# Patient Record
Sex: MALE | Race: WHITE | Employment: OTHER | URBAN - METROPOLITAN AREA
[De-identification: names, ages, dates, MRNs, and addresses within clinical notes are randomized per-mention and may not be internally consistent; named-entity substitution may affect disease eponyms.]

---

## 2018-02-10 ENCOUNTER — APPOINTMENT (OUTPATIENT)
Dept: NUCLEAR MEDICINE | Age: 73
DRG: 246 | End: 2018-02-10
Attending: EMERGENCY MEDICINE
Payer: MEDICARE

## 2018-02-10 ENCOUNTER — HOSPITAL ENCOUNTER (INPATIENT)
Age: 73
LOS: 5 days | Discharge: HOME HEALTH | DRG: 246 | End: 2018-02-15
Attending: EMERGENCY MEDICINE | Admitting: INTERNAL MEDICINE
Payer: MEDICARE

## 2018-02-10 ENCOUNTER — APPOINTMENT (OUTPATIENT)
Dept: GENERAL RADIOLOGY | Age: 73
DRG: 246 | End: 2018-02-10
Attending: EMERGENCY MEDICINE
Payer: MEDICARE

## 2018-02-10 DIAGNOSIS — R77.8 TROPONIN LEVEL ELEVATED: ICD-10-CM

## 2018-02-10 DIAGNOSIS — N17.9 AKI (ACUTE KIDNEY INJURY) (HCC): Primary | ICD-10-CM

## 2018-02-10 DIAGNOSIS — I48.91 ATRIAL FIBRILLATION WITH RVR (HCC): ICD-10-CM

## 2018-02-10 DIAGNOSIS — I50.9 ACUTE CONGESTIVE HEART FAILURE, UNSPECIFIED CONGESTIVE HEART FAILURE TYPE: ICD-10-CM

## 2018-02-10 DIAGNOSIS — R73.9 HYPERGLYCEMIA: ICD-10-CM

## 2018-02-10 PROBLEM — R00.0 TACHYCARDIA: Status: ACTIVE | Noted: 2018-02-10

## 2018-02-10 LAB
ALBUMIN SERPL-MCNC: 2.7 G/DL (ref 3.4–5)
ALBUMIN/GLOB SERPL: 0.7 {RATIO} (ref 0.8–1.7)
ALP SERPL-CCNC: 171 U/L (ref 45–117)
ALT SERPL-CCNC: 33 U/L (ref 16–61)
ANION GAP SERPL CALC-SCNC: 12 MMOL/L (ref 3–18)
APPEARANCE UR: CLEAR
APTT PPP: 27.7 SEC (ref 23–36.4)
APTT PPP: 94 SEC (ref 23–36.4)
AST SERPL-CCNC: 24 U/L (ref 15–37)
BACTERIA URNS QL MICRO: NORMAL /HPF
BASOPHILS # BLD: 0 K/UL (ref 0–0.06)
BASOPHILS NFR BLD: 0 % (ref 0–2)
BILIRUB SERPL-MCNC: 0.7 MG/DL (ref 0.2–1)
BILIRUB UR QL: NEGATIVE
BNP SERPL-MCNC: 4668 PG/ML (ref 0–900)
BUN SERPL-MCNC: 32 MG/DL (ref 7–18)
BUN/CREAT SERPL: 17 (ref 12–20)
CALCIUM SERPL-MCNC: 8.5 MG/DL (ref 8.5–10.1)
CHLORIDE SERPL-SCNC: 97 MMOL/L (ref 100–108)
CK MB CFR SERPL CALC: 4.1 % (ref 0–4)
CK MB CFR SERPL CALC: 4.4 % (ref 0–4)
CK MB SERPL-MCNC: 4.7 NG/ML (ref 5–25)
CK MB SERPL-MCNC: 6.3 NG/ML (ref 5–25)
CK SERPL-CCNC: 115 U/L (ref 39–308)
CK SERPL-CCNC: 142 U/L (ref 39–308)
CO2 SERPL-SCNC: 25 MMOL/L (ref 21–32)
COLOR UR: YELLOW
CREAT SERPL-MCNC: 1.91 MG/DL (ref 0.6–1.3)
D DIMER PPP FEU-MCNC: 0.57 UG/ML(FEU)
DIFFERENTIAL METHOD BLD: ABNORMAL
EOSINOPHIL # BLD: 0 K/UL (ref 0–0.4)
EOSINOPHIL NFR BLD: 0 % (ref 0–5)
EPITH CASTS URNS QL MICRO: NORMAL /LPF (ref 0–5)
ERYTHROCYTE [DISTWIDTH] IN BLOOD BY AUTOMATED COUNT: 14.4 % (ref 11.6–14.5)
EST. AVERAGE GLUCOSE BLD GHB EST-MCNC: 289 MG/DL
GLOBULIN SER CALC-MCNC: 3.9 G/DL (ref 2–4)
GLUCOSE BLD STRIP.AUTO-MCNC: 342 MG/DL (ref 70–110)
GLUCOSE BLD STRIP.AUTO-MCNC: 468 MG/DL (ref 70–110)
GLUCOSE BLD STRIP.AUTO-MCNC: 516 MG/DL (ref 70–110)
GLUCOSE SERPL-MCNC: 519 MG/DL (ref 74–99)
GLUCOSE SERPL-MCNC: 527 MG/DL (ref 74–99)
GLUCOSE UR STRIP.AUTO-MCNC: >1000 MG/DL
HBA1C MFR BLD: 11.7 % (ref 4.5–5.6)
HCT VFR BLD AUTO: 45.7 % (ref 36–48)
HGB BLD-MCNC: 15.4 G/DL (ref 13–16)
HGB UR QL STRIP: ABNORMAL
INR PPP: 1 (ref 0.8–1.2)
KETONES UR QL STRIP.AUTO: NEGATIVE MG/DL
LEUKOCYTE ESTERASE UR QL STRIP.AUTO: NEGATIVE
LYMPHOCYTES # BLD: 0.4 K/UL (ref 0.9–3.6)
LYMPHOCYTES NFR BLD: 3 % (ref 21–52)
MAGNESIUM SERPL-MCNC: 2.1 MG/DL (ref 1.6–2.6)
MCH RBC QN AUTO: 29.9 PG (ref 24–34)
MCHC RBC AUTO-ENTMCNC: 33.7 G/DL (ref 31–37)
MCV RBC AUTO: 88.7 FL (ref 74–97)
MONOCYTES # BLD: 1.3 K/UL (ref 0.05–1.2)
MONOCYTES NFR BLD: 9 % (ref 3–10)
NEUTS SEG # BLD: 12.3 K/UL (ref 1.8–8)
NEUTS SEG NFR BLD: 88 % (ref 40–73)
NITRITE UR QL STRIP.AUTO: NEGATIVE
PH UR STRIP: 5 [PH] (ref 5–8)
PLATELET # BLD AUTO: 444 K/UL (ref 135–420)
PMV BLD AUTO: 10.5 FL (ref 9.2–11.8)
POTASSIUM SERPL-SCNC: 5.4 MMOL/L (ref 3.5–5.5)
PROT SERPL-MCNC: 6.6 G/DL (ref 6.4–8.2)
PROT UR STRIP-MCNC: 300 MG/DL
PROTHROMBIN TIME: 12.5 SEC (ref 11.5–15.2)
RBC # BLD AUTO: 5.15 M/UL (ref 4.7–5.5)
RBC #/AREA URNS HPF: NORMAL /HPF (ref 0–5)
SODIUM SERPL-SCNC: 134 MMOL/L (ref 136–145)
SP GR UR REFRACTOMETRY: 1.01 (ref 1–1.03)
TROPONIN I SERPL-MCNC: 0.62 NG/ML (ref 0–0.06)
TROPONIN I SERPL-MCNC: 1.25 NG/ML (ref 0–0.06)
UROBILINOGEN UR QL STRIP.AUTO: 0.2 EU/DL (ref 0.2–1)
WBC # BLD AUTO: 14 K/UL (ref 4.6–13.2)
WBC URNS QL MICRO: NORMAL /HPF (ref 0–5)

## 2018-02-10 PROCEDURE — 74011250637 HC RX REV CODE- 250/637: Performed by: INTERNAL MEDICINE

## 2018-02-10 PROCEDURE — A9567 TECHNETIUM TC-99M AEROSOL: HCPCS

## 2018-02-10 PROCEDURE — 74011000250 HC RX REV CODE- 250: Performed by: EMERGENCY MEDICINE

## 2018-02-10 PROCEDURE — 71045 X-RAY EXAM CHEST 1 VIEW: CPT

## 2018-02-10 PROCEDURE — 85730 THROMBOPLASTIN TIME PARTIAL: CPT | Performed by: INTERNAL MEDICINE

## 2018-02-10 PROCEDURE — 96376 TX/PRO/DX INJ SAME DRUG ADON: CPT

## 2018-02-10 PROCEDURE — 85730 THROMBOPLASTIN TIME PARTIAL: CPT | Performed by: EMERGENCY MEDICINE

## 2018-02-10 PROCEDURE — 93005 ELECTROCARDIOGRAM TRACING: CPT

## 2018-02-10 PROCEDURE — 83880 ASSAY OF NATRIURETIC PEPTIDE: CPT | Performed by: EMERGENCY MEDICINE

## 2018-02-10 PROCEDURE — 74011636637 HC RX REV CODE- 636/637: Performed by: EMERGENCY MEDICINE

## 2018-02-10 PROCEDURE — 74011000250 HC RX REV CODE- 250: Performed by: INTERNAL MEDICINE

## 2018-02-10 PROCEDURE — 74011636637 HC RX REV CODE- 636/637: Performed by: INTERNAL MEDICINE

## 2018-02-10 PROCEDURE — 74011250636 HC RX REV CODE- 250/636

## 2018-02-10 PROCEDURE — 85025 COMPLETE CBC W/AUTO DIFF WBC: CPT | Performed by: EMERGENCY MEDICINE

## 2018-02-10 PROCEDURE — 74011250636 HC RX REV CODE- 250/636: Performed by: INTERNAL MEDICINE

## 2018-02-10 PROCEDURE — 83036 HEMOGLOBIN GLYCOSYLATED A1C: CPT | Performed by: INTERNAL MEDICINE

## 2018-02-10 PROCEDURE — 74011250636 HC RX REV CODE- 250/636: Performed by: EMERGENCY MEDICINE

## 2018-02-10 PROCEDURE — 82947 ASSAY GLUCOSE BLOOD QUANT: CPT

## 2018-02-10 PROCEDURE — 96374 THER/PROPH/DIAG INJ IV PUSH: CPT

## 2018-02-10 PROCEDURE — 82962 GLUCOSE BLOOD TEST: CPT

## 2018-02-10 PROCEDURE — 82550 ASSAY OF CK (CPK): CPT | Performed by: EMERGENCY MEDICINE

## 2018-02-10 PROCEDURE — 96375 TX/PRO/DX INJ NEW DRUG ADDON: CPT

## 2018-02-10 PROCEDURE — 74011250637 HC RX REV CODE- 250/637: Performed by: EMERGENCY MEDICINE

## 2018-02-10 PROCEDURE — 94762 N-INVAS EAR/PLS OXIMTRY CONT: CPT

## 2018-02-10 PROCEDURE — 85379 FIBRIN DEGRADATION QUANT: CPT | Performed by: EMERGENCY MEDICINE

## 2018-02-10 PROCEDURE — 99285 EMERGENCY DEPT VISIT HI MDM: CPT

## 2018-02-10 PROCEDURE — 80053 COMPREHEN METABOLIC PANEL: CPT | Performed by: EMERGENCY MEDICINE

## 2018-02-10 PROCEDURE — 83735 ASSAY OF MAGNESIUM: CPT | Performed by: EMERGENCY MEDICINE

## 2018-02-10 PROCEDURE — 85610 PROTHROMBIN TIME: CPT | Performed by: EMERGENCY MEDICINE

## 2018-02-10 PROCEDURE — 65660000000 HC RM CCU STEPDOWN

## 2018-02-10 PROCEDURE — 36415 COLL VENOUS BLD VENIPUNCTURE: CPT | Performed by: INTERNAL MEDICINE

## 2018-02-10 PROCEDURE — 81001 URINALYSIS AUTO W/SCOPE: CPT | Performed by: INTERNAL MEDICINE

## 2018-02-10 RX ORDER — FUROSEMIDE 10 MG/ML
60 INJECTION INTRAMUSCULAR; INTRAVENOUS 2 TIMES DAILY
Status: DISCONTINUED | OUTPATIENT
Start: 2018-02-10 | End: 2018-02-14

## 2018-02-10 RX ORDER — ADENOSINE 3 MG/ML
12 INJECTION, SOLUTION INTRAVENOUS
Status: COMPLETED | OUTPATIENT
Start: 2018-02-10 | End: 2018-02-10

## 2018-02-10 RX ORDER — ADENOSINE 3 MG/ML
INJECTION, SOLUTION INTRAVENOUS
Status: COMPLETED
Start: 2018-02-10 | End: 2018-02-10

## 2018-02-10 RX ORDER — ACETAMINOPHEN 325 MG/1
650 TABLET ORAL
Status: DISCONTINUED | OUTPATIENT
Start: 2018-02-10 | End: 2018-02-15 | Stop reason: HOSPADM

## 2018-02-10 RX ORDER — DEXTROSE 50 % IN WATER (D50W) INTRAVENOUS SYRINGE
25-50 AS NEEDED
Status: DISCONTINUED | OUTPATIENT
Start: 2018-02-10 | End: 2018-02-15 | Stop reason: HOSPADM

## 2018-02-10 RX ORDER — ADENOSINE 3 MG/ML
6 INJECTION, SOLUTION INTRAVENOUS
Status: COMPLETED | OUTPATIENT
Start: 2018-02-10 | End: 2018-02-10

## 2018-02-10 RX ORDER — ASPIRIN 81 MG/1
81 TABLET ORAL DAILY
Status: DISCONTINUED | OUTPATIENT
Start: 2018-02-11 | End: 2018-02-15 | Stop reason: HOSPADM

## 2018-02-10 RX ORDER — METOPROLOL TARTRATE 5 MG/5ML
5 INJECTION INTRAVENOUS ONCE
Status: COMPLETED | OUTPATIENT
Start: 2018-02-10 | End: 2018-02-10

## 2018-02-10 RX ORDER — METOPROLOL TARTRATE 25 MG/1
25 TABLET, FILM COATED ORAL 2 TIMES DAILY
Status: DISCONTINUED | OUTPATIENT
Start: 2018-02-10 | End: 2018-02-11

## 2018-02-10 RX ORDER — INSULIN LISPRO 100 [IU]/ML
INJECTION, SOLUTION INTRAVENOUS; SUBCUTANEOUS
Status: DISCONTINUED | OUTPATIENT
Start: 2018-02-10 | End: 2018-02-12

## 2018-02-10 RX ORDER — ATORVASTATIN CALCIUM 20 MG/1
20 TABLET, FILM COATED ORAL
Status: DISCONTINUED | OUTPATIENT
Start: 2018-02-10 | End: 2018-02-13

## 2018-02-10 RX ORDER — HEPARIN SODIUM 10000 [USP'U]/100ML
18-36 INJECTION, SOLUTION INTRAVENOUS
Status: DISCONTINUED | OUTPATIENT
Start: 2018-02-10 | End: 2018-02-14

## 2018-02-10 RX ORDER — MAGNESIUM SULFATE 100 %
4 CRYSTALS MISCELLANEOUS AS NEEDED
Status: DISCONTINUED | OUTPATIENT
Start: 2018-02-10 | End: 2018-02-15 | Stop reason: HOSPADM

## 2018-02-10 RX ORDER — INSULIN GLARGINE 100 [IU]/ML
0.2 INJECTION, SOLUTION SUBCUTANEOUS
Status: DISCONTINUED | OUTPATIENT
Start: 2018-02-10 | End: 2018-02-15 | Stop reason: HOSPADM

## 2018-02-10 RX ORDER — HEPARIN SODIUM 1000 [USP'U]/ML
80 INJECTION, SOLUTION INTRAVENOUS; SUBCUTANEOUS ONCE
Status: COMPLETED | OUTPATIENT
Start: 2018-02-10 | End: 2018-02-10

## 2018-02-10 RX ORDER — FUROSEMIDE 10 MG/ML
20 INJECTION INTRAMUSCULAR; INTRAVENOUS DAILY
Status: DISCONTINUED | OUTPATIENT
Start: 2018-02-11 | End: 2018-02-10

## 2018-02-10 RX ORDER — INSULIN LISPRO 100 [IU]/ML
INJECTION, SOLUTION INTRAVENOUS; SUBCUTANEOUS
Status: DISCONTINUED | OUTPATIENT
Start: 2018-02-10 | End: 2018-02-10

## 2018-02-10 RX ORDER — FUROSEMIDE 10 MG/ML
40 INJECTION INTRAMUSCULAR; INTRAVENOUS
Status: COMPLETED | OUTPATIENT
Start: 2018-02-10 | End: 2018-02-10

## 2018-02-10 RX ORDER — GUAIFENESIN 100 MG/5ML
162 LIQUID (ML) ORAL
Status: COMPLETED | OUTPATIENT
Start: 2018-02-10 | End: 2018-02-10

## 2018-02-10 RX ADMIN — METOPROLOL TARTRATE 5 MG: 5 INJECTION, SOLUTION INTRAVENOUS at 14:56

## 2018-02-10 RX ADMIN — FUROSEMIDE 40 MG: 10 INJECTION, SOLUTION INTRAMUSCULAR; INTRAVENOUS at 13:02

## 2018-02-10 RX ADMIN — ADENOSINE 12 MG: 3 INJECTION, SOLUTION INTRAVENOUS at 12:55

## 2018-02-10 RX ADMIN — METOPROLOL TARTRATE 5 MG: 5 INJECTION, SOLUTION INTRAVENOUS at 20:22

## 2018-02-10 RX ADMIN — HUMAN INSULIN 6 UNITS: 100 INJECTION, SOLUTION SUBCUTANEOUS at 13:02

## 2018-02-10 RX ADMIN — ATORVASTATIN CALCIUM 20 MG: 20 TABLET, FILM COATED ORAL at 21:56

## 2018-02-10 RX ADMIN — INSULIN LISPRO 15 UNITS: 100 INJECTION, SOLUTION INTRAVENOUS; SUBCUTANEOUS at 21:57

## 2018-02-10 RX ADMIN — METOPROLOL TARTRATE 25 MG: 25 TABLET ORAL at 20:14

## 2018-02-10 RX ADMIN — INSULIN GLARGINE 15 UNITS: 100 INJECTION, SOLUTION SUBCUTANEOUS at 21:57

## 2018-02-10 RX ADMIN — ADENOSINE 6 MG: 3 INJECTION, SOLUTION INTRAVENOUS at 12:48

## 2018-02-10 RX ADMIN — HEPARIN SODIUM 18 UNITS/KG/HR: 10000 INJECTION, SOLUTION INTRAVENOUS at 14:56

## 2018-02-10 RX ADMIN — HUMAN INSULIN 8 UNITS: 100 INJECTION, SOLUTION SUBCUTANEOUS at 14:19

## 2018-02-10 RX ADMIN — HEPARIN SODIUM 18 UNITS/KG/HR: 10000 INJECTION, SOLUTION INTRAVENOUS at 16:39

## 2018-02-10 RX ADMIN — ASPIRIN 81 MG 162 MG: 81 TABLET ORAL at 14:57

## 2018-02-10 RX ADMIN — FUROSEMIDE 60 MG: 10 INJECTION, SOLUTION INTRAMUSCULAR; INTRAVENOUS at 20:12

## 2018-02-10 RX ADMIN — HEPARIN SODIUM 6100 UNITS: 1000 INJECTION, SOLUTION INTRAVENOUS; SUBCUTANEOUS at 14:56

## 2018-02-10 NOTE — ED TRIAGE NOTES
Pt c/o sob since Tuesday, pt states worse each day, pt reports some swelling in rudolph ankles and exertional sob,   Denies chest pain or any other sx. Sepsis Screening completed    (  )Patient meets SIRS criteria. (x)Patient does not meet SIRS criteria.       SIRS Criteria is achieved when two or more of the following are present   Temperature < 96.8°F (36°C) or > 100.9°F (38.3°C)   Heart Rate > 90 beats per minute   Respiratory Rate > 20 breaths per minute   WBC count > 12,000 or <4,000 or > 10% bands

## 2018-02-10 NOTE — PROGRESS NOTES
1605:  Informed that SBAR was ready to be reviewed, awaiting patient arrival to unit. 1645:  Patient arrived to unit with Monika Pinon RN from ED. Bedside skin assessment performed, no abnormalities noted. Patient present difficulty breathing with exertion, Dr. Janay Flor ordered for patient to be on 2L O2 via NC. O2 sat 100%. Patient had +2 bilat pitting edema to LE. Patient had no complaints of pain or discomfort at the time, oriented to room, call bell, telephone, bed, television, and bathroom. Heparin drip verified with Elta Dys at 18 units/kg/hr, PTT ordered for start of drip. Whiteboard updated, bed at the lowest position with call bell within reach. Patient wife at bedside.

## 2018-02-10 NOTE — ED PROVIDER NOTES
EMERGENCY DEPARTMENT HISTORY AND PHYSICAL EXAM    Date: 2/10/2018  Patient Name: Keenan Mccarty. History of Presenting Illness     Chief Complaint   Patient presents with    Shortness of Breath         History Provided By: Patient    Chief Complaint: Dyspnea on exertion  Duration: 4 Days  Timing:  Worsening  Severity: Moderate  Associated Symptoms: BLE swelling    Additional History (Context):   12:36 PM  Keenan Martinez is a 81 y.o. male with PMHX NIDDM, HTN, HDL presents to the emergency department C/O gradually worsening dyspnea on exertion, onset 4 days ago. Worse with laying down at night. Associated sxs include BLE swelling. Tried cough syrup and ASA with no relief. Pt states he ran out of all of his medication a few months ago. Pt is not followed by cardiology and has no known cardiac history. Pt also c/o dizziness when he lays down at night, which causes sleep disturbance. Pt denies PMHX tachycardia, A-fib, CP, and any other sxs or complaints. PCP: Savannah Santos MD        Past History     Past Medical History:  Past Medical History:   Diagnosis Date    Asthma     Diabetes ([address]     High cholesterol     Hypertension        Past Surgical History:  Past Surgical History:   Procedure Laterality Date    HX CATARACT REMOVAL         Family History:  History reviewed. No pertinent family history. Social History:  Social History   Substance Use Topics    Smoking status: Former Smoker    Smokeless tobacco: Never Used    Alcohol use 0.6 oz/week     1 Cans of beer per week       Allergies:  No Known Allergies      Review of Systems   Review of Systems   Respiratory: Positive for shortness of breath. Cardiovascular: Positive for leg swelling. Negative for chest pain. Neurological: Positive for dizziness (when laying down at night). Psychiatric/Behavioral: Positive for sleep disturbance. All other systems reviewed and are negative.       Physical Exam     Vitals:    02/10/18 1224   BP: 104/59   Pulse: (!) 141   Resp: 20   Temp: 98.1 °F (36.7 °C)   SpO2: 94%   Weight: 76.2 kg (168 lb)   Height: 5' 7\" (1.702 m)     Physical Exam   Nursing note and vitals reviewed. Constitutional: Alert. Well appearing, no acute distress. Head: Normocephalic, Atraumatic  Eyes: Pupils are equal, round, and reactive to light, EOMI  ENT: Moist mucous membranes, oropharynx clear. Neck: Supple, non-tender  Cardiovascular: Tachycardic. Regular rhythm, no murmurs, rubs, or gallops  Chest: Normal work of breathing and chest excursion bilaterally  Lungs: Bibasilar crackles, R>L. Mildly SOB with speaking, no distress and able to speak in full sentences. Abdomen: Soft, non tender, non distended, normoactive bowel sounds  Back: No evidence of trauma or deformity. No CVA Tenderness. Extremities: No evidence of trauma or deformity. 1+ pitting edema in RLE and 2+ edema in knee and LLE.    Skin: Warm and dry  Neuro: Alert and appropriate, facial movement symmetric, normal speech, strength and sensation full and symmetric bilaterally, normal gait, normal coordination  Psychiatric: Normal mood and affect       Diagnostic Study Results     Labs -     Recent Results (from the past 12 hour(s))   EKG, 12 LEAD, INITIAL    Collection Time: 02/10/18 12:33 PM   Result Value Ref Range    Ventricular Rate 140 BPM    Atrial Rate 140 BPM    P-R Interval 136 ms    QRS Duration 116 ms    Q-T Interval 302 ms    QTC Calculation (Bezet) 461 ms    Calculated R Axis -14 degrees    Calculated T Axis 142 degrees    Diagnosis       Sinus tachycardia  Anteroseptal infarct , age undetermined  T wave abnormality, consider lateral ischemia  Abnormal ECG  No previous ECGs available     CBC WITH AUTOMATED DIFF    Collection Time: 02/10/18 12:38 PM   Result Value Ref Range    WBC 14.0 (H) 4.6 - 13.2 K/uL    RBC 5.15 4.70 - 5.50 M/uL    HGB 15.4 13.0 - 16.0 g/dL    HCT 45.7 36.0 - 48.0 %    MCV 88.7 74.0 - 97.0 FL    MCH 29.9 24.0 - 34.0 PG    MCHC 33.7 31.0 - 37.0 g/dL    RDW 14.4 11.6 - 14.5 %    PLATELET 205 (H) 654 - 420 K/uL    MPV 10.5 9.2 - 11.8 FL    NEUTROPHILS 88 (H) 40 - 73 %    LYMPHOCYTES 3 (L) 21 - 52 %    MONOCYTES 9 3 - 10 %    EOSINOPHILS 0 0 - 5 %    BASOPHILS 0 0 - 2 %    ABS. NEUTROPHILS 12.3 (H) 1.8 - 8.0 K/UL    ABS. LYMPHOCYTES 0.4 (L) 0.9 - 3.6 K/UL    ABS. MONOCYTES 1.3 (H) 0.05 - 1.2 K/UL    ABS. EOSINOPHILS 0.0 0.0 - 0.4 K/UL    ABS. BASOPHILS 0.0 0.0 - 0.06 K/UL    DF AUTOMATED     PROTHROMBIN TIME + INR    Collection Time: 02/10/18 12:38 PM   Result Value Ref Range    Prothrombin time 12.5 11.5 - 15.2 sec    INR 1.0 0.8 - 1.2     MAGNESIUM    Collection Time: 02/10/18 12:38 PM   Result Value Ref Range    Magnesium 2.1 1.6 - 2.6 mg/dL   METABOLIC PANEL, COMPREHENSIVE    Collection Time: 02/10/18 12:38 PM   Result Value Ref Range    Sodium 134 (L) 136 - 145 mmol/L    Potassium 5.4 3.5 - 5.5 mmol/L    Chloride 97 (L) 100 - 108 mmol/L    CO2 25 21 - 32 mmol/L    Anion gap 12 3.0 - 18 mmol/L    Glucose 519 (HH) 74 - 99 mg/dL    BUN 32 (H) 7.0 - 18 MG/DL    Creatinine 1.91 (H) 0.6 - 1.3 MG/DL    BUN/Creatinine ratio 17 12 - 20      GFR est AA 42 (L) >60 ml/min/1.73m2    GFR est non-AA 35 (L) >60 ml/min/1.73m2    Calcium 8.5 8.5 - 10.1 MG/DL    Bilirubin, total 0.7 0.2 - 1.0 MG/DL    ALT (SGPT) 33 16 - 61 U/L    AST (SGOT) 24 15 - 37 U/L    Alk.  phosphatase 171 (H) 45 - 117 U/L    Protein, total 6.6 6.4 - 8.2 g/dL    Albumin 2.7 (L) 3.4 - 5.0 g/dL    Globulin 3.9 2.0 - 4.0 g/dL    A-G Ratio 0.7 (L) 0.8 - 1.7     CARDIAC PANEL,(CK, CKMB & TROPONIN)    Collection Time: 02/10/18 12:38 PM   Result Value Ref Range     39 - 308 U/L    CK - MB 6.3 (H) <3.6 ng/ml    CK-MB Index 4.4 (H) 0.0 - 4.0 %    Troponin-I, Qt. 0.62 (H) 0.00 - 0.06 NG/ML   NT-PRO BNP    Collection Time: 02/10/18 12:38 PM   Result Value Ref Range    NT pro-BNP 4668 (H) 0 - 900 PG/ML   D DIMER    Collection Time: 02/10/18 12:38 PM   Result Value Ref Range    D DIMER 0.57 (H) <0.46 ug/ml(FEU)   PTT    Collection Time: 02/10/18 12:38 PM   Result Value Ref Range    aPTT 27.7 23.0 - 36.4 SEC   GLUCOSE, POC    Collection Time: 02/10/18 12:39 PM   Result Value Ref Range    Glucose (POC) 468 (HH) 70 - 110 mg/dL   GLUCOSE, POC    Collection Time: 02/10/18  2:15 PM   Result Value Ref Range    Glucose (POC) 342 (H) 70 - 110 mg/dL       Radiologic Studies -   1:38 PM  RADIOLOGY FINDINGS  Chest X-ray shows interstitial pulmonary edema with bilateral effusions. No infiltrate. Pending review by Radiologist  Recorded by Olivia Guerra, ED Scribe, as dictated by Willam Vargas MD     XR CHEST PORT    (Results Pending)   NM LUNG PERFUSION W VENT    (Results Pending)     CT Results  (Last 48 hours)    None        CXR Results  (Last 48 hours)    None            Medical Decision Making   I am the first provider for this patient. I reviewed the vital signs, available nursing notes, past medical history, past surgical history, family history and social history. Vital Signs-Reviewed the patient's vital signs. Pulse Oximetry Analysis - 94% on RA     Cardiac Monitor:  Rate: 142 bpm  Rhythm: Sinus tachycardia    EKG interpretation: (Preliminary)  12:33 PM   Sinus tachycardic at 140 bpm. T wave inversions in 1, 2, AVL, AVF, V5, V6. No ST segment change. Normal intervals. No CP. No STEMI. EKG read by Willam Vargas MD at 12:34 PM     Records Reviewed: Nursing Notes    Procedures:  Procedures    ED Course:   12:36 PM Initial assessment performed. The patients presenting problems have been discussed, and they are in agreement with the care plan formulated and outlined with them. I have encouraged them to ask questions as they arise throughout their visit. 12:58 PM Pt received 6 mg of Adenosine with no effect.  Subsequently receive 12 mg which results in slowing of his HR to reveal no significant underlying rhythm, so tachycardia is likely due to sinus tach.     2:22 PM Discussed patient's history, exam, and available diagnostics results with Betty Cronin MD, cardiology, who agrees with plan to empirically start heparin at the thromboembolism dosing until we can get the VQ results (no isotope until 4 PM) back because he will need anticoagulation due to elevated troponin anyway. Also recommends ASA and low dose beta blocker. Will consult on the pt.     2:52 PM Discussed patient's history, exam, and available diagnostics results with Emiliano Thompson MD, internal medicine, who agrees to admit to Telemetry. Will reassess and if pt becomes symptomatic, he will upgrade to ICU. Requests D-dimer and if it comes back negative, he will cancel the VQ.     3:45 PM Patient's heart rate improved after metoprolol and noted to be in a fib on the monitor. Diagnosis and Disposition       Critical Care Time:   2:55 PM  I have spent 35 minutes of critical care time involved in lab review, consultations with specialist, family decision-making, and documentation. During this entire length of time I was immediately available to the patient. Critical Care: The reason for providing this level of medical care for this critically ill patient was due a critical illness that impaired one or more vital organ systems such that there was a high probability of imminent or life threatening deterioration in the patients condition. This care involved high complexity decision making to assess, manipulate, and support vital system functions, to treat this degreee vital organ system failure and to prevent further life threatening deterioration of the patients condition. Core Measures:  For Hospitalized Patients:    1. Hospitalization Decision Time:  The decision to hospitalize the patient was made by Ting Donahue MD at 2:42 PM on 2/10/2018    2.  Aspirin: Aspirin was not given because the patient did not present with a stroke at the time of their Emergency Department evaluation    2:52 PM  Patient is being admitted to the hospital by Emiliano Ortiz MD. The results of their tests and reasons for their admission have been discussed with them and/or available family. They convey agreement and understanding for the need to be admitted and for their admission diagnosis. CONDITIONS ON ADMISSION:  Sepsis is not present at the time of admission. Pneumonia is not present at the time of admission. MRSA is not present at the time of admission. Wound infection is not present at the time of admission. Pressure Ulcer is not present at the time of admission. CLINICAL IMPRESSION:    1. ELINOR (acute kidney injury) ([address]    2. Atrial fibrillation with RVR ([facility] [address]    3. Acute congestive heart failure, unspecified congestive heart failure type ([address]    4. Hyperglycemia    5. Troponin level elevated        Discussion: 81 y.o. male presenting for 82 y/o male presents for evaluation of dyspnea on exertion secondary to new onset heart failure. He is tachycardic and initially received adenosine to evaluate for underlying arrhythmia of which none were noted at that time. His vital signs have otherwise been stable. He received IV metoprolol with improvement of HR and ultimately ended up showing underlying A-fib. At no point did he have CP, however, trop is elevated at 0.61. Labs also reveal elevated creatinine of 1.9, so unable to check CTA chest and awaiting VQ scan for further evaluation of possible PE. Discussed with Dr. Karen Casey, cardiology, who agrees with plan for empiric anticoagulation with Heparin given elevated troponin. Should his VQ scan be negative, this will be changed to ACS dosing rather than VTE dosing. Hes also noted to be hyperglycemic secondary to medication noncompliance with no evidence of DKA. This was treated with IV insulin. Will admit to Telemetry for diuresis and further cardiac evaluation. PLAN:  1.  Admit to Telemetry  ___________________________   Attestations:     CHRIS ATTESTATION:  This note is prepared by Bree Rios, acting as Scribe for Bebe Gonzalez MD.    PROVIDER ATTESTATION:  Bebe Gonzalez MD: The scribe's documentation has been prepared under my direction and personally reviewed by me in its entirety.  I confirm that the note above accurately reflects all work, treatment, procedures, and medical decision making performed by me.   _______________________________

## 2018-02-10 NOTE — IP AVS SNAPSHOT
[address] [address] 
[phone] Patient: Lucia Barbosa. MRN: [id number] EXO:[id number] About your hospitalization You were admitted on:  February 10, 2018 You last received care in the:  [address] [place] [address] You were discharged on:  February 15, 2018 Why you were hospitalized Your primary diagnosis was:  Nstemi (Non-St Elevated Myocardial Infarction) (Hcc) Your diagnoses also included: Tachycardia, Atrial Flutter (Hcc), Uncontrolled Type Ii Diabetes Mellitus (Hcc), Acute Renal Insufficiency, Systolic Chf, Acute (Hcc) Follow-up Information Follow up With Details Comments Contact Info Melinda Hopson MD On 2/19/2018 Follow-up appointment @ 2:45 p.m. 97 [facility] [address] [place] [address] 
[phone] Agusto Mcghee MD On 2/22/2018 Follow-up appointment @ 1:30 p.m. 355 [facility] [address] [place] [address] 
[phone] 3250 [facility],[address] to continue managing your healthcare needs. [phone] Discharge Orders None A check jasmin indicates which time of day the medication should be taken. My Medications START taking these medications Instructions Each Dose to Equal  
 Morning Noon Evening Bedtime  
 apixaban 5 mg tablet Commonly known as:  Ayla Bullock Your last dose was: Your next dose is: Take 1 Tab by mouth two (2) times a day. 5 mg  
    
   
   
   
  
 aspirin delayed-release 81 mg tablet Start taking on:  2/16/2018 Your last dose was: Your next dose is: Take 1 Tab by mouth daily. 81 mg  
    
   
   
   
  
 atorvastatin 40 mg tablet Commonly known as:  LIPITOR Your last dose was: Your next dose is: Take 1 Tab by mouth nightly. 40 mg  
    
   
   
   
  
 clopidogrel 75 mg Tab Commonly known as:  PLAVIX Start taking on:  2/16/2018 Your last dose was: Your next dose is: Take 1 Tab by mouth daily. 75 mg  
    
   
   
   
  
 furosemide 40 mg tablet Commonly known as:  LASIX Your last dose was: Your next dose is: Take 1 Tab by mouth daily. 40 mg  
    
   
   
   
  
 insulin glargine 100 unit/mL injection Commonly known as:  LANTUS Your last dose was: Your next dose is:    
   
   
 15 Units by SubCUTAneous route daily. 15 Units  
    
   
   
   
  
 losartan 25 mg tablet Commonly known as:  COZAAR Start taking on:  2/16/2018 Your last dose was: Your next dose is: Take 1 Tab by mouth daily. 25 mg  
    
   
   
   
  
 metoprolol succinate 50 mg XL tablet Commonly known as:  TOPROL-XL Start taking on:  2/16/2018 Your last dose was: Your next dose is: Take 1 Tab by mouth daily. 50 mg SITagliptin 50 mg tablet Commonly known as:  Marcia Loop Your last dose was: Your next dose is: Take 1 Tab by mouth daily. 50 mg Where to Get Your Medications These medications were sent to [address] [place] [address] [address] Phone:  [phone]  
  apixaban 5 mg tablet  
 aspirin delayed-release 81 mg tablet  
 atorvastatin 40 mg tablet  
 clopidogrel 75 mg Tab  
 furosemide 40 mg tablet  
 insulin glargine 100 unit/mL injection  
 losartan 25 mg tablet  
 metoprolol succinate 50 mg XL tablet SITagliptin 50 mg tablet Discharge Instructions DISCHARGE SUMMARY from Nurse PATIENT INSTRUCTIONS: 
 
Recommended activity: Activity as tolerated *  Please give a list of your current medications to your Primary Care Provider.  
 
*  Please update this list whenever your medications are discontinued, doses are 
 changed, or new medications (including over-the-counter products) are added. *  Please carry medication information at all times in case of emergency situations. These are general instructions for a healthy lifestyle: No smoking/ No tobacco products/ Avoid exposure to second hand smoke Surgeon General's Warning:  Quitting smoking now greatly reduces serious risk to your health. Obesity, smoking, and sedentary lifestyle greatly increases your risk for illness A healthy diet, regular physical exercise & weight monitoring are important for maintaining a healthy lifestyle You may be retaining fluid if you have a history of heart failure or if you experience any of the following symptoms:  Weight gain of 3 pounds or more overnight or 5 pounds in a week, increased swelling in our hands or feet or shortness of breath while lying flat in bed. Please call your doctor as soon as you notice any of these symptoms; do not wait until your next office visit. Recognize signs and symptoms of STROKE: 
 
F-face looks uneven A-arms unable to move or move unevenly S-speech slurred or non-existent T-time-call 911 as soon as signs and symptoms begin-DO NOT go Back to bed or wait to see if you get better-TIME IS BRAIN. Warning Signs of HEART ATTACK Call 911 if you have these symptoms: 
? Chest discomfort. Most heart attacks involve discomfort in the center of the chest that lasts more than a few minutes, or that goes away and comes back. It can feel like uncomfortable pressure, squeezing, fullness, or pain. ? Discomfort in other areas of the upper body. Symptoms can include pain or discomfort in one or both arms, the back, neck, jaw, or stomach. ? Shortness of breath with or without chest discomfort. ? Other signs may include breaking out in a cold sweat, nausea, or lightheadedness. Don't wait more than five minutes to call [address]!  Fast action can save your life. Calling 911 is almost always the fastest way to get lifesaving treatment. Emergency Medical Services staff can begin treatment when they arrive  up to an hour sooner than if someone gets to the hospital by car. The discharge information has been reviewed with the {PATIENT PARENT GUARDIAN:26040}. The {PATIENT PARENT GUARDIAN:31681} verbalized understanding. Discharge medications reviewed with the {Dishcarge meds reviewed [facility]} and appropriate educational materials and side effects teaching were provided. ___________________________________________________________________________________________________________________________________ Atrial Fibrillation: Care Instructions Your Care Instructions Atrial fibrillation is an irregular and often fast heartbeat. Treating this condition is important for several reasons. It can cause blood clots, which can travel from your heart to your brain and cause a stroke. If you have a fast heartbeat, you may feel lightheaded, dizzy, and weak. An irregular heartbeat can also increase your risk for heart failure. Atrial fibrillation is often the result of another heart condition, such as high blood pressure or coronary artery disease. Making changes to improve your heart condition will help you stay healthy and active. Follow-up care is a key part of your treatment and safety. Be sure to make and go to all appointments, and call your doctor if you are having problems. It's also a good idea to know your test results and keep a list of the medicines you take. How can you care for yourself at home? Medicines ? · Take your medicines exactly as prescribed. Call your doctor if you think you are having a problem with your medicine. You will get more details on the specific medicines your doctor prescribes.   
? · If your doctor has given you a blood thinner to prevent a stroke, be sure you get instructions about how to take your medicine safely. Blood thinners can cause serious bleeding problems. ? · Do not take any vitamins, over-the-counter drugs, or herbal products without talking to your doctor first. ? Lifestyle changes ? · Do not smoke. Smoking can increase your chance of a stroke and heart attack. If you need help quitting, talk to your doctor about stop-smoking programs and medicines. These can increase your chances of quitting for good. ? · Eat a heart-healthy diet. ? · Stay at a healthy weight. Lose weight if you need to.  
? · Limit alcohol to 2 drinks a day for men and 1 drink a day for women. Too much alcohol can cause health problems. ? · Avoid colds and flu. Get a pneumococcal vaccine shot. If you have had one before, ask your doctor whether you need another dose. Get a flu shot every year. If you must be around people with colds or flu, wash your hands often. Activity ? · If your doctor recommends it, get more exercise. Walking is a good choice. Bit by bit, increase the amount you walk every day. Try for at least 30 minutes on most days of the week. You also may want to swim, bike, or do other activities. Your doctor may suggest that you join a cardiac rehabilitation program so that you can have help increasing your physical activity safely. ? · Start light exercise if your doctor says it is okay. Even a small amount will help you get stronger, have more energy, and manage stress. Walking is an easy way to get exercise. Start out by walking a little more than you did in the hospital. Gradually increase the amount you walk. ? · When you exercise, watch for signs that your heart is working too hard. You are pushing too hard if you cannot talk while you are exercising. If you become short of breath or dizzy or have chest pain, sit down and rest immediately. ? · Check your pulse regularly.  Place two fingers on the artery at the palm side of your wrist, in line with your thumb. If your heartbeat seems uneven or fast, talk to your doctor. When should you call for help? Call 911 anytime you think you may need emergency care. For example, call if: 
? · You have symptoms of a heart attack. These may include: ¨ Chest pain or pressure, or a strange feeling in the chest. 
¨ Sweating. ¨ Shortness of breath. ¨ Nausea or vomiting. ¨ Pain, pressure, or a strange feeling in the back, neck, jaw, or upper belly or in one or both shoulders or arms. ¨ Lightheadedness or sudden weakness. ¨ A fast or irregular heartbeat. After you call 911, the  may tell you to chew 1 adult-strength or 2 to 4 low-dose aspirin. Wait for an ambulance. Do not try to drive yourself. ? · You have symptoms of a stroke. These may include: 
¨ Sudden numbness, tingling, weakness, or loss of movement in your face, arm, or leg, especially on only one side of your body. ¨ Sudden vision changes. ¨ Sudden trouble speaking. ¨ Sudden confusion or trouble understanding simple statements. ¨ Sudden problems with walking or balance. ¨ A sudden, severe headache that is different from past headaches. ? · You passed out (lost consciousness). ?Call your doctor now or seek immediate medical care if: 
? · You have new or increased shortness of breath. ? · You feel dizzy or lightheaded, or you feel like you may faint. ? · Your heart rate becomes irregular. ? · You can feel your heart flutter in your chest or skip heartbeats. Tell your doctor if these symptoms are new or worse. ? Watch closely for changes in your health, and be sure to contact your doctor if you have any problems. Where can you learn more? Go to http://chelita-tiarra.info/. Enter U020 in the search box to learn more about \"Atrial Fibrillation: Care Instructions. \" Current as of: September 21, 2016 Content Version: 11.4 © 8917-9663 [employer]. Care instructions adapted under license by [employer] (which disclaims liability or warranty for this information). If you have questions about a medical condition or this instruction, always ask your healthcare professional. [address] any warranty or liability for your use of this information. Heart Failure: Care Instructions Your Care Instructions Heart failure occurs when your heart does not pump as much blood as the body needs. Failure does not mean that the heart has stopped pumping but rather that it is not pumping as well as it should. Over time, this causes fluid buildup in your lungs and other parts of your body. Fluid buildup can cause shortness of breath, fatigue, swollen ankles, and other problems. By taking medicines regularly, reducing sodium (salt) in your diet, checking your weight every day, and making lifestyle changes, you can feel better and live longer. Follow-up care is a key part of your treatment and safety. Be sure to make and go to all appointments, and call your doctor if you are having problems. It's also a good idea to know your test results and keep a list of the medicines you take. How can you care for yourself at home? Medicines ? · Be safe with medicines. Take your medicines exactly as prescribed. Call your doctor if you think you are having a problem with your medicine. ? · Do not take any vitamins, over-the-counter medicine, or herbal products without talking to your doctor first. Valentin Speck not take ibuprofen (Advil or Motrin) and naproxen (Aleve) without talking to your doctor first. They could make your heart failure worse. ? · You may be taking some of the following medicine. ¨ Beta-blockers can slow heart rate, decrease blood pressure, and improve your condition. Taking a beta-blocker may lower your chance of needing to be hospitalized. ¨ Angiotensin-converting enzyme inhibitors (ACEIs) reduce the heart's workload, lower blood pressure, and reduce swelling. Taking an ACEI may lower your chance of needing to be hospitalized again. ¨ Angiotensin II receptor blockers (ARBs) work like ACEIs. Your doctor may prescribe them instead of ACEIs. ¨ Diuretics, also called water pills, reduce swelling. ¨ Potassium supplements replace this important mineral, which is sometimes lost with diuretics. ¨ Aspirin and other blood thinners prevent blood clots, which can cause a stroke or heart attack. ? You will get more details on the specific medicines your doctor prescribes. Diet ? · Your doctor may suggest that you limit sodium to 2,000 milligrams (mg) a day or less. That is less than 1 teaspoon of salt a day, including all the salt you eat in cooking or in packaged foods. People get most of their sodium from processed foods. Fast food and restaurant meals also tend to be very high in sodium. ? · Ask your doctor how much liquid you can drink each day. You may have to limit liquids. ?Weight ? · Weigh yourself without clothing at the same time each day. Record your weight. Call your doctor if you have a sudden weight gain, such as more than 2 to 3 pounds in a day or 5 pounds in a week. (Your doctor may suggest a different range of weight gain.) A sudden weight gain may mean that your heart failure is getting worse. ? Activity level ? · Start light exercise (if your doctor says it is okay). Even if you can only do a small amount, exercise will help you get stronger, have more energy, and manage your weight and your stress. Walking is an easy way to get exercise. Start out by walking a little more than you did before. Bit by bit, increase the amount you walk. ? · When you exercise, watch for signs that your heart is working too hard.  You are pushing yourself too hard if you cannot talk while you are exercising. If you become short of breath or dizzy or have chest pain, stop, sit down, and rest.  
? · If you feel \"wiped out\" the day after you exercise, walk slower or for a shorter distance until you can work up to a better pace. ? · Get enough rest at night. Sleeping with 1 or 2 pillows under your upper body and head may help you breathe easier. ? Lifestyle changes ? · Do not smoke. Smoking can make a heart condition worse. If you need help quitting, talk to your doctor about stop-smoking programs and medicines. These can increase your chances of quitting for good. Quitting smoking may be the most important step you can take to protect your heart. ? · Limit alcohol to 2 drinks a day for men and 1 drink a day for women. Too much alcohol can cause health problems. ? · Avoid getting sick from colds and the flu. Get a pneumococcal vaccine shot. If you have had one before, ask your doctor whether you need another dose. Get a flu shot each year. If you must be around people with colds or the flu, wash your hands often. When should you call for help? Call 911 if you have symptoms of sudden heart failure such as: 
? · You have severe trouble breathing. ? · You cough up pink, foamy mucus. ? · You have a new irregular or rapid heartbeat. ?Call your doctor now or seek immediate medical care if: 
? · You have new or increased shortness of breath. ? · You are dizzy or lightheaded, or you feel like you may faint. ? · You have sudden weight gain, such as more than 2 to 3 pounds in a day or 5 pounds in a week. (Your doctor may suggest a different range of weight gain.) ? · You have increased swelling in your legs, ankles, or feet. ? · You are suddenly so tired or weak that you cannot do your usual activities. ? Watch closely for changes in your health, and be sure to contact your doctor if you develop new symptoms. Where can you learn more? Go to http://chelita-tiarra.info/. Enter V498 in the search box to learn more about \"Heart Failure: Care Instructions. \" Current as of: September 21, 2016 Content Version: 11.4 © 9115-0129 [employer]. Care instructions adapted under license by [employer] (which disclaims liability or warranty for this information). If you have questions about a medical condition or this instruction, always ask your healthcare professional. [facility] any warranty or liability for your use of this information. Learning About Meal Planning for Diabetes Why plan your meals? Meal planning can be a key part of managing diabetes. Planning meals and snacks with the right balance of carbohydrate, protein, and fat can help you keep your blood sugar at the target level you set with your doctor. You don't have to eat special foods. You can eat what your family eats, including sweets once in a while. But you do have to pay attention to how often you eat and how much you eat of certain foods. You may want to work with a dietitian or a certified diabetes educator. He or she can give you tips and meal ideas and can answer your questions about meal planning. This health professional can also help you reach a healthy weight if that is one of your goals. What plan is right for you? Your dietitian or diabetes educator may suggest that you start with the plate format or carbohydrate counting. The plate format The plate format is a simple way to help you manage how you eat. You plan meals by learning how much space each food should take on a plate. Using the plate format helps you spread carbohydrate throughout the day. It can make it easier to keep your blood sugar level within your target range. It also helps you see if you're eating healthy portion sizes. To use the plate format, you put non-starchy vegetables on half your plate. Add meat or meat substitutes on one-quarter of the plate.  Put a grain or starchy vegetable (such as brown rice or a potato) on the final quarter of the plate. You can add a small piece of fruit and some low-fat or fat-free milk or yogurt, depending on your carbohydrate goal for each meal. 
Here are some tips for using the plate format: · Make sure that you are not using an oversized plate. A 9-inch plate is best. Many restaurants use larger plates. · Get used to using the plate format at home. Then you can use it when you eat out. · Write down your questions about using the plate format. Talk to your doctor, a dietitian, or a diabetes educator about your concerns. Carbohydrate counting With carbohydrate counting, you plan meals based on the amount of carbohydrate in each food. Carbohydrate raises blood sugar higher and more quickly than any other nutrient. It is found in desserts, breads and cereals, and fruit. It's also found in starchy vegetables such as potatoes and corn, grains such as rice and pasta, and milk and yogurt. Spreading carbohydrate throughout the day helps keep your blood sugar levels within your target range. Your daily amount depends on several things, including your weight, how active you are, which diabetes medicines you take, and what your goals are for your blood sugar levels. A registered dietitian or diabetes educator can help you plan how much carbohydrate to include in each meal and snack. A guideline for your daily amount of carbohydrate is: · 45 to 60 grams at each meal. That's about the same as 3 to 4 carbohydrate servings. · 15 to 20 grams at each snack. That's about the same as 1 carbohydrate serving. The Nutrition Facts label on packaged foods tells you how much carbohydrate is in a serving of the food. First, look at the serving size on the food label. Is that the amount you eat in a serving? All of the nutrition information on a food label is based on that serving size.  So if you eat more or less than that, you'll need to adjust the other numbers. Total carbohydrate is the next thing you need to look for on the label. If you count carbohydrate servings, one serving of carbohydrate is 15 grams. For foods that don't come with labels, such as fresh fruits and vegetables, you'll need a guide that lists carbohydrate in these foods. Ask your doctor, dietitian, or diabetes educator about books or other nutrition guides you can use. If you take insulin, you need to know how many grams of carbohydrate are in a meal. This lets you know how much rapid-acting insulin to take before you eat. If you use an insulin pump, you get a constant rate of insulin during the day. So the pump must be programmed at meals to give you extra insulin to cover the rise in blood sugar after meals. When you know how much carbohydrate you will eat, you can take the right amount of insulin. Or, if you always use the same amount of insulin, you need to make sure that you eat the same amount of carbohydrate at meals. If you need more help to understand carbohydrate counting and food labels, ask your doctor, dietitian, or diabetes educator. How do you get started with meal planning? Here are some tips to get started: 
· Plan your meals a week at a time. Don't forget to include snacks too. · Use cookbooks or online recipes to plan several main meals. Plan some quick meals for busy nights. You also can double some recipes that freeze well. Then you can save half for other busy nights when you don't have time to cook. · Make sure you have the ingredients you need for your recipes. If you're running low on basic items, put these items on your shopping list too. · List foods that you use to make breakfasts, lunches, and snacks. List plenty of fruits and vegetables. · Post this list on the refrigerator. Add to it as you think of more things you need. · Take the list to the store to do your weekly shopping. Follow-up care is a key part of your treatment and safety. Be sure to make and go to all appointments, and call your doctor if you are having problems. It's also a good idea to know your test results and keep a list of the medicines you take. Where can you learn more? Go to http://chelita-tiarra.info/. Rachel Milian in the search box to learn more about \"Learning About Meal Planning for Diabetes. \" Current as of: March 13, 2017 Content Version: 11.4 © 0202-7879 [employer]. Care instructions adapted under license by [employer] (which disclaims liability or warranty for this information). If you have questions about a medical condition or this instruction, always ask your healthcare professional. [address] any warranty or liability for your use of this information. Learning About Diabetes and Coronary Artery Disease How are diabetes and heart disease connected? Many people think diabetes and heart disease go hand in hand. But having diabetes doesn't have to mean that you are going to have a heart attack someday. Healthy living can help prevent many of the problems that come with both diabetes and heart disease. For some people, diabetes can cause problems in your body that may lead to heart disease. Diabetes can make the problems of heart disease worse. Experts do not fully understand how diabetes affects the heart. Many things can lead to heart disease, including high blood sugar, insulin resistance, high cholesterol, and high blood pressure. But lifestyle and genetics may also affect a person's risk. But here's the good news: The good things you're doing to stay healthy with diabetes-eating healthy foods, quitting smoking, getting exercise and more-are also helping your heart. What increases your risk for heart disease? When you have diabetes, your risk for heart disease is even higher if you have: · High blood pressure, which pushes blood through the arteries with too much force. Over time, this damages the walls of the arteries. · High cholesterol, which causes the buildup of a kind of fat inside the blood vessel walls. This buildup can lower blood flow to the heart muscle and raise your risk for having a heart attack. · Kidney damage, which shares many of the risk factors for heart disease (such as high blood sugar, high blood pressure, and high cholesterol). How can you keep your heart healthy when you have diabetes? Managing your diabetes and keeping your heart healthy are two sides of the same coin. Here are some things you can do. · Test your blood sugar levels and get your diabetes tests on schedule. Try to keep your numbers within your target range. · Keep track of your blood pressure. The target for most people with diabetes is below 140/90. Your doctor will give you a goal that's right for you. If your blood pressure is high, your treatment may also include medicine. Changes in your lifestyle, such as staying at a healthy weight, may also help you lower your blood pressure. · Eat heart-healthy foods. These include fruits, vegetables, whole grains, fish, and low-fat or nonfat dairy foods. Limit sodium, alcohol, and sweets. · If your doctor recommends it, get more exercise. Walking is a good choice. Bit by bit, increase the amount you walk every day. Try for at least 30 minutes on most days of the week. · Do not smoke. Smoking can make diabetes and heart disease worse. If you need help quitting, talk to your doctor about stop-smoking programs and medicines. These can increase your chances of quitting for good. · Your doctor may talk with you about taking medicines for your heart. For example, your doctor may suggest taking a statin or daily aspirin. Where can you learn more? Go to http://chelita-tiarra.info/. Enter H878 in the search box to learn more about \"Learning About Diabetes and Coronary Artery Disease. \" Current as of: March 13, 2017 Content Version: 11.4 © 5976-3021 [employer]adoÂ°. Care instructions adapted under license by [employer] (which disclaims liability or warranty for this information). If you have questions about a medical condition or this instruction, always ask your healthcare professional. [address] any warranty or liability for your use of this information. Heart Failure: Care Instructions Your Care Instructions Heart failure occurs when your heart does not pump as much blood as the body needs. Failure does not mean that the heart has stopped pumping but rather that it is not pumping as well as it should. Over time, this causes fluid buildup in your lungs and other parts of your body. Fluid buildup can cause shortness of breath, fatigue, swollen ankles, and other problems. By taking medicines regularly, reducing sodium (salt) in your diet, checking your weight every day, and making lifestyle changes, you can feel better and live longer. Follow-up care is a key part of your treatment and safety. Be sure to make and go to all appointments, and call your doctor if you are having problems. It's also a good idea to know your test results and keep a list of the medicines you take. How can you care for yourself at home? Medicines ? · Be safe with medicines. Take your medicines exactly as prescribed. Call your doctor if you think you are having a problem with your medicine. ? · Do not take any vitamins, over-the-counter medicine, or herbal products without talking to your doctor first. Christian Garcia not take ibuprofen (Advil or Motrin) and naproxen (Aleve) without talking to your doctor first. They could make your heart failure worse. ? · You may be taking some of the following medicine. ¨ Beta-blockers can slow heart rate, decrease blood pressure, and improve your condition. Taking a beta-blocker may lower your chance of needing to be hospitalized. ¨ Angiotensin-converting enzyme inhibitors (ACEIs) reduce the heart's workload, lower blood pressure, and reduce swelling. Taking an ACEI may lower your chance of needing to be hospitalized again. ¨ Angiotensin II receptor blockers (ARBs) work like ACEIs. Your doctor may prescribe them instead of ACEIs. ¨ Diuretics, also called water pills, reduce swelling. ¨ Potassium supplements replace this important mineral, which is sometimes lost with diuretics. ¨ Aspirin and other blood thinners prevent blood clots, which can cause a stroke or heart attack. ? You will get more details on the specific medicines your doctor prescribes. Diet ? · Your doctor may suggest that you limit sodium to 2,000 milligrams (mg) a day or less. That is less than 1 teaspoon of salt a day, including all the salt you eat in cooking or in packaged foods. People get most of their sodium from processed foods. Fast food and restaurant meals also tend to be very high in sodium. ? · Ask your doctor how much liquid you can drink each day. You may have to limit liquids. ?Weight ? · Weigh yourself without clothing at the same time each day. Record your weight. Call your doctor if you have a sudden weight gain, such as more than 2 to 3 pounds in a day or 5 pounds in a week. (Your doctor may suggest a different range of weight gain.) A sudden weight gain may mean that your heart failure is getting worse. ? Activity level ? · Start light exercise (if your doctor says it is okay). Even if you can only do a small amount, exercise will help you get stronger, have more energy, and manage your weight and your stress. Walking is an easy way to get exercise. Start out by walking a little more than you did before. Bit by bit, increase the amount you walk. ? · When you exercise, watch for signs that your heart is working too hard. You are pushing yourself too hard if you cannot talk while you are exercising. If you become short of breath or dizzy or have chest pain, stop, sit down, and rest.  
? · If you feel \"wiped out\" the day after you exercise, walk slower or for a shorter distance until you can work up to a better pace. ? · Get enough rest at night. Sleeping with 1 or 2 pillows under your upper body and head may help you breathe easier. ? Lifestyle changes ? · Do not smoke. Smoking can make a heart condition worse. If you need help quitting, talk to your doctor about stop-smoking programs and medicines. These can increase your chances of quitting for good. Quitting smoking may be the most important step you can take to protect your heart. ? · Limit alcohol to 2 drinks a day for men and 1 drink a day for women. Too much alcohol can cause health problems. ? · Avoid getting sick from colds and the flu. Get a pneumococcal vaccine shot. If you have had one before, ask your doctor whether you need another dose. Get a flu shot each year. If you must be around people with colds or the flu, wash your hands often. When should you call for help? Call 911 if you have symptoms of sudden heart failure such as: 
? · You have severe trouble breathing. ? · You cough up pink, foamy mucus. ? · You have a new irregular or rapid heartbeat. ?Call your doctor now or seek immediate medical care if: 
? · You have new or increased shortness of breath. ? · You are dizzy or lightheaded, or you feel like you may faint. ? · You have sudden weight gain, such as more than 2 to 3 pounds in a day or 5 pounds in a week. (Your doctor may suggest a different range of weight gain.) ? · You have increased swelling in your legs, ankles, or feet. ? · You are suddenly so tired or weak that you cannot do your usual activities. ?Watch closely for changes in your health, and be sure to contact your doctor if you develop new symptoms. Where can you learn more? Go to http://chelita-tiarra.info/. Enter V021 in the search box to learn more about \"Heart Failure: Care Instructions. \" Current as of: September 21, 2016 Content Version: 11.4 © 5579-1460 [employer]. Care instructions adapted under license by [employer] (which disclaims liability or warranty for this information). If you have questions about a medical condition or this instruction, always ask your healthcare professional. [place] [address] any warranty or liability for your use of this information. Managing Other Conditions When You Have Heart Failure: Care Instructions Your Care Instructions All the systems in your body rely on each other to work properly. Heart failure has effects all through your body that can lead to other problems, such as kidney disease. The reverse is also true. A condition like diabetes or lung disease can damage or stress your heart and cause heart failure. Managing any other problems can help reduce your heart's workload and make your heart failure better. Conditions that commonly cause or occur along with heart failure include high blood pressure, diabetes, COPD, high cholesterol, kidney problems, anemia, and arthritis. Follow-up care is a key part of your treatment and safety. Be sure to make and go to all appointments, and call your doctor if you are having problems. It's also a good idea to know your test results and keep a list of the medicines you take. How can you care for yourself at home? Steps to help with heart failure and other problems · Eat less salt (sodium). This helps keep fluid from building up. It may help you feel better. Limiting sodium can also help if you have high blood pressure or kidney disease. · Watch your fluid intake if your doctor tells you to. Reducing fluids can ease your heart's workload and keep your sodium level in balance. · Get regular exercise. Regular, moderate exercise, such as walking, helps your heart. It can also help lower your blood pressure, lower stress, and help you lose weight. · Lose weight if you are overweight. Losing weight can help you manage diabetes, lower your blood pressure and cholesterol level, and reduce the workload on your heart. · Stop smoking. Smoking stresses your lungs, interferes with healing, and can make heart failure worse. · Limit alcohol. Alcohol can raise your blood pressure. Ask your doctor how much, if any, is safe. If your doctor has not set you up with a cardiac rehabilitation (rehab) program, talk to him or her about whether that is right for you. Cardiac rehab includes exercise, help with diet and lifestyle changes, and emotional support. To stay as healthy as possible · Work closely with your doctor. Have all your tests, and go to all your appointments. · Take your medicines exactly as prescribed. You will take medicines to treat the other conditions you have along with heart failure. It can be hard to balance the treatment for all your conditions. You will need to have follow-up tests to make sure that all your medicines are working well together. Talk to your doctor if you have any problems with your medicine. · Keep all your doctors informed about your health problems and all the medicines you take for them. Medicines that can treat one condition may make another condition worse. · Talk to your doctor before you take any vitamins, over-the-counter drugs, or herbal products. Do not take aspirin, ibuprofen (Advil, Motrin), or naproxen (Aleve) unless you talk to your doctor first. They could make your heart failure and other problems worse. When should you call for help? Call 911 if you have symptoms of sudden heart failure, such as: ? · You have severe trouble breathing. ? · You cough up pink, foamy mucus. ? · You have a new irregular or rapid heartbeat. ?Call 911 if you have symptoms of a heart attack. These may include: 
? · Chest pain or pressure, or a strange feeling in the chest.  
? · Sweating. ? · Shortness of breath. ? · Nausea or vomiting. ? · Pain, pressure, or a strange feeling in the back, neck, jaw, or upper belly or in one or both shoulders or arms. ? · Lightheadedness or sudden weakness. ? · A fast or irregular heartbeat. ? After you call 911, the  may tell you to chew 1 adult-strength or 2 to 4 low-dose aspirin. Wait for an ambulance. Do not try to drive yourself. ?Call your doctor now or seek immediate medical care if: 
? · You have new or increased shortness of breath. ? · You are dizzy or lightheaded, or you feel like you may faint. ? · You have sudden weight gain, such as more than 2 to 3 pounds in a day or 5 pounds in a week. (Your doctor may suggest a different range of weight gain.) ? · You have increased swelling in your legs, ankles, or feet. ? · You are suddenly so tired or weak that you cannot do your usual activities. ? Watch closely for changes in your health, and be sure to contact your doctor if you develop new symptoms. Where can you learn more? Go to http://chelita-tiarra.info/. Enter P052 in the search box to learn more about \"Managing Other Conditions When You Have Heart Failure: Care Instructions. \" Current as of: September 21, 2016 Content Version: 11.4 © 2470-4003 [employer]. Care instructions adapted under license by [employer] (which disclaims liability or warranty for this information). If you have questions about a medical condition or this instruction, always ask your healthcare professional. [address] any warranty or liability for your use of this information. Learning About Fluid Overload What is fluid overload? Fluid overload means that your body has too much water. The extra fluid in your body can raise your blood pressure and force your heart to work harder. It can also make it hard for you to breathe. Most of your body is made up of water. The body uses minerals like sodium and potassium to help organs such as your heart, kidneys, and liver balance how much water you need. For example, the heart pumps blood to move water around the body. And the kidneys work to get rid of the water that the body doesn't need. Health conditions like kidney disease, heart failure, and cirrhosis can cause fluid overload. Other things can cause extra fluid to build up. IV fluids, some medicines, too much salt (sodium) from food, and certain medical treatments can sometimes cause this fluid increase. What are the symptoms? Some of the most common symptoms are: 
· Gaining weight over a short period of time. · Swelling in the ankles or legs. · Shortness of breath. How is it treated? The goal of treatment is to remove the extra fluid in your body. Your treatment will depend on the cause. Your doctor may: · Give you medicines, such as diuretics (also called \"water pills\"). They help your body get rid of the extra fluid. · Restrict your fluid or salt intake. Follow-up care is a key part of your treatment and safety. Be sure to make and go to all appointments, and call your doctor if you are having problems. It's also a good idea to know your test results and keep a list of the medicines you take. Where can you learn more? Go to http://chelita-tiarra.info/. Enter O110 in the search box to learn more about \"Learning About Fluid Overload. \" Current as of: September 21, 2016 Content Version: 11.4 © 3243-7494 [employer].  Care instructions adapted under license by [email or web address] (which disclaims liability or warranty for this information). If you have questions about a medical condition or this instruction, always ask your healthcare professional. [address] any warranty or liability for your use of this information. Cardiac Catheterization/Angiography Discharge Instructions *Check the puncture site frequently for swelling or bleeding. If you see any bleeding, lie down and apply pressure over the area with a clean town or washcloth. Notify your doctor for any redness, swelling, drainage or oozing from the puncture site. Notify your doctor for any fever or chills. *If the leg or arm with the puncture becomes cold, numb or painful, call Dr Rowena Umana office. *Activity should be limited for the next 48 hours. Climb stairs as little as possible and avoid any stooping, bending or strenuous activity for 48 hours. No heavy lifting (anything over 10 pounds) for three days. *Do not drive for 48 hours. *You may resume your usual diet. Drink more fluids than usual. 
 
*Have a responsible person drive you home and stay with you for at least 24 hours after your heart catheterization/angiography. *You may remove the bandage from your Left and Arm in 24 hours. You may shower in 24 hours. No tub baths, hot tubs or swimming for one week. Do not place any lotions, creams, powders, ointments over the puncture site for one week. You may place a clean band-aid over the puncture site each day for 5 days. Change this daily. Lab Results Component Value Date/Time Hemoglobin A1c 11.7 (H) 02/10/2018 12:38 PM  
 
 
This lab test reflects that your blood sugar has been slightly elevated over the past 3 months and should be evaluated by your primary care provider. An A1C of 5.7-6.4% meets the criteria for pre-diabetes; an A1C of 6.5% or higher meets the criteria for diabetes.   
 
This lab test reflects that your blood sugar averaged 289 mg/dL  over the past 3 months. It is important to follow up with your provider on a routine basis to continue to evaluate your blood sugar and discuss any necessary changes in treatment. [employer] Announcement We are excited to announce that we are making your provider's discharge notes available to you in [employer]. You will see these notes when they are completed and signed by the physician that discharged you from your recent hospital stay. If you have any questions or concerns about any information you see in [employer], please call the Health Information Department where you were seen or reach out to your Primary Care Provider for more information about your plan of care. Introducing [facility]! Vicenta Lim introduces [employer] patient portal. Now you can access parts of your medical record, email your doctor's office, and request medication refills online. 1. In your internet browser, go to [email or web address] 2. Click on the First Time User? Click Here link in the Sign In box. You will see the New Member Sign Up page. 3. Enter your [employer] Access Code exactly as it appears below. You will not need to use this code after youve completed the sign-up process. If you do not sign up before the expiration date, you must request a new code. · [employer] Access Code: [email or web address] Expires: 5/16/2018 12:33 PM 
 
4. Enter the last four digits of your Social Security Number (xxxx) and Date of Birth (mm/dd/yyyy) as indicated and click Submit. You will be taken to the next sign-up page. 5. Create a [employer] ID. This will be your [employer] login ID and cannot be changed, so think of one that is secure and easy to remember. 6. Create a [employer] password. You can change your password at any time. 7. Enter your Password Reset Question and Answer. This can be used at a later time if you forget your password. 8. Enter your e-mail address.  You will receive e-mail notification when new information is available in [employer]. 9. Click Sign Up. You can now view and download portions of your medical record. 10. Click the Download Summary menu link to download a portable copy of your medical information. If you have questions, please visit the Frequently Asked Questions section of the [employer] website. Remember, [employer] is NOT to be used for urgent needs. For medical emergencies, dial 911. Now available from your iPhone and Android! Providers Seen During Your Hospitalization Provider Specialty Primary office phone Darin Corrigan MD Emergency Medicine [phone] Toro Katz MD Internal Medicine [phone] Your Primary Care Physician (PCP) Primary Care Physician Office Phone Office Fax OTHER, PHYS ** None ** ** None ** You are allergic to the following No active allergies Recent Documentation Height Weight BMI Smoking Status 1.702 m 76.8 kg 26.53 kg/m2 Former Smoker Emergency Contacts Name Discharge Info Relation Home Work Mobile [address] [facility] CAREGIVER [3] Spouse [3]   [phone] Patient Belongings The following personal items are in your possession at time of discharge: 
  Dental Appliances: None  Visual Aid: None      Home Medications: None   Jewelry: None  Clothing: Footwear, Pants, Socks, Shirt, At bedside Discharge Instructions Attachments/References DIET: DASH (ENGLISH) Patient Handouts DASH Diet: Care Instructions Your Care Instructions The DASH diet is an eating plan that can help lower your blood pressure. DASH stands for Dietary Approaches to Stop Hypertension. Hypertension is high blood pressure. The DASH diet focuses on eating foods that are high in calcium, potassium, and magnesium. These nutrients can lower blood pressure.  The foods that are highest in these nutrients are fruits, vegetables, low-fat dairy products, nuts, seeds, and legumes. But taking calcium, potassium, and magnesium supplements instead of eating foods that are high in those nutrients does not have the same effect. The DASH diet also includes whole grains, fish, and poultry. The DASH diet is one of several lifestyle changes your doctor may recommend to lower your high blood pressure. Your doctor may also want you to decrease the amount of sodium in your diet. Lowering sodium while following the DASH diet can lower blood pressure even further than just the DASH diet alone. Follow-up care is a key part of your treatment and safety. Be sure to make and go to all appointments, and call your doctor if you are having problems. It's also a good idea to know your test results and keep a list of the medicines you take. How can you care for yourself at home? Following the DASH diet · Eat 4 to 5 servings of fruit each day. A serving is 1 medium-sized piece of fruit, ½ cup chopped or canned fruit, 1/4 cup dried fruit, or 4 ounces (½ cup) of fruit juice. Choose fruit more often than fruit juice. · Eat 4 to 5 servings of vegetables each day. A serving is 1 cup of lettuce or raw leafy vegetables, ½ cup of chopped or cooked vegetables, or 4 ounces (½ cup) of vegetable juice. Choose vegetables more often than vegetable juice. · Get 2 to 3 servings of low-fat and fat-free dairy each day. A serving is 8 ounces of milk, 1 cup of yogurt, or 1 ½ ounces of cheese. · Eat 6 to 8 servings of grains each day. A serving is 1 slice of bread, 1 ounce of dry cereal, or ½ cup of cooked rice, pasta, or cooked cereal. Try to choose whole-grain products as much as possible. · Limit lean meat, poultry, and fish to 2 servings each day. A serving is 3 ounces, about the size of a deck of cards. · Eat 4 to 5 servings of nuts, seeds, and legumes (cooked dried beans, lentils, and split peas) each week.  A serving is 1/3 cup of nuts, 2 tablespoons of seeds, or ½ cup of cooked beans or peas. · Limit fats and oils to 2 to 3 servings each day. A serving is 1 teaspoon of vegetable oil or 2 tablespoons of salad dressing. · Limit sweets and added sugars to 5 servings or less a week. A serving is 1 tablespoon jelly or jam, ½ cup sorbet, or 1 cup of lemonade. · Eat less than 2,300 milligrams (mg) of sodium a day. If you limit your sodium to 1,500 mg a day, you can lower your blood pressure even more. Tips for success · Start small. Do not try to make dramatic changes to your diet all at once. You might feel that you are missing out on your favorite foods and then be more likely to not follow the plan. Make small changes, and stick with them. Once those changes become habit, add a few more changes. · Try some of the following: ¨ Make it a goal to eat a fruit or vegetable at every meal and at snacks. This will make it easy to get the recommended amount of fruits and vegetables each day. ¨ Try yogurt topped with fruit and nuts for a snack or healthy dessert. ¨ Add lettuce, tomato, cucumber, and onion to sandwiches. ¨ Combine a ready-made pizza crust with low-fat mozzarella cheese and lots of vegetable toppings. Try using tomatoes, squash, spinach, broccoli, carrots, cauliflower, and onions. ¨ Have a variety of cut-up vegetables with a low-fat dip as an appetizer instead of chips and dip. ¨ Sprinkle sunflower seeds or chopped almonds over salads. Or try adding chopped walnuts or almonds to cooked vegetables. ¨ Try some vegetarian meals using beans and peas. Add garbanzo or kidney beans to salads. Make burritos and tacos with mashed hernandez beans or black beans. Where can you learn more? Go to http://chelita-tiarra.info/. Enter A513 in the search box to learn more about \"DASH Diet: Care Instructions. \" Current as of: September 21, 2016 Content Version: 11.4 © 9815-4606 [employer].  Care instructions adapted under license by [address] (which disclaims liability or warranty for this information). If you have questions about a medical condition or this instruction, always ask your healthcare professional. [address] any warranty or liability for your use of this information. Please provide this summary of care documentation to your next provider. Signatures-by signing, you are acknowledging that this After Visit Summary has been reviewed with you and you have received a copy. Patient Signature:  ____________________________________________________________ Date:  ____________________________________________________________  
  
Ser Officer Provider Signature:  ____________________________________________________________ Date:  ____________________________________________________________

## 2018-02-10 NOTE — H&P
HISTORY AND PHYSICAL EXAMINATION      Assessment:     Patient Active Problem List    Diagnosis Date Noted    Tachycardia 02/10/2018     1. Acute resp distress   2. Atrial fibrillation with RVR ([facility] [address]    3. Acute congestive heart failure, unspecified congestive heart failure type ([facility] [address]    4. DM-ii uncotrolled   5. Acute renal insuf          Plan:   Admit to tele  Consult cardiology - d/w Dr. Jatinder Weaver  Start lopressor  Cont IV HEP  obtian echo  Labs s ordered  IV lasix  acuuchces and SSI. Check HA1c    GI/DVT Prophylaxis  - IV HEp  Code Status: full  D/w wife at [facility]    Subjective:     Alf Smiley is a 81 y.o. male being admitted to the hospital with  Acute resp distress. He states 'he was on DM meds but stopped taking it several months ago because \"i did not like the doctor I was gong to\" He has been havign progressive SOB with cough and LE edema.  In ER, he was found to be in CHF exacerbation with resp distress and has been admitted for [facility]         Past Medical History:   Diagnosis Date    Asthma     Diabetes ([facility] [address]     High cholesterol     Hypertension        Past Surgical History:   Procedure Laterality Date    HX CATARACT REMOVAL         No Known Allergies    Current Facility-Administered Medications   Medication Dose Route Frequency Provider Last Rate Last Dose    heparin 25,000 units in D5W 250 ml infusion  18-36 Units/kg/hr IntraVENous TITRATE Bogdan Botello MD 13.7 mL/hr at 02/10/18 1639 18 Units/kg/hr at 02/10/18 1639    acetaminophen (TYLENOL) tablet 650 mg  650 mg Oral Q4H PRN Ila Cortes MD        insulin glargine (LANTUS) injection 15 Units  0.2 Units/kg SubCUTAneous QHS Ila Cortes MD        insulin lispro (HUMALOG) injection   SubCUTAneous AC&HS Ila Cortes MD        glucose chewable tablet 16 g  4 Tab Oral PRN Ila Cortes MD        glucagon (GLUCAGEN) injection 1 mg  1 mg IntraMUSCular PRN Ila Cortes MD        dextrose (D50W) injection syrg 12.5-25 g  25-50 mL IntraVENous PRN Toro Katz MD       [facility] furosemide (LASIX) injection 20 mg  20 mg IntraVENous DAILY Toro Katz MD           None       Social History     Social History    Marital status:      Spouse name: N/A    Number of children: N/A    Years of education: N/A     Occupational History    Not on file. Social History Main Topics    Smoking status: Former Smoker    Smokeless tobacco: Never Used    Alcohol use 0.6 oz/week     1 Cans of beer per week    Drug use: No    Sexual activity: Not on file     Other Topics Concern    Not on file     Social History Narrative    No narrative on file       History reviewed. No pertinent family history. Constitutional: negative for chills, fever and malaise/fatigue   Skin: negative for rash   HENT: negative for congestion, ear pain and headaches   Eyes: Negative for blurred vision   Cardiovascular: posfor chest pain, leg swelling, orthopnea   Respiratory: posfor cough, shortness of breath,    Gastointestinal: Negative for abdominal pain, constipation, diarrhea, nausea and vomiting   Genitourinary: not assessed   Musculoskeletal: negative for back pain, falls and myalgias   Endo: negative for polydipsia and polyuria.    Heme: negative for anemia   Allergies: negative for environmental allergies and hay fever   Neurological: negative for dizziness and focal weakness   Psychiatric:  Negative for depression, insomnia and anxious         Objective:     Patient Vitals for the past 24 hrs:   BP Temp Pulse Resp SpO2 Height Weight   02/10/18 1645 (!) 138/96 - (!) 118 24 100 % - -   02/10/18 1638 - - - 20 - - -   02/10/18 1530 115/69 - (!) 102 23 95 % - -   02/10/18 1224 104/59 98.1 °F (36.7 °C) (!) 141 20 94 % 5' 7\" (1.702 m) 76.2 kg (168 lb)           Extended / Orthostatic Vitals:    Vital Signs  Level of Consciousness: Alert (02/10/18 1645)  Temp: 98.1 °F (36.7 °C) (02/10/18 1224)  Temp Source: Oral (02/10/18 1638)  Pulse (Heart Rate): (!) 118 (02/10/18 1645)  Cardiac Rhythm: (P) Sinus tachycardia (02/10/18 1645)  Resp Rate: 24 (02/10/18 1645)  BP: (!) 138/96 (02/10/18 1645)  MAP (Monitor): 81 (02/10/18 1530)  MAP (Calculated): 110 (02/10/18 1645)  BP 1 Location: Left arm (02/10/18 1645)  BP 1 Method: Automatic (02/10/18 1645)  BP Patient Position: At rest (02/10/18 1645)  MEWS Score: 4 (02/10/18 1224)         Oxygen Therapy  O2 Sat (%): 100 % (02/10/18 1645)    General Appearance:   Appears in  acute distress. ,  Skin:   No rash, No jaundice,   Lymph:   There is no lymphadenopathy,   HEENT:   PERRLA, EOMI, Dry oral mucous membranes, Sclera clear, Neck:   Supple, Without masses, Without thyromegaly, Without bruits,   Lungs:   B/l basilar rales  Heart:  irreg irreg rhythm  Breasts:   normal,   Abdomen:   Soft , Non-distended, Normal bowel sounds and No organomegaly or mass, Extremities:   No edema of legs, Normal pedal and radial pulses,   Neuro:   alert, oriented, affect appropriate and speech fluent,    Laboratory:     Recent Results (from the past 24 hour(s))   EKG, 12 LEAD, INITIAL    Collection Time: 02/10/18 12:33 PM   Result Value Ref Range    Ventricular Rate 140 BPM    Atrial Rate 140 BPM    P-R Interval 136 ms    QRS Duration 116 ms    Q-T Interval 302 ms    QTC Calculation (Bezet) 461 ms    Calculated R Axis -14 degrees    Calculated T Axis 142 degrees    Diagnosis       Sinus tachycardia  Anteroseptal infarct , age undetermined  T wave abnormality, consider lateral ischemia  Abnormal ECG  No previous ECGs available     CBC WITH AUTOMATED DIFF    Collection Time: 02/10/18 12:38 PM   Result Value Ref Range    WBC 14.0 (H) 4.6 - 13.2 K/uL    RBC 5.15 4.70 - 5.50 M/uL    HGB 15.4 13.0 - 16.0 g/dL    HCT 45.7 36.0 - 48.0 %    MCV 88.7 74.0 - 97.0 FL    MCH 29.9 24.0 - 34.0 PG    MCHC 33.7 31.0 - 37.0 g/dL    RDW 14.4 11.6 - 14.5 %    PLATELET 485 (H) 243 - 420 K/uL    MPV 10.5 9.2 - 11.8 FL    NEUTROPHILS 88 (H) 40 - 73 % LYMPHOCYTES 3 (L) 21 - 52 %    MONOCYTES 9 3 - 10 %    EOSINOPHILS 0 0 - 5 %    BASOPHILS 0 0 - 2 %    ABS. NEUTROPHILS 12.3 (H) 1.8 - 8.0 K/UL    ABS. LYMPHOCYTES 0.4 (L) 0.9 - 3.6 K/UL    ABS. MONOCYTES 1.3 (H) 0.05 - 1.2 K/UL    ABS. EOSINOPHILS 0.0 0.0 - 0.4 K/UL    ABS. BASOPHILS 0.0 0.0 - 0.06 K/UL    DF AUTOMATED     PROTHROMBIN TIME + INR    Collection Time: 02/10/18 12:38 PM   Result Value Ref Range    Prothrombin time 12.5 11.5 - 15.2 sec    INR 1.0 0.8 - 1.2     MAGNESIUM    Collection Time: 02/10/18 12:38 PM   Result Value Ref Range    Magnesium 2.1 1.6 - 2.6 mg/dL   METABOLIC PANEL, COMPREHENSIVE    Collection Time: 02/10/18 12:38 PM   Result Value Ref Range    Sodium 134 (L) 136 - 145 mmol/L    Potassium 5.4 3.5 - 5.5 mmol/L    Chloride 97 (L) 100 - 108 mmol/L    CO2 25 21 - 32 mmol/L    Anion gap 12 3.0 - 18 mmol/L    Glucose 519 (HH) 74 - 99 mg/dL    BUN 32 (H) 7.0 - 18 MG/DL    Creatinine 1.91 (H) 0.6 - 1.3 MG/DL    BUN/Creatinine ratio 17 12 - 20      GFR est AA 42 (L) >60 ml/min/1.73m2    GFR est non-AA 35 (L) >60 ml/min/1.73m2    Calcium 8.5 8.5 - 10.1 MG/DL    Bilirubin, total 0.7 0.2 - 1.0 MG/DL    ALT (SGPT) 33 16 - 61 U/L    AST (SGOT) 24 15 - 37 U/L    Alk.  phosphatase 171 (H) 45 - 117 U/L    Protein, total 6.6 6.4 - 8.2 g/dL    Albumin 2.7 (L) 3.4 - 5.0 g/dL    Globulin 3.9 2.0 - 4.0 g/dL    A-G Ratio 0.7 (L) 0.8 - 1.7     CARDIAC PANEL,(CK, CKMB & TROPONIN)    Collection Time: 02/10/18 12:38 PM   Result Value Ref Range     39 - 308 U/L    CK - MB 6.3 (H) <3.6 ng/ml    CK-MB Index 4.4 (H) 0.0 - 4.0 %    Troponin-I, Qt. 0.62 (H) 0.00 - 0.06 NG/ML   NT-PRO BNP    Collection Time: 02/10/18 12:38 PM   Result Value Ref Range    NT pro-BNP 4668 (H) 0 - 900 PG/ML   D DIMER    Collection Time: 02/10/18 12:38 PM   Result Value Ref Range    D DIMER 0.57 (H) <0.46 ug/ml(FEU)   PTT    Collection Time: 02/10/18 12:38 PM   Result Value Ref Range    aPTT 27.7 23.0 - 36.4 SEC   GLUCOSE, POC Collection Time: 02/10/18 12:39 PM   Result Value Ref Range    Glucose (POC) 468 (HH) 70 - 110 mg/dL   GLUCOSE, POC    Collection Time: 02/10/18  2:15 PM   Result Value Ref Range    Glucose (POC) 342 (H) 70 - 110 mg/dL         Imaging/Procedures:     Chest X-ray:  Chest X-ray shows interstitial pulmonary edema with bilateral effusions. No infiltrate.         Esvin Montanez MD    2/10/2018, 5:30 PM

## 2018-02-10 NOTE — IP AVS SNAPSHOT
[address] [address] 
[phone] Patient: Mary Merida. MRN: [id number] A check jasmin indicates which time of day the medication should be taken. My Medications START taking these medications Instructions Each Dose to Equal  
 Morning Noon Evening Bedtime  
 apixaban 5 mg tablet Commonly known as:  Nayana Capps Your last dose was: Your next dose is: Take 1 Tab by mouth two (2) times a day. 5 mg  
    
   
   
   
  
 aspirin delayed-release 81 mg tablet Start taking on:  2/16/2018 Your last dose was: Your next dose is: Take 1 Tab by mouth daily. 81 mg  
    
   
   
   
  
 atorvastatin 40 mg tablet Commonly known as:  LIPITOR Your last dose was: Your next dose is: Take 1 Tab by mouth nightly. 40 mg  
    
   
   
   
  
 clopidogrel 75 mg Tab Commonly known as:  PLAVIX Start taking on:  2/16/2018 Your last dose was: Your next dose is: Take 1 Tab by mouth daily. 75 mg  
    
   
   
   
  
 furosemide 40 mg tablet Commonly known as:  LASIX Your last dose was: Your next dose is: Take 1 Tab by mouth daily. 40 mg  
    
   
   
   
  
 insulin glargine 100 unit/mL injection Commonly known as:  LANTUS Your last dose was: Your next dose is:    
   
   
 15 Units by SubCUTAneous route daily. 15 Units  
    
   
   
   
  
 losartan 25 mg tablet Commonly known as:  COZAAR Start taking on:  2/16/2018 Your last dose was: Your next dose is: Take 1 Tab by mouth daily. 25 mg  
    
   
   
   
  
 metoprolol succinate 50 mg XL tablet Commonly known as:  TOPROL-XL Start taking on:  2/16/2018 Your last dose was: Your next dose is: Take 1 Tab by mouth daily.   
 50 mg  
    
   
 SITagliptin 50 mg tablet Commonly known as:  Del Mcgraw Your last dose was: Your next dose is: Take 1 Tab by mouth daily. 50 mg Where to Get Your Medications These medications were sent to [address] [place] [address] [address] Phone:  [phone]  
  apixaban 5 mg tablet  
 aspirin delayed-release 81 mg tablet  
 atorvastatin 40 mg tablet  
 clopidogrel 75 mg Tab  
 furosemide 40 mg tablet  
 insulin glargine 100 unit/mL injection  
 losartan 25 mg tablet  
 metoprolol succinate 50 mg XL tablet SITagliptin 50 mg tablet

## 2018-02-10 NOTE — ED NOTES
RN called Telemetry and spoke to Riley to inform her that patient will be going to room 340 after nuclear medicine.  Elena to review SBAR report and RN will take patient to room after nuclear medicine exam.

## 2018-02-11 LAB
ALBUMIN SERPL-MCNC: 2.2 G/DL (ref 3.4–5)
ALBUMIN/GLOB SERPL: 0.6 {RATIO} (ref 0.8–1.7)
ALP SERPL-CCNC: 144 U/L (ref 45–117)
ALT SERPL-CCNC: 29 U/L (ref 16–61)
ANION GAP SERPL CALC-SCNC: 9 MMOL/L (ref 3–18)
APTT PPP: 152.6 SEC (ref 23–36.4)
APTT PPP: 48.9 SEC (ref 23–36.4)
APTT PPP: 60.2 SEC (ref 23–36.4)
APTT PPP: 72.8 SEC (ref 23–36.4)
AST SERPL-CCNC: 26 U/L (ref 15–37)
ATRIAL RATE: 140 BPM
ATRIAL RATE: 300 BPM
BASOPHILS # BLD: 0 K/UL (ref 0–0.06)
BASOPHILS NFR BLD: 0 % (ref 0–2)
BILIRUB SERPL-MCNC: 0.4 MG/DL (ref 0.2–1)
BUN SERPL-MCNC: 40 MG/DL (ref 7–18)
BUN/CREAT SERPL: 20 (ref 12–20)
CALCIUM SERPL-MCNC: 8.3 MG/DL (ref 8.5–10.1)
CALCULATED P AXIS, ECG09: -53 DEGREES
CALCULATED R AXIS, ECG10: -14 DEGREES
CALCULATED R AXIS, ECG10: 18 DEGREES
CALCULATED T AXIS, ECG11: 142 DEGREES
CALCULATED T AXIS, ECG11: 174 DEGREES
CHLORIDE SERPL-SCNC: 103 MMOL/L (ref 100–108)
CK MB CFR SERPL CALC: 3.4 % (ref 0–4)
CK MB CFR SERPL CALC: 3.5 % (ref 0–4)
CK MB SERPL-MCNC: 4 NG/ML (ref 5–25)
CK MB SERPL-MCNC: 4.2 NG/ML (ref 5–25)
CK SERPL-CCNC: 114 U/L (ref 39–308)
CK SERPL-CCNC: 124 U/L (ref 39–308)
CO2 SERPL-SCNC: 30 MMOL/L (ref 21–32)
CREAT SERPL-MCNC: 2.03 MG/DL (ref 0.6–1.3)
DIAGNOSIS, 93000: NORMAL
DIAGNOSIS, 93000: NORMAL
DIFFERENTIAL METHOD BLD: ABNORMAL
EOSINOPHIL # BLD: 0.2 K/UL (ref 0–0.4)
EOSINOPHIL NFR BLD: 1 % (ref 0–5)
ERYTHROCYTE [DISTWIDTH] IN BLOOD BY AUTOMATED COUNT: 14.6 % (ref 11.6–14.5)
GLOBULIN SER CALC-MCNC: 3.4 G/DL (ref 2–4)
GLUCOSE BLD STRIP.AUTO-MCNC: 102 MG/DL (ref 70–110)
GLUCOSE BLD STRIP.AUTO-MCNC: 113 MG/DL (ref 70–110)
GLUCOSE BLD STRIP.AUTO-MCNC: 174 MG/DL (ref 70–110)
GLUCOSE BLD STRIP.AUTO-MCNC: 184 MG/DL (ref 70–110)
GLUCOSE BLD STRIP.AUTO-MCNC: 229 MG/DL (ref 70–110)
GLUCOSE BLD STRIP.AUTO-MCNC: 305 MG/DL (ref 70–110)
GLUCOSE SERPL-MCNC: 43 MG/DL (ref 74–99)
HCT VFR BLD AUTO: 44.6 % (ref 36–48)
HGB BLD-MCNC: 14.6 G/DL (ref 13–16)
LYMPHOCYTES # BLD: 1.5 K/UL (ref 0.9–3.6)
LYMPHOCYTES NFR BLD: 11 % (ref 21–52)
MAGNESIUM SERPL-MCNC: 2.2 MG/DL (ref 1.6–2.6)
MCH RBC QN AUTO: 29.1 PG (ref 24–34)
MCHC RBC AUTO-ENTMCNC: 32.7 G/DL (ref 31–37)
MCV RBC AUTO: 89 FL (ref 74–97)
MONOCYTES # BLD: 1.8 K/UL (ref 0.05–1.2)
MONOCYTES NFR BLD: 13 % (ref 3–10)
NEUTS SEG # BLD: 10 K/UL (ref 1.8–8)
NEUTS SEG NFR BLD: 75 % (ref 40–73)
P-R INTERVAL, ECG05: 136 MS
PHOSPHATE SERPL-MCNC: 4.8 MG/DL (ref 2.5–4.9)
PLATELET # BLD AUTO: 459 K/UL (ref 135–420)
PMV BLD AUTO: 10.8 FL (ref 9.2–11.8)
POTASSIUM SERPL-SCNC: 4.6 MMOL/L (ref 3.5–5.5)
PROT SERPL-MCNC: 5.6 G/DL (ref 6.4–8.2)
Q-T INTERVAL, ECG07: 302 MS
Q-T INTERVAL, ECG07: 374 MS
QRS DURATION, ECG06: 100 MS
QRS DURATION, ECG06: 116 MS
QTC CALCULATION (BEZET), ECG08: 461 MS
QTC CALCULATION (BEZET), ECG08: 465 MS
RBC # BLD AUTO: 5.01 M/UL (ref 4.7–5.5)
SODIUM SERPL-SCNC: 142 MMOL/L (ref 136–145)
TROPONIN I SERPL-MCNC: 1.25 NG/ML (ref 0–0.06)
TROPONIN I SERPL-MCNC: 1.33 NG/ML (ref 0–0.06)
TSH SERPL DL<=0.05 MIU/L-ACNC: 1.72 UIU/ML (ref 0.36–3.74)
VENTRICULAR RATE, ECG03: 140 BPM
VENTRICULAR RATE, ECG03: 93 BPM
WBC # BLD AUTO: 13.4 K/UL (ref 4.6–13.2)

## 2018-02-11 PROCEDURE — 74011250637 HC RX REV CODE- 250/637: Performed by: INTERNAL MEDICINE

## 2018-02-11 PROCEDURE — 84443 ASSAY THYROID STIM HORMONE: CPT | Performed by: INTERNAL MEDICINE

## 2018-02-11 PROCEDURE — 74011000250 HC RX REV CODE- 250: Performed by: INTERNAL MEDICINE

## 2018-02-11 PROCEDURE — 82550 ASSAY OF CK (CPK): CPT | Performed by: INTERNAL MEDICINE

## 2018-02-11 PROCEDURE — 85730 THROMBOPLASTIN TIME PARTIAL: CPT | Performed by: INTERNAL MEDICINE

## 2018-02-11 PROCEDURE — 77010033678 HC OXYGEN DAILY

## 2018-02-11 PROCEDURE — 80053 COMPREHEN METABOLIC PANEL: CPT | Performed by: INTERNAL MEDICINE

## 2018-02-11 PROCEDURE — 84100 ASSAY OF PHOSPHORUS: CPT | Performed by: INTERNAL MEDICINE

## 2018-02-11 PROCEDURE — 74011636637 HC RX REV CODE- 636/637: Performed by: INTERNAL MEDICINE

## 2018-02-11 PROCEDURE — 36415 COLL VENOUS BLD VENIPUNCTURE: CPT | Performed by: INTERNAL MEDICINE

## 2018-02-11 PROCEDURE — 85025 COMPLETE CBC W/AUTO DIFF WBC: CPT | Performed by: INTERNAL MEDICINE

## 2018-02-11 PROCEDURE — 74011250636 HC RX REV CODE- 250/636: Performed by: EMERGENCY MEDICINE

## 2018-02-11 PROCEDURE — 82962 GLUCOSE BLOOD TEST: CPT

## 2018-02-11 PROCEDURE — 74011250636 HC RX REV CODE- 250/636: Performed by: INTERNAL MEDICINE

## 2018-02-11 PROCEDURE — C8929 TTE W OR WO FOL WCON,DOPPLER: HCPCS

## 2018-02-11 PROCEDURE — 65660000000 HC RM CCU STEPDOWN

## 2018-02-11 PROCEDURE — 83735 ASSAY OF MAGNESIUM: CPT | Performed by: INTERNAL MEDICINE

## 2018-02-11 RX ORDER — CLOPIDOGREL BISULFATE 75 MG/1
75 TABLET ORAL DAILY
Status: DISCONTINUED | OUTPATIENT
Start: 2018-02-12 | End: 2018-02-15 | Stop reason: HOSPADM

## 2018-02-11 RX ORDER — METOPROLOL TARTRATE 50 MG/1
50 TABLET ORAL 2 TIMES DAILY
Status: DISCONTINUED | OUTPATIENT
Start: 2018-02-11 | End: 2018-02-12

## 2018-02-11 RX ORDER — CLOPIDOGREL 300 MG/1
300 TABLET, FILM COATED ORAL
Status: COMPLETED | OUTPATIENT
Start: 2018-02-11 | End: 2018-02-11

## 2018-02-11 RX ORDER — METOPROLOL TARTRATE 50 MG/1
50 TABLET ORAL ONCE
Status: COMPLETED | OUTPATIENT
Start: 2018-02-11 | End: 2018-02-11

## 2018-02-11 RX ORDER — HEPARIN SODIUM 1000 [USP'U]/ML
40 INJECTION, SOLUTION INTRAVENOUS; SUBCUTANEOUS
Status: COMPLETED | OUTPATIENT
Start: 2018-02-11 | End: 2018-02-11

## 2018-02-11 RX ADMIN — METOPROLOL TARTRATE 50 MG: 50 TABLET, FILM COATED ORAL at 22:44

## 2018-02-11 RX ADMIN — FUROSEMIDE 60 MG: 10 INJECTION, SOLUTION INTRAMUSCULAR; INTRAVENOUS at 22:44

## 2018-02-11 RX ADMIN — INSULIN LISPRO 12 UNITS: 100 INJECTION, SOLUTION INTRAVENOUS; SUBCUTANEOUS at 12:52

## 2018-02-11 RX ADMIN — METOPROLOL TARTRATE 50 MG: 50 TABLET ORAL at 10:50

## 2018-02-11 RX ADMIN — METOPROLOL TARTRATE 25 MG: 25 TABLET ORAL at 08:44

## 2018-02-11 RX ADMIN — INSULIN LISPRO 3 UNITS: 100 INJECTION, SOLUTION INTRAVENOUS; SUBCUTANEOUS at 17:51

## 2018-02-11 RX ADMIN — CLOPIDOGREL BISULFATE 300 MG: 300 TABLET, FILM COATED ORAL at 23:37

## 2018-02-11 RX ADMIN — HEPARIN SODIUM 20 UNITS/KG/HR: 10000 INJECTION, SOLUTION INTRAVENOUS at 10:55

## 2018-02-11 RX ADMIN — HEPARIN SODIUM 3600 UNITS: 1000 INJECTION, SOLUTION INTRAVENOUS; SUBCUTANEOUS at 06:37

## 2018-02-11 RX ADMIN — INSULIN GLARGINE 15 UNITS: 100 INJECTION, SOLUTION SUBCUTANEOUS at 22:45

## 2018-02-11 RX ADMIN — INSULIN LISPRO 3 UNITS: 100 INJECTION, SOLUTION INTRAVENOUS; SUBCUTANEOUS at 22:47

## 2018-02-11 RX ADMIN — HEPARIN SODIUM 17 UNITS/KG/HR: 10000 INJECTION, SOLUTION INTRAVENOUS at 15:49

## 2018-02-11 RX ADMIN — ATORVASTATIN CALCIUM 20 MG: 20 TABLET, FILM COATED ORAL at 22:44

## 2018-02-11 RX ADMIN — FUROSEMIDE 60 MG: 10 INJECTION, SOLUTION INTRAMUSCULAR; INTRAVENOUS at 08:43

## 2018-02-11 RX ADMIN — SODIUM CHLORIDE 1 ML: 9 INJECTION INTRAMUSCULAR; INTRAVENOUS; SUBCUTANEOUS at 09:25

## 2018-02-11 RX ADMIN — ASPIRIN 81 MG: 81 TABLET, COATED ORAL at 08:44

## 2018-02-11 NOTE — PROGRESS NOTES
Cardiology Progress Note        Patient: David Durant. Sex: male          DOA: 2/10/2018  YOB: 1945      Age:  81 y.o.        LOS:  LOS: 1 day   Assessment/Plan     Acute decompensated systolic heart failure LVEF 15-20%  Non-STEMI  Atrial flutter with controlled ventricular response  Renal insufficiency baseline unknown  Uncontrolled diabetes mellitus    Echocardiogram revealed wall motion abnormality with LVEF of 15-20%. Plan:     Continue aspirin, beta blocker , Statin and IV heparin  Add Plavix 300 mg PO followed by 75 mg PO once a day. Continue IV Lasix  Strict input output  Fluid restriction up to 1.2 L per day and salt restriction up to 2 g per day  Daily BMP  Continue management as per hospital medicine  Patient will need LEFT heart catheterization, coronary angiogram plus or minus PCI. All risks, benefits and alternatives explained to the patient he verbally understood. Continue management as per hospital medicine. Subjective:    cc: My breathing is improving, leg swelling is little better today      REVIEW OF SYSTEMS:     General: No fevers or chills. Cardiovascular: No chest pain,No palpitations, positive orthopnea,positive PND, positive leg swelling, No claudication  Pulmonary: positive dyspnea  Gastrointestinal: No nausea, vomiting, bleeding  Neurology: No Dizziness    Objective:      Visit Vitals    /55 (BP 1 Location: Left arm, BP Patient Position: Sitting)    Pulse 75    Temp 98 °F (36.7 °C)    Resp 14    Ht 5' 7\" (1.702 m)    Wt 90 kg (198 lb 6.4 oz)    SpO2 98%    BMI 31.07 kg/m2     Body mass index is 31.07 kg/(m^2). Physical Exam:  General Appearance: Comfortable, not using accessory muscles of respiration. HEENT: SUSHMA. HEAD: Atraumatic  NECK: No JVD, no thyroidomeglay.  CAROTIDS:no bruit  LUNGS: Absent at entry at both bases   HEART: S1+S2 audible, irregularly irregular,no murmur, no pericardial rub.     ABD: Non-tender, BS Audible    EXT: +++ leg edema, and no cyanosis. PSYCHIATRIC EXAM: Mood is appropriate. MUSCULOSKELETAL: Grossly no joint deformity. NEUROLOGICAL: AAO times 3, No motor and sensory deficit.   Medication:  Current Facility-Administered Medications   Medication Dose Route Frequency    metoprolol tartrate (LOPRESSOR) tablet 50 mg  50 mg Oral BID    heparin 25,000 units in D5W 250 ml infusion  18-36 Units/kg/hr IntraVENous TITRATE    acetaminophen (TYLENOL) tablet 650 mg  650 mg Oral Q4H PRN    insulin glargine (LANTUS) injection 15 Units  0.2 Units/kg SubCUTAneous QHS    glucose chewable tablet 16 g  4 Tab Oral PRN    glucagon (GLUCAGEN) injection 1 mg  1 mg IntraMUSCular PRN    dextrose (D50W) injection syrg 12.5-25 g  25-50 mL IntraVENous PRN    aspirin delayed-release tablet 81 mg  81 mg Oral DAILY    atorvastatin (LIPITOR) tablet 20 mg  20 mg Oral QHS    furosemide (LASIX) injection 60 mg  60 mg IntraVENous BID    insulin lispro (HUMALOG) injection   SubCUTAneous AC&HS               Lab/Data Reviewed:       Recent Labs      02/11/18   0219  02/10/18   1238   WBC  13.4*  14.0*   HGB  14.6  15.4   HCT  44.6  45.7   PLT  459*  444*     Recent Labs      02/11/18   0219  02/10/18   2108  02/10/18   1238   NA  142   --   134*   K  4.6   --   5.4   CL  103   --   97*   CO2  30   --   25   GLU  43*  527*  519*   BUN  40*   --   32*   CREA  2.03*   --   1.91*   CA  8.3*   --   8.5       Signed By: Gregorio Hanley MD     February 11, 2018

## 2018-02-11 NOTE — ROUTINE PROCESS
Bedside and Verbal shift change report given to Andrew Pearson RN (oncoming nurse) by ELIEZER Perez RN (offgoing nurse). Report included the following information SBAR, Kardex, Intake/Output, MAR and Recent Results.

## 2018-02-11 NOTE — PROGRESS NOTES
1930:  Received patient with Michael Velez RN.    2000:  MT reports to this RN that patient's HR is sustaining in the 150s. Patient noted to be up in bathroom at this time. Will check on him in a few minutes. 2010:  Assessment completed at 2005. Patient ambulatory to bathroom and flushed urine down the toilet before we were able to measure. Patient has returned to bed at this time and is noted to have some MYRICK. Dr. Dave Augustine on floor. Critical troponin of 1.25 reported to him verbally and he stated he would inform Dr. Mariola Mays of this result. Orders received to give the ordered 60mg IV Lasix and the ordered PO metoprolol now and he ordered an additional 5mg IV Lopressor to be given now. Education provided to patient regarding medications and CHF, patient showed little evidence of learning as he stated, \"I still don't know how I caught this. \"  Will continue to provide education to patient. Dr. Dave Augustine at bedside reiterated to patient that he is on a fluid restriction of 1.2 L/day and that he cannot drink fluids ad jonathan. Dr. Dave Augustine also reminded patient to use urinal so that we can measure his output. Patient verbalized understanding. Will provide reinforcement throughout shift. 2057:  Patient's HR now 108, remains in A-flutter at this time. 2110:  Dr. Dave Augustine on floor, reported elevated POC glucose to him, no new orders other than to change patient to very resistant SSI scale per protocol. Stat random glucose obtained. No urine output since receiving IV meds earlier, monitor for UOP. UA ordered. 2120:  Patient with 300 clear yellow urine output. Specimen sent to lab for previously ordered UA. 2156:  Scheduled medications given as ordered. Patient resting in bed with feet up. Patient c/o being thirsty. Patient educated again on reasons for fluid restriction and he verbalized understanding. Will continue reinforcing education. 2245:  Stat EKG completed as ordered.   Patient with additional 150 clear yellow urine output at this time. 2300:  Reassessment completed. No needs stated. Patient resting in bed quietly. 0004: Instructed by Dr. Carley Story to recheck patient's blood sugar. POC glucose is 229 at this time. No further interventions at this time. 0015:  Patient with another 350 ml clear yellow urine out at this time. No other needs noted. 0128:  Rounds completed. Patient resting quietly in bed with no needs stated. 0410:  Notified by lab of critical low glucose of 43. POC performed at this time and = 102. Patient with no s/sx of hypoglycemia. Alert, oriented, no diaphoresis noted. Will notify Dr. Carley Story. Patient weighed in bed, warning box popped up due to patient's stated weight being 168 as recorded in ED. Actual bed weight this am was 198.4 lbs. Actual weight not recorded upon admission to unit. 3033:  Reassessment completed. Patient remains on [facility] with slight improvement to MYRICK. Patient states he is unable to get much sleep. Reassured patient that all testing and VS are necessary to provide excellent care for his condition and patient verbalized understanding. 9012:  Telephone call with Dr. Carley Story regarding critical lab values. Informed of POC glucose check revealing 102. No further orders regarding troponin or glucose. Did discuss low PTT (48.9) also received but not a critical value. Last PTT was therapeutic (94.0) and protocol dictates large bolus and large increase to drip rate. Concerned that lab results may not be accurate as no changes have been made to drip rate and IV has not been stopped or turned off at any time since last draw. Orders received to redraw the PTT stat and adjust drip per new draw. No further orders received.       0602:  PTT returned with value of 60.2. Drip will be adjusted per protocol and 40 unit/kg bolus ordered per protocol.       3321:  Heparin bolus given as ordered and drip adjusted per protocol with JORDIN Kaitlin Ross RN. Next PTT due at 1245. New PIV #22 placed to left wrist without difficulty. Left AC #18 remains in place.

## 2018-02-11 NOTE — PROGRESS NOTES
Hospitalist Progress Note    Patient: Parth Ramachandran MRN: [id number]  CSN: [id number]    YOB: 1945  Age: 81 y.o. Sex: male    DOA: 2/10/2018 LOS:  LOS: 1 day              IMPRESSION and Plan:    Parth Ramachandran is a 81 y.o. male with   Patient Active Problem List    Diagnosis Date Noted    Tachycardia 02/10/2018     Active Problems:    Tachycardia (2/10/2018)        1. Acute resp distress  -- improved   2. Atrial fibrillation with RVR --HR still elevated. Increase B-blocker. On IV hep   3. Acute congestive heart failure, unspecified congestive heart failure type -- echo pending. Cont Iv lasix   4. DM-ii uncotrolled  -- BS imporvong. Cont accuchecks and ssi   5. Acute renal insuf  -- cr is somewhat worse. Will obtain renal us. Avoid ACE/ARB. Consider nephrology consult     6  Abnormal troponin / NSTEMI  -- cont lipitor. On IV Heparin. Echo pending. Appriciate cardiology input      Patient's condition is fair        Recommend to continue hospitalization. Discussed with patient. Chief Complaints:   Chief Complaint   Patient presents with    Shortness of Breath     SUBJECTIVE:  Pt is seen and examined. \" My breathing has improved somewhat\". No CP    No CP No Fever, chills, Nausea, vomitting. Review of systems:    General: No fevers or chills. Cardiovascular: No chest pain or pressure. No palpitations. Pulmonary: shortness of breath.    Gastrointestinal: No nausea, vomiting    PE:  Patient Vitals for the past 24 hrs:   BP Temp Pulse Resp SpO2 Height Weight   02/11/18 0657 128/85 97.3 °F (36.3 °C) 100 16 100 % - -   02/11/18 0413 - - - - - - 90 kg (198 lb 6.4 oz)   02/11/18 0412 (!) 132/91 97.4 °F (36.3 °C) (!) 109 20 98 % - -   02/10/18 2325 110/71 97.2 °F (36.2 °C) 74 20 98 % - -   02/10/18 1937 118/68 98.4 °F (36.9 °C) (!) 112 20 98 % - -   02/10/18 1645 (!) 138/96 - (!) 118 24 100 % - -   02/10/18 1638 - - - 20 - - -   02/10/18 1530 115/69 - (!) 102 23 95 % - - 02/10/18 1224 104/59 98.1 °F (36.7 °C) (!) 141 20 94 % 5' 7\" (1.702 m) 76.2 kg (168 lb)       Intake/Output Summary (Last 24 hours) at 02/11/18 0934  Last data filed at 02/11/18 0649   Gross per 24 hour   Intake          1207.66 ml   Output             1250 ml   Net           -42.34 ml     Patient Vitals for the past 120 hrs:   Weight   02/10/18 1224 76.2 kg (168 lb)   02/11/18 0413 90 kg (198 lb 6.4 oz)           HEENT: Perrla, EOMI; oral mucosa well prefused; Conjunctiva not injected  Neck: No JVD, Negative carotid bruits, normal pulses; No thyromegaly  Resp: CTA bilaterally; No wheezes or rales  CV: irreg irreg   Abd: Positive Bowel Sounds, Soft, Nontender  Ext: improved LE edema  Neuro: Alert and oriented; Nonfocal  Skin: Warm, Dry, Intact  Pulses: 2+ DP/PT/Rad      Intake and Output:  Current Shift:     Last three shifts:  02/09 1901 - 02/11 0700  In: 1207.7 [P.O.:960; I.V.:247.7]  Out: 1250 [Urine:1250]    Lab/Data Reviewed:  Recent Results (from the past 8 hour(s))   CBC WITH AUTOMATED DIFF    Collection Time: 02/11/18  2:19 AM   Result Value Ref Range    WBC 13.4 (H) 4.6 - 13.2 K/uL    RBC 5.01 4.70 - 5.50 M/uL    HGB 14.6 13.0 - 16.0 g/dL    HCT 44.6 36.0 - 48.0 %    MCV 89.0 74.0 - 97.0 FL    MCH 29.1 24.0 - 34.0 PG    MCHC 32.7 31.0 - 37.0 g/dL    RDW 14.6 (H) 11.6 - 14.5 %    PLATELET 410 (H) 303 - 420 K/uL    MPV 10.8 9.2 - 11.8 FL    NEUTROPHILS 75 (H) 40 - 73 %    LYMPHOCYTES 11 (L) 21 - 52 %    MONOCYTES 13 (H) 3 - 10 %    EOSINOPHILS 1 0 - 5 %    BASOPHILS 0 0 - 2 %    ABS. NEUTROPHILS 10.0 (H) 1.8 - 8.0 K/UL    ABS. LYMPHOCYTES 1.5 0.9 - 3.6 K/UL    ABS. MONOCYTES 1.8 (H) 0.05 - 1.2 K/UL    ABS. EOSINOPHILS 0.2 0.0 - 0.4 K/UL    ABS.  BASOPHILS 0.0 0.0 - 0.06 K/UL    DF AUTOMATED     MAGNESIUM    Collection Time: 02/11/18  2:19 AM   Result Value Ref Range    Magnesium 2.2 1.6 - 2.6 mg/dL   METABOLIC PANEL, COMPREHENSIVE    Collection Time: 02/11/18  2:19 AM   Result Value Ref Range    Sodium 142 136 - 145 mmol/L    Potassium 4.6 3.5 - 5.5 mmol/L    Chloride 103 100 - 108 mmol/L    CO2 30 21 - 32 mmol/L    Anion gap 9 3.0 - 18 mmol/L    Glucose 43 (LL) 74 - 99 mg/dL    BUN 40 (H) 7.0 - 18 MG/DL    Creatinine 2.03 (H) 0.6 - 1.3 MG/DL    BUN/Creatinine ratio 20 12 - 20      GFR est AA 40 (L) >60 ml/min/1.73m2    GFR est non-AA 33 (L) >60 ml/min/1.73m2    Calcium 8.3 (L) 8.5 - 10.1 MG/DL    Bilirubin, total 0.4 0.2 - 1.0 MG/DL    ALT (SGPT) 29 16 - 61 U/L    AST (SGOT) 26 15 - 37 U/L    Alk.  phosphatase 144 (H) 45 - 117 U/L    Protein, total 5.6 (L) 6.4 - 8.2 g/dL    Albumin 2.2 (L) 3.4 - 5.0 g/dL    Globulin 3.4 2.0 - 4.0 g/dL    A-G Ratio 0.6 (L) 0.8 - 1.7     PHOSPHORUS    Collection Time: 02/11/18  2:19 AM   Result Value Ref Range    Phosphorus 4.8 2.5 - 4.9 MG/DL   PTT    Collection Time: 02/11/18  2:19 AM   Result Value Ref Range    aPTT 48.9 (H) 23.0 - 36.4 SEC   CARDIAC PANEL,(CK, CKMB & TROPONIN)    Collection Time: 02/11/18  2:19 AM   Result Value Ref Range     39 - 308 U/L    CK - MB 4.2 (H) <3.6 ng/ml    CK-MB Index 3.4 0.0 - 4.0 %    Troponin-I, Qt. 1.33 (HH) 0.00 - 0.06 NG/ML   GLUCOSE, POC    Collection Time: 02/11/18  4:10 AM   Result Value Ref Range    Glucose (POC) 102 70 - 110 mg/dL   PTT    Collection Time: 02/11/18  4:38 AM   Result Value Ref Range    aPTT 60.2 (H) 23.0 - 36.4 SEC   GLUCOSE, POC    Collection Time: 02/11/18  6:17 AM   Result Value Ref Range    Glucose (POC) 113 (H) 70 - 110 mg/dL   CARDIAC PANEL,(CK, CKMB & TROPONIN)    Collection Time: 02/11/18  7:42 AM   Result Value Ref Range     39 - 308 U/L    CK - MB 4.0 (H) <3.6 ng/ml    CK-MB Index 3.5 0.0 - 4.0 %    Troponin-I, Qt. 1.25 (HH) 0.00 - 0.06 NG/ML     Medications:  Current Facility-Administered Medications   Medication Dose Route Frequency    heparin 25,000 units in D5W 250 ml infusion  18-36 Units/kg/hr IntraVENous TITRATE    acetaminophen (TYLENOL) tablet 650 mg  650 mg Oral Q4H PRN    insulin glargine (LANTUS) injection 15 Units  0.2 Units/kg SubCUTAneous QHS    glucose chewable tablet 16 g  4 Tab Oral PRN    glucagon (GLUCAGEN) injection 1 mg  1 mg IntraMUSCular PRN    dextrose (D50W) injection syrg 12.5-25 g  25-50 mL IntraVENous PRN    metoprolol tartrate (LOPRESSOR) tablet 25 mg  25 mg Oral BID    aspirin delayed-release tablet 81 mg  81 mg Oral DAILY    atorvastatin (LIPITOR) tablet 20 mg  20 mg Oral QHS    furosemide (LASIX) injection 60 mg  60 mg IntraVENous BID    insulin lispro (HUMALOG) injection   SubCUTAneous AC&HS       Recent Results (from the past 24 hour(s))   EKG, 12 LEAD, INITIAL    Collection Time: 02/10/18 12:33 PM   Result Value Ref Range    Ventricular Rate 140 BPM    Atrial Rate 140 BPM    P-R Interval 136 ms    QRS Duration 116 ms    Q-T Interval 302 ms    QTC Calculation (Bezet) 461 ms    Calculated R Axis -14 degrees    Calculated T Axis 142 degrees    Diagnosis       Sinus tachycardia  Anteroseptal infarct , age undetermined  T wave abnormality, consider lateral ischemia  Abnormal ECG  No previous ECGs available     CBC WITH AUTOMATED DIFF    Collection Time: 02/10/18 12:38 PM   Result Value Ref Range    WBC 14.0 (H) 4.6 - 13.2 K/uL    RBC 5.15 4.70 - 5.50 M/uL    HGB 15.4 13.0 - 16.0 g/dL    HCT 45.7 36.0 - 48.0 %    MCV 88.7 74.0 - 97.0 FL    MCH 29.9 24.0 - 34.0 PG    MCHC 33.7 31.0 - 37.0 g/dL    RDW 14.4 11.6 - 14.5 %    PLATELET 080 (H) 509 - 420 K/uL    MPV 10.5 9.2 - 11.8 FL    NEUTROPHILS 88 (H) 40 - 73 %    LYMPHOCYTES 3 (L) 21 - 52 %    MONOCYTES 9 3 - 10 %    EOSINOPHILS 0 0 - 5 %    BASOPHILS 0 0 - 2 %    ABS. NEUTROPHILS 12.3 (H) 1.8 - 8.0 K/UL    ABS. LYMPHOCYTES 0.4 (L) 0.9 - 3.6 K/UL    ABS. MONOCYTES 1.3 (H) 0.05 - 1.2 K/UL    ABS. EOSINOPHILS 0.0 0.0 - 0.4 K/UL    ABS.  BASOPHILS 0.0 0.0 - 0.06 K/UL    DF AUTOMATED     PROTHROMBIN TIME + INR    Collection Time: 02/10/18 12:38 PM   Result Value Ref Range    Prothrombin time 12.5 11.5 - 15.2 sec    INR 1.0 0.8 - 1. 2     MAGNESIUM    Collection Time: 02/10/18 12:38 PM   Result Value Ref Range    Magnesium 2.1 1.6 - 2.6 mg/dL   METABOLIC PANEL, COMPREHENSIVE    Collection Time: 02/10/18 12:38 PM   Result Value Ref Range    Sodium 134 (L) 136 - 145 mmol/L    Potassium 5.4 3.5 - 5.5 mmol/L    Chloride 97 (L) 100 - 108 mmol/L    CO2 25 21 - 32 mmol/L    Anion gap 12 3.0 - 18 mmol/L    Glucose 519 (HH) 74 - 99 mg/dL    BUN 32 (H) 7.0 - 18 MG/DL    Creatinine 1.91 (H) 0.6 - 1.3 MG/DL    BUN/Creatinine ratio 17 12 - 20      GFR est AA 42 (L) >60 ml/min/1.73m2    GFR est non-AA 35 (L) >60 ml/min/1.73m2    Calcium 8.5 8.5 - 10.1 MG/DL    Bilirubin, total 0.7 0.2 - 1.0 MG/DL    ALT (SGPT) 33 16 - 61 U/L    AST (SGOT) 24 15 - 37 U/L    Alk.  phosphatase 171 (H) 45 - 117 U/L    Protein, total 6.6 6.4 - 8.2 g/dL    Albumin 2.7 (L) 3.4 - 5.0 g/dL    Globulin 3.9 2.0 - 4.0 g/dL    A-G Ratio 0.7 (L) 0.8 - 1.7     CARDIAC PANEL,(CK, CKMB & TROPONIN)    Collection Time: 02/10/18 12:38 PM   Result Value Ref Range     39 - 308 U/L    CK - MB 6.3 (H) <3.6 ng/ml    CK-MB Index 4.4 (H) 0.0 - 4.0 %    Troponin-I, Qt. 0.62 (H) 0.00 - 0.06 NG/ML   NT-PRO BNP    Collection Time: 02/10/18 12:38 PM   Result Value Ref Range    NT pro-BNP 4668 (H) 0 - 900 PG/ML   D DIMER    Collection Time: 02/10/18 12:38 PM   Result Value Ref Range    D DIMER 0.57 (H) <0.46 ug/ml(FEU)   PTT    Collection Time: 02/10/18 12:38 PM   Result Value Ref Range    aPTT 27.7 23.0 - 36.4 SEC   HEMOGLOBIN A1C WITH EAG    Collection Time: 02/10/18 12:38 PM   Result Value Ref Range    Hemoglobin A1c 11.7 (H) 4.5 - 5.6 %    Est. average glucose 289 mg/dL   GLUCOSE, POC    Collection Time: 02/10/18 12:39 PM   Result Value Ref Range    Glucose (POC) 468 (HH) 70 - 110 mg/dL   GLUCOSE, POC    Collection Time: 02/10/18  2:15 PM   Result Value Ref Range    Glucose (POC) 342 (H) 70 - 110 mg/dL   CARDIAC PANEL,(CK, CKMB & TROPONIN)    Collection Time: 02/10/18  7:18 PM   Result Value Ref Range     39 - 308 U/L    CK - MB 4.7 (H) <3.6 ng/ml    CK-MB Index 4.1 (H) 0.0 - 4.0 %    Troponin-I, Qt. 1.25 (HH) 0.00 - 0.06 NG/ML   GLUCOSE, POC    Collection Time: 02/10/18  9:04 PM   Result Value Ref Range    Glucose (POC) 516 (HH) 70 - 110 mg/dL   PTT    Collection Time: 02/10/18  9:08 PM   Result Value Ref Range    aPTT 94.0 (H) 23.0 - 36.4 SEC   GLUCOSE, RANDOM    Collection Time: 02/10/18  9:08 PM   Result Value Ref Range    Glucose 527 (HH) 74 - 99 mg/dL   URINALYSIS W/ RFLX MICROSCOPIC    Collection Time: 02/10/18  9:20 PM   Result Value Ref Range    Color YELLOW      Appearance CLEAR      Specific gravity 1.014 1.005 - 1.030      pH (UA) 5.0 5.0 - 8.0      Protein 300 (A) NEG mg/dL    Glucose >1000 (A) NEG mg/dL    Ketone NEGATIVE  NEG mg/dL    Bilirubin NEGATIVE  NEG      Blood TRACE (A) NEG      Urobilinogen 0.2 0.2 - 1.0 EU/dL    Nitrites NEGATIVE  NEG      Leukocyte Esterase NEGATIVE  NEG     URINE MICROSCOPIC ONLY    Collection Time: 02/10/18  9:20 PM   Result Value Ref Range    WBC 0 to 1 0 - 5 /hpf    RBC 0 to 1 0 - 5 /hpf    Epithelial cells RARE 0 - 5 /lpf    Bacteria RARE NEG /hpf   EKG, 12 LEAD, INITIAL    Collection Time: 02/10/18 10:45 PM   Result Value Ref Range    Ventricular Rate 103 BPM    Atrial Rate 103 BPM    QRS Duration 100 ms    Q-T Interval 372 ms    QTC Calculation (Bezet) 487 ms    Calculated R Axis 23 degrees    Calculated T Axis 163 degrees    Diagnosis       Atrial fibrillation with rapid ventricular response  Anteroseptal infarct (cited on or before 10-FEB-2018)  T wave abnormality, consider lateral ischemia or digitalis effect  Abnormal ECG  When compared with ECG of 10-FEB-2018 12:33,  Atrial fibrillation has replaced Sinus rhythm  Serial changes of evolving Anteroseptal infarct present     GLUCOSE, POC    Collection Time: 02/11/18 12:04 AM   Result Value Ref Range    Glucose (POC) 229 (H) 70 - 110 mg/dL   CBC WITH AUTOMATED DIFF    Collection Time: 02/11/18  2:19 AM   Result Value Ref Range    WBC 13.4 (H) 4.6 - 13.2 K/uL    RBC 5.01 4.70 - 5.50 M/uL    HGB 14.6 13.0 - 16.0 g/dL    HCT 44.6 36.0 - 48.0 %    MCV 89.0 74.0 - 97.0 FL    MCH 29.1 24.0 - 34.0 PG    MCHC 32.7 31.0 - 37.0 g/dL    RDW 14.6 (H) 11.6 - 14.5 %    PLATELET 098 (H) 379 - 420 K/uL    MPV 10.8 9.2 - 11.8 FL    NEUTROPHILS 75 (H) 40 - 73 %    LYMPHOCYTES 11 (L) 21 - 52 %    MONOCYTES 13 (H) 3 - 10 %    EOSINOPHILS 1 0 - 5 %    BASOPHILS 0 0 - 2 %    ABS. NEUTROPHILS 10.0 (H) 1.8 - 8.0 K/UL    ABS. LYMPHOCYTES 1.5 0.9 - 3.6 K/UL    ABS. MONOCYTES 1.8 (H) 0.05 - 1.2 K/UL    ABS. EOSINOPHILS 0.2 0.0 - 0.4 K/UL    ABS. BASOPHILS 0.0 0.0 - 0.06 K/UL    DF AUTOMATED     MAGNESIUM    Collection Time: 02/11/18  2:19 AM   Result Value Ref Range    Magnesium 2.2 1.6 - 2.6 mg/dL   METABOLIC PANEL, COMPREHENSIVE    Collection Time: 02/11/18  2:19 AM   Result Value Ref Range    Sodium 142 136 - 145 mmol/L    Potassium 4.6 3.5 - 5.5 mmol/L    Chloride 103 100 - 108 mmol/L    CO2 30 21 - 32 mmol/L    Anion gap 9 3.0 - 18 mmol/L    Glucose 43 (LL) 74 - 99 mg/dL    BUN 40 (H) 7.0 - 18 MG/DL    Creatinine 2.03 (H) 0.6 - 1.3 MG/DL    BUN/Creatinine ratio 20 12 - 20      GFR est AA 40 (L) >60 ml/min/1.73m2    GFR est non-AA 33 (L) >60 ml/min/1.73m2    Calcium 8.3 (L) 8.5 - 10.1 MG/DL    Bilirubin, total 0.4 0.2 - 1.0 MG/DL    ALT (SGPT) 29 16 - 61 U/L    AST (SGOT) 26 15 - 37 U/L    Alk.  phosphatase 144 (H) 45 - 117 U/L    Protein, total 5.6 (L) 6.4 - 8.2 g/dL    Albumin 2.2 (L) 3.4 - 5.0 g/dL    Globulin 3.4 2.0 - 4.0 g/dL    A-G Ratio 0.6 (L) 0.8 - 1.7     PHOSPHORUS    Collection Time: 02/11/18  2:19 AM   Result Value Ref Range    Phosphorus 4.8 2.5 - 4.9 MG/DL   PTT    Collection Time: 02/11/18  2:19 AM   Result Value Ref Range    aPTT 48.9 (H) 23.0 - 36.4 SEC   CARDIAC PANEL,(CK, CKMB & TROPONIN)    Collection Time: 02/11/18  2:19 AM   Result Value Ref Range     39 - 308 U/L    CK - MB 4.2 (H) <3.6 ng/ml    CK-MB Index 3.4 0.0 - 4.0 %    Troponin-I, Qt. 1.33 (HH) 0.00 - 0.06 NG/ML   GLUCOSE, POC    Collection Time: 02/11/18  4:10 AM   Result Value Ref Range    Glucose (POC) 102 70 - 110 mg/dL   PTT    Collection Time: 02/11/18  4:38 AM   Result Value Ref Range    aPTT 60.2 (H) 23.0 - 36.4 SEC   GLUCOSE, POC    Collection Time: 02/11/18  6:17 AM   Result Value Ref Range    Glucose (POC) 113 (H) 70 - 110 mg/dL   CARDIAC PANEL,(CK, CKMB & TROPONIN)    Collection Time: 02/11/18  7:42 AM   Result Value Ref Range     39 - 308 U/L    CK - MB 4.0 (H) <3.6 ng/ml    CK-MB Index 3.5 0.0 - 4.0 %    Troponin-I, Qt. 1.25 (HH) 0.00 - 0.06 NG/ML       Procedures/imaging: see electronic medical records for all procedures/Xrays and details which were not copied into this note but were reviewed prior to creation of Franci Morris MD   2/11/2018, 9:34 AM

## 2018-02-11 NOTE — PROGRESS NOTES
7576:  Assumed care for patient, received bedside report from \[facility]\"". Patient sitting on side of bed eating breakfast with no complaints of pain or discomfort at the time. Heparin drip verified at 20 units/kg/hr. Patient on 2L of O2 via NC with no complaints or signs of respiratory distress. Whiteboard updated, bed at the lowest position with call bell within reach. 0845:  2D echo being performed at bedside. 5921:  Critical lab called in from lab by Dickson Navarrete, troponin 1.25. Dr. Jami Rodríguez present at time of notification and no further orders given, told \"Dr. Aaron Salter already said that patient need to go to cath lab. \"   Dr. Aaron Salter paged to notify of critical lab value. 36:  Spoke with Dr. Jami Rodríguez in regard to patient HR sustaining 150's, told that dose of lopressor was increased. No further orders given. 1014: Informed Dr. Jami Rodríguez that HR continues to be in 150's, given verbal order for lopressor 50mg once. 1347:  Critical lab value called in from lab by Kolton Martinez, . 6. Provider not contacted, followed heparin drip protocol. 1400:  Heparin drip stopped. 1545:  Heparin drip restarted and decreased by 3 units/kg/hr. 1915:  Bedside and Verbal shift change report given to Bronwyn Bucio RN (oncoming nurse) by Fadumo Rockwell RN   (offgoing nurse). Report included the following information SBAR, Kardex, ED Summary, Procedure Summary, Intake/Output, MAR, Med Rec Status, Cardiac Rhythm Aflutter and Alarm Parameters .

## 2018-02-12 ENCOUNTER — APPOINTMENT (OUTPATIENT)
Dept: ULTRASOUND IMAGING | Age: 73
DRG: 246 | End: 2018-02-12
Attending: INTERNAL MEDICINE
Payer: MEDICARE

## 2018-02-12 PROBLEM — I48.92 ATRIAL FLUTTER (HCC): Status: ACTIVE | Noted: 2018-02-12

## 2018-02-12 PROBLEM — I50.21 SYSTOLIC CHF, ACUTE (HCC): Status: ACTIVE | Noted: 2018-02-12

## 2018-02-12 PROBLEM — I21.4 NSTEMI (NON-ST ELEVATED MYOCARDIAL INFARCTION) (HCC): Status: ACTIVE | Noted: 2018-02-12

## 2018-02-12 PROBLEM — N28.9 ACUTE RENAL INSUFFICIENCY: Status: ACTIVE | Noted: 2018-02-12

## 2018-02-12 LAB
ALBUMIN SERPL-MCNC: 2 G/DL (ref 3.4–5)
ALBUMIN/GLOB SERPL: 0.6 {RATIO} (ref 0.8–1.7)
ALP SERPL-CCNC: 126 U/L (ref 45–117)
ALT SERPL-CCNC: 27 U/L (ref 16–61)
ANION GAP SERPL CALC-SCNC: 5 MMOL/L (ref 3–18)
APTT PPP: 86.9 SEC (ref 23–36.4)
AST SERPL-CCNC: 22 U/L (ref 15–37)
BASOPHILS # BLD: 0 K/UL (ref 0–0.06)
BASOPHILS NFR BLD: 0 % (ref 0–2)
BILIRUB SERPL-MCNC: 0.3 MG/DL (ref 0.2–1)
BUN SERPL-MCNC: 41 MG/DL (ref 7–18)
BUN/CREAT SERPL: 23 (ref 12–20)
CALCIUM SERPL-MCNC: 8.1 MG/DL (ref 8.5–10.1)
CHLORIDE SERPL-SCNC: 105 MMOL/L (ref 100–108)
CO2 SERPL-SCNC: 32 MMOL/L (ref 21–32)
CREAT SERPL-MCNC: 1.76 MG/DL (ref 0.6–1.3)
DIFFERENTIAL METHOD BLD: ABNORMAL
EOSINOPHIL # BLD: 0.2 K/UL (ref 0–0.4)
EOSINOPHIL NFR BLD: 2 % (ref 0–5)
ERYTHROCYTE [DISTWIDTH] IN BLOOD BY AUTOMATED COUNT: 14.7 % (ref 11.6–14.5)
GLOBULIN SER CALC-MCNC: 3.3 G/DL (ref 2–4)
GLUCOSE BLD STRIP.AUTO-MCNC: 203 MG/DL (ref 70–110)
GLUCOSE BLD STRIP.AUTO-MCNC: 211 MG/DL (ref 70–110)
GLUCOSE BLD STRIP.AUTO-MCNC: 285 MG/DL (ref 70–110)
GLUCOSE BLD STRIP.AUTO-MCNC: 93 MG/DL (ref 70–110)
GLUCOSE SERPL-MCNC: 187 MG/DL (ref 74–99)
HCT VFR BLD AUTO: 41.2 % (ref 36–48)
HGB BLD-MCNC: 13.4 G/DL (ref 13–16)
LYMPHOCYTES # BLD: 1.2 K/UL (ref 0.9–3.6)
LYMPHOCYTES NFR BLD: 12 % (ref 21–52)
MAGNESIUM SERPL-MCNC: 1.9 MG/DL (ref 1.6–2.6)
MCH RBC QN AUTO: 29.1 PG (ref 24–34)
MCHC RBC AUTO-ENTMCNC: 32.5 G/DL (ref 31–37)
MCV RBC AUTO: 89.6 FL (ref 74–97)
MONOCYTES # BLD: 1.3 K/UL (ref 0.05–1.2)
MONOCYTES NFR BLD: 13 % (ref 3–10)
NEUTS SEG # BLD: 6.9 K/UL (ref 1.8–8)
NEUTS SEG NFR BLD: 73 % (ref 40–73)
PHOSPHATE SERPL-MCNC: 4.1 MG/DL (ref 2.5–4.9)
PLATELET # BLD AUTO: 338 K/UL (ref 135–420)
PMV BLD AUTO: 10.7 FL (ref 9.2–11.8)
POTASSIUM SERPL-SCNC: 3.8 MMOL/L (ref 3.5–5.5)
PROT SERPL-MCNC: 5.3 G/DL (ref 6.4–8.2)
RBC # BLD AUTO: 4.6 M/UL (ref 4.7–5.5)
SODIUM SERPL-SCNC: 142 MMOL/L (ref 136–145)
WBC # BLD AUTO: 9.7 K/UL (ref 4.6–13.2)

## 2018-02-12 PROCEDURE — 74011250637 HC RX REV CODE- 250/637: Performed by: INTERNAL MEDICINE

## 2018-02-12 PROCEDURE — 65660000000 HC RM CCU STEPDOWN

## 2018-02-12 PROCEDURE — 85730 THROMBOPLASTIN TIME PARTIAL: CPT | Performed by: INTERNAL MEDICINE

## 2018-02-12 PROCEDURE — 82962 GLUCOSE BLOOD TEST: CPT

## 2018-02-12 PROCEDURE — 74011636637 HC RX REV CODE- 636/637: Performed by: PHYSICIAN ASSISTANT

## 2018-02-12 PROCEDURE — 74011250636 HC RX REV CODE- 250/636: Performed by: INTERNAL MEDICINE

## 2018-02-12 PROCEDURE — 74011636637 HC RX REV CODE- 636/637: Performed by: INTERNAL MEDICINE

## 2018-02-12 PROCEDURE — 80053 COMPREHEN METABOLIC PANEL: CPT | Performed by: INTERNAL MEDICINE

## 2018-02-12 PROCEDURE — 76770 US EXAM ABDO BACK WALL COMP: CPT

## 2018-02-12 PROCEDURE — 84100 ASSAY OF PHOSPHORUS: CPT | Performed by: INTERNAL MEDICINE

## 2018-02-12 PROCEDURE — 85025 COMPLETE CBC W/AUTO DIFF WBC: CPT | Performed by: INTERNAL MEDICINE

## 2018-02-12 PROCEDURE — 83735 ASSAY OF MAGNESIUM: CPT | Performed by: INTERNAL MEDICINE

## 2018-02-12 PROCEDURE — 36415 COLL VENOUS BLD VENIPUNCTURE: CPT | Performed by: INTERNAL MEDICINE

## 2018-02-12 RX ORDER — METOLAZONE 5 MG/1
5 TABLET ORAL ONCE
Status: COMPLETED | OUTPATIENT
Start: 2018-02-12 | End: 2018-02-12

## 2018-02-12 RX ORDER — INSULIN LISPRO 100 [IU]/ML
INJECTION, SOLUTION INTRAVENOUS; SUBCUTANEOUS
Status: DISCONTINUED | OUTPATIENT
Start: 2018-02-12 | End: 2018-02-15 | Stop reason: HOSPADM

## 2018-02-12 RX ORDER — METOPROLOL SUCCINATE 50 MG/1
50 TABLET, EXTENDED RELEASE ORAL DAILY
Status: DISCONTINUED | OUTPATIENT
Start: 2018-02-13 | End: 2018-02-15 | Stop reason: HOSPADM

## 2018-02-12 RX ORDER — INSULIN LISPRO 100 [IU]/ML
5 INJECTION, SOLUTION INTRAVENOUS; SUBCUTANEOUS
Status: DISCONTINUED | OUTPATIENT
Start: 2018-02-12 | End: 2018-02-12

## 2018-02-12 RX ORDER — INSULIN LISPRO 100 [IU]/ML
5 INJECTION, SOLUTION INTRAVENOUS; SUBCUTANEOUS
Status: DISCONTINUED | OUTPATIENT
Start: 2018-02-12 | End: 2018-02-15 | Stop reason: HOSPADM

## 2018-02-12 RX ADMIN — INSULIN LISPRO 5 UNITS: 100 INJECTION, SOLUTION INTRAVENOUS; SUBCUTANEOUS at 17:29

## 2018-02-12 RX ADMIN — FUROSEMIDE 60 MG: 10 INJECTION, SOLUTION INTRAMUSCULAR; INTRAVENOUS at 22:46

## 2018-02-12 RX ADMIN — INSULIN LISPRO 5 UNITS: 100 INJECTION, SOLUTION INTRAVENOUS; SUBCUTANEOUS at 13:33

## 2018-02-12 RX ADMIN — ASPIRIN 81 MG: 81 TABLET, COATED ORAL at 10:01

## 2018-02-12 RX ADMIN — ATORVASTATIN CALCIUM 20 MG: 20 TABLET, FILM COATED ORAL at 22:46

## 2018-02-12 RX ADMIN — INSULIN LISPRO 6 UNITS: 100 INJECTION, SOLUTION INTRAVENOUS; SUBCUTANEOUS at 10:00

## 2018-02-12 RX ADMIN — INSULIN LISPRO 9 UNITS: 100 INJECTION, SOLUTION INTRAVENOUS; SUBCUTANEOUS at 11:30

## 2018-02-12 RX ADMIN — METOPROLOL TARTRATE 50 MG: 50 TABLET, FILM COATED ORAL at 10:01

## 2018-02-12 RX ADMIN — FUROSEMIDE 60 MG: 10 INJECTION, SOLUTION INTRAMUSCULAR; INTRAVENOUS at 10:00

## 2018-02-12 RX ADMIN — CLOPIDOGREL BISULFATE 75 MG: 75 TABLET ORAL at 10:01

## 2018-02-12 RX ADMIN — INSULIN GLARGINE 15 UNITS: 100 INJECTION, SOLUTION SUBCUTANEOUS at 22:46

## 2018-02-12 RX ADMIN — INSULIN LISPRO 4 UNITS: 100 INJECTION, SOLUTION INTRAVENOUS; SUBCUTANEOUS at 17:29

## 2018-02-12 RX ADMIN — METOLAZONE 5 MG: 5 TABLET ORAL at 17:29

## 2018-02-12 NOTE — DIABETES MGMT
Diabetes Patient/Family Education Record    Factors That  May Influence Patients Ability  to Learn or  Comply with Recommendations   []   Language barrier    []   Cultural needs   []   Motivation    []   Cognitive limitation    []   Physical   []   Education    []   Physiological factors   []   Hearing/vision/speaking impairment   []   Congregational beliefs    []   Financial factors   []  Other:   [x]  No factors identified at this time.      Person Instructed:   [x]   Patient   [x]   Family   []  Other     Preference for Learning:   [x]   Verbal   []   Written   []  Demonstration     Level of Comprehension & Competence:    []  Good                                      [x] Fair                                     []  Poor                             []  Needs Reinforcement   []  Teachback completed    Education Component: pt reports attendance at OP Diabetes Self Management classes when initially diagnosed 3-4 years ago;    [x]  Medication management, including no PTA; pt confirmed non adherence to Metformin BID therapy; pt reports he did not \"like\" new OP PCM and did not follow up for prescription refills; pt does not report and AEs w/ Met therapy   []  Nutritional management    []  Exercise   []  Signs, symptoms, and treatment of hyperglycemia and hypoglycemia   [] Prevention, recognition and treatment of hyperglycemia and hypoglycemia   [x]  Importance of blood glucose monitoring; pt had working glucometer at home but stopped SMBG     []  Instruction on use of the blood glucose meter   [x]  Discuss the importance of HbA1C monitoring    []  Sick day guidelines   []  Proper use and disposal of lancets, needles, syringes or insulin pens (if appropriate)   [x]  Potential long-term complications; increase risk of CVD   [] Information about whom to contact in case of emergency or for more information    [x]  Goal:  Patient/family will demonstrate understanding of Diabetes Self Management Skills by: 3/15/18  Plan for post-discharge education or self-management support:    [x] Outpatient class schedule provided            [] Patient Declined    [] Scheduled for outpatient classes (date) _______         Trena Dukes RN, MS  Glycemic Control Team

## 2018-02-12 NOTE — PROGRESS NOTES
1920: Assumed patient care, he was alert and oriented to person, place, time and situation. Respiratory status was stable on room air. Vital signs were stable. MEWS score was a one. Patient denied any nausea vomiting dizziness or anxiety. White board was updated and explained. Bed was locked and in lowest position. Call bell, water and personal belongings were within reach. Patient had no questions, comments or concerns after bedside shift report. 0700: Patient had an uneventful shift. Respiratory status, vital signs and MEWS score remained stable. Patient was resting quietly with no signs of distress noted. Bed locked and in lowest position. Call bell water and personal belongings were within reach. Patient had no questions, comments or concerns after bedside shift report.  Bedside report given to Morgan Velázquez R.N.

## 2018-02-12 NOTE — PROGRESS NOTES
Hospitalist Progress Note    Patient: Yaneli Bennett. MRN: [id number]  CSN: [id number]    YOB: 1945  Age: 81 y.o. Sex: male    DOA: 2/10/2018 LOS:  LOS: 2 days          Chief Complaint:    SOB    Assessment/Plan     1. SOB  2. Atrial Flutter with Controlled Ventricular Response  3. DM2, Uncontrolled  4. Acute Renal Insufficiency  5. NSTEMI  6. Systolic CHF    1. Resolved, no further complaints of dyspnea. Continue to monitor fluid status, continue fluid restriction to 1.2L/day. 2. HR remains controlled. Continue aspirin, plavix, and beta blocker. Continue to monitor. 3. Glycemic control team on board. Continue cardiac diet, 15 units lantus, and SSI for normal insulin sensitivity. 4. Cr improving, pending renal US results. 5. Cardiology on board. Discussed case with Dr Philipp Lainez who will plan for left heart cath +/- PCI. Continue to follow and await results/recs. 6. Continue fluid restriction and salt restriction. Continue IV lasix and monitor fluid status. DVT Prophylaxis - Heparin gtt  Dispo: Pending results of cardiac cath and cardiology recs.      Patient Active Problem List   Diagnosis Code    Tachycardia R00.0    Atrial flutter ([facility]) I48.92    Uncontrolled type II diabetes mellitus (HCC) E11.65    Acute renal insufficiency N28.9    NSTEMI (non-ST elevated myocardial infarction) ([facility]) G09.9    Systolic CHF, acute ([facility]) I50.21       Subjective:    Denies CP/SOB  No new concerns or complaints    Review of systems:    Constitutional: denies fevers, chills, myalgias  Respiratory: denies SOB, cough  Cardiovascular: denies chest pain, palpitations  Gastrointestinal: denies nausea, vomiting, diarrhea      Vital signs/Intake and Output:  Visit Vitals    /68 (BP 1 Location: Left arm, BP Patient Position: At rest)    Pulse 76    Temp 97.3 °F (36.3 °C)    Resp 18    Ht 5' 7\" (1.702 m)    Wt 90 kg (198 lb 6.4 oz)    SpO2 98%    BMI 31.07 kg/m2     Current Shift:  02/12 0701 - 02/12 1900  In: 240 [P.O.:240]  Out: 600 [Urine:600]  Last three shifts:  02/10 1901 - 02/12 0700  In: 847.7 [P.O.:600; I.V.:247.7]  Out: 2800 [Urine:2800]    Exam:    General: Elderly white male, alert, NAD, OX3  Head/Neck: NCAT, supple, No masses, No lymphadenopathy  CVS:Irregular rhythm, no M/R/G, S1/S2 heard, no thrill  Lungs:Diminished breath sounds bilaterally, no wheezes, rhonchi, or rales  Abdomen: Soft, Nontender, No distention, Normal Bowel sounds, No hepatomegaly  Extremities: BLE edema, pulses palpable 2+  Skin:normal texture and turgor, no rashes, no lesions  Neuro:grossly normal , follows commands  Psych:appropriate                Labs: Results:       Chemistry Recent Labs      02/12/18   0447  02/11/18   0219  02/10/18   2108  02/10/18   1238   GLU  187*  43*  527*  519*   NA  142  142   --   134*   K  3.8  4.6   --   5.4   CL  105  103   --   97*   CO2  32  30   --   25   BUN  41*  40*   --   32*   CREA  1.76*  2.03*   --   1.91*   CA  8.1*  8.3*   --   8.5   AGAP  5  9   --   12   BUCR  23*  20   --   17   AP  126*  144*   --   171*   TP  5.3*  5.6*   --   6.6   ALB  2.0*  2.2*   --   2.7*   GLOB  3.3  3.4   --   3.9   AGRAT  0.6*  0.6*   --   0.7*      CBC w/Diff Recent Labs      02/12/18   0447  02/11/18   0219  02/10/18   1238   WBC  9.7  13.4*  14.0*   RBC  4.60*  5.01  5.15   HGB  13.4  14.6  15.4   HCT  41.2  44.6  45.7   PLT  338  459*  444*   GRANS  73  75*  88*   LYMPH  12*  11*  3*   EOS  2  1  0      Cardiac Enzymes Recent Labs      02/11/18   0742  02/11/18   0219   CPK  114  124   CKND1  3.5  3.4      Coagulation Recent Labs      02/11/18   1920  02/11/18   1314   02/10/18   1238   PTP   --    --    --   12.5   INR   --    --    --   1.0   APTT  72.8*  152.6*   < >  27.7    < > = values in this interval not displayed.        Lipid Panel No results found for: CHOL, CHOLPOCT, CHOLX, CHLST, CHOLV, 791408, HDL, LDL, LDLC, DLDLP, 853249, VLDLC, VLDL, TGLX, TRIGL, TRIGP, TGLPOCT, CHHD, CHHDX   BNP No results for input(s): BNPP in the last 72 hours.    Liver Enzymes Recent Labs      02/12/18   0447   TP  5.3*   ALB  2.0*   AP  126*   SGOT  22      Thyroid Studies Lab Results   Component Value Date/Time    TSH 1.72 02/11/2018 02:19 AM        Procedures/imaging: see electronic medical records for all procedures/Xrays and details which were not copied into this note but were reviewed prior to creation of [address], PA-C

## 2018-02-12 NOTE — DIABETES MGMT
NUTRITION ASSESSMENT / GLYCEMIC CONTROL PLAN OF CARE     Syeda Dougherty.           81 y.o.              Patient Active Problem List   Diagnosis Code    Tachycardia R00.0   - DM2     INTERVENTIONS/PLAN:   - BG out of target, known h/o uncontrolled DM2, A1C not to goal (11.7%), pt did not follow-up for refill of Metformin 7+ months ago  - basal insulin orders in place, recommend the following:   * continue current Lantus 15 units qhs order   * initiate mealtime dose of Humalog 5 units qac (0.05 units/kg)   * decrease corrective coverage scale to Normal Sensitivity    (order entered per protocol, Edmar TIAN)    - noted critical hypoglycemic event 2/11 @ 0219, suspect r/t overcorrection, pt is insulin naive, correction scale has been reduced, no repeats noted since  - [facility] (see additional note)  - encouraged OP DM Self Management Class (schedule given)  - recommend d/c pt with a DM home regimen, will be able to make more specific recommendations once pt is closer to d/c    ASSESSMENT:   Nutritional Status: obesity, h/o excessive energy intake, h/o excessive CHO intake, BG out of target r/t uncontrolled DM, non-adherence to Dm med regimen or nutrition recommendations, very large gap in understanding between self-care (or lack of) and impact on health, self-monitoring deficit         Lab Results   Component Value Date/Time     (H) 02/12/2018 04:47 AM    GLUCPOC 285 (H) 02/12/2018 11:34 AM    GLUCPOC 203 (H) 02/12/2018 07:41 AM    GLUCPOC 184 (H) 02/11/2018 09:11 PM             Within target range (non-ICU: <140; ICU<180): [] Yes   [x]  No    Current Insulin regimen:   - Lantus 15 units qhs  - Humalog Very Insulin Resistant Corrective Coverage    Home medication/insulin regimen:   - none, reports was on Metformin 1000 mg BID 7 months ago    HbA1c: equivalent  to ave BGlucose of 289 mg/dl for 2-3 months prior to admission    Lab Results   Component Value Date/Time Hemoglobin A1c 11.7 (H) 02/10/2018 12:38 PM       Adequate glycemic control PTA:  [] Yes  [x] No       SUBJECTIVE/OBJECTIVE:   Information obtained from: pt, family, chart, IDT; pt was able to report no current DM meds at home, stopped ~ 7 months ago when he ran out and did not follow-up with new OP PCM, old MD moved and was transferred to another MD in practice, did not get along with new provider, has working glucometer + supplies but has not checked \"since I stopped the medicine\", discussed with pt that he will need medicine for his DM at home, also stressed that whatever he is discharged with will only last ~30 days, he will need to follow-up for refills, pt expressed interest in our Care Team helping him find new doctor, discussed with Radha (Care Team) on floor        Medications: [x]                Reviewed        Labs:   Lab Results   Component Value Date/Time    Sodium 142 02/12/2018 04:47 AM    Potassium 3.8 02/12/2018 04:47 AM    Chloride 105 02/12/2018 04:47 AM    CO2 32 02/12/2018 04:47 AM    Anion gap 5 02/12/2018 04:47 AM    Glucose 187 (H) 02/12/2018 04:47 AM    BUN 41 (H) 02/12/2018 04:47 AM    Creatinine 1.76 (H) 02/12/2018 04:47 AM    Calcium 8.1 (L) 02/12/2018 04:47 AM    Magnesium 1.9 02/12/2018 04:47 AM    Phosphorus 4.1 02/12/2018 04:47 AM    Albumin 2.0 (L) 02/12/2018 04:47 AM       Anthropometrics: IBW : 73.5 kg (162 lb), % IBW (Calculated): 122.47 %, BMI (calculated): 31.1  Wt Readings from Last 1 Encounters:   02/12/18 90 kg (198 lb 6.4 oz)    Ht Readings from Last 1 Encounters:   02/12/18 5' 7\" (1.702 m)       Estimated Nutrition Needs: 1825 Kcals/day (25 kcal/kg of IBW), Protein (g): 72 g (1 g/kg) Fluid (ml): 1200 ml (per MD)  Based on:   []          Actual BW    [x]          IBW   []            Adjusted BW        Diet:   Active Orders   Diet    DIET CARDIAC Regular; FR 1200ML       Intake:   Patient Vitals for the past 100 hrs:   % Diet Eaten   02/12/18 0959 100 %   02/11/18 1920 100 % 02/11/18 1829 100 %   02/10/18 1854 100 %       Nutrition Diagnoses:   - altered nutrition-related lab values r/t Dm AEB A1C of 11.7% and known h/o DM2  - obesity r/t h/o excessive energy intake AEB BMI of 31.1%  - self-monitoring deficit r/t Dm AEB pt report of no SMBG at home  - lack of adherence to DM regimen and nutrition recommendations r/t DM AEB A1C of 11.7% and observed gap in understanding between self-care and impact on health       Nutrition Interventions:   - education, diet Cardiac with FR 1200 ml     Goal: .  - BG will be in target range of  mg/dl (non-ICU) by 2/15/18  - pt will check BG at least 1x/day at home by 2/20/18  - pt will maintain current wt/prevent wt gain by 3/12/18  - pt will follow up with OP PCP for DM by 3/12/18       Nutrition Monitoring and Evaluation      [x]     Monitor po intake on meal rounds  [x]     Continue inpatient monitoring and intervention  [x]     Other: BG      Nutrition Risk:  []   High     []  Moderate    [x]  Minimal/Uncompromised    HODA Almonte, MPH, RD, CDE

## 2018-02-12 NOTE — DIABETES MGMT
GLYCEMIC CONTROL PROGRESS NOTE:    -discussed in rounds known h/o T2DM, HbA1C not within recommended range for age + comorbids r/t non adherence to Metformin therapy  -BG out of target range non-ICU: < 140 mg/dL, requiring corrective coverage  -TDD = 33 (15 Lantus + 18 - Humalog Normal Insulin Sensitivity Corrective Coverage)  -recommend initiate weight based mealtime insulin Humalog 5 units qac (orders entered per protocol)  Noted:  -pt with severe hypoglycemic episode possibly, hypo protocol followed    - Intake:   Patient Vitals for the past 100 hrs:   % Diet Eaten   02/12/18 0959 100 %   02/11/18 1920 100 %   02/11/18 1829 100 %   02/10/18 1854 100 %         Elbert Holman RN, MS  Glycemic Control Team

## 2018-02-12 NOTE — PROGRESS NOTES
Physical Therapy Screening:  Services are not indicated at this time. An InBasket screening referral was triggered for physical therapy based on results obtained during the nursing admission assessment. The patients chart was reviewed and the patient is not appropriate for a skilled therapy evaluation at this time. Please consult physical therapy if any therapy needs arise. Thank you.     Janet Oates PT, DPT

## 2018-02-12 NOTE — PROGRESS NOTES
Chart Reviewed. Noted patient admitted to the hospital for further medical management. Care Management to follow up with patient and/or family for transition of care needs prn. Readmission Risk Assessment: Low Risk and MSSP/Good Help ACO patients    RRAT Score: 1 - 12    Initial Assessment: presents to the emergency department C/O gradually worsening dyspnea on exertion, onset 4 days ago. Worse with laying down at night patient admitted for medical management    1130 Met with patient at bedside he informed cm he lives with his wife and is usually independent. Patient states he has a pcp at [address] [facility] but they don't get along he would like cm to find him a pcp. CMS will follow up with patient for pcp. Patient may have cardiac cath today or tomorrow. Cm will continue to follow [place] [id number] versus [facility]    Emergency Contact: see face sheet    Pertinent Medical Hx: DM, HTN, HLD  PCP/Specialists:     Community Services:     DME:     Low Risk Care Transition Plan:  1. Evaluate for [facility] or H2H, community care coordination of resources  2. Involve patient/caregiver in assessment, planning, education and implement of intervention. 3. CM daily patient care huddles/interdisciplinary rounds. 4. PCP/Specialist appointment within 7 - 10 days made prior to discharge. 5. Facilitate transportation and logistics for follow-up appointments.   6. Handoff to [address] Nurse Navigator or PCP practice

## 2018-02-13 PROBLEM — R00.0 TACHYCARDIA: Status: RESOLVED | Noted: 2018-02-10 | Resolved: 2018-02-13

## 2018-02-13 LAB
ACT BLD: 194 SECS (ref 79–138)
ANION GAP SERPL CALC-SCNC: 5 MMOL/L (ref 3–18)
APTT PPP: 74.8 SEC (ref 23–36.4)
APTT PPP: 92.5 SEC (ref 23–36.4)
APTT PPP: >180 SEC (ref 23–36.4)
BUN SERPL-MCNC: 43 MG/DL (ref 7–18)
BUN/CREAT SERPL: 26 (ref 12–20)
CALCIUM SERPL-MCNC: 8.4 MG/DL (ref 8.5–10.1)
CHLORIDE SERPL-SCNC: 101 MMOL/L (ref 100–108)
CO2 SERPL-SCNC: 34 MMOL/L (ref 21–32)
CREAT SERPL-MCNC: 1.68 MG/DL (ref 0.6–1.3)
GLUCOSE BLD STRIP.AUTO-MCNC: 152 MG/DL (ref 70–110)
GLUCOSE BLD STRIP.AUTO-MCNC: 155 MG/DL (ref 70–110)
GLUCOSE BLD STRIP.AUTO-MCNC: 180 MG/DL (ref 70–110)
GLUCOSE BLD STRIP.AUTO-MCNC: 328 MG/DL (ref 70–110)
GLUCOSE SERPL-MCNC: 180 MG/DL (ref 74–99)
HCT VFR BLD AUTO: 38.2 % (ref 36–48)
HGB BLD-MCNC: 12.7 G/DL (ref 13–16)
POTASSIUM SERPL-SCNC: 4.3 MMOL/L (ref 3.5–5.5)
SODIUM SERPL-SCNC: 140 MMOL/L (ref 136–145)

## 2018-02-13 PROCEDURE — 74011250636 HC RX REV CODE- 250/636

## 2018-02-13 PROCEDURE — 74011250636 HC RX REV CODE- 250/636: Performed by: EMERGENCY MEDICINE

## 2018-02-13 PROCEDURE — 74011636637 HC RX REV CODE- 636/637: Performed by: INTERNAL MEDICINE

## 2018-02-13 PROCEDURE — B211YZZ FLUOROSCOPY OF MULTIPLE CORONARY ARTERIES USING OTHER CONTRAST: ICD-10-PCS | Performed by: INTERNAL MEDICINE

## 2018-02-13 PROCEDURE — 74011250637 HC RX REV CODE- 250/637: Performed by: INTERNAL MEDICINE

## 2018-02-13 PROCEDURE — 85347 COAGULATION TIME ACTIVATED: CPT

## 2018-02-13 PROCEDURE — 027034Z DILATION OF CORONARY ARTERY, ONE ARTERY WITH DRUG-ELUTING INTRALUMINAL DEVICE, PERCUTANEOUS APPROACH: ICD-10-PCS | Performed by: INTERNAL MEDICINE

## 2018-02-13 PROCEDURE — 74011636320 HC RX REV CODE- 636/320: Performed by: INTERNAL MEDICINE

## 2018-02-13 PROCEDURE — 74011250636 HC RX REV CODE- 250/636: Performed by: INTERNAL MEDICINE

## 2018-02-13 PROCEDURE — 74011636637 HC RX REV CODE- 636/637: Performed by: PHYSICIAN ASSISTANT

## 2018-02-13 PROCEDURE — 85018 HEMOGLOBIN: CPT | Performed by: INTERNAL MEDICINE

## 2018-02-13 PROCEDURE — 65660000000 HC RM CCU STEPDOWN

## 2018-02-13 PROCEDURE — 85730 THROMBOPLASTIN TIME PARTIAL: CPT | Performed by: INTERNAL MEDICINE

## 2018-02-13 PROCEDURE — C1894 INTRO/SHEATH, NON-LASER: HCPCS

## 2018-02-13 PROCEDURE — 36415 COLL VENOUS BLD VENIPUNCTURE: CPT | Performed by: INTERNAL MEDICINE

## 2018-02-13 PROCEDURE — 74011000250 HC RX REV CODE- 250: Performed by: INTERNAL MEDICINE

## 2018-02-13 PROCEDURE — 4A023N7 MEASUREMENT OF CARDIAC SAMPLING AND PRESSURE, LEFT HEART, PERCUTANEOUS APPROACH: ICD-10-PCS | Performed by: INTERNAL MEDICINE

## 2018-02-13 PROCEDURE — 82962 GLUCOSE BLOOD TEST: CPT

## 2018-02-13 PROCEDURE — 80048 BASIC METABOLIC PNL TOTAL CA: CPT | Performed by: INTERNAL MEDICINE

## 2018-02-13 PROCEDURE — 74011000250 HC RX REV CODE- 250

## 2018-02-13 RX ORDER — FENTANYL CITRATE 50 UG/ML
INJECTION, SOLUTION INTRAMUSCULAR; INTRAVENOUS
Status: COMPLETED
Start: 2018-02-13 | End: 2018-02-13

## 2018-02-13 RX ORDER — EPTIFIBATIDE 0.75 MG/ML
1 INJECTION, SOLUTION INTRAVENOUS CONTINUOUS
Status: DISCONTINUED | OUTPATIENT
Start: 2018-02-13 | End: 2018-02-13

## 2018-02-13 RX ORDER — HEPARIN SODIUM 200 [USP'U]/100ML
500 INJECTION, SOLUTION INTRAVENOUS
Status: DISCONTINUED | OUTPATIENT
Start: 2018-02-13 | End: 2018-02-13 | Stop reason: ALTCHOICE

## 2018-02-13 RX ORDER — SODIUM CHLORIDE 0.9 % (FLUSH) 0.9 %
5-10 SYRINGE (ML) INJECTION EVERY 8 HOURS
Status: DISCONTINUED | OUTPATIENT
Start: 2018-02-13 | End: 2018-02-15 | Stop reason: HOSPADM

## 2018-02-13 RX ORDER — MIDAZOLAM HYDROCHLORIDE 1 MG/ML
INJECTION, SOLUTION INTRAMUSCULAR; INTRAVENOUS
Status: COMPLETED
Start: 2018-02-13 | End: 2018-02-13

## 2018-02-13 RX ORDER — EPTIFIBATIDE 2 MG/ML
INJECTION, SOLUTION INTRAVENOUS
Status: COMPLETED
Start: 2018-02-13 | End: 2018-02-13

## 2018-02-13 RX ORDER — FENTANYL CITRATE 50 UG/ML
25-100 INJECTION, SOLUTION INTRAMUSCULAR; INTRAVENOUS
Status: DISCONTINUED | OUTPATIENT
Start: 2018-02-13 | End: 2018-02-13 | Stop reason: ALTCHOICE

## 2018-02-13 RX ORDER — GUAIFENESIN 100 MG/5ML
81 LIQUID (ML) ORAL
Status: COMPLETED | OUTPATIENT
Start: 2018-02-13 | End: 2018-02-13

## 2018-02-13 RX ORDER — ATORVASTATIN CALCIUM 20 MG/1
40 TABLET, FILM COATED ORAL
Status: DISCONTINUED | OUTPATIENT
Start: 2018-02-13 | End: 2018-02-15 | Stop reason: HOSPADM

## 2018-02-13 RX ORDER — HEPARIN SODIUM 1000 [USP'U]/ML
INJECTION, SOLUTION INTRAVENOUS; SUBCUTANEOUS
Status: COMPLETED
Start: 2018-02-13 | End: 2018-02-13

## 2018-02-13 RX ORDER — HEPARIN SODIUM 1000 [USP'U]/ML
1000-4000 INJECTION, SOLUTION INTRAVENOUS; SUBCUTANEOUS
Status: DISCONTINUED | OUTPATIENT
Start: 2018-02-13 | End: 2018-02-13 | Stop reason: ALTCHOICE

## 2018-02-13 RX ORDER — LIDOCAINE HYDROCHLORIDE 10 MG/ML
INJECTION INFILTRATION; PERINEURAL
Status: COMPLETED
Start: 2018-02-13 | End: 2018-02-13

## 2018-02-13 RX ORDER — CLOPIDOGREL 300 MG/1
300 TABLET, FILM COATED ORAL ONCE
Status: COMPLETED | OUTPATIENT
Start: 2018-02-13 | End: 2018-02-13

## 2018-02-13 RX ORDER — GUAIFENESIN 100 MG/5ML
LIQUID (ML) ORAL
Status: DISPENSED
Start: 2018-02-13 | End: 2018-02-14

## 2018-02-13 RX ORDER — EPTIFIBATIDE 2 MG/ML
180 INJECTION, SOLUTION INTRAVENOUS
Status: DISCONTINUED | OUTPATIENT
Start: 2018-02-13 | End: 2018-02-13 | Stop reason: ALTCHOICE

## 2018-02-13 RX ORDER — VERAPAMIL HYDROCHLORIDE 2.5 MG/ML
INJECTION, SOLUTION INTRAVENOUS
Status: DISPENSED
Start: 2018-02-13 | End: 2018-02-14

## 2018-02-13 RX ORDER — SODIUM CHLORIDE 0.9 % (FLUSH) 0.9 %
5-10 SYRINGE (ML) INJECTION AS NEEDED
Status: DISCONTINUED | OUTPATIENT
Start: 2018-02-13 | End: 2018-02-15 | Stop reason: HOSPADM

## 2018-02-13 RX ORDER — LIDOCAINE HYDROCHLORIDE 10 MG/ML
3-20 INJECTION INFILTRATION; PERINEURAL
Status: DISCONTINUED | OUTPATIENT
Start: 2018-02-13 | End: 2018-02-13 | Stop reason: ALTCHOICE

## 2018-02-13 RX ORDER — MIDAZOLAM HYDROCHLORIDE 1 MG/ML
.5-2 INJECTION, SOLUTION INTRAMUSCULAR; INTRAVENOUS
Status: DISCONTINUED | OUTPATIENT
Start: 2018-02-13 | End: 2018-02-13 | Stop reason: ALTCHOICE

## 2018-02-13 RX ORDER — CLOPIDOGREL 300 MG/1
TABLET, FILM COATED ORAL
Status: DISPENSED
Start: 2018-02-13 | End: 2018-02-14

## 2018-02-13 RX ORDER — EPTIFIBATIDE 0.75 MG/ML
2 INJECTION, SOLUTION INTRAVENOUS CONTINUOUS
Status: DISCONTINUED | OUTPATIENT
Start: 2018-02-13 | End: 2018-02-13 | Stop reason: CLARIF

## 2018-02-13 RX ADMIN — FENTANYL CITRATE 25 MCG: 50 INJECTION, SOLUTION INTRAMUSCULAR; INTRAVENOUS at 15:26

## 2018-02-13 RX ADMIN — EPTIFIBATIDE 16.2 MG: 2 INJECTION, SOLUTION INTRAVENOUS at 16:24

## 2018-02-13 RX ADMIN — HEPARIN SODIUM 17 UNITS/KG/HR: 10000 INJECTION, SOLUTION INTRAVENOUS at 06:03

## 2018-02-13 RX ADMIN — HEPARIN SODIUM 4000 UNITS: 1000 INJECTION, SOLUTION INTRAVENOUS; SUBCUTANEOUS at 15:26

## 2018-02-13 RX ADMIN — LIDOCAINE HYDROCHLORIDE 3 ML: 10 INJECTION, SOLUTION INFILTRATION; PERINEURAL at 15:25

## 2018-02-13 RX ADMIN — FUROSEMIDE 60 MG: 10 INJECTION, SOLUTION INTRAMUSCULAR; INTRAVENOUS at 21:41

## 2018-02-13 RX ADMIN — METOPROLOL SUCCINATE 50 MG: 50 TABLET, EXTENDED RELEASE ORAL at 21:19

## 2018-02-13 RX ADMIN — ATORVASTATIN CALCIUM 40 MG: 20 TABLET, FILM COATED ORAL at 21:41

## 2018-02-13 RX ADMIN — FENTANYL CITRATE 25 MCG: 50 INJECTION, SOLUTION INTRAMUSCULAR; INTRAVENOUS at 16:00

## 2018-02-13 RX ADMIN — EPTIFIBATIDE 16.2 MG: 2 INJECTION, SOLUTION INTRAVENOUS at 16:14

## 2018-02-13 RX ADMIN — NITROGLYCERIN 200 MCG: 5 INJECTION, SOLUTION INTRAVENOUS at 15:53

## 2018-02-13 RX ADMIN — Medication 10 ML: at 21:44

## 2018-02-13 RX ADMIN — IOPAMIDOL 75 ML: 510 INJECTION, SOLUTION INTRAVASCULAR at 16:15

## 2018-02-13 RX ADMIN — ASPIRIN 81 MG 81 MG: 81 TABLET ORAL at 16:12

## 2018-02-13 RX ADMIN — NITROGLYCERIN 200 MCG: 5 INJECTION, SOLUTION INTRAVENOUS at 16:05

## 2018-02-13 RX ADMIN — CLOPIDOGREL BISULFATE 300 MG: 300 TABLET, FILM COATED ORAL at 16:11

## 2018-02-13 RX ADMIN — NITROGLYCERIN 200 MCG: 5 INJECTION, SOLUTION INTRAVENOUS at 15:59

## 2018-02-13 RX ADMIN — INSULIN GLARGINE 15 UNITS: 100 INJECTION, SOLUTION SUBCUTANEOUS at 21:44

## 2018-02-13 RX ADMIN — HEPARIN SODIUM 3000 UNITS: 1000 INJECTION, SOLUTION INTRAVENOUS; SUBCUTANEOUS at 15:49

## 2018-02-13 RX ADMIN — INSULIN LISPRO 8 UNITS: 100 INJECTION, SOLUTION INTRAVENOUS; SUBCUTANEOUS at 21:41

## 2018-02-13 RX ADMIN — FENTANYL CITRATE 50 MCG: 50 INJECTION, SOLUTION INTRAMUSCULAR; INTRAVENOUS at 16:06

## 2018-02-13 RX ADMIN — MIDAZOLAM HYDROCHLORIDE 0.5 MG: 1 INJECTION, SOLUTION INTRAMUSCULAR; INTRAVENOUS at 15:25

## 2018-02-13 RX ADMIN — VERAPAMIL HYDROCHLORIDE 3 ML: 2.5 INJECTION INTRAVENOUS at 15:26

## 2018-02-13 RX ADMIN — LIDOCAINE HYDROCHLORIDE 3 ML: 10 INJECTION INFILTRATION; PERINEURAL at 15:25

## 2018-02-13 RX ADMIN — INSULIN LISPRO 5 UNITS: 100 INJECTION, SOLUTION INTRAVENOUS; SUBCUTANEOUS at 17:57

## 2018-02-13 NOTE — PROGRESS NOTES
Cardiology Progress Note        Patient: Kecia Sanchez. Sex: male          DOA: 2/10/2018  YOB: 1945      Age:  81 y.o.        LOS:  LOS: 2 days    Patient seen and examined, chart reviewed. Assessment/Plan     Patient Active Problem List   Diagnosis Code         Atrial flutter ([facility]) I48.92    Uncontrolled type II diabetes mellitus ([facility] [address] E11.65    Acute renal insufficiency N28.9    [facility]STEMI (non-ST elevated myocardial infarction) [facility] F58.0    Systolic CHF, acute ([facility]) I50.21      Acute decompensated systolic heart failure LVEF 15-20%  Non-STEMI  Atrial flutter with controlled ventricular response  Renal insufficiency baseline unknown  Uncontrolled diabetes mellitus     Echocardiogram revealed wall motion abnormality with LVEF of 15-20%.     Plan:      Continue aspirin, plavix, beta blocker ,Statin and IV heparin  Continue IV Lasix  One dose of Metolazone 5 mg PO today  Will add ACEI/ARB  Strict input output  Fluid restriction up to 1.2 L per day and salt restriction up to 2 g per day  Daily BMP  Continue management as per hospital medicine  Patient will need LEFT heart catheterization, coronary angiogram plus or minus PCI. All risks, benefits and alternatives explained to the patient he verbally understood.     Continue management as per hospital medicine. Subjective:    cc: My breathing is better, leg swelling is improving       REVIEW OF SYSTEMS:     General: No fevers or chills.   Cardiovascular: No chest pain,No palpitations, No orthopnea, No PND, Positive leg swelling, No claudication  Pulmonary: Positive dyspnea   Gastrointestinal: No nausea, vomiting, bleeding  Neurology: No Dizziness    Objective:      Visit Vitals    /64 (BP 1 Location: Left arm, BP Patient Position: Sitting)    Pulse 77    Temp 97.3 °F (36.3 °C)    Resp 18    Ht 5' 7\" (1.702 m)    Wt 90 kg (198 lb 6.4 oz)    SpO2 100%    BMI 31.07 kg/m2 Body mass index is 31.07 kg/(m^2). Physical Exam:  General Appearance: Comfortable, not using accessory muscles of respiration. HEENT: SUSHMA. HEAD: Atraumatic  NECK: No JVD, no thyroidomeglay. CAROTIDS: No bruit  LUNGS: bibasilar absent air entry   HEART: S1+S2 audible, irregularly irregular, no murmur, no pericardial rub. ABD: Non-tender, BS Audible    EXT: +++ bilateral leg edema, and no cyanosis. VASCULAR EXAM: Pulses are intact. PSYCHIATRIC EXAM: Mood is appropriate. MUSCULOSKELETAL: Grossly no joint deformity.   NEUROLOGICAL: AAO times 3, No motor and sensory deficit  Medication:  Current Facility-Administered Medications   Medication Dose Route Frequency    insulin lispro (HUMALOG) injection   SubCUTAneous AC&HS    insulin lispro (HUMALOG) injection 5 Units  5 Units SubCUTAneous TIDAC    [START ON 2/13/2018] metoprolol succinate (TOPROL-XL) XL tablet 50 mg  50 mg Oral DAILY    clopidogrel (PLAVIX) tablet 75 mg  75 mg Oral DAILY    heparin 25,000 units in D5W 250 ml infusion  18-36 Units/kg/hr IntraVENous TITRATE    acetaminophen (TYLENOL) tablet 650 mg  650 mg Oral Q4H PRN    insulin glargine (LANTUS) injection 15 Units  0.2 Units/kg SubCUTAneous QHS    glucose chewable tablet 16 g  4 Tab Oral PRN    glucagon (GLUCAGEN) injection 1 mg  1 mg IntraMUSCular PRN    dextrose (D50W) injection syrg 12.5-25 g  25-50 mL IntraVENous PRN    aspirin delayed-release tablet 81 mg  81 mg Oral DAILY    atorvastatin (LIPITOR) tablet 20 mg  20 mg Oral QHS    furosemide (LASIX) injection 60 mg  60 mg IntraVENous BID               Lab/Data Reviewed:       Recent Labs      02/12/18   0447  02/11/18   0219  02/10/18   1238   WBC  9.7  13.4*  14.0*   HGB  13.4  14.6  15.4   HCT  41.2  44.6  45.7   PLT  338  459*  444*     Recent Labs      02/12/18   0447  02/11/18   0219  02/10/18   2108  02/10/18   1238   NA  142  142   --   134*   K  3.8  4.6   --   5.4   CL  105  103   --   97*   CO2  32  30   -- 25   GLU  187*  43*  527*  519*   BUN  41*  40*   --   32*   CREA  1.76*  2.03*   --   1.91*   CA  8.1*  8.3*   --   8.5       Signed By: Joaquin Montero MD     February 12, 2018

## 2018-02-13 NOTE — PROGRESS NOTES
1730: TR band to left wrist discontinued. Pt tolerated well and site remains wnl.     1745: TRANSFER - OUT REPORT:    Verbal report given to Jignesh Edwards RN on Vallerie Spurling.  being transferred to [address] [place] [address](unit) for routine progression of care       Report consisted of patients Situation, Background, Assessment and   Recommendations(SBAR). Information from the following report(s) Procedure Summary, Intake/Output and MAR was reviewed with the receiving nurse. Lines:   Peripheral IV 02/10/18 Right Antecubital (Active)   Site Assessment Bleeding 2/13/2018  2:29 PM   Phlebitis Assessment 0 2/13/2018  2:29 PM   Infiltration Assessment 0 2/13/2018  2:29 PM   Dressing Status Clean, dry, & intact 2/13/2018  2:29 PM   Dressing Type Transparent 2/13/2018  2:29 PM   Hub Color/Line Status Green 2/13/2018  2:29 PM   Action Taken Open ports on tubing capped 2/12/2018  7:25 PM   Alcohol Cap Used Yes 2/13/2018 12:00 PM       Peripheral IV 02/11/18 Left Wrist (Active)   Site Assessment Clean, dry, & intact 2/13/2018  2:29 PM   Phlebitis Assessment 0 2/13/2018  2:29 PM   Infiltration Assessment 0 2/13/2018  2:29 PM   Dressing Status Clean, dry, & intact 2/13/2018  2:29 PM   Dressing Type Transparent 2/13/2018  2:29 PM   Hub Color/Line Status Blue 2/13/2018  2:29 PM   Action Taken Open ports on tubing capped 2/12/2018  7:25 PM   Alcohol Cap Used Yes 2/13/2018  2:29 PM        Opportunity for questions and clarification was provided. Patient transported with:   Monitor  Registered Nurse    7269: Pt transferred back to [facility]. Left radial site assessed with receiving nurse upon arrival on unit. Denies any pain or concerns.

## 2018-02-13 NOTE — PROCEDURES
[facility] and coronary angiogram done via left radial artery. Left main normal  Proximal LAD has 98% stenosis. Mid LCx has 50% stenosis  Mid RCA has 70% stenosis    Successful PCI of pLAD done with SOCORRO. Patient tolerated procedure. Patient was converted to Sinus rhythm during procedure. Integrilin bolus given  Plavix 300 mg PO given. Continue aspirin, plavix, beta blocker and statin  Continue IV lasix  Continue IV heparin    Continue management as per hospital medicine   Plan discussed with patient.

## 2018-02-13 NOTE — PROGRESS NOTES
2600 [facility] care of patient from Hemal Kearney RN. Heparin gtt verified at bedside    840 Assessment completed, patient is alert and oriented x's 4, no c/o pain. A flutter on the monitor with a HR in the 80's. Lung fields are clear, but diminished throughout on 1.5L NC, 02 sat sustained at 100% with no cough noted. Patient is voiding without any complications. Scheduled meds held due to scheduled cardiac cath today. Patient is currently sitting up on side of bed after conversing with Abdon TIAN, no s/s of any distress, VSS, call bell and personal belongings within reach, will continue to monitor. IDR/SLIDR Summary          Patient: David Durant. MRN: [id number]    Age: 81 y.o. YOB: 1945 Room/Bed: 340/01   Admit Diagnosis: Tachycardia  Principal Diagnosis: NSTEMI (non-ST elevated myocardial infarction) ([facility] [address]   Goals: improve respiratory status  Readmission: NO  Quality Measure: CHF  VTE Prophylaxis: Chemical  Influenza Vaccine screening completed? YES  Pneumococcal Vaccine screening completed? YES  Mobility needs: No   Nutrition plan:Yes  Consults:Case Management    Financial concerns:No  Escalated to CM? YES  RRAT Score:    Interventions:H2H  Testing due for pt today? YES cardiac cath  LOS: 3 days Expected length of stay 2 days  Discharge plan: home   PCP: Savannah Santos MD  Transportation needs: No    Days before discharge:two or more days before discharge   Discharge disposition: Home    Signed:     Jus Hill RN  2/13/2018  11:13 AM    1407 TRANSFER - OUT REPORT:    Verbal report given to Paris Evans RN(name) on David Durant.  being transferred to cardiac cath(unit) for ordered procedure       Report consisted of patients Situation, Background, Assessment and   Recommendations(SBAR). Information from the following report(s) SBAR was reviewed with the receiving nurse.     Lines:   Peripheral IV 02/10/18 Right Antecubital (Active)   Site Assessment Clean, dry, & intact 2/13/2018 12:00 PM   Phlebitis Assessment 0 2/13/2018 12:00 PM   Infiltration Assessment 0 2/13/2018 12:00 PM   Dressing Status Clean, dry, & intact 2/13/2018  8:46 AM   Dressing Type Transparent 2/13/2018 12:00 PM   Hub Color/Line Status Green 2/13/2018 12:00 PM   Action Taken Open ports on tubing capped 2/12/2018  7:25 PM   Alcohol Cap Used Yes 2/13/2018 12:00 PM       Peripheral IV 02/11/18 Left Wrist (Active)   Site Assessment Clean, dry, & intact 2/13/2018 12:00 PM   Phlebitis Assessment 0 2/13/2018 12:00 PM   Infiltration Assessment 0 2/13/2018 12:00 PM   Dressing Status Clean, dry, & intact 2/13/2018 12:00 PM   Dressing Type Transparent 2/13/2018 12:00 PM   Hub Color/Line Status Blue 2/13/2018 12:00 PM   Action Taken Open ports on tubing capped 2/12/2018  7:25 PM   Alcohol Cap Used Yes 2/13/2018 12:00 PM        Opportunity for questions and clarification was provided. Patient transported with:   Monitor  O2 @ 1.5 liters    1836 patient arrived back on unit from heart center with left t armband in place, dressing remain C/D/I. Heparin gtt restarted at 17 units, patient is free from pain, no s/s of any distress, VSS, will continue to monitor. 1929 Bedside and Verbal shift change report given to Rj Rivera RN (oncoming nurse) by Yong Garcia RN (offgoing nurse). Report included the following information SBAR.

## 2018-02-13 NOTE — ROUTINE PROCESS
TRANSFER - OUT REPORT:  Verbal report given to Professor Narendra Vasquez RN(name) on Keenan Mccarty. being transferred to CARE(unit) for routine progression of care   Report consisted of patients Situation, Background, Assessment and   Recommendations(SBAR). Information from the following report(s) Procedure Summary, Recent Results, Med Rec Status and Cardiac Rhythm NSR was reviewed with the receiving nurse.     Cath Lab Report:    Procedure:  Angelina.Lies ] C Angelina.Lies ] PTCA      Access site:   Angelina.Lies ] Radial        Sheath:           Angelina.Lies ] Pulled in Cath Lab         Closure:         X  Radial Band  Angelina.Lies ] Left          Site Assessment:   Angelina.Lies ] Clean, Dry, No bleeding    [ ] Minor oozing          [ ] Hematoma: Description:    Stents(s) Placement:  [ ] Left Main:                 Angelina.Lies ] LAD:                [ ] Circ:                [ ] RCA:                [ ] EF:     [ ] Peripheral:      [ ] N/A        Intra procedure Medications:    Fentanyl:100 MCG  Versed:.5 MG  Heparin:7000 UNITS  ASA 81 MG  PLAVIX 300 MG    Lines:        Peripheral IV 02/10/18 Right Antecubital (Active)   Site Assessment Bleeding 2/13/2018  2:29 PM   Phlebitis Assessment 0 2/13/2018  2:29 PM   Infiltration Assessment 0 2/13/2018  2:29 PM   Dressing Status Clean, dry, & intact 2/13/2018  2:29 PM   Dressing Type Transparent 2/13/2018  2:29 PM   Hub Color/Line Status Green 2/13/2018  2:29 PM   Action Taken Open ports on tubing capped 2/12/2018  7:25 PM   Alcohol Cap Used Yes 2/13/2018 12:00 PM       Peripheral IV 02/11/18 Left Wrist (Active)   Site Assessment Clean, dry, & intact 2/13/2018  2:29 PM   Phlebitis Assessment 0 2/13/2018  2:29 PM   Infiltration Assessment 0 2/13/2018  2:29 PM   Dressing Status Clean, dry, & intact 2/13/2018  2:29 PM   Dressing Type Transparent 2/13/2018  2:29 PM   Hub Color/Line Status Blue 2/13/2018  2:29 PM   Action Taken Open ports on tubing capped 2/12/2018  7:25 PM   Alcohol Cap Used Yes 2/13/2018  2:29 PM          Patient Vitals for the past 4 hrs: Temp Pulse Resp BP SpO2   02/13/18 1618 97.9 °F (36.6 °C) 87 17 149/85 100 %         Extended / Orthostatic Vitals:    Vital Signs  Level of Consciousness: Alert (02/13/18 1618)  Temp: 97.9 °F (36.6 °C) (02/13/18 1618)  Temp Source: Oral (02/13/18 1618)  Pulse (Heart Rate): 87 (02/13/18 1618)  Heart Rate Source: Monitor (02/13/18 1618)  Cardiac Rhythm: Normal sinus rhythm (02/13/18 1618)  Resp Rate: 17 (02/13/18 1618)  BP: 149/85 (02/13/18 1618)  MAP (Monitor): 81 (02/10/18 1530)  MAP (Calculated): 106 (02/13/18 1618)  BP 1 Location: Right arm (02/13/18 1618)  BP 1 Method: Automatic (02/13/18 1618)  BP Patient Position: At rest;Supine (02/13/18 1618)  MEWS Score: 1 (02/13/18 1618)         Oxygen Therapy  O2 Sat (%): 100 % (02/13/18 1618)  O2 Device: Nasal cannula (02/13/18 0836)  O2 Flow Rate (L/min): 1.5 l/min (02/13/18 9159)          Opportunity for questions and clarification was provided.

## 2018-02-13 NOTE — PROGRESS NOTES
Hospitalist Progress Note    Patient: Nely Henriquez. MRN: [id number]  CSN: [id number]    YOB: 1945  Age: 81 y.o. Sex: male    DOA: 2/10/2018 LOS:  LOS: 3 days          Chief Complaint:    SOB    Assessment/Plan     1. SOB  2. Atrial Flutter with Controlled Ventricular Response  3. DM2, Uncontrolled  4. Acute Renal Insufficiency   5. NSTEMI  6. Systolic CHF    1. No further complaints. Continue fluid and salt restriction per cardiology recs. 2. HR controlled, continue aspirin, plavix, beta blocker. Continue to monitor. 3. BGL improved this AM to 180. Patient is NPO for cath, but may resume cardiac diet upon completion with 15 units of Lantus, 5 units Humalog, and SSI coverage. 4. Cr continues to improve daily. Continue to monitor. 5. Cardiology on board. Will plan for cath today. Patient on heparin drip. Dr Gunner Vargas will do procedure today. Await results and recs upon completion. 6. Continue fluid restriction to 1.2L/day and salt restriction to 2g per day. Continue IV Lasix. Patient received one dose of metolazone yesterday, with improvement of his BLE edema. DVT Prophylaxis - Heparin gtt  Dispo: Await cath today. Will need further cardiology recs. Patient Active Problem List   Diagnosis Code    Atrial flutter ([facility] I48.92    Uncontrolled type II diabetes mellitus ([facility] E11.65    Acute renal insufficiency N28.9    NSTEMI (non-ST elevated myocardial infarction) ([facility] J35.6    Systolic CHF, acute ([facility]) I50.21       Subjective:    Patient states he is overwhelmed with all of this. Wants to know if he will require insulin upon discharge.      Review of systems:    Constitutional: denies fevers, chills, myalgias  Respiratory: denies SOB, cough  Cardiovascular: denies chest pain, palpitations  Gastrointestinal: denies nausea, vomiting, diarrhea      Vital signs/Intake and Output:  Visit Vitals    /89 (BP 1 Location: Left arm, BP Patient Position: At rest;Sitting)   [address] [place] [address] Pulse 84    Temp 97.4 °F (36.3 °C)    Resp 18    Ht 5' 7\" (1.702 m)    Wt 90 kg (198 lb 6.6 oz)    SpO2 100%    BMI 31.08 kg/m2     Current Shift:  02/13 0701 - 02/13 1900  In: -   Out: 800 [Urine:800]  Last three shifts:  02/11 1901 - 02/13 0700  In: 720 [P.O.:720]  Out: 3770 [Urine:3770]    Exam:    General: Elderly male, alert, NAD, OX3  Head/Neck: NCAT, supple, No masses, No lymphadenopathy  CVS:Regular rate and rhythm, no M/R/G, S1/S2 heard, no thrill  Lungs:Clear to auscultation bilaterally, no wheezes, rhonchi, or rales  Abdomen: Soft, Nontender, No distention, Normal Bowel sounds, No hepatomegaly  Extremities: BLE 1+ edema, pulses palpable 2+  Skin:normal texture and turgor, no rashes, no lesions  Neuro:grossly normal , follows commands  Psych:appropriate                Labs: Results:       Chemistry Recent Labs      02/13/18   0415  02/12/18   0447  02/11/18   0219   02/10/18   1238   GLU  180*  187*  43*   < >  519*   NA  140  142  142   --   134*   K  4.3  3.8  4.6   --   5.4   CL  101  105  103   --   97*   CO2  34*  32  30   --   25   BUN  43*  41*  40*   --   32*   CREA  1.68*  1.76*  2.03*   --   1.91*   CA  8.4*  8.1*  8.3*   --   8.5   AGAP  5  5  9   --   12   BUCR  26*  23*  20   --   17   AP   --   126*  144*   --   171*   TP   --   5.3*  5.6*   --   6.6   ALB   --   2.0*  2.2*   --   2.7*   GLOB   --   3.3  3.4   --   3.9   AGRAT   --   0.6*  0.6*   --   0.7*    < > = values in this interval not displayed.       CBC w/Diff Recent Labs      02/12/18   0447  02/11/18   0219  02/10/18   1238   WBC  9.7  13.4*  14.0*   RBC  4.60*  5.01  5.15   HGB  13.4  14.6  15.4   HCT  41.2  44.6  45.7   PLT  338  459*  444*   GRANS  73  75*  88*   LYMPH  12*  11*  3*   EOS  2  1  0      Cardiac Enzymes Recent Labs      02/11/18   0742  02/11/18   0219   CPK  114  124   CKND1  3.5  3.4      Coagulation Recent Labs      02/13/18   0415  02/12/18   1600   02/10/18   1238   PTP   --    --    --   12.5   INR --    --    --   1.0   APTT  92.5*  86.9*   < >  27.7    < > = values in this interval not displayed. Lipid Panel No results found for: CHOL, CHOLPOCT, CHOLX, CHLST, CHOLV, 898255, HDL, LDL, LDLC, DLDLP, 926369, VLDLC, VLDL, TGLX, TRIGL, TRIGP, TGLPOCT, CHHD, CHHDX   BNP No results for input(s): BNPP in the last 72 hours.    Liver Enzymes Recent Labs      02/12/18   0447   TP  5.3*   ALB  2.0*   AP  126*   SGOT  22      Thyroid Studies Lab Results   Component Value Date/Time    TSH 1.72 02/11/2018 02:19 AM        Procedures/imaging: see electronic medical records for all procedures/Xrays and details which were not copied into this note but were reviewed prior to creation of [address] Hernandez, PA-C

## 2018-02-13 NOTE — PROGRESS NOTES
1920: Assumed patient care, he was alert and oriented to person, place, time and situation. Respiratory status was stable on 1.5L via nasal cannula. Vital signs were stable. MEWS score was a one. Patient denied any nausea vomiting dizziness or anxiety. White board was updated and explained. Bed was locked and in lowest position. Call bell, water and personal belongings were within reach. Patient had no questions, comments or concerns after bedside shift report. 0000: Patient was NPO for a possible procedure in the morning. The reason for him being Npo was explained, he verbalized understanding. 0700: Patient had an uneventful shift. Respiratory status, vital signs and MEWS score remained stable. Patient was resting quietly with no signs of distress noted. Bed locked and in lowest position. Call bell water and personal belongings were within reach. Patient  had no questions, comments or concerns after bedside shift report. Heparin rate verification done with and bedside report given to OMAR Moreno Officer JULISSA

## 2018-02-13 NOTE — DIABETES MGMT
GLYCEMIC CONTROL PROGRESS NOTE:    -known h/o T2DM, HbA1C not within recommended range for age + comorbids r/t nonadherence on oral home regimen  -BG out of target range non-ICU: < 140 mg/dL, requiring corrective coverage  -TDD = 44 (15 Lantus + 10 mealtime + 19 - Humalog Very Insulin Resistant Corrective Coverage)  -possible d/c today after cardiac cath  -monitor BG trends and make adjustments as needed; develop d/c plan for DM home regimen  Recent Glucose Results:   Lab Results   Component Value Date/Time     (H) 02/13/2018 04:15 AM    GLUCPOC 152 (H) 02/13/2018 11:46 AM    GLUCPOC 180 (H) 02/13/2018 06:09 AM    GLUCPOC 93 02/12/2018 09:17 PM         Elbert Holman RN, MS  Glycemic Control Team

## 2018-02-13 NOTE — H&P
Date of Surgery Update:  Marga Alcala. was seen and examined. History and physical has been reviewed. The patient has been examined.  There have been no significant clinical changes since the completion of the originally dated History and Physical.    Signed By: Olga Lidia Christiansen MD     February 13, 2018 2:48 PM

## 2018-02-13 NOTE — PROGRESS NOTES
D/c plan: home when medically cleared  Met with patient at bedside. Patient is scheduled to have a cardiac cath today. Patient lives with hi wife and want to return home when medically cleared. Cm will continue to follow. [facility] versus [facility]  Care Management Interventions  Transition of Care Consult (CM Consult): Discharge Planning  Current Support Network: Lives with Spouse  Confirm Follow Up Transport: Family  Plan discussed with Pt/Family/Caregiver: Yes  Freedom of Choice Offered:  Yes

## 2018-02-13 NOTE — ROUTINE PROCESS
Cardiac Cath Lab:  Pre Procedure Chart Check     Patients chart was accessed and reviewed for possible and/or scheduled procedure. Creatinine Clearance:  CREATININE: 1.68 MG/DL ABNORMAL (02/13/18 0415)  Estimated creatinine clearance: 43.8 mL/min    Total Contrast  Load:  3 x estimated clearance amount=  131.4ml    75% of Contrast Load:  0.75 x Total Contrast Load=    98.55ml    Recent Labs      02/13/18   1035  02/13/18   0415   02/12/18   0447   02/11/18   0742   WBC   --    --    --   9.7   --    --    RBC   --    --    --   4.60*   --    --    HCT   --    --    --   41.2   --    --    HGB   --    --    --   13.4   --    --    PLT   --    --    --   338   --    --    APTT  74.8*  92.5*   < >   --    < >   --    NA   --   140   --   142   --    --    K   --   4.3   --   3.8   --    --    BUN   --   43*   --   41*   --    --    CREA   --   1.68*   --   1.76*   --    --    GFRAA   --   49*   --   47*   --    --    GFRNA   --   41*   --   38*   --    --    CA   --   8.4*   --   8.1*   --    --    CPK   --    --    --    --    --   114   CKMB   --    --    --    --    --   4.0*   CKND1   --    --    --    --    --   3.5   TROIQ   --    --    --    --    --   1.25*    < > = values in this interval not displayed. BMI: Body mass index is 31.08 kg/(m^2).     ALLERGIES: No Known Allergies    Lines:        Peripheral IV 02/10/18 Right Antecubital (Active)   Site Assessment Bleeding 2/13/2018  2:29 PM   Phlebitis Assessment 0 2/13/2018  2:29 PM   Infiltration Assessment 0 2/13/2018  2:29 PM   Dressing Status Clean, dry, & intact 2/13/2018  2:29 PM   Dressing Type Transparent 2/13/2018  2:29 PM   Hub Color/Line Status Green 2/13/2018  2:29 PM   Action Taken Open ports on tubing capped 2/12/2018  7:25 PM   Alcohol Cap Used Yes 2/13/2018 12:00 PM       Peripheral IV 02/11/18 Left Wrist (Active)   Site Assessment Clean, dry, & intact 2/13/2018  2:29 PM   Phlebitis Assessment 0 2/13/2018  2:29 PM   Infiltration Assessment 0 2/13/2018  2:29 PM   Dressing Status Clean, dry, & intact 2/13/2018  2:29 PM   Dressing Type Transparent 2/13/2018  2:29 PM   Hub Color/Line Status Blue 2/13/2018  2:29 PM   Action Taken Open ports on tubing capped 2/12/2018  7:25 PM   Alcohol Cap Used Yes 2/13/2018  2:29 PM          History:    Past Medical History:   Diagnosis Date    Asthma     Diabetes ([facility]     High cholesterol     Hypertension      Past Surgical History:   Procedure Laterality Date    HX CATARACT REMOVAL       Patient Active Problem List   Diagnosis Code    Atrial flutter ([facility] I48.92    Uncontrolled type II diabetes mellitus ([facility] E11.65    Acute renal insufficiency N28.9    NSTEMI (non-ST elevated myocardial infarction) ([facility] M36.3    Systolic CHF, acute ([facility]) I50.21

## 2018-02-14 ENCOUNTER — HOME HEALTH ADMISSION (OUTPATIENT)
Dept: HOME HEALTH SERVICES | Facility: HOME HEALTH | Age: 73
End: 2018-02-14
Payer: MEDICARE

## 2018-02-14 LAB
ANION GAP SERPL CALC-SCNC: 4 MMOL/L (ref 3–18)
APTT PPP: 50.4 SEC (ref 23–36.4)
BUN SERPL-MCNC: 32 MG/DL (ref 7–18)
BUN/CREAT SERPL: 22 (ref 12–20)
CALCIUM SERPL-MCNC: 8.5 MG/DL (ref 8.5–10.1)
CHLORIDE SERPL-SCNC: 102 MMOL/L (ref 100–108)
CO2 SERPL-SCNC: 38 MMOL/L (ref 21–32)
CREAT SERPL-MCNC: 1.46 MG/DL (ref 0.6–1.3)
ERYTHROCYTE [DISTWIDTH] IN BLOOD BY AUTOMATED COUNT: 14.2 % (ref 11.6–14.5)
GLUCOSE BLD STRIP.AUTO-MCNC: 181 MG/DL (ref 70–110)
GLUCOSE BLD STRIP.AUTO-MCNC: 235 MG/DL (ref 70–110)
GLUCOSE BLD STRIP.AUTO-MCNC: 289 MG/DL (ref 70–110)
GLUCOSE BLD STRIP.AUTO-MCNC: 345 MG/DL (ref 70–110)
GLUCOSE SERPL-MCNC: 106 MG/DL (ref 74–99)
HCT VFR BLD AUTO: 40.3 % (ref 36–48)
HGB BLD-MCNC: 13.3 G/DL (ref 13–16)
MCH RBC QN AUTO: 29.3 PG (ref 24–34)
MCHC RBC AUTO-ENTMCNC: 33 G/DL (ref 31–37)
MCV RBC AUTO: 88.8 FL (ref 74–97)
PLATELET # BLD AUTO: 365 K/UL (ref 135–420)
PMV BLD AUTO: 10.2 FL (ref 9.2–11.8)
POTASSIUM SERPL-SCNC: 3.5 MMOL/L (ref 3.5–5.5)
RBC # BLD AUTO: 4.54 M/UL (ref 4.7–5.5)
SODIUM SERPL-SCNC: 144 MMOL/L (ref 136–145)
WBC # BLD AUTO: 10.5 K/UL (ref 4.6–13.2)

## 2018-02-14 PROCEDURE — 80048 BASIC METABOLIC PNL TOTAL CA: CPT | Performed by: HOSPITALIST

## 2018-02-14 PROCEDURE — 74011250636 HC RX REV CODE- 250/636: Performed by: EMERGENCY MEDICINE

## 2018-02-14 PROCEDURE — 74011250637 HC RX REV CODE- 250/637: Performed by: HOSPITALIST

## 2018-02-14 PROCEDURE — 74011250636 HC RX REV CODE- 250/636: Performed by: INTERNAL MEDICINE

## 2018-02-14 PROCEDURE — 74011250637 HC RX REV CODE- 250/637: Performed by: INTERNAL MEDICINE

## 2018-02-14 PROCEDURE — 36415 COLL VENOUS BLD VENIPUNCTURE: CPT | Performed by: HOSPITALIST

## 2018-02-14 PROCEDURE — 74011636637 HC RX REV CODE- 636/637: Performed by: PHYSICIAN ASSISTANT

## 2018-02-14 PROCEDURE — 74011250637 HC RX REV CODE- 250/637: Performed by: PHYSICIAN ASSISTANT

## 2018-02-14 PROCEDURE — 65660000000 HC RM CCU STEPDOWN

## 2018-02-14 PROCEDURE — 85027 COMPLETE CBC AUTOMATED: CPT | Performed by: HOSPITALIST

## 2018-02-14 PROCEDURE — 74011636637 HC RX REV CODE- 636/637: Performed by: INTERNAL MEDICINE

## 2018-02-14 PROCEDURE — 82962 GLUCOSE BLOOD TEST: CPT

## 2018-02-14 PROCEDURE — 85730 THROMBOPLASTIN TIME PARTIAL: CPT | Performed by: INTERNAL MEDICINE

## 2018-02-14 RX ORDER — FUROSEMIDE 40 MG/1
40 TABLET ORAL DAILY
Status: DISCONTINUED | OUTPATIENT
Start: 2018-02-15 | End: 2018-02-15 | Stop reason: HOSPADM

## 2018-02-14 RX ORDER — POTASSIUM CHLORIDE 20 MEQ/1
40 TABLET, EXTENDED RELEASE ORAL
Status: COMPLETED | OUTPATIENT
Start: 2018-02-14 | End: 2018-02-14

## 2018-02-14 RX ORDER — LOSARTAN POTASSIUM 25 MG/1
25 TABLET ORAL DAILY
Status: DISCONTINUED | OUTPATIENT
Start: 2018-02-14 | End: 2018-02-15 | Stop reason: HOSPADM

## 2018-02-14 RX ADMIN — INSULIN LISPRO 5 UNITS: 100 INJECTION, SOLUTION INTRAVENOUS; SUBCUTANEOUS at 08:00

## 2018-02-14 RX ADMIN — POTASSIUM CHLORIDE 40 MEQ: 20 TABLET, EXTENDED RELEASE ORAL at 10:52

## 2018-02-14 RX ADMIN — INSULIN GLARGINE 15 UNITS: 100 INJECTION, SOLUTION SUBCUTANEOUS at 22:35

## 2018-02-14 RX ADMIN — FUROSEMIDE 60 MG: 10 INJECTION, SOLUTION INTRAMUSCULAR; INTRAVENOUS at 10:51

## 2018-02-14 RX ADMIN — HEPARIN SODIUM 14 UNITS/KG/HR: 10000 INJECTION, SOLUTION INTRAVENOUS at 08:06

## 2018-02-14 RX ADMIN — Medication 10 ML: at 22:35

## 2018-02-14 RX ADMIN — INSULIN LISPRO 10 UNITS: 100 INJECTION, SOLUTION INTRAVENOUS; SUBCUTANEOUS at 22:36

## 2018-02-14 RX ADMIN — FUROSEMIDE 60 MG: 10 INJECTION, SOLUTION INTRAMUSCULAR; INTRAVENOUS at 22:36

## 2018-02-14 RX ADMIN — INSULIN LISPRO 6 UNITS: 100 INJECTION, SOLUTION INTRAVENOUS; SUBCUTANEOUS at 16:30

## 2018-02-14 RX ADMIN — INSULIN LISPRO 5 UNITS: 100 INJECTION, SOLUTION INTRAVENOUS; SUBCUTANEOUS at 12:23

## 2018-02-14 RX ADMIN — INSULIN LISPRO 2 UNITS: 100 INJECTION, SOLUTION INTRAVENOUS; SUBCUTANEOUS at 07:59

## 2018-02-14 RX ADMIN — CLOPIDOGREL BISULFATE 75 MG: 75 TABLET ORAL at 10:52

## 2018-02-14 RX ADMIN — APIXABAN 5 MG: 5 TABLET, FILM COATED ORAL at 12:23

## 2018-02-14 RX ADMIN — LOSARTAN POTASSIUM 25 MG: 25 TABLET ORAL at 16:58

## 2018-02-14 RX ADMIN — INSULIN LISPRO 4 UNITS: 100 INJECTION, SOLUTION INTRAVENOUS; SUBCUTANEOUS at 11:30

## 2018-02-14 RX ADMIN — APIXABAN 5 MG: 5 TABLET, FILM COATED ORAL at 22:35

## 2018-02-14 RX ADMIN — Medication 10 ML: at 14:00

## 2018-02-14 RX ADMIN — INSULIN LISPRO 5 UNITS: 100 INJECTION, SOLUTION INTRAVENOUS; SUBCUTANEOUS at 16:59

## 2018-02-14 RX ADMIN — ASPIRIN 81 MG: 81 TABLET, COATED ORAL at 10:53

## 2018-02-14 RX ADMIN — ATORVASTATIN CALCIUM 40 MG: 20 TABLET, FILM COATED ORAL at 22:35

## 2018-02-14 RX ADMIN — METOPROLOL SUCCINATE 50 MG: 50 TABLET, EXTENDED RELEASE ORAL at 10:52

## 2018-02-14 RX ADMIN — Medication 10 ML: at 07:53

## 2018-02-14 NOTE — PROGRESS NOTES
D/c plan: home today  Met with patient during IDR's patient will need HHC for SN per ASMITA Coppola. Patient provided with [address]. He has chose to use [facility]. CMS will place referral for hhc. No other needs at this time no dme needed  Care Management Interventions  PCP Verified by CM:  Yes  Transition of Care Consult (CM Consult): [address]: Yes  Current Support Network: Lives with Spouse  Confirm Follow Up Transport: Family  Plan discussed with Pt/Family/Caregiver: Yes  Freedom of Choice Offered: Yes  Discharge Location  Discharge Placement: Home with home health

## 2018-02-14 NOTE — PROGRESS NOTES
Hospitalist Progress Note    Patient: Steffany Fernandez MRN: [id number]  CSN: [id number]    YOB: 1945  Age: 81 y.o. Sex: male    DOA: 2/10/2018 LOS:  LOS: 4 days          Chief Complaint:    SOB    Assessment/Plan     1. SOB  2. Atrial Flutter with Controlled Ventricular Response  3. DM2, Uncontrolled  4. Acute Renal Insufficiency  5. NSTEMI  6. Systolic CHF  7. Coronary Artery Disease    1. No further complaints. Patient was not requiring supplemental oxygen at time of exam. Continue fluid restriction and salt restriction per cardiology recs. 2. Patient has returned to sinus rhythm since the catheterization yesterday. 3. Adjusted patient to diabetic diet with 2g sodium restriction, and 1.5L fluid restriction. Continue 15 units basal insulin with 5 units mealtime, and SSI coverage. Glycemic control will meet with the patient and his wife tomorrow AM prior to discharge. 4. Continue monitoring Cr with daily BMP. Continued improvement. 5. Patient is s/p cardiac cath with PCI of LAD with Dr Adán Mancia. There was significant CAD of the LAD, RCA, and LCx. Continue plavix, aspirin, beta blocker, and statin. Heparin gtt was dc this AM with transition to Eliquis 5mg BID. Will check with pharmacy to ensure there is no need to renally adjust Eliquis. 6. Continue IV Lasix. Will transition to PO tomorrow AM in anticipation of discharge. 7. As above, continue plavix, aspirin, beta blocker, statin, and eliquis. DVT Prophylaxis - Eliquis  Dispo: Tomorrow AM, home with home health. Patient Active Problem List   Diagnosis Code    Atrial flutter ([facility] I48.92    Uncontrolled type II diabetes mellitus ([facility] E11.65    Acute renal insufficiency N28.9    NSTEMI (non-ST elevated myocardial infarction) ([facility] Y71.7    Systolic CHF, acute ([facility]) I50.21       Subjective:    Patient has many questions regarding new medications  Also questions insulin regimen.     Review of systems:    Constitutional: denies fevers, chills, myalgias  Respiratory: denies SOB, cough  Cardiovascular: denies chest pain, palpitations  Gastrointestinal: denies nausea, vomiting, diarrhea      Vital signs/Intake and Output:  Visit Vitals    /72 (BP 1 Location: Right arm, BP Patient Position: At rest;Supine; Head of bed elevated (Comment degrees))    Pulse 84    Temp 97.7 °F (36.5 °C)    Resp 16    Ht 5' 7\" (1.702 m)    Wt 90 kg (198 lb 6.6 oz)    SpO2 100%    BMI 31.08 kg/m2     Current Shift:  02/14 0701 - 02/14 1900  In: 240 [P.O.:240]  Out: 650 [Urine:650]  Last three shifts:  02/12 1901 - 02/14 0700  In: 720 [P.O.:720]  Out: 4270 [Urine:4270]    Exam:    General: Elderly male, alert, NAD, OX3  Head/Neck: NCAT, supple, No masses, No lymphadenopathy  CVS:Regular rate and rhythm, no M/R/G, S1/S2 heard, no thrill  Lungs:Diminished lung sounds bilaterally, no wheezes, rhonchi, or rales  Abdomen: Soft, Nontender, No distention, Normal Bowel sounds, No hepatomegaly  Extremities: 3+ edema bilateral lower extremities, pulses palpable 2+  Skin:normal texture and turgor, no rashes, no lesions  Neuro:grossly normal , follows commands  Psych:appropriate                Labs: Results:       Chemistry Recent Labs      02/14/18   0401  02/13/18   0415  02/12/18   0447   GLU  106*  180*  187*   NA  144  140  142   K  3.5  4.3  3.8   CL  102  101  105   CO2  38*  34*  32   BUN  32*  43*  41*   CREA  1.46*  1.68*  1.76*   CA  8.5  8.4*  8.1*   AGAP  4  5  5   BUCR  22*  26*  23*   AP   --    --   126*   TP   --    --   5.3*   ALB   --    --   2.0*   GLOB   --    --   3.3   AGRAT   --    --   0.6*      CBC w/Diff Recent Labs      02/14/18   0401  02/13/18   1544  02/12/18   0447   WBC  10.5   --   9.7   RBC  4.54*   --   4.60*   HGB  13.3  12.7*  13.4   HCT  40.3  38.2  41.2   PLT  365   --   338   GRANS   --    --   73   LYMPH   --    --   12*   EOS   --    --   2      Cardiac Enzymes No results for input(s): CPK, CKND1, LEONEL in the last 72 hours.    No lab exists for component: CKRMB, TROIP   Coagulation Recent Labs      02/14/18   0842  02/13/18   1745   APTT  50.4*  >180.0*       Lipid Panel No results found for: CHOL, CHOLPOCT, CHOLX, CHLST, CHOLV, 108078, HDL, LDL, LDLC, DLDLP, 847054, VLDLC, VLDL, TGLX, TRIGL, TRIGP, TGLPOCT, CHHD, CHHDX   BNP No results for input(s): BNPP in the last 72 hours.    Liver Enzymes Recent Labs      02/12/18   0447   TP  5.3*   ALB  2.0*   AP  126*   SGOT  22      Thyroid Studies Lab Results   Component Value Date/Time    TSH 1.72 02/11/2018 02:19 AM        Procedures/imaging: see electronic medical records for all procedures/Xrays and details which were not copied into this note but were reviewed prior to creation of [address], PA-C

## 2018-02-14 NOTE — DIABETES MGMT
GLYCEMIC CONTROL PROGRESS NOTE:    -discussed in rounds, known h/o T2DM HbA1C not within recommended range for age + morbids r/t nonadherence to Metformin therapy  -BG out of target range non-ICU: < 140 mg/dL, requiring corrective coverage  -TDD = 28 (15 Lantus + 5 mealtime + 8 - Humalog Normal Insulin Sensitivity Corrective Coverage)  -pt responding well to IP regimen of Lantus + corrective coverage  -GC will educate pt/wife on DM home regimen tomorrow--Lantus 15 every day + Januvia 50 mg    Recent Glucose Results:   Lab Results   Component Value Date/Time     (H) 02/14/2018 04:01 AM    GLUCPOC 235 (H) 02/14/2018 11:17 AM    GLUCPOC 181 (H) 02/14/2018 07:58 AM    GLUCPOC 328 (H) 02/13/2018 09:21 PM         Chikis Andrew RN, MS  Glycemic Control Team

## 2018-02-14 NOTE — PROGRESS NOTES
1920: Assumed patient care, he was alert and oriented to person, place, time and situation. Respiratory status was stable on room. Vital signs were stable. MEWS score was a one. Patient denied any nausea vomiting dizziness or anxiety. White board was updated and explained. Bed was locked and in lowest position. Call bell, water and personal belongings were within reach. Patient had no questions, comments or concerns after bedside shift report. 0700: Patient had an uneventful shift. Respiratory status, vital signs and MEWS score remained stable. Patient was resting quietly with no signs of distress noted. Bed locked and in lowest position. Call bell water and personal belongings were within reach. Patient had no questions, comments or concerns after bedside shift report.  Bedside report given to [facility].

## 2018-02-14 NOTE — DIABETES MGMT
GLYCEMIC CONTROL PROGRESS NOTE:    - known h/o T2DM, HbA1C not within recommended range for age + comorbids  - in depth education today, pt new to insulin therapy  - general overview of all DM, disease process, insulin basics including time action profile and purpose of basal insulin, proper storage of insulin, nutrition, SMBG, hyper + hypo   * pt, wife, and son were able to demonstrate successful priming, drawing up of correct dose (15 units), injection & disposal of needle using a sample insulin pen  -pt was able to identify appropriate treatment options for hypoglycemic events (juice,soda, glucose tablets); pt provided with Angle insulin kit with written education on injection sites, how to inject using insulin pen or vial & syringe, goals for pre and post prandial BG, hyper + hypo, physical activity, and meal planning  -pt and family members verbalized understanding of above items with no further questions at this time  -pt and family members encouraged to attend OP Diabetes Self Management Class, provided with schedule    Eitan Echeverria RN, MS  Glycemic Control Team

## 2018-02-14 NOTE — ROUTINE PROCESS
- Assumed pt care. Received verbal SBAR from Renée Ann. No S/S of distress noted. Heparin verified 13 units/kg/hr. Pt concerned about which medications to take at discharge, \"what color they are, will they interact with each other. \" Informed pt all would be explained to him and his wife at discharge. Education was provided reguarding all medications during med administration.   - Sheila Rae RN to be informed when pt wife will arrive today. Diabetic education will be provided. - Pt wife stated she would be here sometime after 1:00 PM. Will inform Shelley Mina. - Pt son requested information/status about pt FMLA paperwork. Called [employer]LY. [facility] has paperwork. Hospital has 14 days after discharge to provide pt work with paperwork. Informed pt son and gave him Norma's business card with contact information. Business to contact [facility] with a fax number. Pt son verbalized understanding.  - [address] RN in room with wife and son providing diabetes education.   - Sheila Rae RN requested that I inform pt about disposal of insulin needles. Informed pt to place needles in hard plastic container (ex: empty detergent or bleach bottle) and write contaminated/ needles on bottle in marker. Also talked with Shelley Mina about providing pt information about DASH diet. Bedside and Verbal shift change report given to Tyrone Mata RN (oncoming nurse) by Patricia Barrett RN (offgoing nurse). Report included the following information SBAR, Kardex, Intake/Output and MAR.

## 2018-02-15 VITALS
RESPIRATION RATE: 14 BRPM | OXYGEN SATURATION: 97 % | SYSTOLIC BLOOD PRESSURE: 124 MMHG | DIASTOLIC BLOOD PRESSURE: 64 MMHG | WEIGHT: 169.4 LBS | HEART RATE: 73 BPM | TEMPERATURE: 97.7 F | BODY MASS INDEX: 26.59 KG/M2 | HEIGHT: 67 IN

## 2018-02-15 LAB
ANION GAP SERPL CALC-SCNC: 3 MMOL/L (ref 3–18)
BUN SERPL-MCNC: 35 MG/DL (ref 7–18)
BUN/CREAT SERPL: 22 (ref 12–20)
CALCIUM SERPL-MCNC: 8.8 MG/DL (ref 8.5–10.1)
CHLORIDE SERPL-SCNC: 99 MMOL/L (ref 100–108)
CO2 SERPL-SCNC: 38 MMOL/L (ref 21–32)
CREAT SERPL-MCNC: 1.6 MG/DL (ref 0.6–1.3)
GLUCOSE BLD STRIP.AUTO-MCNC: 117 MG/DL (ref 70–110)
GLUCOSE BLD STRIP.AUTO-MCNC: 290 MG/DL (ref 70–110)
GLUCOSE SERPL-MCNC: 107 MG/DL (ref 74–99)
MAGNESIUM SERPL-MCNC: 1.8 MG/DL (ref 1.6–2.6)
POTASSIUM SERPL-SCNC: 3.1 MMOL/L (ref 3.5–5.5)
SODIUM SERPL-SCNC: 140 MMOL/L (ref 136–145)

## 2018-02-15 PROCEDURE — 74011250637 HC RX REV CODE- 250/637: Performed by: INTERNAL MEDICINE

## 2018-02-15 PROCEDURE — 74011636637 HC RX REV CODE- 636/637: Performed by: PHYSICIAN ASSISTANT

## 2018-02-15 PROCEDURE — 83735 ASSAY OF MAGNESIUM: CPT | Performed by: INTERNAL MEDICINE

## 2018-02-15 PROCEDURE — 74011250637 HC RX REV CODE- 250/637: Performed by: PHYSICIAN ASSISTANT

## 2018-02-15 PROCEDURE — 36415 COLL VENOUS BLD VENIPUNCTURE: CPT | Performed by: INTERNAL MEDICINE

## 2018-02-15 PROCEDURE — 80048 BASIC METABOLIC PNL TOTAL CA: CPT | Performed by: INTERNAL MEDICINE

## 2018-02-15 PROCEDURE — 74011250636 HC RX REV CODE- 250/636: Performed by: PHYSICIAN ASSISTANT

## 2018-02-15 PROCEDURE — 82962 GLUCOSE BLOOD TEST: CPT

## 2018-02-15 RX ORDER — POTASSIUM CHLORIDE 7.45 MG/ML
10 INJECTION INTRAVENOUS
Status: DISPENSED | OUTPATIENT
Start: 2018-02-15 | End: 2018-02-15

## 2018-02-15 RX ORDER — INSULIN GLARGINE 100 [IU]/ML
15 INJECTION, SOLUTION SUBCUTANEOUS DAILY
Qty: 1 VIAL | Refills: 0 | Status: SHIPPED | OUTPATIENT
Start: 2018-02-15 | End: 2021-08-30

## 2018-02-15 RX ORDER — LOSARTAN POTASSIUM 25 MG/1
25 TABLET ORAL DAILY
Qty: 30 TAB | Refills: 0 | Status: SHIPPED | OUTPATIENT
Start: 2018-02-16 | End: 2021-08-30

## 2018-02-15 RX ORDER — CLOPIDOGREL BISULFATE 75 MG/1
75 TABLET ORAL DAILY
Qty: 30 TAB | Refills: 0 | Status: SHIPPED | OUTPATIENT
Start: 2018-02-16 | End: 2021-08-30

## 2018-02-15 RX ORDER — POTASSIUM CHLORIDE 1.5 G/1.77G
40 POWDER, FOR SOLUTION ORAL
Status: COMPLETED | OUTPATIENT
Start: 2018-02-15 | End: 2018-02-15

## 2018-02-15 RX ORDER — METOPROLOL SUCCINATE 50 MG/1
50 TABLET, EXTENDED RELEASE ORAL DAILY
Qty: 30 TAB | Refills: 0 | Status: SHIPPED | OUTPATIENT
Start: 2018-02-16

## 2018-02-15 RX ORDER — FUROSEMIDE 40 MG/1
40 TABLET ORAL DAILY
Qty: 30 TAB | Refills: 0 | Status: SHIPPED | OUTPATIENT
Start: 2018-02-15 | End: 2021-05-25

## 2018-02-15 RX ORDER — ASPIRIN 81 MG/1
81 TABLET ORAL DAILY
Qty: 30 TAB | Refills: 0 | Status: SHIPPED | OUTPATIENT
Start: 2018-02-16 | End: 2021-08-30

## 2018-02-15 RX ORDER — BLOOD-GLUCOSE CONTROL, NORMAL
EACH MISCELLANEOUS
Qty: 1 PACKAGE | Refills: 0 | Status: SHIPPED | OUTPATIENT
Start: 2018-02-15 | End: 2021-05-25

## 2018-02-15 RX ORDER — ATORVASTATIN CALCIUM 40 MG/1
40 TABLET, FILM COATED ORAL
Qty: 30 TAB | Refills: 0 | Status: SHIPPED | OUTPATIENT
Start: 2018-02-15

## 2018-02-15 RX ADMIN — APIXABAN 5 MG: 5 TABLET, FILM COATED ORAL at 11:25

## 2018-02-15 RX ADMIN — Medication 10 ML: at 14:00

## 2018-02-15 RX ADMIN — CLOPIDOGREL BISULFATE 75 MG: 75 TABLET ORAL at 11:24

## 2018-02-15 RX ADMIN — POTASSIUM CHLORIDE 40 MEQ: 1.5 POWDER, FOR SOLUTION ORAL at 13:51

## 2018-02-15 RX ADMIN — INSULIN LISPRO 6 UNITS: 100 INJECTION, SOLUTION INTRAVENOUS; SUBCUTANEOUS at 11:30

## 2018-02-15 RX ADMIN — FUROSEMIDE 40 MG: 40 TABLET ORAL at 11:24

## 2018-02-15 RX ADMIN — POTASSIUM CHLORIDE 10 MEQ: 10 INJECTION, SOLUTION INTRAVENOUS at 11:43

## 2018-02-15 RX ADMIN — LOSARTAN POTASSIUM 25 MG: 25 TABLET ORAL at 11:25

## 2018-02-15 RX ADMIN — INSULIN LISPRO 5 UNITS: 100 INJECTION, SOLUTION INTRAVENOUS; SUBCUTANEOUS at 11:30

## 2018-02-15 RX ADMIN — ASPIRIN 81 MG: 81 TABLET, COATED ORAL at 11:24

## 2018-02-15 RX ADMIN — METOPROLOL SUCCINATE 50 MG: 50 TABLET, EXTENDED RELEASE ORAL at 11:23

## 2018-02-15 NOTE — ROUTINE PROCESS
Dual AVS reviewed with VALERIA Chavez. All medications reviewed individually with patient. Opportunities for questions and concerns provided. Patient discharged via (mode of transport ie. Car, ambulance or air transport) car  Patient's arm band appropriately discarded.

## 2018-02-15 NOTE — PROGRESS NOTES
Per Abdon TIAN, pt should discharge today. Per previous cm notes, pt has already been arranged with [facility]. No other needs identified. CM will cont to be available for discharge planning needs. 1125  CM met with patient at bedside. Pt stated his wife will drive him home at discharge. Pt has already been arranged with [facility]. No other needs identified. Care Management Interventions  PCP Verified by CM:  Yes  Transition of Care Consult (CM Consult): [address]: Yes  Current Support Network: Lives with Spouse  Confirm Follow Up Transport: Family  Plan discussed with Pt/Family/Caregiver: Yes  Freedom of Choice Offered: Yes  Discharge Location  Discharge Placement: Home with home health

## 2018-02-15 NOTE — PROGRESS NOTES
Shift Summary:  Patient spent uneventful shift. No issues noted. See flow sheet and MAR. .    Diabetic diet discussed with patient who agrees that he needs to make some lifestyle changes to help make himself healthier. Patient eager to go home.

## 2018-02-15 NOTE — PROGRESS NOTES
Problem: Falls - Risk of  Goal: *Absence of Falls  Document Joana Fall Risk and appropriate interventions in the flowsheet.    Outcome: Progressing Towards Goal  Fall Risk Interventions:            Medication Interventions: Evaluate medications/consider consulting pharmacy, Patient to call before getting OOB, Teach patient to arise slowly         History of Falls Interventions: Door open when patient unattended

## 2018-02-15 NOTE — DISCHARGE INSTRUCTIONS
DISCHARGE SUMMARY from Nurse    PATIENT INSTRUCTIONS:    Recommended activity: Activity as tolerated    *  Please give a list of your current medications to your Primary Care Provider. *  Please update this list whenever your medications are discontinued, doses are      changed, or new medications (including over-the-counter products) are added. *  Please carry medication information at all times in case of emergency situations. These are general instructions for a healthy lifestyle:    No smoking/ No tobacco products/ Avoid exposure to second hand smoke  Surgeon General's Warning:  Quitting smoking now greatly reduces serious risk to your health. Obesity, smoking, and sedentary lifestyle greatly increases your risk for illness    A healthy diet, regular physical exercise & weight monitoring are important for maintaining a healthy lifestyle    You may be retaining fluid if you have a history of heart failure or if you experience any of the following symptoms:  Weight gain of 3 pounds or more overnight or 5 pounds in a week, increased swelling in our hands or feet or shortness of breath while lying flat in bed. Please call your doctor as soon as you notice any of these symptoms; do not wait until your next office visit. Recognize signs and symptoms of STROKE:    F-face looks uneven    A-arms unable to move or move unevenly    S-speech slurred or non-existent    T-time-call 911 as soon as signs and symptoms begin-DO NOT go       Back to bed or wait to see if you get better-TIME IS BRAIN. Warning Signs of HEART ATTACK     Call 911 if you have these symptoms:   Chest discomfort. Most heart attacks involve discomfort in the center of the chest that lasts more than a few minutes, or that goes away and comes back. It can feel like uncomfortable pressure, squeezing, fullness, or pain.  Discomfort in other areas of the upper body.  Symptoms can include pain or discomfort in one or both arms, the back, neck, jaw, or stomach.  Shortness of breath with or without chest discomfort.  Other signs may include breaking out in a cold sweat, nausea, or lightheadedness. Don't wait more than five minutes to call 911 - MINUTES MATTER! Fast action can save your life. Calling 911 is almost always the fastest way to get lifesaving treatment. Emergency Medical Services staff can begin treatment when they arrive -- up to an hour sooner than if someone gets to the hospital by car. The discharge information has been reviewed with the patient. The patient verbalized understanding. Discharge medications reviewed with the patient and appropriate educational materials and side effects teaching were provided. ___________________________________________________________________________________________________________________________________       Atrial Fibrillation: Care Instructions  Your Care Instructions    Atrial fibrillation is an irregular and often fast heartbeat. Treating this condition is important for several reasons. It can cause blood clots, which can travel from your heart to your brain and cause a stroke. If you have a fast heartbeat, you may feel lightheaded, dizzy, and weak. An irregular heartbeat can also increase your risk for heart failure. Atrial fibrillation is often the result of another heart condition, such as high blood pressure or coronary artery disease. Making changes to improve your heart condition will help you stay healthy and active. Follow-up care is a key part of your treatment and safety. Be sure to make and go to all appointments, and call your doctor if you are having problems. It's also a good idea to know your test results and keep a list of the medicines you take. How can you care for yourself at home? Medicines  ? · Take your medicines exactly as prescribed. Call your doctor if you think you are having a problem with your medicine.  You will get more details on the specific medicines your doctor prescribes. ? · If your doctor has given you a blood thinner to prevent a stroke, be sure you get instructions about how to take your medicine safely. Blood thinners can cause serious bleeding problems. ? · Do not take any vitamins, over-the-counter drugs, or herbal products without talking to your doctor first.   ? Lifestyle changes  ? · Do not smoke. Smoking can increase your chance of a stroke and heart attack. If you need help quitting, talk to your doctor about stop-smoking programs and medicines. These can increase your chances of quitting for good. ? · Eat a heart-healthy diet. ? · Stay at a healthy weight. Lose weight if you need to.   ? · Limit alcohol to 2 drinks a day for men and 1 drink a day for women. Too much alcohol can cause health problems. ? · Avoid colds and flu. Get a pneumococcal vaccine shot. If you have had one before, ask your doctor whether you need another dose. Get a flu shot every year. If you must be around people with colds or flu, wash your hands often. Activity  ? · If your doctor recommends it, get more exercise. Walking is a good choice. Bit by bit, increase the amount you walk every day. Try for at least 30 minutes on most days of the week. You also may want to swim, bike, or do other activities. Your doctor may suggest that you join a cardiac rehabilitation program so that you can have help increasing your physical activity safely. ? · Start light exercise if your doctor says it is okay. Even a small amount will help you get stronger, have more energy, and manage stress. Walking is an easy way to get exercise. Start out by walking a little more than you did in the hospital. Gradually increase the amount you walk. ? · When you exercise, watch for signs that your heart is working too hard. You are pushing too hard if you cannot talk while you are exercising.  If you become short of breath or dizzy or have chest pain, sit down and rest immediately. ? · Check your pulse regularly. Place two fingers on the artery at the palm side of your wrist, in line with your thumb. If your heartbeat seems uneven or fast, talk to your doctor. When should you call for help? Call 911 anytime you think you may need emergency care. For example, call if:  ? · You have symptoms of a heart attack. These may include:  ¨ Chest pain or pressure, or a strange feeling in the chest.  ¨ Sweating. ¨ Shortness of breath. ¨ Nausea or vomiting. ¨ Pain, pressure, or a strange feeling in the back, neck, jaw, or upper belly or in one or both shoulders or arms. ¨ Lightheadedness or sudden weakness. ¨ A fast or irregular heartbeat. After you call 911, the  may tell you to chew 1 adult-strength or 2 to 4 low-dose aspirin. Wait for an ambulance. Do not try to drive yourself. ? · You have symptoms of a stroke. These may include:  ¨ Sudden numbness, tingling, weakness, or loss of movement in your face, arm, or leg, especially on only one side of your body. ¨ Sudden vision changes. ¨ Sudden trouble speaking. ¨ Sudden confusion or trouble understanding simple statements. ¨ Sudden problems with walking or balance. ¨ A sudden, severe headache that is different from past headaches. ? · You passed out (lost consciousness). ?Call your doctor now or seek immediate medical care if:  ? · You have new or increased shortness of breath. ? · You feel dizzy or lightheaded, or you feel like you may faint. ? · Your heart rate becomes irregular. ? · You can feel your heart flutter in your chest or skip heartbeats. Tell your doctor if these symptoms are new or worse. ? Watch closely for changes in your health, and be sure to contact your doctor if you have any problems. Where can you learn more? Go to http://chelita-tiarra.info/. Enter U020 in the search box to learn more about \"Atrial Fibrillation: Care Instructions. \"  Current as of: September 21, 2016  Content Version: 11.4  © 6833-8558 [employer]. Care instructions adapted under license by [employer] (which disclaims liability or warranty for this information). If you have questions about a medical condition or this instruction, always ask your healthcare professional. [address] any warranty or liability for your use of this information. Heart Failure: Care Instructions  Your Care Instructions    Heart failure occurs when your heart does not pump as much blood as the body needs. Failure does not mean that the heart has stopped pumping but rather that it is not pumping as well as it should. Over time, this causes fluid buildup in your lungs and other parts of your body. Fluid buildup can cause shortness of breath, fatigue, swollen ankles, and other problems. By taking medicines regularly, reducing sodium (salt) in your diet, checking your weight every day, and making lifestyle changes, you can feel better and live longer. Follow-up care is a key part of your treatment and safety. Be sure to make and go to all appointments, and call your doctor if you are having problems. It's also a good idea to know your test results and keep a list of the medicines you take. How can you care for yourself at home? Medicines  ? · Be safe with medicines. Take your medicines exactly as prescribed. Call your doctor if you think you are having a problem with your medicine. ? · Do not take any vitamins, over-the-counter medicine, or herbal products without talking to your doctor first. Darleene Diones not take ibuprofen (Advil or Motrin) and naproxen (Aleve) without talking to your doctor first. They could make your heart failure worse. ? · You may be taking some of the following medicine. ¨ Beta-blockers can slow heart rate, decrease blood pressure, and improve your condition. Taking a beta-blocker may lower your chance of needing to be hospitalized.   ¨ Angiotensin-converting enzyme inhibitors (ACEIs) reduce the heart's workload, lower blood pressure, and reduce swelling. Taking an ACEI may lower your chance of needing to be hospitalized again. ¨ Angiotensin II receptor blockers (ARBs) work like ACEIs. Your doctor may prescribe them instead of ACEIs. ¨ Diuretics, also called water pills, reduce swelling. ¨ Potassium supplements replace this important mineral, which is sometimes lost with diuretics. ¨ Aspirin and other blood thinners prevent blood clots, which can cause a stroke or heart attack. ? You will get more details on the specific medicines your doctor prescribes. Diet  ? · Your doctor may suggest that you limit sodium to 2,000 milligrams (mg) a day or less. That is less than 1 teaspoon of salt a day, including all the salt you eat in cooking or in packaged foods. People get most of their sodium from processed foods. Fast food and restaurant meals also tend to be very high in sodium. ? · Ask your doctor how much liquid you can drink each day. You may have to limit liquids. ?Weight  ? · Weigh yourself without clothing at the same time each day. Record your weight. Call your doctor if you have a sudden weight gain, such as more than 2 to 3 pounds in a day or 5 pounds in a week. (Your doctor may suggest a different range of weight gain.) A sudden weight gain may mean that your heart failure is getting worse. ? Activity level  ? · Start light exercise (if your doctor says it is okay). Even if you can only do a small amount, exercise will help you get stronger, have more energy, and manage your weight and your stress. Walking is an easy way to get exercise. Start out by walking a little more than you did before. Bit by bit, increase the amount you walk. ? · When you exercise, watch for signs that your heart is working too hard. You are pushing yourself too hard if you cannot talk while you are exercising.  If you become short of breath or dizzy or have chest pain, stop, sit down, and rest.   ? · If you feel \"wiped out\" the day after you exercise, walk slower or for a shorter distance until you can work up to a better pace. ? · Get enough rest at night. Sleeping with 1 or 2 pillows under your upper body and head may help you breathe easier. ? Lifestyle changes  ? · Do not smoke. Smoking can make a heart condition worse. If you need help quitting, talk to your doctor about stop-smoking programs and medicines. These can increase your chances of quitting for good. Quitting smoking may be the most important step you can take to protect your heart. ? · Limit alcohol to 2 drinks a day for men and 1 drink a day for women. Too much alcohol can cause health problems. ? · Avoid getting sick from colds and the flu. Get a pneumococcal vaccine shot. If you have had one before, ask your doctor whether you need another dose. Get a flu shot each year. If you must be around people with colds or the flu, wash your hands often. When should you call for help? Call 911 if you have symptoms of sudden heart failure such as:  ? · You have severe trouble breathing. ? · You cough up pink, foamy mucus. ? · You have a new irregular or rapid heartbeat. ?Call your doctor now or seek immediate medical care if:  ? · You have new or increased shortness of breath. ? · You are dizzy or lightheaded, or you feel like you may faint. ? · You have sudden weight gain, such as more than 2 to 3 pounds in a day or 5 pounds in a week. (Your doctor may suggest a different range of weight gain.)   ? · You have increased swelling in your legs, ankles, or feet. ? · You are suddenly so tired or weak that you cannot do your usual activities. ? Watch closely for changes in your health, and be sure to contact your doctor if you develop new symptoms. Where can you learn more? Go to http://chelita-tiarra.info/.   Enter W237 in the search box to learn more about \"Heart Failure: Care Instructions. \"  Current as of: September 21, 2016  Content Version: 11.4  © 0762-4187 [employer]. Care instructions adapted under license by [employer] (which disclaims liability or warranty for this information). If you have questions about a medical condition or this instruction, always ask your healthcare professional. [address] any warranty or liability for your use of this information. Learning About Meal Planning for Diabetes  Why plan your meals? Meal planning can be a key part of managing diabetes. Planning meals and snacks with the right balance of carbohydrate, protein, and fat can help you keep your blood sugar at the target level you set with your doctor. You don't have to eat special foods. You can eat what your family eats, including sweets once in a while. But you do have to pay attention to how often you eat and how much you eat of certain foods. You may want to work with a dietitian or a certified diabetes educator. He or she can give you tips and meal ideas and can answer your questions about meal planning. This health professional can also help you reach a healthy weight if that is one of your goals. What plan is right for you? Your dietitian or diabetes educator may suggest that you start with the plate format or carbohydrate counting. The plate format  The plate format is a simple way to help you manage how you eat. You plan meals by learning how much space each food should take on a plate. Using the plate format helps you spread carbohydrate throughout the day. It can make it easier to keep your blood sugar level within your target range. It also helps you see if you're eating healthy portion sizes. To use the plate format, you put non-starchy vegetables on half your plate. Add meat or meat substitutes on one-quarter of the plate. Put a grain or starchy vegetable (such as brown rice or a potato) on the final quarter of the plate. You can add a small piece of fruit and some low-fat or fat-free milk or yogurt, depending on your carbohydrate goal for each meal.  Here are some tips for using the plate format:  · Make sure that you are not using an oversized plate. A 9-inch plate is best. Many restaurants use larger plates. · Get used to using the plate format at home. Then you can use it when you eat out. · Write down your questions about using the plate format. Talk to your doctor, a dietitian, or a diabetes educator about your concerns. Carbohydrate counting  With carbohydrate counting, you plan meals based on the amount of carbohydrate in each food. Carbohydrate raises blood sugar higher and more quickly than any other nutrient. It is found in desserts, breads and cereals, and fruit. It's also found in starchy vegetables such as potatoes and corn, grains such as rice and pasta, and milk and yogurt. Spreading carbohydrate throughout the day helps keep your blood sugar levels within your target range. Your daily amount depends on several things, including your weight, how active you are, which diabetes medicines you take, and what your goals are for your blood sugar levels. A registered dietitian or diabetes educator can help you plan how much carbohydrate to include in each meal and snack. A guideline for your daily amount of carbohydrate is:  · 45 to 60 grams at each meal. That's about the same as 3 to 4 carbohydrate servings. · 15 to 20 grams at each snack. That's about the same as 1 carbohydrate serving. The Nutrition Facts label on packaged foods tells you how much carbohydrate is in a serving of the food. First, look at the serving size on the food label. Is that the amount you eat in a serving? All of the nutrition information on a food label is based on that serving size. So if you eat more or less than that, you'll need to adjust the other numbers. Total carbohydrate is the next thing you need to look for on the label.  If you count carbohydrate servings, one serving of carbohydrate is 15 grams. For foods that don't come with labels, such as fresh fruits and vegetables, you'll need a guide that lists carbohydrate in these foods. Ask your doctor, dietitian, or diabetes educator about books or other nutrition guides you can use. If you take insulin, you need to know how many grams of carbohydrate are in a meal. This lets you know how much rapid-acting insulin to take before you eat. If you use an insulin pump, you get a constant rate of insulin during the day. So the pump must be programmed at meals to give you extra insulin to cover the rise in blood sugar after meals. When you know how much carbohydrate you will eat, you can take the right amount of insulin. Or, if you always use the same amount of insulin, you need to make sure that you eat the same amount of carbohydrate at meals. If you need more help to understand carbohydrate counting and food labels, ask your doctor, dietitian, or diabetes educator. How do you get started with meal planning? Here are some tips to get started:  · Plan your meals a week at a time. Don't forget to include snacks too. · Use cookbooks or online recipes to plan several main meals. Plan some quick meals for busy nights. You also can double some recipes that freeze well. Then you can save half for other busy nights when you don't have time to cook. · Make sure you have the ingredients you need for your recipes. If you're running low on basic items, put these items on your shopping list too. · List foods that you use to make breakfasts, lunches, and snacks. List plenty of fruits and vegetables. · Post this list on the refrigerator. Add to it as you think of more things you need. · Take the list to the store to do your weekly shopping. Follow-up care is a key part of your treatment and safety. Be sure to make and go to all appointments, and call your doctor if you are having problems.  It's also a good idea to know your test results and keep a list of the medicines you take. Where can you learn more? Go to http://chleita-tiarra.info/. Karen Washburn in the search box to learn more about \"Learning About Meal Planning for Diabetes. \"  Current as of: March 13, 2017  Content Version: 11.4  © 5778-2471 [employer]. Care instructions adapted under license by [employer] (which disclaims liability or warranty for this information). If you have questions about a medical condition or this instruction, always ask your healthcare professional. [address] any warranty or liability for your use of this information. Learning About Diabetes and Coronary Artery Disease  How are diabetes and heart disease connected? Many people think diabetes and heart disease go hand in hand. But having diabetes doesn't have to mean that you are going to have a heart attack someday. Healthy living can help prevent many of the problems that come with both diabetes and heart disease. For some people, diabetes can cause problems in your body that may lead to heart disease. Diabetes can make the problems of heart disease worse. Experts do not fully understand how diabetes affects the heart. Many things can lead to heart disease, including high blood sugar, insulin resistance, high cholesterol, and high blood pressure. But lifestyle and genetics may also affect a person's risk. But here's the good news: The good things you're doing to stay healthy with diabetes-eating healthy foods, quitting smoking, getting exercise and more-are also helping your heart. What increases your risk for heart disease? When you have diabetes, your risk for heart disease is even higher if you have:  · High blood pressure, which pushes blood through the arteries with too much force. Over time, this damages the walls of the arteries.   · High cholesterol, which causes the buildup of a kind of fat inside the blood vessel walls. This buildup can lower blood flow to the heart muscle and raise your risk for having a heart attack. · Kidney damage, which shares many of the risk factors for heart disease (such as high blood sugar, high blood pressure, and high cholesterol). How can you keep your heart healthy when you have diabetes? Managing your diabetes and keeping your heart healthy are two sides of the same coin. Here are some things you can do. · Test your blood sugar levels and get your diabetes tests on schedule. Try to keep your numbers within your target range. · Keep track of your blood pressure. The target for most people with diabetes is below 140/90. Your doctor will give you a goal that's right for you. If your blood pressure is high, your treatment may also include medicine. Changes in your lifestyle, such as staying at a healthy weight, may also help you lower your blood pressure. · Eat heart-healthy foods. These include fruits, vegetables, whole grains, fish, and low-fat or nonfat dairy foods. Limit sodium, alcohol, and sweets. · If your doctor recommends it, get more exercise. Walking is a good choice. Bit by bit, increase the amount you walk every day. Try for at least 30 minutes on most days of the week. · Do not smoke. Smoking can make diabetes and heart disease worse. If you need help quitting, talk to your doctor about stop-smoking programs and medicines. These can increase your chances of quitting for good. · Your doctor may talk with you about taking medicines for your heart. For example, your doctor may suggest taking a statin or daily aspirin. Where can you learn more? Go to http://chelita-tiarra.info/. Enter P996 in the search box to learn more about \"Learning About Diabetes and Coronary Artery Disease. \"  Current as of: March 13, 2017  Content Version: 11.4  © 1931-4212 [employer].  Care instructions adapted under license by [facility] (which disclaims liability or warranty for this information). If you have questions about a medical condition or this instruction, always ask your healthcare professional. [address] any warranty or liability for your use of this information. Heart Failure: Care Instructions  Your Care Instructions    Heart failure occurs when your heart does not pump as much blood as the body needs. Failure does not mean that the heart has stopped pumping but rather that it is not pumping as well as it should. Over time, this causes fluid buildup in your lungs and other parts of your body. Fluid buildup can cause shortness of breath, fatigue, swollen ankles, and other problems. By taking medicines regularly, reducing sodium (salt) in your diet, checking your weight every day, and making lifestyle changes, you can feel better and live longer. Follow-up care is a key part of your treatment and safety. Be sure to make and go to all appointments, and call your doctor if you are having problems. It's also a good idea to know your test results and keep a list of the medicines you take. How can you care for yourself at home? Medicines  ? · Be safe with medicines. Take your medicines exactly as prescribed. Call your doctor if you think you are having a problem with your medicine. ? · Do not take any vitamins, over-the-counter medicine, or herbal products without talking to your doctor first. Valentin Speck not take ibuprofen (Advil or Motrin) and naproxen (Aleve) without talking to your doctor first. They could make your heart failure worse. ? · You may be taking some of the following medicine. ¨ Beta-blockers can slow heart rate, decrease blood pressure, and improve your condition. Taking a beta-blocker may lower your chance of needing to be hospitalized. ¨ Angiotensin-converting enzyme inhibitors (ACEIs) reduce the heart's workload, lower blood pressure, and reduce swelling.  Taking an ACEI may lower your chance of needing to be hospitalized again. ¨ Angiotensin II receptor blockers (ARBs) work like ACEIs. Your doctor may prescribe them instead of ACEIs. ¨ Diuretics, also called water pills, reduce swelling. ¨ Potassium supplements replace this important mineral, which is sometimes lost with diuretics. ¨ Aspirin and other blood thinners prevent blood clots, which can cause a stroke or heart attack. ? You will get more details on the specific medicines your doctor prescribes. Diet  ? · Your doctor may suggest that you limit sodium to 2,000 milligrams (mg) a day or less. That is less than 1 teaspoon of salt a day, including all the salt you eat in cooking or in packaged foods. People get most of their sodium from processed foods. Fast food and restaurant meals also tend to be very high in sodium. ? · Ask your doctor how much liquid you can drink each day. You may have to limit liquids. ?Weight  ? · Weigh yourself without clothing at the same time each day. Record your weight. Call your doctor if you have a sudden weight gain, such as more than 2 to 3 pounds in a day or 5 pounds in a week. (Your doctor may suggest a different range of weight gain.) A sudden weight gain may mean that your heart failure is getting worse. ? Activity level  ? · Start light exercise (if your doctor says it is okay). Even if you can only do a small amount, exercise will help you get stronger, have more energy, and manage your weight and your stress. Walking is an easy way to get exercise. Start out by walking a little more than you did before. Bit by bit, increase the amount you walk. ? · When you exercise, watch for signs that your heart is working too hard. You are pushing yourself too hard if you cannot talk while you are exercising.  If you become short of breath or dizzy or have chest pain, stop, sit down, and rest.   ? · If you feel \"wiped out\" the day after you exercise, walk slower or for a shorter distance until you can work up to a better pace. ? · Get enough rest at night. Sleeping with 1 or 2 pillows under your upper body and head may help you breathe easier. ? Lifestyle changes  ? · Do not smoke. Smoking can make a heart condition worse. If you need help quitting, talk to your doctor about stop-smoking programs and medicines. These can increase your chances of quitting for good. Quitting smoking may be the most important step you can take to protect your heart. ? · Limit alcohol to 2 drinks a day for men and 1 drink a day for women. Too much alcohol can cause health problems. ? · Avoid getting sick from colds and the flu. Get a pneumococcal vaccine shot. If you have had one before, ask your doctor whether you need another dose. Get a flu shot each year. If you must be around people with colds or the flu, wash your hands often. When should you call for help? Call 911 if you have symptoms of sudden heart failure such as:  ? · You have severe trouble breathing. ? · You cough up pink, foamy mucus. ? · You have a new irregular or rapid heartbeat. ?Call your doctor now or seek immediate medical care if:  ? · You have new or increased shortness of breath. ? · You are dizzy or lightheaded, or you feel like you may faint. ? · You have sudden weight gain, such as more than 2 to 3 pounds in a day or 5 pounds in a week. (Your doctor may suggest a different range of weight gain.)   ? · You have increased swelling in your legs, ankles, or feet. ? · You are suddenly so tired or weak that you cannot do your usual activities. ? Watch closely for changes in your health, and be sure to contact your doctor if you develop new symptoms. Where can you learn more? Go to http://chelita-tiarra.info/. Enter Z176 in the search box to learn more about \"Heart Failure: Care Instructions. \"  Current as of: September 21, 2016  Content Version: 11.4  © 8854-9391 [employer].  Care instructions adapted under license by [employer] (which disclaims liability or warranty for this information). If you have questions about a medical condition or this instruction, always ask your healthcare professional. [address] any warranty or liability for your use of this information. Managing Other Conditions When You Have Heart Failure: Care Instructions  Your Care Instructions    All the systems in your body rely on each other to work properly. Heart failure has effects all through your body that can lead to other problems, such as kidney disease. The reverse is also true. A condition like diabetes or lung disease can damage or stress your heart and cause heart failure. Managing any other problems can help reduce your heart's workload and make your heart failure better. Conditions that commonly cause or occur along with heart failure include high blood pressure, diabetes, COPD, high cholesterol, kidney problems, anemia, and arthritis. Follow-up care is a key part of your treatment and safety. Be sure to make and go to all appointments, and call your doctor if you are having problems. It's also a good idea to know your test results and keep a list of the medicines you take. How can you care for yourself at home? Steps to help with heart failure and other problems  · Eat less salt (sodium). This helps keep fluid from building up. It may help you feel better. Limiting sodium can also help if you have high blood pressure or kidney disease. · Watch your fluid intake if your doctor tells you to. Reducing fluids can ease your heart's workload and keep your sodium level in balance. · Get regular exercise. Regular, moderate exercise, such as walking, helps your heart. It can also help lower your blood pressure, lower stress, and help you lose weight. · Lose weight if you are overweight.  Losing weight can help you manage diabetes, lower your blood pressure and cholesterol level, and reduce the workload on your heart. · Stop smoking. Smoking stresses your lungs, interferes with healing, and can make heart failure worse. · Limit alcohol. Alcohol can raise your blood pressure. Ask your doctor how much, if any, is safe. If your doctor has not set you up with a cardiac rehabilitation (rehab) program, talk to him or her about whether that is right for you. Cardiac rehab includes exercise, help with diet and lifestyle changes, and emotional support. To stay as healthy as possible  · Work closely with your doctor. Have all your tests, and go to all your appointments. · Take your medicines exactly as prescribed. You will take medicines to treat the other conditions you have along with heart failure. It can be hard to balance the treatment for all your conditions. You will need to have follow-up tests to make sure that all your medicines are working well together. Talk to your doctor if you have any problems with your medicine. · Keep all your doctors informed about your health problems and all the medicines you take for them. Medicines that can treat one condition may make another condition worse. · Talk to your doctor before you take any vitamins, over-the-counter drugs, or herbal products. Do not take aspirin, ibuprofen (Advil, Motrin), or naproxen (Aleve) unless you talk to your doctor first. They could make your heart failure and other problems worse. When should you call for help? Call 911 if you have symptoms of sudden heart failure, such as:  ? · You have severe trouble breathing. ? · You cough up pink, foamy mucus. ? · You have a new irregular or rapid heartbeat. ?Call 911 if you have symptoms of a heart attack. These may include:  ? · Chest pain or pressure, or a strange feeling in the chest.   ? · Sweating. ? · Shortness of breath. ? · Nausea or vomiting.    ? · Pain, pressure, or a strange feeling in the back, neck, jaw, or upper belly or in one or both shoulders or arms.   ? · Lightheadedness or sudden weakness. ? · A fast or irregular heartbeat. ? After you call 911, the  may tell you to chew 1 adult-strength or 2 to 4 low-dose aspirin. Wait for an ambulance. Do not try to drive yourself. ?Call your doctor now or seek immediate medical care if:  ? · You have new or increased shortness of breath. ? · You are dizzy or lightheaded, or you feel like you may faint. ? · You have sudden weight gain, such as more than 2 to 3 pounds in a day or 5 pounds in a week. (Your doctor may suggest a different range of weight gain.)   ? · You have increased swelling in your legs, ankles, or feet. ? · You are suddenly so tired or weak that you cannot do your usual activities. ? Watch closely for changes in your health, and be sure to contact your doctor if you develop new symptoms. Where can you learn more? Go to http://chelita-tiarra.info/. Enter P052 in the search box to learn more about \"Managing Other Conditions When You Have Heart Failure: Care Instructions. \"  Current as of: September 21, 2016  Content Version: 11.4  © 8307-8109 [employer]. Care instructions adapted under license by [employer] (which disclaims liability or warranty for this information). If you have questions about a medical condition or this instruction, always ask your healthcare professional. [place] [address] any warranty or liability for your use of this information. Learning About Fluid Overload  What is fluid overload? Fluid overload means that your body has too much water. The extra fluid in your body can raise your blood pressure and force your heart to work harder. It can also make it hard for you to breathe. Most of your body is made up of water. The body uses minerals like sodium and potassium to help organs such as your heart, kidneys, and liver balance how much water you need.  For example, the heart pumps blood to move water around the body. And the kidneys work to get rid of the water that the body doesn't need. Health conditions like kidney disease, heart failure, and cirrhosis can cause fluid overload. Other things can cause extra fluid to build up. IV fluids, some medicines, too much salt (sodium) from food, and certain medical treatments can sometimes cause this fluid increase. What are the symptoms? Some of the most common symptoms are:  · Gaining weight over a short period of time. · Swelling in the ankles or legs. · Shortness of breath. How is it treated? The goal of treatment is to remove the extra fluid in your body. Your treatment will depend on the cause. Your doctor may:  · Give you medicines, such as diuretics (also called \"water pills\"). They help your body get rid of the extra fluid. · Restrict your fluid or salt intake. Follow-up care is a key part of your treatment and safety. Be sure to make and go to all appointments, and call your doctor if you are having problems. It's also a good idea to know your test results and keep a list of the medicines you take. Where can you learn more? Go to http://chelita-tiarra.info/. Enter O110 in the search box to learn more about \"Learning About Fluid Overload. \"  Current as of: September 21, 2016  Content Version: 11.4  © 3175-7364 [employer]. Care instructions adapted under license by [employer] (which disclaims liability or warranty for this information). If you have questions about a medical condition or this instruction, always ask your healthcare professional. [place] [address] any warranty or liability for your use of this information. Cardiac Catheterization/Angiography Discharge Instructions    *Check the puncture site frequently for swelling or bleeding. If you see any bleeding, lie down and apply pressure over the area with a clean town or washcloth.  Notify your doctor for any redness, swelling, drainage or oozing from the puncture site. Notify your doctor for any fever or chills. *If the leg or arm with the puncture becomes cold, numb or painful, call Dr Dae Hedrick office. *Activity should be limited for the next 48 hours. Climb stairs as little as possible and avoid any stooping, bending or strenuous activity for 48 hours. No heavy lifting (anything over 10 pounds) for three days. *Do not drive for 48 hours. *You may resume your usual diet. Drink more fluids than usual.    *Have a responsible person drive you home and stay with you for at least 24 hours after your heart catheterization/angiography. *You may remove the bandage from your Left and Arm in 24 hours. You may shower in 24 hours. No tub baths, hot tubs or swimming for one week. Do not place any lotions, creams, powders, ointments over the puncture site for one week. You may place a clean band-aid over the puncture site each day for 5 days. Change this daily. Lab Results   Component Value Date/Time    Hemoglobin A1c 11.7 (H) 02/10/2018 12:38 PM       This lab test reflects that your blood sugar has been slightly elevated over the past 3 months and should be evaluated by your primary care provider. An A1C of 5.7-6.4% meets the criteria for pre-diabetes; an A1C of 6.5% or higher meets the criteria for diabetes. This lab test reflects that your blood sugar averaged 289 mg/dL  over the past 3 months. It is important to follow up with your provider on a routine basis to continue to evaluate your blood sugar and discuss any necessary changes in treatment.

## 2018-02-15 NOTE — PROGRESS NOTES
In patient chart to verify what prescriptions were sent to pharmacy, patient asking about script for lancets and strips.

## 2018-02-15 NOTE — PROGRESS NOTES
Cardiology Progress Note        Patient: Melynda Sandhoff. Sex: male          DOA: 2/10/2018  YOB: 1945      Age:  81 y.o.        LOS:  LOS: 4 days    Patient seen and examined, chart reviewed. Assessment/Plan     Patient Active Problem List   Diagnosis Code    Atrial flutter ([facility]) I48.92    Uncontrolled type II diabetes mellitus ([facility] [address] E11.65    Acute renal insufficiency N28.9    [facility]STEM[facility] (non-ST elevated [facility] infarction) [facility] O12.8    Systolic CHF, acute ([facility]) I50.21      CAD s/p PCI of LAD  Paroxysmal atrial flutter  Acute systolic heart failure   DM  Renal insufficieny - improving     Plan:    Continue Aspirin, plavix, beta blocker and statin  Start Eliquis 5 mg PO BID and Losartan 25 mg PO once a day. Change Lasix to 40 mg PO once a day from tomorrow  I/O  Fluid restriction up to 1.2 l/day and Salt restriction up to 2 gm/day  Strongly advised about medication compliance  Continue management as per hospital medicine  Plan discussed with patient and family members at bed side              Subjective:    cc: My breathing is better, leg swelling is improving       REVIEW OF SYSTEMS:     General: No fevers or chills. Cardiovascular: No chest pain,No palpitations, No orthopnea, No PND, Positive leg swelling, No claudication  Pulmonary: Positive dyspnea  Gastrointestinal: No nausea, vomiting, bleeding  Neurology: No Dizziness    Objective:      Visit Vitals    /64 (BP 1 Location: Left arm, BP Patient Position: Sitting)    Pulse 80    Temp 97.5 °F (36.4 °C)    Resp 14    Ht 5' 7\" (1.702 m)    Wt 90 kg (198 lb 6.6 oz)    SpO2 96%    BMI 31.08 kg/m2     Body mass index is 31.08 kg/(m^2). Physical Exam:  General Appearance: Comfortable, not using accessory muscles of respiration. HEENT: SUSHMA. HEAD: Atraumatic  NECK: No JVD, no thyroidomeglay.  CAROTIDS: No bruit  LUNGS: Absent air entry at bases   HEART: S1+S2 audible, no murmur, no pericardial rub. ABD: Non-tender, BS Audible    EXT: ++ leg edema, and no cyanosis. Left wrist: no swelling, no hematoma, no ecchymosis, Left radial pulse +, normal sensory and motor exam of left hand    PSYCHIATRIC EXAM: Mood is appropriate. MUSCULOSKELETAL: Grossly no joint deformity.   NEUROLOGICAL: AAO times 3, No motor and sensory deficit  Medication:  Current Facility-Administered Medications   Medication Dose Route Frequency    apixaban (ELIQUIS) tablet 5 mg  5 mg Oral BID    losartan (COZAAR) tablet 25 mg  25 mg Oral DAILY    [START ON 2/15/2018] furosemide (LASIX) tablet 40 mg  40 mg Oral DAILY    sodium chloride (NS) flush 5-10 mL  5-10 mL IntraVENous Q8H    sodium chloride (NS) flush 5-10 mL  5-10 mL IntraVENous PRN    atorvastatin (LIPITOR) tablet 40 mg  40 mg Oral QHS    insulin lispro (HUMALOG) injection   SubCUTAneous AC&HS    insulin lispro (HUMALOG) injection 5 Units  5 Units SubCUTAneous TIDAC    metoprolol succinate (TOPROL-XL) XL tablet 50 mg  50 mg Oral DAILY    clopidogrel (PLAVIX) tablet 75 mg  75 mg Oral DAILY    acetaminophen (TYLENOL) tablet 650 mg  650 mg Oral Q4H PRN    insulin glargine (LANTUS) injection 15 Units  0.2 Units/kg SubCUTAneous QHS    glucose chewable tablet 16 g  4 Tab Oral PRN    glucagon (GLUCAGEN) injection 1 mg  1 mg IntraMUSCular PRN    dextrose (D50W) injection syrg 12.5-25 g  25-50 mL IntraVENous PRN    aspirin delayed-release tablet 81 mg  81 mg Oral DAILY               Lab/Data Reviewed:       Recent Labs      02/14/18   0401  02/13/18   1544  02/12/18   0447   WBC  10.5   --   9.7   HGB  13.3  12.7*  13.4   HCT  40.3  38.2  41.2   PLT  365   --   338     Recent Labs      02/14/18   0401  02/13/18   0415  02/12/18   0447   NA  144  140  142   K  3.5  4.3  3.8   CL  102  101  105   CO2  38*  34*  32   GLU  106*  180*  187*   BUN  32*  43*  41*   CREA  1.46*  1.68*  1.76*   CA  8.5  8.4*  8.1*       Signed By: Camilla Martin MD     February 14, 2018

## 2018-02-15 NOTE — PROGRESS NOTES
1945:  Received patient from Anneliese Courtney RN.    2015:  Assessment completed. Patient sitting up to side of bed watching TV. 2+ edema noted to BLE. Reminded patient to elevate legs to help eliminate the swelling. Patient placed his legs in the bed at this time. POC for shift discussed with patient and all questions were answered. No complaints of pain and no needs are stated at this time. 2345:  Notified by MT that patient had 21 beat run of VT. Assessed patient who stated he was sleeping just fine, and had not felt anything abnormal.  Patient denies chest pain or SOB at this time. Telephone call with Dr. Angel Guerra who states to check BMP and Mag. In the am.  No further orders at this time. 0010:  Reassessment completed. No changes from previous. Patient resting in bed with eyes closed. No complaints of pain and no needs stated.

## 2018-02-15 NOTE — DISCHARGE SUMMARY
Discharge Summary    Patient: Perri Jeans. MRN: [id number]  CSN: [id number]    YOB: 1945  Age: 81 y.o. Sex: male    DOA: 2/10/2018 LOS:  LOS: 5 days   Discharge Date:      Primary Care Provider:  Savannah Santos, MD    Admission Diagnoses: Tachycardia    Discharge Diagnoses:    Problem List as of 2/15/2018  Never Reviewed          Codes Class Noted - Resolved    Atrial flutter (RUST 75.) ICD-10-CM: I48.92  ICD-9-CM: 427.32  2/12/2018 - Present        Uncontrolled type II diabetes mellitus (RUST 75.) ICD-10-CM: E11.65  ICD-9-CM: 250.02  2/12/2018 - Present        Acute renal insufficiency ICD-10-CM: N28.9  ICD-9-CM: 593.9  2/12/2018 - Present        * (Principal)NSTEMI (non-ST elevated myocardial infarction) (RUST 75.) ICD-10-CM: I21.4  ICD-9-CM: 410.70  2/12/2018 - Present        Systolic CHF, acute (RUST 75.) ICD-10-CM: I50.21  ICD-9-CM: 428.21, 428.0  2/12/2018 - Present        RESOLVED: Tachycardia ICD-10-CM: R00.0  ICD-9-CM: 785.0  2/10/2018 - 2/13/2018              Discharge Medications:     Current Discharge Medication List      START taking these medications    Details   apixaban (ELIQUIS) 5 mg tablet Take 1 Tab by mouth two (2) times a day. Qty: 60 Tab, Refills: 0      aspirin delayed-release 81 mg tablet Take 1 Tab by mouth daily. Qty: 30 Tab, Refills: 0      atorvastatin (LIPITOR) 40 mg tablet Take 1 Tab by mouth nightly. Qty: 30 Tab, Refills: 0      clopidogrel (PLAVIX) 75 mg tab Take 1 Tab by mouth daily. Qty: 30 Tab, Refills: 0      furosemide (LASIX) 40 mg tablet Take 1 Tab by mouth daily. Qty: 30 Tab, Refills: 0      insulin glargine (LANTUS) 100 unit/mL injection 15 Units by SubCUTAneous route daily. Qty: 1 Vial, Refills: 0      losartan (COZAAR) 25 mg tablet Take 1 Tab by mouth daily. Qty: 30 Tab, Refills: 0      metoprolol succinate (TOPROL-XL) 50 mg XL tablet Take 1 Tab by mouth daily. Qty: 30 Tab, Refills: 0      SITagliptin (JANUVIA) 50 mg tablet Take 1 Tab by mouth daily.   Qty: 30 Tab, Refills: 0             Discharge Condition: Stable    Procedures : Cardiac catheterization with stent placement    Consults: Cardiology      PHYSICAL EXAM    Visit Vitals    /64 (BP 1 Location: Right arm, BP Patient Position: Sitting)    Pulse 73    Temp 97.7 °F (36.5 °C)    Resp 14    Ht 5' 7\" (1.702 m)    Wt 76.8 kg (169 lb 6.4 oz)    SpO2 97%    BMI 26.53 kg/m2     General: Awake, cooperative, no acute distress    HEENT: NC, Atraumatic. PERRLA, EOMI. Anicteric sclerae. Lungs:  Diminished bibasilar breath sounds. No Wheezing/Rhonchi/Rales. Heart:  Regular  rhythm,  No murmur, No Rubs, No Gallops  Abdomen: Soft, Non distended, Non tender. +Bowel sounds,   Extremities: 2+ BLE edema. Psych:   Not anxious or agitated. Neurologic:  No acute neurological deficits. Admission HPI : Ever Villanueva. is a 81 y.o. male being admitted to the hospital with  Acute resp distress. He states 'he was on DM meds but stopped taking it several months ago because \"i did not like the doctor I was gong to\" He has been havign progressive SOB with cough and LE edema. In ER, he was found to be in CHF exacerbation with resp distress and has been admitted for [facility] Course : This patient was admitted to medical services on February 10, 2018 with respiratory distress. He was found to be hyperglycemic as well, as he had stopped taking his diabetic medications. The patient underwent echocardiogram which showed a severely decreased EF. The decision was then made to under cardiac catheterization with stent placement in the LAD, with disease in the RCA and LCx as well. The patient was anticoagulated prior to and after the procedure. The patient had thorough education on his diabetes and which medications he will be discharged on. Both he and his family were present at the time of education.  After the cardiac cath, the patient was up ad jonathan and stated that he felt better. Activity: Activity as tolerated    Diet: Cardiac Diet and Diabetic Diet    Follow-up: This patient was instructed to follow up with Dr Lyn Green on 2/22 and Dr Adán Mancia on 2/19. Disposition: This patient will be discharged home with home health to ensure that the patient has a safe medication plan in place. Minutes spent on discharge: 45       Labs: Results:       Chemistry Recent Labs      02/15/18   0451  02/14/18   0401  02/13/18   0415   GLU  107*  106*  180*   NA  140  144  140   K  3.1*  3.5  4.3   CL  99*  102  101   CO2  38*  38*  34*   BUN  35*  32*  43*   CREA  1.60*  1.46*  1.68*   CA  8.8  8.5  8.4*   AGAP  3  4  5   BUCR  22*  22*  26*      CBC w/Diff Recent Labs      02/14/18   0401  02/13/18   1544   WBC  10.5   --    RBC  4.54*   --    HGB  13.3  12.7*   HCT  40.3  38.2   PLT  365   --       Cardiac Enzymes No results for input(s): CPK, CKND1, LEONEL in the last 72 hours. No lab exists for component: CKRMB, TROIP   Coagulation Recent Labs      02/14/18   0842  02/13/18   1745   APTT  50.4*  >180.0*       Lipid Panel No results found for: CHOL, CHOLPOCT, CHOLX, CHLST, CHOLV, 304975, HDL, LDL, LDLC, DLDLP, 438693, VLDLC, VLDL, TGLX, TRIGL, TRIGP, TGLPOCT, CHHD, CHHDX   BNP No results for input(s): BNPP in the last 72 hours. Liver Enzymes No results for input(s): TP, ALB, TBIL, AP, SGOT, GPT in the last 72 hours. No lab exists for component: DBIL   Thyroid Studies Lab Results   Component Value Date/Time    TSH 1.72 02/11/2018 02:19 AM            Significant Diagnostic Studies: Us Retroperitoneum Comp    Result Date: 2/12/2018  EXAM: Complete Retroperitoneal Ultrasound INDICATION: Renal failure, acute kidney injury. COMPARISON: None. _______________ FINDINGS: RIGHT KIDNEY: 10.9 cm in length with lobulated contour. No hydronephrosis or renal mass. No visible calculi. Mild diffuse increased renal fracture, isoechoic to adjacent liver.  LEFT KIDNEY: 10.5 cm in length with a lobulated contour. No hydronephrosis or renal mass. No visible calculi. Mild diffuse increased renal parenchymal echotexture. BLADDER: No intraluminal calculi or debris. No wall thickening. The left ureteral jet is identified. A right ureteral jet with color Doppler interrogation. Pre-void volume estimated at 185 ml. Post-void volume was not performed as the patient was unable to void. OTHER: None. _______________     IMPRESSION: 1. Sonographic findings of medical renal disease without evidence of hydronephrosis. 2. Unremarkable ultrasound of the bladder. Xr Chest Port    Result Date: 2/10/2018  EXAM: CHEST PORTABLE. INDICATION: Shortness of breath. COMPARISON: None. _______________ FINDINGS: Artifact projected the right hemithorax. Right upper lobe azygous fissure. Cardiac size is enlarged. Aortic arch calcifications are noted. Mediastinal structures and pulmonary vasculature are within normal limits. Bilateral lower lung zone opacities and adjacent pleural effusions. No evidence for pneumothorax. Osseous structures are intact. _______________     IMPRESSION: 1. Bilateral pleural effusions with adjacent atelectasis/infiltrates. Nm Lung Perfusion W Vent    Result Date: 2/10/2018  Reason for exam: Shortness of breath, chest pain. Pulmonary ventilation and perfusion scan was performed using 0.8 mCi of 99m technetium labeled DTPA administered orally and 6.6 mCi of 99m technetium labeled MAA given intravenously. Images were acquired in multiple projections. Comparison: 2/10/2018. Ventilation and pulmonary scan demonstrates matched defects predominantly in the lower lung zones. Left basilar pleural effusion with adjacent atelectasis/infiltrate is seen on plain film performed on the same date. There are no segmental or subsegmental perfusion defects. No mismatch defects are identified. There is clumping of radiotracer in the hilar regions. Impression: 1. Low probability for pulmonary embolus.          No results found for this or any previous visit.         CC: Phys Other, MD

## 2018-02-15 NOTE — ROUTINE PROCESS
Assumed pt care. Received bedside and verbal SBAR from [facility]. No s/s of distress noted. Cath site clean, dry, and intact. No s/s of hematoma noted. Pt to be discharged today following 3 IV runs of K. One run of IV K completed. Asked Elayne TIAN if order for K could be changed to oral. Temo TIAN changed order 40 mEQ of KCL packets. K packets administered  prior to discharge. Naif Street RN discharged and provided education to pt and wife. Telemetry and INT removed from pt. Belongings packed. Dash diet information was included in discharge packet.

## 2018-02-15 NOTE — ROUTINE PROCESS
Bedside and Verbal shift change report given to JOSE Stevens (oncoming nurse) by ELIEZER Perez, VALERIA (offgoing nurse). Report included the following information SBAR, Kardex, Intake/Output, MAR and Recent Results.

## 2018-02-18 ENCOUNTER — HOME CARE VISIT (OUTPATIENT)
Dept: SCHEDULING | Facility: HOME HEALTH | Age: 73
End: 2018-02-18
Payer: MEDICARE

## 2018-02-18 PROCEDURE — 3331090002 HH PPS REVENUE DEBIT

## 2018-02-18 PROCEDURE — G0299 HHS/HOSPICE OF RN EA 15 MIN: HCPCS

## 2018-02-18 PROCEDURE — 3331090001 HH PPS REVENUE CREDIT

## 2018-02-18 PROCEDURE — 400013 HH SOC

## 2018-02-19 ENCOUNTER — HOME CARE VISIT (OUTPATIENT)
Dept: HOME HEALTH SERVICES | Facility: HOME HEALTH | Age: 73
End: 2018-02-19
Payer: MEDICARE

## 2018-02-19 PROCEDURE — 3331090001 HH PPS REVENUE CREDIT

## 2018-02-19 PROCEDURE — 3331090002 HH PPS REVENUE DEBIT

## 2018-02-20 PROCEDURE — 3331090001 HH PPS REVENUE CREDIT

## 2018-02-20 PROCEDURE — 3331090002 HH PPS REVENUE DEBIT

## 2018-02-21 ENCOUNTER — HOME CARE VISIT (OUTPATIENT)
Dept: SCHEDULING | Facility: HOME HEALTH | Age: 73
End: 2018-02-21
Payer: MEDICARE

## 2018-02-21 VITALS — OXYGEN SATURATION: 96 % | RESPIRATION RATE: 16 BRPM | HEART RATE: 60 BPM | TEMPERATURE: 98.5 F

## 2018-02-21 PROCEDURE — 3331090002 HH PPS REVENUE DEBIT

## 2018-02-21 PROCEDURE — 3331090001 HH PPS REVENUE CREDIT

## 2018-02-21 PROCEDURE — G0299 HHS/HOSPICE OF RN EA 15 MIN: HCPCS

## 2018-02-22 VITALS
DIASTOLIC BLOOD PRESSURE: 70 MMHG | TEMPERATURE: 97 F | HEART RATE: 66 BPM | OXYGEN SATURATION: 98 % | SYSTOLIC BLOOD PRESSURE: 118 MMHG | RESPIRATION RATE: 16 BRPM

## 2018-02-22 PROCEDURE — 3331090001 HH PPS REVENUE CREDIT

## 2018-02-22 PROCEDURE — 3331090002 HH PPS REVENUE DEBIT

## 2018-02-23 PROCEDURE — 3331090002 HH PPS REVENUE DEBIT

## 2018-02-23 PROCEDURE — 3331090001 HH PPS REVENUE CREDIT

## 2018-02-24 PROCEDURE — 3331090001 HH PPS REVENUE CREDIT

## 2018-02-24 PROCEDURE — 3331090002 HH PPS REVENUE DEBIT

## 2018-02-25 PROCEDURE — 3331090001 HH PPS REVENUE CREDIT

## 2018-02-25 PROCEDURE — 3331090002 HH PPS REVENUE DEBIT

## 2018-02-26 ENCOUNTER — HOME CARE VISIT (OUTPATIENT)
Dept: HOME HEALTH SERVICES | Facility: HOME HEALTH | Age: 73
End: 2018-02-26
Payer: MEDICARE

## 2018-02-26 PROCEDURE — 3331090001 HH PPS REVENUE CREDIT

## 2018-02-26 PROCEDURE — 3331090002 HH PPS REVENUE DEBIT

## 2018-02-27 PROCEDURE — 3331090001 HH PPS REVENUE CREDIT

## 2018-02-27 PROCEDURE — 3331090002 HH PPS REVENUE DEBIT

## 2018-02-28 PROCEDURE — 3331090002 HH PPS REVENUE DEBIT

## 2018-02-28 PROCEDURE — 3331090001 HH PPS REVENUE CREDIT

## 2018-03-01 ENCOUNTER — HOME CARE VISIT (OUTPATIENT)
Dept: HOME HEALTH SERVICES | Facility: HOME HEALTH | Age: 73
End: 2018-03-01
Payer: MEDICARE

## 2018-03-01 PROCEDURE — 3331090001 HH PPS REVENUE CREDIT

## 2018-03-01 PROCEDURE — 3331090003 HH PPS REVENUE ADJ

## 2018-03-01 PROCEDURE — 3331090002 HH PPS REVENUE DEBIT

## 2018-03-02 PROCEDURE — 3331090002 HH PPS REVENUE DEBIT

## 2018-03-02 PROCEDURE — 3331090001 HH PPS REVENUE CREDIT

## 2018-03-03 PROCEDURE — 3331090001 HH PPS REVENUE CREDIT

## 2018-03-03 PROCEDURE — 3331090002 HH PPS REVENUE DEBIT

## 2018-03-04 PROCEDURE — 3331090002 HH PPS REVENUE DEBIT

## 2018-03-04 PROCEDURE — 3331090001 HH PPS REVENUE CREDIT

## 2018-03-05 PROCEDURE — 3331090002 HH PPS REVENUE DEBIT

## 2018-03-05 PROCEDURE — 3331090001 HH PPS REVENUE CREDIT

## 2018-03-06 PROCEDURE — 3331090001 HH PPS REVENUE CREDIT

## 2018-03-06 PROCEDURE — 3331090002 HH PPS REVENUE DEBIT

## 2018-05-29 ENCOUNTER — APPOINTMENT (OUTPATIENT)
Dept: GENERAL RADIOLOGY | Age: 73
DRG: 378 | End: 2018-05-29
Attending: PHYSICIAN ASSISTANT
Payer: MEDICARE

## 2018-05-29 ENCOUNTER — HOSPITAL ENCOUNTER (INPATIENT)
Age: 73
LOS: 3 days | Discharge: HOME | DRG: 378 | End: 2018-06-01
Attending: EMERGENCY MEDICINE | Admitting: INTERNAL MEDICINE
Payer: MEDICARE

## 2018-05-29 DIAGNOSIS — R06.00 DYSPNEA, UNSPECIFIED TYPE: ICD-10-CM

## 2018-05-29 DIAGNOSIS — D64.9 SEVERE ANEMIA: Primary | ICD-10-CM

## 2018-05-29 DIAGNOSIS — N17.9 AKI (ACUTE KIDNEY INJURY) (HCC): ICD-10-CM

## 2018-05-29 DIAGNOSIS — K92.2 GASTROINTESTINAL HEMORRHAGE, UNSPECIFIED GASTROINTESTINAL HEMORRHAGE TYPE: ICD-10-CM

## 2018-05-29 PROBLEM — R79.89 ELEVATED BRAIN NATRIURETIC PEPTIDE (BNP) LEVEL: Status: ACTIVE | Noted: 2018-05-29

## 2018-05-29 PROBLEM — I25.10 CAD (CORONARY ARTERY DISEASE): Status: ACTIVE | Noted: 2018-05-29

## 2018-05-29 PROBLEM — E11.9 DM2 (DIABETES MELLITUS, TYPE 2) (HCC): Status: ACTIVE | Noted: 2018-02-12

## 2018-05-29 PROBLEM — N18.30 CKD (CHRONIC KIDNEY DISEASE) STAGE 3, GFR 30-59 ML/MIN (HCC): Status: ACTIVE | Noted: 2018-05-29

## 2018-05-29 LAB
ALBUMIN SERPL-MCNC: 3.2 G/DL (ref 3.4–5)
ALBUMIN/GLOB SERPL: 1.1 {RATIO} (ref 0.8–1.7)
ALP SERPL-CCNC: 160 U/L (ref 45–117)
ALT SERPL-CCNC: 32 U/L (ref 16–61)
ANION GAP SERPL CALC-SCNC: 19 MMOL/L (ref 3–18)
APTT PPP: 33.7 SEC (ref 23–36.4)
ARTERIAL PATENCY WRIST A: YES
AST SERPL-CCNC: 17 U/L (ref 15–37)
ATRIAL RATE: 75 BPM
BASE DEFICIT BLD-SCNC: 3 MMOL/L
BASOPHILS # BLD: 0 K/UL (ref 0–0.06)
BASOPHILS NFR BLD: 0 % (ref 0–2)
BDY SITE: ABNORMAL
BILIRUB SERPL-MCNC: 1.2 MG/DL (ref 0.2–1)
BNP SERPL-MCNC: 4485 PG/ML (ref 0–900)
BUN SERPL-MCNC: 45 MG/DL (ref 7–18)
BUN/CREAT SERPL: 21 (ref 12–20)
CALCIUM SERPL-MCNC: 7.9 MG/DL (ref 8.5–10.1)
CALCULATED P AXIS, ECG09: 58 DEGREES
CALCULATED R AXIS, ECG10: 48 DEGREES
CALCULATED T AXIS, ECG11: 110 DEGREES
CHLORIDE SERPL-SCNC: 94 MMOL/L (ref 100–108)
CK MB CFR SERPL CALC: 3 % (ref 0–4)
CK MB SERPL-MCNC: 1.4 NG/ML (ref 5–25)
CK SERPL-CCNC: 46 U/L (ref 39–308)
CO2 SERPL-SCNC: 20 MMOL/L (ref 21–32)
CREAT SERPL-MCNC: 2.19 MG/DL (ref 0.6–1.3)
DIAGNOSIS, 93000: NORMAL
DIFFERENTIAL METHOD BLD: ABNORMAL
EOSINOPHIL # BLD: 0 K/UL (ref 0–0.4)
EOSINOPHIL NFR BLD: 0 % (ref 0–5)
ERYTHROCYTE [DISTWIDTH] IN BLOOD BY AUTOMATED COUNT: 19.9 % (ref 11.6–14.5)
GAS FLOW.O2 O2 DELIVERY SYS: ABNORMAL L/MIN
GAS FLOW.O2 SETTING OXYMISER: 4 L/M
GLOBULIN SER CALC-MCNC: 3 G/DL (ref 2–4)
GLUCOSE BLD STRIP.AUTO-MCNC: 233 MG/DL (ref 70–110)
GLUCOSE SERPL-MCNC: 251 MG/DL (ref 74–99)
HCO3 BLD-SCNC: 21 MMOL/L (ref 22–26)
HCT VFR BLD AUTO: 13.3 % (ref 36–48)
HGB BLD-MCNC: 3.5 G/DL (ref 13–16)
INR PPP: 1.9 (ref 0.8–1.2)
LYMPHOCYTES # BLD: 0.6 K/UL (ref 0.9–3.6)
LYMPHOCYTES NFR BLD: 5 % (ref 21–52)
MCH RBC QN AUTO: 16.1 PG (ref 24–34)
MCHC RBC AUTO-ENTMCNC: 26.3 G/DL (ref 31–37)
MCV RBC AUTO: 61 FL (ref 74–97)
MONOCYTES # BLD: 1.1 K/UL (ref 0.05–1.2)
MONOCYTES NFR BLD: 9 % (ref 3–10)
NEUTS SEG # BLD: 10.1 K/UL (ref 1.8–8)
NEUTS SEG NFR BLD: 86 % (ref 40–73)
O2/TOTAL GAS SETTING VFR VENT: 36 %
P-R INTERVAL, ECG05: 156 MS
PCO2 BLD: 29.4 MMHG (ref 35–45)
PH BLD: 7.46 [PH] (ref 7.35–7.45)
PLATELET # BLD AUTO: 330 K/UL (ref 135–420)
PMV BLD AUTO: 9.2 FL (ref 9.2–11.8)
PO2 BLD: 132 MMHG (ref 80–100)
POTASSIUM SERPL-SCNC: 4.1 MMOL/L (ref 3.5–5.5)
PROT SERPL-MCNC: 6.2 G/DL (ref 6.4–8.2)
PROTHROMBIN TIME: 20.8 SEC (ref 11.5–15.2)
Q-T INTERVAL, ECG07: 422 MS
QRS DURATION, ECG06: 106 MS
QTC CALCULATION (BEZET), ECG08: 471 MS
RBC # BLD AUTO: 2.18 M/UL (ref 4.7–5.5)
RBC MORPH BLD: ABNORMAL
SAO2 % BLD: 99 % (ref 92–97)
SERVICE CMNT-IMP: ABNORMAL
SODIUM SERPL-SCNC: 133 MMOL/L (ref 136–145)
SPECIMEN TYPE: ABNORMAL
TROPONIN I SERPL-MCNC: 0.02 NG/ML (ref 0–0.06)
VENTRICULAR RATE, ECG03: 75 BPM
WBC # BLD AUTO: 11.8 K/UL (ref 4.6–13.2)

## 2018-05-29 PROCEDURE — 93005 ELECTROCARDIOGRAM TRACING: CPT

## 2018-05-29 PROCEDURE — 86920 COMPATIBILITY TEST SPIN: CPT | Performed by: EMERGENCY MEDICINE

## 2018-05-29 PROCEDURE — 77030013169 SET IV BLD ICUM -A

## 2018-05-29 PROCEDURE — 86850 RBC ANTIBODY SCREEN: CPT | Performed by: EMERGENCY MEDICINE

## 2018-05-29 PROCEDURE — 82803 BLOOD GASES ANY COMBINATION: CPT

## 2018-05-29 PROCEDURE — 71045 X-RAY EXAM CHEST 1 VIEW: CPT

## 2018-05-29 PROCEDURE — 85610 PROTHROMBIN TIME: CPT | Performed by: PHYSICIAN ASSISTANT

## 2018-05-29 PROCEDURE — 74011250636 HC RX REV CODE- 250/636: Performed by: PHYSICIAN ASSISTANT

## 2018-05-29 PROCEDURE — C9113 INJ PANTOPRAZOLE SODIUM, VIA: HCPCS | Performed by: PHYSICIAN ASSISTANT

## 2018-05-29 PROCEDURE — 85730 THROMBOPLASTIN TIME PARTIAL: CPT | Performed by: PHYSICIAN ASSISTANT

## 2018-05-29 PROCEDURE — 96374 THER/PROPH/DIAG INJ IV PUSH: CPT

## 2018-05-29 PROCEDURE — 65610000006 HC RM INTENSIVE CARE

## 2018-05-29 PROCEDURE — 99285 EMERGENCY DEPT VISIT HI MDM: CPT

## 2018-05-29 PROCEDURE — 96361 HYDRATE IV INFUSION ADD-ON: CPT

## 2018-05-29 PROCEDURE — 36430 TRANSFUSION BLD/BLD COMPNT: CPT

## 2018-05-29 PROCEDURE — 80053 COMPREHEN METABOLIC PANEL: CPT | Performed by: PHYSICIAN ASSISTANT

## 2018-05-29 PROCEDURE — 36600 WITHDRAWAL OF ARTERIAL BLOOD: CPT

## 2018-05-29 PROCEDURE — 82550 ASSAY OF CK (CPK): CPT | Performed by: PHYSICIAN ASSISTANT

## 2018-05-29 PROCEDURE — 30233N1 TRANSFUSION OF NONAUTOLOGOUS RED BLOOD CELLS INTO PERIPHERAL VEIN, PERCUTANEOUS APPROACH: ICD-10-PCS | Performed by: INTERNAL MEDICINE

## 2018-05-29 PROCEDURE — 85025 COMPLETE CBC W/AUTO DIFF WBC: CPT | Performed by: PHYSICIAN ASSISTANT

## 2018-05-29 PROCEDURE — 82962 GLUCOSE BLOOD TEST: CPT

## 2018-05-29 PROCEDURE — P9016 RBC LEUKOCYTES REDUCED: HCPCS | Performed by: EMERGENCY MEDICINE

## 2018-05-29 PROCEDURE — 83880 ASSAY OF NATRIURETIC PEPTIDE: CPT | Performed by: PHYSICIAN ASSISTANT

## 2018-05-29 RX ORDER — MAGNESIUM SULFATE 100 %
4 CRYSTALS MISCELLANEOUS AS NEEDED
Status: DISCONTINUED | OUTPATIENT
Start: 2018-05-29 | End: 2018-06-02 | Stop reason: HOSPADM

## 2018-05-29 RX ORDER — INSULIN LISPRO 100 [IU]/ML
INJECTION, SOLUTION INTRAVENOUS; SUBCUTANEOUS
Status: DISCONTINUED | OUTPATIENT
Start: 2018-05-29 | End: 2018-06-02 | Stop reason: HOSPADM

## 2018-05-29 RX ORDER — SODIUM CHLORIDE 9 MG/ML
250 INJECTION, SOLUTION INTRAVENOUS AS NEEDED
Status: DISCONTINUED | OUTPATIENT
Start: 2018-05-29 | End: 2018-06-02 | Stop reason: HOSPADM

## 2018-05-29 RX ORDER — PANTOPRAZOLE SODIUM 40 MG/10ML
40 INJECTION, POWDER, LYOPHILIZED, FOR SOLUTION INTRAVENOUS
Status: COMPLETED | OUTPATIENT
Start: 2018-05-29 | End: 2018-05-29

## 2018-05-29 RX ORDER — PANTOPRAZOLE SODIUM 40 MG/10ML
40 INJECTION, POWDER, LYOPHILIZED, FOR SOLUTION INTRAVENOUS EVERY 12 HOURS
Status: DISCONTINUED | OUTPATIENT
Start: 2018-05-29 | End: 2018-05-29

## 2018-05-29 RX ORDER — DEXTROSE 50 % IN WATER (D50W) INTRAVENOUS SYRINGE
25-50 AS NEEDED
Status: DISCONTINUED | OUTPATIENT
Start: 2018-05-29 | End: 2018-06-02 | Stop reason: HOSPADM

## 2018-05-29 RX ORDER — INSULIN GLARGINE 100 [IU]/ML
7 INJECTION, SOLUTION SUBCUTANEOUS DAILY
Status: DISCONTINUED | OUTPATIENT
Start: 2018-05-30 | End: 2018-06-02 | Stop reason: HOSPADM

## 2018-05-29 RX ORDER — SODIUM CHLORIDE 9 MG/ML
75 INJECTION, SOLUTION INTRAVENOUS CONTINUOUS
Status: DISCONTINUED | OUTPATIENT
Start: 2018-05-29 | End: 2018-05-29

## 2018-05-29 RX ADMIN — SODIUM CHLORIDE 1000 ML: 900 INJECTION, SOLUTION INTRAVENOUS at 18:29

## 2018-05-29 RX ADMIN — PANTOPRAZOLE SODIUM 40 MG: 40 INJECTION, POWDER, FOR SOLUTION INTRAVENOUS at 18:26

## 2018-05-29 NOTE — ED TRIAGE NOTES
Patient went to see Dr. Joy Landry today for shortness of breath and patient fell at Dr. Damir Valenzuela off. Patient sent here by Dr. Joy Landry for further evaluation. Sepsis Screening completed    (  )Patient meets SIRS criteria. ( x )Patient does not meet SIRS criteria.       SIRS Criteria is achieved when two or more of the following are present   Temperature < 96.8°F (36°C) or > 100.9°F (38.3°C)   Heart Rate > 90 beats per minute   Respiratory Rate > 20 breaths per minute   WBC count > 12,000 or <4,000 or > 10% bands

## 2018-05-29 NOTE — Clinical Note
Status[de-identified] Inpatient [101] Type of Bed: Intensive Care [6] Inpatient Hospitalization Certified Necessary for the Following Reasons: 3. Patient receiving treatment that can only be provided in an inpatient setting (further clarification in H&P documentation) Admitting Diagnosis: Severe anemia [[id number]] Admitting Physician: Jeremiah Colin [[id number]] Attending Physician: Jeremiah Colin [[id number]] Estimated Length of Stay: 2 Midnights Discharge Plan[de-identified] Home with Office Follow-up

## 2018-05-29 NOTE — IP AVS SNAPSHOT
[address] [place] [address] 
[phone] Patient: Beny Bui. MRN: [id number] FFT:6/68/[id number] About your hospitalization You were admitted on:  May 29, 2018 You last received care in the:  [address] [place] [address] You were discharged on:  June 1, 2018 Why you were hospitalized Your primary diagnosis was:  Gi Bleed Your diagnoses also included:  Symptomatic Anemia, Dm2 (Diabetes Mellitus, Type 2) ([facility]), Acute Renal Insufficiency, Cad (Coronary Artery Disease), Chau (Acute Kidney Injury) ([facility]), Ckd (Chronic Kidney Disease) Stage 3, Gfr 30-59 Ml/Min, Elevated Brain Natriuretic Peptide (Bnp) Level Follow-up Information Follow up With Details Comments Contact Info Chelsie Wise MD   [address] [facility] [address] [place], [place] [id number] 
[phone] Rafael Huff MD On 7/19/2018 Follow up appointment scheduled for June 19, 2018 at 1:00 p.m. [address] [place] [address] [address] [place] [address] 
[phone] Care Mgmt contacted MD's office (Dr. Rober Garcia) on 6/1/18 to assist with scheduling f/u appointment for patient. MD's office closed at [facility] on Fridays. Patient please call MD's office on Monday, June 4, 2018 to arrange f/u appointment date/time. Discharge Orders None A check jasmin indicates which time of day the medication should be taken. My Medications START taking these medications Instructions Each Dose to Equal  
 Morning Noon Evening Bedtime Omeprazole delayed release 20 mg tablet Commonly known as:  PRILOSEC D/R Your last dose was: Your next dose is: Take 1 Tab by mouth daily. 20 mg CONTINUE taking these medications Instructions Each Dose to Equal  
 Morning Noon Evening Bedtime  
 apixaban 5 mg tablet Commonly known as:  Jill Barton Your last dose was: Your next dose is: Take 1 Tab by mouth two (2) times a day. 5 mg  
    
   
   
   
  
 aspirin delayed-release 81 mg tablet Your last dose was: Your next dose is: Take 1 Tab by mouth daily. 81 mg  
    
   
   
   
  
 atorvastatin 40 mg tablet Commonly known as:  LIPITOR Your last dose was: Your next dose is: Take 1 Tab by mouth nightly. 40 mg  
    
   
   
   
  
 clopidogrel 75 mg Tab Commonly known as:  PLAVIX Your last dose was: Your next dose is: Take 1 Tab by mouth daily. 75 mg  
    
   
   
   
  
 furosemide 40 mg tablet Commonly known as:  LASIX Your last dose was: Your next dose is: Take 1 Tab by mouth daily. 40 mg  
    
   
   
   
  
 glucose blood VI test strips strip Commonly known as:  IGLUCOSE TEST STRIP Your last dose was: Your next dose is:    
   
   
 Use to check BGL every morning  
     
   
   
   
  
 insulin glargine 100 unit/mL injection Commonly known as:  LANTUS Your last dose was: Your next dose is:    
   
   
 15 Units by SubCUTAneous route daily. 15 Units  
    
   
   
   
  
 lancets 30 gauge Misc Commonly known as:  ADVOCATE LANCET Your last dose was: Your next dose is:    
   
   
 Use while checking BGL each morning. losartan 25 mg tablet Commonly known as:  COZAAR Your last dose was: Your next dose is: Take 1 Tab by mouth daily. 25 mg  
    
   
   
   
  
 metoprolol succinate 50 mg XL tablet Commonly known as:  TOPROL-XL Your last dose was: Your next dose is: Take 1 Tab by mouth daily. 50 mg SITagliptin 50 mg tablet Commonly known as:  Noreene Sly Your last dose was: Your next dose is: Take 1 Tab by mouth daily.   
 50 mg  
    
   
   
   
  
  
  
 Where to Get Your Medications These medications were sent to [address] [place] [address], [address] [place] [address] [address] Pay[address] Masoud Phone:  [phone] Omeprazole delayed release 20 mg tablet Discharge Instructions DISCHARGE SUMMARY from Nurse PATIENT INSTRUCTIONS: 
 
 
F-face looks uneven A-arms unable to move or move unevenly S-speech slurred or non-existent T-time-call 911 as soon as signs and symptoms begin-DO NOT go Back to bed or wait to see if you get better-TIME IS BRAIN. Warning Signs of HEART ATTACK Call 911 if you have these symptoms: 
? Chest discomfort. Most heart attacks involve discomfort in the center of the chest that lasts more than a few minutes, or that goes away and comes back. It can feel like uncomfortable pressure, squeezing, fullness, or pain. ? Discomfort in other areas of the upper body. Symptoms can include pain or discomfort in one or both arms, the back, neck, jaw, or stomach. ? Shortness of breath with or without chest discomfort. ? Other signs may include breaking out in a cold sweat, nausea, or lightheadedness. Don't wait more than five minutes to call [address]! Fast action can save your life. Calling 911 is almost always the fastest way to get lifesaving treatment. Emergency Medical Services staff can begin treatment when they arrive  up to an hour sooner than if someone gets to the hospital by car. The discharge information has been reviewed with the patient. The patient verbalized understanding. Discharge medications reviewed with the patient and appropriate educational materials and side effects teaching were provided. ___________________________________________________________________________________________________________________________________ Anemia: Care Instructions Your Care Instructions Anemia is a low level of red blood cells, which carry oxygen throughout your body. Many things can cause anemia. Lack of iron is one of the most common causes. Your body needs iron to make hemoglobin, a substance in red blood cells that carries oxygen from the lungs to your body's cells. Without enough iron, the body produces fewer and smaller red blood cells. As a result, your body's cells do not get enough oxygen, and you feel tired and weak. And you may have trouble concentrating. Bleeding is the most common cause of a lack of iron. You may have heavy menstrual bleeding or bleeding caused by conditions such as ulcers, hemorrhoids, or cancer. Regular use of aspirin or other anti-inflammatory medicines (such as ibuprofen) also can cause bleeding in some people. A lack of iron in your diet also can cause anemia, especially at times when the body needs more iron, such as during pregnancy, infancy, and the teen years. Your doctor may have prescribed iron pills. It may take several months of treatment for your iron levels to return to normal. Your doctor also may suggest that you eat foods that are rich in iron, such as meat and beans. There are many other causes of anemia. It is not always due to a lack of iron. Finding the specific cause of your anemia will help your doctor find the right treatment for you. Follow-up care is a key part of your treatment and safety. Be sure to make and go to all appointments, and call your doctor if you are having problems. It's also a good idea to know your test results and keep a list of the medicines you take. How can you care for yourself at home? · Take your medicines exactly as prescribed. Call your doctor if you think you are having a problem with your medicine. · If your doctor recommends iron pills, take them as directed: ¨ Try to take the pills on an empty stomach about 1 hour before or 2 hours after meals. But you may need to take iron with food to avoid an upset stomach. ¨ Do not take antacids or drink milk or caffeine drinks (such as coffee, tea, or cola) at the same time or within 2 hours of the time that you take your iron. They can make it hard for your body to absorb the iron. ¨ Vitamin C (from food or supplements) helps your body absorb iron. Try taking iron pills with a glass of orange juice or some other food that is high in vitamin C, such as citrus fruits. ¨ Iron pills may cause stomach problems, such as heartburn, nausea, diarrhea, constipation, and cramps. Be sure to drink plenty of fluids, and include fruits, vegetables, and fiber in your diet each day. Iron pills often make your bowel movements dark or green. ¨ If you forget to take an iron pill, do not take a double dose of iron the next time you take a pill. ¨ Keep iron pills out of the reach of small children. An overdose of iron can be very dangerous. · Follow your doctor's advice about eating iron-rich foods. These include red meat, shellfish, poultry, eggs, beans, raisins, whole-grain bread, and leafy green vegetables. · Steam vegetables to help them keep their iron content. When should you call for help? Call 911 anytime you think you may need emergency care. For example, call if: 
? · You have symptoms of a heart attack. These may include: ¨ Chest pain or pressure, or a strange feeling in the chest. 
¨ Sweating. ¨ Shortness of breath. ¨ Nausea or vomiting. ¨ Pain, pressure, or a strange feeling in the back, neck, jaw, or upper belly or in one or both shoulders or arms. ¨ Lightheadedness or sudden weakness. ¨ A fast or irregular heartbeat. After you call 911, the  may tell you to chew 1 adult-strength or 2 to 4 low-dose aspirin. Wait for an ambulance. Do not try to drive yourself. ? · You passed out (lost consciousness). ?Call your doctor now or seek immediate medical care if: ? · You have new or increased shortness of breath. ? · You are dizzy or lightheaded, or you feel like you may faint. ? · Your fatigue and weakness continue or get worse. ? · You have any abnormal bleeding, such as: 
¨ Nosebleeds. ¨ Vaginal bleeding that is different (heavier, more frequent, at a different time of the month) than what you are used to. ¨ Bloody or black stools, or rectal bleeding. ¨ Bloody or pink urine. ? Watch closely for changes in your health, and be sure to contact your doctor if: 
? · You do not get better as expected. Where can you learn more? Go to http://chelita-tiarra.info/. Enter R301 in the search box to learn more about \"Anemia: Care Instructions. \" Current as of: October 13, 2016 Content Version: 11.4 © 4929-2292 [employer]. Care instructions adapted under license by "[employer] (which disclaims liability or warranty for this information). If you have questions about a medical condition or this instruction, always ask your healthcare professional. [place] [address] any warranty or liability for your use of this information. Colonoscopy: What to Expect at [facility] Your Recovery After you have a colonoscopy, you will stay at the clinic for 1 to 2 hours until the medicines wear off. Then you can go home. But you will need to arrange for a ride. Your doctor will tell you when you can eat and do your other usual activities. Your doctor will talk to you about when you will need your next colonoscopy. Your doctor can help you decide how often you need to be checked. This will depend on the results of your test and your risk for colorectal cancer. After the test, you may be bloated or have gas pains. You may need to pass gas. If a biopsy was done or a polyp was removed, you may have streaks of blood in your stool (feces) for a few days.  
This care sheet gives you a general idea about how long it will take for you to recover. But each person recovers at a different pace. Follow the steps below to get better as quickly as possible. How can you care for yourself at home? Activity ? · Rest when you feel tired. ? · You can do your normal activities when it feels okay to do so. Diet ? · Follow your doctor's directions for eating. ? · Unless your doctor has told you not to, drink plenty of fluids. This helps to replace the fluids that were lost during the colon prep. ? · Do not drink alcohol. Medicines ? · Your doctor will tell you if and when you can restart your medicines. He or she will also give you instructions about taking any new medicines. ? · If you take blood thinners, such as warfarin (Coumadin), clopidogrel (Plavix), or aspirin, be sure to talk to your doctor. He or she will tell you if and when to start taking those medicines again. Make sure that you understand exactly what your doctor wants you to do. ? · If polyps were removed or a biopsy was done during the test, your doctor may tell you not to take aspirin or other anti-inflammatory medicines for a few days. These include ibuprofen (Advil, Motrin) and naproxen (Aleve). Other instructions ? · For your safety, do not drive or operate machinery until the medicine wears off and you can think clearly. Your doctor may tell you not to drive or operate machinery until the day after your test.  
? · Do not sign legal documents or make major decisions until the medicine wears off and you can think clearly. The anesthesia can make it hard for you to fully understand what you are agreeing to. Follow-up care is a key part of your treatment and safety. Be sure to make and go to all appointments, and call your doctor if you are having problems. It's also a good idea to know your test results and keep a list of the medicines you take. When should you call for help? Call 911 anytime you think you may need emergency care. For example, call if: ? · You passed out (lost consciousness). ? · You pass maroon or bloody stools. ? · You have trouble breathing. ?Call your doctor now or seek immediate medical care if: 
? · You have pain that does not get better after you take pain medicine. ? · You are sick to your stomach or cannot drink fluids. ? · You have new or worse belly pain. ? · You have blood in your stools. ? · You have a fever. ? · You cannot pass stools or gas. ? Watch closely for changes in your health, and be sure to contact your doctor if you have any problems. Where can you learn more? Go to http://chelita-tiarra.info/. Enter E264 in the search box to learn more about \"Colonoscopy: What to Expect at Home. \" Current as of: May 12, 2017 Content Version: 11.4 © 2388-8456 [employer]. Care instructions adapted under license by [employer] (which disclaims liability or warranty for this information). If you have questions about a medical condition or this instruction, always ask your healthcare professional. [place] [address] any warranty or liability for your use of this information. Introducing [facility]! [employer] introduces Zendrive patient portal. Now you can access parts of your medical record, email your doctor's office, and request medication refills online. 1. In your internet browser, go to [email or web address] 2. Click on the First Time User? Click Here link in the Sign In box. You will see the New Member Sign Up page. 3. Enter your Zendrive Access Code exactly as it appears below. You will not need to use this code after youve completed the sign-up process. If you do not sign up before the expiration date, you must request a new code. · Zendrive Access Code: [email or web address] Expires: 8/27/2018  5:12 PM 
 
4.  Enter the last four digits of your Social Security Number (xxxx) and Date of Birth (mm/dd/yyyy) as indicated and click Submit. You will be taken to the next sign-up page. 5. Create a SimuFormt ID. This will be your FlxOne login ID and cannot be changed, so think of one that is secure and easy to remember. 6. Create a SimuFormt password. You can change your password at any time. 7. Enter your Password Reset Question and Answer. This can be used at a later time if you forget your password. 8. Enter your e-mail address. You will receive e-mail notification when new information is available in [address] 9. Click Sign Up. You can now view and download portions of your medical record. 10. Click the Download Summary menu link to download a portable copy of your medical information. If you have questions, please visit the Frequently Asked Questions section of the FlxOne website. Remember, FlxOne is NOT to be used for urgent needs. For medical emergencies, dial 911. Now available from your iPhone and Android! Introducing Gonsalo Segura As a [facility] patient, I wanted to make you aware of our electronic visit tool called Gonsalo Segura. [facility] 24/7 allows you to connect within minutes with a medical provider 24 hours a day, seven days a week via a mobile device or tablet or logging into a secure website from your computer. You can access Gonsalo Segura from anywhere in the [place]. A virtual visit might be right for you when you have a simple condition and feel like you just dont want to get out of bed, or cant get away from work for an appointment, when your regular [facility] provider is not available (evenings, weekends or holidays), or when youre out of town and need minor care. Electronic visits cost only $49 and if the [facility] 24/7 provider determines a prescription is needed to treat your condition, one can be electronically transmitted to a nearby pharmacy*. Please take a moment to enroll today if you have not already done so. The enrollment process is free and takes just a few minutes. To enroll, please download the [employer] 24/7 jessie to your tablet or phone, or visit [email or web address] to enroll on your computer. And, as an [address] [place] [address] patient with a [employer] account, the results of your visits will be scanned into your electronic medical record and your primary care provider will be able to view the scanned results. We urge you to continue to see your regular [employer] provider for your ongoing medical care. And while your primary care provider may not be the one available when you seek a [employer] virtual visit, the peace of mind you get from getting a real diagnosis real time can be priceless. For more information on [employer], view our Frequently Asked Questions (FAQs) at [email or web address]. Sincerely, 
 
Deanna Urrutia MD 
Chief Medical Officer [facility] Billie Miramontes *:  certain medications cannot be prescribed via [employer] Unresulted Labs-Please follow up with your PCP about these lab tests Order Current Status HGB & HCT In process IRON PROFILE In process Providers Seen During Your Hospitalization Provider Specialty Primary office phone Herb Goyal MD Emergency Medicine [phone] Edy Alexander MD Internal Medicine [phone] Your Primary Care Physician (PCP) Primary Care Physician Office Phone Office Fax Elaina Pacheco [phone] You are allergic to the following No active allergies Recent Documentation Height Weight BMI Smoking Status 1.702 m 84 kg 29 kg/m2 Former Smoker Emergency Contacts Name Discharge Info Relation Home Work Mobile [address] [place] [address] CAREGIVER [3] Spouse [3]   [phone] Patient Belongings The following personal items are in your possession at time of discharge: 
  Dental Appliances: None  Visual Aid: None             Clothing: Hat, Sweater, Footwear, Undergarments Please provide this summary of care documentation to your next provider. Signatures-by signing, you are acknowledging that this After Visit Summary has been reviewed with you and you have received a copy. Patient Signature:  ____________________________________________________________ Date:  ____________________________________________________________  
  
Elsie Pane Provider Signature:  ____________________________________________________________ Date:  ____________________________________________________________

## 2018-05-29 NOTE — ED PROVIDER NOTES
EMERGENCY DEPARTMENT HISTORY AND PHYSICAL EXAM    Date: 5/29/2018  Patient Name: David Pressley. History of Presenting Illness     No chief complaint on file. History Provided By: Patient    Chief Complaint: SOB  Duration: 4 Months  Timing:  Intermittent  Location: Chest  Quality: MYRICK  Severity: Moderate, pulse ox of 87% on RA  Modifying Factors: No relieving or worsening factors  Associated Symptoms: Dizziness, SOB, pallor. Also c/o diarrhea. Additional History (Context):   5:19 PM  David Carpenter is a 81 y.o. male with PMHX of DM, asthma, HTN, & HLD, who presents to the emergency department for two complaints: MYRICK onset 4 months ago and fall today. Pt was being seen by PCP, Dr. Vincent Amaral, due to MYRICK, and had fallen secondary to weakness in BLE. Per wife pt had caught himself from fall and did not sustain head injury or LOC. Associated sxs include dizziness, SOB, pallor. Reports he had 2 balloons and coronary stent placed 4 months ago and has had MYRICK since. Pt was placed on 81 mg ASA, 5 mg Eliquis, and 75 mg Plavix after this surgery. Also states he has had diarrhea for last two weeks that he notes is darker in color than normal stools. Pt still has c/o MYRICK, but no CP or SOB at this time. Followed by Dr. Álvaro Victoria (cardiology). Denies N/V, abdominal pain, HA, blurred vision, unilateral weakness, CP, back pain, hx COPD, current tobacco use, and any other sxs or complaints.      Shx: -tobacco use (previously used tobacco but quit about 10 yrs ago)    PCP: Darwin Paige MD    Current Facility-Administered Medications   Medication Dose Route Frequency Provider Last Rate Last Dose    0.9% sodium chloride infusion 250 mL  250 mL IntraVENous PRN Michele Kemp MD        insulin glargine (LANTUS) injection 7 Units  7 Units SubCUTAneous DAILY Nir Gee MD        insulin lispro (HUMALOG) injection   SubCUTAneous AC&HS Willa Harmon MD        glucose chewable tablet 16 g  4 Tab Oral PRN Nir Amin, MD        glucagon (GLUCAGEN) injection 1 mg  1 mg IntraMUSCular PRN Norma Crandall MD        dextrose (D50W) injection syrg 12.5-25 g  25-50 mL IntraVENous PRN Norma Crandall MD        pantoprazole (PROTONIX) 40 mg in sodium chloride 0.9% 10 mL injection  40 mg IntraVENous Q12H Norma Crandall MD           Past History     Past Medical History:  Past Medical History:   Diagnosis Date    Asthma     Diabetes ([address]     High cholesterol     Hypertension        Past Surgical History:  Past Surgical History:   Procedure Laterality Date    HX CATARACT REMOVAL         Family History:  No family history on file. Social History:  Social History   Substance Use Topics    Smoking status: Former Smoker    Smokeless tobacco: Never Used    Alcohol use 0.6 oz/week     1 Cans of beer per week       Allergies:  No Known Allergies      Review of Systems   Review of Systems   Respiratory: Positive for shortness of breath. Cardiovascular: Negative for chest pain. Gastrointestinal: Positive for diarrhea. Negative for nausea and vomiting. Skin: Positive for pallor. Neurological: Positive for dizziness and weakness (BLE). Negative for syncope, speech difficulty, light-headedness and headaches. All other systems reviewed and are negative. Physical Exam     Vitals:    05/29/18 2345 05/30/18 0000 05/30/18 0030 05/30/18 0045   BP: 131/52   128/53   Pulse: 68 99 74 70   Resp: 13 25 15 16   Temp:       SpO2:  94% 100%    Weight:       Height:         Physical Exam   Constitutional: He is oriented to person, place, and time. He appears well-developed and well-nourished. No distress. Sitting up on stretcher, appears pale, no resp distress    HENT:   Head: Normocephalic and atraumatic. Eyes: EOM are normal. Pupils are equal, round, and reactive to light. Neck: Normal range of motion. Neck supple. Cardiovascular: Normal rate, regular rhythm, normal heart sounds and intact distal pulses.     No murmur heard.  Pulmonary/Chest: Effort normal. No respiratory distress. He has wheezes (faint wheeze ). He has no rales. Abdominal: Soft. Bowel sounds are normal. He exhibits no distension. There is no tenderness. There is no rebound and no guarding. abd soft non tender    Genitourinary:   Genitourinary Comments: Brown stool, Heme +    Neurological: He is alert and oriented to person, place, and time. Skin: Skin is warm and dry. There is pallor. Psychiatric: He has a normal mood and affect. Judgment normal.   Nursing note and vitals reviewed. Diagnostic Study Results     Labs -     Recent Results (from the past 12 hour(s))   EKG, 12 LEAD, INITIAL    Collection Time: 05/29/18  5:33 PM   Result Value Ref Range    Ventricular Rate 75 BPM    Atrial Rate 75 BPM    P-R Interval 156 ms    QRS Duration 106 ms    Q-T Interval 422 ms    QTC Calculation (Bezet) 471 ms    Calculated P Axis 58 degrees    Calculated R Axis 48 degrees    Calculated T Axis 110 degrees    Diagnosis       Poor data quality, interpretation may be adversely affected  Normal sinus rhythm  ST T changes in lateral leads   Prolonged QT  Abnormal ECG  Confirmed by Felisa Pfeiffer MD, [facility] ([id number]) on 5/29/2018 7:58:04 PM     CBC WITH AUTOMATED DIFF    Collection Time: 05/29/18  5:45 PM   Result Value Ref Range    WBC 11.8 4.6 - 13.2 K/uL    RBC 2.18 (L) 4.70 - 5.50 M/uL    HGB 3.5 (LL) 13.0 - 16.0 g/dL    HCT 13.3 (LL) 36.0 - 48.0 %    MCV 61.0 (L) 74.0 - 97.0 FL    MCH 16.1 (L) 24.0 - 34.0 PG    MCHC 26.3 (L) 31.0 - 37.0 g/dL    RDW 19.9 (H) 11.6 - 14.5 %    PLATELET 639 205 - 419 K/uL    MPV 9.2 9.2 - 11.8 FL    NEUTROPHILS 86 (H) 40 - 73 %    LYMPHOCYTES 5 (L) 21 - 52 %    MONOCYTES 9 3 - 10 %    EOSINOPHILS 0 0 - 5 %    BASOPHILS 0 0 - 2 %    ABS. NEUTROPHILS 10.1 (H) 1.8 - 8.0 K/UL    ABS. LYMPHOCYTES 0.6 (L) 0.9 - 3.6 K/UL    ABS. MONOCYTES 1.1 0.05 - 1.2 K/UL    ABS. EOSINOPHILS 0.0 0.0 - 0.4 K/UL    ABS.  BASOPHILS 0.0 0.0 - 0.06 K/UL    RBC COMMENTS OVALOCYTES  1+        RBC COMMENTS POIKILOCYTOSIS  1+        RBC COMMENTS ANISOCYTOSIS  2+        RBC COMMENTS MICROCYTOSIS  2+        RBC COMMENTS HYPOCHROMIA  1+        DF AUTOMATED     METABOLIC PANEL, COMPREHENSIVE    Collection Time: 05/29/18  5:45 PM   Result Value Ref Range    Sodium 133 (L) 136 - 145 mmol/L    Potassium 4.1 3.5 - 5.5 mmol/L    Chloride 94 (L) 100 - 108 mmol/L    CO2 20 (L) 21 - 32 mmol/L    Anion gap 19 (H) 3.0 - 18 mmol/L    Glucose 251 (H) 74 - 99 mg/dL    BUN 45 (H) 7.0 - 18 MG/DL    Creatinine 2.19 (H) 0.6 - 1.3 MG/DL    BUN/Creatinine ratio 21 (H) 12 - 20      GFR est AA 36 (L) >60 ml/min/1.73m2    GFR est non-AA 30 (L) >60 ml/min/1.73m2    Calcium 7.9 (L) 8.5 - 10.1 MG/DL    Bilirubin, total 1.2 (H) 0.2 - 1.0 MG/DL    ALT (SGPT) 32 16 - 61 U/L    AST (SGOT) 17 15 - 37 U/L    Alk. phosphatase 160 (H) 45 - 117 U/L    Protein, total 6.2 (L) 6.4 - 8.2 g/dL    Albumin 3.2 (L) 3.4 - 5.0 g/dL    Globulin 3.0 2.0 - 4.0 g/dL    A-G Ratio 1.1 0.8 - 1.7     PROTHROMBIN TIME + INR    Collection Time: 05/29/18  5:45 PM   Result Value Ref Range    Prothrombin time 20.8 (H) 11.5 - 15.2 sec    INR 1.9 (H) 0.8 - 1.2     PTT    Collection Time: 05/29/18  5:45 PM   Result Value Ref Range    aPTT 33.7 23.0 - 36.4 SEC   NT-PRO BNP    Collection Time: 05/29/18  5:45 PM   Result Value Ref Range    NT pro-BNP 4485 (H) 0 - 900 PG/ML   CARDIAC PANEL,(CK, CKMB & TROPONIN)    Collection Time: 05/29/18  5:45 PM   Result Value Ref Range    CK 46 39 - 308 U/L    CK - MB 1.4 <3.6 ng/ml    CK-MB Index 3.0 0.0 - 4.0 %    Troponin-I, Qt. 0.02 0.00 - 0.06 NG/ML   TYPE + CROSSMATCH    Collection Time: 05/29/18  5:45 PM   Result Value Ref Range    Crossmatch Expiration 06/01/2018     ABO/Rh(D) O POSITIVE     Antibody screen NEG     CALLED TO:       2 UNITS READY NOTIFIED [facility] AT 1928 ON 5/29/18 BY JACOB.     Unit number [id number]     Blood component type RC LR     Unit division 00     Status of unit ISSUED Crossmatch result Compatible     Unit number [id number]     Blood component type  LR     Unit division 00     Status of unit ISSUED     Crossmatch result Compatible     Unit number [id number]     Blood component type  LR     Unit division 00     Status of unit ALLOCATED     Crossmatch result Compatible    POC G3    Collection Time: 05/29/18  6:21 PM   Result Value Ref Range    Device: NASAL CANNULA      Flow rate (POC) 4 L/M    FIO2 (POC) 36 %    pH (POC) 7.461 (H) 7.35 - 7.45      pCO2 (POC) 29.4 (L) 35.0 - 45.0 MMHG    pO2 (POC) 132 (H) 80 - 100 MMHG    HCO3 (POC) 21.0 (L) 22 - 26 MMOL/L    sO2 (POC) 99 (H) 92 - 97 %    Base deficit (POC) 3 mmol/L    Allens test (POC) YES      Site RIGHT RADIAL      Specimen type (POC) ARTERIAL      Performed by Ruby Castillo    GLUCOSE, POC    Collection Time: 05/29/18 10:36 PM   Result Value Ref Range    Glucose (POC) 233 (H) 70 - 110 mg/dL       Radiologic Studies -   XR CHEST PORT   Final Result        RADIOLOGY FINDINGS  Chest X-ray shows no acute process  Pending review by Radiologist  Recorded by [facility], ED Scribe, as dictated by Nabeel Lyons PA-C. Medications given in the ED-  Medications   0.9% sodium chloride infusion 250 mL (not administered)   insulin glargine (LANTUS) injection 7 Units (not administered)   insulin lispro (HUMALOG) injection (not administered)   glucose chewable tablet 16 g (not administered)   glucagon (GLUCAGEN) injection 1 mg (not administered)   dextrose (D50W) injection syrg 12.5-25 g (not administered)   pantoprazole (PROTONIX) 40 mg in sodium chloride 0.9% 10 mL injection (not administered)   pantoprazole (PROTONIX) injection 40 mg (40 mg IntraVENous Given 5/29/18 1826)   sodium chloride 0.9 % bolus infusion 1,000 mL (1,000 mL IntraVENous New Bag 5/29/18 1829)         Medical Decision Making   I am the first provider for this patient.     I reviewed the vital signs, available nursing notes, past medical history, past surgical history, family history and social history. Vital Signs-Reviewed the patient's vital signs. Pulse Oximetry Analysis - 100% on 4L O2 via NC     Cardiac Monitor:  Rate: 72 bpm  Rhythm: NSR    EKG interpretation: (Preliminary)  NSR @ 75 bpm. Nonspecific ST and T wave abnormality. Prolonged QT. No CP, no STEMI. EKG read by Nabeel Lyons PA-C at 5:33 PM.     Records Reviewed: Nursing Notes, Old Medical Records, Previous electrocardiograms and Previous Laboratory Studies    Provider Notes (Medical Decision Making):   ACS, MI, Arrhthymia, CHF, pericarditis, pneumonia, bronchitis, musculoskeletal pain, Ptx, GI bleed, anemia, electrolyte imbalance, UTI      Procedures:  Procedures     PROCEDURE NOTE - RECTAL EXAM:   5:33 PM  Performed by: Nabeel Lyons PA-C  Rectal exam performed. Brown stool was collected. Stool was Hemoccult tested, and found to be grossly heme Positive. The procedure took 1-15 minutes, and pt tolerated well. Written by Alf Jarrett ED Scribe, as dictated by Nabeel Lyons PA-C.     ED Course:   5:19 PM Initial assessment performed. The patients presenting problems have been discussed, and they are in agreement with the care plan formulated and outlined with them. I have encouraged them to ask questions as they arise throughout their visit. 6:28 PM Face-to-Face Progress Note:  ED Attending was requested to see pt by the TANYA. Evaluated pt face-to-face. 81year old male sent from PCP office for MYRICK. Extremely pale on exam. Abdomen soft and non tender. Found to have a hgb of 3.5. Will require blood transfusion and reversal of anticoagulation. Written by Migdalia Crooks ED Scribe, as dictated by Leroy Wilson MD .     6:32 PM Discussed patient's history, exam, and available diagnostics results with pharmacy, who recommend Kcentra for reversal of Eliquis, however supply was depleted on another pt earlier today and there is currently none left in pharmacy.  Will call other [facility] facilities about retrieving some more. 6:44 PM Discussed patient's history, exam, and available diagnostics results with Mary Young MD, gastroenterology, who recommends Protonix BID and placing pt on full liquid diet. Will scope pt tomorrow. 7:10 PM Pharmacy will be getting Kcentra from [facility] or [place] stat. Will call back when they have a time frame. 7:15 PM Discussed patient's history, exam, and available diagnostics results with Donna Sanderson MD, internal medicine, who agree to admit pt to ICU.    7:30 PM Pharmacy called back. [facility] will be sending Kcentra stat, but they are unable to give a time frame. Diagnosis and Disposition       Critical Care Time:  I have spent 60 minutes of critical care time involved in lab review, consultations with specialist, family decision-making, and documentation. During this entire length of time I was immediately available to the patient. Critical Care: The reason for providing this level of medical care for this critically ill patient was due a critical illness that impaired one or more vital organ systems such that there was a high probability of imminent or life threatening deterioration in the patients condition. This care involved high complexity decision making to assess, manipulate, and support vital system functions, to treat this degreee vital organ system failure and to prevent further life threatening deterioration of the patients condition. Core Measures:  For Hospitalized Patients:    1. Hospitalization Decision Time:  The decision to hospitalize the patient was made by Cata Plummer PA-C at 5:45 PM on 5/29/2018    2. Aspirin: Aspirin was not given because the patient is a bleeding risk    7:18 PM  Patient is being admitted to the hospital by Donna Sanderson MD. The results of their tests and reasons for their admission have been discussed with them and/or available family.  They convey agreement and understanding for the need to be admitted and for their admission diagnosis. CONDITIONS ON ADMISSION:  Sepsis is not present at the time of admission. Deep Vein Thrombosis is not present at the time of admission. Thrombosis is not present at the time of admission. Urinary Tract Infection is not present at the time of admission. Pneumonia is not present at the time of admission. MRSA is not present at the time of admission. Wound infection is not present at the time of admission. Pressure Ulcer is not present at the time of admission. CLINICAL IMPRESSION:    1. Severe anemia    2. Gastrointestinal hemorrhage, unspecified gastrointestinal hemorrhage type    3. ELINOR (acute kidney injury) ([facility] [address]    4. Dyspnea, unspecified type        PLAN:  1. ADMIT TO ICU    _______________________________    Attestations: This note is prepared by [facility] and Khris Meade, acting as Scribe for Cyrus Diaz PA-C. Cyrus Diaz PA-C:  The scribe's documentation has been prepared under my direction and personally reviewed by me in its entirety. I confirm that the note above accurately reflects all work, treatment, procedures, and medical decision making performed by me. This note is prepared by Margoth Tabares, acting as Scribe for MD Michaela Stone MD:  The scribe's documentation has been prepared under my direction and personally reviewed by me in its entirety.   I confirm that the note above accurately reflects all work, treatment, procedures, and medical decision making performed by me.  _______________________________

## 2018-05-29 NOTE — IP AVS SNAPSHOT
[address] [address] 
[phone] Patient: Carlos Paige. MRN: [id number] YJM:[id number] A check jasmin indicates which time of day the medication should be taken. My Medications START taking these medications Instructions Each Dose to Equal  
 Morning Noon Evening Bedtime Omeprazole delayed release 20 mg tablet Commonly known as:  PRILOSEC D/R Your last dose was: Your next dose is: Take 1 Tab by mouth daily. 20 mg CONTINUE taking these medications Instructions Each Dose to Equal  
 Morning Noon Evening Bedtime  
 apixaban 5 mg tablet Commonly known as:  Malia Josh Your last dose was: Your next dose is: Take 1 Tab by mouth two (2) times a day. 5 mg  
    
   
   
   
  
 aspirin delayed-release 81 mg tablet Your last dose was: Your next dose is: Take 1 Tab by mouth daily. 81 mg  
    
   
   
   
  
 atorvastatin 40 mg tablet Commonly known as:  LIPITOR Your last dose was: Your next dose is: Take 1 Tab by mouth nightly. 40 mg  
    
   
   
   
  
 clopidogrel 75 mg Tab Commonly known as:  PLAVIX Your last dose was: Your next dose is: Take 1 Tab by mouth daily. 75 mg  
    
   
   
   
  
 furosemide 40 mg tablet Commonly known as:  LASIX Your last dose was: Your next dose is: Take 1 Tab by mouth daily. 40 mg  
    
   
   
   
  
 glucose blood VI test strips strip Commonly known as:  IGLUCOSE TEST STRIP Your last dose was: Your next dose is:    
   
   
 Use to check BGL every morning  
     
   
   
   
  
 insulin glargine 100 unit/mL injection Commonly known as:  LANTUS Your last dose was: Your next dose is:    
   
   
 15 Units by SubCUTAneous route daily. 15 Units  
    
   
   
   
  
 lancets 30 gauge Misc Commonly known as:  ADVOCATE LANCET Your last dose was: Your next dose is:    
   
   
 Use while checking BGL each morning. losartan 25 mg tablet Commonly known as:  COZAAR Your last dose was: Your next dose is: Take 1 Tab by mouth daily. 25 mg  
    
   
   
   
  
 metoprolol succinate 50 mg XL tablet Commonly known as:  TOPROL-XL Your last dose was: Your next dose is: Take 1 Tab by mouth daily. 50 mg SITagliptin 50 mg tablet Commonly known as:  Erick Ambrosio Your last dose was: Your next dose is: Take 1 Tab by mouth daily. 50 mg Where to Get Your Medications These medications were sent to [address] [place] [address] [address] Phone:  [phone] Omeprazole delayed release 20 mg tablet

## 2018-05-30 ENCOUNTER — APPOINTMENT (OUTPATIENT)
Dept: CT IMAGING | Age: 73
DRG: 378 | End: 2018-05-30
Attending: INTERNAL MEDICINE
Payer: MEDICARE

## 2018-05-30 LAB
ALBUMIN SERPL-MCNC: 2.9 G/DL (ref 3.4–5)
ALBUMIN/GLOB SERPL: 1.1 {RATIO} (ref 0.8–1.7)
ALP SERPL-CCNC: 131 U/L (ref 45–117)
ALT SERPL-CCNC: 27 U/L (ref 16–61)
ANION GAP SERPL CALC-SCNC: 10 MMOL/L (ref 3–18)
APTT PPP: 35.6 SEC (ref 23–36.4)
AST SERPL-CCNC: 14 U/L (ref 15–37)
BILIRUB SERPL-MCNC: 1.9 MG/DL (ref 0.2–1)
BUN SERPL-MCNC: 40 MG/DL (ref 7–18)
BUN/CREAT SERPL: 22 (ref 12–20)
CALCIUM SERPL-MCNC: 7.6 MG/DL (ref 8.5–10.1)
CHLORIDE SERPL-SCNC: 100 MMOL/L (ref 100–108)
CO2 SERPL-SCNC: 26 MMOL/L (ref 21–32)
CREAT SERPL-MCNC: 1.84 MG/DL (ref 0.6–1.3)
ERYTHROCYTE [DISTWIDTH] IN BLOOD BY AUTOMATED COUNT: 26.5 % (ref 11.6–14.5)
GLOBULIN SER CALC-MCNC: 2.7 G/DL (ref 2–4)
GLUCOSE BLD STRIP.AUTO-MCNC: 104 MG/DL (ref 70–110)
GLUCOSE BLD STRIP.AUTO-MCNC: 134 MG/DL (ref 70–110)
GLUCOSE BLD STRIP.AUTO-MCNC: 202 MG/DL (ref 70–110)
GLUCOSE BLD STRIP.AUTO-MCNC: 93 MG/DL (ref 70–110)
GLUCOSE SERPL-MCNC: 203 MG/DL (ref 74–99)
HCT VFR BLD AUTO: 17.9 % (ref 36–48)
HCT VFR BLD AUTO: 25.5 % (ref 36–48)
HGB BLD-MCNC: 5.2 G/DL (ref 13–16)
HGB BLD-MCNC: 7.8 G/DL (ref 13–16)
INR PPP: 1.6 (ref 0.8–1.2)
MCH RBC QN AUTO: 19.8 PG (ref 24–34)
MCHC RBC AUTO-ENTMCNC: 29.1 G/DL (ref 31–37)
MCV RBC AUTO: 68.1 FL (ref 74–97)
PLATELET # BLD AUTO: 270 K/UL (ref 135–420)
PMV BLD AUTO: 9.3 FL (ref 9.2–11.8)
POTASSIUM SERPL-SCNC: 4 MMOL/L (ref 3.5–5.5)
PROT SERPL-MCNC: 5.6 G/DL (ref 6.4–8.2)
PROTHROMBIN TIME: 18.2 SEC (ref 11.5–15.2)
RBC # BLD AUTO: 2.63 M/UL (ref 4.7–5.5)
SODIUM SERPL-SCNC: 136 MMOL/L (ref 136–145)
WBC # BLD AUTO: 8.1 K/UL (ref 4.6–13.2)

## 2018-05-30 PROCEDURE — 85610 PROTHROMBIN TIME: CPT | Performed by: INTERNAL MEDICINE

## 2018-05-30 PROCEDURE — 36415 COLL VENOUS BLD VENIPUNCTURE: CPT | Performed by: INTERNAL MEDICINE

## 2018-05-30 PROCEDURE — 74011250636 HC RX REV CODE- 250/636: Performed by: INTERNAL MEDICINE

## 2018-05-30 PROCEDURE — 74011636320 HC RX REV CODE- 636/320: Performed by: INTERNAL MEDICINE

## 2018-05-30 PROCEDURE — 77030011640 HC PAD GRND REM COVD -A: Performed by: INTERNAL MEDICINE

## 2018-05-30 PROCEDURE — C9113 INJ PANTOPRAZOLE SODIUM, VIA: HCPCS | Performed by: INTERNAL MEDICINE

## 2018-05-30 PROCEDURE — 86677 HELICOBACTER PYLORI ANTIBODY: CPT | Performed by: INTERNAL MEDICINE

## 2018-05-30 PROCEDURE — 85730 THROMBOPLASTIN TIME PARTIAL: CPT | Performed by: INTERNAL MEDICINE

## 2018-05-30 PROCEDURE — 85018 HEMOGLOBIN: CPT | Performed by: INTERNAL MEDICINE

## 2018-05-30 PROCEDURE — P9016 RBC LEUKOCYTES REDUCED: HCPCS | Performed by: EMERGENCY MEDICINE

## 2018-05-30 PROCEDURE — G0500 MOD SEDAT ENDO SERVICE >5YRS: HCPCS | Performed by: INTERNAL MEDICINE

## 2018-05-30 PROCEDURE — 74011636637 HC RX REV CODE- 636/637: Performed by: INTERNAL MEDICINE

## 2018-05-30 PROCEDURE — 74011000250 HC RX REV CODE- 250: Performed by: INTERNAL MEDICINE

## 2018-05-30 PROCEDURE — 0DJ08ZZ INSPECTION OF UPPER INTESTINAL TRACT, VIA NATURAL OR ARTIFICIAL OPENING ENDOSCOPIC: ICD-10-PCS | Performed by: INTERNAL MEDICINE

## 2018-05-30 PROCEDURE — 76040000007: Performed by: INTERNAL MEDICINE

## 2018-05-30 PROCEDURE — 82962 GLUCOSE BLOOD TEST: CPT

## 2018-05-30 PROCEDURE — 74176 CT ABD & PELVIS W/O CONTRAST: CPT

## 2018-05-30 PROCEDURE — 77010033678 HC OXYGEN DAILY

## 2018-05-30 PROCEDURE — 74011250637 HC RX REV CODE- 250/637: Performed by: INTERNAL MEDICINE

## 2018-05-30 PROCEDURE — 85027 COMPLETE CBC AUTOMATED: CPT | Performed by: INTERNAL MEDICINE

## 2018-05-30 PROCEDURE — 80053 COMPREHEN METABOLIC PANEL: CPT | Performed by: INTERNAL MEDICINE

## 2018-05-30 PROCEDURE — 65610000006 HC RM INTENSIVE CARE

## 2018-05-30 PROCEDURE — 36430 TRANSFUSION BLD/BLD COMPNT: CPT | Performed by: NURSE PRACTITIONER

## 2018-05-30 RX ORDER — NALOXONE HYDROCHLORIDE 0.4 MG/ML
0.4 INJECTION, SOLUTION INTRAMUSCULAR; INTRAVENOUS; SUBCUTANEOUS
Status: ACTIVE | OUTPATIENT
Start: 2018-05-30 | End: 2018-05-30

## 2018-05-30 RX ORDER — DEXTROMETHORPHAN/PSEUDOEPHED 2.5-7.5/.8
1.2 DROPS ORAL
Status: DISCONTINUED | OUTPATIENT
Start: 2018-05-30 | End: 2018-05-30 | Stop reason: SDUPTHER

## 2018-05-30 RX ORDER — MIDAZOLAM HYDROCHLORIDE 1 MG/ML
.5-5 INJECTION, SOLUTION INTRAMUSCULAR; INTRAVENOUS
Status: DISPENSED | OUTPATIENT
Start: 2018-05-30 | End: 2018-05-30

## 2018-05-30 RX ORDER — EPINEPHRINE 0.1 MG/ML
1 INJECTION INTRACARDIAC; INTRAVENOUS
Status: DISCONTINUED | OUTPATIENT
Start: 2018-05-30 | End: 2018-05-30 | Stop reason: SDUPTHER

## 2018-05-30 RX ORDER — EPINEPHRINE 0.1 MG/ML
1 INJECTION INTRACARDIAC; INTRAVENOUS
Status: ACTIVE | OUTPATIENT
Start: 2018-05-30 | End: 2018-05-30

## 2018-05-30 RX ORDER — LUBIPROSTONE 24 UG/1
24 CAPSULE, GELATIN COATED ORAL 2 TIMES DAILY WITH MEALS
Status: DISCONTINUED | OUTPATIENT
Start: 2018-05-30 | End: 2018-06-02 | Stop reason: HOSPADM

## 2018-05-30 RX ORDER — FLUMAZENIL 0.1 MG/ML
0.2 INJECTION INTRAVENOUS
Status: DISCONTINUED | OUTPATIENT
Start: 2018-05-30 | End: 2018-05-30 | Stop reason: SDUPTHER

## 2018-05-30 RX ORDER — FUROSEMIDE 10 MG/ML
40 INJECTION INTRAMUSCULAR; INTRAVENOUS ONCE
Status: COMPLETED | OUTPATIENT
Start: 2018-05-30 | End: 2018-05-30

## 2018-05-30 RX ORDER — FLUMAZENIL 0.1 MG/ML
0.2 INJECTION INTRAVENOUS
Status: ACTIVE | OUTPATIENT
Start: 2018-05-30 | End: 2018-05-30

## 2018-05-30 RX ORDER — NALOXONE HYDROCHLORIDE 0.4 MG/ML
0.4 INJECTION, SOLUTION INTRAMUSCULAR; INTRAVENOUS; SUBCUTANEOUS
Status: DISCONTINUED | OUTPATIENT
Start: 2018-05-30 | End: 2018-05-30 | Stop reason: SDUPTHER

## 2018-05-30 RX ORDER — FENTANYL CITRATE 50 UG/ML
100 INJECTION, SOLUTION INTRAMUSCULAR; INTRAVENOUS
Status: DISPENSED | OUTPATIENT
Start: 2018-05-30 | End: 2018-05-30

## 2018-05-30 RX ORDER — MIDAZOLAM HYDROCHLORIDE 1 MG/ML
.5-5 INJECTION, SOLUTION INTRAMUSCULAR; INTRAVENOUS
Status: DISCONTINUED | OUTPATIENT
Start: 2018-05-30 | End: 2018-05-30 | Stop reason: SDUPTHER

## 2018-05-30 RX ORDER — POLYETHYLENE GLYCOL 3350 17 G/17G
17 POWDER, FOR SOLUTION ORAL 3 TIMES DAILY
Status: DISCONTINUED | OUTPATIENT
Start: 2018-05-30 | End: 2018-06-02 | Stop reason: HOSPADM

## 2018-05-30 RX ORDER — DEXTROMETHORPHAN/PSEUDOEPHED 2.5-7.5/.8
1.2 DROPS ORAL
Status: DISCONTINUED | OUTPATIENT
Start: 2018-05-30 | End: 2018-06-01 | Stop reason: HOSPADM

## 2018-05-30 RX ORDER — SODIUM CHLORIDE 9 MG/ML
1000 INJECTION, SOLUTION INTRAVENOUS CONTINUOUS
Status: DISCONTINUED | OUTPATIENT
Start: 2018-05-30 | End: 2018-05-30

## 2018-05-30 RX ORDER — SODIUM CHLORIDE 9 MG/ML
100 INJECTION, SOLUTION INTRAVENOUS CONTINUOUS
Status: DISPENSED | OUTPATIENT
Start: 2018-05-30 | End: 2018-05-30

## 2018-05-30 RX ORDER — METOPROLOL TARTRATE 5 MG/5ML
5 INJECTION INTRAVENOUS EVERY 6 HOURS
Status: DISCONTINUED | OUTPATIENT
Start: 2018-05-30 | End: 2018-05-31

## 2018-05-30 RX ORDER — DOCUSATE SODIUM 100 MG/1
100 CAPSULE, LIQUID FILLED ORAL 3 TIMES DAILY
Status: DISCONTINUED | OUTPATIENT
Start: 2018-05-30 | End: 2018-06-02 | Stop reason: HOSPADM

## 2018-05-30 RX ORDER — SODIUM CHLORIDE 9 MG/ML
250 INJECTION, SOLUTION INTRAVENOUS AS NEEDED
Status: DISCONTINUED | OUTPATIENT
Start: 2018-05-30 | End: 2018-06-02 | Stop reason: HOSPADM

## 2018-05-30 RX ORDER — ATROPINE SULFATE 0.1 MG/ML
0.5 INJECTION INTRAVENOUS
Status: DISCONTINUED | OUTPATIENT
Start: 2018-05-30 | End: 2018-05-30 | Stop reason: SDUPTHER

## 2018-05-30 RX ORDER — ATROPINE SULFATE 0.1 MG/ML
0.5 INJECTION INTRAVENOUS
Status: ACTIVE | OUTPATIENT
Start: 2018-05-30 | End: 2018-05-30

## 2018-05-30 RX ORDER — FENTANYL CITRATE 50 UG/ML
100 INJECTION, SOLUTION INTRAMUSCULAR; INTRAVENOUS
Status: DISCONTINUED | OUTPATIENT
Start: 2018-05-30 | End: 2018-05-30 | Stop reason: SDUPTHER

## 2018-05-30 RX ADMIN — LUBIPROSTONE 24 MCG: 24 CAPSULE, GELATIN COATED ORAL at 18:10

## 2018-05-30 RX ADMIN — FUROSEMIDE 40 MG: 10 INJECTION, SOLUTION INTRAMUSCULAR; INTRAVENOUS at 11:44

## 2018-05-30 RX ADMIN — POLYETHYLENE GLYCOL 3350 17 G: 17 POWDER, FOR SOLUTION ORAL at 18:10

## 2018-05-30 RX ADMIN — SODIUM CHLORIDE 40 MG: 9 INJECTION INTRAMUSCULAR; INTRAVENOUS; SUBCUTANEOUS at 07:04

## 2018-05-30 RX ADMIN — DOCUSATE SODIUM 100 MG: 100 CAPSULE, LIQUID FILLED ORAL at 21:58

## 2018-05-30 RX ADMIN — DIATRIZOATE MEGLUMINE AND DIATRIZOATE SODIUM 30 ML: 660; 100 LIQUID ORAL; RECTAL at 20:27

## 2018-05-30 RX ADMIN — DOCUSATE SODIUM 100 MG: 100 CAPSULE, LIQUID FILLED ORAL at 18:10

## 2018-05-30 RX ADMIN — SODIUM CHLORIDE 40 MG: 9 INJECTION INTRAMUSCULAR; INTRAVENOUS; SUBCUTANEOUS at 18:11

## 2018-05-30 RX ADMIN — INSULIN LISPRO 4 UNITS: 100 INJECTION, SOLUTION INTRAVENOUS; SUBCUTANEOUS at 08:16

## 2018-05-30 RX ADMIN — INSULIN GLARGINE 7 UNITS: 100 INJECTION, SOLUTION SUBCUTANEOUS at 08:15

## 2018-05-30 RX ADMIN — METOPROLOL TARTRATE 5 MG: 5 INJECTION, SOLUTION INTRAVENOUS at 11:44

## 2018-05-30 RX ADMIN — POLYETHYLENE GLYCOL 3350 17 G: 17 POWDER, FOR SOLUTION ORAL at 21:58

## 2018-05-30 NOTE — ED NOTES
TRANSFER - OUT REPORT:    Verbal report given to Severiano Nevins, RN on Vicenta Archuleta.  being transferred to ICU for routine progression of care       Report consisted of patients Situation, Background, Assessment and   Recommendations(SBAR). Information from the following report(s) SBAR, ED Summary, [facility] and Recent Results was reviewed with the receiving nurse. Lines:   Peripheral IV 05/29/18 Left Antecubital (Active)   Site Assessment Clean, dry, & intact 5/29/2018  6:20 PM   Dressing Status Clean, dry, & intact; Occlusive 5/29/2018  6:20 PM   Hub Color/Line Status Pink 5/29/2018  6:20 PM        Opportunity for questions and clarification was provided.       Patient transported with:   Monitor  O2 @ 2 liters  Registered Nurse

## 2018-05-30 NOTE — DIABETES MGMT
NUTRITION / GLYCEMIC CONTROL PLAN OF CARE        Diabetes Management:    -known h/o T2DM, HbA1C not within recommended range for age + comorbids on basal insulin home regimen  -pt received blood transfusions today, will need to obtain current HbA1C prior to discharge  - recommend: both FBG & PPG out of target range, monitor overnight trends and make adjustments as needed  - education: (see GC RN note )  - goals:    *BG will be in target range ICU: 140-180 mg/dL, by 6/6   *PO intake will be at least 50 % of meals offered by 6/6     - TDD: 11 (7 lantus + 4 - Humalog Normal Insulin Sensitivity Corrective Coverage)  - BG range: 104-251 mg/dL  - Hypo: no  - BG in target range (non-ICU: <140; ICU<180): [] Yes  [x] No  - Steroids: no  - Intake:    Patient Vitals for the past 100 hrs:   % Diet Eaten   05/30/18 0800 100 %     Current Insulin regimen:   Lantus 7 units daily  Humalog Normal Insulin Sensitivity Corrective Coverage    Home medication/insulin regimen: (unable to confirm PTA DM meds)  Lantus 15 units daily  Januvia 50 mg daily    Recent Glucose Results: Lab Results   Component Value Date/Time     (H) 05/30/2018 04:14 AM     (H) 05/29/2018 05:45 PM    GLUCPOC 104 05/30/2018 11:47 AM    GLUCPOC 202 (H) 05/30/2018 06:12 AM    GLUCPOC 233 (H) 05/29/2018 10:36 PM   Adequate glycemic control PTA:  [] Yes  [x] No    HbA1c: equivalent  to ave BGlucose of 289 mg/dl for 2-3 months prior to admission    Lab Results   Component Value Date/Time    Hemoglobin A1c 11.7 (H) 02/10/2018 12:38 PM         Diet:   Active Orders   Diet    DIET NPO With Meds     Gustavo Magaña RN, MS  Glycemic Control Team  Pager [phone] (M-TH 8:30-5P)  *After Hours pager [phone]

## 2018-05-30 NOTE — CDMP QUERY
Please clarify if this patient is being treated/managed for:    =>Acute blood loss anemia in setting of GI bleed  =>Other Explanation of clinical findings  =>Unable to Determine (no explanation of clinical findings)    The medical record reflects the following:    Risk:on asa    Clinical Indicators:H&H 3.5/13.3 , + for hemoccult stool. Treatment: 2 units prbc    Please clarify and document your clinical opinion in the progress notes and discharge summary including the definitive and/or presumptive diagnosis, (suspected or probable), related to the above clinical findings. Please include clinical findings supporting your diagnosis. If you DECLINE this query or would like to communicate with [facility], please utilize the \"[employer] message box\" at the TOP of the Progress Note on the right.       Thank you,  Tenisha Craven RN [facility] [phone]

## 2018-05-30 NOTE — ACP (ADVANCE CARE PLANNING)
I introduced Advance Medical Directive to patient. Wife was at the bedside. Patient stated that he is comfortable with his wife being his legal next of kin. He knows to call the Chaplains if he decides to complete while here. Wife is active at [employer] Patient is hopeful to \"get the heck out of here. \"     Ngoc Whitman M.Div.   Board Certified   [phone] - Office

## 2018-05-30 NOTE — ROUTINE PROCESS
1905 Bedside and Verbal shift change  Received from Deep Hallman RN (outgoing nurse), to L. Darcey Galeazzi (oncoming)  Pt. Is AOX 4. IV SL, Pt. denies  pain at this time. Report included the following information SBAR, Kardex, Procedure Summary, Intake/Output, MAR, Recent Lab Results, and  Cardiac Rhythm @ NSR. Will resume care and monitor Pt. Condition. Pt. Educated on call bell when in need of help and assistance. Pt. verbalized understanding. 1920  Pt. Head to toe Assessment Done and documented. Pt. Sitting at bedside, Pt. Voided cyu.    2000  Pt. OOB to [facility] with loose yellow brown BM. Given  Chlorhexidene bath, back care, jolie care, changed gown, and linene. 2027  Given gastroview contrast for CT abd.    2130  Pt. Brought to CT Scan and back, Pt. Tolerated procedure and transport well.    2300  Pt. In bed, watching tv, no sign of distress. 0000  Pt  Made no complaints. 0200 Denies pain. 0430  Resting comfortably. 0515  BM X1 light bron stool. 0630  Pt. Able to rest and sleep between care. Verbal and bedside Shift changed report given to HALEY Villagomez RN (oncoming RN) on Pt. Condition. Report consisted of patients Situation, History, Activities, intake/output,  Background, Assessment and Recommendations(SBAR). Information from the following report(s) Kardex, order Summary, Lab results and MAR was reviewed with the receiving nurse. Opportunity for questions and clarification was provided.

## 2018-05-30 NOTE — PROCEDURES
[facility]  Esophagogastroduodenoscopy Procedure Report  _______________________________________________________  Patient: Chauncey Steel. Attending Physician: Rivka Rhodes MD    Patient ID: [id number]                                      Referring Physician: Phillip Rivera MD    Exam Date: May 30, 2018 _______________________________________________________      Introduction: A  81 y.o. male patient, presents for Esophagogastroduodenoscopy Procedure. Indication: severe iron deficiency anemia at 3.5 was 13.3 in February 2018. He required 4 blood transfusions. Has been noticing black stools x 3 weeks but today 2 brown liquid stools. He is chronically constipated he was started in February on Eliquis, Plavix and ASA for coronary stent insertion. : Rivka Rhodes MD    Sedation:    Versed 6 mg IV, fentanyl 100 mcg IV, topical Hurricaine spray  Procedure Details:  After infomed consent was obtained for the procedure, with all risks and benefits of procedure explained the patient was taken to the endoscopy suite and placed in the left lateral decubitus position. Following sequential administration of sedation as per above, the endoscope was inserted into the mouth and advanced under direct vision to proximal jejunum. A careful inspection was made as the gastroscope was withdrawn, including a retroflexed view of the proximal stomach; findings and interventions are described below. Findings:   HYPOPHARYNX AND LARYNX: Normal  Esophagus: Normal proximal and middle esophagus. Grade B small linear erosive esophagitis on 2 locations in the distal esophagus. Slightly irregular Z line and 1.5 cm hiatal hernia. Diaphragmatic pinch located at 38 cm. Stomach: No food or liquid retention. Normal, cardia, fundus, body, lesser curvature, incisura, antrum and pylorus. Mucosa is normal.  Duodenum/jejunum:    The bulb, second, third, fourth portions, proximal jejunum and major papilla are normal sharp angulation between D 1 and D 2. Therapies:    none    Specimen:   none           Complications:   None    EBL:  None  IMPRESSION: High tolerance to sedation. Grade B small linear erosive esophagitis on 2 locations in the distal esophagus. Slightly irregular Z line and 1.5 cm hiatal hernia. Gastric biopsy not taken because on Plavix          Recommendations: -Acid suppression with a proton pump inhibitor. , -Await SEROLOGY test result and treat for Helicobacter pylori if positive. , -Follow clinical symptoms and laboratory studies for evidence of rebleeding. , -Clear liquid diet. , -No NSAIDS, -do Friday colonoscopy CT scan of abd and pelvis tomorrow.   Assistant: none    Elenita Bowles MD  5/30/2018  12:59 PM

## 2018-05-30 NOTE — ROUTINE PROCESS
Bedside, Verbal and Written shift change report given to MIRACLE Velasco (oncoming nurse) by Radha Watson,VALERIA (offgoing nurse). Report included the following information SBAR, Kardex, Intake/Output and Recent Results. Assumed care of pt at this time. Pt aox4, denies pain, remains telemetry monitored, 2L NC, 3rd unit PRBCs running now with one more to go.    1000. Scheduled med's given, pt tolerated well. Multiple IV attempts with out success.  1100.  4th unit PRBCs started, pt tolerating well.  1200. Reassessed, status unchanged, Dr. Dave Berrios at bedside with Endo team for EGD.  1400. Endo Team at bedside for scope. Pt aox4 following commands. Dr. Joel Neal at bedside assessing pt.  1600. Post EDG pt sleeping at this time. VSS.    1800. Pt awake, scheduled med's given. Pt appears asymptomatic. VSS. 1915. Bedside, Verbal and Written shift change report given to ELIEZER Dahl RN (oncoming nurse) by Parul Velasco (offgoing nurse). Report included the following information SBAR, Kardex, Intake/Output and Recent Results.

## 2018-05-30 NOTE — DIABETES MGMT
GLYCEMIC CONTROL PROGRESS NOTE:    -known h/o T2DM, Hba1C pending pt receiving blood transfusions, on basal + oral home regimen  -BG out of target range ICU: 140-180 mg/dL  -TDD = 11 (7 lantus + 4 - Humalog Normal Insulin Sensitivity Corrective Coverage)  -24 hour BG trend trending down  -recommend monitor BG trends for the need to add mealtime insulin as diet advances      Recent Glucose Results:   Lab Results   Component Value Date/Time     (H) 05/30/2018 04:14 AM     (H) 05/29/2018 05:45 PM    GLUCPOC 202 (H) 05/30/2018 06:12 AM    GLUCPOC 233 (H) 05/29/2018 10:36 PM     Chucho Payne RN, MS  Glycemic Control Team  Pager [phone] (M-TH 8:30-5P)  *After Hours pager [phone]

## 2018-05-30 NOTE — PROGRESS NOTES
Critical result called from Obdulio in lab. H&H 5.2 and 17.9. Notified Dr. Celestino Mendez. Aware pt has completed previous PRBC infusions. New orders to transfuse 2 units given. Verbal read back verified. Notified HODA Crowe RN (primary RN).

## 2018-05-30 NOTE — CONSULTS
[id number] Merged with [facility]    Ritchie Garcia  MR#: [id number]  : 1945  ACCOUNT #: [de-identified]   DATE OF SERVICE: 2018    HISTORY OF PRESENT ILLNESS:  This is a 79-year-old male who was sent from Dr. Naif Rojo office to the emergency room because of severe anemia. The patient has been feeling weak for the last 3 weeks with severe exertional shortness of breath. He says that during visit to the Dr. Naif Rojo office, he felt very weak and almost collapsed, but did not lose consciousness. The patient was found to have a hemoglobin of 3.5. He received 4 blood transfusions. His hemoglobin was 13.3 on 2018. This is when he had his angioplasty and stents inserted in his heart, was put on Plavix and Eliquis. The patient claims in the last 3 weeks, he has been having black discoloration in his stools, but no rectal bleeding. He claims that he is chronically constipated, usually he has 1 or 2 bowel movements per week. He claims that he had a negative colonoscopy about 20 years ago. He denied having an abdominal pain, dyspepsia, heartburn, nausea, vomiting or dysphagia. He had never had any endoscopy. He has no prior history of peptic ulcer disease. He denies taking any NSAIDs. His weight has been stable. He has been diabetic for at least 3 years with poor control. He has been, since February, on insulin. He had a small myocardial infarction in 2018. Never had any stroke. Never had any surgeries. He used to smoke, but quit 20 years ago. Denied abusing alcohol. He claims that he used to drink a couple of drinks only a week, but quit a few months ago. He is not known to have any liver disease. FAMILY HISTORY:  His mother  in her 35s of cancer, but he is not sure what kind. SOCIAL HISTORY:  He is living with his wife who had, herself, had cancer.   He has been very sedentary recently because of his shortness of breath and also bilateral leg swelling and pain. ALLERGIES:  No known drug allergies. MEDICATIONS:  At home, he has been taking Eliquis 5 mg b.i.d., aspirin 81 mg, Lipitor 40, Plavix 75 mg, Lasix 40, Lantus 15 units at night and 7 in the morning, Cozaar 25 mg, Toprol-XL 50, Januvia 50. FUNCTIONAL INQUIRY:  No loss of consciousness, no stroke, paralysis, numbness. No hematuria, epistaxis or gingevorrhagia. He denied having back pain or headaches. PHYSICAL EXAMINATION:  GENERAL:  We have a 70-year-old  male who appears to be in no distress. He has very slow mentation, but he is alert and oriented. Moves all 4 extremities. His weight is 180 and apparently has been stable. VITAL SIGNS:  Temperature 98.3, pulse 67-98, breathing 17-23, saturating 100% on nasal cannula 2 liters per minute. SKIN:  Normal.  No stigmata of chronic liver disease. HEENT:  Eyes are remarkable for pale conjunctivae, but the sclerae are anicteric. The pupils are equal, round, reactive to light. Oropharyngeal cavity:  He has pale and moist mucous membrane. He has his own teeth. NECK:  Supple, no palpable mass. LUNGS:  Remarkable for diffuse decreased air entry and some rhonchi. HEART:  Rhythm is regular. S1, S2 normal.  No gallop, no murmur. ABDOMEN:  Nondistended, soft, nontender. No mass or organomegaly. Bowel sounds are normal.    LABORATORY DATA:  Blood tests. His hemoglobin is 5.2 after 3 blood transfusions. Platelets 730. WBC is 11.8 with 86% neutrophils. Very low MCV and MCH. INR 1.9 and PTT 20.8. Complete metabolic panel: We have a sodium of 133, glucose 251, BUN 45, creatinine 2.19. In February, they were 35 and 1. 16. Albumin 3.2. Normal LFTs except for alkaline phosphatase 160. CPK, CPK-MB, troponin normal.  NT ProBNP 4485. Chest x-ray:  Lungs are clear. Normal cardiac silhouette, no effusions. Ultrasound of the retroperitoneum on 02/2018, medical renal disease.     In conclusion, this is a 79-year-old male who has been on Plavix, Eliquis, and aspirin since February 2018 for myocardial infarction and insertion of stents, but in the last 3 weeks, he has been having dark black stools. He arrived with severe iron deficiency anemia at 3.5. He is chronically constipated. Usually he has one bowel movement per week or twice. He does not take laxatives. He had a negative colonoscopy at least 20 years ago. For now, we are going to check his stomach, make sure that he does not have any AVM or ulcer, although he is completely asymptomatic. Second, if this is negative, then we have to do a full colonoscopy. The patient will be a challenge to clean, because he is chronically constipated. This patient has diabetes mellitus and appears to be poorly controlled. He has coronary artery disease. He may have also peripheral vascular disease. He has acute on chronic kidney disease. For now, we will do the EGD, I explained to him this and he agreed to proceed. On physical exam, he has a huge scrotal hernia, which needs to be addressed. He is going to be high surgical risk and his colonoscopy is going to be also extremely difficult if we have to do it. I would like to order a CT scan of the abdomen and pelvis to evaluate this properly. ALLISON Reza MD MBE / GUERO  D: 05/30/2018 12:44     T: 05/30/2018 13:37  JOB #: [id number]  CC: Haritha Mcgowan MD  CC: Dequan Restrepo MD

## 2018-05-30 NOTE — PROGRESS NOTES
2125 Arrived to unit via stretcher accompanied by ER staff. Blood verified with Getachew Hill RN. Pt oriented to room and call bell system. Bed alarm set. Importance of asking for help when attempting to get out of bed explained due to prior syncopal episode. Pt reluctant to this. Educated on ICU safety measures. VSS. SOB noted on exertion. 2L O2 NC, o2 98% at this time. 2200 Incontinence episode. Brown soft BM, no david blood. However burgundy coloring noted on toilette wipe. During incontinence change, large mass noted to inguinal area. Hospitalist made aware of possible hernia. No new orders. Will assess and handle this problem outpatient. 0000 2nd unit of PRBCs infusing. Pt tolerating fine. SOB beginning to steadily improve through tranfusion. 0200 Resting. No change. Comfortable. 9242 Critical called to JORDIN Cisse RN on [facility]. This was reported to on call hospitalist. Additional 2 units ordered. Awaiting call from lab that units are ready and available to transfuse. 7917 Still awaiting PRBCs to be ready to transfuse. 6230 No change. Resting. VSS. NAD. Bed alarm remains on. Sunmnary: Pt tolerated 2 units PRBc successfully. On 3rd unit currently. 1 to go. VSS. Denies pain. On 2L O2 NC for SOB MYRICK. BM X3, all soft small-medium. No david blood, but some burgundy streaks. Awaiting GI to see patient. Possible scope. Bedside and Verbal shift change report given to Torri Grissom RN (oncoming nurse) by Anna Neves RN   (offgoing nurse). Report included the following information SBAR, Kardex and MAR.

## 2018-05-30 NOTE — PROGRESS NOTES
Reason for Admission:  C/o SOB               RRAT Score: 32                 Resources/supports as identified by patient/family:  TBD                 Top Challenges facing patient (as identified by patient/family and CM):  TBD                     Finances/Medication cost?                    Transportation? Support system or lack thereof? Living arrangements? Self-care/ADLs/Cognition? Current Advanced Directive/Advance Care Plan:  Not on file cm will alert MD to have discussion with pt and request consult for [facility] services to evaluate. Plan for utilizing home health:TBD                          Likelihood of readmission: TBD                 Transition of Care Plan:     Chart reviewed per ED note pt was at PCP office for SOB and fell in office, pt admitted  To ICU for monitoring cm will cont to review and plan for d/c planning.

## 2018-05-30 NOTE — PROGRESS NOTES
Called Pharmacy to ask for assistance in fixing discrepancy created. Cesar Davis RN assisted and aware of discrepancy but we were unable to fix discrepancy so called pharmacy and spoke with Lloyd Hadley who stated Valentino Bills would be the person to help fix but she was out. Lloyd Hadley stated to make a note in chart in regard to issue and Valentino Bills would audit and if had questions would address.

## 2018-05-30 NOTE — CONSULTS
[facility]  Pulmonary, Critical Care, and Sleep Medicine    Name: Deneice Severance. MRN: [id number]   : 1945 Hospital: [facility] [place]   Date: 2018        Pulmonary Critical Care Initial Patient Consult      IMPRESSION:   · Principal Problem:  ·   GI bleed (2018)  ·   · Active Problems:  ·   DM2 (diabetes mellitus, type 2) ([facility] [address] (2018)  ·   ·   Acute renal insufficiency (2018)  ·   ·   Symptomatic anemia (2018)  ·   ·   CAD (coronary artery disease) (2018)  ·   ·   ELINOR (acute kidney injury) ([facility] (2018)  ·   ·   CKD (chronic kidney disease) stage 3, GFR 30-59 ml/min (2018)  ·   ·   Elevated brain natriuretic peptide (BNP) level (2018)  ·   · Chronic systolic cardiomyopathy, LVEF 15-20%   RECOMMENDATIONS:   Compensated respiratory status; continue supplemental O2 via NC, titrate flow for goal SPO2> 90%  Aspiration prevention bundle, head of the bed at 30' all times, pulmonary hygiene care  Transfuse PRBC and monitor H/H Q6  Fluids: IVF NS at 100 mL/hr  Monitor hemodynamics, UO  Check cardiac panel, ECHO; starting/continuing anti-platelet per any cardiology consult  Await GI input and any interventions  Glycemic control  Stress ulcer prophylaxis - PPI  DVT prophylaxis - SCDs  AM labs. Diet - advance per GI work up    Will defer respective systems problem management to primary and other consultant and follow patient in ICU with primary and other medical team  Further recommendations will be based on the patient's response to recommended treatment and results of the investigation ordered. Subjective/History:   Mr. Deneice Severance. has been seen and evaluated as Dr. Val Nagel requested now for assisting in ICU management of severe anemia. Patient is a 81 y.o. male HTN, HLD, CAD, DM, asthma presented with pre-syncopal episode from PCP's office. In ED, patient noted to have severe anemia with Hgb 3.5. Heme occult was positive.  GI has been consulted. He states in [facility] with NSTEMI, underwent LHC and had stent placed, subsequently he was started on Eliquis and plavix. He has noticed some dark stool over the past several weeks, no abd pain, hematemesis and other bleeding, weight loss, nausea, vomiting. He has associated fatigue and MYRICK. The patient denies cough, chest pain, wheezing or hemoptysis. Denies NSAIDs, tobacco and IVDA. Last drink was over 3 months ago. Review of Systems:  Pertinent items are noted in HPI. Past Medical History:  Past Medical History:   Diagnosis Date    Asthma     Diabetes ([facility] [address]     High cholesterol     Hypertension         Past Surgical History:  Past Surgical History:   Procedure Laterality Date    HX CATARACT REMOVAL          Medications:  Prior to Admission medications    Medication Sig Start Date End Date Taking? Authorizing Provider   apixaban (ELIQUIS) 5 mg tablet Take 1 Tab by mouth two (2) times a day. 2/15/18   Danay Keys PA-C   aspirin delayed-release 81 mg tablet Take 1 Tab by mouth daily. 2/16/18   Danay Keys PA-C   atorvastatin (LIPITOR) 40 mg tablet Take 1 Tab by mouth nightly. 2/15/18   Dnaay Keys PA-C   clopidogrel (PLAVIX) 75 mg tab Take 1 Tab by mouth daily. 2/16/18   Danay Keys PA-C   furosemide (LASIX) 40 mg tablet Take 1 Tab by mouth daily. 2/15/18   Danay Keys PA-C   insulin glargine (LANTUS) 100 unit/mL injection 15 Units by SubCUTAneous route daily. 2/15/18   Danay Keys PA-C   losartan (COZAAR) 25 mg tablet Take 1 Tab by mouth daily. 2/16/18   Danay Keys PA-C   metoprolol succinate (TOPROL-XL) 50 mg XL tablet Take 1 Tab by mouth daily. 2/16/18   Danay Keys PA-C   SITagliptin (JANUVIA) 50 mg tablet Take 1 Tab by mouth daily. 2/15/18   Danay Keys PA-C   lancets (ADVOCATE LANCET) 30 gauge misc Use while checking BGL each morning.  2/15/18   Danay Keys PA-C   glucose blood VI test strips (IGLUCOSE TEST STRIP) strip Use to check BGL every morning 2/15/18   Cleo Landin PA-C       Current Facility-Administered Medications   Medication Dose Route Frequency    furosemide (LASIX) injection 40 mg  40 mg IntraVENous ONCE    metoprolol (LOPRESSOR) injection 5 mg  5 mg IntraVENous Q6H    insulin glargine (LANTUS) injection 7 Units  7 Units SubCUTAneous DAILY    insulin lispro (HUMALOG) injection   SubCUTAneous AC&HS    pantoprazole (PROTONIX) 40 mg in sodium chloride 0.9% 10 mL injection  40 mg IntraVENous Q12H       Allergy:  No Known Allergies     Social History:  Social History   Substance Use Topics    Smoking status: Former Smoker    Smokeless tobacco: Never Used    Alcohol use 0.6 oz/week     1 Cans of beer per week        Family History:  Denies family history of chronic lung disease such as COPD. Objective:   Vital Signs:    Blood pressure 143/76, pulse 80, temperature 98.3 °F (36.8 °C), resp. rate 17, height 5' 7\" (1.702 m), weight 81.6 kg (180 lb), SpO2 100 %. Body mass index is 28.19 kg/(m^2).    O2 Device: Nasal cannula   O2 Flow Rate (L/min): 2 l/min   Temp (24hrs), Av.9 °F (36.6 °C), Min:97.4 °F (36.3 °C), Max:98.3 °F (36.8 °C)       Intake/Output:   Last shift:      701 - 1900  In: 840 [P.O.:840]  Out: 225 [Urine:225]  Last 3 shifts: 1901 -  0700  In: 615.8   Out: 700 [Urine:700]    Intake/Output Summary (Last 24 hours) at 18 0955  Last data filed at 18 0800   Gross per 24 hour   Intake           1455.8 ml   Output              925 ml   Net            530.8 ml       Physical Exam:  General: AAO x 3, pale, in no respiratory distress and acyanotic, cooperative, no distress, appears older than stated age  HEENT: PERRLA, EOMI, fundi benign, throat normal without erythema or exudate  Neck: No abnormally enlarged lymph nodes or thyroid, supple  Chest: normal  Lungs: moderate air entry, breathing normal, clear to auscultation bilaterally, normal percussion bilaterally, no tenderness/ rash  Heart: Regular rate and rhythm, S1S2 present or without murmur or extra heart sounds  Abdomen: protuberant, bowel sounds normoactive, tympanic, abdomen is soft without significant tenderness, masses, organomegaly or guarding, rigidity, rebound  Extremity: negative for edema, cyanosis, clubbing  Capillary refill: normal  Neuro: alert, oriented x 3, no defects noted in general exam.  Skin: Skin color, texture, turgor fair. Skin dry, warm, non-diaphoretic    Data:     Recent Results (from the past 24 hour(s))   EKG, 12 LEAD, INITIAL    Collection Time: 05/29/18  5:33 PM   Result Value Ref Range    Ventricular Rate 75 BPM    Atrial Rate 75 BPM    P-R Interval 156 ms    QRS Duration 106 ms    Q-T Interval 422 ms    QTC Calculation (Bezet) 471 ms    Calculated P Axis 58 degrees    Calculated R Axis 48 degrees    Calculated T Axis 110 degrees    Diagnosis       Poor data quality, interpretation may be adversely affected  Normal sinus rhythm  ST T changes in lateral leads   Prolonged QT  Abnormal ECG  Confirmed by Josue Rivera MD, [facility] ([id number]) on 5/29/2018 7:58:04 PM     CBC WITH AUTOMATED DIFF    Collection Time: 05/29/18  5:45 PM   Result Value Ref Range    WBC 11.8 4.6 - 13.2 K/uL    RBC 2.18 (L) 4.70 - 5.50 M/uL    HGB 3.5 (LL) 13.0 - 16.0 g/dL    HCT 13.3 (LL) 36.0 - 48.0 %    MCV 61.0 (L) 74.0 - 97.0 FL    MCH 16.1 (L) 24.0 - 34.0 PG    MCHC 26.3 (L) 31.0 - 37.0 g/dL    RDW 19.9 (H) 11.6 - 14.5 %    PLATELET 611 447 - 504 K/uL    MPV 9.2 9.2 - 11.8 FL    NEUTROPHILS 86 (H) 40 - 73 %    LYMPHOCYTES 5 (L) 21 - 52 %    MONOCYTES 9 3 - 10 %    EOSINOPHILS 0 0 - 5 %    BASOPHILS 0 0 - 2 %    ABS. NEUTROPHILS 10.1 (H) 1.8 - 8.0 K/UL    ABS. LYMPHOCYTES 0.6 (L) 0.9 - 3.6 K/UL    ABS. MONOCYTES 1.1 0.05 - 1.2 K/UL    ABS. EOSINOPHILS 0.0 0.0 - 0.4 K/UL    ABS.  BASOPHILS 0.0 0.0 - 0.06 K/UL    RBC COMMENTS OVALOCYTES  1+        RBC COMMENTS POIKILOCYTOSIS  1+        RBC COMMENTS ANISOCYTOSIS  2+        RBC COMMENTS MICROCYTOSIS  2+        RBC COMMENTS HYPOCHROMIA  1+        DF AUTOMATED     METABOLIC PANEL, COMPREHENSIVE    Collection Time: 05/29/18  5:45 PM   Result Value Ref Range    Sodium 133 (L) 136 - 145 mmol/L    Potassium 4.1 3.5 - 5.5 mmol/L    Chloride 94 (L) 100 - 108 mmol/L    CO2 20 (L) 21 - 32 mmol/L    Anion gap 19 (H) 3.0 - 18 mmol/L    Glucose 251 (H) 74 - 99 mg/dL    BUN 45 (H) 7.0 - 18 MG/DL    Creatinine 2.19 (H) 0.6 - 1.3 MG/DL    BUN/Creatinine ratio 21 (H) 12 - 20      GFR est AA 36 (L) >60 ml/min/1.73m2    GFR est non-AA 30 (L) >60 ml/min/1.73m2    Calcium 7.9 (L) 8.5 - 10.1 MG/DL    Bilirubin, total 1.2 (H) 0.2 - 1.0 MG/DL    ALT (SGPT) 32 16 - 61 U/L    AST (SGOT) 17 15 - 37 U/L    Alk. phosphatase 160 (H) 45 - 117 U/L    Protein, total 6.2 (L) 6.4 - 8.2 g/dL    Albumin 3.2 (L) 3.4 - 5.0 g/dL    Globulin 3.0 2.0 - 4.0 g/dL    A-G Ratio 1.1 0.8 - 1.7     PROTHROMBIN TIME + INR    Collection Time: 05/29/18  5:45 PM   Result Value Ref Range    Prothrombin time 20.8 (H) 11.5 - 15.2 sec    INR 1.9 (H) 0.8 - 1.2     PTT    Collection Time: 05/29/18  5:45 PM   Result Value Ref Range    aPTT 33.7 23.0 - 36.4 SEC   NT-PRO BNP    Collection Time: 05/29/18  5:45 PM   Result Value Ref Range    NT pro-BNP 4485 (H) 0 - 900 PG/ML   CARDIAC PANEL,(CK, CKMB & TROPONIN)    Collection Time: 05/29/18  5:45 PM   Result Value Ref Range    CK 46 39 - 308 U/L    CK - MB 1.4 <3.6 ng/ml    CK-MB Index 3.0 0.0 - 4.0 %    Troponin-I, Qt. 0.02 0.00 - 0.06 NG/ML   TYPE + CROSSMATCH    Collection Time: 05/29/18  5:45 PM   Result Value Ref Range    Crossmatch Expiration 06/01/2018     ABO/Rh(D) O POSITIVE     Antibody screen NEG     CALLED TO:       2 UNITS READY NOTIFIED ZAID RN ED AT 1928 ON 5/29/18 BY JACOB.     CALLED TO:       Nakul Ley RN, ICU ON 05/30/2018 AT 0618 FOR 1 UNIT OF PRBC READY TO TRANSFUSE BY JDA    Unit number [id number]     Blood component type [facility]     Unit division 00     Status of unit ISSUED     Crossmatch result Compatible     Unit number [id number]     Blood component type RC LR     Unit division 00     Status of unit ISSUED     Crossmatch result Compatible     Unit number [id number]     Blood component type RC LR     Unit division 00     Status of unit ISSUED     Crossmatch result Compatible     Unit number [id number]     Blood component type RC LR     Unit division 00     Status of unit ALLOCATED     Crossmatch result Compatible    POC G3    Collection Time: 05/29/18  6:21 PM   Result Value Ref Range    Device: NASAL CANNULA      Flow rate (POC) 4 L/M    FIO2 (POC) 36 %    pH (POC) 7.461 (H) 7.35 - 7.45      pCO2 (POC) 29.4 (L) 35.0 - 45.0 MMHG    pO2 (POC) 132 (H) 80 - 100 MMHG    HCO3 (POC) 21.0 (L) 22 - 26 MMOL/L    sO2 (POC) 99 (H) 92 - 97 %    Base deficit (POC) 3 mmol/L    Allens test (POC) YES      Site RIGHT RADIAL      Specimen type (POC) ARTERIAL      Performed by Henry Carter    GLUCOSE, POC    Collection Time: 05/29/18 10:36 PM   Result Value Ref Range    Glucose (POC) 233 (H) 70 - 237 mg/dL   METABOLIC PANEL, COMPREHENSIVE    Collection Time: 05/30/18  4:14 AM   Result Value Ref Range    Sodium 136 136 - 145 mmol/L    Potassium 4.0 3.5 - 5.5 mmol/L    Chloride 100 100 - 108 mmol/L    CO2 26 21 - 32 mmol/L    Anion gap 10 3.0 - 18 mmol/L    Glucose 203 (H) 74 - 99 mg/dL    BUN 40 (H) 7.0 - 18 MG/DL    Creatinine 1.84 (H) 0.6 - 1.3 MG/DL    BUN/Creatinine ratio 22 (H) 12 - 20      GFR est AA 44 (L) >60 ml/min/1.73m2    GFR est non-AA 37 (L) >60 ml/min/1.73m2    Calcium 7.6 (L) 8.5 - 10.1 MG/DL    Bilirubin, total 1.9 (H) 0.2 - 1.0 MG/DL    ALT (SGPT) 27 16 - 61 U/L    AST (SGOT) 14 (L) 15 - 37 U/L    Alk.  phosphatase 131 (H) 45 - 117 U/L    Protein, total 5.6 (L) 6.4 - 8.2 g/dL    Albumin 2.9 (L) 3.4 - 5.0 g/dL    Globulin 2.7 2.0 - 4.0 g/dL    A-G Ratio 1.1 0.8 - 1.7     CBC W/O DIFF    Collection Time: 05/30/18  4:14 AM   Result Value Ref Range    WBC 8.1 4.6 - 13.2 K/uL    RBC 2.63 (L) 4.70 - 5.50 M/uL    HGB 5.2 (LL) 13.0 - 16.0 g/dL    HCT 17.9 (LL) 36.0 - 48.0 %    MCV 68.1 (L) 74.0 - 97.0 FL    MCH 19.8 (L) 24.0 - 34.0 PG    MCHC 29.1 (L) 31.0 - 37.0 g/dL    RDW 26.5 (H) 11.6 - 14.5 %    PLATELET 085 327 - 239 K/uL    MPV 9.3 9.2 - 11.8 FL   PROTHROMBIN TIME + INR    Collection Time: 05/30/18  4:14 AM   Result Value Ref Range    Prothrombin time 18.2 (H) 11.5 - 15.2 sec    INR 1.6 (H) 0.8 - 1.2     PTT    Collection Time: 05/30/18  4:14 AM   Result Value Ref Range    aPTT 35.6 23.0 - 36.4 SEC   GLUCOSE, POC    Collection Time: 05/30/18  6:12 AM   Result Value Ref Range    Glucose (POC) 202 (H) 70 - 110 mg/dL           Recent Labs      05/29/18   1821   FIO2I  36   HCO3I  21.0*   PCO2I  29.4*   PHI  7.461*   PO2I  132*       All Micro Results     None          Telemetry: normal sinus rhythm    2/11/18 ECHO  LVEF 15-20%    Imaging:  [x]I have personally reviewed the patients chest radiographs images and report       Results from Hospital Encounter encounter on 05/29/18   XR CHEST PORT   Narrative EXAM: Chest x-ray portable 1 view    INDICATION: Shortness of breath    COMPARISON: February 10, 2018    _____________    FINDINGS:     The lungs are clear. The cardiomediastinal silhouette is within normal limits  for an AP radiograph of the chest. There are no pleural effusions. _____________         Impression IMPRESSION:     No acute cardiopulmonary disease        No results found for this or any previous visit. [x]See my orders for details    My assessment, plan of care, findings, medications, side effects etc were discussed with:  [x]nursing []PT/OT    []respiratory therapy []Dr. Hao Grajeda [x]Patient     [x]Total critical care time exclusive of procedures 46 minutes with complex decision making performed and > 50% time spent in face to face evaluation.     Caterina Pacheco MD

## 2018-05-30 NOTE — H&P
History & Physical    Patient: Danie Alvarado MRN: [id number]  CSN: [id number]    YOB: 1945  Age: 81 y.o. Sex: male      DOA: 5/29/2018  Primary Care Provider:  Trevon Dunbar MD      Assessment/Plan     Patient Active Problem List   Diagnosis Code    Atrial flutter (HCC) I48.92    DM2 (diabetes mellitus, type 2) ([facility] E11.9    Acute renal insufficiency N28.9    NSTEMI (non-ST elevated myocardial infarction) ([facility] B81.2    Systolic CHF, acute (HCC) I50.21    Symptomatic anemia D64.9    CAD (coronary artery disease) I25.10    GI bleed K92.2    ELINOR (acute kidney injury) ([facility] N17.9    CKD (chronic kidney disease) stage 3, GFR 30-59 ml/min N18.3    Elevated brain natriuretic peptide (BNP) level R79.89       1. PreSyncope, Symptomatic Anemia with SOB with exertion   2. GI bleed, unknown source   3. ELINOR on CKD stage III  4. Elevated BNP  5. CAD s/p Stents   6. DM2  FULL CODE     -admit for further care to the icu   -PPI IV BID, type and screen, 2 units pRBC ordered with 2 units ahead   -GI consulted, possible scope tomorrow. Will order clear liquid their recs   -check caogs neal am. Hold all po meds including ELiquis and plavix   -check iron studies and ferritin. Advised to check prior to BLood tx   -monitor Renal function, elevated Cr likely from poor perfusion/oxygenation in setting of low Hb   -no  IVF at this time due to stable hemodynamics and elevated BNP  -accuchecks, ISS    Estimated length of stay : 2-3 days     CC: PResyncope       HPI:     Danie Alvarado is a 81 y.o. male who came to the ed after being Presyncopal at his PCPs office. Patient states he went to his routine appt and while leaving the office, he felt lightheadedness and fell down on his knees therefore sent to the ED. Patient states he was here back in feb and was dx with NSTEMI. He underwent LHC and has stent placed, subsequently he was started on ELiquis and plavix.  Since then he has noticed some dark stool over the past several weeks. No abd pain, hematemesis and other complaints. Denies every having C scope in the past. No weight loss and other complaints. During this time he also reports of progressive exertional sob and more pale appearing. In the ED he was noted to have Hb of 3 which was lower from baseline around 13 back in feb 2018. Heme occult was positive. GI was consulted and 2 units prbc was ordered. His Cr was noted to be slightly elevated too. Denies tobacco and IVDA. Last drink was over 3 months ago. Past Medical History:   Diagnosis Date    Asthma     Diabetes ([address]     High cholesterol     Hypertension        Past Surgical History:   Procedure Laterality Date    HX CATARACT REMOVAL         No family history on file. Social History     Social History    Marital status:      Spouse name: N/A    Number of children: N/A    Years of education: N/A     Social History Main Topics    Smoking status: Former Smoker    Smokeless tobacco: Never Used    Alcohol use 0.6 oz/week     1 Cans of beer per week    Drug use: No    Sexual activity: Not on file     Other Topics Concern    Not on file     Social History Narrative    No narrative on file       Prior to Admission medications    Medication Sig Start Date End Date Taking? Authorizing Provider   apixaban (ELIQUIS) 5 mg tablet Take 1 Tab by mouth two (2) times a day. 2/15/18   Danay Keys PA-C   aspirin delayed-release 81 mg tablet Take 1 Tab by mouth daily. 2/16/18   Danay Keys PA-C   atorvastatin (LIPITOR) 40 mg tablet Take 1 Tab by mouth nightly. 2/15/18   Danay Keys PA-C   clopidogrel (PLAVIX) 75 mg tab Take 1 Tab by mouth daily. 2/16/18   Danay Keys PA-C   furosemide (LASIX) 40 mg tablet Take 1 Tab by mouth daily. 2/15/18   Danay Keys PA-C   insulin glargine (LANTUS) 100 unit/mL injection 15 Units by SubCUTAneous route daily.  2/15/18   Danay Keys PA-C   losartan (COZAAR) 25 mg tablet Take 1 Tab by mouth daily. 18   Manny Garcia PA-C   metoprolol succinate (TOPROL-XL) 50 mg XL tablet Take 1 Tab by mouth daily. 18   Manny Garcia PA-C   SITagliptin (JANUVIA) 50 mg tablet Take 1 Tab by mouth daily. 2/15/18   Manny Garcia PA-C   lancets (ADVOCATE LANCET) 30 gauge misc Use while checking BGL each morning. 2/15/18   Manny Garcia PA-C   glucose blood VI test strips (IGLUCOSE TEST STRIP) strip Use to check BGL every morning 2/15/18   Manny Garcia PA-C       No Known Allergies    Review of Systems  Gen: No fever, chills, weight loss/gain. Heent: No headache, rhinorrhea, epistaxis, ear pain, hearing loss, sinus pain, neck pain/stiffness, sore throat. Heart: No chest pain, palpitations, MYRICK, pnd, or orthopnea. Resp: No cough, hemoptysis, wheezing   GI: No nausea, vomiting, diarrhea, constipation  : No urinary obstruction, dysuria or hematuria. Derm: No rash, new skin lesion or pruritis. Musc/skeletal: no bone or joint complains. Vasc: No edema, cyanosis or claudication. Endo: No heat/cold intolerance, no polyuria,polydipsia or polyphagia. Neuro: No unilateral weakness, numbness, tingling. No seizures. Heme: No easy bruising or bleeding. Physical Exam:     Physical Exam:  Visit Vitals    BP (!) 124/38 (BP 1 Location: Right arm, BP Patient Position: At rest)    Pulse 70    Temp 97.8 °F (36.6 °C)    Resp 19    Ht 5' 7\" (1.702 m)    Wt 81.6 kg (180 lb)    SpO2 92%    BMI 28.19 kg/m2    O2 Flow Rate (L/min): 2 l/min O2 Device: Nasal cannula    Temp (24hrs), Av.6 °F (36.4 °C), Min:97.4 °F (36.3 °C), Max:97.8 °F (36.6 °C)             General:  Awake, cooperative, no distress. Head:  Normocephalic, without obvious abnormality, atraumatic. Eyes:  Conjunctivae/corneas pale, sclera anicteric, PERRL, EOMs intact. Nose: Nares normal. No drainage or sinus tenderness.    Throat: Lips, mucosa, and tongue normal. Neck: Supple, symmetrical, trachea midline, no adenopathy. Lungs:   Clear to auscultation bilaterally. Heart:  Regular rate and rhythm, S1, S2 normal, no murmur, click, rub or gallop. Abdomen: Soft, non-tender. Bowel sounds normal. No masses,  No organomegaly. Extremities: Extremities normal, atraumatic, no cyanosis or edema. Capillary refill normal.   Pulses: 2+ and symmetric all extremities. Skin: Skin color pale, turgor normal. No rashes or lesions   Neurologic: CNII-XII intact. No focal motor or sensory deficit. Labs Reviewed:    Recent Results (from the past 24 hour(s))   EKG, 12 LEAD, INITIAL    Collection Time: 05/29/18  5:33 PM   Result Value Ref Range    Ventricular Rate 75 BPM    Atrial Rate 75 BPM    P-R Interval 156 ms    QRS Duration 106 ms    Q-T Interval 422 ms    QTC Calculation (Bezet) 471 ms    Calculated P Axis 58 degrees    Calculated R Axis 48 degrees    Calculated T Axis 110 degrees    Diagnosis       Poor data quality, interpretation may be adversely affected  Normal sinus rhythm  ST T changes in lateral leads   Prolonged QT  Abnormal ECG  Confirmed by Victoria Aguilar MD, [facility] ([id number]) on 5/29/2018 7:58:04 PM     CBC WITH AUTOMATED DIFF    Collection Time: 05/29/18  5:45 PM   Result Value Ref Range    WBC 11.8 4.6 - 13.2 K/uL    RBC 2.18 (L) 4.70 - 5.50 M/uL    HGB 3.5 (LL) 13.0 - 16.0 g/dL    HCT 13.3 (LL) 36.0 - 48.0 %    MCV 61.0 (L) 74.0 - 97.0 FL    MCH 16.1 (L) 24.0 - 34.0 PG    MCHC 26.3 (L) 31.0 - 37.0 g/dL    RDW 19.9 (H) 11.6 - 14.5 %    PLATELET 246 991 - 294 K/uL    MPV 9.2 9.2 - 11.8 FL    NEUTROPHILS 86 (H) 40 - 73 %    LYMPHOCYTES 5 (L) 21 - 52 %    MONOCYTES 9 3 - 10 %    EOSINOPHILS 0 0 - 5 %    BASOPHILS 0 0 - 2 %    ABS. NEUTROPHILS 10.1 (H) 1.8 - 8.0 K/UL    ABS. LYMPHOCYTES 0.6 (L) 0.9 - 3.6 K/UL    ABS. MONOCYTES 1.1 0.05 - 1.2 K/UL    ABS. EOSINOPHILS 0.0 0.0 - 0.4 K/UL    ABS.  BASOPHILS 0.0 0.0 - 0.06 K/UL    RBC COMMENTS OVALOCYTES  1+        RBC COMMENTS POIKILOCYTOSIS  1+        RBC COMMENTS ANISOCYTOSIS  2+        RBC COMMENTS MICROCYTOSIS  2+        RBC COMMENTS HYPOCHROMIA  1+        DF AUTOMATED     METABOLIC PANEL, COMPREHENSIVE    Collection Time: 05/29/18  5:45 PM   Result Value Ref Range    Sodium 133 (L) 136 - 145 mmol/L    Potassium 4.1 3.5 - 5.5 mmol/L    Chloride 94 (L) 100 - 108 mmol/L    CO2 20 (L) 21 - 32 mmol/L    Anion gap 19 (H) 3.0 - 18 mmol/L    Glucose 251 (H) 74 - 99 mg/dL    BUN 45 (H) 7.0 - 18 MG/DL    Creatinine 2.19 (H) 0.6 - 1.3 MG/DL    BUN/Creatinine ratio 21 (H) 12 - 20      GFR est AA 36 (L) >60 ml/min/1.73m2    GFR est non-AA 30 (L) >60 ml/min/1.73m2    Calcium 7.9 (L) 8.5 - 10.1 MG/DL    Bilirubin, total 1.2 (H) 0.2 - 1.0 MG/DL    ALT (SGPT) 32 16 - 61 U/L    AST (SGOT) 17 15 - 37 U/L    Alk. phosphatase 160 (H) 45 - 117 U/L    Protein, total 6.2 (L) 6.4 - 8.2 g/dL    Albumin 3.2 (L) 3.4 - 5.0 g/dL    Globulin 3.0 2.0 - 4.0 g/dL    A-G Ratio 1.1 0.8 - 1.7     PROTHROMBIN TIME + INR    Collection Time: 05/29/18  5:45 PM   Result Value Ref Range    Prothrombin time 20.8 (H) 11.5 - 15.2 sec    INR 1.9 (H) 0.8 - 1.2     PTT    Collection Time: 05/29/18  5:45 PM   Result Value Ref Range    aPTT 33.7 23.0 - 36.4 SEC   NT-PRO BNP    Collection Time: 05/29/18  5:45 PM   Result Value Ref Range    NT pro-BNP 4485 (H) 0 - 900 PG/ML   CARDIAC PANEL,(CK, CKMB & TROPONIN)    Collection Time: 05/29/18  5:45 PM   Result Value Ref Range    CK 46 39 - 308 U/L    CK - MB 1.4 <3.6 ng/ml    CK-MB Index 3.0 0.0 - 4.0 %    Troponin-I, Qt. 0.02 0.00 - 0.06 NG/ML   TYPE + CROSSMATCH    Collection Time: 05/29/18  5:45 PM   Result Value Ref Range    Crossmatch Expiration 06/01/2018     ABO/Rh(D) O POSITIVE     Antibody screen NEG     CALLED TO:       2 UNITS READY NOTIFIED [facility] AT 1928 ON 5/29/18 BY JACOB.     Unit number [id number]     Blood component type  LR     Unit division 00     Status of unit ALLOCATED     Crossmatch result Compatible     Unit number [id number]     Blood component type  LR     Unit division 00     Status of unit ALLOCATED     Crossmatch result Compatible     Unit number [id number]     Blood component type  LR     Unit division 00     Status of unit ALLOCATED     Crossmatch result Compatible    POC G3    Collection Time: 05/29/18  6:21 PM   Result Value Ref Range    Device: NASAL CANNULA      Flow rate (POC) 4 L/M    FIO2 (POC) 36 %    pH (POC) 7.461 (H) 7.35 - 7.45      pCO2 (POC) 29.4 (L) 35.0 - 45.0 MMHG    pO2 (POC) 132 (H) 80 - 100 MMHG    HCO3 (POC) 21.0 (L) 22 - 26 MMOL/L    sO2 (POC) 99 (H) 92 - 97 %    Base deficit (POC) 3 mmol/L    Allens test (POC) YES      Site RIGHT RADIAL      Specimen type (POC) ARTERIAL      Performed by Darny Mind Palettejoel        Procedures/imaging: see electronic medical records for all procedures/Xrays and details which were not copied into this note but were reviewed prior to creation of Plan    45 minutes of critical care time spent in the direct evaluation and treatment of this high risk patient. The reason for providing this level of medical care for this critically ill patient was due a critical illness that impaired one or more vital organ systems such that there was a high probability of imminent or life threatening deterioration in the patients condition. This care involved high complexity decision making to assess, manipulate, and support vital system functions, to treat this degreee vital organ system failure and to prevent further life threatening deterioration of the patients condition.       CC: Chao Parker MD

## 2018-05-30 NOTE — DIABETES MGMT
Diabetes Patient/Family Education Record    Factors That  May Influence Patients Ability  to Learn or  Comply with Recommendations   []   Language barrier    []   Cultural needs   []   Motivation    []   Cognitive limitation    []   Physical   []   Education    []   Physiological factors   [x]   Hearing/vision/speaking impairment   []   Synagogue beliefs    []   Financial factors   []  Other:   []  No factors identified at this time.      Person Instructed:   [x]   Patient   []   Family   []  Other     Preference for Learning:   [x]   Verbal   []   Written   []  Demonstration     Level of Comprehension & Competence:    []  Good                                      [x] Fair                                     []  Poor                             [x]  Needs Reinforcement   []  Teachback completed    Education Component:   [x]  Medication management, including attempted confirmation of home regimen; pt verbalizes he takes 40 units of insulin does not know the name; pt unable to confirm [place] and [place]    []  Nutritional management    []  Exercise   []  Signs, symptoms, and treatment of hyperglycemia and hypoglycemia   [] Prevention, recognition and treatment of hyperglycemia and hypoglycemia   [x]  Importance of blood glucose monitoring; SMBG daily pt reports ranges in the high 200s-300s    []  Instruction on use of the blood glucose meter   [x]  Discuss the importance of HbA1C monitoring; receiving blood transfusion will need to obtain a new Hba1C this week   []  Sick day guidelines   []  Proper use and disposal of lancets, needles, syringes or insulin pens (if appropriate)   []  Potential long-term complications (retinopathy, kidney disease, neuropathy, foot care)   [] Information about whom to contact in case of emergency or for more information    [x]  Goal:  Patient/family will demonstrate understanding of Diabetes Self Management Skills by: 6/30  Plan for post-discharge education or self-management support:    [x] Outpatient class schedule provided            [] Patient Declined    [] Scheduled for outpatient classes (date) _______       Raghavendra Mcrae RN, MS  Glycemic Control Team  Pager [phone] (M-TH 8:30-5P)  *After Hours pager [phone]

## 2018-05-30 NOTE — PROGRESS NOTES
Hospitalist Progress Note    Patient: Lalo Barriga. MRN: [id number]  CSN: [id number]    YOB: 1945  Age: 81 y.o. Sex: male    DOA: 5/29/2018 LOS:  LOS: 1 day          Chief Complaint: anemia        Assessment/Plan       Disposition :  Patient Active Problem List   Diagnosis Code    Atrial flutter (HCC) I48.92    DM2 (diabetes mellitus, type 2) (HCC) E11.9    Acute renal insufficiency N28.9    NSTEMI (non-ST elevated myocardial infarction) ([facility] J34.3    Systolic CHF, acute (HCC) I50.21    Symptomatic anemia D64.9    CAD (coronary artery disease) I25.10    GI bleed K92.2    ELINOR (acute kidney injury) ([facility] N17.9    CKD (chronic kidney disease) stage 3, GFR 30-59 ml/min N18.3    Elevated brain natriuretic peptide (BNP) level R79.89         82 y/o gentleman who I have seen twice in the office admitted after being seen in the office yesterday for dyspnea and weakness. In ER determined to be remarkably anemic with hemoglobin of 3.5. History notable for stent placement in February, plavix antiplatelet therapy and therapeutic anticoagulation with Eliquis. S/p two units of prbc's. Currently receiving third unit. 1.  Anemia severe/symptomatic. ABL anemia poa. 2.  Probably upper gi bleed. 3.  CAD s/p stents Feb 2018 on antiplatelet med. 4.  Atrial flutter on Eliquis. 5.  CKD. 6.  CHF systolic chronic EF 65%. 7.  DM2.  8.  Hyperlipidemia. 9.  Hx of asthma. Former smoker. 1.  Complete blood transfusion. 2.  GI eval - EGD. 3.  PPI. 4.  Rx lasix iv x one dose to prevent overload / chf from transfusions. 5.  Resume cardiac meds as indicated. 6.  Hold antiplatelet and anticoagulant meds now. 7.  Pharmacological dvt px contraindicated at this juncture. 8.  Dispo TBD. 9.  Full Code. Subjective:    Seen and examined in room 2. No cp or sob. On third unit of prbc's.       Review of systems:    Constitutional: denies fevers, chills, myalgias  Respiratory: denies SOB, cough  Cardiovascular: denies chest pain, palpitations  Gastrointestinal: denies nausea, vomiting, diarrhea      Vital signs/Intake and Output:  Visit Vitals    /57    Pulse 69    Temp 98.3 °F (36.8 °C)    Resp 20    Ht 5' 7\" (1.702 m)    Wt 81.6 kg (180 lb)    SpO2 100%    BMI 28.19 kg/m2     Current Shift:  05/30 0701 - 05/30 1900  In: 840 [P.O.:840]  Out: 225 [Urine:225]  Last three shifts:  05/28 1901 - 05/30 0700  In: 615.8   Out: 700 [Urine:700]    Exam:    nad  Head/Neck: mmm  CVS:s1s2  Lungs:Cdiminished in the bases. Abdomen: Soft, bs+  Extremities: Trace edema. Labs: Results:       Chemistry Recent Labs      05/30/18 0414 05/29/18 1745   GLU  203*  251*   NA  136  133*   K  4.0  4.1   CL  100  94*   CO2  26  20*   BUN  40*  45*   CREA  1.84*  2.19*   CA  7.6*  7.9*   AGAP  10  19*   BUCR  22*  21*   AP  131*  160*   TP  5.6*  6.2*   ALB  2.9*  3.2*   GLOB  2.7  3.0   AGRAT  1.1  1.1      CBC w/Diff Recent Labs      05/30/18 0414 05/29/18 1745   WBC  8.1  11.8   RBC  2.63*  2.18*   HGB  5.2*  3.5*   HCT  17.9*  13.3*   PLT  270  330   GRANS   --   86*   LYMPH   --   5*   EOS   --   0      Cardiac Enzymes Recent Labs      05/29/18 1745   CPK  46   CKND1  3.0      Coagulation Recent Labs      05/30/18 0414 05/29/18   1745   PTP  18.2*  20.8*   INR  1.6*  1.9*   APTT  35.6  33.7       Lipid Panel No results found for: CHOL, CHOLPOCT, CHOLX, CHLST, CHOLV, 436413, HDL, LDL, LDLC, DLDLP, 942648, VLDLC, VLDL, TGLX, TRIGL, TRIGP, TGLPOCT, CHHD, CHHDX   BNP No results for input(s): BNPP in the last 72 hours.    Liver Enzymes Recent Labs      05/30/18   0414   TP  5.6*   ALB  2.9*   AP  131*   SGOT  14*      Thyroid Studies Lab Results   Component Value Date/Time    TSH 1.72 02/11/2018 02:19 AM        Procedures/imaging: see electronic medical records for all procedures/Xrays and details which were not copied into this note but were reviewed prior to creation of Oliver Justin MD

## 2018-05-31 LAB
ABO + RH BLD: NORMAL
ANION GAP SERPL CALC-SCNC: 6 MMOL/L (ref 3–18)
BLD PROD TYP BPU: NORMAL
BLOOD GROUP ANTIBODIES SERPL: NORMAL
BPU ID: NORMAL
BUN SERPL-MCNC: 24 MG/DL (ref 7–18)
BUN/CREAT SERPL: 16 (ref 12–20)
CALCIUM SERPL-MCNC: 8 MG/DL (ref 8.5–10.1)
CALLED TO:,BCALL1: NORMAL
CALLED TO:,BCALL2: NORMAL
CHLORIDE SERPL-SCNC: 104 MMOL/L (ref 100–108)
CO2 SERPL-SCNC: 30 MMOL/L (ref 21–32)
CREAT SERPL-MCNC: 1.48 MG/DL (ref 0.6–1.3)
CROSSMATCH RESULT,%XM: NORMAL
ERYTHROCYTE [DISTWIDTH] IN BLOOD BY AUTOMATED COUNT: 25.2 % (ref 11.6–14.5)
GLUCOSE BLD STRIP.AUTO-MCNC: 103 MG/DL (ref 70–110)
GLUCOSE BLD STRIP.AUTO-MCNC: 112 MG/DL (ref 70–110)
GLUCOSE BLD STRIP.AUTO-MCNC: 130 MG/DL (ref 70–110)
GLUCOSE BLD STRIP.AUTO-MCNC: 164 MG/DL (ref 70–110)
GLUCOSE BLD STRIP.AUTO-MCNC: 244 MG/DL (ref 70–110)
GLUCOSE SERPL-MCNC: 124 MG/DL (ref 74–99)
H PYLORI IGA SER-ACNC: <9 UNITS (ref 0–8.9)
H PYLORI IGG SER IA-ACNC: <0.8 INDEX VALUE (ref 0–0.79)
HCT VFR BLD AUTO: 25 % (ref 36–48)
HCT VFR BLD AUTO: 26 % (ref 36–48)
HCT VFR BLD AUTO: 27.1 % (ref 36–48)
HGB BLD-MCNC: 7.5 G/DL (ref 13–16)
HGB BLD-MCNC: 7.8 G/DL (ref 13–16)
HGB BLD-MCNC: 8.3 G/DL (ref 13–16)
MCH RBC QN AUTO: 21.6 PG (ref 24–34)
MCHC RBC AUTO-ENTMCNC: 30 G/DL (ref 31–37)
MCV RBC AUTO: 72 FL (ref 74–97)
PLATELET # BLD AUTO: 277 K/UL (ref 135–420)
PMV BLD AUTO: 9.4 FL (ref 9.2–11.8)
POTASSIUM SERPL-SCNC: 3.5 MMOL/L (ref 3.5–5.5)
RBC # BLD AUTO: 3.47 M/UL (ref 4.7–5.5)
SODIUM SERPL-SCNC: 140 MMOL/L (ref 136–145)
SPECIMEN EXP DATE BLD: NORMAL
STATUS OF UNIT,%ST: NORMAL
UNIT DIVISION, %UDIV: 0
WBC # BLD AUTO: 9.8 K/UL (ref 4.6–13.2)

## 2018-05-31 PROCEDURE — 74011250637 HC RX REV CODE- 250/637: Performed by: HOSPITALIST

## 2018-05-31 PROCEDURE — 74011000250 HC RX REV CODE- 250: Performed by: INTERNAL MEDICINE

## 2018-05-31 PROCEDURE — 74011250637 HC RX REV CODE- 250/637: Performed by: INTERNAL MEDICINE

## 2018-05-31 PROCEDURE — 77010033678 HC OXYGEN DAILY

## 2018-05-31 PROCEDURE — 80048 BASIC METABOLIC PNL TOTAL CA: CPT | Performed by: INTERNAL MEDICINE

## 2018-05-31 PROCEDURE — 36415 COLL VENOUS BLD VENIPUNCTURE: CPT | Performed by: INTERNAL MEDICINE

## 2018-05-31 PROCEDURE — 82962 GLUCOSE BLOOD TEST: CPT

## 2018-05-31 PROCEDURE — 74011250636 HC RX REV CODE- 250/636: Performed by: INTERNAL MEDICINE

## 2018-05-31 PROCEDURE — 85018 HEMOGLOBIN: CPT | Performed by: INTERNAL MEDICINE

## 2018-05-31 PROCEDURE — C9113 INJ PANTOPRAZOLE SODIUM, VIA: HCPCS | Performed by: INTERNAL MEDICINE

## 2018-05-31 PROCEDURE — 65610000006 HC RM INTENSIVE CARE

## 2018-05-31 PROCEDURE — 74011636637 HC RX REV CODE- 636/637: Performed by: INTERNAL MEDICINE

## 2018-05-31 PROCEDURE — 85027 COMPLETE CBC AUTOMATED: CPT | Performed by: INTERNAL MEDICINE

## 2018-05-31 RX ORDER — METOCLOPRAMIDE HYDROCHLORIDE 5 MG/ML
5 INJECTION INTRAMUSCULAR; INTRAVENOUS
Status: DISCONTINUED | OUTPATIENT
Start: 2018-05-31 | End: 2018-06-02 | Stop reason: HOSPADM

## 2018-05-31 RX ORDER — LOSARTAN POTASSIUM 25 MG/1
25 TABLET ORAL DAILY
Status: DISCONTINUED | OUTPATIENT
Start: 2018-06-01 | End: 2018-06-02 | Stop reason: HOSPADM

## 2018-05-31 RX ORDER — METOPROLOL SUCCINATE 50 MG/1
50 TABLET, EXTENDED RELEASE ORAL DAILY
Status: DISCONTINUED | OUTPATIENT
Start: 2018-06-01 | End: 2018-06-02 | Stop reason: HOSPADM

## 2018-05-31 RX ORDER — SODIUM CHLORIDE 9 MG/ML
100 INJECTION, SOLUTION INTRAVENOUS CONTINUOUS
Status: DISPENSED | OUTPATIENT
Start: 2018-05-31 | End: 2018-06-01

## 2018-05-31 RX ORDER — ATORVASTATIN CALCIUM 20 MG/1
40 TABLET, FILM COATED ORAL
Status: DISCONTINUED | OUTPATIENT
Start: 2018-05-31 | End: 2018-06-02 | Stop reason: HOSPADM

## 2018-05-31 RX ADMIN — INSULIN GLARGINE 7 UNITS: 100 INJECTION, SOLUTION SUBCUTANEOUS at 09:06

## 2018-05-31 RX ADMIN — SODIUM CHLORIDE 40 MG: 9 INJECTION INTRAMUSCULAR; INTRAVENOUS; SUBCUTANEOUS at 06:12

## 2018-05-31 RX ADMIN — POLYETHYLENE GLYCOL 3350 17 G: 17 POWDER, FOR SOLUTION ORAL at 13:46

## 2018-05-31 RX ADMIN — POLYETHYLENE GLYCOL 3350, SODIUM CHLORIDE, POTASSIUM CHLORIDE, SODIUM BICARBONATE, AND SODIUM SULFATE 4000 ML: 240; 5.84; 2.98; 6.72; 22.72 POWDER, FOR SOLUTION ORAL at 21:16

## 2018-05-31 RX ADMIN — METOPROLOL TARTRATE 5 MG: 5 INJECTION, SOLUTION INTRAVENOUS at 00:00

## 2018-05-31 RX ADMIN — POLYETHYLENE GLYCOL 3350 17 G: 17 POWDER, FOR SOLUTION ORAL at 21:17

## 2018-05-31 RX ADMIN — METOPROLOL TARTRATE 5 MG: 5 INJECTION, SOLUTION INTRAVENOUS at 06:14

## 2018-05-31 RX ADMIN — SODIUM CHLORIDE 40 MG: 9 INJECTION INTRAMUSCULAR; INTRAVENOUS; SUBCUTANEOUS at 16:52

## 2018-05-31 RX ADMIN — LUBIPROSTONE 24 MCG: 24 CAPSULE, GELATIN COATED ORAL at 09:06

## 2018-05-31 RX ADMIN — INSULIN LISPRO 2 UNITS: 100 INJECTION, SOLUTION INTRAVENOUS; SUBCUTANEOUS at 11:30

## 2018-05-31 RX ADMIN — POLYETHYLENE GLYCOL 3350 17 G: 17 POWDER, FOR SOLUTION ORAL at 09:06

## 2018-05-31 RX ADMIN — INSULIN LISPRO 4 UNITS: 100 INJECTION, SOLUTION INTRAVENOUS; SUBCUTANEOUS at 22:28

## 2018-05-31 RX ADMIN — DOCUSATE SODIUM 100 MG: 100 CAPSULE, LIQUID FILLED ORAL at 21:17

## 2018-05-31 RX ADMIN — DOCUSATE SODIUM 100 MG: 100 CAPSULE, LIQUID FILLED ORAL at 16:53

## 2018-05-31 RX ADMIN — DOCUSATE SODIUM 100 MG: 100 CAPSULE, LIQUID FILLED ORAL at 09:06

## 2018-05-31 RX ADMIN — ATORVASTATIN CALCIUM 40 MG: 20 TABLET, FILM COATED ORAL at 21:17

## 2018-05-31 RX ADMIN — LUBIPROSTONE 24 MCG: 24 CAPSULE, GELATIN COATED ORAL at 16:53

## 2018-05-31 NOTE — ROUTINE PROCESS
Bedside, Verbal and Written shift change report given to J. Darice Osler (oncoming nurse) by Huma Smyth (offgoing nurse). Report included the following information SBAR, Kardex, Intake/Output and Recent Results. 1500. Assumed care of pt at this time. Pt aox4 following commands, denies pain, up with assist.  Remains tele monitored, 2L NC.  1630. Scheduled med's given, questions answered. 1915  Bedside, Verbal and Written shift change report given to Xochilt Lucas RN (oncoming nurse) by Grant Ritchei. Darice Osler (offgoing nurse). Report included the following information SBAR, Kardex, Intake/Output and Recent Results.

## 2018-05-31 NOTE — PROGRESS NOTES
Chart reviewed cm spoke with pt at bedside regarding d/c planning pt plans to return back home where he lives with his wife denies using any home DME's , pt feels he needs home 02 explained walk test and orders will be needed by physician prior to discharge,otherwise pt feels he does not have any cm needs at this time.

## 2018-05-31 NOTE — PROGRESS NOTES
[facility]ists  Pulmonary, Critical Care, and Sleep Medicine    Name: Danie Meyer. MRN: [id number]   : 1945 Hospital: [facility] [place]   Date: 2018        Pulmonary Critical Care Patient F/Up note      IMPRESSION:   Principal Problem:    GI bleed (2018)    Active Problems:    DM2 (diabetes mellitus, type 2) ([facility] [address] (2018)      Acute renal insufficiency (2018)      Symptomatic anemia (2018)      CAD (coronary artery disease) (2018)      ELINOR (acute kidney injury) ([facility] [address] (2018)      CKD (chronic kidney disease) stage 3, GFR 30-59 ml/min (2018)      Elevated brain natriuretic peptide (BNP) level (2018)    · Chronic systolic cardiomyopathy, LVEF 15-20%   RECOMMENDATIONS:   Compensated respiratory status; continue supplemental O2 via NC as needed for goal SPO2> 90%  Aspiration prevention bundle, head of the bed at 30' all times, pulmonary hygiene care  Transfuse PRBC for Hgb < 7  Monitor hemodynamics, UO  Starting/continuing anti-platelet per any cardiology consult, may consider to get Dr. Karla Ferguson after colonoscopy in AM by GI  Glycemic control  Stress ulcer prophylaxis - PPI  DVT prophylaxis - SCDs  AM labs. Diet - advance per GI work up    Will defer respective systems problem management to primary and other consultant and will sign off. History:   Mr. Danie Meyer. has been seen and evaluated as Dr. Lr Shock requested now for assisting in ICU management of severe anemia. Patient is a 81 y.o. male HTN, HLD, CAD, DM, asthma presented with pre-syncopal episode from PCP's office. In ED, patient noted to have severe anemia with Hgb 3.5. Heme occult was positive. GI has been consulted. He states in [place] dx with NSTEMI, underwent LHC and had stent placed, subsequently he was started on Eliquis and plavix. He has noticed some dark stool over the past several weeks, no abd pain, hematemesis and other bleeding, weight loss, nausea, vomiting.  He has associated fatigue and MYRICK. The patient denies cough, chest pain, wheezing or hemoptysis. Denies NSAIDs, tobacco and IVDA. Last drink was over 3 months ago. Subjective:  Patient had no further GI bleed with brown stool with MB, HGB stable  Hemodynamic stable. The patient denies cough, chest pain, dyspnea, wheezing or hemoptysis. No fever, UO good, tolerating diet well. Possible colonoscopy in AM tomorrow    Review of Systems:  General ROS: negative for  - fever, chills, weight loss, fatigue and malaise  Cardiovascular ROS: negative for - chest pain, edema, murmur, orthopnea, palpitations or paroxysmal nocturnal dyspnea  Gastrointestinal ROS: no abdominal pain, or black or bloody stools  Dermatological ROS: negative for - pruritus, rash or skin lesion changes       Past Medical History:  Past Medical History:   Diagnosis Date    Asthma     Diabetes ([facility] [address]     High cholesterol     Hypertension         Past Surgical History:  Past Surgical History:   Procedure Laterality Date    HX CATARACT REMOVAL          Medications:  Prior to Admission medications    Medication Sig Start Date End Date Taking? Authorizing Provider   apixaban (ELIQUIS) 5 mg tablet Take 1 Tab by mouth two (2) times a day. 2/15/18   Steen Round, PA-C   aspirin delayed-release 81 mg tablet Take 1 Tab by mouth daily. 2/16/18   Jeremy Round, PA-C   atorvastatin (LIPITOR) 40 mg tablet Take 1 Tab by mouth nightly. 2/15/18   Steen Round, PA-C   clopidogrel (PLAVIX) 75 mg tab Take 1 Tab by mouth daily. 2/16/18   Jeremy Round, PA-C   furosemide (LASIX) 40 mg tablet Take 1 Tab by mouth daily. 2/15/18   Jeremy Round, PA-C   insulin glargine (LANTUS) 100 unit/mL injection 15 Units by SubCUTAneous route daily. 2/15/18   Steen Round, PA-C   losartan (COZAAR) 25 mg tablet Take 1 Tab by mouth daily. 2/16/18   Jeremy Round, PA-C   metoprolol succinate (TOPROL-XL) 50 mg XL tablet Take 1 Tab by mouth daily.  2/16/18   [facility] JAYLA Silva PA-C   SITagliptin (JANUVIA) 50 mg tablet Take 1 Tab by mouth daily. 2/15/18   Samy Murphy PA-C   lancets (ADVOCATE LANCET) 30 gauge misc Use while checking BGL each morning. 2/15/18   Samy Murphy PA-C   glucose blood VI test strips (IGLUCOSE TEST STRIP) strip Use to check BGL every morning 2/15/18   Samy Murphy PA-C       Current Facility-Administered Medications   Medication Dose Route Frequency    polyethylene glycol (MIRALAX) packet 17 g  17 g Oral TID    lubiPROStone (AMITIZA) capsule 24 mcg  24 mcg Oral BID WITH MEALS    docusate sodium (COLACE) capsule 100 mg  100 mg Oral TID    insulin glargine (LANTUS) injection 7 Units  7 Units SubCUTAneous DAILY    insulin lispro (HUMALOG) injection   SubCUTAneous AC&HS    pantoprazole (PROTONIX) 40 mg in sodium chloride 0.9% 10 mL injection  40 mg IntraVENous Q12H       Allergy:  No Known Allergies     Social History:  Social History   Substance Use Topics    Smoking status: Former Smoker    Smokeless tobacco: Never Used    Alcohol use 0.6 oz/week     1 Cans of beer per week        Family History:  Denies family history of chronic lung disease such as COPD. Objective:   Vital Signs:    Blood pressure 132/86, pulse 87, temperature 98.3 °F (36.8 °C), resp. rate 27, height 5' 7\" (1.702 m), weight 80.9 kg (178 lb 5.6 oz), SpO2 100 %. Body mass index is 27.93 kg/(m^2).    O2 Device: Nasal cannula   O2 Flow Rate (L/min): 2 l/min   Temp (24hrs), Av °F (36.7 °C), Min:97.6 °F (36.4 °C), Max:98.3 °F (36.8 °C)       Intake/Output:   Last shift:       07 - 1900  In: 360 [P.O.:360]  Out: 700 [Urine:700]  Last 3 shifts: 1901 -  0700  In: 1695.8 [P.O.:1080]  Out: 2453 [Urine:1775]    Intake/Output Summary (Last 24 hours) at 18 1327  Last data filed at 18 1100   Gross per 24 hour   Intake              600 ml   Output             1550 ml   Net             -950 ml       Physical Exam:  General: AAO x 3, pale, no respiratory distress, cooperative, no distress, appears older than stated age  HEENT: PERRLA, EOMI, fundi benign, throat normal without erythema or exudate  Neck: No abnormally enlarged lymph nodes or thyroid, supple  Chest: normal  Lungs: moderate air entry, clear to auscultation bilaterally, normal percussion bilaterally, no tenderness/ rash  Heart: Regular rate and rhythm, S1S2 present or without murmur or extra heart sounds  Abdomen: protuberant, bowel sounds normoactive, tympanic, abdomen is soft without significant tenderness, masses, organomegaly or guarding, rigidity, rebound  Extremity: negative for edema, cyanosis, clubbing  Neuro: alert, oriented x 3, no defects noted in general exam.  Skin: Skin color, texture, turgor fair.  Skin dry, warm, non-diaphoretic    Data:     Recent Results (from the past 24 hour(s))   HGB & HCT    Collection Time: 05/30/18  4:14 PM   Result Value Ref Range    HGB 7.8 (L) 13.0 - 16.0 g/dL    HCT 25.5 (L) 36.0 - 48.0 %   H PYLORI ABS, IGA AND IGG    Collection Time: 05/30/18  4:14 PM   Result Value Ref Range    H. pylori Ab, IgA PENDING units    H. pylori Ab, IgG <0.80 0.00 - 0.79 Index Value   GLUCOSE, POC    Collection Time: 05/30/18  5:40 PM   Result Value Ref Range    Glucose (POC) 93 70 - 110 mg/dL   GLUCOSE, POC    Collection Time: 05/30/18  9:07 PM   Result Value Ref Range    Glucose (POC) 134 (H) 70 - 110 mg/dL   CBC W/O DIFF    Collection Time: 05/31/18  3:45 AM   Result Value Ref Range    WBC 9.8 4.6 - 13.2 K/uL    RBC 3.47 (L) 4.70 - 5.50 M/uL    HGB 7.5 (L) 13.0 - 16.0 g/dL    HCT 25.0 (L) 36.0 - 48.0 %    MCV 72.0 (L) 74.0 - 97.0 FL    MCH 21.6 (L) 24.0 - 34.0 PG    MCHC 30.0 (L) 31.0 - 37.0 g/dL    RDW 25.2 (H) 11.6 - 14.5 %    PLATELET 972 624 - 934 K/uL    MPV 9.4 9.2 - 08.4 FL   METABOLIC PANEL, BASIC    Collection Time: 05/31/18  3:45 AM   Result Value Ref Range    Sodium 140 136 - 145 mmol/L    Potassium 3.5 3.5 - 5.5 mmol/L    Chloride 104 100 - 108 mmol/L    CO2 30 21 - 32 mmol/L    Anion gap 6 3.0 - 18 mmol/L    Glucose 124 (H) 74 - 99 mg/dL    BUN 24 (H) 7.0 - 18 MG/DL    Creatinine 1.48 (H) 0.6 - 1.3 MG/DL    BUN/Creatinine ratio 16 12 - 20      GFR est AA 57 (L) >60 ml/min/1.73m2    GFR est non-AA 47 (L) >60 ml/min/1.73m2    Calcium 8.0 (L) 8.5 - 10.1 MG/DL   GLUCOSE, POC    Collection Time: 05/31/18  6:27 AM   Result Value Ref Range    Glucose (POC) 112 (H) 70 - 110 mg/dL   GLUCOSE, POC    Collection Time: 05/31/18  7:10 AM   Result Value Ref Range    Glucose (POC) 103 70 - 110 mg/dL   HGB & HCT    Collection Time: 05/31/18  9:54 AM   Result Value Ref Range    HGB 7.8 (L) 13.0 - 16.0 g/dL    HCT 26.0 (L) 36.0 - 48.0 %   GLUCOSE, POC    Collection Time: 05/31/18 11:18 AM   Result Value Ref Range    Glucose (POC) 164 (H) 70 - 110 mg/dL           Recent Labs      05/29/18   1821   FIO2I  36   HCO3I  21.0*   PCO2I  29.4*   PHI  7.461*   PO2I  132*       All Micro Results     None          Telemetry: normal sinus rhythm    2/11/18 ECHO  LVEF 15-20%    Imaging:  [x]I have personally reviewed the patients chest radiographs images and report       Results from Hospital Encounter encounter on 05/29/18   XR CHEST PORT   Narrative EXAM: Chest x-ray portable 1 view    INDICATION: Shortness of breath    COMPARISON: February 10, 2018    _____________    FINDINGS:     The lungs are clear. The cardiomediastinal silhouette is within normal limits  for an AP radiograph of the chest. There are no pleural effusions. _____________         Impression IMPRESSION:     No acute cardiopulmonary disease          Results from [place] encounter on 05/29/18   CT ABD PELV WO CONT   Narrative EXAM: CT of the abdomen and pelvis    INDICATION: Abdominal pain    COMPARISON: None. TECHNIQUE: Axial CT imaging of the abdomen and pelvis was performed without  intravenous contrast. Multiplanar reformats were generated.     DOSE REDUCTION:  One or more dose reduction techniques were used on this CT:  automated exposure control, adjustment of the mAs and/or kVp according to  patient's size, and iterative reconstruction techniques. The specific techniques  utilized on this CT exam have been documented in the patient's electronic  medical record.    _______________    FINDINGS:    LOWER CHEST: Small bilateral effusions are seen. There is a small hiatal hernia. LIVER, BILIARY: There is hepatic steatosis. No biliary dilation. Gallbladder is  unremarkable. PANCREAS: Normal.    SPLEEN: Normal.    ADRENALS: Normal.    KIDNEYS/URETERS: There is perinephric stranding around both kidneys. There is  mild hydronephrosis. Prominent extrarenal pelves seen bilaterally. Is  hydroureter bilaterally down to the pelvis however no ureteral calculi seen. VASCULATURE: Moderate calcific atherosclerosis present. LYMPH NODES: No enlarged lymph nodes. GASTRO INTESTINAL TRACT: There is a small hiatal hernia. There is no bowel  obstruction. There is a right inguinal hernia extending into the right scrotal  sac containing cecum and terminal ileum. There is no bowel obstruction. This  hernia sac is fairly large measuring 12.7 x 13.7 x 16.2 cm. There is no  stranding of the fat in this hernia sac. There is no edema of the bowel wall. No  evidence of acute appendicitis. The appendix is normal in the right scrotal sac. Patulous left inguinal canal with fat content seen    PELVIC ORGANS/BLADDER: Prostate is mildly enlarged. The urinary bladder is  unremarkable. The urinary bladder is markedly distended. BONES: No acute or aggressive osseous abnormalities identified. OTHER: None.    _______________         Impression IMPRESSION:    1. Is a very large right inguinal hernia extending into the right scrotal sac  containing proximal colon and terminal ileum. There is no bowel obstruction or  strangulation. Mild to moderate bilateral hydronephrosis and hydroureter down to the pelvis.   This may be secondary to markedly distended bladder. Small bilateral effusions    Small hiatal hernia          [x]See my orders for details    My assessment, plan of care, findings, medications, side effects etc were discussed with:  [x]nursing []PT/OT    []respiratory therapy []Dr. Elke Mercer [x]Patient     [x]Total critical care time exclusive of procedures 31 minutes with complex decision making performed and > 50% time spent in face to face evaluation.     Diana Huff MD

## 2018-05-31 NOTE — PROGRESS NOTES
Hospitalist Progress Note    Patient: Ning Pearson MRN: [id number]  CSN: [id number]    YOB: 1945  Age: 81 y.o. Sex: male    DOA: 5/29/2018 LOS:  LOS: 2 days          Chief Complaint:     Ac blood loss anemia      Assessment/Plan     Ac blood loss anemia, severe  GI bleed  Aflutter/fib  AC with eliquis and plavix/asa  CAD, stent placement , NSTEMI  Chronic systolic CHF  IDDM  CKD 3    Hgb has come up above 7 from 3 range-s/p transfusions  Repeat BMP and CBC in am  S/p EGD  Resume his PO cozaar and metoprolol  CT unrevealing-he has  A hernia however  Off blood thinners for now  Colonoscopy tomorrow  BG levels acceptable    Transfer to tele in interim  OOB with PT  Disposition :  Patient Active Problem List   Diagnosis Code    Atrial flutter ([facility]) I48.92    DM2 (diabetes mellitus, type 2) ([facility]) E11.9    Acute renal insufficiency N28.9    NSTEMI (non-ST elevated myocardial infarction) ([facility]) Z89.4    Systolic CHF, acute ([facility]) I50.21    Symptomatic anemia D64.9    CAD (coronary artery disease) I25.10    GI bleed K92.2    ELINOR (acute kidney injury) ([facility] [address] N17.9    CKD (chronic kidney disease) stage 3, GFR 30-59 ml/min N18.3    Elevated brain natriuretic peptide (BNP) level R79.89       Subjective:    Denies abd pain, bleeding    Review of systems:    Constitutional: denies fevers, chills, myalgias  Respiratory: denies SOB, cough  Cardiovascular: denies chest pain, palpitations  Gastrointestinal: denies nausea, vomiting, diarrhea      Vital signs/Intake and Output:  Visit Vitals    BP (!) 137/108    Pulse (!) 115    Temp 98.3 °F (36.8 °C)    Resp 17    Ht 5' 7\" (1.702 m)    Wt 80.9 kg (178 lb 5.6 oz)    SpO2 100%    BMI 27.93 kg/m2     Current Shift:  05/31 0701 - 05/31 1900  In: 360 [P.O.:360]  Out: 700 [Urine:700]  Last three shifts:  05/29 1901 - 05/31 0700  In: 1695.8 [P.O.:1080]  Out: 1775 [Urine:1775]    Exam:    General: elderly appearing pale WM, alert, NAD, OX3  Head/Neck: NCAT, supple, No masses, No lymphadenopathy  CVS:Regular rate and rhythm, no M/R/G, S1/S2 heard, no thrill  Lungs:Clear to auscultation bilaterally, no wheezes, rhonchi, or rales  Abdomen: Soft, Nontender, No distention, Normal Bowel sounds, No hepatomegaly  Extremities: No C/C/E, pulses palpable 2+  Skin:normal texture and turgor, no rashes, no lesions  Neuro:grossly normal , follows commands  Psych:appropriate                Labs: Results:       Chemistry Recent Labs      05/31/18 0345 05/30/18 0414 05/29/18 1745   GLU  124*  203*  251*   NA  140  136  133*   K  3.5  4.0  4.1   CL  104  100  94*   CO2  30  26  20*   BUN  24*  40*  45*   CREA  1.48*  1.84*  2.19*   CA  8.0*  7.6*  7.9*   AGAP  6  10  19*   BUCR  16  22*  21*   AP   --   131*  160*   TP   --   5.6*  6.2*   ALB   --   2.9*  3.2*   GLOB   --   2.7  3.0   AGRAT   --   1.1  1.1      CBC w/Diff Recent Labs      05/31/18   0954  05/31/18 0345 05/30/18   1614  05/30/18 0414 05/29/18 1745   WBC   --   9.8   --   8.1  11.8   RBC   --   3.47*   --   2.63*  2.18*   HGB  7.8*  7.5*  7.8*  5.2*  3.5*   HCT  26.0*  25.0*  25.5*  17.9*  13.3*   PLT   --   277   --   270  330   GRANS   --    --    --    --   86*   LYMPH   --    --    --    --   5*   EOS   --    --    --    --   0      Cardiac Enzymes Recent Labs      05/29/18 1745   CPK  46   CKND1  3.0      Coagulation Recent Labs      05/30/18 0414 05/29/18   1745   PTP  18.2*  20.8*   INR  1.6*  1.9*   APTT  35.6  33.7       Lipid Panel No results found for: CHOL, CHOLPOCT, CHOLX, CHLST, CHOLV, 859158, HDL, LDL, LDLC, DLDLP, 276437, VLDLC, VLDL, TGLX, TRIGL, TRIGP, TGLPOCT, CHHD, CHHDX   BNP No results for input(s): BNPP in the last 72 hours.    Liver Enzymes Recent Labs      05/30/18   0414   TP  5.6*   ALB  2.9*   AP  131*   SGOT  14*      Thyroid Studies Lab Results   Component Value Date/Time    TSH 1.72 02/11/2018 02:19 AM        Procedures/imaging: see electronic medical records for all procedures/Xrays and details which were not copied into this note but were reviewed prior to creation of Awa Velázquez MD

## 2018-05-31 NOTE — PROGRESS NOTES
Problem: Pressure Injury - Risk of  Goal: *Prevention of pressure injury  Document Anam Scale and appropriate interventions in the flowsheet.    Outcome: Progressing Towards Goal  Pressure Injury Interventions:  Sensory Interventions: Assess changes in LOC         Activity Interventions: Increase time out of bed, Pressure redistribution bed/mattress(bed type)    Mobility Interventions: Pressure redistribution bed/mattress (bed type)    Nutrition Interventions: Document food/fluid/supplement intake

## 2018-05-31 NOTE — DIABETES MGMT
GLYCEMIC CONTROL PROGRESS NOTE:    -discussed in rounds, known h/o T2DM, basal + oral home regimen  -BG out of target range ICU: 140-180 mg/dL  -TDD = 11 (7 lantus + 4 - Humalog Normal Insulin Sensitivity Corrective Coverage)  -24 hour BG trend trending down  -recommend monitor BG trends for the need to add mealtime insulin as diet advances    Recent Glucose Results:   Lab Results   Component Value Date/Time     (H) 05/31/2018 03:45 AM    GLUCPOC 103 05/31/2018 07:10 AM    GLUCPOC 112 (H) 05/31/2018 06:27 AM    GLUCPOC 134 (H) 05/30/2018 09:07 PM        Kingsley Krishnamurthy RN, MS  Glycemic Control Team  Pager [phone] (M-TH 8:30-5P)  *After Hours pager [phone]

## 2018-06-01 ENCOUNTER — HOME HEALTH ADMISSION (OUTPATIENT)
Dept: HOME HEALTH SERVICES | Facility: HOME HEALTH | Age: 73
End: 2018-06-01

## 2018-06-01 VITALS
HEART RATE: 78 BPM | OXYGEN SATURATION: 99 % | RESPIRATION RATE: 18 BRPM | SYSTOLIC BLOOD PRESSURE: 123 MMHG | WEIGHT: 185.19 LBS | BODY MASS INDEX: 29.07 KG/M2 | DIASTOLIC BLOOD PRESSURE: 57 MMHG | TEMPERATURE: 97.5 F | HEIGHT: 67 IN

## 2018-06-01 LAB
ANION GAP SERPL CALC-SCNC: 9 MMOL/L (ref 3–18)
BUN SERPL-MCNC: 12 MG/DL (ref 7–18)
BUN/CREAT SERPL: 10 (ref 12–20)
CALCIUM SERPL-MCNC: 8.1 MG/DL (ref 8.5–10.1)
CHLORIDE SERPL-SCNC: 104 MMOL/L (ref 100–108)
CO2 SERPL-SCNC: 29 MMOL/L (ref 21–32)
CREAT SERPL-MCNC: 1.18 MG/DL (ref 0.6–1.3)
ERYTHROCYTE [DISTWIDTH] IN BLOOD BY AUTOMATED COUNT: 26 % (ref 11.6–14.5)
GLUCOSE BLD STRIP.AUTO-MCNC: 114 MG/DL (ref 70–110)
GLUCOSE BLD STRIP.AUTO-MCNC: 136 MG/DL (ref 70–110)
GLUCOSE BLD STRIP.AUTO-MCNC: 212 MG/DL (ref 70–110)
GLUCOSE BLD STRIP.AUTO-MCNC: 214 MG/DL (ref 70–110)
GLUCOSE SERPL-MCNC: 104 MG/DL (ref 74–99)
HCT VFR BLD AUTO: 24.5 % (ref 36–48)
HCT VFR BLD AUTO: 26.8 % (ref 36–48)
HCT VFR BLD AUTO: 27.9 % (ref 36–48)
HGB BLD-MCNC: 7.3 G/DL (ref 13–16)
HGB BLD-MCNC: 8 G/DL (ref 13–16)
HGB BLD-MCNC: 8.2 G/DL (ref 13–16)
MCH RBC QN AUTO: 21.6 PG (ref 24–34)
MCHC RBC AUTO-ENTMCNC: 29.4 G/DL (ref 31–37)
MCV RBC AUTO: 73.4 FL (ref 74–97)
PLATELET # BLD AUTO: 311 K/UL (ref 135–420)
PMV BLD AUTO: 9.6 FL (ref 9.2–11.8)
POTASSIUM SERPL-SCNC: 3.3 MMOL/L (ref 3.5–5.5)
RBC # BLD AUTO: 3.8 M/UL (ref 4.7–5.5)
SODIUM SERPL-SCNC: 142 MMOL/L (ref 136–145)
WBC # BLD AUTO: 6.4 K/UL (ref 4.6–13.2)

## 2018-06-01 PROCEDURE — 74011250636 HC RX REV CODE- 250/636: Performed by: INTERNAL MEDICINE

## 2018-06-01 PROCEDURE — 82962 GLUCOSE BLOOD TEST: CPT

## 2018-06-01 PROCEDURE — 88305 TISSUE EXAM BY PATHOLOGIST: CPT | Performed by: INTERNAL MEDICINE

## 2018-06-01 PROCEDURE — 74011636637 HC RX REV CODE- 636/637: Performed by: INTERNAL MEDICINE

## 2018-06-01 PROCEDURE — G0500 MOD SEDAT ENDO SERVICE >5YRS: HCPCS | Performed by: INTERNAL MEDICINE

## 2018-06-01 PROCEDURE — 65270000029 HC RM PRIVATE

## 2018-06-01 PROCEDURE — 76040000008: Performed by: INTERNAL MEDICINE

## 2018-06-01 PROCEDURE — 77030013991 HC SNR POLYP ENDOSC BSC -A: Performed by: INTERNAL MEDICINE

## 2018-06-01 PROCEDURE — 85018 HEMOGLOBIN: CPT | Performed by: INTERNAL MEDICINE

## 2018-06-01 PROCEDURE — 74011250637 HC RX REV CODE- 250/637: Performed by: INTERNAL MEDICINE

## 2018-06-01 PROCEDURE — 36415 COLL VENOUS BLD VENIPUNCTURE: CPT | Performed by: HOSPITALIST

## 2018-06-01 PROCEDURE — 83540 ASSAY OF IRON: CPT | Performed by: INTERNAL MEDICINE

## 2018-06-01 PROCEDURE — 80048 BASIC METABOLIC PNL TOTAL CA: CPT | Performed by: HOSPITALIST

## 2018-06-01 PROCEDURE — 85027 COMPLETE CBC AUTOMATED: CPT | Performed by: HOSPITALIST

## 2018-06-01 PROCEDURE — 74011250636 HC RX REV CODE- 250/636

## 2018-06-01 PROCEDURE — 77010033678 HC OXYGEN DAILY

## 2018-06-01 PROCEDURE — 77030020256 HC SOL INJ NACL 0.9%  500ML: Performed by: INTERNAL MEDICINE

## 2018-06-01 PROCEDURE — 0DBN8ZZ EXCISION OF SIGMOID COLON, VIA NATURAL OR ARTIFICIAL OPENING ENDOSCOPIC: ICD-10-PCS | Performed by: INTERNAL MEDICINE

## 2018-06-01 PROCEDURE — 99153 MOD SED SAME PHYS/QHP EA: CPT | Performed by: INTERNAL MEDICINE

## 2018-06-01 PROCEDURE — 74011250637 HC RX REV CODE- 250/637: Performed by: HOSPITALIST

## 2018-06-01 PROCEDURE — C9113 INJ PANTOPRAZOLE SODIUM, VIA: HCPCS | Performed by: INTERNAL MEDICINE

## 2018-06-01 RX ORDER — DEXTROMETHORPHAN/PSEUDOEPHED 2.5-7.5/.8
1.2 DROPS ORAL
Status: CANCELLED | OUTPATIENT
Start: 2018-06-01

## 2018-06-01 RX ORDER — PHENOL/SODIUM PHENOLATE
20 AEROSOL, SPRAY (ML) MUCOUS MEMBRANE DAILY
Qty: 30 TAB | Refills: 5 | Status: SHIPPED | OUTPATIENT
Start: 2018-06-01 | End: 2021-05-25

## 2018-06-01 RX ORDER — NALOXONE HYDROCHLORIDE 0.4 MG/ML
0.4 INJECTION, SOLUTION INTRAMUSCULAR; INTRAVENOUS; SUBCUTANEOUS
Status: DISCONTINUED | OUTPATIENT
Start: 2018-06-01 | End: 2018-06-01 | Stop reason: HOSPADM

## 2018-06-01 RX ORDER — MIDAZOLAM HYDROCHLORIDE 1 MG/ML
.5-5 INJECTION, SOLUTION INTRAMUSCULAR; INTRAVENOUS
Status: DISCONTINUED | OUTPATIENT
Start: 2018-06-01 | End: 2018-06-01 | Stop reason: HOSPADM

## 2018-06-01 RX ORDER — FENTANYL CITRATE 50 UG/ML
100 INJECTION, SOLUTION INTRAMUSCULAR; INTRAVENOUS
Status: DISCONTINUED | OUTPATIENT
Start: 2018-06-01 | End: 2018-06-01 | Stop reason: HOSPADM

## 2018-06-01 RX ORDER — FLUMAZENIL 0.1 MG/ML
0.2 INJECTION INTRAVENOUS
Status: DISCONTINUED | OUTPATIENT
Start: 2018-06-01 | End: 2018-06-01 | Stop reason: HOSPADM

## 2018-06-01 RX ORDER — ATROPINE SULFATE 0.1 MG/ML
0.5 INJECTION INTRAVENOUS
Status: CANCELLED | OUTPATIENT
Start: 2018-06-01 | End: 2018-06-01

## 2018-06-01 RX ORDER — SODIUM CHLORIDE 9 MG/ML
100 INJECTION, SOLUTION INTRAVENOUS CONTINUOUS
Status: CANCELLED | OUTPATIENT
Start: 2018-06-01 | End: 2018-06-01

## 2018-06-01 RX ORDER — EPINEPHRINE 0.1 MG/ML
1 INJECTION INTRACARDIAC; INTRAVENOUS
Status: CANCELLED | OUTPATIENT
Start: 2018-06-01 | End: 2018-06-01

## 2018-06-01 RX ADMIN — SODIUM CHLORIDE 40 MG: 9 INJECTION INTRAMUSCULAR; INTRAVENOUS; SUBCUTANEOUS at 17:32

## 2018-06-01 RX ADMIN — DOCUSATE SODIUM 100 MG: 100 CAPSULE, LIQUID FILLED ORAL at 09:43

## 2018-06-01 RX ADMIN — IRON SUCROSE 200 MG: 20 INJECTION, SOLUTION INTRAVENOUS at 21:39

## 2018-06-01 RX ADMIN — POLYETHYLENE GLYCOL 3350 17 G: 17 POWDER, FOR SOLUTION ORAL at 14:00

## 2018-06-01 RX ADMIN — INSULIN LISPRO 4 UNITS: 100 INJECTION, SOLUTION INTRAVENOUS; SUBCUTANEOUS at 21:30

## 2018-06-01 RX ADMIN — LOSARTAN POTASSIUM 25 MG: 25 TABLET ORAL at 09:43

## 2018-06-01 RX ADMIN — INSULIN GLARGINE 7 UNITS: 100 INJECTION, SOLUTION SUBCUTANEOUS at 09:43

## 2018-06-01 RX ADMIN — DOCUSATE SODIUM 100 MG: 100 CAPSULE, LIQUID FILLED ORAL at 15:25

## 2018-06-01 RX ADMIN — ATORVASTATIN CALCIUM 40 MG: 20 TABLET, FILM COATED ORAL at 21:29

## 2018-06-01 RX ADMIN — SODIUM CHLORIDE 40 MG: 9 INJECTION INTRAMUSCULAR; INTRAVENOUS; SUBCUTANEOUS at 05:25

## 2018-06-01 RX ADMIN — LUBIPROSTONE 24 MCG: 24 CAPSULE, GELATIN COATED ORAL at 17:32

## 2018-06-01 RX ADMIN — METOPROLOL SUCCINATE 50 MG: 50 TABLET, EXTENDED RELEASE ORAL at 09:43

## 2018-06-01 RX ADMIN — INSULIN LISPRO 4 UNITS: 100 INJECTION, SOLUTION INTRAVENOUS; SUBCUTANEOUS at 12:25

## 2018-06-01 NOTE — DISCHARGE SUMMARY
Discharge Summary    Patient: Kristel Red MRN: [id number]  CSN: [id number]    YOB: 1945  Age: 81 y.o. Sex: male    DOA: 5/29/2018 LOS:  LOS: 3 days   Discharge Date:      Primary Care Provider:  Hans Beard MD    Admission Diagnoses: Severe anemia  GI bleed  GI bleed  anemia    Discharge Diagnoses:    Problem List as of 6/1/2018  Date Reviewed: 5/29/2018          Codes Class Noted - Resolved    Symptomatic anemia ICD-10-CM: D64.9  ICD-9-CM: 285.9  5/29/2018 - Present        CAD (coronary artery disease) ICD-10-CM: I25.10  ICD-9-CM: 414.00  5/29/2018 - Present        * (Principal)GI bleed ICD-10-CM: K92.2  ICD-9-CM: 578.9  5/29/2018 - Present        ELINOR (acute kidney injury) ([facility] ICD-10-CM: N17.9  ICD-9-CM: 584.9  5/29/2018 - Present        CKD (chronic kidney disease) stage 3, GFR 30-59 ml/min ICD-10-CM: N18.3  ICD-9-CM: 585.3  5/29/2018 - Present        Elevated brain natriuretic peptide (BNP) level ICD-10-CM: R79.89  ICD-9-CM: 790.99  5/29/2018 - Present        Atrial flutter ([facility] ICD-10-CM: Q88.95  ICD-9-CM: 427.32  2/12/2018 - Present        DM2 (diabetes mellitus, type 2) ([facility] ICD-10-CM: E11.9  ICD-9-CM: 250.00  2/12/2018 - Present        Acute renal insufficiency ICD-10-CM: N28.9  ICD-9-CM: 593.9  2/12/2018 - Present        NSTEMI (non-ST elevated myocardial infarction) ([facility] ICD-10-CM: I21.4  ICD-9-CM: 410.70  2/12/2018 - Present        Systolic CHF, acute ([facility] ICD-10-CM: I50.21  ICD-9-CM: 428.21, 428.0  2/12/2018 - Present        RESOLVED: Tachycardia ICD-10-CM: R00.0  ICD-9-CM: 785.0  2/10/2018 - 2/13/2018              Discharge Medications:     Current Discharge Medication List      START taking these medications    Details   Omeprazole delayed release (PRILOSEC D/R) 20 mg tablet Take 1 Tab by mouth daily.   Qty: 30 Tab, Refills: 5         CONTINUE these medications which have NOT CHANGED    Details   apixaban (ELIQUIS) 5 mg tablet Take 1 Tab by mouth two (2) times a day.  Qty: 60 Tab, Refills: 0      aspirin delayed-release 81 mg tablet Take 1 Tab by mouth daily. Qty: 30 Tab, Refills: 0      atorvastatin (LIPITOR) 40 mg tablet Take 1 Tab by mouth nightly. Qty: 30 Tab, Refills: 0      clopidogrel (PLAVIX) 75 mg tab Take 1 Tab by mouth daily. Qty: 30 Tab, Refills: 0      furosemide (LASIX) 40 mg tablet Take 1 Tab by mouth daily. Qty: 30 Tab, Refills: 0      insulin glargine (LANTUS) 100 unit/mL injection 15 Units by SubCUTAneous route daily. Qty: 1 Vial, Refills: 0      losartan (COZAAR) 25 mg tablet Take 1 Tab by mouth daily. Qty: 30 Tab, Refills: 0      metoprolol succinate (TOPROL-XL) 50 mg XL tablet Take 1 Tab by mouth daily. Qty: 30 Tab, Refills: 0      SITagliptin (JANUVIA) 50 mg tablet Take 1 Tab by mouth daily. Qty: 30 Tab, Refills: 0      lancets (ADVOCATE LANCET) 30 gauge misc Use while checking BGL each morning. Qty: 1 Package, Refills: 0      glucose blood VI test strips (IGLUCOSE TEST STRIP) strip Use to check BGL every morning  Qty: 50 Strip, Refills: 0             Discharge Condition: Good    Procedures : EGD, Colonoscopy    Consults: Gastroenterology      PHYSICAL EXAM   Visit Vitals    /59    Pulse 67    Temp 98.2 °F (36.8 °C)    Resp 17    Ht 5' 7\" (1.702 m)    Wt 84 kg (185 lb 3 oz)    SpO2 94%    BMI 29 kg/m2     General: Awake, cooperative, no acute distress    HEENT: NC, Atraumatic. PERRLA, EOMI. Anicteric sclerae. Lungs:  CTA Bilaterally. No Wheezing/Rhonchi/Rales. Heart:  Regular  rhythm,  No murmur, No Rubs, No Gallops  Abdomen: Soft, Non distended, Non tender. +Bowel sounds,   Extremities: No c/c/e  Psych:   Not anxious or agitated. Neurologic:  No acute neurological deficits. Admission HPI : Kristel Red is a 81 y.o. male who came to the ed after being Presyncopal at his PCPs office.  Patient states he went to his routine appt and while leaving the office, he felt lightheadedness and fell down on his knees therefore sent to the ED. Patient states he was here back in feb and was dx with NSTEMI. He underwent LHC and has stent placed, subsequently he was started on ELiquis and plavix. Since then he has noticed some dark stool over the past several weeks. No abd pain, hematemesis and other complaints. Denies every having C scope in the past. No weight loss and other complaints. During this time he also reports of progressive exertional sob and more pale appearing. In the ED he was noted to have Hb of 3 which was lower from baseline around 13 back in feb 2018. Heme occult was positive. GI was consulted and 2 units prbc was ordered. His Cr was noted to be slightly elevated too. Denies tobacco and IVDA. Last drink was over 3 months ago. Hospital Course :     Severe symptomatic anemia - from GI bleed. It is likely chronic bleed as patient had Hb of 3.5. Until he had symptoms. H/H now improved after multiple blood transfusions. Monitored his H/H and has remained above 8 for the last 2 days. GI bleed - seen and followed by GI. His eliquis, aspirin and plavix all stopped. EGD showed Grade B small linear erosive esophagitis on 2 locations in the distal esophagus. Slightly irregular Z line and 1.5 cm hiatal hernia. Gastric biopsy not taken because on Plavix. Colonoscopy showed Decreased anal sphincter tone. 2 small  4 mm sessile  Polyps in the sigmoid they were hot snared. Mild sigmoid diverticulosis. . Normal Mucosa. GI recommended high fiber diet, PPI, avoid NSAIDS. Repeat colonoscopy 5-10 years pending histology. CT abdomen pelvis showed Is a very large right inguinal hernia extending into the right scrotal sac  containing proximal colon and terminal ileum. There is no bowel obstruction or Strangulation. Mild to moderate bilateral hydronephrosis and hydroureter down to the pelvis. This may be secondary to markedly distended bladder. No other acute findings.    H-Pylori negative  GI is ok to restart eliquis, plavix and aspirin. For his other chronic stable problems, we continued his home medications. Activity: Activity as tolerated    Diet: Cardiac Diet    Follow-up: PCP, GI    Disposition: home    Minutes spent on discharge: 45       Labs: Results:       Chemistry Recent Labs      06/01/18   0540  05/31/18   0345  05/30/18 0414 05/29/18   1745   GLU  104*  124*  203*  251*   NA  142  140  136  133*   K  3.3*  3.5  4.0  4.1   CL  104  104  100  94*   CO2  29  30  26  20*   BUN  12  24*  40*  45*   CREA  1.18  1.48*  1.84*  2.19*   CA  8.1*  8.0*  7.6*  7.9*   AGAP  9  6  10  19*   BUCR  10*  16  22*  21*   AP   --    --   131*  160*   TP   --    --   5.6*  6.2*   ALB   --    --   2.9*  3.2*   GLOB   --    --   2.7  3.0   AGRAT   --    --   1.1  1.1      CBC w/Diff Recent Labs      06/01/18   1155  06/01/18 0540  05/31/18   1557   05/31/18 0345 05/30/18 0414 05/29/18 1745   WBC   --   6.4   --    --   9.8   --   8.1  11.8   RBC   --   3.80*   --    --   3.47*   --   2.63*  2.18*   HGB  8.0*  8.2*  8.3*   < >  7.5*   < >  5.2*  3.5*   HCT  26.8*  27.9*  27.1*   < >  25.0*   < >  17.9*  13.3*   PLT   --   311   --    --   277   --   270  330   GRANS   --    --    --    --    --    --    --   86*   LYMPH   --    --    --    --    --    --    --   5*   EOS   --    --    --    --    --    --    --   0    < > = values in this interval not displayed. Cardiac Enzymes Recent Labs      05/29/18 1745   CPK  46   CKND1  3.0      Coagulation Recent Labs      05/30/18 0414 05/29/18   1745   PTP  18.2*  20.8*   INR  1.6*  1.9*   APTT  35.6  33.7       Lipid Panel No results found for: CHOL, CHOLPOCT, CHOLX, CHLST, CHOLV, 124190, HDL, LDL, LDLC, DLDLP, 886227, VLDLC, VLDL, TGLX, TRIGL, TRIGP, TGLPOCT, CHHD, CHHDX   BNP No results for input(s): BNPP in the last 72 hours.    Liver Enzymes Recent Labs      05/30/18   0414   TP  5.6*   ALB  2.9*   AP  131*   SGOT  14*      Thyroid Studies Lab Results   Component Value Date/Time    TSH 1.72 02/11/2018 02:19 AM            Significant Diagnostic Studies: Ct Abd Pelv Wo Cont    Result Date: 5/30/2018  EXAM: CT of the abdomen and pelvis INDICATION: Abdominal pain COMPARISON: None. TECHNIQUE: Axial CT imaging of the abdomen and pelvis was performed without intravenous contrast. Multiplanar reformats were generated. DOSE REDUCTION:  One or more dose reduction techniques were used on this CT: automated exposure control, adjustment of the mAs and/or kVp according to patient's size, and iterative reconstruction techniques. The specific techniques utilized on this CT exam have been documented in the patient's electronic medical record. _______________ FINDINGS: LOWER CHEST: Small bilateral effusions are seen. There is a small hiatal hernia. LIVER, BILIARY: There is hepatic steatosis. No biliary dilation. Gallbladder is unremarkable. PANCREAS: Normal. SPLEEN: Normal. ADRENALS: Normal. KIDNEYS/URETERS: There is perinephric stranding around both kidneys. There is mild hydronephrosis. Prominent extrarenal pelves seen bilaterally. Is hydroureter bilaterally down to the pelvis however no ureteral calculi seen. VASCULATURE: Moderate calcific atherosclerosis present. LYMPH NODES: No enlarged lymph nodes. GASTRO INTESTINAL TRACT: There is a small hiatal hernia. There is no bowel obstruction. There is a right inguinal hernia extending into the right scrotal sac containing cecum and terminal ileum. There is no bowel obstruction. This hernia sac is fairly large measuring 12.7 x 13.7 x 16.2 cm. There is no stranding of the fat in this hernia sac. There is no edema of the bowel wall. No evidence of acute appendicitis. The appendix is normal in the right scrotal sac. Patulous left inguinal canal with fat content seen PELVIC ORGANS/BLADDER: Prostate is mildly enlarged. The urinary bladder is unremarkable. The urinary bladder is markedly distended.  BONES: No acute or aggressive osseous abnormalities identified. OTHER: None. _______________     IMPRESSION: 1. Is a very large right inguinal hernia extending into the right scrotal sac containing proximal colon and terminal ileum. There is no bowel obstruction or strangulation. Mild to moderate bilateral hydronephrosis and hydroureter down to the pelvis. This may be secondary to markedly distended bladder. Small bilateral effusions Small hiatal hernia     Xr Chest Port    Result Date: 5/29/2018  EXAM: Chest x-ray portable 1 view INDICATION: Shortness of breath COMPARISON: February 10, 2018 _____________ FINDINGS: The lungs are clear. The cardiomediastinal silhouette is within normal limits for an AP radiograph of the chest. There are no pleural effusions. _____________     IMPRESSION: No acute cardiopulmonary disease        No results found for this or any previous visit.         CC: Doris Shaffer MD

## 2018-06-01 NOTE — PROGRESS NOTES
COLONOSCOPY COMPLETED    Patient received 3 mg of versed, 100 mcg of fentanyl, and 150 ml of normal saline    Findings are decreased anal sphincter tone, two polyps in the sigmoid colon, and diverticulosis    VSs at this time    Patient arouses to name and light touch.      Report called to Kian

## 2018-06-01 NOTE — PROGRESS NOTES
Chart reviewed. Attended IDRs with MD Mat eJan. Met with patient at bedside. Pt will have colonoscopy today. Pt may discharge today after procedure. Pt was wearing oxygen during IDRs. MD Mat Jean asked pt to be taken off oxygen. Nursing, if patient will require oxygen at discharge please conduct and chart walk test.  Pt lives with wife. Pts PCP is MD Tommy Haines. Pts cardiologist is MD Alfaro. Pt denies using any  DME. Pt denies being active with any HH. Pt was offered [facility] [place] and pt refused. Will place [facility] order. CM will cont to be available. Care Management Interventions  PCP Verified by CM: Yes  Mode of Transport at Discharge:  Other (see comment) (wife)  Transition of Care Consult (CM Consult): Discharge Planning  Current Support Network: Lives with Spouse  Confirm Follow Up Transport: Family  Plan discussed with Pt/Family/Caregiver: Yes  Freedom of Choice Offered: Yes  Discharge Location  Discharge Placement: Home with family assistance [facility]

## 2018-06-01 NOTE — ROUTINE PROCESS
1930-Verbal report received from Shaun Joy RN. Sbar, mar, labs, kardex, and patient status reviewed. Assumed care of patient. 2000-Patient OOB without informing nurse first. Educated on safety and alerting nurse prior to getting up. Patient is remains slightly unsteady on feet at this time. Assisted to bedside chair and window opened to visualize patient at all times. Large BM in bed-brown, no visible blood at this time. Orders released for colonoscopy in AM.   2100-Initiated bowel prep at this time. No difficulty with drinking prep. 2200-Orders received to transfer to Medical with remote tele. 2300-Telephone report given to Kamlesh ritchie RN. Sbar, mar, labs, kardex and patient status reviewed. 0000-Transferred via Medical bed to Room 326. Oriented to room and call bell. Placed on remote tele box and left in care of VALERIA Madden.

## 2018-06-01 NOTE — PROCEDURES
[facility]  Colonoscopy Procedure Report  _______________________________________________________  Patient: Zeus Carlin. Attending Physician: Sabrina Ken MD    Patient ID: [id number]                                      Referring Physician: Doris Shaffer MD    Exam Date: June 1, 2018 _______________________________________________________      Introduction: A  81 y.o. male patient, presents for inpatient Colonoscopy    Indications: Severe iron deficiency anemia at 3.5. It was 13.3 in February 2018. He required 4 blood transfusions. he was started in February on Eliquis, Plavix and ASA for coronary stent insertion. Has been noticing black stools x 3 weeks but in the hospital they were brown liquid. He is chronically constipated. He has one bm every 3 to 4 days. EGD done on May 30: Grade B small linear erosive esophagitis on 2 locations in the distal esophagus. Slightly irregular Z line and 1.5 cm hiatal hernia. H Pylori came back negative. He never had a previous colonoscopy. CT Scan abd and Pelvis: 1. Is a very large right inguinal hernia extending into the right scrotal sac containing proximal colon and terminal ileum. There is no bowel obstruction or  Strangulation. Mild to moderate bilateral hydronephrosis and hydroureter down to the pelvis. This may be secondary to markedly distended bladder. Small bilateral effusions  Small hiatal hernia. Consent: The benefits, risks, and alternatives to the procedure were discussed and informed consent was obtained from the patient. Preparation: EKG, pulse, pulse oximetry and blood pressure were monitored throughout the procedure. ASA Classification: Class 3 - . The heart is an S1-S2 and regular heart rate and rhythm. Lungs are clear to auscultation and percussion. Abdomen is obese soft, nondistended, and non tender. Huge scrotal hernia.  Mental Status: awake, alert, and oriented to person, place, and time    Medications:  · Fentanyl 100 mcg IV before procedure. · Versed 3 mg IV throughout the procedure. Rectal Exam: decrease anal sphincter tone. No Blood. Prostate not enlarged. Pathology Specimens: One.       Procedure: The colonoscope was passed with ease through the anus under direct visualization and advanced to the cecum and 5 cm inside the terminal ileum. The scope was withdrawn and the mucosa was carefully examined. The quality of the preparation was fair. The views were very good. The patient's toleration of the procedure was excellent. Retroflexion was preformed in the ascending colon and hepatic flexure. The exam was done twice to the cecum. Total time is 23 minutes and withdrawal time is 17 minutes. Findings:    Rectum:   Normal  Sigmoid:   2 small  4 mm sessile  Polyps in the sigmoid they were hot snared. Mild sigmoid diverticulosis. Descending Colon:   Normal  Transverse Colon:   Normal  Ascending Colon:   Normal  Cecum:   Normal  Terminal Ileum:   Normal      Unplanned Events: There were no unplanned events. Estimated Blood Loss: None  Impressions:  Decreased anal sphincter tone. 2 small  4 mm sessile  Polyps in the sigmoid they were hot snared. Mild sigmoid diverticulosis. . Normal Mucosa. Complications: None; patient tolerated the procedure well. Recommendations:  · Resume diabetic and high fiber diet. May have to take Miralax daily to help with constipation. · Colonoscopy recommendation in 5 to 10  Years, pending the result of the histology. · For now discharge on Omeprazole 20 mg or Protonix 40 mg daily check the Hgb/ Hct on regular basis if it remains low or keeps decreasing then we need to do capsule enteroscopy!   · For now give Iron IV infusion to boost his Hgb    Procedure Codes:    · Deyanira Balbuena [[id number]]    Endoscope Information:  Model Number(s)    [id number]     Assistant: None      Signed By: Ludwin Parker MD Date: June 1, 2018

## 2018-06-01 NOTE — PROGRESS NOTES
Gastrointestinal Progress Note    Patient Name: Raven Infante. Today's Date: 6/1/2018    Admit Date: 5/29/2018    Subjective:     Diet: Patient is on Clear Liquid and is tolerating. Nausea is not present. Vomiting is not present. Pain: Patient does not complain of abdominal pain. Bowel Movements: Diarrhea    Bleeding:  None    Current Facility-Administered Medications   Medication Dose Route Frequency    metoclopramide HCl (REGLAN) injection 5 mg  5 mg IntraVENous Q6H PRN    atorvastatin (LIPITOR) tablet 40 mg  40 mg Oral QHS    losartan (COZAAR) tablet 25 mg  25 mg Oral DAILY    metoprolol succinate (TOPROL-XL) XL tablet 50 mg  50 mg Oral DAILY    0.9% sodium chloride infusion 250 mL  250 mL IntraVENous PRN    simethicone (MYLICON) 68IX/7.0JB oral drops 80 mg  1.2 mL Oral Multiple    polyethylene glycol (MIRALAX) packet 17 g  17 g Oral TID    lubiPROStone (AMITIZA) capsule 24 mcg  24 mcg Oral BID WITH MEALS    docusate sodium (COLACE) capsule 100 mg  100 mg Oral TID    0.9% sodium chloride infusion 250 mL  250 mL IntraVENous PRN    insulin glargine (LANTUS) injection 7 Units  7 Units SubCUTAneous DAILY    insulin lispro (HUMALOG) injection   SubCUTAneous AC&HS    glucose chewable tablet 16 g  4 Tab Oral PRN    glucagon (GLUCAGEN) injection 1 mg  1 mg IntraMUSCular PRN    dextrose (D50W) injection syrg 12.5-25 g  25-50 mL IntraVENous PRN    pantoprazole (PROTONIX) 40 mg in sodium chloride 0.9% 10 mL injection  40 mg IntraVENous Q12H          Objective:     Visit Vitals    /56 (BP 1 Location: Right arm, BP Patient Position: At rest)    Pulse 76    Temp 98 °F (36.7 °C)    Resp 16    Ht 5' 7\" (1.702 m)    Wt 84 kg (185 lb 3 oz)    SpO2 100%    BMI 29 kg/m2       05/30 1901 - 06/01 0700  In: 825 [P.O.:825]  Out: 1501 [Urine:1500]    Abdomen: soft, non-tender.  Bowel sounds normal. No masses,  no organomegaly, huge scrotal hernia    Data Review:    Labs: Results: Chemistry Recent Labs      06/01/18   0540  05/31/18   0345 05/30/18 0414 05/29/18   1745   GLU  104*  124*  203*  251*   NA  142  140  136  133*   K  3.3*  3.5  4.0  4.1   CL  104  104  100  94*   CO2  29  30  26  20*   BUN  12  24*  40*  45*   CREA  1.18  1.48*  1.84*  2.19*   CA  8.1*  8.0*  7.6*  7.9*   AGAP  9  6  10  19*   BUCR  10*  16  22*  21*   AP   --    --   131*  160*   TP   --    --   5.6*  6.2*   ALB   --    --   2.9*  3.2*   GLOB   --    --   2.7  3.0   AGRAT   --    --   1.1  1.1      CBC w/Diff Recent Labs      06/01/18   1155  06/01/18 0540 05/31/18   1557   05/31/18 0345 05/30/18 0414 05/29/18 1745   WBC   --   6.4   --    --   9.8   --   8.1  11.8   RBC   --   3.80*   --    --   3.47*   --   2.63*  2.18*   HGB  8.0*  8.2*  8.3*   < >  7.5*   < >  5.2*  3.5*   HCT  26.8*  27.9*  27.1*   < >  25.0*   < >  17.9*  13.3*   PLT   --   311   --    --   277   --   270  330   GRANS   --    --    --    --    --    --    --   86*   LYMPH   --    --    --    --    --    --    --   5*   EOS   --    --    --    --    --    --    --   0    < > = values in this interval not displayed. Coagulation Recent Labs      05/30/18 0414 05/29/18 1745   PTP  18.2*  20.8*   INR  1.6*  1.9*   APTT  35.6  33.7       Liver Enzymes Recent Labs      05/30/18 0414   TP  5.6*   ALB  2.9*   AP  131*   SGOT  14*          Assessment:     Principal Problem:    GI bleed (5/29/2018)    Active Problems:    DM2 (diabetes mellitus, type 2) ([facility] (2/12/2018)      Acute renal insufficiency (2/12/2018)      Symptomatic anemia (5/29/2018)      CAD (coronary artery disease) (5/29/2018)      ELINOR (acute kidney injury) ([facility] (5/29/2018)      CKD (chronic kidney disease) stage 3, GFR 30-59 ml/min (5/29/2018)      Elevated brain natriuretic peptide (BNP) level (5/29/2018)        Plan:     Severe iron deficiency anemia down to 3.5. He was started on Eliquis and Plavix in February. He is chronically constipated.  Plan - Do colonoscopy as he never had one  Constipation  Huge right scrotal hernia  BLAISE Lane MD  June 1, 2018

## 2018-06-01 NOTE — ROUTINE PROCESS
Bedside and Verbal shift change report given to Jevon Zhang RN by Frankie Cabral RN.  Report included the following information SBAR, Kardex, OR Summary, Intake/Output and MAR

## 2018-06-01 NOTE — PROGRESS NOTES
Melodie- Assumed care of patient at this time. Report received from Kyler Portillo RN. Patient sitting on side of bed. Pain 0/10. Call bell left within reach. 7365- Patient sitting on side of bed this time, AM medications tolerated well. A/O x 4. Lungs clear, abdomen soft and non-distended. Bowel sounds active, 20 G IV to left wrist and left AC, capped. No signs of phlebitis or infiltration noted. Skin warm ,dry and intact. Patient denies pain or discomfort. Bed placed in lowest position, call bell within reach. Patient on tele box 42.    1545- Patient off floor for scheduled colonoscopy    1620- Patient returned from colonoscopy     1700- Spoke to Dr. Joseph Lemus, ok to have regular diet, orders entered. Patient will have iron transfusion and then ok to be discharged home. He will put in orders. 1745- Lab called to see if Iron profile can be drawn before iron infusion, stated someone will be up    1830- Lab called again to see if lab had been drawn, states tech is aware and out to get blood draws    Shift Summary: Patient has not complained of pain this shift. Patient can be discharged home after iron infusion, lab has not drawn iron profile at this time. 2 IV sites remain intact.

## 2018-06-01 NOTE — ROUTINE PROCESS
Ivett Torres TRANSFER - in- REPORT:     [employer] report received from Manny Richey RN on [employer] being transfer  In to 3 S room 226  for routine progression of care       Report consisted of patients Situation, Background, Assessment and   Recommendations(SBAR).    Information from the following report(s) SBAR, Kardex, OR Summary and MAR was reviewed with the receiving nurse.     Opportunity for questions and clarification was provided.       Assessment completed upon patients arrival to unit and care assumed. 0045  PT. Tolerated transfer well. Educated to call bell and assistance when in need of help,  Pt. verblied understanding. 0200  Pt. Watching tv.    0400 Pt. Denies pain. 0600  WIll resting well in bed.     Verbal and bedside Shift changed report given to Tammy Nassar RN (oncoming RN) on Pt. Condition. Report consisted of patients Situation, History, Activities, intake/output,  Background, Assessment and Recommendations(SBAR). Information from the following report(s) Kardex, order Summary, Lab results and MAR was reviewed with the receiving nurse. Opportunity for questions and clarification was provided.

## 2018-06-02 LAB
IRON SATN MFR SERPL: 5 %
IRON SERPL-MCNC: 14 UG/DL (ref 50–175)
TIBC SERPL-MCNC: 302 UG/DL (ref 250–450)

## 2018-06-02 NOTE — PROGRESS NOTES
Problem: Falls - Risk of  Goal: *Absence of Falls  Document Joana Fall Risk and appropriate interventions in the flowsheet.    Outcome: Resolved/Met Date Met: 06/01/18  Fall Risk Interventions:  Mobility Interventions: Patient to call before getting OOB         Medication Interventions: Patient to call before getting OOB, Teach patient to arise slowly    Elimination Interventions: Call light in reach, Urinal in reach    History of Falls Interventions: Door open when patient unattended

## 2018-06-02 NOTE — DISCHARGE INSTRUCTIONS
DISCHARGE SUMMARY from Nurse    PATIENT INSTRUCTIONS:    After general anesthesia or intravenous sedation, for 24 hours or while taking prescription Narcotics:  · Limit your activities  · Do not drive and operate hazardous machinery  · Do not make important personal or business decisions  · Do  not drink alcoholic beverages  · If you have not urinated within 8 hours after discharge, please contact your surgeon on call. Report the following to your surgeon:  · Excessive pain, swelling, redness or odor of or around the surgical area  · Temperature over 100.5  · Nausea and vomiting lasting longer than 4 hours or if unable to take medications  · Any signs of decreased circulation or nerve impairment to extremity: change in color, persistent  numbness, tingling, coldness or increase pain  · Any questions    What to do at Home:  Recommended activity: Activity as tolerated    *  Please give a list of your current medications to your Primary Care Provider. *  Please update this list whenever your medications are discontinued, doses are      changed, or new medications (including over-the-counter products) are added. *  Please carry medication information at all times in case of emergency situations. These are general instructions for a healthy lifestyle:    No smoking/ No tobacco products/ Avoid exposure to second hand smoke  Surgeon General's Warning:  Quitting smoking now greatly reduces serious risk to your health. Obesity, smoking, and sedentary lifestyle greatly increases your risk for illness    A healthy diet, regular physical exercise & weight monitoring are important for maintaining a healthy lifestyle    You may be retaining fluid if you have a history of heart failure or if you experience any of the following symptoms:  Weight gain of 3 pounds or more overnight or 5 pounds in a week, increased swelling in our hands or feet or shortness of breath while lying flat in bed.   Please call your doctor as soon as you notice any of these symptoms; do not wait until your next office visit. Recognize signs and symptoms of STROKE:    F-face looks uneven    A-arms unable to move or move unevenly    S-speech slurred or non-existent    T-time-call 911 as soon as signs and symptoms begin-DO NOT go       Back to bed or wait to see if you get better-TIME IS BRAIN. Warning Signs of HEART ATTACK     Call 911 if you have these symptoms:   Chest discomfort. Most heart attacks involve discomfort in the center of the chest that lasts more than a few minutes, or that goes away and comes back. It can feel like uncomfortable pressure, squeezing, fullness, or pain.  Discomfort in other areas of the upper body. Symptoms can include pain or discomfort in one or both arms, the back, neck, jaw, or stomach.  Shortness of breath with or without chest discomfort.  Other signs may include breaking out in a cold sweat, nausea, or lightheadedness. Don't wait more than five minutes to call 911 - MINUTES MATTER! Fast action can save your life. Calling 911 is almost always the fastest way to get lifesaving treatment. Emergency Medical Services staff can begin treatment when they arrive -- up to an hour sooner than if someone gets to the hospital by car. The discharge information has been reviewed with the patient. The patient verbalized understanding. Discharge medications reviewed with the patient and appropriate educational materials and side effects teaching were provided. ___________________________________________________________________________________________________________________________________     Anemia: Care Instructions  Your Care Instructions    Anemia is a low level of red blood cells, which carry oxygen throughout your body. Many things can cause anemia. Lack of iron is one of the most common causes.  Your body needs iron to make hemoglobin, a substance in red blood cells that carries oxygen from the lungs to your body's cells. Without enough iron, the body produces fewer and smaller red blood cells. As a result, your body's cells do not get enough oxygen, and you feel tired and weak. And you may have trouble concentrating. Bleeding is the most common cause of a lack of iron. You may have heavy menstrual bleeding or bleeding caused by conditions such as ulcers, hemorrhoids, or cancer. Regular use of aspirin or other anti-inflammatory medicines (such as ibuprofen) also can cause bleeding in some people. A lack of iron in your diet also can cause anemia, especially at times when the body needs more iron, such as during pregnancy, infancy, and the teen years. Your doctor may have prescribed iron pills. It may take several months of treatment for your iron levels to return to normal. Your doctor also may suggest that you eat foods that are rich in iron, such as meat and beans. There are many other causes of anemia. It is not always due to a lack of iron. Finding the specific cause of your anemia will help your doctor find the right treatment for you. Follow-up care is a key part of your treatment and safety. Be sure to make and go to all appointments, and call your doctor if you are having problems. It's also a good idea to know your test results and keep a list of the medicines you take. How can you care for yourself at home? · Take your medicines exactly as prescribed. Call your doctor if you think you are having a problem with your medicine. · If your doctor recommends iron pills, take them as directed:  ¨ Try to take the pills on an empty stomach about 1 hour before or 2 hours after meals. But you may need to take iron with food to avoid an upset stomach. ¨ Do not take antacids or drink milk or caffeine drinks (such as coffee, tea, or cola) at the same time or within 2 hours of the time that you take your iron. They can make it hard for your body to absorb the iron.   ¨ Vitamin C (from food or supplements) helps your body absorb iron. Try taking iron pills with a glass of orange juice or some other food that is high in vitamin C, such as citrus fruits. ¨ Iron pills may cause stomach problems, such as heartburn, nausea, diarrhea, constipation, and cramps. Be sure to drink plenty of fluids, and include fruits, vegetables, and fiber in your diet each day. Iron pills often make your bowel movements dark or green. ¨ If you forget to take an iron pill, do not take a double dose of iron the next time you take a pill. ¨ Keep iron pills out of the reach of small children. An overdose of iron can be very dangerous. · Follow your doctor's advice about eating iron-rich foods. These include red meat, shellfish, poultry, eggs, beans, raisins, whole-grain bread, and leafy green vegetables. · Steam vegetables to help them keep their iron content. When should you call for help? Call 911 anytime you think you may need emergency care. For example, call if:  ? · You have symptoms of a heart attack. These may include:  ¨ Chest pain or pressure, or a strange feeling in the chest.  ¨ Sweating. ¨ Shortness of breath. ¨ Nausea or vomiting. ¨ Pain, pressure, or a strange feeling in the back, neck, jaw, or upper belly or in one or both shoulders or arms. ¨ Lightheadedness or sudden weakness. ¨ A fast or irregular heartbeat. After you call 911, the  may tell you to chew 1 adult-strength or 2 to 4 low-dose aspirin. Wait for an ambulance. Do not try to drive yourself. ? · You passed out (lost consciousness). ?Call your doctor now or seek immediate medical care if:  ? · You have new or increased shortness of breath. ? · You are dizzy or lightheaded, or you feel like you may faint. ? · Your fatigue and weakness continue or get worse. ? · You have any abnormal bleeding, such as:  ¨ Nosebleeds. ¨ Vaginal bleeding that is different (heavier, more frequent, at a different time of the month) than what you are used to.   ¨ Bloody or black stools, or rectal bleeding. ¨ Bloody or pink urine. ? Watch closely for changes in your health, and be sure to contact your doctor if:  ? · You do not get better as expected. Where can you learn more? Go to http://chelita-tiarra.info/. Enter R301 in the search box to learn more about \"Anemia: Care Instructions. \"  Current as of: October 13, 2016  Content Version: 11.4  © 5011-2205 [employer]. Care instructions adapted under license by [employer] (which disclaims liability or warranty for this information). If you have questions about a medical condition or this instruction, always ask your healthcare professional. [place] [address] any warranty or liability for your use of this information. Colonoscopy: What to Expect at [address] [place] [address]  After you have a colonoscopy, you will stay at the clinic for 1 to 2 hours until the medicines wear off. Then you can go home. But you will need to arrange for a ride. Your doctor will tell you when you can eat and do your other usual activities. Your doctor will talk to you about when you will need your next colonoscopy. Your doctor can help you decide how often you need to be checked. This will depend on the results of your test and your risk for colorectal cancer. After the test, you may be bloated or have gas pains. You may need to pass gas. If a biopsy was done or a polyp was removed, you may have streaks of blood in your stool (feces) for a few days. This care sheet gives you a general idea about how long it will take for you to recover. But each person recovers at a different pace. Follow the steps below to get better as quickly as possible. How can you care for yourself at home? Activity  ? · Rest when you feel tired. ? · You can do your normal activities when it feels okay to do so. Diet  ? · Follow your doctor's directions for eating.    ? · Unless your doctor has told you not to, drink plenty of fluids. This helps to replace the fluids that were lost during the colon prep. ? · Do not drink alcohol. Medicines  ? · Your doctor will tell you if and when you can restart your medicines. He or she will also give you instructions about taking any new medicines. ? · If you take blood thinners, such as warfarin (Coumadin), clopidogrel (Plavix), or aspirin, be sure to talk to your doctor. He or she will tell you if and when to start taking those medicines again. Make sure that you understand exactly what your doctor wants you to do. ? · If polyps were removed or a biopsy was done during the test, your doctor may tell you not to take aspirin or other anti-inflammatory medicines for a few days. These include ibuprofen (Advil, Motrin) and naproxen (Aleve). Other instructions  ? · For your safety, do not drive or operate machinery until the medicine wears off and you can think clearly. Your doctor may tell you not to drive or operate machinery until the day after your test.   ? · Do not sign legal documents or make major decisions until the medicine wears off and you can think clearly. The anesthesia can make it hard for you to fully understand what you are agreeing to. Follow-up care is a key part of your treatment and safety. Be sure to make and go to all appointments, and call your doctor if you are having problems. It's also a good idea to know your test results and keep a list of the medicines you take. When should you call for help? Call 911 anytime you think you may need emergency care. For example, call if:  ? · You passed out (lost consciousness). ? · You pass maroon or bloody stools. ? · You have trouble breathing. ?Call your doctor now or seek immediate medical care if:  ? · You have pain that does not get better after you take pain medicine. ? · You are sick to your stomach or cannot drink fluids. ? · You have new or worse belly pain.    ? · You have blood in your stools. ? · You have a fever. ? · You cannot pass stools or gas. ? Watch closely for changes in your health, and be sure to contact your doctor if you have any problems. Where can you learn more? Go to http://chelita-tiarra.info/. Enter E264 in the search box to learn more about \"Colonoscopy: What to Expect at Home. \"  Current as of: May 12, 2017  Content Version: 11.4  © 3262-4849 [employer], [employer]. Care instructions adapted under license by [employer] (which disclaims liability or warranty for this information). If you have questions about a medical condition or this instruction, always ask your healthcare professional. [address] any warranty or liability for your use of this information.

## 2018-06-02 NOTE — PROGRESS NOTES
Problem: Pressure Injury - Risk of  Goal: *Prevention of pressure injury  Document Anam Scale and appropriate interventions in the flowsheet.    Outcome: Resolved/Met Date Met: 06/01/18  Pressure Injury Interventions:  Sensory Interventions: Assess changes in LOC    Moisture Interventions: Check for incontinence Q2 hours and as needed, Limit adult briefs    Activity Interventions: Increase time out of bed    Mobility Interventions: HOB 30 degrees or less    Nutrition Interventions: Document food/fluid/supplement intake

## 2018-06-02 NOTE — PROGRESS NOTES
Spoke with Dr. Justine Cobian via telephone about iron profile. Instructed to have iron profile drawn, administer IV iron infusion, then discharge patient.      2110- verified with lab that iron profile has been drawn

## 2018-06-02 NOTE — PROGRESS NOTES
I have reviewed discharge instructions with the patient and son. The patient and son verbalized understanding. Pt discharged via [facility] with son.

## 2018-06-04 ENCOUNTER — HOME CARE VISIT (OUTPATIENT)
Dept: HOME HEALTH SERVICES | Facility: HOME HEALTH | Age: 73
End: 2018-06-04

## 2018-09-24 ENCOUNTER — HOSPITAL ENCOUNTER (OUTPATIENT)
Dept: LAB | Age: 73
Discharge: HOME | End: 2018-09-24
Payer: MEDICARE

## 2018-09-24 LAB
ANION GAP SERPL CALC-SCNC: 5 MMOL/L (ref 3–18)
BUN SERPL-MCNC: 31 MG/DL (ref 7–18)
BUN/CREAT SERPL: 15 (ref 12–20)
CALCIUM SERPL-MCNC: 8.6 MG/DL (ref 8.5–10.1)
CHLORIDE SERPL-SCNC: 105 MMOL/L (ref 100–108)
CO2 SERPL-SCNC: 30 MMOL/L (ref 21–32)
CREAT SERPL-MCNC: 2.04 MG/DL (ref 0.6–1.3)
GLUCOSE SERPL-MCNC: 174 MG/DL (ref 74–99)
POTASSIUM SERPL-SCNC: 4.5 MMOL/L (ref 3.5–5.5)
SODIUM SERPL-SCNC: 140 MMOL/L (ref 136–145)

## 2018-09-24 PROCEDURE — 80048 BASIC METABOLIC PNL TOTAL CA: CPT | Performed by: INTERNAL MEDICINE

## 2018-09-24 PROCEDURE — 36415 COLL VENOUS BLD VENIPUNCTURE: CPT | Performed by: INTERNAL MEDICINE

## 2018-09-25 LAB
FAX TO INFO,FAXT: NORMAL
FAX TO NUMBER,FAXN: NORMAL

## 2021-01-01 ENCOUNTER — APPOINTMENT (OUTPATIENT)
Dept: GENERAL RADIOLOGY | Age: 76
DRG: 240 | End: 2021-01-01
Attending: PHYSICIAN ASSISTANT
Payer: MEDICARE

## 2021-01-01 ENCOUNTER — HOSPITAL ENCOUNTER (OUTPATIENT)
Dept: PREADMISSION TESTING | Age: 76
Discharge: HOME | End: 2021-10-11

## 2021-01-01 ENCOUNTER — ANESTHESIA EVENT (OUTPATIENT)
Dept: SURGERY | Age: 76
DRG: 240 | End: 2021-01-01
Payer: MEDICARE

## 2021-01-01 ENCOUNTER — ANESTHESIA (OUTPATIENT)
Dept: SURGERY | Age: 76
DRG: 240 | End: 2021-01-01
Payer: MEDICARE

## 2021-01-01 ENCOUNTER — HOSPITAL ENCOUNTER (INPATIENT)
Age: 76
LOS: 8 days | Discharge: NURSING, REHAB OR LONG-TERM CARE | DRG: 240 | End: 2021-11-03
Attending: EMERGENCY MEDICINE | Admitting: FAMILY MEDICINE
Payer: MEDICARE

## 2021-01-01 ENCOUNTER — HOSPITAL ENCOUNTER (OUTPATIENT)
Dept: PREADMISSION TESTING | Age: 76
Discharge: HOME | End: 2021-11-01

## 2021-01-01 VITALS
DIASTOLIC BLOOD PRESSURE: 52 MMHG | HEART RATE: 61 BPM | TEMPERATURE: 97.6 F | RESPIRATION RATE: 15 BRPM | BODY MASS INDEX: 27.28 KG/M2 | WEIGHT: 180 LBS | SYSTOLIC BLOOD PRESSURE: 147 MMHG | HEIGHT: 68 IN | OXYGEN SATURATION: 95 %

## 2021-01-01 VITALS — WEIGHT: 169 LBS | HEIGHT: 68 IN | BODY MASS INDEX: 25.61 KG/M2

## 2021-01-01 DIAGNOSIS — E11.621 DIABETES MELLITUS WITH FOOT ULCER AND GANGRENE (HCC): Primary | ICD-10-CM

## 2021-01-01 DIAGNOSIS — N39.0 URINARY TRACT INFECTION WITH PYURIA: ICD-10-CM

## 2021-01-01 DIAGNOSIS — Z98.890 S/P FOOT SURGERY, LEFT: ICD-10-CM

## 2021-01-01 DIAGNOSIS — L97.509 DIABETES MELLITUS WITH FOOT ULCER AND GANGRENE (HCC): Primary | ICD-10-CM

## 2021-01-01 DIAGNOSIS — E11.52 DIABETES MELLITUS WITH FOOT ULCER AND GANGRENE (HCC): Primary | ICD-10-CM

## 2021-01-01 LAB
ABO + RH BLD: NORMAL
ABO + RH BLD: NORMAL
ALBUMIN SERPL-MCNC: 1.3 G/DL (ref 3.4–5)
ALBUMIN/GLOB SERPL: 0.3 {RATIO} (ref 0.8–1.7)
ALP SERPL-CCNC: 234 U/L (ref 45–117)
ALT SERPL-CCNC: 18 U/L (ref 16–61)
ANION GAP SERPL CALC-SCNC: 4 MMOL/L (ref 3–18)
ANION GAP SERPL CALC-SCNC: 4 MMOL/L (ref 3–18)
ANION GAP SERPL CALC-SCNC: 5 MMOL/L (ref 3–18)
ANION GAP SERPL CALC-SCNC: 5 MMOL/L (ref 3–18)
ANION GAP SERPL CALC-SCNC: 6 MMOL/L (ref 3–18)
APPEARANCE UR: ABNORMAL
AST SERPL-CCNC: 26 U/L (ref 10–38)
ATRIAL RATE: 79 BPM
BACTERIA SPEC CULT: ABNORMAL
BACTERIA SPEC CULT: ABNORMAL
BACTERIA SPEC CULT: NORMAL
BACTERIA URNS QL MICRO: ABNORMAL /HPF
BASOPHILS # BLD: 0 K/UL (ref 0–0.1)
BASOPHILS NFR BLD: 0 % (ref 0–2)
BILIRUB SERPL-MCNC: 0.5 MG/DL (ref 0.2–1)
BILIRUB UR QL: NEGATIVE
BLD PROD TYP BPU: NORMAL
BLD PROD TYP BPU: NORMAL
BLOOD GROUP ANTIBODIES SERPL: NORMAL
BLOOD GROUP ANTIBODIES SERPL: NORMAL
BPU ID: NORMAL
BPU ID: NORMAL
BUN SERPL-MCNC: 14 MG/DL (ref 7–18)
BUN SERPL-MCNC: 21 MG/DL (ref 7–18)
BUN SERPL-MCNC: 21 MG/DL (ref 7–18)
BUN SERPL-MCNC: 25 MG/DL (ref 7–18)
BUN SERPL-MCNC: 25 MG/DL (ref 7–18)
BUN/CREAT SERPL: 16 (ref 12–20)
BUN/CREAT SERPL: 18 (ref 12–20)
BUN/CREAT SERPL: 19 (ref 12–20)
BUN/CREAT SERPL: 21 (ref 12–20)
BUN/CREAT SERPL: 24 (ref 12–20)
CALCIUM SERPL-MCNC: 7.6 MG/DL (ref 8.5–10.1)
CALCIUM SERPL-MCNC: 7.6 MG/DL (ref 8.5–10.1)
CALCIUM SERPL-MCNC: 7.7 MG/DL (ref 8.5–10.1)
CALCIUM SERPL-MCNC: 7.9 MG/DL (ref 8.5–10.1)
CALCIUM SERPL-MCNC: 8.3 MG/DL (ref 8.5–10.1)
CALCULATED P AXIS, ECG09: 64 DEGREES
CALCULATED R AXIS, ECG10: 69 DEGREES
CALCULATED T AXIS, ECG11: 85 DEGREES
CALLED TO:,BCALL1: NORMAL
CC UR VC: ABNORMAL
CC UR VC: NORMAL
CHLORIDE SERPL-SCNC: 104 MMOL/L (ref 100–111)
CHLORIDE SERPL-SCNC: 108 MMOL/L (ref 100–111)
CHLORIDE SERPL-SCNC: 108 MMOL/L (ref 100–111)
CHLORIDE SERPL-SCNC: 110 MMOL/L (ref 100–111)
CHLORIDE SERPL-SCNC: 110 MMOL/L (ref 100–111)
CO2 SERPL-SCNC: 23 MMOL/L (ref 21–32)
CO2 SERPL-SCNC: 25 MMOL/L (ref 21–32)
CO2 SERPL-SCNC: 26 MMOL/L (ref 21–32)
CO2 SERPL-SCNC: 28 MMOL/L (ref 21–32)
CO2 SERPL-SCNC: 28 MMOL/L (ref 21–32)
COLOR UR: YELLOW
COVID-19 RAPID TEST, COVR: NOT DETECTED
CREAT SERPL-MCNC: 0.88 MG/DL (ref 0.6–1.3)
CREAT SERPL-MCNC: 1.05 MG/DL (ref 0.6–1.3)
CREAT SERPL-MCNC: 1.1 MG/DL (ref 0.6–1.3)
CREAT SERPL-MCNC: 1.14 MG/DL (ref 0.6–1.3)
CREAT SERPL-MCNC: 1.2 MG/DL (ref 0.6–1.3)
CROSSMATCH RESULT,%XM: NORMAL
CROSSMATCH RESULT,%XM: NORMAL
DIAGNOSIS, 93000: NORMAL
DIFFERENTIAL METHOD BLD: ABNORMAL
EOSINOPHIL # BLD: 0.1 K/UL (ref 0–0.4)
EOSINOPHIL NFR BLD: 1 % (ref 0–5)
EPITH CASTS URNS QL MICRO: NEGATIVE /LPF (ref 0–5)
ERYTHROCYTE [DISTWIDTH] IN BLOOD BY AUTOMATED COUNT: 20 % (ref 11.6–14.5)
ERYTHROCYTE [DISTWIDTH] IN BLOOD BY AUTOMATED COUNT: 20.3 % (ref 11.6–14.5)
ERYTHROCYTE [DISTWIDTH] IN BLOOD BY AUTOMATED COUNT: 22.2 % (ref 11.6–14.5)
GLOBULIN SER CALC-MCNC: 4.5 G/DL (ref 2–4)
GLUCOSE BLD STRIP.AUTO-MCNC: 122 MG/DL (ref 70–110)
GLUCOSE BLD STRIP.AUTO-MCNC: 131 MG/DL (ref 70–110)
GLUCOSE BLD STRIP.AUTO-MCNC: 136 MG/DL (ref 70–110)
GLUCOSE BLD STRIP.AUTO-MCNC: 140 MG/DL (ref 70–110)
GLUCOSE BLD STRIP.AUTO-MCNC: 143 MG/DL (ref 70–110)
GLUCOSE BLD STRIP.AUTO-MCNC: 148 MG/DL (ref 70–110)
GLUCOSE BLD STRIP.AUTO-MCNC: 150 MG/DL (ref 70–110)
GLUCOSE BLD STRIP.AUTO-MCNC: 154 MG/DL (ref 70–110)
GLUCOSE BLD STRIP.AUTO-MCNC: 157 MG/DL (ref 70–110)
GLUCOSE BLD STRIP.AUTO-MCNC: 162 MG/DL (ref 70–110)
GLUCOSE BLD STRIP.AUTO-MCNC: 166 MG/DL (ref 70–110)
GLUCOSE BLD STRIP.AUTO-MCNC: 167 MG/DL (ref 70–110)
GLUCOSE BLD STRIP.AUTO-MCNC: 168 MG/DL (ref 70–110)
GLUCOSE BLD STRIP.AUTO-MCNC: 169 MG/DL (ref 70–110)
GLUCOSE BLD STRIP.AUTO-MCNC: 171 MG/DL (ref 70–110)
GLUCOSE BLD STRIP.AUTO-MCNC: 173 MG/DL (ref 70–110)
GLUCOSE BLD STRIP.AUTO-MCNC: 174 MG/DL (ref 70–110)
GLUCOSE BLD STRIP.AUTO-MCNC: 179 MG/DL (ref 70–110)
GLUCOSE BLD STRIP.AUTO-MCNC: 179 MG/DL (ref 70–110)
GLUCOSE BLD STRIP.AUTO-MCNC: 181 MG/DL (ref 70–110)
GLUCOSE BLD STRIP.AUTO-MCNC: 185 MG/DL (ref 70–110)
GLUCOSE BLD STRIP.AUTO-MCNC: 200 MG/DL (ref 70–110)
GLUCOSE BLD STRIP.AUTO-MCNC: 212 MG/DL (ref 70–110)
GLUCOSE BLD STRIP.AUTO-MCNC: 228 MG/DL (ref 70–110)
GLUCOSE BLD STRIP.AUTO-MCNC: 232 MG/DL (ref 70–110)
GLUCOSE BLD STRIP.AUTO-MCNC: 62 MG/DL (ref 70–110)
GLUCOSE BLD STRIP.AUTO-MCNC: 79 MG/DL (ref 70–110)
GLUCOSE BLD STRIP.AUTO-MCNC: 84 MG/DL (ref 70–110)
GLUCOSE BLD STRIP.AUTO-MCNC: 88 MG/DL (ref 70–110)
GLUCOSE BLD STRIP.AUTO-MCNC: 94 MG/DL (ref 70–110)
GLUCOSE BLD STRIP.AUTO-MCNC: 94 MG/DL (ref 70–110)
GLUCOSE BLD STRIP.AUTO-MCNC: 96 MG/DL (ref 70–110)
GLUCOSE SERPL-MCNC: 145 MG/DL (ref 74–99)
GLUCOSE SERPL-MCNC: 152 MG/DL (ref 74–99)
GLUCOSE SERPL-MCNC: 193 MG/DL (ref 74–99)
GLUCOSE SERPL-MCNC: 68 MG/DL (ref 74–99)
GLUCOSE SERPL-MCNC: 71 MG/DL (ref 74–99)
GLUCOSE UR STRIP.AUTO-MCNC: NEGATIVE MG/DL
HCT VFR BLD AUTO: 21.5 % (ref 36–48)
HCT VFR BLD AUTO: 22.9 % (ref 36–48)
HCT VFR BLD AUTO: 27.2 % (ref 36–48)
HCT VFR BLD AUTO: 27.4 % (ref 36–48)
HCT VFR BLD AUTO: 27.5 % (ref 36–48)
HGB BLD-MCNC: 6.1 G/DL (ref 13–16)
HGB BLD-MCNC: 6.7 G/DL (ref 13–16)
HGB BLD-MCNC: 8 G/DL (ref 13–16)
HGB BLD-MCNC: 8.1 G/DL (ref 13–16)
HGB BLD-MCNC: 8.1 G/DL (ref 13–16)
HGB UR QL STRIP: ABNORMAL
HISTORY CHECKED?,CKHIST: NORMAL
HISTORY CHECKED?,CKHIST: NORMAL
KETONES UR QL STRIP.AUTO: NEGATIVE MG/DL
LACTATE BLD-SCNC: 1.36 MMOL/L (ref 0.4–2)
LEUKOCYTE ESTERASE UR QL STRIP.AUTO: ABNORMAL
LYMPHOCYTES # BLD: 1.1 K/UL (ref 0.9–3.6)
LYMPHOCYTES NFR BLD: 10 % (ref 21–52)
MCH RBC QN AUTO: 20.7 PG (ref 24–34)
MCH RBC QN AUTO: 21.3 PG (ref 24–34)
MCH RBC QN AUTO: 22.6 PG (ref 24–34)
MCHC RBC AUTO-ENTMCNC: 28.4 G/DL (ref 31–37)
MCHC RBC AUTO-ENTMCNC: 29.5 G/DL (ref 31–37)
MCHC RBC AUTO-ENTMCNC: 29.8 G/DL (ref 31–37)
MCV RBC AUTO: 72.4 FL (ref 78–100)
MCV RBC AUTO: 72.9 FL (ref 78–100)
MCV RBC AUTO: 75.8 FL (ref 78–100)
MONOCYTES # BLD: 0.6 K/UL (ref 0.05–1.2)
MONOCYTES NFR BLD: 5 % (ref 3–10)
NEUTS SEG # BLD: 9 K/UL (ref 1.8–8)
NEUTS SEG NFR BLD: 82 % (ref 40–73)
NITRITE UR QL STRIP.AUTO: POSITIVE
OTHER,OTHU: ABNORMAL
P-R INTERVAL, ECG05: 152 MS
PH UR STRIP: 6.5 [PH] (ref 5–8)
PLATELET # BLD AUTO: 449 K/UL (ref 135–420)
PLATELET # BLD AUTO: 456 K/UL (ref 135–420)
PLATELET # BLD AUTO: 522 K/UL (ref 135–420)
PMV BLD AUTO: 9.1 FL (ref 9.2–11.8)
PMV BLD AUTO: 9.3 FL (ref 9.2–11.8)
PMV BLD AUTO: 9.6 FL (ref 9.2–11.8)
POTASSIUM SERPL-SCNC: 3.8 MMOL/L (ref 3.5–5.5)
POTASSIUM SERPL-SCNC: 4.2 MMOL/L (ref 3.5–5.5)
POTASSIUM SERPL-SCNC: 4.4 MMOL/L (ref 3.5–5.5)
POTASSIUM SERPL-SCNC: 4.4 MMOL/L (ref 3.5–5.5)
POTASSIUM SERPL-SCNC: 4.5 MMOL/L (ref 3.5–5.5)
PROT SERPL-MCNC: 5.8 G/DL (ref 6.4–8.2)
PROT UR STRIP-MCNC: >300 MG/DL
Q-T INTERVAL, ECG07: 390 MS
QRS DURATION, ECG06: 94 MS
QTC CALCULATION (BEZET), ECG08: 447 MS
RBC # BLD AUTO: 2.95 M/UL (ref 4.35–5.65)
RBC # BLD AUTO: 3.59 M/UL (ref 4.35–5.65)
RBC # BLD AUTO: 3.8 M/UL (ref 4.35–5.65)
RBC #/AREA URNS HPF: ABNORMAL /HPF (ref 0–5)
SERVICE CMNT-IMP: ABNORMAL
SERVICE CMNT-IMP: NORMAL
SODIUM SERPL-SCNC: 137 MMOL/L (ref 136–145)
SODIUM SERPL-SCNC: 137 MMOL/L (ref 136–145)
SODIUM SERPL-SCNC: 140 MMOL/L (ref 136–145)
SOURCE, COVRS: NORMAL
SP GR UR REFRACTOMETRY: 1.02 (ref 1–1.03)
SPECIMEN EXP DATE BLD: NORMAL
SPECIMEN EXP DATE BLD: NORMAL
STATUS OF UNIT,%ST: NORMAL
STATUS OF UNIT,%ST: NORMAL
UNIT DIVISION, %UDIV: 0
UNIT DIVISION, %UDIV: 0
UROBILINOGEN UR QL STRIP.AUTO: 0.2 EU/DL (ref 0.2–1)
VANCOMYCIN SERPL-MCNC: 12.6 UG/ML (ref 5–40)
VANCOMYCIN SERPL-MCNC: 20.9 UG/ML (ref 5–40)
VENTRICULAR RATE, ECG03: 79 BPM
WBC # BLD AUTO: 11 K/UL (ref 4.6–13.2)
WBC # BLD AUTO: 7.1 K/UL (ref 4.6–13.2)
WBC # BLD AUTO: 8.2 K/UL (ref 4.6–13.2)
WBC URNS QL MICRO: ABNORMAL /HPF (ref 0–5)

## 2021-01-01 PROCEDURE — 85027 COMPLETE CBC AUTOMATED: CPT

## 2021-01-01 PROCEDURE — 77030020782 HC GWN BAIR PAWS FLX 3M -B: Performed by: ORTHOPAEDIC SURGERY

## 2021-01-01 PROCEDURE — 80053 COMPREHEN METABOLIC PANEL: CPT

## 2021-01-01 PROCEDURE — 0Y6J0Z1 DETACHMENT AT LEFT LOWER LEG, HIGH, OPEN APPROACH: ICD-10-PCS | Performed by: ORTHOPAEDIC SURGERY

## 2021-01-01 PROCEDURE — 87635 SARS-COV-2 COVID-19 AMP PRB: CPT

## 2021-01-01 PROCEDURE — 74011636637 HC RX REV CODE- 636/637: Performed by: FAMILY MEDICINE

## 2021-01-01 PROCEDURE — 74011250636 HC RX REV CODE- 250/636: Performed by: ORTHOPAEDIC SURGERY

## 2021-01-01 PROCEDURE — 87086 URINE CULTURE/COLONY COUNT: CPT

## 2021-01-01 PROCEDURE — 36415 COLL VENOUS BLD VENIPUNCTURE: CPT

## 2021-01-01 PROCEDURE — 74011250637 HC RX REV CODE- 250/637: Performed by: FAMILY MEDICINE

## 2021-01-01 PROCEDURE — 86923 COMPATIBILITY TEST ELECTRIC: CPT

## 2021-01-01 PROCEDURE — 74011250636 HC RX REV CODE- 250/636: Performed by: FAMILY MEDICINE

## 2021-01-01 PROCEDURE — 85014 HEMATOCRIT: CPT

## 2021-01-01 PROCEDURE — 82962 GLUCOSE BLOOD TEST: CPT

## 2021-01-01 PROCEDURE — 77030027138 HC INCENT SPIROMETER -A

## 2021-01-01 PROCEDURE — 97161 PT EVAL LOW COMPLEX 20 MIN: CPT

## 2021-01-01 PROCEDURE — P9016 RBC LEUKOCYTES REDUCED: HCPCS

## 2021-01-01 PROCEDURE — 96374 THER/PROPH/DIAG INJ IV PUSH: CPT

## 2021-01-01 PROCEDURE — 65270000029 HC RM PRIVATE

## 2021-01-01 PROCEDURE — 76210000006 HC OR PH I REC 0.5 TO 1 HR: Performed by: ORTHOPAEDIC SURGERY

## 2021-01-01 PROCEDURE — 74011250636 HC RX REV CODE- 250/636: Performed by: PHYSICIAN ASSISTANT

## 2021-01-01 PROCEDURE — 74011000250 HC RX REV CODE- 250: Performed by: PHYSICIAN ASSISTANT

## 2021-01-01 PROCEDURE — 77030000032 HC CUF TRNQT ZIMM -B: Performed by: ORTHOPAEDIC SURGERY

## 2021-01-01 PROCEDURE — 36430 TRANSFUSION BLD/BLD COMPNT: CPT

## 2021-01-01 PROCEDURE — 74011000250 HC RX REV CODE- 250: Performed by: REGISTERED NURSE

## 2021-01-01 PROCEDURE — L1830 KO IMMOB CANVAS LONG PRE OTS: HCPCS | Performed by: ORTHOPAEDIC SURGERY

## 2021-01-01 PROCEDURE — 97167 OT EVAL HIGH COMPLEX 60 MIN: CPT

## 2021-01-01 PROCEDURE — 77030006807 HC BLD SAW RECIP KMET -B: Performed by: ORTHOPAEDIC SURGERY

## 2021-01-01 PROCEDURE — 80202 ASSAY OF VANCOMYCIN: CPT

## 2021-01-01 PROCEDURE — 80048 BASIC METABOLIC PNL TOTAL CA: CPT

## 2021-01-01 PROCEDURE — 77030012508 HC MSK AIRWY LMA AMBU -A: Performed by: ANESTHESIOLOGY

## 2021-01-01 PROCEDURE — C9290 INJ, BUPIVACAINE LIPOSOME: HCPCS | Performed by: ORTHOPAEDIC SURGERY

## 2021-01-01 PROCEDURE — 86901 BLOOD TYPING SEROLOGIC RH(D): CPT

## 2021-01-01 PROCEDURE — 77030013079 HC BLNKT BAIR HGGR 3M -A: Performed by: ANESTHESIOLOGY

## 2021-01-01 PROCEDURE — 2709999900 HC NON-CHARGEABLE SUPPLY: Performed by: ORTHOPAEDIC SURGERY

## 2021-01-01 PROCEDURE — 74011250637 HC RX REV CODE- 250/637: Performed by: INTERNAL MEDICINE

## 2021-01-01 PROCEDURE — 93005 ELECTROCARDIOGRAM TRACING: CPT

## 2021-01-01 PROCEDURE — 88307 TISSUE EXAM BY PATHOLOGIST: CPT

## 2021-01-01 PROCEDURE — 97530 THERAPEUTIC ACTIVITIES: CPT

## 2021-01-01 PROCEDURE — 30233N1 TRANSFUSION OF NONAUTOLOGOUS RED BLOOD CELLS INTO PERIPHERAL VEIN, PERCUTANEOUS APPROACH: ICD-10-PCS | Performed by: FAMILY MEDICINE

## 2021-01-01 PROCEDURE — 74011250636 HC RX REV CODE- 250/636: Performed by: REGISTERED NURSE

## 2021-01-01 PROCEDURE — 74011000258 HC RX REV CODE- 258: Performed by: REGISTERED NURSE

## 2021-01-01 PROCEDURE — 71045 X-RAY EXAM CHEST 1 VIEW: CPT

## 2021-01-01 PROCEDURE — 77030040361 HC SLV COMPR DVT MDII -B: Performed by: ORTHOPAEDIC SURGERY

## 2021-01-01 PROCEDURE — 85025 COMPLETE CBC W/AUTO DIFF WBC: CPT

## 2021-01-01 PROCEDURE — 77030031139 HC SUT VCRL2 J&J -A: Performed by: ORTHOPAEDIC SURGERY

## 2021-01-01 PROCEDURE — 81001 URINALYSIS AUTO W/SCOPE: CPT

## 2021-01-01 PROCEDURE — 87040 BLOOD CULTURE FOR BACTERIA: CPT

## 2021-01-01 PROCEDURE — 99285 EMERGENCY DEPT VISIT HI MDM: CPT

## 2021-01-01 PROCEDURE — 76060000034 HC ANESTHESIA 1.5 TO 2 HR: Performed by: ORTHOPAEDIC SURGERY

## 2021-01-01 PROCEDURE — 88311 DECALCIFY TISSUE: CPT

## 2021-01-01 PROCEDURE — 83605 ASSAY OF LACTIC ACID: CPT

## 2021-01-01 PROCEDURE — 76010000153 HC OR TIME 1.5 TO 2 HR: Performed by: ORTHOPAEDIC SURGERY

## 2021-01-01 PROCEDURE — 74011000250 HC RX REV CODE- 250: Performed by: ORTHOPAEDIC SURGERY

## 2021-01-01 PROCEDURE — 74011250637 HC RX REV CODE- 250/637: Performed by: ORTHOPAEDIC SURGERY

## 2021-01-01 RX ORDER — SODIUM CHLORIDE 0.9 % (FLUSH) 0.9 %
5-10 SYRINGE (ML) INJECTION AS NEEDED
Status: DISCONTINUED | OUTPATIENT
Start: 2021-01-01 | End: 2021-01-01 | Stop reason: HOSPADM

## 2021-01-01 RX ORDER — FENTANYL CITRATE 50 UG/ML
25 INJECTION, SOLUTION INTRAMUSCULAR; INTRAVENOUS AS NEEDED
Status: DISCONTINUED | OUTPATIENT
Start: 2021-01-01 | End: 2021-01-01 | Stop reason: HOSPADM

## 2021-01-01 RX ORDER — INSULIN LISPRO 100 [IU]/ML
INJECTION, SOLUTION INTRAVENOUS; SUBCUTANEOUS
Status: DISCONTINUED | OUTPATIENT
Start: 2021-01-01 | End: 2021-01-01 | Stop reason: HOSPADM

## 2021-01-01 RX ORDER — MAGNESIUM SULFATE 100 %
4 CRYSTALS MISCELLANEOUS AS NEEDED
Status: DISCONTINUED | OUTPATIENT
Start: 2021-01-01 | End: 2021-01-01 | Stop reason: HOSPADM

## 2021-01-01 RX ORDER — METOPROLOL SUCCINATE 100 MG/1
100 TABLET, EXTENDED RELEASE ORAL DAILY
Status: DISCONTINUED | OUTPATIENT
Start: 2021-01-01 | End: 2021-01-01 | Stop reason: HOSPADM

## 2021-01-01 RX ORDER — SODIUM CHLORIDE 0.9 % (FLUSH) 0.9 %
5-40 SYRINGE (ML) INJECTION AS NEEDED
Status: DISCONTINUED | OUTPATIENT
Start: 2021-01-01 | End: 2021-01-01 | Stop reason: HOSPADM

## 2021-01-01 RX ORDER — LIDOCAINE HYDROCHLORIDE 20 MG/ML
INJECTION, SOLUTION EPIDURAL; INFILTRATION; INTRACAUDAL; PERINEURAL AS NEEDED
Status: DISCONTINUED | OUTPATIENT
Start: 2021-01-01 | End: 2021-01-01 | Stop reason: HOSPADM

## 2021-01-01 RX ORDER — DEXTROSE 50 % IN WATER (D50W) INTRAVENOUS SYRINGE
25-50 AS NEEDED
Status: DISCONTINUED | OUTPATIENT
Start: 2021-01-01 | End: 2021-01-01 | Stop reason: HOSPADM

## 2021-01-01 RX ORDER — SODIUM CHLORIDE, SODIUM LACTATE, POTASSIUM CHLORIDE, CALCIUM CHLORIDE 600; 310; 30; 20 MG/100ML; MG/100ML; MG/100ML; MG/100ML
125 INJECTION, SOLUTION INTRAVENOUS CONTINUOUS
Status: DISCONTINUED | OUTPATIENT
Start: 2021-01-01 | End: 2021-01-01 | Stop reason: HOSPADM

## 2021-01-01 RX ORDER — CLOPIDOGREL BISULFATE 75 MG/1
TABLET ORAL DAILY
COMMUNITY

## 2021-01-01 RX ORDER — MIDAZOLAM HYDROCHLORIDE 1 MG/ML
INJECTION, SOLUTION INTRAMUSCULAR; INTRAVENOUS AS NEEDED
Status: DISCONTINUED | OUTPATIENT
Start: 2021-01-01 | End: 2021-01-01 | Stop reason: HOSPADM

## 2021-01-01 RX ORDER — SAME BUTANEDISULFONATE/BETAINE 400-600 MG
250 POWDER IN PACKET (EA) ORAL DAILY
Status: DISCONTINUED | OUTPATIENT
Start: 2021-01-01 | End: 2021-01-01 | Stop reason: HOSPADM

## 2021-01-01 RX ORDER — FENTANYL CITRATE 50 UG/ML
INJECTION, SOLUTION INTRAMUSCULAR; INTRAVENOUS AS NEEDED
Status: DISCONTINUED | OUTPATIENT
Start: 2021-01-01 | End: 2021-01-01 | Stop reason: HOSPADM

## 2021-01-01 RX ORDER — LANOLIN ALCOHOL/MO/W.PET/CERES
325 CREAM (GRAM) TOPICAL
Status: DISCONTINUED | OUTPATIENT
Start: 2021-01-01 | End: 2021-01-01 | Stop reason: HOSPADM

## 2021-01-01 RX ORDER — NALOXONE HYDROCHLORIDE 0.4 MG/ML
0.1 INJECTION, SOLUTION INTRAMUSCULAR; INTRAVENOUS; SUBCUTANEOUS AS NEEDED
Status: DISCONTINUED | OUTPATIENT
Start: 2021-01-01 | End: 2021-01-01 | Stop reason: HOSPADM

## 2021-01-01 RX ORDER — FLUMAZENIL 0.1 MG/ML
0.2 INJECTION INTRAVENOUS
Status: DISCONTINUED | OUTPATIENT
Start: 2021-01-01 | End: 2021-01-01 | Stop reason: HOSPADM

## 2021-01-01 RX ORDER — PROPOFOL 10 MG/ML
INJECTION, EMULSION INTRAVENOUS AS NEEDED
Status: DISCONTINUED | OUTPATIENT
Start: 2021-01-01 | End: 2021-01-01 | Stop reason: HOSPADM

## 2021-01-01 RX ORDER — CEFAZOLIN SODIUM/WATER 2 G/20 ML
2 SYRINGE (ML) INTRAVENOUS ONCE
Status: CANCELLED | OUTPATIENT
Start: 2021-01-01 | End: 2021-01-01

## 2021-01-01 RX ORDER — SODIUM CHLORIDE, SODIUM LACTATE, POTASSIUM CHLORIDE, CALCIUM CHLORIDE 600; 310; 30; 20 MG/100ML; MG/100ML; MG/100ML; MG/100ML
1000 INJECTION, SOLUTION INTRAVENOUS CONTINUOUS
Status: DISCONTINUED | OUTPATIENT
Start: 2021-01-01 | End: 2021-01-01 | Stop reason: HOSPADM

## 2021-01-01 RX ORDER — VANCOMYCIN/0.9 % SOD CHLORIDE 1.5G/250ML
1500 PLASTIC BAG, INJECTION (ML) INTRAVENOUS ONCE
Status: COMPLETED | OUTPATIENT
Start: 2021-01-01 | End: 2021-01-01

## 2021-01-01 RX ORDER — METOCLOPRAMIDE HYDROCHLORIDE 5 MG/ML
INJECTION INTRAMUSCULAR; INTRAVENOUS AS NEEDED
Status: DISCONTINUED | OUTPATIENT
Start: 2021-01-01 | End: 2021-01-01 | Stop reason: HOSPADM

## 2021-01-01 RX ORDER — SODIUM CHLORIDE 0.9 % (FLUSH) 0.9 %
5-40 SYRINGE (ML) INJECTION EVERY 8 HOURS
Status: DISCONTINUED | OUTPATIENT
Start: 2021-01-01 | End: 2021-01-01 | Stop reason: HOSPADM

## 2021-01-01 RX ORDER — SODIUM CHLORIDE, SODIUM LACTATE, POTASSIUM CHLORIDE, CALCIUM CHLORIDE 600; 310; 30; 20 MG/100ML; MG/100ML; MG/100ML; MG/100ML
125 INJECTION, SOLUTION INTRAVENOUS CONTINUOUS
Status: CANCELLED | OUTPATIENT
Start: 2021-01-01

## 2021-01-01 RX ORDER — MORPHINE SULFATE 2 MG/ML
2 INJECTION, SOLUTION INTRAMUSCULAR; INTRAVENOUS
Status: DISCONTINUED | OUTPATIENT
Start: 2021-01-01 | End: 2021-01-01 | Stop reason: HOSPADM

## 2021-01-01 RX ORDER — OXYCODONE AND ACETAMINOPHEN 5; 325 MG/1; MG/1
TABLET ORAL
COMMUNITY
End: 2021-01-01

## 2021-01-01 RX ORDER — GABAPENTIN 300 MG/1
300 CAPSULE ORAL 3 TIMES DAILY
Status: DISCONTINUED | OUTPATIENT
Start: 2021-01-01 | End: 2021-01-01 | Stop reason: HOSPADM

## 2021-01-01 RX ORDER — GLYCOPYRROLATE 0.2 MG/ML
INJECTION INTRAMUSCULAR; INTRAVENOUS AS NEEDED
Status: DISCONTINUED | OUTPATIENT
Start: 2021-01-01 | End: 2021-01-01 | Stop reason: HOSPADM

## 2021-01-01 RX ORDER — INSULIN LISPRO 100 [IU]/ML
INJECTION, SOLUTION INTRAVENOUS; SUBCUTANEOUS
Qty: 1 EACH | Refills: 0 | Status: SHIPPED
Start: 2021-01-01

## 2021-01-01 RX ORDER — INSULIN LISPRO 100 [IU]/ML
INJECTION, SOLUTION INTRAVENOUS; SUBCUTANEOUS ONCE
Status: DISCONTINUED | OUTPATIENT
Start: 2021-01-01 | End: 2021-01-01 | Stop reason: HOSPADM

## 2021-01-01 RX ORDER — FACIAL-BODY WIPES
10 EACH TOPICAL
Status: DISCONTINUED | OUTPATIENT
Start: 2021-01-01 | End: 2021-01-01 | Stop reason: HOSPADM

## 2021-01-01 RX ORDER — GABAPENTIN 300 MG/1
300 CAPSULE ORAL 3 TIMES DAILY
Qty: 30 CAPSULE | Refills: 0 | Status: SHIPPED | OUTPATIENT
Start: 2021-01-01

## 2021-01-01 RX ORDER — HYDROMORPHONE HYDROCHLORIDE 1 MG/ML
0.5 INJECTION, SOLUTION INTRAMUSCULAR; INTRAVENOUS; SUBCUTANEOUS
Status: DISCONTINUED | OUTPATIENT
Start: 2021-01-01 | End: 2021-01-01 | Stop reason: HOSPADM

## 2021-01-01 RX ORDER — OXYCODONE HYDROCHLORIDE 10 MG/1
10 TABLET ORAL
Qty: 12 TABLET | Refills: 0 | Status: SHIPPED | OUTPATIENT
Start: 2021-01-01 | End: 2021-01-01

## 2021-01-01 RX ORDER — OXYCODONE HYDROCHLORIDE 5 MG/1
10 TABLET ORAL
Status: DISCONTINUED | OUTPATIENT
Start: 2021-01-01 | End: 2021-01-01 | Stop reason: HOSPADM

## 2021-01-01 RX ORDER — SODIUM CHLORIDE 9 MG/ML
250 INJECTION, SOLUTION INTRAVENOUS AS NEEDED
Status: DISCONTINUED | OUTPATIENT
Start: 2021-01-01 | End: 2021-01-01 | Stop reason: SDUPTHER

## 2021-01-01 RX ORDER — TAMSULOSIN HYDROCHLORIDE 0.4 MG/1
0.8 CAPSULE ORAL DAILY
Status: DISCONTINUED | OUTPATIENT
Start: 2021-01-01 | End: 2021-01-01 | Stop reason: HOSPADM

## 2021-01-01 RX ORDER — MAGNESIUM SULFATE 100 %
16 CRYSTALS MISCELLANEOUS AS NEEDED
Status: DISCONTINUED | OUTPATIENT
Start: 2021-01-01 | End: 2021-01-01 | Stop reason: HOSPADM

## 2021-01-01 RX ORDER — ENOXAPARIN SODIUM 100 MG/ML
40 INJECTION SUBCUTANEOUS EVERY 24 HOURS
Status: DISCONTINUED | OUTPATIENT
Start: 2021-01-01 | End: 2021-01-01 | Stop reason: HOSPADM

## 2021-01-01 RX ORDER — ONDANSETRON 2 MG/ML
INJECTION INTRAMUSCULAR; INTRAVENOUS AS NEEDED
Status: DISCONTINUED | OUTPATIENT
Start: 2021-01-01 | End: 2021-01-01 | Stop reason: HOSPADM

## 2021-01-01 RX ORDER — KETAMINE HCL IN 0.9 % NACL 50 MG/5 ML
SYRINGE (ML) INTRAVENOUS AS NEEDED
Status: DISCONTINUED | OUTPATIENT
Start: 2021-01-01 | End: 2021-01-01 | Stop reason: HOSPADM

## 2021-01-01 RX ORDER — VANCOMYCIN HYDROCHLORIDE
1250 EVERY 24 HOURS
Status: DISCONTINUED | OUTPATIENT
Start: 2021-01-01 | End: 2021-01-01

## 2021-01-01 RX ORDER — EPHEDRINE SULFATE/0.9% NACL/PF 50 MG/5 ML
SYRINGE (ML) INTRAVENOUS AS NEEDED
Status: DISCONTINUED | OUTPATIENT
Start: 2021-01-01 | End: 2021-01-01 | Stop reason: HOSPADM

## 2021-01-01 RX ORDER — SODIUM CHLORIDE 9 MG/ML
250 INJECTION, SOLUTION INTRAVENOUS AS NEEDED
Status: DISCONTINUED | OUTPATIENT
Start: 2021-01-01 | End: 2021-01-01 | Stop reason: HOSPADM

## 2021-01-01 RX ORDER — ATORVASTATIN CALCIUM 20 MG/1
40 TABLET, FILM COATED ORAL
Status: DISCONTINUED | OUTPATIENT
Start: 2021-01-01 | End: 2021-01-01 | Stop reason: HOSPADM

## 2021-01-01 RX ADMIN — ENOXAPARIN SODIUM 40 MG: 40 INJECTION SUBCUTANEOUS at 01:37

## 2021-01-01 RX ADMIN — PROPOFOL 200 MG: 10 INJECTION, EMULSION INTRAVENOUS at 08:34

## 2021-01-01 RX ADMIN — VANCOMYCIN HYDROCHLORIDE 750 MG: 750 INJECTION, POWDER, LYOPHILIZED, FOR SOLUTION INTRAVENOUS at 05:40

## 2021-01-01 RX ADMIN — GLYCOPYRROLATE 0.2 MG: 0.2 INJECTION INTRAMUSCULAR; INTRAVENOUS at 08:27

## 2021-01-01 RX ADMIN — VANCOMYCIN HYDROCHLORIDE 1250 MG: 10 INJECTION, POWDER, LYOPHILIZED, FOR SOLUTION INTRAVENOUS at 22:18

## 2021-01-01 RX ADMIN — ATORVASTATIN CALCIUM 40 MG: 20 TABLET, FILM COATED ORAL at 20:46

## 2021-01-01 RX ADMIN — Medication 250 MG: at 08:13

## 2021-01-01 RX ADMIN — OXYCODONE 10 MG: 5 TABLET ORAL at 22:11

## 2021-01-01 RX ADMIN — INSULIN LISPRO 2 UNITS: 100 INJECTION, SOLUTION INTRAVENOUS; SUBCUTANEOUS at 08:14

## 2021-01-01 RX ADMIN — MORPHINE SULFATE 2 MG: 2 INJECTION, SOLUTION INTRAMUSCULAR; INTRAVENOUS at 03:09

## 2021-01-01 RX ADMIN — WATER 2 G: 1 INJECTION INTRAMUSCULAR; INTRAVENOUS; SUBCUTANEOUS at 08:03

## 2021-01-01 RX ADMIN — ATORVASTATIN CALCIUM 40 MG: 20 TABLET, FILM COATED ORAL at 21:01

## 2021-01-01 RX ADMIN — WATER 2 G: 1 INJECTION INTRAMUSCULAR; INTRAVENOUS; SUBCUTANEOUS at 08:45

## 2021-01-01 RX ADMIN — MORPHINE SULFATE 2 MG: 2 INJECTION, SOLUTION INTRAMUSCULAR; INTRAVENOUS at 09:53

## 2021-01-01 RX ADMIN — OXYCODONE 10 MG: 5 TABLET ORAL at 12:24

## 2021-01-01 RX ADMIN — METOPROLOL SUCCINATE 100 MG: 100 TABLET, EXTENDED RELEASE ORAL at 08:57

## 2021-01-01 RX ADMIN — OXYCODONE 10 MG: 5 TABLET ORAL at 06:43

## 2021-01-01 RX ADMIN — MORPHINE SULFATE 2 MG: 2 INJECTION, SOLUTION INTRAMUSCULAR; INTRAVENOUS at 05:40

## 2021-01-01 RX ADMIN — WATER 2 G: 1 INJECTION INTRAMUSCULAR; INTRAVENOUS; SUBCUTANEOUS at 18:12

## 2021-01-01 RX ADMIN — METOPROLOL SUCCINATE 100 MG: 100 TABLET, EXTENDED RELEASE ORAL at 09:23

## 2021-01-01 RX ADMIN — ENOXAPARIN SODIUM 40 MG: 40 INJECTION SUBCUTANEOUS at 03:18

## 2021-01-01 RX ADMIN — SODIUM CHLORIDE 500 ML: 900 INJECTION, SOLUTION INTRAVENOUS at 14:57

## 2021-01-01 RX ADMIN — METOCLOPRAMIDE 10 MG: 5 INJECTION, SOLUTION INTRAMUSCULAR; INTRAVENOUS at 08:26

## 2021-01-01 RX ADMIN — ONDANSETRON HYDROCHLORIDE 4 MG: 2 INJECTION INTRAMUSCULAR; INTRAVENOUS at 08:55

## 2021-01-01 RX ADMIN — LIDOCAINE HYDROCHLORIDE 100 MG: 20 INJECTION, SOLUTION INTRAVENOUS at 08:34

## 2021-01-01 RX ADMIN — Medication 10 MG: at 09:44

## 2021-01-01 RX ADMIN — Medication 250 MG: at 08:44

## 2021-01-01 RX ADMIN — SODIUM CHLORIDE, SODIUM LACTATE, POTASSIUM CHLORIDE, AND CALCIUM CHLORIDE 125 ML/HR: 600; 310; 30; 20 INJECTION, SOLUTION INTRAVENOUS at 08:11

## 2021-01-01 RX ADMIN — INSULIN LISPRO 2 UNITS: 100 INJECTION, SOLUTION INTRAVENOUS; SUBCUTANEOUS at 12:14

## 2021-01-01 RX ADMIN — OXYCODONE 10 MG: 5 TABLET ORAL at 11:21

## 2021-01-01 RX ADMIN — TAMSULOSIN HYDROCHLORIDE 0.8 MG: 0.4 CAPSULE ORAL at 08:15

## 2021-01-01 RX ADMIN — INSULIN LISPRO 4 UNITS: 100 INJECTION, SOLUTION INTRAVENOUS; SUBCUTANEOUS at 12:38

## 2021-01-01 RX ADMIN — TAMSULOSIN HYDROCHLORIDE 0.8 MG: 0.4 CAPSULE ORAL at 08:13

## 2021-01-01 RX ADMIN — SODIUM CHLORIDE, SODIUM LACTATE, POTASSIUM CHLORIDE, AND CALCIUM CHLORIDE 125 ML/HR: 600; 310; 30; 20 INJECTION, SOLUTION INTRAVENOUS at 20:03

## 2021-01-01 RX ADMIN — ATORVASTATIN CALCIUM 40 MG: 20 TABLET, FILM COATED ORAL at 22:11

## 2021-01-01 RX ADMIN — MORPHINE SULFATE 2 MG: 2 INJECTION, SOLUTION INTRAMUSCULAR; INTRAVENOUS at 11:24

## 2021-01-01 RX ADMIN — INSULIN LISPRO 2 UNITS: 100 INJECTION, SOLUTION INTRAVENOUS; SUBCUTANEOUS at 16:54

## 2021-01-01 RX ADMIN — MULTIPLE VITAMINS W/ MINERALS TAB 1 TABLET: TAB at 08:29

## 2021-01-01 RX ADMIN — INSULIN LISPRO 2 UNITS: 100 INJECTION, SOLUTION INTRAVENOUS; SUBCUTANEOUS at 17:12

## 2021-01-01 RX ADMIN — SODIUM CHLORIDE, SODIUM LACTATE, POTASSIUM CHLORIDE, AND CALCIUM CHLORIDE 125 ML/HR: 600; 310; 30; 20 INJECTION, SOLUTION INTRAVENOUS at 17:04

## 2021-01-01 RX ADMIN — WATER 2 G: 1 INJECTION INTRAMUSCULAR; INTRAVENOUS; SUBCUTANEOUS at 07:00

## 2021-01-01 RX ADMIN — INSULIN LISPRO 2 UNITS: 100 INJECTION, SOLUTION INTRAVENOUS; SUBCUTANEOUS at 13:27

## 2021-01-01 RX ADMIN — VANCOMYCIN HYDROCHLORIDE 1500 MG: 10 INJECTION, POWDER, LYOPHILIZED, FOR SOLUTION INTRAVENOUS at 16:11

## 2021-01-01 RX ADMIN — INSULIN LISPRO 2 UNITS: 100 INJECTION, SOLUTION INTRAVENOUS; SUBCUTANEOUS at 21:34

## 2021-01-01 RX ADMIN — VANCOMYCIN HYDROCHLORIDE 750 MG: 750 INJECTION, POWDER, LYOPHILIZED, FOR SOLUTION INTRAVENOUS at 16:28

## 2021-01-01 RX ADMIN — MORPHINE SULFATE 2 MG: 2 INJECTION, SOLUTION INTRAMUSCULAR; INTRAVENOUS at 16:13

## 2021-01-01 RX ADMIN — SODIUM CHLORIDE, SODIUM LACTATE, POTASSIUM CHLORIDE, AND CALCIUM CHLORIDE 125 ML/HR: 600; 310; 30; 20 INJECTION, SOLUTION INTRAVENOUS at 13:32

## 2021-01-01 RX ADMIN — GABAPENTIN 300 MG: 300 CAPSULE ORAL at 10:24

## 2021-01-01 RX ADMIN — MULTIPLE VITAMINS W/ MINERALS TAB 1 TABLET: TAB at 08:13

## 2021-01-01 RX ADMIN — OXYCODONE 10 MG: 5 TABLET ORAL at 12:32

## 2021-01-01 RX ADMIN — INSULIN LISPRO 2 UNITS: 100 INJECTION, SOLUTION INTRAVENOUS; SUBCUTANEOUS at 18:45

## 2021-01-01 RX ADMIN — OXYCODONE 10 MG: 5 TABLET ORAL at 04:20

## 2021-01-01 RX ADMIN — ATORVASTATIN CALCIUM 40 MG: 20 TABLET, FILM COATED ORAL at 21:42

## 2021-01-01 RX ADMIN — INSULIN LISPRO 2 UNITS: 100 INJECTION, SOLUTION INTRAVENOUS; SUBCUTANEOUS at 22:26

## 2021-01-01 RX ADMIN — Medication 250 MG: at 08:29

## 2021-01-01 RX ADMIN — OXYCODONE 10 MG: 5 TABLET ORAL at 21:32

## 2021-01-01 RX ADMIN — PHENYLEPHRINE HYDROCHLORIDE 100 MCG: 10 INJECTION INTRAVENOUS at 09:36

## 2021-01-01 RX ADMIN — Medication 250 MG: at 08:57

## 2021-01-01 RX ADMIN — OXYCODONE 10 MG: 5 TABLET ORAL at 15:45

## 2021-01-01 RX ADMIN — OXYCODONE 10 MG: 5 TABLET ORAL at 08:03

## 2021-01-01 RX ADMIN — WATER 2 G: 1 INJECTION INTRAMUSCULAR; INTRAVENOUS; SUBCUTANEOUS at 21:30

## 2021-01-01 RX ADMIN — Medication 250 MG: at 08:15

## 2021-01-01 RX ADMIN — MORPHINE SULFATE 2 MG: 2 INJECTION, SOLUTION INTRAMUSCULAR; INTRAVENOUS at 20:57

## 2021-01-01 RX ADMIN — INSULIN LISPRO 4 UNITS: 100 INJECTION, SOLUTION INTRAVENOUS; SUBCUTANEOUS at 21:42

## 2021-01-01 RX ADMIN — OXYCODONE 10 MG: 5 TABLET ORAL at 03:17

## 2021-01-01 RX ADMIN — MORPHINE SULFATE 2 MG: 2 INJECTION, SOLUTION INTRAMUSCULAR; INTRAVENOUS at 09:26

## 2021-01-01 RX ADMIN — OXYCODONE 10 MG: 5 TABLET ORAL at 22:26

## 2021-01-01 RX ADMIN — MORPHINE SULFATE 2 MG: 2 INJECTION, SOLUTION INTRAMUSCULAR; INTRAVENOUS at 08:54

## 2021-01-01 RX ADMIN — INSULIN LISPRO 4 UNITS: 100 INJECTION, SOLUTION INTRAVENOUS; SUBCUTANEOUS at 16:50

## 2021-01-01 RX ADMIN — FERROUS SULFATE TAB 325 MG (65 MG ELEMENTAL FE) 325 MG: 325 (65 FE) TAB at 08:03

## 2021-01-01 RX ADMIN — TAMSULOSIN HYDROCHLORIDE 0.8 MG: 0.4 CAPSULE ORAL at 09:49

## 2021-01-01 RX ADMIN — INSULIN LISPRO 2 UNITS: 100 INJECTION, SOLUTION INTRAVENOUS; SUBCUTANEOUS at 11:59

## 2021-01-01 RX ADMIN — FERROUS SULFATE TAB 325 MG (65 MG ELEMENTAL FE) 325 MG: 325 (65 FE) TAB at 08:57

## 2021-01-01 RX ADMIN — MULTIPLE VITAMINS W/ MINERALS TAB 1 TABLET: TAB at 08:44

## 2021-01-01 RX ADMIN — ATORVASTATIN CALCIUM 40 MG: 20 TABLET, FILM COATED ORAL at 22:26

## 2021-01-01 RX ADMIN — FENTANYL CITRATE 25 MCG: 50 INJECTION, SOLUTION INTRAMUSCULAR; INTRAVENOUS at 09:16

## 2021-01-01 RX ADMIN — MORPHINE SULFATE 2 MG: 2 INJECTION, SOLUTION INTRAMUSCULAR; INTRAVENOUS at 04:26

## 2021-01-01 RX ADMIN — INSULIN LISPRO 2 UNITS: 100 INJECTION, SOLUTION INTRAVENOUS; SUBCUTANEOUS at 21:33

## 2021-01-01 RX ADMIN — INSULIN LISPRO 4 UNITS: 100 INJECTION, SOLUTION INTRAVENOUS; SUBCUTANEOUS at 22:43

## 2021-01-01 RX ADMIN — PHENYLEPHRINE HYDROCHLORIDE 100 MCG: 10 INJECTION INTRAVENOUS at 09:19

## 2021-01-01 RX ADMIN — TAMSULOSIN HYDROCHLORIDE 0.8 MG: 0.4 CAPSULE ORAL at 08:29

## 2021-01-01 RX ADMIN — WATER 2 G: 1 INJECTION INTRAMUSCULAR; INTRAVENOUS; SUBCUTANEOUS at 19:11

## 2021-01-01 RX ADMIN — VANCOMYCIN HYDROCHLORIDE 750 MG: 750 INJECTION, POWDER, LYOPHILIZED, FOR SOLUTION INTRAVENOUS at 04:34

## 2021-01-01 RX ADMIN — Medication 20 MG: at 08:27

## 2021-01-01 RX ADMIN — WATER 2 G: 1 INJECTION INTRAMUSCULAR; INTRAVENOUS; SUBCUTANEOUS at 06:59

## 2021-01-01 RX ADMIN — METOPROLOL SUCCINATE 100 MG: 100 TABLET, EXTENDED RELEASE ORAL at 08:13

## 2021-01-01 RX ADMIN — FERROUS SULFATE TAB 325 MG (65 MG ELEMENTAL FE) 325 MG: 325 (65 FE) TAB at 06:53

## 2021-01-01 RX ADMIN — MULTIPLE VITAMINS W/ MINERALS TAB 1 TABLET: TAB at 08:15

## 2021-01-01 RX ADMIN — VANCOMYCIN HYDROCHLORIDE 750 MG: 750 INJECTION, POWDER, LYOPHILIZED, FOR SOLUTION INTRAVENOUS at 17:24

## 2021-01-01 RX ADMIN — FERROUS SULFATE TAB 325 MG (65 MG ELEMENTAL FE) 325 MG: 325 (65 FE) TAB at 07:30

## 2021-01-01 RX ADMIN — TAMSULOSIN HYDROCHLORIDE 0.8 MG: 0.4 CAPSULE ORAL at 09:23

## 2021-01-01 RX ADMIN — MORPHINE SULFATE 2 MG: 2 INJECTION, SOLUTION INTRAMUSCULAR; INTRAVENOUS at 08:30

## 2021-01-01 RX ADMIN — SODIUM CHLORIDE, SODIUM LACTATE, POTASSIUM CHLORIDE, AND CALCIUM CHLORIDE: 600; 310; 30; 20 INJECTION, SOLUTION INTRAVENOUS at 08:25

## 2021-01-01 RX ADMIN — Medication 250 MG: at 09:49

## 2021-01-01 RX ADMIN — Medication 30 MG: at 08:35

## 2021-01-01 RX ADMIN — OXYCODONE 10 MG: 5 TABLET ORAL at 06:36

## 2021-01-01 RX ADMIN — PHENYLEPHRINE HYDROCHLORIDE 100 MCG: 10 INJECTION INTRAVENOUS at 09:06

## 2021-01-01 RX ADMIN — Medication 250 MG: at 09:23

## 2021-01-01 RX ADMIN — FENTANYL CITRATE 25 MCG: 50 INJECTION, SOLUTION INTRAMUSCULAR; INTRAVENOUS at 09:42

## 2021-01-01 RX ADMIN — MULTIPLE VITAMINS W/ MINERALS TAB 1 TABLET: TAB at 08:57

## 2021-01-01 RX ADMIN — SODIUM CHLORIDE, SODIUM LACTATE, POTASSIUM CHLORIDE, AND CALCIUM CHLORIDE 125 ML/HR: 600; 310; 30; 20 INJECTION, SOLUTION INTRAVENOUS at 12:23

## 2021-01-01 RX ADMIN — METOPROLOL SUCCINATE 100 MG: 100 TABLET, EXTENDED RELEASE ORAL at 09:49

## 2021-01-01 RX ADMIN — ATORVASTATIN CALCIUM 40 MG: 20 TABLET, FILM COATED ORAL at 21:33

## 2021-01-01 RX ADMIN — TAMSULOSIN HYDROCHLORIDE 0.8 MG: 0.4 CAPSULE ORAL at 08:45

## 2021-01-01 RX ADMIN — SODIUM CHLORIDE, SODIUM LACTATE, POTASSIUM CHLORIDE, AND CALCIUM CHLORIDE 125 ML/HR: 600; 310; 30; 20 INJECTION, SOLUTION INTRAVENOUS at 03:29

## 2021-01-01 RX ADMIN — INSULIN LISPRO 2 UNITS: 100 INJECTION, SOLUTION INTRAVENOUS; SUBCUTANEOUS at 08:49

## 2021-01-01 RX ADMIN — SODIUM CHLORIDE, SODIUM LACTATE, POTASSIUM CHLORIDE, AND CALCIUM CHLORIDE 125 ML/HR: 600; 310; 30; 20 INJECTION, SOLUTION INTRAVENOUS at 21:24

## 2021-01-01 RX ADMIN — METOPROLOL SUCCINATE 100 MG: 100 TABLET, EXTENDED RELEASE ORAL at 08:29

## 2021-01-01 RX ADMIN — FERROUS SULFATE TAB 325 MG (65 MG ELEMENTAL FE) 325 MG: 325 (65 FE) TAB at 06:47

## 2021-01-01 RX ADMIN — PHENYLEPHRINE HYDROCHLORIDE 100 MCG: 10 INJECTION INTRAVENOUS at 08:48

## 2021-01-01 RX ADMIN — MULTIPLE VITAMINS W/ MINERALS TAB 1 TABLET: TAB at 09:49

## 2021-01-01 RX ADMIN — MIDAZOLAM 2 MG: 1 INJECTION INTRAMUSCULAR; INTRAVENOUS at 08:26

## 2021-01-01 RX ADMIN — OXYCODONE 10 MG: 5 TABLET ORAL at 03:58

## 2021-01-01 RX ADMIN — FERROUS SULFATE TAB 325 MG (65 MG ELEMENTAL FE) 325 MG: 325 (65 FE) TAB at 06:44

## 2021-01-01 RX ADMIN — SODIUM CHLORIDE, SODIUM LACTATE, POTASSIUM CHLORIDE, AND CALCIUM CHLORIDE 125 ML/HR: 600; 310; 30; 20 INJECTION, SOLUTION INTRAVENOUS at 02:09

## 2021-01-01 RX ADMIN — OXYCODONE 10 MG: 5 TABLET ORAL at 12:14

## 2021-01-01 RX ADMIN — METOPROLOL SUCCINATE 100 MG: 100 TABLET, EXTENDED RELEASE ORAL at 08:15

## 2021-01-01 RX ADMIN — INSULIN LISPRO 2 UNITS: 100 INJECTION, SOLUTION INTRAVENOUS; SUBCUTANEOUS at 12:16

## 2021-01-01 RX ADMIN — MORPHINE SULFATE 2 MG: 2 INJECTION, SOLUTION INTRAMUSCULAR; INTRAVENOUS at 13:31

## 2021-01-01 RX ADMIN — MORPHINE SULFATE 2 MG: 2 INJECTION, SOLUTION INTRAMUSCULAR; INTRAVENOUS at 01:31

## 2021-01-01 RX ADMIN — INSULIN LISPRO 2 UNITS: 100 INJECTION, SOLUTION INTRAVENOUS; SUBCUTANEOUS at 22:11

## 2021-01-01 RX ADMIN — MORPHINE SULFATE 2 MG: 2 INJECTION, SOLUTION INTRAMUSCULAR; INTRAVENOUS at 19:03

## 2021-01-01 RX ADMIN — DIPHENHYDRAMINE HYDROCHLORIDE, ZINC ACETATE: 2; .1 CREAM TOPICAL at 05:35

## 2021-01-01 RX ADMIN — MULTIPLE VITAMINS W/ MINERALS TAB 1 TABLET: TAB at 09:23

## 2021-01-01 RX ADMIN — MORPHINE SULFATE 2 MG: 2 INJECTION, SOLUTION INTRAMUSCULAR; INTRAVENOUS at 09:23

## 2021-01-01 RX ADMIN — TAMSULOSIN HYDROCHLORIDE 0.8 MG: 0.4 CAPSULE ORAL at 08:57

## 2021-01-01 RX ADMIN — OXYCODONE 10 MG: 5 TABLET ORAL at 20:45

## 2021-01-01 RX ADMIN — WATER 2 G: 1 INJECTION INTRAMUSCULAR; INTRAVENOUS; SUBCUTANEOUS at 19:00

## 2021-01-01 RX ADMIN — METOPROLOL SUCCINATE 100 MG: 100 TABLET, EXTENDED RELEASE ORAL at 08:45

## 2021-01-01 RX ADMIN — ATORVASTATIN CALCIUM 40 MG: 20 TABLET, FILM COATED ORAL at 21:32

## 2021-01-01 RX ADMIN — VANCOMYCIN HYDROCHLORIDE 750 MG: 750 INJECTION, POWDER, LYOPHILIZED, FOR SOLUTION INTRAVENOUS at 04:17

## 2021-01-01 RX ADMIN — INSULIN LISPRO 2 UNITS: 100 INJECTION, SOLUTION INTRAVENOUS; SUBCUTANEOUS at 16:13

## 2021-01-01 RX ADMIN — INSULIN LISPRO 2 UNITS: 100 INJECTION, SOLUTION INTRAVENOUS; SUBCUTANEOUS at 08:58

## 2021-01-01 RX ADMIN — ENOXAPARIN SODIUM 40 MG: 40 INJECTION SUBCUTANEOUS at 02:00

## 2021-01-01 RX ADMIN — FERROUS SULFATE TAB 325 MG (65 MG ELEMENTAL FE) 325 MG: 325 (65 FE) TAB at 06:43

## 2021-01-01 RX ADMIN — INSULIN LISPRO 2 UNITS: 100 INJECTION, SOLUTION INTRAVENOUS; SUBCUTANEOUS at 08:13

## 2021-01-01 RX ADMIN — SODIUM CHLORIDE, SODIUM LACTATE, POTASSIUM CHLORIDE, AND CALCIUM CHLORIDE: 600; 310; 30; 20 INJECTION, SOLUTION INTRAVENOUS at 09:36

## 2021-01-01 RX ADMIN — FENTANYL CITRATE 50 MCG: 50 INJECTION, SOLUTION INTRAMUSCULAR; INTRAVENOUS at 09:00

## 2021-01-01 RX ADMIN — WATER 2 G: 1 INJECTION INTRAMUSCULAR; INTRAVENOUS; SUBCUTANEOUS at 14:53

## 2021-04-14 ENCOUNTER — APPOINTMENT (OUTPATIENT)
Dept: GENERAL RADIOLOGY | Age: 76
DRG: 628 | End: 2021-04-14
Attending: EMERGENCY MEDICINE
Payer: MEDICARE

## 2021-04-14 ENCOUNTER — HOSPITAL ENCOUNTER (INPATIENT)
Age: 76
LOS: 8 days | Discharge: HOME HEALTH | DRG: 628 | End: 2021-04-22
Attending: EMERGENCY MEDICINE | Admitting: FAMILY MEDICINE
Payer: MEDICARE

## 2021-04-14 ENCOUNTER — HOSPITAL ENCOUNTER (OUTPATIENT)
Dept: LAB | Age: 76
Discharge: HOME | End: 2021-04-14
Payer: MEDICARE

## 2021-04-14 DIAGNOSIS — J18.9 COMMUNITY ACQUIRED PNEUMONIA OF RIGHT LUNG, UNSPECIFIED PART OF LUNG: ICD-10-CM

## 2021-04-14 DIAGNOSIS — E08.621 DIABETIC ULCER OF LEFT HEEL ASSOCIATED WITH DIABETES MELLITUS DUE TO UNDERLYING CONDITION, LIMITED TO BREAKDOWN OF SKIN (HCC): Primary | ICD-10-CM

## 2021-04-14 DIAGNOSIS — U07.1 COVID-19 VIRUS INFECTION: ICD-10-CM

## 2021-04-14 DIAGNOSIS — N28.9 RENAL INSUFFICIENCY: ICD-10-CM

## 2021-04-14 DIAGNOSIS — A41.9 SEPSIS, DUE TO UNSPECIFIED ORGANISM, UNSPECIFIED WHETHER ACUTE ORGAN DYSFUNCTION PRESENT (HCC): ICD-10-CM

## 2021-04-14 DIAGNOSIS — L97.421 DIABETIC ULCER OF LEFT HEEL ASSOCIATED WITH DIABETES MELLITUS DUE TO UNDERLYING CONDITION, LIMITED TO BREAKDOWN OF SKIN (HCC): Primary | ICD-10-CM

## 2021-04-14 PROBLEM — E11.621 DIABETIC ULCER OF LEFT FOOT (HCC): Status: ACTIVE | Noted: 2021-04-14

## 2021-04-14 PROBLEM — L97.529 DIABETIC ULCER OF LEFT FOOT (HCC): Status: ACTIVE | Noted: 2021-04-14

## 2021-04-14 LAB
ABO + RH BLD: NORMAL
ALBUMIN SERPL-MCNC: 2.2 G/DL (ref 3.4–5)
ALBUMIN/GLOB SERPL: 0.4 {RATIO} (ref 0.8–1.7)
ALP SERPL-CCNC: 136 U/L (ref 45–117)
ALT SERPL-CCNC: 30 U/L (ref 16–61)
ANION GAP SERPL CALC-SCNC: 10 MMOL/L (ref 3–18)
AST SERPL-CCNC: 28 U/L (ref 10–38)
ATRIAL RATE: 97 BPM
BASOPHILS # BLD: 0 K/UL (ref 0–0.1)
BASOPHILS NFR BLD: 0 % (ref 0–2)
BILIRUB SERPL-MCNC: 0.7 MG/DL (ref 0.2–1)
BLOOD GROUP ANTIBODIES SERPL: NORMAL
BUN SERPL-MCNC: 34 MG/DL (ref 7–18)
BUN/CREAT SERPL: 14 (ref 12–20)
CALCIUM SERPL-MCNC: 8.2 MG/DL (ref 8.5–10.1)
CALCULATED P AXIS, ECG09: -2 DEGREES
CALCULATED R AXIS, ECG10: 41 DEGREES
CALCULATED T AXIS, ECG11: 89 DEGREES
CHLORIDE SERPL-SCNC: 99 MMOL/L (ref 100–111)
CO2 SERPL-SCNC: 26 MMOL/L (ref 21–32)
COVID-19 RAPID TEST, COVR: DETECTED
CREAT SERPL-MCNC: 2.51 MG/DL (ref 0.6–1.3)
D DIMER PPP FEU-MCNC: 2.31 UG/ML(FEU)
DIAGNOSIS, 93000: NORMAL
DIFFERENTIAL METHOD BLD: ABNORMAL
EOSINOPHIL # BLD: 0 K/UL (ref 0–0.4)
EOSINOPHIL NFR BLD: 0 % (ref 0–5)
ERYTHROCYTE [DISTWIDTH] IN BLOOD BY AUTOMATED COUNT: 18.8 % (ref 11.6–14.5)
GLOBULIN SER CALC-MCNC: 4.9 G/DL (ref 2–4)
GLUCOSE BLD STRIP.AUTO-MCNC: 168 MG/DL (ref 70–110)
GLUCOSE BLD STRIP.AUTO-MCNC: 48 MG/DL (ref 70–110)
GLUCOSE BLD STRIP.AUTO-MCNC: 76 MG/DL (ref 70–110)
GLUCOSE BLD STRIP.AUTO-MCNC: 84 MG/DL (ref 70–110)
GLUCOSE SERPL-MCNC: 39 MG/DL (ref 74–99)
HCT VFR BLD AUTO: 35.9 % (ref 36–48)
HGB BLD-MCNC: 11.5 G/DL (ref 13–16)
LACTATE BLD-SCNC: 1.12 MMOL/L (ref 0.4–2)
LACTATE BLD-SCNC: 2.59 MMOL/L (ref 0.4–2)
LYMPHOCYTES # BLD: 1.3 K/UL (ref 0.9–3.6)
LYMPHOCYTES NFR BLD: 15 % (ref 21–52)
MCH RBC QN AUTO: 26.7 PG (ref 24–34)
MCHC RBC AUTO-ENTMCNC: 32 G/DL (ref 31–37)
MCV RBC AUTO: 83.3 FL (ref 74–97)
MONOCYTES # BLD: 1.4 K/UL (ref 0.05–1.2)
MONOCYTES NFR BLD: 16 % (ref 3–10)
NEUTS SEG # BLD: 5.9 K/UL (ref 1.8–8)
NEUTS SEG NFR BLD: 69 % (ref 40–73)
P-R INTERVAL, ECG05: 126 MS
PLATELET # BLD AUTO: 491 K/UL (ref 135–420)
PLATELET COMMENTS,PCOM: ABNORMAL
PMV BLD AUTO: 10 FL (ref 9.2–11.8)
POTASSIUM SERPL-SCNC: 4.4 MMOL/L (ref 3.5–5.5)
PROT SERPL-MCNC: 7.1 G/DL (ref 6.4–8.2)
Q-T INTERVAL, ECG07: 330 MS
QRS DURATION, ECG06: 80 MS
QTC CALCULATION (BEZET), ECG08: 419 MS
RBC # BLD AUTO: 4.31 M/UL (ref 4.35–5.65)
RBC MORPH BLD: ABNORMAL
RBC MORPH BLD: ABNORMAL
SODIUM SERPL-SCNC: 135 MMOL/L (ref 136–145)
SOURCE, COVRS: ABNORMAL
SPECIMEN EXP DATE BLD: NORMAL
VENTRICULAR RATE, ECG03: 97 BPM
WBC # BLD AUTO: 8.6 K/UL (ref 4.6–13.2)

## 2021-04-14 PROCEDURE — 82962 GLUCOSE BLOOD TEST: CPT

## 2021-04-14 PROCEDURE — 83605 ASSAY OF LACTIC ACID: CPT

## 2021-04-14 PROCEDURE — 86901 BLOOD TYPING SEROLOGIC RH(D): CPT

## 2021-04-14 PROCEDURE — 80053 COMPREHEN METABOLIC PANEL: CPT

## 2021-04-14 PROCEDURE — 74011250636 HC RX REV CODE- 250/636: Performed by: FAMILY MEDICINE

## 2021-04-14 PROCEDURE — 74011636637 HC RX REV CODE- 636/637: Performed by: FAMILY MEDICINE

## 2021-04-14 PROCEDURE — 74011000258 HC RX REV CODE- 258: Performed by: FAMILY MEDICINE

## 2021-04-14 PROCEDURE — 84145 PROCALCITONIN (PCT): CPT

## 2021-04-14 PROCEDURE — 85025 COMPLETE CBC W/AUTO DIFF WBC: CPT

## 2021-04-14 PROCEDURE — 87040 BLOOD CULTURE FOR BACTERIA: CPT

## 2021-04-14 PROCEDURE — 73630 X-RAY EXAM OF FOOT: CPT

## 2021-04-14 PROCEDURE — 74011000258 HC RX REV CODE- 258: Performed by: EMERGENCY MEDICINE

## 2021-04-14 PROCEDURE — 86140 C-REACTIVE PROTEIN: CPT

## 2021-04-14 PROCEDURE — 93005 ELECTROCARDIOGRAM TRACING: CPT

## 2021-04-14 PROCEDURE — 96375 TX/PRO/DX INJ NEW DRUG ADDON: CPT

## 2021-04-14 PROCEDURE — 36415 COLL VENOUS BLD VENIPUNCTURE: CPT

## 2021-04-14 PROCEDURE — 87205 SMEAR GRAM STAIN: CPT

## 2021-04-14 PROCEDURE — 71045 X-RAY EXAM CHEST 1 VIEW: CPT

## 2021-04-14 PROCEDURE — 74011250637 HC RX REV CODE- 250/637: Performed by: FAMILY MEDICINE

## 2021-04-14 PROCEDURE — 87077 CULTURE AEROBIC IDENTIFY: CPT

## 2021-04-14 PROCEDURE — 74011250636 HC RX REV CODE- 250/636: Performed by: EMERGENCY MEDICINE

## 2021-04-14 PROCEDURE — U0003 INFECTIOUS AGENT DETECTION BY NUCLEIC ACID (DNA OR RNA); SEVERE ACUTE RESPIRATORY SYNDROME CORONAVIRUS 2 (SARS-COV-2) (CORONAVIRUS DISEASE [COVID-19]), AMPLIFIED PROBE TECHNIQUE, MAKING USE OF HIGH THROUGHPUT TECHNOLOGIES AS DESCRIBED BY CMS-2020-01-R: HCPCS

## 2021-04-14 PROCEDURE — 99285 EMERGENCY DEPT VISIT HI MDM: CPT

## 2021-04-14 PROCEDURE — 87186 SC STD MICRODIL/AGAR DIL: CPT

## 2021-04-14 PROCEDURE — 87635 SARS-COV-2 COVID-19 AMP PRB: CPT

## 2021-04-14 PROCEDURE — 85379 FIBRIN DEGRADATION QUANT: CPT

## 2021-04-14 PROCEDURE — 96374 THER/PROPH/DIAG INJ IV PUSH: CPT

## 2021-04-14 PROCEDURE — 65660000000 HC RM CCU STEPDOWN

## 2021-04-14 RX ORDER — ATORVASTATIN CALCIUM 20 MG/1
40 TABLET, FILM COATED ORAL
Status: DISCONTINUED | OUTPATIENT
Start: 2021-04-14 | End: 2021-04-22 | Stop reason: HOSPADM

## 2021-04-14 RX ORDER — VANCOMYCIN 2 GRAM/500 ML IN 0.9 % SODIUM CHLORIDE INTRAVENOUS
2000 ONCE
Status: COMPLETED | OUTPATIENT
Start: 2021-04-14 | End: 2021-04-14

## 2021-04-14 RX ORDER — METOPROLOL SUCCINATE 50 MG/1
50 TABLET, EXTENDED RELEASE ORAL DAILY
Status: DISCONTINUED | OUTPATIENT
Start: 2021-04-15 | End: 2021-04-22 | Stop reason: HOSPADM

## 2021-04-14 RX ORDER — ACETAMINOPHEN 650 MG/1
650 SUPPOSITORY RECTAL
Status: DISCONTINUED | OUTPATIENT
Start: 2021-04-14 | End: 2021-04-22 | Stop reason: HOSPADM

## 2021-04-14 RX ORDER — INSULIN LISPRO 100 [IU]/ML
INJECTION, SOLUTION INTRAVENOUS; SUBCUTANEOUS
Status: DISCONTINUED | OUTPATIENT
Start: 2021-04-14 | End: 2021-04-15 | Stop reason: SDUPTHER

## 2021-04-14 RX ORDER — DEXAMETHASONE SODIUM PHOSPHATE 10 MG/ML
10 INJECTION INTRAMUSCULAR; INTRAVENOUS
Status: COMPLETED | OUTPATIENT
Start: 2021-04-14 | End: 2021-04-14

## 2021-04-14 RX ORDER — SODIUM CHLORIDE 0.9 % (FLUSH) 0.9 %
5-10 SYRINGE (ML) INJECTION AS NEEDED
Status: DISCONTINUED | OUTPATIENT
Start: 2021-04-14 | End: 2021-04-15 | Stop reason: SDUPTHER

## 2021-04-14 RX ORDER — GUAIFENESIN/DEXTROMETHORPHAN 100-10MG/5
5 SYRUP ORAL
Status: DISCONTINUED | OUTPATIENT
Start: 2021-04-14 | End: 2021-04-22 | Stop reason: HOSPADM

## 2021-04-14 RX ORDER — LOSARTAN POTASSIUM 25 MG/1
25 TABLET ORAL DAILY
Status: DISCONTINUED | OUTPATIENT
Start: 2021-04-15 | End: 2021-04-22 | Stop reason: HOSPADM

## 2021-04-14 RX ORDER — CALCIUM CARB/MAGNESIUM CARB 311-232MG
10 TABLET ORAL
Status: DISCONTINUED | OUTPATIENT
Start: 2021-04-14 | End: 2021-04-22 | Stop reason: HOSPADM

## 2021-04-14 RX ORDER — INSULIN GLARGINE 100 [IU]/ML
15 INJECTION, SOLUTION SUBCUTANEOUS DAILY
Status: DISCONTINUED | OUTPATIENT
Start: 2021-04-15 | End: 2021-04-22 | Stop reason: HOSPADM

## 2021-04-14 RX ORDER — FUROSEMIDE 40 MG/1
40 TABLET ORAL DAILY
Status: DISCONTINUED | OUTPATIENT
Start: 2021-04-15 | End: 2021-04-22 | Stop reason: HOSPADM

## 2021-04-14 RX ORDER — MELATONIN
2000 DAILY
Status: DISCONTINUED | OUTPATIENT
Start: 2021-04-21 | End: 2021-04-22 | Stop reason: HOSPADM

## 2021-04-14 RX ORDER — ACETAMINOPHEN 325 MG/1
650 TABLET ORAL
Status: DISCONTINUED | OUTPATIENT
Start: 2021-04-14 | End: 2021-04-22 | Stop reason: HOSPADM

## 2021-04-14 RX ORDER — ASCORBIC ACID 250 MG
500 TABLET ORAL 2 TIMES DAILY
Status: DISCONTINUED | OUTPATIENT
Start: 2021-04-14 | End: 2021-04-22 | Stop reason: HOSPADM

## 2021-04-14 RX ORDER — ENOXAPARIN SODIUM 100 MG/ML
30 INJECTION SUBCUTANEOUS EVERY 12 HOURS
Status: DISCONTINUED | OUTPATIENT
Start: 2021-04-14 | End: 2021-04-18

## 2021-04-14 RX ORDER — ZINC SULFATE 50(220)MG
1 CAPSULE ORAL DAILY
Status: DISCONTINUED | OUTPATIENT
Start: 2021-04-14 | End: 2021-04-22 | Stop reason: HOSPADM

## 2021-04-14 RX ADMIN — PIPERACILLIN AND TAZOBACTAM 4.5 G: 4; .5 INJECTION, POWDER, LYOPHILIZED, FOR SOLUTION INTRAVENOUS; PARENTERAL at 14:49

## 2021-04-14 RX ADMIN — DEXAMETHASONE SODIUM PHOSPHATE 10 MG: 10 INJECTION, SOLUTION INTRAMUSCULAR; INTRAVENOUS at 18:52

## 2021-04-14 RX ADMIN — SODIUM CHLORIDE 1000 ML: 900 INJECTION, SOLUTION INTRAVENOUS at 16:25

## 2021-04-14 RX ADMIN — Medication 6000 UNITS: at 22:18

## 2021-04-14 RX ADMIN — Medication 500 MG: at 22:19

## 2021-04-14 RX ADMIN — VANCOMYCIN HYDROCHLORIDE 2000 MG: 10 INJECTION, POWDER, LYOPHILIZED, FOR SOLUTION INTRAVENOUS at 16:29

## 2021-04-14 RX ADMIN — ENOXAPARIN SODIUM 30 MG: 30 INJECTION SUBCUTANEOUS at 22:18

## 2021-04-14 RX ADMIN — PIPERACILLIN AND TAZOBACTAM 2.25 G: 2; .25 INJECTION, POWDER, LYOPHILIZED, FOR SOLUTION INTRAVENOUS at 22:17

## 2021-04-14 RX ADMIN — INSULIN LISPRO 2 UNITS: 100 INJECTION, SOLUTION INTRAVENOUS; SUBCUTANEOUS at 22:17

## 2021-04-14 RX ADMIN — AZITHROMYCIN MONOHYDRATE 500 MG: 500 INJECTION, POWDER, LYOPHILIZED, FOR SOLUTION INTRAVENOUS at 18:52

## 2021-04-14 RX ADMIN — ATORVASTATIN CALCIUM 40 MG: 20 TABLET, FILM COATED ORAL at 22:19

## 2021-04-14 RX ADMIN — Medication 10 MG: at 22:18

## 2021-04-14 RX ADMIN — ZINC SULFATE 220 MG (50 MG) CAPSULE 1 CAPSULE: CAPSULE at 22:19

## 2021-04-14 NOTE — Clinical Note
Status[de-identified] INPATIENT [101]  Type of Bed: Medical [8]  Inpatient Hospitalization Certified Necessary for the Following Reasons: 3.  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

## 2021-04-14 NOTE — H&P
History & Physical    Patient: Phyllis Almonte. MRN: [id number]  [facility]: [id number]    YOB: 1945  Age: 70 y.o. Sex: male      DOA: 4/14/2021  Primary Care Provider:  Gabby Calderón MD      Assessment/Plan   68-year-old male with past medical history of diabetes, atrial fibrillation on Eliquis, coronary artery disease history of CHF and NSTEMI in the past as well as chronic kidney disease stage III admitted for infected diabetic ulcer on left heel of foot. In emergency room found to be Covid positive. Sepsis -initially diagnosed with sepsis but found not to be present  Lactic acid within normal limits  Blood sugar did fall to 48, but recovered well  No leukocytosis and no left shift  No hypotension and no fever    Diabetes ulcer, infected -  IV vancomycin and Zosyn  Dr. Jackelyn Newton consulting, considering surgical debridement, but that likely will be on hold for right now as patient is tested positive for Covid-19  Will obtain MRI of foot with and without contrast  Will obtain infectious disease consult in a.m. Will obtain wound care consult  Deep plantar left hindfoot soft tissue ulcer. No radiographic findings of active osteomyelitis. We will also order duplex study of bilateral lower extremities to rule out DVT    Community-acquired pneumonia versus Covid pneumonia -  Acute right upper lobe pulmonary infiltrate seen on chest x-ray  Patient receiving IV Zosyn and vancomycin, azithromycin IV added  Patient not hypoxic, not requiring oxygen    COVID-19 infection -  We will place patient on Covid protocol  Ordered immune boosting vitamins, vitamin C vitamin D and zinc  Order lab work per [place] protocol  Onset of symptoms likely 3 weeks ago, too late for remdesivir  If patient becomes hypoxic requires oxygen will start steroids did not start now    Chronic kidney disease, stage III -  Current creatinine 2.51, unknown where patient's baseline is  We will check BMP in a.m.   Avoid nephrotoxic agents    DM with hypoglycemia -  Patient had hypoglycemic episode in the ED with blood sugar dropped down to 48, recovered well with juice  Hypoglycemic protocol  SSI, ADA and fingerstick blood glucose before every meal and nightly    Chronic history of atrial flutter/atrial fibrillation -  On Eliquis, will hold until surgical determination is made    DVT prophylaxis Lovenox via Covid protocol    GI prophylaxis, Pepcid ordered      Patient Active Problem List   Diagnosis Code    Atrial flutter ([facility]) I48.92    DM2 (diabetes mellitus, type 2) ([facility]) E11.9    Acute renal insufficiency N28.9    NSTEMI (non-ST elevated myocardial infarction) ([facility] X18.3    Systolic CHF, acute ([facility]) I50.21    Symptomatic anemia D64.9    CAD (coronary artery disease) I25.10    GI bleed K92.2    ELINOR (acute kidney injury) ([facility] N17.9    CKD (chronic kidney disease) stage 3, GFR 30-59 ml/min ([facility]) N18.30    Elevated brain natriuretic peptide (BNP) level R79.89    CAP (community acquired pneumonia) J18.9    Diabetic ulcer of left foot ([facility] E11.621, L97.529    COVID-19 virus infection U07.1       Estimated length of stay : 3 to 4 days    CC: Infected left heel/foot       HPI:     Bianka Sotomayor is a 70 y.o. male with past medical history of diabetes, atrial fibrillation on Eliquis presents to the emergency department via private vehicle complaining of ulcer to his left foot that has been managed by his podiatrist Dr Rhona Ayala. The ulcer has been present for the past 2 to 3 months. He was in his podiatrist office earlier today and Dr. Rhona Ayala tried to do a debridement but patient did not tolerate it well. As a result, Dr. Rhona Ayala sent the patient to the hospital for IV antibiotics and possible surgical debridement was seen in the office earlier today and was advised to come to the emergency department for admission. Is currently not on antibiotics. Pt denies fever, nausea, vomiting, and any other sxs or complaints.  No relieving or exacerbating factors identified. Patient's wife is by his bedside and she states that the foot has been smelling very foul and so she keeps the windows open at night in order to air out the room. Patient's wife also states that her  had cold/flu symptoms approximately 3 weeks ago and was and had fever, chills, night sweats, diaphoresis, shortness of breath and cough. Patient and his wife deny any Covid exposures and stated that all he does is stay at home. Past Medical History:   Diagnosis Date    Asthma     Diabetes ([address]     High cholesterol     Hypertension        Past Surgical History:   Procedure Laterality Date    COLONOSCOPY N/A 6/1/2018    COLONOSCOPY, POLYPECTOMY performed by Winston Burnham MD at [facility] ENDOSCOPY    HX CATARACT REMOVAL         No family history on file. Social History     Socioeconomic History    Marital status:      Spouse name: Not on file    Number of children: Not on file    Years of education: Not on file    Highest education level: Not on file   Tobacco Use    Smoking status: Former Smoker    Smokeless tobacco: Never Used   Substance and Sexual Activity    Alcohol use: Yes     Alcohol/week: 1.0 standard drinks     Types: 1 Cans of beer per week    Drug use: No       Prior to Admission medications    Medication Sig Start Date End Date Taking? Authorizing Provider   Omeprazole delayed release (PRILOSEC D/R) 20 mg tablet Take 1 Tab by mouth daily. 6/1/18   Ciara Guzmán MD   apixaban (ELIQUIS) 5 mg tablet Take 1 Tab by mouth two (2) times a day. 2/15/18   Sahil Dejesus PA-C   aspirin delayed-release 81 mg tablet Take 1 Tab by mouth daily. 2/16/18   Sahil Dejesus PA-C   atorvastatin (LIPITOR) 40 mg tablet Take 1 Tab by mouth nightly. 2/15/18   Sahil Dejesus PA-C   clopidogrel (PLAVIX) 75 mg tab Take 1 Tab by mouth daily. 2/16/18   Sahil Dejesus PA-C   furosemide (LASIX) 40 mg tablet Take 1 Tab by mouth daily.  2/15/18 Shara Burton PA-C   insulin glargine (LANTUS) 100 unit/mL injection 15 Units by SubCUTAneous route daily. 2/15/18   Shara Burton PA-C   losartan (COZAAR) 25 mg tablet Take 1 Tab by mouth daily. 18   Shara Burton PA-C   metoprolol succinate (TOPROL-XL) 50 mg XL tablet Take 1 Tab by mouth daily. 18   Shara Burton PA-C   SITagliptin (JANUVIA) 50 mg tablet Take 1 Tab by mouth daily. 2/15/18   Shara Burton PA-C   lancets (ADVOCATE LANCET) 30 gauge misc Use while checking BGL each morning. 2/15/18   Shara Burton PA-C   glucose blood VI test strips (IGLUCOSE TEST STRIP) strip Use to check BGL every morning 2/15/18   Shara Burton PA-C       No Known Allergies    Review of Systems  Gen: No fever, chills, malaise, weight loss/gain. Heent: No headache, rhinorrhea, epistaxis, ear pain, hearing loss, sinus pain, neck pain/stiffness, sore throat. Heart: No chest pain, palpitations, MYRICK, pnd, or orthopnea. Resp: No cough, hemoptysis, wheezing and shortness of breath. GI: No nausea, vomiting, diarrhea, constipation, melena or hematochezia. : No urinary obstruction, dysuria or hematuria. Derm: No rash, new skin lesion or pruritis. Musc/skeletal: no bone or joint complains. Vasc: No edema, cyanosis or claudication. Endo: No heat/cold intolerance, no polyuria,polydipsia or polyphagia. Neuro: No unilateral weakness, numbness, tingling. No seizures. Heme: No easy bruising or bleeding. Physical Exam:     Physical Exam:  Visit Vitals  BP (!) 118/58   Pulse 70   Temp 98.9 °F (37.2 °C)   Resp 16   Ht 5' 7\" (1.702 m)   Wt 83.9 kg (185 lb)   SpO2 96%   BMI 28.98 kg/m²      O2 Device: None (Room air)    Temp (24hrs), Av °F (37.2 °C), Min:98.9 °F (37.2 °C), Max:99 °F (37.2 °C)    No intake/output data recorded. No intake/output data recorded. General:   Elderly white male, awake, cooperative, no distress.    Head:  Normocephalic, without obvious abnormality, atraumatic. Eyes:  Conjunctivae/corneas clear, sclera anicteric, PERRL, EOMs intact. Nose: Nares normal. No drainage or sinus tenderness. Throat: Lips, mucosa, and tongue normal.    Neck: Supple, symmetrical, trachea midline, no adenopathy. Lungs:   Clear to auscultation bilaterally. Heart:  Regular rate and rhythm, S1, S2 normal, no murmur, click, rub or gallop. Abdomen: Soft, non-tender. Bowel sounds normal. No masses,  No organomegaly. Extremities: Extremities normal, atraumatic, no cyanosis or edema. Capillary refill normal.   Pulses: 2+ and symmetric all extremities. Skin:  Bilateral lower extremities with skin changes consistent with peripheral vascular disease, heel of left foot shows a quarter sized ulcer with necrotic base oozing serosanguineous fluid   Neurologic: CNII-XII intact. No focal motor or sensory deficit. Labs Reviewed:      Recent Results (from the past 24 hour(s))   METABOLIC PANEL, COMPREHENSIVE    Collection Time: 04/14/21  2:45 PM   Result Value Ref Range    Sodium 135 (L) 136 - 145 mmol/L    Potassium 4.4 3.5 - 5.5 mmol/L    Chloride 99 (L) 100 - 111 mmol/L    CO2 26 21 - 32 mmol/L    Anion gap 10 3.0 - 18 mmol/L    Glucose 39 (LL) 74 - 99 mg/dL    BUN 34 (H) 7.0 - 18 MG/DL    Creatinine 2.51 (H) 0.6 - 1.3 MG/DL    BUN/Creatinine ratio 14 12 - 20      GFR est AA 31 (L) >60 ml/min/1.73m2    GFR est non-AA 25 (L) >60 ml/min/1.73m2    Calcium 8.2 (L) 8.5 - 10.1 MG/DL    Bilirubin, total 0.7 0.2 - 1.0 MG/DL    ALT (SGPT) 30 16 - 61 U/L    AST (SGOT) 28 10 - 38 U/L    Alk.  phosphatase 136 (H) 45 - 117 U/L    Protein, total 7.1 6.4 - 8.2 g/dL    Albumin 2.2 (L) 3.4 - 5.0 g/dL    Globulin 4.9 (H) 2.0 - 4.0 g/dL    A-G Ratio 0.4 (L) 0.8 - 1.7     CBC WITH AUTOMATED DIFF    Collection Time: 04/14/21  2:45 PM   Result Value Ref Range    WBC 8.6 4.6 - 13.2 K/uL    RBC 4.31 (L) 4.35 - 5.65 M/uL    HGB 11.5 (L) 13.0 - 16.0 g/dL    HCT 35.9 (L) 36.0 - 48.0 % MCV 83.3 74.0 - 97.0 FL    MCH 26.7 24.0 - 34.0 PG    MCHC 32.0 31.0 - 37.0 g/dL    RDW 18.8 (H) 11.6 - 14.5 %    PLATELET 069 (H) 572 - 420 K/uL    MPV 10.0 9.2 - 11.8 FL    NEUTROPHILS 69 40 - 73 %    LYMPHOCYTES 15 (L) 21 - 52 %    MONOCYTES 16 (H) 3 - 10 %    EOSINOPHILS 0 0 - 5 %    BASOPHILS 0 0 - 2 %    ABS. NEUTROPHILS 5.9 1.8 - 8.0 K/UL    ABS. LYMPHOCYTES 1.3 0.9 - 3.6 K/UL    ABS. MONOCYTES 1.4 (H) 0.05 - 1.2 K/UL    ABS. EOSINOPHILS 0.0 0.0 - 0.4 K/UL    ABS.  BASOPHILS 0.0 0.0 - 0.1 K/UL    DF MANUAL      PLATELET COMMENTS Increased Platelets      RBC COMMENTS ANISOCYTOSIS  1+        RBC COMMENTS MICROCYTOSIS  1+       POC LACTIC ACID    Collection Time: 04/14/21  2:53 PM   Result Value Ref Range    Lactic Acid (POC) 2.59 (HH) 0.40 - 2.00 mmol/L   EKG, 12 LEAD, INITIAL    Collection Time: 04/14/21  3:00 PM   Result Value Ref Range    Ventricular Rate 97 BPM    Atrial Rate 97 BPM    P-R Interval 126 ms    QRS Duration 80 ms    Q-T Interval 330 ms    QTC Calculation (Bezet) 419 ms    Calculated P Axis -2 degrees    Calculated R Axis 41 degrees    Calculated T Axis 89 degrees    Diagnosis       Normal sinus rhythm  Septal infarct , age undetermined  Abnormal ECG  When compared with ECG of 29-MAY-2018 17:33,  QRS duration has decreased  Nonspecific T wave abnormality no longer evident in Inferior leads  T wave inversion no longer evident in Lateral leads  QT has shortened     GLUCOSE, POC    Collection Time: 04/14/21  3:30 PM   Result Value Ref Range    Glucose (POC) 48 (LL) 70 - 110 mg/dL   GLUCOSE, POC    Collection Time: 04/14/21  4:18 PM   Result Value Ref Range    Glucose (POC) 84 70 - 110 mg/dL   POC LACTIC ACID    Collection Time: 04/14/21  4:31 PM   Result Value Ref Range    Lactic Acid (POC) 1.12 0.40 - 2.00 mmol/L   COVID-19 RAPID TEST    Collection Time: 04/14/21  4:41 PM   Result Value Ref Range    Specimen source Nasopharyngeal      COVID-19 rapid test Detected (AA) NOTD Procedures/imaging: see electronic medical records for all procedures/Xrays and details which were not copied into this note but were reviewed prior to creation of Plan        CC:  Janay Orozco MD

## 2021-04-14 NOTE — PROGRESS NOTES
Pharmacy Renal Dosing Services:    Zosyn was automatically dose-adjusted per [facility] Protocol, with respect to renal function. Consult provided for this   70 y.o. , male , for the indication of diabetic foot infection. Dose adjusted to:  Zosyn 2.25 grams IV q6h (each dose to run over 30 minutes)    Pt Weight:   Wt Readings from Last 1 Encounters:   04/14/21 83.9 kg (185 lb)     Previous Regimen   Zosyn 4.5 grams IV q6h    Serum Creatinine Lab Results   Component Value Date/Time    Creatinine 2.51 (H) 04/14/2021 02:45 PM       Creatinine Clearance Estimated Creatinine Clearance: 27.1 mL/min (A) (based on SCr of 2.51 mg/dL (H)). BUN Lab Results   Component Value Date/Time    BUN 34 (H) 04/14/2021 02:45 PM         Pharmacy to continue to monitor patient daily. Will make dosage adjustments based upon changing renal function.   Signed Fariba Nathan information:  [phone]

## 2021-04-14 NOTE — ED PROVIDER NOTES
EMERGENCY DEPARTMENT HISTORY AND PHYSICAL EXAM    Date: 4/14/2021  Patient Name: Savannah Adan. History of Presenting Illness     Chief Complaint   Patient presents with    Foot Pain         History Provided By: Patient    Additional History (Context):   Savannah Cheung is a 70 y.o. male with PMHX diabetes, atrial fibrillation on Eliquis presents to the emergency department via private vehicle C/O ulcer to his left foot that has been managed by his podiatrist Dr Lisa Sawyer. Was seen in the office earlier today and was advised to come to the emergency department for admission. Patient reports that he has had the wound to his left foot ongoing for 2 to 3 months. Is currently not on antibiotics. Dr. Ashutosh Adan did attempt debridement in the office today however patient was unable to tolerate the debridement. Pt denies fever, nausea, vomiting, and any other sxs or complaints. No relieving or exacerbating factors identified.     PCP: Dean Carroll MD    Current Facility-Administered Medications   Medication Dose Route Frequency Provider Last Rate Last Admin    sodium chloride (NS) flush 5-10 mL  5-10 mL IntraVENous PRN Agustiady-Tobar, Adams Inks, DO        piperacillin-tazobactam (ZOSYN) 4.5 g in 0.9% sodium chloride (MBP/ADV) 100 mL MBP  4.5 g IntraVENous Q6H Agustiady-Tobar, Adams Inks, DO 25 mL/hr at 04/14/21 1449 4.5 g at 04/14/21 1449    vancomycin (VANCOCIN) 2000 mg in  ml infusion  2,000 mg IntraVENous ONCE Agustiady-Tobar, Adams Inks,  mL/hr at 04/14/21 1629 2,000 mg at 04/14/21 1629    VANCOMCYIN-PHARMACY TO DOSE  1 Each Other Rx Dosing/Monitoring Agustiady-Tobar, Adams Inks, DO        sodium chloride 0.9 % bolus infusion 1,000 mL  1,000 mL IntraVENous ONCE Agustiady-Tobar, Adams Inks, DO 1,000 mL/hr at 04/14/21 1625 1,000 mL at 04/14/21 1625    azithromycin (ZITHROMAX) 500 mg in 0.9% sodium chloride 250 mL (VIAL-MATE)  500 mg IntraVENous Q24H John Torre S, DO         Current Outpatient Medications   Medication Sig Dispense Refill    Omeprazole delayed release (PRILOSEC D/R) 20 mg tablet Take 1 Tab by mouth daily. 30 Tab 5    apixaban (ELIQUIS) 5 mg tablet Take 1 Tab by mouth two (2) times a day. 60 Tab 0    aspirin delayed-release 81 mg tablet Take 1 Tab by mouth daily. 30 Tab 0    atorvastatin (LIPITOR) 40 mg tablet Take 1 Tab by mouth nightly. 30 Tab 0    clopidogrel (PLAVIX) 75 mg tab Take 1 Tab by mouth daily. 30 Tab 0    furosemide (LASIX) 40 mg tablet Take 1 Tab by mouth daily. 30 Tab 0    insulin glargine (LANTUS) 100 unit/mL injection 15 Units by SubCUTAneous route daily. 1 Vial 0    losartan (COZAAR) 25 mg tablet Take 1 Tab by mouth daily. 30 Tab 0    metoprolol succinate (TOPROL-XL) 50 mg XL tablet Take 1 Tab by mouth daily. 30 Tab 0    SITagliptin (JANUVIA) 50 mg tablet Take 1 Tab by mouth daily. 30 Tab 0    lancets (ADVOCATE LANCET) 30 gauge misc Use while checking BGL each morning. 1 Package 0    glucose blood VI test strips (IGLUCOSE TEST STRIP) strip Use to check BGL every morning 50 Strip 0       Past History     Past Medical History:  Past Medical History:   Diagnosis Date    Asthma     Diabetes ([address]     High cholesterol     Hypertension        Past Surgical History:  Past Surgical History:   Procedure Laterality Date    COLONOSCOPY N/A 6/1/2018    COLONOSCOPY, POLYPECTOMY performed by Warden Jovani MD at [facility] ENDOSCOPY    HX CATARACT REMOVAL         Family History:  No family history on file. Social History:  Social History     Tobacco Use    Smoking status: Former Smoker    Smokeless tobacco: Never Used   Substance Use Topics    Alcohol use: Yes     Alcohol/week: 1.0 standard drinks     Types: 1 Cans of beer per week    Drug use: No       Allergies:  No Known Allergies      Review of Systems   Review of Systems   Constitutional: Negative for chills and fever.    HENT: Negative for congestion, ear pain, sinus pain and sore throat. Eyes: Negative for pain and visual disturbance. Respiratory: Negative for cough and shortness of breath. Cardiovascular: Negative for chest pain and leg swelling. Gastrointestinal: Negative for abdominal pain, constipation, diarrhea, nausea and vomiting. Genitourinary: Negative for dysuria and hematuria. Musculoskeletal: Negative for back pain and neck pain. Skin: Positive for wound. Negative for pallor and rash. Neurological: Negative for dizziness, tremors, weakness, light-headedness and headaches. All other systems reviewed and are negative. Physical Exam     Vitals:    04/14/21 1530 04/14/21 1600 04/14/21 1630 04/14/21 1704   BP: (!) 107/52 (!) 104/49 (!) 114/56 (!) 118/58   Pulse:    70   Resp:  16 16 16   Temp:    98.9 °F (37.2 °C)   SpO2: 93% 98% 97% 96%   Weight:       Height:         Physical Exam  Musculoskeletal:        Feet:          Nursing note and vitals reviewed    Constitutional: Elderly  male, no acute distress  Head: Normocephalic, Atraumatic  Eyes: Pupils are equal, round, and reactive to light, EOMI  Neck: Supple, non-tender  Cardiovascular: Regular rate and rhythm, no murmurs, rubs, or gallops, + 2 radial pulses bilaterally  Chest: Normal work of breathing and chest excursion bilaterally  Lungs: Clear to ausculation bilaterally, no wheezes, no rhonchi  Abdomen: Soft, non tender, non distended, normoactive bowel sounds  Back: No evidence of trauma or deformity  Extremities: Quarter sized ulcerating wound along the plantar aspect of his mid heel of his left foot. Mild overlying erythema. No evidence of trauma or deformity, no LE edema.  No streaking erythema, vesicular lesions, ulcerations or bulla  Skin: Warm and dry, normal cap refill  Neuro: Alert and appropriate, CN intact, normal speech, moving all 4 extremities freely and symmetrically  Psychiatric: Normal mood and affect       Diagnostic Study Results     Labs -     Recent Results (from the past 12 hour(s))   METABOLIC PANEL, COMPREHENSIVE    Collection Time: 04/14/21  2:45 PM   Result Value Ref Range    Sodium 135 (L) 136 - 145 mmol/L    Potassium 4.4 3.5 - 5.5 mmol/L    Chloride 99 (L) 100 - 111 mmol/L    CO2 26 21 - 32 mmol/L    Anion gap 10 3.0 - 18 mmol/L    Glucose 39 (LL) 74 - 99 mg/dL    BUN 34 (H) 7.0 - 18 MG/DL    Creatinine 2.51 (H) 0.6 - 1.3 MG/DL    BUN/Creatinine ratio 14 12 - 20      GFR est AA 31 (L) >60 ml/min/1.73m2    GFR est non-AA 25 (L) >60 ml/min/1.73m2    Calcium 8.2 (L) 8.5 - 10.1 MG/DL    Bilirubin, total 0.7 0.2 - 1.0 MG/DL    ALT (SGPT) 30 16 - 61 U/L    AST (SGOT) 28 10 - 38 U/L    Alk. phosphatase 136 (H) 45 - 117 U/L    Protein, total 7.1 6.4 - 8.2 g/dL    Albumin 2.2 (L) 3.4 - 5.0 g/dL    Globulin 4.9 (H) 2.0 - 4.0 g/dL    A-G Ratio 0.4 (L) 0.8 - 1.7     CBC WITH AUTOMATED DIFF    Collection Time: 04/14/21  2:45 PM   Result Value Ref Range    WBC 8.6 4.6 - 13.2 K/uL    RBC 4.31 (L) 4.35 - 5.65 M/uL    HGB 11.5 (L) 13.0 - 16.0 g/dL    HCT 35.9 (L) 36.0 - 48.0 %    MCV 83.3 74.0 - 97.0 FL    MCH 26.7 24.0 - 34.0 PG    MCHC 32.0 31.0 - 37.0 g/dL    RDW 18.8 (H) 11.6 - 14.5 %    PLATELET 906 (H) 025 - 420 K/uL    MPV 10.0 9.2 - 11.8 FL    NEUTROPHILS 69 40 - 73 %    LYMPHOCYTES 15 (L) 21 - 52 %    MONOCYTES 16 (H) 3 - 10 %    EOSINOPHILS 0 0 - 5 %    BASOPHILS 0 0 - 2 %    ABS. NEUTROPHILS 5.9 1.8 - 8.0 K/UL    ABS. LYMPHOCYTES 1.3 0.9 - 3.6 K/UL    ABS. MONOCYTES 1.4 (H) 0.05 - 1.2 K/UL    ABS. EOSINOPHILS 0.0 0.0 - 0.4 K/UL    ABS.  BASOPHILS 0.0 0.0 - 0.1 K/UL    DF MANUAL      PLATELET COMMENTS Increased Platelets      RBC COMMENTS ANISOCYTOSIS  1+        RBC COMMENTS MICROCYTOSIS  1+       POC LACTIC ACID    Collection Time: 04/14/21  2:53 PM   Result Value Ref Range    Lactic Acid (POC) 2.59 (HH) 0.40 - 2.00 mmol/L   EKG, 12 LEAD, INITIAL    Collection Time: 04/14/21  3:00 PM   Result Value Ref Range    Ventricular Rate 97 BPM    Atrial Rate 97 BPM    P-R Interval 126 ms    QRS Duration 80 ms    Q-T Interval 330 ms    QTC Calculation (Bezet) 419 ms    Calculated P Axis -2 degrees    Calculated R Axis 41 degrees    Calculated T Axis 89 degrees    Diagnosis       Normal sinus rhythm  Septal infarct , age undetermined  Abnormal ECG  When compared with ECG of 29-MAY-2018 17:33,  QRS duration has decreased  Nonspecific T wave abnormality no longer evident in Inferior leads  T wave inversion no longer evident in Lateral leads  QT has shortened     GLUCOSE, POC    Collection Time: 04/14/21  3:30 PM   Result Value Ref Range    Glucose (POC) 48 (LL) 70 - 110 mg/dL   GLUCOSE, POC    Collection Time: 04/14/21  4:18 PM   Result Value Ref Range    Glucose (POC) 84 70 - 110 mg/dL   POC LACTIC ACID    Collection Time: 04/14/21  4:31 PM   Result Value Ref Range    Lactic Acid (POC) 1.12 0.40 - 2.00 mmol/L   COVID-19 RAPID TEST    Collection Time: 04/14/21  4:41 PM   Result Value Ref Range    Specimen source Nasopharyngeal      COVID-19 rapid test Detected (AA) NOTD         Radiologic Studies -   XR CHEST PORT   Final Result      Acute right upper lobe pulmonary infiltrate. XR FOOT LT MIN 3 V   Final Result      Deep plantar left hindfoot soft tissue ulcer. No radiographic findings of active   osteomyelitis. CT Results  (Last 48 hours)    None        CXR Results  (Last 48 hours)               04/14/21 1530  XR CHEST PORT Final result    Impression:      Acute right upper lobe pulmonary infiltrate. Narrative:  EXAM: CHEST RADIOGRAPH, SINGLE VIEW       CLINICAL INDICATION/HISTORY: Fever, sepsis       COMPARISON: 5/29/2018       TECHNIQUE: Portable frontal view of the chest was obtained.        _______________       FINDINGS:       SUPPORT DEVICES: None. HEART AND MEDIASTINUM: Cardiomediastinal silhouette appears within normal   limits. Normal caliber thoracic aorta. No central vascular congestion. LUNGS AND PLEURAL SPACES: Normal variant azygous fissure.  Patchy opacity is seen   throughout lateral and mid aspects of the right upper lobe. Remaining lung   parenchyma appears otherwise adequately aerated. No pleural effusion or   pneumothorax. BONY THORAX AND SOFT TISSUES: No acute osseous abnormality. _______________                   Medical Decision Making   I am the first provider for this patient. I reviewed the vital signs, available nursing notes, past medical history, past surgical history, family history and social history. Vital Signs-Reviewed the patient's vital signs. Pulse Oximetry Analysis -100% on room air    Cardiac Monitor:  Rate: 99 bpm  Rhythm: Regular    EKG interpretation: (Preliminary)  2:47 PM   Normal sinus rhythm at 97 bpm.  MS interval 126 ms. QRS 80 ms. QTc 419 ms. No acute ST elevation    Records Reviewed: Nursing Notes and Old Medical Records    Provider Notes:   70 y.o. [facility] diabetic nonhealing foot ulcer. On exam patient is afebrile. Normotensive and not tachycardic. He does have a quarter sized ulcerating wound to his left plantar aspect of his foot. Will obtain x-rays of his foot to evaluate for possible osteomyelitis. Will start IV antibiotics. Will discuss admission as recommended by his podiatrist Dr. Damon Pablo    Procedures:  Procedures    ED Course:   2:47 PM   Initial assessment performed. The patients presenting problems have been discussed, and they are in agreement with the care plan formulated and outlined with them. I have encouraged them to ask questions as they arise throughout their visit. 3:49 PM  Patient's lactic acid is elevated 2.59. Code sepsis initiated. ABx initiated, will judiciously use IVF due to advanced age, concern for respiratory compromise. 3:50 PM  Patient checks x-ray concerning for acute infiltrate. Patient already started on antibiotics for diabetic foot ulcer. 5:15 PM  Patient's rapid Covid is positive. Will start steroids. Diagnosis and Disposition     3:48 PM  I have spent 75 minutes of critical care time involved in lab review, consultations with specialist, family decision-making, and documentation. During this entire length of time I was immediately available to the patient. Critical Care: The reason for providing this level of medical care for this critically ill patient was due a critical illness that impaired one or more vital organ systems such that there was a high probability of imminent or life threatening deterioration in the patients condition. This care involved high complexity decision making to assess, manipulate, and support vital system functions, to treat this degreee vital organ system failure and to prevent further life threatening deterioration of the patients condition. Core Measures:  For Hospitalized Patients:    1. Hospitalization Decision Time:  The decision to hospitalize the patient was made by [place] M[place]ormick DO at 3:48 PM on 4/14/2021    2. Aspirin: Aspirin was not given because the patient did not present with a stroke at the time of their Emergency Department evaluation    3:48 PM  Patient is being admitted to the hospital by Dr. Krista Lazo  . The results of their tests and reasons for their admission have been discussed with them and/or available family. They convey agreement and understanding for the need to be admitted and for their admission diagnosis. CONDITIONS ON ADMISSION:  Sepsis is present at the time of admission. Pneumonia is present at the time of admission. MRSA is not present at the time of admission. Wound infection is present at the time of admission. Pressure Ulcer is not present at the time of admission. CLINICAL IMPRESSION:    1. Diabetic ulcer of left heel associated with diabetes mellitus due to underlying condition, limited to breakdown of skin ([address]    2. Community acquired pneumonia of right lung, unspecified part of lung    3.  Sepsis, due to unspecified organism, unspecified whether acute organ dysfunction present ([facility]    4. Renal insufficiency    5. COVID-19 virus infection      ____________________________________     Please note that this dictation was completed with [employer], the computer voice recognition software. Quite often unanticipated grammatical, syntax, homophones, and other interpretive errors are inadvertently transcribed by the computer software. Please disregard these errors. Please excuse any errors that have escaped final proofreading.

## 2021-04-14 NOTE — PROGRESS NOTES
Pharmacy Dosing Services: Vancomycin    Consult for Vancomycin Dosing by Pharmacy from Dr. Yelitza Pastor. Consult provided for this 70y.o. year old male , for indication of Diabetic Foot Infection. Day of Therapy 1    Ht Readings from Last 1 Encounters:   04/14/21 170.2 cm (67\")        Wt Readings from Last 1 Encounters:   04/14/21 83.9 kg (185 lb)        Other Current Antibiotics Zosyn 4.5g IV Q6H   Serum Creatinine Lab Results   Component Value Date/Time    Creatinine 2.04 (H) 09/24/2018 02:23 PM      Creatinine Clearance CrCl cannot be calculated (Patient's most recent lab result is older than the maximum 180 days allowed. ). BUN Lab Results   Component Value Date/Time    BUN 31 (H) 09/24/2018 02:23 PM      WBC Lab Results   Component Value Date/Time    WBC 6.4 06/01/2018 05:40 AM      H/H Lab Results   Component Value Date/Time    HGB 7.3 (L) 06/01/2018 05:15 PM      Platelets Lab Results   Component Value Date/Time    PLATELET 442 73/11/0301 05:40 AM      Temp 99 °F (37.2 °C)     Vancomycin 2000mg IV loading dose scheduled 04/14/21 1600 x1  Subsequent maintenance doses will be ordered after lab results are available. Pharmacy to follow daily and will make changes to dose and/or frequency based on clinical status.       Bernie Olszewski, PharmD  [phone]

## 2021-04-14 NOTE — ED NOTES
Lab called for critical glucose. Repeat test with glucometer. Pt drinking cranberry juice per Dr Haresh Guzmán verbal order. Pt denies any symptoms. Pt drinking juice.

## 2021-04-14 NOTE — ED TRIAGE NOTES
patint was seen by Dr Nicholas Alcala and he sent him to ed he needs to be admitted for diabetic foot ulcer to left heel nerotic and to the bone.  Dr. Tamiko Mccrary to be on consult and plans to take him to surgery

## 2021-04-15 ENCOUNTER — APPOINTMENT (OUTPATIENT)
Dept: MRI IMAGING | Age: 76
DRG: 628 | End: 2021-04-15
Attending: FAMILY MEDICINE
Payer: MEDICARE

## 2021-04-15 PROBLEM — M86.10 ACUTE OSTEOMYELITIS (HCC): Status: ACTIVE | Noted: 2021-04-15

## 2021-04-15 LAB
25(OH)D3 SERPL-MCNC: 17.6 NG/ML (ref 30–100)
ANION GAP SERPL CALC-SCNC: 5 MMOL/L (ref 3–18)
APTT PPP: 41.6 SEC (ref 23–36.4)
BUN SERPL-MCNC: 41 MG/DL (ref 7–18)
BUN/CREAT SERPL: 17 (ref 12–20)
CALCIUM SERPL-MCNC: 7.5 MG/DL (ref 8.5–10.1)
CHLORIDE SERPL-SCNC: 99 MMOL/L (ref 100–111)
CO2 SERPL-SCNC: 28 MMOL/L (ref 21–32)
CREAT SERPL-MCNC: 2.43 MG/DL (ref 0.6–1.3)
CRP SERPL-MCNC: 8.9 MG/DL (ref 0–0.3)
CRP SERPL-MCNC: 9.1 MG/DL (ref 0–0.3)
D DIMER PPP FEU-MCNC: 1.97 UG/ML(FEU)
EST. AVERAGE GLUCOSE BLD GHB EST-MCNC: 134 MG/DL
FERRITIN SERPL-MCNC: 136 NG/ML (ref 8–388)
FIBRINOGEN PPP-MCNC: 796 MG/DL (ref 210–451)
GLUCOSE BLD STRIP.AUTO-MCNC: 196 MG/DL (ref 70–110)
GLUCOSE BLD STRIP.AUTO-MCNC: 229 MG/DL (ref 70–110)
GLUCOSE BLD STRIP.AUTO-MCNC: 288 MG/DL (ref 70–110)
GLUCOSE BLD STRIP.AUTO-MCNC: 351 MG/DL (ref 70–110)
GLUCOSE BLD STRIP.AUTO-MCNC: 364 MG/DL (ref 70–110)
GLUCOSE SERPL-MCNC: 354 MG/DL (ref 74–99)
HBA1C MFR BLD: 6.3 % (ref 4.2–5.6)
INR PPP: 1.2 (ref 0.8–1.2)
LDH SERPL L TO P-CCNC: 180 U/L (ref 81–234)
POTASSIUM SERPL-SCNC: 5.5 MMOL/L (ref 3.5–5.5)
PROCALCITONIN SERPL-MCNC: 0.18 NG/ML
PROTHROMBIN TIME: 15.1 SEC (ref 11.5–15.2)
SARS-COV-2, COV2NT: DETECTED
SODIUM SERPL-SCNC: 132 MMOL/L (ref 136–145)
TROPONIN I SERPL-MCNC: <0.02 NG/ML (ref 0–0.04)
VANCOMYCIN SERPL-MCNC: 15.4 UG/ML (ref 5–40)

## 2021-04-15 PROCEDURE — 74011636637 HC RX REV CODE- 636/637: Performed by: FAMILY MEDICINE

## 2021-04-15 PROCEDURE — 74011000250 HC RX REV CODE- 250: Performed by: INTERNAL MEDICINE

## 2021-04-15 PROCEDURE — 80048 BASIC METABOLIC PNL TOTAL CA: CPT

## 2021-04-15 PROCEDURE — 74011636320 HC RX REV CODE- 636/320: Performed by: FAMILY MEDICINE

## 2021-04-15 PROCEDURE — 73720 MRI LWR EXTREMITY W/O&W/DYE: CPT

## 2021-04-15 PROCEDURE — 86140 C-REACTIVE PROTEIN: CPT

## 2021-04-15 PROCEDURE — A9575 INJ GADOTERATE MEGLUMI 0.1ML: HCPCS | Performed by: FAMILY MEDICINE

## 2021-04-15 PROCEDURE — 74011250636 HC RX REV CODE- 250/636: Performed by: INTERNAL MEDICINE

## 2021-04-15 PROCEDURE — 85384 FIBRINOGEN ACTIVITY: CPT

## 2021-04-15 PROCEDURE — 74011250636 HC RX REV CODE- 250/636: Performed by: FAMILY MEDICINE

## 2021-04-15 PROCEDURE — 65660000000 HC RM CCU STEPDOWN

## 2021-04-15 PROCEDURE — 74011000258 HC RX REV CODE- 258: Performed by: FAMILY MEDICINE

## 2021-04-15 PROCEDURE — 36415 COLL VENOUS BLD VENIPUNCTURE: CPT

## 2021-04-15 PROCEDURE — 83615 LACTATE (LD) (LDH) ENZYME: CPT

## 2021-04-15 PROCEDURE — 82306 VITAMIN D 25 HYDROXY: CPT

## 2021-04-15 PROCEDURE — 82728 ASSAY OF FERRITIN: CPT

## 2021-04-15 PROCEDURE — 85730 THROMBOPLASTIN TIME PARTIAL: CPT

## 2021-04-15 PROCEDURE — 80202 ASSAY OF VANCOMYCIN: CPT

## 2021-04-15 PROCEDURE — 74011250637 HC RX REV CODE- 250/637: Performed by: FAMILY MEDICINE

## 2021-04-15 PROCEDURE — 83036 HEMOGLOBIN GLYCOSYLATED A1C: CPT

## 2021-04-15 PROCEDURE — 85610 PROTHROMBIN TIME: CPT

## 2021-04-15 PROCEDURE — 84145 PROCALCITONIN (PCT): CPT

## 2021-04-15 PROCEDURE — 84484 ASSAY OF TROPONIN QUANT: CPT

## 2021-04-15 PROCEDURE — 82962 GLUCOSE BLOOD TEST: CPT

## 2021-04-15 PROCEDURE — 85379 FIBRIN DEGRADATION QUANT: CPT

## 2021-04-15 RX ORDER — INSULIN LISPRO 100 [IU]/ML
INJECTION, SOLUTION INTRAVENOUS; SUBCUTANEOUS
Status: DISCONTINUED | OUTPATIENT
Start: 2021-04-15 | End: 2021-04-21

## 2021-04-15 RX ORDER — POLYETHYLENE GLYCOL 3350 17 G/17G
17 POWDER, FOR SOLUTION ORAL DAILY PRN
Status: DISCONTINUED | OUTPATIENT
Start: 2021-04-15 | End: 2021-04-22 | Stop reason: HOSPADM

## 2021-04-15 RX ORDER — DEXTROSE 50 % IN WATER (D50W) INTRAVENOUS SYRINGE
25-50 AS NEEDED
Status: DISCONTINUED | OUTPATIENT
Start: 2021-04-15 | End: 2021-04-22 | Stop reason: HOSPADM

## 2021-04-15 RX ORDER — FAMOTIDINE 20 MG/1
20 TABLET, FILM COATED ORAL DAILY
Status: DISCONTINUED | OUTPATIENT
Start: 2021-04-15 | End: 2021-04-22 | Stop reason: HOSPADM

## 2021-04-15 RX ORDER — SODIUM CHLORIDE 0.9 % (FLUSH) 0.9 %
5-40 SYRINGE (ML) INJECTION EVERY 8 HOURS
Status: DISCONTINUED | OUTPATIENT
Start: 2021-04-15 | End: 2021-04-22 | Stop reason: HOSPADM

## 2021-04-15 RX ORDER — ONDANSETRON 2 MG/ML
4 INJECTION INTRAMUSCULAR; INTRAVENOUS
Status: DISCONTINUED | OUTPATIENT
Start: 2021-04-15 | End: 2021-04-22 | Stop reason: HOSPADM

## 2021-04-15 RX ORDER — PROMETHAZINE HYDROCHLORIDE 25 MG/1
12.5 TABLET ORAL
Status: DISCONTINUED | OUTPATIENT
Start: 2021-04-15 | End: 2021-04-22 | Stop reason: HOSPADM

## 2021-04-15 RX ORDER — SPIRONOLACTONE 25 MG/1
12.5 TABLET ORAL DAILY
COMMUNITY
End: 2021-01-01

## 2021-04-15 RX ORDER — AMMONIUM LACTATE 12 G/100G
LOTION TOPICAL AS NEEDED
COMMUNITY
End: 2021-04-22

## 2021-04-15 RX ORDER — MAGNESIUM SULFATE 100 %
4 CRYSTALS MISCELLANEOUS AS NEEDED
Status: DISCONTINUED | OUTPATIENT
Start: 2021-04-15 | End: 2021-04-22 | Stop reason: HOSPADM

## 2021-04-15 RX ORDER — SODIUM CHLORIDE 0.9 % (FLUSH) 0.9 %
5-40 SYRINGE (ML) INJECTION AS NEEDED
Status: DISCONTINUED | OUTPATIENT
Start: 2021-04-15 | End: 2021-04-22 | Stop reason: HOSPADM

## 2021-04-15 RX ORDER — PANTOPRAZOLE SODIUM 40 MG/1
40 TABLET, DELAYED RELEASE ORAL DAILY
COMMUNITY
End: 2021-04-22

## 2021-04-15 RX ORDER — VANCOMYCIN HYDROCHLORIDE
1250 ONCE
Status: COMPLETED | OUTPATIENT
Start: 2021-04-15 | End: 2021-04-15

## 2021-04-15 RX ORDER — VANCOMYCIN HYDROCHLORIDE
1250 ONCE
Status: DISCONTINUED | OUTPATIENT
Start: 2021-04-15 | End: 2021-04-15 | Stop reason: SDUPTHER

## 2021-04-15 RX ADMIN — Medication 6000 UNITS: at 09:35

## 2021-04-15 RX ADMIN — PIPERACILLIN AND TAZOBACTAM 2.25 G: 2; .25 INJECTION, POWDER, LYOPHILIZED, FOR SOLUTION INTRAVENOUS at 14:57

## 2021-04-15 RX ADMIN — GADOTERATE MEGLUMINE 15 ML: 376.9 INJECTION INTRAVENOUS at 09:17

## 2021-04-15 RX ADMIN — Medication 10 ML: at 11:44

## 2021-04-15 RX ADMIN — ENOXAPARIN SODIUM 30 MG: 30 INJECTION SUBCUTANEOUS at 21:52

## 2021-04-15 RX ADMIN — LOSARTAN POTASSIUM 25 MG: 25 TABLET, FILM COATED ORAL at 09:35

## 2021-04-15 RX ADMIN — INSULIN LISPRO 10 UNITS: 100 INJECTION, SOLUTION INTRAVENOUS; SUBCUTANEOUS at 06:44

## 2021-04-15 RX ADMIN — Medication 500 MG: at 09:35

## 2021-04-15 RX ADMIN — VANCOMYCIN HYDROCHLORIDE 1250 MG: 10 INJECTION, POWDER, LYOPHILIZED, FOR SOLUTION INTRAVENOUS at 21:51

## 2021-04-15 RX ADMIN — Medication 10 MG: at 21:52

## 2021-04-15 RX ADMIN — Medication 10 ML: at 21:53

## 2021-04-15 RX ADMIN — ENOXAPARIN SODIUM 30 MG: 30 INJECTION SUBCUTANEOUS at 09:34

## 2021-04-15 RX ADMIN — Medication 500 MG: at 21:52

## 2021-04-15 RX ADMIN — FUROSEMIDE 40 MG: 40 TABLET ORAL at 09:35

## 2021-04-15 RX ADMIN — PIPERACILLIN AND TAZOBACTAM 2.25 G: 2; .25 INJECTION, POWDER, LYOPHILIZED, FOR SOLUTION INTRAVENOUS at 09:36

## 2021-04-15 RX ADMIN — PIPERACILLIN AND TAZOBACTAM 2.25 G: 2; .25 INJECTION, POWDER, LYOPHILIZED, FOR SOLUTION INTRAVENOUS at 06:40

## 2021-04-15 RX ADMIN — MEROPENEM 1 G: 1 INJECTION, POWDER, FOR SOLUTION INTRAVENOUS at 18:10

## 2021-04-15 RX ADMIN — METOPROLOL SUCCINATE 50 MG: 50 TABLET, EXTENDED RELEASE ORAL at 09:35

## 2021-04-15 RX ADMIN — AZITHROMYCIN MONOHYDRATE 500 MG: 500 INJECTION, POWDER, LYOPHILIZED, FOR SOLUTION INTRAVENOUS at 16:06

## 2021-04-15 RX ADMIN — FAMOTIDINE 20 MG: 20 TABLET, FILM COATED ORAL at 11:44

## 2021-04-15 RX ADMIN — INSULIN LISPRO 6 UNITS: 100 INJECTION, SOLUTION INTRAVENOUS; SUBCUTANEOUS at 11:45

## 2021-04-15 RX ADMIN — ZINC SULFATE 220 MG (50 MG) CAPSULE 1 CAPSULE: CAPSULE at 09:35

## 2021-04-15 RX ADMIN — ATORVASTATIN CALCIUM 40 MG: 20 TABLET, FILM COATED ORAL at 21:52

## 2021-04-15 RX ADMIN — INSULIN LISPRO 4 UNITS: 100 INJECTION, SOLUTION INTRAVENOUS; SUBCUTANEOUS at 16:19

## 2021-04-15 RX ADMIN — INSULIN GLARGINE 15 UNITS: 100 INJECTION, SOLUTION SUBCUTANEOUS at 12:42

## 2021-04-15 RX ADMIN — INSULIN LISPRO 3 UNITS: 100 INJECTION, SOLUTION INTRAVENOUS; SUBCUTANEOUS at 21:51

## 2021-04-15 NOTE — PROGRESS NOTES
Hospitalist Progress Note    Patient: Sasha Saba. MRN: [id number]  [facility]: [id number]    YOB: 1945  Age: 70 y.o. Sex: male    DOA: 4/14/2021 LOS:  LOS: 1 day          Chief Complaint:    Infected left foot      Assessment/Plan     71-year-old male with past medical history of diabetes, atrial fibrillation on Eliquis, coronary artery disease history of CHF and NSTEMI in the past as well as chronic kidney disease stage III admitted for infected diabetic ulcer on left heel of foot.   In emergency room found to be Covid positive.      Diabetes ulcer, infected -  Continue IV vancomycin and Zosyn  Dr. Kristy Chao consulting, considering surgical debridement, but that likely will be on hold for right now as patient is tested positive for Covid-19  MRI of left foot does demonstrate acute osteomyelitis as well as ischemic and necrotic tissue    Consulted Infectious Disease -Dr. Roel Chinchilla     Community-acquired pneumonia versus Covid pneumonia -  Acute right upper lobe pulmonary infiltrate seen on chest x-ray  Patient receiving IV Zosyn and vancomycin, azithromycin IV added  Patient not hypoxic, not requiring oxygen     COVID-19 infection -  We will place patient on Covid protocol  Ordered immune boosting vitamins, vitamin C vitamin D and zinc  Order lab work per [place] protocol  Onset of symptoms likely 3 weeks ago, too late for remdesivir  Patient has not required steroids at this point    Chronic kidney disease, stage III -  Mild improvement this morning  Sodium level slightly low, will start low-dose normal saline and continue to follow BMP  Avoid nephrotoxic agents     DM with hypoglycemia -hypoglycemia is definitely resolved and has given way to hyperglycemia  Gave patient dose of 15 units of Lantus  Diabetic educator has been following this patient and appreciate her expertise  SSI, ADA and fingerstick blood glucose before every meal and nightly     Chronic history of atrial flutter/atrial fibrillation -  On Eliquis, will hold until surgical determination is made     DVT prophylaxis Lovenox via Covid protocol     GI prophylaxis, Pepcid ordered     Disposition :  Patient Active Problem List   Diagnosis Code    Atrial flutter ([facility]) I48.92    DM2 (diabetes mellitus, type 2) ([facility]) E11.9    Acute renal insufficiency N28.9    NSTEMI (non-ST elevated myocardial infarction) ([facility]) V01.3    Systolic CHF, acute ([facility]) I50.21    Symptomatic anemia D64.9    CAD (coronary artery disease) I25.10    GI bleed K92.2    ELINOR (acute kidney injury) ([facility] [address] [address] N17.9    CKD (chronic kidney disease) stage 3, GFR 30-59 ml/min ([facility]) N18.30    Elevated brain natriuretic peptide (BNP) level R79.89    CAP (community acquired pneumonia) J18.9    Diabetic ulcer of left foot ([facility]) E11.621, L97.529    COVID-19 virus infection U07.1       Subjective:    Patient very talkative and in good spirits. States that he does not need me to update his wife's condition that he will call her underscore himself. He states that he hopes that she does not get Covid like he did. He has no complaints this morning and he only would just wants to know when his surgery will be now. Review of systems:    Constitutional: denies fevers, chills, myalgias  Respiratory: denies SOB, cough  Cardiovascular: denies chest pain, palpitations  Gastrointestinal: denies nausea, vomiting, diarrhea      Vital signs/Intake and Output:  Visit Vitals  BP (!) 109/49   Pulse 74   Temp 98.4 °F (36.9 °C)   Resp 16   Ht 5' 7\" (1.702 m)   Wt 83.9 kg (185 lb)   SpO2 94%   BMI 28.98 kg/m²     Current Shift:  No intake/output data recorded. Last three shifts:  04/13 1901 - 04/15 0700  In: 170 [P.O.:120;  I.V.:50]  Out: -     Exam:    General: Well developed, alert, NAD, OX3  Head/Neck: NCAT, supple, No masses, No lymphadenopathy  CVS:Regular rate and rhythm, no M/R/G, S1/S2 heard, no thrill  Lungs:Clear to auscultation bilaterally, no wheezes, rhonchi, or rales  Abdomen: Soft, Nontender, No distention, Normal Bowel sounds, No hepatomegaly  Extremities: No C/C/E, pulses palpable 2+  Skin: Left foot wrapped, bloody gauze seen also with necrotic base present normal texture and turgor, no rashes, no lesions  Neuro:grossly normal , follows commands  Psych:appropriate                Labs: Results:       Chemistry Recent Labs     04/15/21  0549 04/14/21  1445   * 39*   * 135*   K 5.5 4.4   CL 99* 99*   CO2 28 26   BUN 41* 34*   CREA 2.43* 2.51*   CA 7.5* 8.2*   AGAP 5 10   BUCR 17 14   AP  --  136*   TP  --  7.1   ALB  --  2.2*   GLOB  --  4.9*   AGRAT  --  0.4*      CBC w/Diff Recent Labs     04/14/21  1445   WBC 8.6   RBC 4.31*   HGB 11.5*   HCT 35.9*   *   GRANS 69   LYMPH 15*   EOS 0      Cardiac Enzymes No results for input(s): CPK, CKND1, LEONEL in the last 72 hours. No lab exists for component: CKRMB, TROIP   Coagulation Recent Labs     04/15/21  0549   PTP 15.1   INR 1.2   APTT 41.6*       Lipid Panel No results found for: CHOL, CHOLPOCT, CHOLX, CHLST, CHOLV, 962906, HDL, HDLP, LDL, LDLC, DLDLP, 204496, VLDLC, VLDL, TGLX, TRIGL, TRIGP, TGLPOCT, CHHD, CHHDX   BNP No results for input(s): BNPP in the last 72 hours.    Liver Enzymes Recent Labs     04/14/21  1445   TP 7.1   ALB 2.2*   *      Thyroid Studies Lab Results   Component Value Date/Time    TSH 1.72 02/11/2018 02:19 AM        Procedures/imaging: see electronic medical records for all procedures/Xrays and details which were not copied into this note but were reviewed prior to creation of Hilario Mohamud MD

## 2021-04-15 NOTE — PROGRESS NOTES
Pharmacy Dosing Services: Vancomycin    Consult for Vancomycin Dosing by Pharmacy by Dr. Heraclio Cagle provided for this 70y.o. year old male , for indication of diabetic foot infection / osteomyelitis. Day of Therapy 2    Scr = 2.43   CrCl = 28 ml/min     Vancomycin 2000 mg IV once, administered 4/14/21 at 16:29. Vancomycin Random level: 15.4  (4/15/21 at 16:04)    within therapeutic range:  15 - 20    Continue therapy with dose: Vancomycin 1250 mg IV once, scheduled for 4/15/21 at ~ 18:00  Subsequent dosing to be determined based on assessment of renal function on 4/16/21    Pharmacy to follow daily and will make changes to dose and/or frequency based on clinical status. Ht Readings from Last 1 Encounters:   04/14/21 170.2 cm (67\")        Wt Readings from Last 1 Encounters:   04/14/21 83.9 kg (185 lb)        Other Current Antibiotics Meropenem / Azithromycin   Significant Cultures Wound (4/14/21) gram neg rods / rare gram + rods   Serum Creatinine Lab Results   Component Value Date/Time    Creatinine 2.43 (H) 04/15/2021 05:49 AM      Creatinine Clearance Estimated Creatinine Clearance: 28 mL/min (A) (based on SCr of 2.43 mg/dL (H)).    BUN Lab Results   Component Value Date/Time    BUN 41 (H) 04/15/2021 05:49 AM      WBC Lab Results   Component Value Date/Time    WBC 8.6 04/14/2021 02:45 PM      H/H Lab Results   Component Value Date/Time    HGB 11.5 (L) 04/14/2021 02:45 PM      Platelets Lab Results   Component Value Date/Time    PLATELET 698 (H) 95/53/0690 02:45 PM      Temp 97.4 °F (36.3 °C)     Pharmacist Get Rodriguez, [address] [facility] [address]

## 2021-04-15 NOTE — DIABETES MGMT
Diabetes Patient/Family Education Record  Factors That  May Influence Patients Ability  to Learn or  Comply with Recommendations   []   Language barrier    []   Cultural needs   []   Motivation    []   Cognitive limitation    []   Physical   []   Education    []   Physiological factors   []   Hearing/vision/speaking impairment   []   Samaritan beliefs    []   Financial factors   []  Other:   [x]  No factors identified at this time.      Person Instructed:   [x]   Patient   []   Family   []  Other     Preference for Learning:   [x]   Verbal   []   Written   []  Demonstration     Level of Comprehension & Competence:    [x]  Good                                      [] Fair                                     []  Poor                             []  Needs Reinforcement   [x]  Teachback completed    Education Component:   [x]  Medication management, including how to administer insulin (if appropriate) and potential medication interactions ; confirmation of home regimen   []  Nutritional management -obtain usual meal pattern   []  Exercise   []  Signs, symptoms, and treatment of hyperglycemia and hypoglycemia   [] Prevention, recognition and treatment of hyperglycemia and hypoglycemia   [x]  Importance of blood glucose monitoring    []  Instruction on use of the blood glucose meter   [x]  Discuss the importance of HbA1C monitoring    []  Sick day guidelines   []  Proper use and disposal of lancets, needles, syringes or insulin pens (if appropriate)   []  Potential long-term complications (retinopathy, kidney disease, neuropathy, foot care)   [] Information about whom to contact in case of emergency or for more information    [x]  Goal:  Patient/family will demonstrate understanding of Diabetes Self Management Skills by: (date) _5/31______  Plan for post-discharge education or self-management support:    [] Outpatient class schedule provided            [] Patient Declined    [] Scheduled for outpatient classes (date) _______  Verify:  Does patient understand how diabetes medications work? ____yes________________________  Does patient know what their most recent A1c is? ___________pending________________________  Does patient monitor glucose at home? _____________________yes______________________  Does patient have difficulty obtaining diabetes medications or testing supplies? ______no___________         Bar Carter MS, RN, CDE  Glycemic Control Team  [phone]  Pager [phone] (- 8:00-4:30P)  *After Hours pager [phone]

## 2021-04-15 NOTE — PROGRESS NOTES
Problem: Pain  Goal: *Control of Pain  Outcome: Progressing Towards Goal  Goal: *PALLIATIVE CARE:  Alleviation of Pain  Outcome: Progressing Towards Goal     Problem: Patient Education: Go to Patient Education Activity  Goal: Patient/Family Education  Outcome: Progressing Towards Goal     Problem: Chronic Renal Failure  Goal: *Fluid and electrolytes stabilized  Outcome: Progressing Towards Goal     Problem: Patient Education: Go to Patient Education Activity  Goal: Patient/Family Education  Outcome: Progressing Towards Goal     Problem: Diabetes Maintenance:Admission  Goal: Activity/Safety  Outcome: Progressing Towards Goal  Goal: Diagnostic Tests/Procedures  Outcome: Progressing Towards Goal  Goal: Nutrition  Outcome: Progressing Towards Goal  Goal: Medications  Outcome: Progressing Towards Goal  Goal: Treatments/Interventions/Procedures  Outcome: Progressing Towards Goal     Problem: Diabetes Maintenance:Ongoing  Goal: Activity/Safety  Outcome: Progressing Towards Goal  Goal: Nutrition  Outcome: Progressing Towards Goal  Goal: Medications  Outcome: Progressing Towards Goal  Goal: Treatments/Interventsions/Procedures  Outcome: Progressing Towards Goal  Goal: *Blood Glucose 80 to 180 md/dl  Outcome: Progressing Towards Goal     Problem: Diabetes Maintenance:Discharge Outcomes  Goal: *Describes follow-up/return visits to physicians  Outcome: Progressing Towards Goal  Goal: *Blood glucose at patient's target range or approaching  Outcome: Progressing Towards Goal  Goal: *Aware of nutrition guidelines  Outcome: Progressing Towards Goal  Goal: *Verbalizes information about medication  Description: Verbalizes name, dosage, time, side effects, and number of days to  continue medications.   Outcome: Progressing Towards Goal  Goal: *Describes goals, rules, symptoms, and treatments  Description: Describes blood glucose goals, monitoring, sick day rules,  hypo/hyperglycemia prevention, symptoms, and treatment  Outcome: Progressing Towards Goal  Goal: *Describes available outpatient diabetes resources and support systems  Outcome: Progressing Towards Goal     Problem: Diabetic Ulcer-Treatment  Goal: *Improvement of existing diabetic ulcer  Outcome: Progressing Towards Goal     Problem: Patient Education: Go to Patient Education Activity  Goal: Patient/Family Education  Outcome: Progressing Towards Goal     Problem: Falls - Risk of  Goal: *Absence of Falls  Description: Document Joana Fall Risk and appropriate interventions in the flowsheet. Outcome: Progressing Towards Goal  Note: Fall Risk Interventions:  Mobility Interventions: Bed/chair exit alarm, Communicate number of staff needed for ambulation/transfer, Patient to call before getting OOB, Utilize walker, cane, or other assistive device                             Problem: Patient Education: Go to Patient Education Activity  Goal: Patient/Family Education  Outcome: Progressing Towards Goal     Problem: Pressure Injury - Risk of  Goal: *Prevention of pressure injury  Description: Document Anam Scale and appropriate interventions in the flowsheet. Outcome: Progressing Towards Goal     Problem: Patient Education: Go to Patient Education Activity  Goal: Patient/Family Education  Outcome: Progressing Towards Goal     Problem: Airway Clearance - Ineffective  Goal: Achieve or maintain patent airway  Outcome: Progressing Towards Goal     Problem: Gas Exchange - Impaired  Goal: Absence of hypoxia  Outcome: Progressing Towards Goal  Goal: Promote optimal lung function  Outcome: Progressing Towards Goal     Problem: Breathing Pattern - Ineffective  Goal: Ability to achieve and maintain a regular respiratory rate  Outcome: Progressing Towards Goal     Problem:  Body Temperature -  Risk of, Imbalanced  Goal: Ability to maintain a body temperature within defined limits  Outcome: Progressing Towards Goal  Goal: Will regain or maintain usual level of consciousness  Outcome: Progressing Towards Goal  Goal: Complications related to the disease process, condition or treatment will be avoided or minimized  Outcome: Progressing Towards Goal     Problem: Isolation Precautions - Risk of Spread of Infection  Goal: Prevent transmission of infectious organism to others  Outcome: Progressing Towards Goal     Problem: Nutrition Deficits  Goal: Optimize nutrtional status  Outcome: Progressing Towards Goal     Problem: Risk for Fluid Volume Deficit  Goal: Maintain normal heart rhythm  Outcome: Progressing Towards Goal  Goal: Maintain absence of muscle cramping  Outcome: Progressing Towards Goal  Goal: Maintain normal serum potassium, sodium, calcium, phosphorus, and pH  Outcome: Progressing Towards Goal     Problem: Loneliness or Risk for Loneliness  Goal: Demonstrate positive use of time alone when socialization is not possible  Outcome: Progressing Towards Goal     Problem: Fatigue  Goal: Verbalize increase energy and improved vitality  Outcome: Progressing Towards Goal     Problem: Patient Education: Go to Patient Education Activity  Goal: Patient/Family Education  Outcome: Progressing Towards Goal

## 2021-04-15 NOTE — PROGRESS NOTES
Podiatry:  Patient seen for treatment of infected, necrotic, left heel ulcer with acute osteomyelitis. Continue IV abx as per ID. Should order arterial doppler for DENNIS's.   He will require surgical I&D with calcaneal debridement when possible due to positive Covid-19 test.  See Consult for details

## 2021-04-15 NOTE — PROGRESS NOTES
Problem: Pain  Goal: *Control of Pain  4/15/2021 1016 by Debbi Mata RN  Outcome: Progressing Towards Goal  4/15/2021 1015 by Debbi Mata RN  Outcome: Progressing Towards Goal     Problem: Patient Education: Go to Patient Education Activity  Goal: Patient/Family Education  4/15/2021 1016 by Debbi Mata RN  Outcome: Progressing Towards Goal  4/15/2021 1015 by Debbi Mata RN  Outcome: Progressing Towards Goal     Problem: Chronic Renal Failure  Goal: *Fluid and electrolytes stabilized  Outcome: Progressing Towards Goal     Problem: Patient Education: Go to Patient Education Activity  Goal: Patient/Family Education  Outcome: Progressing Towards Goal     Problem: Diabetes Maintenance:Admission  Goal: Activity/Safety  Outcome: Progressing Towards Goal  Goal: Nutrition  Outcome: Progressing Towards Goal     Problem: Falls - Risk of  Goal: *Absence of Falls  Description: Document Monica Butler Fall Risk and appropriate interventions in the flowsheet.   4/15/2021 1016 by Debbi Mata RN  Outcome: Progressing Towards Goal  Note: Fall Risk Interventions:  Mobility Interventions: Patient to call before getting OOB         Medication Interventions: Patient to call before getting OOB, Teach patient to arise slowly                4/15/2021 1015 by Debbi Mata RN  Outcome: Progressing Towards Goal  Note: Fall Risk Interventions:  Mobility Interventions: Patient to call before getting OOB         Medication Interventions: Patient to call before getting OOB, Teach patient to arise slowly                   Problem: Patient Education: Go to Patient Education Activity  Goal: Patient/Family Education  Outcome: Progressing Towards Goal     Problem: Airway Clearance - Ineffective  Goal: Achieve or maintain patent airway  Outcome: Progressing Towards Goal     Problem: Gas Exchange - Impaired  Goal: Absence of hypoxia  Outcome: Progressing Towards Goal  Goal: Promote optimal lung function  Outcome: Progressing Towards Goal

## 2021-04-15 NOTE — CONSULTS
[facility]  (A Division of [address] [place] [address])                                                                                                     Evelyne Roa MD                                                                                              (Prefer text when possible)                                                                                                                                      Work Cell #: [phone]. If no call or text back within 30 minutes, please call office number. Office Number: [phone]         Consultation Note      Date of Admission: 4/14/2021    Date of Note: 4/15/2021      Reason for Referral: I was asked to see this immuno-comproised and medically complex patient by Dr Romana Monge  for evaluation and management of diabetic foot infection, also + COVID 23. Thank you for involving me in the care of this patient. Please do not hesitate to contact me on the above number if question or concern. Current Antimicrobials:    Prior Antimicrobials:  · Zithromax 500mg IV- 4/14 TD  · Zosyn 2.25gm IV Q6 4/14 TD  · Vancomycin IV N/A       Assessment:  Recommendation/Plan:    Left Infected DM Ulcer and acute OM and necrotic tissue, punched out heel ulcer.  COVID 19 Infection/ PNA- Right UL infiltrate, without hypoxia. -procal 0.18, D-dimer 1.97, ferretin 136, , fibrinogen 796  Difficult to tell if Flu like symptom due to cellulitis/ soft tissue infection or onset of COVID 2 weeks ago. Other Medical Issues followed and managed by primary and other services   DM- Poorly controlled   Low Vitamin D   CAD with CHF/ NSTMI. Atrial flutter   CKD   Hyperlipidemia   Past ID issues: N/A  Soft tissue infection with necrosis/ acute OM by MRI in diabetic pt, high risk patient. Modify broad abx coverage to Meropenem + vanco for DM infection( minimize renal toxicity with zosyn + vanco ) and agree with Zithromax.  Duplex studies for his ciruclatio   OR to debride as soon as feasible- Podiatry following   Follow serologies to monitor for COVD 19    Monitor vanco level and change in renal status closely  Monitor resp status and supportive care accordingly for COVID status. D/w Dr Dr Darryn Syed, and nursing                 Microbiology:  Blood culture: 4/14- 2 sets: NGSF    Urine culture:N/A    Body Fluid/ CSF/drainage culture:  4/14- gram stain: GNR and GPR        --wound culture Pending    Cath tip/ PICC culture: N/A      Lines / Catheters:      HPI:  70 y.o. WHITE male with PMH of DM, CAD with CHF and CKD. He has a blister and redness on lower LE about two weeks ago, which eschared and removed the scab. Around that time, he also had flu like symptom. After that, he had pain and swelling which opened up on his heel. Per ED reports, had attempt to debridement by Podiatry on his heel, prior to arrival to ED. Denies cough, sob. Unaware of COVID 19 contact but his son has tested negative since his diagnosis. Unaware of his wife status. Retired from [employer] in early [employer], and he relocated from [place] to here to be close to his children. Active Hospital Problems    Diagnosis Date Noted    CAP (community acquired pneumonia) 04/14/2021    Diabetic ulcer of left foot ([facility] [address] 04/14/2021    COVID-19 virus infection 04/14/2021     Past Medical History:   Diagnosis Date    Asthma     Diabetes ([address]     High cholesterol     Hypertension      Past Surgical History:   Procedure Laterality Date    COLONOSCOPY N/A 6/1/2018    COLONOSCOPY, POLYPECTOMY performed by Montez Beth MD at [facility] ENDOSCOPY    HX CATARACT REMOVAL       No family history on file.   Social History     Socioeconomic History    Marital status:      Spouse name: Not on file    Number of children: Not on file    Years of education: Not on file    Highest education level: Not on file   Occupational History    Not on file   Social Needs    Financial resource strain: Not on file    Food insecurity     Worry: Not on file     Inability: Not on file    Transportation needs     Medical: Not on file     Non-medical: Not on file   Tobacco Use    Smoking status: Former Smoker    Smokeless tobacco: Never Used   Substance and Sexual Activity    Alcohol use: Yes     Alcohol/week: 1.0 standard drinks     Types: 1 Cans of beer per week    Drug use: No    Sexual activity: Not on file   Lifestyle    Physical activity     Days per week: Not on file     Minutes per session: Not on file    Stress: Not on file   Relationships    Social connections     Talks on phone: Not on file     Gets together: Not on file     Attends [employer] service: Not on file     Active member of club or organization: Not on file     Attends meetings of clubs or organizations: Not on file     Relationship status: Not on file    Intimate partner violence     Fear of current or ex partner: Not on file     Emotionally abused: Not on file     Physically abused: Not on file     Forced sexual activity: Not on file   Other Topics Concern    Not on file   Social History Narrative    Not on file       Allergies:  Patient has no known allergies.      Medications:  Current Facility-Administered Medications   Medication Dose Route Frequency    sodium chloride (NS) flush 5-40 mL  5-40 mL IntraVENous Q8H    sodium chloride (NS) flush 5-40 mL  5-40 mL IntraVENous PRN    polyethylene glycol (MIRALAX) packet 17 g  17 g Oral DAILY PRN    promethazine (PHENERGAN) tablet 12.5 mg  12.5 mg Oral Q6H PRN    Or    ondansetron (ZOFRAN) injection 4 mg  4 mg IntraVENous Q6H PRN    famotidine (PEPCID) tablet 20 mg  20 mg Oral DAILY    insulin lispro (HUMALOG) injection   SubCUTAneous AC&HS    glucose chewable tablet 16 g  4 Tab Oral PRN    glucagon (GLUCAGEN) injection 1 mg  1 mg IntraMUSCular PRN    dextrose (D50W) injection syrg 12.5-25 g  25-50 mL IntraVENous PRN    Vancomycin random level due today 4/15/21 @ 1600  1 Each Other ONCE    VANCOMCYIN-PHARMACY TO DOSE  1 Each Other Rx Dosing/Monitoring    azithromycin (ZITHROMAX) 500 mg in 0.9% sodium chloride 250 mL (VIAL-MATE)  500 mg IntraVENous Q24H    piperacillin-tazobactam (ZOSYN) 2.25 g in 0.9% sodium chloride (MBP/ADV) 50 mL MBP  2.25 g IntraVENous Q6H    enoxaparin (LOVENOX) injection 30 mg  30 mg SubCUTAneous Q12H    acetaminophen (TYLENOL) tablet 650 mg  650 mg Oral Q6H PRN    Or    acetaminophen (TYLENOL) suppository 650 mg  650 mg Rectal Q6H PRN    guaiFENesin-dextromethorphan (ROBITUSSIN DM) 100-10 mg/5 mL syrup 5 mL  5 mL Oral Q4H PRN    cholecalciferol (VITAMIN D3) tablet 6,000 Units  6,000 Units Oral DAILY    Followed by   Celsa Justice ON 2021] cholecalciferol (VITAMIN D3) (1000 Units /25 mcg) tablet 2,000 Units  2,000 Units Oral DAILY    ascorbic acid (vitamin C) (VITAMIN C) tablet 500 mg  500 mg Oral BID    zinc sulfate (ZINCATE) 50 mg zinc (220 mg) capsule 1 Cap  1 Cap Oral DAILY    melatonin (rapid dissolve) tablet 10 mg  10 mg Oral QHS    atorvastatin (LIPITOR) tablet 40 mg  40 mg Oral QHS    furosemide (LASIX) tablet 40 mg  40 mg Oral DAILY    insulin glargine (LANTUS) injection 15 Units  15 Units SubCUTAneous DAILY    losartan (COZAAR) tablet 25 mg  25 mg Oral DAILY    metoprolol succinate (TOPROL-XL) XL tablet 50 mg  50 mg Oral DAILY        ROS:  A comprehensive review of systems was negative except for that written in the History of Present Illness.      Physical Exam:    HPI:  Temp (24hrs), Av.3 °F (36.8 °C), Min:97.4 °F (36.3 °C), Max:98.9 °F (37.2 °C)    Visit Vitals  BP (!) 154/72 (BP 1 Location: Left arm)   Pulse 87   Temp 97.4 °F (36.3 °C)   Resp 18   Ht 5' 7\" (1.702 m)   Wt 83.9 kg (185 lb)   SpO2 98%   BMI 28.98 kg/m²       General: Well developed, well nourished 70 y.o. WHITE male in no acute distress. ENT: ENT exam normal, no neck nodes or sinus tenderness  Head: normocephalic, without obvious abnormality, atraumatic  Cardio:  regular rate and regular rhythm  Chest: inspection normal - no chest wall deformities or tenderness, respiratory effort normal  Lungs: clear to auscultation, no wheezes or rales and unlabored breathing  Abdomen: soft, non-tender. Bowel sounds normal. No masses, no organomegaly. Extremities:  no redness or tenderness in the calves  Feet with DM deformity  Scab on LLE- above medial malleoli  Open wound with necrotic tissue on heel-- mushy feel to the soft tissue  Neuro: A and O X3, pleasant affect.  Moves all extremities CN grossly intact       Lab results:    Chemistry  Recent Labs     04/15/21  0549 04/14/21  1445   * 39*   * 135*   K 5.5 4.4   CL 99* 99*   CO2 28 26   BUN 41* 34*   CREA 2.43* 2.51*   CA 7.5* 8.2*   AGAP 5 10   BUCR 17 14   AP  --  136*   TP  --  7.1   ALB  --  2.2*   GLOB  --  4.9*   AGRAT  --  0.4*       CBC w/ Diff  Recent Labs     04/14/21  1445   WBC 8.6   RBC 4.31*   HGB 11.5*   HCT 35.9*   *   GRANS 69   LYMPH 15*   EOS 0       Microbiology  All Micro Results     Procedure Component Value Units Date/Time    CULTURE, BLOOD [[id number]] Collected: 04/14/21 1500    Order Status: Completed Specimen: Blood Updated: 04/15/21 0706     Special Requests: NO SPECIAL REQUESTS        Culture result: NO GROWTH AFTER 15 HOURS       CULTURE, BLOOD [[id number]] Collected: 04/14/21 1445    Order Status: Completed Specimen: Blood Updated: 04/15/21 0706     Special Requests: NO SPECIAL REQUESTS        Culture result: NO GROWTH AFTER 16 HOURS       LEGIONELLA PNEUMOPHILA AG, URINE [[id number]]     Order Status: Sent Specimen: Urine     STREP [place], URINE [[id number]]     Order Status: Sent Specimen: Urine     CULTURE, RESPIRATORY/SPUTUM/BRONCH [place] STAIN [[id number]]     Order Status: Sent Specimen: Sputum COVID-19 RAPID TEST [[id number]]  (Abnormal) Collected: 04/14/21 1641    Order Status: Completed Specimen: Nasopharyngeal Updated: 04/14/21 1715     Specimen source Nasopharyngeal        COVID-19 rapid test Detected        Comment:      The specimen is POSITIVE for SARS-CoV-2, the novel coronavirus associated with COVID-19. This test has been authorized by the FDA under an Emergency Use Authorization (EUA) for use by authorized laboratories. Fact sheet for Healthcare Providers: [employer]  Fact sheet for Patients: [employer]       Methodology: Isothermal Nucleic Acid Amplification  CALLED TO AND CORRECTLY REPEATED BY:  Patrick Alves RN ER TO [facility] AT 5991 ON [id number]              Imaging: CXR- right apical infiltreate 4/14  Left foot x-ray: no abn finding for OM    MRI: left foot 4/15- without contrast  1. Plantar soft tissue ulceration involving the calcaneal fat pad with soft  tissue abnormality extending to the bony cortex with associated cortical erosion  and enthesitis in keeping with acute osteomyelitis, corresponding to the  attachment of the plantar fascial aponeurosis which is disrupted. 2.  In addition, there is a large area of abnormal nonenhancing soft tissue with  multiple scattered locules of subcutaneous gas tracking distally along the  course of the plantar fascial aponeurosis from its calcaneal attachment, roughly  9 cm in length compatible with ischemic and necrotic tissue. 3.  Additional chronic and degenerative changes as above. Total time: 70 minutes in this medically complex patient.

## 2021-04-15 NOTE — ROUTINE PROCESS
Assume care of patient from [facility], RN(ED). Patient received in bed awake. Patient A&Ox4, denies pain and discomfort. No distress noted. Bed locked in low position. Call bell within reach and patient verbalized understanding of use for assistance and needs. 0730- Bedside and Verbal shift change report given to Ede Evans RN (oncoming nurse) by Richardson Ferraro RN (offgoing nurse). Report included the following information SBAR, Kardex, Intake/Output, MAR and Recent Results. [facility] [place] [facility] Night Shift Chart Audit    Chart Audit completed?  YES

## 2021-04-15 NOTE — PROGRESS NOTES
Reason for Admission: Diabetic ulcer of left foot    Chart reviewed. Per H&P: \"Asael Carl is a 70 y.o. male with past medical history of diabetes, atrial fibrillation on Eliquis presents to the emergency department via private vehicle complaining of ulcer to his left foot that has been managed by his podiatrist Dr Vinita Bui. The ulcer has been present for the past 2 to 3 months. He was in his podiatrist office earlier today and Dr. Vinita Bui tried to do a debridement but patient did not tolerate it well. As a result, Dr. Vinita Bui sent the patient to the hospital for IV antibiotics and possible surgical debridement was seen in the office earlier today and was advised to come to the emergency department for admission. Is currently not on antibiotics. Pt denies fever, nausea, vomiting, and any other sxs or complaints. No relieving or exacerbating factors identified. Patient's wife is by his bedside and she states that the foot has been smelling very foul and so she keeps the windows open at night in order to air out the room. Patient's wife also states that her  had cold/flu symptoms approximately 3 weeks ago and was and had fever, chills, night sweats, diaphoresis, shortness of breath and cough. Patient and his wife deny any Covid exposures and stated that all he does is stay at home. \"       Care Management/Patient Conversation: 1500: Chart reviewed. CM spoke with the patient and informed him of the CM's role. The patient confirmed demographics and PCP. CM and the patient discussed discharge plans to include transportation upon discharge, DME, family support, and transportation to and from appointments. The patient states that he lives with his wife and that he was independent prior to this admission, not using any DME for ambulation. The patient states that he does have a cane and walker at home should he need them. The patient denies any additional questions or concerns at this time.  CM to follow the patient's progress and be available to assist with discharge planning as needed. CMS referral placed. Prior to admission patient was living: Home with wife    Prior to admission patient was using the following DME: None, but has cane and walker if needed                  RUR Score: 19%                  PCP: First and Last name: Dean Carroll MD    Name of Practice: [facility]   Are you a current patient: Yes/No: Yes   Approximate date of last visit: 4/14/21   Can you participate in a virtual visit if needed:     Do you (patient/family) have any concerns for transition/discharge? Not at this time                 Plan for utilizing home health: TBD      Current Advanced Directive/Advance Care Plan: Full Code no ACP docs    Healthcare Decision Maker: Wife - Mili Weiss    Transition of Care Plan: In progress, likely home with family assist and MD follow-up     Care Management Interventions  PCP Verified by CM:  Yes  Transition of Care Consult (CM Consult): Discharge Planning  Current Support Network: Lives with Spouse  Confirm Follow Up Transport: Family  The Plan for Transition of Care is Related to the Following Treatment Goals : Diabetic ulcer of left foot  Discharge Location  Discharge Placement: Home with family assistance

## 2021-04-15 NOTE — CONSULTS
Podiatry Consult    Subjective:         Date of Consultation: April 15, 2021     Referring Physician: Dr. Alex Whitten    Patient is a 70 y.o.  male who is being seen for treatment of an open infected, deep ulcer, on the bottom of his left heel. He was seen yesterday by his PCP Dr. Cleopatra Houser who referred him to me for treatment. He was then  seen in my office and the ulcer was debrided, but found to be deep to bone with excessive black necrotic gangrenous tissue deep in the wound that I was unable to remove. He was subsequently sent to [facility] for more aggressive treatment and testing. Patient Active Problem List    Diagnosis Date Noted    CAP (community acquired pneumonia) 04/14/2021    Diabetic ulcer of left foot ([facility] [address] 04/14/2021    COVID-19 virus infection 04/14/2021    Symptomatic anemia 05/29/2018    CAD (coronary artery disease) 05/29/2018    GI bleed 05/29/2018    ELINOR (acute kidney injury) ([facility] [address] 05/29/2018    CKD (chronic kidney disease) stage 3, GFR 30-59 ml/min ([facility]) 05/29/2018    Elevated brain natriuretic peptide (BNP) level 05/29/2018    Atrial flutter ([facility] [address] 02/12/2018    DM2 (diabetes mellitus, type 2) ([facility] [address] 02/12/2018    Acute renal insufficiency 02/12/2018    NSTEMI (non-ST elevated myocardial infarction) ([address] 24/72/5312    Systolic CHF, acute ([facility] [address] 02/12/2018     Past Medical History:   Diagnosis Date    Asthma     Diabetes ([address]     High cholesterol     Hypertension       Past Surgical History:   Procedure Laterality Date    COLONOSCOPY N/A 6/1/2018    COLONOSCOPY, POLYPECTOMY performed by Jesenia Bravo MD at [facility] ENDOSCOPY    HX CATARACT REMOVAL        No family history on file. Social History     Tobacco Use    Smoking status: Former Smoker    Smokeless tobacco: Never Used   Substance Use Topics    Alcohol use:  Yes     Alcohol/week: 1.0 standard drinks     Types: 1 Cans of beer per week     Current Facility-Administered Medications   Medication Dose Route Frequency Provider Last Rate Last Admin    sodium chloride (NS) flush 5-40 mL  5-40 mL IntraVENous Q8H Rahul Kidd MD   10 mL at 04/15/21 1144    sodium chloride (NS) flush 5-40 mL  5-40 mL IntraVENous PRN Rahul Kidd MD        polyethylene glycol (MIRALAX) packet 17 g  17 g Oral DAILY PRN Rahul Kidd MD        promethazine (PHENERGAN) tablet 12.5 mg  12.5 mg Oral Q6H PRN Tara Kidd MD        Or    ondansetron (ZOFRAN) injection 4 mg  4 mg IntraVENous Q6H PRN Rahul Kidd MD        famotidine (PEPCID) tablet 20 mg  20 mg Oral DAILY Tara Kidd MD   20 mg at 04/15/21 1144    insulin lispro (HUMALOG) injection   SubCUTAneous AC&HS Rahul Kidd MD   6 Units at 04/15/21 1145    glucose chewable tablet 16 g  4 Tab Oral PRN Rahul Kidd MD        glucagon (GLUCAGEN) injection 1 mg  1 mg IntraMUSCular PRN Rahul Kidd MD        dextrose (D50W) injection syrg 12.5-25 g  25-50 mL IntraVENous PRN Rahul Kidd MD        Vancomycin random level due today 4/15/21 @ 1600  1 Each Other ONCE Lucy Hodges DO        VANCOMCYIN-PHARMACY TO DOSE  1 Each Other Rx Dosing/Monitoring Rahul Kidd MD        azithromycin (ZITHROMAX) 500 mg in 0.9% sodium chloride 250 mL (VIAL-MATE)  500 mg IntraVENous Q24H Tara Kidd MD   500 mg at 04/14/21 1852    piperacillin-tazobactam (ZOSYN) 2.25 g in 0.9% sodium chloride (MBP/ADV) 50 mL MBP  2.25 g IntraVENous Q6H Tara Kidd  mL/hr at 04/15/21 0936 2.25 g at 04/15/21 0936    enoxaparin (LOVENOX) injection 30 mg  30 mg SubCUTAneous Q12H Tara Kidd MD   30 mg at 04/15/21 0934    acetaminophen (TYLENOL) tablet 650 mg  650 mg Oral Q6H PRN Rahul Kidd MD        Or    acetaminophen (TYLENOL) suppository 650 mg  650 mg Rectal Q6H PRN Rahul Kidd MD        guaiFENesin-dextromethorphan (ROBITUSSIN DM) 100-10 mg/5 mL syrup 5 mL  5 mL Oral Q4H PRN Tamy Kidd MD        cholecalciferol (VITAMIN D3) tablet 6,000 Units  6,000 Units Oral DAILY Tamy Kidd MD   6,000 Units at 04/15/21 0935    Followed by   Zoey Locus ON 2021] cholecalciferol (VITAMIN D3) (1000 Units /25 mcg) tablet 2,000 Units  2,000 Units Oral DAILY Tamy Kidd MD        ascorbic acid (vitamin C) (VITAMIN C) tablet 500 mg  500 mg Oral BID Tara Kidd MD   500 mg at 04/15/21 0935    zinc sulfate (ZINCATE) 50 mg zinc (220 mg) capsule 1 Cap  1 Cap Oral DAILY Tamy Kidd MD   1 Cap at 04/15/21 0935    melatonin (rapid dissolve) tablet 10 mg  10 mg Oral QHS Tara Kidd MD   10 mg at 21    atorvastatin (LIPITOR) tablet 40 mg  40 mg Oral QHS Tara Kidd MD   40 mg at 21    furosemide (LASIX) tablet 40 mg  40 mg Oral DAILY Tara Kidd MD   40 mg at 04/15/21 0935    insulin glargine (LANTUS) injection 15 Units  15 Units SubCUTAneous DAILY Tara Kidd MD        losartan (COZAAR) tablet 25 mg  25 mg Oral DAILY Tamy Kidd MD   25 mg at 04/15/21 0935    metoprolol succinate (TOPROL-XL) XL tablet 50 mg  50 mg Oral DAILY Tamy Kidd MD   50 mg at 04/15/21 09      No Known Allergies     Review of Systems:  A comprehensive review of systems was negative except for that written in the History of Present Illness. Objective:     Patient Vitals for the past 8 hrs:   BP Temp Pulse Resp SpO2   04/15/21 0953 (!) 154/72 97.4 °F (36.3 °C) 87 18 98 %     Temp (24hrs), Av.4 °F (36.9 °C), Min:97.4 °F (36.3 °C), Max:99 °F (37.2 °C)      Physical Exam:      Vascular:  Dorsalis pedis pulses are 0/4 bilateral.  Posterior tibial pulses are 0/4 bilateral.  Capillary fill time is greater than 3 seconds, bilateral.  Edema is observed bilateral lower extremities. Varicosities are observed bilateral lower extremities. Derm:  Skin temperature of lower extremities warm to cool proximal to distal bilateral.  Inspection of skin shows open necrotic ulcer plantar left heel, deep to bone with excessive black gangrenous tissue inside the wound. Ulcer measures 3.1 x 1.6 x 1.5 cm with purulent drainage and surrounding erythema. Ortho:  Muscle strength 5/5 for all groups tested. Muscle tone normal. Inspection/palpation of bones - joints- muscle shows - within normal limits bilateral lower extremities. Neuro:  Light touch, sharp/dull, vibratory, and proprioception sensations all decreased bilateral lower extremities. Deep tendon reflexes decreased bilaterally. Lab Review:   Recent Results (from the past 24 hour(s))   CULTURE, BLOOD    Collection Time: 04/14/21  2:45 PM    Specimen: Blood   Result Value Ref Range    Special Requests: NO SPECIAL REQUESTS      Culture result: NO GROWTH AFTER 16 HOURS     METABOLIC PANEL, COMPREHENSIVE    Collection Time: 04/14/21  2:45 PM   Result Value Ref Range    Sodium 135 (L) 136 - 145 mmol/L    Potassium 4.4 3.5 - 5.5 mmol/L    Chloride 99 (L) 100 - 111 mmol/L    CO2 26 21 - 32 mmol/L    Anion gap 10 3.0 - 18 mmol/L    Glucose 39 (LL) 74 - 99 mg/dL    BUN 34 (H) 7.0 - 18 MG/DL    Creatinine 2.51 (H) 0.6 - 1.3 MG/DL    BUN/Creatinine ratio 14 12 - 20      GFR est AA 31 (L) >60 ml/min/1.73m2    GFR est non-AA 25 (L) >60 ml/min/1.73m2    Calcium 8.2 (L) 8.5 - 10.1 MG/DL    Bilirubin, total 0.7 0.2 - 1.0 MG/DL    ALT (SGPT) 30 16 - 61 U/L    AST (SGOT) 28 10 - 38 U/L    Alk.  phosphatase 136 (H) 45 - 117 U/L    Protein, total 7.1 6.4 - 8.2 g/dL    Albumin 2.2 (L) 3.4 - 5.0 g/dL    Globulin 4.9 (H) 2.0 - 4.0 g/dL    A-G Ratio 0.4 (L) 0.8 - 1.7     CBC WITH AUTOMATED DIFF    Collection Time: 04/14/21  2:45 PM   Result Value Ref Range    WBC 8.6 4.6 - 13.2 K/uL    RBC 4.31 (L) 4.35 - 5.65 M/uL    HGB 11.5 (L) 13.0 - 16.0 g/dL    HCT 35.9 (L) 36.0 - 48.0 %    MCV 83.3 74.0 - 97.0 FL    MCH 26.7 24.0 - 34.0 PG    MCHC 32.0 31.0 - 37.0 g/dL    RDW 18.8 (H) 11.6 - 14.5 %    PLATELET 597 (H) 481 - 420 K/uL    MPV 10.0 9.2 - 11.8 FL    NEUTROPHILS 69 40 - 73 %    LYMPHOCYTES 15 (L) 21 - 52 %    MONOCYTES 16 (H) 3 - 10 %    EOSINOPHILS 0 0 - 5 %    BASOPHILS 0 0 - 2 %    ABS. NEUTROPHILS 5.9 1.8 - 8.0 K/UL    ABS. LYMPHOCYTES 1.3 0.9 - 3.6 K/UL    ABS. MONOCYTES 1.4 (H) 0.05 - 1.2 K/UL    ABS. EOSINOPHILS 0.0 0.0 - 0.4 K/UL    ABS.  BASOPHILS 0.0 0.0 - 0.1 K/UL    DF MANUAL      PLATELET COMMENTS Increased Platelets      RBC COMMENTS ANISOCYTOSIS  1+        RBC COMMENTS MICROCYTOSIS  1+       POC LACTIC ACID    Collection Time: 04/14/21  2:53 PM   Result Value Ref Range    Lactic Acid (POC) 2.59 (HH) 0.40 - 2.00 mmol/L   CULTURE, BLOOD    Collection Time: 04/14/21  3:00 PM    Specimen: Blood   Result Value Ref Range    Special Requests: NO SPECIAL REQUESTS      Culture result: NO GROWTH AFTER 15 HOURS     EKG, 12 LEAD, INITIAL    Collection Time: 04/14/21  3:00 PM   Result Value Ref Range    Ventricular Rate 97 BPM    Atrial Rate 97 BPM    P-R Interval 126 ms    QRS Duration 80 ms    Q-T Interval 330 ms    QTC Calculation (Bezet) 419 ms    Calculated P Axis -2 degrees    Calculated R Axis 41 degrees    Calculated T Axis 89 degrees    Diagnosis       Normal sinus rhythm  Septal infarct , age undetermined  Abnormal ECG  When compared with ECG of 29-MAY-2018 17:33,  Nonspecific T wave abnormality no longer evident in Inferior leads  T wave inversion no longer evident in Lateral leads  QT has shortened  Confirmed by Xuan Jon MD. ([id number]) on 4/14/2021 11:45:09 PM     GLUCOSE, POC    Collection Time: 04/14/21  3:30 PM   Result Value Ref Range    Glucose (POC) 48 (LL) 70 - 110 mg/dL   GLUCOSE, POC    Collection Time: 04/14/21  4:18 PM   Result Value Ref Range    Glucose (POC) 84 70 - 110 mg/dL   POC LACTIC ACID    Collection Time: 04/14/21  4:31 PM   Result Value Ref Range    Lactic Acid (POC) 1.12 0.40 - 2.00 mmol/L   COVID-19 RAPID TEST    Collection Time: 04/14/21  4:41 PM   Result Value Ref Range    Specimen source Nasopharyngeal      COVID-19 rapid test Detected (AA) NOTD     CULTURE, WOUND W GRAM STAIN    Collection Time: 04/14/21  6:29 PM    Specimen: Heel   Result Value Ref Range    Special Requests: HEEL   RIGHT HEEL ULCER        GRAM STAIN RARE WBCS SEEN      GRAM STAIN 2+ GRAM NEGATIVE RODS      GRAM STAIN RARE GRAM POSITIVE RODS      Culture result: PENDING    GLUCOSE, POC    Collection Time: 04/14/21  7:25 PM   Result Value Ref Range    Glucose (POC) 76 70 - 110 mg/dL   GLUCOSE, POC    Collection Time: 04/14/21  9:02 PM   Result Value Ref Range    Glucose (POC) 168 (H) 70 - 110 mg/dL   C REACTIVE PROTEIN, QT    Collection Time: 04/14/21  9:40 PM   Result Value Ref Range    C-Reactive protein 9.1 (H) 0 - 0.3 mg/dL   D DIMER    Collection Time: 04/14/21  9:40 PM   Result Value Ref Range    D DIMER 2.31 (H) <0.46 ug/ml(FEU)   TYPE & SCREEN    Collection Time: 04/14/21  9:40 PM   Result Value Ref Range    Crossmatch Expiration 04/17/2021,2359     ABO/Rh(D) O POSITIVE     Antibody screen NEG    METABOLIC PANEL, BASIC    Collection Time: 04/15/21  5:49 AM   Result Value Ref Range    Sodium 132 (L) 136 - 145 mmol/L    Potassium 5.5 3.5 - 5.5 mmol/L    Chloride 99 (L) 100 - 111 mmol/L    CO2 28 21 - 32 mmol/L    Anion gap 5 3.0 - 18 mmol/L    Glucose 354 (H) 74 - 99 mg/dL    BUN 41 (H) 7.0 - 18 MG/DL    Creatinine 2.43 (H) 0.6 - 1.3 MG/DL    BUN/Creatinine ratio 17 12 - 20      GFR est AA 32 (L) >60 ml/min/1.73m2    GFR est non-AA 26 (L) >60 ml/min/1.73m2    Calcium 7.5 (L) 8.5 - 10.1 MG/DL   PROTHROMBIN TIME + INR    Collection Time: 04/15/21  5:49 AM   Result Value Ref Range    Prothrombin time 15.1 11.5 - 15.2 sec    INR 1.2 0.8 - 1.2     PTT    Collection Time: 04/15/21  5:49 AM   Result Value Ref Range    aPTT 41.6 (H) 23.0 - 36.4 SEC   FIBRINOGEN    Collection Time: 04/15/21  5:49 AM   Result Value Ref Range    Fibrinogen 796 (H) 210 - 451 mg/dL   C REACTIVE PROTEIN, QT    Collection Time: 04/15/21  5:49 AM   Result Value Ref Range    C-Reactive protein 8.9 (H) 0 - 0.3 mg/dL   LD    Collection Time: 04/15/21  5:49 AM   Result Value Ref Range     81 - 234 U/L   D DIMER    Collection Time: 04/15/21  5:49 AM   Result Value Ref Range    D DIMER 1.97 (H) <0.46 ug/ml(FEU)   TROPONIN I    Collection Time: 04/15/21  5:49 AM   Result Value Ref Range    Troponin-I, QT <0.02 0.0 - 0.045 NG/ML   FERRITIN    Collection Time: 04/15/21  5:50 AM   Result Value Ref Range    Ferritin 136 8 - 388 NG/ML   VITAMIN D, 25 HYDROXY    Collection Time: 04/15/21  5:50 AM   Result Value Ref Range    Vitamin D 25-Hydroxy 17.6 (L) 30 - 100 ng/mL   GLUCOSE, POC    Collection Time: 04/15/21  6:18 AM   Result Value Ref Range    Glucose (POC) 351 (H) 70 - 110 mg/dL   GLUCOSE, POC    Collection Time: 04/15/21  6:42 AM   Result Value Ref Range    Glucose (POC) 364 (H) 70 - 110 mg/dL   GLUCOSE, POC    Collection Time: 04/15/21 11:42 AM   Result Value Ref Range    Glucose (POC) 288 (H) 70 - 110 mg/dL       Impression:     Grade 4 Kemp decubitus ulcer plantar left heel with deep necrotic tissue and gas extending approx 9 cm along the plantar fascia  Acute osteomyelitis left plantar calcaneous as shown by MRI  Cellulitis left heel-wound cultures pending  Diabetic with ASO/PVD/PN bilateral lower extremities  Covid-19 positive    Recommendation:     Continue IV antibiotics as per ID  Rec vascular arterial doppler DENNIS's bilateral lower extremities  Patient requires surgical I&D of left heel wound when possible, (due to Covid-19 positive)  Consult Wound Care for left heel treatment  Patient non-weight bearing left foot

## 2021-04-15 NOTE — DIABETES MGMT
GLYCEMIC CONTROL PLAN OF CARE        Diabetes Management:      Assessment: known h/o T2DM, HbA1C pending, basal/bolus home regimen  - admitted for infected toe  - Covid positive  - pt states hypoglycemia yesterday r/t lack of PO intake  - per EMR pt is on the following home regimen    *Lantus 40 units @ hs   *Humalog 10 units @ breakfast, 15  Units @ lunch & dinner   *januvia 50 mg daily    BG in target range (non-ICU\" < 180 ; -180):  [] Yes  [x] No      Steroids: Decadron 10 mg x1, steroid associated hyperglycemia    TDD previous day = 2 units - Humalog Normal Insulin Sensitivity Corrective Coverage    Recommend: give Lantus now, monitor BG trends, pt may benefit from mealtime insulin if steroids to continue   *Lantus 20 units     Most recent blood glucose results:   Lab Results   Component Value Date/Time     (H) 04/15/2021 05:49 AM    GLU 39 (LL) 04/14/2021 02:45 PM    GLUCPOC 288 (H) 04/15/2021 11:42 AM    GLUCPOC 364 (H) 04/15/2021 06:42 AM    GLUCPOC 351 (H) 04/15/2021 06:18 AM         Hypo: 39 mg/dL    HbA1C: equivalent  to ave BGlucose of  mg/dl for 2-3 months prior to admission (pending)  Lab Results   Component Value Date/Time    Hemoglobin A1c 11.7 (H) 02/10/2018 12:38 PM       Adequate glycemic control PTA:  [] Yes  [] No      Home diabetes medications:  Key Antihyperglycemic Medications             insulin glargine (LANTUS) 100 unit/mL injection 15 Units by SubCUTAneous route daily. SITagliptin (JANUVIA) 50 mg tablet Take 1 Tab by mouth daily.           Goals:  Blood glucose will be within target range of 70 - 180 mg/dL by: 5/15    Diet:   Active Orders   Diet    DIET DIABETIC CONSISTENT CARB Regular         Education:  __X___ Refer to Diabetes Education Record                       _____ Education not indicated at this time        Rajwinder Bradshaw 89, RN, CDE  Glycemic Control Team  [phone]  Pager [phone] (M-TH 8:00-4:30P)  *After Hours pager [phone]

## 2021-04-16 ENCOUNTER — APPOINTMENT (OUTPATIENT)
Dept: VASCULAR SURGERY | Age: 76
DRG: 628 | End: 2021-04-16
Attending: FAMILY MEDICINE
Payer: MEDICARE

## 2021-04-16 ENCOUNTER — APPOINTMENT (OUTPATIENT)
Dept: WOUND CARE | Age: 76
End: 2021-04-16
Attending: PODIATRIST

## 2021-04-16 LAB
ANION GAP SERPL CALC-SCNC: 4 MMOL/L (ref 3–18)
BUN SERPL-MCNC: 44 MG/DL (ref 7–18)
BUN/CREAT SERPL: 21 (ref 12–20)
CALCIUM SERPL-MCNC: 7.6 MG/DL (ref 8.5–10.1)
CHLORIDE SERPL-SCNC: 104 MMOL/L (ref 100–111)
CO2 SERPL-SCNC: 30 MMOL/L (ref 21–32)
CREAT SERPL-MCNC: 2.12 MG/DL (ref 0.6–1.3)
CRP SERPL-MCNC: 5.8 MG/DL (ref 0–0.3)
D DIMER PPP FEU-MCNC: 1.6 UG/ML(FEU)
GLUCOSE BLD STRIP.AUTO-MCNC: 129 MG/DL (ref 70–110)
GLUCOSE BLD STRIP.AUTO-MCNC: 154 MG/DL (ref 70–110)
GLUCOSE BLD STRIP.AUTO-MCNC: 218 MG/DL (ref 70–110)
GLUCOSE BLD STRIP.AUTO-MCNC: 310 MG/DL (ref 70–110)
GLUCOSE SERPL-MCNC: 212 MG/DL (ref 74–99)
POTASSIUM SERPL-SCNC: 4.9 MMOL/L (ref 3.5–5.5)
SODIUM SERPL-SCNC: 138 MMOL/L (ref 136–145)

## 2021-04-16 PROCEDURE — 74011000250 HC RX REV CODE- 250: Performed by: INTERNAL MEDICINE

## 2021-04-16 PROCEDURE — 36415 COLL VENOUS BLD VENIPUNCTURE: CPT

## 2021-04-16 PROCEDURE — 85379 FIBRIN DEGRADATION QUANT: CPT

## 2021-04-16 PROCEDURE — 65660000000 HC RM CCU STEPDOWN

## 2021-04-16 PROCEDURE — 82962 GLUCOSE BLOOD TEST: CPT

## 2021-04-16 PROCEDURE — 74011250636 HC RX REV CODE- 250/636: Performed by: FAMILY MEDICINE

## 2021-04-16 PROCEDURE — 74011250637 HC RX REV CODE- 250/637: Performed by: FAMILY MEDICINE

## 2021-04-16 PROCEDURE — 93925 LOWER EXTREMITY STUDY: CPT

## 2021-04-16 PROCEDURE — 80048 BASIC METABOLIC PNL TOTAL CA: CPT

## 2021-04-16 PROCEDURE — 93970 EXTREMITY STUDY: CPT

## 2021-04-16 PROCEDURE — 74011636637 HC RX REV CODE- 636/637: Performed by: FAMILY MEDICINE

## 2021-04-16 PROCEDURE — 74011000250 HC RX REV CODE- 250: Performed by: PODIATRIST

## 2021-04-16 PROCEDURE — 74011250636 HC RX REV CODE- 250/636: Performed by: INTERNAL MEDICINE

## 2021-04-16 PROCEDURE — 2709999900 HC NON-CHARGEABLE SUPPLY

## 2021-04-16 PROCEDURE — 86140 C-REACTIVE PROTEIN: CPT

## 2021-04-16 RX ORDER — VANCOMYCIN HYDROCHLORIDE
1250 EVERY 24 HOURS
Status: DISCONTINUED | OUTPATIENT
Start: 2021-04-16 | End: 2021-04-19

## 2021-04-16 RX ORDER — INSULIN LISPRO 100 [IU]/ML
4 INJECTION, SOLUTION INTRAVENOUS; SUBCUTANEOUS
Status: DISCONTINUED | OUTPATIENT
Start: 2021-04-16 | End: 2021-04-22 | Stop reason: HOSPADM

## 2021-04-16 RX ADMIN — Medication 500 MG: at 09:44

## 2021-04-16 RX ADMIN — Medication 500 MG: at 21:18

## 2021-04-16 RX ADMIN — METOPROLOL SUCCINATE 50 MG: 50 TABLET, EXTENDED RELEASE ORAL at 09:44

## 2021-04-16 RX ADMIN — INSULIN LISPRO 3 UNITS: 100 INJECTION, SOLUTION INTRAVENOUS; SUBCUTANEOUS at 06:44

## 2021-04-16 RX ADMIN — AZITHROMYCIN MONOHYDRATE 500 MG: 500 INJECTION, POWDER, LYOPHILIZED, FOR SOLUTION INTRAVENOUS at 17:24

## 2021-04-16 RX ADMIN — INSULIN LISPRO 6 UNITS: 100 INJECTION, SOLUTION INTRAVENOUS; SUBCUTANEOUS at 10:53

## 2021-04-16 RX ADMIN — INSULIN LISPRO 12 UNITS: 100 INJECTION, SOLUTION INTRAVENOUS; SUBCUTANEOUS at 22:15

## 2021-04-16 RX ADMIN — INSULIN GLARGINE 15 UNITS: 100 INJECTION, SOLUTION SUBCUTANEOUS at 09:43

## 2021-04-16 RX ADMIN — MEROPENEM 1 G: 1 INJECTION, POWDER, FOR SOLUTION INTRAVENOUS at 09:40

## 2021-04-16 RX ADMIN — LOSARTAN POTASSIUM 25 MG: 25 TABLET, FILM COATED ORAL at 09:43

## 2021-04-16 RX ADMIN — Medication 10 MG: at 22:16

## 2021-04-16 RX ADMIN — VANCOMYCIN HYDROCHLORIDE 1250 MG: 10 INJECTION, POWDER, LYOPHILIZED, FOR SOLUTION INTRAVENOUS at 17:31

## 2021-04-16 RX ADMIN — ZINC SULFATE 220 MG (50 MG) CAPSULE 1 CAPSULE: CAPSULE at 09:44

## 2021-04-16 RX ADMIN — Medication 10 ML: at 13:18

## 2021-04-16 RX ADMIN — FAMOTIDINE 20 MG: 20 TABLET, FILM COATED ORAL at 09:44

## 2021-04-16 RX ADMIN — MEROPENEM 1 G: 1 INJECTION, POWDER, FOR SOLUTION INTRAVENOUS at 00:57

## 2021-04-16 RX ADMIN — MEROPENEM 1 G: 1 INJECTION, POWDER, FOR SOLUTION INTRAVENOUS at 16:13

## 2021-04-16 RX ADMIN — ATORVASTATIN CALCIUM 40 MG: 20 TABLET, FILM COATED ORAL at 22:16

## 2021-04-16 RX ADMIN — ENOXAPARIN SODIUM 30 MG: 30 INJECTION SUBCUTANEOUS at 09:41

## 2021-04-16 RX ADMIN — Medication 6000 UNITS: at 09:43

## 2021-04-16 RX ADMIN — FUROSEMIDE 40 MG: 40 TABLET ORAL at 09:44

## 2021-04-16 RX ADMIN — COLLAGENASE SANTYL: 250 OINTMENT TOPICAL at 10:45

## 2021-04-16 RX ADMIN — INSULIN LISPRO 4 UNITS: 100 INJECTION, SOLUTION INTRAVENOUS; SUBCUTANEOUS at 16:13

## 2021-04-16 RX ADMIN — Medication 10 ML: at 22:16

## 2021-04-16 RX ADMIN — Medication 10 ML: at 06:45

## 2021-04-16 RX ADMIN — ENOXAPARIN SODIUM 30 MG: 30 INJECTION SUBCUTANEOUS at 21:17

## 2021-04-16 NOTE — PROGRESS NOTES
[facility]                         (A Division of [address] [place] [address])                                                                                                                       Evelyne Domínguez MD  Work Cell #: [phone]. If no call or text back within 30 minutes, please call office number. (Prefer text when possible)  Office #: [phone]  [id number] [phone]   Office Fax: [phone]    Date of Admission: 4/14/2021       Date of Note:  4/16/2021  Consult FU for : Left diabetic foot infection    Summary:    70 y.o. WHITE male with PMH of DM, CAD with CHF and CKD. He has a blister and redness on lower LE about two weeks ago, which eschared and removed the scab. Around that time, he also had flu like symptom. After that, he had pain and swelling which opened up on his heel. Per ED reports, had attempt to debridement by Podiatry on his heel, prior to arrival to ED.     Denies cough, sob. Unaware of COVID 19 contact but his son has tested negative since his diagnosis. Unaware of his wife status.     Retired from [employer] in early [employer], and he relocated from [place] to here to be close to his children. COVID 19 + 4/14       Subjective: \"when is the surgery? \"  No high fevers or chills. No sob, cough. Appetite ok. Anxious about plans    Condition: acutely sick. Hemodynamics/resp status stable    Interval History:  Vascular duplex studies done- result pending  CRP declining           Current Antimicrobials:    Prior Antimicrobials:  · Zithromax 500mg IV- 4/14 TD  · Vancomycin IV  · Meropenem IV 4/15 · Zosyn 2.25gm IV Q6 4/14 to 4/15       Assessment: Rec / Plan:   · Left Infected DM Ulcer and acute OM and necrotic tissue, punched out heel ulcer. Likely mixed infection- GPR/GNR on gram stain  · COVID 19 Infection/ PNA- Right UL infiltrate, without hypoxia.   -procal 0.18, D-dimer 1.97, ferretin 136, , fibrinogen 796  Difficult to tell if Flu like symptom due to cellulitis/ soft tissue infection or onset of COVID 2 weeks ago.      Other Medical Issues followed and managed by primary and other services  · DM- Poorly controlled  · Low Vitamin D  · CAD with CHF/ NSTMI. Atrial flutter  · CKD  · Hyperlipidemia  · Past ID issues: N/A · FU SS on S.aurues, WCX from 4/14/20  · If MSSA, can DC vancomycin  · Cont meropenem/zithromax  · OR debridement as soon as feasible. Unsure of COVID acquisition date, surgery under COVID + protocol  · panculture and biopsy from OR  · FU urine ag- leg/strep    Follow serologies to monitor for COVID 19    I will be back to round on Monday, April 19,2021. For new consults, urgent questions or concerns, please call on call MD via . Thank you.            Microbiology:  Blood culture: 4/14- 2 sets: NGSF     Urine culture:N/A     Body Fluid/ CSF/drainage culture:  4/14- gram stain: GNR and GPR        --wound culture+ s.aurues     Cath tip/ PICC culture: Left PIV         Patient Active Problem List   Diagnosis Code    Atrial flutter (HCC) I48.92    DM2 (diabetes mellitus, type 2) ([facility] E11.9    Acute renal insufficiency N28.9    NSTEMI (non-ST elevated myocardial infarction) ([facility] G59.0    Systolic CHF, acute ([facility]) I50.21    Symptomatic anemia D64.9    CAD (coronary artery disease) I25.10    GI bleed K92.2    ELINOR (acute kidney injury) ([facility] N17.9    CKD (chronic kidney disease) stage 3, GFR 30-59 ml/min ([facility]) N18.30    Elevated brain natriuretic peptide (BNP) level R79.89    CAP (community acquired pneumonia) J18.9    Diabetic ulcer of left foot ([facility] E11.621, L97.529    COVID-19 virus infection U07.1    Acute osteomyelitis ([facility]) M86.10       Current Facility-Administered Medications   Medication Dose Route Frequency    collagenase (SANTYL) 250 unit/gram ointment   Topical DAILY    vancomycin (VANCOCIN) 1250 mg in  ml infusion  1,250 mg IntraVENous Q24H    sodium chloride (NS) flush 5-40 mL  5-40 mL IntraVENous Q8H    sodium chloride (NS) flush 5-40 mL  5-40 mL IntraVENous PRN    polyethylene glycol (MIRALAX) packet 17 g  17 g Oral DAILY PRN    promethazine (PHENERGAN) tablet 12.5 mg  12.5 mg Oral Q6H PRN    Or    ondansetron (ZOFRAN) injection 4 mg  4 mg IntraVENous Q6H PRN    famotidine (PEPCID) tablet 20 mg  20 mg Oral DAILY    insulin lispro (HUMALOG) injection   SubCUTAneous AC&HS    glucose chewable tablet 16 g  4 Tab Oral PRN    glucagon (GLUCAGEN) injection 1 mg  1 mg IntraMUSCular PRN    dextrose (D50W) injection syrg 12.5-25 g  25-50 mL IntraVENous PRN    meropenem (MERREM) 1 g in sterile water (preservative free) 20 mL IV syringe  1 g IntraVENous Q8H    VANCOMCYIN-PHARMACY TO DOSE  1 Each Other Rx Dosing/Monitoring    azithromycin (ZITHROMAX) 500 mg in 0.9% sodium chloride 250 mL (VIAL-MATE)  500 mg IntraVENous Q24H    enoxaparin (LOVENOX) injection 30 mg  30 mg SubCUTAneous Q12H    acetaminophen (TYLENOL) tablet 650 mg  650 mg Oral Q6H PRN    Or    acetaminophen (TYLENOL) suppository 650 mg  650 mg Rectal Q6H PRN    guaiFENesin-dextromethorphan (ROBITUSSIN DM) 100-10 mg/5 mL syrup 5 mL  5 mL Oral Q4H PRN    cholecalciferol (VITAMIN D3) tablet 6,000 Units  6,000 Units Oral DAILY    Followed by   Anneliese Styles ON 4/21/2021] cholecalciferol (VITAMIN D3) (1000 Units /25 mcg) tablet 2,000 Units  2,000 Units Oral DAILY    ascorbic acid (vitamin C) (VITAMIN C) tablet 500 mg  500 mg Oral BID    zinc sulfate (ZINCATE) 50 mg zinc (220 mg) capsule 1 Cap  1 Cap Oral DAILY    melatonin (rapid dissolve) tablet 10 mg  10 mg Oral QHS    atorvastatin (LIPITOR) tablet 40 mg  40 mg Oral QHS    furosemide (LASIX) tablet 40 mg  40 mg Oral DAILY    insulin glargine (LANTUS) injection 15 Units  15 Units SubCUTAneous DAILY    losartan (COZAAR) tablet 25 mg  25 mg Oral DAILY    metoprolol succinate (TOPROL-XL) XL tablet 50 mg  50 mg Oral DAILY         Review of Systems - Negative except left heel pain. About the same       Objective:    Visit Vitals  BP (!) 126/54   Pulse 81   Temp 97.2 °F (36.2 °C)   Resp 18   Ht 5' 7\" (1.702 m)   Wt 88.9 kg (196 lb)   SpO2 97%   BMI 30.70 kg/m²       Temp (24hrs), Av.6 °F (36.4 °C), Min:97.2 °F (36.2 °C), Max:98.4 °F (36.9 °C)      GEN: WDWN, not in resp distress. HEENT: Unicteric. Wearing mask  CHEST: Non laboured breathing  CVS:RRR, no mur/gallop  ABD: Obese/soft. Non tender  EXT: left heel dressed. No cellulitic change. On exposed skin. No creps  CNS: A, OX3. Moves all extremity. CN grossly ok.          Lab results:    Chemistry  Recent Labs     21  0338 04/15/21  0549 21  1445   * 354* 39*    132* 135*   K 4.9 5.5 4.4    99* 99*   CO2 30 28 26   BUN 44* 41* 34*   CREA 2.12* 2.43* 2.51*   CA 7.6* 7.5* 8.2*   AGAP 4 5 10   BUCR 21* 17 14   AP  --   --  136*   TP  --   --  7.1   ALB  --   --  2.2*   GLOB  --   --  4.9*   AGRAT  --   --  0.4*       CBC w/ Diff  Recent Labs     21  1445   WBC 8.6   RBC 4.31*   HGB 11.5*   HCT 35.9*   *   GRANS 69   LYMPH 15*   EOS 0       Microbiology  All Micro Results     Procedure Component Value Units Date/Time    CULTURE, BLOOD [[id number]] Collected: 21 1500    Order Status: Completed Specimen: Blood Updated: 21     Special Requests: NO SPECIAL REQUESTS        Culture result: NO GROWTH 2 DAYS       CULTURE, BLOOD [[id number]] Collected: 21 1445    Order Status: Completed Specimen: Blood Updated: 21     Special Requests: NO SPECIAL REQUESTS        Culture result: NO GROWTH 2 DAYS       LEGIONELLA PNEUMOPHILA AG, URINE [[id number]]     Order Status: Sent Specimen: Urine     STREP Jose A Norton, URINE [[id number]]     Order Status: Sent Specimen: Urine     CULTURE, RESPIRATORY/SPUTUM/BRONCH Deloris Quant STAIN [[id number]]     Order Status: Sent Specimen: Sputum     COVID-19 RAPID TEST [[id number]]  (Abnormal) Collected: 21 1641    Order Status: Completed Specimen: Nasopharyngeal Updated: 04/14/21 1715     Specimen source Nasopharyngeal        COVID-19 rapid test Detected        Comment:      The specimen is POSITIVE for SARS-CoV-2, the novel coronavirus associated with COVID-19. This test has been authorized by the FDA under an Emergency Use Authorization (EUA) for use by authorized laboratories.         Fact sheet for Healthcare Providers: ConventionUpdate.co.nz  Fact sheet for Patients: ConventionUpdate.co.nz       Methodology: Isothermal Nucleic Acid Amplification  CALLED TO AND CORRECTLY REPEATED BY:  Bethel Abebe RN ER TO [facility] AT 9522 ON [id number]                Total duration: 25 minutes

## 2021-04-16 NOTE — DIABETES MGMT
GLYCEMIC CONTROL PLAN OF CARE        Diabetes Management:      Assessment: known h/o T2DM, HbA1C of 6.3%, basal/bolus home regimen  - admitted for infected toe  - Covid positive  - pt states hypoglycemia 4/14/21 r/t lack of PO intake  - per EMR pt is on the following home regimen    *Lantus 40 units @ hs   *Humalog 10 units @ breakfast, 15  Units @ lunch & dinner   *januvia 50 mg daily  Fasting blood glucose this morning in target range however pre-lunch, pre-dinner and HS blood glucose readings have been elevated (195 to 362 mg/dL). Recommend: adding 4 units scheduled meal time lispro TID    Steroids:  No (received single dose Decadron 10 mg on 4/14/21). TDD previous day = 38 units  (15 units Lantus, 23 units corrective lispro)    Most recent blood glucose results:   Lab Results   Component Value Date/Time     (H) 04/16/2021 03:38 AM    GLUCPOC 218 (H) 04/16/2021 10:51 AM    GLUCPOC 154 (H) 04/16/2021 06:32 AM    GLUCPOC 196 (H) 04/15/2021 09:30 PM         Hypo: 39 mg/dL on 4/14/21 (in ED)    BG in target range (non-ICU\" < 180 ; -180):  [] Yes  [x] No      HbA1C: equivalent  to ave BGlucose of  mg/dl for 2-3 months prior to admission (pending)  Lab Results   Component Value Date/Time    Hemoglobin A1c 6.3 (H) 04/15/2021 05:49 AM       Adequate glycemic control PTA:  [x] Yes  [] No      Home diabetes medications:  Key Antihyperglycemic Medications             insulin lispro (HUMALOG KWIKPEN INSULIN SC) (Taking) 10-15 Units by SubCUTAneous route three (3) times daily (with meals). insulin glargine (LANTUS) 100 unit/mL injection 15 Units by SubCUTAneous route daily. SITagliptin (JANUVIA) 50 mg tablet Take 1 Tab by mouth daily.           Goals:  Blood glucose will be within target range of 70 - 180 mg/dL by: 5/15    Diet:   Active Orders   Diet    DIET DIABETIC CONSISTENT CARB Regular     Meal Intake:   Patient Vitals for the past 168 hrs:   % Diet Eaten   04/15/21 1010 76 - 100%   04/14/21 2001 76 - 100%       Education:  __X___ Refer to Diabetes Education Record 4-15-21                       _____ Education not indicated at this time        Stephanie Mitchell, [address] [place] [address], [address] [place] [address]   Office:  [phone]  Pager:  [phone]

## 2021-04-16 NOTE — PROGRESS NOTES
Physician Progress Note      Magda Pena  CSN #:                  [id number]  :                       1945  ADMIT DATE:       2021 2:36 PM  [address] [place] DATE:  RESPONDING  PROVIDER #:        Yohan Gibbs MD          QUERY TEXT:    Patient admitted for infected diabetic foot ulcer. . Noted documentation of history of CHF, and of acute systolic CHF in the  H&P. In order to support the diagnosis of acute CHF, please include additional clinical indicators in your documentation. Or please document if the systolic CHF is chronic. The medical record reflects the following:  Risk Factors: 70 yr old with CHF  Clinical Indicators: - Per 21 H&P: A/P: history of CHF. ... Pt Active Problem List: Systolic CHF, acute  Treatment: Lasix 40 mg po daily; imaging    Thank you for your time,  Tiffanie Rachelle, [facility] Criteria for Acute CHF: Heart Failure exacerbation met with two major criteria or one major and two minor met ([email or web address])  Major: acute pulmonary edema on CXR, cardiomegaly, hepatojugular reflux, JVD, paroxysmal nocturnal dyspnea or orthopnea, rales or S3 gallop;  Minor: ankle edema, MYRICK, hepatomegaly, nocturnal cough, pleural effusion on CXR, tachycardia with HR > 120 bpm  Options provided:  -- Chronic systolic CHF with acute exacerbation as evidenced by, Please document evidence of acute. -- Chronic systolic CHF without acute exacerbation  -- Other - I will add my own diagnosis  -- Disagree - Not applicable / Not valid  -- Disagree - Clinically unable to determine / Unknown  -- Refer to Clinical Documentation Reviewer    PROVIDER RESPONSE TEXT:    Acute systolic CHF was not documented in the H&P as an active problem during the current admission. Acute systolic CHF was listed on the patient's 'non-hospital/problems not addressed during this admission' list.    Query created by:  Sofia Cervantes on 4/15/2021 4:07 PM      Electronically signed Agustin Quach MD 4/16/2021 8:36 AM

## 2021-04-16 NOTE — ROUTINE PROCESS
Assume care of patient from [facility]. Patient received in bed awake. Patient A&Ox4, denies pain and discomfort. No distress noted. Bed locked in low position. Call bell within reach and patient verbalized understanding of use for assistance and needs. 0715- Bedside and Verbal shift change report given to Shyann Silver RN (oncoming nurse) by Tahira Ling RN (offgoing nurse). Report included the following information SBAR, Kardex, Intake/Output, MAR and Recent Results. [facility] Night Shift Chart Audit    Chart Audit completed?  YES

## 2021-04-16 NOTE — PROGRESS NOTES
Problem: Pain  Goal: *Control of Pain  Outcome: Progressing Towards Goal  Goal: *PALLIATIVE CARE:  Alleviation of Pain  Outcome: Progressing Towards Goal     Problem: Patient Education: Go to Patient Education Activity  Goal: Patient/Family Education  Outcome: Progressing Towards Goal     Problem: Chronic Renal Failure  Goal: *Fluid and electrolytes stabilized  Outcome: Progressing Towards Goal     Problem: Patient Education: Go to Patient Education Activity  Goal: Patient/Family Education  Outcome: Progressing Towards Goal     Problem: Diabetes Maintenance:Admission  Goal: Activity/Safety  Outcome: Progressing Towards Goal  Goal: Diagnostic Tests/Procedures  Outcome: Progressing Towards Goal  Goal: Nutrition  Outcome: Progressing Towards Goal  Goal: Medications  Outcome: Progressing Towards Goal  Goal: Treatments/Interventions/Procedures  Outcome: Progressing Towards Goal     Problem: Diabetes Maintenance:Ongoing  Goal: Activity/Safety  Outcome: Progressing Towards Goal  Goal: Nutrition  Outcome: Progressing Towards Goal  Goal: Medications  Outcome: Progressing Towards Goal  Goal: Treatments/Interventsions/Procedures  Outcome: Progressing Towards Goal  Goal: *Blood Glucose 80 to 180 md/dl  Outcome: Progressing Towards Goal     Problem: Diabetes Maintenance:Discharge Outcomes  Goal: *Describes follow-up/return visits to physicians  Outcome: Progressing Towards Goal  Goal: *Blood glucose at patient's target range or approaching  Outcome: Progressing Towards Goal  Goal: *Aware of nutrition guidelines  Outcome: Progressing Towards Goal  Goal: *Verbalizes information about medication  Description: Verbalizes name, dosage, time, side effects, and number of days to  continue medications.   Outcome: Progressing Towards Goal  Goal: *Describes goals, rules, symptoms, and treatments  Description: Describes blood glucose goals, monitoring, sick day rules,  hypo/hyperglycemia prevention, symptoms, and treatment  Outcome: Progressing Towards Goal  Goal: *Describes available outpatient diabetes resources and support systems  Outcome: Progressing Towards Goal     Problem: Diabetic Ulcer-Treatment  Goal: *Improvement of existing diabetic ulcer  Outcome: Progressing Towards Goal     Problem: Patient Education: Go to Patient Education Activity  Goal: Patient/Family Education  Outcome: Progressing Towards Goal     Problem: Falls - Risk of  Goal: *Absence of Falls  Description: Document Joana Fall Risk and appropriate interventions in the flowsheet. Outcome: Progressing Towards Goal  Note: Fall Risk Interventions:  Mobility Interventions: Bed/chair exit alarm, Communicate number of staff needed for ambulation/transfer, Patient to call before getting OOB, Utilize walker, cane, or other assistive device         Medication Interventions: Bed/chair exit alarm, Patient to call before getting OOB, Teach patient to arise slowly                   Problem: Patient Education: Go to Patient Education Activity  Goal: Patient/Family Education  Outcome: Progressing Towards Goal     Problem: Pressure Injury - Risk of  Goal: *Prevention of pressure injury  Description: Document Anam Scale and appropriate interventions in the flowsheet. Outcome: Progressing Towards Goal     Problem: Patient Education: Go to Patient Education Activity  Goal: Patient/Family Education  Outcome: Progressing Towards Goal     Problem: Airway Clearance - Ineffective  Goal: Achieve or maintain patent airway  Outcome: Progressing Towards Goal     Problem: Gas Exchange - Impaired  Goal: Absence of hypoxia  Outcome: Progressing Towards Goal  Goal: Promote optimal lung function  Outcome: Progressing Towards Goal     Problem:  Body Temperature -  Risk of, Imbalanced  Goal: Ability to maintain a body temperature within defined limits  Outcome: Progressing Towards Goal  Goal: Will regain or maintain usual level of consciousness  Outcome: Progressing Towards Goal  Goal: Complications related to the disease process, condition or treatment will be avoided or minimized  Outcome: Progressing Towards Goal     Problem: Breathing Pattern - Ineffective  Goal: Ability to achieve and maintain a regular respiratory rate  Outcome: Progressing Towards Goal     Problem: Isolation Precautions - Risk of Spread of Infection  Goal: Prevent transmission of infectious organism to others  Outcome: Progressing Towards Goal     Problem: Nutrition Deficits  Goal: Optimize nutrtional status  Outcome: Progressing Towards Goal     Problem: Risk for Fluid Volume Deficit  Goal: Maintain normal heart rhythm  Outcome: Progressing Towards Goal  Goal: Maintain absence of muscle cramping  Outcome: Progressing Towards Goal  Goal: Maintain normal serum potassium, sodium, calcium, phosphorus, and pH  Outcome: Progressing Towards Goal     Problem: Loneliness or Risk for Loneliness  Goal: Demonstrate positive use of time alone when socialization is not possible  Outcome: Progressing Towards Goal     Problem: Fatigue  Goal: Verbalize increase energy and improved vitality  Outcome: Progressing Towards Goal     Problem: Patient Education: Go to Patient Education Activity  Goal: Patient/Family Education  Outcome: Progressing Towards Goal

## 2021-04-16 NOTE — PROGRESS NOTES
Discharge Planning: Home with home health vs home with family assist and MD follow-up when medically stable    CM notes that the patient is pending surgery and will likely remain hospitalized through the weekend. CM anticipates that the patient will discharge home with family assist and MD follow-up, possible home health depending on wound care needs after surgery. CM to follow the patient's progress and be available to assist with discharge planning as needed. CMS referral placed. [phone]: CM spoke with the patient an inquired as to whether the patient would be agreeable to home health upon discharge if it is indicated. The patient states that he is agreeable to home health upon discharge if necessary, [address] offered and the patient states that he does not have a preference regarding home health agency. CM to arrange home health on the patient's behalf if indicated upon discharge. Care Management Interventions  PCP Verified by CM:  Yes  Transition of Care Consult (CM Consult): Discharge Planning  Current Support Network: Lives with Spouse  Confirm Follow Up Transport: Family  The Plan for Transition of Care is Related to the Following Treatment Goals : Diabetic ulcer of left foot  Discharge Location  Discharge Placement: Home with family assistance(vs home with home health)

## 2021-04-16 NOTE — ROUTINE PROCESS
Bedside shift change report given to Adelaida RN (oncoming nurse) by Santi Starks RN (offgoing nurse). Report included the following information SBAR, Kardex, Intake/Output, MAR and Recent Results.

## 2021-04-16 NOTE — PROGRESS NOTES
Problem: Pain  Goal: *Control of Pain  Outcome: Progressing Towards Goal     Problem: Patient Education: Go to Patient Education Activity  Goal: Patient/Family Education  Outcome: Progressing Towards Goal     Problem: Diabetes Maintenance:Admission  Goal: Activity/Safety  Outcome: Progressing Towards Goal

## 2021-04-16 NOTE — PROGRESS NOTES
Hospitalist Progress Note    Patient: Brendan Valdivia. MRN: [id number]  [facility]: [id number]    YOB: 1945  Age: 70 y.o. Sex: male    DOA: 4/14/2021 LOS:  LOS: 2 days          Chief Complaint:    Infected left foot      Assessment/Plan     77-year-old male with past medical history of diabetes, atrial fibrillation on Eliquis, coronary artery disease history of CHF and NSTEMI in the past as well as chronic kidney disease stage III admitted for infected diabetic ulcer on left heel of foot.   In emergency room found to be Covid positive.      Diabetes ulcer, infected -associated with acute osteomyelitis as well as ischemic and necrotic tissue  Continue IV vancomycin and Zosyn  Extensive conversation with Dr. Jason Villalta and Dr. Keny Reynolds regarding taking a Covid positive patient to the OR  Patient will be made n.p.o. after midnight in preparation for early morning surgery tomorrow    Infectious Disease -Dr. Jon Glover, following     Community-acquired pneumonia versus Covid pneumonia -  Continue IV antibiotics    COVID-19 infection -  Continue Covid protocol with immune boosting vitamins  Patient continues to be asymptomatic    Chronic kidney disease, stage III -  Continue incremental improvement  Sodium level within normal limits now  Avoid nephrotoxic agents     DM with hypoglycemia  -hypoglycemia is definitely resolved and has given way to hyperglycemia  Adding 4 units of scheduled mealtime lispro 3 times daily     DVT prophylaxis Lovenox via Covid protocol     GI prophylaxis, Pepcid ordered     Disposition :  Patient Active Problem List   Diagnosis Code    Atrial flutter (HCC) I48.92    DM2 (diabetes mellitus, type 2) ([facility] E11.9    Acute renal insufficiency N28.9    NSTEMI (non-ST elevated myocardial infarction) ([facility] Y02.7    Systolic CHF, acute (HCC) I50.21    Symptomatic anemia D64.9    CAD (coronary artery disease) I25.10    GI bleed K92.2    ELINOR (acute kidney injury) ([facility] N17.9    CKD (chronic kidney disease) stage 3, GFR 30-59 ml/min ([facility]) N18.30    Elevated brain natriuretic peptide (BNP) level R79.89    CAP (community acquired pneumonia) J18.9    Diabetic ulcer of left foot ([facility]) E11.621, L97.529    COVID-19 virus infection U07.1    Acute osteomyelitis ([facility] M86.10       Subjective:    Patient hopeful that he will be able to have his surgery soon. Reports no complaints. Had a good night. Review of systems:    Constitutional: denies fevers, chills, myalgias  Respiratory: denies SOB, cough  Cardiovascular: denies chest pain, palpitations  Gastrointestinal: denies nausea, vomiting, diarrhea      Vital signs/Intake and Output:  Visit Vitals  BP (!) 126/54   Pulse 81   Temp 97.2 °F (36.2 °C)   Resp 18   Ht 5' 7\" (1.702 m)   Wt 88.9 kg (196 lb)   SpO2 97%   BMI 30.70 kg/m²     Current Shift:  No intake/output data recorded.   Last three shifts:  04/14 1901 - 04/16 0700  In: 680 [P.O.:360; I.V.:320]  Out: 525 [Urine:525]    Exam:    General: Well developed, alert, NAD, OX3  Head/Neck: NCAT, supple, No masses, No lymphadenopathy  CVS:Regular rate and rhythm, no M/R/G, S1/S2 heard, no thrill  Lungs:Clear to auscultation bilaterally, no wheezes, rhonchi, or rales  Abdomen: Soft, Nontender, No distention, Normal Bowel sounds, No hepatomegaly  Extremities: No C/C/E, pulses palpable 2+  Skin: Left foot wrapped, bloody gauze seen also with necrotic base present normal texture and turgor, no rashes, no lesions  Neuro:grossly normal , follows commands  Psych:appropriate                Labs: Results:       Chemistry Recent Labs     04/16/21  0338 04/15/21  0549 04/14/21  1445   * 354* 39*    132* 135*   K 4.9 5.5 4.4    99* 99*   CO2 30 28 26   BUN 44* 41* 34*   CREA 2.12* 2.43* 2.51*   CA 7.6* 7.5* 8.2*   AGAP 4 5 10   BUCR 21* 17 14   AP  --   --  136*   TP  --   --  7.1   ALB  --   --  2.2*   GLOB  --   --  4.9*   AGRAT  --   --  0.4*      CBC w/Diff Recent Labs     04/14/21  7923 WBC 8.6   RBC 4.31*   HGB 11.5*   HCT 35.9*   *   GRANS 69   LYMPH 15*   EOS 0      Cardiac Enzymes No results for input(s): CPK, CKND1, LEONEL in the last 72 hours. No lab exists for component: CKRMB, TROIP   Coagulation Recent Labs     04/15/21  0549   PTP 15.1   INR 1.2   APTT 41.6*       Lipid Panel No results found for: CHOL, CHOLPOCT, CHOLX, CHLST, CHOLV, 617092, HDL, HDLP, LDL, LDLC, DLDLP, 725913, VLDLC, VLDL, TGLX, TRIGL, TRIGP, TGLPOCT, CHHD, CHHDX   BNP No results for input(s): BNPP in the last 72 hours.    Liver Enzymes Recent Labs     04/14/21  1445   TP 7.1   ALB 2.2*   *      Thyroid Studies Lab Results   Component Value Date/Time    TSH 1.72 02/11/2018 02:19 AM        Procedures/imaging: see electronic medical records for all procedures/Xrays and details which were not copied into this note but were reviewed prior to creation of Elsy Moore MD

## 2021-04-16 NOTE — WOUND CARE
Patient discussed with Dr. Elvis Hall, orders placed for daily santyl, this treatment to continue until patient can be taken to OR for surgical debridement.

## 2021-04-17 ENCOUNTER — APPOINTMENT (OUTPATIENT)
Dept: GENERAL RADIOLOGY | Age: 76
DRG: 628 | End: 2021-04-17
Attending: PODIATRIST
Payer: MEDICARE

## 2021-04-17 ENCOUNTER — ANESTHESIA (OUTPATIENT)
Dept: SURGERY | Age: 76
DRG: 628 | End: 2021-04-17
Payer: MEDICARE

## 2021-04-17 ENCOUNTER — ANESTHESIA EVENT (OUTPATIENT)
Dept: SURGERY | Age: 76
DRG: 628 | End: 2021-04-17
Payer: MEDICARE

## 2021-04-17 PROBLEM — M86.9 DIABETIC FOOT ULCER WITH OSTEOMYELITIS (HCC): Status: ACTIVE | Noted: 2021-04-17

## 2021-04-17 PROBLEM — E11.52 DIABETIC GANGRENE (HCC): Status: ACTIVE | Noted: 2021-04-17

## 2021-04-17 PROBLEM — L97.509 DIABETIC FOOT ULCER WITH OSTEOMYELITIS (HCC): Status: ACTIVE | Noted: 2021-04-17

## 2021-04-17 PROBLEM — E11.52 DIABETIC GANGRENE (HCC): Status: RESOLVED | Noted: 2021-04-17 | Resolved: 2021-04-17

## 2021-04-17 PROBLEM — E11.621 DIABETIC FOOT ULCER WITH OSTEOMYELITIS (HCC): Status: ACTIVE | Noted: 2021-04-17

## 2021-04-17 PROBLEM — M86.072 ACUTE HEMATOGENOUS OSTEOMYELITIS OF LEFT FOOT (HCC): Status: ACTIVE | Noted: 2021-04-17

## 2021-04-17 PROBLEM — E11.69 DIABETIC FOOT ULCER WITH OSTEOMYELITIS (HCC): Status: ACTIVE | Noted: 2021-04-17

## 2021-04-17 PROBLEM — L03.116 CELLULITIS OF LEFT FOOT: Status: ACTIVE | Noted: 2021-04-17

## 2021-04-17 LAB
ANION GAP SERPL CALC-SCNC: 4 MMOL/L (ref 3–18)
BUN SERPL-MCNC: 31 MG/DL (ref 7–18)
BUN/CREAT SERPL: 20 (ref 12–20)
CALCIUM SERPL-MCNC: 7.6 MG/DL (ref 8.5–10.1)
CHLORIDE SERPL-SCNC: 103 MMOL/L (ref 100–111)
CO2 SERPL-SCNC: 29 MMOL/L (ref 21–32)
CREAT SERPL-MCNC: 1.56 MG/DL (ref 0.6–1.3)
DATE LAST DOSE: NORMAL
GLUCOSE BLD STRIP.AUTO-MCNC: 105 MG/DL (ref 70–110)
GLUCOSE BLD STRIP.AUTO-MCNC: 148 MG/DL (ref 70–110)
GLUCOSE BLD STRIP.AUTO-MCNC: 158 MG/DL (ref 70–110)
GLUCOSE BLD STRIP.AUTO-MCNC: 172 MG/DL (ref 70–110)
GLUCOSE SERPL-MCNC: 193 MG/DL (ref 74–99)
POTASSIUM SERPL-SCNC: 4.6 MMOL/L (ref 3.5–5.5)
PROCALCITONIN SERPL-MCNC: 0.09 NG/ML
REPORTED DOSE,DOSE: NORMAL UNITS
REPORTED DOSE/TIME,TMG: 1731
SODIUM SERPL-SCNC: 136 MMOL/L (ref 136–145)
VANCOMYCIN TROUGH SERPL-MCNC: 18.3 UG/ML (ref 10–20)

## 2021-04-17 PROCEDURE — 74011000250 HC RX REV CODE- 250: Performed by: NURSE ANESTHETIST, CERTIFIED REGISTERED

## 2021-04-17 PROCEDURE — 77030002916 HC SUT ETHLN J&J -A: Performed by: PODIATRIST

## 2021-04-17 PROCEDURE — 74011250637 HC RX REV CODE- 250/637: Performed by: FAMILY MEDICINE

## 2021-04-17 PROCEDURE — 88311 DECALCIFY TISSUE: CPT

## 2021-04-17 PROCEDURE — 74011000250 HC RX REV CODE- 250: Performed by: INTERNAL MEDICINE

## 2021-04-17 PROCEDURE — 87185 SC STD ENZYME DETCJ PER NZM: CPT

## 2021-04-17 PROCEDURE — C1713 ANCHOR/SCREW BN/BN,TIS/BN: HCPCS | Performed by: PODIATRIST

## 2021-04-17 PROCEDURE — 87075 CULTR BACTERIA EXCEPT BLOOD: CPT

## 2021-04-17 PROCEDURE — 2709999900 HC NON-CHARGEABLE SUPPLY: Performed by: PODIATRIST

## 2021-04-17 PROCEDURE — 77030019895 HC PCKNG STRP IODO -A: Performed by: PODIATRIST

## 2021-04-17 PROCEDURE — 87205 SMEAR GRAM STAIN: CPT

## 2021-04-17 PROCEDURE — 76010000153 HC OR TIME 1.5 TO 2 HR: Performed by: PODIATRIST

## 2021-04-17 PROCEDURE — 88307 TISSUE EXAM BY PATHOLOGIST: CPT

## 2021-04-17 PROCEDURE — 82962 GLUCOSE BLOOD TEST: CPT

## 2021-04-17 PROCEDURE — 36415 COLL VENOUS BLD VENIPUNCTURE: CPT

## 2021-04-17 PROCEDURE — 77030031741 HC HNDPC IRR VRSAJET PLS S&N -F: Performed by: PODIATRIST

## 2021-04-17 PROCEDURE — 74011250636 HC RX REV CODE- 250/636: Performed by: NURSE ANESTHETIST, CERTIFIED REGISTERED

## 2021-04-17 PROCEDURE — 80202 ASSAY OF VANCOMYCIN: CPT

## 2021-04-17 PROCEDURE — 74011000272 HC RX REV CODE- 272: Performed by: PODIATRIST

## 2021-04-17 PROCEDURE — 74011250636 HC RX REV CODE- 250/636: Performed by: FAMILY MEDICINE

## 2021-04-17 PROCEDURE — 80048 BASIC METABOLIC PNL TOTAL CA: CPT

## 2021-04-17 PROCEDURE — 65660000000 HC RM CCU STEPDOWN

## 2021-04-17 PROCEDURE — 74011250636 HC RX REV CODE- 250/636: Performed by: INTERNAL MEDICINE

## 2021-04-17 PROCEDURE — 74011000250 HC RX REV CODE- 250: Performed by: PODIATRIST

## 2021-04-17 PROCEDURE — 84145 PROCALCITONIN (PCT): CPT

## 2021-04-17 PROCEDURE — 74011636637 HC RX REV CODE- 636/637: Performed by: FAMILY MEDICINE

## 2021-04-17 PROCEDURE — 0QBM0ZZ EXCISION OF LEFT TARSAL, OPEN APPROACH: ICD-10-PCS | Performed by: PODIATRIST

## 2021-04-17 PROCEDURE — 77030013708 HC HNDPC SUC IRR PULS STRY –B: Performed by: PODIATRIST

## 2021-04-17 PROCEDURE — 73630 X-RAY EXAM OF FOOT: CPT

## 2021-04-17 PROCEDURE — 77030003029 HC SUT VCRL J&J -B: Performed by: PODIATRIST

## 2021-04-17 PROCEDURE — 3E0V329 INTRODUCTION OF OTHER ANTI-INFECTIVE INTO BONES, PERCUTANEOUS APPROACH: ICD-10-PCS | Performed by: PODIATRIST

## 2021-04-17 PROCEDURE — 74011250636 HC RX REV CODE- 250/636: Performed by: PODIATRIST

## 2021-04-17 PROCEDURE — 76060000034 HC ANESTHESIA 1.5 TO 2 HR: Performed by: PODIATRIST

## 2021-04-17 PROCEDURE — 0Y9N0ZX DRAINAGE OF LEFT FOOT, OPEN APPROACH, DIAGNOSTIC: ICD-10-PCS | Performed by: PODIATRIST

## 2021-04-17 PROCEDURE — 77030040361 HC SLV COMPR DVT MDII -B: Performed by: PODIATRIST

## 2021-04-17 PROCEDURE — 77030000032 HC CUF TRNQT ZIMM -B: Performed by: PODIATRIST

## 2021-04-17 DEVICE — STIMULAN® RAPID CURE PROVIDED STERILE FOR SINGLE PATIENT USE. STIMULAN® RAPID CURE CONTAINS CALCIUM SULFATE POWDER AND MIXING SOLUTION IN PRE-MEASURED QUANTITIES SO THAT WHEN MIXED TOGETHER IN A STERILE MIXING BOWL, THE RESULTANT PASTE IS TO BE DIGITALLY PACKED INTO OPEN BONE VOID/GAP TO SET INSITU OR PLACED INTO THE MOULD PROVIDED, THE MIXTURE SETS TO FORM BEADS. THE BIODEGRADABLE, RADIOPAQUE BEADS ARE RESORBED IN APPROXIMATELY 30 – 60 DAYS WHEN USED IN ACCORDANCE WITH THE DEVICE LABELLING. STIMULAN® RAPID CURE IS MANUFACTURED FROM SYNTHETIC IMPLANT GRADE CALCIUM SULFATE DIHYDRATE(CASO4.2H2O) THAT RESORBS AND IS REPLACED WITH BONE DURING THE HEALING PROCESS. ALSO, AS THE BONE VOID FILLER BEADS ARE BIODEGRADABLE AND BIOCOMPATIBLE, THEY MAY BE USED AT AN INFECTED SITE.
Type: IMPLANTABLE DEVICE | Site: FOOT | Status: FUNCTIONAL
Brand: STIMULAN® RAPID CURE

## 2021-04-17 RX ORDER — GLYCOPYRROLATE 0.2 MG/ML
INJECTION INTRAMUSCULAR; INTRAVENOUS AS NEEDED
Status: DISCONTINUED | OUTPATIENT
Start: 2021-04-17 | End: 2021-04-17 | Stop reason: HOSPADM

## 2021-04-17 RX ORDER — LIDOCAINE HYDROCHLORIDE 20 MG/ML
INJECTION, SOLUTION EPIDURAL; INFILTRATION; INTRACAUDAL; PERINEURAL AS NEEDED
Status: DISCONTINUED | OUTPATIENT
Start: 2021-04-17 | End: 2021-04-17 | Stop reason: HOSPADM

## 2021-04-17 RX ORDER — NALOXONE HYDROCHLORIDE 0.4 MG/ML
0.1 INJECTION, SOLUTION INTRAMUSCULAR; INTRAVENOUS; SUBCUTANEOUS
Status: CANCELLED | OUTPATIENT
Start: 2021-04-17

## 2021-04-17 RX ORDER — PROPOFOL 10 MG/ML
INJECTION, EMULSION INTRAVENOUS AS NEEDED
Status: DISCONTINUED | OUTPATIENT
Start: 2021-04-17 | End: 2021-04-17 | Stop reason: HOSPADM

## 2021-04-17 RX ORDER — MIDAZOLAM HYDROCHLORIDE 1 MG/ML
INJECTION, SOLUTION INTRAMUSCULAR; INTRAVENOUS AS NEEDED
Status: DISCONTINUED | OUTPATIENT
Start: 2021-04-17 | End: 2021-04-17 | Stop reason: HOSPADM

## 2021-04-17 RX ORDER — ONDANSETRON 2 MG/ML
4 INJECTION INTRAMUSCULAR; INTRAVENOUS ONCE
Status: CANCELLED | OUTPATIENT
Start: 2021-04-17 | End: 2021-04-17

## 2021-04-17 RX ORDER — SODIUM CHLORIDE, SODIUM LACTATE, POTASSIUM CHLORIDE, CALCIUM CHLORIDE 600; 310; 30; 20 MG/100ML; MG/100ML; MG/100ML; MG/100ML
INJECTION, SOLUTION INTRAVENOUS
Status: DISCONTINUED | OUTPATIENT
Start: 2021-04-17 | End: 2021-04-17 | Stop reason: HOSPADM

## 2021-04-17 RX ORDER — INSULIN LISPRO 100 [IU]/ML
INJECTION, SOLUTION INTRAVENOUS; SUBCUTANEOUS ONCE
Status: CANCELLED | OUTPATIENT
Start: 2021-04-17 | End: 2021-04-17

## 2021-04-17 RX ORDER — FENTANYL CITRATE 50 UG/ML
25 INJECTION, SOLUTION INTRAMUSCULAR; INTRAVENOUS
Status: CANCELLED | OUTPATIENT
Start: 2021-04-17

## 2021-04-17 RX ORDER — ONDANSETRON 2 MG/ML
INJECTION INTRAMUSCULAR; INTRAVENOUS AS NEEDED
Status: DISCONTINUED | OUTPATIENT
Start: 2021-04-17 | End: 2021-04-17 | Stop reason: HOSPADM

## 2021-04-17 RX ORDER — DEXTROSE 50 % IN WATER (D50W) INTRAVENOUS SYRINGE
25-50 AS NEEDED
Status: CANCELLED | OUTPATIENT
Start: 2021-04-17

## 2021-04-17 RX ORDER — PROPOFOL 10 MG/ML
INJECTION, EMULSION INTRAVENOUS
Status: DISCONTINUED | OUTPATIENT
Start: 2021-04-17 | End: 2021-04-17 | Stop reason: HOSPADM

## 2021-04-17 RX ORDER — OXYCODONE AND ACETAMINOPHEN 5; 325 MG/1; MG/1
1 TABLET ORAL AS NEEDED
Status: DISCONTINUED | OUTPATIENT
Start: 2021-04-17 | End: 2021-04-22 | Stop reason: HOSPADM

## 2021-04-17 RX ORDER — SODIUM CHLORIDE, SODIUM LACTATE, POTASSIUM CHLORIDE, CALCIUM CHLORIDE 600; 310; 30; 20 MG/100ML; MG/100ML; MG/100ML; MG/100ML
50 INJECTION, SOLUTION INTRAVENOUS CONTINUOUS
Status: CANCELLED | OUTPATIENT
Start: 2021-04-17

## 2021-04-17 RX ORDER — HYDROMORPHONE HYDROCHLORIDE 1 MG/ML
0.5 INJECTION, SOLUTION INTRAMUSCULAR; INTRAVENOUS; SUBCUTANEOUS
Status: CANCELLED | OUTPATIENT
Start: 2021-04-17

## 2021-04-17 RX ORDER — MAGNESIUM SULFATE 100 %
4 CRYSTALS MISCELLANEOUS AS NEEDED
Status: CANCELLED | OUTPATIENT
Start: 2021-04-17

## 2021-04-17 RX ADMIN — PROPOFOL 10 MG: 10 INJECTION, EMULSION INTRAVENOUS at 09:20

## 2021-04-17 RX ADMIN — Medication 500 MG: at 22:21

## 2021-04-17 RX ADMIN — INSULIN LISPRO 3 UNITS: 100 INJECTION, SOLUTION INTRAVENOUS; SUBCUTANEOUS at 22:21

## 2021-04-17 RX ADMIN — MEROPENEM 1 G: 1 INJECTION, POWDER, FOR SOLUTION INTRAVENOUS at 01:17

## 2021-04-17 RX ADMIN — METOPROLOL SUCCINATE 50 MG: 50 TABLET, EXTENDED RELEASE ORAL at 12:03

## 2021-04-17 RX ADMIN — AZITHROMYCIN MONOHYDRATE 500 MG: 500 INJECTION, POWDER, LYOPHILIZED, FOR SOLUTION INTRAVENOUS at 18:00

## 2021-04-17 RX ADMIN — PROPOFOL 10 MG: 10 INJECTION, EMULSION INTRAVENOUS at 09:00

## 2021-04-17 RX ADMIN — LOSARTAN POTASSIUM 25 MG: 25 TABLET, FILM COATED ORAL at 12:00

## 2021-04-17 RX ADMIN — FAMOTIDINE 20 MG: 20 TABLET, FILM COATED ORAL at 12:01

## 2021-04-17 RX ADMIN — SODIUM CHLORIDE, SODIUM LACTATE, POTASSIUM CHLORIDE, AND CALCIUM CHLORIDE: 600; 310; 30; 20 INJECTION, SOLUTION INTRAVENOUS at 08:46

## 2021-04-17 RX ADMIN — ATORVASTATIN CALCIUM 40 MG: 20 TABLET, FILM COATED ORAL at 22:21

## 2021-04-17 RX ADMIN — ENOXAPARIN SODIUM 30 MG: 30 INJECTION SUBCUTANEOUS at 22:21

## 2021-04-17 RX ADMIN — MEROPENEM 1 G: 1 INJECTION, POWDER, FOR SOLUTION INTRAVENOUS at 09:10

## 2021-04-17 RX ADMIN — INSULIN GLARGINE 15 UNITS: 100 INJECTION, SOLUTION SUBCUTANEOUS at 12:02

## 2021-04-17 RX ADMIN — ZINC SULFATE 220 MG (50 MG) CAPSULE 1 CAPSULE: CAPSULE at 12:00

## 2021-04-17 RX ADMIN — PROPOFOL 10 MG: 10 INJECTION, EMULSION INTRAVENOUS at 09:10

## 2021-04-17 RX ADMIN — MEROPENEM 1 G: 1 INJECTION, POWDER, FOR SOLUTION INTRAVENOUS at 17:59

## 2021-04-17 RX ADMIN — VANCOMYCIN HYDROCHLORIDE 1250 MG: 10 INJECTION, POWDER, LYOPHILIZED, FOR SOLUTION INTRAVENOUS at 18:48

## 2021-04-17 RX ADMIN — FUROSEMIDE 40 MG: 40 TABLET ORAL at 12:00

## 2021-04-17 RX ADMIN — INSULIN LISPRO 4 UNITS: 100 INJECTION, SOLUTION INTRAVENOUS; SUBCUTANEOUS at 18:00

## 2021-04-17 RX ADMIN — MIDAZOLAM 1 MG: 1 INJECTION INTRAMUSCULAR; INTRAVENOUS at 08:57

## 2021-04-17 RX ADMIN — LIDOCAINE HYDROCHLORIDE 40 MG: 20 INJECTION, SOLUTION EPIDURAL; INFILTRATION; INTRACAUDAL; PERINEURAL at 09:00

## 2021-04-17 RX ADMIN — INSULIN LISPRO 3 UNITS: 100 INJECTION, SOLUTION INTRAVENOUS; SUBCUTANEOUS at 18:01

## 2021-04-17 RX ADMIN — Medication 10 ML: at 22:21

## 2021-04-17 RX ADMIN — PROPOFOL 10 MG: 10 INJECTION, EMULSION INTRAVENOUS at 09:04

## 2021-04-17 RX ADMIN — Medication 10 ML: at 06:51

## 2021-04-17 RX ADMIN — PROPOFOL 10 MG: 10 INJECTION, EMULSION INTRAVENOUS at 09:02

## 2021-04-17 RX ADMIN — Medication 10 ML: at 15:45

## 2021-04-17 RX ADMIN — Medication 500 MG: at 12:00

## 2021-04-17 RX ADMIN — INSULIN LISPRO 4 UNITS: 100 INJECTION, SOLUTION INTRAVENOUS; SUBCUTANEOUS at 12:02

## 2021-04-17 RX ADMIN — PROPOFOL 10 MG: 10 INJECTION, EMULSION INTRAVENOUS at 09:25

## 2021-04-17 RX ADMIN — PROPOFOL 25 MCG/KG/MIN: 10 INJECTION, EMULSION INTRAVENOUS at 09:25

## 2021-04-17 RX ADMIN — Medication 10 MG: at 22:21

## 2021-04-17 RX ADMIN — Medication 6000 UNITS: at 12:01

## 2021-04-17 RX ADMIN — MIDAZOLAM 1 MG: 1 INJECTION INTRAMUSCULAR; INTRAVENOUS at 08:55

## 2021-04-17 RX ADMIN — ONDANSETRON HYDROCHLORIDE 4 MG: 2 INJECTION INTRAMUSCULAR; INTRAVENOUS at 09:15

## 2021-04-17 RX ADMIN — GLYCOPYRROLATE 0.2 MG: 0.2 INJECTION INTRAMUSCULAR; INTRAVENOUS at 08:53

## 2021-04-17 RX ADMIN — PROPOFOL 10 MG: 10 INJECTION, EMULSION INTRAVENOUS at 09:15

## 2021-04-17 NOTE — ANESTHESIA PREPROCEDURE EVALUATION
Relevant Problems   No relevant active problems       Anesthetic History   No history of anesthetic complications            Review of Systems / Medical History  Patient summary reviewed, nursing notes reviewed and pertinent labs reviewed    Pulmonary          Pneumonia (current admission)    Pertinent negatives: No asthma     Neuro/Psych   Within defined limits           Cardiovascular    Hypertension      CHF (per chart)  Dysrhythmias : atrial fibrillation and atrial flutter  CAD and cardiac stents    Exercise tolerance: <4 METS     GI/Hepatic/Renal         Renal disease: CRI       Endo/Other    Diabetes: type 2, using insulin         Other Findings   Comments: Osteomyelitis/diabetic foot ulcer    Covid 19 + although no symptoms           Physical Exam    Airway  Mallampati: II  TM Distance: 4 - 6 cm  Neck ROM: normal range of motion   Mouth opening: Normal     Cardiovascular               Dental    Dentition: Poor dentition     Pulmonary                 Abdominal         Other Findings            Anesthetic Plan    ASA: 3, emergent  Anesthesia type: MAC            Anesthetic plan and risks discussed with: Patient      Multiple comorbidities although appears non-toxic. No signs/symptoms CHF and no recent angina. Respirations non-labored although was SOB getting OOB to rest room. OK for MAC - suspect minimal sensation in operative area.

## 2021-04-17 NOTE — PROGRESS NOTES
Problem: Pain  Goal: *Control of Pain  Outcome: Progressing Towards Goal     Problem: Patient Education: Go to Patient Education Activity  Goal: Patient/Family Education  Outcome: Progressing Towards Goal     Problem: Chronic Renal Failure  Goal: *Fluid and electrolytes stabilized  Outcome: Progressing Towards Goal     Problem: Patient Education: Go to Patient Education Activity  Goal: Patient/Family Education  Outcome: Progressing Towards Goal     Problem: Diabetes Maintenance:Admission  Goal: Activity/Safety  Outcome: Progressing Towards Goal  Goal: Diagnostic Tests/Procedures  Outcome: Progressing Towards Goal  Goal: Nutrition  Outcome: Progressing Towards Goal  Goal: Medications  Outcome: Progressing Towards Goal  Goal: Treatments/Interventions/Procedures  Outcome: Progressing Towards Goal     Problem: Diabetes Maintenance:Ongoing  Goal: Activity/Safety  Outcome: Progressing Towards Goal  Goal: Nutrition  Outcome: Progressing Towards Goal  Goal: Medications  Outcome: Progressing Towards Goal  Goal: Treatments/Interventsions/Procedures  Outcome: Progressing Towards Goal  Goal: *Blood Glucose 80 to 180 md/dl  Outcome: Progressing Towards Goal     Problem: Diabetes Maintenance:Discharge Outcomes  Goal: *Describes follow-up/return visits to physicians  Outcome: Progressing Towards Goal  Goal: *Blood glucose at patient's target range or approaching  Outcome: Progressing Towards Goal  Goal: *Aware of nutrition guidelines  Outcome: Progressing Towards Goal  Goal: *Verbalizes information about medication  Description: Verbalizes name, dosage, time, side effects, and number of days to  continue medications.   Outcome: Progressing Towards Goal  Goal: *Describes goals, rules, symptoms, and treatments  Description: Describes blood glucose goals, monitoring, sick day rules,  hypo/hyperglycemia prevention, symptoms, and treatment  Outcome: Progressing Towards Goal  Goal: *Describes available outpatient diabetes resources and support systems  Outcome: Progressing Towards Goal     Problem: Falls - Risk of  Goal: *Absence of Falls  Description: Document Loki Gabriel Fall Risk and appropriate interventions in the flowsheet. Outcome: Progressing Towards Goal  Note: Fall Risk Interventions:  Mobility Interventions: Patient to call before getting OOB         Medication Interventions: Patient to call before getting OOB                   Problem:  Body Temperature -  Risk of, Imbalanced  Goal: Ability to maintain a body temperature within defined limits  Outcome: Progressing Towards Goal  Goal: Will regain or maintain usual level of consciousness  Outcome: Progressing Towards Goal  Goal: Complications related to the disease process, condition or treatment will be avoided or minimized  Outcome: Progressing Towards Goal     Problem: Breathing Pattern - Ineffective  Goal: Ability to achieve and maintain a regular respiratory rate  Outcome: Progressing Towards Goal     Problem: Isolation Precautions - Risk of Spread of Infection  Goal: Prevent transmission of infectious organism to others  Outcome: Progressing Towards Goal     Problem: Risk for Fluid Volume Deficit  Goal: Maintain normal heart rhythm  Outcome: Progressing Towards Goal  Goal: Maintain absence of muscle cramping  Outcome: Progressing Towards Goal  Goal: Maintain normal serum potassium, sodium, calcium, phosphorus, and pH  Outcome: Progressing Towards Goal

## 2021-04-17 NOTE — ANESTHESIA POSTPROCEDURE EVALUATION
Post-Anesthesia Evaluation & Assessment    Visit Vitals  BP (!) 153/65   Pulse 87   Temp 36.4 °C (97.5 °F)   Resp 18   Ht 5' 7\" (1.702 m)   Wt 88.7 kg (195 lb 9.6 oz)   SpO2 92%   BMI 30.64 kg/m²       No untreated/active PONV    Post-operative hydration adequate. Adequate post-operative analgesia per PACU discharge criteria    Mental status & level of consciousness: alert and oriented x 3    Respiratory status: patent unassisted airway     No apparent anesthetic complications requiring additional post-anesthetic care    Patient has met all discharge requirements.             Elma Bradford CRNA

## 2021-04-17 NOTE — PERIOP NOTES
TRANSFER - OUT REPORT:    Verbal report given to [facility] RN on Mable Councilman.  being transferred to [facility] for routine post - op       Report consisted of patients Situation, Background, Assessment and   Recommendations(SBAR). Information from the following report(s) OR Summary was reviewed with the receiving nurse. Opportunity for questions and clarification was provided.       Patient transported with:   O2 @ 2 liters  Registered Nurse  [facility]

## 2021-04-17 NOTE — BRIEF OP NOTE
Brief Postoperative Note    Patient: Kyaw Cowan YOB: 1945  MRN: [id number]    Date of Procedure: 4/17/2021     Pre-Op Diagnosis: ostemylitis left heel    Post-Op Diagnosis: Osteomyelitis left heel with cellulitis, gangrenous ulcer with deep abscess tracking along the plantar fascia      Procedure(s):  INCISION AND DRAINAGE LOWER EXTREMITY left heel Debridement antibotic beads possible    Surgeon(s):  Bhanu Marsh DPM    Surgical Assistant: Surg Asst-1: Rachel TANG    Anesthesia: MAC     Estimated Blood Loss (mL): Minimal    Complications: None    Specimens:   ID Type Source Tests Collected by Time Destination   1 : BONE LEFT FOOT Preservative   [facility] [place], [place] 4/17/2021 0955 Pathology   1 : swab left foot Wound Foot, left CULTURE, ANAEROBIC, CULTURE, WOUND W Zaire Garg, [place] 4/17/2021 9177 Microbiology   2 : BONE LEFT FOOT Tissue Foot, left CULTURE, TISSUE W Richard Fung [place], [place] 4/17/2021 3710 Microbiology        Implants:   Implant Name Type Inv.  Item Serial No.  Lot No. LRB No. Used Action   GRAFT BNE SUB 10CC BEAD 25CC CA SULPHATE RAP SET W/ INDIV - [id number]  GRAFT BNE SUB 10CC BEAD 25CC CA SULPHATE RAP SET W/ INDIV  [id number] Left 1 Implanted       Drains: * No LDAs found *    Findings: Diabetic gangrenous ulcer plantar left heel with deep necrotic abscess tracking along the plantar fascia and osteomyelitis of the calcaneous    Electronically Signed by Siddharth Moore DPM on 4/17/2021 at 10:41 AM

## 2021-04-17 NOTE — PROGRESS NOTES
Podiatry:  Patient is status post left foot I&D with deep abscess and calcaneal debridement secondary to osteomyelitis. Deep intra op cultures were taken and bone cultures. Antibiotic beads were packed around the calcaneous with Vancomycin and Rocephin. Will continue IV antibiotics as per ID and will need PICC for 6 weeks of IV antibiotics. Will plan to take him back to the OR next week to remove the antibiotic beads and graft the open wound with Stravix to help wound closure.

## 2021-04-17 NOTE — PROGRESS NOTES
Hospitalist Progress Note    Patient: Daniella Alejandre MRN: [id number]  CSN: [id number]    YOB: 1945  Age: 70 y.o. Sex: male    DOA: 4/14/2021 LOS:  LOS: 3 days          Chief Complaint:    Infected left foot      Assessment/Plan     77-year-old male with past medical history of diabetes, atrial fibrillation on Eliquis, coronary artery disease history of CHF and NSTEMI in the past as well as chronic kidney disease stage III admitted for infected diabetic ulcer on left heel of foot. In emergency room found to be Covid positive.      Osteomyelitis left heel with cellulitis, gangrenous ulcer with deep abscess tracking along the plantar fascia  Community-acquired pneumonia versus Covid pneumonia  Covid positive state  Stage III chronic kidney disease  Diabetes mellitus now with hyperglycemia    Status post incision and drainage lower extremity left heel debridement and antibiotic bead placement with vancomycin and Rocephin  Continue IV antibiotics, appreciate infectious disease expertise  Will need 6 weeks of IV antibiotics via PICC line  Dr. Jesus Gill plans to take the patient back to the OR next week to remove the antibiotic beads and graft open wound  Continue mealtime insulin for improved diabetic control      Disposition :  Patient Active Problem List   Diagnosis Code    Atrial flutter [facility] I48.92    DM2 (diabetes mellitus, type 2) ([facility] E11.9    Acute renal insufficiency N28.9    NSTEMI (non-ST elevated myocardial infarction) ([facility] X21.2    Systolic CHF, acute ([facility]) I50.21    Symptomatic anemia D64.9    CAD (coronary artery disease) I25.10    GI bleed K92.2    ELINOR (acute kidney injury) ([facility] N17.9    CKD (chronic kidney disease) stage 3, GFR 30-59 ml/min ([facility]) N18.30    Elevated brain natriuretic peptide (BNP) level R79.89    CAP (community acquired pneumonia) J18.9    Diabetic ulcer of left foot ([facility]) E11.621, L97.529    COVID-19 virus infection U07.1    Acute osteomyelitis ([facility] M86.10    Acute hematogenous osteomyelitis of left foot ([facility] M86.072    Cellulitis of left foot L03. 80    Diabetic foot ulcer with osteomyelitis (HCC) E11.621, E11.69, L97.509, M86.9       Subjective:    Doing well. No pain in surgical area. In good spirits. Review of systems:    Constitutional: denies fevers, chills, myalgias  Respiratory: denies SOB, cough  Cardiovascular: denies chest pain, palpitations  Gastrointestinal: denies nausea, vomiting, diarrhea      Vital signs/Intake and Output:  Visit Vitals  BP (!) 153/65   Pulse 87   Temp 97.5 °F (36.4 °C)   Resp 18   Ht 5' 7\" (1.702 m)   Wt 88.7 kg (195 lb 9.6 oz)   SpO2 92%   BMI 30.64 kg/m²     Current Shift:  04/17 0701 - 04/17 1900  In: 400 [I.V.:400]  Out: 15   Last three shifts:  04/15 1901 - 04/17 0700  In: 510 [P.O.:240; I.V.:270]  Out: 700 [Urine:700]    Exam:    General: Well developed, alert, NAD, OX3  Head/Neck: NCAT, supple, No masses, No lymphadenopathy  CVS:Regular rate and rhythm, no M/R/G, S1/S2 heard, no thrill  Lungs:Clear to auscultation bilaterally, no wheezes, rhonchi, or rales  Abdomen: Soft, Nontender, No distention, Normal Bowel sounds, No hepatomegaly  Extremities: No C/C/E, pulses palpable 2+  Skin: Left foot wrapped in post-surgical dressing  Neuro:grossly normal , follows commands  Psych:appropriate                Labs: Results:       Chemistry Recent Labs     04/16/21  0338 04/15/21  0549 04/14/21  1445   * 354* 39*    132* 135*   K 4.9 5.5 4.4    99* 99*   CO2 30 28 26   BUN 44* 41* 34*   CREA 2.12* 2.43* 2.51*   CA 7.6* 7.5* 8.2*   AGAP 4 5 10   BUCR 21* 17 14   AP  --   --  136*   TP  --   --  7.1   ALB  --   --  2.2*   GLOB  --   --  4.9*   AGRAT  --   --  0.4*      CBC w/Diff Recent Labs     04/14/21  1445   WBC 8.6   RBC 4.31*   HGB 11.5*   HCT 35.9*   *   GRANS 69   LYMPH 15*   EOS 0      Cardiac Enzymes No results for input(s): CPK, CKND1, LEONEL in the last 72 hours.     No lab exists for component: CKRMB, TROIP   Coagulation Recent Labs     04/15/21  0549   PTP 15.1   INR 1.2   APTT 41.6*       Lipid Panel No results found for: CHOL, CHOLPOCT, CHOLX, CHLST, CHOLV, 850905, HDL, HDLP, LDL, LDLC, DLDLP, 204645, VLDLC, VLDL, TGLX, TRIGL, TRIGP, TGLPOCT, CHHD, CHHDX   BNP No results for input(s): BNPP in the last 72 hours.    Liver Enzymes Recent Labs     04/14/21  1445   TP 7.1   ALB 2.2*   *      Thyroid Studies Lab Results   Component Value Date/Time    TSH 1.72 02/11/2018 02:19 AM        Procedures/imaging: see electronic medical records for all procedures/Xrays and details which were not copied into this note but were reviewed prior to creation of Amada Coleman MD

## 2021-04-17 NOTE — H&P
Date of Surgery Update:  Kerry Hodge was seen and examined. History and physical has been reviewed. The patient has been examined.  There have been no significant clinical changes since the completion of the originally dated History and Physical.    Signed By: Ilya Iglesias DPM     April 17, 2021 8:05 AM

## 2021-04-17 NOTE — PROGRESS NOTES
0725 Assumed care of the patient from ELIEZER Gutiérrez RN (offgoing Nurse). 0748 Pt off the floor for surgery    1022 TRANSFER - IN REPORT:    Verbal report received from Harish Cowan (name) on Annalisa Lin.  being received from OR (unit) for routine post - op      Report consisted of patients Situation, Background, Assessment and   Recommendations(SBAR). Information from the following report(s) SBAR, Kardex, OR Summary and MAR was reviewed with the receiving nurse. Opportunity for questions and clarification was provided. Assessment completed upon patients arrival to unit and care assumed. 1928 Bedside and Verbal shift change report given to Pratima Alonso RN (oncoming nurse) by Irish Dandy RN (offgoing nurse). Report included the following information SBAR, Kardex, MAR, Recent Results and Cardiac Rhythm .

## 2021-04-18 LAB
ANION GAP SERPL CALC-SCNC: 6 MMOL/L (ref 3–18)
BACTERIA SPEC CULT: ABNORMAL
BUN SERPL-MCNC: 31 MG/DL (ref 7–18)
BUN/CREAT SERPL: 20 (ref 12–20)
CALCIUM SERPL-MCNC: 7.9 MG/DL (ref 8.5–10.1)
CHLORIDE SERPL-SCNC: 103 MMOL/L (ref 100–111)
CO2 SERPL-SCNC: 27 MMOL/L (ref 21–32)
CREAT SERPL-MCNC: 1.52 MG/DL (ref 0.6–1.3)
GLUCOSE BLD STRIP.AUTO-MCNC: 154 MG/DL (ref 70–110)
GLUCOSE BLD STRIP.AUTO-MCNC: 269 MG/DL (ref 70–110)
GLUCOSE BLD STRIP.AUTO-MCNC: 92 MG/DL (ref 70–110)
GLUCOSE BLD STRIP.AUTO-MCNC: 99 MG/DL (ref 70–110)
GLUCOSE SERPL-MCNC: 125 MG/DL (ref 74–99)
GRAM STN SPEC: ABNORMAL
POTASSIUM SERPL-SCNC: 5 MMOL/L (ref 3.5–5.5)
SARS-COV-2, COV2: NORMAL
SERVICE CMNT-IMP: ABNORMAL
SODIUM SERPL-SCNC: 136 MMOL/L (ref 136–145)

## 2021-04-18 PROCEDURE — 74011000250 HC RX REV CODE- 250: Performed by: INTERNAL MEDICINE

## 2021-04-18 PROCEDURE — 82962 GLUCOSE BLOOD TEST: CPT

## 2021-04-18 PROCEDURE — 74011250636 HC RX REV CODE- 250/636: Performed by: INTERNAL MEDICINE

## 2021-04-18 PROCEDURE — U0003 INFECTIOUS AGENT DETECTION BY NUCLEIC ACID (DNA OR RNA); SEVERE ACUTE RESPIRATORY SYNDROME CORONAVIRUS 2 (SARS-COV-2) (CORONAVIRUS DISEASE [COVID-19]), AMPLIFIED PROBE TECHNIQUE, MAKING USE OF HIGH THROUGHPUT TECHNOLOGIES AS DESCRIBED BY CMS-2020-01-R: HCPCS

## 2021-04-18 PROCEDURE — 36415 COLL VENOUS BLD VENIPUNCTURE: CPT

## 2021-04-18 PROCEDURE — 74011250636 HC RX REV CODE- 250/636: Performed by: FAMILY MEDICINE

## 2021-04-18 PROCEDURE — 74011250637 HC RX REV CODE- 250/637: Performed by: FAMILY MEDICINE

## 2021-04-18 PROCEDURE — 80048 BASIC METABOLIC PNL TOTAL CA: CPT

## 2021-04-18 PROCEDURE — 74011636637 HC RX REV CODE- 636/637: Performed by: FAMILY MEDICINE

## 2021-04-18 PROCEDURE — 65660000000 HC RM CCU STEPDOWN

## 2021-04-18 RX ORDER — ENOXAPARIN SODIUM 100 MG/ML
1 INJECTION SUBCUTANEOUS EVERY 12 HOURS
Status: DISCONTINUED | OUTPATIENT
Start: 2021-04-18 | End: 2021-04-22 | Stop reason: HOSPADM

## 2021-04-18 RX ADMIN — INSULIN LISPRO 4 UNITS: 100 INJECTION, SOLUTION INTRAVENOUS; SUBCUTANEOUS at 12:22

## 2021-04-18 RX ADMIN — FAMOTIDINE 20 MG: 20 TABLET, FILM COATED ORAL at 08:59

## 2021-04-18 RX ADMIN — ENOXAPARIN SODIUM 30 MG: 30 INJECTION SUBCUTANEOUS at 09:00

## 2021-04-18 RX ADMIN — INSULIN LISPRO 3 UNITS: 100 INJECTION, SOLUTION INTRAVENOUS; SUBCUTANEOUS at 12:22

## 2021-04-18 RX ADMIN — MEROPENEM 1 G: 1 INJECTION, POWDER, FOR SOLUTION INTRAVENOUS at 01:45

## 2021-04-18 RX ADMIN — Medication 10 MG: at 22:21

## 2021-04-18 RX ADMIN — MEROPENEM 1 G: 1 INJECTION, POWDER, FOR SOLUTION INTRAVENOUS at 08:57

## 2021-04-18 RX ADMIN — Medication 10 ML: at 22:22

## 2021-04-18 RX ADMIN — METOPROLOL SUCCINATE 50 MG: 50 TABLET, EXTENDED RELEASE ORAL at 08:57

## 2021-04-18 RX ADMIN — FUROSEMIDE 40 MG: 40 TABLET ORAL at 08:57

## 2021-04-18 RX ADMIN — ZINC SULFATE 220 MG (50 MG) CAPSULE 1 CAPSULE: CAPSULE at 08:57

## 2021-04-18 RX ADMIN — VANCOMYCIN HYDROCHLORIDE 1250 MG: 10 INJECTION, POWDER, LYOPHILIZED, FOR SOLUTION INTRAVENOUS at 17:13

## 2021-04-18 RX ADMIN — Medication 6000 UNITS: at 08:57

## 2021-04-18 RX ADMIN — INSULIN LISPRO 9 UNITS: 100 INJECTION, SOLUTION INTRAVENOUS; SUBCUTANEOUS at 22:22

## 2021-04-18 RX ADMIN — Medication 500 MG: at 22:21

## 2021-04-18 RX ADMIN — LOSARTAN POTASSIUM 25 MG: 25 TABLET, FILM COATED ORAL at 08:58

## 2021-04-18 RX ADMIN — AZITHROMYCIN MONOHYDRATE 500 MG: 500 INJECTION, POWDER, LYOPHILIZED, FOR SOLUTION INTRAVENOUS at 17:14

## 2021-04-18 RX ADMIN — INSULIN LISPRO 4 UNITS: 100 INJECTION, SOLUTION INTRAVENOUS; SUBCUTANEOUS at 08:58

## 2021-04-18 RX ADMIN — ENOXAPARIN SODIUM 90 MG: 100 INJECTION SUBCUTANEOUS at 22:22

## 2021-04-18 RX ADMIN — Medication 10 ML: at 09:00

## 2021-04-18 RX ADMIN — Medication 500 MG: at 08:59

## 2021-04-18 RX ADMIN — ATORVASTATIN CALCIUM 40 MG: 20 TABLET, FILM COATED ORAL at 22:21

## 2021-04-18 RX ADMIN — Medication 10 ML: at 17:15

## 2021-04-18 RX ADMIN — INSULIN GLARGINE 15 UNITS: 100 INJECTION, SOLUTION SUBCUTANEOUS at 08:58

## 2021-04-18 RX ADMIN — MEROPENEM 1 G: 1 INJECTION, POWDER, FOR SOLUTION INTRAVENOUS at 17:13

## 2021-04-18 NOTE — PROGRESS NOTES
Bedside and Verbal shift change report given to Yoselin Justin RN (oncoming nurse) by Sharona Gore RN (offgoing nurse). Report included the following information SBAR, Kardex, ED Summary, Intake/Output, MAR, Recent Results, Med Rec Status and Cardiac Rhythm NSR.     2013  Shift assessment complete  Pt resting quietly with eyes closed and chest rising and falling evenly   No c/o pain or signs of distress   Bandage on LLE clean dry and intact    0145  Reassessment complete  Pt resting quietly with eyes closed and chest rising and falling evenly   No c/o pain or signs of distress  Bandage on LLE clean dry and intact   0437  Reassessment complete   Pt resting quietly with eyes closed and chest rising and falling evenly   3 [facility] Night Shift Chart Audit    Chart Audit completed? YES          Bedside and Verbal shift change report given to Sharona Gore RN (oncoming nurse) by Yoselin Justin RN (offgoing nurse). Report included the following information SBAR, Kardex, ED Summary, Intake/Output, MAR, Recent Results, Med Rec Status and Cardiac Rhythm NSR.

## 2021-04-18 NOTE — PROGRESS NOTES
Problem: Pain  Goal: *Control of Pain  Outcome: Progressing Towards Goal     Problem: Patient Education: Go to Patient Education Activity  Goal: Patient/Family Education  Outcome: Progressing Towards Goal     Problem: Chronic Renal Failure  Goal: *Fluid and electrolytes stabilized  Outcome: Progressing Towards Goal     Problem: Patient Education: Go to Patient Education Activity  Goal: Patient/Family Education  Outcome: Progressing Towards Goal     Problem: Diabetes Maintenance:Admission  Goal: Activity/Safety  Outcome: Progressing Towards Goal  Goal: Diagnostic Tests/Procedures  Outcome: Progressing Towards Goal  Goal: Nutrition  Outcome: Progressing Towards Goal  Goal: Medications  Outcome: Progressing Towards Goal  Goal: Treatments/Interventions/Procedures  Outcome: Progressing Towards Goal     Problem: Diabetes Maintenance:Ongoing  Goal: Activity/Safety  Outcome: Progressing Towards Goal  Goal: Nutrition  Outcome: Progressing Towards Goal  Goal: Medications  Outcome: Progressing Towards Goal  Goal: Treatments/Interventsions/Procedures  Outcome: Progressing Towards Goal  Goal: *Blood Glucose 80 to 180 md/dl  Outcome: Progressing Towards Goal     Problem: Diabetic Ulcer-Treatment  Goal: *Improvement of existing diabetic ulcer  Outcome: Progressing Towards Goal

## 2021-04-18 NOTE — PROGRESS NOTES
Pharmacy Dosing Services: Vancomycin    Consult for Vancomycin Dosing by Pharmacy by Dr. Ezra Gaines provided for this 70y.o. year old male , for indication of Diabetic Foot Infection / Osteomyelitis. Day of Therapy: 5    Scr = 1.52   CrCl = 47.1 ml/min       Vancomycin Trough:  18.3 (4/17/21 at 17:45)    Within therapeutic range:  15 - 20     Continue current dosing:  Vancomycin 1250 mg IV q24h     Pharmacy to follow daily and will make changes to dose and/or frequency based on clinical status. Ht Readings from Last 1 Encounters:   04/14/21 170.2 cm (67\")        Wt Readings from Last 1 Encounters:   04/18/21 93.3 kg (205 lb 11.2 oz)        Other Current Antibiotics Meropenem / Azithromycin   Significant Cultures Wound: Staphlococcus aureus / E. Coli  (pending)   Serum Creatinine Lab Results   Component Value Date/Time    Creatinine 1.52 (H) 04/18/2021 09:25 AM      Creatinine Clearance Estimated Creatinine Clearance: 47.1 mL/min (A) (based on SCr of 1.52 mg/dL (H)).    BUN Lab Results   Component Value Date/Time    BUN 31 (H) 04/18/2021 09:25 AM      WBC Lab Results   Component Value Date/Time    WBC 8.6 04/14/2021 02:45 PM      H/H Lab Results   Component Value Date/Time    HGB 11.5 (L) 04/14/2021 02:45 PM      Platelets Lab Results   Component Value Date/Time    PLATELET 957 (H) 72/82/1620 02:45 PM      Temp 97.4 °F (36.3 °C)     Pharmacist Janice Ortega, [address] [facility]

## 2021-04-18 NOTE — PROGRESS NOTES
Hour troponin, hemoglobin stable at 9.6 continue    Hospitalist Progress Note    Patient: Terri Perry. MRN: [id number]  [facility]: [id number]    YOB: 1945  Age: 70 y.o. Sex: male    DOA: 4/14/2021 LOS:  LOS: 4 days          Chief Complaint:    Infected left foot      Assessment/Plan     82-year-old male with past medical history of diabetes, atrial fibrillation on Eliquis, coronary artery disease history of CHF and NSTEMI in the past as well as chronic kidney disease stage III admitted for infected diabetic ulcer on left heel of foot. In emergency room found to be Covid positive.      Osteomyelitis left heel with cellulitis, gangrenous ulcer with deep abscess tracking along the plantar fascia  Community-acquired pneumonia versus Covid pneumonia  Covid positive state  Stage III chronic kidney disease  Diabetes mellitus now with hyperglycemia    Will obtain COVID-19 PCR test to see if still positive     Will DC IV antibiotics after today for pneumonia because patient will have completed 5-day course    Status post incision and drainage lower extremity left heel debridement and antibiotic bead placement with vancomycin and Rocephin  Continue IV antibiotics, appreciate infectious disease expertise  Will need 6 weeks of IV antibiotics via PICC line  Dr. Craig Solorzano plans to take the patient back to the OR next week to remove the antibiotic beads and graft open wound  Continue mealtime insulin for improved diabetic control      Disposition :  Patient Active Problem List   Diagnosis Code    Atrial flutter ([facility] I48.92    DM2 (diabetes mellitus, type 2) ([facility] E11.9    Acute renal insufficiency N28.9    NSTEMI (non-ST elevated myocardial infarction) ([facility] I57.2    Systolic CHF, acute ([facility]) I50.21    Symptomatic anemia D64.9    CAD (coronary artery disease) I25.10    GI bleed K92.2    ELINOR (acute kidney injury) ([facility] N17.9    CKD (chronic kidney disease) stage 3, GFR 30-59 ml/min ([facility]) N18.30    Elevated brain natriuretic peptide (BNP) level R79.89    CAP (community acquired pneumonia) J18.9    Diabetic ulcer of left foot ([facility] E11.621, L97.529    COVID-19 virus infection U07.1    Acute osteomyelitis ([facility] M86.10    Acute hematogenous osteomyelitis of left foot (HCC) M86.072    Cellulitis of left foot L03. 80    Diabetic foot ulcer with osteomyelitis (HCC) E11.621, E11.69, L97.509, M86.9       Subjective:    Doing well. No pain in surgical area. In good spirits. Wanted to know the plan for his foot. Explained entire that Dr. Jackelyn Newton outlined including the 2nd surgery, the PICC line and 6 weeks of IV antibiotics     Review of systems:    Constitutional: denies fevers, chills, myalgias  Respiratory: denies SOB, cough  Cardiovascular: denies chest pain, palpitations  Gastrointestinal: denies nausea, vomiting, diarrhea      Vital signs/Intake and Output:  Visit Vitals  BP (!) 152/82 (BP 1 Location: Left arm, BP Patient Position: At rest)   Pulse 93   Temp 97.4 °F (36.3 °C)   Resp 18   Ht 5' 7\" (1.702 m)   Wt 93.3 kg (205 lb 11.2 oz)   SpO2 94%   BMI 32.22 kg/m²     Current Shift:  No intake/output data recorded. Last three shifts:  04/16 1901 - 04/18 0700  In: 1120 [P.O.:720;  I.V.:400]  Out: 15     Exam:    General: Well developed, alert, NAD, OX3  Head/Neck: NCAT, supple, No masses, No lymphadenopathy  CVS:Regular rate and rhythm, no M/R/G, S1/S2 heard, no thrill  Lungs:Clear to auscultation bilaterally, no wheezes, rhonchi, or rales  Abdomen: Soft, Nontender, No distention, Normal Bowel sounds, No hepatomegaly  Extremities: No C/C/E, pulses palpable 2+  Skin: Left foot wrapped in post-surgical dressing  Neuro:grossly normal , follows commands  Psych:appropriate                Labs: Results:       Chemistry Recent Labs     04/18/21  0925 04/17/21  1333 04/16/21  0338   * 193* 212*    136 138   K 5.0 4.6 4.9    103 104   CO2 27 29 30   BUN 31* 31* 44*   CREA 1.52* 1.56* 2.12*   CA 7. 9* 7.6* 7.6*   AGAP 6 4 4   BUCR 20 20 21*      CBC w/Diff No results for input(s): WBC, RBC, HGB, HCT, PLT, GRANS, LYMPH, EOS, HGBEXT, HCTEXT, PLTEXT, HGBEXT, HCTEXT, PLTEXT in the last 72 hours. Cardiac Enzymes No results for input(s): CPK, CKND1, LEONEL in the last 72 hours. No lab exists for component: CKRMB, TROIP   Coagulation No results for input(s): PTP, INR, APTT, INREXT, INREXT in the last 72 hours. Lipid Panel No results found for: CHOL, CHOLPOCT, CHOLX, CHLST, CHOLV, 274039, HDL, HDLP, LDL, LDLC, DLDLP, 040311, VLDLC, VLDL, TGLX, TRIGL, TRIGP, TGLPOCT, CHHD, CHHDX   BNP No results for input(s): BNPP in the last 72 hours. Liver Enzymes No results for input(s): TP, ALB, TBIL, AP in the last 72 hours.     No lab exists for component: SGOT, GPT, DBIL   Thyroid Studies Lab Results   Component Value Date/Time    TSH 1.72 02/11/2018 02:19 AM        Procedures/imaging: see electronic medical records for all procedures/Xrays and details which were not copied into this note but were reviewed prior to creation of Ansam Corona MD

## 2021-04-18 NOTE — PROGRESS NOTES
Problem: Pain  Goal: *Control of Pain  Outcome: Progressing Towards Goal     Problem: Patient Education: Go to Patient Education Activity  Goal: Patient/Family Education  Outcome: Progressing Towards Goal     Problem: Patient Education: Go to Patient Education Activity  Goal: Patient/Family Education  Outcome: Progressing Towards Goal     Problem: Diabetes Maintenance:Admission  Goal: Activity/Safety  Outcome: Progressing Towards Goal  Goal: Diagnostic Tests/Procedures  Outcome: Progressing Towards Goal  Goal: Nutrition  Outcome: Progressing Towards Goal  Goal: Medications  Outcome: Progressing Towards Goal  Goal: Treatments/Interventions/Procedures  Outcome: Progressing Towards Goal     Problem: Diabetes Maintenance:Ongoing  Goal: Activity/Safety  Outcome: Progressing Towards Goal  Goal: Nutrition  Outcome: Progressing Towards Goal  Goal: Medications  Outcome: Progressing Towards Goal  Goal: Treatments/Interventsions/Procedures  Outcome: Progressing Towards Goal  Goal: *Blood Glucose 80 to 180 md/dl  Outcome: Progressing Towards Goal     Problem: Diabetes Maintenance:Discharge Outcomes  Goal: *Describes follow-up/return visits to physicians  Outcome: Progressing Towards Goal  Goal: *Blood glucose at patient's target range or approaching  Outcome: Progressing Towards Goal  Goal: *Aware of nutrition guidelines  Outcome: Progressing Towards Goal  Goal: *Verbalizes information about medication  Description: Verbalizes name, dosage, time, side effects, and number of days to  continue medications.   Outcome: Progressing Towards Goal  Goal: *Describes goals, rules, symptoms, and treatments  Description: Describes blood glucose goals, monitoring, sick day rules,  hypo/hyperglycemia prevention, symptoms, and treatment  Outcome: Progressing Towards Goal  Goal: *Describes available outpatient diabetes resources and support systems  Outcome: Progressing Towards Goal     Problem: Diabetic Ulcer-Treatment  Goal: *Improvement of existing diabetic ulcer  Outcome: Progressing Towards Goal     Problem: Patient Education: Go to Patient Education Activity  Goal: Patient/Family Education  Outcome: Progressing Towards Goal     Problem: Falls - Risk of  Goal: *Absence of Falls  Description: Document Hanna Dean Fall Risk and appropriate interventions in the flowsheet. Outcome: Progressing Towards Goal  Note: Fall Risk Interventions:  Mobility Interventions: Assess mobility with egress test, Communicate number of staff needed for ambulation/transfer, Utilize walker, cane, or other assistive device         Medication Interventions: Teach patient to arise slowly, Patient to call before getting OOB    Elimination Interventions: Call light in reach, Patient to call for help with toileting needs, Urinal in reach              Problem: Patient Education: Go to Patient Education Activity  Goal: Patient/Family Education  Outcome: Progressing Towards Goal     Problem: Pressure Injury - Risk of  Goal: *Prevention of pressure injury  Description: Document Anam Scale and appropriate interventions in the flowsheet. Outcome: Progressing Towards Goal     Problem: Patient Education: Go to Patient Education Activity  Goal: Patient/Family Education  Outcome: Progressing Towards Goal     Problem: Airway Clearance - Ineffective  Goal: Achieve or maintain patent airway  Outcome: Progressing Towards Goal     Problem: Gas Exchange - Impaired  Goal: Absence of hypoxia  Outcome: Progressing Towards Goal  Goal: Promote optimal lung function  Outcome: Progressing Towards Goal     Problem: Breathing Pattern - Ineffective  Goal: Ability to achieve and maintain a regular respiratory rate  Outcome: Progressing Towards Goal     Problem:  Body Temperature -  Risk of, Imbalanced  Goal: Ability to maintain a body temperature within defined limits  Outcome: Progressing Towards Goal  Goal: Will regain or maintain usual level of consciousness  Outcome: Progressing Towards Goal  Goal: Complications related to the disease process, condition or treatment will be avoided or minimized  Outcome: Progressing Towards Goal     Problem: Isolation Precautions - Risk of Spread of Infection  Goal: Prevent transmission of infectious organism to others  Outcome: Progressing Towards Goal     Problem: Nutrition Deficits  Goal: Optimize nutrtional status  Outcome: Progressing Towards Goal     Problem: Risk for Fluid Volume Deficit  Goal: Maintain normal heart rhythm  Outcome: Progressing Towards Goal  Goal: Maintain absence of muscle cramping  Outcome: Progressing Towards Goal  Goal: Maintain normal serum potassium, sodium, calcium, phosphorus, and pH  Outcome: Progressing Towards Goal     Problem: Loneliness or Risk for Loneliness  Goal: Demonstrate positive use of time alone when socialization is not possible  Outcome: Progressing Towards Goal     Problem: Fatigue  Goal: Verbalize increase energy and improved vitality  Outcome: Progressing Towards Goal     Problem: Patient Education: Go to Patient Education Activity  Goal: Patient/Family Education  Outcome: Progressing Towards Goal

## 2021-04-18 NOTE — PROGRESS NOTES
0710 Assumed care of the patient from [address] [facility], [address] (offgoing Nurse). Patient is alert and oriented. Pt denies any pain or discomfort at this moment. bed in low position, call bell within reach. 1900 Patient had an uneventful shift and remained stable. Purposeful hourly rounding completed throughout the shift, NAD noted at this time, and patient is resting quietly in bed. No concerns or requests voiced    1945 Bedside and Verbal shift change report given to Joen Duverney RN (oncoming nurse) by Lou Cagle RN (offgoing nurse). Report included the following information SBAR, Kardex, MAR, Recent Results and Cardiac Rhythm .

## 2021-04-19 LAB
ANION GAP SERPL CALC-SCNC: 9 MMOL/L (ref 3–18)
BUN SERPL-MCNC: 27 MG/DL (ref 7–18)
BUN/CREAT SERPL: 20 (ref 12–20)
CALCIUM SERPL-MCNC: 8.5 MG/DL (ref 8.5–10.1)
CHLORIDE SERPL-SCNC: 103 MMOL/L (ref 100–111)
CO2 SERPL-SCNC: 26 MMOL/L (ref 21–32)
CREAT SERPL-MCNC: 1.34 MG/DL (ref 0.6–1.3)
GLUCOSE BLD STRIP.AUTO-MCNC: 106 MG/DL (ref 70–110)
GLUCOSE SERPL-MCNC: 142 MG/DL (ref 74–99)
LEFT CFA PROX SYS PSV: 168.9 CM/S
LEFT PERONEAL DIST VELOCITY: 19 CM/S
LEFT POPLITEAL MID SYS PSV: 48.9 CM/S
LEFT PTA MID SYS PSV: 30.5 CM/S
LEFT SFA DIST VEL RATIO: 1.52
LEFT SFA MID VEL RATIO: 0.56
LEFT SFA PROX VEL RATIO: 0.45
LEFT SUPER FEMORAL DIST SYS PSV: 64.1 CM/S
LEFT SUPER FEMORAL MID SYS PSV: 42.1 CM/S
LEFT SUPER FEMORAL PROX SYS PSV: 75.8 CM/S
POTASSIUM SERPL-SCNC: 3.7 MMOL/L (ref 3.5–5.5)
RIGHT CFA PROX SYS PSV: 132.6 CM/S
RIGHT PERONEAL MID SYS PSV: 20.6 CM/S
RIGHT POPLITEAL MID SYS PSV: 46.2 CM/S
RIGHT PROX PFA A PSV: 107.6 CM/S
RIGHT PTA MID SYS PSV: 30.4 CM/S
RIGHT SFA DIST VEL RATIO: 0.34
RIGHT SUPER FEMORAL DIST SYS PSV: 30.1 CM/S
RIGHT SUPER FEMORAL MID SYS PSV: 89.2 CM/S
SODIUM SERPL-SCNC: 138 MMOL/L (ref 136–145)

## 2021-04-19 PROCEDURE — 74011250636 HC RX REV CODE- 250/636: Performed by: FAMILY MEDICINE

## 2021-04-19 PROCEDURE — 74011000250 HC RX REV CODE- 250: Performed by: INTERNAL MEDICINE

## 2021-04-19 PROCEDURE — 74011250637 HC RX REV CODE- 250/637: Performed by: FAMILY MEDICINE

## 2021-04-19 PROCEDURE — 74011250636 HC RX REV CODE- 250/636: Performed by: INTERNAL MEDICINE

## 2021-04-19 PROCEDURE — 74011250637 HC RX REV CODE- 250/637: Performed by: SPECIALIST

## 2021-04-19 PROCEDURE — 82962 GLUCOSE BLOOD TEST: CPT

## 2021-04-19 PROCEDURE — 65660000000 HC RM CCU STEPDOWN

## 2021-04-19 PROCEDURE — 80048 BASIC METABOLIC PNL TOTAL CA: CPT

## 2021-04-19 PROCEDURE — 36415 COLL VENOUS BLD VENIPUNCTURE: CPT

## 2021-04-19 PROCEDURE — 74011636637 HC RX REV CODE- 636/637: Performed by: FAMILY MEDICINE

## 2021-04-19 RX ADMIN — MEROPENEM 1 G: 1 INJECTION, POWDER, FOR SOLUTION INTRAVENOUS at 17:08

## 2021-04-19 RX ADMIN — ZINC SULFATE 220 MG (50 MG) CAPSULE 1 CAPSULE: CAPSULE at 08:34

## 2021-04-19 RX ADMIN — INSULIN LISPRO 4 UNITS: 100 INJECTION, SOLUTION INTRAVENOUS; SUBCUTANEOUS at 17:24

## 2021-04-19 RX ADMIN — MEROPENEM 1 G: 1 INJECTION, POWDER, FOR SOLUTION INTRAVENOUS at 08:36

## 2021-04-19 RX ADMIN — AZITHROMYCIN MONOHYDRATE 500 MG: 500 INJECTION, POWDER, LYOPHILIZED, FOR SOLUTION INTRAVENOUS at 17:08

## 2021-04-19 RX ADMIN — ENOXAPARIN SODIUM 90 MG: 100 INJECTION SUBCUTANEOUS at 08:41

## 2021-04-19 RX ADMIN — Medication 500 MG: at 08:34

## 2021-04-19 RX ADMIN — Medication 10 ML: at 13:17

## 2021-04-19 RX ADMIN — OXYCODONE HYDROCHLORIDE AND ACETAMINOPHEN 1 TABLET: 5; 325 TABLET ORAL at 09:10

## 2021-04-19 RX ADMIN — MEROPENEM 1 G: 1 INJECTION, POWDER, FOR SOLUTION INTRAVENOUS at 01:20

## 2021-04-19 RX ADMIN — INSULIN LISPRO 4 UNITS: 100 INJECTION, SOLUTION INTRAVENOUS; SUBCUTANEOUS at 13:17

## 2021-04-19 RX ADMIN — ENOXAPARIN SODIUM 90 MG: 100 INJECTION SUBCUTANEOUS at 20:44

## 2021-04-19 RX ADMIN — LOSARTAN POTASSIUM 25 MG: 25 TABLET, FILM COATED ORAL at 08:35

## 2021-04-19 RX ADMIN — INSULIN GLARGINE 15 UNITS: 100 INJECTION, SOLUTION SUBCUTANEOUS at 08:40

## 2021-04-19 RX ADMIN — INSULIN LISPRO 6 UNITS: 100 INJECTION, SOLUTION INTRAVENOUS; SUBCUTANEOUS at 13:17

## 2021-04-19 RX ADMIN — FAMOTIDINE 20 MG: 20 TABLET, FILM COATED ORAL at 08:35

## 2021-04-19 RX ADMIN — FUROSEMIDE 40 MG: 40 TABLET ORAL at 08:34

## 2021-04-19 RX ADMIN — INSULIN LISPRO 4 UNITS: 100 INJECTION, SOLUTION INTRAVENOUS; SUBCUTANEOUS at 08:45

## 2021-04-19 RX ADMIN — INSULIN LISPRO 6 UNITS: 100 INJECTION, SOLUTION INTRAVENOUS; SUBCUTANEOUS at 17:23

## 2021-04-19 RX ADMIN — Medication 10 MG: at 20:43

## 2021-04-19 RX ADMIN — Medication 500 MG: at 20:44

## 2021-04-19 RX ADMIN — ATORVASTATIN CALCIUM 40 MG: 20 TABLET, FILM COATED ORAL at 20:44

## 2021-04-19 RX ADMIN — Medication 6000 UNITS: at 08:34

## 2021-04-19 RX ADMIN — METOPROLOL SUCCINATE 50 MG: 50 TABLET, EXTENDED RELEASE ORAL at 08:35

## 2021-04-19 NOTE — PROGRESS NOTES
0730:Received verbal bedside report from off going nurse JOSE Jorge R.N. Patient care received. Patient alert and oriented x 4. Patient resting in bed denies pain. Patient stable. Call light with in reach bed in lowest position.

## 2021-04-19 NOTE — DIABETES MGMT
GLYCEMIC CONTROL PLAN OF CARE        Diabetes Management:      Assessment: known h/o T2DM, HbA1C of 6.3%, basal/bolus home regimen. Pt admitted for infected toe; Covid positive  - pt states hypoglycemia 4/14/21 r/t lack of PO intake  - per EMR pt is on the following home regimen    *Lantus 40 units @ hs   *Humalog 10 units @ breakfast, 15  Units @ lunch & dinner   *januvia 50 mg daily  Good glycemic control with current dosing of basal insulin plus scheduled meal time and corrective lispro. Current Diabetes Medications:    15 units Lantus/day  4 units lispro TID at meals  Corrective lispro, very insulin resistant dosing ACHS    TDD previous day = 35 units  (15 units Lantus,  8 units scheduled meal time lispro and 12 units corrective lispro)    Most recent blood glucose results:   Lab Results   Component Value Date/Time     (H) 04/19/2021 08:30 AM    GLUCPOC 106 04/19/2021 06:08 AM    GLUCPOC 269 (H) 04/18/2021 09:31 PM    GLUCPOC 92 04/18/2021 04:32 PM         Hypo: 39 mg/dL on 4/14/21 (in ED)    BG in target range (non-ICU < 180 ; -180):  [x] Yes  [] No      HbA1C: equivalent  to ave BGlucose of  mg/dl for 2-3 months prior to admission (pending)  Lab Results   Component Value Date/Time    Hemoglobin A1c 6.3 (H) 04/15/2021 05:49 AM       Adequate glycemic control PTA:  [x] Yes  [] No      Home diabetes medications:  Key Antihyperglycemic Medications             insulin lispro (HUMALOG KWIKPEN INSULIN SC) (Taking) 10-15 Units by SubCUTAneous route three (3) times daily (with meals). insulin glargine (LANTUS) 100 unit/mL injection 15 Units by SubCUTAneous route daily. SITagliptin (JANUVIA) 50 mg tablet Take 1 Tab by mouth daily.           Goals:  Blood glucose will be within target range of 70 - 180 mg/dL by: 5/15    Diet:   Active Orders   Diet    DIET DIABETIC CONSISTENT CARB Regular     Meal Intake:   Patient Vitals for the past 168 hrs:   % Diet Eaten   04/17/21 1800 76 - 100%   04/17/21 1404 76 - 100%   04/16/21 1626 76 - 100%   04/15/21 1010 76 - 100%   04/14/21 2001 76 - 100%       Education:  __X___ Refer to Diabetes Education Record 4-15-21                       _____ Education not indicated at this time      Sherif Talamantes, [address], [address] [place] [address]   Office:  [phone]  Pager:  [phone]

## 2021-04-19 NOTE — PROGRESS NOTES
Bedside and Verbal shift change report given to Aneudy Ellison RN (oncoming nurse) by Terri Delgado RN (offgoing nurse). Report included the following information SBAR, Kardex, ED Summary, Intake/Output, MAR, Recent Results, Med Rec Status and Cardiac Rhythm NSR.     1945  Shift assessment complete  Pt resting quietly with eyes open and chest rising and falling evenly  No c/o pain or signs of distress   0120  Reassessment complete  Pt resting quietly with eyes closed and chest rising and falling evenly  No c/o pain or signs of distress  0413  Reassessment complete  Pt resting quietly with eyes closed and chest rising and falling evenly   No c/o pain or signs of distress    Shift unevenful     [facility] Night Shift Chart Audit    Chart Audit completed? YES             Bedside and Verbal shift change report given to Scottie Fairchild RN (oncoming nurse) by Aneudy Ellison RN (offgoing nurse). Report included the following information SBAR, Kardex, ED Summary, Intake/Output, MAR, Recent Results, Med Rec Status and Cardiac Rhythm nSR.

## 2021-04-19 NOTE — PROGRESS NOTES
Podiatry:  Planning to take patient back to the OR on Wednesday afternoon 6PM to remove antibiotic beads and apply a Stravix graft to the left heel wound and then application of a wound VAC on Thursday morning by nursing in the wound care clinic.

## 2021-04-19 NOTE — PROGRESS NOTES
Discharge Planning: Home with home health and MD follow-up    CM notes that the patient underwent surgery on 4/17/21 and is scheduled for another procedure on 4/21/21. CM also notes that the patient will likely need a PICC and IV antibiotics upon discharge. CM to follow the patient's progress and assist with discharge needs as the patient approaches discharge. Care Management Interventions  PCP Verified by CM:  Yes  Transition of Care Consult (CM Consult): Discharge Planning  Current Support Network: Lives with Spouse  Confirm Follow Up Transport: Family  The Plan for Transition of Care is Related to the Following Treatment Goals : Diabetic ulcer of left foot  Discharge Location  Discharge Placement: Home with family assistance(vs home with home health)

## 2021-04-19 NOTE — WOUND CARE
[facility]. MEDICAL RECORD NUMBER:  [id number]  AGE: 70 y.o. GENDER: male  : 1945  TODAY'S DATE:  2021    GENERAL     [x] Follow-up   [] New Consult    Terri Diggs is a 70 y.o. male referred by:   [x] Physician  [] Nursing  [] Other:         PAST MEDICAL HISTORY    Past Medical History:   Diagnosis Date    Asthma     Diabetes ([address]     High cholesterol     Hypertension         PAST SURGICAL HISTORY    Past Surgical History:   Procedure Laterality Date    COLONOSCOPY N/A 2018    COLONOSCOPY, POLYPECTOMY performed by Denver Barney MD at [address] [place] [address]    History reviewed. No pertinent family history. SOCIAL HISTORY    Social History     Tobacco Use    Smoking status: Former Smoker    Smokeless tobacco: Never Used   Substance Use Topics    Alcohol use: Yes     Alcohol/week: 1.0 standard drinks     Types: 1 Cans of beer per week    Drug use: No       ALLERGIES    No Known Allergies    MEDICATIONS    No current facility-administered medications on file prior to encounter. Current Outpatient Medications on File Prior to Encounter   Medication Sig Dispense Refill    pantoprazole (Protonix) 40 mg tablet Take 40 mg by mouth daily.  ammonium lactate (LAC-HYDRIN) 12 % lotion Apply  to affected area as needed. rub in to affected area well      spironolactone (ALDACTONE) 25 mg tablet Take 12.5 mg by mouth daily. Take 0.5 tablet (12.5 mg) by oral      insulin lispro (HUMALOG KWIKPEN INSULIN SC) 10-15 Units by SubCUTAneous route three (3) times daily (with meals).  Omeprazole delayed release (PRILOSEC D/R) 20 mg tablet Take 1 Tab by mouth daily. 30 Tab 5    apixaban (ELIQUIS) 5 mg tablet Take 1 Tab by mouth two (2) times a day. 60 Tab 0    aspirin delayed-release 81 mg tablet Take 1 Tab by mouth daily. 30 Tab 0    atorvastatin (LIPITOR) 40 mg tablet Take 1 Tab by mouth nightly.  30 Tab 0    clopidogrel (PLAVIX) 75 mg tab Take 1 Tab by mouth daily. 30 Tab 0    furosemide (LASIX) 40 mg tablet Take 1 Tab by mouth daily. 30 Tab 0    insulin glargine (LANTUS) 100 unit/mL injection 15 Units by SubCUTAneous route daily. (Patient taking differently: 40 Units by SubCUTAneous route nightly. Indications: type 2 diabetes mellitus) 1 Vial 0    losartan (COZAAR) 25 mg tablet Take 1 Tab by mouth daily. 30 Tab 0    metoprolol succinate (TOPROL-XL) 50 mg XL tablet Take 1 Tab by mouth daily. 30 Tab 0    SITagliptin (JANUVIA) 50 mg tablet Take 1 Tab by mouth daily. 30 Tab 0    lancets (ADVOCATE LANCET) 30 gauge misc Use while checking BGL each morning. 1 Package 0    glucose blood VI test strips (IGLUCOSE TEST STRIP) strip Use to check BGL every morning 50 Strip 0       Wt Readings from Last 3 Encounters:   04/18/21 93.3 kg (205 lb 11.2 oz)   04/15/21 83.9 kg (185 lb)   06/01/18 84 kg (185 lb 3 oz)       [unfilled]  Visit Vitals  BP (!) 164/62 (BP 1 Location: Right upper arm, BP Patient Position: At rest)   Pulse 79   Temp 97.5 °F (36.4 °C)   Resp 18   Ht 5' 7\" (1.702 m)   Wt 93.3 kg (205 lb 11.2 oz)   SpO2 93%   BMI 32.22 kg/m²       ASSESSMENT     Skin impairment Identification:  Type: surgical    Contributing Factors: diabetes    Incision 04/17/21 Foot Left (Active)   Wound Image   04/19/21 1507   Dressing Status Breakthrough drainage noted; Old drainage noted 04/19/21 1507   Dressing Change Due 04/21/21 04/19/21 1507   Cleansed Not cleansed 04/19/21 1507   Dressing/Treatment ABD pad 04/19/21 1507   Wound Length (cm) 8.5 cm 04/19/21 1507   Wound Width (cm) 0.8 cm 04/19/21 1507   Closure Sutures 04/19/21 1507   Margins Approximated 04/19/21 1507   Incision Assessment Bleeding 04/19/21 1507   Drainage Amount Small 04/19/21 1507   Drainage Description Sanguineous 04/19/21 1507   Wound Odor None 04/19/21 1507   Neha-Wound/Incision Assessment Intact; Other (Comment) 04/19/21 1507   Number of days: 2 PLAN     Skin Care & Pressure Relief Recommendations  Minimize layers of linen  Pads under patient to optimize support surface    Physician/Provider notified: Yes  Recommendations: absorbent dressing changed, packing not removed due to proximity to beads, change abd pads as needed until pt returns to OR Wednesday, Wound care to apply wound vac Thursday morning     Teaching completed with:   [x] Patient           [] Family member       [] Caregiver          [] Nursing  [] Other    Patient/Caregiver Teaching:  Level of patient/caregiver understanding able to:   [x] Indicates understanding       [] Needs reinforcement  [] Unsuccessful      [] Verbal Understanding  [] Demonstrated understanding       [] No evidence of learning  [] Refused teaching         [] N/A       Electronically signed by Sandi Avendaño RN on 4/19/2021 at 3:12 PM

## 2021-04-19 NOTE — PROGRESS NOTES
Problem: Pain  Goal: *Control of Pain  Outcome: Progressing Towards Goal     Problem: Patient Education: Go to Patient Education Activity  Goal: Patient/Family Education  Outcome: Progressing Towards Goal     Problem: Patient Education: Go to Patient Education Activity  Goal: Patient/Family Education  Outcome: Progressing Towards Goal     Problem: Diabetes Maintenance:Admission  Goal: Activity/Safety  Outcome: Progressing Towards Goal  Goal: Diagnostic Tests/Procedures  Outcome: Progressing Towards Goal  Goal: Nutrition  Outcome: Progressing Towards Goal  Goal: Medications  Outcome: Progressing Towards Goal  Goal: Treatments/Interventions/Procedures  Outcome: Progressing Towards Goal     Problem: Diabetes Maintenance:Ongoing  Goal: Activity/Safety  Outcome: Progressing Towards Goal  Goal: Nutrition  Outcome: Progressing Towards Goal  Goal: Medications  Outcome: Progressing Towards Goal  Goal: Treatments/Interventsions/Procedures  Outcome: Progressing Towards Goal  Goal: *Blood Glucose 80 to 180 md/dl  Outcome: Progressing Towards Goal     Problem: Diabetes Maintenance:Discharge Outcomes  Goal: *Describes follow-up/return visits to physicians  Outcome: Progressing Towards Goal  Goal: *Blood glucose at patient's target range or approaching  Outcome: Progressing Towards Goal  Goal: *Aware of nutrition guidelines  Outcome: Progressing Towards Goal  Goal: *Verbalizes information about medication  Description: Verbalizes name, dosage, time, side effects, and number of days to  continue medications.   Outcome: Progressing Towards Goal  Goal: *Describes goals, rules, symptoms, and treatments  Description: Describes blood glucose goals, monitoring, sick day rules,  hypo/hyperglycemia prevention, symptoms, and treatment  Outcome: Progressing Towards Goal  Goal: *Describes available outpatient diabetes resources and support systems  Outcome: Progressing Towards Goal     Problem: Diabetic Ulcer-Treatment  Goal: *Improvement of existing diabetic ulcer  Outcome: Progressing Towards Goal     Problem: Falls - Risk of  Goal: *Absence of Falls  Description: Document Dejan Umanzor Fall Risk and appropriate interventions in the flowsheet. Outcome: Progressing Towards Goal  Note: Fall Risk Interventions:  Mobility Interventions: Assess mobility with egress test, Communicate number of staff needed for ambulation/transfer, Patient to call before getting OOB         Medication Interventions: Patient to call before getting OOB, Teach patient to arise slowly    Elimination Interventions: Patient to call for help with toileting needs, Urinal in reach, Call light in reach              Problem: Patient Education: Go to Patient Education Activity  Goal: Patient/Family Education  Outcome: Progressing Towards Goal     Problem: Pressure Injury - Risk of  Goal: *Prevention of pressure injury  Description: Document Anam Scale and appropriate interventions in the flowsheet. Outcome: Progressing Towards Goal     Problem: Patient Education: Go to Patient Education Activity  Goal: Patient/Family Education  Outcome: Progressing Towards Goal     Problem: Airway Clearance - Ineffective  Goal: Achieve or maintain patent airway  Outcome: Progressing Towards Goal     Problem: Gas Exchange - Impaired  Goal: Absence of hypoxia  Outcome: Progressing Towards Goal  Goal: Promote optimal lung function  Outcome: Progressing Towards Goal     Problem: Breathing Pattern - Ineffective  Goal: Ability to achieve and maintain a regular respiratory rate  Outcome: Progressing Towards Goal     Problem:  Body Temperature -  Risk of, Imbalanced  Goal: Ability to maintain a body temperature within defined limits  Outcome: Progressing Towards Goal  Goal: Will regain or maintain usual level of consciousness  Outcome: Progressing Towards Goal  Goal: Complications related to the disease process, condition or treatment will be avoided or minimized  Outcome: Progressing Towards Goal     Problem: Isolation Precautions - Risk of Spread of Infection  Goal: Prevent transmission of infectious organism to others  Outcome: Progressing Towards Goal     Problem: Nutrition Deficits  Goal: Optimize nutrtional status  Outcome: Progressing Towards Goal     Problem: Risk for Fluid Volume Deficit  Goal: Maintain normal heart rhythm  Outcome: Progressing Towards Goal  Goal: Maintain absence of muscle cramping  Outcome: Progressing Towards Goal  Goal: Maintain normal serum potassium, sodium, calcium, phosphorus, and pH  Outcome: Progressing Towards Goal     Problem: Loneliness or Risk for Loneliness  Goal: Demonstrate positive use of time alone when socialization is not possible  Outcome: Progressing Towards Goal     Problem: Fatigue  Goal: Verbalize increase energy and improved vitality  Outcome: Progressing Towards Goal     Problem: Patient Education: Go to Patient Education Activity  Goal: Patient/Family Education  Outcome: Progressing Towards Goal

## 2021-04-19 NOTE — ROUTINE PROCESS
Bedside and Verbal shift change report given to JOSE Genao R.N. (oncoming nurse) by ALICE Dutta R.N. (offgoing nurse). Report included the following information SBAR, Kardex, Intake/Output, MAR, Accordion, Recent Results, and Med Rec Status.

## 2021-04-19 NOTE — PROGRESS NOTES
Hospitalist Progress Note    Patient: Franky Morocho. MRN: [id number]  CSN: [id number]    YOB: 1945  Age: 70 y.o. Sex: male    DOA: 4/14/2021 LOS:  LOS: 5 days                Assessment/Plan     Patient Active Problem List   Diagnosis Code    Atrial flutter (HCC) I48.92    DM2 (diabetes mellitus, type 2) ([facility] E11.9    Acute renal insufficiency N28.9    NSTEMI (non-ST elevated myocardial infarction) ([facility] N93.3    Systolic CHF, acute ([facility]) I50.21    Symptomatic anemia D64.9    CAD (coronary artery disease) I25.10    GI bleed K92.2    ELINOR (acute kidney injury) ([facility] N17.9    CKD (chronic kidney disease) stage 3, GFR 30-59 ml/min ([facility]) N18.30    Elevated brain natriuretic peptide (BNP) level R79.89    CAP (community acquired pneumonia) J18.9    Diabetic ulcer of left foot ([facility] E11.621, L97.529    COVID-19 virus infection U07.1    Acute osteomyelitis ([facility] M86.10    Acute hematogenous osteomyelitis of left foot ([facility] M86.072    Cellulitis of left foot L03. 80    Diabetic foot ulcer with osteomyelitis ([facility] E11.621, E11.69, L97.509, M86.9            44-year-old male with past medical history of diabetes, atrial fibrillation on Eliquis, coronary artery disease history of CHF and NSTEMI in the past as well as chronic kidney disease stage III admitted for infected diabetic ulcer on left heel of foot.  In emergency room found to be Covid positive.      Osteomyelitis left heel with cellulitis, gangrenous ulcer with deep abscess tracking along the plantar fascia -  Status post incision and drainage lower extremity left heel debridement and antibiotic bead placement   Continue IV antibiotics,  infectious disease following  Dr. Fabiano Olmos plans to take the patient back to the OR next week to remove the antibiotic beads and graft open wound.   Will need 6 weeks of IV antibiotics via PICC line    Covid pneumonia  On vitamin/antioxidant cocktail  Not hypoxic    Stage III chronic kidney disease -  Monitor renal function    Diabetes mellitus now with hyperglycemia -  No better controlled,   Continue with SSI    CAD -  No anginal symptoms    A-flutter -  Rate controlled   on therapeutic lovenox            Disposition : 2-3 days    Review of systems  General: No fevers or chills. Cardiovascular: No chest pain or pressure. No palpitations. Pulmonary: No shortness of breath. Gastrointestinal: No nausea, vomiting. Physical Exam:  General: Awake, cooperative, no acute distress    HEENT: NC, Atraumatic. PERRLA, anicteric sclerae. Lungs: CTA Bilaterally. No Wheezing/Rhonchi/Rales. Heart:  S1 S2,  No murmur, No Rubs, No Gallops  Abdomen: Soft, Non distended, Non tender.  +Bowel sounds,   Extremities: Left foot with ace wrap  Psych:   Not anxious or agitated. Neurologic:  No acute neurological deficit. Vital signs/Intake and Output:  Visit Vitals  BP (!) 133/56 (BP 1 Location: Right upper arm, BP Patient Position: At rest)   Pulse 84   Temp 97.4 °F (36.3 °C)   Resp 18   Ht 5' 7\" (1.702 m)   Wt 93.3 kg (205 lb 11.2 oz)   SpO2 93%   BMI 32.22 kg/m²     Current Shift:  No intake/output data recorded. Last three shifts:  No intake/output data recorded. Labs: Results:       Chemistry Recent Labs     04/19/21  0830 04/18/21  0925 04/17/21  1333   * 125* 193*    136 136   K 3.7 5.0 4.6    103 103   CO2 26 27 29   BUN 27* 31* 31*   CREA 1.34* 1.52* 1.56*   CA 8.5 7.9* 7.6*   AGAP 9 6 4   BUCR 20 20 20      CBC w/Diff No results for input(s): WBC, RBC, HGB, HCT, PLT, GRANS, LYMPH, EOS, HGBEXT, HCTEXT, PLTEXT in the last 72 hours. Cardiac Enzymes No results for input(s): CPK, CKND1, LEONEL in the last 72 hours. No lab exists for component: CKRMB, TROIP   Coagulation No results for input(s): PTP, INR, APTT, INREXT in the last 72 hours.     Lipid Panel No results found for: CHOL, CHOLPOCT, CHOLX, CHLST, CHOLV, 209596, HDL, HDLP, LDL, LDLC, DLDLP, 903829, VLDLC, VLDL, TGLX, TRIGL, TRIGP, TGLPOCT, CHHD, CHHDX   BNP No results for input(s): BNPP in the last 72 hours. Liver Enzymes No results for input(s): TP, ALB, TBIL, AP in the last 72 hours.     No lab exists for component: SGOT, GPT, DBIL   Thyroid Studies Lab Results   Component Value Date/Time    TSH 1.72 02/11/2018 02:19 AM        Procedures/imaging: see electronic medical records for all procedures/Xrays and details which were not copied into this note but were reviewed prior to creation of Plan

## 2021-04-19 NOTE — PROGRESS NOTES
[facility]                         (A Division of [address] [place] [address])                                                                                                                       Evelyne Waters MD  Work Cell #: [phone]. If no call or text back within 30 minutes, please call office number. (Prefer text when possible)  Office #: [phone]  [phone]   Office Fax: [phone]    Date of Admission: 4/14/2021       Date of Note:  4/19/2021   Consult FU for : Left diabetic foot infection    Summary:    70 y.o. WHITE male with PMH of DM, CAD with CHF and CKD. He has a blister and redness on lower LE about two weeks ago, which eschared and removed the scab. Around that time, he also had flu like symptom. After that, he had pain and swelling which opened up on his heel. Per ED reports, had attempt to debridement by Podiatry on his heel, prior to arrival to ED.     Denies cough, sob. Unaware of COVID 19 contact but his son has tested negative since his diagnosis. Unaware of his wife status.     Retired from [employer] in early [address] [employer], and he relocated from [place] to here to be close to his children. COVID 19 + 4/14       Subjective: no new complaint. Doing ok  No high fevers or chills. No sob, cough. Appetite ok. Tolerating abx ok    Condition: Stable    Interval History:  I and D of left heel including heel bone- done 4/17             Current Antimicrobials:    Prior Antimicrobials:  · Zithromax 500mg IV- 4/14 TD  · Vancomycin IV  · Meropenem IV 4/15 · Zosyn 2.25gm IV Q6 4/14 to 4/15       Assessment: Rec / Plan:   · Left Infected DM Ulcer and acute OM and necrotic tissue, punched out heel ulcer. Mixed infection- MSSA. E.coli  · COVID 19 Infection/ PNA- Right UL infiltrate, without hypoxia.   · Post I and D of left heel bone/abscess with ABX bead placement:  4/17/21  -procal 0.18, D-dimer 1.97, ferretin 136, , fibrinogen 796  Difficult to tell if Flu like symptom due to cellulitis/ soft tissue infection or onset of COVID 2 weeks ago.      Other Medical Issues followed and managed by primary and other services  · DM- Poorly controlled  · Low Vitamin D  · CAD with CHF/ NSTMI. Atrial flutter  · CKD  · Hyperlipidemia  · ch  Past ID issues: N/A · FU SS on S.aurues, WCX from 4/14/20  · If MSSA, can DC vancomycin  · Cont meropenem/zithromax  · OR debridement as soon as feasible. Unsure of COVID acquisition date, surgery under COVID + protocol  · panculture and biopsy from OR  · FU urine ag- leg/strep    4/19: wound culture + E.coli/MSSA  DC vanco  Cont meropenem  DC Zithromax for pna per primary    FU OR culture 4/17 until final  Repeat OR Plan noted. PICC needed for long term abxX6 weeks- can place tomorrow. CM to review for place of ABX                 Microbiology:  Blood culture: 4/14- 2 sets: NGSF     Urine culture:N/A     Body Fluid/ CSF/drainage culture:  4/14- gram stain: GNR and GPR        --wound culture+ s.aurues and E.coli     Cath tip/ PICC culture: Left PIV         Patient Active Problem List   Diagnosis Code    Atrial flutter ([facility]) I48.92    DM2 (diabetes mellitus, type 2) ([facility]) E11.9    Acute renal insufficiency N28.9    NSTEMI (non-ST elevated myocardial infarction) ([facility]) L34.4    Systolic CHF, acute ([facility]) I50.21    Symptomatic anemia D64.9    CAD (coronary artery disease) I25.10    GI bleed K92.2    ELINOR (acute kidney injury) ([facility] N17.9    CKD (chronic kidney disease) stage 3, GFR 30-59 ml/min ([facility]) N18.30    Elevated brain natriuretic peptide (BNP) level R79.89    CAP (community acquired pneumonia) J18.9    Diabetic ulcer of left foot ([facility]) E11.621, L97.529    COVID-19 virus infection U07.1    Acute osteomyelitis ([facility] M86.10    Acute hematogenous osteomyelitis of left foot ([facility]) M86.072    Cellulitis of left foot L03. 116    Diabetic foot ulcer with osteomyelitis ([facility]) E11.621, E11.69, L97.509, M86.9 Current Facility-Administered Medications   Medication Dose Route Frequency    enoxaparin (LOVENOX) injection 90 mg  1 mg/kg SubCUTAneous Q12H    oxyCODONE-acetaminophen (PERCOCET) 5-325 mg per tablet 1 Tab  1 Tab Oral PRN    insulin lispro (HUMALOG) injection 4 Units  4 Units SubCUTAneous TIDAC    sodium chloride (NS) flush 5-40 mL  5-40 mL IntraVENous Q8H    sodium chloride (NS) flush 5-40 mL  5-40 mL IntraVENous PRN    polyethylene glycol (MIRALAX) packet 17 g  17 g Oral DAILY PRN    promethazine (PHENERGAN) tablet 12.5 mg  12.5 mg Oral Q6H PRN    Or    ondansetron (ZOFRAN) injection 4 mg  4 mg IntraVENous Q6H PRN    famotidine (PEPCID) tablet 20 mg  20 mg Oral DAILY    insulin lispro (HUMALOG) injection   SubCUTAneous AC&HS    glucose chewable tablet 16 g  4 Tab Oral PRN    glucagon (GLUCAGEN) injection 1 mg  1 mg IntraMUSCular PRN    dextrose (D50W) injection syrg 12.5-25 g  25-50 mL IntraVENous PRN    meropenem (MERREM) 1 g in sterile water (preservative free) 20 mL IV syringe  1 g IntraVENous Q8H    azithromycin (ZITHROMAX) 500 mg in 0.9% sodium chloride 250 mL (VIAL-MATE)  500 mg IntraVENous Q24H    acetaminophen (TYLENOL) tablet 650 mg  650 mg Oral Q6H PRN    Or    acetaminophen (TYLENOL) suppository 650 mg  650 mg Rectal Q6H PRN    guaiFENesin-dextromethorphan (ROBITUSSIN DM) 100-10 mg/5 mL syrup 5 mL  5 mL Oral Q4H PRN    cholecalciferol (VITAMIN D3) tablet 6,000 Units  6,000 Units Oral DAILY    Followed by   Celsa Justice ON 4/21/2021] cholecalciferol (VITAMIN D3) (1000 Units /25 mcg) tablet 2,000 Units  2,000 Units Oral DAILY    ascorbic acid (vitamin C) (VITAMIN C) tablet 500 mg  500 mg Oral BID    zinc sulfate (ZINCATE) 50 mg zinc (220 mg) capsule 1 Cap  1 Cap Oral DAILY    melatonin (rapid dissolve) tablet 10 mg  10 mg Oral QHS    atorvastatin (LIPITOR) tablet 40 mg  40 mg Oral QHS    furosemide (LASIX) tablet 40 mg  40 mg Oral DAILY    insulin glargine (LANTUS) injection 15 Units  15 Units SubCUTAneous DAILY    losartan (COZAAR) tablet 25 mg  25 mg Oral DAILY    metoprolol succinate (TOPROL-XL) XL tablet 50 mg  50 mg Oral DAILY         Review of Systems - Negative except left heel pain. About the same       Objective:    Visit Vitals  BP (!) 164/62 (BP 1 Location: Right upper arm, BP Patient Position: At rest)   Pulse 79   Temp 97.5 °F (36.4 °C)   Resp 18   Ht 5' 7\" (1.702 m)   Wt 93.3 kg (205 lb 11.2 oz)   SpO2 93%   BMI 32.22 kg/m²       Temp (24hrs), Av.7 °F (36.5 °C), Min:97.2 °F (36.2 °C), Max:98.3 °F (36.8 °C)      GEN: WDWN, not in resp distress. HEENT: Unicteric. Wearing mask  CHEST: Non laboured breathing  CVS:RRR, no mur/gallop  ABD: Obese/soft. Non tender  LISET: Chronic scrotal swelling  EXT: left heel dressed. No cellulitic change. On exposed skin. No creps  CNS: A, OX3. Moves all extremity. CN grossly ok. Lab results:    Chemistry  Recent Labs     21  0830 21  0925 21  1333   * 125* 193*    136 136   K 3.7 5.0 4.6    103 103   CO2 26 27 29   BUN 27* 31* 31*   CREA 1.34* 1.52* 1.56*   CA 8.5 7.9* 7.6*   AGAP 9 6 4   BUCR 20 20 20       CBC w/ Diff  No results for input(s): WBC, RBC, HGB, HCT, PLT, GRANS, LYMPH, EOS, HGBEXT, HCTEXT, PLTEXT, HGBEXT, HCTEXT, PLTEXT in the last 72 hours.     Microbiology  All Micro Results     Procedure Component Value Units Date/Time    CULTURE, TISSUE Jeffery Gu STAIN [[id number]] Collected: 21 0907    Order Status: Completed Specimen: Bone Updated: 21 1227     Special Requests: LEFT FOOT        GRAM STAIN 1+ WBCS SEEN         NO ORGANISMS SEEN        Culture result: NO GROWTH 2 DAYS       CULTURE, ANAEROBIC [[id number]] Collected: 21 0929    Order Status: Completed Specimen: Foot, left Updated: 21 1226     Special Requests: NO SPECIAL REQUESTS        Culture result: NO GROWTH 2 DAYS       CULTURE, Patrick Casanova STAIN [[id number]] Collected: 21 0213    Order Status: Completed Specimen: Wound from Foot Updated: 04/19/21 1225     Special Requests: NO SPECIAL REQUESTS        GRAM STAIN OCCASIONAL WBCS SEEN         NO ORGANISMS SEEN        Culture result: NO GROWTH 2 DAYS       CULTURE, BLOOD [[id number]] Collected: 04/14/21 1500    Order Status: Completed Specimen: Blood Updated: 04/19/21 0826     Special Requests: NO SPECIAL REQUESTS        Culture result: NO GROWTH 5 DAYS       CULTURE, BLOOD [[id number]] Collected: 04/14/21 1445    Order Status: Completed Specimen: Blood Updated: 04/19/21 0826     Special Requests: NO SPECIAL REQUESTS        Culture result: NO GROWTH 5 DAYS       LEGIONELLA PNEUMOPHILA AG, URINE [[id number]] Collected: 04/14/21 2000    Order Status: Canceled Specimen: Urine     STREP [place], URINE [[id number]] Collected: 04/14/21 2000    Order Status: Canceled Specimen: Urine     CULTURE, RESPIRATORY/SPUTUM/BRONCH [place] STAIN [[id number]] Collected: 04/14/21 2000    Order Status: Canceled Specimen: Sputum     COVID-19 RAPID TEST [[id number]]  (Abnormal) Collected: 04/14/21 1641    Order Status: Completed Specimen: Nasopharyngeal Updated: 04/14/21 1715     Specimen source Nasopharyngeal        COVID-19 rapid test Detected        Comment:      The specimen is POSITIVE for SARS-CoV-2, the novel coronavirus associated with COVID-19. This test has been authorized by the FDA under an Emergency Use Authorization (EUA) for use by authorized laboratories.         Fact sheet for Healthcare Providers: ConventionUpdate.co.nz  Fact sheet for Patients: ConventionUpdate.co.nz       Methodology: Isothermal Nucleic Acid Amplification  CALLED TO AND CORRECTLY REPEATED BY:  Daniella Woods RN ER TO [facility] AT [id number] ON [id number]                Total duration: 25 minutes

## 2021-04-19 NOTE — PROGRESS NOTES
Problem: Pain  Goal: *Control of Pain  Outcome: Progressing Towards Goal     Problem: Patient Education: Go to Patient Education Activity  Goal: Patient/Family Education  Outcome: Progressing Towards Goal     Problem: Chronic Renal Failure  Goal: *Fluid and electrolytes stabilized  Outcome: Progressing Towards Goal     Problem: Patient Education: Go to Patient Education Activity  Goal: Patient/Family Education  Outcome: Progressing Towards Goal     Problem: Diabetes Maintenance:Admission  Goal: Activity/Safety  Outcome: Progressing Towards Goal  Goal: Diagnostic Tests/Procedures  Outcome: Progressing Towards Goal  Goal: Nutrition  Outcome: Progressing Towards Goal  Goal: Medications  Outcome: Progressing Towards Goal  Goal: Treatments/Interventions/Procedures  Outcome: Progressing Towards Goal     Problem: Falls - Risk of  Goal: *Absence of Falls  Description: Document Joaan Fall Risk and appropriate interventions in the flowsheet. Outcome: Progressing Towards Goal  Note: Fall Risk Interventions:  Mobility Interventions: Communicate number of staff needed for ambulation/transfer, OT consult for ADLs, Patient to call before getting OOB, PT Consult for mobility concerns, PT Consult for assist device competence         Medication Interventions: Patient to call before getting OOB, Teach patient to arise slowly    Elimination Interventions: Call light in reach, Patient to call for help with toileting needs              Problem: Patient Education: Go to Patient Education Activity  Goal: Patient/Family Education  Outcome: Progressing Towards Goal     Problem: Pressure Injury - Risk of  Goal: *Prevention of pressure injury  Description: Document Anam Scale and appropriate interventions in the flowsheet.   Outcome: Progressing Towards Goal  Note: Pressure Injury Interventions:  Sensory Interventions: Assess changes in LOC, Maintain/enhance activity level, Minimize linen layers, Pressure redistribution bed/mattress (bed type)         Activity Interventions: Pressure redistribution bed/mattress(bed type), PT/OT evaluation    Mobility Interventions: Pressure redistribution bed/mattress (bed type), PT/OT evaluation    Nutrition Interventions: Document food/fluid/supplement intake    Friction and Shear Interventions: Apply protective barrier, creams and emollients, Minimize layers                Problem: Patient Education: Go to Patient Education Activity  Goal: Patient/Family Education  Outcome: Progressing Towards Goal

## 2021-04-20 ENCOUNTER — ANESTHESIA EVENT (OUTPATIENT)
Dept: SURGERY | Age: 76
DRG: 628 | End: 2021-04-20
Payer: MEDICARE

## 2021-04-20 ENCOUNTER — APPOINTMENT (OUTPATIENT)
Dept: INTERVENTIONAL RADIOLOGY/VASCULAR | Age: 76
DRG: 628 | End: 2021-04-20
Attending: INTERNAL MEDICINE
Payer: MEDICARE

## 2021-04-20 LAB
ANION GAP SERPL CALC-SCNC: 3 MMOL/L (ref 3–18)
BACTERIA SPEC CULT: NORMAL
BUN SERPL-MCNC: 24 MG/DL (ref 7–18)
BUN/CREAT SERPL: 19 (ref 12–20)
CALCIUM SERPL-MCNC: 8 MG/DL (ref 8.5–10.1)
CHLORIDE SERPL-SCNC: 104 MMOL/L (ref 100–111)
CO2 SERPL-SCNC: 34 MMOL/L (ref 21–32)
CREAT SERPL-MCNC: 1.26 MG/DL (ref 0.6–1.3)
GLUCOSE BLD STRIP.AUTO-MCNC: 131 MG/DL (ref 70–110)
GLUCOSE BLD STRIP.AUTO-MCNC: 145 MG/DL (ref 70–110)
GLUCOSE BLD STRIP.AUTO-MCNC: 162 MG/DL (ref 70–110)
GLUCOSE BLD STRIP.AUTO-MCNC: 169 MG/DL (ref 70–110)
GLUCOSE BLD STRIP.AUTO-MCNC: 189 MG/DL (ref 70–110)
GLUCOSE BLD STRIP.AUTO-MCNC: 200 MG/DL (ref 70–110)
GLUCOSE BLD STRIP.AUTO-MCNC: 210 MG/DL (ref 70–110)
GLUCOSE BLD STRIP.AUTO-MCNC: 216 MG/DL (ref 70–110)
GLUCOSE BLD STRIP.AUTO-MCNC: 267 MG/DL (ref 70–110)
GLUCOSE SERPL-MCNC: 134 MG/DL (ref 74–99)
GRAM STN SPEC: NORMAL
GRAM STN SPEC: NORMAL
POTASSIUM SERPL-SCNC: 3.5 MMOL/L (ref 3.5–5.5)
SARS-COV-2, COV2NT: NOT DETECTED
SERVICE CMNT-IMP: NORMAL
SODIUM SERPL-SCNC: 141 MMOL/L (ref 136–145)

## 2021-04-20 PROCEDURE — 74011250637 HC RX REV CODE- 250/637: Performed by: FAMILY MEDICINE

## 2021-04-20 PROCEDURE — 74011250637 HC RX REV CODE- 250/637: Performed by: SPECIALIST

## 2021-04-20 PROCEDURE — 76937 US GUIDE VASCULAR ACCESS: CPT

## 2021-04-20 PROCEDURE — 74011250636 HC RX REV CODE- 250/636: Performed by: INTERNAL MEDICINE

## 2021-04-20 PROCEDURE — 74011000250 HC RX REV CODE- 250: Performed by: RADIOLOGY

## 2021-04-20 PROCEDURE — 74011250636 HC RX REV CODE- 250/636

## 2021-04-20 PROCEDURE — 74011636637 HC RX REV CODE- 636/637: Performed by: FAMILY MEDICINE

## 2021-04-20 PROCEDURE — 02HV33Z INSERTION OF INFUSION DEVICE INTO SUPERIOR VENA CAVA, PERCUTANEOUS APPROACH: ICD-10-PCS | Performed by: RADIOLOGY

## 2021-04-20 PROCEDURE — 80048 BASIC METABOLIC PNL TOTAL CA: CPT

## 2021-04-20 PROCEDURE — 74011000250 HC RX REV CODE- 250: Performed by: INTERNAL MEDICINE

## 2021-04-20 PROCEDURE — 36415 COLL VENOUS BLD VENIPUNCTURE: CPT

## 2021-04-20 PROCEDURE — 74011250636 HC RX REV CODE- 250/636: Performed by: FAMILY MEDICINE

## 2021-04-20 PROCEDURE — 65660000000 HC RM CCU STEPDOWN

## 2021-04-20 PROCEDURE — 74011000258 HC RX REV CODE- 258: Performed by: INTERNAL MEDICINE

## 2021-04-20 RX ORDER — HEPARIN SODIUM 200 [USP'U]/100ML
500 INJECTION, SOLUTION INTRAVENOUS
Status: COMPLETED | OUTPATIENT
Start: 2021-04-20 | End: 2021-04-20

## 2021-04-20 RX ORDER — HEPARIN SODIUM (PORCINE) LOCK FLUSH IV SOLN 100 UNIT/ML 100 UNIT/ML
500 SOLUTION INTRAVENOUS
Status: DISCONTINUED | OUTPATIENT
Start: 2021-04-20 | End: 2021-04-22 | Stop reason: HOSPADM

## 2021-04-20 RX ORDER — HEPARIN SODIUM 200 [USP'U]/100ML
INJECTION, SOLUTION INTRAVENOUS
Status: COMPLETED
Start: 2021-04-20 | End: 2021-04-20

## 2021-04-20 RX ORDER — LIDOCAINE HYDROCHLORIDE 10 MG/ML
1-20 INJECTION INFILTRATION; PERINEURAL
Status: COMPLETED | OUTPATIENT
Start: 2021-04-20 | End: 2021-04-20

## 2021-04-20 RX ORDER — HEPARIN SODIUM (PORCINE) LOCK FLUSH IV SOLN 100 UNIT/ML 100 UNIT/ML
SOLUTION INTRAVENOUS
Status: COMPLETED
Start: 2021-04-20 | End: 2021-04-20

## 2021-04-20 RX ADMIN — MEROPENEM 1 G: 1 INJECTION, POWDER, FOR SOLUTION INTRAVENOUS at 10:40

## 2021-04-20 RX ADMIN — FAMOTIDINE 20 MG: 20 TABLET, FILM COATED ORAL at 09:02

## 2021-04-20 RX ADMIN — HEPARIN SODIUM IN SODIUM CHLORIDE 1000 UNITS: 200 INJECTION INTRAVENOUS at 15:24

## 2021-04-20 RX ADMIN — LIDOCAINE HYDROCHLORIDE 2 ML: 10 INJECTION, SOLUTION INFILTRATION; PERINEURAL at 15:24

## 2021-04-20 RX ADMIN — AMPICILLIN AND SULBACTAM 3 G: 1; 2 INJECTION, POWDER, FOR SOLUTION INTRAMUSCULAR; INTRAVENOUS at 23:36

## 2021-04-20 RX ADMIN — HEPARIN SODIUM (PORCINE) LOCK FLUSH IV SOLN 100 UNIT/ML 500 UNITS: 100 SOLUTION at 15:25

## 2021-04-20 RX ADMIN — Medication 10 MG: at 22:42

## 2021-04-20 RX ADMIN — MEROPENEM 1 G: 1 INJECTION, POWDER, FOR SOLUTION INTRAVENOUS at 00:07

## 2021-04-20 RX ADMIN — Medication 6000 UNITS: at 10:35

## 2021-04-20 RX ADMIN — HEPARIN SODIUM 1000 UNITS: 200 INJECTION, SOLUTION INTRAVENOUS at 15:24

## 2021-04-20 RX ADMIN — ENOXAPARIN SODIUM 90 MG: 100 INJECTION SUBCUTANEOUS at 22:57

## 2021-04-20 RX ADMIN — AMPICILLIN AND SULBACTAM 3 G: 1; 2 INJECTION, POWDER, FOR SOLUTION INTRAMUSCULAR; INTRAVENOUS at 18:09

## 2021-04-20 RX ADMIN — ZINC SULFATE 220 MG (50 MG) CAPSULE 1 CAPSULE: CAPSULE at 09:03

## 2021-04-20 RX ADMIN — LOSARTAN POTASSIUM 25 MG: 25 TABLET, FILM COATED ORAL at 09:03

## 2021-04-20 RX ADMIN — METOPROLOL SUCCINATE 50 MG: 50 TABLET, EXTENDED RELEASE ORAL at 09:03

## 2021-04-20 RX ADMIN — Medication 500 MG: at 22:42

## 2021-04-20 RX ADMIN — INSULIN LISPRO 3 UNITS: 100 INJECTION, SOLUTION INTRAVENOUS; SUBCUTANEOUS at 17:42

## 2021-04-20 RX ADMIN — Medication 10 ML: at 22:42

## 2021-04-20 RX ADMIN — OXYCODONE HYDROCHLORIDE AND ACETAMINOPHEN 1 TABLET: 5; 325 TABLET ORAL at 09:02

## 2021-04-20 RX ADMIN — INSULIN LISPRO 4 UNITS: 100 INJECTION, SOLUTION INTRAVENOUS; SUBCUTANEOUS at 17:42

## 2021-04-20 RX ADMIN — ATORVASTATIN CALCIUM 40 MG: 20 TABLET, FILM COATED ORAL at 22:43

## 2021-04-20 RX ADMIN — Medication 500 MG: at 09:02

## 2021-04-20 RX ADMIN — INSULIN GLARGINE 15 UNITS: 100 INJECTION, SOLUTION SUBCUTANEOUS at 09:03

## 2021-04-20 RX ADMIN — INSULIN LISPRO 3 UNITS: 100 INJECTION, SOLUTION INTRAVENOUS; SUBCUTANEOUS at 22:57

## 2021-04-20 RX ADMIN — ENOXAPARIN SODIUM 90 MG: 100 INJECTION SUBCUTANEOUS at 09:06

## 2021-04-20 RX ADMIN — FUROSEMIDE 40 MG: 40 TABLET ORAL at 09:03

## 2021-04-20 RX ADMIN — INSULIN LISPRO 3 UNITS: 100 INJECTION, SOLUTION INTRAVENOUS; SUBCUTANEOUS at 06:44

## 2021-04-20 NOTE — PROGRESS NOTES
[facility]                         (A Division of [address] [place] [address] Road)                                                                                                                       Evelyne Rosales MD  Work Cell #: [phone]. If no call or text back within 30 minutes, please call office number. (Prefer text when possible)  Office #: [phone]  [id number] [phone]   Office Fax: [phone]    Date of Admission: 4/14/2021       Date of Note:  4/20/2021   Consult FU for : Left diabetic foot infection    Summary:    70 y.o. WHITE male with PMH of DM, CAD with CHF and CKD. He has a blister and redness on lower LE about two weeks ago, which eschared and removed the scab. Around that time, he also had flu like symptom. After that, he had pain and swelling which opened up on his heel. Per ED reports, had attempt to debridement by Podiatry on his heel, prior to arrival to ED.     Denies cough, sob. Unaware of COVID 19 contact but his son has tested negative since his diagnosis. Unaware of his wife status.     Retired from [employer] in early [address] [employer], and he relocated from [place] to here to be close to his children. COVID 19 + 4/14       Subjective: no new complaint. Doing ok  OR again today    Condition: Stable    Interval History:  I and D of left heel including heel bone- done 4/17             Current Antimicrobials:    Prior Antimicrobials:  · Unasyn 3 gm Q6 hour 4/20 to date  · Right arm PICC- 4/20 · Zosyn 2.25gm IV Q6 4/14 to 4/15  · Vancomycin IV 4/14  until 4/19  · Zithromax 500mg IV- 4/14 to 4/20  · Meropenem IV 4/15-4/20       Assessment: Rec / Plan:   · Left Infected DM Ulcer and acute OM and necrotic tissue, punched out heel ulcer. Mixed infection- MSSA +E.coli + anaerobes  · COVID 19 Infection/ PNA- Right UL infiltrate, without hypoxia.   · Post I and D of left heel bone/abscess with ABX bead placement:  4/17/21  -procal 0.18, D-dimer 1.97, gladystin 136, , fibrinogen 796  Difficult to tell if Flu like symptom due to cellulitis/ soft tissue infection or onset of COVID 2 weeks ago.      Other Medical Issues followed and managed by primary and other services  · DM- Poorly controlled  · Low Vitamin D  · CAD with CHF/ NSTMI. Atrial flutter  · CKD  · Hyperlipidemia  · ch  Past ID issues: N/A Cont Unasyn 3gm IV Q6 for mixed infection  Repeat OR -4/21    PICC needed for long term abxX6 weeks- can place tomorrow- until 6/1/21  CM to review for place of ABX-  -- can go on Unasyn Q6 via pump. Alternate rx--ertapenem 1 gm IV q24 hour if unable to do Unasyn as OP. Will follow               Microbiology:  Blood culture: 4/14- 2 sets: NGSF     Urine culture:N/A     Body Fluid/ CSF/drainage culture:  4/14- gram stain: GNR and GPR        --wound culture+ s.aurues and E.coli + anaerobe   4/17-- OR culture in progress    Cath tip/ PICC culture: right PICC 4/20       Patient Active Problem List   Diagnosis Code    Atrial flutter ([facility]) I48.92    DM2 (diabetes mellitus, type 2) ([facility]) E11.9    Acute renal insufficiency N28.9    NSTEMI (non-ST elevated myocardial infarction) ([facility]) M50.2    Systolic CHF, acute ([facility]) I50.21    Symptomatic anemia D64.9    CAD (coronary artery disease) I25.10    GI bleed K92.2    ELINOR (acute kidney injury) ([facility] N17.9    CKD (chronic kidney disease) stage 3, GFR 30-59 ml/min ([facility]) N18.30    Elevated brain natriuretic peptide (BNP) level R79.89    CAP (community acquired pneumonia) J18.9    Diabetic ulcer of left foot ([facility] E11.621, L97.529    COVID-19 virus infection U07.1    Acute osteomyelitis ([facility] M86.10    Acute hematogenous osteomyelitis of left foot ([facility] M86.072    Cellulitis of left foot L03. 116    Diabetic foot ulcer with osteomyelitis ([facility]) E11.621, E11.69, L97.509, M86.9       Current Facility-Administered Medications   Medication Dose Route Frequency    ampicillin-sulbactam (UNASYN) 3 g in 0.9% sodium chloride (MBP/ADV) 100 mL MBP  3 g IntraVENous Q6H    enoxaparin (LOVENOX) injection 90 mg  1 mg/kg SubCUTAneous Q12H    oxyCODONE-acetaminophen (PERCOCET) 5-325 mg per tablet 1 Tab  1 Tab Oral PRN    insulin lispro (HUMALOG) injection 4 Units  4 Units SubCUTAneous TIDAC    sodium chloride (NS) flush 5-40 mL  5-40 mL IntraVENous Q8H    sodium chloride (NS) flush 5-40 mL  5-40 mL IntraVENous PRN    polyethylene glycol (MIRALAX) packet 17 g  17 g Oral DAILY PRN    promethazine (PHENERGAN) tablet 12.5 mg  12.5 mg Oral Q6H PRN    Or    ondansetron (ZOFRAN) injection 4 mg  4 mg IntraVENous Q6H PRN    famotidine (PEPCID) tablet 20 mg  20 mg Oral DAILY    insulin lispro (HUMALOG) injection   SubCUTAneous AC&HS    glucose chewable tablet 16 g  4 Tab Oral PRN    glucagon (GLUCAGEN) injection 1 mg  1 mg IntraMUSCular PRN    dextrose (D50W) injection syrg 12.5-25 g  25-50 mL IntraVENous PRN    acetaminophen (TYLENOL) tablet 650 mg  650 mg Oral Q6H PRN    Or    acetaminophen (TYLENOL) suppository 650 mg  650 mg Rectal Q6H PRN    guaiFENesin-dextromethorphan (ROBITUSSIN DM) 100-10 mg/5 mL syrup 5 mL  5 mL Oral Q4H PRN    [START ON 4/21/2021] cholecalciferol (VITAMIN D3) (1000 Units /25 mcg) tablet 2,000 Units  2,000 Units Oral DAILY    ascorbic acid (vitamin C) (VITAMIN C) tablet 500 mg  500 mg Oral BID    zinc sulfate (ZINCATE) 50 mg zinc (220 mg) capsule 1 Cap  1 Cap Oral DAILY    melatonin (rapid dissolve) tablet 10 mg  10 mg Oral QHS    atorvastatin (LIPITOR) tablet 40 mg  40 mg Oral QHS    furosemide (LASIX) tablet 40 mg  40 mg Oral DAILY    insulin glargine (LANTUS) injection 15 Units  15 Units SubCUTAneous DAILY    losartan (COZAAR) tablet 25 mg  25 mg Oral DAILY    metoprolol succinate (TOPROL-XL) XL tablet 50 mg  50 mg Oral DAILY         Review of Systems - Negative except left heel pain.  About the same       Objective:    Visit Vitals  BP (!) 129/90   Pulse 91   Temp 97.7 °F (36.5 °C)   Resp 20 Ht 5' 7\" (1.702 m)   Wt 91.3 kg (201 lb 3.2 oz)   SpO2 90%   BMI 31.51 kg/m²       Temp (24hrs), Av.7 °F (36.5 °C), Min:97.4 °F (36.3 °C), Max:98.1 °F (36.7 °C)      GEN: WDWN, not in resp distress. HEENT: Unicteric. Wearing mask  CHEST: Non laboured breathing  EXT: left heel dressed. No cellulitic change. On exposed skin. No creps  CNS: A, OX3. Moves all extremity. CN grossly ok. Lab results:    Chemistry  Recent Labs     21  0610 21  0830 21  0925   * 142* 125*    138 136   K 3.5 3.7 5.0    103 103   CO2 34* 26 27   BUN 24* 27* 31*   CREA 1.26 1.34* 1.52*   CA 8.0* 8.5 7.9*   AGAP 3 9 6   BUCR  20       CBC w/ Diff  No results for input(s): WBC, RBC, HGB, HCT, PLT, GRANS, LYMPH, EOS, HGBEXT, HCTEXT, PLTEXT, HGBEXT, HCTEXT, PLTEXT in the last 72 hours.     Microbiology  All Micro Results     Procedure Component Value Units Date/Time    CULTURE, BLOOD [[id number]] Collected: 21 1445    Order Status: Completed Specimen: Blood Updated: 2156     Special Requests: NO SPECIAL REQUESTS        Culture result: NO GROWTH 6 DAYS       CULTURE, BLOOD [[id number]] Collected: 21 1500    Order Status: Completed Specimen: Blood Updated: 2156     Special Requests: NO SPECIAL REQUESTS        Culture result: NO GROWTH 6 DAYS       CULTURE, TISSUE Eren Yu [[id number]] Collected: 21 0955    Order Status: Completed Specimen: Bone Updated: 21 1227     Special Requests: LEFT FOOT        GRAM STAIN 1+ WBCS SEEN         NO ORGANISMS SEEN        Culture result: NO GROWTH 2 DAYS       CULTURE, ANAEROBIC [[id number]] Collected: 21 0929    Order Status: Completed Specimen: Foot, left Updated: 21 1226     Special Requests: NO SPECIAL REQUESTS        Culture result: NO GROWTH 2 DAYS       CULTURE, Patrick Casanova STAIN [[id number]] Collected: 21 1996    Order Status: Completed Specimen: Wound from Foot Updated: 21 1229 Special Requests: NO SPECIAL REQUESTS        GRAM STAIN OCCASIONAL WBCS SEEN         NO ORGANISMS SEEN        Culture result: NO GROWTH 2 DAYS       LEGIONELLA PNEUMOPHILA AG, URINE [[id number]] Collected: 04/14/21 2000    Order Status: Canceled Specimen: Urine     STREP Eyal Powell, URINE [[id number]] Collected: 04/14/21 2000    Order Status: Canceled Specimen: Urine     CULTURE, RESPIRATORY/SPUTUM/BRONCH Mollie Amen STAIN [[id number]] Collected: 04/14/21 2000    Order Status: Canceled Specimen: Sputum     COVID-19 RAPID TEST [[id number]]  (Abnormal) Collected: 04/14/21 1641    Order Status: Completed Specimen: Nasopharyngeal Updated: 04/14/21 1715     Specimen source Nasopharyngeal        COVID-19 rapid test Detected        Comment:      The specimen is POSITIVE for SARS-CoV-2, the novel coronavirus associated with COVID-19. This test has been authorized by the FDA under an Emergency Use Authorization (EUA) for use by authorized laboratories.         Fact sheet for Healthcare Providers: ConventionUpdate.co.nz  Fact sheet for Patients: ConventionUpdate.co.nz       Methodology: Isothermal Nucleic Acid Amplification  CALLED TO AND CORRECTLY REPEATED BY:  Ingrid Sadler RN ER TO [facility] AT 2808 ON [id number]                Total duration: 15 minutes

## 2021-04-20 NOTE — PROGRESS NOTES
Problem: Pain  Goal: *Control of Pain  Outcome: Progressing Towards Goal     Problem: Patient Education: Go to Patient Education Activity  Goal: Patient/Family Education  Outcome: Progressing Towards Goal     Problem: Chronic Renal Failure  Goal: *Fluid and electrolytes stabilized  Outcome: Progressing Towards Goal     Problem: Patient Education: Go to Patient Education Activity  Goal: Patient/Family Education  Outcome: Progressing Towards Goal     Problem: Diabetes Maintenance:Admission  Goal: Activity/Safety  Outcome: Progressing Towards Goal  Goal: Diagnostic Tests/Procedures  Outcome: Progressing Towards Goal  Goal: Nutrition  Outcome: Progressing Towards Goal  Goal: Medications  Outcome: Progressing Towards Goal  Goal: Treatments/Interventions/Procedures  Outcome: Progressing Towards Goal     Problem: Diabetes Maintenance:Ongoing  Goal: Activity/Safety  Outcome: Progressing Towards Goal  Goal: Nutrition  Outcome: Progressing Towards Goal  Goal: Medications  Outcome: Progressing Towards Goal  Goal: Treatments/Interventsions/Procedures  Outcome: Progressing Towards Goal  Goal: *Blood Glucose 80 to 180 md/dl  Outcome: Progressing Towards Goal     Problem: Diabetes Maintenance:Discharge Outcomes  Goal: *Describes follow-up/return visits to physicians  Outcome: Progressing Towards Goal  Goal: *Blood glucose at patient's target range or approaching  Outcome: Progressing Towards Goal  Goal: *Aware of nutrition guidelines  Outcome: Progressing Towards Goal  Goal: *Verbalizes information about medication  Description: Verbalizes name, dosage, time, side effects, and number of days to  continue medications.   Outcome: Progressing Towards Goal  Goal: *Describes goals, rules, symptoms, and treatments  Description: Describes blood glucose goals, monitoring, sick day rules,  hypo/hyperglycemia prevention, symptoms, and treatment  Outcome: Progressing Towards Goal  Goal: *Describes available outpatient diabetes resources and support systems  Outcome: Progressing Towards Goal     Problem: Diabetic Ulcer-Treatment  Goal: *Improvement of existing diabetic ulcer  Outcome: Progressing Towards Goal     Problem: Patient Education: Go to Patient Education Activity  Goal: Patient/Family Education  Outcome: Progressing Towards Goal     Problem: Falls - Risk of  Goal: *Absence of Falls  Description: Document Joana Fall Risk and appropriate interventions in the flowsheet. Outcome: Progressing Towards Goal  Note: Fall Risk Interventions:  Mobility Interventions: Communicate number of staff needed for ambulation/transfer, OT consult for ADLs, Patient to call before getting OOB, PT Consult for mobility concerns, PT Consult for assist device competence         Medication Interventions: Patient to call before getting OOB, Teach patient to arise slowly    Elimination Interventions: Call light in reach, Patient to call for help with toileting needs              Problem: Patient Education: Go to Patient Education Activity  Goal: Patient/Family Education  Outcome: Progressing Towards Goal     Problem: Pressure Injury - Risk of  Goal: *Prevention of pressure injury  Description: Document Anam Scale and appropriate interventions in the flowsheet. Outcome: Progressing Towards Goal     Problem: Patient Education: Go to Patient Education Activity  Goal: Patient/Family Education  Outcome: Progressing Towards Goal     Problem: Airway Clearance - Ineffective  Goal: Achieve or maintain patent airway  Outcome: Progressing Towards Goal     Problem: Gas Exchange - Impaired  Goal: Absence of hypoxia  Outcome: Progressing Towards Goal  Goal: Promote optimal lung function  Outcome: Progressing Towards Goal     Problem: Breathing Pattern - Ineffective  Goal: Ability to achieve and maintain a regular respiratory rate  Outcome: Progressing Towards Goal     Problem:  Body Temperature -  Risk of, Imbalanced  Goal: Ability to maintain a body temperature within defined limits  Outcome: Progressing Towards Goal  Goal: Will regain or maintain usual level of consciousness  Outcome: Progressing Towards Goal  Goal: Complications related to the disease process, condition or treatment will be avoided or minimized  Outcome: Progressing Towards Goal     Problem: Isolation Precautions - Risk of Spread of Infection  Goal: Prevent transmission of infectious organism to others  Outcome: Progressing Towards Goal     Problem: Nutrition Deficits  Goal: Optimize nutrtional status  Outcome: Progressing Towards Goal     Problem: Risk for Fluid Volume Deficit  Goal: Maintain normal heart rhythm  Outcome: Progressing Towards Goal  Goal: Maintain absence of muscle cramping  Outcome: Progressing Towards Goal  Goal: Maintain normal serum potassium, sodium, calcium, phosphorus, and pH  Outcome: Progressing Towards Goal     Problem: Loneliness or Risk for Loneliness  Goal: Demonstrate positive use of time alone when socialization is not possible  Outcome: Progressing Towards Goal     Problem: Fatigue  Goal: Verbalize increase energy and improved vitality  Outcome: Progressing Towards Goal     Problem: Patient Education: Go to Patient Education Activity  Goal: Patient/Family Education  Outcome: Progressing Towards Goal

## 2021-04-20 NOTE — ROUTINE PROCESS
Bedside and Verbal shift change report given to Jose Daniel Radford  (oncoming nurse) by Heavenly Simms RN  (offgoing nurse). Report given with SBAR, Kardex, Intake/Output and Recent Results.

## 2021-04-20 NOTE — PROGRESS NOTES
Hospitalist Progress Note    Patient: Ivonne Lyon MRN: [id number]  [facility]: [id number]    YOB: 1945  Age: 70 y.o. Sex: male    DOA: 4/14/2021 LOS:  LOS: 6 days                Assessment/Plan     Patient Active Problem List   Diagnosis Code    Atrial flutter ([facility]) I48.92    DM2 (diabetes mellitus, type 2) ([facility] E11.9    Acute renal insufficiency N28.9    NSTEMI (non-ST elevated myocardial infarction) ([facility] L35.1    Systolic CHF, acute ([facility]) I50.21    Symptomatic anemia D64.9    CAD (coronary artery disease) I25.10    GI bleed K92.2    ELINOR (acute kidney injury) ([facility] N17.9    CKD (chronic kidney disease) stage 3, GFR 30-59 ml/min ([facility]) N18.30    Elevated brain natriuretic peptide (BNP) level R79.89    CAP (community acquired pneumonia) J18.9    Diabetic ulcer of left foot ([facility] E11.621, L97.529    COVID-19 virus infection U07.1    Acute osteomyelitis ([facility] M86.10    Acute hematogenous osteomyelitis of left foot ([facility] M86.072    Cellulitis of left foot L03. 80    Diabetic foot ulcer with osteomyelitis ([facility] E11.621, E11.69, L97.509, M86.9            65-year-old male with past medical history of diabetes, atrial fibrillation on Eliquis, coronary artery disease history of CHF and NSTEMI in the past as well as chronic kidney disease stage III admitted for infected diabetic ulcer on left heel of foot.  In emergency room found to be Covid positive.      Osteomyelitis left heel with cellulitis, gangrenous ulcer with deep abscess tracking along the plantar fascia -  Status post incision and drainage lower extremity left heel debridement and antibiotic bead placement   Continue IV antibiotics,  infectious disease following  Dr. Karsten Downing plans to take the patient back to the OR next week to remove the antibiotic beads and graft open wound.   Will need 6 weeks of IV antibiotics via PICC line    Covid pneumonia  On vitamin/antioxidant cocktail  Not hypoxic    Stage III chronic kidney disease -  Monitor renal function    HTN -  On cozaar, toprol xl, lasix. Monitor BP    Diabetes mellitus now with hyperglycemia -  No better controlled,   Continue with SSI    CAD -  No anginal symptoms    A-flutter -  Rate controlled on toprol xl   on therapeutic lovenox      Disposition : 2-3 days    Review of systems  General: No fevers or chills. Cardiovascular: No chest pain or pressure. No palpitations. Pulmonary: No shortness of breath. Gastrointestinal: No nausea, vomiting. Physical Exam:  General: Awake, cooperative, no acute distress    HEENT: NC, Atraumatic. PERRLA, anicteric sclerae. Lungs: CTA Bilaterally. No Wheezing/Rhonchi/Rales. Heart:  S1 S2,  No murmur, No Rubs, No Gallops  Abdomen: Soft, Non distended, Non tender.  +Bowel sounds,   Extremities: Left foot with ace wrap  Psych:   Not anxious or agitated. Neurologic:  No acute neurological deficit. Vital signs/Intake and Output:  Visit Vitals  BP (!) 208/93   Pulse 88   Temp 97.8 °F (36.6 °C)   Resp 20   Ht 5' 7\" (1.702 m)   Wt 91.3 kg (201 lb 3.2 oz)   SpO2 95%   BMI 31.51 kg/m²     Current Shift:  No intake/output data recorded. Last three shifts:  No intake/output data recorded. Labs: Results:       Chemistry Recent Labs     04/20/21  0610 04/19/21  0830 04/18/21  0925   * 142* 125*    138 136   K 3.5 3.7 5.0    103 103   CO2 34* 26 27   BUN 24* 27* 31*   CREA 1.26 1.34* 1.52*   CA 8.0* 8.5 7.9*   AGAP 3 9 6   BUCR 19 20 20      CBC w/Diff No results for input(s): WBC, RBC, HGB, HCT, PLT, GRANS, LYMPH, EOS, HGBEXT, HCTEXT, PLTEXT, HGBEXT, HCTEXT, PLTEXT in the last 72 hours. Cardiac Enzymes No results for input(s): CPK, CKND1, LEONEL in the last 72 hours. No lab exists for component: CKRMB, TROIP   Coagulation No results for input(s): PTP, INR, APTT, INREXT, INREXT in the last 72 hours.     Lipid Panel No results found for: CHOL, CHOLPOCT, CHOLX, CHLST, CHOLV, 004985, HDL, HDLP, LDL, LDLC, DLDLP, 539531, VLDLC, VLDL, TGLX, TRIGL, TRIGP, TGLPOCT, CHHD, CHHDX   BNP No results for input(s): BNPP in the last 72 hours. Liver Enzymes No results for input(s): TP, ALB, TBIL, AP in the last 72 hours.     No lab exists for component: SGOT, GPT, DBIL   Thyroid Studies Lab Results   Component Value Date/Time    TSH 1.72 02/11/2018 02:19 AM        Procedures/imaging: see electronic medical records for all procedures/Xrays and details which were not copied into this note but were reviewed prior to creation of Plan

## 2021-04-20 NOTE — PROGRESS NOTES
Bedside and Verbal shift change report given to HODA Daily R.N. (oncoming nurse) by ALICE Dutta R.N. (offgoing nurse). Report included the following information SBAR, Kardex, Intake/Output, MAR, Accordion, Recent Results, and Med Rec Status.

## 2021-04-20 NOTE — PROGRESS NOTES
Problem: Pain  Goal: *Control of Pain  Outcome: Progressing Towards Goal     Problem: Patient Education: Go to Patient Education Activity  Goal: Patient/Family Education  Outcome: Progressing Towards Goal     Problem: Chronic Renal Failure  Goal: *Fluid and electrolytes stabilized  Outcome: Progressing Towards Goal     Problem: Patient Education: Go to Patient Education Activity  Goal: Patient/Family Education  Outcome: Progressing Towards Goal     Problem: Diabetes Maintenance:Admission  Goal: Activity/Safety  Outcome: Progressing Towards Goal  Goal: Diagnostic Tests/Procedures  Outcome: Progressing Towards Goal  Goal: Nutrition  Outcome: Progressing Towards Goal

## 2021-04-20 NOTE — PROGRESS NOTES
Received verbal bedside report from off going nurse JOSE Genao R.N. Patient care received. Patient alert and oriented x 4. Patient resting in bed denies pain. Patient stable. Call light with in reach bed in lowest position.

## 2021-04-20 NOTE — OP NOTES
[facility] [place]  OPERATIVE REPORT    Name:  Ambreen Holliday  MR#:   [id number]  :  1945  ACCOUNT #:  [de-identified]  DATE OF SERVICE:  2021    LOCATION:  OPS. PREOPERATIVE DIAGNOSES:  1. 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 2.  Cellulitis with abscess, left heel. 3.  Grade B3 [employer] necrotic ulcer of the left heel with gangrene. POSTOPERATIVE DIAGNOSES:  1. 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 2.  Cellulitis with abscess, left heel. 3.  Grade B3 [employer] necrotic ulcer of the left heel with gangrene. PROCEDURES PERFORMED:  1. Partial excision, left calcaneus, with implantation of antibiotic beads secondary to osteomyelitis with saucerization and curettement of infected bone. 2.  Incision and drainage, deep/complicated left heel abscess. 3.  Bone biopsy of the left calcaneus for osteomyelitis. 4.  Surgical wound prep for Stravix graft placement in four days as a separate planned procedure to facilitate wound closure. SURGEON:  Claudette Fox DPM    ASSISTANT:  Goapl Arteaga    ANESTHESIA:  MAC with local consisting of 10 mL of 0.5% Marcaine with epinephrine given to the left heel. COMPLICATIONS:  None. SPECIMENS REMOVED:  A portion of the left calcaneus and bone biopsy of the left calcaneus. HEMOSTASIS:  There was no tourniquet used in this procedure. MATERIALS:  3-0 Vicryl, 2-0 nylon. Also Stimulan Rapid Cure antibiotic beads with vancomycin and Rocephin. Also, aerobic and anaerobic cultures were taken. INJECTABLES:  20 mL of 0.5% Marcaine with epinephrine was also given. IMPLANTS:  Stimulan Rapid Cure antibiotic beads were implanted and also Iodoform gauze was used. ESTIMATED BLOOD LOSS:  Minimal, less than 10 mL.     INDICATIONS FOR PROCEDURE:  The patient is a 59-year-old diabetic male admitted to [facility] on 2021 for treatment of open infected gangrenous ulcer with abscess, left heel, with osteomyelitis of the left calcaneus. The patient is on IV antibiotics, and his MRI shows osteomyelitis of the plantar body of the calcaneus that requires surgical correction. All conservative treatments have failed to offer the patient adequate relief, and the patient desires surgical correction. The planned procedures were explained to the patient in detail, including all risks, benefits, and possible complications, and the patient still desires surgical correction. Medical clearance was obtained prior to surgery. Consent was signed and on chart. All of the patient's questions were answered, and no guarantees were given. Lastly, the patient was asked if he had any bleeding disorders, blood clot history, and he stated that he had none. PROCEDURE:  The patient was brought to the operating room and placed on the operating room table in the supine position. The patient's left heel area was then anesthetized using 10 mL of 0.5% Marcaine with epinephrine as a local block. Next, the patient's left foot was then prepped and draped in the usual sterile manner. Again, no tourniquet was used in this procedure. Attention was then drawn to the plantar left heel area where the necrotic open ulcer was noted. A #10 blade was then used to debride away all gangrenous necrotic tissue, including about 5 cm of the plantar fascia. Next, the incision and drainage of the left heel was done using a #10 blade to make an approximately 10 cm linear skin incision up into the plantar arch area. A second #10 blade was then used to deepen this incision down to muscle and fascial layer, deep to bone. Next, both aerobic and anaerobic cultures were taken and sent to [facility]. Blunt dissection was then continued deeper into the hindfoot to drain all pus and infected areas both medial and laterally.   Next, the surgical site was then flushed with copious amounts of normal sterile saline mixed with Polysporin using a pulse lavage to complete the complicated deep incision and drainage. Next, the body of the calcaneus was then inspected and found to be soft at the plantar posterior area, which was likely to be bone infection, and therefore, a Delphix saw was then used to make a through-and-through osteotomy to excise a 3 cm section of the plantar calcaneus. Next, a bone curette was then used for saucerization and curettement of the calcaneus to remove all soft, gray infected bone down to healthy bleeding bone. Next, a bone biopsy was then taken and sent to pathology to rule out further osteomyelitis. Next, the remaining portion of the calcaneus was then inspected and found to be hard and healthy-looking bone. This was the remaining portion of the body of the calculus. Next, the Stimulan antibiotic beads were then fabricated using vancomycin and Rocephin, and the antibiotic beads were then passed into and on top of the remaining surface of the calcaneus to eradicate the osteomyelitis. Next, the skin incision measuring approximately 10 cm up in the arch area was then closed using 3-0 Vicryl for the deeper stitches and also 2-0 nylon in a simple interrupted technique. Next, the Versajet, a high water pressure debridement system, was then used to complete the surgical wound preparation of the large open plantar ulcer. This was done to accept a Stravix stem cell graft that will be applied in four days to facilitate wound closure due to the deep nature of the wound with exposed bone. Next, the wound was then dressed using Adaptic, 4x4s, Kerlix, ABDs, and an Ace wrap. The patient was then transported to the recovery room with vital signs stable and neurovascular status returned to baseline for the patient's left foot. The patient tolerated the procedures and anesthesia well with no complications.     POSTOPERATIVE CHECK:  The patient was later seen in the recovery room with his dressings clean, dry, and intact with no bleeding noted to his left foot. He stated that he had no pain. He was later returned to his room on the third floor, the medical floor, for continued care. The patient will continue with IV antibiotics and medical care as needed. Podiatry will follow as needed. Lastly, he will be brought back to the OR for a planned future procedure done in four days for a planned Stravix graft to the bottom of his heel.       FREDRICK Rudolph/[email or web address]/[email or web address]  D:  04/19/2021 20:39  T:  04/20/2021 6:56  JOB #:  [id number]

## 2021-04-20 NOTE — PROGRESS NOTES
[id number] Shift Summary: Pt rested well overnight with no complaints. No new clinical concerns noted.      Nightshift Chart Audit Completed

## 2021-04-21 ENCOUNTER — ANESTHESIA (OUTPATIENT)
Dept: SURGERY | Age: 76
DRG: 628 | End: 2021-04-21
Payer: MEDICARE

## 2021-04-21 ENCOUNTER — HOME HEALTH ADMISSION (OUTPATIENT)
Dept: HOME HEALTH SERVICES | Facility: HOME HEALTH | Age: 76
End: 2021-04-21

## 2021-04-21 PROBLEM — L97.424 ULCER OF LEFT HEEL, WITH NECROSIS OF BONE (HCC): Status: ACTIVE | Noted: 2021-04-21

## 2021-04-21 LAB
ANION GAP SERPL CALC-SCNC: 6 MMOL/L (ref 3–18)
BUN SERPL-MCNC: 24 MG/DL (ref 7–18)
BUN/CREAT SERPL: 17 (ref 12–20)
CALCIUM SERPL-MCNC: 8 MG/DL (ref 8.5–10.1)
CHLORIDE SERPL-SCNC: 106 MMOL/L (ref 100–111)
CO2 SERPL-SCNC: 32 MMOL/L (ref 21–32)
CREAT SERPL-MCNC: 1.41 MG/DL (ref 0.6–1.3)
GLUCOSE BLD STRIP.AUTO-MCNC: 107 MG/DL (ref 70–110)
GLUCOSE BLD STRIP.AUTO-MCNC: 109 MG/DL (ref 70–110)
GLUCOSE BLD STRIP.AUTO-MCNC: 191 MG/DL (ref 70–110)
GLUCOSE SERPL-MCNC: 93 MG/DL (ref 74–99)
POTASSIUM SERPL-SCNC: 3.4 MMOL/L (ref 3.5–5.5)
SODIUM SERPL-SCNC: 144 MMOL/L (ref 136–145)

## 2021-04-21 PROCEDURE — 74011250636 HC RX REV CODE- 250/636: Performed by: PODIATRIST

## 2021-04-21 PROCEDURE — 74011250636 HC RX REV CODE- 250/636: Performed by: INTERNAL MEDICINE

## 2021-04-21 PROCEDURE — 2709999900 HC NON-CHARGEABLE SUPPLY: Performed by: PODIATRIST

## 2021-04-21 PROCEDURE — 65660000000 HC RM CCU STEPDOWN

## 2021-04-21 PROCEDURE — 74011636637 HC RX REV CODE- 636/637: Performed by: HOSPITALIST

## 2021-04-21 PROCEDURE — 36415 COLL VENOUS BLD VENIPUNCTURE: CPT

## 2021-04-21 PROCEDURE — 82962 GLUCOSE BLOOD TEST: CPT

## 2021-04-21 PROCEDURE — 77030031741 HC HNDPC IRR VRSAJET PLS S&N -F: Performed by: PODIATRIST

## 2021-04-21 PROCEDURE — 0HRNXK3 REPLACEMENT OF LEFT FOOT SKIN WITH NONAUTOLOGOUS TISSUE SUBSTITUTE, FULL THICKNESS, EXTERNAL APPROACH: ICD-10-PCS | Performed by: PODIATRIST

## 2021-04-21 PROCEDURE — 74011250636 HC RX REV CODE- 250/636: Performed by: FAMILY MEDICINE

## 2021-04-21 PROCEDURE — 74011000250 HC RX REV CODE- 250: Performed by: PODIATRIST

## 2021-04-21 PROCEDURE — 74011250636 HC RX REV CODE- 250/636: Performed by: NURSE ANESTHETIST, CERTIFIED REGISTERED

## 2021-04-21 PROCEDURE — 74011000250 HC RX REV CODE- 250: Performed by: NURSE ANESTHETIST, CERTIFIED REGISTERED

## 2021-04-21 PROCEDURE — 77030040361 HC SLV COMPR DVT MDII -B: Performed by: PODIATRIST

## 2021-04-21 PROCEDURE — 76010000138 HC OR TIME 0.5 TO 1 HR: Performed by: PODIATRIST

## 2021-04-21 PROCEDURE — 74011000272 HC RX REV CODE- 272: Performed by: PODIATRIST

## 2021-04-21 PROCEDURE — 0QBM0ZZ EXCISION OF LEFT TARSAL, OPEN APPROACH: ICD-10-PCS | Performed by: PODIATRIST

## 2021-04-21 PROCEDURE — 80048 BASIC METABOLIC PNL TOTAL CA: CPT

## 2021-04-21 PROCEDURE — 77030031139 HC SUT VCRL2 J&J -A: Performed by: PODIATRIST

## 2021-04-21 PROCEDURE — 74011636637 HC RX REV CODE- 636/637: Performed by: FAMILY MEDICINE

## 2021-04-21 PROCEDURE — 76060000032 HC ANESTHESIA 0.5 TO 1 HR: Performed by: PODIATRIST

## 2021-04-21 PROCEDURE — 74011000258 HC RX REV CODE- 258: Performed by: INTERNAL MEDICINE

## 2021-04-21 PROCEDURE — 74011250637 HC RX REV CODE- 250/637: Performed by: FAMILY MEDICINE

## 2021-04-21 PROCEDURE — 74011250637 HC RX REV CODE- 250/637: Performed by: HOSPITALIST

## 2021-04-21 DEVICE — GRAFT HUM TISS W3XL6CM CRYOPRESERVED PLCNTA TISS UMB AMNION: Type: IMPLANTABLE DEVICE | Site: FOOT | Status: FUNCTIONAL

## 2021-04-21 RX ORDER — INSULIN LISPRO 100 [IU]/ML
INJECTION, SOLUTION INTRAVENOUS; SUBCUTANEOUS
Status: DISCONTINUED | OUTPATIENT
Start: 2021-04-21 | End: 2021-04-22 | Stop reason: HOSPADM

## 2021-04-21 RX ORDER — PROCHLORPERAZINE EDISYLATE 5 MG/ML
5 INJECTION INTRAMUSCULAR; INTRAVENOUS ONCE
Status: CANCELLED | OUTPATIENT
Start: 2021-04-21 | End: 2021-04-21

## 2021-04-21 RX ORDER — POTASSIUM CHLORIDE 20 MEQ/1
40 TABLET, EXTENDED RELEASE ORAL
Status: COMPLETED | OUTPATIENT
Start: 2021-04-21 | End: 2021-04-21

## 2021-04-21 RX ORDER — HYDROMORPHONE HYDROCHLORIDE 1 MG/ML
0.5 INJECTION, SOLUTION INTRAMUSCULAR; INTRAVENOUS; SUBCUTANEOUS
Status: CANCELLED | OUTPATIENT
Start: 2021-04-21

## 2021-04-21 RX ORDER — PROPOFOL 10 MG/ML
INJECTION, EMULSION INTRAVENOUS AS NEEDED
Status: DISCONTINUED | OUTPATIENT
Start: 2021-04-21 | End: 2021-04-21 | Stop reason: HOSPADM

## 2021-04-21 RX ORDER — FENTANYL CITRATE 50 UG/ML
50 INJECTION, SOLUTION INTRAMUSCULAR; INTRAVENOUS AS NEEDED
Status: CANCELLED | OUTPATIENT
Start: 2021-04-21 | End: 2021-04-25

## 2021-04-21 RX ORDER — MIDAZOLAM HYDROCHLORIDE 1 MG/ML
INJECTION, SOLUTION INTRAMUSCULAR; INTRAVENOUS AS NEEDED
Status: DISCONTINUED | OUTPATIENT
Start: 2021-04-21 | End: 2021-04-21 | Stop reason: HOSPADM

## 2021-04-21 RX ORDER — FLUMAZENIL 0.1 MG/ML
0.2 INJECTION INTRAVENOUS
Status: CANCELLED | OUTPATIENT
Start: 2021-04-21

## 2021-04-21 RX ORDER — SODIUM CHLORIDE, SODIUM LACTATE, POTASSIUM CHLORIDE, CALCIUM CHLORIDE 600; 310; 30; 20 MG/100ML; MG/100ML; MG/100ML; MG/100ML
125 INJECTION, SOLUTION INTRAVENOUS CONTINUOUS
Status: DISCONTINUED | OUTPATIENT
Start: 2021-04-21 | End: 2021-04-22

## 2021-04-21 RX ORDER — BUPIVACAINE HYDROCHLORIDE AND EPINEPHRINE 5; 5 MG/ML; UG/ML
INJECTION, SOLUTION PERINEURAL AS NEEDED
Status: DISCONTINUED | OUTPATIENT
Start: 2021-04-21 | End: 2021-04-22 | Stop reason: HOSPADM

## 2021-04-21 RX ORDER — LIDOCAINE HYDROCHLORIDE 20 MG/ML
INJECTION, SOLUTION EPIDURAL; INFILTRATION; INTRACAUDAL; PERINEURAL AS NEEDED
Status: DISCONTINUED | OUTPATIENT
Start: 2021-04-21 | End: 2021-04-21 | Stop reason: HOSPADM

## 2021-04-21 RX ORDER — MAGNESIUM SULFATE 100 %
4 CRYSTALS MISCELLANEOUS AS NEEDED
Status: CANCELLED | OUTPATIENT
Start: 2021-04-21

## 2021-04-21 RX ORDER — SODIUM CHLORIDE 0.9 % (FLUSH) 0.9 %
5-40 SYRINGE (ML) INJECTION EVERY 8 HOURS
Status: CANCELLED | OUTPATIENT
Start: 2021-04-21

## 2021-04-21 RX ORDER — SODIUM CHLORIDE 0.9 % (FLUSH) 0.9 %
5-40 SYRINGE (ML) INJECTION AS NEEDED
Status: CANCELLED | OUTPATIENT
Start: 2021-04-21

## 2021-04-21 RX ORDER — NALOXONE HYDROCHLORIDE 0.4 MG/ML
0.1 INJECTION, SOLUTION INTRAMUSCULAR; INTRAVENOUS; SUBCUTANEOUS AS NEEDED
Status: CANCELLED | OUTPATIENT
Start: 2021-04-21

## 2021-04-21 RX ORDER — DEXTROSE 50 % IN WATER (D50W) INTRAVENOUS SYRINGE
25-50 AS NEEDED
Status: CANCELLED | OUTPATIENT
Start: 2021-04-21

## 2021-04-21 RX ADMIN — Medication 10 MG: at 21:19

## 2021-04-21 RX ADMIN — SODIUM CHLORIDE, SODIUM LACTATE, POTASSIUM CHLORIDE, AND CALCIUM CHLORIDE 125 ML/HR: 600; 310; 30; 20 INJECTION, SOLUTION INTRAVENOUS at 23:32

## 2021-04-21 RX ADMIN — ZINC SULFATE 220 MG (50 MG) CAPSULE 1 CAPSULE: CAPSULE at 10:05

## 2021-04-21 RX ADMIN — PROPOFOL 60 MG: 10 INJECTION, EMULSION INTRAVENOUS at 19:00

## 2021-04-21 RX ADMIN — ENOXAPARIN SODIUM 90 MG: 100 INJECTION SUBCUTANEOUS at 21:19

## 2021-04-21 RX ADMIN — Medication 500 MG: at 10:04

## 2021-04-21 RX ADMIN — Medication 500 MG: at 21:18

## 2021-04-21 RX ADMIN — PROPOFOL 20 MG: 10 INJECTION, EMULSION INTRAVENOUS at 19:20

## 2021-04-21 RX ADMIN — Medication 2000 UNITS: at 10:05

## 2021-04-21 RX ADMIN — MIDAZOLAM 2 MG: 1 INJECTION INTRAMUSCULAR; INTRAVENOUS at 18:39

## 2021-04-21 RX ADMIN — LIDOCAINE HYDROCHLORIDE 60 MG: 20 INJECTION, SOLUTION EPIDURAL; INFILTRATION; INTRACAUDAL; PERINEURAL at 19:00

## 2021-04-21 RX ADMIN — Medication 10 ML: at 13:21

## 2021-04-21 RX ADMIN — PROPOFOL 30 MG: 10 INJECTION, EMULSION INTRAVENOUS at 19:11

## 2021-04-21 RX ADMIN — AMPICILLIN AND SULBACTAM 3 G: 1; 2 INJECTION, POWDER, FOR SOLUTION INTRAMUSCULAR; INTRAVENOUS at 05:13

## 2021-04-21 RX ADMIN — AMPICILLIN AND SULBACTAM 3 G: 1; 2 INJECTION, POWDER, FOR SOLUTION INTRAMUSCULAR; INTRAVENOUS at 23:32

## 2021-04-21 RX ADMIN — INSULIN LISPRO 3 UNITS: 100 INJECTION, SOLUTION INTRAVENOUS; SUBCUTANEOUS at 13:09

## 2021-04-21 RX ADMIN — ENOXAPARIN SODIUM 90 MG: 100 INJECTION SUBCUTANEOUS at 10:05

## 2021-04-21 RX ADMIN — Medication 10 ML: at 05:14

## 2021-04-21 RX ADMIN — SODIUM CHLORIDE, SODIUM LACTATE, POTASSIUM CHLORIDE, AND CALCIUM CHLORIDE 125 ML/HR: 600; 310; 30; 20 INJECTION, SOLUTION INTRAVENOUS at 18:00

## 2021-04-21 RX ADMIN — PROPOFOL 20 MG: 10 INJECTION, EMULSION INTRAVENOUS at 19:15

## 2021-04-21 RX ADMIN — AMPICILLIN AND SULBACTAM 3 G: 1; 2 INJECTION, POWDER, FOR SOLUTION INTRAMUSCULAR; INTRAVENOUS at 13:08

## 2021-04-21 RX ADMIN — LOSARTAN POTASSIUM 25 MG: 25 TABLET, FILM COATED ORAL at 10:04

## 2021-04-21 RX ADMIN — FAMOTIDINE 20 MG: 20 TABLET, FILM COATED ORAL at 10:05

## 2021-04-21 RX ADMIN — INSULIN GLARGINE 15 UNITS: 100 INJECTION, SOLUTION SUBCUTANEOUS at 10:05

## 2021-04-21 RX ADMIN — POTASSIUM CHLORIDE 40 MEQ: 1500 TABLET, EXTENDED RELEASE ORAL at 13:07

## 2021-04-21 RX ADMIN — METOPROLOL SUCCINATE 50 MG: 50 TABLET, EXTENDED RELEASE ORAL at 10:05

## 2021-04-21 RX ADMIN — INSULIN LISPRO 4 UNITS: 100 INJECTION, SOLUTION INTRAVENOUS; SUBCUTANEOUS at 13:08

## 2021-04-21 RX ADMIN — ATORVASTATIN CALCIUM 40 MG: 20 TABLET, FILM COATED ORAL at 21:18

## 2021-04-21 RX ADMIN — FUROSEMIDE 40 MG: 40 TABLET ORAL at 10:04

## 2021-04-21 RX ADMIN — Medication 10 ML: at 21:19

## 2021-04-21 NOTE — H&P
Date of Surgery Update:  Melody Garcia was seen and examined. History and physical has been reviewed. The patient has been examined.  There have been no significant clinical changes since the completion of the originally dated History and Physical.    Signed By: Lesly Davila DPM     April 21, 2021 6:04 PM

## 2021-04-21 NOTE — PROGRESS NOTES
0730 Received report from off going nurse Tomasa Cristobal, [facility]. Report included the following information SBAR, Kardex, Intake/Output, MAR and Recent Results. 1013 Pt phone replaced. 1054 Pt NPO for procedure today. 1345 OR called to ask about Covid status. They saw a recent result that was neg on the 18th. Paged Dr. Mikaela Weller for advisement. Saige Malik 75 Dr. Nicole Alvarez that patient had a dose of Lovenox at 10am today. Dr. Nicole Alvarez said he could safely proceed with procedure. 1630 Complete patient care provided to include CHG wipes/nasal . New linens in prep for surgery. This patient is incontinent of urine and has difficulty wiping his stool away effectively. Needs extra help with hygiene. Patient scrotum is edematous and a \"quick wrap\" can be used as an extra pad to help with incontinence. Pt off floor to surgery. 1905 Gave shift report to oncoming nurse Chele Blackman RN. Report included the following information: SBAR, Kardex, Intake/Output, MAR and Recent Results. Pt still in surgery.

## 2021-04-21 NOTE — ANESTHESIA POSTPROCEDURE EVALUATION
Post-Anesthesia Evaluation and Assessment    Cardiovascular Function/Vital Signs  Visit Vitals  /60   Pulse 81   Temp 36.6 °C (97.8 °F)   Resp 16   Ht 5' 7\" (1.702 m)   Wt 91.2 kg (201 lb)   SpO2 100%   BMI 31.48 kg/m²       Patient is status post Procedure(s):  DEBRIDEMENT OF LEFT HEEL, CURETTEMENT/SAUCERIZATION CALCANEOUS, WOUND,APPLICATION OF STRAVIX GRAFT, PATIENT IS IN ROOM 350  **PATIENT POSITIVE FOR COVID**. Nausea/Vomiting: Controlled. Postoperative hydration reviewed and adequate. Pain:  Pain Scale 1: Numeric (0 - 10) (04/21/21 1315)  Pain Intensity 1: 0 (04/21/21 1315)   Managed. Neurological Status:   Neuro (WDL): Within Defined Limits (04/21/21 1801)   At baseline. Mental Status and Level of Consciousness: Arousable. Pulmonary Status:   O2 Device: None (04/21/21 1935)   Adequate oxygenation and airway patent. Complications related to anesthesia: None    Post-anesthesia assessment completed. No concerns. Patient has met all discharge requirements. Signed By: Bonnie Lim CRNA    April 21, 2021             Procedure(s):  DEBRIDEMENT OF LEFT HEEL, CURETTEMENT/SAUCERIZATION CALCANEOUS, WOUND,APPLICATION OF STRAVIX GRAFT, PATIENT IS IN ROOM 350  **PATIENT POSITIVE FOR COVID**.     MAC    <BSHSIANPOST>    INITIAL Post-op Vital signs:   Vitals Value Taken Time   /60 04/21/21 1935   Temp     Pulse 81 04/21/21 1935   Resp 16 04/21/21 1935   SpO2 100 % 04/21/21 1935

## 2021-04-21 NOTE — PROGRESS NOTES
Podiatry:  Patient is status post left foot surgery for further debridement and placement of Stravix stem cell graft to help granulate over the exposed bone and help close his heel wound. He should continue IV antibiotics then PICC and 6 weeks of IV antibiotics as per ID. I don't feel that the Intraoperative cultures were accurate due to his IV antibiotic treatment prior to surgery/culture, and ID may need to refer to the first wound cultures take on 4-14-21 as shown below for his 6 week treatment. HEEL    RIGHT HEEL ULCER      Final   GRAM STAIN RARE WBCS SEEN      Final   GRAM STAIN      Final   2+ GRAM NEGATIVE RODS    GRAM STAIN      Final   RARE GRAM POSITIVE RODS    Culture result: Abnormal       Final   LIGHT ESCHERICHIA COLI    Culture result: Abnormal       Final   LIGHT STAPHYLOCOCCUS AUREUS    Culture result: Abnormal       Final   100 [facility] will apply a Wound VAC on Thursday morning left heel, and continue wound care for him. He may be discharged when Medicine/ID feel he is stable. I will follow him in the wound care clinic on Fridays.

## 2021-04-21 NOTE — ROUTINE PROCESS
Bedside and Verbal shift change report given to [address] [place] [address] (oncoming nurse) by Agnieszka wagner. Report included the following information SBAR, Kardex and MAR.

## 2021-04-21 NOTE — ANESTHESIA PREPROCEDURE EVALUATION
Relevant Problems   No relevant active problems       Anesthetic History   No history of anesthetic complications            Review of Systems / Medical History  Patient summary reviewed, nursing notes reviewed and pertinent labs reviewed    Pulmonary  Within defined limits          Asthma        Neuro/Psych   Within defined limits           Cardiovascular    Hypertension        Dysrhythmias : atrial fibrillation  CAD    Exercise tolerance: <4 METS     GI/Hepatic/Renal         Renal disease: CRI       Endo/Other    Diabetes    Obesity     Other Findings            Physical Exam    Airway  Mallampati: III  TM Distance: 4 - 6 cm  Neck ROM: normal range of motion   Mouth opening: Normal     Cardiovascular               Dental    Dentition: Poor dentition  Comments: Few remaining teeth, 1 loose right upper incisor.    Pulmonary                 Abdominal  GI exam deferred       Other Findings            Anesthetic Plan    ASA: 3  Anesthesia type: MAC          Induction: Intravenous  Anesthetic plan and risks discussed with: Patient

## 2021-04-21 NOTE — DIABETES MGMT
GLYCEMIC CONTROL PLAN OF CARE        Diabetes Management:      Assessment: known h/o T2DM, HbA1C pending, basal/bolus home regimen  - admitted for infected toe  - Covid positive  - pt states hypoglycemia yesterday r/t lack of PO intake  - per EMR pt is on the following home regimen    *Lantus 40 units @ hs   *Humalog 10 units @ breakfast, 15  Units @ lunch & dinner   *januvia 50 mg daily    BG in target range (non-ICU\" < 180 ; -180):  [] Yes  [x] No      Steroids: No    TDD previous day = 28 (15 Lantus + 4 (MTI) + 9 Humalog Very Insulin Resistant Corrective Coverage)    Recommend: continue current regimen    Most recent blood glucose results:   Lab Results   Component Value Date/Time    GLU 93 04/21/2021 03:10 AM    GLUCPOC 191 (H) 04/21/2021 11:18 AM    GLUCPOC 109 04/21/2021 06:11 AM    GLUCPOC 189 (H) 04/20/2021 10:40 PM         Hypo: no    HbA1C: equivalent  to ave BGlucose of 129 mg/dl for 2-3 months prior to admission  Lab Results   Component Value Date/Time    Hemoglobin A1c 6.3 (H) 04/15/2021 05:49 AM       Adequate glycemic control PTA:  [x] Yes  [] No      Home diabetes medications:  Key Antihyperglycemic Medications             insulin lispro (HUMALOG KWIKPEN INSULIN SC) (Taking) 10-15 Units by SubCUTAneous route three (3) times daily (with meals). insulin glargine (LANTUS) 100 unit/mL injection 15 Units by SubCUTAneous route daily. SITagliptin (JANUVIA) 50 mg tablet Take 1 Tab by mouth daily.           Goals:  Blood glucose will be within target range of 70 - 180 mg/dL by: 5/15    Diet:   Active Orders   Diet    DIET DIABETIC CONSISTENT CARB Regular    DIET NPO         Education:  __X___ Refer to Diabetes Education Record                       _____ Education not indicated at this time        Yousuf Bradshaw 87, RN, CDE  Glycemic Control Team  [phone]  Pager [phone] (M-TH 8:00-4:30P)  *After Hours pager [phone]

## 2021-04-21 NOTE — BRIEF OP NOTE
Brief Postoperative Note    Patient: Annalisa Lin. YOB: 1945  MRN: [id number]    Date of Procedure: 4/21/2021     Pre-Op Diagnosis: OSTEOMYELITIS LEFT FOOT,ULCERATION OF WOUND LEFT HEEL    Post-Op Diagnosis: Same as preoperative diagnosis. Procedure(s):  DEBRIDEMENT OF LEFT HEEL, CURETTEMENT/SAUCERIZATION CALCANEOUS, WOUND,APPLICATION OF STRAVIX GRAFT, PATIENT IS IN ROOM 350  **PATIENT POSITIVE FOR COVID**    Surgeon(s):  Staci Kumar DPM    Surgical Assistant: None    Anesthesia: MAC     Estimated Blood Loss (mL): Minimal    Complications: None    Specimens: * No specimens in log *     Implants:   Implant Name Type Inv.  Item Serial No.  Lot No. LRB No. Used Action   GRAFT HUM TISS [id number] CRYOPRESERVED PLCNTA TISS [employer]ION - [id number]  GRAFT HUM TISS [id number] CRYOPRESERVED PLCNTA TISS [employer]NION [id number] [id number] Left 1 Implanted       Drains: * No LDAs found *    Findings: Osteomyelitis with large necrotic deep ulcer left heel    Electronically Signed by Lavella Seip, DPM on 4/21/2021 at 7:40 PM

## 2021-04-21 NOTE — ROUTINE PROCESS
TRANSFER - OUT REPORT:    Verbal report given to Yeimy Sanchez RN on Dioincio Crum.  being transferred to OR for ordered procedure       Report consisted of patients Situation, Background, Assessment and   Recommendations(SBAR). Information from the following report(s) SBAR, Kardex and MAR was reviewed with the receiving nurse. Lines:   PICC Double Lumen 08/01/30 Right;Basilic (Active)   Central Line Being Utilized Yes 04/20/21 1737   Criteria for Appropriate Use Long term IV/antibiotic administration 04/20/21 1737   Site Assessment Clean, dry, & intact 04/20/21 1737   Phlebitis Assessment 0 04/20/21 1737   Infiltration Assessment 0 04/20/21 1737   Dressing Status Clean, dry, & intact 04/20/21 1737   Dressing Type Disk with Chlorhexadine gluconate (CHG); Tape;Transparent 04/20/21 1737   Hub Color/Line Status Red;Capped;Flushed;Patent 04/20/21 1737   Positive Blood Return (Site #1) Yes 04/20/21 1737   Hub Color/Line Status Purple;Capped;Flushed;Patent 04/20/21 1737   Positive Blood Return (Site #2) Yes 04/20/21 1737        Opportunity for questions and clarification was provided.       Patient transported with:   [employer]

## 2021-04-21 NOTE — ROUTINE PROCESS
2320  Bedside and Verbal shift change  Received from Brina Mohan RN (outgoing nurse), to ELIEZER Maradiaga (oncoming)  Pt. Is AOX 4. IV SL, Pt. denies  pain at this time. Report included the following information SBAR, Kardex, Procedure Summary, Intake/Output, MAR, Recent Lab Results, and  Cardiac Rhythm @ NSR. Will resume care and monitor Pt. Condition. Pt. Educated on call bell when in need of help and assistance. Pt. verbalized understanding. 0000 Pt. Head to toe Assessment Done and documented. 0200  Pt. Made no complaints. 0400  Pt. Eyes closed, easily awaken. 0600  Pt able to rest and sleep well throughout the shift. Verbal and bedside Shift changed report given to Ximena Benavides RN (oncoming RN) on Pt. Condition. Report consisted of patients Situation, History, Activities, intake/output,  Background, Assessment and Recommendations(SBAR). Information from the following report(s) Kardex, order Summary, Lab results and MAR was reviewed with the receiving nurse. Opportunity for questions and clarification was provided.

## 2021-04-21 NOTE — PROGRESS NOTES
Hospitalist Progress Note    Patient: Mable Councilman. MRN: [id number]  CSN: [id number]    YOB: 1945  Age: 70 y.o. Sex: male    DOA: 4/14/2021 LOS:  LOS: 7 days                Assessment/Plan     Patient Active Problem List   Diagnosis Code    Atrial flutter ([facility]) I48.92    DM2 (diabetes mellitus, type 2) ([facility] E11.9    Acute renal insufficiency N28.9    NSTEMI (non-ST elevated myocardial infarction) ([facility] L91.3    Systolic CHF, acute ([facility]) I50.21    Symptomatic anemia D64.9    CAD (coronary artery disease) I25.10    GI bleed K92.2    ELINOR (acute kidney injury) ([facility] N17.9    CKD (chronic kidney disease) stage 3, GFR 30-59 ml/min ([facility]) N18.30    Elevated brain natriuretic peptide (BNP) level R79.89    CAP (community acquired pneumonia) J18.9    Diabetic ulcer of left foot ([facility] E11.621, L97.529    COVID-19 virus infection U07.1    Acute osteomyelitis ([facility] M86.10    Acute hematogenous osteomyelitis of left foot ([facility] M86.072    Cellulitis of left foot L03. 80    Diabetic foot ulcer with osteomyelitis ([facility] E11.621, E11.69, L97.509, M86.9    Ulcer of left heel, with necrosis of bone [facility]) L97.424            72-year-old male with past medical history of diabetes, atrial fibrillation on Eliquis, coronary artery disease history of CHF and NSTEMI in the past as well as chronic kidney disease stage III admitted for infected diabetic ulcer on left heel of foot.  In emergency room found to be Covid positive.      Osteomyelitis left heel with cellulitis, gangrenous ulcer with deep abscess tracking along the plantar fascia -  Status post incision and drainage lower extremity left heel debridement and antibiotic bead placement   Continue IV antibiotics,  infectious disease following  Dr. Vinita Bui plans to take the patient back to the OR next week to remove the antibiotic beads and graft open wound.   Will need 6 weeks of IV antibiotics via PICC line    Covid pneumonia  On vitamin/antioxidant cocktail  Not hypoxic    Stage III chronic kidney disease -  Monitor renal function    HTN -  On cozaar, toprol xl, lasix. Monitor BP    Diabetes mellitus now with hyperglycemia -  No better controlled,   Continue with SSI    CAD -  No anginal symptoms    A-flutter -  Rate controlled on toprol xl   on therapeutic lovenox      Disposition : 2-3 days    Review of systems  General: No fevers or chills. Cardiovascular: No chest pain or pressure. No palpitations. Pulmonary: No shortness of breath. Gastrointestinal: No nausea, vomiting. Physical Exam:  General: Awake, cooperative, no acute distress    HEENT: NC, Atraumatic. PERRLA, anicteric sclerae. Lungs: CTA Bilaterally. No Wheezing/Rhonchi/Rales. Heart:  S1 S2,  No murmur, No Rubs, No Gallops  Abdomen: Soft, Non distended, Non tender.  +Bowel sounds,   Extremities: Left foot with ace wrap  Psych:   Not anxious or agitated. Neurologic:  No acute neurological deficit. Vital signs/Intake and Output:  Visit Vitals  BP (!) 140/71   Pulse 87   Temp 97.8 °F (36.6 °C)   Resp 18   Ht 5' 7\" (1.702 m)   Wt 91.2 kg (201 lb)   SpO2 94%   BMI 31.48 kg/m²     Current Shift:  No intake/output data recorded. Last three shifts:  No intake/output data recorded. Labs: Results:       Chemistry Recent Labs     04/21/21  0310 04/20/21  0610 04/19/21  0830   GLU 93 134* 142*    141 138   K 3.4* 3.5 3.7    104 103   CO2 32 34* 26   BUN 24* 24* 27*   CREA 1.41* 1.26 1.34*   CA 8.0* 8.0* 8.5   AGAP 6 3 9   BUCR 17 19 20      CBC w/Diff No results for input(s): WBC, RBC, HGB, HCT, PLT, GRANS, LYMPH, EOS, HGBEXT, HCTEXT, PLTEXT, HGBEXT, HCTEXT, PLTEXT in the last 72 hours. Cardiac Enzymes No results for input(s): CPK, CKND1, LEONEL in the last 72 hours. No lab exists for component: CKRMB, TROIP   Coagulation No results for input(s): PTP, INR, APTT, INREXT, INREXT in the last 72 hours.     Lipid Panel No results found for: CHOL, CHOLPOCT, CHOLX, CHLST, CHOLV, 219966, HDL, HDLP, LDL, LDLC, DLDLP, 817338, VLDLC, VLDL, TGLX, TRIGL, TRIGP, TGLPOCT, CHHD, CHHDX   BNP No results for input(s): BNPP in the last 72 hours. Liver Enzymes No results for input(s): TP, ALB, TBIL, AP in the last 72 hours.     No lab exists for component: SGOT, GPT, DBIL   Thyroid Studies Lab Results   Component Value Date/Time    TSH 1.72 02/11/2018 02:19 AM        Procedures/imaging: see electronic medical records for all procedures/Xrays and details which were not copied into this note but were reviewed prior to creation of Plan

## 2021-04-22 VITALS
DIASTOLIC BLOOD PRESSURE: 60 MMHG | BODY MASS INDEX: 30.87 KG/M2 | HEIGHT: 67 IN | SYSTOLIC BLOOD PRESSURE: 134 MMHG | HEART RATE: 86 BPM | OXYGEN SATURATION: 99 % | WEIGHT: 196.7 LBS | RESPIRATION RATE: 16 BRPM | TEMPERATURE: 98.4 F

## 2021-04-22 PROBLEM — N28.9 ACUTE RENAL INSUFFICIENCY: Status: RESOLVED | Noted: 2018-02-12 | Resolved: 2021-04-22

## 2021-04-22 LAB
BACTERIA SPEC CULT: NORMAL
CRP SERPL-MCNC: 3.8 MG/DL (ref 0–0.3)
GLUCOSE BLD STRIP.AUTO-MCNC: 110 MG/DL (ref 70–110)
GLUCOSE BLD STRIP.AUTO-MCNC: 84 MG/DL (ref 70–110)
GRAM STN SPEC: NORMAL
GRAM STN SPEC: NORMAL
SERVICE CMNT-IMP: NORMAL

## 2021-04-22 PROCEDURE — 82962 GLUCOSE BLOOD TEST: CPT

## 2021-04-22 PROCEDURE — 74011250637 HC RX REV CODE- 250/637: Performed by: FAMILY MEDICINE

## 2021-04-22 PROCEDURE — 74011636637 HC RX REV CODE- 636/637: Performed by: FAMILY MEDICINE

## 2021-04-22 PROCEDURE — 74011250636 HC RX REV CODE- 250/636: Performed by: INTERNAL MEDICINE

## 2021-04-22 PROCEDURE — 74011000258 HC RX REV CODE- 258: Performed by: INTERNAL MEDICINE

## 2021-04-22 PROCEDURE — 74011250636 HC RX REV CODE- 250/636: Performed by: PODIATRIST

## 2021-04-22 PROCEDURE — 74011250636 HC RX REV CODE- 250/636: Performed by: FAMILY MEDICINE

## 2021-04-22 PROCEDURE — 86140 C-REACTIVE PROTEIN: CPT

## 2021-04-22 PROCEDURE — 2709999900 HC NON-CHARGEABLE SUPPLY

## 2021-04-22 PROCEDURE — 77030019934 HC DRSG VAC ASST KCON -B

## 2021-04-22 RX ADMIN — ENOXAPARIN SODIUM 90 MG: 100 INJECTION SUBCUTANEOUS at 09:22

## 2021-04-22 RX ADMIN — Medication 2000 UNITS: at 09:23

## 2021-04-22 RX ADMIN — SODIUM CHLORIDE, SODIUM LACTATE, POTASSIUM CHLORIDE, AND CALCIUM CHLORIDE 125 ML/HR: 600; 310; 30; 20 INJECTION, SOLUTION INTRAVENOUS at 09:30

## 2021-04-22 RX ADMIN — AMPICILLIN AND SULBACTAM 3 G: 1; 2 INJECTION, POWDER, FOR SOLUTION INTRAMUSCULAR; INTRAVENOUS at 05:34

## 2021-04-22 RX ADMIN — LOSARTAN POTASSIUM 25 MG: 25 TABLET, FILM COATED ORAL at 09:23

## 2021-04-22 RX ADMIN — Medication 10 ML: at 05:34

## 2021-04-22 RX ADMIN — ERTAPENEM SODIUM 1 G: 1 INJECTION, POWDER, LYOPHILIZED, FOR SOLUTION INTRAMUSCULAR; INTRAVENOUS at 15:00

## 2021-04-22 RX ADMIN — Medication 10 ML: at 14:00

## 2021-04-22 RX ADMIN — FUROSEMIDE 40 MG: 40 TABLET ORAL at 09:23

## 2021-04-22 RX ADMIN — METOPROLOL SUCCINATE 50 MG: 50 TABLET, EXTENDED RELEASE ORAL at 09:23

## 2021-04-22 RX ADMIN — Medication 500 MG: at 09:22

## 2021-04-22 RX ADMIN — AMPICILLIN AND SULBACTAM 3 G: 1; 2 INJECTION, POWDER, FOR SOLUTION INTRAMUSCULAR; INTRAVENOUS at 12:21

## 2021-04-22 RX ADMIN — INSULIN GLARGINE 15 UNITS: 100 INJECTION, SOLUTION SUBCUTANEOUS at 09:23

## 2021-04-22 RX ADMIN — FAMOTIDINE 20 MG: 20 TABLET, FILM COATED ORAL at 09:23

## 2021-04-22 RX ADMIN — ZINC SULFATE 220 MG (50 MG) CAPSULE 1 CAPSULE: CAPSULE at 09:23

## 2021-04-22 NOTE — DIABETES MGMT
GLYCEMIC CONTROL PLAN OF CARE        Diabetes Management:      Assessment: known h/o T2DM, HbA1C within recommended range for age + comorbids basal/bolus home regimen  - admitted for infected toe  - Covid positive  - pt states hypoglycemia yesterday r/t lack of PO intake  - per EMR pt is on the following home regimen    *Lantus 40 units @ hs   *Humalog 10 units @ breakfast, 15  Units @ lunch & dinner   *januvia 50 mg daily    BG in target range (non-ICU\" < 180 ; -180):  [x] Yes  [] No      Steroids: No    TDD previous day = 22 (15 Lantus + 4 (MTI) + 3 Humalog Very Insulin Resistant Corrective Coverage)    Recommend:  FBG trending down x 48 hours, recommend adjustment to basal insulin dose   *Lantus 8 units daily    Most recent blood glucose results:   Lab Results   Component Value Date/Time    GLUCPOC 84 04/22/2021 08:31 AM    GLUCPOC 107 04/21/2021 05:57 PM    GLUCPOC 191 (H) 04/21/2021 11:18 AM         Hypo: no    HbA1C: equivalent  to ave BGlucose of 129 mg/dl for 2-3 months prior to admission  Lab Results   Component Value Date/Time    Hemoglobin A1c 6.3 (H) 04/15/2021 05:49 AM       Adequate glycemic control PTA:  [x] Yes  [] No      Home diabetes medications:  Key Antihyperglycemic Medications             insulin lispro (HUMALOG KWIKPEN INSULIN SC) (Taking) 10-15 Units by SubCUTAneous route three (3) times daily (with meals). insulin glargine (LANTUS) 100 unit/mL injection 15 Units by SubCUTAneous route daily. SITagliptin (JANUVIA) 50 mg tablet Take 1 Tab by mouth daily.           Goals:  Blood glucose will be within target range of 70 - 180 mg/dL by: 5/15    Diet:   Active Orders   Diet    DIET DIABETIC CONSISTENT CARB Regular         Education:  __X___ Refer to Diabetes Education Record                       _____ Education not indicated at this time        Sheila Thompson MS, RN, CDE  Glycemic Control Team  [phone]  Pager [phone] (M-TH 8:00-4:30P)  *After Hours pager [phone]

## 2021-04-22 NOTE — WOUND CARE
Wound Care Note:    Wound care attempted to apply wound vac this am, pt very irate with this RN, stating he \"knows nothing about the wound vac, he does not want it, he does not want a tube to carry around, the machine is too large, how will he shower, he needs to speak with his \". Attempted to console and educate pt who was not amenable to discussion, continued to speak over this RN when trying to console him. Discussed this with bedside RN and will reach out to Dr. Fabiano Olmos. Will attempt placement again this afternoon if patient is agreeable at that time. Patient also stating that he is walking around the room, this RN informed pt that is not what is ordered, as he is to be non-weight bearing to left foot, states he will continue to walk, because \"he has to walk\".       Skin Care & Pressure Relief Recommendations  Minimize layers of linen  Pads under patient to optimize support surface  Turn/reposition approximately every 2 hours  Pillow wedges  Manage incontinence   Promote continence; Skin Protective lotion/cream to buttocks and sacrum daily and as needed with incontinence care  Offload heels pillows

## 2021-04-22 NOTE — PROGRESS NOTES
DC Plan: home with follow up with wound care, ID and primary care    Noted wound care documentation of patient refusal of application of wound vac; care manager contacted Dr. Olga Lidia Calhoun who stated that this would be OK, that he did not want home health to disturb his graft and that only supporting outside dressing would be needed; he will see patient on Friday 4/30 in the wound care clinic. Patient should be nonweightbearing as much as possible and keep foot elevated. Regarding daily IV abx, discussed cost breakdown with insurance being $83.39 per day - this is the least expensive option using ertapenem as q 6 unasyn 3 gms was $114.41 plus cost of IV pump of $59.72. Patient very concerned about IV drug cost so CM is arranging for outpatient infusion center at [facility] to provide OP infusion. Patient anxious to go, leaving before confirmation of infusion time. Will follow up in morning and contact patient for therapy. Care Management Interventions  PCP Verified by CM:  Yes  Transition of Care Consult (CM Consult): Discharge Planning  Current Support Network: Lives with Spouse  Confirm Follow Up Transport: Family  The Plan for Transition of Care is Related to the Following Treatment Goals : Diabetic ulcer of left foot  Discharge Location  Discharge Placement: Home with family assistance(vs home with home health)

## 2021-04-22 NOTE — PROGRESS NOTES
[facility]                         (A Division of [address] [place] [address] Road)                                                                                                                       Evelyne Santos MD  Work Cell #: [phone]. If no call or text back within 30 minutes, please call office number. (Prefer text when possible)  Office #: [phone]  [id number] [phone]   Office Fax: [phone]    Date of Admission: 4/14/2021       Date of Note:  4/22/2021   Consult FU for : Left diabetic foot infection    Summary:    70 y.o. WHITE male with PMH of DM, CAD with CHF and CKD. He has a blister and redness on lower LE about two weeks ago, which eschared and removed the scab. Around that time, he also had flu like symptom. After that, he had pain and swelling which opened up on his heel. Per ED reports, had attempt to debridement by Podiatry on his heel, prior to arrival to ED.     Denies cough, sob. Unaware of COVID 19 contact but his son has tested negative since his diagnosis. Unaware of his wife status.     Retired from [employer] in early [address] [employer] [employer], and he relocated from [place] to here to be close to his children. COVID 19 + 4/14       Subjective: no new complaint. Doing ok. Plan of discharge for home infusion in progress. Denies any issue with abx.   No fever  No sob  No itching/rash      Condition: Stable    Interval History:  I and D of left heel including heel bone- done 4/17  Post OR with debridement and placement of Stravix stem cell graft to help granulate over the exposed bone and help close his heel wound- 4/21/21           Current Antimicrobials:    Prior Antimicrobials:  · Unasyn 3 gm Q6 hour 4/20 to date  · Right arm PICC- 4/20 · Zosyn 2.25gm IV Q6 4/14 to 4/15  · Vancomycin IV 4/14  until 4/19  · Zithromax 500mg IV- 4/14 to 4/20  · Meropenem IV 4/15-4/20       Assessment: Rec / Plan:   · Left Infected DM Ulcer and acute OM and necrotic tissue, punched out heel ulcer with Mixed infection- MSSA +E.coli + anaerobes  · Post I and D of left heel bone/abscess 4/17  · Post debridement/graft of heel- 4/21  · CRP 8.9-> 5.8->3.8  · COVID 19 Infection/ PNA- Right UL infiltrate, without hypoxia. FU test 4/18-- negative    -procal 0.18, D-dimer 1.97, ferretin 136, , fibrinogen 796  Difficult to tell if Flu like symptom due to cellulitis/ soft tissue infection or onset of COVID 2 weeks ago.      Other Medical Issues followed and managed by primary and other services  · DM- Poorly controlled  · Low Vitamin D  · CAD with CHF/ NSTMI. Atrial flutter  · CKD  · Hyperlipidemia    Past ID issues: N/A Cont replace Unasyn 3gm IV Q6 for mixed infection- with ertapenem 1 gm Q24. Stressed dm management    Side effect/ HHC monitoring reviewed with patient. D/w care management, Dr Malgorzata Salazar vac/care per Podiatry    OPAT( Outpatient antimicrobial infusion treatment): to be arranged by care management. Type of Antibiotic: ertapenem 1 gm IVPB  End date: June 1, 2021    Routine PICC care per [facility] protocol- with flushing and TPA use if clogging as needed. Removal of PICC at end of treatment. No exception. Labs to be done Q Monday: CBC with Diff, BUN/CR, LFT, CRP, ESR    Please fax results to: [phone], attention Dr Nandini Orozco with ID on [address] morning at wound clinic office, in 1-2 weeks. Please call [phone] to arrange. Will sign off.  Available as needed       Microbiology:  Blood culture: 4/14- 2 sets: NGSF     Urine culture:N/A     Body Fluid/ CSF/drainage culture:  4/14- gram stain: GNR and GPR        --wound culture+ s.aurues and E.coli + anaerobe   4/17-- OR culture: NG    Cath tip/ PICC culture: right PICC 4/20       Patient Active Problem List   Diagnosis Code    Atrial flutter (HCC) I48.92    DM2 (diabetes mellitus, type 2) (HCC) E11.9    Acute renal insufficiency N28.9    NSTEMI (non-ST elevated myocardial infarction) [facility] I21.4    Systolic CHF, acute ([facility]) I50.21    Symptomatic anemia D64.9    CAD (coronary artery disease) I25.10    GI bleed K92.2    ELINOR (acute kidney injury) ([facility]) N17.9    CKD (chronic kidney disease) stage 3, GFR 30-59 ml/min ([facility]) N18.30    Elevated brain natriuretic peptide (BNP) level R79.89    CAP (community acquired pneumonia) J18.9    Diabetic ulcer of left foot ([facility]) E11.621, L97.529    COVID-19 virus infection U07.1    Acute osteomyelitis ([facility]) M86.10    Acute hematogenous osteomyelitis of left foot ([facility]) M86.072    Cellulitis of left foot L03. 116    Diabetic foot ulcer with osteomyelitis ([address] E11.621, E11.69, L97.509, M86.9    Ulcer of left heel, with necrosis of bone ([facility]) L97.424       Current Facility-Administered Medications   Medication Dose Route Frequency    insulin lispro (HUMALOG) injection   SubCUTAneous AC&HS    bupivacaine-EPINEPHrine (SENSORCAINE) 0.5 %-1:200,000 injection    PRN    sodium chloride irrigation 0.9 % 500 mL Irrigation    PRN    sodium chloride irrigation 0.9 % 3,000 mL Irrigation    PRN    ampicillin-sulbactam (UNASYN) 3 g in 0.9% sodium chloride (MBP/ADV) 100 mL MBP  3 g IntraVENous Q6H    heparin (porcine) 100 unit/mL injection 500 Units  500 Units InterCATHeter Q8H PRN    enoxaparin (LOVENOX) injection 90 mg  1 mg/kg SubCUTAneous Q12H    oxyCODONE-acetaminophen (PERCOCET) 5-325 mg per tablet 1 Tab  1 Tab Oral PRN    insulin lispro (HUMALOG) injection 4 Units  4 Units SubCUTAneous TIDAC    sodium chloride (NS) flush 5-40 mL  5-40 mL IntraVENous Q8H    sodium chloride (NS) flush 5-40 mL  5-40 mL IntraVENous PRN    polyethylene glycol (MIRALAX) packet 17 g  17 g Oral DAILY PRN    promethazine (PHENERGAN) tablet 12.5 mg  12.5 mg Oral Q6H PRN    Or    ondansetron (ZOFRAN) injection 4 mg  4 mg IntraVENous Q6H PRN    famotidine (PEPCID) tablet 20 mg  20 mg Oral DAILY    glucose chewable tablet 16 g  4 Tab Oral PRN    glucagon (GLUCAGEN) injection 1 mg  1 mg IntraMUSCular PRN    dextrose (D50W) injection syrg 12.5-25 g  25-50 mL IntraVENous PRN    acetaminophen (TYLENOL) tablet 650 mg  650 mg Oral Q6H PRN    Or    acetaminophen (TYLENOL) suppository 650 mg  650 mg Rectal Q6H PRN    guaiFENesin-dextromethorphan (ROBITUSSIN DM) 100-10 mg/5 mL syrup 5 mL  5 mL Oral Q4H PRN    cholecalciferol (VITAMIN D3) (1000 Units /25 mcg) tablet 2,000 Units  2,000 Units Oral DAILY    ascorbic acid (vitamin C) (VITAMIN C) tablet 500 mg  500 mg Oral BID    zinc sulfate (ZINCATE) 50 mg zinc (220 mg) capsule 1 Cap  1 Cap Oral DAILY    melatonin (rapid dissolve) tablet 10 mg  10 mg Oral QHS    atorvastatin (LIPITOR) tablet 40 mg  40 mg Oral QHS    furosemide (LASIX) tablet 40 mg  40 mg Oral DAILY    insulin glargine (LANTUS) injection 15 Units  15 Units SubCUTAneous DAILY    losartan (COZAAR) tablet 25 mg  25 mg Oral DAILY    metoprolol succinate (TOPROL-XL) XL tablet 50 mg  50 mg Oral DAILY         Review of Systems - Negative except left heel pain. About the same       Objective:    Visit Vitals  /60   Pulse 86   Temp 98.4 °F (36.9 °C)   Resp 16   Ht 5' 7\" (1.702 m)   Wt 89.2 kg (196 lb 11.2 oz)   SpO2 99%   BMI 30.81 kg/m²       Temp (24hrs), Av.1 °F (36.7 °C), Min:97.8 °F (36.6 °C), Max:98.4 °F (36.9 °C)      GEN: WDWN, not in resp distress. HEENT: Unicteric. Wearing mask  CHEST: Non laboured breathing  EXT: left heel dressed. No cellulitic change. On exposed skin. No creps  CNS: A, OX3. Moves all extremity. CN grossly ok. Lab results:    Chemistry  Recent Labs     21  0310 21  0610   GLU 93 134*    141   K 3.4* 3.5    104   CO2 32 34*   BUN 24* 24*   CREA 1.41* 1.26   CA 8.0* 8.0*   AGAP 6 3   BUCR 17 19       CBC w/ Diff  No results for input(s): WBC, RBC, HGB, HCT, PLT, GRANS, LYMPH, EOS, HGBEXT, HCTEXT, PLTEXT, HGBEXT, HCTEXT, PLTEXT in the last 72 hours.     Microbiology  All Micro Results     Procedure Component Value Units Date/Time    CULTURE, TISSUE Mikayla Grain STAIN [[id number]] Collected: 04/17/21 0955    Order Status: Completed Specimen: Bone Updated: 04/22/21 1309     Special Requests: LEFT FOOT        GRAM STAIN 1+ WBCS SEEN         NO ORGANISMS SEEN        Culture result: NO AEROBIC GROWTH AT 4 DAYS    CULTURE, ANAEROBIC [[id number]]  (Abnormal) Collected: 04/17/21 0929    Order Status: Completed Specimen: Foot, left Updated: 04/21/21 1006     Special Requests: NO SPECIAL REQUESTS        Culture result:       LIGHT  ANAEROBIC GRAM NEGATIVE RODS  BETA LACTAMASE NEGATIVE  AND  ANAEROBIC GRAM POSITIVE COCCI      CULTURE, Divina Ajay STAIN [[id number]] Collected: 04/17/21 0929    Order Status: Completed Specimen: Wound from Foot Updated: 04/20/21 1453     Special Requests: NO SPECIAL REQUESTS        GRAM STAIN OCCASIONAL WBCS SEEN         NO ORGANISMS SEEN        Culture result: NO AEROBIC GROWTH      REFER TO [facility] [id number] FOR ANAEROBIC GROWTH    CULTURE, BLOOD [[id number]] Collected: 04/14/21 1445    Order Status: Completed Specimen: Blood Updated: 04/20/21 0656     Special Requests: NO SPECIAL REQUESTS        Culture result: NO GROWTH 6 DAYS       CULTURE, BLOOD [[id number]] Collected: 04/14/21 1500    Order Status: Completed Specimen: Blood Updated: 04/20/21 0656     Special Requests: NO SPECIAL REQUESTS        Culture result: NO GROWTH 6 DAYS       LEGIONELLA PNEUMOPHILA AG, URINE [[id number]] Collected: 04/14/21 2000    Order Status: Canceled Specimen: Urine     STREP Casandra Carbine, URINE [[id number]] Collected: 04/14/21 2000    Order Status: Canceled Specimen: Urine     CULTURE, RESPIRATORY/SPUTUM/BRONCH Mikayla Grain STAIN [[id number]] Collected: 04/14/21 2000    Order Status: Canceled Specimen: Sputum     COVID-19 RAPID TEST [[id number]]  (Abnormal) Collected: 04/14/21 1641    Order Status: Completed Specimen: Nasopharyngeal Updated: 04/14/21 1715     Specimen source Nasopharyngeal        COVID-19 rapid test Detected        Comment:       The specimen is POSITIVE for SARS-CoV-2, the novel coronavirus associated with COVID-19. This test has been authorized by the FDA under an Emergency Use Authorization (EUA) for use by authorized laboratories.         Fact sheet for Healthcare Providers: ConventionUpdate.co.nz  Fact sheet for Patients: ConventionUpdate.co.nz       Methodology: Isothermal Nucleic Acid Amplification  CALLED TO AND CORRECTLY REPEATED BY:  Lior Garcia RN ER TO [facility] AT [id number] ON [id number]             Total duration of time spent with patient interview and exam and discussion of plan of care and arranging antibiotics for OP infusion, review of chart in care everywhere, discussion with medical staff- nursing/attending and additional specialities as indicated: 30 minutes

## 2021-04-22 NOTE — DISCHARGE INSTRUCTIONS
Patient Education        Osteomyelitis: Care Instructions  Your Care Instructions  Osteomyelitis (say \"kh-ybey-ti-kh-fk-NQ-tus\") is a bone infection. It is caused by bacteria. The bacteria can infect the bone where it has been injured, or they can be carried through the blood from another area in the body. Osteomyelitis can be a short- or long-term problem. It is treated with antibiotics. You may get the antibiotics as pills or through a needle in a vein (IV). You will probably get treatment in the hospital at first. The type of treatment depends on the type of bacteria causing the infection, the bones affected, and how bad the infection is. Sometimes people need surgery to drain pus from bone or to fix damaged bone. Short-term osteomyelitis that is treated right away usually can be cured. But the long-term form sometimes comes back after treatment. You can help your chances of stopping the infection by taking your medicines as directed. Follow-up care is a key part of your treatment and safety. Be sure to make and go to all appointments, and call your doctor if you are having problems. It's also a good idea to know your test results and keep a list of the medicines you take. How can you care for yourself at home? · Take your antibiotics as directed. Do not stop taking them just because you feel better. You need to take the full course of antibiotics. · Take pain medicines exactly as directed. ? If the doctor gave you a prescription medicine for pain, take it as prescribed. ? If you are not taking a prescription pain medicine, ask your doctor if you can take an over-the-counter medicine. · Do mild exercise and stretching if your doctor says it is okay. This can help keep your bones and muscles healthy. Avoid strenuous work or exercise until your doctor says you can do it. · Consider physical therapy if your doctor suggests it. Physical therapy may help you have a normal range of movement. · Do not smoke. Smoking can slow healing of the infection. If you need help quitting, talk to your doctor about stop-smoking programs and medicines. These can increase your chances of quitting for good. When should you call for help? Call 911 anytime you think you may need emergency care. For example, call if:    · You have severe bone pain. Call your doctor now or seek immediate medical care if:    · You continue to have bone pain.     · You have signs of infection, such as:  ? Increased pain, swelling, warmth, or redness. ? Red streaks leading from a wound. ? Pus draining from a wound. ? A fever. Watch closely for changes in your health, and be sure to contact your doctor if:    · You do not get better as expected. Where can you learn more? Go to http://www.gray.com/  Enter B364 in the search box to learn more about \"Osteomyelitis: Care Instructions. \"  Current as of: March 4, 2020               Content Version: 12.8  © 2006-2021 [employer] Care instructions adapted under license by [employer] (which disclaims liability or warranty for this information). If you have questions about a medical condition or this instruction, always ask your healthcare professional. [address] any warranty or liability for your use of this information.

## 2021-04-22 NOTE — PROGRESS NOTES
2100-pharmacy returned call from nurse and nurse was informed that Yesenia Morris said it was OK to give scheduled Lovenox tonight.

## 2021-04-22 NOTE — PROGRESS NOTES
1942-Alert and oriented x 4. Lungs with wheezes on RA. BS active x 4 quads. PICC line double lumen to right arm, infusing LR at 125 ml/hr without difficulty. On tele box 32 showing NSR. Left foot is wrapped with Ace wrap on top with no drainage note. 2100-Pharmacy returned all from nurse and informed nurse that Dr. Olga Lidia Calhoun said it was OK to given scheduled Lovenox tonight. 0535-blood drawn from red port of PICC line without difficulty for labs. Patient repositioned in bed and IV ATB started. Shift summary-patient is Covid +. He did ambulates with assist to bathroom x one and brief was placed due to incontinent episodes. Ampicillin IV given with no adverse effects. Ace wrap to left foot continues to be CDI.

## 2021-04-22 NOTE — ROUTINE PROCESS
Bedside and Verbal shift change report given to ELIEZER Silverio RN (oncoming nurse) by [employer] RN (offgoing nurse). Report included the following information SBAR, Kardex, Intake/Output and MAR.

## 2021-04-22 NOTE — ROUTINE PROCESS
Bedside and Verbal shift change report given to LINDA Thornton RN (oncoming nurse) by OR nurse (offgoing nurse). Report included the following information SBAR, Kardex, Intake/Output and MAR.

## 2021-04-22 NOTE — DISCHARGE SUMMARY
Discharge Summary    Patient: Sasha Zarate MRN: [id number]  CSN: [id number]    YOB: 1945  Age: 70 y.o.   Sex: male    DOA: 4/14/2021 LOS:  LOS: 8 days   Discharge Date:      Primary Care Provider:  Judit Camejo MD    Admission Diagnoses: Diabetic ulcer of left foot ([facility] [P54.127, L97.529]  CAP (community acquired pneumonia) [J18.9]  COVID-19 virus infection [U07.1]    Discharge Diagnoses:    Problem List as of 4/22/2021 Date Reviewed: 4/17/2021          Codes Class Noted - Resolved    * (Principal) Ulcer of left heel, with necrosis of bone ([facility] ICD-10-CM: L97.424  ICD-9-CM: 707.14, 730.17  4/21/2021 - Present        Acute hematogenous osteomyelitis of left foot ([facility] ICD-10-CM: X65.079  ICD-9-CM: 730.07  4/17/2021 - Present        Cellulitis of left foot ICD-10-CM: L03.116  ICD-9-CM: 682.7  4/17/2021 - Present        Diabetic foot ulcer with osteomyelitis ([facility] ICD-10-CM: E11.621, E11.69, L97.509, M86.9  ICD-9-CM: 250.80, 707.15, 730.27, 731.8  4/17/2021 - Present        Acute osteomyelitis ([facility] ICD-10-CM: M86.10  ICD-9-CM: 730.00  4/15/2021 - Present        Diabetic ulcer of left foot ([facility] ICD-10-CM: E11.621, L97.529  ICD-9-CM: 250.80, 707.15  4/14/2021 - Present        COVID-19 virus infection ICD-10-CM: U07.1  ICD-9-CM: 079.89  4/14/2021 - Present        CAD (coronary artery disease) ICD-10-CM: I25.10  ICD-9-CM: 414.00  5/29/2018 - Present        ELINOR (acute kidney injury) ([facility] ICD-10-CM: N17.9  ICD-9-CM: 584.9  5/29/2018 - Present        CKD (chronic kidney disease) stage 3, GFR 30-59 ml/min ([facility]) ICD-10-CM: N18.30  ICD-9-CM: 585.3  5/29/2018 - Present        Elevated brain natriuretic peptide (BNP) level ICD-10-CM: R79.89  ICD-9-CM: 790.99  5/29/2018 - Present        Atrial flutter ([facility] ICD-10-CM: L93.67  ICD-9-CM: 427.32  2/12/2018 - Present        DM2 (diabetes mellitus, type 2) ([facility] ICD-10-CM: E11.9  ICD-9-CM: 250.00  2/12/2018 - Present        RESOLVED: Diabetic gangrene ([facility] ICD-10-CM: E11.52  ICD-9-CM: 250.70, 785.4  4/17/2021 - 4/17/2021        RESOLVED: Acute renal insufficiency ICD-10-CM: N28.9  ICD-9-CM: 593.9  2/12/2018 - 4/22/2021        RESOLVED: Tachycardia ICD-10-CM: R00.0  ICD-9-CM: 785.0  2/10/2018 - 2/13/2018              Discharge Medications:     Current Discharge Medication List      START taking these medications    Details   ertapenem 1 gram 1 g IVPB 1 g by IntraVENous route every twenty-four (24) hours for 40 days. Qty: 40 Dose, Refills: 0    Comments: Routine PICC care per [facility] protocol- with flushing and TPA use if clogging as needed. Removal of PICC at end of treatment. No exception.     Labs to be done Q Monday: CBC with Diff, BUN/CR, LFT, CRP, ESR     Please fax results to: [phone], attention Dr Darin Garcia with ID on Wedensday morning       ertapenem 1 gram 1 g IVPB 1 g by IntraVENous route every twenty-four (24) hours for 39 days. Qty: 1 Dose, Refills: 0         CONTINUE these medications which have NOT CHANGED    Details   spironolactone (ALDACTONE) 25 mg tablet Take 12.5 mg by mouth daily. Take 0.5 tablet (12.5 mg) by oral      insulin lispro (HUMALOG KWIKPEN INSULIN SC) 10-15 Units by SubCUTAneous route three (3) times daily (with meals). Omeprazole delayed release (PRILOSEC D/R) 20 mg tablet Take 1 Tab by mouth daily. Qty: 30 Tab, Refills: 5      apixaban (ELIQUIS) 5 mg tablet Take 1 Tab by mouth two (2) times a day. Qty: 60 Tab, Refills: 0      aspirin delayed-release 81 mg tablet Take 1 Tab by mouth daily. Qty: 30 Tab, Refills: 0      atorvastatin (LIPITOR) 40 mg tablet Take 1 Tab by mouth nightly. Qty: 30 Tab, Refills: 0      clopidogrel (PLAVIX) 75 mg tab Take 1 Tab by mouth daily. Qty: 30 Tab, Refills: 0      furosemide (LASIX) 40 mg tablet Take 1 Tab by mouth daily. Qty: 30 Tab, Refills: 0      insulin glargine (LANTUS) 100 unit/mL injection 15 Units by SubCUTAneous route daily.   Qty: 1 Vial, Refills: 0 SITagliptin (JANUVIA) 50 mg tablet Take 1 Tab by mouth daily. Qty: 30 Tab, Refills: 0      lancets (ADVOCATE LANCET) 30 gauge misc Use while checking BGL each morning. Qty: 1 Package, Refills: 0      glucose blood VI test strips (IGLUCOSE TEST STRIP) strip Use to check BGL every morning  Qty: 50 Strip, Refills: 0         STOP taking these medications       pantoprazole (Protonix) 40 mg tablet Comments:   Reason for Stopping:         ammonium lactate (LAC-HYDRIN) 12 % lotion Comments:   Reason for Stopping:               Discharge Condition: Good    Procedures : INCISION AND DRAINAGE LOWER EXTREMITY left heel Debridement antibotic beads. DEBRIDEMENT OF LEFT HEEL, CURETTEMENT/SAUCERIZATION CALCANEOUS, WOUND,APPLICATION OF STRAVIX GRAFT. Consults: Infectious Disease and podiatry      PHYSICAL EXAM   Visit Vitals  /60   Pulse 86   Temp 98.4 °F (36.9 °C)   Resp 16   Ht 5' 7\" (1.702 m)   Wt 89.2 kg (196 lb 11.2 oz)   SpO2 99%   BMI 30.81 kg/m²     General: Awake, cooperative, no acute distress    HEENT: NC, Atraumatic. PERRLA, EOMI. Anicteric sclerae. Lungs:  CTA Bilaterally. No Wheezing/Rhonchi/Rales. Heart:  Regular  rhythm,  No murmur, No Rubs, No Gallops  Abdomen: Soft, Non distended, Non tender. +Bowel sounds,   Extremities: No c/c/e  Psych:   Not anxious or agitated. Neurologic:  No acute neurological deficits. Admission HPI :   Dillon Toscano is a 70 y.o. male with past medical history of diabetes, atrial fibrillation on Eliquis presents to the emergency department via private vehicle complaining of ulcer to his left foot that has been managed by his podiatrist Dr Dianne Chan. The ulcer has been present for the past 2 to 3 months. He was in his podiatrist office earlier today and Dr. Dianne Chan tried to do a debridement but patient did not tolerate it well.   As a result, Dr. Dianne Chan sent the patient to the hospital for IV antibiotics and possible surgical debridement was seen in the office earlier today and was advised to come to the emergency department for admission. Is currently not on antibiotics. Pt denies fever, nausea, vomiting, and any other sxs or complaints. No relieving or exacerbating factors identified. Patient's wife is by his bedside and she states that the foot has been smelling very foul and so she keeps the windows open at night in order to air out the room. Patient's wife also states that her  had cold/flu symptoms approximately 3 weeks ago and was and had fever, chills, night sweats, diaphoresis, shortness of breath and cough. Patient and his wife deny any Covid exposures and stated that all he does is stay at home. Hospital Course :   Mr. Geoff Phillips was admitted to isolation room under COVID 19 protocol. He was seen and followed by podiatry and ID. Osteomyelitis left heel with cellulitis, gangrenous ulcer with deep abscess tracking along the plantar fascia -  S/P incision and drainage lower extremity left heel debridement and antibiotic bead placement on 04/17/2021  S/P debridement of left heel, currettement/saucerization calcaneous wound, application of stravix graft on 04/21/2021  He was seen by ID, his cultures grew mixed nicko with MSSA, E-Coli, and anaerobes. He was started on unasyn, at discharge he was switched to ertapenem. Will need 6 weeks of IV antibiotics via PICC line     Covid 19  Started On vitamin/antioxidant cocktail  Not hypoxic hence did not use steroids     Stage III chronic kidney disease -  Monitored renal function     HTN -  Continued On cozaar, toprol xl, lasix.   Monitor BP     Diabetes mellitus now with hyperglycemia -  Now better controlled,   Was started on SSI     CAD -  No anginal symptoms     A-flutter -  Rate controlled on toprol xl   on therapeutic lovenox dose for procedures, switch to eliquis at discharge.       Activity: Activity as tolerated    Diet: Diabetic Diet    Follow-up: PCP, ID, podiatry    Disposition: home with home health    Minutes spent on discharge: 45       Labs: Results:       Chemistry Recent Labs     04/21/21  0310 04/20/21  0610   GLU 93 134*    141   K 3.4* 3.5    104   CO2 32 34*   BUN 24* 24*   CREA 1.41* 1.26   CA 8.0* 8.0*   AGAP 6 3   BUCR 17 19      CBC w/Diff No results for input(s): WBC, RBC, HGB, HCT, PLT, GRANS, LYMPH, EOS, HGBEXT, HCTEXT, PLTEXT, HGBEXT, HCTEXT, PLTEXT in the last 72 hours. Cardiac Enzymes No results for input(s): CPK, CKND1, LEONEL in the last 72 hours. No lab exists for component: CKRMB, TROIP   Coagulation No results for input(s): PTP, INR, APTT, INREXT, INREXT in the last 72 hours. Lipid Panel No results found for: CHOL, CHOLPOCT, CHOLX, CHLST, CHOLV, 472627, HDL, HDLP, LDL, LDLC, DLDLP, 238683, VLDLC, VLDL, TGLX, TRIGL, TRIGP, TGLPOCT, CHHD, CHHDX   BNP No results for input(s): BNPP in the last 72 hours. Liver Enzymes No results for input(s): TP, ALB, TBIL, AP in the last 72 hours. No lab exists for component: SGOT, GPT, DBIL   Thyroid Studies Lab Results   Component Value Date/Time    TSH 1.72 02/11/2018 02:19 AM            Significant Diagnostic Studies: Xr Foot Lt Min 3 V    Result Date: 4/17/2021  EXAM: XR FOOT LT MIN 3 V CLINICAL INDICATION/HISTORY: Left foot pain, postoperative follow-up  COMPARISON: Left foot MRI 4/15/2021 TECHNIQUE: AP, lateral, and oblique views of the left foot were obtained. _______________ FINDINGS: Radiodense antibiotic pellets are seen throughout the lateral and plantar left hindfoot subjacent to the calcaneus. Expected adjacent postoperative soft tissue swelling and emphysema. Pes planus morphology. Debridement of plantar soft tissue ulcer seen previously. No acute osseous abnormality. _______________     Surgical changes of interval debridement.      Xr Foot Lt Min 3 V    Result Date: 4/14/2021  EXAM: XR FOOT LT MIN 3 V CLINICAL INDICATION/HISTORY: Diabetic with left forefoot soft tissue ulcer, pain and swelling  COMPARISON: None. TECHNIQUE: AP, lateral, and oblique views of the left foot were obtained. _______________ FINDINGS: Deep soft tissue ulcer is seen within the plantar, lateral aspect of the left hindfoot, just peripheral to the calcaneus. No additional region of soft tissue emphysema elsewhere. No radiodense foreign body. Pes planus morphology with moderate degenerative changes of talonavicular articulation. More mild degenerative changes elsewhere in the left foot. No cortical defect or definite periosteal reaction within the plantar calcaneus near the soft tissue ulcer to suggest osteomyelitis. There is no acute fracture or dislocation. _______________     Deep plantar left hindfoot soft tissue ulcer. No radiographic findings of active osteomyelitis. Mri Foot Lt W Wo Cont    Result Date: 4/15/2021  EXAM: MRI FOOT LT W WO CONT CLINICAL INDICATION/HISTORY: Infected diabetic ulcer left heel, evaluate for osteomyelitis  >Additional: None COMPARISON: Left foot radiographs dated April 14, 2021  >Reference exam: None. TECHNIQUE: Axial long axis TI and T2 with fat saturation, sagittal and coronal short axis TI and STIR sequences through the foot were obtained without contrast. Initial postcontrast axial and sagittal T1-weighted fat-saturated imaging was also acquired. _______________ FINDINGS: OSSEOUS: There is a small plantar calcaneal enthesophyte formation with associated diffuse marrow edema with corresponding low T1 signal, postcontrast enhancement, as well as evidence of cortical/bony erosive change along the plantar surface of the calcaneus and enthesophyte corresponding to the attachment of the plantar fascial aponeurosis which is also abnormally thickened with partial disruption of the medial band fibers. Overlying soft tissue ulceration with a defect and underlying soft tissue edema extending to the aponeurosis and bone.  The ulceration measures roughly 13 mm proximal distal by 2.7 cm mediolateral. Degenerative changes noted at the subtalar joint with patchy subcortical edema at the calcaneal sulcus in the opposing surface of the cuboid with a small amount joint fluid noted in the subtalar joint. Talar dome is intact without focal chondral or osteochondral defect. There is hindfoot valgus alignment with medial deviation of the mid talar axis. Padmini morphology of the posterior tibialis tendon slips as it courses under the head of the talus but otherwise intact. Mild tenosynovial fluid noted along the course of the flexor houses longus tendon. SOFT TISSUES: Postcontrast imaging demonstrates low-grade enhancement and skin thickening involving the superficial tissues surrounding the ulceration. There is abnormal low T1 signal and patchy edema with nonenhancing soft tissue dissecting along the plantar aspect of the forefoot superficially along the course of the plantar fascial aponeurosis with numerous locules of soft tissue gas, compatible with ischemic and necrotic tissue. Area of abnormal nonenhancing soft tissue spans roughly 9 cm proximal distal from the level of the cuboid to the calcaneus, to approximately 2 cm mediolateral, and 1.2 cm deep to the ulceration on sagittal series 10 and axial/long axis series image 11. There is extensive edema throughout the intrinsic foot musculature, likely related to diabetic neuromyopathic changes. INCIDENTAL FINDINGS: None. _______________     1. Plantar soft tissue ulceration involving the calcaneal fat pad with soft tissue abnormality extending to the bony cortex with associated cortical erosion and enthesitis in keeping with acute osteomyelitis, corresponding to the attachment of the plantar fascial aponeurosis which is disrupted.  2.  In addition, there is a large area of abnormal nonenhancing soft tissue with multiple scattered locules of subcutaneous gas tracking distally along the course of the plantar fascial aponeurosis from its calcaneal attachment, roughly 9 cm in length compatible with ischemic and necrotic tissue. 3.  Additional chronic and degenerative changes as above. Xr Chest Port    Result Date: 4/14/2021  EXAM: CHEST RADIOGRAPH, SINGLE VIEW CLINICAL INDICATION/HISTORY: Fever, sepsis COMPARISON: 5/29/2018 TECHNIQUE: Portable frontal view of the chest was obtained. _______________ FINDINGS: SUPPORT DEVICES: None. HEART AND MEDIASTINUM: Cardiomediastinal silhouette appears within normal limits. Normal caliber thoracic aorta. No central vascular congestion. LUNGS AND PLEURAL SPACES: Normal variant azygous fissure. Patchy opacity is seen throughout lateral and mid aspects of the right upper lobe. Remaining lung parenchyma appears otherwise adequately aerated. No pleural effusion or pneumothorax. BONY THORAX AND SOFT TISSUES: No acute osseous abnormality. _______________     Acute right upper lobe pulmonary infiltrate. Duplex Lower Ext Venous Bilat    Result Date: 4/19/2021  · No evidence of deep vein thrombosis in the right lower extremity. · No evidence of deep vein thrombosis in the left lower extremity. Duplex Lower Ext Artery Bilat    Result Date: 4/19/2021  Monophasic Doppler waveforms in the right distal common femoral, proximal superficial femoral, middle superficial femoral, distal superficial femoral, proximal popliteal, posterior tibial and dorsalis pedis artery. Biphasic Doppler waveforms in the right proximal common femoral and profunda femoris artery. Monophasic Doppler waveforms in the left distal superficial femoral, proximal popliteal, distal popliteal, posterior tibial, peroneal and dorsalis pedis artery. Biphasic Doppler waveforms in the left proximal superficial femoral and middle superficial femoral artery. Triphasic Doppler waveforms in the left distal common femoral and profunda femoris artery.  The following arteries are patent: distal common femoral.    Ir Guide Insert Picc Over 5 Yrs    Result Date: 4/20/2021  PROCEDURE: PICC placement ATTENDING: Ava Hancock M.D. INDICATION: Long-term central venous access. COMPLICATIONS: None. PROCEDURE: Informed consent was obtained. Physician lead timeout was performed. The procedure was performed following standard maximal barrier technique which includes, cap, mask, sterile gown, sterile gloves, sterile full body drape, hand hygiene with alcohol foam, and 2% chlorhexidine for cutaneous antisepsis. Sterile ultrasound gel and a sterile cover for the US probe was used. . The arm was prepped and draped in sterile fashion. The right basilic vein was compressed to exclude thrombus. A permanent US recording of the vein was created for the patients file. The vein was then accessed with a 21-gauge needle and ultrasound guidance. Guidewire was passed centrally using fluoroscopic guidance. A peel-away sheath was inserted over the guidewire. The intravascular distance was measured. The PICC was trimmed to the appropriate length, 36 cm. The double lumen PICC was placed into the vein with its tip in the SVC/RA junction and both ports aspirated and flushed easily. The PICC was secured to the skin, a sterile dressing including Biopatch was applied. Fluoroscopic dose (reference air kerma): 4 mGy     Dual lumen right PICC placement, ready for immediate use. No results found for this or any previous visit. Please note that this dictation was completed with [employer], the [employer] voice recognition software. Quite often unanticipated grammatical, syntax, homophones, and other interpretive errors are inadvertently transcribed by the computer software. Please disregard these errors. Please excuse any errors that have escaped final proofreading.      CC: Javon Goetz MD

## 2021-04-23 ENCOUNTER — PATIENT OUTREACH (OUTPATIENT)
Dept: CASE MANAGEMENT | Age: 76
End: 2021-04-23

## 2021-04-23 ENCOUNTER — HOSPITAL ENCOUNTER (OUTPATIENT)
Dept: INFUSION THERAPY | Age: 76
Discharge: HOME | End: 2021-04-23
Payer: MEDICARE

## 2021-04-23 VITALS
RESPIRATION RATE: 20 BRPM | OXYGEN SATURATION: 94 % | SYSTOLIC BLOOD PRESSURE: 97 MMHG | TEMPERATURE: 98.8 F | DIASTOLIC BLOOD PRESSURE: 61 MMHG | HEART RATE: 103 BPM

## 2021-04-23 PROCEDURE — 74011250636 HC RX REV CODE- 250/636: Performed by: INTERNAL MEDICINE

## 2021-04-23 PROCEDURE — 96365 THER/PROPH/DIAG IV INF INIT: CPT

## 2021-04-23 PROCEDURE — 74011000258 HC RX REV CODE- 258: Performed by: INTERNAL MEDICINE

## 2021-04-23 RX ADMIN — SODIUM CHLORIDE 1 G: 900 INJECTION INTRAVENOUS at 13:07

## 2021-04-23 NOTE — PROGRESS NOTES
[facility]    Date: 2021    Name: Henrik Quispe. MRN: [id number]         : 1945     Invanz x 1 dose. Mr. Amos Zamora was assessed and education was provided. Patient recently discharged from the hospital. He is here for one more dose of Invanz. Patient states he received the same antibiotics while inpatient. He denies any issues or concerns at this time. Mr. Hildy Holstein vitals were reviewed. Visit Vitals  BP 97/61 (BP 1 Location: Left upper arm, BP Patient Position: Sitting)   Pulse (!) 103   Temp 98.8 °F (37.1 °C)   Resp 20   SpO2 94%     His right upper arm double lumen picc appears to be clean, dry, and intact and  was flushed and noted with brisk blood return.        []  Vancomycin     [x]  Invanz 1 G     []  Cubicin     []  Rocephin    was infused over 30 minutes. Picc-line flushed and heparinized. New curos caps applied to lumens and wrapped with gauze and coban. Stockinette placed. Mr. Amos Zamora tolerated infusion, and had no complaints at this time. Patient armband removed and shredded. Mr. Amos Zamora was discharged from [place] [id number] in stable condition at [phone]. He has no future appointments with [facility] at this time.     Vicki Mojica  2021  1:14 PM

## 2021-04-23 NOTE — PROGRESS NOTES
Transitions of Care     Care Transitions Nurse ( CTN) attempted to contact patient via telephone call regarding recent hospital discharge follow up. A message was heard that call could not be completed as dialed. There was no option to leave a voicemail message.

## 2021-04-26 LAB
BACTERIA SPEC CULT: ABNORMAL
BACTERIA SPEC CULT: ABNORMAL
SERVICE CMNT-IMP: ABNORMAL

## 2021-04-28 ENCOUNTER — PATIENT OUTREACH (OUTPATIENT)
Dept: CASE MANAGEMENT | Age: 76
End: 2021-04-28

## 2021-04-28 NOTE — PROGRESS NOTES
Care Transitions Nurse ( CTN) attempted to contact patient via telephone call regarding follow up of hospital discharge 4/22/21. There was no response. A voicemail message was left requesting a non-emergency return telephone call. CTN  contact information provided.

## 2021-04-28 NOTE — OP NOTES
[facility] [place]  OPERATIVE REPORT    Name:  Sherri Radford  MR#:   [id number]  :  1945  ACCOUNT #:  [de-identified]  DATE OF SERVICE:  2021      LOCATION:  OPS. PREOPERATIVE DIAGNOSES:  1. Diabetic with open ulcer, plantar left heel, with bone necrosis. 2.  Osteomyelitis left calcaneus. POSTOPERATIVE DIAGNOSES:  1. Diabetic with open ulcer, plantar left heel, with bone necrosis. 2.  Osteomyelitis left calcaneus. PROCEDURES PERFORMED:  1. Partial excision with craterization and saucerization of left calcaneus secondary to osteomyelitis. 2.  Application of Stravix allograft to the left heel consisting of a 4 x 6 cm piece. SURGEON:  Luke Cordon DPM    ASSISTANT:  Rosalinda Gracia    ANESTHESIA:  MAC with local consisting of 10 mL of 0.5% Marcaine with epinephrine given to the left heel. HEMOSTASIS:  There was no tourniquet used in this procedure. COMPLICATIONS:  None. SPECIMENS REMOVED:  Bone specimens from the left calcaneus. Also, antibiotic beads were removed and also iodoform gauze was removed. IMPLANTS:  Stravix graft    MATERIALS:  3-0 Vicryl was used. Also, an Mely Stravix mesenchymal allograft was used, a 3 x 6 piece. INJECTABLES:  None. ESTIMATED BLOOD LOSS:  Minimal, less than 5 mL. INDICATIONS FOR PROCEDURE:  The patient is a 51-year-old insulin-dependent diabetic male who was admitted to [facility] on 2021 for inpatient treatment of acute left heel osteomyelitis with cellulitis and open wound. He declined surgical amputation and removal of his left foot infected bones as a treatment for the osteomyelitis and therefore he chooses to try to use six weeks of IV antibiotics to eradicate his osteomyelitis.   It was explained to the patient that this was a second part of a planned and staged surgical procedure today for his previous surgery that was done on 2021 to return him to the OR for continued treatment of his left heel bone infection and subsequent grafting of his large open wound on the bottom of his left heel to try to get this wound to close faster. All conservative treatments have failed to offer the patient adequate relief, and the patient desires surgical correction today. The planned procedures were explained to the patient in detail, including all risks, benefits, and possible complications, and the patient still desires surgical correction. Medical clearance was obtained prior to surgery. Consent was signed and on chart. All of the patient's questions were answered, and no guarantees were given. Also, the patient was asked if he had any history of bleeding disorders or blood clots or DVTs, and he stated that he had none. No tourniquet was used in this procedure. It was also explained to the patient that this surgery would be considered a salvage procedure due to the severity of the previous surgery and infection in his foot and that it was to be performed on his left foot and that no guarantees were given. The patient stated that he understood this and agreed. PROCEDURE:  The patient was brought to the operating room and placed on the operating room table in the supine position. The patient's left foot was then anesthetized using 10 mL of 0.5% Marcaine with epinephrine as a local block to his left heel. The patient's left foot and ankle were then prepped and draped in the usual sterile manner. Attention was then drawn to the plantar left heel where a 3.5 x 3.0 x 2.0 cm open wound was noted deep to bone from his previous surgery with exposed calcaneus from the surgery done on 04/17/2021. Next, the iodoform dressing and also antibiotic beads were then removed.   Blunt dissection was then continued down to better expose the plantar surface of the calcaneus, which appeared to have some necrotic, soft, gray cancellous bone which was then excised with craterization and saucerization technique and removed using a bone curette and an osteotome to remove nonviable bone. The procedure was done down to hard bleeding bone. At this point, a bone biopsy was not considered and not done due to his previous treatment with antibiotic beads and also IV antibiotics since his admission. It was concluded that the bone biopsy would not be accurate. Next, a #10 blade was then used to begin the surgical wound debridement of his left heel, also by excising all necrotic tissue, muscle, and tendon down to healthy bleeding tissue. Next, the Versajet instrument by [facility], which is a high-pressure water tissue debriding system was then used to complete his soft tissue debridement by removing any remaining necrotic tissue deep to tendon, muscle, and bone. The wound at this point measured approximately 3.6 x 3.1 x 2 cm deep to bone and deep fascia. Next, the [employer] placental allograft, which was a 3 x 6 cm mesenchymal graft was then fenestrated and placed into and over the wound and sutured into place using 3-0 Vicryl. Next, the wound was then dressed using Adaptic, 4x4 gauze, ABDs, Kerlix, and an Ace wrap. The patient was then transported to the recovery room with vital signs stable and neurovascular status returned to baseline for the patient's left foot. The patient tolerated the procedures and anesthesia well with no complications. Again, no tourniquet was used in this procedure. It was also planned that the following morning, a wound VAC would be applied to the patient's left heel for better healing. POSTOPERATIVE CHECK:  The patient was later seen in the recovery room with his dressings clean, dry, and intact with no bleeding noted to his left heel. His capillary refill time was then checked for all five toes in his left foot and found to be less than 5 seconds to each toe.   The patient was later transported back to his room on the third floor for continued medical treatment and IV antibiotic treatment for his left foot. Dr. Vinita Bui will follow the patient as needed.       FREDRICK [id number]  D:  04/27/2021 18:53  T:  04/28/2021 4:29  JOB #:  [id number]

## 2021-04-30 ENCOUNTER — HOSPITAL ENCOUNTER (OUTPATIENT)
Dept: WOUND CARE | Age: 76
Discharge: HOME | End: 2021-04-30
Attending: PODIATRIST
Payer: MEDICARE

## 2021-04-30 PROCEDURE — 11042 DBRDMT SUBQ TIS 1ST 20SQCM/<: CPT

## 2021-05-03 VITALS
OXYGEN SATURATION: 98 % | HEART RATE: 72 BPM | RESPIRATION RATE: 18 BRPM | TEMPERATURE: 98.2 F | DIASTOLIC BLOOD PRESSURE: 80 MMHG | SYSTOLIC BLOOD PRESSURE: 150 MMHG

## 2021-05-03 NOTE — DISCHARGE INSTRUCTIONS
Discharge Instructions for  [address]  [address] [place], [place], [facility]  [phone] Fax [phone]    NAME:  Nia Abdul. YOB: 1945  MEDICAL RECORD NUMBER:  [id number]  DATE:  4/30/2021     Compression:  Apply: [x] Multilayer Compression Wrap Applied in Clinic []RightLeg [x]Left Leg   [x] Multi-layer compression. Do not get leg(s) with wrap wet. If wraps become too tight call the center or completely remove the wrap. [x] Elevate leg(s) above the level of the heart when sitting. Dietary:  [] Diet as tolerated: [x] Calorie Diabetic Diet: [x] No Added Salt:  [x] Increase Protein: [] Other:   Activity:  [x] Activity as tolerated:  [] Patient has no activity restrictions     [] Strict Bedrest: [] Remain off Work:     [] May return to full duty work:                                   [] Return to work with restrictions:             Return Appointment:  [] Wound and dressing supply provider:   [] ECF or Home Healthcare:  [] Wound Assessment: [] Physician or NP scheduled for Wound Assessment:   [x] Return Appointment: With [facility]  in  3 Week(s)  [] Ordered tests:      Electronically signed Deon Calderon, [address] [place] [address] Information: Should you experience any significant changes in your wound(s) or have questions about your wound care, please contact the [facility] at [address] [place] [address] 8:00 am - 4:30. If you need help with your wound outside these hours and cannot wait until we are again available, contact your PCP or go to the hospital emergency room. PLEASE NOTE: IF YOU ARE UNABLE TO OBTAIN WOUND SUPPLIES, CONTINUE TO USE THE SUPPLIES YOU HAVE AVAILABLE UNTIL YOU ARE ABLE TO REACH US. IT IS MOST IMPORTANT TO KEEP THE WOUND COVERED AT ALL TIMES.      Physician Signature:_______________________    Date: ___________ Time:  ____________

## 2021-05-03 NOTE — WOUND CARE
05/03/21 1230   Wound Heel Left   Date First Assessed/Time First Assessed: 04/30/21 0900   Primary Wound Type: Open incision/surgical site  Location: Heel  Wound Location Orientation: Left   Wound Image      Wound Etiology Diabetic Kemp 3   Dressing Status Breakthrough drainage noted   Cleansed Wound cleanser   Dressing/Treatment Alginate; Cast padding;Coban/self-adherent bandages; Collagen; Foam   Offloading for Diabetic Foot Ulcers Felt or foam   Dressing Change Due 05/07/21   Wound Length (cm) 2.5 cm   Wound Width (cm) 3.3 cm   Wound Depth (cm) 1.6 cm   Wound Surface Area (cm^2) 8.25 cm^2   Wound Volume (cm^3) 13.2 cm^3   Post-Procedure Length (cm) 2.6 cm   Post-Procedure Width (cm) 3.4 cm   Post-Procedure Depth (cm) 1.7 cm   Post-Procedure Surface Area (cm^2) 8.84 cm^2   Post-Procedure Volume (cm^3) 15.03 cm^3   Wound Assessment Devitalized tissue   Drainage Amount Moderate   Drainage Description Serosanguinous   Wound Odor None   Neha-Wound/Incision Assessment Fragile   Edges Defined edges   Wound Thickness Description Full thickness     Unna Boot Application   Below Knee    NAME:  Ivonne Carroll. YOB: 1945  MEDICAL RECORD NUMBER:  [id number]  DATE:  4/30/2021    Remove old [employer] or Boots. Applied moisturizing agent to dry skin as needed. Appied primary and secondary dressing as ordered. Applied Unna roll from toes to knee overlapping each time. Applied ace wrap or coban from toes to below the knee. Instructed patient/caregiver to keep dressing dry and intact. DO NOT REMOVE DRESSING. Instructed pt/family/caregiver to report excessive draining, loose bandage, wet dressing, severe pain or tingling in toes. Applied [employer] dressing below the knee to left lower leg. Unna Boot(s) were applied per  Guidelines.     Response to treatment: Well tolerated by patient      Electronically signed by Loyda Bonilla RN

## 2021-05-04 ENCOUNTER — APPOINTMENT (OUTPATIENT)
Dept: GENERAL RADIOLOGY | Age: 76
DRG: 464 | End: 2021-05-04
Attending: STUDENT IN AN ORGANIZED HEALTH CARE EDUCATION/TRAINING PROGRAM
Payer: MEDICARE

## 2021-05-04 ENCOUNTER — HOSPITAL ENCOUNTER (INPATIENT)
Age: 76
LOS: 21 days | Discharge: NURSING, REHAB OR LONG-TERM CARE | DRG: 464 | End: 2021-05-25
Attending: STUDENT IN AN ORGANIZED HEALTH CARE EDUCATION/TRAINING PROGRAM | Admitting: HOSPITALIST
Payer: MEDICARE

## 2021-05-04 ENCOUNTER — PATIENT OUTREACH (OUTPATIENT)
Dept: CASE MANAGEMENT | Age: 76
End: 2021-05-04

## 2021-05-04 DIAGNOSIS — T82.524A DISPLACEMENT OF PERIPHERALLY INSERTED CENTRAL CATHETER (PICC) (HCC): ICD-10-CM

## 2021-05-04 DIAGNOSIS — I25.10 ATHEROSCLEROSIS OF NATIVE CORONARY ARTERY OF NATIVE HEART WITHOUT ANGINA PECTORIS: ICD-10-CM

## 2021-05-04 DIAGNOSIS — R06.02 SOB (SHORTNESS OF BREATH): ICD-10-CM

## 2021-05-04 DIAGNOSIS — S82.852A TRIMALLEOLAR FRACTURE OF ANKLE, CLOSED, LEFT, INITIAL ENCOUNTER: Primary | ICD-10-CM

## 2021-05-04 DIAGNOSIS — M86.9 OSTEOMYELITIS OF LEFT FOOT, UNSPECIFIED TYPE (HCC): ICD-10-CM

## 2021-05-04 LAB — GLUCOSE BLD STRIP.AUTO-MCNC: 73 MG/DL (ref 70–110)

## 2021-05-04 PROCEDURE — 74011250636 HC RX REV CODE- 250/636: Performed by: HOSPITALIST

## 2021-05-04 PROCEDURE — 73060 X-RAY EXAM OF HUMERUS: CPT

## 2021-05-04 PROCEDURE — 73610 X-RAY EXAM OF ANKLE: CPT

## 2021-05-04 PROCEDURE — 73630 X-RAY EXAM OF FOOT: CPT

## 2021-05-04 PROCEDURE — 99283 EMERGENCY DEPT VISIT LOW MDM: CPT

## 2021-05-04 PROCEDURE — 65270000029 HC RM PRIVATE

## 2021-05-04 PROCEDURE — 74011250636 HC RX REV CODE- 250/636: Performed by: FAMILY MEDICINE

## 2021-05-04 PROCEDURE — 74011000258 HC RX REV CODE- 258: Performed by: HOSPITALIST

## 2021-05-04 PROCEDURE — 02HV33Z INSERTION OF INFUSION DEVICE INTO SUPERIOR VENA CAVA, PERCUTANEOUS APPROACH: ICD-10-PCS | Performed by: HOSPITALIST

## 2021-05-04 PROCEDURE — 74011250637 HC RX REV CODE- 250/637: Performed by: HOSPITALIST

## 2021-05-04 PROCEDURE — 75810000053 HC SPLINT APPLICATION

## 2021-05-04 PROCEDURE — 82962 GLUCOSE BLOOD TEST: CPT

## 2021-05-04 RX ORDER — INSULIN GLARGINE 100 [IU]/ML
15 INJECTION, SOLUTION SUBCUTANEOUS DAILY
Status: DISCONTINUED | OUTPATIENT
Start: 2021-05-05 | End: 2021-05-21

## 2021-05-04 RX ORDER — ASPIRIN 81 MG/1
81 TABLET ORAL DAILY
Status: DISCONTINUED | OUTPATIENT
Start: 2021-05-05 | End: 2021-05-16

## 2021-05-04 RX ORDER — METOPROLOL SUCCINATE 50 MG/1
50 TABLET, EXTENDED RELEASE ORAL DAILY
Status: DISCONTINUED | OUTPATIENT
Start: 2021-05-05 | End: 2021-05-08

## 2021-05-04 RX ORDER — ATORVASTATIN CALCIUM 20 MG/1
40 TABLET, FILM COATED ORAL
Status: DISCONTINUED | OUTPATIENT
Start: 2021-05-04 | End: 2021-05-25 | Stop reason: HOSPADM

## 2021-05-04 RX ORDER — FUROSEMIDE 40 MG/1
40 TABLET ORAL DAILY
Status: DISCONTINUED | OUTPATIENT
Start: 2021-05-05 | End: 2021-05-07

## 2021-05-04 RX ORDER — INSULIN LISPRO 100 [IU]/ML
INJECTION, SOLUTION INTRAVENOUS; SUBCUTANEOUS
Status: DISCONTINUED | OUTPATIENT
Start: 2021-05-04 | End: 2021-05-25 | Stop reason: HOSPADM

## 2021-05-04 RX ORDER — MORPHINE SULFATE 2 MG/ML
2 INJECTION, SOLUTION INTRAMUSCULAR; INTRAVENOUS
Status: DISCONTINUED | OUTPATIENT
Start: 2021-05-04 | End: 2021-05-18 | Stop reason: SDUPTHER

## 2021-05-04 RX ORDER — LOSARTAN POTASSIUM 25 MG/1
25 TABLET ORAL DAILY
Status: DISCONTINUED | OUTPATIENT
Start: 2021-05-05 | End: 2021-05-05

## 2021-05-04 RX ORDER — MORPHINE SULFATE 4 MG/ML
4 INJECTION INTRAVENOUS
Status: ACTIVE | OUTPATIENT
Start: 2021-05-04 | End: 2021-05-05

## 2021-05-04 RX ORDER — DEXTROSE 50 % IN WATER (D50W) INTRAVENOUS SYRINGE
50 AS NEEDED
Status: DISCONTINUED | OUTPATIENT
Start: 2021-05-04 | End: 2021-05-25 | Stop reason: HOSPADM

## 2021-05-04 RX ORDER — SPIRONOLACTONE 25 MG/1
12.5 TABLET ORAL DAILY
Status: DISCONTINUED | OUTPATIENT
Start: 2021-05-05 | End: 2021-05-05

## 2021-05-04 RX ORDER — MAGNESIUM SULFATE 100 %
16 CRYSTALS MISCELLANEOUS AS NEEDED
Status: DISCONTINUED | OUTPATIENT
Start: 2021-05-04 | End: 2021-05-25 | Stop reason: HOSPADM

## 2021-05-04 RX ORDER — CLOPIDOGREL BISULFATE 75 MG/1
75 TABLET ORAL DAILY
Status: DISCONTINUED | OUTPATIENT
Start: 2021-05-05 | End: 2021-05-16

## 2021-05-04 RX ADMIN — ATORVASTATIN CALCIUM 40 MG: 20 TABLET, FILM COATED ORAL at 23:49

## 2021-05-04 RX ADMIN — ERTAPENEM SODIUM 1 G: 1 INJECTION, POWDER, LYOPHILIZED, FOR SOLUTION INTRAMUSCULAR; INTRAVENOUS at 23:55

## 2021-05-04 RX ADMIN — MORPHINE SULFATE 2 MG: 2 INJECTION, SOLUTION INTRAMUSCULAR; INTRAVENOUS at 23:56

## 2021-05-04 NOTE — PROGRESS NOTES
Care Transitions Nurse ( CTN) unable to reach patient for  Follow up x2. Per chart review patient admitted into THE [facility] ED at this time.

## 2021-05-04 NOTE — H&P
History & Physical    Patient: Sivan Chavez MRN: [id number]  [facility]: [id number]    YOB: 1945  Age: 70 y.o. Sex: male      DOA: 5/4/2021  Primary Care Provider:  Jana Reid MD      Assessment/Plan     Patient Active Problem List   Diagnosis Code    Atrial flutter ([facility] I48.92    DM2 (diabetes mellitus, type 2) ([address] E11.9    CAD (coronary artery disease) I25.10    ELINOR (acute kidney injury) ([address] N17.9    CKD (chronic kidney disease) stage 3, GFR 30-59 ml/min ([facility]) N18.30    Elevated brain natriuretic peptide (BNP) level R79.89    Diabetic ulcer of left foot ([facility]) E11.621, L97.529    COVID-19 virus infection U07.1    Acute osteomyelitis ([address] M86.10    Acute hematogenous osteomyelitis of left foot ([address] M86.072    Cellulitis of left foot L03. 80    Diabetic foot ulcer with osteomyelitis ([address] E11.621, E11.69, L97.509, M86.9    Ulcer of left heel, with necrosis of bone ([address] L97.424    Osteomyelitis ([address] M86.9    Trimalleolar fracture of ankle, closed, left, initial encounter J37.379Y    Displacement of peripherally inserted central catheter (PICC) ([address] T82.524A       Admit to monitored floor. Trimalleolar fracture -  Not a surgical candidate given osteomyelitis. Podiatry recommended splint. Splint placed in ER    Osteomyelitis left heel with cellulitis, gangrenous ulcer with deep abscess tracking along the plantar fascia -  Status post incision and drainage lower extremity left heel debridement and antibiotic bead placement   S/p removal of antibiotic beads and graft open wound. On last admission  On ertapenem for 6 weeks        Stage III chronic kidney disease -  Monitor renal function     HTN -  On cozaar, toprol xl, lasix.   Monitor BP     Diabetes mellitus -  Continue with lantus, start on SSI       CAD -  No anginal symptoms, continue with aspirin and plavix     A-flutter -  Rate controlled on toprol xl  On eliquis    Recent covid 19 infection    Estimated length of stay : 3 days    CC: fall, trimalleolar fracture       HPI:     Savannah Cheung is a 70 y.o. male with diabetes, hypertension, coronary artery disease, atrial fibrillation, recent [phone] presents to ER after he fell from his stairs today. Patient reports that he was stepping from his kitchen to his garage and he fell down 3 steps. He he injured his left foot. He was recently admitted to this hospital for left heel cellulitis and osteomyelitis with gangrenous ulcer and deep abscess. He underwent incision and drainage and placement of antibiotic beads. He was then again taken to the OR for removal of beads and placement of graft. He was seen by ID and a 6 weeks of IV antibiotics recommended. In ED x-ray showed trimalleolar fracture. Past Medical History:   Diagnosis Date    [facility]-fib [facility]     Asthma     CAD (coronary artery disease)     COVID-19     Diabetes ([facility]     High cholesterol     Hypertension        Past Surgical History:   Procedure Laterality Date    COLONOSCOPY N/A 6/1/2018    COLONOSCOPY, POLYPECTOMY performed by Tami Quintana MD at [facility]    HX CATARACT REMOVAL         No family history on file. Social History     Socioeconomic History    Marital status:      Spouse name: Not on file    Number of children: Not on file    Years of education: Not on file    Highest education level: Not on file   Tobacco Use    Smoking status: Former Smoker    Smokeless tobacco: Never Used   Substance and Sexual Activity    Alcohol use: Yes     Alcohol/week: 1.0 standard drinks     Types: 1 Cans of beer per week    Drug use: No       Prior to Admission medications    Medication Sig Start Date End Date Taking? Authorizing Provider   ertapenem 1 gram 1 g IVPB 1 g by IntraVENous route every twenty-four (24) hours for 40 days. 4/22/21 6/1/21  Karthikeyan Rodriguez MD   ertapenem 1 gram 1 g IVPB 1 g by IntraVENous route every twenty-four (24) hours for 39 days.  4/23/21 6/1/21  Isabela Cardona MD   spironolactone (ALDACTONE) 25 mg tablet Take 12.5 mg by mouth daily. Take 0.5 tablet (12.5 mg) by oral    Provider, Historical   insulin lispro (HUMALOG KWIKPEN INSULIN SC) 10-15 Units by SubCUTAneous route three (3) times daily (with meals). Provider, Historical   Omeprazole delayed release (PRILOSEC D/R) 20 mg tablet Take 1 Tab by mouth daily. 6/1/18   Isabela Cardona MD   apixaban (ELIQUIS) 5 mg tablet Take 1 Tab by mouth two (2) times a day. 2/15/18   Iveth Brown PA-C   aspirin delayed-release 81 mg tablet Take 1 Tab by mouth daily. 2/16/18   Iveth Brown PA-C   atorvastatin (LIPITOR) 40 mg tablet Take 1 Tab by mouth nightly. 2/15/18   Iveth Brown PA-C   clopidogrel (PLAVIX) 75 mg tab Take 1 Tab by mouth daily. 2/16/18   Iveth Brown PA-C   furosemide (LASIX) 40 mg tablet Take 1 Tab by mouth daily. 2/15/18   Iveth Brown PA-C   insulin glargine (LANTUS) 100 unit/mL injection 15 Units by SubCUTAneous route daily. Patient taking differently: 40 Units by SubCUTAneous route nightly. Indications: type 2 diabetes mellitus 2/15/18   Iveth Brown PA-C   losartan (COZAAR) 25 mg tablet Take 1 Tab by mouth daily. 2/16/18   Iveth Brown PA-C   metoprolol succinate (TOPROL-XL) 50 mg XL tablet Take 1 Tab by mouth daily. 2/16/18   Iveth Brown PA-C   SITagliptin (JANUVIA) 50 mg tablet Take 1 Tab by mouth daily. 2/15/18   Iveth Brown PA-C   lancets (ADVOCATE LANCET) 30 gauge misc Use while checking BGL each morning. 2/15/18   Iveth Brown PA-C   glucose blood VI test strips (IGLUCOSE TEST STRIP) strip Use to check BGL every morning 2/15/18   Iveth Brown PA-C       No Known Allergies    Review of Systems  Gen: No fever, chills, malaise, weight loss/gain. Heent: No headache, rhinorrhea, epistaxis, ear pain, hearing loss, sinus pain, neck pain/stiffness, sore throat.    Heart: No chest pain, palpitations, MYRICK, pnd, or orthopnea. Resp: No cough, hemoptysis, wheezing and shortness of breath. GI: No nausea, vomiting, diarrhea, constipation, melena or hematochezia. : No urinary obstruction, dysuria or hematuria. Derm: No rash, new skin lesion or pruritis. Musc/skeletal: see above. Vasc: No edema, cyanosis or claudication. Endo: No heat/cold intolerance, no polyuria,polydipsia or polyphagia. Neuro: No unilateral weakness, numbness, tingling. No seizures. Heme: No easy bruising or bleeding. Physical Exam:     Physical Exam:  Visit Vitals  BP (!) 100/53 (BP 1 Location: Left upper arm, BP Patient Position: At rest;Sitting)   Pulse 79   Temp 97.5 °F (36.4 °C)   Resp 16   SpO2 97%      O2 Device: None (Room air)    Temp (24hrs), Av.5 °F (36.4 °C), Min:97.5 °F (36.4 °C), Max:97.5 °F (36.4 °C)    No intake/output data recorded. No intake/output data recorded. General:  Awake, cooperative, no distress. Head:  Normocephalic, without obvious abnormality, atraumatic. Eyes:  Conjunctivae/corneas clear, sclera anicteric, PERRL, EOMs intact. Nose: Nares normal. No drainage or sinus tenderness. Throat: Lips, mucosa, and tongue normal.    Neck: Supple, symmetrical, trachea midline, no adenopathy. Lungs:   Clear to auscultation bilaterally. Heart:   S1, S2, no murmur, click, rub or gallop. Abdomen: Soft, non-tender. Bowel sounds normal. No masses,  No organomegaly. Extremities: Left foot in splint. Pulses: 2+ and symmetric all extremities. Skin: Skin color pink, turgor normal. No rashes or lesions   Neurologic: CNII-XII intact. No focal motor or sensory deficit.        Labs Reviewed:    CMP: No results found for: NA, K, CL, CO2, AGAP, GLU, BUN, CREA, GFRAA, GFRNA, CA, MG, PHOS, ALB, TBIL, TP, ALB, GLOB, AGRAT, ALT  CBC: No results found for: WBC, HGB, HGBEXT, HCT, HCTEXT, PLT, PLTEXT, HGBEXT, HCTEXT, PLTEXT      Procedures/imaging: see electronic medical records for all procedures/Xrays and details which were not copied into this note but were reviewed prior to creation of Plan    Please note that this dictation was completed with [employer], the computer voice recognition software. Quite often unanticipated grammatical, syntax, homophones, and other interpretive errors are inadvertently transcribed by the computer software. Please disregard these errors. Please excuse any errors that have escaped final proofreading.         CC: Norris Weaver MD

## 2021-05-04 NOTE — PROGRESS NOTES
5/4/21 1145: have received call from [facility] OP tx that at today's visit, wife states she is overwhelmed and cannot transport patient daily for OP IV therapy; patient's PICC has also partially dislodged; after patient receives today's abx they will be taking him to [facility] ED for evaluation. Care manager accessed patient record; per discharge note, OP IV tx had been arranged because of drug cost that would patient would incur with home therapy.

## 2021-05-04 NOTE — ED NOTES
TRANSFER - OUT REPORT:    Verbal report given to RN on Bianka Harmon.  being transferred to  for routine progression of care       Report consisted of patients Situation, Background, Assessment and   Recommendations(SBAR). Information from the following report(s) SBAR and Kardex was reviewed with the receiving nurse. Lines:   PICC Double Lumen 44/54/79 Right;Basilic (Active)        Opportunity for questions and clarification was provided.       Patient transported with:  Benji

## 2021-05-04 NOTE — ED PROVIDER NOTES
EMERGENCY DEPARTMENT HISTORY AND PHYSICAL EXAM    Date: 5/4/2021  Patient Name: Brendan Valdivia. History of Presenting Illness     Chief Complaint   Patient presents with    Foot Injury         History Provided By: Patient        Additional History (Context): Brendan Flannery is a 70 y.o. male with diabetes, hypertension, hyperlipidemia, obesity and asthma who presents with having fallen today. He missed stepped getting out of the home to get to his vehicle to get to the infusion center for his morning appointment. Patient is being treated with IV antibiotics for his left foot heel osteomyelitis infection. He had a nonhealing osteomyelitic lesion with a skin graft repair performed by Dr. Jason Villalta. PCP: Cathy Cai MD    Current Facility-Administered Medications   Medication Dose Route Frequency Provider Last Rate Last Admin    morphine injection 4 mg  4 mg IntraVENous NOW Art Rita Portillo PA         Current Outpatient Medications   Medication Sig Dispense Refill    ertapenem 1 gram 1 g IVPB 1 g by IntraVENous route every twenty-four (24) hours for 40 days. 40 Dose 0    ertapenem 1 gram 1 g IVPB 1 g by IntraVENous route every twenty-four (24) hours for 39 days. 1 Dose 0    spironolactone (ALDACTONE) 25 mg tablet Take 12.5 mg by mouth daily. Take 0.5 tablet (12.5 mg) by oral      insulin lispro (HUMALOG KWIKPEN INSULIN SC) 10-15 Units by SubCUTAneous route three (3) times daily (with meals).  Omeprazole delayed release (PRILOSEC D/R) 20 mg tablet Take 1 Tab by mouth daily. 30 Tab 5    apixaban (ELIQUIS) 5 mg tablet Take 1 Tab by mouth two (2) times a day. 60 Tab 0    aspirin delayed-release 81 mg tablet Take 1 Tab by mouth daily. 30 Tab 0    atorvastatin (LIPITOR) 40 mg tablet Take 1 Tab by mouth nightly. 30 Tab 0    clopidogrel (PLAVIX) 75 mg tab Take 1 Tab by mouth daily. 30 Tab 0    furosemide (LASIX) 40 mg tablet Take 1 Tab by mouth daily.  30 Tab 0    insulin glargine (LANTUS) 100 unit/mL injection 15 Units by SubCUTAneous route daily. (Patient taking differently: 40 Units by SubCUTAneous route nightly. Indications: type 2 diabetes mellitus) 1 Vial 0    losartan (COZAAR) 25 mg tablet Take 1 Tab by mouth daily. 30 Tab 0    metoprolol succinate (TOPROL-XL) 50 mg XL tablet Take 1 Tab by mouth daily. 30 Tab 0    SITagliptin (JANUVIA) 50 mg tablet Take 1 Tab by mouth daily. 30 Tab 0    lancets (ADVOCATE LANCET) 30 gauge misc Use while checking BGL each morning. 1 Package 0    glucose blood VI test strips (IGLUCOSE TEST STRIP) strip Use to check BGL every morning 50 Strip 0       Past History     Past Medical History:  Past Medical History:   Diagnosis Date    Asthma     Diabetes ([address]     High cholesterol     Hypertension        Past Surgical History:  Past Surgical History:   Procedure Laterality Date    COLONOSCOPY N/A 6/1/2018    COLONOSCOPY, POLYPECTOMY performed by Amos Wilson MD at [facility] ENDOSCOPY    HX CATARACT REMOVAL         Family History:  No family history on file. Social History:  Social History     Tobacco Use    Smoking status: Former Smoker    Smokeless tobacco: Never Used   Substance Use Topics    Alcohol use: Yes     Alcohol/week: 1.0 standard drinks     Types: 1 Cans of beer per week    Drug use: No       Allergies:  No Known Allergies      Review of Systems   Review of Systems   Musculoskeletal: Positive for arthralgias and gait problem. Negative for joint swelling. Neurological: Negative for weakness and numbness. All Other Systems Negative  Physical Exam     Vitals:    05/04/21 1300   BP: (!) 100/53   Pulse: 79   Resp: 16   Temp: 97.5 °F (36.4 °C)   SpO2: 97%     Physical Exam  Vitals signs and nursing note reviewed. Constitutional:       General: He is not in acute distress. Appearance: He is well-developed. He is not ill-appearing, toxic-appearing or diaphoretic. HENT:      Head: Normocephalic and atraumatic.    Neck: Musculoskeletal: Normal range of motion and neck supple. Thyroid: No thyromegaly. Vascular: No carotid bruit. Trachea: No tracheal deviation. Cardiovascular:      Rate and Rhythm: Normal rate and regular rhythm. Heart sounds: Normal heart sounds. No murmur. No friction rub. No gallop. Pulmonary:      Effort: Pulmonary effort is normal. No respiratory distress. Breath sounds: Normal breath sounds. No stridor. No wheezing or rales. Chest:      Chest wall: No tenderness. Abdominal:      General: There is no distension. Palpations: Abdomen is soft. There is no mass. Tenderness: There is no abdominal tenderness. There is no guarding or rebound. Musculoskeletal: Normal range of motion. Skin:     General: Skin is warm and dry. Coloration: Skin is not pale. Neurological:      Mental Status: He is alert. Psychiatric:         Speech: Speech normal.         Behavior: Behavior normal.         Thought Content: Thought content normal.         Judgment: Judgment normal.            Diagnostic Study Results     Labs -   No results found for this or any previous visit (from the past 12 hour(s)). Radiologic Studies -   XR ANKLE LT MIN 3 V   Final Result      Distal fibular and malleolar fractures as above. XR FOOT LT MIN 3 V   Final Result      Distal fibular and medial/posterior malleoli fractures, incompletely evaluated   on nondedicated studies. XR HUMERUS RT    (Results Pending)     CT Results  (Last 48 hours)    None        CXR Results  (Last 48 hours)    None            Medical Decision Making   I am the first provider for this patient. I reviewed the vital signs, available nursing notes, past medical history, past surgical history, family history and social history. Vital Signs-Reviewed the patient's vital signs.     Records Reviewed: Nursing Notes    Procedures:  Procedures    Provider Notes (Medical Decision Making): spoke with podiatry; will get dedicated ankle series and will speak with ortho next but do not think patient will do well w/crutches. Consider walking boot. Podiatry approves of keeping wound care in place. Sent via [facility] of x-rays to orthopedic surgeon Dr. Shanda Llanes who is on-call today. He says doing surgery on this patient would not be appropriate because of the fact that the foot heel is still infected and does not want to add additional hardware. Does approve of putting the patient in splint keeping him nonweightbearing and admitting to hospitalist for placement. Splint applied by [employer] to left ankle; excellent position. N/v intact before and after application. With Brielle Tracy, nurse from the infusion center. Patient did receive his 1 g of Invanz through a peripheral line today, however, she is concerned that the PICC line was dislodged in the patient's fall this morning as there was too much hanging compared to her previous experiences w/pt. Dr. Marina Salazar for hospitalist service. Patient will need a PICC line replacement and will be in need to be admitted to have the PICC line placed as well as begin receiving his IV antibiotics there. Patient was updated. I have also consulted pharmacy to begin the Invanz 1 g daily beginning tomorrow. MED RECONCILIATION:  Current Facility-Administered Medications   Medication Dose Route Frequency    morphine injection 4 mg  4 mg IntraVENous NOW     Current Outpatient Medications   Medication Sig    ertapenem 1 gram 1 g IVPB 1 g by IntraVENous route every twenty-four (24) hours for 40 days.  ertapenem 1 gram 1 g IVPB 1 g by IntraVENous route every twenty-four (24) hours for 39 days.  spironolactone (ALDACTONE) 25 mg tablet Take 12.5 mg by mouth daily. Take 0.5 tablet (12.5 mg) by oral    insulin lispro (HUMALOG KWIKPEN INSULIN SC) 10-15 Units by SubCUTAneous route three (3) times daily (with meals).     Omeprazole delayed release (PRILOSEC D/R) 20 mg tablet Take 1 Tab by mouth daily.  apixaban (ELIQUIS) 5 mg tablet Take 1 Tab by mouth two (2) times a day.  aspirin delayed-release 81 mg tablet Take 1 Tab by mouth daily.  atorvastatin (LIPITOR) 40 mg tablet Take 1 Tab by mouth nightly.  clopidogrel (PLAVIX) 75 mg tab Take 1 Tab by mouth daily.  furosemide (LASIX) 40 mg tablet Take 1 Tab by mouth daily.  insulin glargine (LANTUS) 100 unit/mL injection 15 Units by SubCUTAneous route daily. (Patient taking differently: 40 Units by SubCUTAneous route nightly. Indications: type 2 diabetes mellitus)    losartan (COZAAR) 25 mg tablet Take 1 Tab by mouth daily.  metoprolol succinate (TOPROL-XL) 50 mg XL tablet Take 1 Tab by mouth daily.  SITagliptin (JANUVIA) 50 mg tablet Take 1 Tab by mouth daily.  lancets (ADVOCATE LANCET) 30 gauge misc Use while checking BGL each morning.  glucose blood VI test strips (IGLUCOSE TEST STRIP) strip Use to check BGL every morning       Disposition:  Admit    6:10 PM  I have spent 35 minutes of critical care time involved in lab review, consultations with specialist, family decision-making, and documentation. During this entire length of time I was immediately available to the patient. Critical Care: The reason for providing this level of medical care for this critically ill patient was due a critical illness that impaired one or more vital organ systems such that there was a high probability of imminent or life threatening deterioration in the patients condition. This care involved high complexity decision making to assess, manipulate, and support vital system functions, to treat this degreee vital organ system failure and to prevent further life threatening deterioration of the patients condition.       Follow-up Information     Follow up With Specialties Details Why Contact Info    Dolly Guadarrama [facility]   [address]  A to [facility]  [facility] [address] [facility]  [phone] Current Discharge Medication List            Diagnosis     Clinical Impression:   1. Trimalleolar fracture of ankle, closed, left, initial encounter    2. Osteomyelitis of left foot, unspecified type ([facility] [address]    3.  Displacement of peripherally inserted central catheter (PICC) ([facility] [address]

## 2021-05-04 NOTE — ED TRIAGE NOTES
Patient getting out of house with 2 steps and LEFT foot gave out.  Patient reports notable deformity    Denies hitting head

## 2021-05-04 NOTE — Clinical Note
Status[de-identified] INPATIENT [101]   Type of Bed: Medical [8]   Inpatient Hospitalization Certified Necessary for the Following Reasons: 3.  Patient receiving treatment that can only be provided in an inpatient setting (further clarification in H&P documentation)   Admitting Diagnosis: Osteomyelitis ([address] [109669]   Admitting Diagnosis: Trimalleolar fracture of ankle, closed, left, initial encounter [[id number]]   Admitting Diagnosis: Displacement of peripherally inserted central catheter (PICC) [facility]) [[id number]]   Admitting Physician: Sofia Díaz [[id number]]   Attending Physician: Sofia Díaz [[id number]]   Estimated Length of Stay: 2 Midnights   Discharge Plan[de-identified] [address] (e.g. Adult Home, Nursing Home, etc.)

## 2021-05-04 NOTE — ED NOTES
No IV access at this time. Pt in and out of sleep. Pt not complaining of pain at this time. Pain to be readdressed as per protocol.

## 2021-05-05 ENCOUNTER — APPOINTMENT (OUTPATIENT)
Dept: ULTRASOUND IMAGING | Age: 76
DRG: 464 | End: 2021-05-05
Attending: INTERNAL MEDICINE
Payer: MEDICARE

## 2021-05-05 ENCOUNTER — APPOINTMENT (OUTPATIENT)
Dept: GENERAL RADIOLOGY | Age: 76
DRG: 464 | End: 2021-05-05
Attending: INTERNAL MEDICINE
Payer: MEDICARE

## 2021-05-05 ENCOUNTER — APPOINTMENT (OUTPATIENT)
Dept: INTERVENTIONAL RADIOLOGY/VASCULAR | Age: 76
DRG: 464 | End: 2021-05-05
Attending: INTERNAL MEDICINE
Payer: MEDICARE

## 2021-05-05 ENCOUNTER — PATIENT OUTREACH (OUTPATIENT)
Dept: CASE MANAGEMENT | Age: 76
End: 2021-05-05

## 2021-05-05 ENCOUNTER — APPOINTMENT (OUTPATIENT)
Dept: WOUND CARE | Age: 76
End: 2021-05-05
Attending: PODIATRIST

## 2021-05-05 ENCOUNTER — APPOINTMENT (OUTPATIENT)
Dept: WOUND CARE | Age: 76
End: 2021-05-05
Attending: INTERNAL MEDICINE

## 2021-05-05 LAB
ALBUMIN SERPL-MCNC: 2.2 G/DL (ref 3.4–5)
ALBUMIN/GLOB SERPL: 0.6 {RATIO} (ref 0.8–1.7)
ALP SERPL-CCNC: 114 U/L (ref 45–117)
ALT SERPL-CCNC: 17 U/L (ref 16–61)
ANION GAP SERPL CALC-SCNC: 9 MMOL/L (ref 3–18)
APPEARANCE UR: ABNORMAL
AST SERPL-CCNC: 27 U/L (ref 10–38)
BACTERIA URNS QL MICRO: ABNORMAL /HPF
BILIRUB SERPL-MCNC: 0.7 MG/DL (ref 0.2–1)
BILIRUB UR QL: NEGATIVE
BUN SERPL-MCNC: 56 MG/DL (ref 7–18)
BUN/CREAT SERPL: 15 (ref 12–20)
CALCIUM SERPL-MCNC: 7.7 MG/DL (ref 8.5–10.1)
CHLORIDE SERPL-SCNC: 106 MMOL/L (ref 100–111)
CO2 SERPL-SCNC: 25 MMOL/L (ref 21–32)
COLOR UR: ABNORMAL
CREAT SERPL-MCNC: 3.78 MG/DL (ref 0.6–1.3)
CREAT UR-MCNC: 28 MG/DL (ref 30–125)
EOSINOPHIL #/AREA URNS HPF: NORMAL /[HPF]
EPITH CASTS URNS QL MICRO: ABNORMAL /LPF (ref 0–5)
ERYTHROCYTE [DISTWIDTH] IN BLOOD BY AUTOMATED COUNT: 20.3 % (ref 11.6–14.5)
ERYTHROCYTE [DISTWIDTH] IN BLOOD BY AUTOMATED COUNT: 20.8 % (ref 11.6–14.5)
GLOBULIN SER CALC-MCNC: 3.4 G/DL (ref 2–4)
GLUCOSE BLD STRIP.AUTO-MCNC: 116 MG/DL (ref 70–110)
GLUCOSE BLD STRIP.AUTO-MCNC: 137 MG/DL (ref 70–110)
GLUCOSE BLD STRIP.AUTO-MCNC: 178 MG/DL (ref 70–110)
GLUCOSE BLD STRIP.AUTO-MCNC: 230 MG/DL (ref 70–110)
GLUCOSE SERPL-MCNC: 107 MG/DL (ref 74–99)
GLUCOSE UR STRIP.AUTO-MCNC: NEGATIVE MG/DL
HCT VFR BLD AUTO: 23 % (ref 36–48)
HCT VFR BLD AUTO: 25.7 % (ref 36–48)
HGB BLD-MCNC: 7 G/DL (ref 13–16)
HGB BLD-MCNC: 7.6 G/DL (ref 13–16)
HGB UR QL STRIP: ABNORMAL
INR PPP: 1.5 (ref 0.8–1.2)
KETONES UR QL STRIP.AUTO: NEGATIVE MG/DL
LACTATE SERPL-SCNC: 1 MMOL/L (ref 0.4–2)
LEUKOCYTE ESTERASE UR QL STRIP.AUTO: ABNORMAL
MCH RBC QN AUTO: 24.8 PG (ref 24–34)
MCH RBC QN AUTO: 25 PG (ref 24–34)
MCHC RBC AUTO-ENTMCNC: 29.6 G/DL (ref 31–37)
MCHC RBC AUTO-ENTMCNC: 30.4 G/DL (ref 31–37)
MCV RBC AUTO: 82.1 FL (ref 74–97)
MCV RBC AUTO: 84 FL (ref 74–97)
NITRITE UR QL STRIP.AUTO: NEGATIVE
OTHER,OTHU: ABNORMAL
PH UR STRIP: 7.5 [PH] (ref 5–8)
PLATELET # BLD AUTO: 275 K/UL (ref 135–420)
PLATELET # BLD AUTO: 317 K/UL (ref 135–420)
PMV BLD AUTO: 10.5 FL (ref 9.2–11.8)
PMV BLD AUTO: 9.7 FL (ref 9.2–11.8)
POTASSIUM SERPL-SCNC: 4.7 MMOL/L (ref 3.5–5.5)
PROT SERPL-MCNC: 5.6 G/DL (ref 6.4–8.2)
PROT UR STRIP-MCNC: >300 MG/DL
PROT UR-MCNC: 678 MG/DL
PROT/CREAT UR-RTO: 24.2
PROTHROMBIN TIME: 17.7 SEC (ref 11.5–15.2)
RBC # BLD AUTO: 2.8 M/UL (ref 4.35–5.65)
RBC # BLD AUTO: 3.06 M/UL (ref 4.35–5.65)
RBC #/AREA URNS HPF: ABNORMAL /HPF (ref 0–5)
SODIUM SERPL-SCNC: 140 MMOL/L (ref 136–145)
SP GR UR REFRACTOMETRY: 1.02 (ref 1–1.03)
UROBILINOGEN UR QL STRIP.AUTO: 0.2 EU/DL (ref 0.2–1)
WBC # BLD AUTO: 5.4 K/UL (ref 4.6–13.2)
WBC # BLD AUTO: 7 K/UL (ref 4.6–13.2)
WBC URNS QL MICRO: ABNORMAL /HPF (ref 0–5)

## 2021-05-05 PROCEDURE — 77001 FLUOROGUIDE FOR VEIN DEVICE: CPT

## 2021-05-05 PROCEDURE — 74011250636 HC RX REV CODE- 250/636: Performed by: RADIOLOGY

## 2021-05-05 PROCEDURE — 85027 COMPLETE CBC AUTOMATED: CPT

## 2021-05-05 PROCEDURE — 87086 URINE CULTURE/COLONY COUNT: CPT

## 2021-05-05 PROCEDURE — 71045 X-RAY EXAM CHEST 1 VIEW: CPT

## 2021-05-05 PROCEDURE — 83605 ASSAY OF LACTIC ACID: CPT

## 2021-05-05 PROCEDURE — 74011250636 HC RX REV CODE- 250/636: Performed by: HOSPITALIST

## 2021-05-05 PROCEDURE — 51798 US URINE CAPACITY MEASURE: CPT

## 2021-05-05 PROCEDURE — 74011250637 HC RX REV CODE- 250/637: Performed by: FAMILY MEDICINE

## 2021-05-05 PROCEDURE — 76770 US EXAM ABDO BACK WALL COMP: CPT

## 2021-05-05 PROCEDURE — 82962 GLUCOSE BLOOD TEST: CPT

## 2021-05-05 PROCEDURE — 74011636637 HC RX REV CODE- 636/637: Performed by: HOSPITALIST

## 2021-05-05 PROCEDURE — 74011000258 HC RX REV CODE- 258: Performed by: HOSPITALIST

## 2021-05-05 PROCEDURE — 85610 PROTHROMBIN TIME: CPT

## 2021-05-05 PROCEDURE — 77010033678 HC OXYGEN DAILY

## 2021-05-05 PROCEDURE — 81001 URINALYSIS AUTO W/SCOPE: CPT

## 2021-05-05 PROCEDURE — 84156 ASSAY OF PROTEIN URINE: CPT

## 2021-05-05 PROCEDURE — 77030027138 HC INCENT SPIROMETER -A

## 2021-05-05 PROCEDURE — 65270000029 HC RM PRIVATE

## 2021-05-05 PROCEDURE — 87205 SMEAR GRAM STAIN: CPT

## 2021-05-05 PROCEDURE — 36415 COLL VENOUS BLD VENIPUNCTURE: CPT

## 2021-05-05 PROCEDURE — 80053 COMPREHEN METABOLIC PANEL: CPT

## 2021-05-05 PROCEDURE — 74011250637 HC RX REV CODE- 250/637: Performed by: HOSPITALIST

## 2021-05-05 PROCEDURE — 84300 ASSAY OF URINE SODIUM: CPT

## 2021-05-05 PROCEDURE — 87040 BLOOD CULTURE FOR BACTERIA: CPT

## 2021-05-05 RX ORDER — ACETAMINOPHEN 500 MG
1000 TABLET ORAL
Status: DISCONTINUED | OUTPATIENT
Start: 2021-05-05 | End: 2021-05-25 | Stop reason: HOSPADM

## 2021-05-05 RX ORDER — IPRATROPIUM BROMIDE AND ALBUTEROL SULFATE 2.5; .5 MG/3ML; MG/3ML
3 SOLUTION RESPIRATORY (INHALATION)
Status: DISCONTINUED | OUTPATIENT
Start: 2021-05-05 | End: 2021-05-11 | Stop reason: SDUPTHER

## 2021-05-05 RX ORDER — LIDOCAINE HYDROCHLORIDE 10 MG/ML
1-20 INJECTION INFILTRATION; PERINEURAL
Status: DISCONTINUED | OUTPATIENT
Start: 2021-05-05 | End: 2021-05-05

## 2021-05-05 RX ORDER — ACETAMINOPHEN 325 MG/1
650 TABLET ORAL
Status: DISCONTINUED | OUTPATIENT
Start: 2021-05-05 | End: 2021-05-05

## 2021-05-05 RX ORDER — ACETAMINOPHEN 325 MG/1
650 TABLET ORAL
Status: DISCONTINUED | OUTPATIENT
Start: 2021-05-05 | End: 2021-05-05 | Stop reason: SDUPTHER

## 2021-05-05 RX ORDER — HEPARIN SODIUM 200 [USP'U]/100ML
500 INJECTION, SOLUTION INTRAVENOUS
Status: COMPLETED | OUTPATIENT
Start: 2021-05-05 | End: 2021-05-05

## 2021-05-05 RX ORDER — HEPARIN SODIUM (PORCINE) LOCK FLUSH IV SOLN 100 UNIT/ML 100 UNIT/ML
500 SOLUTION INTRAVENOUS
Status: DISCONTINUED | OUTPATIENT
Start: 2021-05-05 | End: 2021-05-25 | Stop reason: HOSPADM

## 2021-05-05 RX ADMIN — SPIRONOLACTONE 12.5 MG: 25 TABLET ORAL at 09:15

## 2021-05-05 RX ADMIN — ATORVASTATIN CALCIUM 40 MG: 20 TABLET, FILM COATED ORAL at 21:53

## 2021-05-05 RX ADMIN — APIXABAN 5 MG: 5 TABLET, FILM COATED ORAL at 00:00

## 2021-05-05 RX ADMIN — ERTAPENEM SODIUM 0.5 G: 1 INJECTION, POWDER, LYOPHILIZED, FOR SOLUTION INTRAMUSCULAR; INTRAVENOUS at 23:32

## 2021-05-05 RX ADMIN — INSULIN GLARGINE 15 UNITS: 100 INJECTION, SOLUTION SUBCUTANEOUS at 09:15

## 2021-05-05 RX ADMIN — FUROSEMIDE 40 MG: 40 TABLET ORAL at 09:16

## 2021-05-05 RX ADMIN — CLOPIDOGREL BISULFATE 75 MG: 75 TABLET ORAL at 09:16

## 2021-05-05 RX ADMIN — ACETAMINOPHEN 650 MG: 325 TABLET ORAL at 21:53

## 2021-05-05 RX ADMIN — HEPARIN SODIUM IN SODIUM CHLORIDE 1000 UNITS: 200 INJECTION INTRAVENOUS at 12:48

## 2021-05-05 RX ADMIN — LOSARTAN POTASSIUM 25 MG: 25 TABLET, FILM COATED ORAL at 09:16

## 2021-05-05 RX ADMIN — INSULIN LISPRO 4 UNITS: 100 INJECTION, SOLUTION INTRAVENOUS; SUBCUTANEOUS at 17:05

## 2021-05-05 RX ADMIN — Medication 81 MG: at 09:16

## 2021-05-05 RX ADMIN — HEPARIN SODIUM (PORCINE) LOCK FLUSH IV SOLN 100 UNIT/ML 500 UNITS: 100 SOLUTION at 12:48

## 2021-05-05 RX ADMIN — APIXABAN 5 MG: 5 TABLET, FILM COATED ORAL at 09:16

## 2021-05-05 RX ADMIN — INSULIN LISPRO 2 UNITS: 100 INJECTION, SOLUTION INTRAVENOUS; SUBCUTANEOUS at 13:23

## 2021-05-05 RX ADMIN — METOPROLOL SUCCINATE 50 MG: 50 TABLET, EXTENDED RELEASE ORAL at 09:15

## 2021-05-05 RX ADMIN — SODIUM CHLORIDE 250 ML: 900 INJECTION, SOLUTION INTRAVENOUS at 13:23

## 2021-05-05 NOTE — ROUTINE PROCESS
TRANSFER - IN REPORT:    Verbal report received from Jignesh Winslow RN(name) on [place] Gee.  being received from ED(unit) for routine progression of care      Report consisted of patients Situation, Background, Assessment and   Recommendations(SBAR). Information from the following report(s) SBAR, Kardex, ED Summary, Intake/Output, MAR and Recent Results was reviewed with the receiving nurse. Opportunity for questions and clarification was provided. Assessment completed upon patients arrival to unit and care assumed. 2110 pt received in room via stretcher, A/O x4, no distress noted. 2356 Morphine given for pain rated at 6/10    0630 Incontinent of urine, jolie care diaper change done by CNA. No pain reported while resting in bed, dressing and splint CDI. No surgery planned. Conservative management with antibiotic and pain control. BS ACHS. For SNF placement    0710 Bedside and Verbal shift change report given to Virginia Aceves RN (oncoming nurse) by Jase Reynaga RN   (offgoing nurse). Report included the following information SBAR, Kardex, Intake/Output, MAR and Recent Results.

## 2021-05-05 NOTE — CONSULTS
RENAL CONSULT  2021    Patient:  Bianka Sotomayor :  1945  Gender:  male  MRN #:  [id number]    Consulting Physician:  Magdy Bennett DO,  Assessment:    )Principal Problem:    Trimalleolar fracture of ankle, closed, left, initial encounter (2021)    Active Problems:    Osteomyelitis ([facility] (2021)      Displacement of peripherally inserted central catheter (PICC) ([facility] (2021)      ARF  HTN  Urinary incontinence    Plan:    Need US and bladder PVR. IF urinary retention will need caal placement  Need urine studies  His GFR is below 15, recommend decreasing NOAC dose  PT has excess volume so  NS Only if septic    History of Present Illness:  Bianka Sotomayor is a 70y.o. year old male with ongoing CKD, DM, HTN who has been suffering from infected foot. Pt had basline cr of 1.4 rang  Now is 3.8. NO NSAIDs  On IV abx for Osteo  Urinary incontinence  Adequate PO Intake        Past Medical History:   Diagnosis Date    A-fib ([facility]     Asthma     CAD (coronary artery disease)     COVID-19     Diabetes ([facility]     High cholesterol     Hypertension      Past Surgical History:   Procedure Laterality Date    COLONOSCOPY N/A 2018    COLONOSCOPY, POLYPECTOMY performed by Umair Augustine MD at [facility] ENDOSCOPY    HX CATARACT REMOVAL       No family history on file.   No Known Allergies  Current Facility-Administered Medications   Medication Dose Route Frequency Provider Last Rate Last Admin    sodium chloride 0.9 % bolus infusion 250 mL  250 mL IntraVENous ONCE Sherry Sepulveda MD        ertapenem (INVANZ) 1 g in 0.9% sodium chloride (MBP/ADV) 50 mL MBP  1 g IntraVENous DAILY Rita Villanueva PA        apixaban (ELIQUIS) tablet 5 mg  5 mg Oral BID Nicholas BLOOM MD   5 mg at 21 1090    aspirin delayed-release tablet 81 mg  81 mg Oral DAILY Nehemiah Sewell MD   81 mg at 21 0916    atorvastatin (LIPITOR) tablet 40 mg  40 mg Oral QHS Nehemiah Sewell MD   40 mg at 05/04/21 2349    clopidogreL (PLAVIX) tablet 75 mg  75 mg Oral DAILY Nessa BLOOM MD   75 mg at 05/05/21 0916    ertapenem (INVANZ) 1 g in 0.9% sodium chloride 50 mL IVPB  1,000 mg IntraVENous Q24H Nessa BLOOM  mL/hr at 05/04/21 2355 1 g at 05/04/21 2355    furosemide (LASIX) tablet 40 mg  40 mg Oral DAILY Tatiana Kennedy MD   40 mg at 05/05/21 0916    insulin glargine (LANTUS) injection 15 Units  15 Units SubCUTAneous DAILY Tatiana Kennedy MD   15 Units at 05/05/21 0915    metoprolol succinate (TOPROL-XL) XL tablet 50 mg  50 mg Oral DAILY Nessa BLOOM MD   50 mg at 05/05/21 0915    glucose chewable tablet 16 g  16 g Oral PRN Tatiana Kennedy MD        glucagon (GLUCAGEN) injection 1 mg  1 mg IntraMUSCular PRN Tatiana Kennedy MD        dextrose (D50W) injection syrg 25 g  50 mL IntraVENous PRN Tatiana Kennedy MD        insulin lispro (HUMALOG) injection   SubCUTAneous AC&HS Tatiana Kennedy MD   Stopped at 05/04/21 2200    morphine injection 2 mg  2 mg IntraVENous Q4H PRN Zahira Victor MD   2 mg at 05/04/21 2356       Review of Symptoms:  Below symptoms negative if not in [place].   Consitutional Symptoms: Fever, weight loss, weight gain, fatigue  Eyes:  pain, sudden vision loss, blurry vision, double vision             bleeding nose, sore throat, hoarseness  GI: nausea, vomiting, diarrhea, pain, blood in stool  Pulmonary; Cough, Shortness of breath, Wheezing's  Cardiac: chest pain, palpitation  Musculoskeletal: difficulty walking, falls, pain over muscle, joint pain, weakness  : incontinance  Neurologia: dizziness, syncope, focal weakness, difficulty speaking  Psychiatric:  Depression suicidal ideation    Objective:  Visit Vitals  BP (!) 117/52   Pulse 88   Temp 98.9 °F (37.2 °C)   Resp 18   Ht 5' 8.9\" (1.75 m)   Wt 97.6 kg (215 lb 3.2 oz)   SpO2 94%   BMI 31.87 kg/m²         Well developed and groomed, obese   Eyes: anicteric  Ears, nose, mouth and throat without visible mass, scars, lesion  Neck: supple  Respiratory: decreased breath sounds at the bases  GI: soft, nontender,   Musculoskeletal: No clubbing cynosis, no petechia  Skin:left foot is wrapped   Edema plus 2  Neruoligcal: no focal dificit grossly  Psychicatric: fair judgment and insights, good memory. No patricia affect. Non anxiouse. Laboratory Data:  Lab Results   Component Value Date    BUN 56 (H) 05/05/2021    BUN 24 (H) 04/21/2021    BUN 24 (H) 04/20/2021     05/05/2021     04/21/2021     04/20/2021    CO2 25 05/05/2021    CO2 32 04/21/2021    CO2 34 (H) 04/20/2021     Lab Results   Component Value Date    WBC 5.4 05/05/2021    HGB 7.6 (L) 05/05/2021    HCT 25.7 (L) 05/05/2021       Imaging have been reviewed:  )Xr Humerus Rt    Result Date: 5/4/2021  EXAM: XR HUMERUS RT CLINICAL INDICATION/HISTORY: Pain   > Additional: Right arm pain COMPARISON: None. TECHNIQUE: 2 separate views right humerus _______________ FINDINGS: Osseous alignment is as expected on the provided projections. No evidence of fracture. Mild degenerative changes of both the right shoulder and right elbow. Partial inclusion right approach PICC line. _______________     No acute osseous abnormality or malalignment involving the right humerus. Xr Ankle Lt Min 3 V    Result Date: 5/4/2021  EXAM: LEFT FOOT RADIOGRAPHS CLINICAL INDICATION/HISTORY: fracture eval -Additional: None COMPARISON: Earlier the same day TECHNIQUE: 3 views of the left foot _______________ FINDINGS: BONES: Fracture distal fibula above the syndesmosis with one half shaft width lateral displacement of distal fracture fragment. Fractures of the medial and posterior malleoli. Alignment is anatomic. SOFT TISSUES: Associated soft tissue swelling _______________     Distal fibular and malleolar fractures as above.     Xr Foot Lt Min 3 V    Result Date: 5/4/2021  EXAM: LEFT FOOT RADIOGRAPHS CLINICAL INDICATION/HISTORY: foot injury -Additional: None COMPARISON: None TECHNIQUE: 3 views of the left foot _______________ FINDINGS: BONES: Oblique fracture of the distal fibula with one cortex width lateral displacement of the distal fracture fragment. Fracture of the medial and posterior malleoli . Overlying bandaging slightly limits evaluation. Pes planus morphology with talonavicular and subtalar joint degenerative changes. First MTP joint arthrosis. SOFT TISSUES: Unremarkable. _______________     Distal fibular and medial/posterior malleoli fractures, incompletely evaluated on nondedicated studies. Xr Foot Lt Min 3 V    Result Date: 4/17/2021  EXAM: XR FOOT LT MIN 3 V CLINICAL INDICATION/HISTORY: Left foot pain, postoperative follow-up  COMPARISON: Left foot MRI 4/15/2021 TECHNIQUE: AP, lateral, and oblique views of the left foot were obtained. _______________ FINDINGS: Radiodense antibiotic pellets are seen throughout the lateral and plantar left hindfoot subjacent to the calcaneus. Expected adjacent postoperative soft tissue swelling and emphysema. Pes planus morphology. Debridement of plantar soft tissue ulcer seen previously. No acute osseous abnormality. _______________     Surgical changes of interval debridement. Xr Foot Lt Min 3 V    Result Date: 4/14/2021  EXAM: XR FOOT LT MIN 3 V CLINICAL INDICATION/HISTORY: Diabetic with left forefoot soft tissue ulcer, pain and swelling  COMPARISON: None. TECHNIQUE: AP, lateral, and oblique views of the left foot were obtained. _______________ FINDINGS: Deep soft tissue ulcer is seen within the plantar, lateral aspect of the left hindfoot, just peripheral to the calcaneus. No additional region of soft tissue emphysema elsewhere. No radiodense foreign body. Pes planus morphology with moderate degenerative changes of talonavicular articulation. More mild degenerative changes elsewhere in the left foot.  No cortical defect or definite periosteal reaction within the plantar calcaneus near the soft tissue ulcer to suggest osteomyelitis. There is no acute fracture or dislocation. _______________     Deep plantar left hindfoot soft tissue ulcer. No radiographic findings of active osteomyelitis. Mri Foot Lt W Wo Cont    Result Date: 4/15/2021  EXAM: MRI FOOT LT W WO CONT CLINICAL INDICATION/HISTORY: Infected diabetic ulcer left heel, evaluate for osteomyelitis  >Additional: None COMPARISON: Left foot radiographs dated April 14, 2021  >Reference exam: None. TECHNIQUE: Axial long axis TI and T2 with fat saturation, sagittal and coronal short axis TI and STIR sequences through the foot were obtained without contrast. Initial postcontrast axial and sagittal T1-weighted fat-saturated imaging was also acquired. _______________ FINDINGS: OSSEOUS: There is a small plantar calcaneal enthesophyte formation with associated diffuse marrow edema with corresponding low T1 signal, postcontrast enhancement, as well as evidence of cortical/bony erosive change along the plantar surface of the calcaneus and enthesophyte corresponding to the attachment of the plantar fascial aponeurosis which is also abnormally thickened with partial disruption of the medial band fibers. Overlying soft tissue ulceration with a defect and underlying soft tissue edema extending to the aponeurosis and bone. The ulceration measures roughly 13 mm proximal distal by 2.7 cm mediolateral. Degenerative changes noted at the subtalar joint with patchy subcortical edema at the calcaneal sulcus in the opposing surface of the cuboid with a small amount joint fluid noted in the subtalar joint. Talar dome is intact without focal chondral or osteochondral defect. There is hindfoot valgus alignment with medial deviation of the mid talar axis. Padmini morphology of the posterior tibialis tendon slips as it courses under the head of the talus but otherwise intact. Mild tenosynovial fluid noted along the course of the flexor houses longus tendon.  SOFT TISSUES: Postcontrast imaging demonstrates low-grade enhancement and skin thickening involving the superficial tissues surrounding the ulceration. There is abnormal low T1 signal and patchy edema with nonenhancing soft tissue dissecting along the plantar aspect of the forefoot superficially along the course of the plantar fascial aponeurosis with numerous locules of soft tissue gas, compatible with ischemic and necrotic tissue. Area of abnormal nonenhancing soft tissue spans roughly 9 cm proximal distal from the level of the cuboid to the calcaneus, to approximately 2 cm mediolateral, and 1.2 cm deep to the ulceration on sagittal series 10 and axial/long axis series image 11. There is extensive edema throughout the intrinsic foot musculature, likely related to diabetic neuromyopathic changes. INCIDENTAL FINDINGS: None. _______________     1. Plantar soft tissue ulceration involving the calcaneal fat pad with soft tissue abnormality extending to the bony cortex with associated cortical erosion and enthesitis in keeping with acute osteomyelitis, corresponding to the attachment of the plantar fascial aponeurosis which is disrupted. 2.  In addition, there is a large area of abnormal nonenhancing soft tissue with multiple scattered locules of subcutaneous gas tracking distally along the course of the plantar fascial aponeurosis from its calcaneal attachment, roughly 9 cm in length compatible with ischemic and necrotic tissue. 3.  Additional chronic and degenerative changes as above. Xr Chest Port    Result Date: 4/14/2021  EXAM: CHEST RADIOGRAPH, SINGLE VIEW CLINICAL INDICATION/HISTORY: Fever, sepsis COMPARISON: 5/29/2018 TECHNIQUE: Portable frontal view of the chest was obtained. _______________ FINDINGS: SUPPORT DEVICES: None. HEART AND MEDIASTINUM: Cardiomediastinal silhouette appears within normal limits. Normal caliber thoracic aorta. No central vascular congestion. LUNGS AND PLEURAL SPACES: Normal variant azygous fissure.  Patchy opacity is seen throughout lateral and mid aspects of the right upper lobe. Remaining lung parenchyma appears otherwise adequately aerated. No pleural effusion or pneumothorax. BONY THORAX AND SOFT TISSUES: No acute osseous abnormality. _______________     Acute right upper lobe pulmonary infiltrate. Duplex Lower Ext Venous Bilat    Result Date: 4/19/2021  · No evidence of deep vein thrombosis in the right lower extremity. · No evidence of deep vein thrombosis in the left lower extremity. Duplex Lower Ext Artery Bilat    Result Date: 4/19/2021  Monophasic Doppler waveforms in the right distal common femoral, proximal superficial femoral, middle superficial femoral, distal superficial femoral, proximal popliteal, posterior tibial and dorsalis pedis artery. Biphasic Doppler waveforms in the right proximal common femoral and profunda femoris artery. Monophasic Doppler waveforms in the left distal superficial femoral, proximal popliteal, distal popliteal, posterior tibial, peroneal and dorsalis pedis artery. Biphasic Doppler waveforms in the left proximal superficial femoral and middle superficial femoral artery. Triphasic Doppler waveforms in the left distal common femoral and profunda femoris artery. The following arteries are patent: distal common femoral.    Ir Guide Insert Picc Over 5 Yrs    Result Date: 4/20/2021  PROCEDURE: PICC placement ATTENDING: Hiro Diaz M.D. INDICATION: Long-term central venous access. COMPLICATIONS: None. PROCEDURE: Informed consent was obtained. Physician lead timeout was performed. The procedure was performed following standard maximal barrier technique which includes, cap, mask, sterile gown, sterile gloves, sterile full body drape, hand hygiene with alcohol foam, and 2% chlorhexidine for cutaneous antisepsis. Sterile ultrasound gel and a sterile cover for the US probe was used. . The arm was prepped and draped in sterile fashion.  The right basilic vein was compressed to exclude thrombus. A permanent US recording of the vein was created for the patients file. The vein was then accessed with a 21-gauge needle and ultrasound guidance. Guidewire was passed centrally using fluoroscopic guidance. A peel-away sheath was inserted over the guidewire. The intravascular distance was measured. The PICC was trimmed to the appropriate length, 36 cm. The double lumen PICC was placed into the vein with its tip in the SVC/RA junction and both ports aspirated and flushed easily. The PICC was secured to the skin, a sterile dressing including Biopatch was applied. Fluoroscopic dose (reference air kerma): 4 mGy     Dual lumen right PICC placement, ready for immediate use.        Total time spent 61 minutes    )Oswald Trujillo DO,

## 2021-05-05 NOTE — CONSULTS
[facility]  (A Division of [address] [place] [address])                                                                                                                       Consultation Note    Evelyne Arnett MD  Work Cell #: [phone]( Mon-Fri, 7am-5pm)  If no call or text back within 30 minutes from me, please call office number. (Prefer text when possible)  Office #:     [phone]  [id number] [phone]   Office Fax: [phone]     Date of Admission: 5/4/2021    Date of Note: 5/5/2021      Reason for Referral: I was asked to see patient by Dr Marck Fuentes, [place] *for evaluation and antibiotic management of left heel OM. Thank you for involving me in the care of this patient. Please do not hesitate to contact me on the above number if question or concern. Current Antimicrobials:    Prior Antimicrobials:  Ertapenem 1gm IV q24 4/23 to date · Unasyn 3 gm Q6 hour 4/20 to 4/22/21  · Meropenem IV 4/15-4/20  · Zosyn 2.25gm IV Q6 4/14 to 4/15  · Vancomycin IV 4/14  until 4/19  · Zithromax 500mg IV- 4/14 to 4/20     Central lines / PICC: Right arm picc placed 4/20/21    Assessment: Recommendation/Plan:   · Left Infected DM Ulcer and acute OM and necrotic tissue, punched out heel ulcer with Mixed infection- MSSA +E.coli + anaerobes  · Post I and D of left heel bone/abscess 4/17  · Post debridement/graft of heel- 4/21  · CRP 8.9-> 5.8->3.8 as inpatient  · CRP 15-> 34 as OP( different parameters)  · ESR 78-> 63     · Acute renal failure-- New. Etiology appears to be acute U retention. Drug related IN in DDX. · Left heel ankle fracture 2/2 fall: on this admission       Other Medical Issues followed and managed by primary and other services  · DM- Poorly controlled  · Low Vitamin D  · CAD with CHF/ NSTMI. Atrial flutter  · CKD  · Hyperlipidemia     Past ID issues: N/A  -History of COVID 19 infection 4/2021    Reason for acute renal failure? Renal service following.  UA for eosinophils/US renal orders noted   Reduce ertapenem to 500 mg daily- for CrCl      Acute renal mx- per renal/ primary team    D/w IR to replace the PICC    Daily bmp. Will follow serial crp while here         HPI:  Terri Diggs is 69 y/o WM with PMH as listed below. Last seen by me on 4/22/21 prior to discharge. He was set up at [facility] infusion center for daily ertapenem. Yesterday, I was notified by RN that he fell, hurt his left foot, and his PICC was not working and they infused him via PIV and sent him to ED. On arrival to ED, was noted to be in acute renal failure, and with new left ankle fracture. Wound care note with the picture reviewed. Pt denies F/C/R. Denies SOB. Feels weak, appetite and po intake has decreased past many days per wife. Active Hospital Problems    Diagnosis Date Noted    Osteomyelitis ([facility] 05/04/2021    Trimalleolar fracture of ankle, closed, left, initial encounter 05/04/2021    Displacement of peripherally inserted central catheter (PICC) ([facility] 05/04/2021     Past Medical History:   Diagnosis Date    A-fib ([facility]     Asthma     CAD (coronary artery disease)     COVID-19     Diabetes ([facility]     High cholesterol     Hypertension      Past Surgical History:   Procedure Laterality Date    COLONOSCOPY N/A 6/1/2018    COLONOSCOPY, POLYPECTOMY performed by Denver Barney MD at THE [facility] ENDOSCOPY    HX CATARACT REMOVAL       No family history on file.   Social History     Socioeconomic History    Marital status:      Spouse name: Not on file    Number of children: Not on file    Years of education: Not on file    Highest education level: Not on file   Occupational History    Not on file   Social Needs    Financial resource strain: Not on file    Food insecurity     Worry: Not on file     Inability: Not on file    Transportation needs     Medical: Not on file     Non-medical: Not on file   Tobacco Use    Smoking status: Former Smoker    Smokeless tobacco: Never Used   Substance and Sexual Activity    Alcohol use: Yes     Alcohol/week: 1.0 standard drinks     Types: 1 Cans of beer per week    Drug use: No    Sexual activity: Not on file   Lifestyle    Physical activity     Days per week: Not on file     Minutes per session: Not on file    Stress: Not on file   Relationships    Social connections     Talks on phone: Not on file     Gets together: Not on file     Attends Buddhism service: Not on file     Active member of club or organization: Not on file     Attends meetings of clubs or organizations: Not on file     Relationship status: Not on file    Intimate partner violence     Fear of current or ex partner: Not on file     Emotionally abused: Not on file     Physically abused: Not on file     Forced sexual activity: Not on file   Other Topics Concern    Not on file   Social History Narrative    Not on file       Allergies:  Patient has no known allergies.      Medications:  Current Facility-Administered Medications   Medication Dose Route Frequency    sodium chloride 0.9 % bolus infusion 250 mL  250 mL IntraVENous ONCE    ertapenem (INVANZ) 0.5 g in 0.9% sodium chloride 50 mL IVPB  500 mg IntraVENous Q24H    heparin (porcine) 100 unit/mL injection 500 Units  500 Units InterCATHeter Q8H PRN    heparinized saline 2 units/mL infusion 1,000 Units  500 mL Irrigation RAD ONCE    lidocaine (XYLOCAINE) 10 mg/mL (1 %) injection 1-20 mL  1-20 mL SubCUTAneous RAD ONCE    apixaban (ELIQUIS) tablet 5 mg  5 mg Oral BID    aspirin delayed-release tablet 81 mg  81 mg Oral DAILY    atorvastatin (LIPITOR) tablet 40 mg  40 mg Oral QHS    clopidogreL (PLAVIX) tablet 75 mg  75 mg Oral DAILY    furosemide (LASIX) tablet 40 mg  40 mg Oral DAILY    insulin glargine (LANTUS) injection 15 Units  15 Units SubCUTAneous DAILY    metoprolol succinate (TOPROL-XL) XL tablet 50 mg  50 mg Oral DAILY    glucose chewable tablet 16 g  16 g Oral PRN    glucagon (GLUCAGEN) injection 1 mg  1 mg IntraMUSCular PRN    dextrose (D50W) injection syrg 25 g  50 mL IntraVENous PRN    insulin lispro (HUMALOG) injection   SubCUTAneous AC&HS    morphine injection 2 mg  2 mg IntraVENous Q4H PRN        ROS:  A comprehensive review of systems was negative except for that written in the History of Present Illness. Physical Exam:    Temp (24hrs), Av.3 °F (36.8 °C), Min:97.5 °F (36.4 °C), Max:98.9 °F (37.2 °C)    Visit Vitals  BP (!) 117/52   Pulse 88   Temp 98.9 °F (37.2 °C)   Resp 18   Ht 5' 8.9\" (1.75 m)   Wt 97.6 kg (215 lb 3.2 oz)   SpO2 94%   BMI 31.87 kg/m²       General: Well developed, well nourished 70 y.o. WHITE male in no acute distress. ENT: ENT exam normal, no neck nodes or sinus tenderness  Head: normocephalic, without obvious abnormality, atraumatic  Mouth:  mucous membranes moist, pharynx normal without lesions  Neck: supple, symmetrical, trachea midline   Cardio:  regular rate and rhythm, S1, S2 normal, no murmur, click, rub or gallop and regular rate  Chest: inspection normal - no chest wall deformities or tenderness, respiratory effort normal  Lungs: clear to auscultation, no wheezes or rales and unlabored breathing  Abdomen: soft, non-tender. Bowel sounds normal. No masses, no organomegaly. Extremities:  no redness or tenderness in the calves or thighs, no edema  Neuro: Grossly normal CN. Moves all extremity.  Left LE in support     Microbiology:    Blood culture: - 2 sets: NGSF     Urine culture:N/A     Body Fluid/ CSF/drainage culture:  - gram stain: GNR and GPR        --wound culture+ s.aurues and E.coli + anaerobe   -- OR culture: NG     Cath tip/ PICC culture: right PICC     Lab results:    Chemistry  Recent Labs     21  0330   *      K 4.7      CO2 25   BUN 56*   CREA 3.78*   CA 7.7*   AGAP 9   BUCR 15      TP 5.6*   ALB 2.2*   GLOB 3.4   AGRAT 0.6*       CBC w/ Diff  Recent Labs     21  0330   WBC 5.4   RBC 3.06*   HGB 7.6*   HCT 25.7*          Imagin/5- CXR     1. Right PICC line tip retracted from optimal/appropriate position, projecting  over the right subclavian vein. 2. Mild bibasilar atelectasis. 21  US renal  1. Considerable urinary bladder fluid distention (approximately 2.1 L) with  likely reactive pelviectasis. > Results called to [address] [place] [address] as patient 2 likely benefit from  urinary bladder catheterization. : right humerus     No acute osseous abnormality or malalignment involving the right humerus.      Total duration of time spent with patient interview and exam and discussion of plan of care, review of chart in care everywhere, discussion with medical staff- nursing/attending and additional specialities as indicated: 55 minutes

## 2021-05-05 NOTE — PROGRESS NOTES
Care Transitions Nurse ( CTN) unable to reach patient for follow up of hospital discharge 4/22/21  x2. Per chart review patient us currently admitted into [facility].     Episode of Care Closed.

## 2021-05-05 NOTE — CONSULTS
Consult Note    Patient: Nadir Kirby Sex: male          DOA: 5/4/2021         YOB: 1945      Age:  70 y.o.        LOS:  LOS: 1 day              Assessment/Plan     Principal Problem:    Trimalleolar fracture of ankle, closed, left, initial encounter (5/4/2021)    Active Problems:    Osteomyelitis ([address] (5/4/2021)      Displacement of peripherally inserted central catheter (PICC) ([address] (5/4/2021)    Trimalleolar fracture -  Splint placed in ER     Osteomyelitis left heel with cellulitis, gangrenous ulcer with deep abscess tracking along the plantar fascia -  Status post incision and drainage lower extremity left heel debridement and antibiotic bead placement   S/p removal of antibiotic beads and graft open wound. On last admission  On ertapenem for 6 weeks        Stage III chronic kidney disease -  HTN -  Diabetes mellitus -  CAD -  A-flutter - Rate controlled on toprol xl  On eliquis  Recent covid 19 infection    The patient has minimally displaced trimalleolar ankle fractrue in setting of peripheral neuropathy. We discussed I will contact Dr. Yo Mcneal to discuss his wound and infection. He may benefit from stabilization to help with prevention of Charcot but high risk with osteomyelitis. I reviewed his splint. His motion in toes intact, in supportive wrap for wound. Poss IM nail for fx but stable for now. NWB  Left leg for now. PT.       HPI:     Nadir Kirby is a 70 y.o. male who has been seen for left ankle trimalleolar ankle fracture. PT has ongoing abx for neuropathic ulcer and calcaneus osteomyelitis. Allen Canavan when ankle gave out. No head trauma.      Past Medical History:   Diagnosis Date    A-fib [facility])     Asthma     CAD (coronary artery disease)     COVID-19     Diabetes ([facility]     High cholesterol     Hypertension        Past Surgical History:   Procedure Laterality Date    COLONOSCOPY N/A 6/1/2018    COLONOSCOPY, POLYPECTOMY performed by Rasta Crawford MD at [facility] ENDOSCOPY    HX CATARACT REMOVAL         No family history on file. Social History     Socioeconomic History    Marital status:      Spouse name: Not on file    Number of children: Not on file    Years of education: Not on file    Highest education level: Not on file   Tobacco Use    Smoking status: Former Smoker    Smokeless tobacco: Never Used   Substance and Sexual Activity    Alcohol use: Yes     Alcohol/week: 1.0 standard drinks     Types: 1 Cans of beer per week    Drug use: No       Prior to Admission medications    Medication Sig Start Date End Date Taking? Authorizing Provider   ertapenem 1 gram 1 g IVPB 1 g by IntraVENous route every twenty-four (24) hours for 40 days. 4/22/21 6/1/21  Meg Rodriguez MD   ertapenem 1 gram 1 g IVPB 1 g by IntraVENous route every twenty-four (24) hours for 39 days. 4/23/21 6/1/21  Den Garcia MD   spironolactone (ALDACTONE) 25 mg tablet Take 12.5 mg by mouth daily. Take 0.5 tablet (12.5 mg) by oral    Provider, Historical   insulin lispro (HUMALOG KWIKPEN INSULIN SC) 10-15 Units by SubCUTAneous route three (3) times daily (with meals). Provider, Historical   Omeprazole delayed release (PRILOSEC D/R) 20 mg tablet Take 1 Tab by mouth daily. 6/1/18   Den Garcia MD   apixaban (ELIQUIS) 5 mg tablet Take 1 Tab by mouth two (2) times a day. 2/15/18   Johnson Guzman PA-C   aspirin delayed-release 81 mg tablet Take 1 Tab by mouth daily. 2/16/18   Johnson Guzman PA-C   atorvastatin (LIPITOR) 40 mg tablet Take 1 Tab by mouth nightly. 2/15/18   Johnson Guzman PA-C   clopidogrel (PLAVIX) 75 mg tab Take 1 Tab by mouth daily. 2/16/18   Johnson Guzman PA-C   furosemide (LASIX) 40 mg tablet Take 1 Tab by mouth daily. 2/15/18   Johnson Guzman PA-C   insulin glargine (LANTUS) 100 unit/mL injection 15 Units by SubCUTAneous route daily. Patient taking differently: 40 Units by SubCUTAneous route nightly.  Indications: type 2 diabetes mellitus 2/15/18   Alcides Adan PA-C   losartan (COZAAR) 25 mg tablet Take 1 Tab by mouth daily. 2/16/18   Alcides Adan PA-C   metoprolol succinate (TOPROL-XL) 50 mg XL tablet Take 1 Tab by mouth daily. 2/16/18   Alcides Adan PA-C   SITagliptin (JANUVIA) 50 mg tablet Take 1 Tab by mouth daily. 2/15/18   Alcides Adan PA-C   lancets (ADVOCATE LANCET) 30 gauge misc Use while checking BGL each morning. 2/15/18   Alcides Adan PA-C   glucose blood VI test strips (IGLUCOSE TEST STRIP) strip Use to check BGL every morning 2/15/18   Alcides Adan PA-C       No Known Allergies    Review of Systems  Pertinent items are noted in the History of Present Illness. Physical Exam:      Visit Vitals  BP (!) 131/56   Pulse 78   Temp 98.4 °F (36.9 °C)   Resp 18   SpO2 91%       Physical Exam:  Wound dressed, in 3 sided splint  Moves toes  No acute distress    Labs Reviewed: All lab results for the last 24 hours reviewed. Assessment/Plan     Principal Problem:    Trimalleolar fracture of ankle, closed, left, initial encounter (5/4/2021)    Active Problems:    Osteomyelitis ([address] (5/4/2021)      Displacement of peripherally inserted central catheter (PICC) ([address] (5/4/2021)    Trimalleolar fracture -  Splint placed in ER     Osteomyelitis left heel with cellulitis, gangrenous ulcer with deep abscess tracking along the plantar fascia -  Status post incision and drainage lower extremity left heel debridement and antibiotic bead placement   S/p removal of antibiotic beads and graft open wound. On last admission  On ertapenem for 6 weeks        Stage III chronic kidney disease -  HTN -  Diabetes mellitus -  CAD -  A-flutter - Rate controlled on toprol xl  On eliquis  Recent covid 19 infection    The patient has minimally displaced trimalleolar ankle fractrue in setting of peripheral neuropathy. We discussed I will contact Dr. Landon Ortiz to discuss his wound and infection.   He may benefit from stabilization to help with prevention of Charcot but high risk with osteomyelitis. I reviewed his splint. His motion in toes intact, in supportive wrap for wound. Poss IM nail for fx but stable for now. NWB  Left leg for now.  PT.

## 2021-05-05 NOTE — PROGRESS NOTES
Patient presents compliant to current plan of care as directed.    Ronaldo Mcneil  5/5/2021  9:09 AM

## 2021-05-05 NOTE — PROGRESS NOTES
Hospitalist Progress Note    Patient: Terri Diggs MRN: [id number]  CSN: [id number]    YOB: 1945  Age: 70 y.o. Sex: male    DOA: 5/4/2021 LOS:  LOS: 1 day                Assessment/Plan     Patient Active Problem List   Diagnosis Code    Atrial flutter ([facility]) I48.92    DM2 (diabetes mellitus, type 2) ([facility] E11.9    CAD (coronary artery disease) I25.10    ELINOR (acute kidney injury) ([address] N17.9    CKD (chronic kidney disease) stage 3, GFR 30-59 ml/min ([facility]) N18.30    Elevated brain natriuretic peptide (BNP) level R79.89    Diabetic ulcer of left foot ([facility]) E11.621, L97.529    COVID-19 virus infection U07.1    Acute osteomyelitis ([address] M86.10    Acute hematogenous osteomyelitis of left foot ([address] M86.072    Cellulitis of left foot L03. 80    Diabetic foot ulcer with osteomyelitis ([address] E11.621, E11.69, L97.509, M86.9    Ulcer of left heel, with necrosis of bone ([address] L97.424    Osteomyelitis ([address] M86.9    Trimalleolar fracture of ankle, closed, left, initial encounter S82.852A    Displacement of peripherally inserted central catheter (PICC) [facility]) T82.524A            70 y.o. male with diabetes, hypertension, coronary artery disease, atrial fibrillation, recent COVID-19 presents to ER after he fell from his stairs today. Trimalleolar fracture -  Not a surgical candidate given osteomyelitis. Podiatry recommended splint. Splint placed in ER. Seen by ortho  Further management per ortho/podiatry.     Osteomyelitis left heel with cellulitis, gangrenous ulcer with deep abscess tracking along the plantar fascia -  Status post incision and drainage lower extremity left heel debridement and antibiotic bead placement   S/p removal of antibiotic beads and graft open wound. On last admission  On ertapenem for 6 weeks.      ELINOR on CKD stage 3-  Nephrology following. Renal US with urinary bladder distension.   Mccray placed  Follow urine studies.     HTN -  On cozaar, toprol xl, lasix. Monitor BP     Diabetes mellitus -  Continue with lantus, start on SSI.      CAD -  No anginal symptoms, continue with aspirin and plavix     A-flutter -  Rate controlled on toprol xl  Renally dosed eliquis     Recent covid 19 infection    Disposition : need rehab    Review of systems  General: No fevers or chills. Cardiovascular: No chest pain or pressure. No palpitations. Pulmonary: No shortness of breath. Gastrointestinal: No nausea, vomiting. Physical Exam:  General: Awake, cooperative, no acute distress    HEENT: NC, Atraumatic. PERRLA, anicteric sclerae. Lungs: CTA Bilaterally. No Wheezing/Rhonchi/Rales. Heart:  S1 S2,  No murmur, No Rubs, No Gallops  Abdomen: Soft, Non distended, Non tender.  +Bowel sounds,   Extremities: Left foot in splint  Psych:   Not anxious or agitated. Neurologic:  No acute neurological deficit. Vital signs/Intake and Output:  Visit Vitals  /62   Pulse 100   Temp 99.6 °F (37.6 °C)   Resp 18   Ht 5' 8.9\" (1.75 m)   Wt 97.6 kg (215 lb 3.2 oz)   SpO2 92%   BMI 31.87 kg/m²     Current Shift:  05/05 0701 - 05/05 1900  In: 960 [P.O.:960]  Out: 2750 [Urine:2750]  Last three shifts:  No intake/output data recorded. Labs: Results:       Chemistry Recent Labs     05/05/21  0330   *      K 4.7      CO2 25   BUN 56*   CREA 3.78*   CA 7.7*   AGAP 9   BUCR 15      TP 5.6*   ALB 2.2*   GLOB 3.4   AGRAT 0.6*      CBC w/Diff Recent Labs     05/05/21  0330   WBC 5.4   RBC 3.06*   HGB 7.6*   HCT 25.7*         Cardiac Enzymes No results for input(s): CPK, CKND1, LEONEL in the last 72 hours.     No lab exists for component: CKRMB, TROIP   Coagulation Recent Labs     05/05/21  0330   PTP 17.7*   INR 1.5*       Lipid Panel No results found for: CHOL, CHOLPOCT, CHOLX, CHLST, CHOLV, 517952, HDL, HDLP, LDL, LDLC, DLDLP, 995655, VLDLC, VLDL, TGLX, TRIGL, TRIGP, TGLPOCT, CHHD, CHHDX   BNP No results for input(s): BNPP in the last 72 hours.   Liver Enzymes Recent Labs     05/05/21  0330   TP 5.6*   ALB 2.2*         Thyroid Studies Lab Results   Component Value Date/Time    TSH 1.72 02/11/2018 02:19 AM        Procedures/imaging: see electronic medical records for all procedures/Xrays and details which were not copied into this note but were reviewed prior to creation of Plan

## 2021-05-05 NOTE — PROGRESS NOTES
Reason for Admission:   fall, trimalleolar fracture                    RUR Score:     Mod; 20%             PCP: First and Last name:   Willie Vergara MD     Name of Practice:    Are you a current patient: Yes/No:    Approximate date of last visit:    Can you participate in a virtual visit if needed:     Do you (patient/family) have any concerns for transition/discharge? Plan for utilizing home health:       Current Advanced Directive/Advance Care Plan:  Prior      Healthcare Decision Maker:   Click here to complete [address] including selection of the [address] Relationship (ie \"Primary\")            Primary Decision MakerGuy Home - Spouse - [phone]    Transition of Care Plan:    SNF      Chart reviewed. Per H&P \"Asael Barney. is a 70 y.o. male with diabetes, hypertension, coronary artery disease, atrial fibrillation, recent COVID-19 presents to ER after he fell from his stairs today. Patient reports that he was stepping from his kitchen to his garage and he fell down 3 steps. He he injured his left foot. He was recently admitted to this hospital for left heel cellulitis and osteomyelitis with gangrenous ulcer and deep abscess. He underwent incision and drainage and placement of antibiotic beads. He was then again taken to the OR for removal of beads and placement of graft. He was seen by ID and a 6 weeks of IV antibiotics recommended. In ED x-ray showed trimalleolar fracture. \"    CM met with pt and his wife at bedside to discuss transition of care. Pt/family are in agreement with SNF placement. Pt/family would like to have information sent to [address] [place] [address]. Noted ortho is considering IM nail for fx. Please consider ordering therapy services when appropriate to assist with transition of care.   CMS has been notified to assist.    Care Management Interventions  Mode of Transport at Discharge: BLS  Transition of Care Consult (CM Consult): Discharge Planning, SNF  Health Maintenance Reviewed: Yes  Current Support Network: Lives with Spouse  Confirm Follow Up Transport: Family  The Plan for Transition of Care is Related to the Following Treatment Goals : SNF  The Patient and/or Patient Representative was Provided with a Choice of Provider and Agrees with the Discharge Plan?: Yes  Name of the Patient Representative Who was Provided with a Choice of Provider and Agrees with the Discharge Plan: pt/spouse  Freedom of Choice List was Provided with Basic Dialogue that Supports the Patient's Individualized Plan of Care/Goals, Treatment Preferences and Shares the Quality Data Associated with the Providers?: Yes  Discharge Location  Discharge Placement: Skilled nursing facility      Readmission Assessment  Number of days since last admission?: 8-30 days  Previous disposition: Other (comment)([facility])  What was the patient's/caregiver's perception as to why they think they needed to return back to the hospital?: Other (Comment)(fall)  Did you visit your Primary Care Physician after you left the hospital, before you returned this time?: Yes  Who advised the patient to return to the hospital?: Caregiver  Does the patient report anything that got in the way of taking their medications?: No

## 2021-05-06 ENCOUNTER — APPOINTMENT (OUTPATIENT)
Dept: NON INVASIVE DIAGNOSTICS | Age: 76
DRG: 464 | End: 2021-05-06
Attending: HOSPITALIST
Payer: MEDICARE

## 2021-05-06 ENCOUNTER — APPOINTMENT (OUTPATIENT)
Dept: GENERAL RADIOLOGY | Age: 76
DRG: 464 | End: 2021-05-06
Attending: HOSPITALIST
Payer: MEDICARE

## 2021-05-06 LAB
ANION GAP SERPL CALC-SCNC: 8 MMOL/L (ref 3–18)
BUN SERPL-MCNC: 56 MG/DL (ref 7–18)
BUN/CREAT SERPL: 17 (ref 12–20)
CALCIUM SERPL-MCNC: 7.7 MG/DL (ref 8.5–10.1)
CHLORIDE SERPL-SCNC: 107 MMOL/L (ref 100–111)
CO2 SERPL-SCNC: 27 MMOL/L (ref 21–32)
CREAT SERPL-MCNC: 3.39 MG/DL (ref 0.6–1.3)
ECHO AO ROOT DIAM: 2.97 CM
ECHO AV AREA PEAK VELOCITY: 3.31 CM2
ECHO AV AREA VTI: 2.94 CM2
ECHO AV AREA/BSA PEAK VELOCITY: 1.6 CM2/M2
ECHO AV AREA/BSA VTI: 1.4 CM2/M2
ECHO AV MEAN GRADIENT: 8.12 MMHG
ECHO AV PEAK GRADIENT: 12.44 MMHG
ECHO AV PEAK VELOCITY: 176.33 CM/S
ECHO AV VTI: 31.02 CM
ECHO IVC PROX: 1.62 CM
ECHO LA MAJOR AXIS: 3.54 CM
ECHO LA MINOR AXIS: 1.69 CM
ECHO LA VOL 2C: 43.15 ML (ref 18–58)
ECHO LA VOL 4C: 33.99 ML (ref 18–58)
ECHO LA VOL BP: 42.52 ML (ref 18–58)
ECHO LA VOL/BSA BIPLANE: 20.34 ML/M2 (ref 16–28)
ECHO LA VOLUME INDEX A2C: 20.64 ML/M2 (ref 16–28)
ECHO LA VOLUME INDEX A4C: 16.26 ML/M2 (ref 16–28)
ECHO LV E' LATERAL VELOCITY: 11 CM/S
ECHO LV E' SEPTAL VELOCITY: 7 CM/S
ECHO LV EDV A2C: 77.72 ML
ECHO LV EDV A4C: 118.97 ML
ECHO LV EDV BP: 98.67 ML (ref 67–155)
ECHO LV EDV INDEX A4C: 56.9 ML/M2
ECHO LV EDV INDEX BP: 47.2 ML/M2
ECHO LV EDV NDEX A2C: 37.2 ML/M2
ECHO LV EJECTION FRACTION A2C: 59 PERCENT
ECHO LV EJECTION FRACTION A4C: 58 PERCENT
ECHO LV EJECTION FRACTION BIPLANE: 58.9 PERCENT (ref 55–100)
ECHO LV ESV A2C: 32.11 ML
ECHO LV ESV A4C: 50.34 ML
ECHO LV ESV BP: 40.56 ML (ref 22–58)
ECHO LV ESV INDEX A2C: 15.4 ML/M2
ECHO LV ESV INDEX A4C: 24.1 ML/M2
ECHO LV ESV INDEX BP: 19.4 ML/M2
ECHO LV INTERNAL DIMENSION DIASTOLIC: 4.87 CM (ref 4.2–5.9)
ECHO LV INTERNAL DIMENSION SYSTOLIC: 3.62 CM
ECHO LV IVSD: 1.19 CM (ref 0.6–1)
ECHO LV MASS 2D: 213.8 G (ref 88–224)
ECHO LV MASS INDEX 2D: 102.2 G/M2 (ref 49–115)
ECHO LV POSTERIOR WALL DIASTOLIC: 1.13 CM (ref 0.6–1)
ECHO LVOT DIAM: 2.38 CM
ECHO LVOT PEAK GRADIENT: 6.85 MMHG
ECHO LVOT PEAK VELOCITY: 130.86 CM/S
ECHO LVOT SV: 91.3 ML
ECHO LVOT VTI: 20.47 CM
ECHO MV A VELOCITY: 93.81 CM/S
ECHO MV AREA PHT: 9.13 CM2
ECHO MV E DECELERATION TIME (DT): 83.13 MS
ECHO MV E VELOCITY: 89.03 CM/S
ECHO MV E/A RATIO: 0.95
ECHO MV E/E' LATERAL: 8.09
ECHO MV E/E' RATIO (AVERAGED): 10.41
ECHO MV E/E' SEPTAL: 12.72
ECHO MV PRESSURE HALF TIME (PHT): 24.11 MS
ECHO RA AREA 4C: 18.05 CM2
ECHO RV INTERNAL DIMENSION: 3.44 CM
ECHO RV TAPSE: 3.3 CM (ref 1.5–2)
ERYTHROCYTE [DISTWIDTH] IN BLOOD BY AUTOMATED COUNT: 20.4 % (ref 11.6–14.5)
FERRITIN SERPL-MCNC: 125 NG/ML (ref 8–388)
FOLATE SERPL-MCNC: 5.4 NG/ML (ref 3.1–17.5)
GLUCOSE BLD STRIP.AUTO-MCNC: 167 MG/DL (ref 70–110)
GLUCOSE BLD STRIP.AUTO-MCNC: 171 MG/DL (ref 70–110)
GLUCOSE BLD STRIP.AUTO-MCNC: 211 MG/DL (ref 70–110)
GLUCOSE BLD STRIP.AUTO-MCNC: 295 MG/DL (ref 70–110)
GLUCOSE SERPL-MCNC: 131 MG/DL (ref 74–99)
HCT VFR BLD AUTO: 20.5 % (ref 36–48)
HCT VFR BLD AUTO: 21.1 % (ref 36–48)
HGB BLD-MCNC: 6.2 G/DL (ref 13–16)
HGB BLD-MCNC: 6.7 G/DL (ref 13–16)
HISTORY CHECKED?,CKHIST: NORMAL
IRON SATN MFR SERPL: 5 % (ref 20–50)
IRON SERPL-MCNC: 10 UG/DL (ref 50–175)
LVOT MG: 4.11 MMHG
MCH RBC QN AUTO: 25.4 PG (ref 24–34)
MCHC RBC AUTO-ENTMCNC: 30.2 G/DL (ref 31–37)
MCV RBC AUTO: 84 FL (ref 74–97)
PLATELET # BLD AUTO: 245 K/UL (ref 135–420)
PMV BLD AUTO: 10.7 FL (ref 9.2–11.8)
POTASSIUM SERPL-SCNC: 4.1 MMOL/L (ref 3.5–5.5)
RBC # BLD AUTO: 2.44 M/UL (ref 4.35–5.65)
RETICS/RBC NFR AUTO: 2 % (ref 0.5–2.5)
SODIUM SERPL-SCNC: 142 MMOL/L (ref 136–145)
SODIUM UR-SCNC: 77 MMOL/L (ref 20–110)
TIBC SERPL-MCNC: 186 UG/DL (ref 250–450)
VIT B12 SERPL-MCNC: 578 PG/ML (ref 211–911)
WBC # BLD AUTO: 7.7 K/UL (ref 4.6–13.2)

## 2021-05-06 PROCEDURE — 94640 AIRWAY INHALATION TREATMENT: CPT

## 2021-05-06 PROCEDURE — 86901 BLOOD TYPING SEROLOGIC RH(D): CPT

## 2021-05-06 PROCEDURE — 82607 VITAMIN B-12: CPT

## 2021-05-06 PROCEDURE — 74011000250 HC RX REV CODE- 250: Performed by: FAMILY MEDICINE

## 2021-05-06 PROCEDURE — 74011250636 HC RX REV CODE- 250/636: Performed by: HOSPITALIST

## 2021-05-06 PROCEDURE — 85027 COMPLETE CBC AUTOMATED: CPT

## 2021-05-06 PROCEDURE — 71045 X-RAY EXAM CHEST 1 VIEW: CPT

## 2021-05-06 PROCEDURE — 74011000250 HC RX REV CODE- 250: Performed by: HOSPITALIST

## 2021-05-06 PROCEDURE — 97162 PT EVAL MOD COMPLEX 30 MIN: CPT

## 2021-05-06 PROCEDURE — 36415 COLL VENOUS BLD VENIPUNCTURE: CPT

## 2021-05-06 PROCEDURE — 83540 ASSAY OF IRON: CPT

## 2021-05-06 PROCEDURE — 82728 ASSAY OF FERRITIN: CPT

## 2021-05-06 PROCEDURE — 97535 SELF CARE MNGMENT TRAINING: CPT

## 2021-05-06 PROCEDURE — 74011250637 HC RX REV CODE- 250/637: Performed by: FAMILY MEDICINE

## 2021-05-06 PROCEDURE — 51798 US URINE CAPACITY MEASURE: CPT

## 2021-05-06 PROCEDURE — 97530 THERAPEUTIC ACTIVITIES: CPT

## 2021-05-06 PROCEDURE — C8929 TTE W OR WO FOL WCON,DOPPLER: HCPCS

## 2021-05-06 PROCEDURE — 86923 COMPATIBILITY TEST ELECTRIC: CPT

## 2021-05-06 PROCEDURE — 74011250637 HC RX REV CODE- 250/637: Performed by: HOSPITALIST

## 2021-05-06 PROCEDURE — 77010033678 HC OXYGEN DAILY

## 2021-05-06 PROCEDURE — 74011636637 HC RX REV CODE- 636/637: Performed by: HOSPITALIST

## 2021-05-06 PROCEDURE — 85045 AUTOMATED RETICULOCYTE COUNT: CPT

## 2021-05-06 PROCEDURE — 82962 GLUCOSE BLOOD TEST: CPT

## 2021-05-06 PROCEDURE — 80048 BASIC METABOLIC PNL TOTAL CA: CPT

## 2021-05-06 PROCEDURE — 97166 OT EVAL MOD COMPLEX 45 MIN: CPT

## 2021-05-06 PROCEDURE — 36430 TRANSFUSION BLD/BLD COMPNT: CPT

## 2021-05-06 PROCEDURE — P9016 RBC LEUKOCYTES REDUCED: HCPCS

## 2021-05-06 PROCEDURE — 85018 HEMOGLOBIN: CPT

## 2021-05-06 PROCEDURE — 65270000029 HC RM PRIVATE

## 2021-05-06 PROCEDURE — 74011250636 HC RX REV CODE- 250/636: Performed by: FAMILY MEDICINE

## 2021-05-06 RX ORDER — SODIUM CHLORIDE 9 MG/ML
250 INJECTION, SOLUTION INTRAVENOUS AS NEEDED
Status: DISCONTINUED | OUTPATIENT
Start: 2021-05-06 | End: 2021-05-07 | Stop reason: SDUPTHER

## 2021-05-06 RX ORDER — VANCOMYCIN 2 GRAM/500 ML IN 0.9 % SODIUM CHLORIDE INTRAVENOUS
2000 ONCE
Status: COMPLETED | OUTPATIENT
Start: 2021-05-06 | End: 2021-05-07

## 2021-05-06 RX ADMIN — SODIUM CHLORIDE 500 ML: 900 INJECTION, SOLUTION INTRAVENOUS at 12:49

## 2021-05-06 RX ADMIN — CLOPIDOGREL BISULFATE 75 MG: 75 TABLET ORAL at 10:06

## 2021-05-06 RX ADMIN — VANCOMYCIN HYDROCHLORIDE 2000 MG: 10 INJECTION, POWDER, LYOPHILIZED, FOR SOLUTION INTRAVENOUS at 15:57

## 2021-05-06 RX ADMIN — INSULIN LISPRO 2 UNITS: 100 INJECTION, SOLUTION INTRAVENOUS; SUBCUTANEOUS at 16:55

## 2021-05-06 RX ADMIN — MORPHINE SULFATE 2 MG: 2 INJECTION, SOLUTION INTRAMUSCULAR; INTRAVENOUS at 16:58

## 2021-05-06 RX ADMIN — MEROPENEM 1 G: 1 INJECTION, POWDER, FOR SOLUTION INTRAVENOUS at 12:48

## 2021-05-06 RX ADMIN — INSULIN LISPRO 6 UNITS: 100 INJECTION, SOLUTION INTRAVENOUS; SUBCUTANEOUS at 21:31

## 2021-05-06 RX ADMIN — Medication 81 MG: at 10:06

## 2021-05-06 RX ADMIN — ATORVASTATIN CALCIUM 40 MG: 20 TABLET, FILM COATED ORAL at 21:31

## 2021-05-06 RX ADMIN — INSULIN LISPRO 4 UNITS: 100 INJECTION, SOLUTION INTRAVENOUS; SUBCUTANEOUS at 12:48

## 2021-05-06 RX ADMIN — ACETAMINOPHEN 1000 MG: 500 TABLET ORAL at 10:06

## 2021-05-06 RX ADMIN — METOPROLOL SUCCINATE 50 MG: 50 TABLET, EXTENDED RELEASE ORAL at 10:06

## 2021-05-06 RX ADMIN — FUROSEMIDE 40 MG: 40 TABLET ORAL at 10:06

## 2021-05-06 RX ADMIN — INSULIN LISPRO 2 UNITS: 100 INJECTION, SOLUTION INTRAVENOUS; SUBCUTANEOUS at 08:45

## 2021-05-06 RX ADMIN — IPRATROPIUM BROMIDE AND ALBUTEROL SULFATE 3 ML: .5; 3 SOLUTION RESPIRATORY (INHALATION) at 02:05

## 2021-05-06 RX ADMIN — SODIUM CHLORIDE 1 ML: 9 INJECTION INTRAMUSCULAR; INTRAVENOUS; SUBCUTANEOUS at 13:23

## 2021-05-06 RX ADMIN — INSULIN GLARGINE 15 UNITS: 100 INJECTION, SOLUTION SUBCUTANEOUS at 08:45

## 2021-05-06 NOTE — WOUND CARE
[address] [facility]. MEDICAL RECORD NUMBER:  [id number]  AGE: 70 y.o. GENDER: male  : 1945  TODAY'S DATE:  2021    GENERAL     [] Follow-up   [x] New Consult    Rick Sánchez. is a 70 y.o. male referred by:   [x] Physician  [] Nursing  [] Other:         PAST MEDICAL HISTORY    Past Medical History:   Diagnosis Date    [facility])     Asthma     CAD (coronary artery disease)     COVID-19     Diabetes ([facility])     High cholesterol     Hypertension         PAST SURGICAL HISTORY    Past Surgical History:   Procedure Laterality Date    COLONOSCOPY N/A 2018    COLONOSCOPY, POLYPECTOMY performed by Bridgett Apple MD at [facility] ENDOSCOPY    HX CATARACT REMOVAL         FAMILY HISTORY    No family history on file. SOCIAL HISTORY    Social History     Tobacco Use    Smoking status: Former Smoker    Smokeless tobacco: Never Used   Substance Use Topics    Alcohol use: Yes     Alcohol/week: 1.0 standard drinks     Types: 1 Cans of beer per week    Drug use: No       ALLERGIES    No Known Allergies    MEDICATIONS    No current facility-administered medications on file prior to encounter. Current Outpatient Medications on File Prior to Encounter   Medication Sig Dispense Refill    ertapenem 1 gram 1 g IVPB 1 g by IntraVENous route every twenty-four (24) hours for 40 days. 40 Dose 0    ertapenem 1 gram 1 g IVPB 1 g by IntraVENous route every twenty-four (24) hours for 39 days. 1 Dose 0    spironolactone (ALDACTONE) 25 mg tablet Take 12.5 mg by mouth daily. Take 0.5 tablet (12.5 mg) by oral      insulin lispro (HUMALOG KWIKPEN INSULIN SC) 10-15 Units by SubCUTAneous route three (3) times daily (with meals).  Omeprazole delayed release (PRILOSEC D/R) 20 mg tablet Take 1 Tab by mouth daily. 30 Tab 5    apixaban (ELIQUIS) 5 mg tablet Take 1 Tab by mouth two (2) times a day. 60 Tab 0    aspirin delayed-release 81 mg tablet Take 1 Tab by mouth daily.  30 Tab 0    atorvastatin (LIPITOR) 40 mg tablet Take 1 Tab by mouth nightly. 30 Tab 0    clopidogrel (PLAVIX) 75 mg tab Take 1 Tab by mouth daily. 30 Tab 0    furosemide (LASIX) 40 mg tablet Take 1 Tab by mouth daily. 30 Tab 0    insulin glargine (LANTUS) 100 unit/mL injection 15 Units by SubCUTAneous route daily. (Patient taking differently: 40 Units by SubCUTAneous route nightly. Indications: type 2 diabetes mellitus) 1 Vial 0    losartan (COZAAR) 25 mg tablet Take 1 Tab by mouth daily. 30 Tab 0    metoprolol succinate (TOPROL-XL) 50 mg XL tablet Take 1 Tab by mouth daily. 30 Tab 0    SITagliptin (JANUVIA) 50 mg tablet Take 1 Tab by mouth daily. 30 Tab 0    lancets (ADVOCATE LANCET) 30 gauge misc Use while checking BGL each morning.  1 Package 0    glucose blood VI test strips (IGLUCOSE TEST STRIP) strip Use to check BGL every morning 50 Strip 0       Wt Readings from Last 3 Encounters:   05/06/21 95.7 kg (211 lb)   04/22/21 89.2 kg (196 lb 11.2 oz)   04/15/21 83.9 kg (185 lb)       [unfilled]  Visit Vitals  BP (!) 93/45   Pulse (!) 104   Temp (!) 101.8 °F (38.8 °C)   Resp 18   Ht 5' 8.9\" (1.75 m)   Wt 95.7 kg (211 lb)   SpO2 94%   BMI 31.25 kg/m²       ASSESSMENT     Skin impairment Identification:  Type: diabetic and non-healing surgical    Contributing Factors: diabetes and non-compliance    Wound Heel Left (Active)   Wound Image   05/06/21 1206   Wound Etiology Diabetic 05/06/21 1206   Dressing Status Intact 05/06/21 1206   Cleansed Cleansed with saline 05/06/21 1206   Dressing/Treatment Collagen;Alginate with Ag 05/06/21 1206   Offloading for Diabetic Foot Ulcers Other (comment) 05/06/21 1206   Dressing Change Due 05/06/21 05/06/21 1206   Wound Length (cm) 2.5 cm 05/06/21 1206   Wound Width (cm) 3 cm 05/06/21 1206   Wound Surface Area (cm^2) 7.5 cm^2 05/06/21 1206   Change in Wound Size % 9.09 05/06/21 1206   Wound Assessment Graft;Slough 05/06/21 1206   Drainage Amount Moderate 05/06/21 1206   Drainage Description Thick;Brown 05/06/21 1206   Wound Odor None 05/06/21 1206   Neha-Wound/Incision Assessment Maceration 05/06/21 1206   Edges Unattached edges 05/06/21 1206   Wound Thickness Description Full thickness 05/06/21 1206   Number of days: 6       Incision 04/21/21 Foot Left (Active)   Dressing Status Clean;Dry; Intact 05/05/21 2051   Dressing/Treatment Ace wrap;Splint 05/05/21 2051   Drainage Amount None 05/05/21 2051   Wound Odor None 05/05/21 2051   Neha-Wound/Incision Assessment Intact 05/05/21 0909   Number of days: 15       [REMOVED] Incision 04/17/21 Foot Left (Removed)   Number of days: 4          PLAN     Skin Care & Pressure Relief Recommendations  Minimize layers of linen  Pads under patient to optimize support surface  Turn/reposition approximately every 2 hours  Pillow wedges  Promote continence; Skin Protective lotion/cream to buttocks and sacrum daily and as needed with incontinence care    Physician/Provider notified: Yes  Recommendations: per wound clinic orders, promogran to ulcerated area, alginate to incision, soft roll to leg, splint reapplied and secured with ace wrap    Teaching completed with:   [x] Patient           [] Family member       [] Caregiver          [] Nursing  [] Other    Patient/Caregiver Teaching:  Level of patient/caregiver understanding able to:   [x] Indicates understanding       [] Needs reinforcement  [] Unsuccessful      [] Verbal Understanding  [] Demonstrated understanding       [] No evidence of learning  [] Refused teaching         [] N/A       Electronically signed by Sandi Dumont RN on 5/6/2021 at 12:23 PM

## 2021-05-06 NOTE — PROGRESS NOTES
Physician Progress Note      Sonya Hines  CSN #:                  [id number]  :                       1945  ADMIT DATE:       2021 3:59 PM  [address] [place] DATE:  RESPONDING  PROVIDER #:        Latonya Aparicio MD          QUERY TEXT:    Pt admitted with fracture ankle  . Pt noted to have abnormal H&H . If possible, please document in the progress notes and discharge summary if you are evaluating and/or treating any of the following: The medical record reflects the following:  Risk Factors: Fracture ankle and CKD  Clinical Indicators: 5/ H&H 7.6, 7.0/23.0, 5/ H&H 6.2/20.5. Facture ankle, CKD 3  Treatment: 1 unit PRBC    Thank-You  Adry Gamble RN [facility]  [phone]  Options provided:  -- Acute blood loss anemia  -- Anemia of chronic disease due to CKD. -- Other - I will add my own diagnosis  -- Disagree - Not applicable / Not valid  -- Disagree - Clinically unable to determine / Unknown  -- Refer to Clinical Documentation Reviewer    PROVIDER RESPONSE TEXT:    This patient has acute blood loss anemia.     Query created by: Tyler Singh on 2021 9:44 AM      Electronically signed by:  Latonya Aparicio MD 2021 3:16 PM

## 2021-05-06 NOTE — PROGRESS NOTES
5/6/2021 PT note: consult received and chart reviewed. Pt has unstable L ankle fracture; non weight bearing with splint in place. Per chart, if pt medically stable, pt may have surgery Tuesday for internal fixation. Evaluation attempted. Wound Care nurses currently with pt performing wound care to L LE. Will f/u at later time as pt schedule allows for PT evaluation.  Thank you for this referral.   Nellie Hidalgo, PT

## 2021-05-06 NOTE — PROGRESS NOTES
Overnight coverage note:     Bedside nurse culture informed me that patient has spiked a temperature. His temperature is 102. 2. He is not tachycardic and his vitals are otherwise stable. She also informed me that he has blood in his urine. Possible traumatic Mccray insertion may be causing hematuria. Obtain blood cultures x2, lactic acid, urinalysis and urine culture. Also obtain CBC. Order Tylenol for fever. Currently only on Invanz IV. Will consider adjusting antibiotic profile after lab work results available.

## 2021-05-06 NOTE — PROGRESS NOTES
Pharmacy Dosing Services: Vancomycin    Consult for Vancomycin Dosing by Pharmacy from Dr. Marcin Moffett  Indication: Bone and Joint Infection  Day of Therapy 1    Other Current Antibiotics Meropenem 1g IV Q12H   Significant Cultures Pending   Serum Creatinine Lab Results   Component Value Date/Time    Creatinine 3.39 (H) 05/06/2021 03:00 AM      Creatinine Clearance Estimated Creatinine Clearance: 21.8 mL/min (A) (based on SCr of 3.39 mg/dL (H)). BUN Lab Results   Component Value Date/Time    BUN 56 (H) 05/06/2021 03:00 AM       WBC Lab Results   Component Value Date/Time    WBC 7.7 05/06/2021 03:00 AM      H/H    Lab Results   Component Value Date/Time    HGB 6.2 (L) 05/06/2021 03:00 AM      Platelets Lab Results   Component Value Date/Time    PLATELET 973 79/31/4759 03:00 AM      Temp 99.4 °F (37.4 °C)     Vancomycin 2g IV x1 loading dose received today at 1600. Maintenance doses will be ordered based on random levels until renal function improves. First random level is scheduled to be drawn 05/07/21 1600. Pharmacy will continue to monitor daily and make adjustments based on clinical status.     Katina Madison, PharmD  [phone]

## 2021-05-06 NOTE — PROGRESS NOTES
Entered chart to complete admission data base.  Seems sure of which meds but unsure of when he took them last.

## 2021-05-06 NOTE — PROGRESS NOTES
Assessment:     Trimalleolar fracture of ankle, closed, left, initial encounter (5/4/2021)      Osteomyelitis (HCC) (5/4/2021)   Displacement of peripherally inserted central catheter (PICC) ([facility] (5/4/2021)   ARF  HTN  Urinary incontinence     Plan:    Urinary retention  Mccray in place and draining  BUN and cr slightly better  D/w and ID attending  Will need Urology in put once stable       CC: ARF, urinary retention  Interval History: Mccray was put, good urine out put, some hematuria        Subjective:   PT does not have any somatic complaints  Left leg is heavy      Review of Systems  Negative for  SOB,  Nausea, vomiting        Blood pressure (!) 113/52, pulse 89, temperature 100 °F (37.8 °C), resp. rate 20, height 5' 8.9\" (1.75 m), weight 95.7 kg (211 lb), SpO2 96 %.       NAD, Conversant  Mccray in place  Edema right leg  Left leg is wrapped        Intake/Output Summary (Last 24 hours) at 5/6/2021 0939  Last data filed at 5/6/2021 0205  Gross per 24 hour   Intake 480 ml   Output 3575 ml   Net -3095 ml      Recent Labs     05/06/21  0300   WBC 7.7     Lab Results   Component Value Date/Time    Sodium 142 05/06/2021 03:00 AM    Potassium 4.1 05/06/2021 03:00 AM    Chloride 107 05/06/2021 03:00 AM    CO2 27 05/06/2021 03:00 AM    Anion gap 8 05/06/2021 03:00 AM    Glucose 131 (H) 05/06/2021 03:00 AM    BUN 56 (H) 05/06/2021 03:00 AM    Creatinine 3.39 (H) 05/06/2021 03:00 AM    BUN/Creatinine ratio 17 05/06/2021 03:00 AM    GFR est AA 22 (L) 05/06/2021 03:00 AM    GFR est non-AA 18 (L) 05/06/2021 03:00 AM    Calcium 7.7 (L) 05/06/2021 03:00 AM        Current Facility-Administered Medications   Medication Dose Route Frequency Provider Last Rate Last Admin    0.9% sodium chloride infusion 250 mL  250 mL IntraVENous PRN Bruno Lopez MD        ertapenem [facility]) 0.5 g in 0.9% sodium chloride 50 mL IVPB  500 mg IntraVENous Q24H Harley BLOOM  mL/hr at 05/05/21 2332 0.5 g at 05/05/21 2332    heparin (porcine) 100 unit/mL injection 500 Units  500 Units InterCATHeter Q8H PRN Mili Patel MD   500 Units at 05/05/21 1248    [Held by provider] apixaban (ELIQUIS) tablet 2.5 mg  2.5 mg Oral BID Preeti Cloud MD   Stopped at 05/05/21 2100    acetaminophen (TYLENOL) tablet 1,000 mg  1,000 mg Oral Q8H PRN Armen Chnace MD        albuterol-ipratropium (DUO-NEB) 2.5 MG-0.5 MG/3 ML  3 mL Nebulization Q4H PRN Armen Chance MD   3 mL at 05/06/21 0205    aspirin delayed-release tablet 81 mg  81 mg Oral DAILY Preeti Cloud MD   81 mg at 05/05/21 0916    atorvastatin (LIPITOR) tablet 40 mg  40 mg Oral QHS Luan BLOOM MD   40 mg at 05/05/21 2153    clopidogreL (PLAVIX) tablet 75 mg  75 mg Oral DAILY Preeti Cloud MD   75 mg at 05/05/21 0916    furosemide (LASIX) tablet 40 mg  40 mg Oral DAILY Luan BLOOM MD   40 mg at 05/05/21 0916    insulin glargine (LANTUS) injection 15 Units  15 Units SubCUTAneous DAILY Preeti Cloud MD   15 Units at 05/05/21 0915    metoprolol succinate (TOPROL-XL) XL tablet 50 mg  50 mg Oral DAILY Luan BLOOM MD   50 mg at 05/05/21 0915    glucose chewable tablet 16 g  16 g Oral PRN Preeti Cloud MD        glucagon (GLUCAGEN) injection 1 mg  1 mg IntraMUSCular PRN Preeti Cloud MD        dextrose (D50W) injection syrg 25 g  50 mL IntraVENous PRN Preeti Cloud MD        insulin lispro (HUMALOG) injection   SubCUTAneous AC&HS Preeti Cloud MD   Stopped at 05/05/21 2200    morphine injection 2 mg  2 mg IntraVENous Q4H PRN Armen Chance MD   2 mg at 05/04/21 9002

## 2021-05-06 NOTE — PROGRESS NOTES
[facility]                         (A Division of [address] [place] [address])                                                                                                                       Evelyne Barrett MD  Work Cell #: [phone]( Mon-Fri, 7am-5pm)  If no call or text back within 30 minutes from me, please call office number. (Prefer text when possible)  Office #:     [phone]   Office Fax: [phone]     Date of Admission: 5/4/2021   Date of Service:  5/6/2021  Reason for FU : left ankle OM, fever    Current Antimicrobials:    Prior Antimicrobials:  Ertapenem 1gm IV q24 4/23 to date  -- reduced dose to 500 mg 5/5 · Unasyn 3 gm Q6 hour 4/20 to 4/22/21  · Meropenem IV 4/15-4/20  · Zosyn 2.25gm IV Q6 4/14 to 4/15  · Vancomycin IV 4/14  until 4/19  · Zithromax 500mg IV- 4/14 to 4/20             Assessment: Rec / Plan:   · Fever to 102.2 overnight-- DDX: urine source Vs worsening of left ankle OM Vs line. No respiratory complaint and no rash noted. · Left Infected DM Ulcer and acute OM and necrotic tissue, punched out heel ulcer with Mixed infection- MSSA +E.coli + anaerobes - 4/14/21  · Post I and D of left heel bone/abscess 4/17, OR cultures remain sterile  · Post I and D with graft to heel- 4/21  CRP 8.9-> 5.8->3.8 as inpatient  CRP 15-> 34 as OP  ESR 78-> 63   · Acute renal failure 2/2 urinary retention, caal in place. Drug related IN in DDX but less likely       (urine eos negative)   · Left heel ankle fracture 2/2 fall: on this admission    · Anemia- HB 6.2    Other Medical Issues followed and managed by primary and other services  · DM- Poorly controlled  · Low Vitamin D  · CAD with CHF/ NSTMI. Atrial flutter  · CKD  · Hyperlipidemia     Past ID issues: N/A  -History of COVID 19 infection 4/2021 Agree with panculture- FU cultures from 5/5/21    Cont with Ertapenem at 500 mg- hemodynamics ok    If hypotension or recurrent fever:  1.  Add vancomycin to regimen and change ertapenem to meropenem  2. Re-image left ankle with CT scan + consider CT CAP    Ortho note about internal fixation noted. Will d/w MD    AUGUST renal MD-- expected gradual improvement with Obstructive uropathy. Will follow     Condition: Hemodynamically stable. New fever    Subjective: \" Can't move much\"  High fever to 102. 2. didn't feel it much. Denies cough/sob, HA, runny nose, chest discomfort. Denies N/V/abd pain. Denies flank pain. No diarrhea. No itching or rash    Mccray placed, relieved of >3L of urine with hematuria       Review of System:  See above, other wise negative     Summary:    Oz Villavicencio. is 69 y/o WM with PMH as listed below- with initial consult on 4/15/21. Last seen by me on 4/22/21 prior to discharge. He was set up at [facility] infusion center for daily ertapenem.     Yesterday, I was notified by RN that he fell, hurt his left foot, and his PICC was not working and they infused him via PIV and sent him to ED.     On arrival to ED, was noted to be in acute renal failure, and with new left ankle fracture.     Wound care note with the picture reviewed.     Pt denies F/C/R. Denies SOB. Feels weak, appetite and po intake has decreased past many days per wife. Patient Active Problem List   Diagnosis Code    Atrial flutter ([facility]) I48.92    DM2 (diabetes mellitus, type 2) ([facility]) E11.9    CAD (coronary artery disease) I25.10    ELINOR (acute kidney injury) [facility] N17.9    CKD (chronic kidney disease) stage 3, GFR 30-59 ml/min ([facility]) N18.30    Elevated brain natriuretic peptide (BNP) level R79.89    Diabetic ulcer of left foot ([facility]) E11.621, L97.529    COVID-19 virus infection U07.1    Acute osteomyelitis ([facility] M86.10    Acute hematogenous osteomyelitis of left foot ([facility]) M86.072    Cellulitis of left foot L03. 80    Diabetic foot ulcer with osteomyelitis ([facility]) E11.621, E11.69, L97.509, M86.9    Ulcer of left heel, with necrosis of bone ([address] L97.424    Osteomyelitis ([facility] M86.9    Trimalleolar fracture of ankle, closed, left, initial encounter S82.852A    Displacement of peripherally inserted central catheter (PICC) ([facility] T82.524A       Current Facility-Administered Medications   Medication Dose Route Frequency    0.9% sodium chloride infusion 250 mL  250 mL IntraVENous PRN    ertapenem (INVANZ) 0.5 g in 0.9% sodium chloride 50 mL IVPB  500 mg IntraVENous Q24H    heparin (porcine) 100 unit/mL injection 500 Units  500 Units InterCATHeter Q8H PRN    [Held by provider] apixaban (ELIQUIS) tablet 2.5 mg  2.5 mg Oral BID    acetaminophen (TYLENOL) tablet 1,000 mg  1,000 mg Oral Q8H PRN    albuterol-ipratropium (DUO-NEB) 2.5 MG-0.5 MG/3 ML  3 mL Nebulization Q4H PRN    aspirin delayed-release tablet 81 mg  81 mg Oral DAILY    atorvastatin (LIPITOR) tablet 40 mg  40 mg Oral QHS    clopidogreL (PLAVIX) tablet 75 mg  75 mg Oral DAILY    furosemide (LASIX) tablet 40 mg  40 mg Oral DAILY    insulin glargine (LANTUS) injection 15 Units  15 Units SubCUTAneous DAILY    metoprolol succinate (TOPROL-XL) XL tablet 50 mg  50 mg Oral DAILY    glucose chewable tablet 16 g  16 g Oral PRN    glucagon (GLUCAGEN) injection 1 mg  1 mg IntraMUSCular PRN    dextrose (D50W) injection syrg 25 g  50 mL IntraVENous PRN    insulin lispro (HUMALOG) injection   SubCUTAneous AC&HS    morphine injection 2 mg  2 mg IntraVENous Q4H PRN       Objective:    Visit Vitals  BP (!) 113/52   Pulse 89   Temp 100 °F (37.8 °C)   Resp 20   Ht 5' 8.9\" (1.75 m)   Wt 95.7 kg (211 lb)   SpO2 96%   BMI 31.25 kg/m²       Temp (24hrs), Av.2 °F (37.9 °C), Min:98.4 °F (36.9 °C), Max:102.2 °F (39 °C)    Right PICC - replaced over wire     GEN: WD sick looking , not in resp distress. HEENT: Unicteric. EOMI intact  CHEST: Non laboured breathing. CTA  CVS:RRR, no mur/gallop  ABD: Obese/soft. Non tender. NABS  LISET: caal in place- with hematuria.  NO CVA tenderness  EXT: No apparent swelling or redness on UE/LE joints that was examined. Left LE is splinted. Didn't see wound site. Skin: Dry and intact. No rash, no redness. CNS: A, OX3. Moves all extremity. CN grossly ok. Microbiology:    Blood culture: - 2 sets: NGSF  - 2 sets: NGSF     Urine culture:  - urine culture: Pending     Body Fluid/ CSF/drainage culture:  - gram stain: GNR and GPR        --wound culture+ s.aurues and E.coli + anaerobe   -- OR culture: NG    Lab results:    Chemistry  Recent Labs     21  0300 21  0330   * 107*    140   K 4.1 4.7    106   CO2 27 25   BUN 56* 56*   CREA 3.39* 3.78*   CA 7.7* 7.7*   AGAP 8 9   BUCR 17 15   AP  --  114   TP  --  5.6*   ALB  --  2.2*   GLOB  --  3.4   AGRAT  --  0.6*       CBC w/ Diff  Recent Labs     21  0300 21  2155 21  0330   WBC 7.7 7.0 5.4   RBC 2.44* 2.80* 3.06*   HGB 6.2* 7.0* 7.6*   HCT 20.5* 23.0* 25.7*    275 317     Imagin/5- CXR     1. Right PICC line tip retracted from optimal/appropriate position, projecting  over the right subclavian vein. 2. Mild bibasilar atelectasis.      21  US renal  1. Considerable urinary bladder fluid distention (approximately 2.1 L) with  likely reactive pelviectasis.      > Results called to [address] [place] [address] unit as patient 2 likely benefit from  urinary bladder catheterization.     : right humerus     No acute osseous abnormality or malalignment involving the right humerus.      Total duration of time spent with patient interview and exam and discussion of plan of care, review of chart in care everywhere, discussion with medical staff- nursing/attending and additional specialities as indicated:  35 minutes

## 2021-05-06 NOTE — PROGRESS NOTES
Care manager met with patient; introduced self and care manager role in assisting with discharge planning; when care manager discussed planned surgery, patient appeared initially surprised; reviewed that Dr. Yonis Laura had been in to see him this morning to which he then appeared to remember the conversation. So far, patient has been accepted to [facility] but did not broach this yet with patient as he appears to be unhappy with hospitalization in general, saying things like \"I feel like I have been here for a year\" and \"I wish I were back in [place]\" - patient shared that he was in the [employer], stationed in [facility] for 4 days and in [facility] for 7 years; it was in [facility] that he met his wife. Anticipate patient will need SNF for both IV abx and rehab postop since he was unsuccessful with OP IV abx previously; care manager will continue to follow.     Care Management Interventions  Mode of Transport at Discharge: BLS  Transition of Care Consult (CM Consult): Discharge Planning, SNF  Health Maintenance Reviewed: Yes  Current Support Network: Lives with Spouse  Confirm Follow Up Transport: Family  The Plan for Transition of Care is Related to the Following Treatment Goals : SNF  The Patient and/or Patient Representative was Provided with a Choice of Provider and Agrees with the Discharge Plan?: Yes  Name of the Patient Representative Who was Provided with a Choice of Provider and Agrees with the Discharge Plan: pt/spouse  Freedom of Choice List was Provided with Basic Dialogue that Supports the Patient's Individualized Plan of Care/Goals, Treatment Preferences and Shares the Quality Data Associated with the Providers?: Yes  Discharge Location  Discharge Placement: Skilled nursing facility

## 2021-05-06 NOTE — PROGRESS NOTES
Problem: Self Care Deficits Care Plan (Adult)  Goal: *Acute Goals and Plan of Care (Insert Text)  Description: Occupational Therapy Goals  Initiated 5/6/2021 within 7 day(s). 1.  Patient will perform grooming with supervision/set-up seated at EOB. 2.  Patient will perform upper body bathing with minimal assistance/contact guard assist seated at EOB. 3.  Patient will participate in upper extremity therapeutic exercise/activities with minimal assistance/contact guard assist for 10 minutes. 4.  Patient will utilize energy conservation techniques during functional activities with verbal, visual, and tactile cues. OCCUPATIONAL THERAPY EVALUATION    Patient: Franky Ricci (80 y.o. male)  Date: 5/6/2021  Primary Diagnosis: Osteomyelitis ([facility] [M86.9]  Trimalleolar fracture of ankle, closed, left, initial encounter [S82.992A]  Displacement of peripherally inserted central catheter (PICC) ([facility] [T82.524A]        Precautions:   Fall, NWB(lle)  PLOF: mod A for ADLs and transfers PTA     ASSESSMENT :  Based on the objective data described below, the patient presents with decreased strength, endurance and balance for safety with ADLs and transfers. Pt presented supine in bed. Pt co-treat with PT requiring skilled assistance for safety. Pt with occasional confusion and requires verbal  cues to be redirected for participation with transfers and ADLs. Pt performed bed mobility with mod A, supine<>sit with min A, demo poor dynamic sitting balance and requires cues to maintain NWB at LLE when sitting EOB. Pt participate with functional reach activities while seated EOB in all planes. Pt returned to bed at the end of session in [facility] N[facility] Barillas Patient will benefit from skilled intervention to address the above impairments.   Patient's rehabilitation potential is considered to be Fair  Factors which may influence rehabilitation potential include:   []             None noted  []             Mental ability/status  [x] Medical condition  []             Home/family situation and support systems  [x]             Safety awareness  []             Pain tolerance/management  []             Other:      PLAN :  Recommendations and Planned Interventions:   [x]               Self Care Training                  [x]      Therapeutic Activities  [x]               Functional Mobility Training   []      Cognitive Retraining  [x]               Therapeutic Exercises           []      Endurance Activities  [x]               Balance Training                    []      Neuromuscular Re-Education  []               Visual/Perceptual Training     [x]      Home Safety Training  [x]               Patient Education                   [x]      Family Training/Education  []               Other (comment):    Frequency/Duration: Patient will be followed by occupational therapy 1-2 times per day/4-7 days per week to address goals. Discharge Recommendations: Inpatient Rehab and [facility]  Further Equipment Recommendations for Discharge: TBD      SUBJECTIVE:   Patient stated  I will try to get up.     OBJECTIVE DATA SUMMARY:     Past Medical History:   Diagnosis Date    A-fib ([facility]     Asthma     CAD (coronary artery disease)     COVID-19     Diabetes ([facility]     High cholesterol     Hypertension      Past Surgical History:   Procedure Laterality Date    COLONOSCOPY N/A 6/1/2018    COLONOSCOPY, POLYPECTOMY performed by Umair Augustine MD at [facility] ENDOSCOPY    HX CATARACT REMOVAL       Barriers to Learning/Limitations: yes;  altered mental status (i.e.Sedation, Confusion)  Compensate with: visual, verbal, tactile, kinesthetic cues/model    Home Situation:   Home Situation  Home Environment: Private residence  # Steps to Enter: 2  Rails to Enter: Yes  Hand Rails : Bilateral  Wheelchair Ramp: Yes  One/Two Story Residence: Two story, live on 1st floor  Interior Rails: Right  Lift Chair Available: No  Living Alone: No  Support Systems: Spouse/Significant Other/Partner  Patient Expects to be Discharged to[de-identified] Private residence  Current DME Used/Available at Home: Dulcie Pila, rolling(transport wheelchair)  Tub or Shower Type: Tub/Shower combination  []  Right hand dominant   []  Left hand dominant    Cognitive/Behavioral Status:  Neurologic State: Alert  Orientation Level: Oriented to person;Oriented to place;Oriented to time  Cognition: Decreased attention/concentration;Decreased command following  Safety/Judgement: Fall prevention;Decreased insight into deficits    Skin: iintact  Edema: none    Vision/Perceptual:    Tracking: Able to track stimulus in all quadrants w/o difficulty      Coordination: BUE  Coordination: Generally decreased, functional  Fine Motor Skills-Upper: Left Intact; Right Intact    Gross Motor Skills-Upper: Left Intact; Right Intact    Balance:  Sitting: Impaired  Sitting - Static: Fair (occasional)  Sitting - Dynamic: Poor (constant support)  Standing: (not tested)    Strength: BUE  Strength: Generally decreased, functional    Tone & Sensation: BUE  Tone: Normal  Sensation: Intact    Range of Motion: BUE  AROM: Generally decreased, functional  PROM: Generally decreased, functional    Functional Mobility and Transfers for ADLs:  Bed Mobility:  Rolling: Moderate assistance  Supine to Sit: Moderate assistance  Sit to Supine: Minimum assistance  Scooting: Contact guard assistance;Minimum assistance(seated EOB;  Ralph bed used to scoot to [facility] supine)  Transfers:    ADL Assessment:   Feeding: Setup    Oral Facial Hygiene/Grooming: Minimum assistance    Upper Body Dressing: Minimum assistance    Lower Body Dressing: Maximum assistance    Toileting: Maximum assistance    ADL Intervention:  Upper Body Dressing Assistance  Dressing Assistance: Minimum assistance  Hospital Gown: Minimum  assistance    Cognitive Retraining  Safety/Judgement: Fall prevention;Decreased insight into deficits    Therapeutic Exercise:      Pain:  Pain level pre-treatment: 0/10   Pain level post-treatment: 0/10   Pain Intervention(s): Medication (see MAR); Rest, Ice, Repositioning   Response to intervention: Nurse notified, See doc flow    Activity Tolerance:   Fair   Please refer to the flowsheet for vital signs taken during this treatment. After treatment:   [] Patient left in no apparent distress sitting up in chair  [x] Patient left in no apparent distress in bed  [x] Call bell left within reach  [x] Nursing notified  [] Caregiver present  [] Bed alarm activated    COMMUNICATION/EDUCATION:   [x] Role of Occupational Therapy in the acute care setting  [x] Home safety education was provided and the patient/caregiver indicated understanding. [x] Patient/family have participated as able in goal setting and plan of care. [] Patient/family agree to work toward stated goals and plan of care. [] Patient understands intent and goals of therapy, but is neutral about his/her participation. [] Patient is unable to participate in goal setting and plan of care. Thank you for this referral.  Ethan Myrick OTR/L  Time Calculation: 30 mins    Eval Complexity: History: MEDIUM Complexity : Expanded review of history including physical, cognitive and psychosocial  history ; Examination: MEDIUM Complexity : 3-5 performance deficits relating to physical, cognitive , or psychosocial skils that result in activity limitations and / or participation restrictions; Decision Making:MEDIUM Complexity : Patient may present with comorbidities that affect occupational performnce.  Miniml to moderate modification of tasks or assistance (eg, physical or verbal ) with assesment(s) is necessary to enable patient to complete evaluation

## 2021-05-06 NOTE — PROGRESS NOTES
Hospitalist notified of VS and pt's condition. Instructed pt to take deep breaths. IS provided,  No Tylenol ordered for now    2250 Blood culture x2 done, urine specimen brought to lab    Joyce 1019 therapist paged for [facility] treatment. MD aware of lab results    0540 Blood drawn for type and screen, H/H of 6.2/20.5    0805 Bedside and Verbal shift change report given to Miko Ashraf RN (oncoming nurse) by Mihai Mayo RN   (offgoing nurse). Report included the following information SBAR, Kardex, Intake/Output, MAR and Recent Results.

## 2021-05-06 NOTE — PROGRESS NOTES
Occupational Therapy Treatment Attempt     Chart reviewed. Attempted Occupational Therapy Treatment, however, patient unable to be seen due to:  []  Nausea/vomiting  []  Eating  []  Pain  []  Patient too lethargic  []  Off Unit for testing/procedure  []  Dialysis treatment in progress  []  Telemetry Results  [x]  Other: getting Echo at bedside. Will f/u later as patient's schedule allows.   Lalitha Sharpe, OTR/L

## 2021-05-06 NOTE — PROGRESS NOTES
Pharmacy Dosing Services: Meropenem    Meropenem 1g IV Q8H was automatically dose-adjusted per [facility] P&T Committee Protocol to Meropenem 1g IV Q12H, with respect to renal function. Consult provided for this   70 y.o. , male , for the indication of Bone and Joint Infection. Pt Weight:   Wt Readings from Last 1 Encounters:   05/06/21 95.7 kg (211 lb)         Previous Regimen New start for Meropenem 1g IV Q8H   Serum Creatinine Lab Results   Component Value Date/Time    Creatinine 3.39 (H) 05/06/2021 03:00 AM       Creatinine Clearance Estimated Creatinine Clearance: 22.1 mL/min (A) (based on SCr of 3.39 mg/dL (H)). BUN Lab Results   Component Value Date/Time    BUN 56 (H) 05/06/2021 03:00 AM         Pharmacy to continue to monitor patient daily and make dosage adjustments based upon changing renal function and clinical status.     [place], PharmD  [phone]

## 2021-05-06 NOTE — PROGRESS NOTES
Hospitalist Progress Note    Patient: Melody Garcia MRN: [id number]  CSN: [id number]    YOB: 1945  Age: 70 y.o. Sex: male    DOA: 5/4/2021 LOS:  LOS: 2 days                Assessment/Plan     Patient Active Problem List   Diagnosis Code    Atrial flutter ([facility]) I48.92    DM2 (diabetes mellitus, type 2) ([facility] E11.9    CAD (coronary artery disease) I25.10    ELINOR (acute kidney injury) ([address] N17.9    CKD (chronic kidney disease) stage 3, GFR 30-59 ml/min ([facility]) N18.30    Elevated brain natriuretic peptide (BNP) level R79.89    Diabetic ulcer of left foot ([facility]) E11.621, L97.529    COVID-19 virus infection U07.1    Acute osteomyelitis ([address] M86.10    Acute hematogenous osteomyelitis of left foot ([address] M86.072    Cellulitis of left foot L03. 80    Diabetic foot ulcer with osteomyelitis ([address] E11.621, E11.69, L97.509, M86.9    Ulcer of left heel, with necrosis of bone ([address] L97.424    Osteomyelitis ([address] M86.9    Trimalleolar fracture of ankle, closed, left, initial encounter S82.852A    Displacement of peripherally inserted central catheter (PICC) [facility]) T82.524A            70 y.o. male with diabetes, hypertension, coronary artery disease, atrial fibrillation, recent COVID-19 presents to ER after he fell from his stairs today. Persistent fever, hypotension. Fluid bolus given  Monitor BP  If persistently low, then need to transfer to ICU for pressors. Lactic acid in normal range. CXR ordered. Hematuria -  Likely from traumatic caal. Anemia -  Hb, low since admission. Unlikely anemia from hematuria. Blood transfusion ordered. Will work up anemia by getting the following labs-   1. Iron levels   2. TIBC   3. Iron saturation   4. Serum ferritin   5. Vitamin B12   6. Folate level   7. Hemoccult testing of stools. Trimalleolar fracture -  Not a surgical candidate given osteomyelitis. Podiatry recommended splint. Splint placed in ER.   Seen by ortho  Further management/surgical intervention per ortho/podiatry.     Osteomyelitis left heel with cellulitis, gangrenous ulcer with deep abscess tracking along the plantar fascia -  Status post incision and drainage lower extremity left heel debridement and antibiotic bead placement   S/p removal of antibiotic beads and graft open wound. On last admission  Was discharged on ertapenem. Antibiotics switched to meropenem, vancomycin.     ELINOR on CKD stage 3-  Nephrology following. Renal US with urinary bladder distension. Mccray placed  Follow urine studies.     HTN -  On cozaar, toprol xl, lasix. Monitor BP     Diabetes mellitus -  Continue with lantus, start on SSI.      CAD -  No anginal symptoms, continue with aspirin and plavix     A-flutter -  Rate controlled on toprol xl  eliquis stopped due to hematuria     Recent covid 19 infection    Disposition : need rehab    Review of systems  General: No fevers or chills. Cardiovascular: No chest pain or pressure. No palpitations. Pulmonary: No shortness of breath. Gastrointestinal: No nausea, vomiting. Physical Exam:  General: Awake, cooperative, no acute distress    HEENT: NC, Atraumatic. PERRLA, anicteric sclerae. Lungs: CTA Bilaterally. No Wheezing/Rhonchi/Rales. Heart:  S1 S2,  No murmur, No Rubs, No Gallops  Abdomen: Soft, Non distended, Non tender.  +Bowel sounds,   Extremities: Left foot in splint  Psych:   Not anxious or agitated. Neurologic:  No acute neurological deficit. Vital signs/Intake and Output:  Visit Vitals  BP (!) 93/45   Pulse (!) 104   Temp (!) 101.8 °F (38.8 °C)   Resp 18   Ht 5' 8\" (1.727 m)   Wt 95.7 kg (211 lb)   SpO2 94%   BMI 32.08 kg/m²     Current Shift:  No intake/output data recorded.   Last three shifts:  05/04 1901 - 05/06 0700  In: 960 [P.O.:960]  Out: 3575 [Urine:3575]            Labs: Results:       Chemistry Recent Labs     05/06/21  0300 05/05/21  0330   * 107*    140   K 4.1 4.7    106   CO2 27 25 BUN 56* 56*   CREA 3.39* 3.78*   CA 7.7* 7.7*   AGAP 8 9   BUCR 17 15   AP  --  114   TP  --  5.6*   ALB  --  2.2*   GLOB  --  3.4   AGRAT  --  0.6*      CBC w/Diff Recent Labs     05/06/21  0300 05/05/21  2155 05/05/21  0330   WBC 7.7 7.0 5.4   RBC 2.44* 2.80* 3.06*   HGB 6.2* 7.0* 7.6*   HCT 20.5* 23.0* 25.7*    275 317      Cardiac Enzymes No results for input(s): CPK, CKND1, LEONEL in the last 72 hours. No lab exists for component: CKRMB, TROIP   Coagulation Recent Labs     05/05/21  0330   PTP 17.7*   INR 1.5*       Lipid Panel No results found for: CHOL, CHOLPOCT, CHOLX, CHLST, CHOLV, 632602, HDL, HDLP, LDL, LDLC, DLDLP, 595395, VLDLC, VLDL, TGLX, TRIGL, TRIGP, TGLPOCT, CHHD, CHHDX   BNP No results for input(s): BNPP in the last 72 hours.    Liver Enzymes Recent Labs     05/05/21  0330   TP 5.6*   ALB 2.2*         Thyroid Studies Lab Results   Component Value Date/Time    TSH 1.72 02/11/2018 02:19 AM        Procedures/imaging: see electronic medical records for all procedures/Xrays and details which were not copied into this note but were reviewed prior to creation of Plan

## 2021-05-06 NOTE — PROGRESS NOTES
Progress Note      Patient: Jacobo Wise. Sex: male          DOA: 5/4/2021       YOB: 1945      Age:  70 y.o.        LOS:  LOS: 2 days               Assessment/Plan      Principal Problem:    Trimalleolar fracture of ankle, closed, left, initial encounter (5/4/2021)     Active Problems:    Osteomyelitis ([address] (5/4/2021)       Displacement of peripherally inserted central catheter (PICC) ([address] (5/4/2021)     Trimalleolar fracture -  Splint placed in ER     Osteomyelitis left heel with cellulitis, gangrenous ulcer with deep abscess tracking along the plantar fascia -  Status post incision and drainage lower extremity left heel debridement and antibiotic bead placement   S/p removal of antibiotic beads and graft open wound. On last admission  On ertapenem for 6 weeks      I spoke with Dr. Alexsander Muir. Pt often putting weight on foot. Concerning if will be compliant with non weight bearing. Has unstable ankle fracture. Discussed possible internal fixation to prevent open fx. Pt anemic and spiked temp. If medically stable may benefit from IM nail laterally and screws to try to prevent osteomyeleitis. [facility] if stable can place internal fixation. Discussed with pt. Pt concerned about surgery and I agree. Will discuss again but with ongoing fever may make surgery too high risk. Subjective:   Patient seen today for follow up. Pt looking for menu. Minimal pain.      Medications:     Current Facility-Administered Medications:     0.9% sodium chloride infusion 250 mL, 250 mL, IntraVENous, PRN, Arias Luque MD    ertapenem [facility]) 0.5 g in 0.9% sodium chloride 50 mL IVPB, 500 mg, IntraVENous, Q24H, Sherry Sepulveda MD, Last Rate: 100 mL/hr at 05/05/21 2332, 0.5 g at 05/05/21 2332    heparin (porcine) 100 unit/mL injection 500 Units, 500 Units, InterCATHeter, Q8H PRN, Broadus Bumpers, MD, 500 Units at 05/05/21 1248    [Held by provider] apixaban (ELIQUIS) tablet 2.5 mg, 2.5 mg, Oral, BID, Kodi Leyva MD, Stopped at 05/05/21 2100    acetaminophen (TYLENOL) tablet 1,000 mg, 1,000 mg, Oral, Q8H PRN, Gunner Gaines MD    albuterol-ipratropium (DUO-NEB) 2.5 MG-0.5 MG/3 ML, 3 mL, Nebulization, Q4H PRN, Gunner Gaines MD, 3 mL at 05/06/21 0205    aspirin delayed-release tablet 81 mg, 81 mg, Oral, DAILY, Sherry Sepulveda MD, 81 mg at 05/05/21 0916    atorvastatin (LIPITOR) tablet 40 mg, 40 mg, Oral, QHS, Sherry Sepulveda MD, 40 mg at 05/05/21 2153    clopidogreL (PLAVIX) tablet 75 mg, 75 mg, Oral, DAILY, Sherry Kendrick MD, 75 mg at 05/05/21 0916    furosemide (LASIX) tablet 40 mg, 40 mg, Oral, DAILY, Sherry Sepulveda MD, 40 mg at 05/05/21 0916    insulin glargine (LANTUS) injection 15 Units, 15 Units, SubCUTAneous, DAILY, Sherry Kendrick MD, 15 Units at 05/05/21 0915    metoprolol succinate (TOPROL-XL) XL tablet 50 mg, 50 mg, Oral, DAILY, Sherry Sepulveda MD, 50 mg at 05/05/21 0915    glucose chewable tablet 16 g, 16 g, Oral, PRN, Kodi Leyva MD    glucagon (GLUCAGEN) injection 1 mg, 1 mg, IntraMUSCular, PRN, Kodi Leyva MD    dextrose (D50W) injection syrg 25 g, 50 mL, IntraVENous, PRN, Michael Kendrick MD    insulin lispro (HUMALOG) injection, , SubCUTAneous, AC&HS, Kodi Leyva MD, Stopped at 05/05/21 2200    morphine injection 2 mg, 2 mg, IntraVENous, Q4H PRN, Gunner Gaines MD, 2 mg at 05/04/21 4776    Review of Systems  Negative for chest pain and shortness of breath        Objective:      Visit Vitals  BP (!) 100/52 (BP 1 Location: Left upper arm, BP Patient Position: At rest)   Pulse (!) 52   Temp 99.6 °F (37.6 °C)   Resp 17   Ht 5' 8.9\" (1.75 m)   Wt 95.7 kg (211 lb)   SpO2 98%   BMI 31.25 kg/m²       Physical Exam:    Pt in splint, moves toes, unable to see wound, will await pics from wound care      Labs:  Recent Results (from the past 24 hour(s))   GLUCOSE, POC    Collection Time: 05/05/21 12:48 PM   Result Value Ref Range    Glucose (POC) 178 (H) 70 - 110 mg/dL   GLUCOSE, POC    Collection Time: 05/05/21  4:16 PM   Result Value Ref Range    Glucose (POC) 230 (H) 70 - 110 mg/dL   GLUCOSE, POC    Collection Time: 05/05/21  9:52 PM   Result Value Ref Range    Glucose (POC) 137 (H) 70 - 110 mg/dL   LACTIC ACID    Collection Time: 05/05/21  9:55 PM   Result Value Ref Range    Lactic acid 1.0 0.4 - 2.0 MMOL/L   CBC W/O DIFF    Collection Time: 05/05/21  9:55 PM   Result Value Ref Range    WBC 7.0 4.6 - 13.2 K/uL    RBC 2.80 (L) 4.35 - 5.65 M/uL    HGB 7.0 (L) 13.0 - 16.0 g/dL    HCT 23.0 (L) 36.0 - 48.0 %    MCV 82.1 74.0 - 97.0 FL    MCH 25.0 24.0 - 34.0 PG    MCHC 30.4 (L) 31.0 - 37.0 g/dL    RDW 20.3 (H) 11.6 - 14.5 %    PLATELET 069 628 - 045 K/uL    MPV 9.7 9.2 - 11.8 FL   URINALYSIS W/MICROSCOPIC    Collection Time: 05/05/21 10:40 PM   Result Value Ref Range    Color RED      Appearance BLOODY      Specific gravity 1.025 1.005 - 1.030      pH (UA) 7.5 5.0 - 8.0      Protein >300 (A) NEG mg/dL    Glucose Negative NEG mg/dL    Ketone Negative NEG mg/dL    Bilirubin Negative NEG      Blood LARGE (A) NEG      Urobilinogen 0.2 0.2 - 1.0 EU/dL    Nitrites Negative NEG      Leukocyte Esterase SMALL (A) NEG      WBC 11 to 20 0 - 5 /hpf    RBC TOO NUMEROUS TO COUNT 0 - 5 /hpf    Epithelial cells FEW 0 - 5 /lpf    Bacteria 1+ (A) NEG /hpf    Other:        Macroscopic performed on spun urine due to gross blood  or mucus   EOSINOPHILS, URINE    Collection Time: 05/05/21 10:40 PM   Result Value Ref Range    Eosinophils,urine NONE SEEN     PROTEIN/CREATININE RATIO, URINE    Collection Time: 05/05/21 10:40 PM   Result Value Ref Range    Protein, urine random 678 (H) <11.9 mg/dL    Creatinine, urine 28.00 (L) 30 - 125 mg/dL    Protein/Creat.  urine Ratio 74.3     METABOLIC PANEL, BASIC    Collection Time: 05/06/21  3:00 AM   Result Value Ref Range    Sodium 142 136 - 145 mmol/L    Potassium 4.1 3.5 - 5.5 mmol/L    Chloride 107 100 - 111 mmol/L    CO2 27 21 - 32 mmol/L    Anion gap 8 3.0 - 18 mmol/L    Glucose 131 (H) 74 - 99 mg/dL    BUN 56 (H) 7.0 - 18 MG/DL    Creatinine 3.39 (H) 0.6 - 1.3 MG/DL    BUN/Creatinine ratio 17 12 - 20      GFR est AA 22 (L) >60 ml/min/1.73m2    GFR est non-AA 18 (L) >60 ml/min/1.73m2    Calcium 7.7 (L) 8.5 - 10.1 MG/DL   CBC W/O DIFF    Collection Time: 05/06/21  3:00 AM   Result Value Ref Range    WBC 7.7 4.6 - 13.2 K/uL    RBC 2.44 (L) 4.35 - 5.65 M/uL    HGB 6.2 (L) 13.0 - 16.0 g/dL    HCT 20.5 (L) 36.0 - 48.0 %    MCV 84.0 74.0 - 97.0 FL    MCH 25.4 24.0 - 34.0 PG    MCHC 30.2 (L) 31.0 - 37.0 g/dL    RDW 20.4 (H) 11.6 - 14.5 %    PLATELET 273 275 - 597 K/uL    MPV 10.7 9.2 - 11.8 FL   RBC, ALLOCATE    Collection Time: 05/06/21  6:00 AM   Result Value Ref Range    HISTORY CHECKED?  Historical check performed    TYPE & SCREEN    Collection Time: 05/06/21  6:40 AM   Result Value Ref Range    Crossmatch Expiration 05/09/2021,7629     ABO/Rh(D) PENDING     Antibody screen PENDING

## 2021-05-06 NOTE — PROGRESS NOTES
Problem: Mobility Impaired (Adult and Pediatric)  Goal: *Acute Goals and Plan of Care (Insert Text)  Description: Physical Therapy Goals   Initiated 5/6/2021 and to be accomplished within 7 day(s)  1. Patient will move from supine <> sit with S in prep for out of bed activity and change of position. 2.  Patient will demonstrate sitting balance intact EOB without UE support /S for performance for ADL/ therapeutic exercise activity. 3.  Patient will perform transfers bed to chair via transfer board with min assist.  Outcome: Progressing Towards Goal  []  Patient has met MD ray collins for d/c home   []  Recommend HH with 24 hour adult care   [x]  Benefit from additional acute PT session to address:  functional mobility deficits    PHYSICAL THERAPY EVALUATION    Patient: Jaclyn Umanzor. (80 y.o. male)  Date: 5/6/2021  Primary Diagnosis: Osteomyelitis (HCC) [M86.9]  Trimalleolar fracture of ankle, closed, left, initial encounter [S82.132A]  Displacement of peripherally inserted central catheter (PICC) ([facility] [T82.524A]  Precautions:  Fall, NWB(L LE)  NWB L LE  PLOF: reports amb with RW PTA; lives in 2 story home with spouse and family, but stays downstairs; has 2-3 steps to enter with BHR's    ASSESSMENT :  Based on the objective data described below, the patient presents with decreased ROM/motor performance L LE due to recent fracture and prior h/o heel surgery including skin graft. Pt also with decrease overall functional mobility. Reports no pain t/o session. OT in progress on PT arrival.  Pt requires mod assist to L LE and trunk to transition supine >sit with additional time and vc for efficient use of UE's assist.  Demonstrates impaired sitting balance, requiring UE assist for self support and CGA/vc for safety. Unsafe to attempt transfers to stand at this time due to decr'd strength and decr'd overall activity tolerance. Pt returned to supine with min assist/vc.  Pt rolls with min/mod A for clean pad application and [place] bed utilized to move pt up to [facility]. Pt left sitting up in bed with LE's elevated, all needs in reach and nurse Raisa notified. Pt for possible surgery ORIF L ankle next week per chart. Pt will most likely benefit from SNF for rehab upon discharge at that time. Patient education: Mobility safety, WB, elevation, environmental safety  Patient will benefit from skilled intervention to address the above impairments. Patient's rehabilitation potential is considered to be Fair  Factors which may influence rehabilitation potential include:   []         None noted  [x]         Mental ability/status  [x]         Medical condition  [x]         Home/family situation and support systems  [x]         Safety awareness  []         Pain tolerance/management  []         Other:      PLAN :  Recommendations and Planned Interventions:   [x]           Bed Mobility Training             [x]    Neuromuscular Re-Education  [x]           Transfer Training                   []    Orthotic/Prosthetic Training  [x]           Gait Training                          []    Modalities  [x]           Therapeutic Exercises           []    Edema Management/Control  [x]           Therapeutic Activities            []    Family Training/Education  [x]           Patient Education  []           Other (comment):    Frequency/Duration: Patient will be followed by physical therapy daily to address goals. Discharge Recommendations: Skilled Nursing Facility  Further Equipment Recommendations for Discharge: N/A     SUBJECTIVE:   Patient stated I wish I did have some pain.     OBJECTIVE DATA SUMMARY:     Past Medical History:   Diagnosis Date    A-fib ([facility]     Asthma     CAD (coronary artery disease)     COVID-19     Diabetes ([facility]     High cholesterol     Hypertension      Past Surgical History:   Procedure Laterality Date    COLONOSCOPY N/A 6/1/2018    COLONOSCOPY, POLYPECTOMY performed by Denver Barney MD at [facility] ENDOSCOPY    HX CATARACT REMOVAL       Barriers to Learning/Limitations: yes;  physical and altered mental status (i.e.Sedation, Confusion)  Compensate with: Visual Cues, Verbal Cues, and Tactile Cues  Home Situation:  Home Situation  Home Environment: Private residence  # Steps to Enter: 2  Rails to Enter: Yes  Hand Rails : Bilateral  Wheelchair Ramp: Yes  One/Two Story Residence: Two story, live on 1st floor  Interior Rails: Right  Lift Chair Available: No  Living Alone: No  Support Systems: Spouse/Significant Other/Partner, Family member(s)  Patient Expects to be Discharged to[de-identified] Private residence  Current DME Used/Available at Home: Walker, rolling  Critical Behavior:  Neurologic State: Alert  Orientation Level: Oriented to person;Oriented to place;Oriented to time;Disoriented to situation  Cognition: Decreased attention/concentration;Decreased command following  Safety/Judgement: Decreased insight into deficits  Psychosocial  Patient Behaviors: Calm; Cooperative  Strength:    Strength: Generally decreased, functional  Tone & Sensation:   Tone: Normal  Sensation: Impaired(LE's distally)  Range Of Motion:  AROM: Generally decreased, functional  PROM: Generally decreased, functional  Functional Mobility:  Bed Mobility:  Rolling: Minimum assistance; Moderate assistance(vc)  Supine to Sit: Moderate assistance; Additional time(vc)  Sit to Supine: Minimum assistance(vc)  Scooting: Contact guard assistance;Minimum assistance(seated EOB; [place] bed used to scoot to [facility] supine)  Balance:   Sitting: Impaired  Sitting - Static: Fair (occasional)  Sitting - Dynamic: Poor (constant support); Fair (occasional)  Ambulation/Gait Training:  Left Side Weight Bearing: Non-weight bearing  Therapeutic Exercises:   LAQ x 5  Pain:  Pain level pre-treatment: 0/10   Pain level post-treatment: 0/10   Pain Intervention(s) : Medication (see MAR);  Rest, Ice, Repositioning  Response to intervention: Nurse notified, See doc flow  Activity Tolerance:   Fair   Please refer to the flowsheet for vital signs taken during this treatment. After treatment:   []         Patient left in no apparent distress sitting up in chair  [x]         Patient left in no apparent distress in bed  [x]         Call bell left within reach  [x]         Nursing notified  []         Caregiver present  [x]         Bed alarm activated  []         SCDs applied    COMMUNICATION/EDUCATION:   [x]         Role of Physical Therapy in the acute care setting. [x]         Fall prevention education was provided and the patient/caregiver indicated understanding. [x]         Patient/family have participated as able in goal setting and plan of care. [x]         Patient/family agree to work toward stated goals and plan of care. []         Patient understands intent and goals of therapy, but is neutral about his/her participation. []         Patient is unable to participate in goal setting/plan of care: ongoing with therapy staff.  []         Other:     Thank you for this referral.  Katelin Lugo, PT   Time Calculation: 17 mins      Eval Complexity: History: HIGH Complexity :3+ comorbidities / personal factors will impact the outcome/ POC Exam:HIGH Complexity : 4+ Standardized tests and measures addressing body structure, function, activity limitation and / or participation in recreation  Presentation: MEDIUM Complexity : Evolving with changing characteristics  Clinical Decision Making:Medium Complexity    Overall Complexity:MEDIUM

## 2021-05-07 ENCOUNTER — APPOINTMENT (OUTPATIENT)
Dept: MRI IMAGING | Age: 76
DRG: 464 | End: 2021-05-07
Attending: INTERNAL MEDICINE
Payer: MEDICARE

## 2021-05-07 LAB
ANION GAP SERPL CALC-SCNC: 5 MMOL/L (ref 3–18)
BACTERIA SPEC CULT: NORMAL
BUN SERPL-MCNC: 52 MG/DL (ref 7–18)
BUN/CREAT SERPL: 20 (ref 12–20)
CALCIUM SERPL-MCNC: 7.5 MG/DL (ref 8.5–10.1)
CHLORIDE SERPL-SCNC: 109 MMOL/L (ref 100–111)
CO2 SERPL-SCNC: 31 MMOL/L (ref 21–32)
CREAT SERPL-MCNC: 2.63 MG/DL (ref 0.6–1.3)
ERYTHROCYTE [DISTWIDTH] IN BLOOD BY AUTOMATED COUNT: 18.5 % (ref 11.6–14.5)
GLUCOSE BLD STRIP.AUTO-MCNC: 150 MG/DL (ref 70–110)
GLUCOSE BLD STRIP.AUTO-MCNC: 157 MG/DL (ref 70–110)
GLUCOSE BLD STRIP.AUTO-MCNC: 172 MG/DL (ref 70–110)
GLUCOSE BLD STRIP.AUTO-MCNC: 235 MG/DL (ref 70–110)
GLUCOSE SERPL-MCNC: 145 MG/DL (ref 74–99)
HCT VFR BLD AUTO: 24.5 % (ref 36–48)
HGB BLD-MCNC: 7.8 G/DL (ref 13–16)
HISTORY CHECKED?,CKHIST: NORMAL
MAGNESIUM SERPL-MCNC: 1.6 MG/DL (ref 1.6–2.6)
MCH RBC QN AUTO: 26.4 PG (ref 24–34)
MCHC RBC AUTO-ENTMCNC: 31.8 G/DL (ref 31–37)
MCV RBC AUTO: 82.8 FL (ref 74–97)
PLATELET # BLD AUTO: 214 K/UL (ref 135–420)
PMV BLD AUTO: 10.2 FL (ref 9.2–11.8)
POTASSIUM SERPL-SCNC: 3.6 MMOL/L (ref 3.5–5.5)
RBC # BLD AUTO: 2.96 M/UL (ref 4.35–5.65)
SERVICE CMNT-IMP: NORMAL
SODIUM SERPL-SCNC: 145 MMOL/L (ref 136–145)
VANCOMYCIN SERPL-MCNC: 10.9 UG/ML (ref 5–40)
WBC # BLD AUTO: 9 K/UL (ref 4.6–13.2)

## 2021-05-07 PROCEDURE — 73718 MRI LOWER EXTREMITY W/O DYE: CPT

## 2021-05-07 PROCEDURE — 74011250637 HC RX REV CODE- 250/637: Performed by: HOSPITALIST

## 2021-05-07 PROCEDURE — 65270000029 HC RM PRIVATE

## 2021-05-07 PROCEDURE — 82962 GLUCOSE BLOOD TEST: CPT

## 2021-05-07 PROCEDURE — 74011250636 HC RX REV CODE- 250/636: Performed by: INTERNAL MEDICINE

## 2021-05-07 PROCEDURE — 74011250637 HC RX REV CODE- 250/637: Performed by: FAMILY MEDICINE

## 2021-05-07 PROCEDURE — P9016 RBC LEUKOCYTES REDUCED: HCPCS

## 2021-05-07 PROCEDURE — 77010033678 HC OXYGEN DAILY

## 2021-05-07 PROCEDURE — 74011250637 HC RX REV CODE- 250/637: Performed by: UROLOGY

## 2021-05-07 PROCEDURE — 80048 BASIC METABOLIC PNL TOTAL CA: CPT

## 2021-05-07 PROCEDURE — 74011000250 HC RX REV CODE- 250: Performed by: HOSPITALIST

## 2021-05-07 PROCEDURE — 85027 COMPLETE CBC AUTOMATED: CPT

## 2021-05-07 PROCEDURE — 83735 ASSAY OF MAGNESIUM: CPT

## 2021-05-07 PROCEDURE — 80202 ASSAY OF VANCOMYCIN: CPT

## 2021-05-07 PROCEDURE — 36415 COLL VENOUS BLD VENIPUNCTURE: CPT

## 2021-05-07 PROCEDURE — 36430 TRANSFUSION BLD/BLD COMPNT: CPT

## 2021-05-07 PROCEDURE — 74011250636 HC RX REV CODE- 250/636: Performed by: HOSPITALIST

## 2021-05-07 PROCEDURE — 74011250636 HC RX REV CODE- 250/636: Performed by: FAMILY MEDICINE

## 2021-05-07 PROCEDURE — 74011636637 HC RX REV CODE- 636/637: Performed by: HOSPITALIST

## 2021-05-07 PROCEDURE — 87040 BLOOD CULTURE FOR BACTERIA: CPT

## 2021-05-07 RX ORDER — SODIUM CHLORIDE 9 MG/ML
250 INJECTION, SOLUTION INTRAVENOUS AS NEEDED
Status: DISCONTINUED | OUTPATIENT
Start: 2021-05-07 | End: 2021-05-11 | Stop reason: SDUPTHER

## 2021-05-07 RX ORDER — TAMSULOSIN HYDROCHLORIDE 0.4 MG/1
0.4 CAPSULE ORAL DAILY
Status: DISCONTINUED | OUTPATIENT
Start: 2021-05-07 | End: 2021-05-19

## 2021-05-07 RX ORDER — VANCOMYCIN/0.9 % SOD CHLORIDE 1.5G/250ML
1500 PLASTIC BAG, INJECTION (ML) INTRAVENOUS ONCE
Status: COMPLETED | OUTPATIENT
Start: 2021-05-07 | End: 2021-05-07

## 2021-05-07 RX ORDER — SODIUM CHLORIDE, SODIUM LACTATE, POTASSIUM CHLORIDE, CALCIUM CHLORIDE 600; 310; 30; 20 MG/100ML; MG/100ML; MG/100ML; MG/100ML
50 INJECTION, SOLUTION INTRAVENOUS CONTINUOUS
Status: DISCONTINUED | OUTPATIENT
Start: 2021-05-07 | End: 2021-05-11

## 2021-05-07 RX ORDER — MAGNESIUM SULFATE HEPTAHYDRATE 40 MG/ML
2 INJECTION, SOLUTION INTRAVENOUS
Status: COMPLETED | OUTPATIENT
Start: 2021-05-07 | End: 2021-05-07

## 2021-05-07 RX ADMIN — INSULIN GLARGINE 15 UNITS: 100 INJECTION, SOLUTION SUBCUTANEOUS at 08:42

## 2021-05-07 RX ADMIN — INSULIN LISPRO 4 UNITS: 100 INJECTION, SOLUTION INTRAVENOUS; SUBCUTANEOUS at 12:04

## 2021-05-07 RX ADMIN — SODIUM CHLORIDE 500 ML: 900 INJECTION, SOLUTION INTRAVENOUS at 18:26

## 2021-05-07 RX ADMIN — INSULIN LISPRO 2 UNITS: 100 INJECTION, SOLUTION INTRAVENOUS; SUBCUTANEOUS at 08:42

## 2021-05-07 RX ADMIN — SODIUM CHLORIDE, SODIUM LACTATE, POTASSIUM CHLORIDE, AND CALCIUM CHLORIDE 50 ML/HR: 600; 310; 30; 20 INJECTION, SOLUTION INTRAVENOUS at 12:04

## 2021-05-07 RX ADMIN — MAGNESIUM SULFATE HEPTAHYDRATE 2 G: 40 INJECTION, SOLUTION INTRAVENOUS at 20:54

## 2021-05-07 RX ADMIN — MAGNESIUM SULFATE HEPTAHYDRATE 2 G: 40 INJECTION, SOLUTION INTRAVENOUS at 19:34

## 2021-05-07 RX ADMIN — INSULIN LISPRO 2 UNITS: 100 INJECTION, SOLUTION INTRAVENOUS; SUBCUTANEOUS at 21:03

## 2021-05-07 RX ADMIN — ATORVASTATIN CALCIUM 40 MG: 20 TABLET, FILM COATED ORAL at 21:03

## 2021-05-07 RX ADMIN — VANCOMYCIN HYDROCHLORIDE 1500 MG: 10 INJECTION, POWDER, LYOPHILIZED, FOR SOLUTION INTRAVENOUS at 20:54

## 2021-05-07 RX ADMIN — TAMSULOSIN HYDROCHLORIDE 0.4 MG: 0.4 CAPSULE ORAL at 18:26

## 2021-05-07 RX ADMIN — ACETAMINOPHEN 1000 MG: 500 TABLET ORAL at 00:07

## 2021-05-07 RX ADMIN — MEROPENEM 1 G: 1 INJECTION, POWDER, FOR SOLUTION INTRAVENOUS at 12:04

## 2021-05-07 RX ADMIN — MEROPENEM 1 G: 1 INJECTION, POWDER, FOR SOLUTION INTRAVENOUS at 00:08

## 2021-05-07 RX ADMIN — INSULIN LISPRO 2 UNITS: 100 INJECTION, SOLUTION INTRAVENOUS; SUBCUTANEOUS at 16:57

## 2021-05-07 RX ADMIN — ACETAMINOPHEN 1000 MG: 500 TABLET ORAL at 17:17

## 2021-05-07 RX ADMIN — IRON SUCROSE 300 MG: 20 INJECTION, SOLUTION INTRAVENOUS at 10:18

## 2021-05-07 RX ADMIN — MORPHINE SULFATE 2 MG: 2 INJECTION, SOLUTION INTRAMUSCULAR; INTRAVENOUS at 12:19

## 2021-05-07 NOTE — PROGRESS NOTES
Be paged three times while being paged. Talked with rn and concerns about fever and tachy. Chart reviewed. Treating for OM Pt is already on vanc and merrem and f/u with  ID. Will obtain bcx , tylenol as needed. Small ns bolus.

## 2021-05-07 NOTE — PROGRESS NOTES
Bedside and Verbal shift change report given to [address] (oncoming nurse) by JOSE Mcneil (offgoing nurse). Report included the following information SBAR, Kardex, Intake/Output, MAR and Recent Results. 0255- Pt O2 sat 88 on 2L via NC. Increased O2 flow rate to 3L via NC.    0257- Pt O2 sat 93. Pt temp 100.9, PRN tylenol given. Shift Summary-   Patient Vitals for the past 12 hrs:   Temp Pulse Resp BP SpO2   05/08/21 0257 (!) 100.9 °F (38.3 °C) (!) 106 16 (!) 132/55 93 %   05/08/21 0255 -- -- -- -- (!) 88 %   05/07/21 2300 99 °F (37.2 °C) 86 16 130/62 96 %   05/07/21 1936 98.7 °F (37.1 °C) 71 16 (!) 128/57 94 %   Pt had uneventful shift. Pt denied pain/discomfort    Bedside and Verbal shift change report given to ROSETTA Zhang (oncoming nurse) by [address] (offgoing nurse). Report included the following information SBAR, Kardex, Intake/Output, MAR and Recent Results.

## 2021-05-07 NOTE — PROGRESS NOTES
2044-alert and oriented x 3, disoriented to situation. Lungs CTA and diminished. BS active x 4 quads. Swelling at pubis area. Caal catheter in place draining bloody urine. Ace wrap dressing in place to left foot, CDI. Denies pain at this time. PICC line to right cephlic, patent. IV acces to left forearm patent. 2+ edema to RLE. 2110-Dr. Mara Delarosa on the floor and asked her to look at patient's caal catheter ouput due to bloody color. New orders to irrigate and assess bladder with scan for retaining of urine. 2124-caal irrigated with 500 ml or NS. No clots noted. 2131-bladder scan shows 37.    0007-Tylenol given for fever of 100.9.  0024-message sent to Dr. Clif Hobbs, to let her know that patient was not retaining urine and that his hemoglobbin was 6.7 after his blood infusion and was 6.2 before the infusion. 0313-Unit of blood started with vital signs charted in flow sheet. No s/s of adverse effects from infusion. 0623-Blood infusion completed with no adverse effects from blood infusion. Patient's nose with small amount of bleeding, humidifier attached to oxygen to give moisture. Gown changed and patient changed of small BM. Shift summary-on bedrest, caal draining dark red bloody urine. Dr. Vitor Dumas. Blood x one unit given on noc shift. Patient changed. Small amount of bleeding to nose with humidifier place.

## 2021-05-07 NOTE — PROGRESS NOTES
Hospitalist Progress Note    Patient: Dillon Toscano MRN: [id number]  [facility]: [id number]    YOB: 1945  Age: 70 y.o. Sex: male    DOA: 5/4/2021 LOS:  LOS: 3 days                Assessment/Plan     Patient Active Problem List   Diagnosis Code    Atrial flutter ([facility]) I48.92    DM2 (diabetes mellitus, type 2) ([facility] E11.9    CAD (coronary artery disease) I25.10    ELINOR (acute kidney injury) ([address] N17.9    CKD (chronic kidney disease) stage 3, GFR 30-59 ml/min ([facility]) N18.30    Elevated brain natriuretic peptide (BNP) level R79.89    Diabetic ulcer of left foot ([facility]) E11.621, L97.529    COVID-19 virus infection U07.1    Acute osteomyelitis ([address] M86.10    Acute hematogenous osteomyelitis of left foot ([address] M86.072    Cellulitis of left foot L03. 80    Diabetic foot ulcer with osteomyelitis ([address] E11.621, E11.69, L97.509, M86.9    Ulcer of left heel, with necrosis of bone ([address] L97.424    Osteomyelitis ([address] M86.9    Trimalleolar fracture of ankle, closed, left, initial encounter S82.852A    Displacement of peripherally inserted central catheter (PICC) [facility]) T82.524A            70 y.o. male with diabetes, hypertension, coronary artery disease, atrial fibrillation, recent COVID-19 presents to ER after he fell from his stairs today. BP better. CXR with no acute findings. Anemia -  Hb, low since admission. Unlikely anemia from hematuria. S/p Blood transfusion   H/H improved after blood transfusion  Iron panel with low iron  venofer ordered. Trimalleolar fracture -  Not a surgical candidate given osteomyelitis. Podiatry recommended splint. Splint placed in ER.   Seen by ortho  Further management/surgical intervention per ortho/podiatry.     Osteomyelitis left heel with cellulitis, gangrenous ulcer with deep abscess tracking along the plantar fascia -  Status post incision and drainage lower extremity left heel debridement and antibiotic bead placement   S/p removal of antibiotic beads and graft open wound. On last admission  Was discharged on ertapenem. Antibiotics switched to meropenem, vancomycin.     ELINOR on CKD stage 3-  Nephrology following. Renal US with urinary bladder distension. Caal placed      Urinary retention -  S/p caal  Hematuria likely traumatic  Urology consulted.     HTN -  On cozaar, toprol xl, lasix. Monitor BP     Diabetes mellitus -  Continue with lantus, start on SSI.      CAD -  No anginal symptoms,  aspirin and plavix on hold due to hematuria.      A-flutter -  Rate controlled on toprol xl  eliquis stopped due to hematuria     Recent covid 19 infection    Disposition : need rehab    Review of systems  General: No fevers or chills. Cardiovascular: No chest pain or pressure. No palpitations. Pulmonary: No shortness of breath. Gastrointestinal: No nausea, vomiting. Physical Exam:  General: Awake, cooperative, no acute distress    HEENT: NC, Atraumatic. PERRLA, anicteric sclerae. Lungs: CTA Bilaterally. No Wheezing/Rhonchi/Rales. Heart:  S1 S2,  No murmur, No Rubs, No Gallops  Abdomen: Soft, Non distended, Non tender.  +Bowel sounds,   Extremities: Left foot in splint  Psych:   Not anxious or agitated. Neurologic:  No acute neurological deficit.            Vital signs/Intake and Output:  Visit Vitals  BP (!) 146/62 (BP 1 Location: Left arm, BP Patient Position: At rest)   Pulse (!) 110   Temp (!) 102.9 °F (39.4 °C)   Resp 18   Ht 5' 7.99\" (1.727 m)   Wt 94.3 kg (207 lb 12.8 oz)   SpO2 96%   BMI 31.60 kg/m²     Current Shift:  05/07 0701 - 05/07 1900  In: 6958 [P.O.:288]  Out: 325 [Urine:325]  Last three shifts:  05/05 1901 - 05/07 0700  In: 1372.1   Out: 2583 [Urine:3675]            Labs: Results:       Chemistry Recent Labs     05/07/21  0710 05/06/21  0300 05/05/21  0330   * 131* 107*    142 140   K 3.6 4.1 4.7    107 106   CO2 31 27 25   BUN 52* 56* 56*   CREA 2.63* 3.39* 3.78*   CA 7.5* 7.7* 7.7*   AGAP 5 8 9   BUCR 20 17 15   AP  --   --  114   TP  --   --  5.6*   ALB  --   --  2.2*   GLOB  --   --  3.4   AGRAT  --   --  0.6*      CBC w/Diff Recent Labs     05/07/21  0710 05/06/21 2000 05/06/21  0300 05/05/21  2155   WBC 9.0  --  7.7 7.0   RBC 2.96*  --  2.44* 2.80*   HGB 7.8* 6.7* 6.2* 7.0*   HCT 24.5* 21.1* 20.5* 23.0*     --  245 275      Cardiac Enzymes No results for input(s): CPK, CKND1, LEONEL in the last 72 hours. No lab exists for component: CKRMB, TROIP   Coagulation Recent Labs     05/05/21  0330   PTP 17.7*   INR 1.5*       Lipid Panel No results found for: CHOL, CHOLPOCT, CHOLX, CHLST, CHOLV, 347347, HDL, HDLP, LDL, LDLC, DLDLP, 370797, VLDLC, VLDL, TGLX, TRIGL, TRIGP, TGLPOCT, CHHD, CHHDX   BNP No results for input(s): BNPP in the last 72 hours.    Liver Enzymes Recent Labs     05/05/21  0330   TP 5.6*   ALB 2.2*         Thyroid Studies Lab Results   Component Value Date/Time    TSH 1.72 02/11/2018 02:19 AM        Procedures/imaging: see electronic medical records for all procedures/Xrays and details which were not copied into this note but were reviewed prior to creation of Plan

## 2021-05-07 NOTE — CONSULTS
Urology Consult    Subjective:     Date of Consultation:  May 7, 2021    Referring Physician: Dr Megan Rios     Reason for Consultation:  Urinary Retention    History of Present Illness:   Patient is a 70 y.o.  male who is being seen for urinary retention. He was admitted to the hospital for Osteomyelitis [facility]) [M86.9]  Trimalleolar fracture of ankle, closed, left, initial encounter [S82.011V]  Displacement of peripherally inserted central catheter (PICC) ([facility] Pepe Pelaez. He is a poor historian and easily confused. He states that he has some baseline difficulties voiding: slow stream, intermittent stream at baseline. Since he has been hospitalized, he would feel an urge to void, yet be unable to generate a stream.  He would only dribble. No baseline hematuria, but there was some after caal insertion. Past Medical History:   Diagnosis Date    A-fib ([facility]     Asthma     CAD (coronary artery disease)     COVID-19     Diabetes ([facility]     High cholesterol     Hypertension       Past Surgical History:   Procedure Laterality Date    COLONOSCOPY N/A 6/1/2018    COLONOSCOPY, POLYPECTOMY performed by Inocencia Velasco MD at [facility] ENDOSCOPY    HX CATARACT REMOVAL        No family history on file. Social History     Tobacco Use    Smoking status: Former Smoker    Smokeless tobacco: Never Used   Substance Use Topics    Alcohol use: Yes     Alcohol/week: 1.0 standard drinks     Types: 1 Cans of beer per week     No Known Allergies   Prior to Admission medications    Medication Sig Start Date End Date Taking? Authorizing Provider   ertapenem 1 gram 1 g IVPB 1 g by IntraVENous route every twenty-four (24) hours for 40 days. 4/22/21 6/1/21  Norma Rodriguez MD   ertapenem 1 gram 1 g IVPB 1 g by IntraVENous route every twenty-four (24) hours for 39 days. 4/23/21 6/1/21  Kodi Leyva MD   spironolactone (ALDACTONE) 25 mg tablet Take 12.5 mg by mouth daily.  Take 0.5 tablet (12.5 mg) by oral Provider, Historical   insulin lispro (HUMALOG KWIKPEN INSULIN SC) 10-15 Units by SubCUTAneous route three (3) times daily (with meals). Provider, Historical   Omeprazole delayed release (PRILOSEC D/R) 20 mg tablet Take 1 Tab by mouth daily. Patient taking differently: Take 40 mg by mouth daily. 6/1/18   Preeti Cloud MD   apixaban (ELIQUIS) 5 mg tablet Take 1 Tab by mouth two (2) times a day. 2/15/18   Alonza Leventhal, PA-C   aspirin delayed-release 81 mg tablet Take 1 Tab by mouth daily. 2/16/18   Alonza Leventhal, PA-C   atorvastatin (LIPITOR) 40 mg tablet Take 1 Tab by mouth nightly. 2/15/18   Alonza Leventhal, PA-C   clopidogrel (PLAVIX) 75 mg tab Take 1 Tab by mouth daily. 2/16/18   Alonza Leventhal, PA-C   furosemide (LASIX) 40 mg tablet Take 1 Tab by mouth daily. 2/15/18   Alonza Leventhal, PA-C   insulin glargine (LANTUS) 100 unit/mL injection 15 Units by SubCUTAneous route daily. Patient taking differently: 40 Units by SubCUTAneous route nightly. Indications: type 2 diabetes mellitus 2/15/18   Alonza Leventhal, PA-C   losartan (COZAAR) 25 mg tablet Take 1 Tab by mouth daily. 2/16/18   Alonza Leventhal, PA-C   metoprolol succinate (TOPROL-XL) 50 mg XL tablet Take 1 Tab by mouth daily. 2/16/18   Alonza Leventhal, PA-C   SITagliptin (JANUVIA) 50 mg tablet Take 1 Tab by mouth daily. 2/15/18   Alonza Leventhal, PA-C   lancets (ADVOCATE LANCET) 30 gauge misc Use while checking BGL each morning. 2/15/18   Alonza Leventhal, PA-C   glucose blood VI test strips (IGLUCOSE TEST STRIP) strip Use to check BGL every morning 2/15/18   Alonza Leventhal, PA-C         Review of Systems:  A comprehensive review of systems was negative except for: leg foot pain and just feeling weak.       Objective:     Patient Vitals for the past 8 hrs:   BP Temp Pulse Resp SpO2   05/07/21 1703 -- (!) 102.9 °F (39.4 °C) -- -- --   05/07/21 1700 (!) 146/62 100.1 °F (37.8 °C) (!) 110 18 96 %   05/07/21 1053 (!) 118/50 (!) 100.9 °F (38.3 °C) (!) 105 16 92 %     Temp (24hrs), Av.7 °F (37.6 °C), Min:98 °F (36.7 °C), Max:102.9 °F (39.4 °C)      Intake and Output:    1901 -  0700  In: 1372.1   Out: 5535 [Urine:3675]    Physical Exam:  Gen - NAD, conversational  Psych - A and O x 4, but easily side-tracked and often rambles  CV - Irregular, tach  Resp - Breathing with some difficulty  Abd - soft, nd, nt   - normal phallus with caal in place        CBC W/O DIFF    Collection Time: 21  7:10 AM   Result Value Ref Range    WBC 9.0 4.6 - 13.2 K/uL    RBC 2.96 (L) 4.35 - 5.65 M/uL    HGB 7.8 (L) 13.0 - 16.0 g/dL    HCT 24.5 (L) 36.0 - 48.0 %    MCV 82.8 74.0 - 97.0 FL    MCH 26.4 24.0 - 34.0 PG    MCHC 31.8 31.0 - 37.0 g/dL    RDW 18.5 (H) 11.6 - 14.5 %    PLATELET 772 607 - 055 K/uL    MPV 10.2 9.2 - 11.8 FL       BMP:   Lab Results   Component Value Date/Time     2021 07:10 AM    K 3.6 2021 07:10 AM     2021 07:10 AM    CO2 31 2021 07:10 AM    AGAP 5 2021 07:10 AM     (H) 2021 07:10 AM    BUN 52 (H) 2021 07:10 AM    CREA 2.63 (H) 2021 07:10 AM    GFRAA 29 (L) 2021 07:10 AM    GFRNA 24 (L) 2021 07:10 AM          Assessment:     Principal Problem:    Trimalleolar fracture of ankle, closed, left, initial encounter (2021)    Active Problems:    Osteomyelitis (HCC) (2021)      Displacement of peripherally inserted central catheter (PICC) ([facility] (2021)        Urinary retention. Hematuria is resolved and was likely just due to some catheter trauma and anticoagulation    Plan:     Continue caal until he is improved medically -- certainly through the weekend.    Start flomax

## 2021-05-07 NOTE — ROUTINE PROCESS
Bedside and Verbal shift change report given to [place] (oncoming nurse) by Paty Menjivar RN (offgoing nurse). Report included the following information SBAR, Kardex, Intake/Output and MAR.

## 2021-05-07 NOTE — PROGRESS NOTES
DC Plan: SNF for rehab and abx post surgical repair of ankle fx    Care manager contacted patient's son, Marcia Mcnamara [phone] to introduce self and inquire about SNF choices for post surgical discharge plan anticipation. Patient has been accepted by [facility] AT Replaced by [facility] but will update list after surgery. SNF list sent to son at [email or web address]    . Care Management Interventions  Mode of Transport at Discharge: BLS  Transition of Care Consult (CM Consult): Discharge Planning, SNF  Health Maintenance Reviewed: Yes  Current Support Network: Lives with Spouse  Confirm Follow Up Transport: Family  The Plan for Transition of Care is Related to the Following Treatment Goals : SNF  The Patient and/or Patient Representative was Provided with a Choice of Provider and Agrees with the Discharge Plan?: Yes  Name of the Patient Representative Who was Provided with a Choice of Provider and Agrees with the Discharge Plan: pt/spouse  Freedom of Choice List was Provided with Basic Dialogue that Supports the Patient's Individualized Plan of Care/Goals, Treatment Preferences and Shares the Quality Data Associated with the Providers?: Yes  Discharge Location  Discharge Placement: Skilled nursing facility

## 2021-05-07 NOTE — PROGRESS NOTES
Assessment:   Trimalleolar fracture of ankle, closed, left, initial encounter (5/4/2021)      Osteomyelitis ([facility] [address] (5/4/2021)   [facility]) [facility] (5/4/2021)   ARF  HTN  Urinary incontinence     Plan:    Urinary retention  Mccray in place and draining, bloody  BUN and cr  better  D/w nursing staff  Will need Urology in put for hematuria  Pt has left thigh skin redness and pain? Already on abx,   D/w nursing staff to keep monitoring left thigh redness  Need IVF given his anemia, infection and now overdiuresing due to post obstructive uropathy  Tele          CC: ARF, urinary retention  Interval History: Mccray cath is draining blood             Subjective:   PT does not have any somatic complaints      Decreased skin turger,   No edema of right leg  Abdomen is soft        Blood pressure 138/70, pulse 90, temperature 98 °F (36.7 °C), resp. rate 16, height 5' 7.99\" (1.727 m), weight 94.3 kg (207 lb 12.8 oz), SpO2 97 %.       Left leg is heavy  Decreased skin tuger,   No edema of right leg  Abdomen is soft      Intake/Output Summary (Last 24 hours) at 5/7/2021 0931  Last data filed at 5/7/2021 0853  Gross per 24 hour   Intake 1872.1 ml   Output 3275 ml   Net -1402.9 ml      Recent Labs     05/07/21  0710   WBC 9.0     Lab Results   Component Value Date/Time    Sodium 145 05/07/2021 07:10 AM    Potassium 3.6 05/07/2021 07:10 AM    Chloride 109 05/07/2021 07:10 AM    CO2 31 05/07/2021 07:10 AM    Anion gap 5 05/07/2021 07:10 AM    Glucose 145 (H) 05/07/2021 07:10 AM    BUN 52 (H) 05/07/2021 07:10 AM    Creatinine 2.63 (H) 05/07/2021 07:10 AM    BUN/Creatinine ratio 20 05/07/2021 07:10 AM    GFR est AA 29 (L) 05/07/2021 07:10 AM    GFR est non-AA 24 (L) 05/07/2021 07:10 AM    Calcium 7.5 (L) 05/07/2021 07:10 AM        Current Facility-Administered Medications   Medication Dose Route Frequency Provider Last Rate Last Admin    0.9% sodium chloride infusion 250 mL  250 mL IntraVENous PRN Jayden Magaña MD        iron sucrose (VENOFER) 300 mg in 0.9% sodium chloride 250 mL IVPB  300 mg IntraVENous ONCE Oswald Trujillo R DO        lactated Ringers infusion  50 mL/hr IntraVENous CONTINUOUS Oswald Trujillo, DO        0.9% sodium chloride infusion 250 mL  250 mL IntraVENous PRN Jayden Magaña MD        meropenem (MERREM) 1 g in sterile water (preservative free) 20 mL IV syringe  1 g IntraVENous Q12H Nicholas BLOOM MD   1 g at 05/07/21 0008    Vancomycin- Pharmacy to dose  1 Each Other Rx Dosing/Monitoring Nehemiah Sewell MD        Vancomycin Random Level 05/07/21 1600  1 Each Other ONCE Sherry Sepulveda MD        heparin (porcine) 100 unit/mL injection 500 Units  500 Units InterCATHeter Q8H PRN Yamile Gonzalez MD   500 Units at 05/05/21 1248    [Held by provider] apixaban (ELIQUIS) tablet 2.5 mg  2.5 mg Oral BID Nehemiah Sewell MD   Stopped at 05/05/21 2100    acetaminophen (TYLENOL) tablet 1,000 mg  1,000 mg Oral Q8H PRN Jayden Magaña MD   1,000 mg at 05/07/21 0007    albuterol-ipratropium (DUO-NEB) 2.5 MG-0.5 MG/3 ML  3 mL Nebulization Q4H PRN Suzanne TAYLOR MD   3 mL at 05/06/21 0205    aspirin delayed-release tablet 81 mg  81 mg Oral DAILY Nehemiah Sewell MD   Stopped at 05/07/21 1100    atorvastatin (LIPITOR) tablet 40 mg  40 mg Oral QHS Nicholas BLOOM MD   40 mg at 05/06/21 2131    clopidogreL (PLAVIX) tablet 75 mg  75 mg Oral DAILY Nehemiah Sewell MD   Stopped at 05/07/21 1100    insulin glargine (LANTUS) injection 15 Units  15 Units SubCUTAneous DAILY Nehemiah Sewell MD   15 Units at 05/07/21 8801    [Held by provider] metoprolol succinate (TOPROL-XL) XL tablet 50 mg  50 mg Oral DAILY Nicholas BLOOM MD   50 mg at 05/06/21 1006    glucose chewable tablet 16 g  16 g Oral PRN Nehemiah Sewell MD        glucagon (GLUCAGEN) injection 1 mg  1 mg IntraMUSCular PRN Nehemiah Sewell MD        dextrose (D50W) injection syrg 25 g  50 mL IntraVENous PRN Ignacio Lawrence MD        insulin lispro (HUMALOG) injection   SubCUTAneous AC&HS Ignacio Lawrence MD   2 Units at 05/07/21 8499    morphine injection 2 mg  2 mg IntraVENous Q4H PRN Bruno Lopez MD   2 mg at 05/06/21 1658     Total spent 45 minutes

## 2021-05-07 NOTE — PROGRESS NOTES
Patient presents compliant to current plan of care as directed.    Ronaldo Mcneil  5/7/2021  8:54 AM

## 2021-05-07 NOTE — PROGRESS NOTES
1139: Pt reports he just did therapy, will follow up again. 1432: Pt refusing PT due to just getting back from MRI. Will follow up tomorrow.

## 2021-05-07 NOTE — PROGRESS NOTES
Progress Note      Patient: Savannah Adan. Sex: male          DOA: 5/4/2021       YOB: 1945      Age:  70 y.o.        LOS:  LOS: 3 days             Assessment/Plan      Principal Problem:    Trimalleolar fracture of ankle, closed, left, initial encounter (5/4/2021)     Active Problems:    Osteomyelitis ([address] (5/4/2021)       Displacement of peripherally inserted central catheter (PICC) ([address] (5/4/2021)     Trimalleolar fracture -  Splint placed in ER     Osteomyelitis left heel with cellulitis, gangrenous ulcer with deep abscess tracking along the plantar fascia -  Status post incision and drainage lower extremity left heel debridement and antibiotic bead placement   MRI shows possible worsening fluid, poss osteo    I reviewed his Wound care images    ON IV ABX  Planned for OR Tuesday, can change ORIF to washout, will get new cultures    S/p removal of antibiotic beads and graft open wound. On last admission  On ertapenem for 6 weeks     .     Subjective:   Patient seen today for follow up.   Wound care changed dressing today  Medications:     Current Facility-Administered Medications:     0.9% sodium chloride infusion 250 mL, 250 mL, IntraVENous, PRN, Destin Llamas MD    lactated Ringers infusion, 50 mL/hr, IntraVENous, CONTINUOUS, Oswald Trujillo R, DO, Last Rate: 50 mL/hr at 05/07/21 1204, 50 mL/hr at 05/07/21 1204    meropenem (MERREM) 1 g in sterile water (preservative free) 20 mL IV syringe, 1 g, IntraVENous, Q12H, Sherry Sepulveda MD, 1 g at 05/07/21 1204    Vancomycin- Pharmacy to dose, 1 Each, Other, Rx Dosing/Monitoring, Luis F Barahona MD    Vancomycin Random Level 05/07/21 1600, 1 Each, Other, ONCE, Sherry Sepulveda MD    heparin (porcine) 100 unit/mL injection 500 Units, 500 Units, InterCATHeter, Q8H PRN, Ej Wright MD, 500 Units at 05/05/21 1248    [Held by provider] apixaban (ELIQUIS) tablet 2.5 mg, 2.5 mg, Oral, BID, Luis F Barahona MD, Stopped at 05/05/21 2100    acetaminophen (TYLENOL) tablet 1,000 mg, 1,000 mg, Oral, Q8H PRN, Esa TAYLOR MD, 1,000 mg at 05/07/21 0007    albuterol-ipratropium (DUO-NEB) 2.5 MG-0.5 MG/3 ML, 3 mL, Nebulization, Q4H PRN, Nadia Chin MD, 3 mL at 05/06/21 0205    [Held by provider] aspirin delayed-release tablet 81 mg, 81 mg, Oral, DAILY, Sherry Sepulveda MD, Stopped at 05/07/21 1100    atorvastatin (LIPITOR) tablet 40 mg, 40 mg, Oral, QHS, Sherry Sepulveda MD, 40 mg at 05/06/21 2131    [Held by provider] clopidogreL (PLAVIX) tablet 75 mg, 75 mg, Oral, DAILY, Sherry Sepulveda MD, Stopped at 05/07/21 1100    insulin glargine (LANTUS) injection 15 Units, 15 Units, SubCUTAneous, DAILY, Eileen Mcallister MD, 15 Units at 05/07/21 0842    [Held by provider] metoprolol succinate (TOPROL-XL) XL tablet 50 mg, 50 mg, Oral, DAILY, Sherry Sepulveda MD, 50 mg at 05/06/21 1006    glucose chewable tablet 16 g, 16 g, Oral, PRN, Eileen Mcallister MD    glucagon (GLUCAGEN) injection 1 mg, 1 mg, IntraMUSCular, PRN, Eileen Mcallister MD    dextrose (D50W) injection syrg 25 g, 50 mL, IntraVENous, PRN, Annie Ignacio MD    insulin lispro (HUMALOG) injection, , SubCUTAneous, AC&HS, Eileen Mcallister MD, 4 Units at 05/07/21 1204    morphine injection 2 mg, 2 mg, IntraVENous, Q4H PRN, Nadia Chin MD, 2 mg at 05/07/21 1219    Review of Systems  Negative for chest pain and shortness of breath        Objective:      Visit Vitals  BP (!) 118/50   Pulse (!) 105   Temp (!) 100.9 °F (38.3 °C)   Resp 16   Ht 5' 7.99\" (1.727 m)   Wt 94.3 kg (207 lb 12.8 oz)   SpO2 92%   BMI 31.60 kg/m²       Physical Exam:  In dressing,  Dry  Good alignement    Labs:  Recent Results (from the past 24 hour(s))   GLUCOSE, POC    Collection Time: 05/06/21  4:35 PM   Result Value Ref Range    Glucose (POC) 171 (H) 70 - 110 mg/dL   HGB & HCT    Collection Time: 05/06/21  8:00 PM   Result Value Ref Range    HGB 6.7 (L) 13.0 - 16.0 g/dL    HCT 21.1 (L) 36.0 - 48.0 %   GLUCOSE, POC    Collection Time: 05/06/21  9:09 PM   Result Value Ref Range    Glucose (POC) 295 (H) 70 - 110 mg/dL   RBC, ALLOCATE    Collection Time: 05/07/21  2:15 AM   Result Value Ref Range    HISTORY CHECKED?  Historical check performed    METABOLIC PANEL, BASIC    Collection Time: 05/07/21  7:10 AM   Result Value Ref Range    Sodium 145 136 - 145 mmol/L    Potassium 3.6 3.5 - 5.5 mmol/L    Chloride 109 100 - 111 mmol/L    CO2 31 21 - 32 mmol/L    Anion gap 5 3.0 - 18 mmol/L    Glucose 145 (H) 74 - 99 mg/dL    BUN 52 (H) 7.0 - 18 MG/DL    Creatinine 2.63 (H) 0.6 - 1.3 MG/DL    BUN/Creatinine ratio 20 12 - 20      GFR est AA 29 (L) >60 ml/min/1.73m2    GFR est non-AA 24 (L) >60 ml/min/1.73m2    Calcium 7.5 (L) 8.5 - 10.1 MG/DL   CBC W/O DIFF    Collection Time: 05/07/21  7:10 AM   Result Value Ref Range    WBC 9.0 4.6 - 13.2 K/uL    RBC 2.96 (L) 4.35 - 5.65 M/uL    HGB 7.8 (L) 13.0 - 16.0 g/dL    HCT 24.5 (L) 36.0 - 48.0 %    MCV 82.8 74.0 - 97.0 FL    MCH 26.4 24.0 - 34.0 PG    MCHC 31.8 31.0 - 37.0 g/dL    RDW 18.5 (H) 11.6 - 14.5 %    PLATELET 467 804 - 560 K/uL    MPV 10.2 9.2 - 11.8 FL   GLUCOSE, POC    Collection Time: 05/07/21  7:17 AM   Result Value Ref Range    Glucose (POC) 150 (H) 70 - 110 mg/dL   GLUCOSE, POC    Collection Time: 05/07/21 11:13 AM   Result Value Ref Range    Glucose (POC) 235 (H) 70 - 110 mg/dL

## 2021-05-07 NOTE — ROUTINE PROCESS
Bedside and Verbal shift change report given to LINDA Plunkett RN (oncoming nurse) by Codie Morel RN (offgoing nurse). Report included the following information SBAR, Kardex, Intake/Output and MAR.

## 2021-05-07 NOTE — PROGRESS NOTES
[facility]                         (A Division of [address] [place] [address])                                                                                                                       Evelyne aCrson MD  Work Cell #: [phone]( Mon-Fri, 7am-5pm)  If no call or text back within 30 minutes from me, please call office number. (Prefer text when possible)  Office #:     [phone]  [id number]   Office Fax: [phone]     Date of Admission: 5/4/2021   Date of Service:  5/7/2021  Reason for FU : left ankle OM, fever    Current Antimicrobials:    Prior Antimicrobials:  Meropenem 1 gm Q12 5/6 to date  Vancomycin 5/6 to dtae  ·    Zithromax 500mg IV- 4/14 to 4/20  · Vancomycin IV 4/14  until 4/19  · Zosyn 2.25gm IV Q6 4/14 to 4/15   · Meropenem IV 4/15-4/20  Unasyn 3 gm Q6 hour 4/20 to 4/22/21  Ertapenem 1gm IV q24 4/23 to date  -- reduced dose to 500 mg 5/5       Assessment: Rec / Plan:   · Persistent Fever: Tmax to 101.8- last 24 hours  · DDX: urine source Vs worsening of left ankle OM or abscess Vs line. No respiratory complaint and no rash noted. · Left Infected DM Ulcer and acute OM and necrotic tissue, punched out heel ulcer with Mixed infection- MSSA +E.coli + anaerobes - 4/14/21  · Post I and D of left heel bone/abscess 4/17, OR cultures remain sterile  · Post I and D with graft to heel- 4/21  CRP 8.9-> 5.8->3.8 as inpatient  CRP 15-> 34 as OP  ESR 78-> 63   · Acute renal failure 2/2 urinary retention, caal in place. Drug related IN in DDX but less likely       (urine eos negative): Improving  · Left heel ankle fracture 2/2 fall: on this admission    · Anemia- HB 6.2  · Hematuria    Other Medical Issues followed and managed by primary and other services  · DM- Poorly controlled  · Low Vitamin D  · CAD with CHF/ NSTMI.  Atrial flutter  · CKD  · Hyperlipidemia     Past ID issues: N/A  -History of COVID 19 infection 4/2021  FU cultures from 5/5/21- NGSF    ABX broadened to meropenem and Vanco yesterday per plan    AUGUST RN- wound care: marked drainage from left heel 5/6    Will proceed with MRI of left ankle/lwer leg- unable to do CT since w/IV contrast- due to renal failure. Assess abscess collection/ progressing soft tissue/ bone infection. AUGUST ortho MD and Dr Suleiman Villanueva( who is away at this time)     Plan for Urology eval noted    Will follow                Condition: Hemodynamically stable. Persistent fever    Subjective: \" 50 people came in so far this morning! \"  He is awake, alert and oriented to self, place and time. Reports no severe pain/ groin pain. Denies fever or chills- but had fevers   Denies N/V/abd pain. Denies flank pain. No diarrhea. No itching or rash    Continues to drain bloody urine       Review of System:  See above, other wise negative     Summary:    Melody Garcia is 71 y/o WM with PMH as listed below- with initial consult on 4/15/21. Last seen by me on 4/22/21 prior to discharge. He was set up at [facility] infusion center for daily ertapenem.     Yesterday, I was notified by RN that he fell, hurt his left foot, and his PICC was not working and they infused him via PIV and sent him to ED.     On arrival to ED, was noted to be in acute renal failure, and with new left ankle fracture.     Wound care note with the picture reviewed.     Pt denies F/C/R. Denies SOB. Feels weak, appetite and po intake has decreased past many days per wife.      Patient Active Problem List   Diagnosis Code    Atrial flutter ([facility]) I48.92    DM2 (diabetes mellitus, type 2) ([facility]) E11.9    CAD (coronary artery disease) I25.10    ELINOR (acute kidney injury) ([facility] N17.9    CKD (chronic kidney disease) stage 3, GFR 30-59 ml/min ([facility]) N18.30    Elevated brain natriuretic peptide (BNP) level R79.89    Diabetic ulcer of left foot ([facility]) E11.621, L97.529    COVID-19 virus infection U07.1    Acute osteomyelitis ([facility] M86.10    Acute hematogenous osteomyelitis of left foot ([facility] M86.072    Cellulitis of left foot L03. 116    Diabetic foot ulcer with osteomyelitis ([facility] E11.621, E11.69, L97.509, M86.9    Ulcer of left heel, with necrosis of bone ([facility]) L97.424    Osteomyelitis ([facility] M86.9    Trimalleolar fracture of ankle, closed, left, initial encounter S82.852A    Displacement of peripherally inserted central catheter (PICC) ([facility] T82.524A       Current Facility-Administered Medications   Medication Dose Route Frequency    0.9% sodium chloride infusion 250 mL  250 mL IntraVENous PRN    iron sucrose (VENOFER) 300 mg in 0.9% sodium chloride 250 mL IVPB  300 mg IntraVENous ONCE    lactated Ringers infusion  50 mL/hr IntraVENous CONTINUOUS    0.9% sodium chloride infusion 250 mL  250 mL IntraVENous PRN    meropenem (MERREM) 1 g in sterile water (preservative free) 20 mL IV syringe  1 g IntraVENous Q12H    Vancomycin- Pharmacy to dose  1 Each Other Rx Dosing/Monitoring    Vancomycin Random Level 05/07/21 1600  1 Each Other ONCE    heparin (porcine) 100 unit/mL injection 500 Units  500 Units InterCATHeter Q8H PRN    [Held by provider] apixaban (ELIQUIS) tablet 2.5 mg  2.5 mg Oral BID    acetaminophen (TYLENOL) tablet 1,000 mg  1,000 mg Oral Q8H PRN    albuterol-ipratropium (DUO-NEB) 2.5 MG-0.5 MG/3 ML  3 mL Nebulization Q4H PRN    [Held by provider] aspirin delayed-release tablet 81 mg  81 mg Oral DAILY    atorvastatin (LIPITOR) tablet 40 mg  40 mg Oral QHS    [Held by provider] clopidogreL (PLAVIX) tablet 75 mg  75 mg Oral DAILY    insulin glargine (LANTUS) injection 15 Units  15 Units SubCUTAneous DAILY    [Held by provider] metoprolol succinate (TOPROL-XL) XL tablet 50 mg  50 mg Oral DAILY    glucose chewable tablet 16 g  16 g Oral PRN    glucagon (GLUCAGEN) injection 1 mg  1 mg IntraMUSCular PRN    dextrose (D50W) injection syrg 25 g  50 mL IntraVENous PRN    insulin lispro (HUMALOG) injection   SubCUTAneous AC&HS    morphine injection 2 mg  2 mg IntraVENous Q4H PRN       Objective:    Visit Vitals  /70   Pulse 90   Temp 98 °F (36.7 °C)   Resp 16   Ht 5' 7.99\" (1.727 m)   Wt 94.3 kg (207 lb 12.8 oz)   SpO2 97%   BMI 31.60 kg/m²       Temp (24hrs), Av.3 °F (37.4 °C), Min:98 °F (36.7 °C), Max:101.8 °F (38.8 °C)    Right PICC - replaced over wire     GEN: WD sick looking , not in resp distress. HEENT: Unicteric. EOMI intact  CHEST: Non laboured breathing. CTA  CVS:RRR, no mur/gallop  ABD: Obese/soft. Non tender. NABS  LISET: caal in place- with hematuria. EXT: Left LE is splinted. No redness/ creps to palpable soft tissue. D/w Wound care about her exam yesterday  Skin: Dry and intact. No rash, no redness. CNS: A, OX3. Moves all extremity. CN grossly ok. Microbiology:    Blood culture: - 2 sets: NGSF  - 2 sets: NGSF     Urine culture:  - urine culture: Pending     Body Fluid/ CSF/drainage culture:  - gram stain: GNR and GPR        --wound culture+ s.aurues and E.coli + anaerobe   -- OR culture: NG    Lab results:    Chemistry  Recent Labs     21  0710 05/06/21  0300 21  0330   * 131* 107*    142 140   K 3.6 4.1 4.7    107 106   CO2 31 27 25   BUN 52* 56* 56*   CREA 2.63* 3.39* 3.78*   CA 7.5* 7.7* 7.7*   AGAP 5 8 9   BUCR 20 17 15   AP  --   --  114   TP  --   --  5.6*   ALB  --   --  2.2*   GLOB  --   --  3.4   AGRAT  --   --  0.6*       CBC w/ Diff  Recent Labs     21  0710 21  2000 21  0300 21  2155   WBC 9.0  --  7.7 7.0   RBC 2.96*  --  2.44* 2.80*   HGB 7.8* 6.7* 6.2* 7.0*   HCT 24.5* 21.1* 20.5* 23.0*     --  245 275     Imagin/5- CXR     1. Right PICC line tip retracted from optimal/appropriate position, projecting  over the right subclavian vein. 2. Mild bibasilar atelectasis.      21  US renal  1.  Considerable urinary bladder fluid distention (approximately 2.1 L) with  likely reactive pelviectasis.      > Results called to [address] [place] [address] Street unit as patient 2 likely benefit from  urinary bladder catheterization.     5/4: right humerus     No acute osseous abnormality or malalignment involving the right humerus.      Total duration of time spent with patient interview and exam and discussion of plan of care, review of chart in care everywhere, discussion with medical staff- nursing/attending and additional specialities as indicated:  35 minutes

## 2021-05-08 LAB
ANION GAP SERPL CALC-SCNC: 6 MMOL/L (ref 3–18)
BUN SERPL-MCNC: 38 MG/DL (ref 7–18)
BUN/CREAT SERPL: 19 (ref 12–20)
CALCIUM SERPL-MCNC: 7.5 MG/DL (ref 8.5–10.1)
CHLORIDE SERPL-SCNC: 108 MMOL/L (ref 100–111)
CO2 SERPL-SCNC: 29 MMOL/L (ref 21–32)
CREAT SERPL-MCNC: 1.96 MG/DL (ref 0.6–1.3)
GLUCOSE BLD STRIP.AUTO-MCNC: 138 MG/DL (ref 70–110)
GLUCOSE BLD STRIP.AUTO-MCNC: 150 MG/DL (ref 70–110)
GLUCOSE BLD STRIP.AUTO-MCNC: 190 MG/DL (ref 70–110)
GLUCOSE BLD STRIP.AUTO-MCNC: 193 MG/DL (ref 70–110)
GLUCOSE SERPL-MCNC: 119 MG/DL (ref 74–99)
POTASSIUM SERPL-SCNC: 3.3 MMOL/L (ref 3.5–5.5)
SODIUM SERPL-SCNC: 143 MMOL/L (ref 136–145)

## 2021-05-08 PROCEDURE — 74011250636 HC RX REV CODE- 250/636: Performed by: INTERNAL MEDICINE

## 2021-05-08 PROCEDURE — 74011250637 HC RX REV CODE- 250/637: Performed by: INTERNAL MEDICINE

## 2021-05-08 PROCEDURE — 74011250636 HC RX REV CODE- 250/636: Performed by: HOSPITALIST

## 2021-05-08 PROCEDURE — 74011250637 HC RX REV CODE- 250/637: Performed by: UROLOGY

## 2021-05-08 PROCEDURE — 65270000029 HC RM PRIVATE

## 2021-05-08 PROCEDURE — 74011000250 HC RX REV CODE- 250: Performed by: HOSPITALIST

## 2021-05-08 PROCEDURE — 77010033678 HC OXYGEN DAILY

## 2021-05-08 PROCEDURE — 74011250637 HC RX REV CODE- 250/637: Performed by: FAMILY MEDICINE

## 2021-05-08 PROCEDURE — 74011250637 HC RX REV CODE- 250/637: Performed by: HOSPITALIST

## 2021-05-08 PROCEDURE — 74011636637 HC RX REV CODE- 636/637: Performed by: HOSPITALIST

## 2021-05-08 PROCEDURE — 80048 BASIC METABOLIC PNL TOTAL CA: CPT

## 2021-05-08 PROCEDURE — 82962 GLUCOSE BLOOD TEST: CPT

## 2021-05-08 RX ORDER — METOPROLOL SUCCINATE 50 MG/1
50 TABLET, EXTENDED RELEASE ORAL DAILY
Status: DISCONTINUED | OUTPATIENT
Start: 2021-05-08 | End: 2021-05-25 | Stop reason: HOSPADM

## 2021-05-08 RX ORDER — VANCOMYCIN HYDROCHLORIDE
1250 EVERY 24 HOURS
Status: DISCONTINUED | OUTPATIENT
Start: 2021-05-08 | End: 2021-05-12

## 2021-05-08 RX ORDER — POTASSIUM CHLORIDE 20 MEQ/1
20 TABLET, EXTENDED RELEASE ORAL ONCE
Status: COMPLETED | OUTPATIENT
Start: 2021-05-08 | End: 2021-05-08

## 2021-05-08 RX ADMIN — INSULIN GLARGINE 15 UNITS: 100 INJECTION, SOLUTION SUBCUTANEOUS at 08:22

## 2021-05-08 RX ADMIN — MEROPENEM 1 G: 1 INJECTION, POWDER, FOR SOLUTION INTRAVENOUS at 01:17

## 2021-05-08 RX ADMIN — TAMSULOSIN HYDROCHLORIDE 0.4 MG: 0.4 CAPSULE ORAL at 08:22

## 2021-05-08 RX ADMIN — METOPROLOL SUCCINATE 50 MG: 50 TABLET, EXTENDED RELEASE ORAL at 14:00

## 2021-05-08 RX ADMIN — INSULIN LISPRO 2 UNITS: 100 INJECTION, SOLUTION INTRAVENOUS; SUBCUTANEOUS at 21:15

## 2021-05-08 RX ADMIN — VANCOMYCIN HYDROCHLORIDE 1250 MG: 10 INJECTION, POWDER, LYOPHILIZED, FOR SOLUTION INTRAVENOUS at 21:15

## 2021-05-08 RX ADMIN — MEROPENEM 1 G: 1 INJECTION, POWDER, FOR SOLUTION INTRAVENOUS at 12:46

## 2021-05-08 RX ADMIN — SODIUM CHLORIDE, SODIUM LACTATE, POTASSIUM CHLORIDE, AND CALCIUM CHLORIDE 50 ML/HR: 600; 310; 30; 20 INJECTION, SOLUTION INTRAVENOUS at 14:01

## 2021-05-08 RX ADMIN — POTASSIUM CHLORIDE 20 MEQ: 1500 TABLET, EXTENDED RELEASE ORAL at 10:26

## 2021-05-08 RX ADMIN — ACETAMINOPHEN 1000 MG: 500 TABLET ORAL at 03:13

## 2021-05-08 RX ADMIN — INSULIN LISPRO 2 UNITS: 100 INJECTION, SOLUTION INTRAVENOUS; SUBCUTANEOUS at 16:53

## 2021-05-08 RX ADMIN — INSULIN LISPRO 2 UNITS: 100 INJECTION, SOLUTION INTRAVENOUS; SUBCUTANEOUS at 12:45

## 2021-05-08 RX ADMIN — ATORVASTATIN CALCIUM 40 MG: 20 TABLET, FILM COATED ORAL at 21:15

## 2021-05-08 RX ADMIN — ACETAMINOPHEN 1000 MG: 500 TABLET ORAL at 15:39

## 2021-05-08 NOTE — PROGRESS NOTES
Pharmacy Dosing Services:    Consult for Vancomycin Dosing by Pharmacy by Dr. Rolf Campoverde provided for this 70y.o. year old male , for indication of Bone and Joint Infection. Day of Therapy 3    Ht Readings from Last 1 Encounters:   05/07/21 172.7 cm (67.99\")        Wt Readings from Last 1 Encounters:   05/07/21 94.3 kg (207 lb 12.8 oz)        Previous Regimen Have been dosing vancomycin daily   Last Level 10.9 on 5/7/21   Other Current Antibiotics Merrem 1 g IV q 12 hr   Serum Creatinine Lab Results   Component Value Date/Time    Creatinine 1.96 (H) 05/08/2021 03:14 AM      Creatinine Clearance Estimated Creatinine Clearance: 37.4 mL/min (A) (based on SCr of 1.96 mg/dL (H)). BUN Lab Results   Component Value Date/Time    BUN 38 (H) 05/08/2021 03:14 AM      WBC Lab Results   Component Value Date/Time    WBC 9.0 05/07/2021 07:10 AM      H/H Lab Results   Component Value Date/Time    HGB 7.8 (L) 05/07/2021 07:10 AM      Platelets Lab Results   Component Value Date/Time    PLATELET 843 94/80/5013 07:10 AM      Temp 98.9 °F (37.2 °C)     Last dose of Vancomycin = 1500 mg IV once on 5/7/21   Follow with Vancomycin 1250 mg IV every 24 hours. Patients creatinine clearance is steadily improving. Dose calculated to approximate a therapeutic trough of 15-20 mcg/mL. Pharmacy to follow daily and will make changes to dose and/or frequency based on clinical status.     Pharmacist David Velasquez, [address] [facility]

## 2021-05-08 NOTE — PROGRESS NOTES
Inflammatory, infectious or ischemic colitis  CT scan showed "   wall thickening throughout the colon at the splenic flexure and proximal descending colon  Diverticulosis in the sigmoid colon without diverticulitis "  No evidence of bowel perforation, obstruction or abscess noted on CT  Abdominal pain  resolved  WBC's remain normalized  Afebrile    Tolerating house diet but reports she is eating lite things      -continue IV Zosyn until discharge  -follow CBC trend  -will need a colonoscopy but this can be accomplished outpatient in 6-8 weeks Progress Note POD #      Patient: Nia Abdul. Sex: male          DOA: 5/4/2021         YOB: 1945      Surgery:            LOS: 4 days               Subjective:     No new complaints    Objective:      Visit Vitals  /62   Pulse (!) 106   Temp 98.9 °F (37.2 °C)   Resp 18   Ht 5' 7.99\" (1.727 m)   Wt 94.3 kg (207 lb 12.8 oz)   SpO2 93%   BMI 31.60 kg/m²       Physical Exam:  Neurological: motor strength: 5/5 in lower extremities bilaterally                          sensation: intact to light touch  Patient mobility  Bed Mobility Training  Rolling: Moderate assistance  Supine to Sit: Moderate assistance  Sit to Supine: Minimum assistance  Scooting: Contact guard assistance, Minimum assistance(seated EOB; [place] bed used to scoot to [facility] supine)             Weight Bearing Status  Left Side Weight Bearing: Non-weight bearing        Intake and Output:  Current Shift:  No intake/output data recorded. Last three shifts:  05/06 1901 - 05/08 0700  In: 4788 [P.O.:288]  Out: 6754 [Urine:4975]    Lab/Data Reviewed:    Lab/Data Reviewed:  Lab Results   Component Value Date/Time    WBC 9.0 05/07/2021 07:10 AM    HGB 7.8 (L) 05/07/2021 07:10 AM    HCT 24.5 (L) 05/07/2021 07:10 AM    PLATELET 176 21/25/0809 07:10 AM    MCV 82.8 05/07/2021 07:10 AM     Lab Results   Component Value Date/Time    aPTT 41.6 (H) 04/15/2021 05:49 AM     Lab Results   Component Value Date/Time    INR 1.5 (H) 05/05/2021 03:30 AM    INR 1.2 04/15/2021 05:49 AM    INR 1.6 (H) 05/30/2018 04:14 AM    Prothrombin time 17.7 (H) 05/05/2021 03:30 AM    Prothrombin time 15.1 04/15/2021 05:49 AM    Prothrombin time 18.2 (H) 05/30/2018 04:14 AM            Assessment/Plan     Principal Problem:    Trimalleolar fracture of ankle, closed, left, initial encounter (5/4/2021)    Active Problems:    Osteomyelitis ([address] (5/4/2021)      Displacement of peripherally inserted central catheter (PICC) ([facility] (5/4/2021)        1. Stable  2. Dr. Vaughn Murrieta surgery for Tuesday  3.  Continue Abx

## 2021-05-08 NOTE — PROGRESS NOTES
Problem: Falls - Risk of  Goal: *Absence of Falls  Description: Document Faviola Scott Fall Risk and appropriate interventions in the flowsheet.   Outcome: Progressing Towards Goal  Note: Fall Risk Interventions:  Mobility Interventions: Communicate number of staff needed for ambulation/transfer    Mentation Interventions: Adequate sleep, hydration, pain control    Medication Interventions: Patient to call before getting OOB    Elimination Interventions: Call light in reach    History of Falls Interventions: Bed/chair exit alarm

## 2021-05-08 NOTE — PROGRESS NOTES
1321: Pt refusing PT stating \"I don't want to. \"  Will follow up as schedule permits. 1520: 2nd attempt, refusing, will attempt 1 more day, if refusing pt will be removed from PT caseload.

## 2021-05-08 NOTE — PROGRESS NOTES
Bedside and Verbal shift change report given to [address] (oncoming nurse) by Bere Ely (offgoing nurse). Report included the following information SBAR, Kardex, Intake/Output, MAR and Recent Results. Shift Summary-   Patient Vitals for the past 12 hrs:   Temp Pulse Resp BP SpO2   05/09/21 0615 100.1 °F (37.8 °C) (!) 111 20 127/71 --   05/09/21 0452 100.2 °F (37.9 °C) (!) 110 19 (!) 146/56 94 %   05/09/21 0400 -- (!) 110 -- -- --   05/09/21 0000 -- 93 -- -- --   05/08/21 2348 99.4 °F (37.4 °C) (!) 108 16 (!) 136/56 90 %   05/08/21 2000 -- 98 -- -- --   05/08/21 1953 97.7 °F (36.5 °C) 97 16 (!) 135/57 90 %   Pt had uneventful shift    Bedside and Verbal shift change report given to ROSETTA Parry (oncoming nurse) by [address] (offgoing nurse). Report included the following information SBAR, Kardex, Intake/Output, MAR and Recent Results.

## 2021-05-08 NOTE — PROGRESS NOTES
Consult for Vancomycin Dosing by Dr Leroy Lemon    Vancomycin Trough = 10.9 on 5/7/21  (drawn 1725)  Vancomycin 1500 mg IV once at 2054 on 5/7/21  Scr = 2.63  CrCl = 27.9 ml/min  WBC = 9  Tmax = 100.9  DOT: 2  Indication: Bone and Joint Infecton

## 2021-05-08 NOTE — PROGRESS NOTES
0696  Bedside and verbal shift change report given to Alcira Thakkar RN (on coming nurse) by KRISTI Freed RN (off going nurse). Report included the following information SBAR, Kardex, OR Summary, Intake/Output and MAR.    1342  Pt's  and notified Dr. Gretchen Merrill. 1443  Pt's  and found out that pt was trying to  a paper on the floor. 1534  Fever 102.7 and administered Tylenol 1000 mg.    1909  Bedside and verbal shift change report given by VALERIA Treadwell (off going nurse) to Ingrid Case RN(on coming nurse). Report included the following information SBAR, Kardex, OR Summary, Intake/Output and MAR.

## 2021-05-08 NOTE — PROGRESS NOTES
Hospitalist Progress Note    Patient: Sasha Saba. MRN: [id number]  [facility]: [id number]    YOB: 1945  Age: 70 y.o. Sex: male    DOA: 5/4/2021 LOS:  LOS: 4 days                Assessment and Plan:    Principal Problem:    Trimalleolar fracture of ankle, closed, left, initial encounter (5/4/2021)    Active Problems:    Osteomyelitis ([facility] (5/4/2021)      Displacement of peripherally inserted central catheter (PICC) ([facility] (5/4/2021)        Anemia -    H/H improved after blood transfusion. Better today      Iron panel with low iron  venofer ordered.         Trimalleolar fracture -  Not a surgical candidate given osteomyelitis. Podiatry recommended splint. Splint placed in ER. Seen by ortho, he may get wash out on Tuesday    Further management/surgical intervention per ortho/podiatry.     Osteomyelitis left heel with cellulitis, gangrenous ulcer with deep abscess tracking along the plantar fascia -  Status post incision and drainage lower extremity left heel debridement and antibiotic bead placement   S/p removal of antibiotic beads and graft open wound. On last admission  Was discharged on ertapenem. Antibiotics switched to meropenem, vancomycin.     ELINOR on CKD stage 3-  Nephrology following. His creatinine is improving today    Renal US with urinary bladder distension. Caal placed        Urinary retention -  S/p caal  Hematuria likely traumatic  Urology consulted.     HTN -  On cozaar, toprol xl, lasix. Monitor BP     Diabetes mellitus -  Continue with lantus, start on SSI.      CAD -  No anginal symptoms,  aspirin and plavix on hold due to hematuria.      A-flutter -  Was Rate controlled as outpatient  on toprol xl. This was held because of hypotension a few days ago. Will restart  eliquis stopped due to hematuria      Chief complaint:  70 y. o. male with diabetes, hypertension, coronary artery disease, atrial fibrillation, recent COVID-19 presents to ER after he fell from his stairs today.      Subjective:    He feels good        Review of systems:    General: No fevers or chills. Cardiovascular: No chest pain or pressure. No palpitations. Pulmonary: No shortness of breath. Gastrointestinal: No nausea, vomiting. Objective:    Vital signs/Intake and Output:  Visit Vitals  BP (!) 145/71   Pulse (!) 104   Temp 98.4 °F (36.9 °C)   Resp 18   Ht 5' 7.99\" (1.727 m)   Wt 94.3 kg (207 lb 12.8 oz)   SpO2 91%   BMI 31.60 kg/m²     Current Shift:  05/08 0701 - 05/08 1900  In: 300   Out: 2350 [ZMCZU:4975]  Last three shifts:  05/06 1901 - 05/08 0700  In: 4788 [P.O.:288]  Out: 2755 [Urine:4975]    Physical Exam:  General: NAD, AAOx3. Non-toxic. HEENT: NC/AT. PERRLA, EOMI.  MMM. Lungs: Nml inspection. CTA B/L. No wheezing, rales or rhonchi. Heart:  S1S2 RRR,  PMI mid 5th IC space. No M/RG. Abdomen: Soft, NT/ND.  BS+. No peritoneal signs. Extremities: No C/C/E. Psych:   Nml affect. Neurologic:  2-12 intact. Strength 5/5 throughout. Sensation symmetrical.          Labs: Results:       Chemistry Recent Labs     05/08/21 0314 05/07/21  0710 05/06/21  0300   * 145* 131*    145 142   K 3.3* 3.6 4.1    109 107   CO2 29 31 27   BUN 38* 52* 56*   CREA 1.96* 2.63* 3.39*   CA 7.5* 7.5* 7.7*   AGAP 6 5 8   BUCR 19 20 17      CBC w/Diff Recent Labs     05/07/21  0710 05/06/21  2000 05/06/21  0300 05/05/21  2155   WBC 9.0  --  7.7 7.0   RBC 2.96*  --  2.44* 2.80*   HGB 7.8* 6.7* 6.2* 7.0*   HCT 24.5* 21.1* 20.5* 23.0*     --  245 275      Cardiac Enzymes No results for input(s): CPK, CKND1, LEONEL in the last 72 hours. No lab exists for component: CKRMB, TROIP   Coagulation No results for input(s): PTP, INR, APTT, INREXT in the last 72 hours. Lipid Panel No results found for: CHOL, CHOLPOCT, CHOLX, CHLST, CHOLV, 072753, HDL, HDLP, LDL, LDLC, DLDLP, 003932, VLDLC, VLDL, TGLX, TRIGL, TRIGP, TGLPOCT, CHHD, CHHDX   BNP No results for input(s): BNPP in the last 72 hours.    Liver Enzymes No results for input(s): TP, ALB, TBIL, AP in the last 72 hours.     No lab exists for component: SGOT, GPT, DBIL   Thyroid Studies Lab Results   Component Value Date/Time    TSH 1.72 02/11/2018 02:19 AM        Procedures/imaging: see electronic medical records for all procedures/Xrays and details which were not copied into this note but were reviewed prior to creation of Plan

## 2021-05-09 LAB
ANION GAP SERPL CALC-SCNC: 7 MMOL/L (ref 3–18)
BUN SERPL-MCNC: 39 MG/DL (ref 7–18)
BUN/CREAT SERPL: 21 (ref 12–20)
CALCIUM SERPL-MCNC: 7.3 MG/DL (ref 8.5–10.1)
CHLORIDE SERPL-SCNC: 106 MMOL/L (ref 100–111)
CO2 SERPL-SCNC: 28 MMOL/L (ref 21–32)
CREAT SERPL-MCNC: 1.88 MG/DL (ref 0.6–1.3)
GLUCOSE BLD STRIP.AUTO-MCNC: 121 MG/DL (ref 70–110)
GLUCOSE BLD STRIP.AUTO-MCNC: 122 MG/DL (ref 70–110)
GLUCOSE BLD STRIP.AUTO-MCNC: 127 MG/DL (ref 70–110)
GLUCOSE BLD STRIP.AUTO-MCNC: 156 MG/DL (ref 70–110)
GLUCOSE BLD STRIP.AUTO-MCNC: 183 MG/DL (ref 70–110)
GLUCOSE SERPL-MCNC: 122 MG/DL (ref 74–99)
POTASSIUM SERPL-SCNC: 3.1 MMOL/L (ref 3.5–5.5)
SODIUM SERPL-SCNC: 141 MMOL/L (ref 136–145)

## 2021-05-09 PROCEDURE — 74011250637 HC RX REV CODE- 250/637: Performed by: INTERNAL MEDICINE

## 2021-05-09 PROCEDURE — 80048 BASIC METABOLIC PNL TOTAL CA: CPT

## 2021-05-09 PROCEDURE — 74011000250 HC RX REV CODE- 250: Performed by: HOSPITALIST

## 2021-05-09 PROCEDURE — 36415 COLL VENOUS BLD VENIPUNCTURE: CPT

## 2021-05-09 PROCEDURE — 74011250636 HC RX REV CODE- 250/636: Performed by: HOSPITALIST

## 2021-05-09 PROCEDURE — 74011250637 HC RX REV CODE- 250/637: Performed by: FAMILY MEDICINE

## 2021-05-09 PROCEDURE — 74011636637 HC RX REV CODE- 636/637: Performed by: HOSPITALIST

## 2021-05-09 PROCEDURE — 97530 THERAPEUTIC ACTIVITIES: CPT

## 2021-05-09 PROCEDURE — 82962 GLUCOSE BLOOD TEST: CPT

## 2021-05-09 PROCEDURE — 65270000029 HC RM PRIVATE

## 2021-05-09 PROCEDURE — 74011250637 HC RX REV CODE- 250/637: Performed by: UROLOGY

## 2021-05-09 PROCEDURE — 51798 US URINE CAPACITY MEASURE: CPT

## 2021-05-09 PROCEDURE — 74011250637 HC RX REV CODE- 250/637: Performed by: HOSPITALIST

## 2021-05-09 RX ADMIN — MEROPENEM 1 G: 1 INJECTION, POWDER, FOR SOLUTION INTRAVENOUS at 12:48

## 2021-05-09 RX ADMIN — METOPROLOL SUCCINATE 50 MG: 50 TABLET, EXTENDED RELEASE ORAL at 08:29

## 2021-05-09 RX ADMIN — INSULIN GLARGINE 15 UNITS: 100 INJECTION, SOLUTION SUBCUTANEOUS at 08:29

## 2021-05-09 RX ADMIN — ACETAMINOPHEN 1000 MG: 500 TABLET ORAL at 17:06

## 2021-05-09 RX ADMIN — MEROPENEM 1 G: 1 INJECTION, POWDER, FOR SOLUTION INTRAVENOUS at 00:23

## 2021-05-09 RX ADMIN — INSULIN LISPRO 2 UNITS: 100 INJECTION, SOLUTION INTRAVENOUS; SUBCUTANEOUS at 08:25

## 2021-05-09 RX ADMIN — INSULIN LISPRO 2 UNITS: 100 INJECTION, SOLUTION INTRAVENOUS; SUBCUTANEOUS at 16:45

## 2021-05-09 RX ADMIN — VANCOMYCIN HYDROCHLORIDE 1250 MG: 10 INJECTION, POWDER, LYOPHILIZED, FOR SOLUTION INTRAVENOUS at 21:43

## 2021-05-09 RX ADMIN — TAMSULOSIN HYDROCHLORIDE 0.4 MG: 0.4 CAPSULE ORAL at 08:30

## 2021-05-09 RX ADMIN — ATORVASTATIN CALCIUM 40 MG: 20 TABLET, FILM COATED ORAL at 21:43

## 2021-05-09 NOTE — PROGRESS NOTES
Problem: Mobility Impaired (Adult and Pediatric)  Goal: *Acute Goals and Plan of Care (Insert Text)  Description: Physical Therapy Goals   Initiated 5/6/2021 and to be accomplished within 7 day(s)  1. Patient will move from supine <> sit with S in prep for out of bed activity and change of position. 2.  Patient will demonstrate sitting balance intact EOB without UE support /S for performance for ADL/ therapeutic exercise activity. 3.  Patient will perform transfers bed to chair via transfer board with min assist.          Outcome: Progressing Towards Goal   PHYSICAL THERAPY TREATMENT    Patient: Annalisa Banuelos (80 y.o. male)  Date: 5/9/2021  Diagnosis: Osteomyelitis ([facility] [M86.9]  Trimalleolar fracture of ankle, closed, left, initial encounter [S82.852A]  Displacement of peripherally inserted central catheter (PICC) ([facility] [T82.524A] Trimalleolar fracture of ankle, closed, left, initial encounter    Precautions: Fall, NWB(lle)  PLOF: scheduled for ankle surgery on Tues 5/11, unable to obtain info on mobility PTA due to pt is very difficult to understand    ASSESSMENT:  Pt required maxA to sit up EOB. Posterior and L lateral lean requiring increased VC for shifting COG and use of UE for support. Pt performed (B)LE LAQs. C/o passing out, assist back to supine and pt felt better. Elevated LLE with a pillow and placed bed in trendelenburg to reduce edema. Progression toward goals:   []      Improving appropriately and progressing toward goals  [x]      Improving slowly and progressing toward goals  [x]      Not making progress toward goals and plan of care will be adjusted     PLAN:  Patient continues to benefit from skilled intervention to address the above impairments. Continue treatment per established plan of care.   Discharge Recommendations:  Claudio Bonilla  Further Equipment Recommendations for Discharge:  wheelchair      SUBJECTIVE:   Patient stated .    OBJECTIVE DATA SUMMARY: Critical Behavior:  Neurologic State: Alert  Orientation Level: Disoriented to time, Oriented to person, Oriented to place, Oriented to situation  Cognition: Follows commands  Safety/Judgement: Fall prevention, Decreased insight into deficits  Functional Mobility Training:  Bed Mobility:    Supine to Sit: Maximum assistance  Sit to Supine: Moderate assistance  Scooting: Total assistance  Balance:  Sitting: Impaired  Sitting - Static: Fair (occasional)(-)  Sitting - Dynamic: Poor (constant support)  Therapeutic Exercises:   (B)LEs      EXERCISE   Sets   Reps   Active Active Assist   Passive Self ROM   Comments   Ankle Pumps    [] [] [] []    Quad Sets/Glut Sets    [] [] [] [] Hold for 5 secs   Hamstring Sets   [] [] [] []    Short Arc Quads   [] [] [] []    Heel Slides   [] [] [] []    Straight Leg Raises   [] [] [] []    Hip Add   [] [] [] [] Hold for 5 secs, w/ pillow squeeze   Long Arc Quads 2 5 [x] [] [] []    Seated Marching   [] [] [] []    Standing Marching   [] [] [] []       [] [] [] []        Pain:  Pain level pre-treatment: 0/10  Pain level post-treatment: 5/10   Pain Intervention(s): Medication (see MAR); Rest, Ice, Repositioning   Response to intervention: Nurse notified, See doc flow    Activity Tolerance:   poor  Please refer to the flowsheet for vital signs taken during this treatment. After treatment:   [] Patient left in no apparent distress sitting up in chair  [x] Patient left in no apparent distress in bed  [x] Call bell left within reach  [] Nursing notified  [] Caregiver present  [] Bed alarm activated  [] SCDs applied      COMMUNICATION/EDUCATION:   [x]         Role of Physical Therapy in the acute care setting. []         Fall prevention education was provided and the patient/caregiver indicated understanding. []         Patient/family have participated as able in working toward goals and plan of care. []         Patient/family agree to work toward stated goals and plan of care.   [x] Patient understands intent and goals of therapy, but is neutral about his/her participation.   []         Patient is unable to participate in stated goals/plan of care: ongoing with therapy staff.  []         Other:        Kristy Felder, FESTUS   Time Calculation: 14 mins

## 2021-05-09 NOTE — PROGRESS NOTES
0715  Bedside and verbal shift change report given to Julianne Hammond, RN (on coming nurse) by KRISTI Cooper RN (off going nurse). Report included the following information SBAR, Kardex, OR Summary, Intake/Output and MAR.    2019  Bedside and verbal shift change report given by VALERIA Treadwell (off going nurse) to JUAN Castro RN(on coming nurse). Report included the following information SBAR, Kardex, OR Summary, Intake/Output and MAR.

## 2021-05-09 NOTE — PROGRESS NOTES
Problem: Falls - Risk of  Goal: *Absence of Falls  Description: Document Samantha Steele Fall Risk and appropriate interventions in the flowsheet. Outcome: Progressing Towards Goal  Note: Fall Risk Interventions:  Mobility Interventions: Communicate number of staff needed for ambulation/transfer    Mentation Interventions: Adequate sleep, hydration, pain control    Medication Interventions: Teach patient to arise slowly    Elimination Interventions: Call light in reach    History of Falls Interventions:  Investigate reason for fall

## 2021-05-09 NOTE — PROGRESS NOTES
Hospitalist Progress Note    Patient: Mario Coker. MRN: [id number]  CSN: [id number]    YOB: 1945  Age: 70 y.o. Sex: male    DOA: 5/4/2021 LOS:  LOS: 5 days                Assessment and Plan:    Principal Problem:    Trimalleolar fracture of ankle, closed, left, initial encounter (5/4/2021)    Active Problems:    Osteomyelitis ([address] (5/4/2021)      Displacement of peripherally inserted central catheter (PICC) ([facility] (5/4/2021)        Anemia -     H/H improved after blood transfusion. Stable today. WIll recheck tomorrow       Iron panel with low iron  venofer ordered.         Trimalleolar fracture -  Not a surgical candidate given osteomyelitis. Podiatry recommended splint. Splint placed in ER. Seen by ortho, he is to get wash out on Tuesday  with [place] petty    Osteomyelitis left heel with cellulitis, gangrenous ulcer with deep abscess tracking along the plantar fascia -  Status post incision and drainage lower extremity left heel debridement and antibiotic bead placement   S/p removal of antibiotic beads and graft open wound. On last admission  Was discharged on ertapenem. Antibiotics switched to meropenem, vancomycin.     ELINOR on CKD stage 3-  Nephrology following. His creatinine is improving today  again  Was 1.88 today    Renal US with urinary bladder distension. Caal placed        Urinary retention -  S/p caal  Hematuria likely traumatic  Urology consulted.     HTN -  On cozaar, toprol xl, lasix. Monitor BP     Diabetes mellitus -  Continue with lantus, start on SSI.      CAD -  No anginal symptoms,  aspirin and plavix on hold due to hematuria.      A-flutter -  Was Rate controlled as outpatient  on toprol xl. This was held because of hypotension a few days ago. restarted this yesterday and heart rate improved. eliquis stopped due to hematuria        Chief complaint:  70 y. o. male with diabetes, hypertension, coronary artery disease, atrial fibrillation, recent COVID-19 presents to ER after he fell from his stairs today. Subjective:    He feels ok todfday        Review of systems:    General: No fevers or chills. Cardiovascular: No chest pain or pressure. No palpitations. Pulmonary: No shortness of breath. Gastrointestinal: No nausea, vomiting. Objective:    Vital signs/Intake and Output:  Visit Vitals  /71   Pulse (!) 111   Temp 100.1 °F (37.8 °C)   Resp 20   Ht 5' 7.99\" (1.727 m)   Wt 96.4 kg (212 lb 9.6 oz)   SpO2 94%   BMI 32.33 kg/m²     Current Shift:  05/09 0701 - 05/09 1900  In: 6004 [P.O.:120; I.V.:816]  Out: 350 [Urine:350]  Last three shifts:  05/07 1901 - 05/09 0700  In: 6662 [P.O.:120; I.V.:2992]  Out: 4600 [Urine:4600]    Physical Exam:  General: NAD, AAOx3. Non-toxic. HEENT: NC/AT. PERRLA, EOMI.  MMM. Lungs: Nml inspection. CTA B/L. No wheezing, rales or rhonchi. Heart:  S1S2 RRR,  PMI mid 5th IC space. No M/RG. Abdomen: Soft, NT/ND.  BS+. No peritoneal signs. Extremities: No C/C/E. Psych:   Nml affect. Neurologic:  2-12 intact. Strength 5/5 throughout. Sensation symmetrical.          Labs: Results:       Chemistry Recent Labs     05/09/21  0325 05/08/21  0314 05/07/21  0710   * 119* 145*    143 145   K 3.1* 3.3* 3.6    108 109   CO2 28 29 31   BUN 39* 38* 52*   CREA 1.88* 1.96* 2.63*   CA 7.3* 7.5* 7.5*   AGAP 7 6 5   BUCR 21* 19 20      CBC w/Diff Recent Labs     05/07/21  0710 05/06/21 2000   WBC 9.0  --    RBC 2.96*  --    HGB 7.8* 6.7*   HCT 24.5* 21.1*     --       Cardiac Enzymes No results for input(s): CPK, CKND1, LEONEL in the last 72 hours. No lab exists for component: CKRMB, TROIP   Coagulation No results for input(s): PTP, INR, APTT, INREXT in the last 72 hours. Lipid Panel No results found for: CHOL, CHOLPOCT, CHOLX, CHLST, CHOLV, 990275, HDL, HDLP, LDL, LDLC, DLDLP, 418753, VLDLC, VLDL, TGLX, TRIGL, TRIGP, TGLPOCT, CHHD, CHHDX   BNP No results for input(s): BNPP in the last 72 hours. Liver Enzymes No results for input(s): TP, ALB, TBIL, AP in the last 72 hours.     No lab exists for component: SGOT, GPT, DBIL   Thyroid Studies Lab Results   Component Value Date/Time    TSH 1.72 02/11/2018 02:19 AM        Procedures/imaging: see electronic medical records for all procedures/Xrays and details which were not copied into this note but were reviewed prior to creation of Plan

## 2021-05-10 LAB
ANION GAP SERPL CALC-SCNC: 9 MMOL/L (ref 3–18)
BASOPHILS # BLD: 0 K/UL (ref 0–0.1)
BASOPHILS NFR BLD: 0 % (ref 0–2)
BUN SERPL-MCNC: 38 MG/DL (ref 7–18)
BUN/CREAT SERPL: 27 (ref 12–20)
CALCIUM SERPL-MCNC: 7.4 MG/DL (ref 8.5–10.1)
CHLORIDE SERPL-SCNC: 107 MMOL/L (ref 100–111)
CO2 SERPL-SCNC: 28 MMOL/L (ref 21–32)
CREAT SERPL-MCNC: 1.4 MG/DL (ref 0.6–1.3)
DIFFERENTIAL METHOD BLD: ABNORMAL
EOSINOPHIL # BLD: 0.2 K/UL (ref 0–0.4)
EOSINOPHIL NFR BLD: 2 % (ref 0–5)
ERYTHROCYTE [DISTWIDTH] IN BLOOD BY AUTOMATED COUNT: 19.2 % (ref 11.6–14.5)
GLUCOSE BLD STRIP.AUTO-MCNC: 122 MG/DL (ref 70–110)
GLUCOSE BLD STRIP.AUTO-MCNC: 142 MG/DL (ref 70–110)
GLUCOSE BLD STRIP.AUTO-MCNC: 153 MG/DL (ref 70–110)
GLUCOSE BLD STRIP.AUTO-MCNC: 206 MG/DL (ref 70–110)
GLUCOSE SERPL-MCNC: 115 MG/DL (ref 74–99)
HCT VFR BLD AUTO: 22.8 % (ref 36–48)
HCT VFR BLD AUTO: 23.4 % (ref 36–48)
HEMOCCULT STL QL: POSITIVE
HGB BLD-MCNC: 7.3 G/DL (ref 13–16)
HGB BLD-MCNC: 7.5 G/DL (ref 13–16)
LYMPHOCYTES # BLD: 0.6 K/UL (ref 0.9–3.6)
LYMPHOCYTES NFR BLD: 8 % (ref 21–52)
MCH RBC QN AUTO: 26.3 PG (ref 24–34)
MCHC RBC AUTO-ENTMCNC: 31.2 G/DL (ref 31–37)
MCV RBC AUTO: 84.2 FL (ref 74–97)
MONOCYTES # BLD: 0.9 K/UL (ref 0.05–1.2)
MONOCYTES NFR BLD: 11 % (ref 3–10)
NEUTS SEG # BLD: 6.1 K/UL (ref 1.8–8)
NEUTS SEG NFR BLD: 77 % (ref 40–73)
PLATELET # BLD AUTO: 249 K/UL (ref 135–420)
PMV BLD AUTO: 10.5 FL (ref 9.2–11.8)
POTASSIUM SERPL-SCNC: 3.1 MMOL/L (ref 3.5–5.5)
RBC # BLD AUTO: 2.78 M/UL (ref 4.35–5.65)
SODIUM SERPL-SCNC: 144 MMOL/L (ref 136–145)
VANCOMYCIN TROUGH SERPL-MCNC: 17.8 UG/ML (ref 10–20)
WBC # BLD AUTO: 7.9 K/UL (ref 4.6–13.2)

## 2021-05-10 PROCEDURE — 82272 OCCULT BLD FECES 1-3 TESTS: CPT

## 2021-05-10 PROCEDURE — C9113 INJ PANTOPRAZOLE SODIUM, VIA: HCPCS | Performed by: HOSPITALIST

## 2021-05-10 PROCEDURE — 86677 HELICOBACTER PYLORI ANTIBODY: CPT

## 2021-05-10 PROCEDURE — 77010033678 HC OXYGEN DAILY

## 2021-05-10 PROCEDURE — 74011250636 HC RX REV CODE- 250/636: Performed by: INTERNAL MEDICINE

## 2021-05-10 PROCEDURE — 74011250637 HC RX REV CODE- 250/637: Performed by: HOSPITALIST

## 2021-05-10 PROCEDURE — 80048 BASIC METABOLIC PNL TOTAL CA: CPT

## 2021-05-10 PROCEDURE — 74011250637 HC RX REV CODE- 250/637: Performed by: UROLOGY

## 2021-05-10 PROCEDURE — 97110 THERAPEUTIC EXERCISES: CPT

## 2021-05-10 PROCEDURE — 80202 ASSAY OF VANCOMYCIN: CPT

## 2021-05-10 PROCEDURE — 74011000250 HC RX REV CODE- 250: Performed by: HOSPITALIST

## 2021-05-10 PROCEDURE — 85018 HEMOGLOBIN: CPT

## 2021-05-10 PROCEDURE — 85025 COMPLETE CBC W/AUTO DIFF WBC: CPT

## 2021-05-10 PROCEDURE — 82962 GLUCOSE BLOOD TEST: CPT

## 2021-05-10 PROCEDURE — 74011250636 HC RX REV CODE- 250/636: Performed by: HOSPITALIST

## 2021-05-10 PROCEDURE — 97530 THERAPEUTIC ACTIVITIES: CPT

## 2021-05-10 PROCEDURE — 74011000250 HC RX REV CODE- 250: Performed by: INTERNAL MEDICINE

## 2021-05-10 PROCEDURE — 65270000029 HC RM PRIVATE

## 2021-05-10 PROCEDURE — 74011636637 HC RX REV CODE- 636/637: Performed by: HOSPITALIST

## 2021-05-10 PROCEDURE — 74011250637 HC RX REV CODE- 250/637: Performed by: INTERNAL MEDICINE

## 2021-05-10 PROCEDURE — 36592 COLLECT BLOOD FROM PICC: CPT

## 2021-05-10 PROCEDURE — 51798 US URINE CAPACITY MEASURE: CPT

## 2021-05-10 RX ORDER — ENOXAPARIN SODIUM 100 MG/ML
40 INJECTION SUBCUTANEOUS EVERY 24 HOURS
Status: DISCONTINUED | OUTPATIENT
Start: 2021-05-10 | End: 2021-05-11

## 2021-05-10 RX ORDER — LANOLIN ALCOHOL/MO/W.PET/CERES
1 CREAM (GRAM) TOPICAL 2 TIMES DAILY WITH MEALS
Status: DISCONTINUED | OUTPATIENT
Start: 2021-05-10 | End: 2021-05-25 | Stop reason: HOSPADM

## 2021-05-10 RX ORDER — POTASSIUM CHLORIDE 20 MEQ/1
40 TABLET, EXTENDED RELEASE ORAL 2 TIMES DAILY
Status: COMPLETED | OUTPATIENT
Start: 2021-05-10 | End: 2021-05-11

## 2021-05-10 RX ADMIN — TAMSULOSIN HYDROCHLORIDE 0.4 MG: 0.4 CAPSULE ORAL at 11:36

## 2021-05-10 RX ADMIN — ATORVASTATIN CALCIUM 40 MG: 20 TABLET, FILM COATED ORAL at 21:36

## 2021-05-10 RX ADMIN — MEROPENEM 1 G: 1 INJECTION, POWDER, FOR SOLUTION INTRAVENOUS at 11:34

## 2021-05-10 RX ADMIN — MEROPENEM 1 G: 1 INJECTION, POWDER, FOR SOLUTION INTRAVENOUS at 01:01

## 2021-05-10 RX ADMIN — POTASSIUM CHLORIDE 40 MEQ: 1500 TABLET, EXTENDED RELEASE ORAL at 11:36

## 2021-05-10 RX ADMIN — SODIUM CHLORIDE 40 MG: 9 INJECTION INTRAMUSCULAR; INTRAVENOUS; SUBCUTANEOUS at 21:36

## 2021-05-10 RX ADMIN — METOPROLOL SUCCINATE 50 MG: 50 TABLET, EXTENDED RELEASE ORAL at 11:37

## 2021-05-10 RX ADMIN — INSULIN LISPRO 4 UNITS: 100 INJECTION, SOLUTION INTRAVENOUS; SUBCUTANEOUS at 21:35

## 2021-05-10 RX ADMIN — INSULIN GLARGINE 15 UNITS: 100 INJECTION, SOLUTION SUBCUTANEOUS at 11:34

## 2021-05-10 RX ADMIN — VANCOMYCIN HYDROCHLORIDE 1250 MG: 10 INJECTION, POWDER, LYOPHILIZED, FOR SOLUTION INTRAVENOUS at 21:40

## 2021-05-10 RX ADMIN — FERROUS SULFATE TAB 325 MG (65 MG ELEMENTAL FE) 325 MG: 325 (65 FE) TAB at 16:19

## 2021-05-10 RX ADMIN — POTASSIUM CHLORIDE 40 MEQ: 1500 TABLET, EXTENDED RELEASE ORAL at 21:36

## 2021-05-10 RX ADMIN — INSULIN LISPRO 2 UNITS: 100 INJECTION, SOLUTION INTRAVENOUS; SUBCUTANEOUS at 11:17

## 2021-05-10 RX ADMIN — SODIUM CHLORIDE 40 MG: 9 INJECTION INTRAMUSCULAR; INTRAVENOUS; SUBCUTANEOUS at 11:36

## 2021-05-10 RX ADMIN — ENOXAPARIN SODIUM 40 MG: 40 INJECTION SUBCUTANEOUS at 11:35

## 2021-05-10 RX ADMIN — FERROUS SULFATE TAB 325 MG (65 MG ELEMENTAL FE) 325 MG: 325 (65 FE) TAB at 11:36

## 2021-05-10 RX ADMIN — MEROPENEM 1 G: 1 INJECTION, POWDER, FOR SOLUTION INTRAVENOUS at 21:36

## 2021-05-10 NOTE — PROGRESS NOTES
DC Plan: SNF for rehab and continuation of abx    Care manager met with patient at bedside; noted urine in caal tubing is clear alanna. Per patient, \"a thousand people\" have been in patient room today. Patient stated that he is aware of surgery tomorrow and that he will be going to rehab when ready. Has been accepted to [facility] so far. Care manager will continue to follow.     Care Management Interventions  Mode of Transport at Discharge: BLS  Transition of Care Consult (CM Consult): Discharge Planning, SNF  Health Maintenance Reviewed: Yes  Current Support Network: Lives with Spouse  Confirm Follow Up Transport: Family  The Plan for Transition of Care is Related to the Following Treatment Goals : SNF  The Patient and/or Patient Representative was Provided with a Choice of Provider and Agrees with the Discharge Plan?: Yes  Name of the Patient Representative Who was Provided with a Choice of Provider and Agrees with the Discharge Plan: pt/spouse  Freedom of Choice List was Provided with Basic Dialogue that Supports the Patient's Individualized Plan of Care/Goals, Treatment Preferences and Shares the Quality Data Associated with the Providers?: Yes  Discharge Location  Discharge Placement: Skilled nursing facility

## 2021-05-10 NOTE — PROGRESS NOTES
Patient assisted with compliance to current plan of care as directed.    Ronaldo Mcneil  5/10/2021  11:47 AM

## 2021-05-10 NOTE — PROGRESS NOTES
Patient:  Nadir Ruiz. :  1945  Gender:  male  MRN #:  [id number]    Assessment:   He is 70 yrs male with h/o CKD stage 3Ga , Uncontrolled DM, HTN Anemia , CAD s/p stent admitted for left foot/heel cellulitis and osteomyelitis . He was found to have acute renal failure on CKD with peaked creatinine of 3.78 mg/dl on admission with urinary retention . Urine sodium was not consistent with pre renal . Most likely obstructive uropathy , Toxin mediated ATN is other possibility due to cellulitis/osteomyelitis vs antibiotics  Related interstitia nephritis is other possibility , urine eosinophil is negative . He is schedule to have left foot debridement on     Plan:     # ELINOR on CKD - with peaked creatinine 3.78 mg/dl , now renal function is slowly improving , creatinine down to 1.41 mg/dl this morning after having Foleys catheter  - Appreciate urologist Dr Nabeel Matthews recommendation for urinary retention   - continue Flomax 0.4 mg daily   - Strict intake and output recording   - Try to keep volume status even   - encourage oral water intake   - Avoid NSAIDS, contrast and nephrotoxin   - Dose all meds and antibiotics for current eGFR  - Ensure that he is able to urinate without retention after removing Mccray's catheter    # Hypokalemia- Agree to replace 40 meq KCL -two doses  Keep level around 4-4.5 mmol/lt     # Mild Hypernatremia - Needs free water intake for rising sodium         As renal function is improving , I will sign off . Please call us if you have any question and concern . He needs follow up in Nephrology clinic for CKD after discharge.         Subjective/Interval Events    - he feels better , except pain in left foot  - BP stable  Breathing comfortable on nasal canula  Laying flat in bed  No other complaints      Intake/Output Summary (Last 24 hours) at 5/10/2021 1241  Last data filed at 5/10/2021 1145  Gross per 24 hour   Intake 3673 ml   Output 3625 ml   Net 48 ml           Blood pressure (!) 152/57, pulse (!) 109, temperature 98.4 °F (36.9 °C), resp. rate 18, height 5' 7.99\" (1.727 m), weight 95 kg (209 lb 8 oz), SpO2 95 %. Exam:    Pt awake and alert  HEENT: No JVD, anicteric sclera, no neck swelling  Lung: clear to auscultation  Heart: s1s2 heard   Abdomen: soft, non tender, no guarding, normal bowel sounds. Ext: no edema in RLE, LLE wrapped in crepe bandage    CNS- Oriented to time , place and person     [unfilled]   Recent Labs     05/10/21  0525   WBC 7.9   HCT 23.4*   MCV 84.2     Recent Labs     05/10/21  0525      CO2 28   BUN 38*     No results for input(s): ALBUMIN, AGRAT in the last 72 hours. No lab exists for component: TOTPR, SGOTAST, ALKPHOS, BILIT, BILID, SGPTALT, GLOBULIN    MRI left foot 5/7/21: 1. Progressive extension of a plantar left heel ulcer with fluid extending to  the calcaneal undersurface.     > Small foci of T1 marrow hypointensity adjacent to the ulcer site compatible  with small areas of osteomyelitis.     > Concomitant rupture of the central bundle of the planar fascial aponeurosis. 2. Multifocal chondrosis and advanced hindfoot valgus. 3. Trimalleolar left ankle fracture with distal syndesmotic involvement  4.  Diffusely abnormal intrinsic muscle bulk and signal, typical for changes  related to subacute denervation      Thank you very much for consult     Cintia Asif MD  Nephrology -[facility], [facility].

## 2021-05-10 NOTE — PROGRESS NOTES
[facility]                         (A Division of [address] [place] [address])                                                                                                                       Evelyne Conklin MD  Work Cell #: [phone]( Mon-Fri, 7am-5pm)  If no call or text back within 30 minutes from me, please call office number. (Prefer text when possible)  Office #:     21  #[phone]   Office Fax: [phone]     Date of Admission: 5/4/2021   Date of Service:  5/10/2021  Reason for FU : left ankle OM, fever    Current Antimicrobials:    Prior Antimicrobials:  Meropenem 1 gm Q12 5/6 to date  Vancomycin 5/6 to dtae  ·    Zithromax 500mg IV- 4/14 to 4/20  · Vancomycin IV 4/14  until 4/19  · Zosyn 2.25gm IV Q6 4/14 to 4/15   · Meropenem IV 4/15-4/20  Unasyn 3 gm Q6 hour 4/20 to 4/22/21  Ertapenem 1gm IV q24 4/23 to date  -- reduced dose to 500 mg 5/5       Assessment: Rec / Plan:   · Fever: Improved, last record on 5/8/21       DDX: urine source Vs worsening of left ankle OM      or abscess Vs line. No respiratory complaint and no      rash noted. · Left Infected DM Ulcer and acute OM and necrotic tissue, punched out heel ulcer with Mixed infection- MSSA +E.coli + anaerobes - 4/14/21  · Post I and D of left heel bone/abscess 4/17, OR cultures remain sterile  · Post I and D with graft to heel- 4/21  CRP 8.9-> 5.8->3.8 as inpatient  CRP 15-> 34 as OP  ESR 78-> 63   · Acute renal failure 2/2 urinary retention, caal in place. Drug related IN in DDX but less likely       (urine eos negative): Improving  · Left heel ankle fracture 2/2 fall: on this admission    · Anemia- HB 6.2  · Hematuria    Other Medical Issues followed and managed by primary and other services  · DM- Poorly controlled  · Low Vitamin D  · CAD with CHF/ NSTMI.  Atrial flutter  · CKD  · Hyperlipidemia  · hypokalemia     Past ID issues: N/A  -History of COVID 19 infection 4/2021 MRI of left heel-- abn with fluid collection. Going to OR tomorrow. I and D plans for tomorrow. Please send of panculture- aerobic/anaerobic. Thanks. Cont meropenem and Vanco. Follow Cr closely while on vanco-- continues to improve    Will follow                Condition: Hemodynamically stable. Persistent fever    Subjective: \" no complaint\"    Denies fever or chills- but had fevers   Denies N/V/abd pain. Denies flank pain. No diarrhea. No itching or rash    Continues with acal- urine in bag less bloody this am  Urology seen pt over weekend- note reviewed       Review of System:  See above, other wise negative     Summary:    Dillon Toscano is 71 y/o WM with PMH as listed below- with initial consult on 4/15/21. Last seen by me on 4/22/21 prior to discharge. He was set up at [facility] infusion center for daily ertapenem.     Yesterday, I was notified by RN that he fell, hurt his left foot, and his PICC was not working and they infused him via PIV and sent him to ED.     On arrival to ED, was noted to be in acute renal failure, and with new left ankle fracture.     Wound care note with the picture reviewed.     Pt denies F/C/R. Denies SOB. Feels weak, appetite and po intake has decreased past many days per wife. Patient Active Problem List   Diagnosis Code    Atrial flutter ([facility]) I48.92    DM2 (diabetes mellitus, type 2) ([facility]) E11.9    CAD (coronary artery disease) I25.10    ELINOR (acute kidney injury) ([address] N17.9    CKD (chronic kidney disease) stage 3, GFR 30-59 ml/min ([facility]) N18.30    Elevated brain natriuretic peptide (BNP) level R79.89    Diabetic ulcer of left foot ([facility]) E11.621, L97.529    COVID-19 virus infection U07.1    Acute osteomyelitis ([address] M86.10    Acute hematogenous osteomyelitis of left foot ([facility]) M86.072    Cellulitis of left foot L03. 116    Diabetic foot ulcer with osteomyelitis ([facility]) E11.621, E11.69, L97.509, M86.9    Ulcer of left heel, with necrosis of bone ([address] L97.375    Osteomyelitis ([facility] M86.9    Trimalleolar fracture of ankle, closed, left, initial encounter S82.852A    Displacement of peripherally inserted central catheter (PICC) ([facility] T82.524A       Current Facility-Administered Medications   Medication Dose Route Frequency    potassium chloride (K-DUR, KLOR-CON) SR tablet 40 mEq  40 mEq Oral BID    enoxaparin (LOVENOX) injection 40 mg  40 mg SubCUTAneous Q24H    ferrous sulfate tablet 325 mg  1 Tab Oral BID WITH MEALS    pantoprazole (PROTONIX) 40 mg in 0.9% sodium chloride 10 mL injection  40 mg IntraVENous Q12H    meropenem (MERREM) 1 g in sterile water (preservative free) 20 mL IV syringe  1 g IntraVENous Q8H    vancomycin (VANCOCIN) 1250 mg in  ml infusion  1,250 mg IntraVENous Q24H    metoprolol succinate (TOPROL-XL) XL tablet 50 mg  50 mg Oral DAILY    0.9% sodium chloride infusion 250 mL  250 mL IntraVENous PRN    lactated Ringers infusion  50 mL/hr IntraVENous CONTINUOUS    tamsulosin (FLOMAX) capsule 0.4 mg  0.4 mg Oral DAILY    Vancomycin- Pharmacy to dose  1 Each Other Rx Dosing/Monitoring    heparin (porcine) 100 unit/mL injection 500 Units  500 Units InterCATHeter Q8H PRN    [Held by provider] apixaban (ELIQUIS) tablet 2.5 mg  2.5 mg Oral BID    acetaminophen (TYLENOL) tablet 1,000 mg  1,000 mg Oral Q8H PRN    albuterol-ipratropium (DUO-NEB) 2.5 MG-0.5 MG/3 ML  3 mL Nebulization Q4H PRN    [Held by provider] aspirin delayed-release tablet 81 mg  81 mg Oral DAILY    atorvastatin (LIPITOR) tablet 40 mg  40 mg Oral QHS    [Held by provider] clopidogreL (PLAVIX) tablet 75 mg  75 mg Oral DAILY    insulin glargine (LANTUS) injection 15 Units  15 Units SubCUTAneous DAILY    glucose chewable tablet 16 g  16 g Oral PRN    glucagon (GLUCAGEN) injection 1 mg  1 mg IntraMUSCular PRN    dextrose (D50W) injection syrg 25 g  50 mL IntraVENous PRN    insulin lispro (HUMALOG) injection   SubCUTAneous AC&HS    morphine injection 2 mg  2 mg IntraVENous Q4H PRN       Objective:    Visit Vitals  BP (!) 152/57 (BP 1 Location: Left upper arm, BP Patient Position: At rest)   Pulse (!) 109   Temp 98.4 °F (36.9 °C)   Resp 18   Ht 5' 7.99\" (1.727 m)   Wt 95 kg (209 lb 8 oz)   SpO2 95%   BMI 31.86 kg/m²       Temp (24hrs), Av.6 °F (37 °C), Min:97.8 °F (36.6 °C), Max:99.5 °F (37.5 °C)    Right PICC - replaced over wire     GEN: WD sick looking , not in resp distress. HEENT: Unicteric. EOMI intact  CHEST: Non laboured breathing. CTA  CVS:RRR, no mur/gallop  ABD: Obese/soft. Non tender. NABS  LISET: caal in place- with hematuria. EXT: Left LE is splinted. No redness/ creps to palpable soft tissue. D/w Wound care about her exam yesterday  Skin: Dry and intact. No rash, no redness. CNS: A, OX3. Moves all extremity. CN grossly ok. Microbiology:    Blood culture: - 2 sets: NGSF    - 2 sets: NG  : 1 set: NGSF     Urine culture:  - urine culture: NG     Body Fluid/ CSF/drainage culture:  - gram stain: GNR and GPR        --wound culture+ s.aurues and E.coli + anaerobe   -- OR culture: NG    Lab results:    Chemistry  Recent Labs     05/10/21  0525 21  0325 21  0314   * 122* 119*    141 143   K 3.1* 3.1* 3.3*    106 108   CO2 28 28 29   BUN 38* 39* 38*   CREA 1.40* 1.88* 1.96*   CA 7.4* 7.3* 7.5*   AGAP 9 7 6   BUCR 27* 21* 19       CBC w/ Diff  Recent Labs     05/10/21  0525   WBC 7.9   RBC 2.78*   HGB 7.3*   HCT 23.4*      GRANS 77*   LYMPH 8*   EOS 2     Imagin/5- CXR     1. Right PICC line tip retracted from optimal/appropriate position, projecting  over the right subclavian vein. 2. Mild bibasilar atelectasis.      21  US renal  1.  Considerable urinary bladder fluid distention (approximately 2.1 L) with  likely reactive pelviectasis.      > Results called to [address] [place] [address] unit as patient 2 likely benefit from  urinary bladder catheterization.     : right humerus  No acute osseous abnormality or malalignment involving the right humerus. 5/7: MRI of left foot:    No acute osseous abnormality or malalignment involving the right humerus.      Total duration of time spent with patient interview and exam and discussion of plan of care, review of chart in care everywhere, discussion with medical staff- nursing/attending and additional specialities as indicated:  25 minutes

## 2021-05-10 NOTE — PROGRESS NOTES
2019 - Bedside and Verbal shift change report given to Georgia Higgins RN (oncoming nurse) by Alyssa Chong RN (offgoing nurse). Report included the following information SBAR, Kardex, ED Summary, Intake/Output, MAR, Recent Results and Cardiac Rhythm NSR.     0 ml in caal bag at this time. 2208 - Bladder scanner showed more than 999 ml. Patient reported the urge to void and defecate. Manually irrigated catheter w/ 250 ml NS.     2244 - Slight wheezing present to bilateral anterior upper lungs. Patient positioned to sit upright. [place] urine began to flow through tubing. Bed repositioned into trendelenburg. Total urine output at this time 1675 ml. 160 ml on bladder scan post urine drainage. 0010 - Bedside and Verbal shift change report given to Shankar Good RN (oncoming nurse) by Georgia Higgins RN (offgoing nurse). Report included the following information SBAR, Kardex, Intake/Output, MAR, Recent Results and Cardiac Rhythm NSR.

## 2021-05-10 NOTE — PROGRESS NOTES
Bedside and Verbal shift change report given to [address] (oncoming nurse) by JOSE Mcneil (offgoing nurse). Report included the following information SBAR, Kardex, Intake/Output, MAR and Recent Results. 0420- Pt PICC flushes but does not give blood return.  notified, orders received for cathflo. 0553- Pt Hgb 6.7. Dr. Evelio Garcia paged, orders received for 1 unit blood transfusion    Shift Summary-   Patient Vitals for the past 12 hrs:   Temp Pulse Resp BP SpO2   05/11/21 0424 99 °F (37.2 °C) 98 18 138/62 98 %   05/10/21 2131 99.2 °F (37.3 °C) 99 18 135/65 98 %   Pt denied pain/discomfort overnight    Bedside and Verbal shift change report given to ELIEZER Silverio (oncoming nurse) by [address] (offgoing nurse). Report included the following information SBAR, Kardex, Intake/Output, MAR and Recent Results.

## 2021-05-10 NOTE — PROGRESS NOTES
Problem: Mobility Impaired (Adult and Pediatric)  Goal: *Acute Goals and Plan of Care (Insert Text)  Description: Physical Therapy Goals   Initiated 5/6/2021 and to be accomplished within 7 day(s)  1. Patient will move from supine <> sit with S in prep for out of bed activity and change of position. 2.  Patient will demonstrate sitting balance intact EOB without UE support /S for performance for ADL/ therapeutic exercise activity. 3.  Patient will perform transfers bed to chair via transfer board with min assist.    Outcome: Progressing Towards Goal  []  Patient has met MD ray collins for d/c home   []  Recommend HH with 24 hour adult care   [x]  Benefit from additional acute PT session to address:  address functional mobility deficits    PHYSICAL THERAPY TREATMENT    Patient: Nia Allen (80 y.o. male)  Date: 5/10/2021  Diagnosis: Osteomyelitis ([facility] [M86.9]  Trimalleolar fracture of ankle, closed, left, initial encounter [S82.852A]  Displacement of peripherally inserted central catheter (PICC) ([facility] [T82.524A] Trimalleolar fracture of ankle, closed, left, initial encounter  Precautions: Fall, NWB(lle)  PLOF: as per evaluation  ASSESSMENT:  Remains slightly confused and with decr'd insight into deficits. Pt son present. Pt  able to pull self up to [facility] with bed inverted into trendelenburg position. Fair tolerance for therapeutic ex as noted below. Declines sitting EOB. Note congested cough. Reminded pt to use IS and re-educated in same with pt demonstration performed. May benefit from continued instruction. Pt for surgery L foot tomorrow. Progression toward goals:   []      Improving appropriately and progressing toward goals  [x]      Improving slowly and progressing toward goals  []      Not making progress toward goals and plan of care will be adjusted     PLAN:  Patient continues to benefit from skilled intervention to address the above impairments.   Continue treatment per established plan of care. Discharge Recommendations:  Claudio Bonilla  Further Equipment Recommendations for Discharge:  N/A     SUBJECTIVE:   Patient stated Indira Tejada is looking at my foot.     OBJECTIVE DATA SUMMARY:   Critical Behavior:  Neurologic State: Confused  Orientation Level: Disoriented to situation, Disoriented to time, Oriented to person, Oriented to place(Redirectable)  Cognition: Follows commands  Safety/Judgement: Fall prevention, Decreased insight into deficits  Functional Mobility Training:  Bed Mobility:  Scooting: Stand-by assistance;Bed Modified(be in trendelenburg )   Therapeutic Exercises: r le      EXERCISE   Sets   Reps   Active Active Assist   Passive Self ROM   Comments   Ankle Pumps 1 10  [] [] [] [] R   Unilateral bridging 2 5 [x] [] [] []    Heel Slides 1 10 [x] [] [] []    Straight Leg Raises 2 5 [x] [] [] []    Hip ABD 1 10 [x] [] [] [] 1   Long Arc Quads   [] [] [] []    Seated Marching   [] [] [] []    Standing Marching   [] [] [] []       [] [] [] []        Pain:  Pain level pre-treatment: 0/10  Pain level post-treatment: 0/10   Pain Intervention(s): Medication (see MAR); Rest, Ice, Repositioning   Response to intervention: Nurse notified, See doc flow    Activity Tolerance:   fair  Please refer to the flowsheet for vital signs taken during this treatment. After treatment:   [] Patient left in no apparent distress sitting up in chair  [x] Patient left in no apparent distress in bed  [x] Call bell left within reach  [] Nursing notified  [x] Caregiver present-son  [] Bed alarm activated  [] SCDs applied      COMMUNICATION/EDUCATION:   [x]         Role of Physical Therapy in the acute care setting. [x]         Fall prevention education was provided and the patient/caregiver indicated understanding. [x]         Patient/family have participated as able in working toward goals and plan of care.   [x]         Patient/family agree to work toward stated goals and plan of care.  []         Patient understands intent and goals of therapy, but is neutral about his/her participation.   []         Patient is unable to participate in stated goals/plan of care: ongoing with therapy staff.  []         Other:        Alexys Grissom, PT   Time Calculation: 14 mins

## 2021-05-10 NOTE — PROGRESS NOTES
Progress Note      Patient: Daniella Campbell. Sex: male          DOA: 5/4/2021         YOB: 1945      Age:  70 y.o.        LOS:  LOS: 6 days             Assessment and Plan:     Principal Problem:    Trimalleolar fracture of ankle, closed, left, initial encounter (5/4/2021)     Active Problems:    Osteomyelitis ([address] (5/4/2021)     Displacement of peripherally inserted central catheter (PICC) ([address] (5/4/2021)     Anemia -   H/H improved after blood transfusion.        Iron panel with low iron  venofer ordered.       Trimalleolar fracture -  Seen by ortho,  wash out on Tuesday       Osteomyelitis left heel with cellulitis, gangrenous ulcer with deep abscess tracking along the plantar fascia -  Status post incision and drainage lower extremity left heel debridement and antibiotic bead placement   S/p removal of antibiotic beads and graft open wound. On last admission  Was discharged on ertapenem. Antibiotics switched to meropenem, vancomycin.     ELINOR on CKD stage 3-  Nephrology following. His creatinine is improving today    Renal US with urinary bladder distension. Caal placed        Urinary retention -  S/p caal  Hematuria likely traumatic  Urology consulted.     HTN -  On cozaar, toprol xl, lasix. Monitor BP     Diabetes mellitus -  Continue with lantus, start on SSI.      CAD -  No anginal symptoms,  aspirin and plavix on hold due to hematuria.      A-flutter -  Was Rate controlled as outpatient    Subjective:     [place] weekend. Mild pain. Objective:      Visit Vitals  BP (!) 146/58 (BP 1 Location: Left upper arm, BP Patient Position: At rest)   Pulse 98   Temp 98.6 °F (37 °C)   Resp 17   Ht 5' 7.99\" (1.727 m)   Wt 93 kg (205 lb)   SpO2 96%   BMI 31.18 kg/m²       Physical Exam:  Pertinent items are noted in HPI. Intake and Output:  Current Shift:  No intake/output data recorded. Last three shifts:  05/08 1901 - 05/10 0700  In: 4793 [P.O.:120;  I.V.:1823]  Out: 6225 [KEYANA:[id number]]    Lab/Data Reviewed: All lab results for the last 24 hours reviewed. Medications Reviewed    Continued hospitalization is indicated due to surgery, infection.

## 2021-05-10 NOTE — PROGRESS NOTES
Hospitalist Progress Note    Patient: Lizette Velasco. MRN: [id number]  CSN: [id number]    YOB: 1945  Age: 70 y.o. Sex: male    DOA: 5/4/2021 LOS:  LOS: 6 days          Chief Complaint:    Ankle infection      Assessment/Plan     Anemia -severe  Repeat Hgb this afternoon, he is bordering on needing another transfusion  Hemoccult came back positive  Start PPI IV  Ask GI to see  He was on blood thinners at home, uncertain baseline KZC-18? Daily CBC     Severe iron deficiency, s/p venofer, start PO iron     Trimalleolar fracture -  Not a surgical candidate given osteomyelitis. Podiatry recommended splint. Splint placed in ER. Seen by ortho, he is to get wash out on [address]     Osteomyelitis left heel with cellulitis, gangrenous ulcer with deep abscess tracking along the plantar fascia -  Status post incision and drainage lower extremity left heel debridement and antibiotic bead placement   S/p removal of antibiotic beads and graft open wound. On last admission  Was discharged on ertapenem. Antibiotics switched to meropenem, vancomycin here now     ELINOR on CKD stage 3-improved  Renal US with urinary bladder distension. Caal placed     Urinary retention -  S/p caal  Hematuria likely traumatic, RESUME AC after surgery tomorrow, only if GI issues are ok, otherwise continue to hold     HTN -  On cozaar, toprol xl, lasix.   Monitor BP    Hypokalemia-repeltion PO, daily BMP     Diabetes mellitus -  Continue with lantus,  SSI.      CAD -  No anginal symptoms,  aspirin and plavix on hold due to hematuria. and now planned surgery     A-flutter -  Resume eliquis when stabale    Repeat H&H  Supportive care  GI consult     Disposition :  Patient Active Problem List   Diagnosis Code    Atrial flutter (HCC) I48.92    DM2 (diabetes mellitus, type 2) ([facility] E11.9    CAD (coronary artery disease) I25.10    ELINOR (acute kidney injury) ([facility] N17.9    CKD (chronic kidney disease) stage 3, GFR 30-59 ml/min ([facility]) N18.30    Elevated brain natriuretic peptide (BNP) level R79.89    Diabetic ulcer of left foot ([facility]) E11.621, L97.529    COVID-19 virus infection U07.1    Acute osteomyelitis ([facility]) M86.10    Acute hematogenous osteomyelitis of left foot ([facility]) M86.072    Cellulitis of left foot L03. 80    Diabetic foot ulcer with osteomyelitis ([facility] E11.621, E11.69, L97.509, M86.9    Ulcer of left heel, with necrosis of bone ([facility]) L97.424    Osteomyelitis ([facility] M86.9    Trimalleolar fracture of ankle, closed, left, initial encounter S82.852A    Displacement of peripherally inserted central catheter (PICC) [facility] T82.524A       Subjective:  He denies new issue  Denies rectal bleeding or blood in stool  No abd pain  No CP, SOB, palp      Review of systems:    Constitutional: denies fevers, chills  Respiratory: denies SOB  Cardiovascular: denies chest pain, palpitations  Gastrointestinal: denies nausea, vomiting, diarrhea      Vital signs/Intake and Output:  Visit Vitals  BP (!) 140/67 (BP 1 Location: Left upper arm)   Pulse 100   Temp 98.2 °F (36.8 °C)   Resp 20   Ht 5' 7.99\" (1.727 m)   Wt 93 kg (205 lb)   SpO2 99%   BMI 31.18 kg/m²     Current Shift:  No intake/output data recorded. Last three shifts:  05/08 1901 - 05/10 0700  In: 4793 [P.O.:120;  I.V.:1823]  Out: 4125 [Urine:4125]    Exam:    General: elderly Wm alert, NAD, OX3  Head/Neck: NCAT, supple, No masses, No lymphadenopathy  CVS:Regular rate and rhythm, no M/R/G, S1/S2 heard, no thrill  Lungs:Clear to auscultation bilaterally, no wheezes, rhonchi, or rales  Abdomen: Soft, Nontender, No distention, Normal Bowel sounds, No hepatomegaly  Extremities: foot and leg dressed  Mccray, yellow urine  Neuro:grossly normal , follows commands  Psych:appropriate                Labs: Results:       Chemistry Recent Labs     05/10/21  0525 05/09/21  0325 05/08/21  0314   * 122* 119*    141 143   K 3.1* 3.1* 3.3*    106 108   CO2 28 28 29   BUN 38* 39* 38*   CREA 1.40* 1.88* 1.96*   CA 7.4* 7.3* 7.5*   AGAP 9 7 6   BUCR 27* 21* 19      CBC w/Diff Recent Labs     05/10/21  0525   WBC 7.9   RBC 2.78*   HGB 7.3*   HCT 23.4*      GRANS 77*   LYMPH 8*   EOS 2      Cardiac Enzymes No results for input(s): CPK, CKND1, LEONEL in the last 72 hours. No lab exists for component: CKRMB, TROIP   Coagulation No results for input(s): PTP, INR, APTT, INREXT in the last 72 hours. Lipid Panel No results found for: CHOL, CHOLPOCT, CHOLX, CHLST, CHOLV, 462697, HDL, HDLP, LDL, LDLC, DLDLP, 472799, VLDLC, VLDL, TGLX, TRIGL, TRIGP, TGLPOCT, CHHD, CHHDX   BNP No results for input(s): BNPP in the last 72 hours. Liver Enzymes No results for input(s): TP, ALB, TBIL, AP in the last 72 hours.     No lab exists for component: SGOT, GPT, DBIL   Thyroid Studies Lab Results   Component Value Date/Time    TSH 1.72 02/11/2018 02:19 AM        Procedures/imaging: see electronic medical records for all procedures/Xrays and details which were not copied into this note but were reviewed prior to creation of Winston Pugh MD

## 2021-05-10 NOTE — PROGRESS NOTES
Problem: Mobility Impaired (Adult and Pediatric)  Goal: *Acute Goals and Plan of Care (Insert Text)  Description: Physical Therapy Goals   Initiated 5/6/2021 and to be accomplished within 7 day(s)  1. Patient will move from supine <> sit with S in prep for out of bed activity and change of position. 2.  Patient will demonstrate sitting balance intact EOB without UE support /S for performance for ADL/ therapeutic exercise activity. 3.  Patient will perform transfers bed to chair via transfer board with min assist.      5/10/2021  PT treatment not completed due to:  [] Off Unit for testing/procedure  [] Pain  [] Eating  [] Patient too lethargic  [] Nausea/vomiting  [] Dialysis treatment in progress   [x] Other: Dr Karen Cabezas in with pt at this time. Will f/u at later time as pt schedule allows. Thank you.    Santi Nguyễn, PT

## 2021-05-10 NOTE — DIABETES MGMT
GLYCEMIC CONTROL PLAN OF CARE     Assessment/Recommendations:  Pt with h/o CKD stage 3, DM, HTN Anemia , CAD s/p stent admitted for left foot/heel cellulitis and osteomyelitis, scheduled to have foot debridement 5/11. Pt with good glycemic control PTA. He states he obtains his diabetes medications and supplies from [facility]. Most blood glucose readings are in target range with current Lantus and corrective lispro dosing. Most recent blood glucose values:  5/10:   142, 153  5/09:  183, 127, 156, 122, 121    Current A1C of 6.3 % is equivalent to average blood glucose of 134 mg/dl over the past 2-3 months. Current hospital diabetes medications:   15 units Lantus/day  Corrective lispro, normal insulin sensitivity ACHS    Previous day's insulin requirements:  19 units (15 units Lantus plus 4 units corrective lispro)    Home diabetes medications: (pt verified his insulin doses as below but does not know if he is taking Januvia)  Lantus 40 units/day  Humalog 10 units at breakfast and 15 units at lunch and dinner  Per chart - Januvia 50 mg/d    Diet:  DIET DIABETIC WITH OPTIONS - pt took 100% breakfast meal today per observation.   DIET NPO     Meal Intake:   Patient Vitals for the past 168 hrs:   % Diet Eaten   05/10/21 0630 0%   05/09/21 0824 76 - 100%   05/08/21 0821 76 - 100%   05/07/21 1215 51 - 75%   05/07/21 0958 76 - 100%   05/05/21 1425 76 - 100%   05/05/21 0924 76 - 100%     Education:  ____Refer to Diabetes Education Record             _x___Education not indicated at this time    Ivet Dumont, [address] [place] [address], [address] [place] [address]  Diabetes and Glycemic Control   Office:  [phone]  Pager:  [phone]

## 2021-05-10 NOTE — PROGRESS NOTES
Occult positive stool. H&H 7.3 & 23.4. Dr. Katha Denver made aware of occult stool and will consult GI. Patient slept through the night, denied pain and discomfort. Dressing to LLE CDI with splint intact, popliteal pulse present,  and +1 edema. Mccray draining brown, clear urine, but positional.  Vital signs stable, NSR, and afebrile. 0745 - Bedside and Verbal shift change report given to JOSE Mcneil RN (oncoming nurse) by Aniya Galdamez (offgoing nurse). Report included the following information SBAR, Kardex and Intake/Output.

## 2021-05-11 ENCOUNTER — APPOINTMENT (OUTPATIENT)
Dept: GENERAL RADIOLOGY | Age: 76
DRG: 464 | End: 2021-05-11
Attending: HOSPITALIST
Payer: MEDICARE

## 2021-05-11 ENCOUNTER — ANESTHESIA EVENT (OUTPATIENT)
Dept: SURGERY | Age: 76
DRG: 464 | End: 2021-05-11
Payer: MEDICARE

## 2021-05-11 ENCOUNTER — ANESTHESIA (OUTPATIENT)
Dept: SURGERY | Age: 76
DRG: 464 | End: 2021-05-11
Payer: MEDICARE

## 2021-05-11 ENCOUNTER — APPOINTMENT (OUTPATIENT)
Dept: GENERAL RADIOLOGY | Age: 76
DRG: 464 | End: 2021-05-11
Attending: ORTHOPAEDIC SURGERY
Payer: MEDICARE

## 2021-05-11 ENCOUNTER — APPOINTMENT (OUTPATIENT)
Dept: CT IMAGING | Age: 76
DRG: 464 | End: 2021-05-11
Attending: INTERNAL MEDICINE
Payer: MEDICARE

## 2021-05-11 LAB
ABO + RH BLD: NORMAL
ANION GAP SERPL CALC-SCNC: 7 MMOL/L (ref 3–18)
BACTERIA SPEC CULT: NORMAL
BACTERIA SPEC CULT: NORMAL
BASOPHILS # BLD: 0 K/UL (ref 0–0.1)
BASOPHILS # BLD: 0 K/UL (ref 0–0.1)
BASOPHILS NFR BLD: 0 % (ref 0–2)
BASOPHILS NFR BLD: 0 % (ref 0–2)
BLD PROD TYP BPU: NORMAL
BLD PROD TYP BPU: NORMAL
BLOOD GROUP ANTIBODIES SERPL: NORMAL
BPU ID: NORMAL
BPU ID: NORMAL
BUN SERPL-MCNC: 36 MG/DL (ref 7–18)
BUN/CREAT SERPL: 26 (ref 12–20)
CALCIUM SERPL-MCNC: 7.2 MG/DL (ref 8.5–10.1)
CALLED TO:,BCALL1: NORMAL
CALLED TO:,BCALL2: NORMAL
CHLORIDE SERPL-SCNC: 110 MMOL/L (ref 100–111)
CO2 SERPL-SCNC: 29 MMOL/L (ref 21–32)
CREAT SERPL-MCNC: 1.4 MG/DL (ref 0.6–1.3)
CROSSMATCH RESULT,%XM: NORMAL
CROSSMATCH RESULT,%XM: NORMAL
DIFFERENTIAL METHOD BLD: ABNORMAL
DIFFERENTIAL METHOD BLD: ABNORMAL
EOSINOPHIL # BLD: 0.2 K/UL (ref 0–0.4)
EOSINOPHIL # BLD: 0.2 K/UL (ref 0–0.4)
EOSINOPHIL NFR BLD: 3 % (ref 0–5)
EOSINOPHIL NFR BLD: 3 % (ref 0–5)
ERYTHROCYTE [DISTWIDTH] IN BLOOD BY AUTOMATED COUNT: 19.6 % (ref 11.6–14.5)
ERYTHROCYTE [DISTWIDTH] IN BLOOD BY AUTOMATED COUNT: 19.6 % (ref 11.6–14.5)
GLUCOSE BLD STRIP.AUTO-MCNC: 104 MG/DL (ref 70–110)
GLUCOSE BLD STRIP.AUTO-MCNC: 120 MG/DL (ref 70–110)
GLUCOSE BLD STRIP.AUTO-MCNC: 123 MG/DL (ref 70–110)
GLUCOSE BLD STRIP.AUTO-MCNC: 200 MG/DL (ref 70–110)
GLUCOSE SERPL-MCNC: 107 MG/DL (ref 74–99)
H PYLORI IGA SER-ACNC: <9 UNITS (ref 0–8.9)
H PYLORI IGG SER IA-ACNC: 0.62 INDEX VALUE (ref 0–0.79)
HCT VFR BLD AUTO: 21.6 % (ref 36–48)
HCT VFR BLD AUTO: 22.2 % (ref 36–48)
HGB BLD-MCNC: 6.7 G/DL (ref 13–16)
HGB BLD-MCNC: 6.9 G/DL (ref 13–16)
HISTORY CHECKED?,CKHIST: NORMAL
LYMPHOCYTES # BLD: 0.5 K/UL (ref 0.9–3.6)
LYMPHOCYTES # BLD: 0.5 K/UL (ref 0.9–3.6)
LYMPHOCYTES NFR BLD: 8 % (ref 21–52)
LYMPHOCYTES NFR BLD: 9 % (ref 21–52)
MCH RBC QN AUTO: 26.3 PG (ref 24–34)
MCH RBC QN AUTO: 26.5 PG (ref 24–34)
MCHC RBC AUTO-ENTMCNC: 31 G/DL (ref 31–37)
MCHC RBC AUTO-ENTMCNC: 31.1 G/DL (ref 31–37)
MCV RBC AUTO: 84.7 FL (ref 74–97)
MCV RBC AUTO: 85.4 FL (ref 74–97)
MONOCYTES # BLD: 0.6 K/UL (ref 0.05–1.2)
MONOCYTES # BLD: 0.6 K/UL (ref 0.05–1.2)
MONOCYTES NFR BLD: 11 % (ref 3–10)
MONOCYTES NFR BLD: 12 % (ref 3–10)
NEUTS SEG # BLD: 3.9 K/UL (ref 1.8–8)
NEUTS SEG # BLD: 4.2 K/UL (ref 1.8–8)
NEUTS SEG NFR BLD: 75 % (ref 40–73)
NEUTS SEG NFR BLD: 75 % (ref 40–73)
PLATELET # BLD AUTO: 255 K/UL (ref 135–420)
PLATELET # BLD AUTO: 269 K/UL (ref 135–420)
PMV BLD AUTO: 10.4 FL (ref 9.2–11.8)
PMV BLD AUTO: 10.4 FL (ref 9.2–11.8)
POTASSIUM SERPL-SCNC: 3.6 MMOL/L (ref 3.5–5.5)
RBC # BLD AUTO: 2.55 M/UL (ref 4.35–5.65)
RBC # BLD AUTO: 2.6 M/UL (ref 4.35–5.65)
SERVICE CMNT-IMP: NORMAL
SERVICE CMNT-IMP: NORMAL
SODIUM SERPL-SCNC: 146 MMOL/L (ref 136–145)
SPECIMEN EXP DATE BLD: NORMAL
STATUS OF UNIT,%ST: NORMAL
STATUS OF UNIT,%ST: NORMAL
UNIT DIVISION, %UDIV: 0
UNIT DIVISION, %UDIV: 0
WBC # BLD AUTO: 5.2 K/UL (ref 4.6–13.2)
WBC # BLD AUTO: 5.6 K/UL (ref 4.6–13.2)

## 2021-05-11 PROCEDURE — 74011000250 HC RX REV CODE- 250: Performed by: HOSPITALIST

## 2021-05-11 PROCEDURE — 86901 BLOOD TYPING SEROLOGIC RH(D): CPT

## 2021-05-11 PROCEDURE — C9113 INJ PANTOPRAZOLE SODIUM, VIA: HCPCS | Performed by: HOSPITALIST

## 2021-05-11 PROCEDURE — 36415 COLL VENOUS BLD VENIPUNCTURE: CPT

## 2021-05-11 PROCEDURE — 80048 BASIC METABOLIC PNL TOTAL CA: CPT

## 2021-05-11 PROCEDURE — 74011000250 HC RX REV CODE- 250: Performed by: ANESTHESIOLOGY

## 2021-05-11 PROCEDURE — 74011000250 HC RX REV CODE- 250: Performed by: INTERNAL MEDICINE

## 2021-05-11 PROCEDURE — 74174 CTA ABD&PLVS W/CONTRAST: CPT

## 2021-05-11 PROCEDURE — 77010033678 HC OXYGEN DAILY

## 2021-05-11 PROCEDURE — 36430 TRANSFUSION BLD/BLD COMPNT: CPT

## 2021-05-11 PROCEDURE — 74011250637 HC RX REV CODE- 250/637: Performed by: FAMILY MEDICINE

## 2021-05-11 PROCEDURE — 74011000636 HC RX REV CODE- 636: Performed by: HOSPITALIST

## 2021-05-11 PROCEDURE — 74011250636 HC RX REV CODE- 250/636: Performed by: INTERNAL MEDICINE

## 2021-05-11 PROCEDURE — P9016 RBC LEUKOCYTES REDUCED: HCPCS

## 2021-05-11 PROCEDURE — 74011250637 HC RX REV CODE- 250/637: Performed by: UROLOGY

## 2021-05-11 PROCEDURE — 82962 GLUCOSE BLOOD TEST: CPT

## 2021-05-11 PROCEDURE — 74011250637 HC RX REV CODE- 250/637: Performed by: HOSPITALIST

## 2021-05-11 PROCEDURE — 74011636637 HC RX REV CODE- 636/637: Performed by: HOSPITALIST

## 2021-05-11 PROCEDURE — 74011250636 HC RX REV CODE- 250/636: Performed by: HOSPITALIST

## 2021-05-11 PROCEDURE — 74011250637 HC RX REV CODE- 250/637: Performed by: INTERNAL MEDICINE

## 2021-05-11 PROCEDURE — 86923 COMPATIBILITY TEST ELECTRIC: CPT

## 2021-05-11 PROCEDURE — 85025 COMPLETE CBC W/AUTO DIFF WBC: CPT

## 2021-05-11 PROCEDURE — 65270000029 HC RM PRIVATE

## 2021-05-11 PROCEDURE — 71045 X-RAY EXAM CHEST 1 VIEW: CPT

## 2021-05-11 PROCEDURE — 74011250636 HC RX REV CODE- 250/636: Performed by: ORTHOPAEDIC SURGERY

## 2021-05-11 RX ORDER — FUROSEMIDE 10 MG/ML
20 INJECTION INTRAMUSCULAR; INTRAVENOUS ONCE
Status: COMPLETED | OUTPATIENT
Start: 2021-05-11 | End: 2021-05-11

## 2021-05-11 RX ORDER — SODIUM CHLORIDE 9 MG/ML
250 INJECTION, SOLUTION INTRAVENOUS AS NEEDED
Status: DISCONTINUED | OUTPATIENT
Start: 2021-05-11 | End: 2021-05-22 | Stop reason: SDUPTHER

## 2021-05-11 RX ORDER — IPRATROPIUM BROMIDE AND ALBUTEROL SULFATE 2.5; .5 MG/3ML; MG/3ML
3 SOLUTION RESPIRATORY (INHALATION)
Status: DISCONTINUED | OUTPATIENT
Start: 2021-05-11 | End: 2021-05-25 | Stop reason: HOSPADM

## 2021-05-11 RX ORDER — SODIUM CHLORIDE 9 MG/ML
150 INJECTION, SOLUTION INTRAVENOUS ONCE
Status: COMPLETED | OUTPATIENT
Start: 2021-05-11 | End: 2021-05-11

## 2021-05-11 RX ORDER — SODIUM CHLORIDE, SODIUM LACTATE, POTASSIUM CHLORIDE, CALCIUM CHLORIDE 600; 310; 30; 20 MG/100ML; MG/100ML; MG/100ML; MG/100ML
125 INJECTION, SOLUTION INTRAVENOUS CONTINUOUS
Status: DISCONTINUED | OUTPATIENT
Start: 2021-05-11 | End: 2021-05-12

## 2021-05-11 RX ORDER — SODIUM CHLORIDE 9 MG/ML
100 INJECTION, SOLUTION INTRAVENOUS CONTINUOUS
Status: DISCONTINUED | OUTPATIENT
Start: 2021-05-11 | End: 2021-05-12

## 2021-05-11 RX ORDER — IPRATROPIUM BROMIDE AND ALBUTEROL SULFATE 2.5; .5 MG/3ML; MG/3ML
3 SOLUTION RESPIRATORY (INHALATION)
Status: COMPLETED | OUTPATIENT
Start: 2021-05-11 | End: 2021-05-11

## 2021-05-11 RX ORDER — ALBUTEROL SULFATE 0.83 MG/ML
2.5 SOLUTION RESPIRATORY (INHALATION)
Status: CANCELLED | OUTPATIENT
Start: 2021-05-11 | End: 2021-05-12

## 2021-05-11 RX ADMIN — FERROUS SULFATE TAB 325 MG (65 MG ELEMENTAL FE) 325 MG: 325 (65 FE) TAB at 09:31

## 2021-05-11 RX ADMIN — SODIUM CHLORIDE 40 MG: 9 INJECTION INTRAMUSCULAR; INTRAVENOUS; SUBCUTANEOUS at 21:51

## 2021-05-11 RX ADMIN — ACETAMINOPHEN 1000 MG: 500 TABLET ORAL at 23:27

## 2021-05-11 RX ADMIN — INSULIN GLARGINE 15 UNITS: 100 INJECTION, SOLUTION SUBCUTANEOUS at 09:26

## 2021-05-11 RX ADMIN — FERROUS SULFATE TAB 325 MG (65 MG ELEMENTAL FE) 325 MG: 325 (65 FE) TAB at 18:25

## 2021-05-11 RX ADMIN — MEROPENEM 1 G: 1 INJECTION, POWDER, FOR SOLUTION INTRAVENOUS at 21:52

## 2021-05-11 RX ADMIN — SODIUM CHLORIDE 100 ML/HR: 900 INJECTION, SOLUTION INTRAVENOUS at 18:26

## 2021-05-11 RX ADMIN — SODIUM CHLORIDE 40 MG: 9 INJECTION INTRAMUSCULAR; INTRAVENOUS; SUBCUTANEOUS at 09:25

## 2021-05-11 RX ADMIN — TAMSULOSIN HYDROCHLORIDE 0.4 MG: 0.4 CAPSULE ORAL at 09:26

## 2021-05-11 RX ADMIN — METOPROLOL SUCCINATE 50 MG: 50 TABLET, EXTENDED RELEASE ORAL at 16:25

## 2021-05-11 RX ADMIN — INSULIN LISPRO 4 UNITS: 100 INJECTION, SOLUTION INTRAVENOUS; SUBCUTANEOUS at 21:51

## 2021-05-11 RX ADMIN — POTASSIUM CHLORIDE 40 MEQ: 1500 TABLET, EXTENDED RELEASE ORAL at 21:51

## 2021-05-11 RX ADMIN — SODIUM CHLORIDE 150 ML/HR: 900 INJECTION, SOLUTION INTRAVENOUS at 12:06

## 2021-05-11 RX ADMIN — WATER 1 MG: 1 INJECTION INTRAMUSCULAR; INTRAVENOUS; SUBCUTANEOUS at 04:24

## 2021-05-11 RX ADMIN — VANCOMYCIN HYDROCHLORIDE 1250 MG: 10 INJECTION, POWDER, LYOPHILIZED, FOR SOLUTION INTRAVENOUS at 21:51

## 2021-05-11 RX ADMIN — MEROPENEM 1 G: 1 INJECTION, POWDER, FOR SOLUTION INTRAVENOUS at 04:24

## 2021-05-11 RX ADMIN — SODIUM CHLORIDE, SODIUM LACTATE, POTASSIUM CHLORIDE, AND CALCIUM CHLORIDE 125 ML/HR: 600; 310; 30; 20 INJECTION, SOLUTION INTRAVENOUS at 16:09

## 2021-05-11 RX ADMIN — IPRATROPIUM BROMIDE AND ALBUTEROL SULFATE 3 ML: .5; 3 SOLUTION RESPIRATORY (INHALATION) at 16:38

## 2021-05-11 RX ADMIN — IOPAMIDOL 80 ML: 755 INJECTION, SOLUTION INTRAVENOUS at 05:26

## 2021-05-11 RX ADMIN — ATORVASTATIN CALCIUM 40 MG: 20 TABLET, FILM COATED ORAL at 21:51

## 2021-05-11 RX ADMIN — FUROSEMIDE 20 MG: 10 INJECTION, SOLUTION INTRAMUSCULAR; INTRAVENOUS at 18:25

## 2021-05-11 RX ADMIN — POTASSIUM CHLORIDE 40 MEQ: 1500 TABLET, EXTENDED RELEASE ORAL at 09:26

## 2021-05-11 NOTE — PROGRESS NOTES
Attempted OT tx session at 1427. Pt currently receiving blood transfusion. Will follow up as schedule permits.     Kristen Louis MS OTR/L

## 2021-05-11 NOTE — PROGRESS NOTES
Vancomycin - Pharmacy to Dose  Patient currently on Vancomycin 1250 mg IV q24hrs. Trough level 17.8 at 20:08 today ==> Therapeutic   No Change.   CrCl 52.5 ml/min  WBC 7.9  Pharmacy will continue to monitor and adjust.

## 2021-05-11 NOTE — PROGRESS NOTES
[facility]                         (A Division of [address] [place] [address])                                                                                                                       Evelyne Daniel MD  Work Cell #: [phone]( Mon-Fri, 7am-5pm)  If no call or text back within 30 minutes from me, please call office number. (Prefer text when possible)  Office #:     21  #[phone]   Office Fax: [phone]     Date of Admission: 5/4/2021   Date of Service:  5/11/2021  Reason for FU : left ankle OM, fever    Current Antimicrobials:    Prior Antimicrobials:  Meropenem 1 gm Q12 5/6 to date  Vancomycin 5/6 to dtae  ·    Zithromax 500mg IV- 4/14 to 4/20  · Vancomycin IV 4/14  until 4/19  · Zosyn 2.25gm IV Q6 4/14 to 4/15   · Meropenem IV 4/15-4/20  Unasyn 3 gm Q6 hour 4/20 to 4/22/21  Ertapenem 1gm IV q24 4/23 to date  -- reduced dose to 500 mg 5/5       Assessment: Rec / Plan:   · Fever: Improved, last record >102 on 5/8/21       DDX: urine source Vs worsening of left ankle OM      or abscess Vs line. No respiratory complaint and no      rash noted. · Left Infected DM Ulcer and acute OM and necrotic tissue, punched out heel ulcer with Mixed infection- MSSA +E.coli + anaerobes - 4/14/21  · Post I and D of left heel bone/abscess 4/17, OR cultures remain sterile  · Post I and D with graft to heel- 4/21  CRP 8.9-> 5.8->3.8( 4/22/21) as inpatient   CRP 15-> 34 as OP  ESR 78-> 63   · Acute renal failure 2/2 urinary retention, caal in place. Drug related IN in DDX but less likely       (urine eos negative): Improving  · Left heel ankle fracture 2/2 fall: on this admission    · Anemia- HB 6.2  · Hematuria- off anticoagulant    Other Medical Issues followed and managed by primary and other services  · DM- Poorly controlled  · Low Vitamin D  · CAD with CHF/ NSTMI.  Atrial flutter  · CKD  · Hyperlipidemia       Past ID issues: N/A  -History of COVID 19 infection 4/2021 MRI of left heel-- abn with fluid collection. I and D today. Please send of panculture- aerobic/anaerobic. Thanks. Cont meropenem and Vanco. Follow Cr closely while on vanco. Vanco level on target    Further ABX adjustment based on OR finding/cultures    Will follow                Condition: Hemodynamically stable. Persistent fever    Subjective: \" Will I have food after surgery? \"    Appears tired, no new complaint. NPO for procedure today  PRBC transfusing    Denies fever or chills  Denies N/V/abd pain. Denies flank pain. No diarrhea. No itching or rash    Continues with caal- urine in bag with tea colored urine  Urology seen pt over weekend- note reviewed  GI on board for Anemia work up       Review of System:  See above, other wise negative     Summary:    Mario Coker. is 69 y/o WM with PMH as listed below- with initial consult on 4/15/21. Last seen by me on 4/22/21 prior to discharge. He was set up at [facility] infusion center for daily ertapenem.     Yesterday, I was notified by RN that he fell, hurt his left foot, and his PICC was not working and they infused him via PIV and sent him to ED.     On arrival to ED, was noted to be in acute renal failure, and with new left ankle fracture.     Wound care note with the picture reviewed.     Pt denies F/C/R. Denies SOB. Feels weak, appetite and po intake has decreased past many days per wife.      Patient Active Problem List   Diagnosis Code    Atrial flutter ([facility]) I48.92    DM2 (diabetes mellitus, type 2) ([facility]) E11.9    CAD (coronary artery disease) I25.10    ELINOR (acute kidney injury) ([facility] N17.9    CKD (chronic kidney disease) stage 3, GFR 30-59 ml/min ([facility]) N18.30    Elevated brain natriuretic peptide (BNP) level R79.89    Diabetic ulcer of left foot ([facility]) E11.621, L97.529    COVID-19 virus infection U07.1    Acute osteomyelitis ([facility] M86.10    Acute hematogenous osteomyelitis of left foot ([facility]) M86.072    Cellulitis of left foot L03. 116    Diabetic foot ulcer with osteomyelitis ([facility] E11.621, E11.69, L97.509, M86.9    Ulcer of left heel, with necrosis of bone ([facility]) L97.424    [facility]el[facility] M86.9    Trimalleolar fracture of ankle, closed, left, initial encounter S82.852A    Displacement of peripherally inserted central catheter (PICC) ([facility] T82.524A       Current Facility-Administered Medications   Medication Dose Route Frequency    0.9% sodium chloride infusion 250 mL  250 mL IntraVENous PRN    0.9% sodium chloride infusion  100 mL/hr IntraVENous CONTINUOUS    potassium chloride (K-DUR, KLOR-CON) SR tablet 40 mEq  40 mEq Oral BID    ferrous sulfate tablet 325 mg  1 Tab Oral BID WITH MEALS    pantoprazole (PROTONIX) 40 mg in 0.9% sodium chloride 10 mL injection  40 mg IntraVENous Q12H    meropenem (MERREM) 1 g in sterile water (preservative free) 20 mL IV syringe  1 g IntraVENous Q8H    vancomycin (VANCOCIN) 1250 mg in  ml infusion  1,250 mg IntraVENous Q24H    metoprolol succinate (TOPROL-XL) XL tablet 50 mg  50 mg Oral DAILY    tamsulosin (FLOMAX) capsule 0.4 mg  0.4 mg Oral DAILY    Vancomycin- Pharmacy to dose  1 Each Other Rx Dosing/Monitoring    heparin (porcine) 100 unit/mL injection 500 Units  500 Units InterCATHeter Q8H PRN    [Held by provider] apixaban (ELIQUIS) tablet 2.5 mg  2.5 mg Oral BID    acetaminophen (TYLENOL) tablet 1,000 mg  1,000 mg Oral Q8H PRN    albuterol-ipratropium (DUO-NEB) 2.5 MG-0.5 MG/3 ML  3 mL Nebulization Q4H PRN    [Held by provider] aspirin delayed-release tablet 81 mg  81 mg Oral DAILY    atorvastatin (LIPITOR) tablet 40 mg  40 mg Oral QHS    [Held by provider] clopidogreL (PLAVIX) tablet 75 mg  75 mg Oral DAILY    insulin glargine (LANTUS) injection 15 Units  15 Units SubCUTAneous DAILY    glucose chewable tablet 16 g  16 g Oral PRN    glucagon (GLUCAGEN) injection 1 mg  1 mg IntraMUSCular PRN    dextrose (D50W) injection syrg 25 g  50 mL IntraVENous PRN    insulin lispro (HUMALOG) injection   SubCUTAneous AC&HS    morphine injection 2 mg  2 mg IntraVENous Q4H PRN       Objective:    Visit Vitals  BP (!) 150/58 (BP 1 Location: Left upper arm, BP Patient Position: Supine)   Pulse 96   Temp 98.7 °F (37.1 °C)   Resp 16   Ht 5' 7.99\" (1.727 m)   Wt 95 kg (209 lb 8 oz)   SpO2 99%   BMI 31.86 kg/m²       Temp (24hrs), Av.7 °F (37.1 °C), Min:98.3 °F (36.8 °C), Max:99.2 °F (37.3 °C)    Right PICC - replaced over wire     GEN: WD chronically sick looking , not in resp distress. HEENT: Unicteric. EOMI intact  CHEST: Non laboured breathing. CTA  CVS:RRR, no mur/gallop  ABD: Obese/soft. Non tender. NABS  LISET: caal in place- with tea colored urine  EXT: Left LE is splinted. No redness/ creps to palpable soft tissue. D/w Wound care about her exam yesterday  Skin: Dry and intact. No rash, no redness. CNS: A, OX3. Moves all extremity. CN grossly ok. Microbiology:    Blood culture: - 2 sets: NGSF    - 2 sets: NG  : 1 set: NGSF     Urine culture:  - urine culture: NG     Body Fluid/ CSF/drainage culture:  - gram stain: GNR and GPR        --wound culture+ s.aurues and E.coli + anaerobe   -- OR culture: NG    Lab results:    Chemistry  Recent Labs     21  0454 05/10/21  0525 21  0325   * 115* 122*   * 144 141   K 3.6 3.1* 3.1*    107 106   CO2 29 28 28   BUN 36* 38* 39*   CREA 1.40* 1.40* 1.88*   CA 7.2* 7.4* 7.3*   AGAP 7 9 7   BUCR 26* 27* 21*       CBC w/ Diff  Recent Labs     21  0822 21  0454 05/10/21  1525 05/10/21  0525   WBC 5.6 5.2  --  7.9   RBC 2.60* 2.55*  --  2.78*   HGB 6.9* 6.7* 7.5* 7.3*   HCT 22.2* 21.6* 22.8* 23.4*    255  --  249   GRANS 75* 75*  --  77*   LYMPH 8* 9*  --  8*   EOS 3 3  --  2     Imagin/5- CXR     1. Right PICC line tip retracted from optimal/appropriate position, projecting  over the right subclavian vein. 2. Mild bibasilar atelectasis.    5/5/21  US renal  1. Considerable urinary bladder fluid distention (approximately 2.1 L) with  likely reactive pelviectasis.      > Results called to [address] [place] [address] unit as patient 2 likely benefit from  urinary bladder catheterization.     5/4: right humerus  No acute osseous abnormality or malalignment involving the right humerus. 5/7: MRI of left foot:    No acute osseous abnormality or malalignment involving the right humerus.      Total duration of time spent with patient interview and exam and discussion of plan of care, review of chart in care everywhere, discussion with medical staff- nursing/attending and additional specialities as indicated:  25 minutes

## 2021-05-11 NOTE — ANESTHESIA PREPROCEDURE EVALUATION
Relevant Problems   CARDIOVASCULAR   (+) Atrial flutter (HCC)   (+) CAD (coronary artery disease)      RENAL FAILURE   (+) ELINOR (acute kidney injury) (HCC)   (+) CKD (chronic kidney disease) stage 3, GFR 30-59 ml/min (HCC)      ENDOCRINE   (+) DM2 (diabetes mellitus, type 2) (HCC)   (+) Diabetic foot ulcer with osteomyelitis (HCC)      HEMATOLOGY   (+) Acute hematogenous osteomyelitis of left foot (HCC)   (+) Acute osteomyelitis (HCC)   (+) Osteomyelitis (HCC)       Anesthetic History   No history of anesthetic complications            Review of Systems / Medical History  Patient summary reviewed, nursing notes reviewed and pertinent labs reviewed    Pulmonary            Asthma        Neuro/Psych   Within defined limits           Cardiovascular    Hypertension        Dysrhythmias   CAD and cardiac stents         GI/Hepatic/Renal  Within defined limits              Endo/Other    Diabetes         Other Findings              Physical Exam    Airway  Mallampati: III  TM Distance: 4 - 6 cm  Neck ROM: normal range of motion        Cardiovascular               Dental      Comments: Poor dentition   Pulmonary        Wheezes:bilateral         Abdominal        Comments: Will give nebulizer tx pre-op Other Findings            Anesthetic Plan    ASA: 3  Anesthesia type: general          Induction: Intravenous      In pre-op pt had diffuse bilateral wheezing. Pt in NAD and SpO2 96% on 2l NC. Lungs not clear post tx; + bilateral rhonchi. Discussed w/ Dr. Julio Springer and decision made to delay surgery for further optimization.     Spoke with Dr. Francis Hobbs, as lungs were documented as CTA this AM.

## 2021-05-11 NOTE — PROGRESS NOTES
Bedside and Verbal shift change report given to [address] (oncoming nurse) by ELIEZER Silverio (offgoing nurse). Report included the following information SBAR, Kardex, Intake/Output, MAR and Recent Results. Shift Summary-   Patient Vitals for the past 12 hrs:   Temp Pulse Resp BP SpO2   05/12/21 0352 98.8 °F (37.1 °C) 91 16 (!) 128/55 99 %   05/11/21 2320 (!) 101.1 °F (38.4 °C) 95 18 137/62 92 %   05/11/21 1916 98.2 °F (36.8 °C) 87 18 (!) 149/55 97 %   05/11/21 1743 98.8 °F (37.1 °C) 90 20 (!) 153/62 98 %   Pt given PRN tylenol x1 overnight for temp of 101.1    Bedside and Verbal shift change report given to ELIEZER Silverio (oncoming nurse) by [address] (offgoing nurse). Report included the following information SBAR, Kardex, Intake/Output, MAR and Recent Results.

## 2021-05-11 NOTE — PROGRESS NOTES
Pt seems oriented but still easily confused. Discussed surgery for washout, possible ORIF vs ex fix. He agrees to proceed. Will also get new cultures from heel. JENAE Ruiz    ADDENDUM:  In Pre op pt found to be wheezing, possible fluid overload. Afebrile. May need to delay surgery until Thursday or Friday to medically optimize.      Joshua Etienne MD  [address] [place] [address]

## 2021-05-11 NOTE — PROGRESS NOTES
Problem: Falls - Risk of  Goal: *Absence of Falls  Description: Document Jesu Wiseman Fall Risk and appropriate interventions in the flowsheet.   Outcome: Progressing Towards Goal  Note: Fall Risk Interventions:  Mobility Interventions: Patient to call before getting OOB, Strengthening exercises (ROM-active/passive)    Mentation Interventions: Adequate sleep, hydration, pain control, Reorient patient    Medication Interventions: Teach patient to arise slowly, Patient to call before getting OOB    Elimination Interventions: Bed/chair exit alarm, Patient to call for help with toileting needs, Call light in reach, Toileting schedule/hourly rounds    History of Falls Interventions: Room close to nurse's station

## 2021-05-11 NOTE — PROGRESS NOTES
Bedside and Verbal shift change report given to VALERIA Willingham (oncoming nurse) by  Swati Cho RN (offgoing nurse).  Report included the following information SBAR, Kardex, Intake/Output, MAR, Recent Results

## 2021-05-11 NOTE — CONSULTS
[address] [facility]    Name:  Adilia Quinn  MR#:   [id number]  :  1945  ACCOUNT #:  [de-identified]  DATE OF SERVICE:  05/10/2021      HISTORY OF PRESENT ILLNESS:  This is a 70-year-old male who admitted on 2021, because of trimalleolar fracture on the right side after falling. The patient was already admitted recently for left heel cellulitis and osteomyelitis with gangrenous ulcer requiring incision, debridement, and drainage. He also was admitted recently for COVID-19 infection. He is known to be obese and having diabetes, hypertension, coronary artery disease, atrial fibrillation, and anticoagulated. He has asthma. The patient is also known to have recurring severe iron deficiency anemia. In fact in 2018, he underwent colonoscopy because hemoglobin was 3.5. There were two small 4-mm sessile polyps in the sigmoid that were adenomas, mild sigmoid diverticulosis, but nothing else to explain the anemia. His EGD was done on 2021, showed a small linear erosive esophagitis in two locations in the distal esophagus, slightly irregular Z-line, and a 1.5-cm hiatal hernia. The H. pylori came back negative. CT scan of the abdomen and pelvis at that time showed that he has very large right inguinal hernia extending into the right scrotal sac containing proximal colon and terminal ileum, mild-to-moderate bilateral hydronephrosis and hydroureter down to the pelvis secondary to markedly distended bladder, small bilateral effusions, and a small hiatal hernia. The patient had a capsule enteroscopy at the office on 2018, that was completely normal.    The patient denies taking NSAIDs. MEDICATIONS AT HOME:  1.  Omeprazole 20 mg daily. 2.  Januvia 50 mg.  3.  Ertapenem 1 g IV every 24 hours. 4.  Aldactone 25 mg half a tablet daily. 5.  Humalog 10-15 units subcutaneously three times a day with meals.   6.  Eliquis 5 mg b.i.d.  7.  Aspirin 81 mg.  8.  Lipitor 40.  9. Plavix 75 mg. 10.  Lasix 40. 11.  Lantus 40 units. 12.  Cozaar 25 mg. 13.  Toprol-XL 50 mg daily. SOCIAL HISTORY:  He used to drink heavily, but quit many years ago, at least 3, and he used to smoke, but quit about 20 years ago. Lately, he has been very sedentary, he was limited in his activity because of his foot ulcer. He claims that he has a stent in his heart. However, he denies having CVA. He has been diabetic for many years. He is also with mild chronic kidney disease with creatinine of around 1.40, but on arrival, his creatinine was 3.78 and BUN was 56. Presently, they are 38 and 1.40 respectively. The patient denied having any dyspepsia, heartburns, nausea, vomiting, or dysphagia, but I feel that he is a poor historian. He claims that he has some tenderness due to being constipated. He denies having any rectal bleeding or melena. He has poor memory and he is disoriented to person. PHYSICAL EXAMINATION:  GENERAL:  The patient appears to be shortness of breath on the slightest exertion. VITAL SIGNS:  Weight is 212 pounds, but looks like much heavier than this, temperature 98.3, pulse , blood pressure 142/56, breathing 18, and saturation 98% on nasal cannula at 2 L per minute. SKIN:  Normal.  HEENT:  The eyes are remarkable for pale conjunctivae. The sclerae are anicteric. The pupils are equal and reactive to light. The sclerae are anicteric. The oropharyngeal cavity has dry and pale mucous membranes. Normal teeth. NECK:  Supple. No palpable mass. No enlarged thyroids. LUNGS:  Clear to auscultation. HEART:  The cardiac rhythm is irregular. He has atrial fibrillation. ABDOMEN:  The abdomen is huge, bulgy with a large right inguinal hernia extending into the scrotum. EXTREMITIES:  He has bilateral leg edema, but he has a dressing on his right ankle, and it is bandaged. GENITOURINARY:  He has a Mccray catheter that is draining very dark urine.   NEUROLOGIC:  The patient is alert and oriented x2, but disoriented to person. He could not tell me who was the president, he told it was Trump or Obama. He moves his four extremities, but he is slow and he has some poor memories. LABORATORY DATA:  His hemoglobin is 7.5, and it was 6.2 on arrival.  Normal MCV and MCH. Normal WBCs, platelets, and differential.  Occult blood in his stools was positive. BUN and creatinine were 5.36 and 3.78, but now, 38 and 1.40. Ultrasound of the abdomen showed urinary bladder, fluid distention about 2.1 L. In conclusion, this is a 72-year-old male who is on multiple anticoagulants including Eliquis, Plavix, and aspirin, had a recent fracture of the right ankle, presented with a drop in his hemoglobin to 6.7. We know in the past that he had the same problem and his hemoglobin at that time was 3.5. We are not sure where he is bleeding from. I do not think there is a need to repeat his colonoscopy, but later, we may benefit from an EGD. For now, the patient has so many other problems including the fracture, acute-on-chronic kidney disease with significant distention of his bladder that needs to be addressed as possibly. Once he is more stable, we can consider doing an upper endoscopy just to be on the safe side, although the last time, there was not much there. I recommended in this case to repeat his CT scan of the abdomen and pelvis without the contrast because of the chronic kidney disease.     Sincerely,      MD KENJI Vaca/[email or web address]  D:  05/10/2021 19:48  T:  05/10/2021 22:54  JOB #:  [id number]  CC:  Claudio Watkins MD

## 2021-05-11 NOTE — PROGRESS NOTES
Hospitalist Progress Note    Patient: Trenton Gee. MRN: [id number]  CSN: [id number]    YOB: 1945  Age: 70 y.o. Sex: male    DOA: 5/4/2021 LOS:  LOS: 7 days          Chief Complaint:          Assessment/Plan     Anemia -severe  Hemoccult came back positive  PPI IV  Appreciate GI consult-possible EGD after orthopedic surgery completed, watch Hgb  Transfuse PRBC today  Hx of GI bleeding and anemia  Daily CBC     Severe iron deficiency, s/p venofer, started PO iron     Trimalleolar fracture -  Not a surgical candidate given osteomyelitis. Podiatry recommended splint. Splint placed in ER. Seen by ortho, he is to get wash out today with Dr Agueda Stokes     Osteomyelitis left heel with cellulitis, gangrenous ulcer with deep abscess tracking along the plantar fascia -  Status post incision and drainage lower extremity left heel debridement and antibiotic bead placement   S/p removal of antibiotic beads and graft open wound. On last admission  Was discharged on ertapenem. Antibiotics switched to meropenem, vancomycin here now     ELINOR on CKD stage 3-improved  Renal US with urinary bladder distension.   Caal placed  IVF hydration     Urinary retention -  S/p caal  Hematuria likely traumatic, RESUME AC after surgery only if GI issues are ok, otherwise continue to hold     HTN -  On toprol     Hypokalemia-repeltion PO, daily BMP     Diabetes mellitus -  Continue with lantus,  SSI.      CAD -  No anginal symptoms,  aspirin and plavix on hold due to hematuria. and now planned surgery     A-flutter -  Resume eliquis when stable    Supportive care  Will need rehab after medically stable    Daily labs  Watch for GI bleeding  Hold blood thinners for now     Disposition :  Patient Active Problem List   Diagnosis Code    Atrial flutter ([facility] [address] I48.92    DM2 (diabetes mellitus, type 2) ([address] E11.9    CAD (coronary artery disease) I25.10    ELINOR (acute kidney injury) ([address] N17.9    CKD (chronic kidney disease) stage 3, GFR 30-59 ml/min ([facility]) N18.30    Elevated brain natriuretic peptide (BNP) level R79.89    Diabetic ulcer of left foot ([facility]) E11.621, L97.529    COVID-19 virus infection U07.1    Acute osteomyelitis (HCC) M86.10    Acute hematogenous osteomyelitis of left foot ([facility]) M86.072    Cellulitis of left foot L03. 116    Diabetic foot ulcer with osteomyelitis ([address] E11.621, E11.69, L97.509, M86.9    Ulcer of left heel, with necrosis of bone ([facility]) L97.424    Osteomyelitis ([facility] [address] M86.9    Trimalleolar fracture of ankle, closed, left, initial encounter S82.852A    Displacement of peripherally inserted central catheter (PICC) ([facility] [address] T82.524A       Subjective:    I am ok  Denies pain, SOB, Chest pain  Watching the price is right on tv  No new problems reported    Review of systems:    Constitutional: denies fevers, chills  Respiratory: denies SOB, cough  Cardiovascular: denies chest pain  Gastrointestinal: denies nausea, vomiting, diarrhea      Vital signs/Intake and Output:  Visit Vitals  /62   Pulse (!) 106   Temp 98.6 °F (37 °C)   Resp 18   Ht 5' 7.99\" (1.727 m)   Wt 95 kg (209 lb 8 oz)   SpO2 92%   BMI 31.86 kg/m²     Current Shift:  No intake/output data recorded.   Last three shifts:  05/09 1901 - 05/11 0700  In: 4467 [I.V.:717]  Out: 4775 [Urine:4775]    Exam:    General:debilitated elderly Wm, alert, NAD, OX3  CVS:Regular rate and rhythm, no M/R/G, S1/S2 heard, no thrill  Lungs:Clear to auscultation bilaterally, no wheezes, rhonchi, or rales  Abdomen: Soft, Nontender, No distention, Normal Bowel sounds, No hepatomegaly  Extremities: LLe dressed  Neuro:grossly normal , follows commands  Psych:appropriate                Labs: Results:       Chemistry Recent Labs     05/11/21  0454 05/10/21  0525 05/09/21  0325   * 115* 122*   * 144 141   K 3.6 3.1* 3.1*    107 106   CO2 29 28 28   BUN 36* 38* 39*   CREA 1.40* 1.40* 1.88*   CA 7.2* 7.4* 7.3*   AGAP 7 9 7   BUCR 26* 27* 21* CBC w/Diff Recent Labs     05/11/21  0454 05/10/21  1525 05/10/21  0525   WBC 5.2  --  7.9   RBC 2.55*  --  2.78*   HGB 6.7* 7.5* 7.3*   HCT 21.6* 22.8* 23.4*     --  249   GRANS 75*  --  77*   LYMPH 9*  --  8*   EOS 3  --  2      Cardiac Enzymes No results for input(s): CPK, CKND1, LEONEL in the last 72 hours. No lab exists for component: CKRMB, TROIP   Coagulation No results for input(s): PTP, INR, APTT, INREXT in the last 72 hours. Lipid Panel No results found for: CHOL, CHOLPOCT, CHOLX, CHLST, CHOLV, 805567, HDL, HDLP, LDL, LDLC, DLDLP, 833725, VLDLC, VLDL, TGLX, TRIGL, TRIGP, TGLPOCT, CHHD, CHHDX   BNP No results for input(s): BNPP in the last 72 hours. Liver Enzymes No results for input(s): TP, ALB, TBIL, AP in the last 72 hours.     No lab exists for component: SGOT, GPT, DBIL   Thyroid Studies Lab Results   Component Value Date/Time    TSH 1.72 02/11/2018 02:19 AM        Procedures/imaging: see electronic medical records for all procedures/Xrays and details which were not copied into this note but were reviewed prior to creation of Denzel Moya MD

## 2021-05-11 NOTE — PROGRESS NOTES
Urology Progress Note    Subjective:     Daily Progress Note: 2021 4:18 PM    Rick Sánchez. is doing OKAY. NO CATHETER RELATED PAIN. tHERE WAS SOME OBSTRUCTION YESTERDAY BUT IT CLEARED EASILY WITH A FLUSH.  DRAINING FINE ALL DAY TODAY. hE FEELS WEAK AND DOESN'T THINK HE IS VERY AMBULATORY.   Objective:     Visit Vitals  BP (!) 145/67 (BP 1 Location: Left upper arm, BP Patient Position: At rest)   Pulse 91   Temp 99 °F (37.2 °C)   Resp 20   Ht 5' 7.99\" (1.727 m)   Wt 95 kg (209 lb 8 oz)   SpO2 97%   BMI 31.86 kg/m²        Temp (24hrs), Av.7 °F (37.1 °C), Min:98.3 °F (36.8 °C), Max:99.2 °F (37.3 °C)      Intake and Output:  1901 -  0700  In: 9266 [I.V.:717]  Out: 4775 [Urine:4775]   0701 -  190  In: 921.3   Out: 1150 [Urine:1150]    Physical Exam:   NAD, LYING COMFORTABLY IN BED  ABD - SOFT, ND, NT   - CUELLAR IN PLACE AND NORMAL PHALLUS OTHERWISE    UOP - BROWNISH TINGED CONSISTENT WITH OLD BLOOD STAINED URINE, NO ACTIVE HEMATURIA    Lab/Data Review:  BMP:   Lab Results   Component Value Date/Time     (H) 2021 04:54 AM    K 3.6 2021 04:54 AM     2021 04:54 AM    CO2 29 2021 04:54 AM    AGAP 7 2021 04:54 AM     (H) 2021 04:54 AM    BUN 36 (H) 2021 04:54 AM    CREA 1.40 (H) 2021 04:54 AM    GFRAA >60 2021 04:54 AM    GFRNA 50 (L) 2021 04:54 AM     CBC:   Lab Results   Component Value Date/Time    WBC 5.6 2021 08:22 AM    HGB 6.9 (L) 2021 08:22 AM    HCT 22.2 (L) 2021 08:22 AM     2021 08:22 AM       Assessment/Plan:     Principal Problem:    Trimalleolar fracture of ankle, closed, left, initial encounter (2021)    Active Problems:    Osteomyelitis (HCC) (2021)      Displacement of peripherally inserted central catheter (PICC) ([facility] [address] (2021)    ACUTE URINARY RETENTION    Plan:      IF OKAY PER PRIMARY TEM, CUELLAR MAY BE REMOVED ON Wednesday EVENING AT 11 PM

## 2021-05-11 NOTE — PROGRESS NOTES
Problem: Mobility Impaired (Adult and Pediatric)  Goal: *Acute Goals and Plan of Care (Insert Text)  Description: Physical Therapy Goals   Initiated 5/6/2021 and to be accomplished within 7 day(s)  1. Patient will move from supine <> sit with S in prep for out of bed activity and change of position. 2.  Patient will demonstrate sitting balance intact EOB without UE support /S for performance for ADL/ therapeutic exercise activity. 3.  Patient will perform transfers bed to chair via transfer board with min assist.      Outcome: Not Progressing Towards Goal   PT session held due to:  []  Nausea/vomiting  []  RN Communication/ suggestion  []  Extreme Pain  [x]  Blood transfusion in progress. Will f/u later as pt's schedule allows. Thank you.   Sanjeev Grimaldo, PTA

## 2021-05-12 LAB
ABO + RH BLD: NORMAL
ANION GAP SERPL CALC-SCNC: 4 MMOL/L (ref 3–18)
BASOPHILS # BLD: 0 K/UL (ref 0–0.1)
BASOPHILS NFR BLD: 0 % (ref 0–2)
BLD PROD TYP BPU: NORMAL
BLOOD GROUP ANTIBODIES SERPL: NORMAL
BPU ID: NORMAL
BUN SERPL-MCNC: 27 MG/DL (ref 7–18)
BUN/CREAT SERPL: 20 (ref 12–20)
CALCIUM SERPL-MCNC: 7.1 MG/DL (ref 8.5–10.1)
CALLED TO:,BCALL1: NORMAL
CHLORIDE SERPL-SCNC: 113 MMOL/L (ref 100–111)
CO2 SERPL-SCNC: 30 MMOL/L (ref 21–32)
CREAT SERPL-MCNC: 1.33 MG/DL (ref 0.6–1.3)
CROSSMATCH RESULT,%XM: NORMAL
DIFFERENTIAL METHOD BLD: ABNORMAL
EOSINOPHIL # BLD: 0.2 K/UL (ref 0–0.4)
EOSINOPHIL NFR BLD: 5 % (ref 0–5)
ERYTHROCYTE [DISTWIDTH] IN BLOOD BY AUTOMATED COUNT: 18.6 % (ref 11.6–14.5)
GLUCOSE BLD STRIP.AUTO-MCNC: 148 MG/DL (ref 70–110)
GLUCOSE BLD STRIP.AUTO-MCNC: 173 MG/DL (ref 70–110)
GLUCOSE BLD STRIP.AUTO-MCNC: 187 MG/DL (ref 70–110)
GLUCOSE SERPL-MCNC: 142 MG/DL (ref 74–99)
HCT VFR BLD AUTO: 25.7 % (ref 36–48)
HGB BLD-MCNC: 7.9 G/DL (ref 13–16)
LYMPHOCYTES # BLD: 0.5 K/UL (ref 0.9–3.6)
LYMPHOCYTES NFR BLD: 12 % (ref 21–52)
MCH RBC QN AUTO: 26.5 PG (ref 24–34)
MCHC RBC AUTO-ENTMCNC: 30.7 G/DL (ref 31–37)
MCV RBC AUTO: 86.2 FL (ref 74–97)
MONOCYTES # BLD: 0.6 K/UL (ref 0.05–1.2)
MONOCYTES NFR BLD: 15 % (ref 3–10)
NEUTS SEG # BLD: 2.8 K/UL (ref 1.8–8)
NEUTS SEG NFR BLD: 67 % (ref 40–73)
PLATELET # BLD AUTO: 273 K/UL (ref 135–420)
PMV BLD AUTO: 10.3 FL (ref 9.2–11.8)
POTASSIUM SERPL-SCNC: 3.8 MMOL/L (ref 3.5–5.5)
RBC # BLD AUTO: 2.98 M/UL (ref 4.35–5.65)
SODIUM SERPL-SCNC: 147 MMOL/L (ref 136–145)
SPECIMEN EXP DATE BLD: NORMAL
STATUS OF UNIT,%ST: NORMAL
UNIT DIVISION, %UDIV: 0
WBC # BLD AUTO: 4.1 K/UL (ref 4.6–13.2)

## 2021-05-12 PROCEDURE — 2709999900 HC NON-CHARGEABLE SUPPLY

## 2021-05-12 PROCEDURE — 74011636637 HC RX REV CODE- 636/637: Performed by: HOSPITALIST

## 2021-05-12 PROCEDURE — 74011250636 HC RX REV CODE- 250/636: Performed by: HOSPITALIST

## 2021-05-12 PROCEDURE — 74011250637 HC RX REV CODE- 250/637: Performed by: UROLOGY

## 2021-05-12 PROCEDURE — 74011250636 HC RX REV CODE- 250/636: Performed by: RADIOLOGY

## 2021-05-12 PROCEDURE — 74011250636 HC RX REV CODE- 250/636: Performed by: INTERNAL MEDICINE

## 2021-05-12 PROCEDURE — 85025 COMPLETE CBC W/AUTO DIFF WBC: CPT

## 2021-05-12 PROCEDURE — 74011250637 HC RX REV CODE- 250/637: Performed by: INTERNAL MEDICINE

## 2021-05-12 PROCEDURE — 97110 THERAPEUTIC EXERCISES: CPT

## 2021-05-12 PROCEDURE — 97535 SELF CARE MNGMENT TRAINING: CPT

## 2021-05-12 PROCEDURE — C9113 INJ PANTOPRAZOLE SODIUM, VIA: HCPCS | Performed by: HOSPITALIST

## 2021-05-12 PROCEDURE — 74011000250 HC RX REV CODE- 250: Performed by: INTERNAL MEDICINE

## 2021-05-12 PROCEDURE — 74011250637 HC RX REV CODE- 250/637: Performed by: HOSPITALIST

## 2021-05-12 PROCEDURE — 80048 BASIC METABOLIC PNL TOTAL CA: CPT

## 2021-05-12 PROCEDURE — 74011000250 HC RX REV CODE- 250: Performed by: HOSPITALIST

## 2021-05-12 PROCEDURE — 97530 THERAPEUTIC ACTIVITIES: CPT

## 2021-05-12 PROCEDURE — 82962 GLUCOSE BLOOD TEST: CPT

## 2021-05-12 PROCEDURE — 94640 AIRWAY INHALATION TREATMENT: CPT

## 2021-05-12 PROCEDURE — 65270000029 HC RM PRIVATE

## 2021-05-12 RX ADMIN — INSULIN LISPRO 2 UNITS: 100 INJECTION, SOLUTION INTRAVENOUS; SUBCUTANEOUS at 12:33

## 2021-05-12 RX ADMIN — MEROPENEM 1 G: 1 INJECTION, POWDER, FOR SOLUTION INTRAVENOUS at 12:33

## 2021-05-12 RX ADMIN — MEROPENEM 1 G: 1 INJECTION, POWDER, FOR SOLUTION INTRAVENOUS at 03:44

## 2021-05-12 RX ADMIN — TAMSULOSIN HYDROCHLORIDE 0.4 MG: 0.4 CAPSULE ORAL at 09:04

## 2021-05-12 RX ADMIN — METOPROLOL SUCCINATE 50 MG: 50 TABLET, EXTENDED RELEASE ORAL at 09:04

## 2021-05-12 RX ADMIN — FERROUS SULFATE TAB 325 MG (65 MG ELEMENTAL FE) 325 MG: 325 (65 FE) TAB at 09:04

## 2021-05-12 RX ADMIN — SODIUM CHLORIDE 40 MG: 9 INJECTION INTRAMUSCULAR; INTRAVENOUS; SUBCUTANEOUS at 09:04

## 2021-05-12 RX ADMIN — SODIUM CHLORIDE 40 MG: 9 INJECTION INTRAMUSCULAR; INTRAVENOUS; SUBCUTANEOUS at 20:34

## 2021-05-12 RX ADMIN — MEROPENEM 1 G: 1 INJECTION, POWDER, FOR SOLUTION INTRAVENOUS at 20:34

## 2021-05-12 RX ADMIN — INSULIN GLARGINE 15 UNITS: 100 INJECTION, SOLUTION SUBCUTANEOUS at 09:04

## 2021-05-12 RX ADMIN — FERROUS SULFATE TAB 325 MG (65 MG ELEMENTAL FE) 325 MG: 325 (65 FE) TAB at 17:57

## 2021-05-12 RX ADMIN — ATORVASTATIN CALCIUM 40 MG: 20 TABLET, FILM COATED ORAL at 21:25

## 2021-05-12 RX ADMIN — INSULIN LISPRO 2 UNITS: 100 INJECTION, SOLUTION INTRAVENOUS; SUBCUTANEOUS at 17:57

## 2021-05-12 RX ADMIN — INSULIN LISPRO 2 UNITS: 100 INJECTION, SOLUTION INTRAVENOUS; SUBCUTANEOUS at 21:26

## 2021-05-12 RX ADMIN — HEPARIN SODIUM (PORCINE) LOCK FLUSH IV SOLN 100 UNIT/ML 500 UNITS: 100 SOLUTION at 21:27

## 2021-05-12 RX ADMIN — IPRATROPIUM BROMIDE AND ALBUTEROL SULFATE 3 ML: .5; 3 SOLUTION RESPIRATORY (INHALATION) at 22:04

## 2021-05-12 NOTE — PROGRESS NOTES
Hospitalist Progress Note    Patient: Inessa Corral. MRN: [id number]  CSN: [id number]    YOB: 1945  Age: 70 y.o. Sex: male    DOA: 5/4/2021 LOS:  LOS: 8 days          Chief Complaint:    Infected ankle      Assessment/Plan   Surgery cancelled 5/11 due to wheezing and prob volume overload  Received lasix   CXR with effusions  Received blood 5/11. hgb better today    Admitted for ankle infection and prior fracture with multiple comorbidities    Anemia -severe  Hemoccult came back positive  PPI IV  Appreciate GI consult-possible EGD after orthopedic surgery completed, watch Hgb  Hx of GI bleeding and anemia  Daily CBC     Severe iron deficiency, s/p venofer, started PO iron     Trimalleolar fracture -  Not a surgical candidate given osteomyelitis. Podiatry recommended splint. Splint placed in ER. Seen by ortho, he is to get wash out now on Friday 5/14     Osteomyelitis left heel with cellulitis, gangrenous ulcer with deep abscess tracking along the plantar fascia -  Status post incision and drainage lower extremity left heel debridement and antibiotic bead placement   S/p removal of antibiotic beads and graft open wound. On last admission  Was discharged on ertapenem. Antibiotics switched to meropenem, vancomycin here now     ELINOR on CKD stage 3-improved  Renal US with urinary bladder distension.   Caal in place  Urinary retention   S/p caal    HTN -  On toprol     Hypokalemia-repeltion PO, daily BMP     Diabetes mellitus -  Continue with lantus,  SSI.      CAD -  No anginal symptoms,  aspirin and plavix on hold due to hematuria. and now planned surgery     A-flutter -  Resume eliquis when stable     Supportive care  Will need rehab after medically stable     Disposition :SNF  Patient Active Problem List   Diagnosis Code    Atrial flutter (HCC) I48.92    DM2 (diabetes mellitus, type 2) ([facility] [address] E11.9    CAD (coronary artery disease) I25.10    ELINOR (acute kidney injury) ([facility] [address] N17.9    CKD (chronic kidney disease) stage 3, GFR 30-59 ml/min ([facility]) N18.30    Elevated brain natriuretic peptide (BNP) level R79.89    Diabetic ulcer of left foot ([facility]) E11.621, L97.529    COVID-19 virus infection U07.1    Acute osteomyelitis ([facility]) M86.10    Acute hematogenous osteomyelitis of left foot ([facility]) M86.072    Cellulitis of left foot L03. 116    Diabetic foot ulcer with osteomyelitis ([facility] [address] E11.621, E11.69, L97.509, M86.9    Ulcer of left heel, with necrosis of bone ([facility]) L97.424    Osteomyelitis ([facility] [address] M86.9    Trimalleolar fracture of ankle, closed, left, initial encounter S82.852A    Displacement of peripherally inserted central catheter (PICC) ([facility] [address] T82.524A       Subjective:    He denies any new issue  Resting easy this am  No CP, cough, wheezing, SOB    Review of systems:    Constitutional: denies fevers, chills    Gastrointestinal: denies nausea, vomiting, diarrhea      Vital signs/Intake and Output:  Visit Vitals  BP (!) 128/55 (BP 1 Location: Left upper arm, BP Patient Position: At rest)   Pulse 91   Temp 98.8 °F (37.1 °C)   Resp 16   Ht 5' 7.99\" (1.727 m)   Wt 95 kg (209 lb 8 oz)   SpO2 99%   BMI 31.86 kg/m²     Current Shift:  No intake/output data recorded.   Last three shifts:  05/10 1901 - 05/12 0700  In: 3421.3   Out: 3650 [Urine:3650]    Exam:    General: debilitated elderly Wm, alert, NAD, OX3  CVS:Regular rate and rhythm, no M/R/G, S1/S2 heard, no thrill  Lungs:Clearbilaterally, no wheezes, rhonchi, or rales auscultated  Abdomen: Soft, Nontender, No distention, Normal Bowel sounds, No hepatomegaly  Extremities: LLE dressed  Neuro:grossly normal , follows commands  Psych:appropriate                Labs: Results:       Chemistry Recent Labs     05/12/21  0335 05/11/21  0454 05/10/21  0525   * 107* 115*   * 146* 144   K 3.8 3.6 3.1*   * 110 107   CO2 30 29 28   BUN 27* 36* 38*   CREA 1.33* 1.40* 1.40*   CA 7.1* 7.2* 7.4*   AGAP 4 7 9   BUCR 20 26* 27*      CBC w/Diff Recent Labs     05/12/21  0335 05/11/21  0822 05/11/21  0454   WBC 4.1* 5.6 5.2   RBC 2.98* 2.60* 2.55*   HGB 7.9* 6.9* 6.7*   HCT 25.7* 22.2* 21.6*    269 255   GRANS 67 75* 75*   LYMPH 12* 8* 9*   EOS 5 3 3      Cardiac Enzymes No results for input(s): CPK, CKND1, LEONEL in the last 72 hours. No lab exists for component: CKRMB, TROIP   Coagulation No results for input(s): PTP, INR, APTT, INREXT in the last 72 hours. Lipid Panel No results found for: CHOL, CHOLPOCT, CHOLX, CHLST, CHOLV, 806512, HDL, HDLP, LDL, LDLC, DLDLP, 574791, VLDLC, VLDL, TGLX, TRIGL, TRIGP, TGLPOCT, CHHD, CHHDX   BNP No results for input(s): BNPP in the last 72 hours. Liver Enzymes No results for input(s): TP, ALB, TBIL, AP in the last 72 hours.     No lab exists for component: SGOT, GPT, DBIL   Thyroid Studies Lab Results   Component Value Date/Time    TSH 1.72 02/11/2018 02:19 AM        Procedures/imaging: see electronic medical records for all procedures/Xrays and details which were not copied into this note but were reviewed prior to creation of Edmundo Cross MD

## 2021-05-12 NOTE — PROGRESS NOTES
DC Plan: SNF     Noted planned surgery today has been postponed until Friday. Please order therapy services when appropriate following surgical intervention to assist with transition to SNF.   CM to continue to follow and assist.    Care Management Interventions  Mode of Transport at Discharge: BLS  Transition of Care Consult (CM Consult): Discharge Planning, SNF  Health Maintenance Reviewed: Yes  Current Support Network: Lives with Spouse  Confirm Follow Up Transport: Family  The Plan for Transition of Care is Related to the Following Treatment Goals : SNF  The Patient and/or Patient Representative was Provided with a Choice of Provider and Agrees with the Discharge Plan?: Yes  Name of the Patient Representative Who was Provided with a Choice of Provider and Agrees with the Discharge Plan: pt/spouse  Freedom of Choice List was Provided with Basic Dialogue that Supports the Patient's Individualized Plan of Care/Goals, Treatment Preferences and Shares the Quality Data Associated with the Providers?: Yes  Discharge Location  Discharge Placement: Skilled nursing facility

## 2021-05-12 NOTE — PROGRESS NOTES
Progress Note      Patient: Phyllis Almonte. Sex: male          DOA: 5/4/2021         YOB: 1945      Age:  70 y.o.        LOS:  LOS: 8 days     Assessment/Plan     Principal Problem:    Trimalleolar fracture of ankle, closed, left, initial encounter (5/4/2021)    Active Problems:    Osteomyelitis ([address] (5/4/2021)      Displacement of peripherally inserted central catheter (PICC) ([facility] [address] (5/4/2021)      Pt received blood yesterday. Planned ORIF but was wheezing. Will add on to Friday. WBC down  Still spiking fevers  Plan washout and possible orif vs ex fix              Subjective:     Pt more alert today. Still feeling weak. Objective:      Visit Vitals  BP (!) 128/55 (BP 1 Location: Left upper arm, BP Patient Position: At rest)   Pulse 91   Temp 98.8 °F (37.1 °C)   Resp 16   Ht 5' 7.99\" (1.727 m)   Wt 95 kg (209 lb 8 oz)   SpO2 99%   BMI 31.86 kg/m²       Physical Exam:  splint in place, foot externally rotated,     Intake and Output:  Current Shift:  No intake/output data recorded. Last three shifts:  05/10 1901 - 05/12 0700  In: 3421.3   Out: [address] [Urine:3650]    Lab/Data Reviewed: All lab results for the last 24 hours reviewed.     Medications Reviewed    Continued hospitalization is indicated due to CV, pulm issues, fracture

## 2021-05-12 NOTE — PROGRESS NOTES
Problem: Falls - Risk of  Goal: *Absence of Falls  Description: Document [place] Fall Risk and appropriate interventions in the flowsheet. Outcome: Progressing Towards Goal  Note: Fall Risk Interventions:  Mobility Interventions: Communicate number of staff needed for ambulation/transfer, Patient to call before getting OOB    Mentation Interventions: Adequate sleep, hydration, pain control, Increase mobility, More frequent rounding    Medication Interventions: Patient to call before getting OOB, Teach patient to arise slowly    Elimination Interventions: Call light in reach, Patient to call for help with toileting needs, Toilet paper/wipes in reach    History of Falls Interventions: Door open when patient unattended, Evaluate medications/consider consulting pharmacy         Problem: Patient Education: Go to Patient Education Activity  Goal: Patient/Family Education  Outcome: Progressing Towards Goal     Problem: Pressure Injury - Risk of  Goal: *Prevention of pressure injury  Description: Document Anam Scale and appropriate interventions in the flowsheet.   Outcome: Progressing Towards Goal  Note: Pressure Injury Interventions:  Sensory Interventions: Minimize linen layers    Moisture Interventions: Minimize layers    Activity Interventions: Pressure redistribution bed/mattress(bed type)    Mobility Interventions: Pressure redistribution bed/mattress (bed type)    Nutrition Interventions: Document food/fluid/supplement intake    Friction and Shear Interventions: Minimize layers                Problem: Patient Education: Go to Patient Education Activity  Goal: Patient/Family Education  Outcome: Progressing Towards Goal     Problem: Pain  Goal: *Control of Pain  Outcome: Progressing Towards Goal     Problem: Patient Education: Go to Patient Education Activity  Goal: Patient/Family Education  Outcome: Progressing Towards Goal     Problem: Patient Education: Go to Patient Education Activity  Goal: Patient/Family Education  Outcome: Progressing Towards Goal     Problem: Patient Education: Go to Patient Education Activity  Goal: Patient/Family Education  Outcome: Progressing Towards Goal

## 2021-05-12 NOTE — PROGRESS NOTES
Problem: Self Care Deficits Care Plan (Adult)  Goal: *Acute Goals and Plan of Care (Insert Text)  Description: Occupational Therapy Goals  Initiated 5/6/2021 within 7 day(s). 1.  Patient will perform grooming with supervision/set-up seated at EOB. 2.  Patient will perform upper body bathing with minimal assistance/contact guard assist seated at EOB. 3.  Patient will participate in upper extremity therapeutic exercise/activities with minimal assistance/contact guard assist for 10 minutes. 4.  Patient will utilize energy conservation techniques during functional activities with verbal, visual, and tactile cues. Outcome: Progressing Towards Goal   OCCUPATIONAL THERAPY TREATMENT    Patient: Kelly Almeida (80 y.o. male)  Date: 5/12/2021  Diagnosis: Osteomyelitis ([facility] [address] [M86.9]  Trimalleolar fracture of ankle, closed, left, initial encounter [S82.852A]  Displacement of peripherally inserted central catheter (PICC) ([facility] [address] [T82.524A] Trimalleolar fracture of ankle, closed, left, initial encounter  Procedure(s) (LRB):  OPEN TREATMENT OF TRIMALLEOLAR LEFT ANKLE FRACTURE W/C-ARM (Left) * Surgery Date in Future *  Precautions: Fall, NWB(lle)  PLOF: Mod I with ADLs    Chart, occupational therapy assessment, plan of care, and goals were reviewed. ASSESSMENT:  Nursing/RN cleared for pt to participate in OT tx session. Patient presents lying semi-reclined in bed, agreeable to participate in grooming tasks, SBA combing hair and washing face. Partial co-tx with PT to maximize safety and pt's participation in bed mobility during toileting d/t incontinence of bowel, dep clothing mgmt and dep-max A, rolling R<-L w/ Min A and vc's for maintaining LLE NWB and correct hand placement on bed rails to assist, scooting up towards head of bed with modification in trendelenburg to grade easier, vc's to maintain LLE NWB and use of RLE and placement for hand on bed rails to assist with tactile and verbal cues.   Patient resting comfortably lying semi reclined in bed, call bell within reach & pt verbalized understanding and provided return demonstration to utilize for assist e.g. functional transfers in order to prevent falls. Progression toward goals:  []          Improving appropriately and progressing toward goals  [x]          Improving slowly and progressing toward goals  []          Not making progress toward goals and plan of care will be adjusted     PLAN:  Patient continues to benefit from skilled intervention to address the above impairments. Continue treatment per established plan of care. Discharge Recommendations:  Inpatient Rehab  Further Equipment Recommendations for Discharge: to be determined as progress is made with therapy       SUBJECTIVE:   Patient stated I was in the [employer] for 23 years.     OBJECTIVE DATA SUMMARY:   Cognitive/Behavioral Status:  Neurologic State: Alert, Confused  Orientation Level: Oriented to person, Oriented to place, Disoriented to situation, Disoriented to time  Cognition: Follows commands  Safety/Judgement: Fall prevention, Decreased insight into deficits    Functional Mobility and Transfers for ADLs:   Bed Mobility:  Rolling: Minimum assistance; Moderate assistance(vc's)        Scooting: Minimum assistance;Bed Modified(vc's)     ADL Intervention:   Patient presents lying semi-reclined in bed, agreeable to participate in grooming tasks, SBA combing hair and washing face. Partial co-tx with PT to maximize safety and pt's participation in bed mobility during toileting d/t incontinence of bowel, dep clothing mgmt and dep-max A, rolling R<-L w/ Min A and vc's for maintaining LLE NWB and correct hand placement on bed rails to assist, scooting up towards head of bed with modification in trendelenburg to grade easier, vc's to maintain LLE NWB and use of RLE and placement for hand on bed rails to assist with tactile and verbal cues.        Toileting  Toileting Assistance: Maximum assistance  Clothing Management: Total assistance (dependent)    Pain:  Pain level pre-treatment: 2/10 left ankle  Pain level post-treatment: 0/10  Pain Intervention(s): Medication (see MAR); Rest, Repositioning   Response to intervention: Nurse notified, See doc flow    Activity Tolerance:    poor  Please refer to the flowsheet for vital signs taken during this treatment. After treatment:   []  Patient left in no apparent distress sitting up in chair  [x]  Patient left in no apparent distress in bed  [x]  Call bell left within reach  [x]  Nursing notified  []  Caregiver present  []  Bed alarm activated    COMMUNICATION/EDUCATION:   [x] Role of Occupational Therapy in the acute care setting  [x] Home safety education was provided and the patient/caregiver indicated understanding. [x] Patient/family have participated as able in working towards goals and plan of care. [x] Patient/family agree to work toward stated goals and plan of care. [] Patient understands intent and goals of therapy, but is neutral about his/her participation. [] Patient is unable to participate in goal setting and plan of care.       Thank you for this referral.  Leeroy Morales  Time Calculation: 27 mins

## 2021-05-12 NOTE — PROGRESS NOTES
Bedside and Verbal shift change report given to VALERIA Trevizo (oncoming nurse) by  Parvin Brady RN (offgoing nurse).  Report included the following information SBAR, Kardex, Intake/Output, MAR, Recent Results

## 2021-05-12 NOTE — PROGRESS NOTES
Problem: Mobility Impaired (Adult and Pediatric)  Goal: *Acute Goals and Plan of Care (Insert Text)  Description: Physical Therapy Goals   Initiated 5/6/2021 and to be accomplished within 7 day(s)  1. Patient will move from supine <> sit with S in prep for out of bed activity and change of position. 2.  Patient will demonstrate sitting balance intact EOB without UE support /S for performance for ADL/ therapeutic exercise activity. 3.  Patient will perform transfers bed to chair via transfer board with min assist.    Outcome: Progressing Towards Goal  physical Therapy TREATMENT    Patient: Jacobo Way (80 y.o. male)  Date: 5/12/2021  Diagnosis: Osteomyelitis ([facility] [address] [M86.9]  Trimalleolar fracture of ankle, closed, left, initial encounter [S82.342A]  Displacement of peripherally inserted central catheter (PICC) ([facility] [address] [T82.524A] Trimalleolar fracture of ankle, closed, left, initial encounter  Procedure(s) (LRB):  OPEN TREATMENT OF TRIMALLEOLAR LEFT ANKLE FRACTURE W/C-ARM (Left) * Surgery Date in Future *  Precautions: Fall, NWB(lle)   Chart, physical therapy assessment, plan of care and goals were reviewed. ASSESSMENT:  Pt found participating with OT on PT arrival.  Pt reports having had BM and needing cleanup. Required min assist to roll to L and mod assist for rolling toward R due to pt NWB L LE for push off and needs assist and verbal reminders of same. Hygiene care performed total assist and gown change completed. Pt able to pull self up toward [facility] with bed in trendelenburg position and PT assist to maintain L LE NWB. Fair tolerance for therapeutic ex as noted below. R LE weakness; needs continued strengthening ex for same.   Progression toward goals:  []      Improving appropriately and progressing toward goals  [x]      Improving slowly and progressing toward goals  []      Not making progress toward goals and plan of care will be adjusted     PLAN:  Patient continues to benefit from skilled intervention to address the above impairments. Continue treatment per established plan of care. Discharge Recommendations:  Skilled Nursing Facility  Further Equipment Recommendations for Discharge:  N/A     SUBJECTIVE:   Patient stated I just wish it was Friday.     OBJECTIVE DATA SUMMARY:   Critical Behavior:  Neurologic State: Alert, Confused  Orientation Level: Oriented to person, Oriented to place, Oriented to situation  Cognition: Follows commands  Safety/Judgement: Fall prevention, Decreased insight into deficits  Functional Mobility Training:  Bed Mobility:  Rolling: Minimum assistance; Moderate assistance; Other (comment)(vc)  Scooting: Minimum assistance;Bed Modified(vc)  Therapeutic Exercises:   R LE: QS with 5 sec hold; unilateral bridging x 5  Pain:  Pain Scale 1: Numeric (0 - 10)  Activity Tolerance:   Fair   Please refer to the flowsheet for vital signs taken during this treatment.   After treatment:   [] Patient left in no apparent distress sitting up in chair  [x] Patient left in no apparent distress in bed  [x] Call bell left within reach  [] Nursing notified  [] Caregiver present  [] Bed alarm activated      Cori Retort, PT   Time Calculation: 23 mins

## 2021-05-12 NOTE — PROGRESS NOTES
Problem: Mobility Impaired (Adult and Pediatric)  Goal: *Acute Goals and Plan of Care (Insert Text)  Description: Physical Therapy Goals   Initiated 5/6/2021 and to be accomplished within 7 day(s)  1. Patient will move from supine <> sit with S in prep for out of bed activity and change of position. 2.  Patient will demonstrate sitting balance intact EOB without UE support /S for performance for ADL/ therapeutic exercise activity. 3.  Patient will perform transfers bed to chair via transfer board with min assist.      Outcome: Not Progressing Towards Goal  PT session held due to:  [x]  Off unit for surgical procedure   []  RN Communication/ suggestion  []  Extreme Pain  []  Dialysis treatment in progress. Please place new order with WB status . Thank you.   Angy Hayes, PTA

## 2021-05-12 NOTE — PROGRESS NOTES
[facility]                         (A Division of [address] [place] [address])                                                                                                                       Evelyne Glover MD  Work Cell #: [phone]( Mon-Fri, 7am-5pm)  If no call or text back within 30 minutes from me, please call office number. (Prefer text when possible)  Office #:     21  #[phone]   Office Fax: [phone]     Date of Admission: 5/4/2021   Date of Service:  5/12/2021  Reason for FU : left ankle OM, fever    Current Antimicrobials:    Prior Antimicrobials:  Meropenem 1 gm Q12 5/6 to date  Vancomycin 5/6 to dtae  ·    Zithromax 500mg IV- 4/14 to 4/20  · Vancomycin IV 4/14  until 4/19  · Zosyn 2.25gm IV Q6 4/14 to 4/15   · Meropenem IV 4/15-4/20  Unasyn 3 gm Q6 hour 4/20 to 4/22/21  Ertapenem 1gm IV q24 4/23 to date  -- reduced dose to 500 mg 5/5       Assessment: Rec / Plan:   · Fever: Improved, last record >102 on 5/8/21       DDX: urine source Vs worsening of left ankle OM      or abscess Vs line. No respiratory complaint and no      rash noted. · Left Infected DM Ulcer and acute OM and necrotic tissue, punched out heel ulcer with Mixed infection- MSSA +E.coli + anaerobes - 4/14/21  · Post I and D of left heel bone/abscess 4/17, OR cultures remain sterile  · Post I and D with graft to heel- 4/21  CRP 8.9-> 5.8->3.8( 4/22/21) as inpatient   CRP 15-> 34 as OP  ESR 78-> 63   · Acute renal failure 2/2 urinary retention, caal in place. Drug related IN in DDX but less likely       (urine eos negative): Improving  · Left heel ankle fracture 2/2 fall: on this admission    · Anemia- HB 6.2  · Hematuria- off anticoagulant    Other Medical Issues followed and managed by primary and other services  · DM- Poorly controlled  · Low Vitamin D  · CAD with CHF/ NSTMI.  Atrial flutter  · CKD  · Hyperlipidemia       Past ID issues: N/A  -History of COVID 19 infection 4/2021   OR cancelled 5/11-- due to lung congestion  BCX -5/5 and 5/7: NG, will dc vancomycin- as no MRSA growth    Cont  meropenem   Continues to spike 101 and above- hopefull I and D soon( Friday per Dr Hailee Chacon). Condition: Hemodynamically stable. Persistent fever    Subjective: \" no fever\"  T max of 101.2 overnight noted. Denies chills/rigors. Denies fever    SOB is better today per pt  Denies N/V/abd pain. Denies flank pain. No diarrhea. No itching or rash    Continues with caal- urine in bag with tea colored urine    CTA AP report reviewed  OR cancellation due to congestion yesterday. Review of System:  See above, other wise negative     Summary:    Bianka Sotomayor is 71 y/o WM with PMH as listed below- with initial consult on 4/15/21. Last seen by me on 4/22/21 prior to discharge. He was set up at [facility] infusion center for daily ertapenem.     Yesterday, I was notified by RN that he fell, hurt his left foot, and his PICC was not working and they infused him via PIV and sent him to ED.     On arrival to ED, was noted to be in acute renal failure, and with new left ankle fracture.     Wound care note with the picture reviewed.     Pt denies F/C/R. Denies SOB. Feels weak, appetite and po intake has decreased past many days per wife. Patient Active Problem List   Diagnosis Code    Atrial flutter ([facility]) I48.92    DM2 (diabetes mellitus, type 2) ([facility]) E11.9    CAD (coronary artery disease) I25.10    ELINOR (acute kidney injury) ([facility] [address] N17.9    CKD (chronic kidney disease) stage 3, GFR 30-59 ml/min ([facility]) N18.30    Elevated brain natriuretic peptide (BNP) level R79.89    Diabetic ulcer of left foot ([facility]) E11.621, L97.529    COVID-19 virus infection U07.1    Acute osteomyelitis ([facility] [address] M86.10    Acute hematogenous osteomyelitis of left foot ([facility]) M86.072    Cellulitis of left foot L03. 80    Diabetic foot ulcer with osteomyelitis ([facility]) E11.621, E11.69, L97.509, M86.9    Ulcer of left heel, with necrosis of bone (H[facility]) L97.424    Osteomyelitis ([facility] [address] M86.9    Trimalleolar fracture of ankle, closed, left, initial encounter S82.852A    Dis[facility] of peripherally inserted central catheter ([facility]ICC) [facility] T82.524A       Current Facility-Administered Medications   Medication Dose Route Frequency    0.9% sodium chloride infusion 250 mL  250 mL IntraVENous PRN    albuterol-ipratropium (DUO-NEB) 2.5 MG-0.5 MG/3 ML  3 mL Nebulization Q4H PRN    ferrous sulfate tablet 325 mg  1 Tab Oral BID WITH MEALS    pantoprazole (PROTONIX) 40 mg in 0.9% sodium chloride 10 mL injection  40 mg IntraVENous Q12H    meropenem (MERREM) 1 g in sterile water (preservative free) 20 mL IV syringe  1 g IntraVENous Q8H    metoprolol succinate (TOPROL-XL) XL tablet 50 mg  50 mg Oral DAILY    tamsulosin (FLOMAX) capsule 0.4 mg  0.4 mg Oral DAILY    heparin (porcine) 100 unit/mL injection 500 Units  500 Units InterCATHeter Q8H PRN    [Held by provider] apixaban (ELIQUIS) tablet 2.5 mg  2.5 mg Oral BID    acetaminophen (TYLENOL) tablet 1,000 mg  1,000 mg Oral Q8H PRN    [Held by provider] aspirin delayed-release tablet 81 mg  81 mg Oral DAILY    atorvastatin (LIPITOR) tablet 40 mg  40 mg Oral QHS    [Held by provider] clopidogreL (PLAVIX) tablet 75 mg  75 mg Oral DAILY    insulin glargine (LANTUS) injection 15 Units  15 Units SubCUTAneous DAILY    glucose chewable tablet 16 g  16 g Oral PRN    glucagon (GLUCAGEN) injection 1 mg  1 mg IntraMUSCular PRN    dextrose (D50W) injection syrg 25 g  50 mL IntraVENous PRN    insulin lispro (HUMALOG) injection   SubCUTAneous AC&HS    morphine injection 2 mg  2 mg IntraVENous Q4H PRN       Objective:    Visit Vitals  BP (!) 140/72   Pulse 77   Temp 97.4 °F (36.3 °C)   Resp 20   Ht 5' 8\" (1.727 m)   Wt 95 kg (209 lb 8 oz)   SpO2 98%   BMI 31.85 kg/m²       Temp (24hrs), Av.8 °F (37.1 °C), Min:97.4 °F (36.3 °C), Max:101.1 °F (38.4 °C)    Right PICC - replaced over wire     GEN: WD chronically sick looking , not in resp distress. HEENT: Unicteric. EOMI intact  CHEST: Non laboured breathing. B/L rhonchi  CVS:RRR, no mur/gallop  ABD: Obese/soft. Non tender. NABS  LISET: caal in place- with tea colored urine  EXT: Left LE is splinted. No redness/ creps to palpable soft tissue. D/w Wound care about her exam yesterday  Skin: Dry and intact. No rash, no redness. CNS: A, OX3. Moves all extremity. CN grossly ok. Microbiology:    Blood culture: - 2 sets: NGSF    - 2 sets: NG  : 1 set: NGSF     Urine culture:  - urine culture: NG     Body Fluid/ CSF/drainage culture:  - gram stain: GNR and GPR        --wound culture+ s.aurues and E.coli + anaerobe   -- OR culture: NG    Lab results:    Chemistry  Recent Labs     21  0335 21  0454 05/10/21  0525   * 107* 115*   * 146* 144   K 3.8 3.6 3.1*   * 110 107   CO2 30 29 28   BUN 27* 36* 38*   CREA 1.33* 1.40* 1.40*   CA 7.1* 7.2* 7.4*   AGAP 4 7 9   BUCR 20 26* 27*       CBC w/ Diff  Recent Labs     21  0335 21  0822 21  0454   WBC 4.1* 5.6 5.2   RBC 2.98* 2.60* 2.55*   HGB 7.9* 6.9* 6.7*   HCT 25.7* 22.2* 21.6*    269 255   GRANS 67 75* 75*   LYMPH 12* 8* 9*   EOS 5 3 3     Imagin/5- CXR     1. Right PICC line tip retracted from optimal/appropriate position, projecting  over the right subclavian vein. 2. Mild bibasilar atelectasis.      21  US renal  1. Considerable urinary bladder fluid distention (approximately 2.1 L) with  likely reactive pelviectasis.      > Results called to [address] [place] [address] as patient 2 likely benefit from  urinary bladder catheterization.     : right humerus  No acute osseous abnormality or malalignment involving the right humerus. : MRI of left foot:    No acute osseous abnormality or malalignment involving the right humerus.      : CTA AP w/contrast  No evidence of contrast extravasation to suggest active gastrointestinal  hemorrhage.     Bilateral hydroureteronephrosis secondary to markedly thick-walled urinary  bladder containing hyperdense fluid, consistent with hemorrhage. Indwelling  Mccray catheter with incomplete bladder decompression. Findings concerning for  cystitis and hemorrhage possibly secondary to catheter placement.   Cannot  exclude underlying malignancy, cystoscopy is recommend upon resolution of acute  pathology.     Large right inguinal hernia containing nondilated distal ileum and right colon.     Trace retroperitoneal ascites.        Total duration of time spent with patient interview and exam and discussion of plan of care, review of chart in care everywhere, discussion with medical staff- nursing/attending and additional specialities as indicated:  25 minutes

## 2021-05-12 NOTE — PROGRESS NOTES
Comprehensive Nutrition Assessment    Type and Reason for Visit: Initial, LOS    Nutrition Recommendations/Plan: Other: continue w/ POC    Nutrition Assessment:  PMHx: CAD, HTN, high cholesterol, CKD stage 3, ELINOR, . Admitted w/ Trimalleolar fracture of ankle, Osteomyelitis. Estimated Daily Nutrient Needs:  Energy (kcal): 4989-3281([id number]); Weight Used for Energy Requirements: Current  Protein (g): (1-1.2g); Weight Used for Protein Requirements: Current  Fluid (ml/day): 3260-7651; Method Used for Fluid Requirements: 1 ml/kcal      Nutrition Related Findings:  Labs Na 147, BUN 27, creatinine 1.33. Med: ferrous sulfate, toprol xl, humalog, lantus. +BM 5/10. Pt eats good at home, and eats what family makes. Since admission, appetite seemed to have declined. Pt reports to be tired of the hospital food. Was able to see breakfast tray and seems to have only had a few bites of the omelette. Offered Glucerna but not interested at this time. Asked about any recent weight changes and was not able to recall. Wounds:    None       Current Nutrition Therapies:  DIET DIABETIC CONSISTENT CARB Regular    Anthropometric Measures:  · Height:  5' 8\" (172.7 cm)  · Current Body Wt:  95 kg (209 lb 7 oz)   · Admission Body Wt:       · Usual Body Wt:  89.2 kg (196 lb 10.4 oz)(4/14/21)     · Ideal Body Wt:  154 lbs:  136 %   · Adjusted Body Weight:   ; Weight Adjustment for: No adjustment   · Adjusted BMI:       · BMI Category:  Obese class 1 (BMI 30.0-34. 9)       Nutrition Diagnosis:   · Inadequate oral intake related to acute injury/trauma as evidenced by intake 0-25%, other (specify)(Pt reports to be tired of the food provided.)      Nutrition Interventions:   Food and/or Nutrient Delivery: Continue current diet  Nutrition Education and Counseling: No recommendations at this time  Coordination of Nutrition Care: Continue to monitor while inpatient, Interdisciplinary rounds    Goals:  Encourage PO intake >50% at most meals throughout the next 5-7 days       Nutrition Monitoring and Evaluation:   Behavioral-Environmental Outcomes: None identified  Food/Nutrient Intake Outcomes: Food and nutrient intake  Physical Signs/Symptoms Outcomes: Biochemical data, Skin, Weight, GI status, Nausea/vomiting    Discharge Planning:     Too soon to determine     Electronically signed by Jeanie Barney on 5/12/2021 at 11:43 AM

## 2021-05-13 LAB
ANION GAP SERPL CALC-SCNC: 3 MMOL/L (ref 3–18)
BACTERIA SPEC CULT: NORMAL
BASOPHILS # BLD: 0 K/UL (ref 0–0.1)
BASOPHILS NFR BLD: 0 % (ref 0–2)
BUN SERPL-MCNC: 22 MG/DL (ref 7–18)
BUN/CREAT SERPL: 18 (ref 12–20)
CALCIUM SERPL-MCNC: 7 MG/DL (ref 8.5–10.1)
CHLORIDE SERPL-SCNC: 113 MMOL/L (ref 100–111)
CO2 SERPL-SCNC: 31 MMOL/L (ref 21–32)
CREAT SERPL-MCNC: 1.19 MG/DL (ref 0.6–1.3)
DIFFERENTIAL METHOD BLD: ABNORMAL
EOSINOPHIL # BLD: 0.1 K/UL (ref 0–0.4)
EOSINOPHIL NFR BLD: 3 % (ref 0–5)
ERYTHROCYTE [DISTWIDTH] IN BLOOD BY AUTOMATED COUNT: 18.6 % (ref 11.6–14.5)
GLUCOSE BLD STRIP.AUTO-MCNC: 140 MG/DL (ref 70–110)
GLUCOSE BLD STRIP.AUTO-MCNC: 188 MG/DL (ref 70–110)
GLUCOSE BLD STRIP.AUTO-MCNC: 233 MG/DL (ref 70–110)
GLUCOSE BLD STRIP.AUTO-MCNC: 251 MG/DL (ref 70–110)
GLUCOSE SERPL-MCNC: 126 MG/DL (ref 74–99)
HCT VFR BLD AUTO: 25.5 % (ref 36–48)
HGB BLD-MCNC: 7.8 G/DL (ref 13–16)
LYMPHOCYTES # BLD: 0.8 K/UL (ref 0.9–3.6)
LYMPHOCYTES NFR BLD: 20 % (ref 21–52)
MCH RBC QN AUTO: 26.6 PG (ref 24–34)
MCHC RBC AUTO-ENTMCNC: 30.6 G/DL (ref 31–37)
MCV RBC AUTO: 87 FL (ref 74–97)
MONOCYTES # BLD: 0.2 K/UL (ref 0.05–1.2)
MONOCYTES NFR BLD: 6 % (ref 3–10)
NEUTS SEG # BLD: 2.8 K/UL (ref 1.8–8)
NEUTS SEG NFR BLD: 71 % (ref 40–73)
PLATELET # BLD AUTO: 257 K/UL (ref 135–420)
PMV BLD AUTO: 10 FL (ref 9.2–11.8)
POTASSIUM SERPL-SCNC: 3.5 MMOL/L (ref 3.5–5.5)
RBC # BLD AUTO: 2.93 M/UL (ref 4.35–5.65)
RBC MORPH BLD: ABNORMAL
SERVICE CMNT-IMP: NORMAL
SODIUM SERPL-SCNC: 147 MMOL/L (ref 136–145)
WBC # BLD AUTO: 3.9 K/UL (ref 4.6–13.2)

## 2021-05-13 PROCEDURE — 74011250637 HC RX REV CODE- 250/637: Performed by: UROLOGY

## 2021-05-13 PROCEDURE — 97110 THERAPEUTIC EXERCISES: CPT

## 2021-05-13 PROCEDURE — 74011250637 HC RX REV CODE- 250/637: Performed by: HOSPITALIST

## 2021-05-13 PROCEDURE — 74011250637 HC RX REV CODE- 250/637: Performed by: FAMILY MEDICINE

## 2021-05-13 PROCEDURE — 74011000250 HC RX REV CODE- 250: Performed by: HOSPITALIST

## 2021-05-13 PROCEDURE — 85025 COMPLETE CBC W/AUTO DIFF WBC: CPT

## 2021-05-13 PROCEDURE — 65270000029 HC RM PRIVATE

## 2021-05-13 PROCEDURE — 74011250637 HC RX REV CODE- 250/637: Performed by: INTERNAL MEDICINE

## 2021-05-13 PROCEDURE — 74011250636 HC RX REV CODE- 250/636: Performed by: INTERNAL MEDICINE

## 2021-05-13 PROCEDURE — 74011250636 HC RX REV CODE- 250/636: Performed by: HOSPITALIST

## 2021-05-13 PROCEDURE — 77010033678 HC OXYGEN DAILY

## 2021-05-13 PROCEDURE — 74011636637 HC RX REV CODE- 636/637: Performed by: HOSPITALIST

## 2021-05-13 PROCEDURE — 82962 GLUCOSE BLOOD TEST: CPT

## 2021-05-13 PROCEDURE — C9113 INJ PANTOPRAZOLE SODIUM, VIA: HCPCS | Performed by: HOSPITALIST

## 2021-05-13 PROCEDURE — 80048 BASIC METABOLIC PNL TOTAL CA: CPT

## 2021-05-13 PROCEDURE — 74011000250 HC RX REV CODE- 250: Performed by: INTERNAL MEDICINE

## 2021-05-13 RX ADMIN — INSULIN GLARGINE 15 UNITS: 100 INJECTION, SOLUTION SUBCUTANEOUS at 11:33

## 2021-05-13 RX ADMIN — MEROPENEM 1 G: 1 INJECTION, POWDER, FOR SOLUTION INTRAVENOUS at 04:11

## 2021-05-13 RX ADMIN — ACETAMINOPHEN 1000 MG: 500 TABLET ORAL at 00:59

## 2021-05-13 RX ADMIN — MEROPENEM 1 G: 1 INJECTION, POWDER, FOR SOLUTION INTRAVENOUS at 21:52

## 2021-05-13 RX ADMIN — METOPROLOL SUCCINATE 50 MG: 50 TABLET, EXTENDED RELEASE ORAL at 11:33

## 2021-05-13 RX ADMIN — ACETAMINOPHEN 1000 MG: 500 TABLET ORAL at 23:00

## 2021-05-13 RX ADMIN — INSULIN LISPRO 4 UNITS: 100 INJECTION, SOLUTION INTRAVENOUS; SUBCUTANEOUS at 16:38

## 2021-05-13 RX ADMIN — TAMSULOSIN HYDROCHLORIDE 0.4 MG: 0.4 CAPSULE ORAL at 11:32

## 2021-05-13 RX ADMIN — INSULIN LISPRO 2 UNITS: 100 INJECTION, SOLUTION INTRAVENOUS; SUBCUTANEOUS at 21:53

## 2021-05-13 RX ADMIN — SODIUM CHLORIDE 40 MG: 9 INJECTION INTRAMUSCULAR; INTRAVENOUS; SUBCUTANEOUS at 21:52

## 2021-05-13 RX ADMIN — SODIUM CHLORIDE 40 MG: 9 INJECTION INTRAMUSCULAR; INTRAVENOUS; SUBCUTANEOUS at 11:33

## 2021-05-13 RX ADMIN — ATORVASTATIN CALCIUM 40 MG: 20 TABLET, FILM COATED ORAL at 21:52

## 2021-05-13 RX ADMIN — MEROPENEM 1 G: 1 INJECTION, POWDER, FOR SOLUTION INTRAVENOUS at 11:33

## 2021-05-13 RX ADMIN — INSULIN LISPRO 6 UNITS: 100 INJECTION, SOLUTION INTRAVENOUS; SUBCUTANEOUS at 11:33

## 2021-05-13 RX ADMIN — FERROUS SULFATE TAB 325 MG (65 MG ELEMENTAL FE) 325 MG: 325 (65 FE) TAB at 11:32

## 2021-05-13 RX ADMIN — FERROUS SULFATE TAB 325 MG (65 MG ELEMENTAL FE) 325 MG: 325 (65 FE) TAB at 16:39

## 2021-05-13 NOTE — PROGRESS NOTES
Hospitalist Progress Note    Patient: Maria Teresa Lunsford. MRN: [id number]  CSN: [id number]    YOB: 1945  Age: 70 y.o. Sex: male    DOA: 5/4/2021 LOS:  LOS: 9 days          Chief Complaint:      Infected ankle    Assessment/Plan     Surgery cancelled 5/11 due to wheezing and prob volume overload  Received lasix   CXR with effusions  Received blood 5/11. Improved, stable     Admitted for ankle infection and prior fracture with multiple comorbidities     Anemia -severe  Hemoccult came back positive  PPI IV  Appreciate GI consult-possible EGD after orthopedic surgery completed, watch Hgb  Hx of GI bleeding and anemia  Daily CBC     Severe iron deficiency, s/p venofer, started PO iron     Trimalleolar fracture -  Not a surgical candidate given osteomyelitis. Seen by ortho, he is to get wash out now on Friday 5/14     Osteomyelitis left heel with cellulitis, gangrenous ulcer with deep abscess tracking along the plantar fascia -  Status post incision and drainage lower extremity left heel debridement and antibiotic bead placement   S/p removal of antibiotic beads and graft open wound. Antibiotics switched to meropenem, vancomycin here now     ELINOR on CKD stage 3-improved  Renal US with urinary bladder distension.   Mccray in place  Urinary retention   flomax    HTN -  On toprol     Hypokalemia-repeltion PO PRN, daily BMP     Diabetes mellitus -  Continue with lantus,  SSI.      CAD -  No anginal symptoms,  aspirin and plavix on hold due to hematuria. and now planned surgery     A-flutter -  Resume eliquis when stable     Supportive care    Procedure tomorrow  Disposition :  Patient Active Problem List   Diagnosis Code    Atrial flutter ([facility]) I48.92    DM2 (diabetes mellitus, type 2) ([facility]) E11.9    CAD (coronary artery disease) I25.10    ELINOR (acute kidney injury) ([facility] N17.9    CKD (chronic kidney disease) stage 3, GFR 30-59 ml/min ([facility]) N18.30    Elevated brain natriuretic peptide (BNP) level R79.89    Diabetic ulcer of left foot ([address] E11.621, L97.529    COVID-19 virus infection U07.1    Acute osteomyelitis ([address] M86.10    Acute hematogenous osteomyelitis of left foot ([address] M86.072    Cellulitis of left foot L03. 116    Diabetic foot ulcer with osteomyelitis ([address] E11.621, E11.69, L97.509, M86.9    Ulcer of left heel, with necrosis of bone ([address] L97.424    Osteomyelitis ([address] M86.9    Trimalleolar fracture of ankle, closed, left, initial encounter S82.852A    Displacement of peripherally inserted central catheter (PICC) ([address] T82.524A       Subjective:    I am fine, how are you? Denies pain, nausea, fevers/chills    Review of systems:    Constitutional: denies fevers, chills  Respiratory: denies SOB  Cardiovascular: denies chest pain  Gastrointestinal: denies nausea, vomiting, diarrhea      Vital signs/Intake and Output:  Visit Vitals  /71 (BP 1 Location: Left upper arm, BP Patient Position: At rest)   Pulse 92   Temp 98.9 °F (37.2 °C)   Resp 16   Ht 5' 8\" (1.727 m)   Wt 95.2 kg (209 lb 12.8 oz)   SpO2 99%   BMI 31.90 kg/m²     Current Shift:  No intake/output data recorded.   Last three shifts:  05/11 1901 - 05/13 0700  In: 1800   Out: 3025 [Urine:3025]    Exam:    General: elderly weakened Wm, alert, NAD, OX3  CVS:Regular rate and rhythm, no M/R/G, S1/S2 heard, no thrill  Lungs:Clear to auscultation bilaterally, no wheezes, rhonchi, or rales noted on exam  Abdomen: Soft, Nontender, No distention, Normal Bowel sounds, No hepatomegaly  LLE dressed/bandaged  Neuro:grossly normal , follows commands  Psych:appropriate                Labs: Results:       Chemistry Recent Labs     05/13/21  0535 05/12/21  0335 05/11/21  0454   * 142* 107*   * 147* 146*   K 3.5 3.8 3.6   * 113* 110   CO2 31 30 29   BUN 22* 27* 36*   CREA 1.19 1.33* 1.40*   CA 7.0* 7.1* 7.2*   AGAP 3 4 7   BUCR 18 20 26*      CBC w/Diff Recent Labs     05/13/21  0535 05/12/21  0335 05/11/21  0822   WBC 3.9* 4. 1* 5.6   RBC 2.93* 2.98* 2.60*   HGB 7.8* 7.9* 6.9*   HCT 25.5* 25.7* 22.2*    273 269   GRANS 71 67 75*   LYMPH 20* 12* 8*   EOS 3 5 3      Cardiac Enzymes No results for input(s): CPK, CKND1, LEONEL in the last 72 hours. No lab exists for component: CKRMB, TROIP   Coagulation No results for input(s): PTP, INR, APTT, INREXT in the last 72 hours. Lipid Panel No results found for: CHOL, CHOLPOCT, CHOLX, CHLST, CHOLV, 122303, HDL, HDLP, LDL, LDLC, DLDLP, 098428, VLDLC, VLDL, TGLX, TRIGL, TRIGP, TGLPOCT, CHHD, CHHDX   BNP No results for input(s): BNPP in the last 72 hours. Liver Enzymes No results for input(s): TP, ALB, TBIL, AP in the last 72 hours.     No lab exists for component: SGOT, GPT, DBIL   Thyroid Studies Lab Results   Component Value Date/Time    TSH 1.72 02/11/2018 02:19 AM        Procedures/imaging: see electronic medical records for all procedures/Xrays and details which were not copied into this note but were reviewed prior to creation of Edin Olivares MD

## 2021-05-13 NOTE — PROGRESS NOTES
0745 Beside and verbal shift change report given to [facility] [employer] (oncoming nurse) by Varun Muñoz (off going nurse). Report included the following information SBAR, Kardex,OR summary, Intake/output and MAR.       1940 Bedside and Verbal shift change report given to [address] [place] [address] Drive (oncoming nurse) by [facility] (offgoing nurse). Report included the following information SBAR, Kardex, Intake/Output, and MAR.

## 2021-05-13 NOTE — PROGRESS NOTES
Problem: Mobility Impaired (Adult and Pediatric)  Goal: *Acute Goals and Plan of Care (Insert Text)  Description: Physical Therapy Goals   Initiated 5/6/2021 and to be accomplished within 7 day(s)  1. Patient will move from supine <> sit with S in prep for out of bed activity and change of position. 2.  Patient will demonstrate sitting balance intact EOB without UE support /S for performance for ADL/ therapeutic exercise activity. 3.  Patient will perform transfers bed to chair via transfer board with min assist.    Outcome: Progressing Towards Goal  []  Patient has met MD ray collins for d/c home   []  Recommend  with 24 hour adult care   [x]  Benefit from additional acute PT session to address:  functional mobility and strength/ROM deficits    PHYSICAL THERAPY TREATMENT    Patient: Daniella Alejandre (80 y.o. male)  Date: 5/13/2021  Diagnosis: Osteomyelitis ([facility] [M86.9]  Trimalleolar fracture of ankle, closed, left, initial encounter [S82.852A]  Displacement of peripherally inserted central catheter (PICC) ([facility] [T82.524A] Trimalleolar fracture of ankle, closed, left, initial encounter  Procedure(s) (LRB):  OPEN TREATMENT OF TRIMALLEOLAR LEFT ANKLE FRACTURE W/C-ARM (Left) * Surgery Date in Future *  Precautions: Fall, NWB(lle)    ASSESSMENT:  Intermittent confusion. Perseverating in going home after surgery. Does not appear to comprehend NWB post surgery. For surgery tomorrow per chart. Progression toward goals:   []      Improving appropriately and progressing toward goals  [x]      Improving slowly and progressing toward goals  []      Not making progress toward goals and plan of care will be adjusted     PLAN:  Patient continues to benefit from skilled intervention to address the above impairments. Continue treatment per established plan of care.   Discharge Recommendations:  Rehab  Further Equipment Recommendations for Discharge:  N/A     SUBJECTIVE:   Patient stated I'm going home after surgery.     OBJECTIVE DATA SUMMARY:   Critical Behavior:  Neurologic State: Alert  Orientation Level: Oriented X4(periodically confusion)  Cognition: Appropriate safety awareness, Follows commands  Safety/Judgement: Fall prevention, Decreased insight into deficits  Therapeutic Exercises:     EXERCISE   Sets   Reps   Active Active Assist   Passive Self ROM   Comments   Ankle Pumps 1 20  [x] [] [] []    Quad Sets/Glut Sets 1 10  [x] [] [] [] Hold for 5 secs   Heel Slides 1 15 [x] [] [] [] R LE   Straight Leg Raises 1 10 [x] [] [] [] 2x5 L   Hip Adb/Add 1 15 [x] [] [] []    Long Arc Quads   [] [] [] []    Seated Marching   [] [] [] []    Standing Marching   [] [] [] []       [] [] [] []        Pain:  Pain level pre-treatment: 0/10  Pain level post-treatment: 0/10   Pain Intervention(s): Medication (see MAR); Rest, Ice, Repositioning   Response to intervention: Nurse notified, See doc flow  Activity Tolerance:   Fair   Please refer to the flowsheet for vital signs taken during this treatment. After treatment:   [] Patient left in no apparent distress sitting up in chair  [x] Patient left in no apparent distress in bed  [x] Call bell left within reach  [] Nursing notified  [] Caregiver present  [] Bed alarm activated  [] SCDs applied      COMMUNICATION/EDUCATION:   [x]         Role of Physical Therapy in the acute care setting. [x]         Fall prevention education was provided and the patient/caregiver indicated understanding. [x]         Patient/family have participated as able in working toward goals and plan of care. [x]         Patient/family agree to work toward stated goals and plan of care. []         Patient understands intent and goals of therapy, but is neutral about his/her participation.   []         Patient is unable to participate in stated goals/plan of care: ongoing with therapy staff.  []         Other:        Katelin Lugo, PT   Time Calculation: 23 mins

## 2021-05-13 NOTE — PROGRESS NOTES
Progress Note      Patient: Trini Vázquez Sex: male          DOA: 5/4/2021         YOB: 1945      Age:  70 y.o.        LOS:  LOS: 9 days               Assessment/Plan     Principal Problem:    Trimalleolar fracture of ankle, closed, left, initial encounter (5/4/2021)    Active Problems:    Osteomyelitis ([address] (5/4/2021)      Displacement of peripherally inserted central catheter (PICC) ([address] (5/4/2021)      Plan washout and possible orif vs ex fix tomorrow. Discussed differences if wound in poor condition vs good. Showed pt ex fix yesterday but does no remember. Discussed risks and benefits again for tomorrow. Plan washout and possible orif vs ex fix    disposition per home team  Subjective: Thirsty. Anxious to get ankle fixed. Minimal pain    Objective:      Visit Vitals  BP (!) 155/71 (BP 1 Location: Left upper arm, BP Patient Position: At rest)   Pulse 84   Temp 98.3 °F (36.8 °C)   Resp 17   Ht 5' 8\" (1.727 m)   Wt 95.2 kg (209 lb 12.8 oz)   SpO2 98%   BMI 31.90 kg/m²       Physical Exam:  splint intact, no drainage. Intake and Output:  Current Shift:  No intake/output data recorded. Last three shifts:  05/11 1901 - 05/13 0700  In: 1800   Out: 3025 [Urine:3025]    Lab/Data Reviewed: All lab results for the last 24 hours reviewed. Medications Reviewed    Continued hospitalization is indicated due to abx, orif.

## 2021-05-13 NOTE — PROGRESS NOTES
Problem: Falls - Risk of  Goal: *Absence of Falls  Description: Document Braulio Rain Fall Risk and appropriate interventions in the flowsheet. Outcome: Progressing Towards Goal  Note: Fall Risk Interventions:  Mobility Interventions: Communicate number of staff needed for ambulation/transfer, Patient to call before getting OOB, Utilize walker, cane, or other assistive device    Mentation Interventions: Adequate sleep, hydration, pain control    Medication Interventions: Evaluate medications/consider consulting pharmacy, Patient to call before getting OOB    Elimination Interventions: Call light in reach, Toileting schedule/hourly rounds    History of Falls Interventions: Investigate reason for fall, Evaluate medications/consider consulting pharmacy         Problem: Patient Education: Go to Patient Education Activity  Goal: Patient/Family Education  Outcome: Progressing Towards Goal     Problem: Pressure Injury - Risk of  Goal: *Prevention of pressure injury  Description: Document Anam Scale and appropriate interventions in the flowsheet.   Outcome: Progressing Towards Goal  Note: Pressure Injury Interventions:  Sensory Interventions: Assess need for specialty bed, Assess changes in LOC, Minimize linen layers, Pressure redistribution bed/mattress (bed type)    Moisture Interventions: Absorbent underpads, Minimize layers    Activity Interventions: Pressure redistribution bed/mattress(bed type)    Mobility Interventions: HOB 30 degrees or less, Pressure redistribution bed/mattress (bed type), PT/OT evaluation    Nutrition Interventions: Document food/fluid/supplement intake    Friction and Shear Interventions: Lift team/patient mobility team                Problem: Patient Education: Go to Patient Education Activity  Goal: Patient/Family Education  Outcome: Progressing Towards Goal     Problem: Pain  Goal: *Control of Pain  Outcome: Progressing Towards Goal     Problem: Patient Education: Go to Patient Education Activity  Goal: Patient/Family Education  Outcome: Progressing Towards Goal     Problem: Patient Education: Go to Patient Education Activity  Goal: Patient/Family Education  Outcome: Progressing Towards Goal     Problem: Patient Education: Go to Patient Education Activity  Goal: Patient/Family Education  Outcome: Progressing Towards Goal

## 2021-05-13 NOTE — PROGRESS NOTES
Caal cath flushed as received in report, draining to alanna then clear yellow urine - no resistance nor clots noted. Resp therapist paged for Neb treatment for noted wheezing. Pt using IS, tolerating well    0300 Irrigation of caal done with minimal amount of old clots removed. Dressing to PICC line changed    0600 Caal irrigated with minimal old clots noted, no difficulty with irrigation. Incontinent of dark brown/black loose stool - jolie care done and CHG wipes given, linens changed    0800 Bedside and Verbal shift change report given to Lety Jimenes RN (oncoming nurse) by Lois Chester RN   (offgoing nurse). Report included the following information SBAR, Kardex, Intake/Output, MAR and Recent Results.

## 2021-05-13 NOTE — PROGRESS NOTES
5/13/2021  PT treatment not completed due to:  [] Off Unit for testing/procedure  [] Pain  [x] Eating  [] Patient too lethargic  [] Nausea/vomiting  [] Dialysis treatment in progress   [] Other:  Will f/u at later time as pt schedule allows. Thank you.    Deejay Salomon, PT

## 2021-05-13 NOTE — PROGRESS NOTES
[facility]                         (A Division of [address] [place] [address])                                                                                                                       Evelyne Jorgensen MD  Work Cell #: [phone]( Mon-Fri, 7am-5pm)  If no call or text back within 30 minutes from me, please call office number. (Prefer text when possible)  Office #:     [phone]  [phone]   Office Fax: [phone]     Date of Admission: 5/4/2021   Date of Service:  5/13/2021  Reason for FU : left ankle OM, fever    Current Antimicrobials:    Prior Antimicrobials:  Meropenem 1 gm Q12 5/6 to date    ·    Zithromax 500mg IV- 4/14 to 4/20  · Vancomycin IV 4/14  until 4/19  · Zosyn 2.25gm IV Q6 4/14 to 4/15   · Meropenem IV 4/15-4/20  Unasyn 3 gm Q6 hour 4/20 to 4/22/21  Ertapenem 1gm IV q24 4/23 to date  -- reduced dose to 500 mg 5/5  --Vancomycin 5/6 to 5/12       Assessment: Rec/ Plan on ID issues I am following:   · Fever: source suspected progressed left ankle infection/abscess       DDX: urine source Vs Vs line. No respiratory                complaint and no rash noted. bcx neg- 5/5 and 5/7  ucx neg 5/5  · Left Infected DM Ulcer and acute OM and necrotic tissue, punched out heel ulcer with Mixed infection- MSSA +E.coli + anaerobes - 4/14/21  · Post I and D of left heel bone/abscess 4/17, OR cultures remain sterile  · Post I and D with graft to heel- 4/21  CRP 8.9-> 5.8->3.8( 4/22/21) as inpatient   CRP 15-> 34 as OP  ESR 78-> 63   · Acute renal failure 2/2 urinary retention, caal in place. Drug related IN in DDX but less likely       (urine eos negative): Resolved  · Left heel ankle fracture 2/2 fall: on this admission    · Anemia- HB 6.2  · Hematuria- off anticoagulant    Other Medical Issues followed and managed by primary and other services  · DM- Poorly controlled  · Low Vitamin D  · CAD with CHF/ NSTMI.  Atrial flutter  · CKD  · Hyperlipidemia       Past ID issues: N/A  -History of COVID 19 infection 4/2021   Await OR on 5/14- please send of tissue culture + histology    Cont meropenem     Will follow                          Condition: Hemodynamically stable. Persistent fever    Subjective: \" no complaint\"  Was sleeping, but wakes easily  Looks weak  Denies F/C-- Tmax of 100.6 on chart review lat 24 hours  Denies cough/chest pain    Denies flank pain. No diarrhea. No itching or rash    Continues with caal- urine in bag with tea colored urine    CTA AP report reviewed         Review of System:  See above, other wise negative     Summary:    Terri Perry. is 71 y/o WM with PMH as listed below- with initial consult on 4/15/21. Last seen by me on 4/22/21 prior to discharge. He was set up at [facility] infusion center for daily ertapenem.     Yesterday, I was notified by RN that he fell, hurt his left foot, and his PICC was not working and they infused him via PIV and sent him to ED.     On arrival to ED, was noted to be in acute renal failure, and with new left ankle fracture.     Wound care note with the picture reviewed.     Pt denies F/C/R. Denies SOB. Feels weak, appetite and po intake has decreased past many days per wife. Patient Active Problem List   Diagnosis Code    Atrial flutter ([facility]) I48.92    DM2 (diabetes mellitus, type 2) ([facility]) E11.9    CAD (coronary artery disease) I25.10    ELINOR (acute kidney injury) ([facility] N17.9    CKD (chronic kidney disease) stage 3, GFR 30-59 ml/min ([facility]) N18.30    Elevated brain natriuretic peptide (BNP) level R79.89    Diabetic ulcer of left foot ([facility]) E11.621, L97.529    COVID-19 virus infection U07.1    Acute osteomyelitis ([facility] M86.10    Acute hematogenous osteomyelitis of left foot ([facility]) M86.072    Cellulitis of left foot L03. 80    Diabetic foot ulcer with osteomyelitis ([facility]) E11.621, E11.69, L97.509, M86.9    Ulcer of left heel, with necrosis of bone ([facility] L97.424    [facility]el[facility] M86.9    Trimalleolar fracture of ankle, closed, left, initial encounter S82.852A    Displacement of peripherally inserted central catheter (PICC) ([facility] T82.524A       Current Facility-Administered Medications   Medication Dose Route Frequency    0.9% sodium chloride infusion 250 mL  250 mL IntraVENous PRN    albuterol-ipratropium (DUO-NEB) 2.5 MG-0.5 MG/3 ML  3 mL Nebulization Q4H PRN    ferrous sulfate tablet 325 mg  1 Tab Oral BID WITH MEALS    pantoprazole (PROTONIX) 40 mg in 0.9% sodium chloride 10 mL injection  40 mg IntraVENous Q12H    meropenem (MERREM) 1 g in sterile water (preservative free) 20 mL IV syringe  1 g IntraVENous Q8H    metoprolol succinate (TOPROL-XL) XL tablet 50 mg  50 mg Oral DAILY    tamsulosin (FLOMAX) capsule 0.4 mg  0.4 mg Oral DAILY    heparin (porcine) 100 unit/mL injection 500 Units  500 Units InterCATHeter Q8H PRN    [Held by provider] apixaban (ELIQUIS) tablet 2.5 mg  2.5 mg Oral BID    acetaminophen (TYLENOL) tablet 1,000 mg  1,000 mg Oral Q8H PRN    [Held by provider] aspirin delayed-release tablet 81 mg  81 mg Oral DAILY    atorvastatin (LIPITOR) tablet 40 mg  40 mg Oral QHS    [Held by provider] clopidogreL (PLAVIX) tablet 75 mg  75 mg Oral DAILY    insulin glargine (LANTUS) injection 15 Units  15 Units SubCUTAneous DAILY    glucose chewable tablet 16 g  16 g Oral PRN    glucagon (GLUCAGEN) injection 1 mg  1 mg IntraMUSCular PRN    dextrose (D50W) injection syrg 25 g  50 mL IntraVENous PRN    insulin lispro (HUMALOG) injection   SubCUTAneous AC&HS    morphine injection 2 mg  2 mg IntraVENous Q4H PRN       Objective:    Visit Vitals  BP (!) 151/67 (BP 1 Location: Left upper arm, BP Patient Position: At rest)   Pulse 93   Temp 98.9 °F (37.2 °C)   Resp 16   Ht 5' 8\" (1.727 m)   Wt 95.2 kg (209 lb 12.8 oz)   SpO2 100%   BMI 31.90 kg/m²       Temp (24hrs), Av °F (37.2 °C), Min:98.3 °F (36.8 °C), Max:100.6 °F (38.1 °C)    Right PICC - replaced over wire     GEN: WD chronically sick looking , not in resp distress. HEENT: Unicteric. EOMI intact  CHEST: Non laboured breathing. B/L rhonchi  CVS:RRR, no mur/gallop  ABD: Obese/soft. Non tender. NABS  LISET: caal in place- with tea colored urine  EXT: Left LE is splinted. No redness/ creps to palpable soft tissue. D/w Wound care about her exam yesterday  Skin: Dry and intact. No rash, no redness. CNS: A, OX3. Moves all extremity. CN grossly ok. Microbiology:    Blood culture: - 2 sets: NGSF    - 2 sets: NG  : 1 set: NGSF     Urine culture:  - urine culture: NG     Wounddrainage and OR tissueculture:  - gram stain: GNR and GPR        --wound culture+ s.aurues and E.coli + anaerobe   -- OR culture: NG    Lab results:    Chemistry  Recent Labs     21  0535 21  0335 21  0454   * 142* 107*   * 147* 146*   K 3.5 3.8 3.6   * 113* 110   CO2 31 30 29   BUN 22* 27* 36*   CREA 1.19 1.33* 1.40*   CA 7.0* 7.1* 7.2*   AGAP 3 4 7   BUCR 18 20 26*       CBC w/ Diff  Recent Labs     21  0535 21  0335 21  0822   WBC 3.9* 4.1* 5.6   RBC 2.93* 2.98* 2.60*   HGB 7.8* 7.9* 6.9*   HCT 25.5* 25.7* 22.2*    273 269   GRANS 71 67 75*   LYMPH 20* 12* 8*   EOS 3 5 3     Imagin/5- CXR     1. Right PICC line tip retracted from optimal/appropriate position, projecting  over the right subclavian vein. 2. Mild bibasilar atelectasis.      21  US renal  1. Considerable urinary bladder fluid distention (approximately 2.1 L) with  likely reactive pelviectasis.      > Results called to [address] [place] [address] as patient 2 likely benefit from  urinary bladder catheterization.     : right humerus  No acute osseous abnormality or malalignment involving the right humerus. : MRI of left foot:    No acute osseous abnormality or malalignment involving the right humerus.      : CTA AP w/contrast  No evidence of contrast extravasation to suggest active gastrointestinal  hemorrhage.     Bilateral hydroureteronephrosis secondary to markedly thick-walled urinary  bladder containing hyperdense fluid, consistent with hemorrhage. Indwelling  Mccray catheter with incomplete bladder decompression. Findings concerning for  cystitis and hemorrhage possibly secondary to catheter placement.   Cannot  exclude underlying malignancy, cystoscopy is recommend upon resolution of acute  pathology.     Large right inguinal hernia containing nondilated distal ileum and right colon.     Trace retroperitoneal ascites.        Total duration of time spent with patient interview and exam and discussion of plan of care, review of chart in care everywhere, discussion with medical staff- nursing/attending and additional specialities as indicated:  20 minutes

## 2021-05-13 NOTE — PROGRESS NOTES
Bedside and Verbal shift change report given to [address] (oncoming nurse) by Valarie Leyva (offgoing nurse). Report included the following information SBAR, Kardex, Intake/Output, MAR and Recent Results. 2300- Pt has temp of 100.8, PRN tylenol given    Shift Summary-   Patient Vitals for the past 12 hrs:   Temp Pulse Resp BP SpO2   05/14/21 0347 97.6 °F (36.4 °C) 88 18 124/60 100 %   05/13/21 2255 (!) 100.8 °F (38.2 °C) 91 18 (!) 142/56 100 %   05/13/21 1946 99.4 °F (37.4 °C) 95 18 (!) 171/61 100 %   Pt had uneventful shift    Bedside and Verbal shift change report given to MIRACLE Sterling (oncoming nurse) by [address] (offgoing nurse). Report included the following information SBAR, Kardex, Intake/Output, MAR and Recent Results.

## 2021-05-13 NOTE — PROGRESS NOTES
Care manager rounded on patient, wife at bedside; plan is for surgery this afternoon and pending orders will pursue SNF placement for discharge planning.

## 2021-05-14 ENCOUNTER — ANESTHESIA EVENT (OUTPATIENT)
Dept: SURGERY | Age: 76
DRG: 464 | End: 2021-05-14
Payer: MEDICARE

## 2021-05-14 ENCOUNTER — ANESTHESIA (OUTPATIENT)
Dept: SURGERY | Age: 76
DRG: 464 | End: 2021-05-14
Payer: MEDICARE

## 2021-05-14 ENCOUNTER — APPOINTMENT (OUTPATIENT)
Dept: GENERAL RADIOLOGY | Age: 76
DRG: 464 | End: 2021-05-14
Attending: ORTHOPAEDIC SURGERY
Payer: MEDICARE

## 2021-05-14 LAB
ANION GAP SERPL CALC-SCNC: 2 MMOL/L (ref 3–18)
BASOPHILS # BLD: 0 K/UL (ref 0–0.1)
BASOPHILS NFR BLD: 0 % (ref 0–2)
BUN SERPL-MCNC: 20 MG/DL (ref 7–18)
BUN/CREAT SERPL: 19 (ref 12–20)
CALCIUM SERPL-MCNC: 7 MG/DL (ref 8.5–10.1)
CHLORIDE SERPL-SCNC: 112 MMOL/L (ref 100–111)
CO2 SERPL-SCNC: 32 MMOL/L (ref 21–32)
CREAT SERPL-MCNC: 1.08 MG/DL (ref 0.6–1.3)
CRP SERPL-MCNC: 15.3 MG/DL (ref 0–0.3)
DIFFERENTIAL METHOD BLD: ABNORMAL
EOSINOPHIL # BLD: 0.2 K/UL (ref 0–0.4)
EOSINOPHIL NFR BLD: 6 % (ref 0–5)
ERYTHROCYTE [DISTWIDTH] IN BLOOD BY AUTOMATED COUNT: 18.4 % (ref 11.6–14.5)
ERYTHROCYTE [SEDIMENTATION RATE] IN BLOOD: >140 MM/HR (ref 0–20)
GLUCOSE BLD STRIP.AUTO-MCNC: 145 MG/DL (ref 70–110)
GLUCOSE BLD STRIP.AUTO-MCNC: 146 MG/DL (ref 70–110)
GLUCOSE BLD STRIP.AUTO-MCNC: 154 MG/DL (ref 70–110)
GLUCOSE BLD STRIP.AUTO-MCNC: 156 MG/DL (ref 70–110)
GLUCOSE BLD STRIP.AUTO-MCNC: 214 MG/DL (ref 70–110)
GLUCOSE SERPL-MCNC: 131 MG/DL (ref 74–99)
HCT VFR BLD AUTO: 26.2 % (ref 36–48)
HGB BLD-MCNC: 7.9 G/DL (ref 13–16)
LYMPHOCYTES # BLD: 0.7 K/UL (ref 0.9–3.6)
LYMPHOCYTES NFR BLD: 18 % (ref 21–52)
MCH RBC QN AUTO: 26.1 PG (ref 24–34)
MCHC RBC AUTO-ENTMCNC: 30.2 G/DL (ref 31–37)
MCV RBC AUTO: 86.5 FL (ref 74–97)
MONOCYTES # BLD: 0.5 K/UL (ref 0.05–1.2)
MONOCYTES NFR BLD: 13 % (ref 3–10)
NEUTS SEG # BLD: 2.5 K/UL (ref 1.8–8)
NEUTS SEG NFR BLD: 62 % (ref 40–73)
PLATELET # BLD AUTO: 267 K/UL (ref 135–420)
PMV BLD AUTO: 10 FL (ref 9.2–11.8)
POTASSIUM SERPL-SCNC: 3.5 MMOL/L (ref 3.5–5.5)
RBC # BLD AUTO: 3.03 M/UL (ref 4.35–5.65)
SODIUM SERPL-SCNC: 146 MMOL/L (ref 136–145)
WBC # BLD AUTO: 4 K/UL (ref 4.6–13.2)

## 2021-05-14 PROCEDURE — 65270000029 HC RM PRIVATE

## 2021-05-14 PROCEDURE — 74011250637 HC RX REV CODE- 250/637: Performed by: INTERNAL MEDICINE

## 2021-05-14 PROCEDURE — 77030026244: Performed by: ORTHOPAEDIC SURGERY

## 2021-05-14 PROCEDURE — 77030030144 HC BLT EXT FIX WRE ORTH -B: Performed by: ORTHOPAEDIC SURGERY

## 2021-05-14 PROCEDURE — 74011250637 HC RX REV CODE- 250/637: Performed by: HOSPITALIST

## 2021-05-14 PROCEDURE — 76010000131 HC OR TIME 2 TO 2.5 HR: Performed by: ORTHOPAEDIC SURGERY

## 2021-05-14 PROCEDURE — 97605 NEG PRS WND THER DME<=50SQCM: CPT

## 2021-05-14 PROCEDURE — 77030022865 HC NUT BN TRUELOK ORTH -B: Performed by: ORTHOPAEDIC SURGERY

## 2021-05-14 PROCEDURE — 77030000032 HC CUF TRNQT ZIMM -B: Performed by: ORTHOPAEDIC SURGERY

## 2021-05-14 PROCEDURE — 2709999900 HC NON-CHARGEABLE SUPPLY: Performed by: ORTHOPAEDIC SURGERY

## 2021-05-14 PROCEDURE — C9113 INJ PANTOPRAZOLE SODIUM, VIA: HCPCS | Performed by: HOSPITALIST

## 2021-05-14 PROCEDURE — 77030021517 HC ROD EXT FIX TRULK ORTH -B: Performed by: ORTHOPAEDIC SURGERY

## 2021-05-14 PROCEDURE — 0JBR0ZZ EXCISION OF LEFT FOOT SUBCUTANEOUS TISSUE AND FASCIA, OPEN APPROACH: ICD-10-PCS | Performed by: ORTHOPAEDIC SURGERY

## 2021-05-14 PROCEDURE — 74011636637 HC RX REV CODE- 636/637: Performed by: ANESTHESIOLOGY

## 2021-05-14 PROCEDURE — 74011000250 HC RX REV CODE- 250: Performed by: REGISTERED NURSE

## 2021-05-14 PROCEDURE — 77030025678 HC STRT EXT FIX TRULK ORTH -G: Performed by: ORTHOPAEDIC SURGERY

## 2021-05-14 PROCEDURE — 80048 BASIC METABOLIC PNL TOTAL CA: CPT

## 2021-05-14 PROCEDURE — 77030023040: Performed by: ORTHOPAEDIC SURGERY

## 2021-05-14 PROCEDURE — 76060000035 HC ANESTHESIA 2 TO 2.5 HR: Performed by: ORTHOPAEDIC SURGERY

## 2021-05-14 PROCEDURE — 77030031139 HC SUT VCRL2 J&J -A: Performed by: ORTHOPAEDIC SURGERY

## 2021-05-14 PROCEDURE — 85652 RBC SED RATE AUTOMATED: CPT

## 2021-05-14 PROCEDURE — 77030019952 HC CANSTR VAC ASST KCON -B: Performed by: ORTHOPAEDIC SURGERY

## 2021-05-14 PROCEDURE — 0QSH35Z REPOSITION LEFT TIBIA WITH EXTERNAL FIXATION DEVICE, PERCUTANEOUS APPROACH: ICD-10-PCS | Performed by: ORTHOPAEDIC SURGERY

## 2021-05-14 PROCEDURE — 85025 COMPLETE CBC W/AUTO DIFF WBC: CPT

## 2021-05-14 PROCEDURE — 74011250636 HC RX REV CODE- 250/636: Performed by: ANESTHESIOLOGY

## 2021-05-14 PROCEDURE — 74011250637 HC RX REV CODE- 250/637: Performed by: UROLOGY

## 2021-05-14 PROCEDURE — 77030029593: Performed by: ORTHOPAEDIC SURGERY

## 2021-05-14 PROCEDURE — 74011000250 HC RX REV CODE- 250: Performed by: INTERNAL MEDICINE

## 2021-05-14 PROCEDURE — 86140 C-REACTIVE PROTEIN: CPT

## 2021-05-14 PROCEDURE — 77030031193 HC WSHR EXT FIX ORTH -A: Performed by: ORTHOPAEDIC SURGERY

## 2021-05-14 PROCEDURE — 74011000258 HC RX REV CODE- 258: Performed by: REGISTERED NURSE

## 2021-05-14 PROCEDURE — 77030031191 HC WRE TRULOK OLV ORTH -C: Performed by: ORTHOPAEDIC SURGERY

## 2021-05-14 PROCEDURE — 74011250636 HC RX REV CODE- 250/636: Performed by: INTERNAL MEDICINE

## 2021-05-14 PROCEDURE — 87205 SMEAR GRAM STAIN: CPT

## 2021-05-14 PROCEDURE — 74011636637 HC RX REV CODE- 636/637: Performed by: HOSPITALIST

## 2021-05-14 PROCEDURE — 77030019934 HC DRSG VAC ASST KCON -B: Performed by: ORTHOPAEDIC SURGERY

## 2021-05-14 PROCEDURE — 36415 COLL VENOUS BLD VENIPUNCTURE: CPT

## 2021-05-14 PROCEDURE — 74011000250 HC RX REV CODE- 250: Performed by: HOSPITALIST

## 2021-05-14 PROCEDURE — 74011250636 HC RX REV CODE- 250/636: Performed by: HOSPITALIST

## 2021-05-14 PROCEDURE — 77030004085: Performed by: ORTHOPAEDIC SURGERY

## 2021-05-14 PROCEDURE — 77030021520 HC POST EXT FIX TRULK ORTH -C: Performed by: ORTHOPAEDIC SURGERY

## 2021-05-14 PROCEDURE — 77030040361 HC SLV COMPR DVT MDII -B: Performed by: ORTHOPAEDIC SURGERY

## 2021-05-14 PROCEDURE — 77010033678 HC OXYGEN DAILY

## 2021-05-14 PROCEDURE — 76210000006 HC OR PH I REC 0.5 TO 1 HR: Performed by: ORTHOPAEDIC SURGERY

## 2021-05-14 PROCEDURE — 87075 CULTR BACTERIA EXCEPT BLOOD: CPT

## 2021-05-14 PROCEDURE — 77030025817 HC BLT EXT FIX ORTH -A: Performed by: ORTHOPAEDIC SURGERY

## 2021-05-14 PROCEDURE — 2709999900 HC NON-CHARGEABLE SUPPLY

## 2021-05-14 PROCEDURE — 87176 TISSUE HOMOGENIZATION CULTR: CPT

## 2021-05-14 PROCEDURE — 77030012508 HC MSK AIRWY LMA AMBU -A: Performed by: ANESTHESIOLOGY

## 2021-05-14 PROCEDURE — 74011250636 HC RX REV CODE- 250/636: Performed by: REGISTERED NURSE

## 2021-05-14 PROCEDURE — 82962 GLUCOSE BLOOD TEST: CPT

## 2021-05-14 PROCEDURE — 94640 AIRWAY INHALATION TREATMENT: CPT

## 2021-05-14 RX ORDER — IPRATROPIUM BROMIDE AND ALBUTEROL SULFATE 2.5; .5 MG/3ML; MG/3ML
3 SOLUTION RESPIRATORY (INHALATION)
Status: COMPLETED | OUTPATIENT
Start: 2021-05-14 | End: 2021-05-14

## 2021-05-14 RX ORDER — PROPOFOL 10 MG/ML
INJECTION, EMULSION INTRAVENOUS AS NEEDED
Status: DISCONTINUED | OUTPATIENT
Start: 2021-05-14 | End: 2021-05-14 | Stop reason: HOSPADM

## 2021-05-14 RX ORDER — SODIUM CHLORIDE 0.9 % (FLUSH) 0.9 %
5-40 SYRINGE (ML) INJECTION AS NEEDED
Status: DISCONTINUED | OUTPATIENT
Start: 2021-05-14 | End: 2021-05-14 | Stop reason: HOSPADM

## 2021-05-14 RX ORDER — MAGNESIUM SULFATE 100 %
4 CRYSTALS MISCELLANEOUS AS NEEDED
Status: DISCONTINUED | OUTPATIENT
Start: 2021-05-14 | End: 2021-05-14 | Stop reason: HOSPADM

## 2021-05-14 RX ORDER — ONDANSETRON 2 MG/ML
INJECTION INTRAMUSCULAR; INTRAVENOUS AS NEEDED
Status: DISCONTINUED | OUTPATIENT
Start: 2021-05-14 | End: 2021-05-14 | Stop reason: HOSPADM

## 2021-05-14 RX ORDER — DEXTROSE 50 % IN WATER (D50W) INTRAVENOUS SYRINGE
25-50 AS NEEDED
Status: DISCONTINUED | OUTPATIENT
Start: 2021-05-14 | End: 2021-05-14 | Stop reason: HOSPADM

## 2021-05-14 RX ORDER — SODIUM CHLORIDE, SODIUM LACTATE, POTASSIUM CHLORIDE, CALCIUM CHLORIDE 600; 310; 30; 20 MG/100ML; MG/100ML; MG/100ML; MG/100ML
125 INJECTION, SOLUTION INTRAVENOUS CONTINUOUS
Status: DISCONTINUED | OUTPATIENT
Start: 2021-05-14 | End: 2021-05-15

## 2021-05-14 RX ORDER — HYDROMORPHONE HYDROCHLORIDE 1 MG/ML
0.5 INJECTION, SOLUTION INTRAMUSCULAR; INTRAVENOUS; SUBCUTANEOUS
Status: DISCONTINUED | OUTPATIENT
Start: 2021-05-14 | End: 2021-05-14 | Stop reason: HOSPADM

## 2021-05-14 RX ORDER — MIDAZOLAM HYDROCHLORIDE 1 MG/ML
INJECTION, SOLUTION INTRAMUSCULAR; INTRAVENOUS AS NEEDED
Status: DISCONTINUED | OUTPATIENT
Start: 2021-05-14 | End: 2021-05-14 | Stop reason: HOSPADM

## 2021-05-14 RX ORDER — FENTANYL CITRATE 50 UG/ML
25 INJECTION, SOLUTION INTRAMUSCULAR; INTRAVENOUS AS NEEDED
Status: DISCONTINUED | OUTPATIENT
Start: 2021-05-14 | End: 2021-05-14 | Stop reason: HOSPADM

## 2021-05-14 RX ORDER — SODIUM CHLORIDE, SODIUM LACTATE, POTASSIUM CHLORIDE, CALCIUM CHLORIDE 600; 310; 30; 20 MG/100ML; MG/100ML; MG/100ML; MG/100ML
125 INJECTION, SOLUTION INTRAVENOUS CONTINUOUS
Status: DISCONTINUED | OUTPATIENT
Start: 2021-05-14 | End: 2021-05-14 | Stop reason: HOSPADM

## 2021-05-14 RX ORDER — METOCLOPRAMIDE HYDROCHLORIDE 5 MG/ML
INJECTION INTRAMUSCULAR; INTRAVENOUS AS NEEDED
Status: DISCONTINUED | OUTPATIENT
Start: 2021-05-14 | End: 2021-05-14 | Stop reason: HOSPADM

## 2021-05-14 RX ORDER — SODIUM CHLORIDE 0.9 % (FLUSH) 0.9 %
5-40 SYRINGE (ML) INJECTION EVERY 8 HOURS
Status: DISCONTINUED | OUTPATIENT
Start: 2021-05-14 | End: 2021-05-14 | Stop reason: HOSPADM

## 2021-05-14 RX ORDER — LIDOCAINE HYDROCHLORIDE 20 MG/ML
INJECTION, SOLUTION EPIDURAL; INFILTRATION; INTRACAUDAL; PERINEURAL AS NEEDED
Status: DISCONTINUED | OUTPATIENT
Start: 2021-05-14 | End: 2021-05-14 | Stop reason: HOSPADM

## 2021-05-14 RX ORDER — INSULIN LISPRO 100 [IU]/ML
INJECTION, SOLUTION INTRAVENOUS; SUBCUTANEOUS ONCE
Status: COMPLETED | OUTPATIENT
Start: 2021-05-14 | End: 2021-05-14

## 2021-05-14 RX ADMIN — PHENYLEPHRINE HYDROCHLORIDE 100 MCG: 10 INJECTION INTRAVENOUS at 15:46

## 2021-05-14 RX ADMIN — SODIUM CHLORIDE, SODIUM LACTATE, POTASSIUM CHLORIDE, AND CALCIUM CHLORIDE 125 ML/HR: 600; 310; 30; 20 INJECTION, SOLUTION INTRAVENOUS at 20:13

## 2021-05-14 RX ADMIN — PROPOFOL 50 MG: 10 INJECTION, EMULSION INTRAVENOUS at 15:26

## 2021-05-14 RX ADMIN — PHENYLEPHRINE HYDROCHLORIDE 100 MCG: 10 INJECTION INTRAVENOUS at 15:58

## 2021-05-14 RX ADMIN — PHENYLEPHRINE HYDROCHLORIDE 100 MCG: 10 INJECTION INTRAVENOUS at 16:21

## 2021-05-14 RX ADMIN — INSULIN LISPRO 2 UNITS: 100 INJECTION, SOLUTION INTRAVENOUS; SUBCUTANEOUS at 17:50

## 2021-05-14 RX ADMIN — MIDAZOLAM 2 MG: 1 INJECTION INTRAMUSCULAR; INTRAVENOUS at 15:04

## 2021-05-14 RX ADMIN — FERROUS SULFATE TAB 325 MG (65 MG ELEMENTAL FE) 325 MG: 325 (65 FE) TAB at 09:25

## 2021-05-14 RX ADMIN — SODIUM CHLORIDE, SODIUM LACTATE, POTASSIUM CHLORIDE, AND CALCIUM CHLORIDE: 600; 310; 30; 20 INJECTION, SOLUTION INTRAVENOUS at 15:04

## 2021-05-14 RX ADMIN — METOCLOPRAMIDE 10 MG: 5 INJECTION, SOLUTION INTRAMUSCULAR; INTRAVENOUS at 15:20

## 2021-05-14 RX ADMIN — TAMSULOSIN HYDROCHLORIDE 0.4 MG: 0.4 CAPSULE ORAL at 09:25

## 2021-05-14 RX ADMIN — MEROPENEM: 1 INJECTION, POWDER, FOR SOLUTION INTRAVENOUS at 13:03

## 2021-05-14 RX ADMIN — SODIUM CHLORIDE 40 MG: 9 INJECTION INTRAMUSCULAR; INTRAVENOUS; SUBCUTANEOUS at 20:07

## 2021-05-14 RX ADMIN — LIDOCAINE HYDROCHLORIDE 100 MG: 20 INJECTION, SOLUTION EPIDURAL; INFILTRATION; INTRACAUDAL; PERINEURAL at 15:11

## 2021-05-14 RX ADMIN — ATORVASTATIN CALCIUM 40 MG: 20 TABLET, FILM COATED ORAL at 21:35

## 2021-05-14 RX ADMIN — INSULIN GLARGINE 15 UNITS: 100 INJECTION, SOLUTION SUBCUTANEOUS at 09:23

## 2021-05-14 RX ADMIN — IPRATROPIUM BROMIDE AND ALBUTEROL SULFATE 3 ML: .5; 3 SOLUTION RESPIRATORY (INHALATION) at 11:58

## 2021-05-14 RX ADMIN — PROPOFOL 150 MG: 10 INJECTION, EMULSION INTRAVENOUS at 15:11

## 2021-05-14 RX ADMIN — SODIUM CHLORIDE 40 MG: 9 INJECTION INTRAMUSCULAR; INTRAVENOUS; SUBCUTANEOUS at 09:24

## 2021-05-14 RX ADMIN — MEROPENEM 1 G: 1 INJECTION, POWDER, FOR SOLUTION INTRAVENOUS at 04:04

## 2021-05-14 RX ADMIN — INSULIN LISPRO 4 UNITS: 100 INJECTION, SOLUTION INTRAVENOUS; SUBCUTANEOUS at 21:35

## 2021-05-14 RX ADMIN — ONDANSETRON HYDROCHLORIDE 4 MG: 2 INJECTION INTRAMUSCULAR; INTRAVENOUS at 15:18

## 2021-05-14 RX ADMIN — PHENYLEPHRINE HYDROCHLORIDE 100 MCG: 10 INJECTION INTRAVENOUS at 16:31

## 2021-05-14 RX ADMIN — INSULIN LISPRO 2 UNITS: 100 INJECTION, SOLUTION INTRAVENOUS; SUBCUTANEOUS at 09:24

## 2021-05-14 RX ADMIN — METOPROLOL SUCCINATE 50 MG: 50 TABLET, EXTENDED RELEASE ORAL at 09:25

## 2021-05-14 RX ADMIN — MEROPENEM 1 G: 1 INJECTION, POWDER, FOR SOLUTION INTRAVENOUS at 20:07

## 2021-05-14 NOTE — BRIEF OP NOTE
Brief Postoperative Note    Patient: Franky Morocho. YOB: 1945  MRN: [id number]    Date of Procedure: 5/14/2021     Pre-Op Diagnosis: fracture left ankle    Post-Op Diagnosis: Same as preoperative diagnosis. Procedure(s):  LEFT ANKLE BIMALLEOLAR ANKLE FRACTURE, EXTERNAL FIXATION ,WASHOUT LEFT FOOT WITH  PLACEMENT OF WOUND VAC     Surgeon(s):  Ines Ramsey MD    Surgical Assistant: None    Anesthesia: General     Estimated Blood Loss (mL): less than 096     Complications: None    Specimens:   ID Type Source Tests Collected by Time Destination   1 : swab from left foot  Wound Foot, left CULTURE, ANAEROBIC, CULTURE, WOUND W Leilani Marin MD 5/14/2021 1544 Microbiology   2 : tissue from left foot  Wound Foot, left CULTURE, ANAEROBIC, CULTURE, WOUND W Leilani Marin MD 5/14/2021 1545 Microbiology        Implants:   Implant Name Type Inv.  Item Serial No.  Lot No. LRB No. Used Action   1.8 olive wire    [employer] 0 Left 5 Implanted   1.8 smooth wire     [employer] 0 Left 3 Implanted       Drains: * No LDAs found *    Findings: dessicated dry lateral foot, ulcer to calcaneus      Electronically Signed by Joey Umanzor MD on 5/14/2021 at 5:01 PM

## 2021-05-14 NOTE — PROGRESS NOTES
Met with patient in room post CHG bath and application of Nozin for preop prep; care manager will continue to follow for discharge needs postop; has been accepted to [address] [place] [address] so far for continuation of care.

## 2021-05-14 NOTE — PROGRESS NOTES
Pt here for left ankle washout, cultures, possible internal versus external fixation of the left ankle. He understands and wishes to proceed. Kiana Mendez MD  [facility]

## 2021-05-14 NOTE — ANESTHESIA PREPROCEDURE EVALUATION
Relevant Problems   CARDIOVASCULAR   (+) Atrial flutter (HCC)   (+) CAD (coronary artery disease)      RENAL FAILURE   (+) ELINOR (acute kidney injury) (HCC)   (+) CKD (chronic kidney disease) stage 3, GFR 30-59 ml/min (HCC)      ENDOCRINE   (+) DM2 (diabetes mellitus, type 2) (HCC)   (+) Diabetic foot ulcer with osteomyelitis (HCC)      HEMATOLOGY   (+) Acute hematogenous osteomyelitis of left foot (HCC)   (+) Acute osteomyelitis (HCC)   (+) Osteomyelitis (HCC)       Anesthetic History   No history of anesthetic complications            Review of Systems / Medical History  Patient summary reviewed, nursing notes reviewed and pertinent labs reviewed    Pulmonary            Asthma        Neuro/Psych   Within defined limits           Cardiovascular    Hypertension        Dysrhythmias   CAD    Exercise tolerance: >4 METS     GI/Hepatic/Renal  Within defined limits              Endo/Other    Diabetes         Other Findings                   Anesthetic Plan    ASA: 4, emergent  Anesthesia type: general          Induction: Intravenous  Anesthetic plan and risks discussed with: Patient      Pt understands he is at a higher periop risk

## 2021-05-14 NOTE — PERIOP NOTES
TRANSFER - IN REPORT:    Verbal report received from Ofe Butler RN(name) on [employer].  being received from 3S(unit) for ordered procedure      Report consisted of patients Situation, Background, Assessment and   Recommendations(SBAR). Information from the following report(s) SBAR was reviewed with the receiving nurse. Opportunity for questions and clarification was provided. Assessment completed upon patients arrival to unit and care assumed.

## 2021-05-14 NOTE — PERIOP NOTES
TRANSFER - OUT REPORT:    Verbal report given to Mitchel Pichardo  being transferred to [phone] for routine post - op       Report consisted of patients Situation, Background, Assessment and   Recommendations(SBAR). Information from the following report(s) SBAR, Kardex, Procedure Summary, Intake/Output and MAR was reviewed with the receiving nurse. Lines:   PICC Double Lumen 30/56/18 Right;Basilic (Active)   Central Line Being Utilized Yes 05/14/21 1750   Criteria for Appropriate Use Hemodynamically unstable, requiring monitoring lines, vasopressors, or volume resuscitation 05/14/21 1429   Site Assessment Clean, dry, & intact 05/14/21 1750   Phlebitis Assessment 0 05/14/21 1750   Infiltration Assessment 0 05/14/21 1750   Arm Circumference (cm) 0 cm 05/07/21 0853   Date of Last Dressing Change 05/13/21 05/13/21 2152   Dressing Status Clean, dry, & intact 05/14/21 1750   Action Taken Open ports on tubing capped 05/12/21 2033   Dressing Type Disk with Chlorhexadine gluconate (CHG) 05/14/21 1750   Hub Color/Line Status Purple 05/14/21 1750   Positive Blood Return (Site #1) No 05/13/21 1144   Hub Color/Line Status Red 05/14/21 1429   Positive Blood Return (Site #2) No 05/13/21 1144   Alcohol Cap Used Yes 05/14/21 1429       Peripheral IV 05/04/21 Left Forearm (Active)   Site Assessment Clean, dry, & intact 05/13/21 1144   Phlebitis Assessment 0 05/13/21 1144   Infiltration Assessment 0 05/13/21 1144   Dressing Status Clean, dry, & intact 05/13/21 1144   Dressing Type Transparent;Tape 05/13/21 1144   Hub Color/Line Status Flushed;Patent 05/13/21 1144   Action Taken Open ports on tubing capped 05/13/21 1144   Alcohol Cap Used Yes 05/13/21 1144        Opportunity for questions and clarification was provided.       Patient transported with:   O2 @ 3 liters  Registered Nurse  Tech     Visit Vitals  /61   Pulse 88   Temp 98 °F (36.7 °C)   Resp 20   Ht 5' 8\" (1.727 m)   Wt 95.2 kg (209 lb 12.8 oz) SpO2 95%   BMI 31.90 kg/m²       Intake/Output Summary (Last 24 hours) at 5/14/2021 1813  Last data filed at 5/14/2021 1750  Gross per 24 hour   Intake 1750 ml   Output 1050 ml   Net 700 ml

## 2021-05-14 NOTE — PROGRESS NOTES
Problem: Mobility Impaired (Adult and Pediatric)  Goal: *Acute Goals and Plan of Care (Insert Text)  Description: Physical Therapy Goals   Initiated 5/6/2021 and to be accomplished within 7 day(s)  1. Patient will move from supine <> sit with S in prep for out of bed activity and change of position. 2.  Patient will demonstrate sitting balance intact EOB without UE support /S for performance for ADL/ therapeutic exercise activity. 3.  Patient will perform transfers bed to chair via transfer board with min assist.      5/14/2021  PT treatment not completed due to:  [] Off Unit for testing/procedure  [] Pain  [] Eating  [] Patient too lethargic  [] Nausea/vomiting  [] Dialysis treatment in progress   [x] Other: Pt off unit for surgery L ankle. Will f/up accordingly after new orders received post operatively.    Serge Robles, PT

## 2021-05-14 NOTE — PROGRESS NOTES
1922 Assumed patient care at this time. Report received from Ludwin Moore RN. Patient in bed in lowest position with wheels locked. Call light within reach. 2005 Shift assessment completed at this time. Patient is alert and oriented x4, but does experience periodic confusion, easy to redirect. Lungs coarse on 3L nasal cannula. Last bowel movement on 5/13. Mccray draining alanna urine. Swelling to scrotum. PICC line to right basilic, red and purple ends flushed, patent, and with positive blood return. LR infusing at 125 mL/hr. Mepilex to the left arm for a skin tear. Scattered bruising on skin. Patient has a wound vac to the left foot with transparent film to the bottom. Patient does not have any feeling to the left surgical foot at this time. Currently experiencing no pain. Call light within reach. 0405 Blood drawn from PICC line. Positive blood return in purple and red ends. Flushed. Caps changed. Alcohol cap applied. [id number] Respiratory in to do duo-neb. Patient wheezing. 0630 Paged on call for Dr. Sharrie Cowden at this time regarding change in skin around external fixator. 0645 Attempted to repage on call for Dr. Sharrie Cowden. Answering service stated it was too early to repage. 0079 Paged hospitalist at this time. [address] with Dr. Roxana Rojas at this time regarding patient's skin change around external fixator. Ordered to elevate leg and they will be in shortly to assess. 0700 Spoke to Dr. Cheli Lao, relayed situation. Informed that Dr. Roxana Rojas is aware and will be rounding to see the patient.     0706 Elevated patient's left leg on two pillows per Dr. Kaitlynn Jasmine order. 0730 Spoke to ASMITA Villasenor, regarding status of patient's foot. 0725 Bedside and verbal shift change report given to Jill Disla RN (oncoming nurse) by Nayely Guzman RN (offgoing nurse). Report included the following information: SBAR, Kardex, MAR, and recent results.     0800 ASMITA Villasenor, in to assess patient's foot.

## 2021-05-14 NOTE — ROUTINE PROCESS
TRANSFER - OUT REPORT:    Verbal report given to Bronwyn Escalante RN on Ronnell Chung.  being transferred to OR for ordered procedure       Report consisted of patients Situation, Background, Assessment and   Recommendations(SBAR). Information from the following report(s) SBAR, Intake/Output and MAR was reviewed with the receiving nurse. Lines:   PICC Double Lumen 58/65/03 Right;Basilic (Active)   Central Line Being Utilized Yes 05/13/21 2152   Criteria for Appropriate Use Hemodynamically unstable, requiring monitoring lines, vasopressors, or volume resuscitation 05/13/21 2152   Site Assessment Clean, dry, & intact 05/13/21 2152   Phlebitis Assessment 0 05/13/21 2152   Infiltration Assessment 0 05/13/21 2152   Arm Circumference (cm) 0 cm 05/07/21 0853   Date of Last Dressing Change 05/13/21 05/13/21 2152   Dressing Status Clean, dry, & intact 05/13/21 2152   Action Taken Open ports on tubing capped 05/12/21 2033   Dressing Type Disk with Chlorhexadine gluconate (CHG); Transparent 05/13/21 2152   Hub Color/Line Status Red 05/13/21 1144   Positive Blood Return (Site #1) No 05/13/21 1144   Hub Color/Line Status Purple 05/13/21 1144   Positive Blood Return (Site #2) No 05/13/21 1144   Alcohol Cap Used Yes 05/13/21 2152       Peripheral IV 05/04/21 Left Forearm (Active)   Site Assessment Clean, dry, & intact 05/13/21 1144   Phlebitis Assessment 0 05/13/21 1144   Infiltration Assessment 0 05/13/21 1144   Dressing Status Clean, dry, & intact 05/13/21 1144   Dressing Type Transparent;Tape 05/13/21 1144   Hub Color/Line Status Flushed;Patent 05/13/21 1144   Action Taken Open ports on tubing capped 05/13/21 1144   Alcohol Cap Used Yes 05/13/21 1144        Opportunity for questions and clarification was provided.       Patient transported with:   [employer]

## 2021-05-14 NOTE — PROGRESS NOTES
0730 Received bedside shift report from off going nurse Valentino Chacko RN. Report included the following information SBAR, Kardex, Intake/Output, MAR and Recent Results. 1300 Prep for surgery completed. 26 Pt off floor to surgery. 1830 Pt return for OR. Dual skin check with Radha Pelayo RN. Wounds noted. 1930 Gave bedside shift report to on coming nurse Musa Bains [facility]. Report included the following information SBAR, Kardex, Intake/Output, MAR and Recent Results.

## 2021-05-14 NOTE — PROGRESS NOTES
[facility]                         (A Division of [address] [place] [address])                                                                                                                       Evelyne Manjarrez MD  Work Cell #: [phone]( Mon-Fri, 7am-5pm)  If no call or text back within 30 minutes from me, please call office number. (Prefer text when possible)  Office #:     [phone]  [facility]   Office Fax: [phone]     Date of Admission: 5/4/2021   Date of Service:  5/14/2021  Reason for FU : left ankle OM, fever    Current Antimicrobials:    Prior Antimicrobials:  Meropenem 1 gm Q12 5/6 to date    ·    Zithromax 500mg IV- 4/14 to 4/20  · Vancomycin IV 4/14  until 4/19  · Zosyn 2.25gm IV Q6 4/14 to 4/15   · Meropenem IV 4/15-4/20  Unasyn 3 gm Q6 hour 4/20 to 4/22/21  Ertapenem 1gm IV q24 4/23 to date  -- reduced dose to 500 mg 5/5  --Vancomycin 5/6 to 5/12       Assessment: Rec/ Plan on ID issues I am following:   · Fever: source suspected progressed left ankle infection/abscess       DDX: urine source Vs Vs line. No respiratory                complaint and no rash noted. bcx neg- 5/5 and 5/7  ucx neg 5/5  · Left Infected DM Ulcer and acute OM and necrotic tissue, punched out heel ulcer with Mixed infection- MSSA +E.coli + anaerobes - 4/14/21  · Post I and D of left heel bone/abscess 4/17, OR cultures remain sterile  · Post I and D with graft to heel- 4/21  CRP 8.9-> 5.8->3.8( 4/22/21) as inpatient   CRP 15-> 34 as OP  ESR 78-> 63   · Acute renal failure 2/2 urinary retention, caal in place. Drug related IN in DDX but less likely       (urine eos negative): Resolved  · Left heel ankle fracture 2/2 fall: on this admission    · Anemia- HB 6.2  · Hematuria- off anticoagulant    Other Medical Issues followed and managed by primary and other services  · DM- Poorly controlled  · Low Vitamin D  · CAD with CHF/ NSTMI.  Atrial flutter  · CKD  · Hyperlipidemia       Past ID issues: N/A  -History of COVID 19 infection 4/2021   Await OR on 5/14- please send of tissue culture for aerobicanaerobic culture + histology    Cont meropenem IV. Modification of ABX further if new pathogens in OR sample    Will follow    I will be back for routine round on Monday morning. For new consults, urgent questions or concerns, please call covering MD via ( Dr Jet Gleason for this weekend). Thank you. Condition: Hemodynamically stable. Persistent fever    Subjective: \" no complaint\"    Denies F/C-- Tmax of 100.8 on chart review lat 24 hours  Denies cough/chest pain or SOB    Denies flank pain. No diarrhea. No itching or rash    Continues with caal- urine in bag with tea colored urine    CTA AP report reviewed         Review of System:  See above, other wise negative     Summary:    Inessa Corral. is 71 y/o WM with PMH as listed below- with initial consult on 4/15/21. Last seen by me on 4/22/21 prior to discharge. He was set up at [facility] infusion center for daily ertapenem.     Yesterday, I was notified by RN that he fell, hurt his left foot, and his PICC was not working and they infused him via PIV and sent him to ED.     On arrival to ED, was noted to be in acute renal failure, and with new left ankle fracture.     Wound care note with the picture reviewed.     Pt denies F/C/R. Denies SOB. Feels weak, appetite and po intake has decreased past many days per wife.      Patient Active Problem List   Diagnosis Code    Atrial flutter ([facility]) I48.92    DM2 (diabetes mellitus, type 2) ([facility]) E11.9    CAD (coronary artery disease) I25.10    ELINOR (acute kidney injury) ([facility] N17.9    CKD (chronic kidney disease) stage 3, GFR 30-59 ml/min ([facility]) N18.30    Elevated brain natriuretic peptide (BNP) level R79.89    Diabetic ulcer of left foot ([facility]) E11.621, L97.529    COVID-19 virus infection U07.1    Acute osteomyelitis ([facility] M86.10    Acute hematogenous osteomyelitis of left foot ([facility] M86.072    Cellulitis of left foot L03. 116    Diabetic foot ulcer with osteomyelitis ([facility] E11.621, E11.69, L97.509, M86.9    Ulcer of left heel, with necrosis of bone ([facility]) L97.424    Osteomyelitis ([facility] M86.9    Trimalleolar fracture of ankle, closed, left, initial encounter S82.852A    Displacement of peripherally inserted central catheter (PICC) ([facility] T82.524A       Current Facility-Administered Medications   Medication Dose Route Frequency    albuterol-ipratropium (DUO-NEB) 2.5 MG-0.5 MG/3 ML  3 mL Nebulization NOW    0.9% sodium chloride infusion 250 mL  250 mL IntraVENous PRN    albuterol-ipratropium (DUO-NEB) 2.5 MG-0.5 MG/3 ML  3 mL Nebulization Q4H PRN    ferrous sulfate tablet 325 mg  1 Tab Oral BID WITH MEALS    pantoprazole (PROTONIX) 40 mg in 0.9% sodium chloride 10 mL injection  40 mg IntraVENous Q12H    meropenem (MERREM) 1 g in sterile water (preservative free) 20 mL IV syringe  1 g IntraVENous Q8H    metoprolol succinate (TOPROL-XL) XL tablet 50 mg  50 mg Oral DAILY    tamsulosin (FLOMAX) capsule 0.4 mg  0.4 mg Oral DAILY    heparin (porcine) 100 unit/mL injection 500 Units  500 Units InterCATHeter Q8H PRN    [Held by provider] apixaban (ELIQUIS) tablet 2.5 mg  2.5 mg Oral BID    acetaminophen (TYLENOL) tablet 1,000 mg  1,000 mg Oral Q8H PRN    [Held by provider] aspirin delayed-release tablet 81 mg  81 mg Oral DAILY    atorvastatin (LIPITOR) tablet 40 mg  40 mg Oral QHS    [Held by provider] clopidogreL (PLAVIX) tablet 75 mg  75 mg Oral DAILY    insulin glargine (LANTUS) injection 15 Units  15 Units SubCUTAneous DAILY    glucose chewable tablet 16 g  16 g Oral PRN    glucagon (GLUCAGEN) injection 1 mg  1 mg IntraMUSCular PRN    dextrose (D50W) injection syrg 25 g  50 mL IntraVENous PRN    insulin lispro (HUMALOG) injection   SubCUTAneous AC&HS    morphine injection 2 mg  2 mg IntraVENous Q4H PRN Objective:    Visit Vitals  BP (!) 143/66 (BP 1 Location: Left upper arm, BP Patient Position: At rest)   Pulse 100   Temp 97.8 °F (36.6 °C)   Resp 17   Ht 5' 8\" (1.727 m)   Wt 95.2 kg (209 lb 12.8 oz)   SpO2 100%   BMI 31.90 kg/m²       Temp (24hrs), Av.9 °F (37.2 °C), Min:97.6 °F (36.4 °C), Max:100.8 °F (38.2 °C)    Right PICC - replaced over wire     GEN: WD chronically sick looking , not in resp distress. HEENT: Unicteric. EOMI intact  CHEST: Non laboured breathing. B/L rhonchi  CVS:RRR, no mur/gallop  ABD: Obese/soft. Non tender. NABS  LISET: caal in place- with tea colored urine  EXT: Left LE is splinted. No redness/ creps to palpable soft tissue. D/w Wound care about her exam yesterday  Skin: Dry and intact. No rash, no redness. CNS: A, OX3. Moves all extremity. CN grossly ok. Microbiology:    Blood culture: - 2 sets: NGSF    - 2 sets: NG  : 1 set: NGSF     Urine culture:  - urine culture: NG     Wounddrainage and OR tissueculture:  - gram stain: GNR and GPR        --wound culture+ s.aurues and E.coli + anaerobe   -- OR culture: NG    Lab results:    Chemistry  Recent Labs     2135 21  0335   * 126* 142*   * 147* 147*   K 3.5 3.5 3.8   * 113* 113*   CO2 32 31 30   BUN 20* 22* 27*   CREA 1.08 1.19 1.33*   CA 7.0* 7.0* 7.1*   AGAP 2* 3 4   BUCR 19 18 20       CBC w/ Diff  Recent Labs     21  0535 21  0335   WBC 4.0* 3.9* 4.1*   RBC 3.03* 2.93* 2.98*   HGB 7.9* 7.8* 7.9*   HCT 26.2* 25.5* 25.7*    257 273   GRANS 62 71 67   LYMPH 18* 20* 12*   EOS 6* 3 5     Imagin/5- CXR     1. Right PICC line tip retracted from optimal/appropriate position, projecting  over the right subclavian vein. 2. Mild bibasilar atelectasis.      21  US renal  1.  Considerable urinary bladder fluid distention (approximately 2.1 L) with  likely reactive pelviectasis.      > Results called to [facility] nursing unit as patient 2 likely benefit from  urinary bladder catheterization.     5/4: right humerus  No acute osseous abnormality or malalignment involving the right humerus. 5/7: MRI of left foot:    No acute osseous abnormality or malalignment involving the right humerus. 5/11: CTA AP w/contrast  No evidence of contrast extravasation to suggest active gastrointestinal  hemorrhage.     Bilateral hydroureteronephrosis secondary to markedly thick-walled urinary  bladder containing hyperdense fluid, consistent with hemorrhage. Indwelling  Mccray catheter with incomplete bladder decompression. Findings concerning for  cystitis and hemorrhage possibly secondary to catheter placement.   Cannot  exclude underlying malignancy, cystoscopy is recommend upon resolution of acute  pathology.     Large right inguinal hernia containing nondilated distal ileum and right colon.     Trace retroperitoneal ascites.        Total duration of time spent with patient interview and exam and discussion of plan of care, review of chart in care everywhere, discussion with medical staff- nursing/attending and additional specialities as indicated:  20 minutes

## 2021-05-14 NOTE — PROGRESS NOTES
Hospitalist Progress Note    Patient: Kyaw Lyles. MRN: [id number]  CSN: [id number]    YOB: 1945  Age: 70 y.o. Sex: male    DOA: 5/4/2021 LOS:  LOS: 10 days          Chief Complaint:    Ankle infection      Assessment/Plan     Surgery cancelled 5/11 due to wheezing and prob volume overload  Received lasix   CXR with effusions, better clinically after lasix  Received blood 5/11. Hgb stable above 7    No clear wheezes but give neb today to make sure he is clear for surgery     Admitted for ankle infection and prior fracture with multiple comorbidities     Anemia -severe  Hemoccult came back positive  PPI IV  Appreciate GI consult-possible EGD after orthopedic surgery completed, watch Hgb  Hx of GI bleeding and anemia  Daily CBC     Severe iron deficiency, s/p venofer, on PO iron     Trimalleolar fracture -  Not a surgical candidate given osteomyelitis.    Seen by ortho, he is to get wash out today     Osteomyelitis left heel with cellulitis, gangrenous ulcer with deep abscess tracking along the plantar fascia -  Status post incision and drainage lower extremity left heel debridement and antibiotic bead placement   S/p removal of antibiotic beads and graft open wound.     Antibiotics switched to meropenem, vancomycin here now     LEINOR on CKD stage 3-resolved  Mccray in place  Urinary retention -flomax     HTN -  On toprol     Hypokalemia-improved     Diabetes mellitus -  Continue with lantus,  SSI.      CAD -  Blood thinners are on hold for procedure     A-flutter -  Resume eliquis when stable    Follow and continue Iv antibiotics    SNF when able to be discharged  Disposition :  Patient Active Problem List   Diagnosis Code    Atrial flutter ([facility]) I48.92    DM2 (diabetes mellitus, type 2) ([facility] E11.9    CAD (coronary artery disease) I25.10    ELINOR (acute kidney injury) ([facility] N17.9    CKD (chronic kidney disease) stage 3, GFR 30-59 ml/min ([facility]) N18.30    Elevated brain natriuretic peptide (BNP) level R79.89    Diabetic ulcer of left foot ([facility]) E11.621, L97.529    COVID-19 virus infection U07.1    Acute osteomyelitis ([facility]) M86.10    Acute hematogenous osteomyelitis of left foot ([facility]) M86.072    Cellulitis of left foot L03. 116    Diabetic foot ulcer with osteomyelitis ([address] E11.621, E11.69, L97.509, M86.9    Ulcer of left heel, with necrosis of bone ([address] L97.424    Osteomyelitis ([address] M86.9    Trimalleolar fracture of ankle, closed, left, initial encounter S82.852A    Displacement of peripherally inserted central catheter (PICC) ([address] T82.524A       Subjective:  I feel fine  Ready to get procedure done with      Review of systems:    Constitutional: denies fevers, chills  Respiratory: denies SOB, cough  Cardiovascular: denies chest pain  Gastrointestinal: denies nausea, vomiting, diarrhea      Vital signs/Intake and Output:  Visit Vitals  BP (!) 143/66 (BP 1 Location: Left upper arm, BP Patient Position: At rest)   Pulse 100   Temp 97.8 °F (36.6 °C)   Resp 17   Ht 5' 8\" (1.727 m)   Wt 95.2 kg (209 lb 12.8 oz)   SpO2 100%   BMI 31.90 kg/m²     Current Shift:  No intake/output data recorded.   Last three shifts:  05/12 1901 - 05/14 0700  In: 1300   Out: 1350 [Urine:1350]    Exam:    General: debilitated elderly WM alert, NAD, OX3  Head/Neck: NCAT, supple, No masses, No lymphadenopathy  CVS:Regular rate and rhythm, no M/R/G, S1/S2 heard, no thrill  Lungs:few fine wheezes upper lobes, no cough, no rales or rhonchi  Abdomen: Soft, Nontender, No distention, Normal Bowel sounds, No hepatomegaly  Extremities: LLE dressed  Neuro:grossly normal , follows commands  Psych:appropriate                Labs: Results:       Chemistry Recent Labs     05/14/21  0425 05/13/21  0535 05/12/21  0335   * 126* 142*   * 147* 147*   K 3.5 3.5 3.8   * 113* 113*   CO2 32 31 30   BUN 20* 22* 27*   CREA 1.08 1.19 1.33*   CA 7.0* 7.0* 7.1*   AGAP 2* 3 4   BUCR 19 18 20      CBC w/Diff Recent Labs 05/14/21  0425 05/13/21  0535 05/12/21  0335   WBC 4.0* 3.9* 4.1*   RBC 3.03* 2.93* 2.98*   HGB 7.9* 7.8* 7.9*   HCT 26.2* 25.5* 25.7*    257 273   GRANS 62 71 67   LYMPH 18* 20* 12*   EOS 6* 3 5      Cardiac Enzymes No results for input(s): CPK, CKND1, LEONEL in the last 72 hours. No lab exists for component: CKRMB, TROIP   Coagulation No results for input(s): PTP, INR, APTT, INREXT in the last 72 hours. Lipid Panel No results found for: CHOL, CHOLPOCT, CHOLX, CHLST, CHOLV, 457965, HDL, HDLP, LDL, LDLC, DLDLP, 742500, VLDLC, VLDL, TGLX, TRIGL, TRIGP, TGLPOCT, CHHD, CHHDX   BNP No results for input(s): BNPP in the last 72 hours. Liver Enzymes No results for input(s): TP, ALB, TBIL, AP in the last 72 hours.     No lab exists for component: SGOT, GPT, DBIL   Thyroid Studies Lab Results   Component Value Date/Time    TSH 1.72 02/11/2018 02:19 AM        Procedures/imaging: see electronic medical records for all procedures/Xrays and details which were not copied into this note but were reviewed prior to creation of Murpy[facility] MD Ernesto

## 2021-05-14 NOTE — ANESTHESIA POSTPROCEDURE EVALUATION
Post-Anesthesia Evaluation and Assessment    Cardiovascular Function/Vital Signs  Visit Vitals  BP (!) 110/58 (BP 1 Location: Left upper arm, BP Patient Position: At rest)   Pulse 87   Temp 36.8 °C (98.2 °F)   Resp 20   Ht 5' 8\" (1.727 m)   Wt 95.2 kg (209 lb 12.8 oz)   SpO2 100%   BMI 31.90 kg/m²       Patient is status post Procedure(s):  LEFT ANKLE BIMALLEOLAR ANKLE FRACTURE, EXTERNAL FIXATION ,WASHOUT LEFT FOOT WITH  PLACEMENT OF WOUND VAC . Nausea/Vomiting: Controlled. Postoperative hydration reviewed and adequate. Pain:  Pain Scale 1: Numeric (0 - 10) (05/14/21 1750)  Pain Intensity 1: 0 (05/14/21 1750)   Managed. Neurological Status:   Neuro (WDL): Within Defined Limits (05/14/21 1750)   At baseline. Mental Status and Level of Consciousness: Baseline and stable. Pulmonary Status:   O2 Device: Nasal cannula (05/14/21 4959)   Adequate oxygenation and airway patent. Complications related to anesthesia: None    Post-anesthesia assessment completed. No concerns. Patient has met all discharge requirements.     Signed By: Lucio Adhikari MD

## 2021-05-14 NOTE — ROUTINE PROCESS
TRANSFER - IN REPORT:    Verbal report received from [facility], [address] on Trini Pel.  being received from PACU for routine post - op      Report consisted of patients Situation, Background, Assessment and   Recommendations(SBAR). Information from the following report(s) SBAR, Kardex, OR Summary, Procedure Summary and MAR was reviewed with the receiving nurse. Opportunity for questions and clarification was provided. Assessment completed upon patients arrival to unit and care assumed.

## 2021-05-14 NOTE — PERIOP NOTES
[address] [place] [address] made aware that SBAR is ready for review.  Patient assigned room #319. Tanya Eddy will be the nurse

## 2021-05-15 LAB
APTT PPP: 94.1 SEC (ref 23–36.4)
BASOPHILS # BLD: 0 K/UL (ref 0–0.1)
BASOPHILS NFR BLD: 0 % (ref 0–2)
DIFFERENTIAL METHOD BLD: ABNORMAL
EOSINOPHIL # BLD: 0.1 K/UL (ref 0–0.4)
EOSINOPHIL NFR BLD: 3 % (ref 0–5)
ERYTHROCYTE [DISTWIDTH] IN BLOOD BY AUTOMATED COUNT: 18.3 % (ref 11.6–14.5)
GLUCOSE BLD STRIP.AUTO-MCNC: 224 MG/DL (ref 70–110)
GLUCOSE BLD STRIP.AUTO-MCNC: 274 MG/DL (ref 70–110)
GLUCOSE BLD STRIP.AUTO-MCNC: 299 MG/DL (ref 70–110)
GLUCOSE BLD STRIP.AUTO-MCNC: 300 MG/DL (ref 70–110)
HCT VFR BLD AUTO: 23.3 % (ref 36–48)
HGB BLD-MCNC: 7 G/DL (ref 13–16)
LYMPHOCYTES # BLD: 0.5 K/UL (ref 0.9–3.6)
LYMPHOCYTES NFR BLD: 15 % (ref 21–52)
MCH RBC QN AUTO: 26.2 PG (ref 24–34)
MCHC RBC AUTO-ENTMCNC: 30 G/DL (ref 31–37)
MCV RBC AUTO: 87.3 FL (ref 74–97)
MONOCYTES # BLD: 0.4 K/UL (ref 0.05–1.2)
MONOCYTES NFR BLD: 11 % (ref 3–10)
NEUTS SEG # BLD: 2.4 K/UL (ref 1.8–8)
NEUTS SEG NFR BLD: 69 % (ref 40–73)
PLATELET # BLD AUTO: 236 K/UL (ref 135–420)
PMV BLD AUTO: 10.2 FL (ref 9.2–11.8)
RBC # BLD AUTO: 2.67 M/UL (ref 4.35–5.65)
WBC # BLD AUTO: 3.5 K/UL (ref 4.6–13.2)

## 2021-05-15 PROCEDURE — 74011250636 HC RX REV CODE- 250/636: Performed by: INTERNAL MEDICINE

## 2021-05-15 PROCEDURE — 74011000250 HC RX REV CODE- 250: Performed by: INTERNAL MEDICINE

## 2021-05-15 PROCEDURE — 85025 COMPLETE CBC W/AUTO DIFF WBC: CPT

## 2021-05-15 PROCEDURE — 77010033678 HC OXYGEN DAILY

## 2021-05-15 PROCEDURE — 74011250637 HC RX REV CODE- 250/637: Performed by: HOSPITALIST

## 2021-05-15 PROCEDURE — 65660000000 HC RM CCU STEPDOWN

## 2021-05-15 PROCEDURE — 74011250637 HC RX REV CODE- 250/637: Performed by: INTERNAL MEDICINE

## 2021-05-15 PROCEDURE — 74011636637 HC RX REV CODE- 636/637: Performed by: HOSPITALIST

## 2021-05-15 PROCEDURE — C9113 INJ PANTOPRAZOLE SODIUM, VIA: HCPCS | Performed by: HOSPITALIST

## 2021-05-15 PROCEDURE — 74011000250 HC RX REV CODE- 250: Performed by: HOSPITALIST

## 2021-05-15 PROCEDURE — 82962 GLUCOSE BLOOD TEST: CPT

## 2021-05-15 PROCEDURE — 94640 AIRWAY INHALATION TREATMENT: CPT

## 2021-05-15 PROCEDURE — 74011250636 HC RX REV CODE- 250/636: Performed by: HOSPITALIST

## 2021-05-15 PROCEDURE — 74011250637 HC RX REV CODE- 250/637: Performed by: UROLOGY

## 2021-05-15 PROCEDURE — 36592 COLLECT BLOOD FROM PICC: CPT

## 2021-05-15 PROCEDURE — 74011250637 HC RX REV CODE- 250/637: Performed by: FAMILY MEDICINE

## 2021-05-15 PROCEDURE — 85730 THROMBOPLASTIN TIME PARTIAL: CPT

## 2021-05-15 PROCEDURE — 36415 COLL VENOUS BLD VENIPUNCTURE: CPT

## 2021-05-15 RX ORDER — HEPARIN SODIUM 10000 [USP'U]/100ML
18-36 INJECTION, SOLUTION INTRAVENOUS
Status: DISCONTINUED | OUTPATIENT
Start: 2021-05-15 | End: 2021-05-17

## 2021-05-15 RX ORDER — PANTOPRAZOLE SODIUM 40 MG/1
40 TABLET, DELAYED RELEASE ORAL
Status: DISCONTINUED | OUTPATIENT
Start: 2021-05-15 | End: 2021-05-16

## 2021-05-15 RX ORDER — POLYETHYLENE GLYCOL 3350 17 G/17G
17 POWDER, FOR SOLUTION ORAL DAILY
Status: DISCONTINUED | OUTPATIENT
Start: 2021-05-15 | End: 2021-05-20

## 2021-05-15 RX ADMIN — INSULIN LISPRO 6 UNITS: 100 INJECTION, SOLUTION INTRAVENOUS; SUBCUTANEOUS at 16:52

## 2021-05-15 RX ADMIN — TAMSULOSIN HYDROCHLORIDE 0.4 MG: 0.4 CAPSULE ORAL at 08:59

## 2021-05-15 RX ADMIN — POLYETHYLENE GLYCOL 3350 17 G: 17 POWDER, FOR SOLUTION ORAL at 12:36

## 2021-05-15 RX ADMIN — METOPROLOL SUCCINATE 50 MG: 50 TABLET, EXTENDED RELEASE ORAL at 08:59

## 2021-05-15 RX ADMIN — IPRATROPIUM BROMIDE AND ALBUTEROL SULFATE 3 ML: .5; 3 SOLUTION RESPIRATORY (INHALATION) at 06:10

## 2021-05-15 RX ADMIN — FERROUS SULFATE TAB 325 MG (65 MG ELEMENTAL FE) 325 MG: 325 (65 FE) TAB at 08:59

## 2021-05-15 RX ADMIN — FERROUS SULFATE TAB 325 MG (65 MG ELEMENTAL FE) 325 MG: 325 (65 FE) TAB at 16:52

## 2021-05-15 RX ADMIN — ATORVASTATIN CALCIUM 40 MG: 20 TABLET, FILM COATED ORAL at 22:05

## 2021-05-15 RX ADMIN — INSULIN LISPRO 8 UNITS: 100 INJECTION, SOLUTION INTRAVENOUS; SUBCUTANEOUS at 12:36

## 2021-05-15 RX ADMIN — INSULIN GLARGINE 15 UNITS: 100 INJECTION, SOLUTION SUBCUTANEOUS at 09:00

## 2021-05-15 RX ADMIN — SODIUM CHLORIDE 40 MG: 9 INJECTION INTRAMUSCULAR; INTRAVENOUS; SUBCUTANEOUS at 08:58

## 2021-05-15 RX ADMIN — MEROPENEM 1 G: 1 INJECTION, POWDER, FOR SOLUTION INTRAVENOUS at 12:36

## 2021-05-15 RX ADMIN — PANTOPRAZOLE SODIUM 40 MG: 40 TABLET, DELAYED RELEASE ORAL at 16:52

## 2021-05-15 RX ADMIN — INSULIN LISPRO 6 UNITS: 100 INJECTION, SOLUTION INTRAVENOUS; SUBCUTANEOUS at 22:04

## 2021-05-15 RX ADMIN — MEROPENEM 1 G: 1 INJECTION, POWDER, FOR SOLUTION INTRAVENOUS at 22:05

## 2021-05-15 RX ADMIN — MEROPENEM 1 G: 1 INJECTION, POWDER, FOR SOLUTION INTRAVENOUS at 04:00

## 2021-05-15 RX ADMIN — HEPARIN SODIUM 18 UNITS/KG/HR: 10000 INJECTION, SOLUTION INTRAVENOUS at 10:43

## 2021-05-15 RX ADMIN — INSULIN LISPRO 6 UNITS: 100 INJECTION, SOLUTION INTRAVENOUS; SUBCUTANEOUS at 09:00

## 2021-05-15 RX ADMIN — ACETAMINOPHEN 1000 MG: 500 TABLET ORAL at 16:51

## 2021-05-15 NOTE — ROUTINE PROCESS
0700 received bedside report from night shift RN. Patient resting quietly in bed. Alert and oriented x 4. Left foot with halo brace, elevated on pillow per night shift RN's request from physician on call. Pedal pulse present. Patient states feeling is present LLE. ASMITA Aguilera in to see patient    Patient's caal flushed per order. Dr Elgin Craven in to see patient. Orders put in for Telemetry transfer. Dr Carmencita Brandon in to see patient. 1130 SBAR given to Daren Akhtar RN on Telemetry. 1645 PTT to be drawn. Daren Akhtar RN aware. 1255 patient brought to new room.

## 2021-05-15 NOTE — CONSULTS
VASCULAR CONSULTATION NOTE    A vascular consultation has been requested on Ivonne Lyon, who is a 70 y.o. male admitted to the hospital on 5/4/2021 with an admitting diagnosis of Osteomyelitis ([facility] [M86.9]  Trimalleolar fracture of ankle, closed, left, initial encounter [W22.408W]  Displacement of peripherally inserted central catheter (PICC) ([facility] Rahat Styles. He is being seen for evaluation and possible treatment of necrotic ankle ulcer, PAD    Attending Physician: Dr. Annabelle Gilford    HPI:  71 yo male who was being treated for osteomyelitis of the left heel and subsequently fell down his steps on 5/4 suffering trimalleolar fracture. Has multiple medical problems including CAD, AF, type 2 DM. He was taken to the OR yesterday at which time he underwent debridement of his infected calcaneus. There is documentation of discoloration of the ankle and blistering at that time. On exam currently there is full thickness necrosis of the skin at the ankle. The patient has undergone arterial duplex of both lower extremities which on the left revealed significant SFA stenosis with monophasic waveforms distal to the SFA disease. There is also right CFA and SFA stenosis of hemodynamic significance. HISTORY:    Patient Active Problem List   Diagnosis Code    Atrial flutter ([facility]) I48.92    DM2 (diabetes mellitus, type 2) ([facility]) E11.9    CAD (coronary artery disease) I25.10    ELINOR (acute kidney injury) ([facility] N17.9    CKD (chronic kidney disease) stage 3, GFR 30-59 ml/min ([facility]) N18.30    Elevated brain natriuretic peptide (BNP) level R79.89    Diabetic ulcer of left foot ([facility]) E11.621, L97.529    COVID-19 virus infection U07.1    Acute osteomyelitis ([facility] M86.10    Acute hematogenous osteomyelitis of left foot ([facility]) M86.072    Cellulitis of left foot L03. 116    Diabetic foot ulcer with osteomyelitis ([facility]) E11.621, E11.69, L97.509, M86.9    Ulcer of left heel, with necrosis of bone ([facility] L97.424    [facility]omyelitis ([facility] M86.9    Trimalleolar fracture of ankle, closed, left, initial encounter S82.852A    Displacement of peripherally inserted central catheter (PICC) ([facility] T82.524A       Past Medical History:   Diagnosis Date    [facility]fib ([facility]     Asthma     CAD (coronary artery disease)     COVID-19     Diabetes ([facility]     High cholesterol     Hypertension      Past Surgical History:   Procedure Laterality Date    COLONOSCOPY N/A 6/1/2018    COLONOSCOPY, POLYPECTOMY performed by Umair Augustine MD at [address] [place] [address] Dr         History reviewed. No pertinent family history. No Known Allergies      In Patient Medications:   ASA, Plavix and apixaban on hold  Atorvastatin 40mg  Ferrous Sulfate 325mg  Insulin Glargine  Insulin Lispro  Meropenem  Metoprolol  Pantoprazole  Polyethylene Glycol  Tamsulosin  Heparin gtt        Review of Systems:   Respiratory: positive for wheezing  Cardiovascular: positive for lower extremity edema  Gastrointestinal: positive for heme positive, negative for nausea and vomiting  Genitourinary:negative for dysuria and decreased stream  Integument/breast: positive for skin color change  Hematologic/lymphatic: negative for coughing up blood  Musculoskeletal:positive for myalgias and arthralgias  Neurological: positive for paresthesia and gait problems  Endocrine: positive for diabetic symptoms including poor wound healing    Physical Assessment:     WD WN 71 yo male in NAD  Tm 101 HR  BP  119//56  HEENT:  NCAT  Neck:  Supple  Cor:  RR  Abd:  Benign  Ext:  Ex fix intact left foot, full thickness skin necrosis about ankle,  Forefoot and midfoot are viable and warm. Has dp/pt signals in left foot. Sensation is decreased compared to RLE, motor intact left foot.   See pics below                    Lab      CBC:   Lab Results   Component Value Date/Time    WBC 3.5 (L) 05/15/2021 04:10 AM    RBC 2.67 (L) 05/15/2021 04:10 AM     BMP:   Lab Results Component Value Date/Time    CO2 32 05/14/2021 04:25 AM    BUN 20 (H) 05/14/2021 04:25 AM     Coagulation:   Lab Results   Component Value Date/Time    INR 1.5 (H) 05/05/2021 03:30 AM    aPTT 94.1 (H) 05/15/2021 04:35 PM       Xray   5/7 MTI left foot:  IMPRESSION  1. Progressive extension of a plantar left heel ulcer with fluid extending to  the calcaneal undersurface.     > Small foci of T1 marrow hypointensity adjacent to the ulcer site compatible  with small areas of osteomyelitis.     > Concomitant rupture of the central bundle of the planar fascial aponeurosis. 2. Multifocal chondrosis and advanced hindfoot valgus. 3. Trimalleolar left ankle fracture with distal syndesmotic involvement  4. Diffusely abnormal intrinsic muscle bulk and signal, typical for changes  related to subacute denervation    PVL   As above. Impression:     1. I am certainly concerned about the necrotic skin changes which envelope the ankle--there is no crepitance and some tenderness. Ex fix intact and foot is viable with dp/pt signals.   Whether or not this is a salvageable limb remains in question and I have discussed this candidly with the patient and his wife  Plan:     I will plan LLE arteriogram with intervention Monday afternoon  Continue abx/heparin for now  Will follow with you    [facility]  MORAIMA Lam Prof Surgery  PG [phone]  Cell [phone]

## 2021-05-15 NOTE — PROGRESS NOTES
Physical Therapy Reevaluation/Treatment Attempt     Chart reviewed. Attempted Physical Therapy Evaluation, however, patient unable to be seen due to:  []  Nausea/vomiting  []  Eating  []  Pain  []  Patient too lethargic  []  Off Unit for testing/procedure  []  Dialysis treatment in progress   []  Telemetry Results  [x]  Other: On PT entry, pt states, \"I'm not doing therapy today. My leg is turning black. I think they're going to have to cut it off. \" Provided pt with reassurance, and encouraged pt to participate, but pt continues to refuse saying, \"maybe tomorrow. \"      Will follow up tomorrow as patient's schedule allows.    Thank you for this referral.    Any Prom, PT, DPT

## 2021-05-15 NOTE — ROUTINE PROCESS
1140 TRANSFER - IN REPORT:    TRANSFER - IN REPORT:    Verbal report received from Tiffanie (name) on [facility].  being received from Medical (unit) for routine progression of care      Report consisted of patients Situation, Background, Assessment and   Recommendations(SBAR). Information from the following report(s) SBAR, Kardex and MAR was reviewed with the receiving nurse. Opportunity for questions and clarification was provided. Assessment completed upon patients arrival to unit and care assumed. 1255 Pt arrived on the unit. Care assumed. 1720 Pt caal leaking from insertion site. Spoke with Dr. Sheila Marin if caal can be removed. Telephone order to remove caal and bladder scan after 5 hours of removal of caal and if it is more than 250 ml, place new caal with size up. 1735 Caal removed at this time. 1930 Bedside and Verbal shift change report given to Therese Issa RN (oncoming nurse) by Sebastián Griffin RN (offgoing nurse). Report included the following information SBAR, Kardex, MAR, Recent Results and Cardiac Rhythm .

## 2021-05-15 NOTE — PROGRESS NOTES
Hospitalist Progress Note    Patient: Maria Teresa Lunsford. MRN: [id number]  CSN: [id number]    YOB: 1945  Age: 70 y.o.   Sex: male    DOA: 5/4/2021 LOS:  LOS: 11 days          Chief Complaint:    Infected foot and ankle      Assessment/Plan       Admitted 5/4 after falling and fracturing left ankle, he has osteo left calcaneus with ulcer and gangrenous skin lesion, he went to OR yesterday for external fixator and washout, and the skin continues to be necrotic  Orthopedics concerned he may not heal lower leg and is at increased risk for amputation    S/p washout and external fixator 5/14     PVD-no occlusion on sterial duplex but clinically looks like very poor prognosis he will heal this lower leg with now very complicated infection, fracture, and ulcerations/necrosis    Osteo-and calcaneal ulcer-on merrem    Iron def anemia with hemoccult pos stool-seen by GI  Recommended conservative therapy with PPI and monitoring as has had prior eval for GI bleeding    His eliquis, asa, plavix on hold for procedure yesterday  I am hesitant to resume all 3 with his condition but know he needs blood thinner thuss will start heparin gtt in case he may also need vascular procedure and this caan be stopped quickly    Severe iron deficiency, s/p venofer, on PO iron     ELINOR on CKD stage 3-resolved  Mccray in place    Urinary retention -flomax     HTN -On toprol     Hypokalemia-improved     Diabetes mellituswith vasc disease, neuropathy, Continue with lantus,  SSI.      CAD -no chest pain    A-flutter -hold eliquis, heparin for now       Called and updated wife  Local wound care  Start PT  Remote tele monitoring      Disposition :  Patient Active Problem List   Diagnosis Code    Atrial flutter ([facility] I48.92    DM2 (diabetes mellitus, type 2) ([facility] E11.9    CAD (coronary artery disease) I25.10    ELINOR (acute kidney injury) ([facility] N17.9    CKD (chronic kidney disease) stage 3, GFR 30-59 ml/min ([address] N18.30    Elevated brain natriuretic peptide (BNP) level R79.89    Diabetic ulcer of left foot (HCC) E11.621, L97.529    COVID-19 virus infection U07.1    Acute osteomyelitis (HCC) M86.10    Acute hematogenous osteomyelitis of left foot (HCC) M86.072    [facility]    Diabetic foot ulcer with osteomyelitis ([address] E11.621, E11.69, L97.509, M86.9    Ulcer of left heel, with necrosis of bone ([address] L97.424    Osteomyelitis ([address] M86.9    Trimalleolar fracture of ankle, closed, left, initial encounter S82.852A    Displacement of peripherally inserted central catheter (PICC) ([address] T82.524A       Subjective:  I am ok  States no constipation  Denies CP, SOB  Is worried he could lose his leg      Review of systems:    Constitutional: denies fevers, chills  Respiratory: denies ough  Gastrointestinal: denies nausea, vomiting, diarrhea      Vital signs/Intake and Output:  Visit Vitals  BP (!) 119/56   Pulse 100   Temp 100.1 °F (37.8 °C)   Resp 19   Ht 5' 8\" (1.727 m)   Wt 106.2 kg (234 lb 3.2 oz)   SpO2 95%   BMI 35.61 kg/m²     Current Shift:  No intake/output data recorded. Last three shifts:  05/13 1901 - 05/15 0700  In: 2750 [P.O.:500;  I.V.:750]  Out: 1800 [Urine:1750]    Exam:    General: Well developed, elderly WM, alert, NAD, OX3  CVS:Regular rate and rhythm, no M/R/G, S1/S2 heard, no thrill  Lungs:Clear to auscultation bilaterally, no wheezes, rhonchi, or rales  Abdomen: Soft, Nontender, No distention, Normal Bowel sounds, No hepatomegaly  Extremities: left ankle with necrotic skin lateral lower leg, ext fixator in place, heel ulcer  Neuro:grossly normal , follows commands  Psych:appropriate                Labs: Results:       Chemistry Recent Labs     05/14/21  0425 05/13/21  0535   * 126*   * 147*   K 3.5 3.5   * 113*   CO2 32 31   BUN 20* 22*   CREA 1.08 1.19   CA 7.0* 7.0*   AGAP 2* 3   BUCR 19 18      CBC w/Diff Recent Labs     05/15/21  0410 05/14/21  0425 05/13/21  0537 WBC 3.5* 4.0* 3.9*   RBC 2.67* 3.03* 2.93*   HGB 7.0* 7.9* 7.8*   HCT 23.3* 26.2* 25.5*    267 257   GRANS 69 62 71   LYMPH 15* 18* 20*   EOS 3 6* 3      Cardiac Enzymes No results for input(s): CPK, CKND1, LEONEL in the last 72 hours. No lab exists for component: CKRMB, TROIP   Coagulation No results for input(s): PTP, INR, APTT, INREXT in the last 72 hours. Lipid Panel No results found for: CHOL, CHOLPOCT, CHOLX, CHLST, CHOLV, 972131, HDL, HDLP, LDL, LDLC, DLDLP, 579480, VLDLC, VLDL, TGLX, TRIGL, TRIGP, TGLPOCT, CHHD, CHHDX   BNP No results for input(s): BNPP in the last 72 hours. Liver Enzymes No results for input(s): TP, ALB, TBIL, AP in the last 72 hours.     No lab exists for component: SGOT, GPT, DBIL   Thyroid Studies Lab Results   Component Value Date/Time    TSH 1.72 02/11/2018 02:19 AM        Procedures/imaging: see electronic medical records for all procedures/Xrays and details which were not copied into this note but were reviewed prior to creation of Jovon Betancourt MD

## 2021-05-15 NOTE — PROGRESS NOTES
Occupational Therapy Treatment Attempt     Chart reviewed. Attempted Occupational Therapy Treatment, however, patient unable to be seen due to:  []  Nausea/vomiting  []  Eating  []  Pain  []  Patient too lethargic  []  Off Unit for testing/procedure  []  Dialysis treatment in progress  []  Telemetry Results  [x]  Other:  Pt refusing participation with therapy. Pt upset about possible amputation at L foot. Will f/u later as patient's schedule allows.   José Miguel Sommer, OTR/L

## 2021-05-15 NOTE — PROGRESS NOTES
Progress Note      Patient: Oz Villavicencio. Sex: male          DOA: 5/4/2021     YOB: 1945      Age:  70 y.o.        LOS:  LOS: 11 days     Status Post: Procedure(s):  LEFT ANKLE BIMALLEOLAR ANKLE FRACTURE, EXTERNAL FIXATION ,WASHOUT LEFT FOOT WITH  PLACEMENT OF WOUND VAC   Surgery Date: 5/14/2021            Subjective:     Oz Villavicencio. is a 70 y.o. male who c/o left ankle and foot pain. Patient reports discoloration to foot noted overnight. Objective:      Visit Vitals  /62   Pulse (!) 101   Temp 99.6 °F (37.6 °C)   Resp 20   Ht 5' 8\" (1.727 m)   Wt 106.2 kg (234 lb 3.2 oz)   SpO2 98%   BMI 35.61 kg/m²       Physical Exam:   Ex fix in place, wound vac in appropriate position  Discoloration of skin noted with erythematous changes pretibial becoming purple and darkened to black distally to the level of the ankle. Wiggles Toes  Foot sensation intact to light touch      Intake and Output:  Current Shift:  No intake/output data recorded. Last three shifts:  05/13 1901 - 05/15 0700  In: 2750 [P.O.:500; I.V.:750]  Out: 1800 [Urine:1750]  Voiding Status:  + void without need for Mccray catheter    Lab/Data Reviewed:  Recent Labs     05/15/21  0410 05/14/21  0425   HGB 7.0* 7.9*   HCT 23.3* 26.2*   NA  --  146*   K  --  3.5   BUN  --  20*   CREA  --  1.08   GLU  --  131*         Medications Reviewed    Assessment/Plan     Principal Problem:    Trimalleolar fracture of ankle, closed, left, initial encounter (5/4/2021)    Active Problems:    Osteomyelitis ([address] (5/4/2021)      Displacement of peripherally inserted central catheter (PICC) ([address] (5/4/2021)        · Pending vascular consult for left lower extremity  · Dr Brandyn Edwards aware  · Appears to be focal skin necrosis in the anterior portion of distal tibia          The patient was seen and examined by Dr. Hyacinth Nunez today as well and he is in agreement with above.

## 2021-05-15 NOTE — OP NOTES
[facility] [place]  OPERATIVE REPORT    Name:  Ramone Thompson  MR#:   [id number]  :  1945  ACCOUNT #:  [de-identified]  DATE OF SERVICE:  2021    PREOPERATIVE DIAGNOSES:  Diabetes, vascular insufficiency, heel ulceration, bimalleolar ankle fracture. POSTOPERATIVE DIAGNOSES:  Severe ulcer; necrotic changes, lateral foot; bimalleolar ankle fracture. PROCEDURES PERFORMED:  Left foot debridement of heel ulcer; excisional debridement including skin, subcutaneous tissue, and curetting down bone; external fixation for bimalleolar ankle fracture and peripheral neuropathy. SURGEON:  Crys Celestin MD.    ASSISTANT: Terra Mariee    ANESTHESIA:  General.    COMPLICATIONS:  None. SPECIMENS REMOVED:  Aerobic and anaerobic cultures were taken. IMPLANTS/DRAINS:  Wound VAC. ESTIMATED BLOOD LOSS:  Less than 100. INDICATION:  The patient has been treated by another provider for a heel ulcer including washout, placement of antibiotic beads, followed by graft placement. He has been admitted after a bimalleolar ankle fracture. He has been urinary bleeding. He has had multiple medical issues. He has had wheezing, possible infection, workup for multiple different sources of his infection and hematuria. He was planned to be taken to the operating room for open reduction internal fixation of his fracture, but he was having significant wheezing. He failed nebs. He was tuned up after getting a blood transfusion and then was planned to be taken to the OR today. PROCEDURE:  He was taken care by Wound Care in the hospital and after taking him to the operating room, he underwent general anesthesia supine. The splint was removed. We found him to have the heel ulcer still had no evidence of healing down to the calcaneus. There was maceration at the heel. The lateral side of the foot had desiccated dry gangrene.   There were significant venous insufficiency changes and bluish discoloration and blistering. With the skin quality I did not feel we could attempt internal fixation, so we went with my plan for percutaneous / external fixation. We cleaned the skin with Betadine-Betadine, and then after doing this, removed the sutures on the plantar aspect of the foot, curetted this out, excised the poor quality tissue around the edges, and then curetted down the exposed calcaneus bone, removing the tissue over top. After this was curetted out, we took cultures. We then irrigated with normal saline with Betadine. After this, we placed a three-ring external fixator for multiplanar control of his ankle fracture. This was a trimalleolar ankle fracture, with medial, posterior and lateral malleolar fractures. We placed heel pins with olives laterally. Mid foot pins were placed. Rings were placed proximally, 140 rings. After getting stable fixation of the rings, we adjusted the fracture by pulling this and placing in slight varus to correct the alignment. We cleaned the pin sites, placed a wound VAC over the plantar aspect, foot placed into suction, had adequate seal.  We placed Xeroform over the wounds and then extubated and transferred him to the recovery room. He had minimal bleeding. Urine output appeared to be clear. After getting hydrated, he will be admitted to the floor. I sent a note to the primary care team to consider Vascular consult, as I reviewed his studies that showed he did have blood supply to the foot, but clinically he does not seem to have adequate blood supply to heal this wound, and I am anticipating if we do not improve the blood supply, he may end up with a below-knee amputation. We will continue his IV antibiotics. Continue wound care, and we will treat him with Ex-Fix and antibiotics.       Richelle Lin MD      [id number]  D:  05/14/2021 17:16  T:  05/15/2021 3:25  JOB #:  [id number]

## 2021-05-16 ENCOUNTER — APPOINTMENT (OUTPATIENT)
Dept: GENERAL RADIOLOGY | Age: 76
DRG: 464 | End: 2021-05-16
Attending: HOSPITALIST
Payer: MEDICARE

## 2021-05-16 LAB
ANION GAP SERPL CALC-SCNC: 4 MMOL/L (ref 3–18)
APTT PPP: 145.8 SEC (ref 23–36.4)
APTT PPP: 177.3 SEC (ref 23–36.4)
BASOPHILS # BLD: 0 K/UL (ref 0–0.1)
BASOPHILS NFR BLD: 0 % (ref 0–2)
BUN SERPL-MCNC: 16 MG/DL (ref 7–18)
BUN/CREAT SERPL: 14 (ref 12–20)
CALCIUM SERPL-MCNC: 6.9 MG/DL (ref 8.5–10.1)
CHLORIDE SERPL-SCNC: 108 MMOL/L (ref 100–111)
CO2 SERPL-SCNC: 32 MMOL/L (ref 21–32)
CREAT SERPL-MCNC: 1.11 MG/DL (ref 0.6–1.3)
DIFFERENTIAL METHOD BLD: ABNORMAL
EOSINOPHIL # BLD: 0.2 K/UL (ref 0–0.4)
EOSINOPHIL NFR BLD: 4 % (ref 0–5)
ERYTHROCYTE [DISTWIDTH] IN BLOOD BY AUTOMATED COUNT: 17.9 % (ref 11.6–14.5)
GLUCOSE BLD STRIP.AUTO-MCNC: 119 MG/DL (ref 70–110)
GLUCOSE BLD STRIP.AUTO-MCNC: 140 MG/DL (ref 70–110)
GLUCOSE BLD STRIP.AUTO-MCNC: 165 MG/DL (ref 70–110)
GLUCOSE BLD STRIP.AUTO-MCNC: 177 MG/DL (ref 70–110)
GLUCOSE SERPL-MCNC: 109 MG/DL (ref 74–99)
HCT VFR BLD AUTO: 25.7 % (ref 36–48)
HGB BLD-MCNC: 7.7 G/DL (ref 13–16)
LYMPHOCYTES # BLD: 0.7 K/UL (ref 0.9–3.6)
LYMPHOCYTES NFR BLD: 16 % (ref 21–52)
MCH RBC QN AUTO: 26.1 PG (ref 24–34)
MCHC RBC AUTO-ENTMCNC: 30 G/DL (ref 31–37)
MCV RBC AUTO: 87.1 FL (ref 74–97)
MONOCYTES # BLD: 0.4 K/UL (ref 0.05–1.2)
MONOCYTES NFR BLD: 10 % (ref 3–10)
NEUTS SEG # BLD: 3.1 K/UL (ref 1.8–8)
NEUTS SEG NFR BLD: 70 % (ref 40–73)
PLATELET # BLD AUTO: 274 K/UL (ref 135–420)
PMV BLD AUTO: 10.2 FL (ref 9.2–11.8)
POTASSIUM SERPL-SCNC: 3.5 MMOL/L (ref 3.5–5.5)
RBC # BLD AUTO: 2.95 M/UL (ref 4.35–5.65)
SODIUM SERPL-SCNC: 144 MMOL/L (ref 136–145)
WBC # BLD AUTO: 4.4 K/UL (ref 4.6–13.2)

## 2021-05-16 PROCEDURE — 77030019905 HC CATH URETH INTMIT MDII -A

## 2021-05-16 PROCEDURE — 85730 THROMBOPLASTIN TIME PARTIAL: CPT

## 2021-05-16 PROCEDURE — 77010033678 HC OXYGEN DAILY

## 2021-05-16 PROCEDURE — 51798 US URINE CAPACITY MEASURE: CPT

## 2021-05-16 PROCEDURE — 74011250637 HC RX REV CODE- 250/637: Performed by: INTERNAL MEDICINE

## 2021-05-16 PROCEDURE — 85025 COMPLETE CBC W/AUTO DIFF WBC: CPT

## 2021-05-16 PROCEDURE — 80048 BASIC METABOLIC PNL TOTAL CA: CPT

## 2021-05-16 PROCEDURE — 74011250637 HC RX REV CODE- 250/637: Performed by: UROLOGY

## 2021-05-16 PROCEDURE — 74011250637 HC RX REV CODE- 250/637: Performed by: HOSPITALIST

## 2021-05-16 PROCEDURE — 74011250636 HC RX REV CODE- 250/636: Performed by: INTERNAL MEDICINE

## 2021-05-16 PROCEDURE — 82962 GLUCOSE BLOOD TEST: CPT

## 2021-05-16 PROCEDURE — 74011636637 HC RX REV CODE- 636/637: Performed by: HOSPITALIST

## 2021-05-16 PROCEDURE — 65660000000 HC RM CCU STEPDOWN

## 2021-05-16 PROCEDURE — 71045 X-RAY EXAM CHEST 1 VIEW: CPT

## 2021-05-16 PROCEDURE — 74011000250 HC RX REV CODE- 250: Performed by: INTERNAL MEDICINE

## 2021-05-16 PROCEDURE — 77030040830 HC CATH URETH FOL MDII -A

## 2021-05-16 RX ORDER — FUROSEMIDE 20 MG/1
20 TABLET ORAL DAILY
Status: DISCONTINUED | OUTPATIENT
Start: 2021-05-16 | End: 2021-05-25 | Stop reason: HOSPADM

## 2021-05-16 RX ORDER — PANTOPRAZOLE SODIUM 40 MG/1
40 TABLET, DELAYED RELEASE ORAL
Status: DISCONTINUED | OUTPATIENT
Start: 2021-05-17 | End: 2021-05-25 | Stop reason: HOSPADM

## 2021-05-16 RX ADMIN — TAMSULOSIN HYDROCHLORIDE 0.4 MG: 0.4 CAPSULE ORAL at 08:06

## 2021-05-16 RX ADMIN — MEROPENEM 1 G: 1 INJECTION, POWDER, FOR SOLUTION INTRAVENOUS at 11:28

## 2021-05-16 RX ADMIN — FUROSEMIDE 20 MG: 20 TABLET ORAL at 08:05

## 2021-05-16 RX ADMIN — MEROPENEM 1 G: 1 INJECTION, POWDER, FOR SOLUTION INTRAVENOUS at 06:16

## 2021-05-16 RX ADMIN — MEROPENEM 1 G: 1 INJECTION, POWDER, FOR SOLUTION INTRAVENOUS at 22:37

## 2021-05-16 RX ADMIN — FERROUS SULFATE TAB 325 MG (65 MG ELEMENTAL FE) 325 MG: 325 (65 FE) TAB at 16:03

## 2021-05-16 RX ADMIN — INSULIN GLARGINE 15 UNITS: 100 INJECTION, SOLUTION SUBCUTANEOUS at 08:06

## 2021-05-16 RX ADMIN — PANTOPRAZOLE SODIUM 40 MG: 40 TABLET, DELAYED RELEASE ORAL at 06:16

## 2021-05-16 RX ADMIN — INSULIN LISPRO 3 UNITS: 100 INJECTION, SOLUTION INTRAVENOUS; SUBCUTANEOUS at 16:04

## 2021-05-16 RX ADMIN — ATORVASTATIN CALCIUM 40 MG: 20 TABLET, FILM COATED ORAL at 22:37

## 2021-05-16 RX ADMIN — FERROUS SULFATE TAB 325 MG (65 MG ELEMENTAL FE) 325 MG: 325 (65 FE) TAB at 08:06

## 2021-05-16 RX ADMIN — METOPROLOL SUCCINATE 50 MG: 50 TABLET, EXTENDED RELEASE ORAL at 08:06

## 2021-05-16 RX ADMIN — INSULIN LISPRO 3 UNITS: 100 INJECTION, SOLUTION INTRAVENOUS; SUBCUTANEOUS at 11:54

## 2021-05-16 NOTE — PROGRESS NOTES
Physical Therapy Reevaluation/Treatment Attempt     Chart reviewed. Attempted Physical Therapy Evaluation, however, patient unable to be seen due to:  []  Nausea/vomiting  []  Eating  []  Pain  []  Patient too lethargic  []  Off Unit for testing/procedure  []  Dialysis treatment in progress   []  Telemetry Results  [x]  Other: Pt continues to be frustrated/upset over possibility of L LE amputation. Pt refusing participation in therapy at this time. Noted that L LE arteriogram is planned for tomorrow afternoon. Will need new PT orders post-operatively. Will follow up tomorrow as medically appropriate.    Thank you for this referral.    Quynh Pedersen, PT, DPT

## 2021-05-16 NOTE — PROGRESS NOTES
OT treatment attempted, pt has visitor at this time and is scheduled for vascular surgery to L LE tomorrow. Pt had initially refused PT earlier today. We will attempt later as schedule allows.    Roby ARREOLA, OTR/L

## 2021-05-16 NOTE — PROGRESS NOTES
1200- MD made aware of Pt having bloody urine. Pt was straight cath and Caal placed on previous shift. Unsure if bloody urine is due to trauma from caal placement. Dr Suma Lr gave verbal order to hold Heparin Drip until 1600.  Will continue to monitor

## 2021-05-16 NOTE — PROGRESS NOTES
Problem: Falls - Risk of  Goal: *Absence of Falls  Description: Document Faviola Scott Fall Risk and appropriate interventions in the flowsheet. Outcome: Progressing Towards Goal  Note: Fall Risk Interventions:  Mobility Interventions: Patient to call before getting OOB, PT Consult for mobility concerns    Mentation Interventions: Door open when patient unattended    Medication Interventions: Teach patient to arise slowly, Bed/chair exit alarm    Elimination Interventions: Bed/chair exit alarm    History of Falls Interventions: Bed/chair exit alarm         Problem: Pressure Injury - Risk of  Goal: *Prevention of pressure injury  Description: Document Anam Scale and appropriate interventions in the flowsheet.   Outcome: Progressing Towards Goal  Note: Pressure Injury Interventions:  Sensory Interventions: Assess changes in LOC, Maintain/enhance activity level    Moisture Interventions: Absorbent underpads    Activity Interventions: Pressure redistribution bed/mattress(bed type), PT/OT evaluation    Mobility Interventions: PT/OT evaluation, Pressure redistribution bed/mattress (bed type)    Nutrition Interventions: Document food/fluid/supplement intake    Friction and Shear Interventions: HOB 30 degrees or less                Problem: Pain  Goal: *Control of Pain  Outcome: Progressing Towards Goal     Problem: Patient Education: Go to Patient Education Activity  Goal: Patient/Family Education  Outcome: Progressing Towards Goal

## 2021-05-16 NOTE — PROGRESS NOTES
Hospitalist Progress Note    Patient: Bianka Sotomayor MRN: [id number]  CSN: [id number]    YOB: 1945  Age: 70 y.o. Sex: male    DOA: 5/4/2021 LOS:  LOS: 12 days          Chief Complaint:    necrosis      Assessment/Plan     Admitted 5/4 after falling and fracturing left ankle, he has osteo left calcaneus with ulcer and gangrenous skin lesion, he went to OR 5/14 for external fixator and washout, and the skin continues to be worsening with necrosis  Orthopedics concerned he may not heal lower leg and is at increased risk for amputation     S/p washout and external fixator 5/14     PVD-no occlusion on arterial duplex but clinically looks like very poor prognosis he will heal this lower leg with now very complicated infection, fracture, and ulcerations/necrosis    For LLE angiogram tomorrow  On heparin gtt     Osteo-and calcaneal ulcer-on merrem     Iron def anemia with hemoccult pos stool-seen by GI  Recommended conservative therapy with PPI and monitoring as has had prior eval for GI bleeding  Heparin with caution, we had to stop his blood thinners for severe anemia and procedure, resume with caution, heme pos stool during this stay     His asa, plavix on hold     Severe iron deficiency, s/p venofer, on PO iron     ELINOR on CKD stage 3-resolved  Mccray in place     Urinary retention -flomax     HTN -On toprol     Hypokalemia-improved     Diabetes mellitus with vasc disease, severe  neuropathy, calcaneal ulcer and ostoe.  Continue with lantus,  SSI.      CAD -no chest pain     A-flutter -hold eliquis, continue heparin for now    Guarded prognosis  High probablility he will need amputation of LLE, BKA  With fracture, PVD, osteo, and ulceration, and diabetes complications, he is unlikely to heal        Disposition :  Patient Active Problem List   Diagnosis Code    Atrial flutter (HCC) I48.92    DM2 (diabetes mellitus, type 2) ([address] E11.9    CAD (coronary artery disease) I25.10    ELINOR (acute kidney injury) ([facility] N17.9    CKD (chronic kidney disease) stage 3, GFR 30-59 ml/min ([facility]) N18.30    Elevated brain natriuretic peptide (BNP) level R79.89    Diabetic ulcer of left foot ([facility]) E11.621, L97.529    COVID-19 virus infection U07.1    Acute osteomyelitis ([facility]) M86.10    Acute hematogenous osteomyelitis of left foot ([facility]) M86.072    Cellulitis of left foot L03. [address]    Diabetic foot ulcer with osteomyelitis ([address] E11.621, E11.69, L97.509, M86.9    Ulcer of left heel, with necrosis of bone ([facility]) L97.424    Osteomyelitis ([address] M86.9    Trimalleolar fracture of ankle, closed, left, initial encounter S82.852A    Displacement of peripherally inserted central catheter (PICC) ([facility] T82.524A       Subjective:    I sure do not want to lose my foot    Denies new issue    No pain, or nausea  No CP    I always wheeze, that is nothing new    Review of systems:    Constitutional: denies fevers, chills  Respiratory: denies SOB, cough  Cardiovascular: denies chest pain, palpitations  Gastrointestinal: denies nausea, vomiting      Vital signs/Intake and Output:  Visit Vitals  BP (!) 143/57 (BP 1 Location: Left upper arm, BP Patient Position: At rest;Supine)   Pulse 99   Temp 99.7 °F (37.6 °C)   Resp 22   Ht 5' 8\" (1.727 m)   Wt 106.2 kg (234 lb 3.2 oz)   SpO2 98%   BMI 35.61 kg/m²     Current Shift:  05/16 0701 - 05/16 1900  In: 480 [P.O.:480]  Out: -   Last three shifts:  05/14 1901 - 05/16 0700  In: 1841.2 [P.O.:500;  I.V.:841.2]  Out: 2070 [Urine:2070]    Exam:    General: elderly debilitated WM, alert, NAD, OX3  CVS:Regular rate and rhythm, no M/R/G, S1/S2 heard, no thrill  Lungs:Clear to auscultation bilaterally, no wheezes, rhonchi, or rales  Abdomen: Soft, Nontender, No distention, Normal Bowel sounds, No hepatomegaly  Extremities: LLE with ext fixator, blackened circumferential tissue around lower leg, ankle, heel ulcer with wound vac  Neuro:grossly normal , follows commands  Psych:appropriate Labs: Results:       Chemistry Recent Labs     05/16/21  0420 05/14/21  0425   * 131*    146*   K 3.5 3.5    112*   CO2 32 32   BUN 16 20*   CREA 1.11 1.08   CA 6.9* 7.0*   AGAP 4 2*   BUCR 14 19      CBC w/Diff Recent Labs     05/16/21  0420 05/15/21  0410 05/14/21  0425   WBC 4.4* 3.5* 4.0*   RBC 2.95* 2.67* 3.03*   HGB 7.7* 7.0* 7.9*   HCT 25.7* 23.3* 26.2*    236 267   GRANS 70 69 62   LYMPH 16* 15* 18*   EOS 4 3 6*      Cardiac Enzymes No results for input(s): CPK, CKND1, LEONEL in the last 72 hours. No lab exists for component: CKRMB, TROIP   Coagulation Recent Labs     05/16/21  0420 05/15/21  1635   APTT 145.8* 94.1*       Lipid Panel No results found for: CHOL, CHOLPOCT, CHOLX, CHLST, CHOLV, 203472, HDL, HDLP, LDL, LDLC, DLDLP, 737618, VLDLC, VLDL, TGLX, TRIGL, TRIGP, TGLPOCT, CHHD, CHHDX   BNP No results for input(s): BNPP in the last 72 hours. Liver Enzymes No results for input(s): TP, ALB, TBIL, AP in the last 72 hours.     No lab exists for component: SGOT, GPT, DBIL   Thyroid Studies Lab Results   Component Value Date/Time    TSH 1.72 02/11/2018 02:19 AM        Procedures/imaging: see electronic medical records for all procedures/Xrays and details which were not copied into this note but were reviewed prior to creation of Stanley Masters MD

## 2021-05-16 NOTE — ROUTINE PROCESS
Assume care of patient from [facility]. Patient received in bed awake. Patient A&Ox4, denies pain and discomfort. No distress noted. Bed locked in low position. Call bell within reach and patient verbalized understanding of use for assistance and needs. Was informed by off going nurse that 14 fr caal was removed at [phone] due to leaking and per MD to reassess in 5 hours to replace caal with larger lumen. 2025- Called and informed Dr. Katha Denver that patient has not voided since caal removal at [phone], no orders present for bladder checks and straight cath. Orders placed for voiding trail *2 if bladder check > 300.     2105- Bladder check reveal <220 cc or urine. 2350- Bladder check revealed 325 cc. Straight cath revealed 350 cc of alanna urine. Will continue to monitor. 4423- Bladder check revealed 375 cc of retained urine. 9494- Per order caal placed, 16 Fr. 450 cc of bloody urine obtained. 2421- Bedside and Verbal shift change report given to Compa Hirsch RN (oncoming nurse) by Renard Schrader RN (offgoing nurse). Report included the following information SBAR, Kardex, Intake/Output, MAR and Recent Results. 3 [place] [facility] Night Shift Chart Audit    Chart Audit completed?  YES

## 2021-05-16 NOTE — PROGRESS NOTES
Problem: Falls - Risk of  Goal: *Absence of Falls  Description: Document Mazama Fall Risk and appropriate interventions in the flowsheet. Outcome: Progressing Towards Goal  Note: Fall Risk Interventions:  Mobility Interventions: Communicate number of staff needed for ambulation/transfer    Mentation Interventions: Bed/chair exit alarm, Adequate sleep, hydration, pain control, More frequent rounding, Reorient patient, Increase mobility, Update white board    Medication Interventions: Bed/chair exit alarm, Patient to call before getting OOB, Teach patient to arise slowly    Elimination Interventions: Bed/chair exit alarm, Call light in reach, Patient to call for help with toileting needs, Toileting schedule/hourly rounds, Toilet paper/wipes in reach    History of Falls Interventions: Bed/chair exit alarm, Door open when patient unattended, Investigate reason for fall         Problem: Patient Education: Go to Patient Education Activity  Goal: Patient/Family Education  Outcome: Progressing Towards Goal     Problem: Pressure Injury - Risk of  Goal: *Prevention of pressure injury  Description: Document Anam Scale and appropriate interventions in the flowsheet. Outcome: Progressing Towards Goal  Note: Pressure Injury Interventions:  Sensory Interventions: Check visual cues for pain, Discuss PT/OT consult with provider, Float heels, Maintain/enhance activity level, Pressure redistribution bed/mattress (bed type), Turn and reposition approx.  every two hours (pillows and wedges if needed)    Moisture Interventions: Absorbent underpads, Internal/External urinary devices, Minimize layers, Moisture barrier    Activity Interventions: PT/OT evaluation, Pressure redistribution bed/mattress(bed type), Increase time out of bed    Mobility Interventions: HOB 30 degrees or less, Pressure redistribution bed/mattress (bed type), PT/OT evaluation    Nutrition Interventions: Document food/fluid/supplement intake, Offer support with meals,snacks and hydration    Friction and Shear Interventions: Apply protective barrier, creams and emollients, Foam dressings/transparent film/skin sealants, HOB 30 degrees or less                Problem: Patient Education: Go to Patient Education Activity  Goal: Patient/Family Education  Outcome: Progressing Towards Goal     Problem: Pain  Goal: *Control of Pain  Outcome: Progressing Towards Goal     Problem: Patient Education: Go to Patient Education Activity  Goal: Patient/Family Education  Outcome: Progressing Towards Goal     Problem: Patient Education: Go to Patient Education Activity  Goal: Patient/Family Education  Outcome: Progressing Towards Goal     Problem: Patient Education: Go to Patient Education Activity  Goal: Patient/Family Education  Outcome: Progressing Towards Goal     Problem: General Wound Care  Goal: *Non-infected wound: Improvement of existing wound, absence of infection, and maintenance of skin integrity  Outcome: Progressing Towards Goal  Goal: *Infected Wound: Prevention of further infection and promotion of healing  Description: Infection control procedures (eg: clean dressings, clean gloves, hand washing, precautions to isolate wound from contamination, sterile instruments used for wound debridement) should be implemented.   Outcome: Progressing Towards Goal  Goal: Interventions  Outcome: Progressing Towards Goal

## 2021-05-16 NOTE — PROGRESS NOTES
Progress Note      Patient: Delia Mclean Sex: male          DOA: 5/4/2021     YOB: 1945      Age:  70 y.o.        LOS:  LOS: 12 days     Status Post: Procedure(s):  LEFT ANKLE BIMALLEOLAR ANKLE FRACTURE, EXTERNAL FIXATION ,WASHOUT LEFT FOOT WITH  PLACEMENT OF WOUND VAC   Surgery Date: 5/14/2021            Subjective:     Delia Mclean is a 70 y.o. male who c/o left ankle and foot pain. Noted sstble area of necrosis  Vascular consult noted. Objective:      Visit Vitals  BP (!) 143/57 (BP 1 Location: Left upper arm, BP Patient Position: At rest;Supine)   Pulse 99   Temp 99.7 °F (37.6 °C)   Resp 22   Ht 5' 8\" (1.727 m)   Wt 106.2 kg (234 lb 3.2 oz)   SpO2 98%   BMI 35.61 kg/m²       Physical Exam:   Ex fix in place, wound vac in appropriate position  Discoloration of skin noted with erythematous changes pretibial becoming purple and darkened to black distally to the level of the ankle. Wiggles Toes  Foot sensation intact to light touch      Intake and Output:  Current Shift:  05/16 0701 - 05/16 1900  In: 480 [P.O.:480]  Out: -   Last three shifts:  05/14 1901 - 05/16 0700  In: 1841.2 [P.O.:500;  I.V.:841.2]  Out: 2070 [Urine:2070]  Voiding Status:  + void without need for Mccray catheter    Lab/Data Reviewed:  Recent Labs     05/16/21  0420   HGB 7.7*   HCT 25.7*      K 3.5   BUN 16   CREA 1.11   *         Medications Reviewed    Assessment/Plan     Principal Problem:    Trimalleolar fracture of ankle, closed, left, initial encounter (5/4/2021)    Active Problems:    Osteomyelitis ([address] (5/4/2021)      Displacement of peripherally inserted central catheter (PICC) ([address] (5/4/2021)        · Pending vascular consult for left lower extremity  · Dr Mccloud Tello aware  · Appears to be focal skin necrosis in the anterior portion of distal tibia un changed   · Additional vascular studies pending  · Further intervention may be dependant on Vascular evaluation likely amputation

## 2021-05-17 ENCOUNTER — APPOINTMENT (OUTPATIENT)
Dept: INTERVENTIONAL RADIOLOGY/VASCULAR | Age: 76
DRG: 464 | End: 2021-05-17
Attending: SURGERY
Payer: MEDICARE

## 2021-05-17 LAB
ANION GAP SERPL CALC-SCNC: 3 MMOL/L (ref 3–18)
APTT PPP: 118.5 SEC (ref 23–36.4)
BASOPHILS # BLD: 0 K/UL (ref 0–0.1)
BASOPHILS NFR BLD: 1 % (ref 0–2)
BUN SERPL-MCNC: 14 MG/DL (ref 7–18)
BUN/CREAT SERPL: 14 (ref 12–20)
CALCIUM SERPL-MCNC: 7.1 MG/DL (ref 8.5–10.1)
CHLORIDE SERPL-SCNC: 108 MMOL/L (ref 100–111)
CO2 SERPL-SCNC: 34 MMOL/L (ref 21–32)
CREAT SERPL-MCNC: 1 MG/DL (ref 0.6–1.3)
DIFFERENTIAL METHOD BLD: ABNORMAL
EOSINOPHIL # BLD: 0.2 K/UL (ref 0–0.4)
EOSINOPHIL NFR BLD: 3 % (ref 0–5)
ERYTHROCYTE [DISTWIDTH] IN BLOOD BY AUTOMATED COUNT: 17.8 % (ref 11.6–14.5)
GLUCOSE BLD STRIP.AUTO-MCNC: 112 MG/DL (ref 70–110)
GLUCOSE BLD STRIP.AUTO-MCNC: 130 MG/DL (ref 70–110)
GLUCOSE BLD STRIP.AUTO-MCNC: 133 MG/DL (ref 70–110)
GLUCOSE BLD STRIP.AUTO-MCNC: 133 MG/DL (ref 70–110)
GLUCOSE BLD STRIP.AUTO-MCNC: 152 MG/DL (ref 70–110)
GLUCOSE SERPL-MCNC: 136 MG/DL (ref 74–99)
HCT VFR BLD AUTO: 25.2 % (ref 36–48)
HGB BLD-MCNC: 7.7 G/DL (ref 13–16)
LYMPHOCYTES # BLD: 0.8 K/UL (ref 0.9–3.6)
LYMPHOCYTES NFR BLD: 18 % (ref 21–52)
MCH RBC QN AUTO: 26.5 PG (ref 24–34)
MCHC RBC AUTO-ENTMCNC: 30.6 G/DL (ref 31–37)
MCV RBC AUTO: 86.6 FL (ref 74–97)
MONOCYTES # BLD: 0.4 K/UL (ref 0.05–1.2)
MONOCYTES NFR BLD: 10 % (ref 3–10)
NEUTS SEG # BLD: 3 K/UL (ref 1.8–8)
NEUTS SEG NFR BLD: 68 % (ref 40–73)
PLATELET # BLD AUTO: 269 K/UL (ref 135–420)
PMV BLD AUTO: 10 FL (ref 9.2–11.8)
POTASSIUM SERPL-SCNC: 3.4 MMOL/L (ref 3.5–5.5)
RBC # BLD AUTO: 2.91 M/UL (ref 4.35–5.65)
SODIUM SERPL-SCNC: 145 MMOL/L (ref 136–145)
WBC # BLD AUTO: 4.4 K/UL (ref 4.6–13.2)

## 2021-05-17 PROCEDURE — C1725 CATH, TRANSLUMIN NON-LASER: HCPCS

## 2021-05-17 PROCEDURE — 82962 GLUCOSE BLOOD TEST: CPT

## 2021-05-17 PROCEDURE — 99153 MOD SED SAME PHYS/QHP EA: CPT

## 2021-05-17 PROCEDURE — 99152 MOD SED SAME PHYS/QHP 5/>YRS: CPT

## 2021-05-17 PROCEDURE — 74011000636 HC RX REV CODE- 636: Performed by: SURGERY

## 2021-05-17 PROCEDURE — 97605 NEG PRS WND THER DME<=50SQCM: CPT

## 2021-05-17 PROCEDURE — 76937 US GUIDE VASCULAR ACCESS: CPT

## 2021-05-17 PROCEDURE — 74011636637 HC RX REV CODE- 636/637: Performed by: HOSPITALIST

## 2021-05-17 PROCEDURE — 77010033678 HC OXYGEN DAILY

## 2021-05-17 PROCEDURE — 74011000258 HC RX REV CODE- 258: Performed by: INTERNAL MEDICINE

## 2021-05-17 PROCEDURE — 85730 THROMBOPLASTIN TIME PARTIAL: CPT

## 2021-05-17 PROCEDURE — 74011250637 HC RX REV CODE- 250/637: Performed by: HOSPITALIST

## 2021-05-17 PROCEDURE — 74011250636 HC RX REV CODE- 250/636: Performed by: SURGERY

## 2021-05-17 PROCEDURE — 65660000000 HC RM CCU STEPDOWN

## 2021-05-17 PROCEDURE — 85025 COMPLETE CBC W/AUTO DIFF WBC: CPT

## 2021-05-17 PROCEDURE — 74011000250 HC RX REV CODE- 250: Performed by: INTERNAL MEDICINE

## 2021-05-17 PROCEDURE — 74011250637 HC RX REV CODE- 250/637: Performed by: SURGERY

## 2021-05-17 PROCEDURE — 74011250637 HC RX REV CODE- 250/637: Performed by: UROLOGY

## 2021-05-17 PROCEDURE — 74011250636 HC RX REV CODE- 250/636

## 2021-05-17 PROCEDURE — 74011250636 HC RX REV CODE- 250/636: Performed by: INTERNAL MEDICINE

## 2021-05-17 PROCEDURE — 80048 BASIC METABOLIC PNL TOTAL CA: CPT

## 2021-05-17 PROCEDURE — 36415 COLL VENOUS BLD VENIPUNCTURE: CPT

## 2021-05-17 PROCEDURE — 74011250637 HC RX REV CODE- 250/637: Performed by: INTERNAL MEDICINE

## 2021-05-17 RX ORDER — MIDAZOLAM HYDROCHLORIDE 1 MG/ML
.5-2 INJECTION, SOLUTION INTRAMUSCULAR; INTRAVENOUS
Status: DISCONTINUED | OUTPATIENT
Start: 2021-05-17 | End: 2021-05-25 | Stop reason: HOSPADM

## 2021-05-17 RX ORDER — HEPARIN SODIUM 200 [USP'U]/100ML
1000 INJECTION, SOLUTION INTRAVENOUS
Status: COMPLETED | OUTPATIENT
Start: 2021-05-17 | End: 2021-05-17

## 2021-05-17 RX ORDER — CLOPIDOGREL 300 MG/1
600 TABLET, FILM COATED ORAL ONCE
Status: COMPLETED | OUTPATIENT
Start: 2021-05-17 | End: 2021-05-17

## 2021-05-17 RX ORDER — LIDOCAINE HYDROCHLORIDE 10 MG/ML
1-10 INJECTION, SOLUTION EPIDURAL; INFILTRATION; INTRACAUDAL; PERINEURAL
Status: COMPLETED | OUTPATIENT
Start: 2021-05-17 | End: 2021-05-17

## 2021-05-17 RX ORDER — PROTAMINE SULFATE 10 MG/ML
INJECTION, SOLUTION INTRAVENOUS
Status: COMPLETED
Start: 2021-05-17 | End: 2021-05-17

## 2021-05-17 RX ORDER — FENTANYL CITRATE 50 UG/ML
25-100 INJECTION, SOLUTION INTRAMUSCULAR; INTRAVENOUS
Status: DISCONTINUED | OUTPATIENT
Start: 2021-05-17 | End: 2021-05-25 | Stop reason: HOSPADM

## 2021-05-17 RX ORDER — SODIUM CHLORIDE 9 MG/ML
50 INJECTION, SOLUTION INTRAVENOUS CONTINUOUS
Status: DISCONTINUED | OUTPATIENT
Start: 2021-05-17 | End: 2021-05-20

## 2021-05-17 RX ORDER — NITROGLYCERIN 10 MG/100ML
INJECTION INTRAVENOUS
Status: DISCONTINUED
Start: 2021-05-17 | End: 2021-05-17 | Stop reason: WASHOUT

## 2021-05-17 RX ORDER — HEPARIN SODIUM 1000 [USP'U]/ML
1000-10000 INJECTION, SOLUTION INTRAVENOUS; SUBCUTANEOUS
Status: DISCONTINUED | OUTPATIENT
Start: 2021-05-17 | End: 2021-05-25 | Stop reason: HOSPADM

## 2021-05-17 RX ORDER — CLOPIDOGREL BISULFATE 75 MG/1
75 TABLET ORAL DAILY
Status: DISCONTINUED | OUTPATIENT
Start: 2021-05-18 | End: 2021-05-25 | Stop reason: HOSPADM

## 2021-05-17 RX ADMIN — FENTANYL CITRATE 25 MCG: 50 INJECTION, SOLUTION INTRAMUSCULAR; INTRAVENOUS at 16:58

## 2021-05-17 RX ADMIN — FENTANYL CITRATE 25 MCG: 50 INJECTION, SOLUTION INTRAMUSCULAR; INTRAVENOUS at 16:53

## 2021-05-17 RX ADMIN — ATORVASTATIN CALCIUM 40 MG: 20 TABLET, FILM COATED ORAL at 21:10

## 2021-05-17 RX ADMIN — IOPAMIDOL 80 ML: 510 INJECTION, SOLUTION INTRAVASCULAR at 17:22

## 2021-05-17 RX ADMIN — CLOPIDOGREL BISULFATE 600 MG: 300 TABLET, FILM COATED ORAL at 21:10

## 2021-05-17 RX ADMIN — PROTAMINE SULFATE 25 MG: 10 INJECTION, SOLUTION INTRAVENOUS at 17:18

## 2021-05-17 RX ADMIN — FERROUS SULFATE TAB 325 MG (65 MG ELEMENTAL FE) 325 MG: 325 (65 FE) TAB at 11:30

## 2021-05-17 RX ADMIN — MEROPENEM 1 G: 1 INJECTION, POWDER, FOR SOLUTION INTRAVENOUS at 04:37

## 2021-05-17 RX ADMIN — FENTANYL CITRATE 50 MCG: 50 INJECTION, SOLUTION INTRAMUSCULAR; INTRAVENOUS at 16:32

## 2021-05-17 RX ADMIN — HEPARIN SODIUM 10000 UNITS: 1000 INJECTION INTRAVENOUS; SUBCUTANEOUS at 16:42

## 2021-05-17 RX ADMIN — PIPERACILLIN AND TAZOBACTAM 3.38 G: 3; .375 INJECTION, POWDER, LYOPHILIZED, FOR SOLUTION INTRAVENOUS at 14:43

## 2021-05-17 RX ADMIN — SODIUM CHLORIDE 50 ML/HR: 900 INJECTION, SOLUTION INTRAVENOUS at 18:05

## 2021-05-17 RX ADMIN — TAMSULOSIN HYDROCHLORIDE 0.4 MG: 0.4 CAPSULE ORAL at 08:27

## 2021-05-17 RX ADMIN — MIDAZOLAM HYDROCHLORIDE 1 MG: 1 INJECTION, SOLUTION INTRAMUSCULAR; INTRAVENOUS at 16:32

## 2021-05-17 RX ADMIN — PIPERACILLIN AND TAZOBACTAM 3.38 G: 3; .375 INJECTION, POWDER, LYOPHILIZED, FOR SOLUTION INTRAVENOUS at 21:10

## 2021-05-17 RX ADMIN — HEPARIN SODIUM IN SODIUM CHLORIDE 1000 UNITS: 200 INJECTION INTRAVENOUS at 16:36

## 2021-05-17 RX ADMIN — FUROSEMIDE 20 MG: 20 TABLET ORAL at 08:27

## 2021-05-17 RX ADMIN — LIDOCAINE HYDROCHLORIDE 4 ML: 10 INJECTION, SOLUTION EPIDURAL; INFILTRATION; INTRACAUDAL; PERINEURAL at 17:22

## 2021-05-17 RX ADMIN — PANTOPRAZOLE SODIUM 40 MG: 40 TABLET, DELAYED RELEASE ORAL at 06:38

## 2021-05-17 RX ADMIN — INSULIN GLARGINE 15 UNITS: 100 INJECTION, SOLUTION SUBCUTANEOUS at 08:28

## 2021-05-17 RX ADMIN — MEROPENEM 1 G: 1 INJECTION, POWDER, FOR SOLUTION INTRAVENOUS at 11:30

## 2021-05-17 RX ADMIN — MIDAZOLAM HYDROCHLORIDE 0.5 MG: 1 INJECTION, SOLUTION INTRAMUSCULAR; INTRAVENOUS at 16:53

## 2021-05-17 RX ADMIN — METOPROLOL SUCCINATE 50 MG: 50 TABLET, EXTENDED RELEASE ORAL at 08:27

## 2021-05-17 NOTE — PROCEDURES
Brief Post-Op Note    Surgeon(s): Tyrone    Assistant(s): Ruben    Pre-operative Diagnosis: gangrene left ankle    Post-operative Diagnosis: same as preop diagnosis    Procedure(s) Performed:  Abdominal aortogram, LLE arteriogram with pta left SFA    Anesthesia:  Local with 1% lidocaine and Moderate Sedation    Blood Adminstered:  none    Findings: occluded left SFA    Complications: none    Estimated Blood Loss:  minimal <100    Tubes and Drains: none    Implants/Grafts:  starclose right CFA    Specimens: none

## 2021-05-17 NOTE — PROGRESS NOTES
Hospitalist Progress Note    Patient: Annalisa Banuelos MRN: [id number]  CSN: [id number]    YOB: 1945  Age: 70 y.o. Sex: male    DOA: 5/4/2021 LOS:  LOS: 13 days                Assessment/Plan     Patient Active Problem List   Diagnosis Code    Atrial flutter ([facility]) I48.92    DM2 (diabetes mellitus, type 2) ([facility] E11.9    CAD (coronary artery disease) I25.10    ELINOR (acute kidney injury) ([address] N17.9    CKD (chronic kidney disease) stage 3, GFR 30-59 ml/min ([facility]) N18.30    Elevated brain natriuretic peptide (BNP) level R79.89    Diabetic ulcer of left foot ([facility]) E11.621, L97.529    COVID-19 virus infection U07.1    Acute osteomyelitis ([address] M86.10    Acute hematogenous osteomyelitis of left foot ([address] M86.072    Cellulitis of left foot L03. 80    Diabetic foot ulcer with osteomyelitis ([address] E11.621, E11.69, L97.509, M86.9    Ulcer of left heel, with necrosis of bone ([address] L97.424    Osteomyelitis ([address] M86.9    Trimalleolar fracture of ankle, closed, left, initial encounter S82.852A    Displacement of peripherally inserted central catheter (PICC) [facility]) T82.524A            70 y.o. male with diabetes, hypertension, coronary artery disease, atrial fibrillation, recent COVID-19 presents to ER after he fell from his stairs today. Anemia -  Heme positive stools  Seen by GI  Recommend PPI  Monitor H/H while on heparin drip. Received venofer. Trimalleolar fracture -  S/p washout and external fixator     Osteomyelitis left heel with cellulitis, gangrenous ulcer with deep abscess tracking along the plantar fascia -  Continue with antibiotics    PVD -  No occlusion on arterial duplex  Seen by vascular  For angiogram today.     ELINOR on CKD stage 3-  Renal function improving. Renal US with urinary bladder distension. Caal placed  Seen by nephrology    Urinary retention -  S/p caal  Hematuria likely traumatic  Seen by urology     HTN -  On toprol xl, lasix.   Monitor BP     Diabetes mellitus -  Continue with lantus, start on SSI.      CAD -  No anginal symptoms,  aspirin and plavix on hold due to hematuria.      A-flutter -  Rate controlled on toprol xl    On heparin drip. Watch H/H, hemoccult stool positive. Also concern for non healing of wound due to PVD. At increased risk of amputation.     Recent covid 19 infection    Disposition : need rehab    Review of systems  General: No fevers or chills. Cardiovascular: No chest pain or pressure. No palpitations. Pulmonary: No shortness of breath. Gastrointestinal: No nausea, vomiting. Physical Exam:  General: Awake, cooperative, no acute distress    HEENT: NC, Atraumatic. PERRLA, anicteric sclerae. Lungs: CTA Bilaterally. No Wheezing/Rhonchi/Rales. Heart:  S1 S2,  No murmur, No Rubs, No Gallops  Abdomen: Soft, Non distended, Non tender.  +Bowel sounds,   Extremities: Left foot in splint  Psych:   Not anxious or agitated. Neurologic:  No acute neurological deficit. Vital signs/Intake and Output:  Visit Vitals  BP (!) 160/64 (BP 1 Location: Left upper arm, BP Patient Position: At rest)   Pulse (!) 106   Temp 98.6 °F (37 °C)   Resp 20   Ht 5' 8\" (1.727 m)   Wt 106.2 kg (234 lb 3.2 oz)   SpO2 98%   BMI 35.61 kg/m²     Current Shift:  No intake/output data recorded. Last three shifts:  05/15 1901 - 05/17 0700  In: 1737.2 [P.O.:720; I.V.:1017.2]  Out: 2295 [Urine:2295]            Labs: Results:       Chemistry Recent Labs     05/17/21  0820 05/16/21  0420   * 109*    144   K 3.4* 3.5    108   CO2 34* 32   BUN 14 16   CREA 1.00 1.11   CA 7.1* 6.9*   AGAP 3 4   BUCR 14 14      CBC w/Diff Recent Labs     05/17/21  0820 05/16/21  0420 05/15/21  0410   WBC 4.4* 4.4* 3.5*   RBC 2.91* 2.95* 2.67*   HGB 7.7* 7.7* 7.0*   HCT 25.2* 25.7* 23.3*    274 236   GRANS 68 70 69   LYMPH 18* 16* 15*   EOS 3 4 3      Cardiac Enzymes No results for input(s): CPK, CKND1, LEONEL in the last 72 hours.     No lab exists for component: CKRMB, TROIP   Coagulation Recent Labs     05/17/21  0820 05/16/21  2210   APTT 118.5* 177.3*       Lipid Panel No results found for: CHOL, CHOLPOCT, CHOLX, CHLST, CHOLV, 938139, HDL, HDLP, LDL, LDLC, DLDLP, 955786, VLDLC, VLDL, TGLX, TRIGL, TRIGP, TGLPOCT, CHHD, CHHDX   BNP No results for input(s): BNPP in the last 72 hours. Liver Enzymes No results for input(s): TP, ALB, TBIL, AP in the last 72 hours.     No lab exists for component: SGOT, GPT, DBIL   Thyroid Studies Lab Results   Component Value Date/Time    TSH 1.72 02/11/2018 02:19 AM        Procedures/imaging: see electronic medical records for all procedures/Xrays and details which were not copied into this note but were reviewed prior to creation of Plan

## 2021-05-17 NOTE — PROGRESS NOTES
[facility]                         (A Division of [address] [place] [address])                                                                                                                       Evelyne Davis MD  Work Cell #: [phone]( Mon-Fri, 7am-5pm)  If no call or text back within 30 minutes from me, please call office number. (Prefer text when possible)  Office #:     [phone]  [id number] [phone]   Office Fax: [phone]     Date of Admission: 5/4/2021   Date of Service:  5/17/2021  Reason for FU : left ankle OM, fever    Current Antimicrobials:    Prior Antimicrobials:  Meropenem 1 gm Q12 5/6 to date    ·    Zithromax 500mg IV- 4/14 to 4/20  · Vancomycin IV 4/14  until 4/19  · Zosyn 2.25gm IV Q6 4/14 to 4/15   · Meropenem IV 4/15-4/20  Unasyn 3 gm Q6 hour 4/20 to 4/22/21  Ertapenem 1gm IV q24 4/23 to date  -- reduced dose to 500 mg 5/5  --Vancomycin 5/6 to 5/12       Assessment: Rec/ Plan on ID issues I am following:   · Fever: source suspected progressed left ankle infection/abscess       DDX: urine source Vs Vs line. No respiratory                complaint and no rash noted. --Post left foot excisional debridement of skin, SQ tissue down to bone, x-fix placement- 5/14/21    bcx neg- 5/5 and 5/7  ucx neg 5/5  · Left Infected DM Ulcer and acute OM and necrotic tissue, punched out heel ulcer with Mixed infection- MSSA +E.coli + anaerobes - 4/14/21  · Post I and D of left heel bone/abscess 4/17, OR cultures remain sterile  · Post I and D with graft to heel- 4/21    · Acute renal failure 2/2 urinary retention, caal in place. Drug related IN in DDX but less likely       (urine eos negative): Resolved  · Left heel ankle fracture 2/2 fall: on this admission    · Anemia- HB 6.2  · Hematuria- off anticoagulant    Other Medical Issues followed and managed by primary and other services  · DM- Poorly controlled  · Low Vitamin D  · CAD with CHF/ NSTMI.  Atrial flutter  · CKD  · Hyperlipidemia       Past ID issues: N/A  -History of COVID 19 infection 4/2021   OR  cx 5/14- NGSF    Will switch meropenem to Zosyn( to avoid resistance development)     Poor tissue perfusion and necrosis found in OR, which likely drove the fever earlier. No new pathogens from cultures    Noted vascular study in plan. BKA under consideration( maybe a good option for him)    Will follow                          Condition: Hemodynamically stable. Fever better    Subjective: \" no complaint\" Hi son with him in room    Afebrile. No chills/sob. OR note from 5/14 reviewed  OR cultures: NGSF  CRP is going up  Renal status- normal and remains stable. Continues to be on same ABX-- no spikes of high fever last 48-72 hurs         Review of System:  See above, other wise negative     Summary:    Dillon Toscano is 71 y/o WM with PMH as listed below- with initial consult on 4/15/21. Last seen by me on 4/22/21 prior to discharge. He was set up at [facility] infusion center for daily ertapenem.     Yesterday, I was notified by RN that he fell, hurt his left foot, and his PICC was not working and they infused him via PIV and sent him to ED.     On arrival to ED, was noted to be in acute renal failure, and with new left ankle fracture.     Wound care note with the picture reviewed.     Pt denies F/C/R. Denies SOB. Feels weak, appetite and po intake has decreased past many days per wife.      Patient Active Problem List   Diagnosis Code    Atrial flutter ([facility]) I48.92    DM2 (diabetes mellitus, type 2) ([facility]) E11.9    CAD (coronary artery disease) I25.10    ELINOR (acute kidney injury) ([facility] N17.9    CKD (chronic kidney disease) stage 3, GFR 30-59 ml/min ([facility]) N18.30    Elevated brain natriuretic peptide (BNP) level R79.89    Diabetic ulcer of left foot ([facility]) E11.621, L97.529    COVID-19 virus infection U07.1    Acute osteomyelitis ([facility] M86.10    Acute hematogenous osteomyelitis of left foot ([address] M86.072    Cellulitis of left foot L03. 116    Diabetic foot ulcer with osteomyelitis ([facility] E11.621, E11.69, L97.509, M86.9    Ulcer of left heel, with necrosis of bone ([facility]) L97.424    Osteomyelitis ([facility] M86.9    Trimalleolar fracture of ankle, closed, left, initial encounter S82.852A    Displacement of peripherally inserted central catheter (PICC) ([facility] T82.524A       Current Facility-Administered Medications   Medication Dose Route Frequency    furosemide (LASIX) tablet 20 mg  20 mg Oral DAILY    pantoprazole (PROTONIX) tablet 40 mg  40 mg Oral ACB    heparin 25,000 units in D5W 250 ml infusion  18-36 Units/kg/hr IntraVENous TITRATE    polyethylene glycol (MIRALAX) packet 17 g  17 g Oral DAILY    0.9% sodium chloride infusion 250 mL  250 mL IntraVENous PRN    albuterol-ipratropium (DUO-NEB) 2.5 MG-0.5 MG/3 ML  3 mL Nebulization Q4H PRN    ferrous sulfate tablet 325 mg  1 Tab Oral BID WITH MEALS    meropenem (MERREM) 1 g in sterile water (preservative free) 20 mL IV syringe  1 g IntraVENous Q8H    metoprolol succinate (TOPROL-XL) XL tablet 50 mg  50 mg Oral DAILY    tamsulosin (FLOMAX) capsule 0.4 mg  0.4 mg Oral DAILY    heparin (porcine) 100 unit/mL injection 500 Units  500 Units InterCATHeter Q8H PRN    [Held by provider] apixaban (ELIQUIS) tablet 2.5 mg  2.5 mg Oral BID    acetaminophen (TYLENOL) tablet 1,000 mg  1,000 mg Oral Q8H PRN    atorvastatin (LIPITOR) tablet 40 mg  40 mg Oral QHS    insulin glargine (LANTUS) injection 15 Units  15 Units SubCUTAneous DAILY    glucose chewable tablet 16 g  16 g Oral PRN    glucagon (GLUCAGEN) injection 1 mg  1 mg IntraMUSCular PRN    dextrose (D50W) injection syrg 25 g  50 mL IntraVENous PRN    insulin lispro (HUMALOG) injection   SubCUTAneous AC&HS    morphine injection 2 mg  2 mg IntraVENous Q4H PRN       Objective:    Visit Vitals  BP (!) 148/71 (BP 1 Location: Left upper arm, BP Patient Position: At rest)   Pulse 92   Temp 98.5 °F (36.9 °C) Resp 18   Ht 5' 8\" (1.727 m)   Wt 106.2 kg (234 lb 3.2 oz)   SpO2 97%   BMI 35.61 kg/m²       Temp (24hrs), Av.4 °F (37.4 °C), Min:98.5 °F (36.9 °C), Max:100.1 °F (37.8 °C)    Right PICC - replaced over wire     GEN: WD chronically sick looking , not in resp distress. HEENT: Unicteric. EOMI intact  CHEST: Non laboured breathing. B/L rhonchi  CVS:RRR, no mur/gallop  ABD: Obese/soft. Non tender. NABS  LISET: caal in place- with tea colored urine  EXT: Left LE is has X-fix in place  --lower leg necrotic skin- circumferential noted  Skin: Dry and intact. No rash, no redness. CNS: A, OX3. Moves all extremity. CN grossly ok. Microbiology:    Blood culture: - 2 sets: NGSF    - 2 sets: NG  : 1 set: NGSF     Urine culture:  - urine culture:      Wounddrainage and OR tissueculture:  - gram stain: GNR and GPR        --wound culture+ s.aurues and E.coli + anaerobe   -- OR culture:   - OR aerobic/anaerobic culture: NGSF    Lab results:    Chemistry  Recent Labs     21  0820 21  0420   * 109*    144   K 3.4* 3.5    108   CO2 34* 32   BUN 14 16   CREA 1.00 1.11   CA 7.1* 6.9*   AGAP 3 4   BUCR 14 14       CBC w/ Diff  Recent Labs     21  0820 21  0420 05/15/21  0410   WBC 4.4* 4.4* 3.5*   RBC 2.91* 2.95* 2.67*   HGB 7.7* 7.7* 7.0*   HCT 25.2* 25.7* 23.3*    274 236   GRANS 68 70 69   LYMPH 18* 16* 15*   EOS 3 4 3     Imagin/5- CXR     1. Right PICC line tip retracted from optimal/appropriate position, projecting  over the right subclavian vein. 2. Mild bibasilar atelectasis.      21  US renal  1. Considerable urinary bladder fluid distention (approximately 2.1 L) with  likely reactive pelviectasis.      > Results called to [address] [place] [address] unit as patient 2 likely benefit from  urinary bladder catheterization.     : right humerus  No acute osseous abnormality or malalignment involving the right humerus.      : MRI of left foot:    No acute osseous abnormality or malalignment involving the right humerus. 5/11: CTA AP w/contrast  No evidence of contrast extravasation to suggest active gastrointestinal  hemorrhage.     Bilateral hydroureteronephrosis secondary to markedly thick-walled urinary  bladder containing hyperdense fluid, consistent with hemorrhage. Indwelling  Mccray catheter with incomplete bladder decompression. Findings concerning for  cystitis and hemorrhage possibly secondary to catheter placement.   Cannot  exclude underlying malignancy, cystoscopy is recommend upon resolution of acute  pathology.     Large right inguinal hernia containing nondilated distal ileum and right colon.     Trace retroperitoneal ascites.      5/16:  CXR: Borderline heart size with mild bronchovascular prominence likely vascular  congestion but improved since the last study of 5/11/2021.

## 2021-05-17 NOTE — ROUTINE PROCESS
Assume care of patient from [facility]. Patient received in bed awake. Patient A&Ox4, denies pain and discomfort. No distress noted. Bed locked in low position. Call bell within reach and patient verbalized understanding of use for assistance and needs. Mccray in place draining alanna urine w/ red sediments. Patient has been NPO after midnight. 4129- Bedside and Verbal shift change report given to VALERIA Mcfadden (oncoming nurse) by Rush Najjar, RN (offgoing nurse). Report included the following information SBAR, Kardex, Intake/Output, MAR and Recent Results. [facility] Night Shift Chart Audit    Chart Audit completed?  YES

## 2021-05-17 NOTE — PROGRESS NOTES
DC Plan: [facility]    CM met with patient to f/u on d/c plan. Patient agreeable to [facility]. Pt states, \"they might chop my leg off. \"  CM informed patient that we would continue to follow him throughout his hospitalization as his d/c plans could change. [facility]        5/7/21    DC Plan: SNF for rehab and abx post surgical repair of ankle fx     Care manager contacted patient's son, Xuan Cole [phone] to introduce self and inquire about SNF choices for post surgical discharge plan anticipation. Patient has been accepted by [employer] [facility] [facility] but will update list after surgery. SNF list sent to son at [email or web address]

## 2021-05-17 NOTE — PROGRESS NOTES
Progress Note      Patient: Kerry Hall. Sex: male          DOA: 5/4/2021         YOB: 1945      Age:  70 y.o.        LOS:  LOS: 13 days               Assessment/Plan     Principal Problem:    Trimalleolar fracture of ankle, closed, left, initial encounter (5/4/2021)    Active Problems:    Osteomyelitis ([address] (5/4/2021)    Displacement of peripherally inserted central catheter (PICC) ([address] (5/4/2021)    Ex fix placed for ulcer protection, and stabilize trimalleolar ankle fracture with possible osteomyelitis and dry gangrene. Plan for vascular study today to hopefully improve blood supply. Discussed with pt that the foot may not be salvageable but will see if improved flow helps. TBD dispostion  Subjective:     Pt alert, says ex fix feels like an anchor. Objective:      Visit Vitals  BP (!) 160/64 (BP 1 Location: Left upper arm, BP Patient Position: At rest)   Pulse (!) 106   Temp 98.6 °F (37 °C)   Resp 20   Ht 5' 8\" (1.727 m)   Wt 106.2 kg (234 lb 3.2 oz)   SpO2 98%   BMI 35.61 kg/m²       Physical Exam:  Dry gangrenous skin lateral foot and anterior ankle  Vac to good seal  Toes move well  Good alignment of foot    Intake and Output:  Current Shift:  No intake/output data recorded. Last three shifts:  05/15 1901 - 05/17 0700  In: 1737.2 [P.O.:720; I.V.:1017.2]  Out: 2295 [Urine:2295]    Lab/Data Reviewed: All lab results for the last 24 hours reviewed. Medications Reviewed    Continued hospitalization is indicated due to vascular studies, abx.

## 2021-05-17 NOTE — ROUTINE PROCESS
Bedside and Verbal shift change report given to Moriah Lr  (oncoming nurse) by Shannan Melgoza  (offgoing nurse). Report included the following information SBAR, Kardex, Procedure Summary, Intake/Output, MAR and Recent Results.

## 2021-05-17 NOTE — PROGRESS NOTES
TRANSFER - OUT REPORT:    Verbal report given to Susan Cross RN(name) on Savannah Adan.  being transferred to [facility] for routine progression of care       Report consisted of patients Situation, Background, Assessment and   Recommendations(SBAR). Information from the following report(s) SBAR, Procedure Summary, MAR and Cardiac Rhythm NSR was reviewed with the receiving nurse. Lines:   PICC Double Lumen 68/79/51 Right;Basilic (Active)   Central Line Being Utilized Yes 05/17/21 0313   Criteria for Appropriate Use Hemodynamically unstable, requiring monitoring lines, vasopressors, or volume resuscitation 05/17/21 0313   Site Assessment Clean, dry, & intact 05/17/21 0313   Phlebitis Assessment 0 05/17/21 0313   Infiltration Assessment 0 05/17/21 0313   Arm Circumference (cm) 0 cm 05/07/21 0853   Date of Last Dressing Change 05/13/21 05/16/21 1540   Dressing Status Clean, dry, & intact 05/17/21 0313   Action Taken Open ports on tubing capped 05/17/21 0313   Dressing Type Disk with Chlorhexadine gluconate (CHG) 05/17/21 0313   Hub Color/Line Status Infusing 05/17/21 0313   Positive Blood Return (Site #1) Yes 05/17/21 0313   Hub Color/Line Status Flushed;Cap end changed 05/17/21 0313   Positive Blood Return (Site #2) Yes 05/17/21 0313   Alcohol Cap Used Yes 05/17/21 0313        Opportunity for questions and clarification was provided. Patient transported with:   O2 @ 2 liters  Registered Nurse    Surgicel, gauze and tegaderm to right groin.    Lay flat for 4 hours

## 2021-05-17 NOTE — PROGRESS NOTES
Problem: Mobility Impaired (Adult and Pediatric)  Goal: *Acute Goals and Plan of Care (Insert Text)  Description: Physical Therapy Goals   Initiated 5/6/2021 and to be accomplished within 7 day(s)  1. Patient will move from supine <> sit with S in prep for out of bed activity and change of position. 2.  Patient will demonstrate sitting balance intact EOB without UE support /S for performance for ADL/ therapeutic exercise activity. 3.  Patient will perform transfers bed to chair via transfer board with min assist.          Note: Physical Therapy Attempt    Chart reviewed. Pt refused to participate in physical therapy session due to:    []  Eating meal  []  Nausea  []  Dizziness  []  Lethargy  []  Lab Results  []  Blood Transfusion  []  Dialysis  []  Pain  [x]  Other: Judit Glover are going to take me to surgery to chop off my leg. I don't want to do anything. \"  Explained PT role and benefits but pt cont to refuse. Will attempt later today/tomorrow as pt schedule allows.     Nusrat Jason PT

## 2021-05-17 NOTE — PROGRESS NOTES
OT note: Pt refusing to participate with Occupational therapy at this time. Pt education on role of therapy and risks of staying in bed. Pt continued to refuse to sit EOB or participate at this time. Will continue to follow.  Thank you Romulo Walker OTR/CLINT

## 2021-05-17 NOTE — PERIOP NOTES
TRANSFER - OUT REPORT:    Verbal report given to [facility] RN(name) on Rick Sánchez.  being transferred to [facility](unit) for routine post - op       Report consisted of patients Situation, Background, Assessment and   Recommendations(SBAR). Information from the following report(s) SBAR, Kardex and Cardiac Rhythm SR was reviewed with the receiving nurse. Lines:   PICC Double Lumen 05/74/80 Right;Basilic (Active)   Central Line Being Utilized Yes 05/17/21 1805   Criteria for Appropriate Use Hemodynamically unstable, requiring monitoring lines, vasopressors, or volume resuscitation 05/17/21 1805   Site Assessment Clean, dry, & intact 05/17/21 1805   Phlebitis Assessment 0 05/17/21 1805   Infiltration Assessment 0 05/17/21 1805   Arm Circumference (cm) 0 cm 05/07/21 0853   Date of Last Dressing Change 05/13/21 05/16/21 1540   Dressing Status Clean, dry, & intact 05/17/21 1805   Action Taken Open ports on tubing capped 05/17/21 1805   Dressing Type Disk with Chlorhexadine gluconate (CHG) 05/17/21 1805   Hub Color/Line Status Infusing 05/17/21 1805   Positive Blood Return (Site #1) Yes 05/17/21 0313   Hub Color/Line Status Capped 05/17/21 1805   Positive Blood Return (Site #2) Yes 05/17/21 0313   Alcohol Cap Used Yes 05/17/21 1805        Opportunity for questions and clarification was provided.       Patient transported with:   Monitor  O2 @ 3 liters  Registered Nurse  [facility]

## 2021-05-17 NOTE — PROGRESS NOTES
Problem: Falls - Risk of  Goal: *Absence of Falls  Description: Document Devere Chatham Fall Risk and appropriate interventions in the flowsheet. Outcome: Progressing Towards Goal  Note: Fall Risk Interventions:  Mobility Interventions: Patient to call before getting OOB, PT Consult for mobility concerns    Mentation Interventions: Bed/chair exit alarm, Increase mobility, More frequent rounding, Reorient patient, Update white board    Medication Interventions: Bed/chair exit alarm, Patient to call before getting OOB, Teach patient to arise slowly    Elimination Interventions: Bed/chair exit alarm, Call light in reach, Patient to call for help with toileting needs, Toileting schedule/hourly rounds, Toilet paper/wipes in reach    History of Falls Interventions: Bed/chair exit alarm, Door open when patient unattended, Investigate reason for fall         Problem: Patient Education: Go to Patient Education Activity  Goal: Patient/Family Education  Outcome: Progressing Towards Goal     Problem: Pressure Injury - Risk of  Goal: *Prevention of pressure injury  Description: Document Anam Scale and appropriate interventions in the flowsheet.   Outcome: Progressing Towards Goal  Note: Pressure Injury Interventions:  Sensory Interventions: Assess changes in LOC, Discuss PT/OT consult with provider, Float heels, Keep linens dry and wrinkle-free, Pressure redistribution bed/mattress (bed type)    Moisture Interventions: Absorbent underpads, Check for incontinence Q2 hours and as needed, Internal/External urinary devices, Moisture barrier, Minimize layers    Activity Interventions: Increase time out of bed, Pressure redistribution bed/mattress(bed type), PT/OT evaluation    Mobility Interventions: HOB 30 degrees or less, Pressure redistribution bed/mattress (bed type), PT/OT evaluation    Nutrition Interventions: Document food/fluid/supplement intake    Friction and Shear Interventions: Apply protective barrier, creams and emollients, Foam dressings/transparent film/skin sealants, HOB 30 degrees or less                Problem: Patient Education: Go to Patient Education Activity  Goal: Patient/Family Education  Outcome: Progressing Towards Goal     Problem: Pain  Goal: *Control of Pain  Outcome: Progressing Towards Goal     Problem: Patient Education: Go to Patient Education Activity  Goal: Patient/Family Education  Outcome: Progressing Towards Goal     Problem: Patient Education: Go to Patient Education Activity  Goal: Patient/Family Education  Outcome: Progressing Towards Goal     Problem: Patient Education: Go to Patient Education Activity  Goal: Patient/Family Education  Outcome: Progressing Towards Goal     Problem: General Wound Care  Goal: *Non-infected wound: Improvement of existing wound, absence of infection, and maintenance of skin integrity  Outcome: Progressing Towards Goal  Goal: *Infected Wound: Prevention of further infection and promotion of healing  Description: Infection control procedures (eg: clean dressings, clean gloves, hand washing, precautions to isolate wound from contamination, sterile instruments used for wound debridement) should be implemented.   Outcome: Progressing Towards Goal  Goal: Interventions  Outcome: Progressing Towards Goal

## 2021-05-18 LAB
ANION GAP SERPL CALC-SCNC: 2 MMOL/L (ref 3–18)
BACTERIA SPEC CULT: NORMAL
BASOPHILS # BLD: 0 K/UL (ref 0–0.1)
BASOPHILS NFR BLD: 0 % (ref 0–2)
BUN SERPL-MCNC: 15 MG/DL (ref 7–18)
BUN/CREAT SERPL: 14 (ref 12–20)
CALCIUM SERPL-MCNC: 6.8 MG/DL (ref 8.5–10.1)
CHLORIDE SERPL-SCNC: 107 MMOL/L (ref 100–111)
CO2 SERPL-SCNC: 34 MMOL/L (ref 21–32)
CREAT SERPL-MCNC: 1.05 MG/DL (ref 0.6–1.3)
DIFFERENTIAL METHOD BLD: ABNORMAL
EOSINOPHIL # BLD: 0.1 K/UL (ref 0–0.4)
EOSINOPHIL NFR BLD: 2 % (ref 0–5)
ERYTHROCYTE [DISTWIDTH] IN BLOOD BY AUTOMATED COUNT: 17.4 % (ref 11.6–14.5)
GLUCOSE BLD STRIP.AUTO-MCNC: 169 MG/DL (ref 70–110)
GLUCOSE BLD STRIP.AUTO-MCNC: 171 MG/DL (ref 70–110)
GLUCOSE BLD STRIP.AUTO-MCNC: 201 MG/DL (ref 70–110)
GLUCOSE BLD STRIP.AUTO-MCNC: 213 MG/DL (ref 70–110)
GLUCOSE SERPL-MCNC: 175 MG/DL (ref 74–99)
GRAM STN SPEC: NORMAL
HCT VFR BLD AUTO: 22.2 % (ref 36–48)
HGB BLD-MCNC: 6.7 G/DL (ref 13–16)
HISTORY CHECKED?,CKHIST: NORMAL
LYMPHOCYTES # BLD: 0.6 K/UL (ref 0.9–3.6)
LYMPHOCYTES NFR BLD: 14 % (ref 21–52)
MCH RBC QN AUTO: 26.2 PG (ref 24–34)
MCHC RBC AUTO-ENTMCNC: 30.2 G/DL (ref 31–37)
MCV RBC AUTO: 86.7 FL (ref 74–97)
MONOCYTES # BLD: 0.4 K/UL (ref 0.05–1.2)
MONOCYTES NFR BLD: 10 % (ref 3–10)
NEUTS SEG # BLD: 3 K/UL (ref 1.8–8)
NEUTS SEG NFR BLD: 74 % (ref 40–73)
PLATELET # BLD AUTO: 238 K/UL (ref 135–420)
PMV BLD AUTO: 10.4 FL (ref 9.2–11.8)
POTASSIUM SERPL-SCNC: 3.9 MMOL/L (ref 3.5–5.5)
RBC # BLD AUTO: 2.56 M/UL (ref 4.35–5.65)
SERVICE CMNT-IMP: NORMAL
SODIUM SERPL-SCNC: 143 MMOL/L (ref 136–145)
WBC # BLD AUTO: 4.1 K/UL (ref 4.6–13.2)

## 2021-05-18 PROCEDURE — 74011250637 HC RX REV CODE- 250/637: Performed by: HOSPITALIST

## 2021-05-18 PROCEDURE — 36430 TRANSFUSION BLD/BLD COMPNT: CPT

## 2021-05-18 PROCEDURE — 80048 BASIC METABOLIC PNL TOTAL CA: CPT

## 2021-05-18 PROCEDURE — P9016 RBC LEUKOCYTES REDUCED: HCPCS

## 2021-05-18 PROCEDURE — 86923 COMPATIBILITY TEST ELECTRIC: CPT

## 2021-05-18 PROCEDURE — 77010033678 HC OXYGEN DAILY

## 2021-05-18 PROCEDURE — 36415 COLL VENOUS BLD VENIPUNCTURE: CPT

## 2021-05-18 PROCEDURE — 65660000000 HC RM CCU STEPDOWN

## 2021-05-18 PROCEDURE — 74011250637 HC RX REV CODE- 250/637: Performed by: SURGERY

## 2021-05-18 PROCEDURE — 74011250636 HC RX REV CODE- 250/636: Performed by: INTERNAL MEDICINE

## 2021-05-18 PROCEDURE — 86901 BLOOD TYPING SEROLOGIC RH(D): CPT

## 2021-05-18 PROCEDURE — 82962 GLUCOSE BLOOD TEST: CPT

## 2021-05-18 PROCEDURE — 74011000258 HC RX REV CODE- 258: Performed by: INTERNAL MEDICINE

## 2021-05-18 PROCEDURE — 85025 COMPLETE CBC W/AUTO DIFF WBC: CPT

## 2021-05-18 PROCEDURE — 74011250637 HC RX REV CODE- 250/637: Performed by: UROLOGY

## 2021-05-18 PROCEDURE — 74011250637 HC RX REV CODE- 250/637: Performed by: INTERNAL MEDICINE

## 2021-05-18 PROCEDURE — 74011636637 HC RX REV CODE- 636/637: Performed by: HOSPITALIST

## 2021-05-18 RX ORDER — SODIUM CHLORIDE 9 MG/ML
250 INJECTION, SOLUTION INTRAVENOUS AS NEEDED
Status: DISCONTINUED | OUTPATIENT
Start: 2021-05-18 | End: 2021-05-25 | Stop reason: HOSPADM

## 2021-05-18 RX ADMIN — INSULIN LISPRO 6 UNITS: 100 INJECTION, SOLUTION INTRAVENOUS; SUBCUTANEOUS at 06:31

## 2021-05-18 RX ADMIN — FERROUS SULFATE TAB 325 MG (65 MG ELEMENTAL FE) 325 MG: 325 (65 FE) TAB at 08:43

## 2021-05-18 RX ADMIN — METOPROLOL SUCCINATE 50 MG: 50 TABLET, EXTENDED RELEASE ORAL at 08:43

## 2021-05-18 RX ADMIN — POLYETHYLENE GLYCOL 3350 17 G: 17 POWDER, FOR SOLUTION ORAL at 08:43

## 2021-05-18 RX ADMIN — TAMSULOSIN HYDROCHLORIDE 0.4 MG: 0.4 CAPSULE ORAL at 08:43

## 2021-05-18 RX ADMIN — INSULIN LISPRO 6 UNITS: 100 INJECTION, SOLUTION INTRAVENOUS; SUBCUTANEOUS at 17:24

## 2021-05-18 RX ADMIN — INSULIN LISPRO 3 UNITS: 100 INJECTION, SOLUTION INTRAVENOUS; SUBCUTANEOUS at 21:30

## 2021-05-18 RX ADMIN — PIPERACILLIN AND TAZOBACTAM 3.38 G: 3; .375 INJECTION, POWDER, LYOPHILIZED, FOR SOLUTION INTRAVENOUS at 08:39

## 2021-05-18 RX ADMIN — INSULIN GLARGINE 15 UNITS: 100 INJECTION, SOLUTION SUBCUTANEOUS at 08:43

## 2021-05-18 RX ADMIN — PIPERACILLIN AND TAZOBACTAM 3.38 G: 3; .375 INJECTION, POWDER, LYOPHILIZED, FOR SOLUTION INTRAVENOUS at 20:09

## 2021-05-18 RX ADMIN — FERROUS SULFATE TAB 325 MG (65 MG ELEMENTAL FE) 325 MG: 325 (65 FE) TAB at 17:24

## 2021-05-18 RX ADMIN — ATORVASTATIN CALCIUM 40 MG: 20 TABLET, FILM COATED ORAL at 21:29

## 2021-05-18 RX ADMIN — FUROSEMIDE 20 MG: 20 TABLET ORAL at 08:43

## 2021-05-18 RX ADMIN — CLOPIDOGREL BISULFATE 75 MG: 75 TABLET ORAL at 08:43

## 2021-05-18 RX ADMIN — PIPERACILLIN AND TAZOBACTAM 3.38 G: 3; .375 INJECTION, POWDER, LYOPHILIZED, FOR SOLUTION INTRAVENOUS at 16:51

## 2021-05-18 RX ADMIN — INSULIN LISPRO 2 UNITS: 100 INJECTION, SOLUTION INTRAVENOUS; SUBCUTANEOUS at 12:31

## 2021-05-18 RX ADMIN — PIPERACILLIN AND TAZOBACTAM 3.38 G: 3; .375 INJECTION, POWDER, LYOPHILIZED, FOR SOLUTION INTRAVENOUS at 02:46

## 2021-05-18 RX ADMIN — PANTOPRAZOLE SODIUM 40 MG: 40 TABLET, DELAYED RELEASE ORAL at 06:31

## 2021-05-18 NOTE — OP NOTES
[facility]  OPERATIVE REPORT    Name:  Italia Coker  MR#:   [id number]  :  1945  ACCOUNT #:  [de-identified]  DATE OF SERVICE:  2021    PREOPERATIVE DIAGNOSIS:  Gangrene of the left ankle. POSTOPERATIVE DIAGNOSIS:  Gangrene of the left ankle. PROCEDURE PERFORMED:  Abdominal aortogram; left lower extremity arteriogram; third-order cannulation, left popliteal artery; percutaneous transluminal angioplasty of left superficial femoral artery, ultrasound guidance for cannulation of right common femoral artery. SURGEON:  Stephenie Iglesias MD.    ASSISTANT:  Ruben    ANESTHESIA:  Moderate sedation. This was administered by [facility]. The patient received a total of 1.5 mg of Versed and 100 mcg of fentanyl. START TIME:  16:32. COMPLETION:  17:24. The patient underwent continuous cardiovascular monitoring. I supervised the entire procedure. COMPLICATIONS:  None. SPECIMENS REMOVED:  none    IMPLANTS:  Star close right CFA. ESTIMATED BLOOD LOSS:  Minimal.    INDICATIONS FOR PROCEDURE:  The patient is a 63-year-old gentleman with osteomyelitis of the heel, status post debridement who subsequently had a fall and had just suffered a trimalleolar fracture of the ankle. He presented with skin changes at the ankle subsequently and then underwent external fixation. I was consulted for the gangrenous skin changes, and the patient has moderate arterial insufficiency of the left lower extremity and presents now for angiography. OPERATIVE FINDINGS:  The abdominal aortogram was essentially normal.  There is right common iliac artery stenosis, which is hemodynamically significant; however, the patient has no symptomatology on the right side. This was not further addressed. The left common iliac artery, hypogastric artery, and external iliac artery were widely patent, as is the common femoral artery and profunda femoris artery. The SFA is occluded at its origin.   There is reconstitution of the distal SFA which is diseased and essentially the popliteal artery becomes normal in caliber and then the three-vessel runoff to the foot. We successfully crossed the occluded SFA, performed balloon angioplasty, and there was brisk flow through the SFA without any evidence of distal embolization. The profunda femoris also remains widely patent. PROCEDURE:  Having obtained prior consent, the patient was brought to the angiogram suite and was placed in a supine position. He was prepped and draped in a sterile fashion. Appropriate time-out with [place] protocol was performed. Lidocaine 1% without epinephrine was used to anesthetize the skin and subcutaneous tissue overlying the right common femoral artery. It was documented to be patent with ultrasound and under real-time ultrasound, was cannulated with an 18-gauge needle, visualizing the needle entering the artery. A permanent image for the patient's record was obtained. The Pure Focus wire was passed into the pararenal abdominal aorta, and the Universal flush catheter was advanced to the pararenal aorta. Flush abdominal aortogram was obtained, followed by a pelvic arteriogram.  I then used the catheter to direct the stiff-angled Glidewire into the contralateral, that is the left common iliac artery, external iliac artery, common femoral artery, advanced the catheter into the common femoral artery on the left side and performed left lower extremity arteriogram, see details above. We elected to intervene following the arteriogram.  I advanced stiff-angled Glidewire into the profunda femoris artery, removed the 4-Zambian from the groin, and placed a Hurricane 6/45 sheath over the bifurcation and into the common femoral artery on the left side. We cannulated the SFA with a combination of the stiff-angled Glidewire and 0.035 CXI, and traversed the SFA for the most part in the subintimal plane.   We reentered the above-the-knee popliteal artery and confirmed this angiographically, and also subsequently then performed angioplasty of the SFA with a 5x300 balloon. This did give a very nice result. There was still a proximal dissection which I was unsure whether or not this was flow-limiting or not, and for this reason, I angioplastied the proximal SFA with a 6-mm balloon. We did use a wallace wire technique and preserved the profunda femoris artery while the SFA work was being performed. Completion angiogram revealed brisk flow through the SFA, and there was no evidence of distal embolization prior to the last balloon angioplasty. Subsequently, then, the long 6-Israeli sheath was removed. I deployed the StarClose device in the right groin. I am unsure if it is excess. As there was still rather significant bleeding from the groin, I held pressure for 10 minutes on the groin. I gave the patient 25 mg of protamine with absolute hemostasis in the groin and a palpable femoral pulse distal to the cannulation site. This was accepted. The patient did tolerate the procedure well.       Dene Bence, MD      [id number]  D:  05/17/2021 17:41  T:  05/18/2021 4:13  JOB #:  [id number]

## 2021-05-18 NOTE — PROGRESS NOTES
Hospitalist Progress Note    Patient: Henrik Quispe. MRN: [id number]  [facility]: [id number]    YOB: 1945  Age: 70 y.o. Sex: male    DOA: 5/4/2021 LOS:  LOS: 14 days                Assessment/Plan     Patient Active Problem List   Diagnosis Code    Atrial flutter ([facility]) I48.92    DM2 (diabetes mellitus, type 2) ([facility] [address] E11.9    CAD (coronary artery disease) I25.10    ELINOR (acute kidney injury) ([address] N17.9    CKD (chronic kidney disease) stage 3, GFR 30-59 ml/min ([facility]) N18.30    Elevated brain natriuretic peptide (BNP) level R79.89    Diabetic ulcer of left foot ([facility]) E11.621, L97.529    COVID-19 virus infection U07.1    Acute osteomyelitis ([address] M86.10    Acute hematogenous osteomyelitis of left foot ([address] M86.072    Cellulitis of left foot L03. 80    Diabetic foot ulcer with osteomyelitis ([address] E11.621, E11.69, L97.509, M86.9    Ulcer of left heel, with necrosis of bone ([address] L97.424    Osteomyelitis ([address] M86.9    Trimalleolar fracture of ankle, closed, left, initial encounter S82.852A    Displacement of peripherally inserted central catheter (PICC) [facility]) T82.524A            70 y.o. male with diabetes, hypertension, coronary artery disease, atrial fibrillation, recent COVID-19 presents to ER after he fell from his stairs today. Anemia -  Heme positive stools  Persistent anemia,  Discussed with GI  Will do EGD tomorrow  Clear liquid diet. Trimalleolar fracture -  S/p washout and external fixator     Osteomyelitis left heel with cellulitis, gangrenous ischemic ulcer with deep abscess tracking along the plantar fascia and left ankle -  Continue with antibiotics    PVD -  No occlusion on arterial duplex  Seen by vascular  S/p LLE arteriogram with PTA of left SFA 05/17/2021.     ELINOR on CKD stage 3-  Renal function improving. Renal US with urinary bladder distension.   Caal placed  Seen by nephrology    Urinary retention -  Continue flomax  D/c caal, voiding trial     HTN -  On toprol xl, lasix. Monitor BP     Diabetes mellitus -  Continue with lantus, start on SSI.      CAD -  No anginal symptoms,  aspirin and plavix on hold due to hematuria.      A-flutter -  Rate controlled on toprol xl    Stopped all anticoagulation and antiplatelets.     Recent covid 19 infection    Disposition : need rehab    Review of systems  General: No fevers or chills. Cardiovascular: No chest pain or pressure. No palpitations. Pulmonary: No shortness of breath. Gastrointestinal: No nausea, vomiting. Physical Exam:  General: Awake, cooperative, no acute distress    HEENT: NC, Atraumatic. PERRLA, anicteric sclerae. Lungs: CTA Bilaterally. No Wheezing/Rhonchi/Rales. Heart:  S1 S2,  No murmur, No Rubs, No Gallops  Abdomen: Soft, Non distended, Non tender.  +Bowel sounds,   Extremities: Left foot in splint  Psych:   Not anxious or agitated. Neurologic:  No acute neurological deficit. Vital signs/Intake and Output:  Visit Vitals  BP (!) 147/47   Pulse 84   Temp 98 °F (36.7 °C)   Resp 18   Ht 5' 8\" (1.727 m)   Wt 106.2 kg (234 lb 3.2 oz)   SpO2 100%   BMI 35.61 kg/m²     Current Shift:  05/18 0701 - 05/18 1900  In: 318.8   Out: -   Last three shifts:  05/16 1901 - 05/18 0700  In: 276 [I.V.:276]  Out: 2600 [Urine:2600]            Labs: Results:       Chemistry Recent Labs     05/18/21  0250 05/17/21  0820 05/16/21  0420   * 136* 109*    145 144   K 3.9 3.4* 3.5    108 108   CO2 34* 34* 32   BUN 15 14 16   CREA 1.05 1.00 1.11   CA 6.8* 7.1* 6.9*   AGAP 2* 3 4   BUCR 14 14 14      CBC w/Diff Recent Labs     05/18/21  0625 05/17/21  0820 05/16/21  0420   WBC 4.1* 4.4* 4.4*   RBC 2.56* 2.91* 2.95*   HGB 6.7* 7.7* 7.7*   HCT 22.2* 25.2* 25.7*    269 274   GRANS 74* 68 70   LYMPH 14* 18* 16*   EOS 2 3 4      Cardiac Enzymes No results for input(s): CPK, CKND1, LEONEL in the last 72 hours.     No lab exists for component: CKRMB, TROIP   Coagulation Recent Labs     05/17/21  0810 05/16/21  2210   APTT 118.5* 177.3*       Lipid Panel No results found for: CHOL, CHOLPOCT, CHOLX, CHLST, CHOLV, 560603, HDL, HDLP, LDL, LDLC, DLDLP, 063293, VLDLC, VLDL, TGLX, TRIGL, TRIGP, TGLPOCT, CHHD, CHHDX   BNP No results for input(s): BNPP in the last 72 hours. Liver Enzymes No results for input(s): TP, ALB, TBIL, AP in the last 72 hours.     No lab exists for component: SGOT, GPT, DBIL   Thyroid Studies Lab Results   Component Value Date/Time    TSH 1.72 02/11/2018 02:19 AM        Procedures/imaging: see electronic medical records for all procedures/Xrays and details which were not copied into this note but were reviewed prior to creation of Plan

## 2021-05-18 NOTE — PROGRESS NOTES
[facility]                         (A Division of [address] [place] [address])                                                                                                                       Evelyne Manjarrez MD  Work Cell #: [phone]( Mon-Fri, 7am-5pm)  If no call or text back within 30 minutes from me, please call office number. (Prefer text when possible)  Office #:     [phone]  [phone]   Office Fax: [phone]     Date of Admission: 5/4/2021   Date of Service:  5/18/2021  Reason for FU : left ankle OM, fever    Current Antimicrobials:    Prior Antimicrobials:    Zosyn 5/17 to date  ·    Zithromax 500mg IV- 4/14 to 4/20  · Vancomycin IV 4/14  until 4/19  · Zosyn 2.25gm IV Q6 4/14 to 4/15   · Meropenem IV 4/15-4/20  Unasyn 3 gm Q6 hour 4/20 to 4/22/21  Ertapenem 1gm IV q24 4/23 to date  -- reduced dose to 500 mg 5/5  --Vancomycin 5/6 to 5/12  --Meropenem 1 gm Q12 5/6 to date       Assessment: Rec/ Plan on ID issues I am following:   · Fever: Improved post I and D  · Source suspected progressed left ankle infection/abscess       DDX: urine source Vs Vs line. No respiratory                complaint and no rash noted. --Post left foot excisional debridement of skin, SQ tissue down to bone, x-fix placement- 5/14/21  --Post angioplasty LLE-5/17    bcx neg- 5/5 and 5/7  ucx neg 5/5  · Left Infected DM Ulcer and acute OM and necrotic tissue, punched out heel ulcer with Mixed infection- MSSA +E.coli + anaerobes - 4/14/21  · Post I and D of left heel bone/abscess 4/17, OR cultures remain sterile  · Post I and D with graft to heel- 4/21    · Acute renal failure 2/2 urinary retention, caal in place.  Drug related IN in DDX but less likely       (urine eos negative): Resolved  · Left heel ankle fracture 2/2 fall: on this admission    · Anemia- HB 6.2  · Hematuria- off anticoagulant    Other Medical Issues followed and managed by primary and other services  · DM- Poorly controlled  · Low Vitamin D  · CAD with CHF/ NSTMI. Atrial flutter  · CKD  · Hyperlipidemia       Past ID issues: N/A  -History of COVID 19 infection 4/2021   OR  cx 5/14- NGSF    Poor tissue perfusion and necrosis found in OR, which likely drove the fever earlier. No new pathogens from cultures      Cont Zosyn    Wound care for the skin care-LLE    Will follow                          Condition: Hemodynamically stable. Fever better    Subjective: \" I want to sleep\" His wife with him in room    Afebrile. No chills/sob. He didn't get good sleep  His left leg feels better now, was hurting soon after the vascular procedure yeserday    OR cultures: NGSF  CRP is going up  Renal status- normal and remains stable. Review of System:  See above, other wise negative     Summary:    Sasha Zarate is 69 y/o WM with PMH as listed below- with initial consult on 4/15/21. Last seen by me on 4/22/21 prior to discharge. He was set up at [facility] infusion center for daily ertapenem.     Yesterday, I was notified by RN that he fell, hurt his left foot, and his PICC was not working and they infused him via PIV and sent him to ED.     On arrival to ED, was noted to be in acute renal failure, and with new left ankle fracture.     Wound care note with the picture reviewed.     Pt denies F/C/R. Denies SOB. Feels weak, appetite and po intake has decreased past many days per wife.      Patient Active Problem List   Diagnosis Code    Atrial flutter ([facility]) I48.92    DM2 (diabetes mellitus, type 2) ([facility]) E11.9    CAD (coronary artery disease) I25.10    ELINOR (acute kidney injury) ([facility] N17.9    CKD (chronic kidney disease) stage 3, GFR 30-59 ml/min ([facility]) N18.30    Elevated brain natriuretic peptide (BNP) level R79.89    Diabetic ulcer of left foot ([facility]) E11.621, L97.529    COVID-19 virus infection U07.1    Acute osteomyelitis ([facility] M86.10    Acute hematogenous osteomyelitis of left foot ([facility]) M86.072    Cellulitis of left foot L03. 116    Diabetic foot ulcer with osteomyelitis ([facility] E11.621, E11.69, L97.509, M86.9    Ulcer of left heel, with necrosis of bone ([facility]) L97.424    Osteomyelitis ([facility] M86.9    Trimalleolar fracture of ankle, closed, left, initial encounter S82.852A    Displacement of peripherally inserted central catheter (PICC) ([facility] T82.524A       Current Facility-Administered Medications   Medication Dose Route Frequency    0.9% sodium chloride infusion 250 mL  250 mL IntraVENous PRN    piperacillin-tazobactam (ZOSYN) 3.375 g in 0.9% sodium chloride (MBP/ADV) 100 mL MBP  3.375 g IntraVENous Q6H    iopamidoL (ISOVUE 250) 51 % contrast injection 1-100 mL  1-100 mL IntraarTERial RAD CONTINUOUS    fentaNYL citrate (PF) injection  mcg   mcg IntraVENous Rad Multiple    midazolam (VERSED) injection 0.5-2 mg  0.5-2 mg IntraVENous Rad Multiple    heparin (porcine) 1,000 unit/mL injection 1,000-10,000 Units  1,000-10,000 Units IntraVENous Rad Multiple    0.9% sodium chloride infusion  50 mL/hr IntraVENous CONTINUOUS    [Held by provider] clopidogreL (PLAVIX) tablet 75 mg  75 mg Oral DAILY    furosemide (LASIX) tablet 20 mg  20 mg Oral DAILY    pantoprazole (PROTONIX) tablet 40 mg  40 mg Oral ACB    polyethylene glycol (MIRALAX) packet 17 g  17 g Oral DAILY    0.9% sodium chloride infusion 250 mL  250 mL IntraVENous PRN    albuterol-ipratropium (DUO-NEB) 2.5 MG-0.5 MG/3 ML  3 mL Nebulization Q4H PRN    ferrous sulfate tablet 325 mg  1 Tab Oral BID WITH MEALS    metoprolol succinate (TOPROL-XL) XL tablet 50 mg  50 mg Oral DAILY    tamsulosin (FLOMAX) capsule 0.4 mg  0.4 mg Oral DAILY    heparin (porcine) 100 unit/mL injection 500 Units  500 Units InterCATHeter Q8H PRN    [Held by provider] apixaban (ELIQUIS) tablet 2.5 mg  2.5 mg Oral BID    acetaminophen (TYLENOL) tablet 1,000 mg  1,000 mg Oral Q8H PRN    atorvastatin (LIPITOR) tablet 40 mg  40 mg Oral QHS    insulin glargine (LANTUS) injection 15 Units  15 Units SubCUTAneous DAILY    glucose chewable tablet 16 g  16 g Oral PRN    glucagon (GLUCAGEN) injection 1 mg  1 mg IntraMUSCular PRN    dextrose (D50W) injection syrg 25 g  50 mL IntraVENous PRN    insulin lispro (HUMALOG) injection   SubCUTAneous AC&HS       Objective:    Visit Vitals  BP (!) 134/59   Pulse 96   Temp 98 °F (36.7 °C)   Resp 20   Ht 5' 8\" (1.727 m)   Wt 106.2 kg (234 lb 3.2 oz)   SpO2 99%   BMI 35.61 kg/m²       Temp (24hrs), Av.4 °F (36.9 °C), Min:97.2 °F (36.2 °C), Max:99.3 °F (37.4 °C)    Right PICC - replaced over wire     GEN: WD chronically sick looking , not in resp distress. HEENT: Unicteric. EOMI intact  CHEST: Non laboured breathing. B/L rhonchi  CVS:RRR, no mur/gallop  ABD: Obese/soft. Non tender. NABS  LISET: caal in place- with tea colored urine  EXT: Left LE is has X-fix in place  --lower leg necrotic skin- circumferential noted- same  Skin: Dry and intact. No rash, no redness. CNS: A, OX3. Moves all extremity. CN grossly ok. Microbiology:    Blood culture: - 2 sets: NGSF    - 2 sets: NG  : 1 set: NGSF     Urine culture:  - urine culture: NG     Wounddrainage and OR tissueculture:  - gram stain: GNR and GPR        --wound culture+ s.aurues and E.coli + anaerobe   -- OR culture: NG  - OR aerobic/anaerobic culture: NGSF    Lab results:    Chemistry  Recent Labs     21  0250 21  0820 21  0420   * 136* 109*    145 144   K 3.9 3.4* 3.5    108 108   CO2 34* 34* 32   BUN 15 14 16   CREA 1.05 1.00 1.11   CA 6.8* 7.1* 6.9*   AGAP 2* 3 4   BUCR 14 14 14       CBC w/ Diff  Recent Labs     21  0625 21  0820 21  0420   WBC 4.1* 4.4* 4.4*   RBC 2.56* 2.91* 2.95*   HGB 6.7* 7.7* 7.7*   HCT 22.2* 25.2* 25.7*    269 274   GRANS 74* 68 70   LYMPH 14* 18* 16*   EOS 2 3 4     Imagin/5- CXR     1.  Right PICC line tip retracted from optimal/appropriate position, projecting  over the right subclavian vein. 2. Mild bibasilar atelectasis.      5/5/21  US renal  1. Considerable urinary bladder fluid distention (approximately 2.1 L) with  likely reactive pelviectasis.      > Results called to [address] [place] [address] unit as patient 2 likely benefit from  urinary bladder catheterization.     5/4: right humerus  No acute osseous abnormality or malalignment involving the right humerus. 5/7: MRI of left foot:    No acute osseous abnormality or malalignment involving the right humerus. 5/11: CTA AP w/contrast  No evidence of contrast extravasation to suggest active gastrointestinal  hemorrhage.     Bilateral hydroureteronephrosis secondary to markedly thick-walled urinary  bladder containing hyperdense fluid, consistent with hemorrhage. Indwelling  Mccray catheter with incomplete bladder decompression. Findings concerning for  cystitis and hemorrhage possibly secondary to catheter placement.   Cannot  exclude underlying malignancy, cystoscopy is recommend upon resolution of acute  pathology.     Large right inguinal hernia containing nondilated distal ileum and right colon.     Trace retroperitoneal ascites.      5/16:  CXR: Borderline heart size with mild bronchovascular prominence likely vascular  congestion but improved since the last study of 5/11/2021.

## 2021-05-18 NOTE — PROGRESS NOTES
Problem: Mobility Impaired (Adult and Pediatric)  Goal: *Acute Goals and Plan of Care (Insert Text)  Description: Physical Therapy Goals   Initiated 5/6/2021 and to be accomplished within 7 day(s)  1. Patient will move from supine <> sit with S in prep for out of bed activity and change of position. 2.  Patient will demonstrate sitting balance intact EOB without UE support /S for performance for ADL/ therapeutic exercise activity. 3.  Patient will perform transfers bed to chair via transfer board with min assist.      5/18/2021  Chart reviewed and noted vascular procedure yesterday. PT treatment not completed at this time as pt declines stating \"I can't move anything. I won't do that (PT) till I get out the hospital after they chop my leg off\". OTONIEL Cabello arrived for VS check. CNA and PT assisted pt to move up in bed. With bed position modified, pt moved toward [facility] with mod/max assist  of 2. Pt with external fixator and wound vac L foot/ankle. Will f/u at later time as pt schedule allows. Cory Smith, PT    17:12  PT attempted. Pt eating meal at this time. Continues to state \"I can't do nothing\". Attempted to encourage pt regarding benefits of physical therapy for strengthening UE's and R LE. Pt replied \"If I had this metal think off, I could go home\". Overall poor insight into deficits. Will f/up at later time as pt schedule allows.   Cory Smith, PT

## 2021-05-18 NOTE — DIABETES MGMT
GLYCEMIC CONTROL PLAN OF CARE     Assessment:  Pt with h/o CKD stage 3, DM, HTN Anemia , CAD s/p stent admitted for trimalleolar fx of ankle, washout and  external fixator,  left foot/heel cellulitis and osteomyelitis. Variable fasting blood glucose readings past 7 days (113 to 299 mg/dL) on 15 units Lantus/day. Noted diet orders written for clear liquids. Recommendations:  1. If fasting blood glucose in the morning remains elevated, could consider small increase of Lantus to 17 units/day. Most recent blood glucose values:  5/18:  213, 171  5/17:  133, 133, 152, 112, 130  5/16:  FBG - 119  5/15:  FBG - 299  5/14:  FBG -  154  5/13:  FBG -  140  5/12:  FBG - 148          Current A1C of 6.3 % is equivalent to average blood glucose of 134 mg/dl over the past 2-3 months. Current hospital diabetes medications:   15 units Lantus/day  Corrective lispro, very insulin resistant dosing ACHS    Previous day's insulin requirements:  15 units ( Lantus)    Home diabetes medications: (pt verified his insulin doses as below but does not know if he is taking Januvia)  Lantus 40 units/day  Humalog 10 units at breakfast and 15 units at lunch and dinner  Per chart - Januvia 50 mg/d  Pt with good glycemic control PTA. He states he obtains his diabetes medications and supplies from [facility].  [place]  Diet: DIET CLEAR LIQUID   Meal Intake:   Patient Vitals for the past 168 hrs:   % Diet Eaten   05/16/21 0834 76 - 100%   05/14/21 2005 76 - 100%   05/13/21 0650 0%     Education:  ____Refer to Diabetes Education Record             _x___Education not indicated at this time    Vicki Gleason, [address] [place] [address], [address] [place] [address]  Diabetes and Glycemic Control   Office:  [phone]  Pager:  [phone]

## 2021-05-18 NOTE — PROGRESS NOTES
1935 - Bedside report received from NICHOLE CARBAJAL RN. Patient in bed. Pain 0/10.     2015 - Patient in bed at this time. D L PICC to RUE  intact and patent. External fixator to LLE and wnd vac in place. Gangrene and edema noted to LLE,+ CMS. Mccray patent with alanna urine, will come out in AM per day RN. Scrotal swelling noted. R groin drsg with blood, leave drsg on per RN. Pt A & O x 4. LS clear, on 3L NC. Abdomen soft, NT and ND. + BS to all 4 quadrants. Denies nausea. Pain 0/10. Call light within reach. Pt had an uneventful shift. No issues/concerns at this time.  Call bell within reach

## 2021-05-18 NOTE — PROGRESS NOTES
Comprehensive Nutrition Assessment    Type and Reason for Visit: Reassess, RD nutrition re-screen/LOS    Nutrition Recommendations/Plan: Other: continue w/ POC    Nutrition Assessment:  PMHx: CAD, HTN, high cholesterol, CKD stage 3, ELINOR, . Admitted w/ Trimalleolar fracture of ankle-S/p washout and external fixator, Osteomyelitis left heel with cellulitis, gangrenous ulcer with deep abscess tracking along the plantar fascia    Estimated Daily Nutrient Needs:  Energy (kcal): 0785-0578; Weight Used for Energy Requirements: Current  Protein (g): (1-1.2g); Weight Used for Protein Requirements: Current  Fluid (ml/day): 0624-0958; Method Used for Fluid Requirements: 1 ml/kcal      Nutrition Related Findings:  (P) Labs and meds reviewed. +BM 5/16/21. Noted 2+ pitting LLE and RLE in flowsheet. Wounds:    Surgical incision  Diabetic wound noted in wound care note 5/6     Current Nutrition Therapies:  DIET DIABETIC CONSISTENT CARB Regular    Anthropometric Measures:  · Height:  5' 8\" (172.7 cm)  · Current Body Wt:  106.2 kg (234 lb 2.1 oz)   · Admission Body Wt:       · Usual Body Wt:  89.2 kg (196 lb 10.4 oz)(4/2021)     · Ideal Body Wt:  154 lbs:  152 %   · Adjusted Body Weight:   ; Weight Adjustment for: No adjustment   · Adjusted BMI:       · BMI Category:  Obese class 2 (BMI 35.0-39. 9)       Nutrition Diagnosis:   · Inadequate protein intake related to acute injury/trauma as evidenced by wounds(Diabetic wound), PO intake about 50% per patient.     Nutrition Interventions:   Food and/or Nutrient Delivery: Continue current diet  Nutrition Education and Counseling: No recommendations at this time  Coordination of Nutrition Care: Continue to monitor while inpatient, Interdisciplinary rounds    Goals:  Continue to encourage PO intake >50% at most meals throughout the next 5-7 days       Nutrition Monitoring and Evaluation:   Behavioral-Environmental Outcomes: None identified  Food/Nutrient Intake Outcomes: Food and nutrient intake  Physical Signs/Symptoms Outcomes: Biochemical data, Skin, Weight, GI status, Nausea/vomiting    Discharge Planning:    Continue current diet     Electronically signed by Luh Corrigan on 5/18/2021 at 1:25 PM

## 2021-05-18 NOTE — PROGRESS NOTES
Assumed care of pt from [address]  6395: Pt was educated on blood transfusion. Transfusion started. Vital WDL  1500: Pt tolerated blood transfusion. 1800: pt had uneventful shift. Bedside shift change report given to Deny Aguilar RN (oncoming nurse) by Donna Olivarez RN (offgoing nurse). Report included the following information SBAR, Kardex, Intake/Output and MAR.

## 2021-05-18 NOTE — PROGRESS NOTES
1920 Assumed care from JOSE Haque RN.    5161 Bedside and Verbal shift change report given to KRISTI Mullen RN (oncoming nurse) by Esvin Thomson RN   (offgoing nurse). Report included the following information SBAR, Kardex, ED Summary, Procedure Summary, Intake/Output, MAR, Recent Results and Med Rec Status.

## 2021-05-19 LAB
ABO + RH BLD: NORMAL
BLD PROD TYP BPU: NORMAL
BLOOD GROUP ANTIBODIES SERPL: NORMAL
BPU ID: NORMAL
CALLED TO:,BCALL1: NORMAL
CROSSMATCH RESULT,%XM: NORMAL
GLUCOSE BLD STRIP.AUTO-MCNC: 129 MG/DL (ref 70–110)
GLUCOSE BLD STRIP.AUTO-MCNC: 131 MG/DL (ref 70–110)
GLUCOSE BLD STRIP.AUTO-MCNC: 206 MG/DL (ref 70–110)
HCT VFR BLD AUTO: 24.5 % (ref 36–48)
HGB BLD-MCNC: 7.5 G/DL (ref 13–16)
SPECIMEN EXP DATE BLD: NORMAL
STATUS OF UNIT,%ST: NORMAL
UNIT DIVISION, %UDIV: 0

## 2021-05-19 PROCEDURE — 74011250636 HC RX REV CODE- 250/636: Performed by: SURGERY

## 2021-05-19 PROCEDURE — 77030019934 HC DRSG VAC ASST KCON -B

## 2021-05-19 PROCEDURE — 77010033678 HC OXYGEN DAILY

## 2021-05-19 PROCEDURE — 2709999900 HC NON-CHARGEABLE SUPPLY: Performed by: INTERNAL MEDICINE

## 2021-05-19 PROCEDURE — 85018 HEMOGLOBIN: CPT

## 2021-05-19 PROCEDURE — 76040000008: Performed by: INTERNAL MEDICINE

## 2021-05-19 PROCEDURE — 65660000000 HC RM CCU STEPDOWN

## 2021-05-19 PROCEDURE — 36415 COLL VENOUS BLD VENIPUNCTURE: CPT

## 2021-05-19 PROCEDURE — 74011250636 HC RX REV CODE- 250/636: Performed by: INTERNAL MEDICINE

## 2021-05-19 PROCEDURE — 74011250637 HC RX REV CODE- 250/637: Performed by: HOSPITALIST

## 2021-05-19 PROCEDURE — 77030021593 HC FCPS BIOP ENDOSC BSC -A: Performed by: INTERNAL MEDICINE

## 2021-05-19 PROCEDURE — 77030040830 HC CATH URETH FOL MDII -A

## 2021-05-19 PROCEDURE — 2709999900 HC NON-CHARGEABLE SUPPLY

## 2021-05-19 PROCEDURE — 74011250637 HC RX REV CODE- 250/637: Performed by: INTERNAL MEDICINE

## 2021-05-19 PROCEDURE — 77030040361 HC SLV COMPR DVT MDII -B: Performed by: INTERNAL MEDICINE

## 2021-05-19 PROCEDURE — 74011000258 HC RX REV CODE- 258: Performed by: INTERNAL MEDICINE

## 2021-05-19 PROCEDURE — 0DB68ZX EXCISION OF STOMACH, VIA NATURAL OR ARTIFICIAL OPENING ENDOSCOPIC, DIAGNOSTIC: ICD-10-PCS | Performed by: INTERNAL MEDICINE

## 2021-05-19 PROCEDURE — 82962 GLUCOSE BLOOD TEST: CPT

## 2021-05-19 PROCEDURE — 88305 TISSUE EXAM BY PATHOLOGIST: CPT

## 2021-05-19 PROCEDURE — 97605 NEG PRS WND THER DME<=50SQCM: CPT

## 2021-05-19 PROCEDURE — 74011636637 HC RX REV CODE- 636/637: Performed by: HOSPITALIST

## 2021-05-19 PROCEDURE — 74011250637 HC RX REV CODE- 250/637: Performed by: UROLOGY

## 2021-05-19 PROCEDURE — G0500 MOD SEDAT ENDO SERVICE >5YRS: HCPCS | Performed by: INTERNAL MEDICINE

## 2021-05-19 PROCEDURE — 77030039961 HC KT CUST COLON BSC -D: Performed by: INTERNAL MEDICINE

## 2021-05-19 RX ORDER — EPINEPHRINE 0.1 MG/ML
1 INJECTION INTRACARDIAC; INTRAVENOUS
Status: CANCELLED | OUTPATIENT
Start: 2021-05-19 | End: 2021-05-20

## 2021-05-19 RX ORDER — SODIUM CHLORIDE 0.9 % (FLUSH) 0.9 %
5-40 SYRINGE (ML) INJECTION EVERY 8 HOURS
Status: CANCELLED | OUTPATIENT
Start: 2021-05-19

## 2021-05-19 RX ORDER — TAMSULOSIN HYDROCHLORIDE 0.4 MG/1
0.8 CAPSULE ORAL DAILY
Status: DISCONTINUED | OUTPATIENT
Start: 2021-05-20 | End: 2021-05-25 | Stop reason: HOSPADM

## 2021-05-19 RX ORDER — NALOXONE HYDROCHLORIDE 0.4 MG/ML
0.4 INJECTION, SOLUTION INTRAMUSCULAR; INTRAVENOUS; SUBCUTANEOUS
Status: CANCELLED | OUTPATIENT
Start: 2021-05-19 | End: 2021-05-19

## 2021-05-19 RX ORDER — FENTANYL CITRATE 50 UG/ML
100 INJECTION, SOLUTION INTRAMUSCULAR; INTRAVENOUS
Status: DISCONTINUED | OUTPATIENT
Start: 2021-05-19 | End: 2021-05-19 | Stop reason: HOSPADM

## 2021-05-19 RX ORDER — DIPHENHYDRAMINE HCL 25 MG
25 CAPSULE ORAL
Status: DISCONTINUED | OUTPATIENT
Start: 2021-05-19 | End: 2021-05-25 | Stop reason: HOSPADM

## 2021-05-19 RX ORDER — SODIUM CHLORIDE 9 MG/ML
1000 INJECTION, SOLUTION INTRAVENOUS CONTINUOUS
Status: DISPENSED | OUTPATIENT
Start: 2021-05-19 | End: 2021-05-19

## 2021-05-19 RX ORDER — SODIUM CHLORIDE 0.9 % (FLUSH) 0.9 %
5-40 SYRINGE (ML) INJECTION AS NEEDED
Status: CANCELLED | OUTPATIENT
Start: 2021-05-19

## 2021-05-19 RX ORDER — MIDAZOLAM HYDROCHLORIDE 1 MG/ML
.25-5 INJECTION, SOLUTION INTRAMUSCULAR; INTRAVENOUS
Status: DISCONTINUED | OUTPATIENT
Start: 2021-05-19 | End: 2021-05-19 | Stop reason: HOSPADM

## 2021-05-19 RX ORDER — DIPHENHYDRAMINE HYDROCHLORIDE 50 MG/ML
50 INJECTION, SOLUTION INTRAMUSCULAR; INTRAVENOUS ONCE
Status: CANCELLED | OUTPATIENT
Start: 2021-05-19 | End: 2021-05-19

## 2021-05-19 RX ORDER — DEXTROMETHORPHAN/PSEUDOEPHED 2.5-7.5/.8
1.2 DROPS ORAL
Status: CANCELLED | OUTPATIENT
Start: 2021-05-19

## 2021-05-19 RX ORDER — FLUMAZENIL 0.1 MG/ML
0.2 INJECTION INTRAVENOUS
Status: CANCELLED | OUTPATIENT
Start: 2021-05-19 | End: 2021-05-19

## 2021-05-19 RX ORDER — ATROPINE SULFATE 0.1 MG/ML
0.5 INJECTION INTRAVENOUS
Status: CANCELLED | OUTPATIENT
Start: 2021-05-19 | End: 2021-05-20

## 2021-05-19 RX ADMIN — SODIUM CHLORIDE 50 ML/HR: 900 INJECTION, SOLUTION INTRAVENOUS at 22:29

## 2021-05-19 RX ADMIN — METOPROLOL SUCCINATE 50 MG: 50 TABLET, EXTENDED RELEASE ORAL at 08:21

## 2021-05-19 RX ADMIN — PIPERACILLIN AND TAZOBACTAM 3.38 G: 3; .375 INJECTION, POWDER, LYOPHILIZED, FOR SOLUTION INTRAVENOUS at 20:42

## 2021-05-19 RX ADMIN — PIPERACILLIN AND TAZOBACTAM 3.38 G: 3; .375 INJECTION, POWDER, LYOPHILIZED, FOR SOLUTION INTRAVENOUS at 17:31

## 2021-05-19 RX ADMIN — INSULIN LISPRO 6 UNITS: 100 INJECTION, SOLUTION INTRAVENOUS; SUBCUTANEOUS at 22:29

## 2021-05-19 RX ADMIN — PIPERACILLIN AND TAZOBACTAM 3.38 G: 3; .375 INJECTION, POWDER, LYOPHILIZED, FOR SOLUTION INTRAVENOUS at 02:08

## 2021-05-19 RX ADMIN — FERROUS SULFATE TAB 325 MG (65 MG ELEMENTAL FE) 325 MG: 325 (65 FE) TAB at 17:32

## 2021-05-19 RX ADMIN — INSULIN GLARGINE 15 UNITS: 100 INJECTION, SOLUTION SUBCUTANEOUS at 09:00

## 2021-05-19 RX ADMIN — ATORVASTATIN CALCIUM 40 MG: 20 TABLET, FILM COATED ORAL at 22:14

## 2021-05-19 RX ADMIN — PANTOPRAZOLE SODIUM 40 MG: 40 TABLET, DELAYED RELEASE ORAL at 06:58

## 2021-05-19 RX ADMIN — FERROUS SULFATE TAB 325 MG (65 MG ELEMENTAL FE) 325 MG: 325 (65 FE) TAB at 08:21

## 2021-05-19 RX ADMIN — PIPERACILLIN AND TAZOBACTAM 3.38 G: 3; .375 INJECTION, POWDER, LYOPHILIZED, FOR SOLUTION INTRAVENOUS at 08:21

## 2021-05-19 RX ADMIN — TAMSULOSIN HYDROCHLORIDE 0.4 MG: 0.4 CAPSULE ORAL at 08:21

## 2021-05-19 RX ADMIN — FUROSEMIDE 20 MG: 20 TABLET ORAL at 08:21

## 2021-05-19 NOTE — PROGRESS NOTES
Hospitalist Progress Note    Patient: Sasha Saba. MRN: [id number]  CSN: [id number]    YOB: 1945  Age: 70 y.o. Sex: male    DOA: 5/4/2021 LOS:  LOS: 15 days                Assessment/Plan     Patient Active Problem List   Diagnosis Code    Atrial flutter ([facility]) I48.92    DM2 (diabetes mellitus, type 2) ([address] E11.9    CAD (coronary artery disease) I25.10    ELINOR (acute kidney injury) ([address] N17.9    CKD (chronic kidney disease) stage 3, GFR 30-59 ml/min ([facility]) N18.30    Elevated brain natriuretic peptide (BNP) level R79.89    Diabetic ulcer of left foot ([facility]) E11.621, L97.529    COVID-19 virus infection U07.1    Acute osteomyelitis ([address] M86.10    Acute hematogenous osteomyelitis of left foot ([address] M86.072    Cellulitis of left foot L03. 80    Diabetic foot ulcer with osteomyelitis ([address] E11.621, E11.69, L97.509, M86.9    Ulcer of left heel, with necrosis of bone ([address] L97.424    Osteomyelitis ([address] M86.9    Trimalleolar fracture of ankle, closed, left, initial encounter S82.852A    Displacement of peripherally inserted central catheter (PICC) [facility]) T82.524A            70 y.o. male with diabetes, hypertension, coronary artery disease, atrial fibrillation, recent COVID-19 presents to ER after he fell from his stairs. Anemia -  Heme positive stools  Persistent anemia,  Discussed with GI  Will do EGD today  Clear liquid diet. Trimalleolar fracture -  S/p washout and external fixator     Osteomyelitis left heel with cellulitis, gangrenous ischemic ulcer with deep abscess tracking along the plantar fascia and left ankle -  Continue with antibiotics    PVD -  No occlusion on arterial duplex  Seen by vascular  S/p LLE arteriogram with PTA of left SFA 05/17/2021.     ELINOR on CKD stage 3-  Renal function improving. Renal US with urinary bladder distension.   Caal placed  Seen by nephrology    Urinary retention -  Continue flomax  D/c caal, voiding trial    Inguinal hernia -  Right. With scrotal swelling  chronic     HTN -  On toprol xl, lasix. Monitor BP     Diabetes mellitus -  Continue with lantus, start on SSI.      CAD -  No anginal symptoms,  aspirin and plavix on hold due to hematuria.      A-flutter -  Rate controlled on toprol xl    Stopped all anticoagulation and antiplatelets.     Recent covid 19 infection    Disposition : need rehab    Review of systems  General: No fevers or chills. Cardiovascular: No chest pain or pressure. No palpitations. Pulmonary: No shortness of breath. Gastrointestinal: No nausea, vomiting. Physical Exam:  General: Awake, cooperative, no acute distress    HEENT: NC, Atraumatic. PERRLA, anicteric sclerae. Lungs: CTA Bilaterally. No Wheezing/Rhonchi/Rales. Heart:  S1 S2,  No murmur, No Rubs, No Gallops  Abdomen: Soft, Non distended, Non tender.  +Bowel sounds, enlarged scrotum secondary to large inguinal hernia. Extremities: Left foot in splint  Psych:   Not anxious or agitated. Neurologic:  No acute neurological deficit. Vital signs/Intake and Output:  Visit Vitals  BP (!) 127/56   Pulse 83   Temp 97.4 °F (36.3 °C)   Resp 15   Ht 5' 8\" (1.727 m)   Wt 106.2 kg (234 lb 3.2 oz)   SpO2 100%   BMI 35.61 kg/m²     Current Shift:  No intake/output data recorded. Last three shifts:  05/17 1901 - 05/19 0700  In: 318.8   Out: 1500 [Urine:1500]            Labs: Results:       Chemistry Recent Labs     05/18/21  0250 05/17/21  0820   * 136*    145   K 3.9 3.4*    108   CO2 34* 34*   BUN 15 14   CREA 1.05 1.00   CA 6.8* 7.1*   AGAP 2* 3   BUCR 14 14      CBC w/Diff Recent Labs     05/19/21  1130 05/18/21  0625 05/17/21  0820   WBC  --  4.1* 4.4*   RBC  --  2.56* 2.91*   HGB 7.5* 6.7* 7.7*   HCT 24.5* 22.2* 25.2*   PLT  --  238 269   GRANS  --  74* 68   LYMPH  --  14* 18*   EOS  --  2 3      Cardiac Enzymes No results for input(s): CPK, CKND1, LEONEL in the last 72 hours.     No lab exists for component: [place] Coagulation Recent Labs     05/17/21  0820 05/16/21  2210   APTT 118.5* 177.3*       Lipid Panel No results found for: CHOL, CHOLPOCT, CHOLX, CHLST, CHOLV, 941190, HDL, HDLP, LDL, LDLC, DLDLP, 391532, VLDLC, VLDL, TGLX, TRIGL, TRIGP, TGLPOCT, CHHD, CHHDX   BNP No results for input(s): BNPP in the last 72 hours. Liver Enzymes No results for input(s): TP, ALB, TBIL, AP in the last 72 hours.     No lab exists for component: SGOT, GPT, DBIL   Thyroid Studies Lab Results   Component Value Date/Time    TSH 1.72 02/11/2018 02:19 AM        Procedures/imaging: see electronic medical records for all procedures/Xrays and details which were not copied into this note but were reviewed prior to creation of Plan

## 2021-05-19 NOTE — PROGRESS NOTES
DC Plan:  [facility]    CM continuing to follow pt for d/c needs. 5/17/21    DC Plan: [facility]     CM met with patient to f/u on d/c plan. Patient agreeable to [facility]. Pt states, \"they might chop my leg off. \"  CM informed patient that we would continue to follow him throughout his hospitalization as his d/c plans could change. [facility]           5/7/21     DC Plan: SNF for rehab and abx post surgical repair of ankle fx     Care manager contacted patient's son, Sharif Gaitan [phone] to introduce self and inquire about SNF choices for post surgical discharge plan anticipation. [facility] has been accepted by [address] but will update list after surgery.  SNF list sent to son at [email or web address]

## 2021-05-19 NOTE — PROGRESS NOTES
[facility]                         (A Division of [address] [place] [address])                                                                                                                       Evelyne Roa MD  Work Cell #: [phone]( Mon-Fri, 7am-5pm)  If no call or text back within 30 minutes from me, please call office number. (Prefer text when possible)  Office #:     [phone]  [phone]   Office Fax: [phone]     Date of Admission: 5/4/2021   Date of Service:  5/19/2021  Reason for FU : left ankle OM, fever    Current Antimicrobials:    Prior Antimicrobials:    Zosyn 5/17 to date  ·    Zithromax 500mg IV- 4/14 to 4/20  · Vancomycin IV 4/14  until 4/19  · Zosyn 2.25gm IV Q6 4/14 to 4/15   · Meropenem IV 4/15-4/20  Unasyn 3 gm Q6 hour 4/20 to 4/22/21  Ertapenem 1gm IV q24 4/23 to date  -- reduced dose to 500 mg 5/5  --Vancomycin 5/6 to 5/12  --Meropenem 1 gm Q12 5/6 to date       Assessment: Rec/ Plan on ID issues I am following:   · Fever: Improved post I and D  · Source suspected progressed left ankle infection/abscess       DDX: urine source Vs Vs line. No respiratory                complaint and no rash noted. --Post left foot excisional debridement of skin, SQ tissue down to bone, x-fix placement- 5/14/21  --Post angioplasty/stent  LLE-5/17    bcx neg- 5/5 and 5/7  ucx neg 5/5  · Left Infected DM Ulcer and acute OM and necrotic tissue, punched out heel ulcer with Mixed infection- MSSA +E.coli + anaerobes - 4/14/21  · Post I and D of left heel bone/abscess 4/17, OR cultures remain sterile  · Post I and D with graft to heel- 4/21    · Acute renal failure 2/2 urinary retention, caal in place.  Drug related IN in DDX but less likely       (urine eos negative): Resolved  · Left heel ankle fracture 2/2 fall: on this admission    · Anemia- HB 6.2  · Hematuria- off anticoagulant    Other Medical Issues followed and managed by primary and other services  · DM- Poorly controlled  · Low Vitamin D  · CAD with CHF/ NSTMI. Atrial flutter  · CKD  · Hyperlipidemia       Past ID issues: N/A  -History of COVID 19 infection 4/2021   OR  cx 5/14- NGSF    Poor tissue perfusion and necrosis found in OR, which likely drove the fever earlier. No new pathogens from cultures      Cont Zosyn- plan to complete his OM treatment with Zosyn  ( until June 1)  Wound care for the skin care-LLE    Will follow                          Condition: Hemodynamically stable. Fever better    Subjective:   Wakes easily, states \" I am ok\"    Afebrile. No chills/sob. He didn't get good sleep    OR cultures: NGSF    Renal status- normal and remains stable. Review of System:  See above, other wise negative     Summary:    Trini Vázquez is 69 y/o WM with PMH as listed below- with initial consult on 4/15/21. Last seen by me on 4/22/21 prior to discharge. He was set up at [facility] infusion center for daily ertapenem.     Yesterday, I was notified by RN that he fell, hurt his left foot, and his PICC was not working and they infused him via PIV and sent him to ED.     On arrival to ED, was noted to be in acute renal failure, and with new left ankle fracture.     Wound care note with the picture reviewed.     Pt denies F/C/R. Denies SOB. Feels weak, appetite and po intake has decreased past many days per wife. Patient Active Problem List   Diagnosis Code    Atrial flutter ([facility]) I48.92    DM2 (diabetes mellitus, type 2) ([facility]) E11.9    CAD (coronary artery disease) I25.10    ELINOR (acute kidney injury) ([facility] N17.9    CKD (chronic kidney disease) stage 3, GFR 30-59 ml/min ([facility]) N18.30    Elevated brain natriuretic peptide (BNP) level R79.89    Diabetic ulcer of left foot ([facility]) E11.621, L97.529    COVID-19 virus infection U07.1    Acute osteomyelitis ([facility] M86.10    Acute hematogenous osteomyelitis of left foot ([facility]) M86.072    Cellulitis of left foot L03. 116    Diabetic foot ulcer with osteomyelitis ([facility] E11.621, E11.69, L97.509, M86.9    Ulcer of left heel, with necrosis of bone ([facility]) L97.424    Osteomyelitis ([facility] M86.9    Trimalleolar fracture of ankle, closed, left, initial encounter S82.852A    Displacement of peripherally inserted central catheter (PICC) ([facility] T82.524A       Current Facility-Administered Medications   Medication Dose Route Frequency    0.9% sodium chloride infusion 250 mL  250 mL IntraVENous PRN    piperacillin-tazobactam (ZOSYN) 3.375 g in 0.9% sodium chloride (MBP/ADV) 100 mL MBP  3.375 g IntraVENous Q6H    iopamidoL (ISOVUE 250) 51 % contrast injection 1-100 mL  1-100 mL IntraarTERial RAD CONTINUOUS    fentaNYL citrate (PF) injection  mcg   mcg IntraVENous Rad Multiple    midazolam (VERSED) injection 0.5-2 mg  0.5-2 mg IntraVENous Rad Multiple    heparin (porcine) 1,000 unit/mL injection 1,000-10,000 Units  1,000-10,000 Units IntraVENous Rad Multiple    0.9% sodium chloride infusion  50 mL/hr IntraVENous CONTINUOUS    [Held by provider] clopidogreL (PLAVIX) tablet 75 mg  75 mg Oral DAILY    furosemide (LASIX) tablet 20 mg  20 mg Oral DAILY    pantoprazole (PROTONIX) tablet 40 mg  40 mg Oral ACB    polyethylene glycol (MIRALAX) packet 17 g  17 g Oral DAILY    0.9% sodium chloride infusion 250 mL  250 mL IntraVENous PRN    albuterol-ipratropium (DUO-NEB) 2.5 MG-0.5 MG/3 ML  3 mL Nebulization Q4H PRN    ferrous sulfate tablet 325 mg  1 Tablet Oral BID WITH MEALS    metoprolol succinate (TOPROL-XL) XL tablet 50 mg  50 mg Oral DAILY    tamsulosin (FLOMAX) capsule 0.4 mg  0.4 mg Oral DAILY    heparin (porcine) 100 unit/mL injection 500 Units  500 Units InterCATHeter Q8H PRN    [Held by provider] apixaban (ELIQUIS) tablet 2.5 mg  2.5 mg Oral BID    acetaminophen (TYLENOL) tablet 1,000 mg  1,000 mg Oral Q8H PRN    atorvastatin (LIPITOR) tablet 40 mg  40 mg Oral QHS    insulin glargine (LANTUS) injection 15 Units  15 Units SubCUTAneous DAILY    glucose chewable tablet 16 g  16 g Oral PRN    glucagon (GLUCAGEN) injection 1 mg  1 mg IntraMUSCular PRN    dextrose (D50W) injection syrg 25 g  50 mL IntraVENous PRN    insulin lispro (HUMALOG) injection   SubCUTAneous AC&HS       Objective:    Visit Vitals  BP (!) 164/61   Pulse 92   Temp 97.9 °F (36.6 °C)   Resp 20   Ht 5' 8\" (1.727 m)   Wt 106.2 kg (234 lb 3.2 oz)   SpO2 100%   BMI 35.61 kg/m²       Temp (24hrs), Av.5 °F (36.9 °C), Min:97.9 °F (36.6 °C), Max:99.9 °F (37.7 °C)    Right PICC - replaced over wire     GEN: WD chronically sick looking , not in resp distress. HEENT: Unicteric. EOMI intact  CHEST: Non laboured breathing. CVS:RRR, no mur/gallop  ABD: Obese/soft. Non tender. EXT: Left LE is has X-fix in place  --lower leg necrotic skin- circumferential noted- same  Skin: Dry and intact. No rash, no redness. CNS: A, OX3. Moves all extremity. CN grossly ok. Microbiology:    Blood culture: - 2 sets: NGSF    - 2 sets: NG  : 1 set: NGSF     Urine culture:  - urine culture: NG     Wounddrainage and OR tissueculture:  - gram stain: GNR and GPR        --wound culture+ s.aurues and E.coli + anaerobe   -- OR culture: NG  - OR aerobic/anaerobic culture: NGSF    Lab results:    Chemistry  Recent Labs     21  0250 21  0820   * 136*    145   K 3.9 3.4*    108   CO2 34* 34*   BUN 15 14   CREA 1.05 1.00   CA 6.8* 7.1*   AGAP 2* 3   BUCR 14 14       CBC w/ Diff  Recent Labs     21  0625 21  0820   WBC 4.1* 4.4*   RBC 2.56* 2.91*   HGB 6.7* 7.7*   HCT 22.2* 25.2*    269   GRANS 74* 68   LYMPH 14* 18*   EOS 2 3     Imagin/5- CXR     1. Right PICC line tip retracted from optimal/appropriate position, projecting  over the right subclavian vein. 2. Mild bibasilar atelectasis.      21  US renal  1.  Considerable urinary bladder fluid distention (approximately 2.1 L) with  likely reactive pelviectasis.      > Results called to [address] [place] [address] unit as patient 2 likely benefit from  urinary bladder catheterization.     5/4: right humerus  No acute osseous abnormality or malalignment involving the right humerus. 5/7: MRI of left foot:    No acute osseous abnormality or malalignment involving the right humerus. 5/11: CTA AP w/contrast  No evidence of contrast extravasation to suggest active gastrointestinal  hemorrhage.     Bilateral hydroureteronephrosis secondary to markedly thick-walled urinary  bladder containing hyperdense fluid, consistent with hemorrhage. Indwelling  Mccray catheter with incomplete bladder decompression. Findings concerning for  cystitis and hemorrhage possibly secondary to catheter placement.   Cannot  exclude underlying malignancy, cystoscopy is recommend upon resolution of acute  pathology.     Large right inguinal hernia containing nondilated distal ileum and right colon.     Trace retroperitoneal ascites.      5/16:  CXR: Borderline heart size with mild bronchovascular prominence likely vascular  congestion but improved since the last study of 5/11/2021.

## 2021-05-19 NOTE — PROCEDURES
(EGD) Esophagogastroduodenoscopy (UPPER ENDOSCOPY) Procedure Note  [facility] [place]  __________________________________________________________________________________________________________________________      5/19/2021     Patient: Melody Garcia YOB: 1945 Gender: male Age: 70 y.o. INDICATION:  79-year-old male who admitted on 05/04/2021, because of trimalleolar fracture on the right side after falling. The patient was already admitted recently for left heel cellulitis and osteomyelitis with gangrenous ulcer requiring incision, debridement, and drainage. He also was admitted recently for COVID-19 infection. He is known to be obese and having diabetes, hypertension, coronary artery disease, atrial fibrillation, and anticoagulated Eliquis, Plavix, and aspirin, had a recent fracture of the right ankle, presented with a drop in his hemoglobin to 6.7. He has asthma. The patient is also known to have recurring severe iron deficiency anemia. In fact in 06/2018, he underwent colonoscopy because hemoglobin was 3.5. There were two small 4-mm sessile polyps in the sigmoid that were adenomas, mild sigmoid diverticulosis, but nothing else to explain the anemia. His EGD was done on 04/30/2021, showed a small linear erosive esophagitis in two locations in the distal esophagus, slightly irregular Z-line, and a 1.5-cm hiatal hernia. The H. pylori came back negative. CT scan of the abdomen and pelvis at that time showed that he has very large right inguinal hernia extending into the right scrotal sac containing proximal colon and terminal ileum, mild-to-moderate bilateral hydronephrosis and hydroureter down to the pelvis secondary to markedly distended bladder, small bilateral effusions, and a small hiatal hernia. The patient had a capsule enteroscopy at the office on 08/01/2018, that was completely normal. Already on Omeprazole 20 mg daily but he is not sure he takes it? ? H Pylori serology was negative this time. : Patricia Nguyễn MD    Referring Provider:  Liz Plummer MD    Sedation:  Versed 5 mg IV, Fentanyl  100 mcg IV,  Glucagon 1 mg IV    Procedure Details:  After infomed consent was obtained for the procedure, with all risks and benefits of procedure explained to the family the patient was taken to the endoscopy suite and placed in the left lateral decubitus position. Following sequential administration of sedation as per above, the endoscope was inserted into the mouth and advanced under direct vision to the proximal jejunum. A careful inspection was made as the gastroscope was withdrawn, including a retroflexed view of the proximal stomach; findings and interventions are described below. OROPHARYNX: The vocal Cords and the larynx are normal.   ESOPHAGUS: The proximal, mid, and distal oesophagus are normal. The Z-Line is intact located at:43.5 cm. < 2 cm Hiatal hernia. Diaphragmatic opening or notch is located at 45 cm. STOMACH: The stomach is large. But does not contain any blood, fluid or solid food retention. The fundus on antegrade and retroflex views is normal. The cardia, body, lesser curvature, greater curvature, the antrum, and pylorus are normal. There is moderate diffuse gastric mucosal edema few small biopsies taken. DUODENUM:  In the distal bulb there is a sharp turn with fluffy erythematous mucosa extending into the proximal D2 but there duodenitis becomes erosive. There is evidence of a healed and epethelialized longitudinal ulcer inside that sharp turn of the upper duodenal genu. No stigmata or friability. So probably it was a recent ulcer presently healedThe proximal, mid bulb, third, fourth portions and major papilla are normal. But no AVM. PROXIMAL JEJUNUM:  Unremarkable. Therapies:  Gastric biopsy    Specimen: One           Complications:   None    EBL:  Negligible    IMPLANTS:   Implant Name Type Inv.  Item Serial No.  Lot No. LRB No. Used Action   1.8 olive wire    [employer] 0 Left 5 Implanted   1.8 smooth wire     [employer] 0 Left 3 Implanted     IMPRESSION:  2 cm Hiatal hernia. Diaphragmatic opening or notch is located at 45 cm. The stomach is large. But does not contain any blood, fluid or solid food retention. Moderate diffuse gastric mucosal edema few small biopsies taken. DUODENUM:  In the distal bulb there is a sharp turn with fluffy erythematous mucosa extending into the proximal D2 but there duodenitis becomes erosive. There is evidence of a healed and epethelialized longitudinal ulcer inside that sharp turn of the upper duodenal genu. No stigmata or friability. So probably it was a recent ulcer that could have bled and presently healed no active ulcer, bleeding or  AVM. RECOMMENDATION:  May resume antireflux diabetic diet. Avoid NSAID's. Make a FU appointment at the office. Start taking Famotidine 20 mg daily at bed time continue always. Monitor Hgb / Hct and occult blood in the stool on regular basis. It is possible that he had a recent duodenal ulcer? If he has not been taking the Omeprazole at home.      Assistant: None    --Montez Beth MD on 5/19/2021 at 3:43 PM

## 2021-05-19 NOTE — PROGRESS NOTES
Problem: Mobility Impaired (Adult and Pediatric)  Goal: *Acute Goals and Plan of Care (Insert Text)  Description: Physical Therapy Goals   Initiated 5/6/2021 and to be accomplished within 7 day(s)  1. Patient will move from supine <> sit with S in prep for out of bed activity and change of position. 2.  Patient will demonstrate sitting balance intact EOB without UE support /S for performance for ADL/ therapeutic exercise activity. 3.  Patient will perform transfers bed to chair via transfer board with min assist.    5/19/2021  PT treatment not completed due to:  [] Off Unit for testing/procedure  [] Pain  [] Eating  [] Patient too lethargic  [] Nausea/vomiting  [] Dialysis treatment in progress   [x] Other: pt continues to decline participation with therapy and states he will do it when he gets out of the hospital.  Expresses fatigue from GI procedure also (recent EGD today). Will f/u tomorrow as appropriate.   Amy Sanchez, PT

## 2021-05-19 NOTE — WOUND CARE
[address]. MEDICAL RECORD NUMBER:  [id number]  AGE: 70 y.o. GENDER: male  : 1945  TODAY'S DATE:  2021    GENERAL     [] Follow-up   [x] New Consult    Rick Sánchez. is a 70 y.o. male referred by:   [x] Physician  [] Nursing  [] Other:         PAST MEDICAL HISTORY    Past Medical History:   Diagnosis Date    A-fib ([facility]     Asthma     CAD (coronary artery disease)     COVID-19     Diabetes ([facility]     High cholesterol     Hypertension         PAST SURGICAL HISTORY    Past Surgical History:   Procedure Laterality Date    COLONOSCOPY N/A 2018    COLONOSCOPY, POLYPECTOMY performed by Bridgett Apple MD at [address] [place] [address]    History reviewed. No pertinent family history. SOCIAL HISTORY    Social History     Tobacco Use    Smoking status: Former Smoker    Smokeless tobacco: Never Used   Substance Use Topics    Alcohol use: Yes     Alcohol/week: 1.0 standard drinks     Types: 1 Cans of beer per week    Drug use: No       ALLERGIES    No Known Allergies    MEDICATIONS    No current facility-administered medications on file prior to encounter. Current Outpatient Medications on File Prior to Encounter   Medication Sig Dispense Refill    ertapenem 1 gram 1 g IVPB 1 g by IntraVENous route every twenty-four (24) hours for 40 days. 40 Dose 0    ertapenem 1 gram 1 g IVPB 1 g by IntraVENous route every twenty-four (24) hours for 39 days. 1 Dose 0    spironolactone (ALDACTONE) 25 mg tablet Take 12.5 mg by mouth daily. Take 0.5 tablet (12.5 mg) by oral      insulin lispro (HUMALOG KWIKPEN INSULIN SC) 10-15 Units by SubCUTAneous route three (3) times daily (with meals).  Omeprazole delayed release (PRILOSEC D/R) 20 mg tablet Take 1 Tab by mouth daily. (Patient taking differently: Take 40 mg by mouth daily.) 30 Tab 5    apixaban (ELIQUIS) 5 mg tablet Take 1 Tab by mouth two (2) times a day.  60 Tab 0    aspirin delayed-release 81 mg tablet Take 1 Tab by mouth daily. 30 Tab 0    atorvastatin (LIPITOR) 40 mg tablet Take 1 Tab by mouth nightly. 30 Tab 0    clopidogrel (PLAVIX) 75 mg tab Take 1 Tab by mouth daily. 30 Tab 0    furosemide (LASIX) 40 mg tablet Take 1 Tab by mouth daily. 30 Tab 0    insulin glargine (LANTUS) 100 unit/mL injection 15 Units by SubCUTAneous route daily. (Patient taking differently: 40 Units by SubCUTAneous route nightly. Indications: type 2 diabetes mellitus) 1 Vial 0    losartan (COZAAR) 25 mg tablet Take 1 Tab by mouth daily. 30 Tab 0    metoprolol succinate (TOPROL-XL) 50 mg XL tablet Take 1 Tab by mouth daily. 30 Tab 0    SITagliptin (JANUVIA) 50 mg tablet Take 1 Tab by mouth daily. 30 Tab 0    lancets (ADVOCATE LANCET) 30 gauge misc Use while checking BGL each morning. 1 Package 0    glucose blood VI test strips (IGLUCOSE TEST STRIP) strip Use to check BGL every morning 50 Strip 0       Wt Readings from Last 3 Encounters:   05/14/21 106.2 kg (234 lb 3.2 oz)   05/07/21 94.3 kg (207 lb 14.3 oz)   04/22/21 89.2 kg (196 lb 11.2 oz)       [unfilled]  Visit Vitals  BP (!) 150/57   Pulse 88   Temp 97.4 °F (36.3 °C)   Resp 20   Ht 5' 8\" (1.727 m)   Wt 106.2 kg (234 lb 3.2 oz)   SpO2 99%   BMI 35.61 kg/m²       ASSESSMENT     Skin impairment Identification:  Type: diabetic and surgical    Contributing Factors: diabetes, poor glucose control and surgery    Wound Heel Left (Active)   Wound Image   05/06/21 1206   Wound Etiology Diabetic 05/18/21 2015   Dressing Status Clean;Dry; Intact 05/18/21 2015   Cleansed Cleansed with saline 05/06/21 1206   Dressing/Treatment Xeroform; Other (Comment); Negative Pressure Wound Therapy 05/14/21 1750   Offloading for Diabetic Foot Ulcers Other (comment) 05/06/21 1206   Dressing Change Due 05/06/21 05/06/21 1206   Wound Length (cm) 2.5 cm 05/06/21 1206   Wound Width (cm) 3 cm 05/06/21 1206   Wound Surface Area (cm^2) 7.5 cm^2 05/06/21 1206   Change in Wound Size % 9.09 05/06/21 1206   Wound Assessment Graft;Slough 05/06/21 1206   Drainage Amount None 05/18/21 2015   Drainage Description Brown 05/15/21 1958   Wound Odor None 05/15/21 1958   Neha-Wound/Incision Assessment Maceration 05/06/21 1206   Edges Unattached edges 05/06/21 1206   Wound Thickness Description Full thickness 05/06/21 1206   Number of days: 19       Incision 04/21/21 Foot Left (Active)   Dressing Status Dry; Intact 05/10/21 2132   Dressing/Treatment Ace wrap 05/10/21 1146   Drainage Amount None 05/10/21 1146   Wound Odor None 05/10/21 1146   Neha-Wound/Incision Assessment Intact 05/10/21 1146   Number of days: 28       Incision 05/14/21 Foot Left (Active)   Wound Image   05/19/21 1156   Dressing Status New dressing applied 05/19/21 1156   Cleansed Wound cleanser 05/19/21 1156   Dressing/Treatment Negative Pressure Wound Therapy 05/19/21 1156   Wound Length (cm) 6.8 cm 05/19/21 1156   Wound Width (cm) 7 cm 05/19/21 1156   Wound Depth (cm) 1.3 cm 05/19/21 1156   Wound Volume (cm^3) 61.88 cm^3 05/19/21 1156   Drainage Amount Small 05/19/21 1156   Drainage Description Other (Comment) 05/19/21 1156   Wound Odor None 05/19/21 1156   Neha-Wound/Incision Assessment Fragile 05/19/21 1156   Number of days: 5       [REMOVED] Incision 04/17/21 Foot Left (Removed)   Number of days: 4          PLAN     Skin Care & Pressure Relief Recommendations  Minimize layers of linen  Pads under patient to optimize support surface  Turn/reposition approximately every 2 hours  Pillow wedges  Manage incontinence   Please use in bed position system  Promote continence; Skin Protective lotion/cream to buttocks and sacrum daily and as needed with incontinence care  Offload heels pillows      Recommendations:  Wound vac dressing change done to left plantar foot using 1 piece of foam, vac drape and disc. Suction at 125 mm hg.  Dressing secure, no leaks detected.   Dressing changes to be done by wound care two times weekly.     Teaching completed with:   [x] Patient           [] Family member       [] Caregiver          [] Nursing  [] Other    Patient/Caregiver Teaching:  Level of patient/caregiver understanding able to:   [] Indicates understanding       [] Needs reinforcement  [] Unsuccessful      [x] Verbal Understanding  [] Demonstrated understanding       [] No evidence of learning  [] Refused teaching         [] N/A       Electronically signed by Jhonatan Avalos RN on 5/19/2021 at 12:03 PM

## 2021-05-19 NOTE — PROGRESS NOTES
Assumed care of pt from [facility].   1130: Notified  that pt caal had been removed but for concern of pt not voiding after removal. Dr. Alejandra Adams stated if pt does not void to replace caal. 1600: pt went to ENDO.   1800: Pt stated he was not sure if he voided. This RN checked pt pt was dry except stool. Bladder scanned < 500 in bladder. This RN and another RN tried to place caal 3 times and unsuccessful. 1950: Paged [place] Colonel.  2000: Nicole Whipple returned this RN;s call and will come to place caal. Instructed to have caal by bedside. Informed night RN to have caal by bedside when doctor comes.

## 2021-05-19 NOTE — PERIOP NOTES
REPORT GIVEN TO VALERIA Mabry in [address] [place] [address]- report includes procedure performed, medication given/   Findings/ diet post EGD . Left foot dressing remaion intact. R folded blanket inserted to stabilized left foot. Transportation aware and seen the leg/   PT Tolerated procedure well/   Pt on supine position DURING PROCEDURE- HEAD TURN TO LEFT SIDE-.  TO PROTECT LEFT FOOT

## 2021-05-19 NOTE — ROUTINE PROCESS
Bedside and Verbal shift change report given to VALERIA Rodarte by Briana Martínez. Report included the following information SBAR, Kardex, OR Summary, Intake/Output and MAR.

## 2021-05-19 NOTE — PROGRESS NOTES
VASCULAR AND TRANSPLANT       PROGRESS NOTE    Date: 2021    Post-Op Day: 2  S/p Abdominal aortogram, LLE arteriogram with pta left SFA  Plan:   Continue current treatment per medical and orthopedic services. eliquis and plavix being held secondary to GI Bleed. 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 S/p angiogram .  Left foot warm and perfused. Subjective:   Pt without complaints this am.        Objective:   Admit weight: Weight: 97.6 kg (215 lb 3.2 oz)  Last recorded weight: Weight: 106.2 kg (234 lb 3.2 oz)    Temp (24hrs), Av.5 °F (36.9 °C), Min:97.9 °F (36.6 °C), Max:99.9 °F (37.7 °C)          [unfilled]    Intake/Output Summary (Last 24 hours) at 2021 1053  Last data filed at 2021 0402  Gross per 24 hour   Intake 318.8 ml   Output 1100 ml   Net -781.2 ml       Right groin soft, no hematoma or pulsatile mass. Large hernia present. Left foot with external fixation devise in place. Foot is warm and perfused. Motor and sensory grossly intact. Labs:     BMP:   Recent Labs     21  0250 21  0820   BUN 15 14    145   CO2 34* 34*     CBC:    Recent Labs     21  0625 21  0820   WBC 4.1* 4.4*   HCT 22.2* 25.2*   RDW 17.4* 17.8*     PT/INR: No results for input(s): INR, INREXT in the last 72 hours.     No lab exists for component: PT    Arterial Blood Gases   No results found for: LPMO2, FIO2, PEEP, PH1, PCO2, PO2, HCO3, MODE     MICRO:  Results     Procedure Component Value Units Date/Time    ELBA Sultana Nithin [[id number]] Collected: 21 1600    Order Status: Canceled Specimen: Wound from [address] [place] [address], ANAEROBIC [[id number]] Collected: 21 8355    Order Status: Canceled Specimen: Foot, left     CULTURE, TISSUE [address] [[id number]] Collected: 21 5678    Order Status: Completed Specimen: Foot, left Updated: 21 8342     Special Requests: NO SPECIAL REQUESTS        GRAM STAIN NO WBC'S SEEN         NO ORGANISMS SEEN        Culture result: NO GROWTH 4 DAYS       CULTURE, ANAEROBIC [[id number]] Collected: 05/14/21 1544    Order Status: Completed Specimen: Foot, left Updated: 05/18/21 0945     Special Requests: NO SPECIAL REQUESTS        Culture result: NO GROWTH 4 DAYS       CULTURE, Anali Griffith STAIN [[id number]] Collected: 05/14/21 1544    Order Status: Completed Specimen: Wound from Foot Updated: 05/18/21 0945     Special Requests: NO SPECIAL REQUESTS        GRAM STAIN NO WBC'S SEEN         NO ORGANISMS SEEN        Culture result: NO GROWTH 4 DAYS       CULTURE, BLOOD [[id number]] Collected: 05/07/21 1745    Order Status: Completed Specimen: Blood Updated: 05/13/21 0617     Special Requests: NO SPECIAL REQUESTS        Culture result: NO GROWTH 6 DAYS       CULTURE, BLOOD [[id number]] Collected: 05/05/21 2240    Order Status: Completed Specimen: Blood Updated: 05/11/21 0859     Special Requests: NO SPECIAL REQUESTS        Culture result: NO GROWTH 6 DAYS       CULTURE, URINE [[id number]] Collected: 05/05/21 2240    Order Status: Completed Specimen: Cath Urine Updated: 05/07/21 1457     Special Requests: NO SPECIAL REQUESTS        Culture result: No growth (<1,000 CFU/ML)       CULTURE, BLOOD [[id number]] Collected: 05/05/21 2155    Order Status: Completed Specimen: Blood Updated: 05/11/21 0859     Special Requests: NO SPECIAL REQUESTS        Culture result: NO GROWTH 6 DAYS                   Demetrius Kaur PA-C   [phone].

## 2021-05-19 NOTE — DIABETES MGMT
GLYCEMIC CONTROL PLAN OF CARE        Diabetes Management:      Assessment: known h/o T2DM, HbA1C within recommended range for age + comorbids on basal/bolus home regimen  - admitted for left foot heal osteomyelitis    BG in target range (non-ICU\" < 180 ; -180):  [] Yes  [x] No      Steroids: No    TDD previous day = 32 (15 Lantus + 17 Humalog Very Insulin Resistant Corrective Coverage)    Recommend: FBG in target range, consistent corrective coverage on clear liquid diet  - pt would benefit from scheduled dose of mealtime insulin once diet progresses    Most recent blood glucose results:   Lab Results   Component Value Date/Time    GLUCPOC 131 (H) 05/19/2021 11:27 AM    GLUCPOC 129 (H) 05/19/2021 06:16 AM    GLUCPOC 169 (H) 05/18/2021 09:08 PM         Hypo: No    HbA1C: equivalent  to ave BGlucose of 134 mg/dl for 2-3 months prior to admission  Lab Results   Component Value Date/Time    Hemoglobin A1c 6.3 (H) 04/15/2021 05:49 AM       Adequate glycemic control PTA:  [x] Yes  [] No      Home diabetes medications:  Key Antihyperglycemic Medications             insulin lispro (HUMALOG KWIKPEN INSULIN SC) 10-15 Units by SubCUTAneous route three (3) times daily (with meals). insulin glargine (LANTUS) 100 unit/mL injection 15 Units by SubCUTAneous route daily. SITagliptin (JANUVIA) 50 mg tablet Take 1 Tab by mouth daily.           Goals:  Blood glucose will be within target range of 70 - 180 mg/dL by: 6/15    Diet:   Active Orders   Diet    DIET CLEAR LIQUID       Education:  ___X__ Refer to Diabetes Education Record                       _____ Education not indicated at this time      Dinah Bradshaw 87, RN, CDE  Glycemic Control Team  [phone]  Pager [phone] (M-TH 8:00-4:30P)  *After Hours pager [phone]

## 2021-05-19 NOTE — PROGRESS NOTES
Problem: Falls - Risk of  Goal: *Absence of Falls  Description: Document Dejan Schimcollin Fall Risk and appropriate interventions in the flowsheet.   Outcome: Progressing Towards Goal  Note: Fall Risk Interventions:  Mobility Interventions: Communicate number of staff needed for ambulation/transfer    Mentation Interventions: Adequate sleep, hydration, pain control    Medication Interventions: Evaluate medications/consider consulting pharmacy    Elimination Interventions: Call light in reach, Patient to call for help with toileting needs    History of Falls Interventions: Consult care management for discharge planning

## 2021-05-20 LAB
ANION GAP SERPL CALC-SCNC: 2 MMOL/L (ref 3–18)
BUN SERPL-MCNC: 13 MG/DL (ref 7–18)
BUN/CREAT SERPL: 11 (ref 12–20)
CALCIUM SERPL-MCNC: 7 MG/DL (ref 8.5–10.1)
CHLORIDE SERPL-SCNC: 108 MMOL/L (ref 100–111)
CO2 SERPL-SCNC: 36 MMOL/L (ref 21–32)
CREAT SERPL-MCNC: 1.17 MG/DL (ref 0.6–1.3)
ERYTHROCYTE [DISTWIDTH] IN BLOOD BY AUTOMATED COUNT: 17.1 % (ref 11.6–14.5)
GLUCOSE BLD STRIP.AUTO-MCNC: 191 MG/DL (ref 70–110)
GLUCOSE BLD STRIP.AUTO-MCNC: 196 MG/DL (ref 70–110)
GLUCOSE BLD STRIP.AUTO-MCNC: 217 MG/DL (ref 70–110)
GLUCOSE BLD STRIP.AUTO-MCNC: 254 MG/DL (ref 70–110)
GLUCOSE SERPL-MCNC: 206 MG/DL (ref 74–99)
HCT VFR BLD AUTO: 23.4 % (ref 36–48)
HGB BLD-MCNC: 7 G/DL (ref 13–16)
MCH RBC QN AUTO: 26.1 PG (ref 24–34)
MCHC RBC AUTO-ENTMCNC: 29.9 G/DL (ref 31–37)
MCV RBC AUTO: 87.3 FL (ref 74–97)
PLATELET # BLD AUTO: 266 K/UL (ref 135–420)
PMV BLD AUTO: 10.2 FL (ref 9.2–11.8)
POTASSIUM SERPL-SCNC: 3.1 MMOL/L (ref 3.5–5.5)
RBC # BLD AUTO: 2.68 M/UL (ref 4.35–5.65)
SODIUM SERPL-SCNC: 146 MMOL/L (ref 136–145)
WBC # BLD AUTO: 3.7 K/UL (ref 4.6–13.2)

## 2021-05-20 PROCEDURE — 77030040393 HC DRSG OPTIFOAM GENT MDII -B

## 2021-05-20 PROCEDURE — 77010033678 HC OXYGEN DAILY

## 2021-05-20 PROCEDURE — 85027 COMPLETE CBC AUTOMATED: CPT

## 2021-05-20 PROCEDURE — 97110 THERAPEUTIC EXERCISES: CPT

## 2021-05-20 PROCEDURE — 97164 PT RE-EVAL EST PLAN CARE: CPT

## 2021-05-20 PROCEDURE — 97530 THERAPEUTIC ACTIVITIES: CPT

## 2021-05-20 PROCEDURE — 82962 GLUCOSE BLOOD TEST: CPT

## 2021-05-20 PROCEDURE — 74011250636 HC RX REV CODE- 250/636: Performed by: INTERNAL MEDICINE

## 2021-05-20 PROCEDURE — 74011000258 HC RX REV CODE- 258: Performed by: INTERNAL MEDICINE

## 2021-05-20 PROCEDURE — 74011636637 HC RX REV CODE- 636/637: Performed by: HOSPITALIST

## 2021-05-20 PROCEDURE — 74011250637 HC RX REV CODE- 250/637: Performed by: INTERNAL MEDICINE

## 2021-05-20 PROCEDURE — 74011250636 HC RX REV CODE- 250/636: Performed by: HOSPITALIST

## 2021-05-20 PROCEDURE — 97168 OT RE-EVAL EST PLAN CARE: CPT

## 2021-05-20 PROCEDURE — 65660000000 HC RM CCU STEPDOWN

## 2021-05-20 PROCEDURE — 74011250637 HC RX REV CODE- 250/637: Performed by: HOSPITALIST

## 2021-05-20 PROCEDURE — 80048 BASIC METABOLIC PNL TOTAL CA: CPT

## 2021-05-20 PROCEDURE — 74011250637 HC RX REV CODE- 250/637: Performed by: UROLOGY

## 2021-05-20 RX ORDER — POTASSIUM CHLORIDE 7.45 MG/ML
10 INJECTION INTRAVENOUS
Status: COMPLETED | OUTPATIENT
Start: 2021-05-20 | End: 2021-05-20

## 2021-05-20 RX ADMIN — INSULIN LISPRO 6 UNITS: 100 INJECTION, SOLUTION INTRAVENOUS; SUBCUTANEOUS at 06:39

## 2021-05-20 RX ADMIN — PANTOPRAZOLE SODIUM 40 MG: 40 TABLET, DELAYED RELEASE ORAL at 06:39

## 2021-05-20 RX ADMIN — INSULIN GLARGINE 15 UNITS: 100 INJECTION, SOLUTION SUBCUTANEOUS at 08:55

## 2021-05-20 RX ADMIN — METOPROLOL SUCCINATE 50 MG: 50 TABLET, EXTENDED RELEASE ORAL at 08:55

## 2021-05-20 RX ADMIN — PIPERACILLIN AND TAZOBACTAM 3.38 G: 3; .375 INJECTION, POWDER, LYOPHILIZED, FOR SOLUTION INTRAVENOUS at 13:14

## 2021-05-20 RX ADMIN — APIXABAN 2.5 MG: 2.5 TABLET, FILM COATED ORAL at 20:28

## 2021-05-20 RX ADMIN — POTASSIUM CHLORIDE 10 MEQ: 10 INJECTION, SOLUTION INTRAVENOUS at 09:53

## 2021-05-20 RX ADMIN — FERROUS SULFATE TAB 325 MG (65 MG ELEMENTAL FE) 325 MG: 325 (65 FE) TAB at 08:55

## 2021-05-20 RX ADMIN — PIPERACILLIN AND TAZOBACTAM 3.38 G: 3; .375 INJECTION, POWDER, LYOPHILIZED, FOR SOLUTION INTRAVENOUS at 02:37

## 2021-05-20 RX ADMIN — PIPERACILLIN AND TAZOBACTAM 3.38 G: 3; .375 INJECTION, POWDER, LYOPHILIZED, FOR SOLUTION INTRAVENOUS at 20:28

## 2021-05-20 RX ADMIN — TAMSULOSIN HYDROCHLORIDE 0.8 MG: 0.4 CAPSULE ORAL at 08:55

## 2021-05-20 RX ADMIN — INSULIN LISPRO 9 UNITS: 100 INJECTION, SOLUTION INTRAVENOUS; SUBCUTANEOUS at 10:55

## 2021-05-20 RX ADMIN — INSULIN LISPRO 3 UNITS: 100 INJECTION, SOLUTION INTRAVENOUS; SUBCUTANEOUS at 16:03

## 2021-05-20 RX ADMIN — FERROUS SULFATE TAB 325 MG (65 MG ELEMENTAL FE) 325 MG: 325 (65 FE) TAB at 16:03

## 2021-05-20 RX ADMIN — POTASSIUM CHLORIDE 10 MEQ: 10 INJECTION, SOLUTION INTRAVENOUS at 12:22

## 2021-05-20 RX ADMIN — ATORVASTATIN CALCIUM 40 MG: 20 TABLET, FILM COATED ORAL at 22:20

## 2021-05-20 RX ADMIN — POTASSIUM CHLORIDE 10 MEQ: 10 INJECTION, SOLUTION INTRAVENOUS at 08:56

## 2021-05-20 RX ADMIN — INSULIN LISPRO 3 UNITS: 100 INJECTION, SOLUTION INTRAVENOUS; SUBCUTANEOUS at 22:21

## 2021-05-20 RX ADMIN — POTASSIUM CHLORIDE 10 MEQ: 10 INJECTION, SOLUTION INTRAVENOUS at 10:56

## 2021-05-20 RX ADMIN — FUROSEMIDE 20 MG: 20 TABLET ORAL at 08:55

## 2021-05-20 RX ADMIN — PIPERACILLIN AND TAZOBACTAM 3.38 G: 3; .375 INJECTION, POWDER, LYOPHILIZED, FOR SOLUTION INTRAVENOUS at 08:56

## 2021-05-20 NOTE — PROGRESS NOTES
1915 - Assumed care at this time. 2027 - Complete assessment documented in flowsheet. Patient A&Ox4, 2L NC. Expiratory wheezing noted. Denies chest pain and SOB. Pain 0/10. Pt educated on deep breathing and pain management. Pt verbalized understanding, no concerns voiced. Call bell within reach, bed in lowest position. Pt encouraged to call for assistance. Luca Grigsby paged regarding pt's current hemoglobin. Christa Edwards 96 with Dr. Kayden Hancock regarding. Hospitalist to place orders for 1 unit PRBCs.

## 2021-05-20 NOTE — ROUTINE PROCESS
Bedside and Verbal shift change report given to Lorna Osborne (oncoming nurse) by Luz Elena García RN (offgoing nurse). Report included the following information SBAR, Kardex, MAR and Recent Results.

## 2021-05-20 NOTE — PROGRESS NOTES
Spoke with Dr. Ksenia Chung, notified MD of H/H and potassium level. Md notified pt with two large loose black stools during the night.

## 2021-05-20 NOTE — PROGRESS NOTES
Dr. Anibal Renee notified pt had two large loose black stools during the night, notified of low H/H and potassium level this am

## 2021-05-20 NOTE — PROGRESS NOTES
Assumed patient care at this time. Received report from April RN.  0806-Assessment complete and documented in flowsheets. 1330-Nurse and prashant did pin care on external fixator. Nurse changed mepilex on L arm due to skin tear. Nurse applied a new square mepilex. 1343-Pt was changed due to stool incontinence. Nurse changed gown, linen, and square mepilex to sacrum due to small pressure injury.

## 2021-05-20 NOTE — PROGRESS NOTES
Occupational Therapy Treatment Attempt     Chart reviewed. Attempted Occupational Therapy Treatment, however, patient unable to be seen due to:  []  Nausea/vomiting  []  Eating  []  Pain  []  Patient too lethargic  []  Off Unit for testing/procedure  []  Dialysis treatment in progress  []  Telemetry Results  [x]  Other: with nursing, getting dressing changes at L foot. Will f/u later as patient's schedule allows.   Richelle Paulson OTR/L

## 2021-05-20 NOTE — DIABETES MGMT
GLYCEMIC CONTROL PLAN OF CARE        Diabetes Management:      Assessment: known h/o T2DM, HbA1C within recommended range for age + comorbids on basal/bolus home regimen  - admitted for left foot heal osteomyelitis    BG in target range (non-ICU\" < 180 ; -180):  [] Yes  [x] No      Steroids: No    TDD previous day = 21 (15 Lantus + 6 Humalog Very Insulin Resistant Corrective Coverage)    Recommend: BG trending up with advancement of diet recommend increase basal & initiate mealtime insulin; BG in 200s today   *Lantus 20 units daily   *Humalog 6 units qac    Most recent blood glucose results:   Lab Results   Component Value Date/Time     (H) 05/20/2021 04:15 AM    GLUCPOC 254 (H) 05/20/2021 10:52 AM    GLUCPOC 217 (H) 05/20/2021 06:08 AM    GLUCPOC 206 (H) 05/19/2021 10:25 PM         Hypo: No    HbA1C: equivalent  to ave BGlucose of 134 mg/dl for 2-3 months prior to admission  Lab Results   Component Value Date/Time    Hemoglobin A1c 6.3 (H) 04/15/2021 05:49 AM       Adequate glycemic control PTA:  [x] Yes  [] No      Home diabetes medications:  Key Antihyperglycemic Medications             insulin lispro (HUMALOG KWIKPEN INSULIN SC) 10-15 Units by SubCUTAneous route three (3) times daily (with meals). insulin glargine (LANTUS) 100 unit/mL injection 15 Units by SubCUTAneous route daily. SITagliptin (JANUVIA) 50 mg tablet Take 1 Tab by mouth daily.           Goals:  Blood glucose will be within target range of 70 - 180 mg/dL by: 6/15    Diet:   Active Orders   Diet    DIET DIABETIC WITH OPTIONS Consistent Carb 1800kcal; Regular       Education:  ___X__ Refer to Diabetes Education Record                       _____ Education not indicated at this time      Kitty Bradshaw 87, RN, CDE  Glycemic Control Team  [phone]  Pager [phone] (M-TH 8:00-4:30P)  *After Hours pager [phone]

## 2021-05-20 NOTE — PROGRESS NOTES
Problem: Self Care Deficits Care Plan (Adult)  Goal: *Acute Goals and Plan of Care (Insert Text)  Description: Occupational Therapy Goals  Initiated 5/6/2021 within 7 day(s). 1.  Patient will perform grooming with supervision/set-up seated at EOB. 2.  Patient will perform upper body bathing with minimal assistance/contact guard assist seated at EOB. 3.  Patient will participate in upper extremity therapeutic exercise/activities with minimal assistance/contact guard assist for 10 minutes. 4.  Patient will utilize energy conservation techniques during functional activities with verbal, visual, and tactile cues. Occupational Therapy Goals  Revised 5/20/2021 within 3 day trial.    1.  Patient will perform grooming with minimum assistance seated at EOB. 2.  Patient will participate in upper extremity therapeutic exercise/activities with minimal assistance/contact guard assist for 10 minutes. 3.  Patient will utilize energy conservation techniques during functional activities with verbal, visual, and tactile cues. Outcome: Not Progressing Towards Goal; POC revised     OCCUPATIONAL THERAPY RE-EVALUATION    Patient: Kerry Hodge (80 y.o. male)  Date: 5/20/2021  Primary Diagnosis: Osteomyelitis ([facility] [address] [M86.9]  Trimalleolar fracture of ankle, closed, left, initial encounter [S82.532A]  Displacement of peripherally inserted central catheter (PICC) ([facility] [address] [T82.524A]  Procedure(s) (LRB):  ESOPHAGOGASTRODUODENOSCOPY (EGD)/BIOPSY (N/A) 1 Day Post-Op   Precautions:   Fall, NWB (L LE)  PLOF:     ASSESSMENT :  Based on the objective data described below, the patient presents with poor activity tolerance and endurance, poor dynamic sitting balance and decreased participation with therapy. Pt defers participation with OT initially but after max verbal encouragement, agreed to participate with bed level UE exercises. Pt appears angry/frustrated and states he wants to drink beer.  Pt HEP provided and he verbalized understanding for the same. Encouraged to perform exercises at least 1 more time before bed time today. OT will follow for 3 day trial as pt has not been participating with therapy as needed. Pt was left long sitting in bed at the end of session. Patient will benefit from skilled intervention to address the above impairments. Patient's rehabilitation potential is considered to be Fair  Factors which may influence rehabilitation potential include:   []             None noted  [x]             Mental ability/status  [x]             Medical condition  []             Home/family situation and support systems  []             Safety awareness  []             Pain tolerance/management  []             Other:      PLAN :  Recommendations and Planned Interventions:   [x]               Self Care Training                  []      Therapeutic Activities  [x]               Functional Mobility Training   []      Cognitive Retraining  [x]               Therapeutic Exercises           []      Endurance Activities  [x]               Balance Training                    []      Neuromuscular Re-Education  []               Visual/Perceptual Training     [x]      Home Safety Training  [x]               Patient Education                   [x]      Family Training/Education  []               Other (comment):    Frequency/Duration: Patient will be followed by occupational therapy 3 day trials to address goals. Discharge Recommendations: LTAC  Further Equipment Recommendations for Discharge: tbd     SUBJECTIVE:   Patient stated  I want to be 32 again and want all of this to be gone.  pointing at hardware on L foot    OBJECTIVE DATA SUMMARY:   Hospital course since last seen and reason for reevaluation:  limited to no participation with therapy  Past Medical History:   Diagnosis Date    A-fib [facility])     Asthma     CAD (coronary artery disease)     COVID-19     Diabetes ([facility] [address]     High cholesterol     Hypertension      Past Surgical History:   Procedure Laterality Date    COLONOSCOPY N/A 6/1/2018    COLONOSCOPY, POLYPECTOMY performed by Jhony Nova MD at [facility] ENDOSCOPY    HX CATARACT REMOVAL       Barriers to Learning/Limitations: None  Compensate with: visual, verbal, tactile, kinesthetic cues/model    Home Situation:   Home Situation  Home Environment: Private residence  # Steps to Enter: 2  Rails to Enter: Yes  Hand Rails : Bilateral  Wheelchair Ramp: Yes  One/Two Story Residence: Two story, live on 1st floor  Interior Rails: Right  Lift Chair Available: No  Living Alone: No  Support Systems: Spouse/Significant Other/Partner  Patient Expects to be Discharged to[de-identified] Other (comment) (PACU)  Current DME Used/Available at Home: Walker, rolling (transport wheelchair)  Tub or Shower Type: Tub/Shower combination  []  Right hand dominant   []  Left hand dominant    Cognitive/Behavioral Status:  Neurologic State: Alert  Orientation Level: Oriented X4  Cognition: Follows commands  Safety/Judgement: Fall prevention    Skin: intact  Edema: none    Vision/Perceptual:    Tracking: Able to track stimulus in all quadrants w/o difficulty     Corrective Lenses: Reading glasses  Coordination: BUE  Coordination: Generally decreased, functional  Fine Motor Skills-Upper: Left Intact; Right Intact    Gross Motor Skills-Upper: Left Intact; Right Intact  Balance:  Sitting:  (not tested)  Strength: BUE  Strength: Generally decreased, functional  Tone & Sensation: BUE  Tone: Normal  Sensation: Intact  Range of Motion: BUE  AROM: Generally decreased, functional  PROM: Generally decreased, functional (L LE non functional)  Functional Mobility and Transfers for ADLs:  Bed Mobility:  Rolling:  (not tested)  Transfers:  Sit to Stand:  (not tested)  ADL Assessment:   Feeding: Independent    Oral Facial Hygiene/Grooming: Setup    Upper Body Dressing: Minimum assistance    Lower Body Dressing: Maximum assistance    Toileting: Maximum assistance; Total assistance  ADL Intervention:  Cognitive Retraining  Safety/Judgement: Fall prevention    Therapeutic Exercise:  HEP provided for BUEs to maintain UE strength and overall endurance for carryover of ADLs and transfers with safety. Shoulder flexion/extension, ab/adduction; elbow flexion/extension; scapular elevation/deperession, protraction/retration: 2 sets 10 reps; 3 times a day  Pain:  Pain level pre-treatment: 6/10   Pain level post-treatment: 6/10  Pain Intervention(s): Medication (see MAR); Rest, Ice, Repositioning   Response to intervention: Nurse notified, See doc flow    Activity Tolerance:   Poor   Please refer to the flowsheet for vital signs taken during this treatment. After treatment:   [] Patient left in no apparent distress sitting up in chair  [x] Patient left in no apparent distress in bed  [x] Call bell left within reach  [x] Nursing notified  [] Caregiver present  [] Bed alarm activated    COMMUNICATION/EDUCATION:   [x] Role of Occupational Therapy in the acute care setting  [x] Home safety education was provided and the patient/caregiver indicated understanding. [] Patient/family have participated as able in goal setting and plan of care. [] Patient/family agree to work toward stated goals and plan of care. [x] Patient understands intent and goals of therapy, but is neutral about his/her participation. [] Patient is unable to participate in goal setting and plan of care.     Thank you for this referral.  Reid Wolff OTR/CLINT  Time Calculation: 10 mins

## 2021-05-20 NOTE — ROUTINE PROCESS
Bedside shift change report given to Sandi Parra RN (oncoming nurse) by Vernell Skiff RN (offgoing nurse). Report included the following information SBAR, Kardex, Intake/Output and MAR.

## 2021-05-20 NOTE — PROGRESS NOTES
Bedside shift change report given to Raine Han  RN (oncoming nurse) by Sandi Martin RN (offgoing nurse). Report included the following information SBAR, Kardex and Recent Results.

## 2021-05-20 NOTE — PROGRESS NOTES
Tatum Hidalgo paged regarding H/H and potassium level  Awaiting return call  0515- Md notified of labs

## 2021-05-20 NOTE — PROGRESS NOTES
Urology Progress Note    Subjective:     Daily Progress Note: 2021 9:33 PM    Rick Gonzalo. failed voiding trial today. Nursing could not place caal catheter because of buried penis and large right inguinal hernia to the scrotum. Bladder scan >500cc    Objective:     Visit Vitals  BP (!) 118/39 (BP 1 Location: Left upper arm, BP Patient Position: At rest)   Pulse 84   Temp 98.2 °F (36.8 °C)   Resp 18   Ht 5' 8\" (1.727 m)   Wt 106.2 kg (234 lb 3.2 oz)   SpO2 100%   BMI 35.61 kg/m²        Temp (24hrs), Av.2 °F (36.8 °C), Min:97.4 °F (36.3 °C), Max:98.8 °F (37.1 °C)      Intake and Output:   0701 -  1900  In: 418.8 [I.V.:100]  Out: 1100 [Urine:1100]  No intake/output data recorded. Physical Exam:   Large right scrotum, buried penis. Palpable left testis. Lab/Data Review:  BMP: No results found for: NA, K, CL, CO2, AGAP, GLU, BUN, CREA, GFRAA, GFRNA  CMP: No results found for: NA, K, CL, CO2, AGAP, GLU, BUN, CREA, GFRAA, GFRNA, CA, MG, PHOS, ALB, TBIL, TP, ALB, GLOB, AGRAT, ALT    Assessment/Plan:     Principal Problem:    Trimalleolar fracture of ankle, closed, left, initial encounter (2021)    Active Problems:    Osteomyelitis ([address] (2021)      Displacement of peripherally inserted central catheter (PICC) ([address] (2021)        Plan:    Beside caal catheter placement with 16fr, with clear urine. Drained over 500cc. Cont caal catheter and consider VT in 2 weeks. Increase flomax to 0.8g daily (done).

## 2021-05-20 NOTE — PROGRESS NOTES
Hospitalist Progress Note    Patient: Daniella Alejandre MRN: [id number]  [facility]: [id number]    YOB: 1945  Age: 70 y.o. Sex: male    DOA: 5/4/2021 LOS:  LOS: 16 days          Chief Complaint:    anemia      Assessment/Plan        70 y. o. male with diabetes, hypertension, coronary artery disease, atrial fibrillation, recent COVID-19 presents to ER after he fell from his stairs. He fractured right ankle that already was being treated for ulcer and sossteomyelitis  He has had repair with fixator and washout for infection  He has necrotic skin prompting vasc eval, no occlusion found    He is anemia but has been eval-ed by GI, we need to resume his AC as he is at high risk for further vasc issue or CVA and we wll monitor his HGb closely     Anemia -  Heme positive stools  EGD done, may have had bleed from prior ulcer    Hypokalemia-4 runs IV Kcl     Trimalleolar fracture -  S/p washout and external fixator     Osteomyelitis left heel with cellulitis, gangrenous ischemic ulcer with deep abscess tracking along the plantar fascia and left ankle -  Continue with antibiotics     PVD -  No occlusion on arterial duplex    S/p LLE arteriogram with PTA of left SFA 05/17/2021.     ELINOR on CKD stage 3-  Renal function improving. Renal US with urinary bladder distension. Caal placed  Failed void trial, continue caal and flomax     Urinary retention-as above  Inguinal hernia -  Right. With scrotal swelling  Chronic    Diarrhea-hold miralax     HTN -  On toprol xl, lasix.   Monitor BP     Diabetes mellitus -  Continue with lantus, start on SSI.      CAD -  No anginal symptoms,  aspirin and plavix on hold due to hematuria.      A-flutter -  Rate controlled on toprol xl  Eliquis, as no active bleeding found on EGD    Follow daily CBC     Disposition :  Patient Active Problem List   Diagnosis Code    Atrial flutter (HCC) I48.92    DM2 (diabetes mellitus, type 2) (HCC) E11.9    CAD (coronary artery disease) I25.10    ELINOR (acute kidney injury) ([facility] N17.9    CKD (chronic kidney disease) stage 3, GFR 30-59 ml/min ([facility]) N18.30    Elevated brain natriuretic peptide (BNP) level R79.89    Diabetic ulcer of left foot ([facility]) E11.621, L97.529    COVID-19 virus infection U07.1    Acute osteomyelitis ([facility]) M86.10    Acute hematogenous osteomyelitis of left foot ([facility]) M86.072    Cellulitis of left foot L03. 116    Diabetic foot ulcer with osteomyelitis ([address] E11.621, E11.69, L97.509, M86.9    Ulcer of left heel, with necrosis of bone ([facility]) L97.424    Osteomyelitis ([facility] [facility] M86.9    Trimalleolar fracture of ankle, closed, left, initial encounter S82.852A    Displacement of peripherally inserted central catheter (PICC) ([facility] T82.524A       Subjective:  I am having loose stools      Review of systems:    Constitutional: denies fevers, chills  Respiratory: denies SOB, cough  Cardiovascular: denies chest pain, palpitations  Gastrointestinal: denies nausea, vomiting      Vital signs/Intake and Output:  Visit Vitals  BP (!) 154/52   Pulse 97   Temp 98.4 °F (36.9 °C)   Resp 18   Ht 5' 8\" (1.727 m)   Wt 106.2 kg (234 lb 3.2 oz)   SpO2 100%   BMI 35.61 kg/m²     Current Shift:  05/20 0701 - 05/20 1900  In: -   Out: 550 [Urine:550]  Last three shifts:  05/18 1901 - 05/20 0700  In: 3524.7 [P.O.:340; I.V.:3184.7]  Out: 2700 [Urine:2700]    Exam:    General: elderly debilitated WM alert, NAD, OX3  CVS:Regular rhythm, no M/R/G, S1/S2 heard, no thrill  Lungs:Clear to auscultation bilaterally, no wheezes, rhonchi, or rales  Abdomen: Soft, Nontender, No distention, Normal Bowel sounds, No hepatomegaly  Extremities:left ankle with necrotic skin changes, no changes, fixator and wound vac in place, edema improved  Neuro:grossly normal , follows commands  Psych:appropriate                Labs: Results:       Chemistry Recent Labs     05/20/21  0415 05/18/21  0250 05/17/21  0820   * 175* 136*   * 143 145   K 3.1* 3.9 3.4*    107 108   CO2 36* 34* 34*   BUN 13 15 14   CREA 1.17 1.05 1.00   CA 7.0* 6.8* 7.1*   AGAP 2* 2* 3   BUCR 11* 14 14      CBC w/Diff Recent Labs     05/20/21  0415 05/19/21  1130 05/18/21  0625 05/17/21  0820   WBC 3.7*  --  4.1* 4.4*   RBC 2.68*  --  2.56* 2.91*   HGB 7.0* 7.5* 6.7* 7.7*   HCT 23.4* 24.5* 22.2* 25.2*     --  238 269   GRANS  --   --  74* 68   LYMPH  --   --  14* 18*   EOS  --   --  2 3      Cardiac Enzymes No results for input(s): CPK, CKND1, LEONEL in the last 72 hours. No lab exists for component: CKRMB, TROIP   Coagulation Recent Labs     05/17/21  0820   APTT 118.5*       Lipid Panel No results found for: CHOL, CHOLPOCT, CHOLX, CHLST, CHOLV, 989027, HDL, HDLP, LDL, LDLC, DLDLP, 823817, VLDLC, VLDL, TGLX, TRIGL, TRIGP, TGLPOCT, CHHD, CHHDX   BNP No results for input(s): BNPP in the last 72 hours. Liver Enzymes No results for input(s): TP, ALB, TBIL, AP in the last 72 hours.     No lab exists for component: SGOT, GPT, DBIL   Thyroid Studies Lab Results   Component Value Date/Time    TSH 1.72 02/11/2018 02:19 AM        Procedures/imaging: see electronic medical records for all procedures/Xrays and details which were not copied into this note but were reviewed prior to creation of Ang Nice MD

## 2021-05-21 LAB
ANION GAP SERPL CALC-SCNC: 0 MMOL/L (ref 3–18)
BASOPHILS # BLD: 0 K/UL (ref 0–0.1)
BASOPHILS NFR BLD: 0 % (ref 0–2)
BUN SERPL-MCNC: 8 MG/DL (ref 7–18)
BUN/CREAT SERPL: 8 (ref 12–20)
CALCIUM SERPL-MCNC: 7.1 MG/DL (ref 8.5–10.1)
CHLORIDE SERPL-SCNC: 108 MMOL/L (ref 100–111)
CO2 SERPL-SCNC: 37 MMOL/L (ref 21–32)
CREAT SERPL-MCNC: 1.02 MG/DL (ref 0.6–1.3)
CRP SERPL-MCNC: 10.5 MG/DL (ref 0–0.3)
DIFFERENTIAL METHOD BLD: ABNORMAL
EOSINOPHIL # BLD: 0.1 K/UL (ref 0–0.4)
EOSINOPHIL NFR BLD: 2 % (ref 0–5)
ERYTHROCYTE [DISTWIDTH] IN BLOOD BY AUTOMATED COUNT: 17.2 % (ref 11.6–14.5)
ERYTHROCYTE [SEDIMENTATION RATE] IN BLOOD: >140 MM/HR (ref 0–20)
FOLATE SERPL-MCNC: 3.5 NG/ML (ref 3.1–17.5)
GLUCOSE BLD STRIP.AUTO-MCNC: 132 MG/DL (ref 70–110)
GLUCOSE BLD STRIP.AUTO-MCNC: 165 MG/DL (ref 70–110)
GLUCOSE BLD STRIP.AUTO-MCNC: 176 MG/DL (ref 70–110)
GLUCOSE BLD STRIP.AUTO-MCNC: 222 MG/DL (ref 70–110)
GLUCOSE SERPL-MCNC: 139 MG/DL (ref 74–99)
HCT VFR BLD AUTO: 22.8 % (ref 36–48)
HCT VFR BLD AUTO: 25.3 % (ref 36–48)
HGB BLD-MCNC: 6.9 G/DL (ref 13–16)
HGB BLD-MCNC: 7.7 G/DL (ref 13–16)
LYMPHOCYTES # BLD: 1 K/UL (ref 0.9–3.6)
LYMPHOCYTES NFR BLD: 24 % (ref 21–52)
MCH RBC QN AUTO: 26.5 PG (ref 24–34)
MCHC RBC AUTO-ENTMCNC: 30.3 G/DL (ref 31–37)
MCV RBC AUTO: 87.7 FL (ref 74–97)
MONOCYTES # BLD: 0.3 K/UL (ref 0.05–1.2)
MONOCYTES NFR BLD: 8 % (ref 3–10)
NEUTS SEG # BLD: 2.6 K/UL (ref 1.8–8)
NEUTS SEG NFR BLD: 65 % (ref 40–73)
PLATELET # BLD AUTO: 286 K/UL (ref 135–420)
PMV BLD AUTO: 10 FL (ref 9.2–11.8)
POTASSIUM SERPL-SCNC: 3.1 MMOL/L (ref 3.5–5.5)
RBC # BLD AUTO: 2.6 M/UL (ref 4.35–5.65)
SODIUM SERPL-SCNC: 145 MMOL/L (ref 136–145)
VIT B12 SERPL-MCNC: 853 PG/ML (ref 211–911)
WBC # BLD AUTO: 4 K/UL (ref 4.6–13.2)

## 2021-05-21 PROCEDURE — 36592 COLLECT BLOOD FROM PICC: CPT

## 2021-05-21 PROCEDURE — 047N3ZZ DILATION OF LEFT POPLITEAL ARTERY, PERCUTANEOUS APPROACH: ICD-10-PCS | Performed by: SURGERY

## 2021-05-21 PROCEDURE — 74011636637 HC RX REV CODE- 636/637: Performed by: HOSPITALIST

## 2021-05-21 PROCEDURE — 86140 C-REACTIVE PROTEIN: CPT

## 2021-05-21 PROCEDURE — 82962 GLUCOSE BLOOD TEST: CPT

## 2021-05-21 PROCEDURE — 65660000000 HC RM CCU STEPDOWN

## 2021-05-21 PROCEDURE — 85018 HEMOGLOBIN: CPT

## 2021-05-21 PROCEDURE — 77030019934 HC DRSG VAC ASST KCON -B

## 2021-05-21 PROCEDURE — 74011250637 HC RX REV CODE- 250/637: Performed by: HOSPITALIST

## 2021-05-21 PROCEDURE — B41G1ZZ FLUOROSCOPY OF LEFT LOWER EXTREMITY ARTERIES USING LOW OSMOLAR CONTRAST: ICD-10-PCS | Performed by: SURGERY

## 2021-05-21 PROCEDURE — 97605 NEG PRS WND THER DME<=50SQCM: CPT

## 2021-05-21 PROCEDURE — 77010033678 HC OXYGEN DAILY

## 2021-05-21 PROCEDURE — 74011250637 HC RX REV CODE- 250/637: Performed by: INTERNAL MEDICINE

## 2021-05-21 PROCEDURE — 74011000258 HC RX REV CODE- 258: Performed by: INTERNAL MEDICINE

## 2021-05-21 PROCEDURE — 82607 VITAMIN B-12: CPT

## 2021-05-21 PROCEDURE — 047L3ZZ DILATION OF LEFT FEMORAL ARTERY, PERCUTANEOUS APPROACH: ICD-10-PCS | Performed by: SURGERY

## 2021-05-21 PROCEDURE — 85652 RBC SED RATE AUTOMATED: CPT

## 2021-05-21 PROCEDURE — B4101ZZ FLUOROSCOPY OF ABDOMINAL AORTA USING LOW OSMOLAR CONTRAST: ICD-10-PCS | Performed by: SURGERY

## 2021-05-21 PROCEDURE — 85025 COMPLETE CBC W/AUTO DIFF WBC: CPT

## 2021-05-21 PROCEDURE — 36415 COLL VENOUS BLD VENIPUNCTURE: CPT

## 2021-05-21 PROCEDURE — 74011250636 HC RX REV CODE- 250/636: Performed by: INTERNAL MEDICINE

## 2021-05-21 PROCEDURE — 80048 BASIC METABOLIC PNL TOTAL CA: CPT

## 2021-05-21 PROCEDURE — 74011250637 HC RX REV CODE- 250/637: Performed by: UROLOGY

## 2021-05-21 RX ORDER — INSULIN GLARGINE 100 [IU]/ML
20 INJECTION, SOLUTION SUBCUTANEOUS DAILY
Status: DISCONTINUED | OUTPATIENT
Start: 2021-05-22 | End: 2021-05-25 | Stop reason: HOSPADM

## 2021-05-21 RX ORDER — POTASSIUM CHLORIDE 20 MEQ/1
40 TABLET, EXTENDED RELEASE ORAL
Status: COMPLETED | OUTPATIENT
Start: 2021-05-21 | End: 2021-05-21

## 2021-05-21 RX ORDER — POTASSIUM CHLORIDE 20 MEQ/1
20 TABLET, EXTENDED RELEASE ORAL DAILY
Status: DISCONTINUED | OUTPATIENT
Start: 2021-05-22 | End: 2021-05-25 | Stop reason: HOSPADM

## 2021-05-21 RX ADMIN — METOPROLOL SUCCINATE 50 MG: 50 TABLET, EXTENDED RELEASE ORAL at 09:28

## 2021-05-21 RX ADMIN — PANTOPRAZOLE SODIUM 40 MG: 40 TABLET, DELAYED RELEASE ORAL at 06:12

## 2021-05-21 RX ADMIN — PIPERACILLIN AND TAZOBACTAM 3.38 G: 3; .375 INJECTION, POWDER, LYOPHILIZED, FOR SOLUTION INTRAVENOUS at 15:24

## 2021-05-21 RX ADMIN — INSULIN GLARGINE 15 UNITS: 100 INJECTION, SOLUTION SUBCUTANEOUS at 09:28

## 2021-05-21 RX ADMIN — PIPERACILLIN AND TAZOBACTAM 3.38 G: 3; .375 INJECTION, POWDER, LYOPHILIZED, FOR SOLUTION INTRAVENOUS at 21:16

## 2021-05-21 RX ADMIN — INSULIN LISPRO 3 UNITS: 100 INJECTION, SOLUTION INTRAVENOUS; SUBCUTANEOUS at 13:03

## 2021-05-21 RX ADMIN — FERROUS SULFATE TAB 325 MG (65 MG ELEMENTAL FE) 325 MG: 325 (65 FE) TAB at 17:55

## 2021-05-21 RX ADMIN — POTASSIUM CHLORIDE 40 MEQ: 1500 TABLET, EXTENDED RELEASE ORAL at 13:03

## 2021-05-21 RX ADMIN — ATORVASTATIN CALCIUM 40 MG: 20 TABLET, FILM COATED ORAL at 21:16

## 2021-05-21 RX ADMIN — INSULIN LISPRO 6 UNITS: 100 INJECTION, SOLUTION INTRAVENOUS; SUBCUTANEOUS at 21:15

## 2021-05-21 RX ADMIN — PIPERACILLIN AND TAZOBACTAM 3.38 G: 3; .375 INJECTION, POWDER, LYOPHILIZED, FOR SOLUTION INTRAVENOUS at 01:51

## 2021-05-21 RX ADMIN — INSULIN LISPRO 3 UNITS: 100 INJECTION, SOLUTION INTRAVENOUS; SUBCUTANEOUS at 17:55

## 2021-05-21 RX ADMIN — FUROSEMIDE 20 MG: 20 TABLET ORAL at 09:28

## 2021-05-21 RX ADMIN — FERROUS SULFATE TAB 325 MG (65 MG ELEMENTAL FE) 325 MG: 325 (65 FE) TAB at 09:28

## 2021-05-21 RX ADMIN — TAMSULOSIN HYDROCHLORIDE 0.8 MG: 0.4 CAPSULE ORAL at 09:28

## 2021-05-21 RX ADMIN — PIPERACILLIN AND TAZOBACTAM 3.38 G: 3; .375 INJECTION, POWDER, LYOPHILIZED, FOR SOLUTION INTRAVENOUS at 09:29

## 2021-05-21 NOTE — ROUTINE PROCESS
Bedside and Verbal shift change report [facility] by Aisha Rayo RN. Report included the following information SBAR, Kardex, Intake/Output and MAR.

## 2021-05-21 NOTE — PROGRESS NOTES
4161: Pt has been refusing to participate in therapy services for 8 straight day. Discharging pt from PT caseload for adamantly refusing participation even with max encouragement and education and why therapy in necessary. Correction: Spoke with PT from yesterday and pt did participate in re-eval, note not yet in chart when I had attempted pt at 09:46. Will continue to attempt PT for now. Pt is very inconsistent in participation.

## 2021-05-21 NOTE — PERIOP NOTES
VASCULAR AND TRANSPLANT       PROGRESS NOTE    Date: 2021    Post-Op Day: 4  S/p Brief Post-Op Note     Surgeon(s): Tyrone     Assistant(s): Ruben     Pre-operative Diagnosis: gangrene left ankle     Post-operative Diagnosis: same as preop diagnosis     Procedure(s) Performed:  Abdominal aortogram, LLE arteriogram with pta left SFA     Anesthesia:  Local with 1% lidocaine and Moderate Sedation     Blood Adminstered:  none     Findings: occluded left SFA     Plan:   plavix and Eliquis still being held secondary to gi bleed. Continue current treatment. Will follow up with Dr. Omar Mendiola 4 weeks as an outpatient. Assessment:   69 y/o male with left ankle fracture, gangrene left ankle/foot. Atherosclerosis of native arteries with gangrene. S/p angioplast left sfa. Left foot perfused. Subjective:   Pt without complaints today. Objective:   Admit weight: Weight: 97.6 kg (215 lb 3.2 oz)  Last recorded weight: Weight: 106.2 kg (234 lb 3.2 oz)    Temp (24hrs), Av.3 °F (36.8 °C), Min:98 °F (36.7 °C), Max:98.5 °F (36.9 °C)          [unfilled]    Intake/Output Summary (Last 24 hours) at 2021 1713  Last data filed at 2021 6217  Gross per 24 hour   Intake 50 ml   Output 1400 ml   Net -1350 ml       Right groin soft, no hematoma. Some tenderness. Left foot with external fixation devise in place. Gangrene stable. Motor and sensory are grossly intact. There are strong biphasic dorsalis pedis and posterior tibial artery signals. Labs:     BMP:   Recent Labs     21  0925 21  0415   BUN 8 13    146*   CO2 37* 36*     CBC:    Recent Labs     21  0925 21  0159 21  0415 21  1130   WBC  --  4.0* 3.7*  --    HCT 25.3* 22.8* 23.4* 24.5*   RDW  --  17.2* 17.1*  --      PT/INR: No results for input(s): INR, INREXT in the last 72 hours.     No lab exists for component: PT    Arterial Blood Gases No results found for: LPMO2, FIO2, PEEP, PH1, PCO2, PO2, HCO3, MODE     MICRO:  Results     Procedure Component Value Units Date/Time    CULTURE, Dalia Santos STAIN [[id number]] Collected: 05/14/21 1600    Order Status: Canceled Specimen: Wound from [facility], ANAEROBIC [[id number]] Collected: 05/14/21 1545    Order Status: Canceled Specimen: Foot, left     CULTURE, TISSUE Orysia Chiquito [[id number]] Collected: 05/14/21 1545    Order Status: Completed Specimen: Foot, left Updated: 05/18/21 0946     Special Requests: NO SPECIAL REQUESTS        GRAM STAIN NO WBC'S SEEN         NO ORGANISMS SEEN        Culture result: NO GROWTH 4 DAYS       CULTURE, ANAEROBIC [[id number]] Collected: 05/14/21 1544    Order Status: Completed Specimen: Foot, left Updated: 05/18/21 0945     Special Requests: NO SPECIAL REQUESTS        Culture result: NO GROWTH 4 DAYS       CULTURE, Dalia Santos STAIN [[id number]] Collected: 05/14/21 1544    Order Status: Completed Specimen: Wound from Foot Updated: 05/18/21 0945     Special Requests: NO SPECIAL REQUESTS        GRAM STAIN NO WBC'S SEEN         NO ORGANISMS SEEN        Culture result: NO GROWTH 4 DAYS       CULTURE, BLOOD [[id number]] Collected: 05/07/21 1743    Order Status: Completed Specimen: Blood Updated: 05/13/21 0617     Special Requests: NO SPECIAL REQUESTS        Culture result: NO GROWTH 6 DAYS                   Lola Foote PA-C   [phone].

## 2021-05-21 NOTE — DIABETES MGMT
GLYCEMIC CONTROL PLAN OF CARE        Diabetes Management:      Assessment: known h/o T2DM, HbA1C within recommended range for age + comorbids on basal/bolus home regimen  - admitted for left foot heal osteomyelitis  Pt with elevated fasting and pre-prandial blood glucose yesterday requiring high amount of corrective lispro however improved fasting glucose this morning (132 mg/dL) and pre lunch blood glucose also in target range. TDD previous day = 36 (15 Lantus + 21 Humalog Very Insulin Resistant Corrective Coverage)    Recommend:   1. Continue 15 units Lantus/day for now. 2. Should pt experience elevated pre-lunch, pre-dinner and HS blood glucose, could consider initiating mealtime insulin. Would start with 5 units lispro qac (weight based). Most recent blood glucose results:   5/21: 132, 176  5/20:  217, 254, 191, 196           Hypo: No  Steroids: No    Current diabetes medications:  Lantus 15 units/d  Corrective lispro, very insulin resistant dosing ACHS    BG in target range (non-ICU\" < 180 ; -180):  [x] Yes, today (5/21)  [] No      HbA1C: equivalent  to ave BGlucose of 134 mg/dl for 2-3 months prior to admission  Lab Results   Component Value Date/Time    Hemoglobin A1c 6.3 (H) 04/15/2021 05:49 AM       Adequate glycemic control PTA:  [x] Yes  [] No      Home diabetes medications:  Key Antihyperglycemic Medications             insulin lispro (HUMALOG KWIKPEN INSULIN SC) 10-15 Units by SubCUTAneous route three (3) times daily (with meals). insulin glargine (LANTUS) 100 unit/mL injection 15 Units by SubCUTAneous route daily. SITagliptin (JANUVIA) 50 mg tablet Take 1 Tab by mouth daily.           Goals:  Blood glucose will be within target range of 70 - 180 mg/dL by: 6/15    Diet:   Active Orders   Diet    DIET DIABETIC WITH OPTIONS Consistent Carb 1800kcal; Regular       Education:  ___X__ Refer to Diabetes Education Record                       _____ Education not indicated at this time      Sherif Talamantes RD, [facility]  Diabetes and Glycemic Control   Office:  [phone]  Pager:  [phone]

## 2021-05-21 NOTE — PROGRESS NOTES
Problem: Falls - Risk of  Goal: *Absence of Falls  Description: Document Penamichael Coe Fall Risk and appropriate interventions in the flowsheet. Outcome: Progressing Towards Goal  Note: Fall Risk Interventions:  Mobility Interventions: Communicate number of staff needed for ambulation/transfer, Patient to call before getting OOB, Utilize walker, cane, or other assistive device    Mentation Interventions: Adequate sleep, hydration, pain control, Update white board    Medication Interventions: Bed/chair exit alarm    Elimination Interventions: Call light in reach    History of Falls Interventions: Investigate reason for fall         Problem: Pressure Injury - Risk of  Goal: *Prevention of pressure injury  Description: Document Anam Scale and appropriate interventions in the flowsheet.   Outcome: Progressing Towards Goal  Note: Pressure Injury Interventions:  Sensory Interventions: Assess changes in LOC    Moisture Interventions: Absorbent underpads    Activity Interventions: Assess need for specialty bed, PT/OT evaluation    Mobility Interventions: Assess need for specialty bed, PT/OT evaluation    Nutrition Interventions: Document food/fluid/supplement intake    Friction and Shear Interventions: Apply protective barrier, creams and emollients, Minimize layers

## 2021-05-21 NOTE — PROGRESS NOTES
Hospitalist Progress Note    Patient: Mable Councilman. MRN: [id number]  CSN: [id number]    YOB: 1945  Age: 70 y.o. Sex: male    DOA: 5/4/2021 LOS:  LOS: 17 days          Chief Complaint:    Ankle fracture      Assessment/Plan     70 y. o. male with diabetes, hypertension, CAD, atrial fibrillation, recent COVID-19 presents to ER after he fell from his stairs. He fractured right ankle that already was being treated for ulcer and osteomyelitis  He has had repair with fixator and washout for infection  He has necrotic skin prompting vasc eval, no occlusion found        Anemia -severe  Heme positive stools  EGD done, may have had bleed from prior ulcer, none active seen  Hgb repeat is stable over 7 this am    ricardo again hold eliquiss althoug he has risks with thromboembolic disease as immobilized and with afutter/fib, as HGb is lingering at 7 range, and slightly lower this am  If stable still tomorrow I think we can resume eliquis     Hypokalemia-repeat BMP done, add PO Kdur     Trimalleolar fracture -  S/p washout and external fixator     Osteomyelitis left heel with cellulitis, gangrenous ischemic ulcer with deep abscess tracking along the plantar fascia and left ankle -  Continue with antibiotics     PVD -  No occlusion on arterial duplex   S/p LLE arteriogram with PTA of left SFA 05/17/2021.     Urinary retention, failed VT, continue caal and flomax     Inguinal hernia and scrotal edema, supportive care      HTN -  On toprol xl, lasix.     Type 2 insulin dep Diabetes mellitus -  Continue with lantus, adjust up dose     CAD  A-flutter     Updated daughter in room  Continue care  If stable, LTAC on Monday    Disposition :  Patient Active Problem List   Diagnosis Code    Atrial flutter ([facility] [address] I48.92    DM2 (diabetes mellitus, type 2) ([address] E11.9    CAD (coronary artery disease) I25.10    ELINOR (acute kidney injury) ([address] N17.9    CKD (chronic kidney disease) stage 3, GFR 30-59 ml/min ([address] N18.30    Elevated brain natriuretic peptide (BNP) level R79.89    Diabetic ulcer of left foot ([facility] E11.621, L97.529    COVID-19 virus infection U07.1    Acute osteomyelitis (HCC) M86.10    Acute hematogenous osteomyelitis of left foot ([facility]) M86.072    Cellulitis of left foot L03. 80    Diabetic foot ulcer with osteomyelitis ([address] E11.621, E11.69, L97.509, M86.9    Ulcer of left heel, with necrosis of bone ([facility]) L97.424    Osteomyelitis ([address] M86.9    Trimalleolar fracture of ankle, closed, left, initial encounter S82.852A    Displacement of peripherally inserted central catheter (PICC) ([facility] [address] T82.524A       Subjective:    Denies any new issue  No pain  Denies SOB  Watching the price is right this am on tv  No new concerns reported    Review of systems:    Constitutional: denies fevers, chills  Respiratory: denies SOB  Cardiovascular: denies chest pain  Gastrointestinal: denies nausea      Vital signs/Intake and Output:  Visit Vitals  BP (!) 130/46 (BP 1 Location: Left upper arm)   Pulse 81   Temp 98 °F (36.7 °C)   Resp 17   Ht 5' 8\" (1.727 m)   Wt 106.2 kg (234 lb 3.2 oz)   SpO2 100%   BMI 35.61 kg/m²     Current Shift:  No intake/output data recorded.   Last three shifts:  05/19 1901 - 05/21 0700  In: 3474.7 [P.O.:390; I.V.:3084.7]  Out: 3550 [Urine:3550]    Exam:    General: debilitated elderly WM, alert, NAD, OX3  CVS:Regular rate and rhythm, no M/R/G, S1/S2 heard, no thrill  Lungs:Clear to auscultation bilaterally, no wheezes, rhonchi, or rales  Abdomen: Soft, Nontender, No distention, Normal Bowel sounds  Extremities: No C/C/E, pulses palpable 2+  Skin:necrotic area left lower leg unchanged  Ext fixator on LLE  Wound vac on heel  Psych:appropriate                Labs: Results:       Chemistry Recent Labs     05/21/21  0925 05/20/21  0415   * 206*    146*   K 3.1* 3.1*    108   CO2 37* 36*   BUN 8 13   CREA 1.02 1.17   CA 7.1* 7.0*   AGAP 0* 2*   BUCR 8* 11*      CBC w/Diff Recent Labs     05/21/21  0925 05/21/21  0159 05/20/21  0415   WBC  --  4.0* 3.7*   RBC  --  2.60* 2.68*   HGB 7.7* 6.9* 7.0*   HCT 25.3* 22.8* 23.4*   PLT  --  286 266   GRANS  --  65  --    LYMPH  --  24  --    EOS  --  2  --       Cardiac Enzymes No results for input(s): CPK, CKND1, LEONEL in the last 72 hours. No lab exists for component: CKRMB, TROIP   Coagulation No results for input(s): PTP, INR, APTT, INREXT, INREXT in the last 72 hours. Lipid Panel No results found for: CHOL, CHOLPOCT, CHOLX, CHLST, CHOLV, 809180, HDL, HDLP, LDL, LDLC, DLDLP, 603824, VLDLC, VLDL, TGLX, TRIGL, TRIGP, TGLPOCT, CHHD, CHHDX   BNP No results for input(s): BNPP in the last 72 hours. Liver Enzymes No results for input(s): TP, ALB, TBIL, AP in the last 72 hours.     No lab exists for component: SGOT, GPT, DBIL   Thyroid Studies Lab Results   Component Value Date/Time    TSH 1.72 02/11/2018 02:19 AM        Procedures/imaging: see electronic medical records for all procedures/Xrays and details which were not copied into this note but were reviewed prior to creation of Lela Bustamante MD

## 2021-05-21 NOTE — PROGRESS NOTES
Problem: Mobility Impaired (Adult and Pediatric)  Goal: *Acute Goals and Plan of Care (Insert Text)  Description: Physical Therapy Goals   Updated 5/20/2021 and to be accomplished within 7 day(s)  1. Patient will move from supine <> sit with mod/max assist change of position and EOB activity. 2.  Patient will demonstrate sitting balance EOB with UE support and CGA for performance for ADL/therapeutic exercise activity. 3.  Patient will perform transfers bed to chair via transfer board with min assist.      Physical Therapy Goals   Initiated 5/6/2021 and to be accomplished within 7 day(s)  1. Patient will move from supine <> sit with S in prep for out of bed activity and change of position. 2.  Patient will demonstrate sitting balance intact EOB without UE support /S for performance for ADL/ therapeutic exercise activity. 3.  Patient will perform transfers bed to chair via transfer board with min assist.      Outcome: Progressing Towards Goal  physical Therapy RE-EVALUATION and TREATMENT    Patient: Oz Napier (80 y.o. male)  Date: 5/20/2021 (late entry)  Diagnosis: Osteomyelitis ([address] [M86.9]  Trimalleolar fracture of ankle, closed, left, initial encounter [S82.852A]  Displacement of peripherally inserted central catheter (PICC) ([address] [T82.524A] Trimalleolar fracture of ankle, closed, left, initial encounter  Procedure(s) (LRB):  ESOPHAGOGASTRODUODENOSCOPY (EGD)/BIOPSY (N/A) 2 Days Post-Op  Precautions: Fall, NWB (L LE)    ASSESSMENT:  Pt s/p surgery as above L ankle 5/14/2021 and subsequent LLE arteriogram with PTA of left SFA 05/17/2021. Pt supine in bed and spouse present in room on PT arrival. Pt with external fixator and wound vac L ankle/foot; large eschar area dorsal foot/ankle. Reports no pain.   Pt with significant limitations in overall functional mobility at initial evaluation and more limited since above procedures, with pt requiring total assist of 2/2+ for bed mobility tasks due to overall poor motor performance BLE with LLE requiring total A for management, and overall debilitated status. Pt may benefit from LTAC upon discharge at this point due to extent of co-morbidities and extended time anticipated for recovery. Patient's progression toward goals since last assessment: as noted above     PLAN:  Goals have been updated based on progression since last assessment. Patient continues to benefit from skilled intervention to address the above impairments. Continue to follow the patient 1-2 times per day/4-7 days per week to address goals. Planned Interventions:  [x]     Bed Mobility Training          [x]     Neuromuscular Re-Education  [x]     Transfer Training                []    Orthotic/Prosthetic Training  []     Gait Training                       []     Modalities  [x]     Therapeutic Exercises       []     Edema Management/Control  [x]     Therapeutic Activities         [x]     Patient and Family Training/Education  []     Other (comment):  Discharge Recommendations: LTAC  Further Equipment Recommendations for Discharge: N/A     SUBJECTIVE:   Patient stated \"They just changed my dressing.     OBJECTIVE DATA SUMMARY:   Critical Behavior:  Neurologic State: Alert, Appropriate for age  Orientation Level: Oriented X4  Cognition: Appropriate decision making, Appropriate for age attention/concentration, Appropriate safety awareness, Follows commands  Safety/Judgement: Fall prevention  Functional Mobility Training:  Bed Mobility:  Rolling:  (not tested)  Supine to Sit: Maximum assistance  Sit to Supine:  Moderate assistance  Scooting: Minimum assistance, Bed Modified (vc's)  Transfers:  Sit to Stand:  (not tested)  Ambulation/Gait Training:  Left Side Weight Bearing: Non-weight bearing, non ambulatory  Pain:  Pain Scale 1: Numeric (0 - 10)  Pain Intensity 1: 0  Pain Location 1: Foot  Activity Tolerance:   Fair   Please refer to the flowsheet for vital signs taken during this treatment.   After treatment:   [] Patient left in no apparent distress sitting up in chair  [x] Patient left in no apparent distress in bed  [x] Call bell left within reach  [x] Nursing notified  [x] Caregiver present-spouse  [] Bed alarm activated      Gianna Pressley, PT   Time Calculation: 37 mins

## 2021-05-21 NOTE — PROGRESS NOTES
[facility]                         (A Division of [address] [place] [address])                                                                                                                       Evelyne Barrett MD  Work Cell #: [phone]( Mon-Fri, 7am-5pm)  If no call or text back within 30 minutes from me, please call office number. (Prefer text when possible)  Office #:     [phone]  [id number]   Office Fax: [phone]     Date of Admission: 5/4/2021   Date of Service:  5/21/2021  Reason for FU : left ankle OM, fever    Current Antimicrobials:    Prior Antimicrobials:    Zosyn 5/17 to date · Zithromax 500mg IV- 4/14 to 4/20  · Vancomycin IV 4/14  until 4/19  · Zosyn 2.25gm IV Q6 4/14 to 4/15   · Meropenem IV 4/15-4/20  Unasyn 3 gm Q6 hour 4/20 to 4/22/21  Ertapenem 1gm IV q24 4/23 to date  -- reduced dose to 500 mg 5/5  --Vancomycin 5/6 to 5/12  --Meropenem 1 gm Q12 5/6 to date       Assessment: Rec/ Plan on ID issues I am following:   · Leucopenia- WBC up 4K  · Fever: Improved post I and D on 5/14  Source: progressed left ankle infection/abscess/ skin necrosis  Post left foot excisional debridement of skin, SQ   tissue down to bone, ex-fix placement- 5/14/21  OR cultures neg-5/14  Necrotic skin around distal left leg  ESR >140, CRP 15.3 -- 5/14  ESR>140, CRP 5/21-- pending    --Post angioplasty/stent  LLE-5/17  bcx neg- 5/5 and 5/7  ucx neg 5/5  · Left Infected DM Ulcer and acute OM and necrotic tissue, punched out heel ulcer with Mixed infection- MSSA +E.coli + anaerobes - 4/14/21  · Post I and D of left heel bone/abscess 4/17, OR cultures remain sterile  · Post I and D with graft to heel- 4/21    · Acute renal failure 2/2 urinary retention, caal in place.  Drug related IN in DDX but less likely       (urine eos negative): Resolved       Failed void trial, caal back  · Trimalleolar left ankle fracture 2/2 fall: 5/7    ·   · Hematuria- off anticoagulant    Other Medical Issues followed and managed by primary and other services  · Anemia- requiring PRBC  · DM- Poorly controlled  · Low Vitamin D  · CAD with CHF/ NSTMI. Atrial flutter  · PAD  · CKD  · Hyperlipidemia     Past ID issues: N/A  -History of COVID 19 infection 4/2021   Cont Zosyn- plan to complete his OM treatment with Zosyn  ( will extend to 4 weeks post op 5/14 until June 14)- plan to re-image by mid June ( FU CT). Will consider extended oral abx after that. Additional plans for surgical team- Ortho- Dr Sheri VERGARA wound/skin necrosis- monitor closely. So far dry skin necrosis with one area of blister on distal medial side-- heel ulcer on wound vac progressing ok. High risk patient for decline    Tight DM control    FU folate/B12. Monitor weekly cbc, cmp, crp, esr    [facility], he can be FU with ID team there. DW with nurse, Podiatry            I will be back for routine round on Monday morning. For new consults, urgent questions or concerns, please call covering MD via ( Dr Leyda Sanchez for this weekend). Thank you. Condition: Hemodynamically stable. Fever better    Subjective: \" no complaint\"    Denies LE pain/ fever/chills. Denies cough/SOB- remains on O2 supplement is down to 2L from 4 L  Failed void trial and caal placed again- clear urine  Tolerating current ABX       Review of System:  See above, other wise negative     Summary:    Mable Councilman. is 71 y/o WM with PMH as listed below- with initial consult on 4/15/21. Last seen by me on 4/22/21 prior to discharge.   He was set up at [facility] infusion center for daily ertapenem.     Yesterday, I was notified by RN that he fell, hurt his left foot, and his PICC was not working and they infused him via PIV and sent him to ED.     On arrival to ED, was noted to be in acute renal failure, and with new left ankle fracture.     Wound care note with the picture reviewed.     Pt denies F/C/R. Denies SOB. Feels weak, appetite and po intake has decreased past many days per wife. Patient Active Problem List   Diagnosis Code    Atrial flutter ([facility]) I48.92    DM2 (diabetes mellitus, type 2) ([facility]) E11.9    CAD (coronary artery disease) I25.10    ELINOR (acute kidney injury) ([facility] N17.9    CKD (chronic kidney disease) stage 3, GFR 30-59 ml/min ([facility]) N18.30    Elevated brain natriuretic peptide (BNP) level R79.89    Diabetic ulcer of left foot ([facility]) E11.621, L97.529    COVID-19 virus infection U07.1    Acute osteomyelitis ([facility] M86.10    Acute hematogenous osteomyelitis of left foot ([facility]) M86.072    Cellulitis of left foot L03. 116    Diabetic foot ulcer with osteomyelitis ([facility] E11.621, E11.69, L97.509, M86.9    Ulcer of left heel, with necrosis of bone ([facility]) L97.424    Osteomyelitis ([facility] M86.9    Trimalleolar fracture of ankle, closed, left, initial encounter S82.852A    Displacement of peripherally inserted central catheter (PICC) ([facility] T82.524A       Current Facility-Administered Medications   Medication Dose Route Frequency    potassium chloride (K-DUR, KLOR-CON) SR tablet 40 mEq  40 mEq Oral NOW    [START ON 5/22/2021] potassium chloride (K-DUR, KLOR-CON) SR tablet 20 mEq  20 mEq Oral DAILY    glucagon (GLUCAGEN) injection    PRN    tamsulosin (FLOMAX) capsule 0.8 mg  0.8 mg Oral DAILY    diphenhydrAMINE (BENADRYL) capsule 25 mg  25 mg Oral Q6H PRN    0.9% sodium chloride infusion 250 mL  250 mL IntraVENous PRN    piperacillin-tazobactam (ZOSYN) 3.375 g in 0.9% sodium chloride (MBP/ADV) 100 mL MBP  3.375 g IntraVENous Q6H    fentaNYL citrate (PF) injection  mcg   mcg IntraVENous Rad Multiple    midazolam (VERSED) injection 0.5-2 mg  0.5-2 mg IntraVENous Rad Multiple    heparin (porcine) 1,000 unit/mL injection 1,000-10,000 Units  1,000-10,000 Units IntraVENous Rad Multiple    [Held by provider] clopidogreL (PLAVIX) tablet 75 mg  75 mg Oral DAILY    furosemide (LASIX) tablet 20 mg  20 mg Oral DAILY    pantoprazole (PROTONIX) tablet 40 mg  40 mg Oral ACB    0.9% sodium chloride infusion 250 mL  250 mL IntraVENous PRN    albuterol-ipratropium (DUO-NEB) 2.5 MG-0.5 MG/3 ML  3 mL Nebulization Q4H PRN    ferrous sulfate tablet 325 mg  1 Tablet Oral BID WITH MEALS    metoprolol succinate (TOPROL-XL) XL tablet 50 mg  50 mg Oral DAILY    heparin (porcine) 100 unit/mL injection 500 Units  500 Units InterCATHeter Q8H PRN    [Held by provider] apixaban (ELIQUIS) tablet 2.5 mg  2.5 mg Oral BID    acetaminophen (TYLENOL) tablet 1,000 mg  1,000 mg Oral Q8H PRN    atorvastatin (LIPITOR) tablet 40 mg  40 mg Oral QHS    insulin glargine (LANTUS) injection 15 Units  15 Units SubCUTAneous DAILY    glucose chewable tablet 16 g  16 g Oral PRN    glucagon (GLUCAGEN) injection 1 mg  1 mg IntraMUSCular PRN    dextrose (D50W) injection syrg 25 g  50 mL IntraVENous PRN    insulin lispro (HUMALOG) injection   SubCUTAneous AC&HS       Objective:    Visit Vitals  BP (!) 143/56   Pulse 86   Temp 98.4 °F (36.9 °C)   Resp 17   Ht 5' 8\" (1.727 m)   Wt 106.2 kg (234 lb 3.2 oz)   SpO2 100%   BMI 35.61 kg/m²       Temp (24hrs), Av.3 °F (36.8 °C), Min:98.1 °F (36.7 °C), Max:98.5 °F (36.9 °C)    Right PICC - site looks ok    GEN: WD chronically sick looking , not in resp distress. HEENT: Unicteric. EOMI intact  CHEST: Non laboured breathing. CVS:RRR, no mur/gallop  ABD: Obese/soft. Non tender. EXT: Left LE is has X-fix in place  --lower leg necrotic skin-appears same as yesterday  -- left heel pictures reviewed on Media- wound appears clean/granulating  Skin: Dry and intact. No rash  CNS: A, OX3. Moves UE and right LE ok. CN grossly ok.        Microbiology:    Blood culture:  sets: NGSF    - 2 sets: NG  : 1 set: NGSF     Urine culture:  - urine culture: NG     Wounddrainage and OR tissueculture:  - gram stain: GNR and GPR        --wound culture+ s.aurues and E.coli + anaerobe   -- OR culture: NG  - OR aerobic/anaerobic culture: NGSF    Lab results:    Chemistry  Recent Labs     21  0925 21  0415   * 206*    146*   K 3.1* 3.1*    108   CO2 37* 36*   BUN 8 13   CREA 1.02 1.17   CA 7.1* 7.0*   AGAP 0* 2*   BUCR 8* 11*       CBC w/ Diff  Recent Labs     21  0925 21  0159 21  0415   WBC  --  4.0* 3.7*   RBC  --  2.60* 2.68*   HGB 7.7* 6.9* 7.0*   HCT 25.3* 22.8* 23.4*   PLT  --  286 266   GRANS  --  65  --    LYMPH  --  24  --    EOS  --  2  --      Imagin/5- CXR     1. Right PICC line tip retracted from optimal/appropriate position, projecting  over the right subclavian vein. 2. Mild bibasilar atelectasis.      21  US renal  1. Considerable urinary bladder fluid distention (approximately 2.1 L) with  likely reactive pelviectasis.      > Results called to [address] [place] [address] unit as patient 2 likely benefit from  urinary bladder catheterization.     : right humerus  No acute osseous abnormality or malalignment involving the right humerus. : MRI of left foot:    No acute osseous abnormality or malalignment involving the right humerus. : CTA AP w/contrast  No evidence of contrast extravasation to suggest active gastrointestinal  hemorrhage.     Bilateral hydroureteronephrosis secondary to markedly thick-walled urinary  bladder containing hyperdense fluid, consistent with hemorrhage. Indwelling  Mccray catheter with incomplete bladder decompression. Findings concerning for  cystitis and hemorrhage possibly secondary to catheter placement.   Cannot  exclude underlying malignancy, cystoscopy is recommend upon resolution of acute  pathology.     Large right inguinal hernia containing nondilated distal ileum and right colon.     Trace retroperitoneal ascites.      :  CXR: Borderline heart size with mild bronchovascular prominence likely vascular  congestion but improved since the last study of 5/11/2021.        Total duration of time spent with patient interview and exam and discussion of plan of care, review of chart in care everywhere, discussion with medical staff- nursing/attending and additional specialities as indicated: 36 minutes

## 2021-05-21 NOTE — WOUND CARE
[facility]. MEDICAL RECORD NUMBER:  [id number]  AGE: 70 y.o. GENDER: male  : 1945  TODAY'S DATE:  2021    GENERAL     [x] Follow-up   [] New Consult    Lizette Gould is a 70 y.o. male referred by:   [x] Physician  [] Nursing  [] Other:         PAST MEDICAL HISTORY    Past Medical History:   Diagnosis Date    A-fib ([facility]     Asthma     CAD (coronary artery disease)     COVID-19     Diabetes ([facility]     High cholesterol     Hypertension         PAST SURGICAL HISTORY    Past Surgical History:   Procedure Laterality Date    COLONOSCOPY N/A 2018    COLONOSCOPY, POLYPECTOMY performed by Bettie Cotter MD at [address] [place] [address]    History reviewed. No pertinent family history. SOCIAL HISTORY    Social History     Tobacco Use    Smoking status: Former Smoker    Smokeless tobacco: Never Used   Substance Use Topics    Alcohol use: Yes     Alcohol/week: 1.0 standard drinks     Types: 1 Cans of beer per week    Drug use: No       ALLERGIES    No Known Allergies    MEDICATIONS    No current facility-administered medications on file prior to encounter. Current Outpatient Medications on File Prior to Encounter   Medication Sig Dispense Refill    ertapenem 1 gram 1 g IVPB 1 g by IntraVENous route every twenty-four (24) hours for 40 days. 40 Dose 0    ertapenem 1 gram 1 g IVPB 1 g by IntraVENous route every twenty-four (24) hours for 39 days. 1 Dose 0    spironolactone (ALDACTONE) 25 mg tablet Take 12.5 mg by mouth daily. Take 0.5 tablet (12.5 mg) by oral      insulin lispro (HUMALOG KWIKPEN INSULIN SC) 10-15 Units by SubCUTAneous route three (3) times daily (with meals).  Omeprazole delayed release (PRILOSEC D/R) 20 mg tablet Take 1 Tab by mouth daily. (Patient taking differently: Take 40 mg by mouth daily.) 30 Tab 5    apixaban (ELIQUIS) 5 mg tablet Take 1 Tab by mouth two (2) times a day.  60 Tab 0    aspirin delayed-release 81 mg tablet Take 1 Tab by mouth daily. 30 Tab 0    atorvastatin (LIPITOR) 40 mg tablet Take 1 Tab by mouth nightly. 30 Tab 0    clopidogrel (PLAVIX) 75 mg tab Take 1 Tab by mouth daily. 30 Tab 0    furosemide (LASIX) 40 mg tablet Take 1 Tab by mouth daily. 30 Tab 0    insulin glargine (LANTUS) 100 unit/mL injection 15 Units by SubCUTAneous route daily. (Patient taking differently: 40 Units by SubCUTAneous route nightly. Indications: type 2 diabetes mellitus) 1 Vial 0    losartan (COZAAR) 25 mg tablet Take 1 Tab by mouth daily. 30 Tab 0    metoprolol succinate (TOPROL-XL) 50 mg XL tablet Take 1 Tab by mouth daily. 30 Tab 0    SITagliptin (JANUVIA) 50 mg tablet Take 1 Tab by mouth daily. 30 Tab 0    lancets (ADVOCATE LANCET) 30 gauge misc Use while checking BGL each morning.  1 Package 0    glucose blood VI test strips (IGLUCOSE TEST STRIP) strip Use to check BGL every morning 50 Strip 0       Wt Readings from Last 3 Encounters:   05/14/21 106.2 kg (234 lb 3.2 oz)   05/07/21 94.3 kg (207 lb 14.3 oz)   04/22/21 89.2 kg (196 lb 11.2 oz)       [unfilled]  Visit Vitals  BP (!) 130/46 (BP 1 Location: Left upper arm)   Pulse 81   Temp 98 °F (36.7 °C)   Resp 17   Ht 5' 8\" (1.727 m)   Wt 106.2 kg (234 lb 3.2 oz)   SpO2 100%   BMI 35.61 kg/m²       ASSESSMENT     Skin impairment Identification:  Type: diabetic and surgery    Contributing Factors: diabetes, poor glucose control and surgery    Wound Heel Left (Active)   Wound Image   05/06/21 1206   Wound Etiology Diabetic 05/18/21 2015   Dressing Status Intact 05/20/21 2027   Cleansed Cleansed with saline 05/06/21 1206   Dressing/Treatment Negative Pressure Wound Therapy 05/20/21 2027   Offloading for Diabetic Foot Ulcers Other (comment) 05/06/21 1206   Dressing Change Due 05/06/21 05/06/21 1206   Wound Length (cm) 2.5 cm 05/06/21 1206   Wound Width (cm) 3 cm 05/06/21 1206   Wound Surface Area (cm^2) 7.5 cm^2 05/06/21 1206   Change in Wound Size % 9.09 05/06/21 1206   Wound Assessment Graft;Slough 05/06/21 1206   Drainage Amount None 05/20/21 2027   Drainage Description Brown 05/15/21 1958   Wound Odor None 05/20/21 2027   Neha-Wound/Incision Assessment Maceration 05/06/21 1206   Edges Unattached edges 05/06/21 1206   Wound Thickness Description Full thickness 05/06/21 1206   Number of days: 21       Incision 04/21/21 Foot Left (Active)   Dressing Status Dry; Intact 05/20/21 0806   Dressing/Treatment Ace wrap 05/10/21 1146   Drainage Amount None 05/20/21 0806   Wound Odor None 05/20/21 0806   Neha-Wound/Incision Assessment Intact 05/10/21 1146   Number of days: 30       Incision 05/14/21 Foot Left (Active)   Wound Image    05/21/21 1310   Dressing Status Intact 05/21/21 1310   Cleansed Wound cleanser 05/21/21 1310   Dressing/Treatment Negative Pressure Wound Therapy 05/21/21 1310   Wound Length (cm) 6.8 cm 05/21/21 1310   Wound Width (cm) 7 cm 05/21/21 1310   Wound Depth (cm) 1.3 cm 05/21/21 1310   Wound Volume (cm^3) 61.88 cm^3 05/21/21 1310   Wound Healing % 0 05/21/21 1310   Drainage Amount Scant 05/21/21 1310   Drainage Description Serosanguinous 05/21/21 1310   Wound Odor None 05/21/21 1310   Neha-Wound/Incision Assessment Fragile 05/21/21 1310   Number of days: 7       [REMOVED] Incision 04/17/21 Foot Left (Removed)   Number of days: 4          PLAN     Skin Care & Pressure Relief Recommendations  Minimize layers of linen  Pads under patient to optimize support surface  Turn/reposition approximately every 2 hours  Pillow wedges  Manage incontinence   Please use in bed position system  Promote continence; Skin Protective lotion/cream to buttocks and sacrum daily and as needed with incontinence care  Offload heels pillows      Recommendations:  Continue wound vac dressing    Teaching completed with:   [x] Patient           [x] Family member       [] Caregiver          [] Nursing  [] Other    Patient/Caregiver Teaching:  Level of patient/caregiver understanding able to:   [] Indicates understanding       [x] Needs reinforcement  [] Unsuccessful      [x] Verbal Understanding  [] Demonstrated understanding       [] No evidence of learning  [] Refused teaching         [] N/A       Electronically signed by Fifi Shaw RN on 5/21/2021 at 1:15 PM

## 2021-05-21 NOTE — PROGRESS NOTES
DC Plan: LTAC     CM met with pt to discuss PT/OT recommendations for LTAC. Pt agreeable. FOC offered and pt chose [address] LTAC referral. CM will continue to follow and update pt and MD on d/c plan. 5/19/21    DC Plan:  [facility]     CM continuing to follow pt for d/c needs.           5/17/21     DC Plan: [facility]     CM met with patient to f/u on d/c plan.  Patient agreeable to [facility].  Pt states, \"they might chop my leg off. \"  CM informed patient that we would continue to follow him throughout his hospitalization as his d/c plans could change. [employer]

## 2021-05-21 NOTE — PROGRESS NOTES
0720 : assumed care of patient from [address] [place] [address] Crossing : patient had an uneventful shift, no new clinical changes. 1915 : Bedside shift change report given to Terrance Hashimoto RN (oncoming nurse) by Yamileth Conn RN (offgoing nurse). Report included the following information SBAR, Kardex, Intake/Output and MAR.

## 2021-05-22 PROBLEM — I73.9 PVD (PERIPHERAL VASCULAR DISEASE) (HCC): Status: ACTIVE | Noted: 2021-05-22

## 2021-05-22 PROBLEM — I48.20 CHRONIC ATRIAL FIBRILLATION (HCC): Status: ACTIVE | Noted: 2021-05-22

## 2021-05-22 LAB
ANION GAP SERPL CALC-SCNC: 3 MMOL/L (ref 3–18)
BASOPHILS # BLD: 0 K/UL (ref 0–0.1)
BASOPHILS NFR BLD: 0 % (ref 0–2)
BUN SERPL-MCNC: 8 MG/DL (ref 7–18)
BUN/CREAT SERPL: 8 (ref 12–20)
CALCIUM SERPL-MCNC: 6.8 MG/DL (ref 8.5–10.1)
CHLORIDE SERPL-SCNC: 107 MMOL/L (ref 100–111)
CO2 SERPL-SCNC: 36 MMOL/L (ref 21–32)
CREAT SERPL-MCNC: 1.03 MG/DL (ref 0.6–1.3)
DIFFERENTIAL METHOD BLD: ABNORMAL
EOSINOPHIL # BLD: 0.1 K/UL (ref 0–0.4)
EOSINOPHIL NFR BLD: 3 % (ref 0–5)
ERYTHROCYTE [DISTWIDTH] IN BLOOD BY AUTOMATED COUNT: 17.2 % (ref 11.6–14.5)
GLUCOSE BLD STRIP.AUTO-MCNC: 130 MG/DL (ref 70–110)
GLUCOSE BLD STRIP.AUTO-MCNC: 182 MG/DL (ref 70–110)
GLUCOSE BLD STRIP.AUTO-MCNC: 199 MG/DL (ref 70–110)
GLUCOSE BLD STRIP.AUTO-MCNC: 248 MG/DL (ref 70–110)
GLUCOSE SERPL-MCNC: 93 MG/DL (ref 74–99)
HCT VFR BLD AUTO: 22.5 % (ref 36–48)
HGB BLD-MCNC: 6.9 G/DL (ref 13–16)
LYMPHOCYTES # BLD: 0.9 K/UL (ref 0.9–3.6)
LYMPHOCYTES NFR BLD: 24 % (ref 21–52)
MCH RBC QN AUTO: 26.7 PG (ref 24–34)
MCHC RBC AUTO-ENTMCNC: 30.7 G/DL (ref 31–37)
MCV RBC AUTO: 87.2 FL (ref 74–97)
MONOCYTES # BLD: 0.4 K/UL (ref 0.05–1.2)
MONOCYTES NFR BLD: 9 % (ref 3–10)
NEUTS SEG # BLD: 2.4 K/UL (ref 1.8–8)
NEUTS SEG NFR BLD: 63 % (ref 40–73)
PLATELET # BLD AUTO: 265 K/UL (ref 135–420)
PMV BLD AUTO: 10 FL (ref 9.2–11.8)
POTASSIUM SERPL-SCNC: 3.2 MMOL/L (ref 3.5–5.5)
RBC # BLD AUTO: 2.58 M/UL (ref 4.35–5.65)
SODIUM SERPL-SCNC: 146 MMOL/L (ref 136–145)
WBC # BLD AUTO: 3.8 K/UL (ref 4.6–13.2)

## 2021-05-22 PROCEDURE — 80048 BASIC METABOLIC PNL TOTAL CA: CPT

## 2021-05-22 PROCEDURE — 74011000258 HC RX REV CODE- 258: Performed by: INTERNAL MEDICINE

## 2021-05-22 PROCEDURE — 74011250637 HC RX REV CODE- 250/637: Performed by: HOSPITALIST

## 2021-05-22 PROCEDURE — 74011250637 HC RX REV CODE- 250/637: Performed by: INTERNAL MEDICINE

## 2021-05-22 PROCEDURE — 74011250636 HC RX REV CODE- 250/636: Performed by: INTERNAL MEDICINE

## 2021-05-22 PROCEDURE — 82962 GLUCOSE BLOOD TEST: CPT

## 2021-05-22 PROCEDURE — 74011636637 HC RX REV CODE- 636/637: Performed by: HOSPITALIST

## 2021-05-22 PROCEDURE — 74011250637 HC RX REV CODE- 250/637: Performed by: UROLOGY

## 2021-05-22 PROCEDURE — 85025 COMPLETE CBC W/AUTO DIFF WBC: CPT

## 2021-05-22 PROCEDURE — 65660000000 HC RM CCU STEPDOWN

## 2021-05-22 RX ADMIN — ATORVASTATIN CALCIUM 40 MG: 20 TABLET, FILM COATED ORAL at 22:18

## 2021-05-22 RX ADMIN — METOPROLOL SUCCINATE 50 MG: 50 TABLET, EXTENDED RELEASE ORAL at 09:55

## 2021-05-22 RX ADMIN — PIPERACILLIN AND TAZOBACTAM 3.38 G: 3; .375 INJECTION, POWDER, LYOPHILIZED, FOR SOLUTION INTRAVENOUS at 01:32

## 2021-05-22 RX ADMIN — INSULIN LISPRO 6 UNITS: 100 INJECTION, SOLUTION INTRAVENOUS; SUBCUTANEOUS at 16:53

## 2021-05-22 RX ADMIN — POTASSIUM CHLORIDE 20 MEQ: 1500 TABLET, EXTENDED RELEASE ORAL at 09:55

## 2021-05-22 RX ADMIN — PIPERACILLIN AND TAZOBACTAM 3.38 G: 3; .375 INJECTION, POWDER, LYOPHILIZED, FOR SOLUTION INTRAVENOUS at 20:01

## 2021-05-22 RX ADMIN — FUROSEMIDE 20 MG: 20 TABLET ORAL at 09:55

## 2021-05-22 RX ADMIN — PIPERACILLIN AND TAZOBACTAM 3.38 G: 3; .375 INJECTION, POWDER, LYOPHILIZED, FOR SOLUTION INTRAVENOUS at 09:53

## 2021-05-22 RX ADMIN — PIPERACILLIN AND TAZOBACTAM 3.38 G: 3; .375 INJECTION, POWDER, LYOPHILIZED, FOR SOLUTION INTRAVENOUS at 14:41

## 2021-05-22 RX ADMIN — TAMSULOSIN HYDROCHLORIDE 0.8 MG: 0.4 CAPSULE ORAL at 09:55

## 2021-05-22 RX ADMIN — FERROUS SULFATE TAB 325 MG (65 MG ELEMENTAL FE) 325 MG: 325 (65 FE) TAB at 09:55

## 2021-05-22 RX ADMIN — INSULIN GLARGINE 20 UNITS: 100 INJECTION, SOLUTION SUBCUTANEOUS at 09:56

## 2021-05-22 RX ADMIN — PANTOPRAZOLE SODIUM 40 MG: 40 TABLET, DELAYED RELEASE ORAL at 06:30

## 2021-05-22 RX ADMIN — INSULIN LISPRO 3 UNITS: 100 INJECTION, SOLUTION INTRAVENOUS; SUBCUTANEOUS at 12:16

## 2021-05-22 RX ADMIN — FERROUS SULFATE TAB 325 MG (65 MG ELEMENTAL FE) 325 MG: 325 (65 FE) TAB at 16:53

## 2021-05-22 RX ADMIN — INSULIN LISPRO 3 UNITS: 100 INJECTION, SOLUTION INTRAVENOUS; SUBCUTANEOUS at 22:18

## 2021-05-22 NOTE — PROGRESS NOTES
Hospitalist Progress Note    Patient: Bianka Sotomayor MRN: [id number]  CSN: [id number]    YOB: 1945  Age: 70 y.o. Sex: male    DOA: 5/4/2021 LOS:  LOS: 18 days            Assessment/Plan     Principal Problem:    Trimalleolar fracture of ankle, closed, left, initial encounter (5/4/2021)    Active Problems:    Atrial flutter ([address] (2/12/2018)      CAD (coronary artery disease) (5/29/2018)      CKD (chronic kidney disease) stage 3, GFR 30-59 ml/min ([address] (5/29/2018)      Diabetic ulcer of left foot ([address] (4/14/2021)      Diabetic foot ulcer with osteomyelitis ([address] (4/17/2021)      Ulcer of left heel, with necrosis of bone ([address] (4/21/2021)      Osteomyelitis ([address] (5/4/2021)      Displacement of peripherally inserted central catheter (PICC) ([address] (5/4/2021)      Chronic atrial fibrillation ([address] (5/22/2021)      PVD (peripheral vascular disease) ([address] (5/22/2021)      CC: 70 y. o. male with diabetes, hypertension, CAD, atrial fibrillation, recent COVID-19 presents to ER after he fell from his stairs.   He fractured right ankle that already was being treated for ulcer and osteomyelitis  He has had repair with fixator and washout for infection  He has necrotic skin prompting vasc eval, no occlusion found        Anemia/severe: Heme positive stools  EGD done, may have had bleed from prior ulcer, none active seen  Hgb repeat is was 6.9 this am   will again hold eliquiss althoug he has risks with thromboembolic disease as immobilized and with afutter/fib, as HGb is lingering at 7 range, and slightly lower this am    Hypokalemia:repeat BMP done, add PO Kdur     Trimalleolar fracture :S/p washout and external fixator     Osteomyelitis left heel with cellulitis, gangrenous ischemic ulcer with deep abscess tracking along the plantar fascia and left ankle : Continue with antibiotics     PVD: No occlusion on arterial duplex   S/p LLE arteriogram with PTA of left SFA 05/17/2021.     Urinary retention, failed VT, continue caal and flomax     Inguinal hernia and scrotal edema, supportive care      HTN : On toprol xl, lasix.     Type 2 insulin dep Diabetes mellitus -  Continue with lantus, adjust up dose     CAD: No CP    A-flutter: Rate controlled. Eliquis held 2nd to anemia     Continue care    If stable, LTAC on Monday              Subjective:     Pt was seen and examined with the nurse in the morning round. No acute event overnight  No CP/SOB. Not participate PT    Review of systems  General: No fevers or chills. Cardiovascular: No chest pain or pressure. No palpitations. Pulmonary: No cough, SOB  Gastrointestinal: No nausea, vomiting. Objective:      Visit Vitals  BP (!) 151/54 (BP 1 Location: Left upper arm, BP Patient Position: At rest)   Pulse 84   Temp 98.5 °F (36.9 °C)   Resp 18   Ht 5' 8\" (1.727 m)   Wt 106.2 kg (234 lb 3.2 oz)   SpO2 99%   BMI 35.61 kg/m²       Physical Exam:    Gen: NAD, frail elderly M  Heent:  MMM, NC, AT. Cor: s1s2 RRR. No MRG. PMI mid 5th intercostal space. Resp:  CTA b/l. No w/r/r. Nml effort and diaphragmatic excursion. Abd:  NT ND.  BS positive. No rebound or guarding. No masses. Ext: necrotic area left lower leg unchanged  Ext fixator on LLE  Wound vac on heel      Intake and Output:  Current Shift:  No intake/output data recorded.   Last three shifts:  05/21 0701 - 05/22 1900  In: -   Out: 700 [Urine:700]    Labs: Results:       Chemistry Recent Labs     05/22/21  0320 05/21/21  0925 05/20/21  0415   GLU 93 139* 206*   * 145 146*   K 3.2* 3.1* 3.1*    108 108   CO2 36* 37* 36*   BUN 8 8 13   CREA 1.03 1.02 1.17   CA 6.8* 7.1* 7.0*   AGAP 3 0* 2*   BUCR 8* 8* 11*      CBC w/Diff Recent Labs     05/22/21  0320 05/21/21  0925 05/21/21  0159 05/20/21  0415   WBC 3.8*  --  4.0* 3.7*   RBC 2.58*  --  2.60* 2.68*   HGB 6.9* 7.7* 6.9* 7.0*   HCT 22.5* 25.3* 22.8* 23.4*     --  286 266   GRANS 63  --  65  --    LYMPH 24  --  24  --    EOS 3  --  2  -- Cardiac Enzymes No results for input(s): CPK, CKND1, LEONEL in the last 72 hours. No lab exists for component: CKRMB, TROIP   Coagulation No results for input(s): PTP, INR, APTT, INREXT, INREXT in the last 72 hours. Lipid Panel No results found for: CHOL, CHOLPOCT, CHOLX, CHLST, CHOLV, 656785, HDL, HDLP, LDL, LDLC, DLDLP, 576276, VLDLC, VLDL, TGLX, TRIGL, TRIGP, TGLPOCT, CHHD, CHHDX   BNP No results for input(s): BNPP in the last 72 hours. Liver Enzymes No results for input(s): TP, ALB, TBIL, AP in the last 72 hours.     No lab exists for component: SGOT, GPT, DBIL   Thyroid Studies Lab Results   Component Value Date/Time    TSH 1.72 02/11/2018 02:19 AM        Procedures/imaging: see electronic medical records for all procedures/Xrays and details which were not copied into this note but were reviewed prior to creation of Plan      Medications Reviewed  Joselin Lea MD

## 2021-05-22 NOTE — PROGRESS NOTES
Problem: Falls - Risk of  Goal: *Absence of Falls  Description: Document Rom Richardson Fall Risk and appropriate interventions in the flowsheet. Outcome: Progressing Towards Goal  Note: Fall Risk Interventions:  Mobility Interventions: Bed/chair exit alarm, Communicate number of staff needed for ambulation/transfer, Patient to call before getting OOB, PT Consult for assist device competence, Utilize gait belt for transfers/ambulation    Mentation Interventions: Bed/chair exit alarm    Medication Interventions: Assess postural VS orthostatic hypotension, Bed/chair exit alarm, Patient to call before getting OOB, Teach patient to arise slowly    Elimination Interventions: Call light in reach, Bed/chair exit alarm, Patient to call for help with toileting needs    History of Falls Interventions: Bed/chair exit alarm         Problem: Patient Education: Go to Patient Education Activity  Goal: Patient/Family Education  Outcome: Progressing Towards Goal     Problem: Pressure Injury - Risk of  Goal: *Prevention of pressure injury  Description: Document Anam Scale and appropriate interventions in the flowsheet.   Outcome: Progressing Towards Goal  Note: Pressure Injury Interventions:  Sensory Interventions: Assess changes in LOC, Assess need for specialty bed, Minimize linen layers, Monitor skin under medical devices, Pressure redistribution bed/mattress (bed type)    Moisture Interventions: Minimize layers, Maintain skin hydration (lotion/cream), Internal/External urinary devices, Contain wound drainage, Assess need for specialty bed, Apply protective barrier, creams and emollients, Absorbent underpads    Activity Interventions: Assess need for specialty bed, Pressure redistribution bed/mattress(bed type)    Mobility Interventions: Assess need for specialty bed, HOB 30 degrees or less, Pressure redistribution bed/mattress (bed type)    Nutrition Interventions: Document food/fluid/supplement intake, Offer support with meals,snacks and hydration    Friction and Shear Interventions: HOB 30 degrees or less, Apply protective barrier, creams and emollients, Minimize layers                Problem: Patient Education: Go to Patient Education Activity  Goal: Patient/Family Education  Outcome: Progressing Towards Goal     Problem: Pain  Goal: *Control of Pain  Outcome: Progressing Towards Goal     Problem: Patient Education: Go to Patient Education Activity  Goal: Patient/Family Education  Outcome: Progressing Towards Goal     Problem: Patient Education: Go to Patient Education Activity  Goal: Patient/Family Education  Outcome: Progressing Towards Goal     Problem: Patient Education: Go to Patient Education Activity  Goal: Patient/Family Education  Outcome: Progressing Towards Goal     Problem: General Wound Care  Goal: *Non-infected wound: Improvement of existing wound, absence of infection, and maintenance of skin integrity  Outcome: Progressing Towards Goal     Problem: General Wound Care  Goal: *Infected Wound: Prevention of further infection and promotion of healing  Description: Infection control procedures (eg: clean dressings, clean gloves, hand washing, precautions to isolate wound from contamination, sterile instruments used for wound debridement) should be implemented.   Outcome: Progressing Towards Goal     Problem: General Wound Care  Goal: Interventions  Outcome: Progressing Towards Goal

## 2021-05-22 NOTE — PROGRESS NOTES
Attempt for OT treatment session. Pt adamantly refusing participation with therapy at this time despite of providing max verbal encouragement and benefits of participation with therapy. Pt refused, states he is going to rehab on Monday and does not wish to be bothered again for therapy while in the hospital. Current OT orders will be discontinued as pt's participation with therapy has been inconsistent.  Thank you for this referral.

## 2021-05-22 NOTE — PROGRESS NOTES
Problem: Falls - Risk of  Goal: *Absence of Falls  Description: Document Hanna Dean Fall Risk and appropriate interventions in the flowsheet. Outcome: Progressing Towards Goal  Note: Fall Risk Interventions:  Mobility Interventions: Bed/chair exit alarm    Mentation Interventions: Bed/chair exit alarm    Medication Interventions: Bed/chair exit alarm    Elimination Interventions: Bed/chair exit alarm, Toileting schedule/hourly rounds    History of Falls Interventions: Bed/chair exit alarm, Investigate reason for fall         Problem: Patient Education: Go to Patient Education Activity  Goal: Patient/Family Education  Outcome: Progressing Towards Goal     Problem: Pressure Injury - Risk of  Goal: *Prevention of pressure injury  Description: Document Anam Scale and appropriate interventions in the flowsheet.   Outcome: Progressing Towards Goal  Note: Pressure Injury Interventions:  Sensory Interventions: Minimize linen layers, Assess changes in LOC    Moisture Interventions: Absorbent underpads, Limit adult briefs, Internal/External urinary devices    Activity Interventions: PT/OT evaluation    Mobility Interventions: Assess need for specialty bed, PT/OT evaluation    Nutrition Interventions: Document food/fluid/supplement intake    Friction and Shear Interventions: Apply protective barrier, creams and emollients, Minimize layers

## 2021-05-22 NOTE — PROGRESS NOTES
Bedside and Verbal shift change report given to Ajit Garcia RN (oncoming nurse) by Myra Baum (offgoing nurse). Report included the following information SBAR, Kardex, ED Summary, OR Summary, Intake/Output, Recent Results, Med Rec Status and Cardiac Rhythm NSR.     1927 Shift assessment complete. Patient is laying in bed watch TV resting quietly with eyes wide open and chest rising and falling evenly. No signs of discomfort or pain. 0630 Patient did not need insulin. Uneventful shift. Patient is resting quietly with eyes closed and chest rising and falling evenly. Bedside and Verbal shift change report given to Myra Baum RN (oncoming nurse) by Ajit Garcia RN (offgoing nurse). Report included the following information SBAR, Kardex, ED Summary, Procedure Summary, Intake/Output, MAR, Med Rec Status and Cardiac Rhythm NSR.

## 2021-05-22 NOTE — PROGRESS NOTES
Problem: Falls - Risk of  Goal: *Absence of Falls  Description: Document George Oscar Fall Risk and appropriate interventions in the flowsheet. Outcome: Progressing Towards Goal  Note: Fall Risk Interventions:  Mobility Interventions: Bed/chair exit alarm    Mentation Interventions: Bed/chair exit alarm    Medication Interventions: Bed/chair exit alarm    Elimination Interventions: Bed/chair exit alarm, Toileting schedule/hourly rounds    History of Falls Interventions: Bed/chair exit alarm, Investigate reason for fall         Problem: Pressure Injury - Risk of  Goal: *Prevention of pressure injury  Description: Document Anam Scale and appropriate interventions in the flowsheet.   Outcome: Progressing Towards Goal  Note: Pressure Injury Interventions:  Sensory Interventions: Minimize linen layers, Assess changes in LOC    Moisture Interventions: Absorbent underpads, Limit adult briefs, Internal/External urinary devices    Activity Interventions: PT/OT evaluation    Mobility Interventions: Assess need for specialty bed, PT/OT evaluation    Nutrition Interventions: Document food/fluid/supplement intake    Friction and Shear Interventions: Apply protective barrier, creams and emollients, Minimize layers

## 2021-05-22 NOTE — PROGRESS NOTES
5809: Pt sleeping sound, did not arouse, will follow up again for PT.    1252: Pt refusing to participate in PT.

## 2021-05-23 LAB
ANION GAP SERPL CALC-SCNC: 3 MMOL/L (ref 3–18)
BASOPHILS # BLD: 0 K/UL (ref 0–0.1)
BASOPHILS NFR BLD: 0 % (ref 0–2)
BUN SERPL-MCNC: 7 MG/DL (ref 7–18)
BUN/CREAT SERPL: 7 (ref 12–20)
CALCIUM SERPL-MCNC: 6.9 MG/DL (ref 8.5–10.1)
CHLORIDE SERPL-SCNC: 105 MMOL/L (ref 100–111)
CO2 SERPL-SCNC: 36 MMOL/L (ref 21–32)
CREAT SERPL-MCNC: 1.01 MG/DL (ref 0.6–1.3)
DIFFERENTIAL METHOD BLD: ABNORMAL
EOSINOPHIL # BLD: 0.1 K/UL (ref 0–0.4)
EOSINOPHIL NFR BLD: 2 % (ref 0–5)
ERYTHROCYTE [DISTWIDTH] IN BLOOD BY AUTOMATED COUNT: 17.1 % (ref 11.6–14.5)
GLUCOSE BLD STRIP.AUTO-MCNC: 164 MG/DL (ref 70–110)
GLUCOSE BLD STRIP.AUTO-MCNC: 165 MG/DL (ref 70–110)
GLUCOSE BLD STRIP.AUTO-MCNC: 166 MG/DL (ref 70–110)
GLUCOSE BLD STRIP.AUTO-MCNC: 172 MG/DL (ref 70–110)
GLUCOSE BLD STRIP.AUTO-MCNC: 394 MG/DL (ref 70–110)
GLUCOSE SERPL-MCNC: 129 MG/DL (ref 74–99)
HCT VFR BLD AUTO: 23 % (ref 36–48)
HGB BLD-MCNC: 7.1 G/DL (ref 13–16)
LYMPHOCYTES # BLD: 0.9 K/UL (ref 0.9–3.6)
LYMPHOCYTES NFR BLD: 22 % (ref 21–52)
MCH RBC QN AUTO: 26.9 PG (ref 24–34)
MCHC RBC AUTO-ENTMCNC: 30.9 G/DL (ref 31–37)
MCV RBC AUTO: 87.1 FL (ref 74–97)
MONOCYTES # BLD: 0.3 K/UL (ref 0.05–1.2)
MONOCYTES NFR BLD: 8 % (ref 3–10)
NEUTS SEG # BLD: 2.7 K/UL (ref 1.8–8)
NEUTS SEG NFR BLD: 67 % (ref 40–73)
PLATELET # BLD AUTO: 285 K/UL (ref 135–420)
PMV BLD AUTO: 10.1 FL (ref 9.2–11.8)
POTASSIUM SERPL-SCNC: 3.1 MMOL/L (ref 3.5–5.5)
RBC # BLD AUTO: 2.64 M/UL (ref 4.35–5.65)
SODIUM SERPL-SCNC: 144 MMOL/L (ref 136–145)
WBC # BLD AUTO: 4.1 K/UL (ref 4.6–13.2)

## 2021-05-23 PROCEDURE — 74011250637 HC RX REV CODE- 250/637: Performed by: HOSPITALIST

## 2021-05-23 PROCEDURE — 82962 GLUCOSE BLOOD TEST: CPT

## 2021-05-23 PROCEDURE — 74011636637 HC RX REV CODE- 636/637: Performed by: HOSPITALIST

## 2021-05-23 PROCEDURE — 74011000258 HC RX REV CODE- 258: Performed by: INTERNAL MEDICINE

## 2021-05-23 PROCEDURE — 97110 THERAPEUTIC EXERCISES: CPT

## 2021-05-23 PROCEDURE — 74011250636 HC RX REV CODE- 250/636: Performed by: INTERNAL MEDICINE

## 2021-05-23 PROCEDURE — 74011250637 HC RX REV CODE- 250/637: Performed by: INTERNAL MEDICINE

## 2021-05-23 PROCEDURE — 65660000000 HC RM CCU STEPDOWN

## 2021-05-23 PROCEDURE — 36592 COLLECT BLOOD FROM PICC: CPT

## 2021-05-23 PROCEDURE — 74011250637 HC RX REV CODE- 250/637: Performed by: UROLOGY

## 2021-05-23 PROCEDURE — 85025 COMPLETE CBC W/AUTO DIFF WBC: CPT

## 2021-05-23 PROCEDURE — 77010033678 HC OXYGEN DAILY

## 2021-05-23 PROCEDURE — 80048 BASIC METABOLIC PNL TOTAL CA: CPT

## 2021-05-23 RX ADMIN — INSULIN LISPRO 3 UNITS: 100 INJECTION, SOLUTION INTRAVENOUS; SUBCUTANEOUS at 12:30

## 2021-05-23 RX ADMIN — POTASSIUM CHLORIDE 20 MEQ: 1500 TABLET, EXTENDED RELEASE ORAL at 09:20

## 2021-05-23 RX ADMIN — FERROUS SULFATE TAB 325 MG (65 MG ELEMENTAL FE) 325 MG: 325 (65 FE) TAB at 09:20

## 2021-05-23 RX ADMIN — INSULIN LISPRO 3 UNITS: 100 INJECTION, SOLUTION INTRAVENOUS; SUBCUTANEOUS at 22:05

## 2021-05-23 RX ADMIN — PIPERACILLIN AND TAZOBACTAM 3.38 G: 3; .375 INJECTION, POWDER, LYOPHILIZED, FOR SOLUTION INTRAVENOUS at 02:36

## 2021-05-23 RX ADMIN — FERROUS SULFATE TAB 325 MG (65 MG ELEMENTAL FE) 325 MG: 325 (65 FE) TAB at 17:13

## 2021-05-23 RX ADMIN — INSULIN GLARGINE 20 UNITS: 100 INJECTION, SOLUTION SUBCUTANEOUS at 09:19

## 2021-05-23 RX ADMIN — PANTOPRAZOLE SODIUM 40 MG: 40 TABLET, DELAYED RELEASE ORAL at 07:09

## 2021-05-23 RX ADMIN — FUROSEMIDE 20 MG: 20 TABLET ORAL at 09:20

## 2021-05-23 RX ADMIN — METOPROLOL SUCCINATE 50 MG: 50 TABLET, EXTENDED RELEASE ORAL at 09:19

## 2021-05-23 RX ADMIN — TAMSULOSIN HYDROCHLORIDE 0.8 MG: 0.4 CAPSULE ORAL at 09:19

## 2021-05-23 RX ADMIN — ATORVASTATIN CALCIUM 40 MG: 20 TABLET, FILM COATED ORAL at 22:00

## 2021-05-23 RX ADMIN — PIPERACILLIN AND TAZOBACTAM 3.38 G: 3; .375 INJECTION, POWDER, LYOPHILIZED, FOR SOLUTION INTRAVENOUS at 14:32

## 2021-05-23 RX ADMIN — PIPERACILLIN AND TAZOBACTAM 3.38 G: 3; .375 INJECTION, POWDER, LYOPHILIZED, FOR SOLUTION INTRAVENOUS at 09:20

## 2021-05-23 RX ADMIN — PIPERACILLIN AND TAZOBACTAM 3.38 G: 3; .375 INJECTION, POWDER, LYOPHILIZED, FOR SOLUTION INTRAVENOUS at 19:40

## 2021-05-23 RX ADMIN — INSULIN LISPRO 3 UNITS: 100 INJECTION, SOLUTION INTRAVENOUS; SUBCUTANEOUS at 17:12

## 2021-05-23 RX ADMIN — INSULIN LISPRO 3 UNITS: 100 INJECTION, SOLUTION INTRAVENOUS; SUBCUTANEOUS at 07:08

## 2021-05-23 NOTE — PROGRESS NOTES
Hospitalist Progress Note    Patient: Ronnell Chung. MRN: [id number]  [facility]: [id number]    YOB: 1945  Age: 70 y.o. Sex: male    DOA: 5/4/2021 LOS:  LOS: 19 days            Assessment/Plan     Principal Problem:    Trimalleolar fracture of ankle, closed, left, initial encounter (5/4/2021)    Active Problems:    Atrial flutter ([address] (2/12/2018)      CAD (coronary artery disease) (5/29/2018)      CKD (chronic kidney disease) stage 3, GFR 30-59 ml/min ([address] (5/29/2018)      Diabetic ulcer of left foot ([address] (4/14/2021)      Diabetic foot ulcer with osteomyelitis ([address] (4/17/2021)      Ulcer of left heel, with necrosis of bone ([address] (4/21/2021)      Osteomyelitis ([address] (5/4/2021)      Displacement of peripherally inserted central catheter (PICC) ([address] (5/4/2021)      Chronic atrial fibrillation ([address] (5/22/2021)      PVD (peripheral vascular disease) ([address] (5/22/2021)    CC: 70 y. o. male with diabetes, hypertension, CAD, atrial fibrillation, recent COVID-19 presents to ER after he fell from his stairs.   He fractured right ankle that already was being treated for ulcer and osteomyelitis  He has had repair with fixator and washout for infection  He has necrotic skin prompting vasc eval, no occlusion found        Anemia/severe: Heme positive stools  EGD done, may have had bleed from prior ulcer, none active seen  Hgb repeat was 7.1 this am   will again hold eliquiss althoug he has risks with thromboembolic disease as immobilized and with afutter/fib, as HGb is lingering at 7 range, and slightly lower this am     Hypokalemia:repeat BMP done, add PO Kdur     Trimalleolar fracture :S/p washout and external fixator     Osteomyelitis left heel with cellulitis, gangrenous ischemic ulcer with deep abscess tracking along the plantar fascia and left ankle : Continue with antibiotics     PVD: No occlusion on arterial duplex   S/p LLE arteriogram with PTA of left SFA 05/17/2021.     Urinary retention, failed VT, continue caal and flomax     Inguinal hernia and scrotal edema, supportive care      HTN : On toprol xl, lasix.     Type 2 insulin dep Diabetes mellitus -  Continue with lantus, adjust up dose     CAD: No CP     A-flutter: Rate controlled. Eliquis held 2nd to anemia     Continue care     If stable, LTAC on Monday            Subjective:     Pt was seen and examined with the nurse in the morning round. \" I am leaving for LTAC tomorrow\"    Review of systems  General: No fevers or chills. Cardiovascular: No chest pain or pressure. No palpitations. Pulmonary: No cough, SOB  Gastrointestinal: No nausea, vomiting. Objective:      Visit Vitals  BP (!) 122/48 (BP 1 Location: Left upper arm, BP Patient Position: At rest)   Pulse 85   Temp 98.9 °F (37.2 °C)   Resp 18   Ht 5' 8\" (1.727 m)   Wt 106.2 kg (234 lb 3.2 oz)   SpO2 97%   BMI 35.61 kg/m²       Physical Exam:    Gen: NAD, frail, elderly. Heent:  MMM, NC, AT. Cor: s1s2 RRR. No MRG. PMI mid 5th intercostal space. Resp:  CTA b/l. No w/r/r. Nml effort and diaphragmatic excursion. Abd:  NT ND.  BS positive. No rebound or guarding. No masses. Ext:  Necrotic area left lower leg unchanged  Ext fixator on LLE  Wound vac on heel. Intake and Output:  Current Shift:  No intake/output data recorded.   Last three shifts:  05/21 1901 - 05/23 0700  In: 500 [I.V.:500]  Out: 2100 [Urine:2100]    Labs: Results:       Chemistry Recent Labs     05/23/21  0540 05/22/21  0320 05/21/21  0925   * 93 139*    146* 145   K 3.1* 3.2* 3.1*    107 108   CO2 36* 36* 37*   BUN 7 8 8   CREA 1.01 1.03 1.02   CA 6.9* 6.8* 7.1*   AGAP 3 3 0*   BUCR 7* 8* 8*      CBC w/Diff Recent Labs     05/23/21  0540 05/22/21  0320 05/21/21  0925 05/21/21  0159   WBC 4.1* 3.8*  --  4.0*   RBC 2.64* 2.58*  --  2.60*   HGB 7.1* 6.9* 7.7* 6.9*   HCT 23.0* 22.5* 25.3* 22.8*    265  --  286   GRANS 67 63  --  65   LYMPH 22 24  --  24   EOS 2 3  --  2      Cardiac Enzymes No results for input(s): CPK, CKND1, LEONEL in the last 72 hours. No lab exists for component: CKRMB, TROIP   Coagulation No results for input(s): PTP, INR, APTT, INREXT in the last 72 hours. Lipid Panel No results found for: CHOL, CHOLPOCT, CHOLX, CHLST, CHOLV, 287464, HDL, HDLP, LDL, LDLC, DLDLP, 990915, VLDLC, VLDL, TGLX, TRIGL, TRIGP, TGLPOCT, CHHD, CHHDX   BNP No results for input(s): BNPP in the last 72 hours. Liver Enzymes No results for input(s): TP, ALB, TBIL, AP in the last 72 hours.     No lab exists for component: SGOT, GPT, DBIL   Thyroid Studies Lab Results   Component Value Date/Time    TSH 1.72 02/11/2018 02:19 AM        Procedures/imaging: see electronic medical records for all procedures/Xrays and details which were not copied into this note but were reviewed prior to creation of Plan      Medications Reviewed  Betty King MD

## 2021-05-23 NOTE — PROGRESS NOTES
1930 - Bedside and Verbal shift change report given to Thania Tabares RN (oncoming nurse) by Anne Valdes RN (offgoing nurse). Report included the following information SBAR, Kardex, Intake/Output, MAR and Recent Results. Shift summary -- Denied pain. Intermittent expiratory wheezing, O2 sats maintained above 95% on 2LNC. Passed 2 BMs overnight. PICC line dressing changed. No change in assessment to LLE. Oncoming RN to be made aware of scheduled pin care per MD orders. 0500 - Dr. Aiken Sender made aware of patient's urine being cloudy with sediment with caal catheter. No orders received for additional lab work at this time. 0715 - Bedside and Verbal shift change report given to HODA Hernandez RN (oncoming nurse) by Sujatha Solis RN (offgoing nurse). Report included the following information SBAR, Kardex, Intake/Output, MAR and Recent Results.

## 2021-05-23 NOTE — PROGRESS NOTES
3841 Received bedside shift report from off going nurse Heriberto [facility] [address] [place] [address]. Report included the following information SBAR, Kardex, Intake/Output, MAR and Recent Results.

## 2021-05-23 NOTE — PROGRESS NOTES
Problem: Falls - Risk of  Goal: *Absence of Falls  Description: Document Anand Choi Fall Risk and appropriate interventions in the flowsheet. Outcome: Progressing Towards Goal  Note: Fall Risk Interventions:  Mobility Interventions: Communicate number of staff needed for ambulation/transfer, Patient to call before getting OOB    Mentation Interventions: Bed/chair exit alarm    Medication Interventions: Patient to call before getting OOB    Elimination Interventions: Call light in reach, Patient to call for help with toileting needs    History of Falls Interventions: Consult care management for discharge planning         Problem: Patient Education: Go to Patient Education Activity  Goal: Patient/Family Education  Outcome: Progressing Towards Goal     Problem: Pressure Injury - Risk of  Goal: *Prevention of pressure injury  Description: Document Anam Scale and appropriate interventions in the flowsheet.   Outcome: Progressing Towards Goal  Note: Pressure Injury Interventions:  Sensory Interventions: Assess changes in LOC, Check visual cues for pain, Float heels, Keep linens dry and wrinkle-free, Maintain/enhance activity level, Monitor skin under medical devices, Pressure redistribution bed/mattress (bed type)    Moisture Interventions: Absorbent underpads    Activity Interventions: Pressure redistribution bed/mattress(bed type)    Mobility Interventions: Float heels, HOB 30 degrees or less, Pressure redistribution bed/mattress (bed type)    Nutrition Interventions: Document food/fluid/supplement intake, Offer support with meals,snacks and hydration    Friction and Shear Interventions: Apply protective barrier, creams and emollients, Feet elevated on foot rest, HOB 30 degrees or less, Lift sheet, Lift team/patient mobility team

## 2021-05-23 NOTE — PROGRESS NOTES
PT treatment attempted. Pt refuses at this time, stating he will do it tomorrow. Attempted to educate pt on need for PT participation in order to progress with recovery toward increased independence in functional mobility. Pt continued to refuse then became quiet. Pt with frequent refusals except when spouse present 3 days ago. Will re-attempt at later time today.     Neisha Mckeon, PT

## 2021-05-23 NOTE — PROGRESS NOTES
Problem: Mobility Impaired (Adult and Pediatric)  Goal: *Acute Goals and Plan of Care (Insert Text)  Description: Physical Therapy Goals   Updated 5/20/2021 and to be accomplished within 7 day(s)  1. Patient will move from supine <> sit with mod/max assist change of position and EOB activity. 2.  Patient will demonstrate sitting balance EOB with UE support and CGA for performance for ADL/therapeutic exercise activity. 3.  Patient will perform transfers bed to chair via transfer board with min assist.      Physical Therapy Goals   Initiated 5/6/2021 and to be accomplished within 7 day(s)  1. Patient will move from supine <> sit with S in prep for out of bed activity and change of position. 2.  Patient will demonstrate sitting balance intact EOB without UE support /S for performance for ADL/ therapeutic exercise activity. 3.  Patient will perform transfers bed to chair via transfer board with min assist.      Outcome: Progressing Towards Goal  physical Therapy TREATMENT    Patient: Terri Diggs (80 y.o. male)  Date: 5/23/2021  Diagnosis: Osteomyelitis ([facility] [M86.9]  Trimalleolar fracture of ankle, closed, left, initial encounter [S82.852A]  Displacement of peripherally inserted central catheter (PICC) ([facility] [T82.524A] Trimalleolar fracture of ankle, closed, left, initial encounter  Procedure(s) (LRB):  ESOPHAGOGASTRODUODENOSCOPY (EGD)/BIOPSY (N/A) 4 Days Post-Op  Precautions: Fall, NWB (L LE)   Chart, physical therapy assessment, plan of care and goals were reviewed. ASSESSMENT:  Pt willing to participate with PT session. [place]/sheet roll placed to lateral side of L LL for neutral positioning. Pt with fair tolerance for therapeutic ex as noted below. R LE hip flexion limited by large inguinal hernia R. More engaged overall today. Appears to be looking forward to transition for rehab.   Progression toward goals:  []      Improving appropriately and progressing toward goals  [x] Improving slowly and progressing toward goals  []      Not making progress toward goals and plan of care will be adjusted     PLAN:  Patient continues to benefit from skilled intervention to address the above impairments. Continue treatment per established plan of care. Discharge Recommendations:  LTAC  Further Equipment Recommendations for Discharge:  N/A     SUBJECTIVE:   Patient stated I just woke up; I'm going to rehab tomorrow thank goodness.     OBJECTIVE DATA SUMMARY:   Critical Behavior:  Neurologic State: Alert  Orientation Level: Oriented X4  Cognition: Appropriate decision making, Appropriate for age attention/concentration, Appropriate safety awareness  Safety/Judgement: Fall prevention  Functional Mobility Training:  Bed Mobility:  Therapeutic Exercises:     EXERCISE   Sets   Reps   Active Active Assist   Passive Self ROM   Comments   Ankle Pumps 1 20  [x] [] [] [] R   Quad Sets/Glut Sets 1 15 [x] [] [] [] L   Hamstring Sets   [] [] [] []    Short Arc Quads 2 10 [x] [] [] [] R   Heel Slides 1 10 [x] [] [] [] R   Straight Leg Raises 1 15 [x] [] [] [] R limited by R inguinal hernia   Pain:  Pain Scale 1: Numeric (0 - 10)  Pain Intensity 1: 2post session; 4/10 pre session  Pain Location 1: Leg  Pain Orientation 1: Right  Pain Description 1: Aching  Activity Tolerance:   Fair   Please refer to the flowsheet for vital signs taken during this treatment.   After treatment:   [] Patient left in no apparent distress sitting up in chair  [x] Patient left in no apparent distress in bed  [x] Call bell left within reach  [] Nursing notified  [] Caregiver present  [] Bed alarm activated      Jay Titus PT   Time Calculation: 19 mins

## 2021-05-24 LAB
GLUCOSE BLD STRIP.AUTO-MCNC: 145 MG/DL (ref 70–110)
GLUCOSE BLD STRIP.AUTO-MCNC: 200 MG/DL (ref 70–110)
GLUCOSE BLD STRIP.AUTO-MCNC: 218 MG/DL (ref 70–110)
GLUCOSE BLD STRIP.AUTO-MCNC: 76 MG/DL (ref 70–110)
GLUCOSE BLD STRIP.AUTO-MCNC: 86 MG/DL (ref 70–110)

## 2021-05-24 PROCEDURE — 82962 GLUCOSE BLOOD TEST: CPT

## 2021-05-24 PROCEDURE — 74011250637 HC RX REV CODE- 250/637: Performed by: UROLOGY

## 2021-05-24 PROCEDURE — 74011000258 HC RX REV CODE- 258: Performed by: INTERNAL MEDICINE

## 2021-05-24 PROCEDURE — 97530 THERAPEUTIC ACTIVITIES: CPT

## 2021-05-24 PROCEDURE — 74011250637 HC RX REV CODE- 250/637: Performed by: INTERNAL MEDICINE

## 2021-05-24 PROCEDURE — 74011636637 HC RX REV CODE- 636/637: Performed by: HOSPITALIST

## 2021-05-24 PROCEDURE — 65660000000 HC RM CCU STEPDOWN

## 2021-05-24 PROCEDURE — 74011250636 HC RX REV CODE- 250/636: Performed by: INTERNAL MEDICINE

## 2021-05-24 PROCEDURE — 74011250637 HC RX REV CODE- 250/637: Performed by: HOSPITALIST

## 2021-05-24 PROCEDURE — 77010033678 HC OXYGEN DAILY

## 2021-05-24 RX ORDER — FUROSEMIDE 20 MG/1
20 TABLET ORAL DAILY
Qty: 30 TABLET | Refills: 0 | Status: SHIPPED | OUTPATIENT
Start: 2021-05-24 | End: 2021-08-30

## 2021-05-24 RX ORDER — LANOLIN ALCOHOL/MO/W.PET/CERES
325 CREAM (GRAM) TOPICAL
Qty: 30 TABLET | Refills: 0 | Status: ON HOLD | OUTPATIENT
Start: 2021-05-24 | End: 2021-01-01

## 2021-05-24 RX ORDER — POTASSIUM CHLORIDE 20 MEQ/1
20 TABLET, EXTENDED RELEASE ORAL DAILY
Qty: 3 TABLET | Refills: 0 | Status: SHIPPED | OUTPATIENT
Start: 2021-05-25 | End: 2021-05-28

## 2021-05-24 RX ORDER — TAMSULOSIN HYDROCHLORIDE 0.4 MG/1
0.8 CAPSULE ORAL DAILY
Qty: 30 CAPSULE | Refills: 0 | Status: SHIPPED
Start: 2021-05-25

## 2021-05-24 RX ORDER — IPRATROPIUM BROMIDE AND ALBUTEROL SULFATE 2.5; .5 MG/3ML; MG/3ML
3 SOLUTION RESPIRATORY (INHALATION)
Qty: 30 NEBULE | Refills: 0 | Status: SHIPPED
Start: 2021-05-24 | End: 2021-08-30

## 2021-05-24 RX ORDER — DOCUSATE SODIUM 100 MG/1
100 CAPSULE, LIQUID FILLED ORAL 2 TIMES DAILY
Qty: 60 CAPSULE | Refills: 2 | Status: SHIPPED | OUTPATIENT
Start: 2021-05-24 | End: 2021-08-22

## 2021-05-24 RX ORDER — PANTOPRAZOLE SODIUM 40 MG/1
40 TABLET, DELAYED RELEASE ORAL
Qty: 30 TABLET | Refills: 1 | Status: SHIPPED
Start: 2021-05-25 | End: 2021-01-01 | Stop reason: CLARIF

## 2021-05-24 RX ADMIN — METOPROLOL SUCCINATE 50 MG: 50 TABLET, EXTENDED RELEASE ORAL at 08:23

## 2021-05-24 RX ADMIN — FUROSEMIDE 20 MG: 20 TABLET ORAL at 08:23

## 2021-05-24 RX ADMIN — TAMSULOSIN HYDROCHLORIDE 0.8 MG: 0.4 CAPSULE ORAL at 08:23

## 2021-05-24 RX ADMIN — INSULIN LISPRO 3 UNITS: 100 INJECTION, SOLUTION INTRAVENOUS; SUBCUTANEOUS at 07:14

## 2021-05-24 RX ADMIN — PIPERACILLIN AND TAZOBACTAM 3.38 G: 3; .375 INJECTION, POWDER, LYOPHILIZED, FOR SOLUTION INTRAVENOUS at 20:24

## 2021-05-24 RX ADMIN — PIPERACILLIN AND TAZOBACTAM 3.38 G: 3; .375 INJECTION, POWDER, LYOPHILIZED, FOR SOLUTION INTRAVENOUS at 08:23

## 2021-05-24 RX ADMIN — INSULIN LISPRO 6 UNITS: 100 INJECTION, SOLUTION INTRAVENOUS; SUBCUTANEOUS at 16:57

## 2021-05-24 RX ADMIN — ATORVASTATIN CALCIUM 40 MG: 20 TABLET, FILM COATED ORAL at 21:25

## 2021-05-24 RX ADMIN — INSULIN GLARGINE 20 UNITS: 100 INJECTION, SOLUTION SUBCUTANEOUS at 08:23

## 2021-05-24 RX ADMIN — FERROUS SULFATE TAB 325 MG (65 MG ELEMENTAL FE) 325 MG: 325 (65 FE) TAB at 16:57

## 2021-05-24 RX ADMIN — PIPERACILLIN AND TAZOBACTAM 3.38 G: 3; .375 INJECTION, POWDER, LYOPHILIZED, FOR SOLUTION INTRAVENOUS at 15:03

## 2021-05-24 RX ADMIN — PANTOPRAZOLE SODIUM 40 MG: 40 TABLET, DELAYED RELEASE ORAL at 06:10

## 2021-05-24 RX ADMIN — APIXABAN 2.5 MG: 2.5 TABLET, FILM COATED ORAL at 20:24

## 2021-05-24 RX ADMIN — FERROUS SULFATE TAB 325 MG (65 MG ELEMENTAL FE) 325 MG: 325 (65 FE) TAB at 08:23

## 2021-05-24 RX ADMIN — POTASSIUM CHLORIDE 20 MEQ: 1500 TABLET, EXTENDED RELEASE ORAL at 08:23

## 2021-05-24 RX ADMIN — PIPERACILLIN AND TAZOBACTAM 3.38 G: 3; .375 INJECTION, POWDER, LYOPHILIZED, FOR SOLUTION INTRAVENOUS at 02:39

## 2021-05-24 NOTE — PROGRESS NOTES
Progress Note      Patient: Kerry Hall. Sex: male          DOA: 5/4/2021         YOB: 1945      Age:  70 y.o.        LOS:  LOS: 20 days       Assessment/Plan     Principal Problem:    Trimalleolar fracture of ankle, closed, left, initial encounter (5/4/2021)  Assessment/Plan     Principal Problem:    Trimalleolar fracture of ankle, closed, left, initial encounter (5/4/2021)  Osteo calcaneus  Gangrene    Active Problems:  POD # 10 debridement and wound VAC placement. Ex fix  No pain in the ankle, stable alignment  Will write 2-3 x week vac change plantar ulcer  Will see if skin revascularized     POD # 7 SFA TPA  Dry gangrene still anterolateral ankle and foot    Skin quite nectrotic, discussed with Vasc, plan \"wait\", may need STSG, coverage, or BK  Can WB on frame  Continue VAC  If discharged will need follow up in the office in 10-14 days. Subjective:     No pain, awaiting LT facility. Has not been weight bearing. Objective:      Visit Vitals  BP (!) 134/54 (BP 1 Location: Left arm, BP Patient Position: At rest)   Pulse 84   Temp 98.2 °F (36.8 °C)   Resp 16   Ht 5' 8\" (1.727 m)   Wt 106.2 kg (234 lb 3.2 oz)   SpO2 99%   BMI 35.61 kg/m²       Physical Exam:  dry gangrene unchanged   Pin sites clean  Good clinical alignement    Intake and Output:  Current Shift:  No intake/output data recorded. Last three shifts:  05/22 1901 - 05/24 0700  In: 1500 [P.O.:1200; I.V.:300]  Out: 2000 [Urine:2000]    Lab/Data Reviewed: All lab results for the last 24 hours reviewed.    Final cultures from OR negative    Medications Reviewed    Continued hospitalization is indicated due to placement      Assessment/Plan     Principal Problem:    Trimalleolar fracture of ankle, closed, left, initial encounter (5/4/2021)    Active Problems:    Atrial flutter ([address] (2/12/2018)      CAD (coronary artery disease) (5/29/2018)      CKD (chronic kidney disease) stage 3, GFR 30-59 ml/min ([facility]) (5/29/2018)      Diabetic ulcer of left foot ([address] (4/14/2021)      Diabetic foot ulcer with osteomyelitis ([address] (4/17/2021)      Ulcer of left heel, with necrosis of bone ([address] (4/21/2021)      Osteomyelitis ([address] (5/4/2021)      Displacement of peripherally inserted central catheter (PICC) ([address] (5/4/2021)      Chronic atrial fibrillation ([address] (5/22/2021)      PVD (peripheral vascular disease) ([address] (5/22/2021)

## 2021-05-24 NOTE — PROGRESS NOTES
Problem: Falls - Risk of  Goal: *Absence of Falls  Description: Document Namita Broad Fall Risk and appropriate interventions in the flowsheet. Outcome: Progressing Towards Goal  Note: Fall Risk Interventions:  Mobility Interventions: Communicate number of staff needed for ambulation/transfer    Mentation Interventions: Adequate sleep, hydration, pain control, Bed/chair exit alarm    Medication Interventions: Patient to call before getting OOB, Teach patient to arise slowly    Elimination Interventions: Call light in reach    History of Falls Interventions: Bed/chair exit alarm, Investigate reason for fall, Room close to nurse's station         Problem: General Wound Care  Goal: *Non-infected wound: Improvement of existing wound, absence of infection, and maintenance of skin integrity  Outcome: Progressing Towards Goal  Goal: *Infected Wound: Prevention of further infection and promotion of healing  Description: Infection control procedures (eg: clean dressings, clean gloves, hand washing, precautions to isolate wound from contamination, sterile instruments used for wound debridement) should be implemented.   Outcome: Progressing Towards Goal  Goal: Interventions  Outcome: Progressing Towards Goal

## 2021-05-24 NOTE — PROGRESS NOTES
Discharge Planning: [facility] tomorrow @ 1:00 pm    CM received word from Saria Bardales at [facility] that they are able to accept the patient tomorrow. CM to arrange transportation. 1540: CM met with the patient at the bedside to discuss plans for discharge tomorrow. CM informed the patient that transportation has been arranged to [facility]. The patient verbalized understanding and denies any questions or concerns at this time. [address] [facility]     Communication to Patient/Family:  Met with patient and family, and they are agreeable to the transition plan. The Plan for Transition of Care is related to the following treatment goals: Trimalleolar fracture of left ankle, closed, initial encounter    The Patient was provided with a choice of provider and agrees   with the discharge plan. Yes [x] No []    Freedom of choice list was provided with basic dialogue that supports the patient's individualized plan of care/goals and shares the quality data associated with the providers. Yes [x] No []    LTAC Transition:  Patient has been accepted to [facility] at [place], [place]. [place], [place] and meets criteria for admission. Patient will transported by [place] [address] and expected to leave at tomorrow at 1300**. Communication to LTAC  Bedside RN has been notified to update the transition plan to the facility and call report [phone]    Discharge information has been updated on the AVS and sent via [facility] and/or CC link. Discharge instructions to be fax'd to facility at  [phone], [phone] Nurses station     Please include all hard scripts for controlled substances, med rec and dc summary, and AVS in packet.  Please medicate for pain prior to dc if possible and needed to help offset delay when patient first arrives to facility. Reviewed and confirmed with facility, [address] can manage the patient care needs for the following:     Hoang Mena with (X) only those applicable:  Medication:  [x]Medications are available at the facility  [x]IV Antibiotics    []Controlled Substance - hard copies available sent. []Weekly Labs    Equipment:  []CPAP/BiPAP  []Wound Vacuum  []Mccray or Urinary Device  [x]PICC/Central Line  []Nebulizer  []Ventilator    Treatment:  []Isolation (for MRSA, VRE, etc.)  []Surgical Drain Management  []Tracheostomy Care  [x]Dressing Changes  []Dialysis with transportation  []PEG Care  []Oxygen  []Daily Weights for Heart Failure    Dietary:  [x]Any diet limitations  []Tube Feedings   []Total Parenteral Management (TPN)    Financial Resources:  [x]UAI Not required for Acute Rehab Transfer  []Medicaid Application Completed  []UAI Completed  []Level II Completed  [] Private pay individual who will not become financially eligible for Medicaid within 6 months from admission to a [address] [place] [address]. [] Individual refused to have screening conducted. [] LTACH Facility       Advanced Care Plan:  []Surrogate Decision Maker of Care  []POA  []Communicated Code Status and copy sent.     Other:             Care Management Interventions  Mode of Transport at Discharge: BLS  Transition of Care Consult (CM Consult): LTAC  Health Maintenance Reviewed: Yes  Physical Therapy Consult: Yes  Occupational Therapy Consult: Yes  Current Support Network: Lives with Spouse  Confirm Follow Up Transport: Family  The Plan for Transition of Care is Related to the Following Treatment Goals : SNF  The Patient and/or Patient Representative was Provided with a Choice of Provider and Agrees with the Discharge Plan?: Yes  Name of the Patient Representative Who was Provided with a Choice of Provider and Agrees with the Discharge Plan: pt/spouse  Freedom of Choice List was Provided with Basic Dialogue that Supports the Patient's Individualized Plan of Care/Goals, Treatment Preferences and Shares the Quality Data Associated with the Providers?: Yes  Discharge Location  Discharge Placement: [address] [place] [address])

## 2021-05-24 NOTE — DIABETES MGMT
GLYCEMIC CONTROL PLAN OF CARE     Assessment:  Most blood glucose readings are in or close to target range with 20 units Lantus/day and corrective lispro (normla insulin sensitivity) ACHS. Noted pending discharge. Recommendations:  1. Continue  20 units Lantus/day and corrective lispro. Most recent blood glucose values:  5/24:  POC - 200, 145  5/23:  Lab - 129;  POC - 172, 164, 166, 394 but repeated and was 165 mg/dL        Current A1C of 6.3 % is equivalent to average blood glucose of 134 mg/dl over the past 2-3 months. Current hospital diabetes medications:   20 units Lantus/day  Corrective lispro, very insulin resistant dosing ACHS    Previous day's insulin requirements:  32 units (20 units Lantus plus 12 units corrective lispro)    Home diabetes medications: (on 5/10/21 pt verified his insulin doses as below but does not know if he is taking Januvia)  Lantus 40 units/day  Humalog 10 units at breakfast and 15 units at lunch and dinner  Per chart - Januvia 50 mg/d  Pt with good glycemic control PTA. He states he obtains his diabetes medications and supplies from [employer].  [place]  Diet: DIET DIABETIC WITH OPTIONS   Meal Intake:   Patient Vitals for the past 168 hrs:   % Diet Eaten   05/23/21 1432 76 - 100%     Education:  ____Refer to Diabetes Education Record             _x___Education not indicated at this time    Quirino Suarez, [address] [place] [address], [address] [place] [address]  Diabetes and Glycemic Control   Office:  [phone]  Pager:  [phone]

## 2021-05-24 NOTE — PROGRESS NOTES
[facility]                         (A Division of [address] [place] [address])                                                                                                                       Evelyne Conklin MD  Work Cell #: [phone]( Mon-Fri, 7am-5pm)  If no call or text back within 30 minutes from me, please call office number. (Prefer text when possible)  Office #:     [phone]  [id number] [phone]   Office Fax: [phone]     Date of Admission: 5/4/2021   Date of Service:  5/24/2021  Reason for FU : left ankle OM, fever    Current Antimicrobials:    Prior Antimicrobials:    Zosyn 5/17 to date- to complete 8 weeks of ABX from 4/14-- until 6/14   · Zithromax 500mg IV- 4/14 to 4/20  · Vancomycin IV 4/14  until 4/19  · Zosyn 2.25gm IV Q6 4/14 to 4/15   · Meropenem IV 4/15-4/20  Unasyn 3 gm Q6 hour 4/20 to 4/22/21  Ertapenem 1gm IV q24 4/23 to date  -- reduced dose to 500 mg 5/5  --Vancomycin 5/6 to 5/12  --Meropenem 1 gm Q12 5/6 to 5/17       Assessment: Rec/ Plan on ID issues I am following:   · Leucopenia- mild, intermittent. B12 and folate normal  · Fever: 2/2 below. Improved post I and D on 5/14  Source: progressed left ankle infection/abscess/ skin necrosis  Post left foot excisional debridement of skin, SQ   tissue down to bone, ex-fix placement- 5/14/21  OR cultures neg-5/14  Necrotic skin around distal left leg  ESR >140, CRP 15.3 -- 5/14  ESR>140, CRP 5/21-- 10.5    --Post angioplasty/stent  LLE-5/17  bcx neg- 5/5 and 5/7  ucx neg 5/5  · Left Infected DM Ulcer and acute OM and necrotic tissue, punched out heel ulcer with Mixed infection- MSSA +E.coli + anaerobes - 4/14/21  · Post I and D of left heel bone/abscess 4/17, OR cultures remain sterile  · Post I and D with graft to heel- 4/21    · Acute renal failure 2/2 urinary retention, caal in place.  Drug related IN in DDX but less likely       (urine eos negative): Resolved       Failed void trial, caal back  · Trimalleolar left ankle fracture 2/2 fall: 5/7    ·   · Hematuria- off anticoagulant    Other Medical Issues followed and managed by primary and other services  · Anemia- requiring PRBC  · DM- Poorly controlled  · Low Vitamin D  · CAD with CHF/ NSTMI. Atrial flutter  · PAD  · CKD  · Hyperlipidemia     Past ID issues: N/A  -History of COVID 19 infection 4/2021   Cont Zosyn - plan to complete his OM treatment with Zosyn  ( will extend to 4 weeks post op 5/14 until June 14). Re-image by mid June ( FU CT). Can consider extended Rx depending clinical progress/ surgical plans. Additional plans for surgical team- Ortho- Dr Rogers Javed and Vascular. Prognosis for L foot salvage: Guarded    LLE wound/skin necrosis- monitor closely. So far dry skin necrosis with one area of blister on distal medial side-- heel ulcer on wound vac progressing ok. High risk patient for decline    Tight DM control    [facility] he can be FU with ID team there. DW with nurse, Podiatry    Can arrange FU with me in ID office on DC from LTAC if needed. Call at [phone]. Condition: Hemodynamically stable. Fever better    Subjective: \" Going today? \"    Denies LE pain/ fever/chills. Denies cough/SOB- remains on O2 supplement is down to 2L from 4 L  Failed void trial and caal placed again- clear urine  Tolerating current ABX    He is waiting for transfer to [place] [address] soon     Review of System:  See above, other wise negative     Summary:    Delia Mclean is 69 y/o WM with PMH as listed below- with initial consult on 4/15/21. Last seen by me on 4/22/21 prior to discharge.   He was set up at [facility] infusion center for daily ertapenem.     Yesterday, I was notified by RN that he fell, hurt his left foot, and his PICC was not working and they infused him via PIV and sent him to ED.     On arrival to ED, was noted to be in acute renal failure, and with new left ankle fracture.     Wound care note with the picture reviewed.     Pt denies F/C/R. Denies SOB. Feels weak, appetite and po intake has decreased past many days per wife. Patient Active Problem List   Diagnosis Code    Atrial flutter ([facility]) I48.92    DM2 (diabetes mellitus, type 2) ([facility]) E11.9    CAD (coronary artery disease) I25.10    ELINOR (acute kidney injury) ([facility] N17.9    CKD (chronic kidney disease) stage 3, GFR 30-59 ml/min ([facility]) N18.30    Elevated brain natriuretic peptide (BNP) level R79.89    Diabetic ulcer of left foot ([facility]) E11.621, L97.529    COVID-19 virus infection U07.1    Acute osteomyelitis ([facility] M86.10    Acute hematogenous osteomyelitis of left foot ([facility]) M86.072    Cellulitis of left foot L03. 116    Diabetic foot ulcer with osteomyelitis ([facility] E11.621, E11.69, L97.509, M86.9    Ulcer of left heel, with necrosis of bone ([facility]) L97.424    Osteomyelitis ([facility] M86.9    Trimalleolar fracture of ankle, closed, left, initial encounter S82.852A    Displacement of peripherally inserted central catheter (PICC) ([facility]) T82.524A    Chronic atrial fibrillation ([facility]) I48.20    PVD (peripheral vascular disease) ([facility] I73.9       Current Facility-Administered Medications   Medication Dose Route Frequency    potassium chloride (K-DUR, KLOR-CON) SR tablet 20 mEq  20 mEq Oral DAILY    insulin glargine (LANTUS) injection 20 Units  20 Units SubCUTAneous DAILY    glucagon (GLUCAGEN) injection    PRN    tamsulosin (FLOMAX) capsule 0.8 mg  0.8 mg Oral DAILY    diphenhydrAMINE (BENADRYL) capsule 25 mg  25 mg Oral Q6H PRN    0.9% sodium chloride infusion 250 mL  250 mL IntraVENous PRN    piperacillin-tazobactam (ZOSYN) 3.375 g in 0.9% sodium chloride (MBP/ADV) 100 mL MBP  3.375 g IntraVENous Q6H    fentaNYL citrate (PF) injection  mcg   mcg IntraVENous Rad Multiple    midazolam (VERSED) injection 0.5-2 mg  0.5-2 mg IntraVENous Rad Multiple    heparin (porcine) 1,000 unit/mL injection 1,000-10,000 Units  1,000-10,000 Units IntraVENous Rad Multiple    [Held by provider] clopidogreL (PLAVIX) tablet 75 mg  75 mg Oral DAILY    furosemide (LASIX) tablet 20 mg  20 mg Oral DAILY    pantoprazole (PROTONIX) tablet 40 mg  40 mg Oral ACB    albuterol-ipratropium (DUO-NEB) 2.5 MG-0.5 MG/3 ML  3 mL Nebulization Q4H PRN    ferrous sulfate tablet 325 mg  1 Tablet Oral BID WITH MEALS    metoprolol succinate (TOPROL-XL) XL tablet 50 mg  50 mg Oral DAILY    heparin (porcine) 100 unit/mL injection 500 Units  500 Units InterCATHeter Q8H PRN    [Held by provider] apixaban (ELIQUIS) tablet 2.5 mg  2.5 mg Oral BID    acetaminophen (TYLENOL) tablet 1,000 mg  1,000 mg Oral Q8H PRN    atorvastatin (LIPITOR) tablet 40 mg  40 mg Oral QHS    glucose chewable tablet 16 g  16 g Oral PRN    glucagon (GLUCAGEN) injection 1 mg  1 mg IntraMUSCular PRN    dextrose (D50W) injection syrg 25 g  50 mL IntraVENous PRN    insulin lispro (HUMALOG) injection   SubCUTAneous AC&HS       Objective:    Visit Vitals  BP (!) 150/58 (BP 1 Location: Left upper arm, BP Patient Position: At rest)   Pulse 91   Temp 97.3 °F (36.3 °C)   Resp 16   Ht 5' 8\" (1.727 m)   Wt 106.2 kg (234 lb 3.2 oz)   SpO2 98%   BMI 35.61 kg/m²       Temp (24hrs), Av.1 °F (36.7 °C), Min:97.3 °F (36.3 °C), Max:98.6 °F (37 °C)    Right PICC - site looks ok    GEN: WD chronically sick looking , not in resp distress. HEENT: Unicteric. EOMI intact  CHEST: Non laboured breathing. ABD: Obese/soft. Non tender. EXT: Left LE is has X-fix in place  --lower leg necrotic skin-appears stable  --draining blister- dark fluid. No crepitation of soft tissue on palpation  -- left heel pictures reviewed on Media- wound appears clean/granulating  Skin: Dry and intact. No rash  CNS: A, OX3. Moves UE and right LE ok. CN grossly ok.        Microbiology:    Blood culture: - 2 sets: NGSF    - 2 sets: NG  : 1 set: NGSF     Urine culture:  - urine culture: NG     Wounddrainage and OR tissueculture:  - gram stain: GNR and GPR        --wound culture+ s.aurues and E.coli + anaerobe   -- OR culture: NG  - OR aerobic/anaerobic culture: NGSF    Lab results:    Chemistry  Recent Labs     21  0540 21  0320 21  0925   * 93 139*    146* 145   K 3.1* 3.2* 3.1*    107 108   CO2 36* 36* 37*   BUN 7 8 8   CREA 1.01 1.03 1.02   CA 6.9* 6.8* 7.1*   AGAP 3 3 0*   BUCR 7* 8* 8*       CBC w/ Diff  Recent Labs     21  0540 21  0320 21  0925   WBC 4.1* 3.8*  --    RBC 2.64* 2.58*  --    HGB 7.1* 6.9* 7.7*   HCT 23.0* 22.5* 25.3*    265  --    GRANS 67 63  --    LYMPH 22 24  --    EOS 2 3  --      Imagin/5- CXR     1. Right PICC line tip retracted from optimal/appropriate position, projecting  over the right subclavian vein. 2. Mild bibasilar atelectasis.      21  US renal  1. Considerable urinary bladder fluid distention (approximately 2.1 L) with  likely reactive pelviectasis.      > Results called to [address] [place] [address] unit as patient 2 likely benefit from  urinary bladder catheterization.     : right humerus  No acute osseous abnormality or malalignment involving the right humerus. : MRI of left foot:    No acute osseous abnormality or malalignment involving the right humerus. : CTA AP w/contrast  No evidence of contrast extravasation to suggest active gastrointestinal  hemorrhage.     Bilateral hydroureteronephrosis secondary to markedly thick-walled urinary  bladder containing hyperdense fluid, consistent with hemorrhage. Indwelling  Mccray catheter with incomplete bladder decompression. Findings concerning for  cystitis and hemorrhage possibly secondary to catheter placement.   Cannot  exclude underlying malignancy, cystoscopy is recommend upon resolution of acute  pathology.     Large right inguinal hernia containing nondilated distal ileum and right colon.     Trace retroperitoneal ascites.      :  CXR: Borderline heart size with mild bronchovascular prominence likely vascular  congestion but improved since the last study of 5/11/2021.

## 2021-05-24 NOTE — PROGRESS NOTES
9470 : Bedside shift change report given to Venu Gonzalez RN (oncoming nurse) by Jorge Luis York RN (offgoing nurse). Report included the following information SBAR, Kardex, Intake/Output and MAR.   1004 : spoke with CM regarding patient discharge. Awaiting to hear back from CM about placement at facility. 1036 : update given to pt wife regarding plan of care for the day. 1439 : updated given to pt son regarding transfer of care. 1900 : Bedside shift change report given to Nirmal Pina (oncoming nurse) by Venu Gonzalez RN (offgoing nurse). Report included the following information SBAR, Kardex, Intake/Output and MAR.

## 2021-05-24 NOTE — PROGRESS NOTES
Bedside and Verbal shift change report received from VALERIA Washburn. Report included the following information SBAR, ED Summary, OR Summary, Intake/Output, MAR, Recent Results and Med Rec Status. 1939 shift assessment completed. no signs of distress , Patient is alert and oriented X 4 . symmetrical chest movement lungs are clear bilaterally. cap refill less than 3 sec's. peripheral pulses palpable X 4. dressing to the PICC to the right basilic  is clean dry and intact. mepilex dressing on the left forearm is clean dry and intact. Bed is locked at the lowest position. Phone personal items and call light within reach      0056 reassessment completed no changes noted. See nursing flow sheet. Bed is locked at the lowest position.  Phone personal items and call light within reach

## 2021-05-24 NOTE — PROGRESS NOTES
1905 Assumed patient care at this time. Report received from [facility], RN. Patient in bed in lowest position with wheels locked. Call light within reach. 2024 Shift assessment completed at this time. Patient is alert and oriented x4. Lungs diminished on room air. Last bowel movement on 05/24. Active bowel sounds. Caal not draining. Will irrigate. Scrotum swollen. Trace edema to upper and lower extremities. Weakness to bilateral lower legs. Halo present on left foot. Elevated on pillows. Left foot black, red, pink, and pale. Blisters present that are weeping. PICC line present in right upper arm. Call light within reach. 2032 Patient stated he has had a full feeling in his bladder all day and feels as though catheter is not working. 100 mL of yellow urine in bag. Caal irrigated and immediately drained 800 mL of cloudy alanna urine. 2114 1400 mL of urine removed from caal. 0710 Bedside and verbal shift change report given to Charter Mendoza (oncoming nurse) by Janene Melgar RN (offgoing nurse). Report included the following information: SBAR, Kardex, MAR, and recent results.

## 2021-05-24 NOTE — PROGRESS NOTES
Problem: Mobility Impaired (Adult and Pediatric)  Goal: *Acute Goals and Plan of Care (Insert Text)  Description: Physical Therapy Goals   Updated 5/20/2021 and to be accomplished within 7 day(s)  1. Patient will move from supine <> sit with mod/max assist change of position and EOB activity. 2.  Patient will demonstrate sitting balance EOB with UE support and CGA for performance for ADL/therapeutic exercise activity. 3.  Patient will perform transfers bed to chair via transfer board with min assist.      Physical Therapy Goals   Initiated 5/6/2021 and to be accomplished within 7 day(s)  1. Patient will move from supine <> sit with S in prep for out of bed activity and change of position. 2.  Patient will demonstrate sitting balance intact EOB without UE support /S for performance for ADL/ therapeutic exercise activity. 3.  Patient will perform transfers bed to chair via transfer board with min assist.      Outcome: Progressing Towards Goal  physical Therapy TREATMENT    Patient: Melody Garcia (80 y.o. male)  Date: 5/24/2021  Diagnosis: Osteomyelitis ([facility] [M86.9]  Trimalleolar fracture of ankle, closed, left, initial encounter [S82.852A]  Displacement of peripherally inserted central catheter (PICC) ([facility] [T82.524A] Trimalleolar fracture of ankle, closed, left, initial encounter  Procedure(s) (LRB):  ESOPHAGOGASTRODUODENOSCOPY (EGD)/BIOPSY (N/A) 5 Days Post-Op  Precautions: Fall, NWB (L LE)   Chart, physical therapy assessment, plan of care and goals were reviewed. ASSESSMENT:  Pt reporting being soiled upon arrival.  Nurse Harley Bolanos and [facility] notified. [facility], nurse supervisor [address] [place] [address]. Chaparrita Locole and PT completed hygiene care with pt requiring assist x4 (requires A of 1 for management of L LE with ext fixator in place). Note pt with full liquid brown stool. Pt too fatigued following above for participation with therapeutic ex.   Chart indicates pt for discharge to [facility] tomorrow. Will continue per POC as tolerated. Progression toward goals:  []      Improving appropriately and progressing toward goals  [x]      Improving slowly and progressing toward goals  []      Not making progress toward goals and plan of care will be adjusted     PLAN:  Patient continues to benefit from skilled intervention to address the above impairments. Continue treatment per established plan of care. Discharge Recommendations:  LT[facility]  Further Equipment Recommendations for Discharge:  N/A     SUBJECTIVE:   Patient stated  I'm a mess.     OBJECTIVE DATA SUMMARY:   Critical Behavior:  Neurologic State: Alert, Appropriate for age, Eyes open spontaneously  Orientation Level: Oriented X4, Appropriate for age  Cognition: Appropriate decision making, Appropriate for age attention/concentration, Poor safety awareness  Safety/Judgement: Fall prevention  Functional Mobility Training:  Bed Mobility:  Rolling: Total assistance; Other (comment) (Assist x4)  Pain:  Pain Scale 1: Numeric (0 - 10)  Pain Intensity 1: 0  Activity Tolerance:   Fair   Please refer to the flowsheet for vital signs taken during this treatment.   After treatment:   [] Patient left in no apparent distress sitting up in chair  [x] Patient left in no apparent distress in bed  [x] Call bell left within reach  [] Nursing notified  [] Caregiver present  [] Bed alarm activated      Meghan Lang PT   Time Calculation: 25 mins

## 2021-05-24 NOTE — PROGRESS NOTES
0715 Bedside and Verbal shift change report given to HODA Benavidez RN (oncoming nurse) by JUAN Rolon RN (offgoing nurse). Report included the following information SBAR, Kardex, Intake/Output, and MAR. Pt in bed, call light in reach. 8482 Pt assessed. No signs of acute distress. Pt in bed.    1230 Pt assessed. No signs of acute distress. Pt in bed. Pin care completed. Pt asked multiple times if they wanted pain medication, but declined. Pins cleaned and xeroform removed and replaced. Pulses checked with doppler. Pedal pulse able to be heard. 1712 Pt assessed. No signs of acute distress. Pt in bed. 1930 Bedside and Verbal shift change report given to ELIEZER Chavez RN (oncoming nurse) by Marija Benavidez RN (offgoing nurse). Report included the following information SBAR, Kardex, Intake/Output, and MAR. Pt in bed, call light in reach.

## 2021-05-24 NOTE — PROGRESS NOTES
Problem: Pressure Injury - Risk of  Goal: *Prevention of pressure injury  Description: Document Anam Scale and appropriate interventions in the flowsheet.   Outcome: Progressing Towards Goal  Note: Pressure Injury Interventions:  Sensory Interventions: Assess changes in LOC    Moisture Interventions: Internal/External urinary devices, Limit adult briefs    Activity Interventions: Assess need for specialty bed, PT/OT evaluation    Mobility Interventions: PT/OT evaluation    Nutrition Interventions: Document food/fluid/supplement intake    Friction and Shear Interventions: Minimize layers                Problem: Pain  Goal: *Control of Pain  Outcome: Progressing Towards Goal

## 2021-05-24 NOTE — DISCHARGE SUMMARY
[address]    Name:  Adilia Quinn  MR#:   [id number]  :  1945  ACCOUNT #:  [de-identified]  ADMIT DATE:  2021  DISCHARGE DATE:  2021    CONSULTANTS:  1.  Philippe Franco MD, Infectious Disease. 2.  Mari Pelaez MD, Orthopedic Surgery. 3.  Vic Yi MD, Vascular Surgery. 4.  Delia Verma MD, Gastroenterology. 5.  Ana Maria Padilla MD, Urology. DISCHARGE DIAGNOSES:  1. Left ankle fracture status post external fixation. 2.  Necrotic skin and skin changes of the left lower extremity. 3.  Heel ulcer with wound VAC in place. 4.  Osteomyelitis of the left foot and heel. 5.  Peripheral vascular disease. 6.  Insulin-dependent diabetes mellitus. 7.  Severe anemia. 8.  Previous gastrointestinal bleeding. 9.  Paroxysmal atrial fibrillation. 10.  Coronary disease. 11.  Hypertension. 12.  Acute kidney injury, resolved. 13.  Chronic kidney disease stage III. 14.  Right inguinal hernia. 15.  Chronic urinary retention requiring Mccray catheter. HOSPITAL SUMMARY:  The patient is 70years old. He has had a complicated medical course, admitted for a second time to this hospital on  after being discharged on . At that hospitalization prior, he had debridement of his left heel, wound Stravix graft placed and was on outpatient IV antibiotics for osteomyelitis. He is a diabetic. He has chronic kidney disease, high blood pressure, coronary disease, paroxysmal AFib and he was doing outpatient care for this severe infection and then ultimately came back to the hospital on the aforementioned date, , after having fallen in his garage and twisting his ankle, causing a fracture. He was admitted back to the hospital.  During this hospitalization, the patient was put in a splint.   He was seen in consultation by Surgery, Infectious Disease, considering the extensive infection already present in the left lower extremity with recent placement of antibiotic beads. Nephrology had to see the patient because of acute-on-chronic kidney disease. He was seen in consultation also by Urology for urinary retention and difficult Mccray catheter insertion. The patient's renal function stabilized. He was stable on the IV antibiotics, and by 05/10, he was continuing treatment with evaluation of hypokalemia and treatment of such as well as anemia. Hemoccult had come back positive, so GI was asked to see him. The patient was on Eliquis and Plavix as an outpatient due to his chronic medical issues. Those medicines were held. He went to have surgery with Dr. Marina Helm. He had spiked fevers in the interim. A washout and possible ORIF versus external fixator was planned. Infectious Disease followed along. He went to surgery on 05/14. They did a left ankle bimalleolar ankle fracture external fixator placement and washout of the left foot with placement of wound VAC and the patient was also seen in consultation with GI because he had a drop in his hemoglobin. During surgery for his ankle, it was noted that the skin had become quite necrotic circumferential around the ankle. There was concern about vascular insufficiency, so he was seen in consultation by Vascular Surgery. They recommended an angiogram procedure. They did that the following Monday. He tolerated that well. The patient had an occluded left SFA. He tolerated that procedure without any issue and the patient continued with the external fixator in place. He went for upper endoscopy with GI that showed that there was a 2-cm hiatal hernia. The stomach was large. There was diffuse gastric mucosal edema. There was a healed and epithelialized longitudinal ulcer inside the sharp turn of the upper duodenal area. It was probably a recent ulcer that could have bled but there was no active bleeding or AVM found. He has been continued on a PPI. His blood has been transfused.   He remains anemic around 7 but asymptomatic. The patient has been participating with Physical Therapy but remains very debilitated and compromised by all of the external structures on his left lower extremity. He has been recommended to go to select long-term acute care and he does have this large right inguinal hernia that is not causing any obstruction at this point but does cause swelling into his groin and scrotum. He is tolerating the Mccray catheter in place at this point. Urology had to replace the catheter, so they recommended it stay in place for now. Today's exam, he is awake and alert in no acute distress, debilitated elderly  male. He is oriented x3. He denies any new issues. No diarrhea, chest pain or shortness of breath. Blood pressure stable at 144/52, pulse 81, temperature 97.5, respiratory rate 17, SAO2 is 98%. Noted right inguinal hernia reducible and soft, nontender. Scrotal edema. Penis has Mccray catheter in place. Left lower extremity with necrotic circumferential skin unchanged around the left ankle, external fixator in place. Wound VAC on left heel. Chronic stasis edema, dermatitis changes of both lower extremities. Lungs are clear bilaterally. Cardiac exam irregular rhythm, regular rate. Mentation is appropriate. Most recent labs; blood sugar 145, H and H 7.1 and 23.0, platelets are 898. His last chemistry shows a BUN of 7, creatinine 1.01. Potassium yesterday was 3.1. We have ordered repeat of that. The patient's echocardiogram on 05/06 showed an ejection fraction of 55%-60%. The patient will go to long-term acute care. I would recommend doing a CBC every three days just to monitor his anemia, to see if it is worsening or not.   We are going to resume his Eliquis 2.5 mg twice daily and his Plavix 75 mg daily due to his extensive disease to help promote vascular supply for healing this significantly diseased left lower extremity and because of his paroxysmal and chronic AFib/aflutter. DISCHARGE MEDICATIONS:  Recommended medications for discharge:  1. DuoNeb every four hours as needed for wheezing. 2.  Eliquis 2.5 mg twice daily. 3.  Lipitor 40 mg at night. 4.  Plavix 75 mg daily. 5.  Colace 100 mg twice daily. 6.  Ferrous sulfate 325 mg daily. 7.  Lasix 20 mg daily. 8.  Humalog KwikPen 10-15 units before meals three times daily as needed. 9.  Lantus 15 units subcu at night. 10.  Cozaar 25 mg daily. 11.  Metoprolol XL 50 mg daily. 12.  Protonix 40 mg daily. 13.  Zosyn 3.375 g IV q.6 h. until 06/14. 14.  K-Dur 20 mEq daily. 15.  Aldactone 12.5 mg daily. 16.  Flomax 0.8 mg daily. DISCHARGE INSTRUCTIONS:  The patient is on a diabetic diet. PT as tolerated. Follow up with Dr. Severa Alberts, Orthopedic Surgery in 10 days. Follow up with Dr. Tamica Davis, Infectious Disease, on 06/02. Follow up with PCP in two to three weeks, Dr. Cathy Anguiano, after discharge from rehab. Again special instructions; repeat a CBC every three days to monitor his anemia, watch for any signs or symptoms of bleeding. Hold the aspirin for now as he is on Plavix and Eliquis. Continue wound care to that left lower extremity. If the necrotic tissue becomes worse or there are signs or symptoms of infection, please return to the appropriate medical treatment center and Dr. Torrey Doty has recommended that there may be need for a skin graft if that does not heal.  The wound VAC should continue under regular care for the heel. He is weightbearing on the frame of the left external fixator. The patient can discharge to [facility] today. Forty-five minutes on discharge time. He is a full code status.       Grover Mason MD    Patient seen and examinded no change in plan of care  Good for disharge to day   [id number]  D:  05/24/2021 11:45  T:  05/24/2021 12:58  JOB #:  [id number]

## 2021-05-25 VITALS
SYSTOLIC BLOOD PRESSURE: 112 MMHG | DIASTOLIC BLOOD PRESSURE: 48 MMHG | TEMPERATURE: 98.5 F | OXYGEN SATURATION: 96 % | RESPIRATION RATE: 16 BRPM | HEIGHT: 68 IN | WEIGHT: 234.2 LBS | BODY MASS INDEX: 35.49 KG/M2 | HEART RATE: 94 BPM

## 2021-05-25 LAB
GLUCOSE BLD STRIP.AUTO-MCNC: 119 MG/DL (ref 70–110)
GLUCOSE BLD STRIP.AUTO-MCNC: 226 MG/DL (ref 70–110)

## 2021-05-25 PROCEDURE — 74011250637 HC RX REV CODE- 250/637: Performed by: HOSPITALIST

## 2021-05-25 PROCEDURE — 74011636637 HC RX REV CODE- 636/637: Performed by: HOSPITALIST

## 2021-05-25 PROCEDURE — 74011250637 HC RX REV CODE- 250/637: Performed by: INTERNAL MEDICINE

## 2021-05-25 PROCEDURE — 82962 GLUCOSE BLOOD TEST: CPT

## 2021-05-25 PROCEDURE — 74011250636 HC RX REV CODE- 250/636: Performed by: INTERNAL MEDICINE

## 2021-05-25 PROCEDURE — 74011000258 HC RX REV CODE- 258: Performed by: INTERNAL MEDICINE

## 2021-05-25 PROCEDURE — 74011250637 HC RX REV CODE- 250/637: Performed by: UROLOGY

## 2021-05-25 RX ADMIN — PIPERACILLIN AND TAZOBACTAM 3.38 G: 3; .375 INJECTION, POWDER, LYOPHILIZED, FOR SOLUTION INTRAVENOUS at 15:03

## 2021-05-25 RX ADMIN — APIXABAN 2.5 MG: 2.5 TABLET, FILM COATED ORAL at 08:58

## 2021-05-25 RX ADMIN — FERROUS SULFATE TAB 325 MG (65 MG ELEMENTAL FE) 325 MG: 325 (65 FE) TAB at 08:58

## 2021-05-25 RX ADMIN — PIPERACILLIN AND TAZOBACTAM 3.38 G: 3; .375 INJECTION, POWDER, LYOPHILIZED, FOR SOLUTION INTRAVENOUS at 08:56

## 2021-05-25 RX ADMIN — PIPERACILLIN AND TAZOBACTAM 3.38 G: 3; .375 INJECTION, POWDER, LYOPHILIZED, FOR SOLUTION INTRAVENOUS at 02:22

## 2021-05-25 RX ADMIN — INSULIN LISPRO 6 UNITS: 100 INJECTION, SOLUTION INTRAVENOUS; SUBCUTANEOUS at 12:40

## 2021-05-25 RX ADMIN — PANTOPRAZOLE SODIUM 40 MG: 40 TABLET, DELAYED RELEASE ORAL at 06:40

## 2021-05-25 RX ADMIN — POTASSIUM CHLORIDE 20 MEQ: 1500 TABLET, EXTENDED RELEASE ORAL at 08:57

## 2021-05-25 RX ADMIN — INSULIN GLARGINE 20 UNITS: 100 INJECTION, SOLUTION SUBCUTANEOUS at 08:57

## 2021-05-25 RX ADMIN — METOPROLOL SUCCINATE 50 MG: 50 TABLET, EXTENDED RELEASE ORAL at 08:57

## 2021-05-25 RX ADMIN — TAMSULOSIN HYDROCHLORIDE 0.8 MG: 0.4 CAPSULE ORAL at 08:57

## 2021-05-25 RX ADMIN — FUROSEMIDE 20 MG: 20 TABLET ORAL at 08:58

## 2021-05-25 NOTE — PROGRESS NOTES
Transition of care to [place] [address] (previously [facility]) today @ [phone]: CM received a call from [address] requesting that the patient's transport be moved back to 1600. CM called the transport company and moved the transport time to 1600. Communication to Patient/Family:  Met with patient and family, and they are agreeable to the transition plan. The Plan for Transition of Care is related to the following treatment goals: Trimalleolar fracture of ankle, close, left, initial encounter    The Patient was provided with a choice of provider and agrees   with the discharge plan. Yes [x] No []    Freedom of choice list was provided with basic dialogue that supports the patient's individualized plan of care/goals and shares the quality data associated with the providers. Yes [x] No []    LTAC Transition:  Patient has been accepted to [address] [place] [address] at [place], [place]. [facility], [facility], [address] [facility] and meets criteria for admission. Patient will transported by [facility] and expected to leave at 1600. Communication to LTAC  Bedside RN has been notified to update the transition plan to the facility and call report [phone]    Discharge information has been updated on the AVS and sent via [facility] and/or CC link. Discharge instructions to be fax'd to facility at  [phone], [phone] Nurses station     Please include all hard scripts for controlled substances, med rec and dc summary, and AVS in packet. Please medicate for pain prior to dc if possible and needed to help offset delay when patient first arrives to facility.     Reviewed and confirmed with facility, [address] [place] [address] can manage the patient care needs for the following:     Sheila Jackson with (X) only those applicable:  Medication:  [x]Medications are available at the facility  [x]IV Antibiotics    []Controlled Substance - hard copies available sent. []Weekly Labs    Equipment:  []CPAP/BiPAP  [x]Wound Vacuum  []Mccray or Urinary Device  [x]PICC/Central Line  []Nebulizer  []Ventilator    Treatment:  []Isolation (for MRSA, VRE, etc.)  []Surgical Drain Management  []Tracheostomy Care  [x]Dressing Changes  []Dialysis with transportation  []PEG Care  []Oxygen  []Daily Weights for Heart Failure    Dietary:  [x]Any diet limitations  []Tube Feedings   []Total Parenteral Management (TPN)    Financial Resources:  [x]UAI Not required for Acute Rehab Transfer  []Medicaid Application Completed  []UAI Completed  []Level II Completed  [] Private pay individual who will not become financially eligible for Medicaid within 6 months from admission to a [address] [place] [address] facility. [] Individual refused to have screening conducted. [] LTACH Facility       Advanced Care Plan:  []Surrogate Decision Maker of Care  []POA  []Communicated Code Status and copy sent.     Other:

## 2021-05-25 NOTE — PROGRESS NOTES
Problem: Falls - Risk of  Goal: *Absence of Falls  Description: Document Carolyn Andrew Fall Risk and appropriate interventions in the flowsheet. Outcome: Progressing Towards Goal  Note: Fall Risk Interventions:  Mobility Interventions: Assess mobility with egress test, PT Consult for mobility concerns    Mentation Interventions: Adequate sleep, hydration, pain control    Medication Interventions: Teach patient to arise slowly    Elimination Interventions: Bed/chair exit alarm, Call light in reach    History of Falls Interventions: Investigate reason for fall         Problem: Pressure Injury - Risk of  Goal: *Prevention of pressure injury  Description: Document Anam Scale and appropriate interventions in the flowsheet.   Outcome: Progressing Towards Goal  Note: Pressure Injury Interventions:  Sensory Interventions: Assess changes in LOC, Minimize linen layers    Moisture Interventions: Absorbent underpads, Limit adult briefs    Activity Interventions: PT/OT evaluation    Mobility Interventions: Assess need for specialty bed, PT/OT evaluation    Nutrition Interventions: Document food/fluid/supplement intake    Friction and Shear Interventions: Apply protective barrier, creams and emollients, Minimize layers

## 2021-05-25 NOTE — PROGRESS NOTES
0715 : Bedside shift change report given to Roddy Manjarrez RN (oncoming nurse) by Idania Dye (offgoing nurse). Report included the following information SBAR, Kardex, Intake/Output and MAR.     1256 : report given to Bryan Santillan RN at [facility]. All questions and concerns addressed.

## 2021-05-25 NOTE — DISCHARGE SUMMARY
[address]    Name:  Jonathon Merlos  MR#:   [id number]  :  1945  ACCOUNT #:  [de-identified]  ADMIT DATE:  2021  DISCHARGE DATE:  2021    CONSULTANTS:  1.  Ruma Heredia MD, Infectious Disease. 2.  Viktor Birch MD, Orthopedic Surgery. 3.  Remy Gutiérrez MD, Vascular Surgery. 4.  Philip Savage MD, Gastroenterology. 5.  Ana Maria Padilla MD, Urology. DISCHARGE DIAGNOSES:  1. Left ankle fracture status post external fixation. 2.  Necrotic skin and skin changes of the left lower extremity. 3.  Heel ulcer with wound VAC in place. 4.  Osteomyelitis of the left foot and heel. 5.  Peripheral vascular disease. 6.  Insulin-dependent diabetes mellitus. 7.  Severe anemia. 8.  Previous gastrointestinal bleeding. 9.  Paroxysmal atrial fibrillation. 10.  Coronary disease. 11.  Hypertension. 12.  Acute kidney injury, resolved. 13.  Chronic kidney disease stage III. 14.  Right inguinal hernia. 15.  Chronic urinary retention requiring Mccray catheter. HOSPITAL SUMMARY:  The patient is 70years old. He has had a complicated medical course, admitted for a second time to this hospital on  after being discharged on . At that hospitalization prior, he had debridement of his left heel, wound Stravix graft placed and was on outpatient IV antibiotics for osteomyelitis. He is a diabetic. He has chronic kidney disease, high blood pressure, coronary disease, paroxysmal AFib and he was doing outpatient care for this severe infection and then ultimately came back to the hospital on the aforementioned date, , after having fallen in his garage and twisting his ankle, causing a fracture. He was admitted back to the hospital.  During this hospitalization, the patient was put in a splint.   He was seen in consultation by Surgery, Infectious Disease, considering the extensive infection already present in the left lower extremity with recent placement of antibiotic beads. Nephrology had to see the patient because of acute-on-chronic kidney disease. He was seen in consultation also by Urology for urinary retention and difficult Mccray catheter insertion. The patient's renal function stabilized. He was stable on the IV antibiotics, and by 05/10, he was continuing treatment with evaluation of hypokalemia and treatment of such as well as anemia. Hemoccult had come back positive, so GI was asked to see him. The patient was on Eliquis and Plavix as an outpatient due to his chronic medical issues. Those medicines were held. He went to have surgery with Dr. Teresa Wiley. He had spiked fevers in the interim. A washout and possible ORIF versus external fixator was planned. Infectious Disease followed along. He went to surgery on 05/14. They did a left ankle bimalleolar ankle fracture external fixator placement and washout of the left foot with placement of wound VAC and the patient was also seen in consultation with GI because he had a drop in his hemoglobin. During surgery for his ankle, it was noted that the skin had become quite necrotic circumferential around the ankle. There was concern about vascular insufficiency, so he was seen in consultation by Vascular Surgery. They recommended an angiogram procedure. They did that the following Monday. He tolerated that well. The patient had an occluded left SFA. He tolerated that procedure without any issue and the patient continued with the external fixator in place. He went for upper endoscopy with GI that showed that there was a 2-cm hiatal hernia. The stomach was large. There was diffuse gastric mucosal edema. There was a healed and epithelialized longitudinal ulcer inside the sharp turn of the upper duodenal area. It was probably a recent ulcer that could have bled but there was no active bleeding or AVM found. He has been continued on a PPI. His blood has been transfused.   He remains anemic around 7 but asymptomatic. The patient has been participating with Physical Therapy but remains very debilitated and compromised by all of the external structures on his left lower extremity. He has been recommended to go to select long-term acute care and he does have this large right inguinal hernia that is not causing any obstruction at this point but does cause swelling into his groin and scrotum. He is tolerating the Mccray catheter in place at this point. Urology had to replace the catheter, so they recommended it stay in place for now. Today's exam, he is awake and alert in no acute distress, debilitated elderly  male. He is oriented x3. He denies any new issues. No diarrhea, chest pain or shortness of breath. Blood pressure stable at 144/52, pulse 81, temperature 97.5, respiratory rate 17, SAO2 is 98%. Noted right inguinal hernia reducible and soft, nontender. Scrotal edema. Penis has Mccray catheter in place. Left lower extremity with necrotic circumferential skin unchanged around the left ankle, external fixator in place. Wound VAC on left heel. Chronic stasis edema, dermatitis changes of both lower extremities. Lungs are clear bilaterally. Cardiac exam irregular rhythm, regular rate. Mentation is appropriate. Most recent labs; blood sugar 145, H and H 7.1 and 23.0, platelets are 425. His last chemistry shows a BUN of 7, creatinine 1.01. Potassium yesterday was 3.1. We have ordered repeat of that. The patient's echocardiogram on 05/06 showed an ejection fraction of 55%-60%. The patient will go to long-term acute care. I would recommend doing a CBC every three days just to monitor his anemia, to see if it is worsening or not.   We are going to resume his Eliquis 2.5 mg twice daily and his Plavix 75 mg daily due to his extensive disease to help promote vascular supply for healing this significantly diseased left lower extremity and because of his paroxysmal and chronic AFib/aflutter. DISCHARGE MEDICATIONS:  Recommended medications for discharge:  1. DuoNeb every four hours as needed for wheezing. 2.  Eliquis 2.5 mg twice daily. 3.  Lipitor 40 mg at night. 4.  Plavix 75 mg daily. 5.  Colace 100 mg twice daily. 6.  Ferrous sulfate 325 mg daily. 7.  Lasix 20 mg daily. 8.  Humalog KwikPen 10-15 units before meals three times daily as needed. 9.  Lantus 15 units subcu at night. 10.  Cozaar 25 mg daily. 11.  Metoprolol XL 50 mg daily. 12.  Protonix 40 mg daily. 13.  Zosyn 3.375 g IV q.6 h. until 06/14. 14.  K-Dur 20 mEq daily. 15.  Aldactone 12.5 mg daily. 16.  Flomax 0.8 mg daily. DISCHARGE INSTRUCTIONS:  The patient is on a diabetic diet. PT as tolerated. Follow up with Dr. Sara Wong, Orthopedic Surgery in 10 days. Follow up with Dr. Braulio Manjarrez, Infectious Disease, on 06/02. Follow up with PCP in two to three weeks, Dr. Roberta Navarro, after discharge from rehab. Again special instructions; repeat a CBC every three days to monitor his anemia, watch for any signs or symptoms of bleeding. Hold the aspirin for now as he is on Plavix and Eliquis. Continue wound care to that left lower extremity. If the necrotic tissue becomes worse or there are signs or symptoms of infection, please return to the appropriate medical treatment center and Dr. Anabel Smith has recommended that there may be need for a skin graft if that does not heal.  The wound VAC should continue under regular care for the heel. He is weightbearing on the frame of the left external fixator. The patient can discharge to [facility] today. Forty-five minutes on discharge time. He is a full code status.       Lorraine Lizarraga MD    Patient seen and examinded no change in plan of care  Good for disharge to day   [id number]  D:  05/24/2021 11:45  T:  05/24/2021 12:58  JOB #:  [id number]

## 2021-05-27 LAB
ORG ID BY SEQUENCING, ORI1T: NORMAL
OTHER ANTIBIOTIC SUSC ISLT: NORMAL
SPECIMEN SOURCE: NORMAL
SPECIMEN SOURCE: NORMAL

## 2021-07-20 NOTE — WOUND CARE
Debridement Wound Care        Problem List Items Addressed This Visit       None            Procedure Note  Indications:  Based on my examination of this patient's wound(s)/ulcer(s) today, debridement is required to promote healing and evaluate the wound base. Patient is a Diabetic with Status post Stravix Graft surgery- Ostemoylitis-  to the left heel. Daily taking IV antibiotics for six weeks. Status post Stravix Graft. Large open wound to the left plantar heel. Deep to the bone with slough. Sharp Debridement on the left heel. Apply Pomagram with dressing. Will consider continuing Grafts in [address] [place] [address] to help close the large wound. Performed by: Gaviota Watts DPM    Consent obtained: Yes    Time out taken: Yes    Debridement: Excisional    Using curette the wound(s)/ulcer(s) was/were sharply debrided down through and including the removal of    subcutaneous tissue    Devitalized Tissue Debrided: slough    Pre Debridement Measurements:  Are located in the Wound/Ulcer Documentation Flow Sheet    Wound/Ulcer #: 1    Post Debridement Measurements:  Wound/Ulcer Descriptions are Pre Debridement except measurements:Diabetic ulcer, necrosis of bone    Wound Heel Left (Active)   Wound Image     05/03/21 1230   Wound Etiology Diabetic Kemp 3 05/03/21 1230   Dressing Status Breakthrough drainage noted 05/03/21 1230   Cleansed Wound cleanser 05/03/21 1230   Dressing/Treatment Alginate; Cast padding;Coban/self-adherent bandages; Collagen; Foam 05/03/21 1230   Offloading for Diabetic Foot Ulcers Felt or foam 05/03/21 1230   Dressing Change Due 05/07/21 05/03/21 1230   Wound Length (cm) 2.5 cm 05/03/21 1230   Wound Width (cm) 3.3 cm 05/03/21 1230   Wound Depth (cm) 1.6 cm 05/03/21 1230   Wound Surface Area (cm^2) 8.25 cm^2 05/03/21 1230   Wound Volume (cm^3) 13.2 cm^3 05/03/21 1230   Post-Procedure Length (cm) 2.6 cm 05/03/21 1230   Post-Procedure Width (cm) 3.4 cm 05/03/21 1230   Post-Procedure Depth (cm) 1.7 cm 05/03/21 1230   Post-Procedure Surface Area (cm^2) 8.84 cm^2 05/03/21 1230   Post-Procedure Volume (cm^3) 15.03 cm^3 05/03/21 1230   Wound Assessment Devitalized tissue 05/03/21 1230   Drainage Amount Moderate 05/03/21 1230   Drainage Description Serosanguinous 05/03/21 1230   Wound Odor None 05/03/21 1230   Neha-Wound/Incision Assessment Fragile 05/03/21 1230   Edges Defined edges 05/03/21 1230   Wound Thickness Description Full thickness 05/03/21 1230   Number of days: 80       Incision 04/21/21 Foot Left (Active)   Number of days: 89       Incision 05/14/21 Foot Left (Active)   Number of days: 66        Percent of Wound(s)/Ulcer(s) Debrided: 100%    Total Surface Area Debrided:  8.25  sq cm     Diabetic/Pressure/Non Pressure Ulcers only:  Ulcer:     Estimated Blood Loss:  Minimal     Hemostasis Achieved: Pressure    Procedural Pain: 2 / 10     Post Procedural Pain: 2 / 10     Response to treatment: Well tolerated by patient

## 2021-08-30 ENCOUNTER — HOSPITAL ENCOUNTER (OUTPATIENT)
Dept: PREADMISSION TESTING | Age: 76
Discharge: HOME | End: 2021-08-30

## 2021-08-30 RX ORDER — ACETAMINOPHEN 325 MG/1
650 TABLET ORAL
COMMUNITY

## 2021-08-30 RX ORDER — ISOSORBIDE MONONITRATE 30 MG/1
30 TABLET, EXTENDED RELEASE ORAL DAILY
COMMUNITY
End: 2021-01-01

## 2021-08-30 RX ORDER — INSULIN GLARGINE 100 [IU]/ML
42 INJECTION, SOLUTION SUBCUTANEOUS DAILY
COMMUNITY
End: 2021-01-01

## 2021-08-30 RX ORDER — INSULIN LISPRO 100 [IU]/ML
INJECTION, SOLUTION INTRAVENOUS; SUBCUTANEOUS
COMMUNITY
End: 2021-01-01 | Stop reason: CLARIF

## 2021-08-30 RX ORDER — FACIAL-BODY WIPES
10 EACH TOPICAL
Status: ON HOLD | COMMUNITY
End: 2021-01-01

## 2021-08-30 RX ORDER — DOXYLAMINE SUCCINATE 25 MG
TABLET ORAL 2 TIMES DAILY
COMMUNITY
End: 2021-01-01

## 2021-08-30 RX ORDER — HYDRALAZINE HYDROCHLORIDE 25 MG/1
25 TABLET, FILM COATED ORAL EVERY 6 HOURS
COMMUNITY
End: 2021-01-01

## 2021-08-30 RX ORDER — POLYETHYLENE GLYCOL 3350 17 G/17G
17 POWDER, FOR SOLUTION ORAL DAILY
COMMUNITY
End: 2021-01-01 | Stop reason: CLARIF

## 2021-08-30 RX ORDER — BISMUTH SUBSALICYLATE 262 MG
1 TABLET,CHEWABLE ORAL DAILY
COMMUNITY

## 2021-08-30 RX ORDER — ADHESIVE BANDAGE
30 BANDAGE TOPICAL DAILY PRN
COMMUNITY
End: 2021-01-01

## 2021-08-30 RX ORDER — CHOLECALCIFEROL (VITAMIN D3) 50 MCG
CAPSULE ORAL DAILY
COMMUNITY

## 2021-08-30 RX ORDER — DICLOFENAC SODIUM 10 MG/G
GEL TOPICAL 2 TIMES DAILY
COMMUNITY
End: 2021-01-01 | Stop reason: CLARIF

## 2021-08-30 RX ORDER — SODIUM CHLORIDE, SODIUM LACTATE, POTASSIUM CHLORIDE, CALCIUM CHLORIDE 600; 310; 30; 20 MG/100ML; MG/100ML; MG/100ML; MG/100ML
125 INJECTION, SOLUTION INTRAVENOUS CONTINUOUS
Status: CANCELLED | OUTPATIENT
Start: 2021-08-30

## 2021-08-30 RX ORDER — SIMETHICONE 80 MG
125 TABLET,CHEWABLE ORAL
COMMUNITY
End: 2021-01-01

## 2021-08-30 RX ORDER — CIPROFLOXACIN 500 MG/1
500 TABLET ORAL DAILY
COMMUNITY
End: 2021-01-01 | Stop reason: CLARIF

## 2021-08-30 NOTE — PERIOP NOTES
Spoke with patient for PAT assessment. Patient states his son may be bringing him dos, but does not know for sure yet. Patient uses wheelchair to get around, but can be transported in family vehicle to appointments he states. Patient stated bloodwork and \"virus test\" were to be done at [facility] ([place], [place]), was not sure about an EKG being done. Instructed patient that he needed an EKG done as well as the other tests.

## 2021-08-30 NOTE — PERIOP NOTES
Spoke with Galina Munoz, Nursing Supervisor at [facility] [employer]. She will have MAR, last progress note faxed to [facility]. She stated Dr. Monty Cherry office had given instructions on holding Plavix pre-op, but no other medication instructions. Will review MAR and progress notes when received, will notify Galina Munoz about any other special instructions with medications when information is reviewed. The [place] Fax: [phone]. Main phone number: [phone]. After speaking with Nursing supervisor and patient, will review all records and then notify Supervisor if any further questions or information needed.

## 2021-09-01 NOTE — PERIOP NOTES
Spoke with Izzy related to patients multiple medical conditions, Izzy has requested pre-op cardiac clearance from Dr. Naty Griffin.

## 2021-09-01 NOTE — PERIOP NOTES
Requested pre-op testing results be faxed to PAT from [facility] for Anesthesia pre-op review.

## 2021-09-03 ENCOUNTER — ANESTHESIA EVENT (OUTPATIENT)
Dept: SURGERY | Age: 76
DRG: 464 | End: 2021-09-03
Payer: MEDICARE

## 2021-09-03 NOTE — PERIOP NOTES
Spoke with Margo Key, Nursing Supervisor, at [facility]. States that patient is going for a transfusion of 1 unit of PRBC's at [facility] today for a hgb 6.2. LVM for Izzy at Dr. Anupama Cedeno office. Will fax labs once received. 1111: Labs faxed to Cody Landa Aisha at Dr. Anupama Cedeno office and scanned into media.    1309: Spoke with Izzy at Dr. Anupama Cedeno office. He is ok with proceeding with surgery under MAC instead of General Anesthesia. Communicated to Dr. Dalila Grant.

## 2021-09-06 NOTE — H&P
Patient Name:  Griselda Freeze  Account #:  [de-identified]  YOB: 1945      Chief Complaint:  Postop left ankle. History of Chief Complaint:  Mr. Kaleb Kilgore comes in today. When we last saw him we released the pin site on the medial aspect of his tibia. He states it took a couple of days for the bleeding to stop. He states the wound on the bottom of his foot is slow healing. He is here for a recheck of his lower extremity. Past Medical/Surgical History:    Disease/Disorder Date Side Surgery Date Side Comment   Asthma         Atrial fibrillation         Atrial flutter         Chronic kidney disease stage 3         Coronary artery disease         Diabetes         High cholesterol         Hypertension            Cataract extraction         Colonoscopy with polypectomy      Trimalleolar fracture  left Foot surgery 05/14/2021 left      Allergies:  No known allergies.   Ingredient Reaction Medication Name Comment   NO KNOWN ALLERGIES        Current Medications:    Medication Directions   aspirin 81 mg tablet,delayed release take 1 tablet by oral route  every day   atorvastatin 40 mg tablet take 1 tablet by oral route  every day   clopidogrel 75 mg tablet take 1 tablet by oral route  every day   Eliquis 5 mg tablet take 1 tablet by oral route 2 times every day   ertapenem 1 gram solution for injection    furosemide 40 mg tablet take 1 tablet by oral route  every day   Humalog KwikPen (U-100) Insulin 100 unit/mL subcutaneous inject 10 - 15 units by subcutaneous route 3 times every day with meals   Januvia 50 mg tablet take 1 tablet by oral route  every day   Lantus U-100 Insulin 100 unit/mL subcutaneous solution inject 15 units by subcutaneous route  every day as per insulin protocol   losartan 25 mg tablet take 1 tablet by oral route  every day   metoprolol succinate ER 50 mg tablet,extended release 24 hr take 1 tablet by oral route  every day   omeprazole 20 mg capsule,delayed release take 1 capsule by oral route  every day 30 minutes to 1 hour before a meal   spironolactone 25 mg tablet take 0.5 tablet by oral route  every day     Social History:    SMOKING  Status Tobacco Type Units Per Day Yrs Used   Former smoker        ALCOHOL  There is a history of alcohol use. Type: Beer. 1 beer consumed weekly. Family History:    Disease Detail Family Member Age Cause of Death Comments   No relevant family history         Vitals:  Date BP Pulse Temp (F) Resp. (per min.) Height (Total in.) Weight (lbs.) BMI   08/30/2021     67.00 190.00 29.76   08/11/2021     67.00  29.76   08/02/2021     67.00  29.76   06/30/2021     67.00  29.76   06/16/2021     67.00  29.76     Physical Examination:  Examination shows he still has a significant amount of dry gangrene. He has no palpable pulses. The eschar appears to be peeling off slowly and granulating underneath. The pin tract that we released appears to have healed in. He has very minimal redness. The tip of the toe is slightly bluish-discolored on the great toe. The plantar wound appears to be healing slowly, but still has the eschar to the outside of the foot that appears to be new. No signs of infection. No odor. Alignment of the foot looks good. Radiograph Examination: AP and lateral views of the left ankle were obtained and interpreted in the office today and show that the proximal pins do show some lucency, no signs of breakage. Fracture appears to be well aligned. Impression:  Trimalleolar left ankle fracture, dry gangrene, possible osteomyelitis of the calcaneus, with retained external fixator. Plan: The wound on the heel is slowly improving. The frame is starting to become more bothersome. He is unable to get around well. We are going to try setting him up for removal of the frame, clearing out of the pin sites, and then fit him with a Crow walker. He is due to see Dr. Natacha Santos on 09/23/21.  We will see if he has any other recommendations for increasing his blood supply. He would like to avoid an amputation if possible. We need to get him something that we can get him out of this frame so we can try to get him more mobile. He has not seen urology yet and still has a catheter in place. He will need clearances. We will plan for outpatient left ankle removal of hardware, cleaning up of the wounds. Then we will see him back in the office in a week or two and hopefully set him up for a Crow walker at that time. He will follow up with Dr. Natacha Santos. Kalpesh Wilson MD/ Alexandr Rivera    CC Providers:  Ariadne Brock

## 2021-09-07 ENCOUNTER — APPOINTMENT (OUTPATIENT)
Dept: GENERAL RADIOLOGY | Age: 76
DRG: 464 | End: 2021-09-07
Attending: HOSPITALIST
Payer: MEDICARE

## 2021-09-07 ENCOUNTER — HOSPITAL ENCOUNTER (INPATIENT)
Age: 76
LOS: 1 days | Discharge: NURSING, REHAB OR LONG-TERM CARE | DRG: 464 | End: 2021-09-09
Attending: ORTHOPAEDIC SURGERY | Admitting: ORTHOPAEDIC SURGERY
Payer: MEDICARE

## 2021-09-07 ENCOUNTER — ANESTHESIA (OUTPATIENT)
Dept: SURGERY | Age: 76
DRG: 464 | End: 2021-09-07
Payer: MEDICARE

## 2021-09-07 ENCOUNTER — APPOINTMENT (OUTPATIENT)
Dept: GENERAL RADIOLOGY | Age: 76
DRG: 464 | End: 2021-09-07
Attending: ORTHOPAEDIC SURGERY
Payer: MEDICARE

## 2021-09-07 DIAGNOSIS — M86.672 OTHER CHRONIC OSTEOMYELITIS OF LEFT FOOT (HCC): ICD-10-CM

## 2021-09-07 DIAGNOSIS — Z98.890 S/P FOOT SURGERY, LEFT: Primary | ICD-10-CM

## 2021-09-07 DIAGNOSIS — I73.9 PVD (PERIPHERAL VASCULAR DISEASE) (HCC): ICD-10-CM

## 2021-09-07 LAB
ANION GAP SERPL CALC-SCNC: 11 MMOL/L (ref 3–18)
BUN SERPL-MCNC: 30 MG/DL (ref 7–18)
BUN/CREAT SERPL: 20 (ref 12–20)
CALCIUM SERPL-MCNC: 8.6 MG/DL (ref 8.5–10.1)
CHLORIDE SERPL-SCNC: 109 MMOL/L (ref 100–111)
CO2 SERPL-SCNC: 23 MMOL/L (ref 21–32)
COVID-19 RAPID TEST, COVR: NOT DETECTED
CREAT SERPL-MCNC: 1.47 MG/DL (ref 0.6–1.3)
EST. AVERAGE GLUCOSE BLD GHB EST-MCNC: 128 MG/DL
GLUCOSE BLD STRIP.AUTO-MCNC: 129 MG/DL (ref 70–110)
GLUCOSE BLD STRIP.AUTO-MCNC: 153 MG/DL (ref 70–110)
GLUCOSE BLD STRIP.AUTO-MCNC: 168 MG/DL (ref 70–110)
GLUCOSE BLD STRIP.AUTO-MCNC: 179 MG/DL (ref 70–110)
GLUCOSE SERPL-MCNC: 160 MG/DL (ref 74–99)
HBA1C MFR BLD: 6.1 % (ref 4.2–5.6)
HCT VFR BLD AUTO: 30.8 % (ref 36–48)
HGB BLD-MCNC: 8.8 G/DL (ref 13–16)
POTASSIUM SERPL-SCNC: 4.3 MMOL/L (ref 3.5–5.5)
SODIUM SERPL-SCNC: 143 MMOL/L (ref 136–145)
SOURCE, COVRS: NORMAL

## 2021-09-07 PROCEDURE — 71045 X-RAY EXAM CHEST 1 VIEW: CPT

## 2021-09-07 PROCEDURE — 0SPG35Z REMOVAL OF EXTERNAL FIXATION DEVICE FROM LEFT ANKLE JOINT, PERCUTANEOUS APPROACH: ICD-10-PCS | Performed by: ORTHOPAEDIC SURGERY

## 2021-09-07 PROCEDURE — 87040 BLOOD CULTURE FOR BACTERIA: CPT

## 2021-09-07 PROCEDURE — 82962 GLUCOSE BLOOD TEST: CPT

## 2021-09-07 PROCEDURE — 74011636637 HC RX REV CODE- 636/637: Performed by: ANESTHESIOLOGY

## 2021-09-07 PROCEDURE — 76210000016 HC OR PH I REC 1 TO 1.5 HR: Performed by: ORTHOPAEDIC SURGERY

## 2021-09-07 PROCEDURE — 74011000250 HC RX REV CODE- 250: Performed by: ORTHOPAEDIC SURGERY

## 2021-09-07 PROCEDURE — 36415 COLL VENOUS BLD VENIPUNCTURE: CPT

## 2021-09-07 PROCEDURE — 76060000032 HC ANESTHESIA 0.5 TO 1 HR: Performed by: ORTHOPAEDIC SURGERY

## 2021-09-07 PROCEDURE — 76010000138 HC OR TIME 0.5 TO 1 HR: Performed by: ORTHOPAEDIC SURGERY

## 2021-09-07 PROCEDURE — 0HRNXK3 REPLACEMENT OF LEFT FOOT SKIN WITH NONAUTOLOGOUS TISSUE SUBSTITUTE, FULL THICKNESS, EXTERNAL APPROACH: ICD-10-PCS | Performed by: ORTHOPAEDIC SURGERY

## 2021-09-07 PROCEDURE — 74011250636 HC RX REV CODE- 250/636: Performed by: ORTHOPAEDIC SURGERY

## 2021-09-07 PROCEDURE — 74011250636 HC RX REV CODE- 250/636: Performed by: ANESTHESIOLOGY

## 2021-09-07 PROCEDURE — 77030040361 HC SLV COMPR DVT MDII -B: Performed by: ORTHOPAEDIC SURGERY

## 2021-09-07 PROCEDURE — 74011250636 HC RX REV CODE- 250/636: Performed by: NURSE ANESTHETIST, CERTIFIED REGISTERED

## 2021-09-07 PROCEDURE — 80048 BASIC METABOLIC PNL TOTAL CA: CPT

## 2021-09-07 PROCEDURE — 74011636637 HC RX REV CODE- 636/637: Performed by: HOSPITALIST

## 2021-09-07 PROCEDURE — 99218 HC RM OBSERVATION: CPT

## 2021-09-07 PROCEDURE — 74011250637 HC RX REV CODE- 250/637: Performed by: ORTHOPAEDIC SURGERY

## 2021-09-07 PROCEDURE — 83036 HEMOGLOBIN GLYCOSYLATED A1C: CPT

## 2021-09-07 PROCEDURE — C1889 IMPLANT/INSERT DEVICE, NOC: HCPCS | Performed by: ORTHOPAEDIC SURGERY

## 2021-09-07 PROCEDURE — 2709999900 HC NON-CHARGEABLE SUPPLY: Performed by: ORTHOPAEDIC SURGERY

## 2021-09-07 PROCEDURE — 87635 SARS-COV-2 COVID-19 AMP PRB: CPT

## 2021-09-07 PROCEDURE — 85014 HEMATOCRIT: CPT

## 2021-09-07 PROCEDURE — 74011000250 HC RX REV CODE- 250: Performed by: ANESTHESIOLOGY

## 2021-09-07 PROCEDURE — 97162 PT EVAL MOD COMPLEX 30 MIN: CPT

## 2021-09-07 DEVICE — GRAFT HUM TISS W2XL4CM CRYOPRESERVED PLCNTA TISS UMB AMNION: Type: IMPLANTABLE DEVICE | Site: HEEL | Status: FUNCTIONAL

## 2021-09-07 RX ORDER — ACETAMINOPHEN 325 MG/1
650 TABLET ORAL
Status: DISCONTINUED | OUTPATIENT
Start: 2021-09-07 | End: 2021-09-09 | Stop reason: HOSPADM

## 2021-09-07 RX ORDER — OXYCODONE AND ACETAMINOPHEN 5; 325 MG/1; MG/1
1 TABLET ORAL AS NEEDED
Status: DISCONTINUED | OUTPATIENT
Start: 2021-09-07 | End: 2021-09-07 | Stop reason: HOSPADM

## 2021-09-07 RX ORDER — OXYCODONE AND ACETAMINOPHEN 5; 325 MG/1; MG/1
1 TABLET ORAL
Status: DISCONTINUED | OUTPATIENT
Start: 2021-09-07 | End: 2021-09-09 | Stop reason: HOSPADM

## 2021-09-07 RX ORDER — HYDROMORPHONE HYDROCHLORIDE 2 MG/1
2 TABLET ORAL
Status: DISCONTINUED | OUTPATIENT
Start: 2021-09-07 | End: 2021-09-09 | Stop reason: HOSPADM

## 2021-09-07 RX ORDER — NALOXONE HYDROCHLORIDE 0.4 MG/ML
0.4 INJECTION, SOLUTION INTRAMUSCULAR; INTRAVENOUS; SUBCUTANEOUS AS NEEDED
Status: DISCONTINUED | OUTPATIENT
Start: 2021-09-07 | End: 2021-09-09 | Stop reason: HOSPADM

## 2021-09-07 RX ORDER — MAGNESIUM SULFATE 100 %
4 CRYSTALS MISCELLANEOUS AS NEEDED
Status: DISCONTINUED | OUTPATIENT
Start: 2021-09-07 | End: 2021-09-07 | Stop reason: HOSPADM

## 2021-09-07 RX ORDER — INSULIN LISPRO 100 [IU]/ML
INJECTION, SOLUTION INTRAVENOUS; SUBCUTANEOUS ONCE
Status: COMPLETED | OUTPATIENT
Start: 2021-09-07 | End: 2021-09-07

## 2021-09-07 RX ORDER — FACIAL-BODY WIPES
10 EACH TOPICAL DAILY PRN
Status: DISCONTINUED | OUTPATIENT
Start: 2021-09-07 | End: 2021-09-09 | Stop reason: HOSPADM

## 2021-09-07 RX ORDER — FENTANYL CITRATE 50 UG/ML
25 INJECTION, SOLUTION INTRAMUSCULAR; INTRAVENOUS AS NEEDED
Status: DISCONTINUED | OUTPATIENT
Start: 2021-09-07 | End: 2021-09-07 | Stop reason: HOSPADM

## 2021-09-07 RX ORDER — ONDANSETRON 2 MG/ML
INJECTION INTRAMUSCULAR; INTRAVENOUS AS NEEDED
Status: DISCONTINUED | OUTPATIENT
Start: 2021-09-07 | End: 2021-09-07 | Stop reason: HOSPADM

## 2021-09-07 RX ORDER — DOXYLAMINE SUCCINATE 25 MG
TABLET ORAL 2 TIMES DAILY
Status: DISCONTINUED | OUTPATIENT
Start: 2021-09-07 | End: 2021-09-09 | Stop reason: HOSPADM

## 2021-09-07 RX ORDER — INSULIN LISPRO 100 [IU]/ML
INJECTION, SOLUTION INTRAVENOUS; SUBCUTANEOUS
Status: DISCONTINUED | OUTPATIENT
Start: 2021-09-07 | End: 2021-09-08

## 2021-09-07 RX ORDER — HYDROMORPHONE HYDROCHLORIDE 1 MG/ML
1 INJECTION, SOLUTION INTRAMUSCULAR; INTRAVENOUS; SUBCUTANEOUS
Status: DISCONTINUED | OUTPATIENT
Start: 2021-09-07 | End: 2021-09-09 | Stop reason: HOSPADM

## 2021-09-07 RX ORDER — FLUCONAZOLE 100 MG/1
200 TABLET ORAL DAILY
Status: DISCONTINUED | OUTPATIENT
Start: 2021-09-08 | End: 2021-09-09 | Stop reason: HOSPADM

## 2021-09-07 RX ORDER — SIMETHICONE 80 MG
80 TABLET,CHEWABLE ORAL
Status: DISCONTINUED | OUTPATIENT
Start: 2021-09-07 | End: 2021-09-09 | Stop reason: HOSPADM

## 2021-09-07 RX ORDER — NALOXONE HYDROCHLORIDE 0.4 MG/ML
0.2 INJECTION, SOLUTION INTRAMUSCULAR; INTRAVENOUS; SUBCUTANEOUS AS NEEDED
Status: DISCONTINUED | OUTPATIENT
Start: 2021-09-07 | End: 2021-09-07 | Stop reason: HOSPADM

## 2021-09-07 RX ORDER — SODIUM CHLORIDE, SODIUM LACTATE, POTASSIUM CHLORIDE, CALCIUM CHLORIDE 600; 310; 30; 20 MG/100ML; MG/100ML; MG/100ML; MG/100ML
125 INJECTION, SOLUTION INTRAVENOUS CONTINUOUS
Status: DISCONTINUED | OUTPATIENT
Start: 2021-09-07 | End: 2021-09-08

## 2021-09-07 RX ORDER — SODIUM CHLORIDE 0.9 % (FLUSH) 0.9 %
5-40 SYRINGE (ML) INJECTION EVERY 8 HOURS
Status: DISCONTINUED | OUTPATIENT
Start: 2021-09-07 | End: 2021-09-09 | Stop reason: HOSPADM

## 2021-09-07 RX ORDER — FLUCONAZOLE 200 MG/1
200 TABLET ORAL DAILY
COMMUNITY
End: 2021-01-01 | Stop reason: CLARIF

## 2021-09-07 RX ORDER — DEXTROSE 50 % IN WATER (D50W) INTRAVENOUS SYRINGE
25-50 AS NEEDED
Status: DISCONTINUED | OUTPATIENT
Start: 2021-09-07 | End: 2021-09-07 | Stop reason: HOSPADM

## 2021-09-07 RX ORDER — ADHESIVE BANDAGE
30 BANDAGE TOPICAL DAILY PRN
Status: DISCONTINUED | OUTPATIENT
Start: 2021-09-07 | End: 2021-09-09 | Stop reason: HOSPADM

## 2021-09-07 RX ORDER — PHENYLEPHRINE HCL IN 0.9% NACL 1 MG/10 ML
SYRINGE (ML) INTRAVENOUS AS NEEDED
Status: DISCONTINUED | OUTPATIENT
Start: 2021-09-07 | End: 2021-09-07 | Stop reason: HOSPADM

## 2021-09-07 RX ORDER — HYDROMORPHONE HYDROCHLORIDE 1 MG/ML
0.5 INJECTION, SOLUTION INTRAMUSCULAR; INTRAVENOUS; SUBCUTANEOUS
Status: DISCONTINUED | OUTPATIENT
Start: 2021-09-07 | End: 2021-09-07 | Stop reason: HOSPADM

## 2021-09-07 RX ORDER — PROVIDONE IODINE 10 MG/ML
1 STICK TOPICAL 2 TIMES DAILY
Status: DISCONTINUED | OUTPATIENT
Start: 2021-09-07 | End: 2021-09-09 | Stop reason: HOSPADM

## 2021-09-07 RX ORDER — SPIRONOLACTONE 25 MG/1
12.5 TABLET ORAL DAILY
Status: DISCONTINUED | OUTPATIENT
Start: 2021-09-08 | End: 2021-09-09 | Stop reason: HOSPADM

## 2021-09-07 RX ORDER — SAME BUTANEDISULFONATE/BETAINE 400-600 MG
250 POWDER IN PACKET (EA) ORAL DAILY
Status: DISCONTINUED | OUTPATIENT
Start: 2021-09-08 | End: 2021-09-09 | Stop reason: HOSPADM

## 2021-09-07 RX ORDER — CEFAZOLIN SODIUM/WATER 2 G/20 ML
2 SYRINGE (ML) INTRAVENOUS EVERY 8 HOURS
Status: COMPLETED | OUTPATIENT
Start: 2021-09-07 | End: 2021-09-08

## 2021-09-07 RX ORDER — PROPOFOL 10 MG/ML
INJECTION, EMULSION INTRAVENOUS AS NEEDED
Status: DISCONTINUED | OUTPATIENT
Start: 2021-09-07 | End: 2021-09-07 | Stop reason: HOSPADM

## 2021-09-07 RX ORDER — ATORVASTATIN CALCIUM 20 MG/1
40 TABLET, FILM COATED ORAL
Status: DISCONTINUED | OUTPATIENT
Start: 2021-09-07 | End: 2021-09-09 | Stop reason: HOSPADM

## 2021-09-07 RX ORDER — HYDRALAZINE HYDROCHLORIDE 50 MG/1
25 TABLET, FILM COATED ORAL EVERY 6 HOURS
Status: DISCONTINUED | OUTPATIENT
Start: 2021-09-07 | End: 2021-09-09 | Stop reason: HOSPADM

## 2021-09-07 RX ORDER — MAGNESIUM SULFATE 100 %
4 CRYSTALS MISCELLANEOUS AS NEEDED
Status: DISCONTINUED | OUTPATIENT
Start: 2021-09-07 | End: 2021-09-09 | Stop reason: HOSPADM

## 2021-09-07 RX ORDER — DIPHENHYDRAMINE HYDROCHLORIDE 50 MG/ML
12.5 INJECTION, SOLUTION INTRAMUSCULAR; INTRAVENOUS
Status: DISCONTINUED | OUTPATIENT
Start: 2021-09-07 | End: 2021-09-07 | Stop reason: HOSPADM

## 2021-09-07 RX ORDER — SODIUM CHLORIDE, SODIUM LACTATE, POTASSIUM CHLORIDE, CALCIUM CHLORIDE 600; 310; 30; 20 MG/100ML; MG/100ML; MG/100ML; MG/100ML
1000 INJECTION, SOLUTION INTRAVENOUS CONTINUOUS
Status: DISCONTINUED | OUTPATIENT
Start: 2021-09-07 | End: 2021-09-07 | Stop reason: HOSPADM

## 2021-09-07 RX ORDER — MIDAZOLAM HYDROCHLORIDE 1 MG/ML
INJECTION, SOLUTION INTRAMUSCULAR; INTRAVENOUS AS NEEDED
Status: DISCONTINUED | OUTPATIENT
Start: 2021-09-07 | End: 2021-09-07 | Stop reason: HOSPADM

## 2021-09-07 RX ORDER — FACIAL-BODY WIPES
10 EACH TOPICAL
Status: DISCONTINUED | OUTPATIENT
Start: 2021-09-07 | End: 2021-09-09 | Stop reason: HOSPADM

## 2021-09-07 RX ORDER — INSULIN GLARGINE 100 [IU]/ML
10 INJECTION, SOLUTION SUBCUTANEOUS DAILY
Status: DISCONTINUED | OUTPATIENT
Start: 2021-09-07 | End: 2021-09-09 | Stop reason: HOSPADM

## 2021-09-07 RX ORDER — DEXTROSE 50 % IN WATER (D50W) INTRAVENOUS SYRINGE
25-50 AS NEEDED
Status: DISCONTINUED | OUTPATIENT
Start: 2021-09-07 | End: 2021-09-09 | Stop reason: HOSPADM

## 2021-09-07 RX ORDER — ONDANSETRON 4 MG/1
4 TABLET, ORALLY DISINTEGRATING ORAL
Status: DISCONTINUED | OUTPATIENT
Start: 2021-09-07 | End: 2021-09-09 | Stop reason: HOSPADM

## 2021-09-07 RX ORDER — SODIUM CHLORIDE 9 MG/ML
75 INJECTION, SOLUTION INTRAVENOUS CONTINUOUS
Status: DISCONTINUED | OUTPATIENT
Start: 2021-09-07 | End: 2021-09-08

## 2021-09-07 RX ORDER — ALBUTEROL SULFATE 0.83 MG/ML
2.5 SOLUTION RESPIRATORY (INHALATION) AS NEEDED
Status: DISCONTINUED | OUTPATIENT
Start: 2021-09-07 | End: 2021-09-07 | Stop reason: HOSPADM

## 2021-09-07 RX ORDER — PANTOPRAZOLE SODIUM 40 MG/1
40 TABLET, DELAYED RELEASE ORAL
Status: DISCONTINUED | OUTPATIENT
Start: 2021-09-08 | End: 2021-09-09 | Stop reason: HOSPADM

## 2021-09-07 RX ORDER — MAGNESIUM SULFATE 100 %
4 CRYSTALS MISCELLANEOUS AS NEEDED
Status: DISCONTINUED | OUTPATIENT
Start: 2021-09-07 | End: 2021-09-07

## 2021-09-07 RX ORDER — GLYCOPYRROLATE 0.2 MG/ML
INJECTION INTRAMUSCULAR; INTRAVENOUS AS NEEDED
Status: DISCONTINUED | OUTPATIENT
Start: 2021-09-07 | End: 2021-09-07 | Stop reason: HOSPADM

## 2021-09-07 RX ORDER — KETAMINE HYDROCHLORIDE 10 MG/ML
INJECTION, SOLUTION INTRAMUSCULAR; INTRAVENOUS AS NEEDED
Status: DISCONTINUED | OUTPATIENT
Start: 2021-09-07 | End: 2021-09-07 | Stop reason: HOSPADM

## 2021-09-07 RX ORDER — INSULIN LISPRO 100 [IU]/ML
4 INJECTION, SOLUTION INTRAVENOUS; SUBCUTANEOUS
Status: DISCONTINUED | OUTPATIENT
Start: 2021-09-07 | End: 2021-09-09 | Stop reason: HOSPADM

## 2021-09-07 RX ORDER — ISOSORBIDE MONONITRATE 30 MG/1
30 TABLET, EXTENDED RELEASE ORAL DAILY
Status: DISCONTINUED | OUTPATIENT
Start: 2021-09-08 | End: 2021-09-09 | Stop reason: HOSPADM

## 2021-09-07 RX ORDER — INSULIN LISPRO 100 [IU]/ML
INJECTION, SOLUTION INTRAVENOUS; SUBCUTANEOUS ONCE
Status: DISCONTINUED | OUTPATIENT
Start: 2021-09-07 | End: 2021-09-07 | Stop reason: HOSPADM

## 2021-09-07 RX ORDER — INSULIN LISPRO 100 [IU]/ML
INJECTION, SOLUTION INTRAVENOUS; SUBCUTANEOUS
Status: DISCONTINUED | OUTPATIENT
Start: 2021-09-07 | End: 2021-09-09 | Stop reason: HOSPADM

## 2021-09-07 RX ORDER — SODIUM CHLORIDE 0.9 % (FLUSH) 0.9 %
5-40 SYRINGE (ML) INJECTION AS NEEDED
Status: DISCONTINUED | OUTPATIENT
Start: 2021-09-07 | End: 2021-09-09 | Stop reason: HOSPADM

## 2021-09-07 RX ORDER — METOPROLOL SUCCINATE 50 MG/1
50 TABLET, EXTENDED RELEASE ORAL DAILY
Status: DISCONTINUED | OUTPATIENT
Start: 2021-09-08 | End: 2021-09-09 | Stop reason: HOSPADM

## 2021-09-07 RX ORDER — FLUMAZENIL 0.1 MG/ML
0.2 INJECTION INTRAVENOUS
Status: DISCONTINUED | OUTPATIENT
Start: 2021-09-07 | End: 2021-09-07 | Stop reason: HOSPADM

## 2021-09-07 RX ORDER — LANOLIN ALCOHOL/MO/W.PET/CERES
325 CREAM (GRAM) TOPICAL
Status: DISCONTINUED | OUTPATIENT
Start: 2021-09-08 | End: 2021-09-09 | Stop reason: HOSPADM

## 2021-09-07 RX ORDER — CIPROFLOXACIN 500 MG/1
500 TABLET ORAL DAILY
Status: DISCONTINUED | OUTPATIENT
Start: 2021-09-08 | End: 2021-09-09 | Stop reason: HOSPADM

## 2021-09-07 RX ORDER — POLYETHYLENE GLYCOL 3350 17 G/17G
17 POWDER, FOR SOLUTION ORAL DAILY
Status: DISCONTINUED | OUTPATIENT
Start: 2021-09-08 | End: 2021-09-09 | Stop reason: HOSPADM

## 2021-09-07 RX ORDER — DICLOFENAC SODIUM 10 MG/G
2 GEL TOPICAL 2 TIMES DAILY
Status: DISCONTINUED | OUTPATIENT
Start: 2021-09-07 | End: 2021-09-09 | Stop reason: HOSPADM

## 2021-09-07 RX ORDER — CEFAZOLIN SODIUM 1 G/3ML
INJECTION, POWDER, FOR SOLUTION INTRAMUSCULAR; INTRAVENOUS AS NEEDED
Status: DISCONTINUED | OUTPATIENT
Start: 2021-09-07 | End: 2021-09-07 | Stop reason: HOSPADM

## 2021-09-07 RX ORDER — TAMSULOSIN HYDROCHLORIDE 0.4 MG/1
0.8 CAPSULE ORAL DAILY
Status: DISCONTINUED | OUTPATIENT
Start: 2021-09-08 | End: 2021-09-09 | Stop reason: HOSPADM

## 2021-09-07 RX ADMIN — ATORVASTATIN CALCIUM 40 MG: 20 TABLET, FILM COATED ORAL at 22:19

## 2021-09-07 RX ADMIN — Medication 100 MCG: at 13:26

## 2021-09-07 RX ADMIN — INSULIN LISPRO 4 UNITS: 100 INJECTION, SOLUTION INTRAVENOUS; SUBCUTANEOUS at 18:00

## 2021-09-07 RX ADMIN — MIDAZOLAM 1 MG: 1 INJECTION INTRAMUSCULAR; INTRAVENOUS at 12:36

## 2021-09-07 RX ADMIN — KETAMINE HYDROCHLORIDE 10 MG: 10 INJECTION, SOLUTION INTRAMUSCULAR; INTRAVENOUS at 12:50

## 2021-09-07 RX ADMIN — ONDANSETRON HYDROCHLORIDE 4 MG: 2 INJECTION INTRAMUSCULAR; INTRAVENOUS at 13:27

## 2021-09-07 RX ADMIN — CEFAZOLIN SODIUM 2 G: 100 INJECTION, POWDER, LYOPHILIZED, FOR SOLUTION INTRAVENOUS at 22:19

## 2021-09-07 RX ADMIN — SODIUM CHLORIDE 75 ML/HR: 900 INJECTION, SOLUTION INTRAVENOUS at 16:02

## 2021-09-07 RX ADMIN — KETAMINE HYDROCHLORIDE 10 MG: 10 INJECTION, SOLUTION INTRAMUSCULAR; INTRAVENOUS at 12:56

## 2021-09-07 RX ADMIN — SODIUM CHLORIDE, SODIUM LACTATE, POTASSIUM CHLORIDE, AND CALCIUM CHLORIDE 125 ML/HR: 600; 310; 30; 20 INJECTION, SOLUTION INTRAVENOUS at 18:50

## 2021-09-07 RX ADMIN — PROPOFOL 20 MG: 10 INJECTION, EMULSION INTRAVENOUS at 13:14

## 2021-09-07 RX ADMIN — INSULIN LISPRO 3 UNITS: 100 INJECTION, SOLUTION INTRAVENOUS; SUBCUTANEOUS at 11:46

## 2021-09-07 RX ADMIN — KETAMINE HYDROCHLORIDE 20 MG: 10 INJECTION, SOLUTION INTRAMUSCULAR; INTRAVENOUS at 12:45

## 2021-09-07 RX ADMIN — CEFAZOLIN 2 G: 1 INJECTION, POWDER, FOR SOLUTION INTRAMUSCULAR; INTRAVENOUS at 12:47

## 2021-09-07 RX ADMIN — INSULIN GLARGINE 10 UNITS: 100 INJECTION, SOLUTION SUBCUTANEOUS at 18:49

## 2021-09-07 RX ADMIN — Medication 50 MCG: at 13:19

## 2021-09-07 RX ADMIN — Medication 100 MCG: at 13:23

## 2021-09-07 RX ADMIN — KETAMINE HYDROCHLORIDE 10 MG: 10 INJECTION, SOLUTION INTRAMUSCULAR; INTRAVENOUS at 13:00

## 2021-09-07 RX ADMIN — SODIUM CHLORIDE 75 ML/HR: 900 INJECTION, SOLUTION INTRAVENOUS at 12:05

## 2021-09-07 RX ADMIN — INSULIN LISPRO 2 UNITS: 100 INJECTION, SOLUTION INTRAVENOUS; SUBCUTANEOUS at 22:26

## 2021-09-07 RX ADMIN — MIDAZOLAM 1 MG: 1 INJECTION INTRAMUSCULAR; INTRAVENOUS at 12:45

## 2021-09-07 RX ADMIN — GLYCOPYRROLATE 0.2 MG: 0.2 INJECTION INTRAMUSCULAR; INTRAVENOUS at 12:36

## 2021-09-07 RX ADMIN — Medication 100 MCG: at 13:40

## 2021-09-07 RX ADMIN — PROPOFOL 20 MG: 10 INJECTION, EMULSION INTRAVENOUS at 13:00

## 2021-09-07 NOTE — CONSULTS
[address] [facility]    Name:  Herson Trinidad  MR#:   [id number]  :  1945  ACCOUNT #:  [de-identified]  DATE OF SERVICE:  2021    HISTORY OF PRESENT ILLNESS:  This 68-year-old gentleman with a complicated left ankle fracture came in for external fixator adjustment and removal with skin graft by Dr. Darnell Leo. I was asked see the patient because preoperatively he had been hypotensive and is anemic. The patient has been recently at a rehab facility recovering from this extensive debilitating fracture. He was most recently here in May where he had multiple procedures and consultations done for a left ankle fracture status post external fixation. During that time he had a heel ulcer with a wound vac in place. Osteomyelitis was treated. He had peripheral vascular disease and received angioplasty for that. He had GI bleeding that was seen by Gastroenterology and resolved as well as acute on chronic kidney disease and chronic urinary retention which required a Mccray catheter. PAST MEDICAL HISTORY:  As noted:  1. Ankle fracture. 2.  Osteomyelitis. 3.  Peripheral vascular disease. 4.  Insulin-dependent diabetes mellitus type 2.  5.  Anemia. 6.  GI bleeding. 7.  Coronary disease. 8.  Hypertension. 9.  Chronic kidney disease stage III. 10.  Right inguinal hernia. 11.  Urinary retention. The patient's primary care doctor is Dr. Thor Brumfield. MEDICATIONS:  His medications that he is on at home include the followin. Lipitor. 2.  Probiotic. 3.  Oral Cipro. 4.  Ferrous sulfate. 5.  Diflucan. 6.  Hydralazine. 7.  Lantus. 8.  Lispro. 9.  Imdur. 10.  Multivitamin. 11.  Protonix. 12.  Flomax. PAST SURGERIES:  The patient's previous surgeries include multiple orthopedic procedures on this ankle, polypectomy, foot surgery. SOCIAL HISTORY:  Former smoker and alcohol drinker, none recently.     FAMILY HISTORY:  No notable family history outside of diabetes and high blood pressure. REVIEW OF SYSTEMS:  Generally he is recovering in the PACU, so it is limited but he denies any uncontrolled pain. He denies fevers or chills. He denies chest pain or palpitations. He denies shortness of breath or cough. He states he has had COVID earlier this year. He has not been vaccinated. He denies any nausea, vomiting, or recent diarrhea. He has a Mccray catheter chronically in place. PHYSICAL EXAMINATION:  GENERAL:  This is an elderly debilitated, chronically ill-appearing  male who is awake and alert, able to communicate normally. VITAL SIGNS:  Saturating 98% on room air, respiratory rate 17, blood pressure is 128/50, pulse 70, temperature 97.8. LUNGS:  Clear anteriorly. CARDIAC:  Regular rate and rhythm. ABDOMEN:  Overweight, nontender, no distention. Diminished bowel sounds. EXTREMITIES:  Left lower extremity is in a dressing. Necrotic tissue seen in the left side of the left great toe. Right lower extremity, trace ankle edema. NEUROLOGIC:  Appears intact. LABORATORY DATA:  Most recent labs show blood sugar 129. His chest x-ray has been done. It shows no acute cardiopulmonary disease. H and H from this morning 8.8 and 30.8. Creatinine 1.47 consistent with chronic kidney disease and his sodium, potassium, bicarb are within normal limits. ASSESSMENT:  1.  Status post external fixator removal and skin graft to the left ankle and foot. 2.  Peripheral vascular disease. 3.  Prior left ankle fracture. 4.  Insulin-dependent diabetes mellitus. 5.  Chronic kidney disease stage III. 6.  Hypertension. 7.  Chronic urinary retention with Mccray catheter in place. 8.  Low blood pressure. 9.  Anemia. DISCUSSION:  I think the anemia is likely stable. Blood pressure has come up after hydration.   I think it is appropriate to get urinalysis, urine cultures, and blood cultures to ensure there is no signs or symptoms of infection and monitor his blood pressure overnight. His regular medications have been ordered for him to resume. He is not usually on a whole lot of blood pressure medications. He does have a beta-blocker. He is on hydralazine and Protonix. He takes a low dose of Aldactone. Continue his statin and Flomax. He is on oral suppressive antibiotics because of the prior bone infection. Of note, he does have a history of A-flutter. He is off anticoagulation at this point due to history of GI bleeding and ulcer that he has had in the past.  We will monitor closely, repeat his labs in the morning. Recommend continue gentle hydration overnight. No other antibiotics are indicated at this time. Rapid COVID test was ordered. Cultures as noted. We will follow his blood sugars a.c., at bedtime which has been ordered, and the patient is stable for admission to the medical side or surgical unit with close monitoring there. I discussed the case earlier with Dr. Conchis Powers.       Stephie Cole MD      [id number]  D:  09/07/2021 16:07  T:  09/07/2021 17:53  JOB #:  [id number]

## 2021-09-07 NOTE — PERIOP NOTES
Patient transferred to room 209 via bed, awake, alert, stable, no bleeding, dual skin assessment completed with Elena Vargas RN in the room, all questions answered. Blood pressure (!) 128/50, pulse 70, temperature 97.8 °F (36.6 °C), resp. rate 17, height 5' 8\" (1.727 m), weight 106.1 kg (234 lb), SpO2 98 %.

## 2021-09-07 NOTE — OP NOTES
[facility] [place]  OPERATIVE REPORT    Name:  Sushma Moreland  MR#:   [id number]  :  1945  ACCOUNT #:  [de-identified]  DATE OF SERVICE:  2021    PREOPERATIVE DIAGNOSES:  Left foot diabetic ulcer, left foot dry gangrene, left foot ankle fracture. POSTOPERATIVE DIAGNOSES:  Partially healed ankle fracture, symptomatic hardware, plantar wound partially healed, dry gangrene with some exposed deep soft tissue. PROCEDURES PERFORMED:  Left ankle removal of external fixator and debridement, debridement of skin, as well as placement of allograft tissue for wound. SURGEON:  Jeana Dueñas. Iona Calixto MD    ASSISTANT:  Blank Monzon. ANESTHESIA:  MAC.    COMPLICATIONS:  none. SPECIMENS REMOVED:  none. IMPLANTS:  Human tissue placenta graft, 2 x 4, cryopreserved. DRAINS:  None. Postoperative immobilization with splint, nonweightbearing. ESTIMATED BLOOD LOSS:  Less than 100. INDICATIONS:  The patient presents with long-standing medical issues and left lower extremity ankle fracture while being treated for a plantar wound. He was not able to stay off of this and he fell and had sustained a bimalleolar ankle fracture, which due to his medical condition we placed him on an external fixator to try to help the wound and the fracture. He has been seen in the office regularly. He has significant amount of dry gangrene. He has been seen by Vascular and without signs of infection. On x-rays, it looked like the fracture is healing. He was obviously having pain at the proximal pin. We tried releasing the pin and felt that we would try to remove the frame now three months and place him on a CROW walker. He understands the risks and benefits. He did have significant hypotension in the preop holding area. This came up slightly with hydration. He is on multiple blood pressure medications. He is staying at a transitional care facility.   After discussing with Medicine, we decided to admit him overnight for observation. PROCEDURE:  After marking, he was taken to the operating room and he was given some sedation and then we cut the wires at the outside of the frame after spraying the frame with Betadine. We then sprayed the foot again with Betadine and then removed the wires without difficulty. We then prepped the foot with the Betadine and Betadine prep, and then after prepping and draping, we then curetted out. The pin sites were irrigated with normal saline and with Betadine and then removed as much dry skin as we felt safe. He had significant thickened dry gangrene. We removed some of this in the plantar aspect of the foot, where he had healed most of the wound. On the plantar aspect, there was curling around the lateral side of the foot, which had not healed. We took off some of the anterior eschar and dry tissue until we did feel like we are getting down, where he might have some exposed deep tissue. Therefore, we stopped, cleaned the wounds and placed the umbilical cord into the plantar wound. We sutured this in place with chromic. The area over the front of the leg, where it seemed to be just close to exposing the tendon, we placed the remaining graft there and sutured this in place. We placed Adaptic over the wound, soft dressing applied. Three-sided bulky Hanks splint to try to keep any shear forces from happening. He was never intubated, postop recovery room, stable. Our plan is overnight observation by Medicine, possible discharge to rehab nonweightbearing. We plan to place him on a CROW walker as soon as we feel like his tissues are stable. He does have a followup with Dr. Lilliam Weller, Vascular, later this month.       Margarito Atkinson MD      [id number]  D:  09/07/2021 13:50  T:  09/07/2021 18:22  JOB #:  [id number]

## 2021-09-07 NOTE — ANESTHESIA POSTPROCEDURE EVALUATION
Post-Anesthesia Evaluation and Assessment    Cardiovascular Function/Vital Signs  Visit Vitals  BP (!) 129/51   Pulse 72   Temp 36.2 °C (97.1 °F)   Resp 14   Ht 5' 8\" (1.727 m)   Wt 106.1 kg (234 lb)   SpO2 95%   BMI 35.58 kg/m²       Patient is status post Procedure(s):  LEFT ANKLE REMOVAL OF EXTERNAL FIXATOR WITH SKIN GRAFT ALLOGRAFT, LARGE C-ARM \"SPEC POP\". Nausea/Vomiting: Controlled. Postoperative hydration reviewed and adequate. Pain:  Pain Scale 1: FLACC (09/07/21 1438)  Pain Intensity 1: 0 (09/07/21 1438)   Managed. Neurological Status:   Neuro (WDL): Exceptions to WDL (bedrest, has external fixator on left foot) (09/07/21 1148)   At baseline. Mental Status and Level of Consciousness: Arousable. Pulmonary Status:   O2 Device: None (Room air) (09/07/21 1438)   Adequate oxygenation and airway patent. Complications related to anesthesia: None    Post-anesthesia assessment completed. No concerns. Patient has met all discharge requirements.     Signed By: Alvin Herr CRNA    September 7, 2021

## 2021-09-07 NOTE — PERIOP NOTES
Blood Glucose 129. Patient's skin is pale, flaky, pilling, great toe on left foot is black (as preop), patient has redness in groin, wearing briefs, small opening noted on sacrum, Mccray is draining cloudy urine. Doctor Darnell Leo is aware. Patient is going to be admitted under hospitalist care.

## 2021-09-07 NOTE — PERIOP NOTES
notified that pt  Arrived from nursing facility and his BP is 80/50's, asymptomatic. Aware that pt had blood transfusion on 09/03, aware that the H&H on 09/02/21 was 6.2/22. 1.  Orders for repeat H&H, BMP, and normal saline

## 2021-09-07 NOTE — PERIOP NOTES
Called [facility] spoke with Jada Lopez regarding which medications pt was given this AM, PTA meds completed. She reports that pt's BP was 145/66 @ 0730, and that urine is milky/cloudy in the caal drainage bag.

## 2021-09-07 NOTE — PERIOP NOTES
Pt had BM, pericare provided and clean brief applied. Reviewed PTA medication list with patient/caregiver and patient/caregiver denies any additional medications. Patient admits to having a responsible adult care for them he is returning to the skilled nursing facility     Patient encouraged to use gown warming system and informed that using said warming gown to regulate body temperature prior to a procedure has been shown to help reduce the risks of blood clots and infection. Patient's pharmacy of choice verified and documented in PTA medication section. Dual skin assessment & fall risk band verification completed with Geraldine SHAW.

## 2021-09-07 NOTE — PROGRESS NOTES
1609 - Received patient from PACU in satisfactory condition. VSS. Assumed care of patient. Dual skin assessment completed with Jose Chaudhry RN. Excoriation and small pinhole wound noted to sacrum. Fall risk band in place. Patient is alert and oriented x 4. Patient is calm and cooperative. Reports numbness to left foot. Able to wiggle toes. Lung sounds clear bilaterally. Respirations even and unlabored. No use of accessory muscles. Educated on incentive spirometer and demonstrated proper use. Abdomen is soft and non-tender. Bowel sounds active to all quadrants. Patient has caal catheter intact and draining clear, cloudy urine. No bladder distention evident. No complaints of bladder discomfort. Skin is warm, dry and skin color is appropriate to race. Ace wrap dressing to left ankle noted CDI. Sequential compression device applied to RLE. IV intact and infusing without difficulty. Reports pain 0/10. Patient oriented to call bell use as well as bed use. Patient oriented to phone and how to order meals. Call bell within reach. Bed in low position. Three side rails up. [address] with Dr. Conchis Powers about Dr. Conchis Powers to clarify weight-bearing status. Patient to be NWB to LLE.     9759 - Page out to Dr. Reilly Reyes per pharmacy for insulin orders. Awaiting return call. [phone] - Return call received and Dr. Reilly Reyes notified and stated she will review and fix orders. 3182 - Mepilex applied to sacrum. Only 15 ml of yellow, cloudy urine emptied from caal catheter. Will notify oncoming nurse to monitor output.

## 2021-09-07 NOTE — PROGRESS NOTES
Pt arrived with external fixator on the left foot from shin down, mepilex on left heel, skin assessment completed with Geraldine SHAW

## 2021-09-07 NOTE — ANESTHESIA PREPROCEDURE EVALUATION
Relevant Problems   CARDIOVASCULAR   (+) Atrial flutter (HCC)   (+) CAD (coronary artery disease)   (+) Chronic atrial fibrillation (HCC)      RENAL FAILURE   (+) ELINOR (acute kidney injury) (HCC)   (+) CKD (chronic kidney disease) stage 3, GFR 30-59 ml/min (HCC)      ENDOCRINE   (+) DM2 (diabetes mellitus, type 2) ([facility])   (+) Diabetic foot ulcer with osteomyelitis (HCC)      HEMATOLOGY   (+) Acute hematogenous osteomyelitis of left foot (HCC)   (+) Acute osteomyelitis (HCC)   (+) Osteomyelitis (HCC)       Anesthetic History   No history of anesthetic complications            Review of Systems / Medical History  Patient summary reviewed, nursing notes reviewed and pertinent labs reviewed    Pulmonary    COPD        Asthma     Comments: 2ppd x 30y; quit 20y ago   Neuro/Psych   Within defined limits           Cardiovascular    Hypertension        Dysrhythmias   CAD    Exercise tolerance: >4 METS     GI/Hepatic/Renal     GERD: well controlled    Renal disease: CRI       Endo/Other    Diabetes         Other Findings              Physical Exam    Airway  Mallampati: II  TM Distance: 4 - 6 cm  Neck ROM: normal range of motion   Mouth opening: Normal     Cardiovascular               Dental      Comments: Poor dentition; numerous missing and chipped teeth   Pulmonary                 Abdominal         Other Findings            Anesthetic Plan    ASA: 3  Anesthesia type: MAC            Anesthetic plan and risks discussed with: Patient

## 2021-09-07 NOTE — INTERVAL H&P NOTE
Update History & Physical    The Patient's History and Physical of was reviewed with the patient and I examined the patient. There was no change. The surgical site was confirmed by the patient and me. Plan:  The risk, benefits, expected outcome, and alternative to the recommended procedure have been discussed with the patient. Patient understands and wants to proceed with the procedure.     Electronically signed by Annemarie Irvin MD on 9/7/2021 at 12:30 PM

## 2021-09-07 NOTE — PROGRESS NOTES
Problem: Mobility Impaired (Adult and Pediatric)  Goal: *Acute Goals and Plan of Care (Insert Text)  Description: Physical Therapy short term goals initiated 09/07/21, to be achieved in 2 days. Pt will:   1. Roll L and R with Jane in prep for ADL completion and OOB activity. 2. Transition sit <> supine with Jane in prep for seated activity and OOB mobility. 3. Transfer bed <> chair with LRAD and CGA in prep for w/c mobility. Note:     PHYSICAL THERAPY EVALUATION    Patient: Marco Hernadez (73 y.o. male)  Date: 9/7/2021  Primary Diagnosis: S/P foot surgery, left [Z98.890]  Procedure(s) (LRB):  LEFT ANKLE REMOVAL OF EXTERNAL FIXATOR WITH SKIN GRAFT ALLOGRAFT, LARGE C-ARM \"SPEC POP\" (Left) Day of Surgery   Precautions:  Fall, NWB (LLE)  NWB LLE  PLOF: pt admitted from [address] [place] [address], reports he has been ambulating short distances with PT.     ASSESSMENT :  Based on the objective data described below, the patient presents with decr L LE sensation, decr activity tolerance, and decr B UE and LE strength resulting in deficits in bed mobility and transfers. Pt supine in bed on PT entry, requires max encouragement to participate. Pt reports having BM, rolled L and R with modA for brief change and hygiene care. Pt required totalA to scoot up in bed. Pt refusing any additional mobility at this time. Pt left supine in bed with all needs met and all questions answered. Recommend return to SNF. Patient will benefit from skilled intervention to address the above impairments.   Patient's rehabilitation potential is considered to be Fair  Factors which may influence rehabilitation potential include:   []         None noted  []         Mental ability/status  [x]         Medical condition  []         Home/family situation and support systems  []         Safety awareness  []         Pain tolerance/management  [x]         Other: pt motivation to participate in Rehab     PLAN :  Recommendations and Planned Interventions:   [x]           Bed Mobility Training             []    Neuromuscular Re-Education  [x]           Transfer Training                   []    Orthotic/Prosthetic Training  [x]           Gait Training                          [x]    Modalities  [x]           Therapeutic Exercises           []    Edema Management/Control  [x]           Therapeutic Activities            [x]    Family Training/Education  [x]           Patient Education  []           Other (comment):    Frequency/Duration: Patient will be followed by physical therapy 1-2 times per day/4-7 days per week to address goals. Discharge Recommendations: Claudio Bonilla  Further Equipment Recommendations for Discharge: N/A     SUBJECTIVE:   Patient stated i'm gonna go back tomorrow. I'll just do my therapy there.     OBJECTIVE DATA SUMMARY:     Past Medical History:   Diagnosis Date    A-fib ([facility]     Asthma     CAD (coronary artery disease)     Chronic kidney disease     stage 3    COVID-19     Diabetes (HCC)     GERD (gastroesophageal reflux disease)     Heart failure (HCC)     chronic diastolic heart failure    High cholesterol     Hypertension     Ill-defined condition     chronic osteomyelitis left ankle and foot    Ill-defined condition     chronic arteriosclerosis    PVD (peripheral vascular disease) ([facility]      Past Surgical History:   Procedure Laterality Date    COLONOSCOPY N/A 6/1/2018    COLONOSCOPY, POLYPECTOMY performed by Laura Chavez MD at THE [facility] ENDOSCOPY    HX CATARACT REMOVAL      HX ORTHOPAEDIC Left 2021    ankle washout, external fixator, would vac     Barriers to Learning/Limitations: yes;  other post-anesthesia  Compensate with: Verbal Cues and Tactile Cues  Home Situation:  Home Situation  Home Environment: Rehabilitation facility  One/Two Story Residence: One story  Living Alone: No  Support Systems: Claudio Bonilla, Spouse/Significant Other  Patient Expects to be Discharged to[de-identified] Rehabilitation facility  Current DME Used/Available at Home: Wheelchair, Walker  Critical Behavior:  Neurologic State: Alert  Orientation Level: Oriented X4  Cognition: Appropriate for age attention/concentration; Follows commands  Safety/Judgement: Fall prevention  Psychosocial  Patient Behaviors: Appears angry  Purposeful Interaction: Yes  Pt Identified Daily Priority: Clinical issues (comment)  Caritas Process: Nurture loving kindness;Enable yunier/hope;Establish trust;Attend basic human needs; Teaching/learning;Create healing environment  Caring Interventions: Reassure; Therapeutic modalities  Reassure: Therapeutic listening; Informing;Caring rounds  Therapeutic Modalities: Humor; Intentional therapeutic touch  Skin Condition/Temp: Dry;Flaky; Peeling  Skin Integrity: Incision (comment) (left foot, open area on sacrum, redness in groin)  Skin Integumentary  Skin Color: Other (comment); Appropriate for ethnicity (flaky, pilling, black left toe)  Skin Condition/Temp: Dry;Flaky; Peeling  Skin Integrity: Incision (comment) (left foot, open area on sacrum, redness in groin)  Strength:    Strength: Generally decreased, functional  Tone & Sensation:   Tone: Normal  Sensation: Impaired (Numbness to L toes)  Range Of Motion:  AROM: Generally decreased, functional  PROM: Generally decreased, functional  Functional Mobility:  Bed Mobility:  Rolling: Moderate assistance  Scooting: Total assistance  Pain:  Pain level pre-treatment: 0/10   Pain level post-treatment: 0/10   Pain Intervention(s) : Medication (see MAR); Rest, Ice, Repositioning  Response to intervention: Nurse notified, See doc flow  Activity Tolerance:   Pt agreeable only to bed mobility at this time. Please refer to the flowsheet for vital signs taken during this treatment.   After treatment:   []         Patient left in no apparent distress sitting up in chair  [x]         Patient left in no apparent distress in bed  [x]         Call bell left within reach  [x]         Nursing notified  []         Caregiver present  []         Bed alarm activated  []         SCDs applied    COMMUNICATION/EDUCATION:   [x]         Role of Physical Therapy in the acute care setting. [x]         Fall prevention education was provided and the patient/caregiver indicated understanding. [x]         Patient/family have participated as able in goal setting and plan of care. [x]         Patient/family agree to work toward stated goals and plan of care. []         Patient understands intent and goals of therapy, but is neutral about his/her participation. []         Patient is unable to participate in goal setting/plan of care: ongoing with therapy staff.  []         Other:     Thank you for this referral.  Alonso Bentley   Time Calculation: 12 mins      Eval Complexity: History: LOW Complexity : Zero comorbidities / personal factors that will impact the outcome / POCExam:LOW Complexity : 1-2 Standardized tests and measures addressing body structure, function, activity limitation and / or participation in recreation  Presentation: LOW Complexity : Stable, uncomplicated  Clinical Decision Making:Low Complexity    Overall Complexity:LOW

## 2021-09-07 NOTE — PERIOP NOTES
TRANSFER - OUT REPORT:    Verbal report given to [facility]. RN(name) on Cheri Holstein.  being transferred to [address] [place] [address](unit) for routine progression of care       Report consisted of patients Situation, Background, Assessment and   Recommendations(SBAR). Information from the following report(s) SBAR, OR Summary, Procedure Summary, Intake/Output, MAR, Recent Results and Cardiac Rhythm A-fib was reviewed with the receiving nurse. Lines:   PICC Double Lumen 71/29/19 Right;Basilic (Active)       Peripheral IV 09/07/21 Left Hand (Active)   Site Assessment Clean, dry, & intact 09/07/21 1447   Phlebitis Assessment 0 09/07/21 1447   Infiltration Assessment 0 09/07/21 1447   Dressing Status Clean, dry, & intact 09/07/21 1447   Dressing Type Transparent;Tape 09/07/21 1447   Hub Color/Line Status Pink; Infusing 09/07/21 1447   Action Taken Armboard 09/07/21 1244        Opportunity for questions and clarification was provided.       Patient transported with:   Registered Nurse

## 2021-09-07 NOTE — ROUTINE PROCESS
TRANSFER - IN REPORT:    Verbal report received from Jasson RN (name) on Zuleyma Mora.  being received from PACU (unit) for routine post - op      Report consisted of patients Situation, Background, Assessment and   Recommendations(SBAR). Information from the following report(s) SBAR, Kardex, OR Summary, Intake/Output, MAR and Recent Results was reviewed with the receiving nurse. Opportunity for questions and clarification was provided. Assessment to be completed upon patients arrival to unit and care assumed.

## 2021-09-07 NOTE — BRIEF OP NOTE
Brief Postoperative Note    Patient: Alin Romero. YOB: 1945  MRN: [id number]    Date of Procedure: 9/7/2021     Pre-Op Diagnosis: ANKLE FRACTURE WITH EXTERNAL FIXATOR, PLANTAR ULCER    Post-Op Diagnosis: Same as preoperative diagnosis. Procedure(s):  LEFT ANKLE REMOVAL OF EXTERNAL FIXATOR WITH SKIN GRAFT Becky Daniels LARGE C-ARM \"SPEC POP\"  [id number]  Surgeon(s):  Caitlin Riley MD    Surgical Assistant: Surg Asst-1: Sonia Jimenez    Anesthesia: MAC     Estimated Blood Loss (mL): less than 328     Complications: None    Specimens: * No specimens in log *     Implants:   Implant Name Type Inv.  Item Serial No.  Lot No. LRB No. Used Action   GRAFT HUM TISS [id number] CRYOPRESERVED PLCNTA TISS [employer]NION - [id number]  GRAFT HUM TISS [id number] CRYOPRESERVED PLCNTA TISS [employer]NION [id number]  Left 1 Implanted       Drains: * No LDAs found *    Findings: some healing of plantar wound, dry gangrene    Electronically Signed by Chely Moreno MD on 9/7/2021 at 1:36 PM

## 2021-09-07 NOTE — PERIOP NOTES
TRANSFER - IN REPORT:    Verbal report received from [facility] CRNA(name) on Meagan Springer.  being received from OR(unit) for routine progression of care      Report consisted of patients Situation, Background, Assessment and   Recommendations(SBAR). Information from the following report(s) SBAR, OR Summary, Procedure Summary, Intake/Output, MAR, Recent Results and Cardiac Rhythm A-fib was reviewed with the receiving nurse. Opportunity for questions and clarification was provided. Assessment completed upon patients arrival to unit and care assumed.

## 2021-09-07 NOTE — PROGRESS NOTES
Physical Therapy Evaluation/Treatment Attempt     Chart reviewed. Attempted Physical Therapy Evaluation, however, patient unable to be seen due to:  []  Nausea/vomiting  []  Eating  []  Pain  []  Patient too lethargic  []  Off Unit for testing/procedure  []  Dialysis treatment in progress   []  Telemetry Results  [x]  Other: Pt eating on PT entry, adamantly refusing to participate in physical therapy on PT introduction. Pt educated on importance of early mobility, encouraged to participate once finished with his food. Pt stating, \"I have a lot of things I need to do. They need to get these sheets straightened out. \" Offered to assist pt with fixing bed linens, pt responding, \"No, I just want some peace. I just got into bed. \"      Will follow up later as patient's schedule allows.    Thank you for this referral.    Parris Varner, PT, DPT

## 2021-09-08 LAB
ALBUMIN SERPL-MCNC: 1.6 G/DL (ref 3.4–5)
ALBUMIN/GLOB SERPL: 0.5 {RATIO} (ref 0.8–1.7)
ALP SERPL-CCNC: 117 U/L (ref 45–117)
ALT SERPL-CCNC: 17 U/L (ref 16–61)
ANION GAP SERPL CALC-SCNC: 9 MMOL/L (ref 3–18)
APPEARANCE UR: ABNORMAL
AST SERPL-CCNC: 16 U/L (ref 10–38)
BACTERIA URNS QL MICRO: ABNORMAL /HPF
BASOPHILS # BLD: 0 K/UL (ref 0–0.1)
BASOPHILS NFR BLD: 0 % (ref 0–2)
BILIRUB SERPL-MCNC: 0.3 MG/DL (ref 0.2–1)
BILIRUB UR QL: NEGATIVE
BUN SERPL-MCNC: 30 MG/DL (ref 7–18)
BUN/CREAT SERPL: 22 (ref 12–20)
CALCIUM SERPL-MCNC: 7.8 MG/DL (ref 8.5–10.1)
CHLORIDE SERPL-SCNC: 109 MMOL/L (ref 100–111)
CO2 SERPL-SCNC: 22 MMOL/L (ref 21–32)
COLOR UR: ABNORMAL
CREAT SERPL-MCNC: 1.34 MG/DL (ref 0.6–1.3)
DIFFERENTIAL METHOD BLD: ABNORMAL
EOSINOPHIL # BLD: 0.1 K/UL (ref 0–0.4)
EOSINOPHIL NFR BLD: 1 % (ref 0–5)
EPITH CASTS URNS QL MICRO: ABNORMAL /LPF (ref 0–5)
ERYTHROCYTE [DISTWIDTH] IN BLOOD BY AUTOMATED COUNT: 17.5 % (ref 11.6–14.5)
GLOBULIN SER CALC-MCNC: 3.5 G/DL (ref 2–4)
GLUCOSE BLD STRIP.AUTO-MCNC: 132 MG/DL (ref 70–110)
GLUCOSE BLD STRIP.AUTO-MCNC: 149 MG/DL (ref 70–110)
GLUCOSE BLD STRIP.AUTO-MCNC: 196 MG/DL (ref 70–110)
GLUCOSE BLD STRIP.AUTO-MCNC: 216 MG/DL (ref 70–110)
GLUCOSE SERPL-MCNC: 96 MG/DL (ref 74–99)
GLUCOSE UR STRIP.AUTO-MCNC: NEGATIVE MG/DL
HCT VFR BLD AUTO: 23.1 % (ref 36–48)
HCT VFR BLD AUTO: 26.4 % (ref 36–48)
HGB BLD-MCNC: 6.8 G/DL (ref 13–16)
HGB BLD-MCNC: 8 G/DL (ref 13–16)
HGB UR QL STRIP: ABNORMAL
HISTORY CHECKED?,CKHIST: NORMAL
KETONES UR QL STRIP.AUTO: ABNORMAL MG/DL
LEUKOCYTE ESTERASE UR QL STRIP.AUTO: ABNORMAL
LYMPHOCYTES # BLD: 0.6 K/UL (ref 0.9–3.6)
LYMPHOCYTES NFR BLD: 8 % (ref 21–52)
MCH RBC QN AUTO: 23 PG (ref 24–34)
MCHC RBC AUTO-ENTMCNC: 29.4 G/DL (ref 31–37)
MCV RBC AUTO: 78 FL (ref 78–100)
MONOCYTES # BLD: 0.4 K/UL (ref 0.05–1.2)
MONOCYTES NFR BLD: 5 % (ref 3–10)
NEUTS BAND NFR BLD MANUAL: 5 % (ref 0–5)
NEUTS SEG # BLD: 6.2 K/UL (ref 1.8–8)
NEUTS SEG NFR BLD: 81 % (ref 40–73)
NITRITE UR QL STRIP.AUTO: NEGATIVE
PH UR STRIP: 5.5 [PH] (ref 5–8)
PLATELET # BLD AUTO: 422 K/UL (ref 135–420)
PLATELET COMMENTS,PCOM: ABNORMAL
PMV BLD AUTO: 9.5 FL (ref 9.2–11.8)
POTASSIUM SERPL-SCNC: 4 MMOL/L (ref 3.5–5.5)
PROT SERPL-MCNC: 5.1 G/DL (ref 6.4–8.2)
PROT UR STRIP-MCNC: >300 MG/DL
RBC # BLD AUTO: 2.96 M/UL (ref 4.35–5.65)
RBC #/AREA URNS HPF: ABNORMAL /HPF (ref 0–5)
RBC MORPH BLD: ABNORMAL
SODIUM SERPL-SCNC: 140 MMOL/L (ref 136–145)
SP GR UR REFRACTOMETRY: 1.02 (ref 1–1.03)
UROBILINOGEN UR QL STRIP.AUTO: 0.2 EU/DL (ref 0.2–1)
WBC # BLD AUTO: 7.3 K/UL (ref 4.6–13.2)
WBC URNS QL MICRO: ABNORMAL /HPF (ref 0–5)

## 2021-09-08 PROCEDURE — 74011250636 HC RX REV CODE- 250/636: Performed by: HOSPITALIST

## 2021-09-08 PROCEDURE — 74011000250 HC RX REV CODE- 250: Performed by: ORTHOPAEDIC SURGERY

## 2021-09-08 PROCEDURE — 81001 URINALYSIS AUTO W/SCOPE: CPT

## 2021-09-08 PROCEDURE — 74011636637 HC RX REV CODE- 636/637: Performed by: HOSPITALIST

## 2021-09-08 PROCEDURE — 74011250637 HC RX REV CODE- 250/637: Performed by: ORTHOPAEDIC SURGERY

## 2021-09-08 PROCEDURE — 86900 BLOOD TYPING SEROLOGIC ABO: CPT

## 2021-09-08 PROCEDURE — P9016 RBC LEUKOCYTES REDUCED: HCPCS

## 2021-09-08 PROCEDURE — 74011636637 HC RX REV CODE- 636/637: Performed by: FAMILY MEDICINE

## 2021-09-08 PROCEDURE — 99218 HC RM OBSERVATION: CPT

## 2021-09-08 PROCEDURE — 30233N1 TRANSFUSION OF NONAUTOLOGOUS RED BLOOD CELLS INTO PERIPHERAL VEIN, PERCUTANEOUS APPROACH: ICD-10-PCS | Performed by: ORTHOPAEDIC SURGERY

## 2021-09-08 PROCEDURE — 87106 FUNGI IDENTIFICATION YEAST: CPT

## 2021-09-08 PROCEDURE — 74011000250 HC RX REV CODE- 250: Performed by: HOSPITALIST

## 2021-09-08 PROCEDURE — 85025 COMPLETE CBC W/AUTO DIFF WBC: CPT

## 2021-09-08 PROCEDURE — 80053 COMPREHEN METABOLIC PANEL: CPT

## 2021-09-08 PROCEDURE — 74011250636 HC RX REV CODE- 250/636: Performed by: ORTHOPAEDIC SURGERY

## 2021-09-08 PROCEDURE — 82962 GLUCOSE BLOOD TEST: CPT

## 2021-09-08 PROCEDURE — 85014 HEMATOCRIT: CPT

## 2021-09-08 PROCEDURE — 36430 TRANSFUSION BLD/BLD COMPNT: CPT

## 2021-09-08 PROCEDURE — 87086 URINE CULTURE/COLONY COUNT: CPT

## 2021-09-08 PROCEDURE — 86923 COMPATIBILITY TEST ELECTRIC: CPT

## 2021-09-08 PROCEDURE — 36415 COLL VENOUS BLD VENIPUNCTURE: CPT

## 2021-09-08 RX ORDER — OXYCODONE AND ACETAMINOPHEN 5; 325 MG/1; MG/1
1 TABLET ORAL
Qty: 20 TABLET | Refills: 0 | Status: SHIPPED | OUTPATIENT
Start: 2021-09-08 | End: 2021-09-11

## 2021-09-08 RX ORDER — SODIUM CHLORIDE 9 MG/ML
250 INJECTION, SOLUTION INTRAVENOUS AS NEEDED
Status: DISCONTINUED | OUTPATIENT
Start: 2021-09-08 | End: 2021-09-09 | Stop reason: HOSPADM

## 2021-09-08 RX ORDER — ONDANSETRON 4 MG/1
4 TABLET, ORALLY DISINTEGRATING ORAL
Qty: 20 TABLET | Refills: 0 | Status: SHIPPED | OUTPATIENT
Start: 2021-09-08 | End: 2021-01-01

## 2021-09-08 RX ADMIN — MICONAZOLE NITRATE: 20 CREAM TOPICAL at 10:34

## 2021-09-08 RX ADMIN — CEFTRIAXONE SODIUM 2 G: 2 INJECTION, POWDER, FOR SOLUTION INTRAMUSCULAR; INTRAVENOUS at 10:27

## 2021-09-08 RX ADMIN — POVIDONE-IODINE 1 EACH: 0.01 SWAB TOPICAL at 21:00

## 2021-09-08 RX ADMIN — Medication 250 MG: at 10:16

## 2021-09-08 RX ADMIN — INSULIN LISPRO 2 UNITS: 100 INJECTION, SOLUTION INTRAVENOUS; SUBCUTANEOUS at 12:41

## 2021-09-08 RX ADMIN — SPIRONOLACTONE 12.5 MG: 25 TABLET ORAL at 10:15

## 2021-09-08 RX ADMIN — ACETAMINOPHEN 650 MG: 325 TABLET ORAL at 12:06

## 2021-09-08 RX ADMIN — INSULIN GLARGINE 10 UNITS: 100 INJECTION, SOLUTION SUBCUTANEOUS at 10:12

## 2021-09-08 RX ADMIN — INSULIN LISPRO 4 UNITS: 100 INJECTION, SOLUTION INTRAVENOUS; SUBCUTANEOUS at 12:41

## 2021-09-08 RX ADMIN — TAMSULOSIN HYDROCHLORIDE 0.8 MG: 0.4 CAPSULE ORAL at 10:15

## 2021-09-08 RX ADMIN — ATORVASTATIN CALCIUM 40 MG: 20 TABLET, FILM COATED ORAL at 23:01

## 2021-09-08 RX ADMIN — HYDROMORPHONE HYDROCHLORIDE 2 MG: 2 TABLET ORAL at 03:51

## 2021-09-08 RX ADMIN — POVIDONE-IODINE 1 EACH: 0.01 SWAB TOPICAL at 10:39

## 2021-09-08 RX ADMIN — CIPROFLOXACIN 500 MG: 500 TABLET, FILM COATED ORAL at 10:15

## 2021-09-08 RX ADMIN — MICONAZOLE NITRATE: 20 CREAM TOPICAL at 23:03

## 2021-09-08 RX ADMIN — CEFAZOLIN SODIUM 2 G: 100 INJECTION, POWDER, LYOPHILIZED, FOR SOLUTION INTRAVENOUS at 04:02

## 2021-09-08 RX ADMIN — ISOSORBIDE MONONITRATE 30 MG: 30 TABLET, EXTENDED RELEASE ORAL at 10:15

## 2021-09-08 RX ADMIN — FERROUS SULFATE TAB 325 MG (65 MG ELEMENTAL FE) 325 MG: 325 (65 FE) TAB at 06:55

## 2021-09-08 RX ADMIN — MULTIPLE VITAMINS W/ MINERALS TAB 1 TABLET: TAB at 10:15

## 2021-09-08 RX ADMIN — INSULIN LISPRO 4 UNITS: 100 INJECTION, SOLUTION INTRAVENOUS; SUBCUTANEOUS at 17:37

## 2021-09-08 RX ADMIN — FLUCONAZOLE 200 MG: 100 TABLET ORAL at 10:25

## 2021-09-08 RX ADMIN — PANTOPRAZOLE SODIUM 40 MG: 40 TABLET, DELAYED RELEASE ORAL at 06:55

## 2021-09-08 RX ADMIN — METOPROLOL SUCCINATE 50 MG: 50 TABLET, EXTENDED RELEASE ORAL at 10:15

## 2021-09-08 RX ADMIN — COLLAGENASE SANTYL: 250 OINTMENT TOPICAL at 10:33

## 2021-09-08 NOTE — PROGRESS NOTES
1516: Pt refusing PT, reports he is going to get blood then going back to SNF, will follow up again later. 1313: Pt again refuses to participcate in PT. Pt is very well known to [facility] and is very difficulty to gt to cooperate with PT. Pt transferring to SNF s/p blood transfusion.

## 2021-09-08 NOTE — DISCHARGE SUMMARY
DISCHARGE SUMMARY    Patient: Kanika Bey MRN: [id number]  CSN: [id number]    YOB: 1945  Age: 78 y.o.   Sex: male              Admit Date: 9/7/2021    Discharge Date: 9/9/21 if cleared by IM    Admission Diagnoses: S/P foot surgery, left [Z98.890]    Discharge Diagnoses:    Problem List as of 9/8/2021 Date Reviewed: 9/7/2021        Codes Class Noted - Resolved    S/P foot surgery, left ICD-10-CM: Z98.890  ICD-9-CM: V45.89  9/7/2021 - Present        Chronic atrial fibrillation ([facility] ICD-10-CM: I48.20  ICD-9-CM: 427.31  5/22/2021 - Present        PVD (peripheral vascular disease) ([facility] ICD-10-CM: I73.9  ICD-9-CM: 443.9  5/22/2021 - Present        Osteomyelitis ([facility] ICD-10-CM: M86.9  ICD-9-CM: 730.20  5/4/2021 - Present        Trimalleolar fracture of ankle, closed, left, initial encounter ICD-10-CM: S82.852A  ICD-9-CM: 824.6  5/4/2021 - Present        Displacement of peripherally inserted central catheter (PICC) ([facility] ICD-10-CM: Y38.836V  ICD-9-CM: 996.1  5/4/2021 - Present        Ulcer of left heel, with necrosis of bone ([facility] ICD-10-CM: L97.424  ICD-9-CM: 707.14, 730.17  4/21/2021 - Present        Acute hematogenous osteomyelitis of left foot ([facility] ICD-10-CM: F94.490  ICD-9-CM: 730.07  4/17/2021 - Present        Cellulitis of left foot ICD-10-CM: L03.116  ICD-9-CM: 682.7  4/17/2021 - Present        Diabetic foot ulcer with osteomyelitis ([facility] ICD-10-CM: E11.621, E11.69, L97.509, M86.9  ICD-9-CM: 250.80, 707.15, 730.27, 731.8  4/17/2021 - Present        Acute osteomyelitis ([facility] ICD-10-CM: M86.10  ICD-9-CM: 730.00  4/15/2021 - Present        Diabetic ulcer of left foot ([facility] ICD-10-CM: E11.621, D89.610  ICD-9-CM: 250.80, 707.15  4/14/2021 - Present        COVID-19 virus infection ICD-10-CM: U07.1  ICD-9-CM: 079.89  4/14/2021 - Present        CAD (coronary artery disease) ICD-10-CM: I25.10  ICD-9-CM: 414.00  5/29/2018 - Present        ELINOR (acute kidney injury) ([facility] ICD-10-CM: N17.9  ICD-9-CM: 584.9  2018 - Present        CKD (chronic kidney disease) stage 3, GFR 30-59 ml/min (HCC) ICD-10-CM: N18.30  ICD-9-CM: 585.3  2018 - Present        Elevated brain natriuretic peptide (BNP) level ICD-10-CM: R79.89  ICD-9-CM: 790.99  2018 - Present        Atrial flutter (HCC) ICD-10-CM: Y11.84  ICD-9-CM: 427.32  2018 - Present        DM2 (diabetes mellitus, type 2) ([facility] ICD-10-CM: E11.9  ICD-9-CM: 250.00  2018 - Present        RESOLVED: Diabetic gangrene ([facility] ICD-10-CM: E11.52  ICD-9-CM: 250.70, 785.4  2021 - 2021        RESOLVED: Acute renal insufficiency ICD-10-CM: N28.9  ICD-9-CM: 593.9  2018 - 2021        RESOLVED: Tachycardia ICD-10-CM: R00.0  ICD-9-CM: 785.0  2/10/2018 - 2018              Discharge Condition: [facility] Course: On the day of admission the patient underwent a left leg removal of hardware. Ulcers and gangrene cleaned. IM consulted. Pt getting blood, splint intact, no sign of acute infection. Patient's hemoglobins were    Recent Labs     21  0259 21  1115   HGB 6.8* 8.8*   . Patient temp max was. Temp (72hrs), Av.7 °F (36.5 °C), Min:97 °F (36.1 °C), Max:98.8 °F (37.1 °C)  . Discharge Medications:     Current Discharge Medication List      START taking these medications    Details   ondansetron (ZOFRAN ODT) 4 mg disintegrating tablet Take 1 Tablet by mouth every eight (8) hours as needed for Nausea or Vomiting (Nausea or Vomiting). Qty: 20 Tablet, Refills: 0      oxyCODONE-acetaminophen (PERCOCET) 5-325 mg per tablet Take 1 Tablet by mouth every four (4) hours as needed for Pain for up to 3 days. Max Daily Amount: 6 Tablets. Qty: 20 Tablet, Refills: 0    Associated Diagnoses: S/P foot surgery, left; PVD (peripheral vascular disease) ([facility]  Other chronic osteomyelitis of left foot ([facility]         CONTINUE these medications which have NOT CHANGED    Details   fluconazole (Diflucan) 200 mg tablet Take 200 mg by mouth daily. FDA advises cautious prescribing of oral fluconazole in pregnancy. insulin glargine (LANTUS,BASAGLAR) 100 unit/mL (3 mL) inpn 42 Units by SubCUTAneous route daily. povidone-iodine (BETADINE SKIN CLEANSER EX) by Apply Externally route. Indications: 10 % topical solution bid to cath site      nut.tx.glucose intolerance,soy (BOOST DIABETIC PO) Take  by mouth two (2) times a day. diclofenac (VOLTAREN) 1 % gel Apply  to affected area two (2) times a day. hydrALAZINE (APRESOLINE) 25 mg tablet Take 25 mg by mouth every six (6) hours. Indications: chronic heart failure      isosorbide mononitrate ER (IMDUR) 30 mg tablet Take 30 mg by mouth daily. polyethylene glycol (Miralax) 17 gram/dose powder Take 17 g by mouth daily. B.infantis-B.ani-B.long-B.bifi (Probiotic 4X) 10-15 mg TbEC Take  by mouth daily. !! OTHER Indications: LiquaCell Protein supplement 30 ml 1 daily      !! OTHER Indications: PUSH supplement bid      multivitamin (ONE A DAY) tablet Take 1 Tablet by mouth daily. collagenase (SantyL) 250 unit/gram ointment Apply  to affected area daily. Indications: and give prn      !! insulin lispro (HUMALOG) 100 unit/mL kwikpen by SubCUTAneous route. Indications: sliding scale      tamsulosin (FLOMAX) 0.4 mg capsule Take 2 Capsules by mouth daily. Qty: 30 Capsule, Refills: 0      pantoprazole (PROTONIX) 40 mg tablet Take 1 Tablet by mouth Daily (before breakfast). Qty: 30 Tablet, Refills: 1      ferrous sulfate 325 mg (65 mg iron) tablet Take 1 Tablet by mouth Daily (before breakfast). Qty: 30 Tablet, Refills: 0      spironolactone (ALDACTONE) 25 mg tablet Take 12.5 mg by mouth daily. Take 0.5 tablet (12.5 mg) by oral   Indications: heart failure      !! insulin lispro (HUMALOG KWIKPEN INSULIN SC) 10 Units by SubCUTAneous route three (3) times daily (with meals). atorvastatin (LIPITOR) 40 mg tablet Take 1 Tab by mouth nightly.   Qty: 30 Tab, Refills: 0 metoprolol succinate (TOPROL-XL) 50 mg XL tablet Take 1 Tab by mouth daily. Qty: 30 Tab, Refills: 0      ciprofloxacin HCl (Cipro) 500 mg tablet Take 500 mg by mouth daily. miconazole (MICOTIN) 2 % topical cream Apply  to affected area two (2) times a day. simethicone (Gas Relief, simethicone,) 80 mg chewable tablet Take 80 mg by mouth four (4) times daily as needed for Flatulence. magnesium hydroxide (MILK OF MAGNESIA) 400 mg/5 mL suspension Take 30 mL by mouth daily as needed for Constipation. dextrose (GLUTOSE-15 PO) Take  by mouth as needed. bisacodyL (Dulcolax, bisacodyl,) 10 mg supp Insert 10 mg into rectum daily as needed for Constipation. acetaminophen (TylenoL) 325 mg tablet Take 650 mg by mouth every four (4) hours as needed for Pain. !! - Potential duplicate medications found. Please discuss with provider.           Activity: No weight left foot    Diet: Diabetic Diet    Wound Care: Keep wound clean and dry, Reinforce dressing PRN and Routine catheter care    Follow-up: 1weeks post-op in the office with

## 2021-09-08 NOTE — PROGRESS NOTES
DC Plan: return to rehab at [facility] once blood transfusion is completed. Transition of Care (HEATHER) Plan:          Pt admitted for an elective surgical procedure. Pt is independent. Please encourage ambulation. No transition of care needs identified at this time. Anticipate pt will be medically stable for discharge within the next 24-48 hours with physician follow up. CM available to assist as needed. HEATHER Transportation:   How is patient being transported at discharge? Family/Friend      When? Once cleared by physician     Is transport scheduled? To be arranged once blood infusion is complete      Follow-up appointment and transportation:   PCP/Specialist?  See AVS for Appointment         Who is transporting to the follow-up appointment? Self/Family/Friend      Is transport for follow up appointment scheduled? N/A    Communication plan (with patient/family): Who is being called? Patient or Next of Kin? Responsible party? Patient      What number(s) is to be used? See Facesheet      What service provider is calling for [facility] services? When are they calling? Readmission Risk? (Green/Low; Yellow/Moderate; Red/High):  [employer] here to complete [address] including selection of the Healthcare Decision Maker Relationship (ie \"Primary\")    Jairo Ramirez, wife - Primary - [phone]  Rema Veloz, son - Secondary [phone]      Transition of Care to SNF: [facility]  Communication to Patient/Family:  Met with patient and family, and they are agreeable to the transition plan. The Plan for Transition of Care is related to the following treatment goals: continued rehab    The Patient was provided with a choice of provider and agrees   with the discharge plan.   Yes [x] No []    Freedom of choice list was provided with basic dialogue that supports the patient's individualized plan of care/goals and shares the quality data associated with the providers. Yes [x] No []    SNF/Rehab Transition:  Patient has been accepted to [facility] at [address], [place]. and meets criteria for admission. Patient will transported by medical transport and expected to leave at . Communication to SNF/Rehab:  Bedside RN, Ghanshyam Gatica, has been notified to update the transition plan to the facility and call report [phone]    Discharge information has been updated on the AVS and sent via [facility] and or CC Link. Discharge instructions to be fax'd to facility at [phone]     Please include all hard scripts for controlled substances, med rec , AVS, and dc summary in packet. Please medicate for pain prior to dc if possible and needed to help offset delay when patient first arrives to facility. Reviewed and confirmed with facility, [facility] can manage the patient care needs for the following:     Zak Sexton with (X) only those applicable:  Medication:  [x]Medications are available at the facility  []IV Antibiotics    []Controlled Substance - hard copies available sent. []Weekly Labs    Equipment:  []CPAP/BiPAP  []Wound Vacuum  []Mccray or Urinary Device  []PICC/Central Line  []Nebulizer  []Ventilator    Treatment:  []Isolation (for MRSA, VRE, etc.)  []Surgical Drain Management  []Tracheostomy Care  []Dressing Changes  []Dialysis with transportation  []PEG Care  []Oxygen  []Daily Weights for Heart Failure    Dietary:  []Any diet limitations  []Tube Feedings   []Total Parenteral Management (TPN)    Financial Resources:  []Medicaid Application Completed    []UAI Completed and copy given to pt/family    []A screening has previously been completed. []Level II Completed    [] Private pay individual who will not become   financially eligible for Medicaid within 6 months from admission to a [address] [place] [address] facility. [] Individual refused to have screening conducted.      []Medicaid Application Completed    []The screening denied because it was determined individual did not need/did not qualify for nursing facility level of care. [] Out of state residents seeking direct admission to a [address] facility. [] Individuals who are inpatients of an out of state hospital, or in state or out of state veterans/ hospital and seek direct admission to a [address] facility  [] Individuals who are pateints or residents of a state owned/operated facility that is licensed by Department of [employer] ([facility]) and seek direct admission to [address] [place] [address]  [] A screening not required for enrollment in [address] services as set out in [address] [place] [address] 39-  [] [facility]) staff shall perform screenings of the [facility] clients. Advanced Care Plan:  []Surrogate Decision Maker of Care  []POA  []Communicated Code Status and copy sent.     Other:

## 2021-09-08 NOTE — PROGRESS NOTES
Hospitalist Progress Note    Patient: Mt Lopez. MRN: [id number]  CSN: [id number]    YOB: 1945  Age: 78 y.o. Sex: male    DOA: 9/7/2021 LOS:  LOS: 0 days          Chief Complaint:    anemia      Assessment/Plan     1. Status post external fixator removal and skin graft to the left ankle and foot. 2.  Peripheral vascular disease. 3.  Prior left ankle fracture. 4.  Insulin-dependent diabetes mellitus. BG stable under 200  5. Chronic kidney disease stage III. Cr is stable  6. Hypertension. 7.  Chronic urinary retention with Mccray catheter in place. Pyuria, but no signs for active infection, for caution give one dose IV rocephin, send culture, and he will continue on his suppressive cipro as well  8. Low blood pressure. resolved  9. Anemia. Ac on chronic, transfuse 1 unit blood as Hgb les than 7  10. UTI-as above    D/c planning, back to rehab after transfusion with surgery follow up and [facility]      Disposition :  Patient Active Problem List   Diagnosis Code    Atrial flutter ([facility]) I48.92    DM2 (diabetes mellitus, type 2) ([address] E11.9    CAD (coronary artery disease) I25.10    ELINOR (acute kidney injury) ([address] N17.9    CKD (chronic kidney disease) stage 3, GFR 30-59 ml/min ([facility]) N18.30    Elevated brain natriuretic peptide (BNP) level R79.89    Diabetic ulcer of left foot ([facility]) E11.621, L97.529    COVID-19 virus infection U07.1    Acute osteomyelitis ([address] M86.10    Acute hematogenous osteomyelitis of left foot ([facility]) M86.072    Cellulitis of left foot L03. 80    Diabetic foot ulcer with osteomyelitis ([address] E11.621, E11.69, L97.509, M86.9    Ulcer of left heel, with necrosis of bone ([address] L97.424    Osteomyelitis ([address] M86.9    Trimalleolar fracture of ankle, closed, left, initial encounter G85.434M    Displacement of peripherally inserted central catheter (PICC) ([address] T82.524A    Chronic atrial fibrillation ([facility]) I48.20    PVD (peripheral vascular disease) ([facility]) I73.9  S/P foot surgery, left Z98.890       Subjective:    I feel fine  Denies any issue  Eating breakfast this am  No uncontrolled pain  No fevers/chills, cough, SOB    Review of systems:      Cardiovascular: denies chest pain, palpitations  Gastrointestinal: denies nausea, vomiting, diarrhea      Vital signs/Intake and Output:  Visit Vitals  BP (!) 120/56   Pulse 81   Temp 97.9 °F (36.6 °C)   Resp 17   Ht 5' 8\" (1.727 m)   Wt 106.1 kg (234 lb)   SpO2 95%   BMI 35.58 kg/m²     Current Shift:  No intake/output data recorded. Last three shifts:  09/06 1901 - 09/08 0700  In: 3152 [P.O.:1400; I.V.:1672]  Out: 1705 [Urine:1705]    Exam:    General: Well developed, alert, NAD, OX3  CVS:Regular rate and rhythm, no M/R/G, S1/S2 heard, no thrill  Lungs:Clear to auscultation bilaterally, no wheezes, rhonchi, or rales  Abdomen: Soft, Nontender, No distention, Normal Bowel sounds, No hepatomegaly  Extremities: left LE dressed and splinted  Neuro:grossly normal , follows commands  Psych:appropriate                Labs: Results:       Chemistry Recent Labs     09/08/21 0259 09/07/21  1115   GLU 96 160*    143   K 4.0 4.3    109   CO2 22 23   BUN 30* 30*   CREA 1.34* 1.47*   CA 7.8* 8.6   AGAP 9 11   BUCR 22* 20     --    TP 5.1*  --    ALB 1.6*  --    GLOB 3.5  --    AGRAT 0.5*  --       CBC w/Diff Recent Labs     09/08/21  0259 09/07/21  1115   WBC 7.3  --    RBC 2.96*  --    HGB 6.8* 8.8*   HCT 23.1* 30.8*   *  --    GRANS 81*  --    LYMPH 8*  --    EOS 1  --       Cardiac Enzymes No results for input(s): CPK, CKND1, LEONEL in the last 72 hours. No lab exists for component: CKRMB, TROIP   Coagulation No results for input(s): PTP, INR, APTT, INREXT in the last 72 hours. Lipid Panel No results found for: CHOL, CHOLPOCT, CHOLX, CHLST, CHOLV, 358496, HDL, HDLP, LDL, LDLC, DLDLP, 308803, VLDLC, VLDL, TGLX, TRIGL, TRIGP, TGLPOCT, CHHD, CHHDX   BNP No results for input(s): BNPP in the last 72 hours.    Liver Enzymes Recent Labs     09/08/21  0259   TP 5.1*   ALB 1.6*         Thyroid Studies Lab Results   Component Value Date/Time    TSH 1.72 02/11/2018 02:19 AM        Procedures/imaging: see electronic medical records for all procedures/Xrays and details which were not copied into this note but were reviewed prior to creation of Chela Suresh MD

## 2021-09-08 NOTE — PROGRESS NOTES
1222-Placed 20 into right AC because left hand was leaking. Restarted blood on new IV line. 1900-Spoke to Eleni Gaoe told he rMD Tana Grijalva did not sign his prescriptions and the rehab won't take him without his prescriptions. 1902-Jared is on call was paged. Awaiting call back. 1903-Bedside and Verbal shift change report given to Crystal Mark RN by Jan Irizarry RN. Report included the following information SBAR, Kardex, OR Summary, Intake/Output and MAR.

## 2021-09-08 NOTE — PROGRESS NOTES
Occupational Therapy Evaluation Attempt    Chart reviewed. Attempted Occupational Therapy Evaluation/Treatment, however, patient unable to be seen due to:  []  Nausea/vomiting  []  Eating  []  Pain  []  Patient too lethargic  []  Off Unit for testing/procedure  []  Dialysis treatment in progress   []  Telemetry Results  [x]  Other: pt refusing Occupational Therapy stating \"I can't do anything right now, I have all these cords. \" Educated pt on importance of therapy, and line management during mobility, pt continued to refuse stating he will do therapy when he gets back to rehab    Will follow up later as patient's schedule allows.    Thank you for this referral.  Caden Heath, OTR/L

## 2021-09-08 NOTE — PROGRESS NOTES
Problem: Falls - Risk of  Goal: *Absence of Falls  Description: Document Suze Mary Fall Risk and appropriate interventions in the flowsheet.   Outcome: Progressing Towards Goal  Note: Fall Risk Interventions:  Mobility Interventions: Communicate number of staff needed for ambulation/transfer, Patient to call before getting OOB         Medication Interventions: Patient to call before getting OOB, Teach patient to arise slowly    Elimination Interventions: Call light in reach, Patient to call for help with toileting needs    History of Falls Interventions: Consult care management for discharge planning

## 2021-09-08 NOTE — ROUTINE PROCESS
Bedside and Verbal shift change report given to [place] RN (oncoming nurse) by ROSETTA Daugherty RN (offgoing nurse). Report included the following information SBAR, Kardex, OR Summary, Intake/Output, MAR and Recent Results.

## 2021-09-08 NOTE — PROGRESS NOTES
Oral and Written notification given to patient and/or caregiver informing them that they are currently an Outpatient receiving care in our facility. Outpatient services include Observation Services under [facility] and [facility] requirements.

## 2021-09-08 NOTE — PROGRESS NOTES
2000 - Bedside report received from [place]. Patient in bed. Pain 2/10. Mccray flushed as it ia al dry and no output noted in the bag. Will monitor    2220- Patient in bed at this time. IV to [address]  intact and patent. Sequential compression device to R LE. .  Dressing to L foot CDI. + CMS. Pt A & O x 4. LS clear, on RA. Abdomen soft, NT and ND. + BS to all 4 quadrants. Denies nausea. Pain 2/10. Call light within reach. 2306- Mccray with no flow still, flushed a 2nd time. BS yielded >999 mls. Mccray advanced up to the hub  After baloon was deflated (had only 3 ccs in it) but no flow still. Paged Hospitalist shortly. 2310-Call back received from the Hospitalist, MD said to first have an ICU RN and the Medic look at it. Medic unable, says zero experience with foleys, supervisor to send an ICU RN.    2340-Mccray now flowing, ICU RN, Nayely Zuniga flushed it and pulled back, this time succesfully pulled out a very cloudy thick urine with a blood clot at the tip of the syringe. Mccray emptied of 1200 ccs of very thick, milky type of urine. UA sent. Some leaking noted around the meatus that was blood tinged. Pt demetrius well.    0607-Pt is NWB, not been up, been in Rehab. Will leave in bed for now. Urine still cloudy, not as thick. Pls see UA results from this shift. Bedside and Verbal shift change report given to [facility], RN by Marychuy Robb. Report included the following information SBAR, Kardex, OR Summary, Intake/Output and MAR. MD aware of the UA results, addressed with day RN.

## 2021-09-09 VITALS
HEIGHT: 68 IN | TEMPERATURE: 98.5 F | RESPIRATION RATE: 18 BRPM | OXYGEN SATURATION: 98 % | HEART RATE: 70 BPM | DIASTOLIC BLOOD PRESSURE: 50 MMHG | WEIGHT: 234 LBS | SYSTOLIC BLOOD PRESSURE: 130 MMHG | BODY MASS INDEX: 35.46 KG/M2

## 2021-09-09 LAB
ALBUMIN SERPL-MCNC: 1.6 G/DL (ref 3.4–5)
ALBUMIN/GLOB SERPL: 0.5 {RATIO} (ref 0.8–1.7)
ALP SERPL-CCNC: 121 U/L (ref 45–117)
ALT SERPL-CCNC: 10 U/L (ref 16–61)
ANION GAP SERPL CALC-SCNC: 7 MMOL/L (ref 3–18)
AST SERPL-CCNC: 19 U/L (ref 10–38)
BASOPHILS # BLD: 0.1 K/UL (ref 0–0.1)
BASOPHILS NFR BLD: 1 % (ref 0–2)
BILIRUB SERPL-MCNC: 0.4 MG/DL (ref 0.2–1)
BUN SERPL-MCNC: 21 MG/DL (ref 7–18)
BUN/CREAT SERPL: 19 (ref 12–20)
CALCIUM SERPL-MCNC: 8.1 MG/DL (ref 8.5–10.1)
CHLORIDE SERPL-SCNC: 110 MMOL/L (ref 100–111)
CO2 SERPL-SCNC: 24 MMOL/L (ref 21–32)
CREAT SERPL-MCNC: 1.11 MG/DL (ref 0.6–1.3)
DIFFERENTIAL METHOD BLD: ABNORMAL
EOSINOPHIL # BLD: 0.2 K/UL (ref 0–0.4)
EOSINOPHIL NFR BLD: 3 % (ref 0–5)
ERYTHROCYTE [DISTWIDTH] IN BLOOD BY AUTOMATED COUNT: 16.8 % (ref 11.6–14.5)
GLOBULIN SER CALC-MCNC: 3.5 G/DL (ref 2–4)
GLUCOSE BLD STRIP.AUTO-MCNC: 137 MG/DL (ref 70–110)
GLUCOSE BLD STRIP.AUTO-MCNC: 238 MG/DL (ref 70–110)
GLUCOSE SERPL-MCNC: 130 MG/DL (ref 74–99)
HCT VFR BLD AUTO: 26 % (ref 36–48)
HGB BLD-MCNC: 8 G/DL (ref 13–16)
LYMPHOCYTES # BLD: 0.9 K/UL (ref 0.9–3.6)
LYMPHOCYTES NFR BLD: 17 % (ref 21–52)
MCH RBC QN AUTO: 24 PG (ref 24–34)
MCHC RBC AUTO-ENTMCNC: 30.8 G/DL (ref 31–37)
MCV RBC AUTO: 78.1 FL (ref 78–100)
MONOCYTES # BLD: 0.4 K/UL (ref 0.05–1.2)
MONOCYTES NFR BLD: 7 % (ref 3–10)
NEUTS SEG # BLD: 3.8 K/UL (ref 1.8–8)
NEUTS SEG NFR BLD: 72 % (ref 40–73)
PLATELET # BLD AUTO: 366 K/UL (ref 135–420)
PMV BLD AUTO: 9.3 FL (ref 9.2–11.8)
POTASSIUM SERPL-SCNC: 4.1 MMOL/L (ref 3.5–5.5)
PROT SERPL-MCNC: 5.1 G/DL (ref 6.4–8.2)
RBC # BLD AUTO: 3.33 M/UL (ref 4.35–5.65)
RBC MORPH BLD: ABNORMAL
RBC MORPH BLD: ABNORMAL
SODIUM SERPL-SCNC: 141 MMOL/L (ref 136–145)
WBC # BLD AUTO: 5.4 K/UL (ref 4.6–13.2)

## 2021-09-09 PROCEDURE — 74011636637 HC RX REV CODE- 636/637: Performed by: FAMILY MEDICINE

## 2021-09-09 PROCEDURE — 97535 SELF CARE MNGMENT TRAINING: CPT

## 2021-09-09 PROCEDURE — 97166 OT EVAL MOD COMPLEX 45 MIN: CPT

## 2021-09-09 PROCEDURE — 80053 COMPREHEN METABOLIC PANEL: CPT

## 2021-09-09 PROCEDURE — 65270000029 HC RM PRIVATE

## 2021-09-09 PROCEDURE — 82962 GLUCOSE BLOOD TEST: CPT

## 2021-09-09 PROCEDURE — 99218 HC RM OBSERVATION: CPT

## 2021-09-09 PROCEDURE — 36415 COLL VENOUS BLD VENIPUNCTURE: CPT

## 2021-09-09 PROCEDURE — 74011636637 HC RX REV CODE- 636/637: Performed by: HOSPITALIST

## 2021-09-09 PROCEDURE — 85025 COMPLETE CBC W/AUTO DIFF WBC: CPT

## 2021-09-09 PROCEDURE — 74011250637 HC RX REV CODE- 250/637: Performed by: ORTHOPAEDIC SURGERY

## 2021-09-09 RX ADMIN — FLUCONAZOLE 200 MG: 100 TABLET ORAL at 09:42

## 2021-09-09 RX ADMIN — INSULIN LISPRO 4 UNITS: 100 INJECTION, SOLUTION INTRAVENOUS; SUBCUTANEOUS at 08:27

## 2021-09-09 RX ADMIN — Medication 250 MG: at 09:37

## 2021-09-09 RX ADMIN — SPIRONOLACTONE 12.5 MG: 25 TABLET ORAL at 09:35

## 2021-09-09 RX ADMIN — INSULIN GLARGINE 10 UNITS: 100 INJECTION, SOLUTION SUBCUTANEOUS at 09:37

## 2021-09-09 RX ADMIN — FERROUS SULFATE TAB 325 MG (65 MG ELEMENTAL FE) 325 MG: 325 (65 FE) TAB at 06:45

## 2021-09-09 RX ADMIN — METOPROLOL SUCCINATE 50 MG: 50 TABLET, EXTENDED RELEASE ORAL at 09:36

## 2021-09-09 RX ADMIN — HYDRALAZINE HYDROCHLORIDE 25 MG: 50 TABLET, FILM COATED ORAL at 00:36

## 2021-09-09 RX ADMIN — COLLAGENASE SANTYL: 250 OINTMENT TOPICAL at 09:38

## 2021-09-09 RX ADMIN — MULTIPLE VITAMINS W/ MINERALS TAB 1 TABLET: TAB at 09:36

## 2021-09-09 RX ADMIN — PANTOPRAZOLE SODIUM 40 MG: 40 TABLET, DELAYED RELEASE ORAL at 06:45

## 2021-09-09 RX ADMIN — HYDRALAZINE HYDROCHLORIDE 25 MG: 50 TABLET, FILM COATED ORAL at 11:57

## 2021-09-09 RX ADMIN — INSULIN LISPRO 4 UNITS: 100 INJECTION, SOLUTION INTRAVENOUS; SUBCUTANEOUS at 11:58

## 2021-09-09 RX ADMIN — CIPROFLOXACIN 500 MG: 500 TABLET, FILM COATED ORAL at 09:36

## 2021-09-09 RX ADMIN — TAMSULOSIN HYDROCHLORIDE 0.8 MG: 0.4 CAPSULE ORAL at 09:36

## 2021-09-09 RX ADMIN — HYDRALAZINE HYDROCHLORIDE 25 MG: 50 TABLET, FILM COATED ORAL at 05:29

## 2021-09-09 RX ADMIN — INSULIN LISPRO 4 UNITS: 100 INJECTION, SOLUTION INTRAVENOUS; SUBCUTANEOUS at 11:57

## 2021-09-09 RX ADMIN — ISOSORBIDE MONONITRATE 30 MG: 30 TABLET, EXTENDED RELEASE ORAL at 09:35

## 2021-09-09 NOTE — PROGRESS NOTES
Pt will need D/C order and D/C summary prior to leaving unit for discharge. Transition of Care to SNF: [facility]  Communication to Patient/Family:  Met with patient and family, and they are agreeable to the transition plan. The Plan for Transition of Care is related to the following treatment goals: The Patient and/or patient  was provided with a choice of provider and agrees   with the discharge plan. Yes [x] No []    Freedom of choice list was provided with basic dialogue that supports the patient's individualized plan of care/goals and shares the quality data associated with the providers. Yes [x] No []    SNF/Rehab Transition:  Patient has been accepted to [facility] at [address] [place] [address]. and meets criteria for admission. Patient will transported by medical transportand expected to leave at 4pm    Communication to SNF/Rehab:  Bedside RN, Ba Jackman has been notified to update the transition plan to the facility and call report [phone]    Discharge information has been updated on the AVS and sent via [facility] and or [facility] Link. Discharge instructions to be fax'd to facility at [phone]     Please include all hard scripts for controlled substances, med rec , AVS, and dc summary in packet. Please medicate for pain prior to dc if possible and needed to help offset delay when patient first arrives to facility. Reviewed and confirmed with facility, [facility] can manage the patient care needs for the following:     Romel File with (X) only those applicable:  Medication:  [x]Medications are available at the facility  []IV Antibiotics    []Controlled Substance - hard copies available sent.   []Weekly Labs    Equipment:  []CPAP/BiPAP  []Wound Vacuum  []Mccray or Urinary Device  []PICC/Central Line  []Nebulizer  []Ventilator    Treatment:  []Isolation (for MRSA, VRE, etc.)  []Surgical Drain Management  []Tracheostomy Care  []Dressing Changes  []Dialysis with transportation  []PEG Care  []Oxygen  []Daily Weights for Heart Failure    Dietary:  []Any diet limitations  []Tube Feedings   []Total Parenteral Management (TPN)    Financial Resources:  []Medicaid Application Completed    []UAI Completed and copy given to pt/family    []A screening has previously been completed. []Level II Completed    [] Private pay individual who will not become   financially eligible for Medicaid within 6 months from admission to a [address] [place] [address] facility. [] Individual refused to have screening conducted. []Medicaid Application Completed    []The screening denied because it was determined individual did not need/did not qualify for nursing facility level of care. [] Out of state residents seeking direct admission to a [address] facility. [] Individuals who are inpatients of an out of state hospital, or in state or out of state veterans/ hospital and seek direct admission to a [address] facility  [] Individuals who are pateints or residents of a state owned/operated facility that is licensed by Department of [employer] ([employer]) and seek direct admission to [address] [place] [address]  [] A screening not required for enrollment in [facility] services as set out in [address] [place] [address] 94-  [] [facility]) staff shall perform screenings of the [facility] clients. Advanced Care Plan:  []Surrogate Decision Maker of Care  []POA  []Communicated Code Status and copy sent.     Other:       Care Management Interventions  Support Systems: Claudio Bonilla, Spouse/Significant Other

## 2021-09-09 NOTE — PROGRESS NOTES
Patient stable over night, No pain. Splint intact. Scripts signed for discharge meds. Needs to follow up in the office Monday or Wednesday next week. Call for time [phone].     James Singh MD

## 2021-09-09 NOTE — PROGRESS NOTES
1925 - Assumed care at this time. Pt resting quietly in bed. No signs of distress. Will continue to monitor. Pt encouraged to call for assistance. 2257 - Patient A&Ox4, RA. Denies chest pain and SOB. SCD compression device to RLE. ACE dressing to LLE C/D/I. Denies numbness/tingling/calf pain. Mccray draining and patent. Pain 4/10 with a tolerable level of 4/10. Pt educated on IS use, q2h rounds, pain management, and CHG wipes. Pt verbalized understanding, no concerns voiced. Per pt he wants to get some rest tonight. Interruptions to be kept at a minimum. Call bell within reach, bed in lowest position. Pt encouraged to call for assistance.

## 2021-09-09 NOTE — PROGRESS NOTES
Problem: Self Care Deficits Care Plan (Adult)  Goal: *Acute Goals and Plan of Care (Insert Text)  Description: Initial Occupational Therapy Goals (9/9/2021) Within 7 day(s):    1. Patient will perform grooming standing sinkside with SBA for increased independence with ADLs. 2. Patient will perform LB dressing with supervision & A/E PRN for increased independence with ADLs. 3. Patient will perform toilet transfer with supervision for increased independence with ADLs. 4. Patient will perform all aspects of toileting with supervision for increased independence with ADLs. 5. Patient will independently apply energy conservation techniques with 1 verbal cue(s)for increased independence with ADLs. 6. Patient will perform bathroom mobility with SBA for increased independence/safety with ADLs. Outcome: Progressing Towards Goal   OCCUPATIONAL THERAPY EVALUATION    Patient: Alin Negro (73 y.o. male)  Date: 9/9/2021  Primary Diagnosis: S/P foot surgery, left [Z98.890]  Procedure(s) (LRB):  LEFT ANKLE REMOVAL OF EXTERNAL FIXATOR WITH SKIN GRAFT ALLOGRAFT, LARGE C-ARM \"SPEC POP\" (Left) 2 Days Post-Op   Precautions: Fall, NWB (LLE)  PLOF: pt admitted to hospital from rehab facility, reports he was ambulating short distances with PT    ASSESSMENT :  Based on the objective data described below, the patient presents with decreased strength, ROM, balance, and LLE NWB status limiting independence with ADLs. Pt found supine in bed, reporting pain 6/10, and eventually agreeable to therapy after education provided on purpose of acute OT. Pt CGA and additional time to sit up to EOB. Pt was able to sit EOB ~10 minutes. Educated pt on NWB status with pt verbalizing understanding, discussed body mechanics of future OOB functional transfers. BUE AROM grossly WFL, and pt reporting soreness in B shoulders due to relying on BUE for bed mobility. Pt was able to doff/don R sock with CGA using c-sitting position.  Pt returned to supine with CGA, call bell/phone within reach. Recommend return to rehab facility upon hospital d/c to continue progressing pt towards PLOF. Education: role of OT and POC, fall prevention    Patient will benefit from skilled intervention to address the above impairments. Patient's rehabilitation potential is considered to be Fair  Factors which may influence rehabilitation potential include:   []             None noted  []             Mental ability/status  []             Medical condition  []             Home/family situation and support systems  []             Safety awareness  [x]             Pain tolerance/management  []             Other:      PLAN :  Recommendations and Planned Interventions:   [x]               Self Care Training                  [x]      Therapeutic Activities  [x]               Functional Mobility Training   []      Cognitive Retraining  [x]               Therapeutic Exercises           [x]      Endurance Activities  [x]               Balance Training                    []      Neuromuscular Re-Education  []               Visual/Perceptual Training     [x]      Home Safety Training  [x]               Patient Education                   [x]      Family Training/Education  []               Other (comment):    Frequency/Duration: Patient will be followed by occupational therapy 1-2 times per day/4-7 days per week to address goals. Discharge Recommendations: Claudio Bonilla  Further Equipment Recommendations for Discharge: To Be Determined (TBD) at next level of care     SUBJECTIVE:   Patient stated you're the second therapy person today.     OBJECTIVE DATA SUMMARY:     Past Medical History:   Diagnosis Date    A-fib ([email or web address]     Asthma     CAD (coronary artery disease)     Chronic kidney disease     stage 3    COVID-19     Diabetes ([facility]CC)     GERD (gastroesophageal reflux disease)     Heart failure (HCC)     chronic diastolic heart failure    High cholesterol     Hypertension Ill-defined condition     chronic osteomyelitis left ankle and foot    Ill-defined condition     chronic arteriosclerosis    PVD (peripheral vascular disease) ([facility]      Past Surgical History:   Procedure Laterality Date    COLONOSCOPY N/A 6/1/2018    COLONOSCOPY, POLYPECTOMY performed by Johanna Scott MD at [facility] ENDOSCOPY    HX CATARACT REMOVAL      HX ORTHOPAEDIC Left 2021    ankle washout, external fixator, would vac     Barriers to Learning/Limitations: yes;  physical  Compensate with: visual, verbal, tactile, kinesthetic cues/model    Home Situation:   Home Situation  Home Environment: Rehabilitation facility [facility]  One/Two Story Residence: One story  Living Alone: No  Support Systems: [place]  Patient Expects to be Discharged to[de-identified] Skilled nursing facility  Current DME Used/Available at Home: Wheelchair, Walker, rolling  []  Right hand dominant   []  Left hand dominant    Cognitive/Behavioral Status:  Neurologic State: Alert  Orientation Level: Oriented X4  Cognition: Follows commands  Safety/Judgement: Awareness of environment    Coordination: BUE  Coordination: Within functional limits  Fine Motor Skills-Upper: Left Intact; Right Intact    Gross Motor Skills-Upper: Left Intact; Right Intact    Balance:  Sitting: Intact    Strength: BUE  Strength: Generally decreased, functional    Tone & Sensation: BUE  Tone: Normal  Sensation: Impaired (L toes)    Range of Motion: BUE  AROM: Generally decreased, functional  PROM: Generally decreased, functional    Functional Mobility and Transfers for ADLs:  Bed Mobility:  Supine to Sit: Contact guard assistance; Additional time (vc)  Sit to Supine: Contact guard assistance; Additional time (vc)  Scooting: Contact guard assistance (to scoot towards EOB)    ADL Assessment:   Feeding: Setup;Modified independent  Oral Facial Hygiene/Grooming: Stand-by assistance (seated)  Bathing:  Moderate assistance  Upper Body Dressing: Stand-by assistance  Lower Body Dressing: Moderate assistance  Toileting: Minimum assistance    ADL Intervention:  Lower Body Dressing Assistance  Dressing Assistance: Contact guard assistance  Socks: Stand-by assistance (R sock)  Leg Crossed Method Used: Yes  Position Performed: Seated edge of bed  Cues: Verbal cues provided    Cognitive Retraining  Safety/Judgement: Awareness of environment    Pain:  Pain level pre-treatment: 6/10   Pain level post-treatment: 6/10   Pain Intervention(s): Medication provided by Nursing (see MAR); Rest, Ice, Repositioning   Response to intervention: Nurse notified, See doc flow sheet    Activity Tolerance:   Fair-. Patient able to sit EOB ~10 minute(s). Patient requires intermittent rest breaks. Patient limited by pain, strength, ROM, balance, NWB. Patient unsteady. Please refer to the flowsheet for vital signs taken during this treatment. After treatment:   [] Patient left in no apparent distress sitting up in chair  [x] Patient left in no apparent distress in bed  [x] Call bell left within reach  [x] Nursing notified  [] Caregiver present  [] Bed alarm activated    COMMUNICATION/EDUCATION:   [x] Role of Occupational Therapy in the acute care setting  [x] Home safety education was provided and the patient/caregiver indicated understanding. [x] Patient/family have participated as able in goal setting and plan of care. [x] Patient/family agree to work toward stated goals and plan of care. [] Patient understands intent and goals of therapy, but is neutral about his/her participation. [] Patient is unable to participate in goal setting and plan of care. Thank you for this referral.  Bautista Patton, OTR/L  Time Calculation: 23 mins    Eval Complexity: History: MEDIUM Complexity : Expanded review of history including physical, cognitive and psychosocial  history ;    Examination: MEDIUM Complexity : 3-5 performance deficits relating to physical, cognitive , or psychosocial skils that result in activity limitations and / or participation restrictions; Decision Making:MEDIUM Complexity : Patient may present with comorbidities that affect occupational performnce.  Miniml to moderate modification of tasks or assistance (eg, physical or verbal ) with assesment(s) is necessary to enable patient to complete evaluation

## 2021-09-09 NOTE — PROGRESS NOTES
Problem: Falls - Risk of  Goal: *Absence of Falls  Description: Document Yin Vargas Fall Risk and appropriate interventions in the flowsheet.   Outcome: Progressing Towards Goal  Note: Fall Risk Interventions:  Mobility Interventions: Communicate number of staff needed for ambulation/transfer, Patient to call before getting OOB, Utilize walker, cane, or other assistive device         Medication Interventions: Teach patient to arise slowly, Patient to call before getting OOB    Elimination Interventions: Call light in reach, Patient to call for help with toileting needs, Toileting schedule/hourly rounds    History of Falls Interventions: Consult care management for discharge planning

## 2021-09-09 NOTE — ACP (ADVANCE CARE PLANNING)
Advance Care Planning   Advance Care Planning Inpatient Note  [facility] Department    Today's Date: 9/9/2021  Unit: [facility] [address] [facility]    Received request from antony. Upon review of chart and communication with care team, patient's decision making abilities are not in question. Patient was/were present in the room during visit. Goals of ACP Conversation:  Discuss Advance Care planning documents    Health Care Decision Makers:      Primary Decision Maker: Faith Lazcano [facility] - [phone]    Secondary Decision Maker: Wilfredo [facility] - [phone]      Summary:  Verified Healthcare Decision Maker  Petra Fox is primary decision maker. Information in Medical record is correct.   Advance Care Planning Documents (Patient Wishes) on file:  document is at home,  encourage patient to bring copy to the Hospital.     Interventions:  Requested patient/family submit existing document(s) for our records: Healthcare Power of /Advance Directive appointment of Health care agent    Care Preferences Communicated:  No    Outcomes/Plan:  ACP Discussion Completed    Gina Ivey on 9/9/2021 at 10:15 AM

## 2021-09-09 NOTE — PROGRESS NOTES
Problem: Falls - Risk of  Goal: *Absence of Falls  Description: Document Olivier Hayes Fall Risk and appropriate interventions in the flowsheet.   Outcome: Progressing Towards Goal  Note: Fall Risk Interventions:  Mobility Interventions: Communicate number of staff needed for ambulation/transfer, Patient to call before getting OOB, Utilize walker, cane, or other assistive device         Medication Interventions: Teach patient to arise slowly, Patient to call before getting OOB    Elimination Interventions: Call light in reach, Patient to call for help with toileting needs, Toileting schedule/hourly rounds    History of Falls Interventions: Consult care management for discharge planning

## 2021-09-09 NOTE — PROGRESS NOTES
0740   Pt is awake, alert, oriented x4. Resting in bed. Splint/ace wrap dressing to L foot dry and intact. Pt's l great toe is blackish. 0930   Lungs re clear. Abdomen soft with active BS. Pt for d/c to [facility] today via medical transport. 1523   Report given to Nurse Stephane Johnson at [facility]. 1629   Pt picked up by Medical transport for transfer to [address] [place] [address].

## 2021-09-09 NOTE — ROUTINE PROCESS
Bedside and Verbal shift change report given to Rosa He RN by Quirino Grover RN. Report included the following information SBAR, Kardex, Intake/Output and MAR.

## 2021-09-10 LAB
ABO + RH BLD: NORMAL
BACTERIA SPEC CULT: ABNORMAL
BLD PROD TYP BPU: NORMAL
BLOOD GROUP ANTIBODIES SERPL: NORMAL
BPU ID: NORMAL
CALLED TO:,BCALL1: NORMAL
CC UR VC: ABNORMAL
CROSSMATCH RESULT,%XM: NORMAL
SERVICE CMNT-IMP: ABNORMAL
SPECIMEN EXP DATE BLD: NORMAL
STATUS OF UNIT,%ST: NORMAL
UNIT DIVISION, %UDIV: 0

## 2021-09-13 LAB
BACTERIA SPEC CULT: NORMAL
SERVICE CMNT-IMP: NORMAL

## 2021-10-11 NOTE — PERIOP NOTES
No prostePlease arrange for your transportation home Patient states that the family physician is not aware of upcoming procedure. Do not put any lotion, jewelry, makeup, fingernail or toenail polish; no wigs, no private piercings; no tictac,gum and mouthwash. Denies sleep apnea. NO dnr. Information obtained from [place]. Valentina Thompson who is aware that she will need to find out from physician when patient is to stop plavix. She was also made aware of need for a1c and ekg.   Pt has h/o chf,cva,dm

## 2021-10-26 PROBLEM — E11.621 DIABETES MELLITUS WITH FOOT ULCER AND GANGRENE (HCC): Status: ACTIVE | Noted: 2021-01-01

## 2021-10-26 PROBLEM — E11.52 DIABETES MELLITUS WITH FOOT ULCER AND GANGRENE (HCC): Status: ACTIVE | Noted: 2021-01-01

## 2021-10-26 PROBLEM — L97.509 DIABETES MELLITUS WITH FOOT ULCER AND GANGRENE (HCC): Status: ACTIVE | Noted: 2021-01-01

## 2021-10-26 NOTE — ED PROVIDER NOTES
EMERGENCY DEPARTMENT HISTORY AND PHYSICAL EXAM    Date: 10/26/2021  Patient Name: Yennifer Mon. History of Presenting Illness     Chief Complaint   Patient presents with    Ankle Pain         History Provided By: Patient and Nursing Home/SNF/Rehab Center    2:32 PM  Yennifer Whitaker is a 78 y.o. male with PMHX of hypertension, hyperlipidemia, stroke, PAD, A. fib, on Plavix who presents to the emergency department C/O low hemoglobin and worsening left foot gangrene. Patient initially had a nonhealing left diabetic foot wound, ankle fracture and subsequent gangrene status post exfix of left ankle fracture with removal of hardware last month. Patient has been at [facility] rehab facility awaiting cardiac clearance for planned left BKA. However he had outpatient labs with hemoglobin of 6.6 and worsening gangrene of the left foot to where \"it is about to fall off\" per nursing home staff. Patient's orthopedist is Dr. Robina Zaman. Patient takes Percocet for pain, took 2 Percocet just prior to arrival.. Pt denies fever, or black stools and any other sxs or complaints.      PCP: Kleber Monge MD    Current Facility-Administered Medications   Medication Dose Route Frequency Provider Last Rate Last Admin    sodium chloride (NS) flush 5-10 mL  5-10 mL IntraVENous PRN ASMITA Esqueda        cefepime (MAXIPIME) 2 g in sterile water (preservative free) 10 mL IV syringe  2 g IntraVENous Q8H ASMITA Esqueda 200 mL/hr at 10/26/21 1453 2 g at 10/26/21 1453    Vancomycin - Pharmacy to Dose  1 Each Other Rx Dosing/Monitoring ASMITA Esqueda        vancomycin (VANCOCIN) 1500 mg in  ml infusion  1,500 mg IntraVENous ONCE Aida [place], [place]        [START ON 10/27/2021] vancomycin (VANCOCIN) 750 mg in 0.9% sodium chloride 250 mL (VIAL-MATE)  750 mg IntraVENous Q12H ASMITA Esqueda         Current Outpatient Medications   Medication Sig Dispense Refill    senna/docusate sodium (SENNA-C PLUS PO) Take 2 Tablets by mouth nightly.  oxyCODONE-acetaminophen (Percocet) 5-325 mg per tablet Take  by mouth every four (4) hours as needed for Pain.  clopidogreL (Plavix) 75 mg tab Take  by mouth daily. Indications: afib      ondansetron (ZOFRAN ODT) 4 mg disintegrating tablet Take 1 Tablet by mouth every eight (8) hours as needed for Nausea or Vomiting (Nausea or Vomiting). 20 Tablet 0    insulin glargine (LANTUS,BASAGLAR) 100 unit/mL (3 mL) inpn 42 Units by SubCUTAneous route daily.  nut.tx.glucose intolerance,soy (BOOST DIABETIC PO) Take  by mouth two (2) times a day.  hydrALAZINE (APRESOLINE) 25 mg tablet Take 25 mg by mouth every six (6) hours. Indications: chronic heart failure      isosorbide mononitrate ER (IMDUR) 30 mg tablet Take 30 mg by mouth daily.  B.infantis-B.ani-B.long-B.bifi (Probiotic 4X) 10-15 mg TbEC Take  by mouth daily.  OTHER Indications: LiquaCell Protein supplement 30 ml 1 daily      OTHER Indications: PUSH supplement bid      multivitamin (ONE A DAY) tablet Take 1 Tablet by mouth daily.  miconazole (MICOTIN) 2 % topical cream Apply  to affected area two (2) times a day.  simethicone (Gas Relief, simethicone,) 80 mg chewable tablet Take 125 mg by mouth four (4) times daily as needed for Flatulence.  magnesium hydroxide (MILK OF MAGNESIA) 400 mg/5 mL suspension Take 30 mL by mouth daily as needed for Constipation.  bisacodyL (Dulcolax, bisacodyl,) 10 mg supp Insert 10 mg into rectum daily as needed for Constipation.  acetaminophen (TylenoL) 325 mg tablet Take 650 mg by mouth every four (4) hours as needed for Pain.  tamsulosin (FLOMAX) 0.4 mg capsule Take 2 Capsules by mouth daily. 30 Capsule 0    ferrous sulfate 325 mg (65 mg iron) tablet Take 1 Tablet by mouth Daily (before breakfast).  (Patient taking differently: Take 325 mg by mouth two (2) times a day.) 30 Tablet 0    spironolactone (ALDACTONE) 25 mg tablet Take 12.5 mg by mouth daily. Take 0.5 tablet (12.5 mg) by oral   Indications: heart failure      insulin lispro (HUMALOG KWIKPEN INSULIN SC) 10 Units by SubCUTAneous route three (3) times daily (with meals).  atorvastatin (LIPITOR) 40 mg tablet Take 1 Tab by mouth nightly. 30 Tab 0    metoprolol succinate (TOPROL-XL) 50 mg XL tablet Take 1 Tab by mouth daily. (Patient taking differently: Take 100 mg by mouth daily.) 30 Tab 0       Past History     Past Medical History:  Past Medical History:   Diagnosis Date    A-fib ([address]     Asthma     CAD (coronary artery disease)     Chronic kidney disease     stage 3    COVID-19     Diabetes ([address]     GERD (gastroesophageal reflux disease)     Heart failure (HCC)     chronic diastolic heart failure    High cholesterol     Hypertension     Ill-defined condition     chronic osteomyelitis left ankle and foot    Ill-defined condition     chronic arteriosclerosis    PVD (peripheral vascular disease) ([address]     Stroke ([address]     cva       Past Surgical History:  Past Surgical History:   Procedure Laterality Date    COLONOSCOPY N/A 6/1/2018    COLONOSCOPY, POLYPECTOMY performed by Ld Britt MD at [facility] ENDOSCOPY    HX CATARACT REMOVAL      HX ORTHOPAEDIC Left 2021    ankle washout, external fixator, would vac    HX ORTHOPAEDIC      external fixator removed       Family History:  History reviewed. No pertinent family history. Social History:  Social History     Tobacco Use    Smoking status: Former Smoker    Smokeless tobacco: Never Used   Substance Use Topics    Alcohol use: Not Currently    Drug use: No       Allergies:  No Known Allergies      Review of Systems   Review of Systems   Constitutional: Negative for fever. Musculoskeletal: Positive for arthralgias and myalgias. Skin: Positive for wound. All other systems reviewed and are negative.         Physical Exam     Vitals:    10/26/21 1400 10/26/21 1401 10/26/21 1430 10/26/21 1530   BP: 121/63 (!) 124/50   Pulse: 84 85 77 69   Resp: 19 19 21 20   SpO2: 98% 97% 96% 96%     Physical Exam  Vital signs and nursing notes reviewed. CONSTITUTIONAL: Alert. Well-appearing; somewhat chronically ill-appearing; in no apparent distress. HEAD: Normocephalic; atraumatic. CV: Normal S1, S2; no murmurs, rubs, or gallops. RESPIRATORY: Normal chest excursion with respiration; breath sounds clear and equal bilaterally; no wheezes, rhonchi, or rales. EXT: LLE: Blackened/necrotic gangrenous left foot, only forefoot and toes examined as foot is wrapped and deformed (significantly laterally deviated). Leg nontender down to mid shin without rash. NEURO: A & O x3. PSYCH:  Mood and affect appropriate. Diagnostic Study Results     Labs -     Recent Results (from the past 12 hour(s))   POC LACTIC ACID    Collection Time: 10/26/21  2:21 PM   Result Value Ref Range    Lactic Acid (POC) 1.36 0.40 - 4.78 mmol/L   METABOLIC PANEL, COMPREHENSIVE    Collection Time: 10/26/21  2:30 PM   Result Value Ref Range    Sodium 137 136 - 145 mmol/L    Potassium 4.4 3.5 - 5.5 mmol/L    Chloride 104 100 - 111 mmol/L    CO2 28 21 - 32 mmol/L    Anion gap 5 3.0 - 18 mmol/L    Glucose 193 (H) 74 - 99 mg/dL    BUN 25 (H) 7.0 - 18 MG/DL    Creatinine 1.20 0.6 - 1.3 MG/DL    BUN/Creatinine ratio 21 (H) 12 - 20      GFR est AA >60 >60 ml/min/1.73m2    GFR est non-AA 59 (L) >60 ml/min/1.73m2    Calcium 8.3 (L) 8.5 - 10.1 MG/DL    Bilirubin, total 0.5 0.2 - 1.0 MG/DL    ALT (SGPT) 18 16 - 61 U/L    AST (SGOT) 26 10 - 38 U/L    Alk.  phosphatase 234 (H) 45 - 117 U/L    Protein, total 5.8 (L) 6.4 - 8.2 g/dL    Albumin 1.3 (L) 3.4 - 5.0 g/dL    Globulin 4.5 (H) 2.0 - 4.0 g/dL    A-G Ratio 0.3 (L) 0.8 - 1.7     CBC WITH AUTOMATED DIFF    Collection Time: 10/26/21  2:30 PM   Result Value Ref Range    WBC 11.0 4.6 - 13.2 K/uL    RBC 3.80 (L) 4.35 - 5.65 M/uL    HGB 8.1 (L) 13.0 - 16.0 g/dL    HCT 27.5 (L) 36.0 - 48.0 %    MCV 72.4 (L) 78.0 - 100.0 FL    MCH 21.3 (L) 24.0 - 34.0 PG    MCHC 29.5 (L) 31.0 - 37.0 g/dL    RDW 20.0 (H) 11.6 - 14.5 %    PLATELET 032 (H) 752 - 420 K/uL    MPV 9.1 (L) 9.2 - 11.8 FL    NEUTROPHILS 82 (H) 40 - 73 %    LYMPHOCYTES 10 (L) 21 - 52 %    MONOCYTES 5 3 - 10 %    EOSINOPHILS 1 0 - 5 %    BASOPHILS 0 0 - 2 %    ABS. NEUTROPHILS 9.0 (H) 1.8 - 8.0 K/UL    ABS. LYMPHOCYTES 1.1 0.9 - 3.6 K/UL    ABS. MONOCYTES 0.6 0.05 - 1.2 K/UL    ABS. EOSINOPHILS 0.1 0.0 - 0.4 K/UL    ABS. BASOPHILS 0.0 0.0 - 0.1 K/UL    DF AUTOMATED     TYPE & SCREEN    Collection Time: 10/26/21  2:30 PM   Result Value Ref Range    Crossmatch Expiration 10/29/2021,2359     ABO/Rh(D) [facility] POSITIVE     Antibody screen NEG    URINALYSIS W/ RFLX MICROSCOPIC    Collection Time: 10/26/21  3:04 PM   Result Value Ref Range    Color YELLOW      Appearance OPAQUE      Specific gravity 1.025 1.003 - 1.030      pH (UA) 6.5 5.0 - 8.0      Protein >300 (A) NEG mg/dL    Glucose Negative NEG mg/dL    Ketone Negative NEG mg/dL    Bilirubin Negative NEG      Blood MODERATE (A) NEG      Urobilinogen 0.2 0.2 - 1.0 EU/dL    Nitrites Positive (A) NEG      Leukocyte Esterase LARGE (A) NEG     URINE MICROSCOPIC ONLY    Collection Time: 10/26/21  3:04 PM   Result Value Ref Range    WBC TOO NUMEROUS TO COUNT 0 - 5 /hpf    RBC 4 to 10 0 - 5 /hpf    Epithelial cells Negative 0 - 5 /lpf    Bacteria 2+ (A) NEG /hpf    Other: MICROSCOPIC ON UNSPUN SPECIMEN SAMPLE TOO VISCOUS        Radiologic Studies -   XR CHEST PORT   Final Result      No active cardiopulmonary disease. **   **        CT Results  (Last 48 hours)    None        CXR Results  (Last 48 hours)               10/26/21 1437  XR CHEST PORT Final result    Impression:      No active cardiopulmonary disease.        **   **       Narrative:  EXAM: CHEST RADIOGRAPH, SINGLE VIEW       CLINICAL INDICATION/HISTORY: meets SIRS criteria       COMPARISON: Single view chest 9/7/2021       TECHNIQUE: Portable frontal view of the chest was obtained.        _______________       FINDINGS:       SUPPORT DEVICES: None. HEART AND MEDIASTINUM: Cardiomediastinal silhouette appears within normal   limits. Normal caliber thoracic aorta. No central vascular congestion. LUNGS AND PLEURAL SPACES: Patient is rotated to the right. Patient is also   lordotically positioned. No definite focal infiltrate or effusion. No   pneumothorax. BONY THORAX AND SOFT TISSUES: No acute osseous abnormality. _______________                 Medications given in the ED-  Medications   sodium chloride (NS) flush 5-10 mL (has no administration in time range)   cefepime (MAXIPIME) 2 g in sterile water (preservative free) 10 mL IV syringe (2 g IntraVENous New Bag 10/26/21 1453)   Vancomycin - Pharmacy to Dose (has no administration in time range)   vancomycin (VANCOCIN) 1500 mg in  ml infusion (has no administration in time range)   vancomycin (VANCOCIN) 750 mg in 0.9% sodium chloride 250 mL (VIAL-MATE) (has no administration in time range)   sodium chloride 0.9 % bolus infusion 500 mL (500 mL IntraVENous New Bag 10/26/21 1457)         Medical Decision Making   I am the first provider for this patient. I reviewed the vital signs, available nursing notes, past medical history, past surgical history, family history and social history. Vital Signs-Reviewed the patient's vital signs. Records Reviewed: Nursing Notes      Procedures:  Procedures    ED Course:  2:32 PM   Initial assessment performed. The patients presenting problems have been discussed, and they are in agreement with the care plan formulated and outlined with them. I have encouraged them to ask questions as they arise throughout their visit. 315 consult note  Case discussed with patient's orthopedic surgeon Dr. Rajni Jordan.   He is very familiar with patient, states plan to perform left BKA next Friday but states since patient's foot gangrene has worsened and patient prefers as well, can plan for admission to the hospitalist and surgery this Friday 10/29/21. Agrees with IV abx as well. 3:50 PM consult note  Case discussed with hospitalist Dr. Brock Poole, who will admit to medical floor. 4 PM progress  Notified by lab that patient's urine is mostly pus. Patient receiving cefepime and Vanco, will culture urine as well. Diagnosis and Disposition     ADMISSION NOTE:  3:55 PM  Patient is being admitted to the hospital by Dr. Brock Poole. The results of their tests and reasons for their admission have been discussed with them and/or available family. They convey agreement and understanding for the need to be admitted and for their admission diagnosis. CONDITIONS ON ADMISSION:  Deep Vein Thrombosis is not present at the time of admission. Thrombosis is not present at the time of admission. Urinary Tract Infection is present at the time of admission. Pneumonia is not present at the time of admission. MRSA is not present at the time of admission. Wound infection is present at the time of admission. Pressure Ulcer is not present at the time of admission. CLINICAL IMPRESSION:    1. 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 Urinary tract infection with pyuria        PLAN:    1. Admit      Please note that this dictation was completed with [employer], the computer voice recognition software. Quite often unanticipated grammatical, syntax, homophones, and other interpretive errors are inadvertently transcribed by the computer software. Please disregard these errors. Please excuse any errors that have escaped final proofreading.

## 2021-10-26 NOTE — Clinical Note
Status[de-identified] INPATIENT [101]   Type of Bed: Medical [8]   Cardiac Monitoring Required?: No   Inpatient Hospitalization Certified Necessary for the Following Reasons: 2.  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

## 2021-10-26 NOTE — PROGRESS NOTES
[address] [facility] Pharmacokinetic Monitoring Service - Vancomycin     Kellen Carolina is a 78 y.o. male starting on vancomycin therapy for Bone and Joint Infection. Pharmacy consulted by Pervis Cranker PA  for monitoring and adjustment. Target Concentration: Goal AUC/CHANTEL 400-600 mg*hr/L    Additional Antimicrobials:  Cefepime 2 gm IV q8hrs    Pertinent Laboratory Values: Wt Readings from Last 1 Encounters:   10/11/21 76.7 kg (169 lb)     Temp Readings from Last 1 Encounters:   09/09/21 98.5 °F (36.9 °C)     No components found for: PROCAL  CrCl cannot be calculated (Unknown ideal weight. ). Recent Labs     10/26/21  1430   WBC 11.0       Pertinent Cultures:  Culture Date Source Results          Plan:  1. BMP ordered x3 starting tomorrow  2. Will dose Vancomycin 1500 mg IV now, then 750 mg IV q12hrs  3. Est  mg/l.hr  Trough 18.5 mg/l  4.  Pharmacy will continue to follow and adjust.    Thank you for the consult,  Rui Patel  10/26/2021 3:07 PM

## 2021-10-26 NOTE — ED TRIAGE NOTES
Pt presents via Transport from [facility] for surgery to gangrenous L ankle and L-BKA. Pt was previously scheduled but did not receive cardiac clearance.  Pt now scheduled for 11/5, but pain is too much for patient.     hbg level-6.6 with 10/25 [place] lab  Pt is alert and oriented  Requiring 2L NC with transport  Mccray catheter appears infected  Skin graft failing L foot

## 2021-10-27 PROBLEM — I96 GANGRENE OF LEFT FOOT (HCC): Status: ACTIVE | Noted: 2021-01-01

## 2021-10-27 NOTE — PROGRESS NOTES
1924 - Bedside report received from [place], [address] [place] [address]. Patient in bed. Pain 3/10.     2030 - Pt upset, says he has been waiting for pain meds for hrs. Will call MD. Patient in bed at this time. IV to R AC and L AC  intact and patent. Dressing to L foot CDI. + CMS. Pt A & O x 4. LS clear, on RA. Abdomen soft, NT and ND. + BS to all 4 quadrants. Denies nausea. Pain 3/10. Call light within reach. 2342-Bedside and Verbal shift change report given to Derek Roberts RN by Allie Serrato. Report included the following information SBAR, Kardex, OR Summary, Intake/Output and MAR.

## 2021-10-27 NOTE — PROGRESS NOTES
Problem: Pressure Injury - Risk of  Goal: *Prevention of pressure injury  Description: Document Anam Scale and appropriate interventions in the flowsheet.   Outcome: Progressing Towards Goal  Note: Pressure Injury Interventions:  Sensory Interventions: Assess changes in LOC    Moisture Interventions: Absorbent underpads    Activity Interventions: Pressure redistribution bed/mattress(bed type)    Mobility Interventions: Pressure redistribution bed/mattress (bed type)    Nutrition Interventions: Document food/fluid/supplement intake    Friction and Shear Interventions: Apply protective barrier, creams and emollients                Problem: Patient Education: Go to Patient Education Activity  Goal: Patient/Family Education  Outcome: Progressing Towards Goal

## 2021-10-27 NOTE — PROGRESS NOTES
Reason for Admission:   worsening left foot gangrene                 RUR Score:    High; 21%       PCP: First and Last name:  Thelma Schultz MD     Name of Practice:   Are you a current patient: Yes/No:   Approximate date of last visit:    Can you do a virtual visit with your PCP:              Resources/supports as identified by patient/family:                   Top Challenges facing patient (as identified by patient/family and CM): Finances/Medication cost?                    Transportation? Support system or lack thereof? Living arrangements? Self-care/ADLs/Cognition? Current Advanced Directive/Advance Care Plan:  Prior      Healthcare Decision Maker:   Click here to complete [address] including selection of the Healthcare Decision Maker Relationship (ie \"Primary\")      [facility] [phone]    Secondary Decision Maker: [facility] [phone]    Payor Source Payor: [place] MED[place]ARE / Plan: [address] / Product Type: Medicare /                             Plan for utilizing home health:   TBD                  Transition of Care Plan:     Acute Rehab vs SNF             Chart reviewed. Per H&P \"Michael S Orelia Saint. is a 78 y.o. male  with PMHX of hypertension, hyperlipidemia, stroke, PAD, A. fib, on Plavix who presents to the emergency department C/O low hemoglobin and worsening left foot gangrene. Patient initially had a nonhealing left diabetic foot wound, ankle fracture and subsequent gangrene status post exfix of left ankle fracture with removal of hardware last month.  Patient has been at [address] [place], [place] [address]ab [facility] awaiting cardiac clearance for planned left BKA.  However he had outpatient labs with hemoglobin of 6.6 and worsening gangrene of the left foot to where \"it is about to fall off\" per nursing home staff.  Patient's orthopedist is  Mounika Vivasr takes Percocet for pain, took 2 Percocet just prior to arrival.. Pt denies fever, chills, nightsweats, nausea, vomiting, SOB or chest pain. Reports pain in left leg and asking for pain medicine. \"    Noted pt with plan BKA. Please order therapy services following surgical intervention to assist with transition of care. Anticipate pt will transition to SNF vs Acute rehab when medically stable.   CM to continue to follow and assist.    Care Management Interventions  Mode of Transport at Discharge: S  Transition of Care Consult (CM Consult): Discharge Planning  Health Maintenance Reviewed: Yes  Support Systems: Spouse/Significant Other  The Plan for Transition of Care is Related to the Following Treatment Goals : SNF vs Acute Rehab  Discharge Location  Discharge Placement: Skilled nursing facility (vs acute rehab)

## 2021-10-27 NOTE — H&P
History & Physical    Patient: Getachew Santamaria MRN: [id number]  [facility]: [id number]    YOB: 1945  Age: 78 y.o. Sex: male      DOA: 10/26/2021  Primary Care Provider:  Damaso Everett MD      Assessment/Plan   Getachew Huston. is a 78 y.o. male  with PMHX of hypertension, hyperlipidemia, stroke, PAD, A. fib, on Plavix who presents to the emergency department C/O low hemoglobin and worsening left foot gangrene. Severe peripheral vascular disease with worsening gangrenous left foot -  [facility]  Orthopedic surgeon, namely Dr. Antoinette Garcia consulted and plans to complete left BKA in 2 days  Vital signs stable lactic acid normal  Continue vancomycin and cefepime started in the emergency department  Pain management    Diabetes -  ADA diet, SSI, fingerstick blood glucose before every meal nightly    History of atrial flutter -  No acute issues  Currently on Plavix    Anemia-  Hemoglobin stable at 8.1  Will monitor with daily CBC    Chronic kidney disease-  No signs of acute kidney injury today, creatinine 1.2    DVT/GI prophylaxis ordered      Patient Active Problem List   Diagnosis Code    Atrial flutter ([facility]) I48.92    DM2 (diabetes mellitus, type 2) ([facility]) E11.9    CAD (coronary artery disease) I25.10    ELINOR (acute kidney injury) ([address] N17.9    CKD (chronic kidney disease) stage 3, GFR 30-59 ml/min ([facility]) N18.30    Elevated brain natriuretic peptide (BNP) level R79.89    Diabetic ulcer of left foot ([facility]) E11.621, L97.529    COVID-19 virus infection U07.1    Acute osteomyelitis ([address] M86.10    Acute hematogenous osteomyelitis of left foot ([address] M86.072    Cellulitis of left foot L03. 80    Diabetic foot ulcer with osteomyelitis ([address] E11.621, E11.69, L97.509, M86.9    Ulcer of left heel, with necrosis of bone ([address] L97.424    Osteomyelitis ([address] M86.9    Trimalleolar fracture of ankle, closed, left, initial encounter Q61.251V    Displacement of peripherally inserted central catheter (PICC) ([facility] T82.524A    Chronic atrial fibrillation (HCC) I48.20    PVD (peripheral vascular disease) ([facility]) I73.9    S/P foot surgery, left Z98.890    Diabetes mellitus with foot ulcer and gangrene (HCC) E11.621, L97.509, E11.52    [facility] left foot [facility] I96     Estimated length of stay : 2-3 days    CC: worsening left foot gangrene. HPI:     Royal Haro. is a 78 y.o. male  with PMHX of hypertension, hyperlipidemia, stroke, PAD, A. fib, on Plavix who presents to the emergency department C/O low hemoglobin and worsening left foot gangrene. Patient initially had a nonhealing left diabetic foot wound, ankle fracture and subsequent gangrene status post exfix of left ankle fracture with removal of hardware last month. Patient has been at [facility] awaiting cardiac clearance for planned left BKA. However he had outpatient labs with hemoglobin of 6.6 and worsening gangrene of the left foot to where \"it is about to fall off\" per nursing home staff. Patient's orthopedist is Dr. Meron Kaufman. Patient takes Percocet for pain, took 2 Percocet just prior to arrival.. Pt denies fever, chills, nightsweats, nausea, vomiting, SOB or chest pain. Reports pain in left leg and asking for pain medicine.     Past Medical History:   Diagnosis Date    A-fib [facility])     Asthma     CAD (coronary artery disease)     Chronic kidney disease     stage 3    COVID-19     Diabetes [facility]     GERD (gastroesophageal reflux disease)     Heart failure ([facility])     chronic diastolic heart failure    High cholesterol     Hypertension     Ill-defined condition     chronic osteomyelitis left ankle and foot    Ill-defined condition     chronic arteriosclerosis    PVD (peripheral vascular disease) ([facility]     Stroke [facility])     cva       Past Surgical History:   Procedure Laterality Date    COLONOSCOPY N/A 6/1/2018    COLONOSCOPY, POLYPECTOMY performed by Malcolm Garcia MD at [address] HX CATARACT REMOVAL      HX ORTHOPAEDIC Left 2021    ankle washout, external fixator, would vac    HX ORTHOPAEDIC      external fixator removed       History reviewed. No pertinent family history. Social History     Socioeconomic History    Marital status:      Spouse name: Not on file    Number of children: Not on file    Years of education: Not on file    Highest education level: Not on file   Tobacco Use    Smoking status: Former Smoker    Smokeless tobacco: Never Used   Substance and Sexual Activity    Alcohol use: Not Currently    Drug use: No     Social Determinants of Health     Financial Resource Strain:     Difficulty of Paying Living Expenses:    Food Insecurity:     Worried About Running Out of Food in the Last Year:     [address] in the Last Year:    Transportation Needs:     Lack of Transportation (Medical):  Lack of Transportation (Non-Medical):    Physical Activity:     Days of Exercise per Week:     Minutes of Exercise per Session:    Stress:     Feeling of Stress :    Social Connections:     Frequency of Communication with Friends and Family:     Frequency of Social Gatherings with Friends and Family:     Attends Orthodox Services:     Active Member of Clubs or Organizations:     Attends Club or Organization Meetings:     Marital Status:        Prior to Admission medications    Medication Sig Start Date End Date Taking? Authorizing Provider   oxyCODONE-acetaminophen (Percocet) 5-325 mg per tablet Take  by mouth every four (4) hours as needed for Pain. Yes Provider, Historical   clopidogreL (Plavix) 75 mg tab Take  by mouth daily. Indications: afib   Yes Provider, Historical   ondansetron (ZOFRAN ODT) 4 mg disintegrating tablet Take 1 Tablet by mouth every eight (8) hours as needed for Nausea or Vomiting (Nausea or Vomiting). 9/8/21  Yes Kae Dobbs MD   insulin glargine (LANTUS,BASAGLAR) 100 unit/mL (3 mL) inpn 42 Units by SubCUTAneous route daily. Yes Provider, Historical   nut.tx.glucose intolerance,soy (BOOST DIABETIC PO) Take  by mouth two (2) times a day. Yes Provider, Historical   hydrALAZINE (APRESOLINE) 25 mg tablet Take 25 mg by mouth every six (6) hours. Indications: chronic heart failure   Yes Provider, Historical   B.infantis-B.ani-B.long-B.bifi (Probiotic 4X) 10-15 mg TbEC Take  by mouth daily. Yes Provider, Historical   multivitamin (ONE A DAY) tablet Take 1 Tablet by mouth daily. Yes Provider, Historical   simethicone (Gas Relief, simethicone,) 80 mg chewable tablet Take 125 mg by mouth four (4) times daily as needed for Flatulence. Yes Provider, Historical   acetaminophen (TylenoL) 325 mg tablet Take 650 mg by mouth every four (4) hours as needed for Pain. Yes Provider, Historical   tamsulosin (FLOMAX) 0.4 mg capsule Take 2 Capsules by mouth daily. 5/25/21  Yes Christine Shanks MD   insulin lispro (HUMALOG [facility] INSULIN SC) 10 Units by SubCUTAneous route three (3) times daily (with meals). Yes Provider, Historical   atorvastatin (LIPITOR) 40 mg tablet Take 1 Tab by mouth nightly. 2/15/18  Yes Omero Calhoun PA-C   metoprolol succinate (TOPROL-XL) 50 mg XL tablet Take 1 Tab by mouth daily. Patient taking differently: Take 100 mg by mouth daily. 2/16/18  Yes Omero Calhoun PA-C   senna/docusate sodium (SENNA-C PLUS PO) Take 2 Tablets by mouth nightly. Patient not taking: Reported on 10/26/2021    Provider, Historical   isosorbide mononitrate ER (IMDUR) 30 mg tablet Take 30 mg by mouth daily. Patient not taking: Reported on 10/26/2021    Provider, Historical   OTHER Indications: LiquaCell Protein supplement 30 ml 1 daily  Patient not taking: No sig reported    Provider, Historical   OTHER Indications: PUSH supplement bid  Patient not taking: No sig reported    Provider, Historical   miconazole (MICOTIN) 2 % topical cream Apply  to affected area two (2) times a day.   Patient not taking: Reported on 10/26/2021    Provider, Historical   magnesium hydroxide (MILK OF MAGNESIA) 400 mg/5 mL suspension Take 30 mL by mouth daily as needed for Constipation. Patient not taking: Reported on 10/26/2021    Provider, Historical   bisacodyL (Dulcolax, bisacodyl,) 10 mg supp Insert 10 mg into rectum daily as needed for Constipation. Patient not taking: Reported on 10/26/2021    Provider, Historical   ferrous sulfate 325 mg (65 mg iron) tablet Take 1 Tablet by mouth Daily (before breakfast). Patient not taking: Reported on 10/26/2021 5/24/21   Rickey Hernandez MD   spironolactone (ALDACTONE) 25 mg tablet Take 12.5 mg by mouth daily. Take 0.5 tablet (12.5 mg) by oral   Indications: heart failure  Patient not taking: Reported on 10/26/2021    Provider, Historical       No Known Allergies    Review of Systems  Gen: No fever, chills, malaise, weight loss/gain. Heent: No headache, rhinorrhea, epistaxis, ear pain, hearing loss, sinus pain, neck pain/stiffness, sore throat. Heart: No chest pain, palpitations, MYRICK, pnd, or orthopnea. Resp: No cough, hemoptysis, wheezing and shortness of breath. GI: No nausea, vomiting, diarrhea, constipation, melena or hematochezia. : No urinary obstruction, dysuria or hematuria. Derm: No rash, new skin lesion or pruritis. Musc/skeletal: Left lower extremity pain  Vasc: No edema, cyanosis or claudication. Endo: No heat/cold intolerance, no polyuria,polydipsia or polyphagia. Neuro: No unilateral weakness, numbness, tingling. No seizures. Heme: No easy bruising or bleeding. Physical Exam:     Physical Exam:  Visit Vitals  BP (!) 149/59   Pulse 69   Temp 97.7 °F (36.5 °C)   Resp 18   SpO2 97%           Temp (24hrs), Av.7 °F (36.5 °C), Min:97.7 °F (36.5 °C), Max:97.7 °F (36.5 °C)    No intake/output data recorded. No intake/output data recorded. General:  Awake, cooperative, no distress. Head:  Normocephalic, without obvious abnormality, atraumatic.    Eyes:  Conjunctivae/corneas clear, sclera anicteric, PERRL, EOMs intact. Nose: Nares normal. No drainage or sinus tenderness. Throat: Lips, mucosa, and tongue normal.    Neck: Supple, symmetrical, trachea midline, no adenopathy. Lungs:   Clear to auscultation bilaterally. Heart:  Regular rate and rhythm, S1, S2 normal, no murmur, click, rub or gallop. Abdomen: Soft, non-tender. Bowel sounds normal. No masses,  No organomegaly. Extremities: Blackened/necrotic gangrenous left foot, only forefoot and toes examined as foot is wrapped and deformed, significantly angulated leg nontender down to mid shin without rash. Pulses: 2+ and symmetric all extremities. Skin: gangrene   Neurologic: CNII-XII intact. No focal motor or sensory deficit. Labs Reviewed:    Recent Results (from the past 24 hour(s))   POC LACTIC ACID    Collection Time: 10/26/21  2:21 PM   Result Value Ref Range    Lactic Acid (POC) 1.36 0.40 - 1.30 mmol/L   METABOLIC PANEL, COMPREHENSIVE    Collection Time: 10/26/21  2:30 PM   Result Value Ref Range    Sodium 137 136 - 145 mmol/L    Potassium 4.4 3.5 - 5.5 mmol/L    Chloride 104 100 - 111 mmol/L    CO2 28 21 - 32 mmol/L    Anion gap 5 3.0 - 18 mmol/L    Glucose 193 (H) 74 - 99 mg/dL    BUN 25 (H) 7.0 - 18 MG/DL    Creatinine 1.20 0.6 - 1.3 MG/DL    BUN/Creatinine ratio 21 (H) 12 - 20      GFR est AA >60 >60 ml/min/1.73m2    GFR est non-AA 59 (L) >60 ml/min/1.73m2    Calcium 8.3 (L) 8.5 - 10.1 MG/DL    Bilirubin, total 0.5 0.2 - 1.0 MG/DL    ALT (SGPT) 18 16 - 61 U/L    AST (SGOT) 26 10 - 38 U/L    Alk.  phosphatase 234 (H) 45 - 117 U/L    Protein, total 5.8 (L) 6.4 - 8.2 g/dL    Albumin 1.3 (L) 3.4 - 5.0 g/dL    Globulin 4.5 (H) 2.0 - 4.0 g/dL    A-G Ratio 0.3 (L) 0.8 - 1.7     CBC WITH AUTOMATED DIFF    Collection Time: 10/26/21  2:30 PM   Result Value Ref Range    WBC 11.0 4.6 - 13.2 K/uL    RBC 3.80 (L) 4.35 - 5.65 M/uL    HGB 8.1 (L) 13.0 - 16.0 g/dL    HCT 27.5 (L) 36.0 - 48.0 %    MCV 72.4 (L) 78.0 - 100.0 FL    MCH 21.3 (L) 24.0 - 34.0 PG    MCHC 29.5 (L) 31.0 - 37.0 g/dL    RDW 20.0 (H) 11.6 - 14.5 %    PLATELET 977 (H) 992 - 420 K/uL    MPV 9.1 (L) 9.2 - 11.8 FL    NEUTROPHILS 82 (H) 40 - 73 %    LYMPHOCYTES 10 (L) 21 - 52 %    MONOCYTES 5 3 - 10 %    EOSINOPHILS 1 0 - 5 %    BASOPHILS 0 0 - 2 %    ABS. NEUTROPHILS 9.0 (H) 1.8 - 8.0 K/UL    ABS. LYMPHOCYTES 1.1 0.9 - 3.6 K/UL    ABS. MONOCYTES 0.6 0.05 - 1.2 K/UL    ABS. EOSINOPHILS 0.1 0.0 - 0.4 K/UL    ABS. BASOPHILS 0.0 0.0 - 0.1 K/UL    DF AUTOMATED     TYPE & SCREEN    Collection Time: 10/26/21  2:30 PM   Result Value Ref Range    Crossmatch Expiration 10/29/2021,2359     ABO/Rh(D) Neida Jn POSITIVE     Antibody screen NEG    URINALYSIS W/ RFLX MICROSCOPIC    Collection Time: 10/26/21  3:04 PM   Result Value Ref Range    Color YELLOW      Appearance OPAQUE      Specific gravity 1.025 1.003 - 1.030      pH (UA) 6.5 5.0 - 8.0      Protein >300 (A) NEG mg/dL    Glucose Negative NEG mg/dL    Ketone Negative NEG mg/dL    Bilirubin Negative NEG      Blood MODERATE (A) NEG      Urobilinogen 0.2 0.2 - 1.0 EU/dL    Nitrites Positive (A) NEG      Leukocyte Esterase LARGE (A) NEG     URINE MICROSCOPIC ONLY    Collection Time: 10/26/21  3:04 PM   Result Value Ref Range    WBC TOO NUMEROUS TO COUNT 0 - 5 /hpf    RBC 4 to 10 0 - 5 /hpf    Epithelial cells Negative 0 - 5 /lpf    Bacteria 2+ (A) NEG /hpf    Other: MICROSCOPIC ON UNSPUN SPECIMEN SAMPLE TOO VISCOUS    GLUCOSE, POC    Collection Time: 10/26/21  5:15 PM   Result Value Ref Range    Glucose (POC) 143 (H) 70 - 110 mg/dL   GLUCOSE, POC    Collection Time: 10/26/21  9:10 PM   Result Value Ref Range    Glucose (POC) 179 (H) 70 - 110 mg/dL       Procedures/imaging: see electronic medical records for all procedures/Xrays and details which were not copied into this note but were reviewed prior to creation of Plan      CC:  Angélica Masters MD

## 2021-10-27 NOTE — PROGRESS NOTES
Pt seen briefly in room during nursing care. Right foot gangrenous, angulated    Pt was scheduled for BKA for gangrene and non healing fracture. Pending clearance so was set up for ellective BKA. Worsening deformity so will move up surgery.     Pt understands will try to add on to Thursday AM    Ginna Romano

## 2021-10-27 NOTE — PROGRESS NOTES
Hospitalist Progress Note    Patient: Kellen Carolina MRN: [id number]  [facility]: [id number]    YOB: 1945  Age: 78 y.o. Sex: male    DOA: 10/26/2021 LOS:  LOS: 1 day                Assessment/Plan     Patient Active Problem List   Diagnosis Code    Atrial flutter (HCC) I48.92    DM2 (diabetes mellitus, type 2) ([address] E11.9    CAD (coronary artery disease) I25.10    ELINOR (acute kidney injury) ([address] N17.9    CKD (chronic kidney disease) stage 3, GFR 30-59 ml/min ([facility]) N18.30    Elevated brain natriuretic peptide (BNP) level R79.89    Diabetic ulcer of left foot ([facility]) E11.621, L97.529    COVID-19 virus infection U07.1    Acute osteomyelitis ([address] M86.10    Acute hematogenous osteomyelitis of left foot ([address] M86.072    Cellulitis of left foot L03. 80    Diabetic foot ulcer with osteomyelitis ([address] E11.621, E11.69, L97.509, M86.9    Ulcer of left heel, with necrosis of bone ([address] L97.424    Osteomyelitis ([address] M86.9    Trimalleolar fracture of ankle, closed, left, initial encounter S82.852A    Displacement of peripherally inserted central catheter (PICC) ([address] T82.524A    Chronic atrial fibrillation ([facility]) I48.20    PVD (peripheral vascular disease) ([address] I73.9    S/P foot surgery, left Z98.890    Diabetes mellitus with foot ulcer and gangrene ([address] E11.621, L97.509, E11.52    Gangrene of left foot [facility]) I96        Chief complaint :  Kellen Carolina is a 78 y.o. male  with PMHX of hypertension, hyperlipidemia, stroke, PAD, A. fib, on Plavix who presents to the emergency department C/O low hemoglobin and worsening left foot gangrene.     Severe peripheral vascular disease with worsening gangrenous left foot -  Seen by ortho  Plans for left BKA. On vancomycin and cefepime started in the emergency department  Pain management.     UTI -  Antibiotics as above  Follow urine cultures     Diabetes -  ADA diet, SSI, fingerstick blood glucose before every meal nightly     History of atrial flutter -  On betablocker, rate controlled     Anemia-  Hemoglobin stable at 8.1  Monitor H/H     Chronic kidney disease-  Monitor renal function.     DVT/GI prophylaxis    Disposition : TBD    Review of systems  General: No fevers or chills. Cardiovascular: No chest pain or pressure. No palpitations. Pulmonary: No shortness of breath. Gastrointestinal: No nausea, vomiting. Physical Exam:  General: Awake, cooperative, no acute distress    HEENT: NC, Atraumatic. PERRLA, anicteric sclerae. Lungs: CTA Bilaterally. No Wheezing/Rhonchi/Rales. Heart:  S1 S2,  No murmur, No Rubs, No Gallops  Abdomen: Soft, Non distended, Non tender.  +Bowel sounds,   Extremities: Severe necrotic left foot. Psych:   Not anxious or agitated. Neurologic:  No acute neurological deficit. Vital signs/Intake and Output:  Visit Vitals  BP (!) 122/59 (BP 1 Location: Right arm, BP Patient Position: At rest)   Pulse 71   Temp 98.3 °F (36.8 °C)   Resp 17   Ht 5' 8\" (1.727 m)   Wt 77.1 kg (170 lb)   SpO2 93%   BMI 25.85 kg/m²     Current Shift:  10/27 0701 - 10/27 1900  In: 240 [P.O.:240]  Out: -   Last three shifts:  10/25 1901 - 10/27 0700  In: 260 [I.V.:260]  Out: 550 [Urine:550]            Labs: Results:       Chemistry Recent Labs     10/27/21  0030 10/26/21  1430   GLU 71* 193*    137   K 4.2 4.4    104   CO2 26 28   BUN 25* 25*   CREA 1.05 1.20   CA 7.7* 8.3*   AGAP 4 5   BUCR 24* 21*   AP  --  234*   TP  --  5.8*   ALB  --  1.3*   GLOB  --  4.5*   AGRAT  --  0.3*      CBC w/Diff Recent Labs     10/26/21  1430   WBC 11.0   RBC 3.80*   HGB 8.1*   HCT 27.5*   *   GRANS 82*   LYMPH 10*   EOS 1      Cardiac Enzymes No results for input(s): CPK, CKND1, LEONEL in the last 72 hours. No lab exists for component: CKRMB, TROIP   Coagulation No results for input(s): PTP, INR, APTT, INREXT in the last 72 hours.     Lipid Panel No results found for: CHOL, CHOLPOCT, CHOLX, CHLST, CHOLV, 073716, HDL, HDLP, LDL, LDLC, DLDLP, 411756, VLDLC, VLDL, TGLX, TRIGL, TRIGP, TGLPOCT, CHHD, CHHDX   BNP No results for input(s): BNPP in the last 72 hours.    Liver Enzymes Recent Labs     10/26/21  1430   TP 5.8*   ALB 1.3*   *      Thyroid Studies Lab Results   Component Value Date/Time    TSH 1.72 02/11/2018 02:19 AM        Procedures/imaging: see electronic medical records for all procedures/Xrays and details which were not copied into this note but were reviewed prior to creation of Plan

## 2021-10-27 NOTE — PROGRESS NOTES
Physician Progress Note      Andry Hernandes  CSN #:                  [id number]  :                       1945  ADMIT DATE:       10/26/2021 1:50 PM  [address] [place] DATE:  RESPONDING  PROVIDER #:        Liliya Akbar MD          QUERY TEXT:    Patient admitted with Gangrene of left foot. Documentation reflects Urinary tract infection with pyuria in ED provider ASMITA Rizo note dated 10/26/21. If possible, please document in the progress notes and discharge summary if UTI was: The medical record reflects the following:    Risk Factors: DM, CKD, Gangrene of left foot, [place] POA, Prolonged immobilization    Clinical Indicators:  > UA 10/26  Protein: >300 (A)  Blood: MODERATE (A)  Nitrites: Positive (A)  Leukocyte Esterase: LARGE (A)  WBC: TOO NUMEROUS TO COUNT  Bacteria: 2+ (A)    Treatment: Receiving Vancomycin, Rocephin    Thank you,  Sadie Contreras RN,  BSN, [facility], [facility]  Options provided:  -- UTI confirmed after study  -- UTI treated and resolved  -- UTI ruled out after study  -- Other - I will add my own diagnosis  -- Disagree - Not applicable / Not valid  -- Disagree - Clinically unable to determine / Unknown  -- Refer to Clinical Documentation Reviewer    PROVIDER RESPONSE TEXT:    UTI confirmed after study.     Query created by: Destinee Ritchie on 10/27/2021 11:03 AM      Electronically signed by:  Liliya Akbar MD 10/27/2021 4:52 PM

## 2021-10-27 NOTE — PROGRESS NOTES
[facility] Pharmacokinetic Monitoring Service - Vancomycin    Consulting Provider: Johnson Chavez   Indication: bone and joint infection  Target Concentration: Goal AUC/CHANTEL 400-600 mg*hr/L  Day of Therapy: 2  Additional Antimicrobials: Cefepime    Pertinent Laboratory Values: Wt Readings from Last 1 Encounters:   10/27/21 77.1 kg (170 lb)     Temp Readings from Last 1 Encounters:   10/27/21 98.3 °F (36.8 °C)     No components found for: PROCAL  Estimated Creatinine Clearance: 59.7 mL/min (based on SCr of 1.05 mg/dL). Recent Labs     10/26/21  1430   WBC 11.0       Pertinent Cultures:  Culture Date Source Results   10/26/21  10/26/21 Blood  Urine NGTD  Pending   MRSA Nasal Swab: N/A. Non-respiratory infection. Sera Swenson     [unfilled]    Assessment:  Date/Time Current Dose Concentration Timing of Concentration (h) AUC   10/27/21 1400 Vancomycin 750 mg IV q12h 12.6 mcg/ml 9h 26min since last dose 385 mg*h/L, est trough 12.9   Note: Serum concentrations collected for AUC dosing may appear elevated if collected in close proximity to the dose administered, this is not necessarily an indication of toxicity    Plan:  Current dosing regimen is therapeutic  Continue current dose vancomycin 750 mg IV q12h  Pharmacy will continue to monitor patient and adjust therapy as indicated    Thank you for the consult,  MICHELLE Malcolm  10/27/2021 4:01 PM

## 2021-10-28 NOTE — PROGRESS NOTES
21:20 Assessment completed. Lungs are clear bilat but are decreased in the bases. L ft remains necrotic with cradle boot in place. 23:10 Bedside and Verbal shift change report given to [address] Louis (oncoming nurse) by Chava Gusman RN (offgoing nurse). Report included the following information SBAR.

## 2021-10-28 NOTE — ROUTINE PROCESS
TRANSFER - OUT REPORT:    Verbal report given to HODA Cooper RN(name) on Merry Calhoun.  being transferred to VALERIA Reece(unit) for ordered procedure       Report consisted of patients Situation, Background, Assessment and   Recommendations(SBAR). Information from the following report(s) SBAR, Kardex, Procedure Summary and Intake/Output was reviewed with the receiving nurse. Lines:   PICC Double Lumen 61/86/20 Right;Basilic (Active)       Peripheral IV 10/26/21 Right Antecubital (Active)   Site Assessment Clean, dry, & intact 10/28/21 0811   Phlebitis Assessment 0 10/28/21 0811   Infiltration Assessment 0 10/28/21 0811   Dressing Status Clean, dry, & intact 10/28/21 0811   Dressing Type Tape;Transparent 10/28/21 0811   Hub Color/Line Status Pink 10/28/21 0811   Action Taken Open ports on tubing capped 10/27/21 2127   Alcohol Cap Used Yes 10/27/21 2313       Peripheral IV Distal;Left Basilic (Active)   Site Assessment Clean, dry, & intact 10/28/21 0811   Phlebitis Assessment 0 10/28/21 0811   Infiltration Assessment 0 10/28/21 0811   Dressing Status Clean, dry, & intact 10/28/21 0811   Dressing Type Tape;Transparent 10/28/21 0811   Hub Color/Line Status Pink 10/28/21 1009        Opportunity for questions and clarification was provided.       Patient transported with:   [employer]

## 2021-10-28 NOTE — PERIOP NOTES
Paged Dr. Angle Espinoza for patient sign out. Patient meets criteria for transfer to the next phase of care.

## 2021-10-28 NOTE — INTERVAL H&P NOTE
Update History & Physical    The Patient's History and Physical of was reviewed with the patient and I examined the patient. There was no change. The surgical site was confirmed by the patient and me. Plan:  The risk, benefits, expected outcome, and alternative to the recommended procedure have been discussed with the patient. Patient understands and wants to proceed with the procedure.     Electronically signed by Marcos Jesnen MD on 10/28/2021 at 8:14 AM

## 2021-10-28 NOTE — PROGRESS NOTES
Hospitalist Progress Note    Patient: Myra Lenz. MRN: [id number]  CSN: [id number]    YOB: 1945  Age: 78 y.o. Sex: male    DOA: 10/26/2021 LOS:  LOS: 2 days          Chief Complaint:    worsening left foot gangrene    Assessment/Plan     Caitlyn Fabian Jr. is a 78 y. o. male  with PMHX of hypertension, hyperlipidemia, stroke, PAD, A. fib, on Plavix who presents to the emergency department C/O low hemoglobin and worsening left foot gangrene.     Severe peripheral vascular disease with worsening gangrenous left foot -  S/p left BKA today   Ortho following   Continue vancomycin and cefepime for now, but can DC soon because surgical cure  Pain management.     UTI -  Antibiotics as above  Follow urine cultures     Diabetes -  ADA diet, SSI, fingerstick blood glucose before every meal nightly     History of atrial flutter -  On betablocker, rate controlled     Anemia-  Hemoglobin stable at 8.1  Monitor H/H     Chronic kidney disease-  Monitor renal function.     DVT/GI prophylaxis  Disposition :  Patient Active Problem List   Diagnosis Code    Atrial flutter ([facility]) I48.92    DM2 (diabetes mellitus, type 2) ([facility]) E11.9    CAD (coronary artery disease) I25.10    ELINOR (acute kidney injury) ([address] N17.9    CKD (chronic kidney disease) stage 3, GFR 30-59 ml/min ([facility]) N18.30    Elevated brain natriuretic peptide (BNP) level R79.89    Diabetic ulcer of left foot ([facility]) E11.621, L97.529    COVID-19 virus infection U07.1    Acute osteomyelitis ([facility]) M86.10    Acute hematogenous osteomyelitis of left foot ([facility]) M86.072    Cellulitis of left foot L03. 80    Diabetic foot ulcer with osteomyelitis ([address] E11.621, E11.69, L97.509, M86.9    Ulcer of left heel, with necrosis of bone ([address] L97.424    Osteomyelitis ([address] M86.9    Trimalleolar fracture of ankle, closed, left, initial encounter F66.890Q    Displacement of peripherally inserted central catheter (PICC) ([facility]) T82.524A    Chronic atrial fibrillation (HCC) I48.20    PVD (peripheral vascular disease) (HCC) I73.9    S/P foot surgery, left Z98.890    Diabetes mellitus with foot ulcer and gangrene ([facility] E11.621, L97.509, E11.52    Gangrene of left foot ([facility] I96       Subjective:    Pt c/o pain. In a frustrated mood. Review of systems:    Constitutional: denies fevers, chills, myalgias  Respiratory: denies SOB, cough  Cardiovascular: denies chest pain, palpitations  Gastrointestinal: denies nausea, vomiting, diarrhea      Vital signs/Intake and Output:  Visit Vitals  /60 (BP 1 Location: Left upper arm, BP Patient Position: At rest;Lying)   Pulse 74   Temp 98 °F (36.7 °C)   Resp 14   Ht 5' 8\" (1.727 m)   Wt 77.1 kg (170 lb)   SpO2 94%   BMI 25.85 kg/m²     Current Shift:  10/28 0701 - 10/28 1900  In: 20 [I.V.:20]  Out: 0   Last three shifts:  10/26 1901 - 10/28 0700  In: 750 [P.O.:240;  I.V.:510]  Out: 1950 [Urine:1950]    Exam:    General: Well developed, alert, NAD, OX3  Head/Neck: NCAT, supple, No masses, No lymphadenopathy  CVS:Regular rate and rhythm, no M/R/G, S1/S2 heard, no thrill  Lungs:Clear to auscultation bilaterally, no wheezes, rhonchi, or rales  Abdomen: Soft, Nontender, No distention, Normal Bowel sounds, No hepatomegaly  Extremities: surgically absent leg below knee  Skin:normal texture and turgor, no rashes, no lesions  Neuro:grossly normal , follows commands  Psych:appropriate                Labs: Results:       Chemistry Recent Labs     10/28/21  0347 10/27/21  0030 10/26/21  1430   GLU 68* 71* 193*    140 137   K 4.5 4.2 4.4    110 104   CO2 25 26 28   BUN 21* 25* 25*   CREA 1.10 1.05 1.20   CA 7.9* 7.7* 8.3*   AGAP 5 4 5   BUCR 19 24* 21*   AP  --   --  234*   TP  --   --  5.8*   ALB  --   --  1.3*   GLOB  --   --  4.5*   AGRAT  --   --  0.3*      CBC w/Diff Recent Labs     10/26/21  1430   WBC 11.0   RBC 3.80*   HGB 8.1*   HCT 27.5*   *   GRANS 82*   LYMPH 10*   EOS 1      Cardiac Enzymes No results for input(s): CPK, CKND1, LEONEL in the last 72 hours. No lab exists for component: CKRMB, TROIP   Coagulation No results for input(s): PTP, INR, APTT, INREXT in the last 72 hours. Lipid Panel No results found for: CHOL, CHOLPOCT, CHOLX, CHLST, CHOLV, 801036, HDL, HDLP, LDL, LDLC, DLDLP, 774555, VLDLC, VLDL, TGLX, TRIGL, TRIGP, TGLPOCT, CHHD, CHHDX   BNP No results for input(s): BNPP in the last 72 hours.    Liver Enzymes Recent Labs     10/26/21  1430   TP 5.8*   ALB 1.3*   *      Thyroid Studies Lab Results   Component Value Date/Time    TSH 1.72 02/11/2018 02:19 AM        Procedures/imaging: see electronic medical records for all procedures/Xrays and details which were not copied into this note but were reviewed prior to creation of Sherita Sue MD

## 2021-10-28 NOTE — ROUTINE PROCESS
0740 - Bedside and Verbal shift change report given to KRISTI Ndiaye (oncoming nurse) by Gina Rivas (offgoing nurse). Report included the following information SBAR, Kardex, Intake/Output and MAR. Patient transported to OR accompanied by OR tech.

## 2021-10-28 NOTE — BRIEF OP NOTE
Brief Postoperative Note    Patient: Getachew Huston. YOB: 1945  MRN: [id number]    Date of Procedure: 10/28/2021     Pre-Op Diagnosis: LEFT FOOT GANGRENE    Post-Op Diagnosis: Same as preoperative diagnosis.       Procedure(s):  LEFT BELOW KNEE AMPUTATION    Surgeon(s):  Lenora Ray MD    Surgical Assistant: Surg Asst-1: Iram Mandujano    Anesthesia: General     Estimated Blood Loss (mL): less than 668     Complications: None    Specimens:   ID Type Source Tests Collected by Time Destination   1 : Left lower leg and foot Fresh Leg, Left  Lenora Ray MD 10/28/2021 4982 Pathology        Implants: * No implants in log *    Drains: * No LDAs found *    Findings: poor vascularity, gangrene    Electronically Signed by Fabiana Mcknight MD on 10/28/2021 at 10:11 AM

## 2021-10-28 NOTE — ANESTHESIA POSTPROCEDURE EVALUATION
Procedure(s):  LEFT BELOW KNEE AMPUTATION. general    Anesthesia Post Evaluation        Comments: Post-Anesthesia Evaluation and Assessment    Cardiovascular Function/Vital Signs  BP (!) 124/52   Pulse 76   Temp 36.9 °C (98.4 °F)   Resp 15   Ht 5' 8\" (1.727 m)   Wt 77.1 kg (170 lb)   SpO2 99%   BMI 25.85 kg/m²     Patient is status post Procedure(s):  LEFT BELOW KNEE AMPUTATION. Nausea/Vomiting: Controlled. Postoperative hydration reviewed and adequate. Pain:  Pain Scale 1: Numeric (0 - 10) (10/28/21 1028)  Pain Intensity 1: 0 (10/28/21 1028)   Managed. Neurological Status:   Neuro (WDL): Exceptions to WDL (10/28/21 1028)   At baseline. Mental Status and Level of Consciousness: Arousable. Pulmonary Status:   O2 Device: Nasal cannula (10/28/21 1027)   Adequate oxygenation and airway patent. Complications related to anesthesia: None    Post-anesthesia assessment completed. No concerns. Patient has met all discharge requirements. Signed By: Mark Zuniga MD    October 28, 2021                   INITIAL Post-op Vital signs:   Vitals Value Taken Time   /52 10/28/21 1055   Temp 36.9 °C (98.4 °F) 10/28/21 1021   Pulse 78 10/28/21 1057   Resp 15 10/28/21 1057   SpO2 99 % 10/28/21 1057   Vitals shown include unvalidated device data.

## 2021-10-28 NOTE — ANESTHESIA PREPROCEDURE EVALUATION
Relevant Problems   CARDIOVASCULAR   (+) Atrial flutter (HCC)   (+) CAD (coronary artery disease)   (+) Chronic atrial fibrillation (HCC)      RENAL FAILURE   (+) ELINOR (acute kidney injury) (HCC)   (+) CKD (chronic kidney disease) stage 3, GFR 30-59 ml/min (HCC)      ENDOCRINE   (+) DM2 (diabetes mellitus, type 2) ([facility])   (+) Diabetes mellitus with foot ulcer and gangrene ([facility])   (+) Diabetic foot ulcer with osteomyelitis ([facility])      HEMATOLOGY   (+) Acute hematogenous osteomyelitis of left foot ([facility])   (+) Acute osteomyelitis (HCC)   (+) Osteomyelitis (HCC)       Anesthetic History   No history of anesthetic complications            Review of Systems / Medical History  Patient summary reviewed, nursing notes reviewed and pertinent labs reviewed    Pulmonary            Asthma        Neuro/Psych       CVA  TIA     Cardiovascular    Hypertension        Dysrhythmias   CAD    Exercise tolerance: >4 METS     GI/Hepatic/Renal     GERD           Endo/Other    Diabetes         Other Findings              Physical Exam    Airway  Mallampati: II  TM Distance: 4 - 6 cm  Neck ROM: normal range of motion   Mouth opening: Normal     Cardiovascular               Dental    Dentition: Poor dentition     Pulmonary                 Abdominal  GI exam deferred       Other Findings            Anesthetic Plan    ASA: 3  Anesthesia type: general          Induction: Intravenous  Anesthetic plan and risks discussed with: Patient      Patient on plavix 10/26  Told patient he may lose his teeth due to poor dentition

## 2021-10-29 PROBLEM — N39.0 UTI (URINARY TRACT INFECTION): Status: ACTIVE | Noted: 2021-01-01

## 2021-10-29 NOTE — PROGRESS NOTES
Loading dose: N/A  Regimen: 750 mg IV every 12 hours.   Start time: 04:00 on 10/30/2021  Exposure target: AUC24 (range)400-600 mg/L.hr   AUC24,ss: 608 mg/L.hr  Probability of AUC24 > 400: 99 %  Ctrough,ss: 21.6 mg/L  Probability of Ctrough,ss > 20: 63 %  Probability of nephrotoxicity (Lodise KISHAN 2009): 20 %        No changes in therapy at current time    continue to monitor SrCr is maintaining  with just a slight bump   if trend continues up  must adjust dose

## 2021-10-29 NOTE — PROGRESS NOTES
D/C Plan: [facility] and physician follow up vs Rehab    CM met with pt at bedside to discuss transition of care options. Pt has indicated he would prefer to return home. Pt has given permission for CM to contact his wife. CM will contact pt's wife once pt has been able to work with therapy to support transition of care plan. CM has sent referral to [facility] to see if he would be a candidate for acute inpt rehab. Noted CM consult for assistance with a prosthesis. Provider can place an order and the nursing staff can assist by contacting Arina Ryan  ([phone]). Please consider ordering therapy services when appropriate to assist with identifying a transition of care.   CM to continue to follow and assist.    Care Management Interventions  Mode of Transport at Discharge: BLS  Transition of Care Consult (CM Consult): Discharge Planning  Health Maintenance Reviewed: Yes  Support Systems: Spouse/Significant Other  The Plan for Transition of Care is Related to the Following Treatment Goals : SNF vs Acute Rehab  Discharge Location  Discharge Placement: Skilled nursing facility (vs acute rehab)

## 2021-10-29 NOTE — PROGRESS NOTES
Hospitalist Progress Note    Patient: Otila Kawasaki. MRN: [id number]  [facility]: [id number]    YOB: 1945  Age: 78 y.o. Sex: male    DOA: 10/26/2021 LOS:  LOS: 3 days          Chief Complaint:    worsening left foot gangrene    Assessment/Plan     Mart Simmons Jr. is a 78 y. o. male  with PMHX of hypertension, hyperlipidemia, stroke, PAD, A. fib, on Plavix who presents to the emergency department C/O low hemoglobin and worsening left foot gangrene.     Severe peripheral vascular disease with worsening gangrenous left foot -  S/p left BKA, POD#1  Ortho following   DC vancomycin and cefepime for now, because BKA is surgical cure  Pain management     UTI -  >2 ORGANISMS - CONTAMINATED SPECIMEN. SUGGEST RECOLLECTION INCLUDING PSEUDOMONAS  SPECIES   Obtain new specimen per lab suggestion, may be impacted by current IV abx      Diabetes -  ADA diet, SSI, fingerstick blood glucose before every meal nightly     History of atrial flutter -  On betablocker, rate controlled     Anemia-  Hemoglobin stable at 8.1  Monitor H/H     Chronic kidney disease-  Monitor renal function.     DVT/GI prophylaxis    Disposition :  Patient Active Problem List   Diagnosis Code    Atrial flutter ([facility]) I48.92    DM2 (diabetes mellitus, type 2) ([facility]) E11.9    CAD (coronary artery disease) I25.10    ELINOR (acute kidney injury) ([address] N17.9    CKD (chronic kidney disease) stage 3, GFR 30-59 ml/min ([facility]) N18.30    Elevated brain natriuretic peptide (BNP) level R79.89    Diabetic ulcer of left foot ([facility]) E11.621, L97.529    COVID-19 virus infection U07.1    Acute osteomyelitis ([facility]) M86.10    Acute hematogenous osteomyelitis of left foot ([facility]) M86.072    Cellulitis of left foot L03. 80    Diabetic foot ulcer with osteomyelitis ([address] E11.621, E11.69, L97.509, M86.9    Ulcer of left heel, with necrosis of bone ([address] L97.424    Osteomyelitis ([address] M86.9    Trimalleolar fracture of ankle, closed, left, initial encounter T69.907O    Displacement of peripherally inserted central catheter (PICC) ([facility] T82.524A    Chronic atrial fibrillation (HCC) I48.20    PVD (peripheral vascular disease) (HCC) I73.9    S/P foot surgery, left Z98.890    Diabetes mellitus with foot ulcer and gangrene ([facility] E11.621, L97.509, E11.52    Gangrene of left foot ([facility] I96    UTI (urinary tract infection) N39.0       Subjective:    Pain improved. Frustration much improved     Review of systems:    Constitutional: denies fevers, chills, myalgias  Respiratory: denies SOB, cough  Cardiovascular: denies chest pain, palpitations  Gastrointestinal: denies nausea, vomiting, diarrhea      Vital signs/Intake and Output:  Visit Vitals  /74   Pulse 79   Temp 98.8 °F (37.1 °C)   Resp 18   Ht 5' 8\" (1.727 m)   Wt 77.1 kg (170 lb)   SpO2 95%   BMI 25.85 kg/m²     Current Shift:  10/29 0701 - 10/29 1900  In: 2031 [I.V.:1210]  Out: -   Last three shifts:  10/27 1901 - 10/29 0700  In: 1570 [I.V.:1570]  Out: 3025 [Urine:2900]    Exam:    General: Well developed, alert, NAD, OX3  Head/Neck: NCAT, supple, No masses, No lymphadenopathy  CVS:Regular rate and rhythm, no M/R/G, S1/S2 heard, no thrill  Lungs:Clear to auscultation bilaterally, no wheezes, rhonchi, or rales  Abdomen: Soft, Nontender, No distention, Normal Bowel sounds, No hepatomegaly  Extremities: surgically absent leg below knee  Skin:normal texture and turgor, no rashes, no lesions  Neuro:grossly normal , follows commands  Psych:appropriate                Labs: Results:       Chemistry Recent Labs     10/29/21  0359 10/28/21  0347 10/27/21  0030   * 68* 71*    140 140   K 4.4 4.5 4.2    110 110   CO2 23 25 26   BUN 21* 21* 25*   CREA 1.14 1.10 1.05   CA 7.6* 7.9* 7.7*   AGAP 6 5 4   BUCR 18 19 24*      CBC w/Diff No results for input(s): WBC, RBC, HGB, HCT, PLT, GRANS, LYMPH, EOS, HGBEXT, HCTEXT, PLTEXT, HGBEXT, HCTEXT, PLTEXT in the last 72 hours.    Cardiac Enzymes No results for input(s): CPK, CKND1, LEONEL in the last 72 hours. No lab exists for component: CKRMB, TROIP   Coagulation No results for input(s): PTP, INR, APTT, INREXT, INREXT in the last 72 hours. Lipid Panel No results found for: CHOL, CHOLPOCT, CHOLX, CHLST, CHOLV, 452619, HDL, HDLP, LDL, LDLC, DLDLP, 290224, VLDLC, VLDL, TGLX, TRIGL, TRIGP, TGLPOCT, CHHD, CHHDX   BNP No results for input(s): BNPP in the last 72 hours. Liver Enzymes No results for input(s): TP, ALB, TBIL, AP in the last 72 hours.     No lab exists for component: SGOT, GPT, DBIL   Thyroid Studies Lab Results   Component Value Date/Time    TSH 1.72 02/11/2018 02:19 AM        Procedures/imaging: see electronic medical records for all procedures/Xrays and details which were not copied into this note but were reviewed prior to creation of Curly Streeter MD

## 2021-10-29 NOTE — PROGRESS NOTES
Problem: Pressure Injury - Risk of  Goal: *Prevention of pressure injury  Description: Document Anam Scale and appropriate interventions in the flowsheet. Outcome: Progressing Towards Goal  Note: Pressure Injury Interventions:  Sensory Interventions: Assess changes in LOC    Moisture Interventions: Absorbent underpads    Activity Interventions: Pressure redistribution bed/mattress(bed type)    Mobility Interventions: Pressure redistribution bed/mattress (bed type)    Nutrition Interventions: Document food/fluid/supplement intake    Friction and Shear Interventions: Apply protective barrier, creams and emollients                Problem: Patient Education: Go to Patient Education Activity  Goal: Patient/Family Education  Outcome: Progressing Towards Goal     Problem: Falls - Risk of  Goal: *Absence of Falls  Description: Document Joana Fall Risk and appropriate interventions in the flowsheet.   Outcome: Progressing Towards Goal  Note: Fall Risk Interventions:  Mobility Interventions: Communicate number of staff needed for ambulation/transfer         Medication Interventions: Patient to call before getting OOB    Elimination Interventions: Call light in reach    History of Falls Interventions: Consult care management for discharge planning         Problem: Patient Education: Go to Patient Education Activity  Goal: Patient/Family Education  Outcome: Progressing Towards Goal

## 2021-10-29 NOTE — PROGRESS NOTES
Progress Note      Patient: Ran Fischer.                Sex: male          DOA: 10/26/2021         YOB: 1945      Age:  78 y.o.        LOS:  LOS: 3 days               Subjective:   Pt doing well, pain still in foot, no nausea    Objective:      Visit Vitals  /74   Pulse 79   Temp 98.8 °F (37.1 °C)   Resp 18   Ht 5' 8\" (1.727 m)   Wt 77.1 kg (170 lb)   SpO2 95%   BMI 25.85 kg/m²       Physical Exam:  Left leg dressing intact, able to do straight leg raise   No swelling     Intake and Output:  Current Shift:  10/29 0701 - 10/29 1900  In: 4532 [I.V.:1210]  Out: -   Last three shifts:  10/27 1901 - 10/29 0700  In: 1570 [I.V.:1570]  Out: 3025 [Urine:2900]          Assessment/Plan     Principal Problem:    Gangrene of left foot ([address] (10/27/2021)    Active Problems:    CAD (coronary artery disease) (5/29/2018)      CKD (chronic kidney disease) stage 3, GFR 30-59 ml/min ([facility]) (5/29/2018)      Chronic atrial fibrillation ([address] (5/22/2021)      PVD (peripheral vascular disease) ([address] (5/22/2021)      Diabetes mellitus with foot ulcer and gangrene ([address] (10/26/2021)      UTI (urinary tract infection) (10/29/2021)      Gangrene  Status post left BKA  Doing well  Plan to start NWB LLE

## 2021-10-29 NOTE — PROGRESS NOTES
Vancomycin - Pharmacy to Dose ( Bone and Joint Infection )  Patient currently on Vancomycin 750 mg IV q12hrs. Updated AUC  623 mg/l.hr Trough 22 mg/l  ( Goal -600 )  Changed Vancomycin to 1250 mg IV q24hrs starting at 22:00 tonight.   Ext  mg/l.hr  Trough 16.5 mg/l  CrCl 55 ml/min  Temp 99 (oral)    Pharmacy will continue to follow and adjust.

## 2021-10-29 NOTE — OP NOTES
[facility] [place]  OPERATIVE REPORT    Name:  Tatiana Villeda  MR#:   [id number]  :  1945  ACCOUNT #:  [de-identified]  DATE OF SERVICE:  10/28/2021    PREOPERATIVE DIAGNOSES:  Left foot gangrene, left foot fracture of nonunion. POSTOPERATIVE DIAGNOSIS:  same    PROCEDURE PERFORMED:  Left below knee amputation. SURGEON:  Marina Hawk. Edmund Nunez MD    ASSISTANT:  Chris Quispe. ANESTHESIA:  General.    COMPLICATIONS:  none. SPECIMENS REMOVED:  Lower leg. IMPLANTS:  None. DRAINS:  None. ESTIMATED BLOOD LOSS:  Less than 200 mL. FINDINGS:  Gangrene, poor vascularity, intact bone. 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 He had a nonhealing ulcer of his left lower extremity, which we had been treating with another provider, trying to get this to heal.  Then he fell and broke his ankle. He was treated with an external fixator, got dry gangrene. Vascular performed procedure to try to improve the blood supply to this area and after removing the external fixator, his foot did not heal.  He was unable to externally rotate. The gangrene got worse and between Vascular and myself, it was felt the best option for him was a below knee amputation versus above knee amputation. PROCEDURE:  He was marked preoperatively. After marking, he was taken to the operating room. He underwent general anesthesia. He was found to have multiple loose teeth prior to the intubation. After prepping and draping up to his thigh, tourniquet which was not inflated. Reviewed the fact that he had dry gangrene with a 90-degree angulated foot with externally exposed bone. We then reviewed his time-out and x-rays and then planned about a 15 cm below the tibial tubercle transverse incision and then it curved over the long posterior limb. It was about 15-20 cm long. We then cut down through the skin.   We identified the saphenous nerve, which was injected with Marcaine and Exparel and cut. We then identified the anterior compartment and through the anterior compartment, identified the deep and superficial peroneal nerves as well as peroneal vessels, which were tied. Nerves were injected. We then opened up and found the periosteum distally. We went about 10 cm distally and then took an osteoperiosteal flap using an osteotome in front of the tibia. We then cut the tibia and then cut the fibula about a centimeter shorter. We used the saw to round up both edges to make a small notch both anteriorly and medially at the tibia. We then irrigated this copiously. We then identified the posterior structures, identified the tibial nerve, which was injected and cut with the level of the ambulation. The tibial artery and veins were tied in two separate ties, identified the sural nerve, which was injected and cut. I then used the amputation knife and cut the residual posterior skin flap. We had to bevel the skin. We again irrigated copiously. We took the deep compartment and removed this tissue while protecting the neurovascular bundles and tucked them up between. We took the osteoperiosteal flap and closed it over the distal aspect of the fibula. We then closed the triceps surae over to the anterior compartment, closing this over the distal end of the limb. We then repaired the skin. In the central portion, after setting the skin, very minimal tension across this. We then removed the dog ears carefully and then closed the skin with 2-0 and 3-0 Vicryl and then 3-0 nylon. We injected some Marcaine with Exparel to deep tissue, but nothing superficial.  There was good capillary refill to the residual limb both in the posterior flap and the anterior flap, soft edges and no signs or complications. The foot was sent for specimen. We placed a soft dressing and then a knee immobilizer for protection.   We will consult Prosthetics to get him RRD to protect the limb and try to help get him up and out of bed. He will be nonweightbearing in this lower extremity. Admitted him to medicine.       MD HEBER [email or web address]/[email or web address]  D:  10/28/2021 10:24  T:  10/28/2021 20:41  JOB #:  [id number]

## 2021-10-29 NOTE — ROUTINE PROCESS
0700 received SBAR from night shift RN. Patient resting quietly in bed. Alert and oriented x 3, disoriented to date. LLE dressing clean/dry/intact. 0169 Roxicodone given for pain. Reassessed and patient stated some relief. 6902 Morphine given for pain. Reassessed and patient stated a little relief. Patient resting in bed throughout shift. Patient Vitals for the past 12 hrs:   Temp Pulse Resp BP SpO2   10/29/21 1519 98.8 °F (37.1 °C) 79 18 134/74 95 %   10/29/21 1137 98.2 °F (36.8 °C) 80 17 (!) 128/54 95 %   10/29/21 0742 99 °F (37.2 °C) (!) 101 18 (!) 113/56 95 %     1600 Bedside and Verbal shift change report given to [address] [place], [place] [address], RN (oncoming nurse) by Melo Thayer RN (offgoing nurse). Report included the following information SBAR, Kardex, Intake/Output, MAR and Recent Results.

## 2021-10-30 NOTE — ROUTINE PROCESS
Bedside and Verbal shift change report given to Blanca Stokes RN by Alejandra Lawrence RN Report included the following information SBAR and Kardex.

## 2021-10-30 NOTE — PROGRESS NOTES
0725 Bedside and verbal shift change report given to Oskar Lu RN (on coming nurse) by Luis Llamas RN (off going nurse). Report included the following information SBAR, Kardex, OR Summary, Intake/Output and MAR. Bedside and verbal shift change report given by Oskar Lu RN (off going nurse) to [address] , RN(on coming nurse). Report included the following information SBAR, Kardex, OR Summary, Intake/Output and MAR.

## 2021-10-30 NOTE — PROGRESS NOTES
Hospitalist Progress Note    Patient: Kellen Bar. MRN: [id number]  CSN: [id number]    YOB: 1945  Age: 78 y.o. Sex: male    DOA: 10/26/2021 LOS:  LOS: 4 days          Chief Complaint:    worsening left foot gangrene    Assessment/Plan     Sharlene Mason Jr. is a 78 y. o. male  with PMHX of hypertension, hyperlipidemia, stroke, PAD, A. fib, on Plavix who presents to the emergency department C/O low hemoglobin and worsening left foot gangrene.     Severe peripheral vascular disease with worsening gangrenous left foot -  S/p left BKA, POD#2  Ortho following   Pain management    Start NWB LLE with PT   Ortho relayed that CM is working on prosthesis fitting      UTI -  >2 ORGANISMS - CONTAMINATED SPECIMEN. SUGGEST RECOLLECTION INCLUDING PSEUDOMONAS  SPECIES   Obtain new specimen per lab suggestion, may be impacted by current IV abx      Diabetes -  ADA diet, SSI, fingerstick blood glucose before every meal nightly     History of atrial flutter -  On betablocker, rate controlled     Anemia-  Hemoglobin stable at 8.1  Monitor H/H     Chronic kidney disease-  Monitor renal function.     DVT/GI prophylaxis    Disposition :  Patient Active Problem List   Diagnosis Code    Atrial flutter ([facility]) I48.92    DM2 (diabetes mellitus, type 2) ([facility]) E11.9    CAD (coronary artery disease) I25.10    ELINOR (acute kidney injury) ([address] N17.9    CKD (chronic kidney disease) stage 3, GFR 30-59 ml/min ([facility]) N18.30    Elevated brain natriuretic peptide (BNP) level R79.89    Diabetic ulcer of left foot ([facility]) E11.621, L97.529    COVID-19 virus infection U07.1    Acute osteomyelitis ([facility]) M86.10    Acute hematogenous osteomyelitis of left foot ([facility]) M86.072    Cellulitis of left foot L03. 80    Diabetic foot ulcer with osteomyelitis ([address] E11.621, E11.69, L97.509, M86.9    Ulcer of left heel, with necrosis of bone ([address] L97.424    Osteomyelitis ([address] M86.9    Trimalleolar fracture of ankle, closed, left, initial encounter S82.852A    Displacement of peripherally inserted central catheter (PICC) ([facility] T82.524A    Chronic atrial fibrillation (HCC) I48.20    PVD (peripheral vascular disease) (HCC) I73.9    S/P foot surgery, left Z98.890    Diabetes mellitus with foot ulcer and gangrene ([facility] E11.621, L97.509, E11.52    Gangrene of left foot ([facility] I96    UTI (urinary tract infection) N39.0       Subjective:    Pt was not very talkative to me, but Ortho PA joined me and pt very energetically talked to him about pain level and discussed prothesis fitting    Review of systems:    Constitutional: denies fevers, chills, myalgias  Respiratory: denies SOB, cough  Cardiovascular: denies chest pain, palpitations  Gastrointestinal: denies nausea, vomiting, diarrhea      Vital signs/Intake and Output:  Visit Vitals  BP (!) 130/56 (BP 1 Location: Left upper arm, BP Patient Position: At rest)   Pulse 91   Temp 98 °F (36.7 °C)   Resp 16   Ht 5' 8\" (1.727 m)   Wt 79.3 kg (174 lb 12.8 oz)   SpO2 92%   BMI 26.58 kg/m²     Current Shift:  No intake/output data recorded. Last three shifts:  10/28 1901 - 10/30 0700  In: 7161 [P.O.:400;  I.V.:2410]  Out: 2500 [Urine:2500]    Exam:    General: Well developed, alert, NAD, OX3  Head/Neck: NCAT, supple, No masses, No lymphadenopathy  CVS:Regular rate and rhythm, no M/R/G, S1/S2 heard, no thrill  Lungs:Clear to auscultation bilaterally, no wheezes, rhonchi, or rales  Abdomen: Soft, Nontender, No distention, Normal Bowel sounds, No hepatomegaly  Extremities: surgically absent leg below knee  Skin:normal texture and turgor, no rashes, no lesions  Neuro:grossly normal , follows commands  Psych:appropriate                Labs: Results:       Chemistry Recent Labs     10/29/21  0359 10/28/21  0347   * 68*    140   K 4.4 4.5    110   CO2 23 25   BUN 21* 21*   CREA 1.14 1.10   CA 7.6* 7.9*   AGAP 6 5   BUCR 18 19      CBC w/Diff No results for input(s): WBC, RBC, HGB, HCT, PLT, GRANS, LYMPH, EOS, HGBEXT, HCTEXT, PLTEXT, HGBEXT, HCTEXT, PLTEXT in the last 72 hours. Cardiac Enzymes No results for input(s): CPK, CKND1, LEONEL in the last 72 hours. No lab exists for component: CKRMB, TROIP   Coagulation No results for input(s): PTP, INR, APTT, INREXT, INREXT in the last 72 hours. Lipid Panel No results found for: CHOL, CHOLPOCT, CHOLX, CHLST, CHOLV, 821918, HDL, HDLP, LDL, LDLC, DLDLP, 858634, VLDLC, VLDL, TGLX, TRIGL, TRIGP, TGLPOCT, CHHD, CHHDX   BNP No results for input(s): BNPP in the last 72 hours. Liver Enzymes No results for input(s): TP, ALB, TBIL, AP in the last 72 hours.     No lab exists for component: SGOT, GPT, DBIL   Thyroid Studies Lab Results   Component Value Date/Time    TSH 1.72 02/11/2018 02:19 AM        Procedures/imaging: see electronic medical records for all procedures/Xrays and details which were not copied into this note but were reviewed prior to creation of Rafita Ruiz MD

## 2021-10-30 NOTE — PROGRESS NOTES
Progress Note      Patient: Lisa Perez. Sex: male          DOA: 10/26/2021         YOB: 1945      Age:  78 y.o.        LOS:  LOS: 4 days               Subjective:   Pt doing well, ports pain has decreased today. Denies nausea. Immobilizer is in place. Objective:      Visit Vitals  BP (!) 130/56 (BP 1 Location: Left upper arm, BP Patient Position: At rest)   Pulse 91   Temp 98 °F (36.7 °C)   Resp 16   Ht 5' 8\" (1.727 m)   Wt 79.3 kg (174 lb 12.8 oz)   SpO2 92%   BMI 26.58 kg/m²       Physical Exam:  Left leg dressing intact, able to do straight leg raise   No swelling     Intake and Output:  Current Shift:  No intake/output data recorded. Last three shifts:  10/28 1901 - 10/30 0700  In: 9147 [P.O.:400; I.V.:2410]  Out: 2500 [Urine:2500]          Assessment/Plan     Principal Problem:    Gangrene of left foot ([facility] (10/27/2021)    Active Problems:    CAD (coronary artery disease) (5/29/2018)      CKD (chronic kidney disease) stage 3, GFR 30-59 ml/min ([facility]) (5/29/2018)      Chronic atrial fibrillation ([address] (5/22/2021)      PVD (peripheral vascular disease) ([facility] (5/22/2021)      Diabetes mellitus with foot ulcer and gangrene ([address] (10/26/2021)      UTI (urinary tract infection) (10/29/2021)      Gangrene  Status post left BKA  Doing well  Plan to start NWB LLE  As per Dr. Peyton Guevara, Case management is working on the prosthesis fitting. We will continue to follow. Patient was discussed with Dr. Charmaine Velasco.

## 2021-10-30 NOTE — PROGRESS NOTES
1712 - Head to toe assessment completed at this time. Pt rated pain 0/10. No distress noted. 20G Right AC INT. 20G Left Wrist IVF infusing as ordered. Call bell within reach. Will continue to monitor.

## 2021-10-31 NOTE — PROGRESS NOTES
Hospitalist Progress Note    Patient: Vladislav Glynn MRN: [id number]  CSN: [id number]    YOB: 1945  Age: 78 y.o. Sex: male    DOA: 10/26/2021 LOS:  LOS: 5 days          Chief Complaint:    worsening left foot gangrene    Assessment/Plan     Shanna Shea Jr. is a 78 y. o. male  with PMHX of hypertension, hyperlipidemia, stroke, PAD, A. fib, on Plavix who presents to the emergency department C/O low hemoglobin and worsening left foot gangrene.     Severe peripheral vascular disease with worsening gangrenous left foot -  S/p left BKA, POD#3  Ortho following   Pain management  Awaiting prothesis  NWB LLE with PT      UTI -  >2 ORGANISMS - CONTAMINATED SPECIMEN. SUGGEST RECOLLECTION INCLUDING PSEUDOMONAS  SPECIES   Only growing yeast so far      Diabetes -  ADA diet, SSI, fingerstick blood glucose before every meal nightly     History of atrial flutter -  On betablocker, rate controlled     Anemia-  Hemoglobin stable   Monitor H/H     Chronic kidney disease-  Monitor renal function.     DVT/GI prophylaxis    Disposition :  Patient Active Problem List   Diagnosis Code    Atrial flutter ([facility]) I48.92    DM2 (diabetes mellitus, type 2) ([facility]) E11.9    CAD (coronary artery disease) I25.10    ELINOR (acute kidney injury) ([address] N17.9    CKD (chronic kidney disease) stage 3, GFR 30-59 ml/min ([facility]) N18.30    Elevated brain natriuretic peptide (BNP) level R79.89    Diabetic ulcer of left foot ([facility]) E11.621, L97.529    COVID-19 virus infection U07.1    Acute osteomyelitis ([facility]) M86.10    Acute hematogenous osteomyelitis of left foot ([facility]) M86.072    Cellulitis of left foot L03. 80    Diabetic foot ulcer with osteomyelitis ([address] E11.621, E11.69, L97.509, M86.9    Ulcer of left heel, with necrosis of bone ([address] L97.424    Osteomyelitis ([address] M86.9    Trimalleolar fracture of ankle, closed, left, initial encounter R82.548I    Displacement of peripherally inserted central catheter (PICC) ([address] T82.524A    Chronic atrial fibrillation (HCC) I48.20    PVD (peripheral vascular disease) ([facility]) I73.9    S/P foot surgery, left Z98.890    Diabetes mellitus with foot ulcer and gangrene (HCC) E11.621, L97.509, E11.52    Gangrene of left foot ([facility] I96    UTI (urinary tract infection) N39.0       Subjective:    Trying to use his ICS  No new complaints     Review of systems:    Constitutional: denies fevers, chills, myalgias  Respiratory: denies SOB, cough  Cardiovascular: denies chest pain, palpitations  Gastrointestinal: denies nausea, vomiting, diarrhea      Vital signs/Intake and Output:  Visit Vitals  BP (!) 136/55 (BP 1 Location: Left upper arm, BP Patient Position: At rest)   Pulse 74   Temp 97.8 °F (36.6 °C)   Resp 16   Ht 5' 8\" (1.727 m)   Wt 83.1 kg (183 lb 3.2 oz)   SpO2 92%   BMI 27.86 kg/m²     Current Shift:  No intake/output data recorded. Last three shifts:  10/29 1901 - 10/31 0700  In: 1600 [P.O.:400; I.V.:1200]  Out: 2050 [Urine:2050]    Exam:    General: alert, NAD, Ox3, elderly, debilitated, WM   Head/Neck: NCAT, supple, No masses, No lymphadenopathy  CVS:Regular rate and rhythm, no M/R/G, S1/S2 heard, no thrill  Lungs:Clear to auscultation bilaterally, no wheezes, rhonchi, or rales  Abdomen: Soft, Nontender, No distention, Normal Bowel sounds, No hepatomegaly  Extremities: surgically absent leg below knee  Skin:normal texture and turgor, no rashes, no lesions  Neuro:grossly normal , follows commands  Psych:appropriate                Labs: Results:       Chemistry Recent Labs     10/29/21  0359   *      K 4.4      CO2 23   BUN 21*   CREA 1.14   CA 7.6*   AGAP 6   BUCR 18      CBC w/Diff No results for input(s): WBC, RBC, HGB, HCT, PLT, GRANS, LYMPH, EOS, HGBEXT, HCTEXT, PLTEXT, HGBEXT, HCTEXT, PLTEXT in the last 72 hours. Cardiac Enzymes No results for input(s): CPK, CKND1, LEONEL in the last 72 hours.     No lab exists for component: CKRMB, TROIP   Coagulation No results for input(s): PTP, INR, APTT, INREXT, INREXT in the last 72 hours. Lipid Panel No results found for: CHOL, CHOLPOCT, CHOLX, CHLST, CHOLV, 973898, HDL, HDLP, LDL, LDLC, DLDLP, 295428, VLDLC, VLDL, TGLX, TRIGL, TRIGP, TGLPOCT, CHHD, CHHDX   BNP No results for input(s): BNPP in the last 72 hours. Liver Enzymes No results for input(s): TP, ALB, TBIL, AP in the last 72 hours.     No lab exists for component: SGOT, GPT, DBIL   Thyroid Studies Lab Results   Component Value Date/Time    TSH 1.72 02/11/2018 02:19 AM        Procedures/imaging: see electronic medical records for all procedures/Xrays and details which were not copied into this note but were reviewed prior to creation of Hemal Walters MD

## 2021-10-31 NOTE — PROGRESS NOTES
0725 Bedside and verbal shift change report given to Lizy Noyola RN (on coming nurse) by Kelley Runner, RN (off going nurse). Report included the following information SBAR, Kardex, OR Summary, Intake/Output and MAR. Bedside and verbal shift change report given by Lizy Noyola RN (off going nurse) to Adolfo Julien RN(on coming nurse). Report included the following information SBAR, Kardex, OR Summary, Intake/Output and MAR.

## 2021-10-31 NOTE — PROGRESS NOTES
2258  Bedside and verbal shift change report given to Noemi Dumont RN (on coming nurse) by Braulio Farias RN (off going nurse). Report included the following information SBAR, Kardex, OR Summary, Intake/Output and MAR.    0125  No DVT prophylaxis order. Notified Dr. Bertha Morris. 0130  DVT prophylaxis order from Dr. Bertha Morris.    0725  Bedside and verbal shift change report given by VALERIA Treadwell (off going nurse) to KRISTI Lainez RN(on coming nurse). Report included the following information SBAR, Kardex, OR Summary, Intake/Output and MAR.

## 2021-10-31 NOTE — ROUTINE PROCESS
2300 - Bedside and Verbal shift change report given to Natalie Britt RN by Marysol Giron RN. Report included the following information .

## 2021-10-31 NOTE — PROGRESS NOTES
Progress Note      Patient: Getachew Huston. Sex: male          DOA: 10/26/2021         YOB: 1945      Age:  78 y.o.        LOS:  LOS: 5 days               Subjective:   Pt doing well, ports pain has decreased today. Denies nausea. Immobilizer is in place. Family in room with him today. Pain appears to be better controlled today. Objective:      Visit Vitals  /74 (BP 1 Location: Left upper arm, BP Patient Position: At rest)   Pulse 73   Temp 98.2 °F (36.8 °C)   Resp 16   Ht 5' 8\" (1.727 m)   Wt 83.1 kg (183 lb 3.2 oz)   SpO2 92%   BMI 27.86 kg/m²       Physical Exam:  Left leg dressing intact, able to do straight leg raise   No swelling     Intake and Output:  Current Shift:  No intake/output data recorded. Last three shifts:  10/29 1901 - 10/31 0700  In: 1600 [P.O.:400; I.V.:1200]  Out: 2050 [Urine:2050]          Assessment/Plan     Principal Problem:    Gangrene of left foot ([facility] (10/27/2021)    Active Problems:    CAD (coronary artery disease) (5/29/2018)      CKD (chronic kidney disease) stage 3, GFR 30-59 ml/min ([facility]) (5/29/2018)      Chronic atrial fibrillation ([facility] (5/22/2021)      PVD (peripheral vascular disease) ([facility] (5/22/2021)      Diabetes mellitus with foot ulcer and gangrene ([facility] (10/26/2021)      UTI (urinary tract infection) (10/29/2021)      Gangrene  Status post left BKA  Doing well  Plan to start NWB LLE  As per Dr. Antoinette Garcia, Case management is working on the prosthesis fitting. We will continue to follow. Patient will be followed by Dr. Antoinette Garcia tomorrow. Family understands the plan for prosthesis. Patient was discussed with Dr. Bridger Sinclair. Visit Information Date & Time Provider Department Dept. Phone Encounter #  
 9/13/2017  9:15 AM Evdanny SenaRAEjo 14 [id number] Upcoming Health Maintenance Date Due Hepatitis A Peds Age 1-18 (1 of 2 - Standard Series) 4/5/2013 INFLUENZA PEDS 6M-8Y (1) 8/1/2017 MCV through Age 25 (1 of 2) 4/5/2023 DTaP/Tdap/Td series (6 - Tdap) 4/5/2023 Allergies as of 9/13/2017  Review Complete On: 9/13/2017 By: Dewey Villa No Known Allergies Current Immunizations  Reviewed on 9/15/2016 Name Date DTAP/HIB/IPV Combined Vaccine 2012, 2012, 2012 DTaP 7/9/2013 10:33 AM  
 DTaP-IPV 6/9/2016 Hep B, Adol/Ped 1/14/2013 Hepatitis B Vaccine 2012, 2012 10:24 AM  
 Hib (PRP-T) 4/15/2013 Influenza High Dose Vaccine PF 1/14/2013 Influenza Vaccine (Quad) PF 10/21/2013 Influenza Vaccine Split 2012 MMRV 6/9/2016, 7/9/2013 10:31 AM  
 Pneumococcal Conjugate (PCV-13) 4/15/2013 Prevnar 13 2012, 2012, 2012 Rotavirus Vaccine 2012, 2012, 2012 Not reviewed this visit You Were Diagnosed With   
  
 Codes Comments Croup    -  Primary ICD-10-CM: J05.0 ICD-9-CM: 464.4 Vitals BP Pulse Temp Height(growth percentile) Weight(growth percentile) BMI  
 110/72 (92 %/ 93 %)* 100 98.8 °F (37.1 °C) (Tympanic) (!) 3' 9.25\" (1.149 m) (80 %, Z= 0.83) 51 lb (23.1 kg) (89 %, Z= 1.22) 17.51 kg/m2 (90 %, Z= 1.31) Smoking Status Never Smoker *BP percentiles are based on NHBPEP's 4th Report Growth percentiles are based on CDC 2-20 Years data. Vitals History BMI and BSA Data Body Mass Index Body Surface Area  
 17.51 kg/m 2 0.86 m 2 Preferred Pharmacy Pharmacy Name Phone [id number] [phone] [facility] [phone] Your Updated Medication List  
  
   
 This list is accurate as of: 9/13/17  9:54 AM.  Always use your most recent med list.  
  
  
  
  
 albuterol 2.5 mg /3 mL (0.083 %) nebulizer solution Commonly known as:  PROVENTIL VENTOLIN  
3 mL by Nebulization route every four (4) hours as needed for Wheezing. mupirocin 2 % ointment Commonly known as:  [employer] Apply  to affected area three (3) times daily. prednisoLONE 15 mg/5 mL syrup Commonly known as:  Polo Rodgers Take 7.5 mL by mouth daily for 3 days. Prescriptions Printed Refills  
 prednisoLONE (PRELONE) 15 mg/5 mL syrup 0 Sig: Take 7.5 mL by mouth daily for 3 days. Class: Print Route: Oral  
  
Patient Instructions For fever or pain-relief:  Children's Ibuprofen -- 11 ml every 6 hours as needed Can add ice-chips with 1-2 ml of normal-saline (salt water) to nebulizer medication cannister and deliver cold, humidified air if stridor or barking cough recurs If stridor at rest is noted, fill Rx for Prednisolone and give ONCE DAILY as directed Croup in Children: Care Instructions Your Care Instructions Croup is an infection that causes swelling in the windpipe (trachea) and voice box (larynx). The swelling causes a loud, barking cough and sometimes makes breathing hard. Croup can be scary for you and your child, but it is rarely serious. In most cases, croup lasts from 2 to 5 days and can be treated at home. Croup usually occurs a few days after the start of a cold and in most cases is caused by the same virus that causes the cold. Croup is worse at night but gets better with each night that passes. Sometimes a doctor will give medicine to decrease swelling. This medicine might be given as a shot or by mouth. Because croup is caused by a virus, antibiotics will not help your child get better. But children sometimes get an ear infection or other bacterial infection along with croup. Antibiotics may help in that case. The doctor has checked your child carefully, but problems can develop later. If you notice any problems or new symptoms, get medical treatment right away. Follow-up care is a key part of your child's treatment and safety. Be sure to make and go to all appointments, and call your doctor if your child is having problems. It's also a good idea to know your child's test results and keep a list of the medicines your child takes. How can you care for your child at home? Medicines · Have your child take medicines exactly as prescribed. Call your doctor if you think your child is having a problem with his or her medicine. · Give acetaminophen (Tylenol) or ibuprofen (Advil, Motrin) for fever, pain, or fussiness. Read and follow all instructions on the label. Do not give aspirin to anyone younger than 20. It has been linked to Reye syndrome, a serious illness. Do not give ibuprofen to a child who is younger than 6 months. · Be careful with cough and cold medicines. Don't give them to children younger than 6, because they don't work for children that age and can even be harmful. For children 6 and older, always follow all the instructions carefully. Make sure you know how much medicine to give and how long to use it. And use the dosing device if one is included. · Be careful when giving your child over-the-counter cold or flu medicines and Tylenol at the same time. Many of these medicines have acetaminophen, which is Tylenol. Read the labels to make sure that you are not giving your child more than the recommended dose. Too much acetaminophen (Tylenol) can be harmful. Other home care · Try running a hot shower to create steam. Do NOT put your child in the hot shower. Let the bathroom fill with steam. Have your child breathe in the moist air for 10 to 15 minutes. · Offer plenty of fluids. Give your child water or crushed ice drinks several times each hour. You also can give flavored ice pops. · Try to be calm. This will help keep your child calm. Crying can make breathing harder. · If your child's breathing does not get better, take him or her outside. Cool outdoor air often helps open a child's airways and reduces coughing and breathing problems. Make sure that your child is dressed warmly before going out. · Sleep in or near your child's room to listen for any increasing problems with his or her breathing. · Keep your child away from smoke. Do not smoke or let anyone else smoke around your child or in your house. · Wash your hands and your child's hands often so that you do not spread the illness. When should you call for help? Call 911 anytime you think your child may need emergency care. For example, call if: 
· Your child has severe trouble breathing. · Your child's skin and fingernails look blue. Call your doctor now or seek immediate medical care if: 
· Your child has new or worse trouble breathing. · Your child has symptoms of dehydration, such as: ¨ Dry eyes and a dry mouth. ¨ Passing only a little dark urine. ¨ Feeling thirstier than usual. 
· Your child seems very sick or is hard to wake up. · Your child has a new or higher fever. · Your child's cough is getting worse. Watch closely for changes in your child's health, and be sure to contact your doctor if: 
· Your child does not get better as expected. Where can you learn more? Go to http://yue-brenda.info/. Enter M301 in the search box to learn more about \"Croup in Children: Care Instructions. \" Current as of: May 4, 2017 Content Version: 11.3 © 7672-1520 Healthwise, Incorporated. Care instructions adapted under license by [employer] (which disclaims liability or warranty for this information).  If you have questions about a medical condition or this instruction, always ask your healthcare professional. [address] any warranty or liability for your use of this information. Introducing [facility]! Dear Parent or Guardian, Thank you for requesting a [employer]hentic[employer] account for your child. With Authentic[employer], you can view your childs hospital or ER discharge instructions, current allergies, immunizations and much more. In order to access your childs information, we require a signed consent on file. Please see the [facility] department or call [phone] for instructions on completing a [employer]hentic[employer] Proxy request.   
Additional Information If you have questions, please visit the Frequently Asked Questions section of the [employer] website at [email or web address] Remember, [employer]hentic[employer] is NOT to be used for urgent needs. For medical emergencies, dial 911. Now available from your iPhone and Android! Please provide this summary of care documentation to your next provider. Your primary care clinician is listed as Iglesia Sanchez. If you have any questions after today's visit, please call [phone].

## 2021-10-31 NOTE — PROGRESS NOTES
2005 - Heads to toe assessment completed at this time. 18G Left AC in place infusing as ordered. Mccray in place draining straw yellow urine. Call bell within reach. Will continue to monitor. 2132 - Pt rated pain 8/10 and medicated per MAR.

## 2021-10-31 NOTE — PROGRESS NOTES
Problem: Pressure Injury - Risk of  Goal: *Prevention of pressure injury  Description: Document Anam Scale and appropriate interventions in the flowsheet.   Outcome: Progressing Towards Goal  Note: Pressure Injury Interventions:  Sensory Interventions: Assess changes in LOC    Moisture Interventions: Absorbent underpads    Activity Interventions: Pressure redistribution bed/mattress(bed type)    Mobility Interventions: Pressure redistribution bed/mattress (bed type)    Nutrition Interventions: Document food/fluid/supplement intake    Friction and Shear Interventions: Minimize layers

## 2021-11-01 NOTE — PROGRESS NOTES
Hospitalist Progress Note    Patient: Noni Sheridan MRN: [id number]  [facility]: [id number]    YOB: 1945  Age: 78 y.o. Sex: male    DOA: 10/26/2021 LOS:  LOS: 6 days                Assessment/Plan     Patient Active Problem List   Diagnosis Code    Atrial flutter (HCC) I48.92    DM2 (diabetes mellitus, type 2) ([address] E11.9    CAD (coronary artery disease) I25.10    ELINOR (acute kidney injury) ([address] N17.9    CKD (chronic kidney disease) stage 3, GFR 30-59 ml/min ([facility]) N18.30    Elevated brain natriuretic peptide (BNP) level R79.89    Diabetic ulcer of left foot ([facility]) E11.621, L97.529    COVID-19 virus infection U07.1    Acute osteomyelitis ([address] M86.10    Acute hematogenous osteomyelitis of left foot ([address] M86.072    Cellulitis of left foot L03. 80    Diabetic foot ulcer with osteomyelitis ([address] E11.621, E11.69, L97.509, M86.9    Ulcer of left heel, with necrosis of bone ([address] L97.424    Osteomyelitis ([address] M86.9    Trimalleolar fracture of ankle, closed, left, initial encounter S82.852A    Displacement of peripherally inserted central catheter (PICC) ([address] T82.524A    Chronic atrial fibrillation ([facility]) I48.20    PVD (peripheral vascular disease) ([address] I73.9    S/P foot surgery, left Z98.890    Diabetes mellitus with foot ulcer and gangrene ([address] E11.621, L97.509, E11.52    Gangrene of left foot ([address] I96    UTI (urinary tract infection) N39.0        Chief complaint :  Noni Sheridan is a 78 y.o. male  with PMHX of hypertension, hyperlipidemia, stroke, PAD, A. fib, on Plavix who presents to the emergency department C/O low hemoglobin and worsening left foot gangrene.     Severe peripheral vascular disease with worsening gangrenous left foot -  Seen by ortho    s/p left BKA.     Diabetes -  ADA diet, SSI, fingerstick blood glucose before every meal nightly     History of atrial flutter -  On betablocker, rate controlled     Anemia-  Transfuse to keep Hb >7  Monitor H/H     Chronic kidney disease-  Monitor renal function.     DVT/GI prophylaxis    Disposition : awaiting placement    Review of systems  General: No fevers or chills. Cardiovascular: No chest pain or pressure. No palpitations. Pulmonary: No shortness of breath. Gastrointestinal: No nausea, vomiting. Physical Exam:  General: Awake, cooperative, no acute distress    HEENT: NC, Atraumatic. PERRLA, anicteric sclerae. Lungs: CTA Bilaterally. No Wheezing/Rhonchi/Rales. Heart:  S1 S2,  No murmur, No Rubs, No Gallops  Abdomen: Soft, Non distended, Non tender.  +Bowel sounds,   Extremities: Severe necrotic left foot. Psych:   Not anxious or agitated. Neurologic:  No acute neurological deficit. Vital signs/Intake and Output:  Visit Vitals  BP (!) 131/45 (BP 1 Location: Left upper arm, BP Patient Position: At rest;Lying)   Pulse 70   Temp 97.7 °F (36.5 °C)   Resp 18   Ht 5' 8\" (1.727 m)   Wt 81.1 kg (178 lb 14.4 oz)   SpO2 94%   BMI 27.20 kg/m²     Current Shift:  No intake/output data recorded. Last three shifts:  10/31 0701 - 11/01 1900  In: 421.8 [P.O.:163]  Out: 1228 [Urine:1225]            Labs: Results:       Chemistry No results for input(s): GLU, NA, K, CL, CO2, BUN, CREA, CA, AGAP, BUCR, TBIL, AP, TP, ALB, GLOB, AGRAT in the last 72 hours. No lab exists for component: GPT   CBC w/Diff Recent Labs     11/01/21  0515   WBC 8.2   RBC 2.95*   HGB 6.1*   HCT 21.5*   *      Cardiac Enzymes No results for input(s): CPK, CKND1, LEONEL in the last 72 hours. No lab exists for component: CKRMB, TROIP   Coagulation No results for input(s): PTP, INR, APTT, INREXT, INREXT in the last 72 hours. Lipid Panel No results found for: CHOL, CHOLPOCT, CHOLX, CHLST, CHOLV, 175489, HDL, HDLP, LDL, LDLC, DLDLP, 364446, VLDLC, VLDL, TGLX, TRIGL, TRIGP, TGLPOCT, CHHD, CHHDX   BNP No results for input(s): BNPP in the last 72 hours.    Liver Enzymes No results for input(s): TP, ALB, TBIL, AP in the last 72 hours.    No lab exists for component: SGOT, GPT, DBIL   Thyroid Studies Lab Results   Component Value Date/Time    TSH 1.72 02/11/2018 02:19 AM        Procedures/imaging: see electronic medical records for all procedures/Xrays and details which were not copied into this note but were reviewed prior to creation of Plan

## 2021-11-01 NOTE — PROGRESS NOTES
Problem: Mobility Impaired (Adult and Pediatric)  Goal: *Acute Goals and Plan of Care (Insert Text)  Description: In 1-7 days pt will be able to perform:  ST.  Bed mobility:  Rolling L to R to L modified independent for positioning. 2.  Supine to sit to supine SBA with HR for meals. 3.  Sit EOB >15' w/ upright posture, >fair balance to increase ability for meals/ADLs. LT. With use of sliding board, min A to transfer bed<>w/c to enable ability to be OOB. 3.  Patient/Family Education:  Patient/family to be independent with HEP for follow-up care and safe discharge. Note:     PHYSICAL THERAPY EVALUATION    Patient: Rober Scanlon (73 y.o. male)  Date: 2021  Primary Diagnosis: Diabetes mellitus with foot ulcer and gang[facility] [E11.621, L97.509, E11.52]  Procedure(s) (LRB):  LEFT BELOW KNEE AMPUTATION (Left) 4 Days Post-Op   Precautions:   Fall    PLOF: Pt inconsistent w/ history reporting living at home and then reporting living at rehab; taken from review of chart, pt at rehab prior to current hospitalization; Pt using sliding board for bed<>w/c transfers w/ Ax3 persons-limited occurrences    ASSESSMENT :  Based on the objective data described below, the patient presents with decreased mobility in regards to bed mobility, transfers, balance and safety due to L BKA surgery/hospitalization. Decreased AROM of B LE, dec generalized strength, pain in L LE residual limb, dec sensation of R LE also impacting pt functional mobility. Pt has been non ambulatory >1 month w/ reports of use of sliding transfer board w/ A x3 persons prior to this hospitalization (at rehab). Pt rating pain on numerical pain scale pre/post and during session 10. Pt ed regarding mobility safety, phantom L LE pain/sensations/management, L KI ed, elevation, environmental safety and need for mobilization. Pt required encouragement to participate as pt response to most requests was \"I can't. \"  Pt does seem depressed about current situation and allowed pt to voice his feelings throughout session w/ encouragement to remain positive. Pt able to perform supine<>sit w/ min/mod A additional time. Pt obtained 1/2 upright sitting relying on L elbow and refused to participate any further. Olaf 1' sitting EOB. Pt seems to self limit due to difficulty of mobility. Answered questions by pt in regards to PT and mobility. Pt left supine in bed w/ all needs within reach and L KI intact. Prior PT experiences, pt has been inconsistent w/ participation. Nurse Enrique Londono aware of session and outcomes. Recommend SNF rehab/LTC upon hospital d/c. Patient will benefit from skilled intervention to address the above impairments. Patient's rehabilitation potential is considered to be Fair  Factors which may influence rehabilitation potential include:   []         None noted  [x]         Mental ability/status  [x]         Medical condition  []         Home/family situation and support systems  []         Safety awareness  [x]         Pain tolerance/management  []         Other:      PLAN :  Recommendations and Planned Interventions:   [x]           Bed Mobility Training             [x]    Neuromuscular Re-Education  [x]           Transfer Training                   []    Orthotic/Prosthetic Training  []           Gait Training                          []    Modalities  [x]           Therapeutic Exercises           [x]    Edema Management/Control  [x]           Therapeutic Activities            [x]    Family Training/Education  [x]           Patient Education  []           Other (comment):    Frequency/Duration: Patient will be followed by physical therapy 1-2 times per day/4-7 days per week to address goals. Discharge Recommendations: Skilled Nursing Facility  Further Equipment Recommendations for Discharge: N/A     SUBJECTIVE:   Patient stated I can't do it.     OBJECTIVE DATA SUMMARY:     Past Medical History:   Diagnosis Date A-fib (HCC)     Asthma     CAD (coronary artery disease)     Chronic kidney disease     stage 3    COVID-19     Diabetes (HCC)     GERD (gastroesophageal reflux disease)     Heart failure (HCC)     chronic diastolic heart failure    High cholesterol     Hypertension     Ill-defined condition     chronic osteomyelitis left ankle and foot    Ill-defined condition     chronic arteriosclerosis    [facility]     Stroke [facility])     cva     Past Surgical History:   Procedure Laterality Date    COLONOSCOPY N/A 6/1/2018    COLONOSCOPY, POLYPECTOMY performed by Halima Mcfadden MD at [facility] ENDOSCOPY    HX CATARACT REMOVAL      HX ORTHOPAEDIC Left 2021    ankle washout, external fixator, would vac    HX ORTHOPAEDIC      external fixator removed     Barriers to Learning/Limitations: yes;  emotional and physical  Compensate with: Visual Cues, Verbal Cues, Tactile Cues, and Kinesthetic Cues  Home Situation:  Home Situation  Home Environment: Private residence  # Steps to Enter: 4  Rails to Enter: No  One/Two Story Residence: Two story  # of Interior Steps: 15  Interior Rails: Right  Lift Chair Available: No  Living Alone: No  Support Systems: Spouse/Significant Other, Child(nayely)  Patient Expects to be Discharged to[de-identified] Long-term care  Current DME Used/Available at Home: Cane, straight, Wheelchair (sliding board)  Critical Behavior:  Neurologic State: Confused; Alert  Orientation Level: Oriented to person;Oriented to place;Oriented to situation  Cognition: Follows commands;Decreased attention/concentration  Safety/Judgement: Awareness of environment  Psychosocial  Patient Behaviors: Confused; Cooperative;Non-Compliant  Purposeful Interaction: Yes  Caritas Process: Establish trust;Supportive expression  Strength:    Strength: Generally decreased, functional  Tone & Sensation:   Tone: Normal  Sensation: Impaired (R LE/L LE)  Range Of Motion:  AROM: Generally decreased, functional  Posture:  Functional Mobility:  Bed Mobility:  Rolling: Minimum assistance (vc)  Supine to Sit: Minimum assistance; Moderate assistance; Additional time (vc)  Sit to Supine: Contact guard assistance; Additional time (vc)  Scooting: Minimum assistance;Contact guard assistance; Additional time (vc)  Transfers:  Balance:   Sitting: Impaired  Sitting - Static: Poor (constant support)  Sitting - Dynamic: Not tested  Ambulation/Gait Training:  Therapeutic Exercises:   Encouraged mobility in bed  Pain:  Pain level pre-treatment: 7/10   Pain level post-treatment: 7/10   Pain Intervention(s) : Medication (see MAR); Rest, Ice, Repositioning  Response to intervention: Nurse notified, See doc flow    Activity Tolerance:   Fair -  Please refer to the flowsheet for vital signs taken during this treatment. After treatment:   []         Patient left in no apparent distress sitting up in chair  [x]         Patient left in no apparent distress in bed  [x]         Call bell left within reach  [x]         Nursing notified  []         Caregiver present  []         Bed alarm activated  [x]         SCD R applied    COMMUNICATION/EDUCATION:   [x]         Role of Physical Therapy in the acute care setting. [x]         Fall prevention education was provided and the patient/caregiver indicated understanding. [x]         Patient/family have participated as able in goal setting and plan of care. [x]         Patient/family agree to work toward stated goals and plan of care. []         Patient understands intent and goals of therapy, but is neutral about his/her participation. []         Patient is unable to participate in goal setting/plan of care: ongoing with therapy staff.  []         Other:     Thank you for this referral.  Teri Pelletier, PT   Time Calculation: 24 mins      Eval Complexity: History: HIGH Complexity :3+ comorbidities / personal factors will impact the outcome/ POC Exam:MEDIUM Complexity : 3 Standardized tests and measures addressing body structure, function, activity limitation and / or participation in recreation  Presentation: LOW Complexity : Stable, uncomplicated  Clinical Decision Making:High Complexity    Overall Complexity:LOW

## 2021-11-01 NOTE — PROGRESS NOTES
3216 Received bedside shift report from off going nurse Cielo Garland RN. Report included the following information SBAR, Kardex, Intake/Output, MAR and Recent Results. Gave bedside shift report to oncoming nurse Reema Colon RN. Report included the following information: SBAR, Kardex, Intake/Output, MAR and Recent Results.

## 2021-11-01 NOTE — PROGRESS NOTES
Occupational Therapy Evaluation Attempt    Chart reviewed. Attempted Occupational Therapy Evaluation/Treatment, however, patient unable to be seen due to:  []  Nausea/vomiting  []  Eating  []  Pain  []  Patient too lethargic  []  Off Unit for testing/procedure  []  Dialysis treatment in progress   []  Telemetry Results  [x]  Other: pt refusing all EOB/bed level ADLs, reporting fatigue from recently working with PT, and high pain levels. Educated pt on role of OT in acute care and provided encouragement to participate, pt continued to refuse    Will follow up later as patient's schedule allows.    Thank you for this referral.  Tushar Naranjo, OTR/L

## 2021-11-01 NOTE — PROGRESS NOTES
Progress Note      Patient: Romel Shields Sex: male          DOA: 10/26/2021         YOB: 1945      Age:  78 y.o.        LOS:  LOS: 6 days               Subjective:     Pt having pain in leg, no nausea, no shortness of breath, has not been out of bed    Objective:      Visit Vitals  /61 (BP 1 Location: Right upper arm, BP Patient Position: At rest)   Pulse 85   Temp 97.8 °F (36.6 °C)   Resp 17   Ht 5' 8\" (1.727 m)   Wt 83.1 kg (183 lb 3.2 oz)   SpO2 95%   BMI 27.86 kg/m²       Physical Exam:  no blisters  Residual limb with minimal swelling  Good rom of knee    Intake and Output:  Current Shift:  No intake/output data recorded. Last three shifts:  10/30 1901 - 11/01 0700  In: 0   Out: [employer] [employer] [Urine:1500]    Lab/Data Reviewed: All lab results for the last 24 hours reviewed.     Medications Reviewed          Assessment/Plan     Principal Problem:    Gangrene of left foot ([address] (10/27/2021)    Active Problems:    CAD (coronary artery disease) (5/29/2018)      CKD (chronic kidney disease) stage 3, GFR 30-59 ml/min ([facility]) (5/29/2018)      Chronic atrial fibrillation ([address] (5/22/2021)      PVD (peripheral vascular disease) ([address] (5/22/2021)      Diabetes mellitus with foot ulcer and gangrene ([address] (10/26/2021)      UTI (urinary tract infection) (10/29/2021)        Left bka  Wounds look good  Can start shrinkers

## 2021-11-01 NOTE — PROGRESS NOTES
Problem: Pressure Injury - Risk of  Goal: *Prevention of pressure injury  Description: Document Anam Scale and appropriate interventions in the flowsheet. Outcome: Progressing Towards Goal  Note: Pressure Injury Interventions:  Sensory Interventions: Assess changes in LOC    Moisture Interventions: Absorbent underpads    Activity Interventions: Increase time out of bed, Pressure redistribution bed/mattress(bed type)    Mobility Interventions: Assess need for specialty bed, Pressure redistribution bed/mattress (bed type)    Nutrition Interventions: Document food/fluid/supplement intake    Friction and Shear Interventions: Minimize layers                Problem: Patient Education: Go to Patient Education Activity  Goal: Patient/Family Education  Outcome: Progressing Towards Goal     Problem: Falls - Risk of  Goal: *Absence of Falls  Description: Document Joana Fall Risk and appropriate interventions in the flowsheet.   Outcome: Progressing Towards Goal  Note: Fall Risk Interventions:  Mobility Interventions: PT Consult for assist device competence    Mentation Interventions: Adequate sleep, hydration, pain control    Medication Interventions: Evaluate medications/consider consulting pharmacy    Elimination Interventions: Bed/chair exit alarm, Call light in reach    History of Falls Interventions: Evaluate medications/consider consulting pharmacy

## 2021-11-01 NOTE — PROGRESS NOTES
Assumed care of patient at 1900. Assessment completed and noted. Plan of care reviewed pt verbalized understanding. NAD, call bell within reach, will cont to monitor.     0700 Report given to Wilfredo Lim

## 2021-11-01 NOTE — PROGRESS NOTES
D/C Plan: Rehab    CM spoke with pt's son, Dennise Dakins ([phone]), to discuss transition of care. CM confirmed pt was transferred from [facility] o [address] [place] [address] on 9/9/2021 and was transferred from [facility] to [facility] this admission. CM discussed transition of care options. Family would like pt to return to [facility] if possible. Family would also like to have clinical information sent to [address] has been notified to assist.  CM spoke with [facility] and they are willing to have pt return pending therapy notes. Pt has therapy consults pending.   CM to continue to follow and assist.    Care Management Interventions  Mode of Transport at Discharge: BLS  Transition of Care Consult (CM Consult): Discharge Planning  Health Maintenance Reviewed: Yes  Support Systems: Spouse/Significant Other  The Plan for Transition of Care is Related to the Following Treatment Goals : SNF vs Acute Rehab  Discharge Location  Discharge Placement: Skilled nursing facility (vs acute rehab)

## 2021-11-02 NOTE — PROGRESS NOTES
Problem: Self Care Deficits Care Plan (Adult)  Goal: *Acute Goals and Plan of Care (Insert Text)  Description: Initial Occupational Therapy Goals (11/2/2021) Within 7 day(s):    1. Patient will perform perform grooming seated EOB for 5 minutes for increased independence in ADLs. 2. Patient will perform UB dressing with supervision seated EOB for increased independence with ADLs. 3. Patient will perform LB dressing with CGA & A/E PRN for increased independence with ADLs. 4. Patient will perform all aspects of toileting with min A for increased independence with ADLs. 5. Patient will perform LE ADLs utilizing body mechanics & adaptive strategies with 1 verbal cue for increased safety in ADLs. 6. Patient will independently apply energy conservation techniques with 1 verbal cue(s) for increased independence with ADLs. Outcome: Progressing Towards Goal  OCCUPATIONAL THERAPY EVALUATION    Patient: Royal Burns (73 y.o. 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 Pt found supine in bed, reporting pain 7/10, agreeable to bed level ADLs after education/motivation provided. Pt has KI donned to LLE. Pt completed simple grooming tasks with setup/supervision in long supported sitting on bed. BUE strength generally decreased, AROM WFL, pt is R hand dominant. Pt able to reposition self in bed with CGA mainly for LLE. Pt requires min A for rolling to B sides.  Pt also complete upper body bathing task with setup/SBA, noted to have drainage from [facility] [employer] RN notified. Pt refused attempt to sit up to EOB in preparation for ADLs. Pt left supine in bed, call bell/phone within reach. Education: role of OT and POC, fall prevention    Patient will benefit from skilled intervention to address the above impairments. Patient's rehabilitation potential is considered to be Fair  Factors which may influence rehabilitation potential include:   []             None noted  []             Mental ability/status  [x]             Medical condition  []             Home/family situation and support systems  []             Safety awareness  [x]             Pain tolerance/management  []             Other:      PLAN :  Recommendations and Planned Interventions:   [x]               Self Care Training                  [x]      Therapeutic Activities  [x]               Functional Mobility Training   [x]      Cognitive Retraining  [x]               Therapeutic Exercises           [x]      Endurance Activities  [x]               Balance Training                    [x]      Neuromuscular Re-Education  []               Visual/Perceptual Training     [x]      Home Safety Training  [x]               Patient Education                   [x]      Family Training/Education  []               Other (comment):    Frequency/Duration: Patient will be followed by occupational therapy 1-2 times per day/4-7 days per week to address goals. Discharge Recommendations: Claudio Bonilla  Further Equipment Recommendations for Discharge: To Be Determined (TBD) at next level of care     SUBJECTIVE:   Patient stated I just can't do anything.     OBJECTIVE DATA SUMMARY:     Past Medical History:   Diagnosis Date    A-fib ([facility]     Asthma     CAD (coronary artery disease)     Chronic kidney disease     stage 3    COVID-19     Diabetes (HCC)     GERD (gastroesophageal reflux disease)     Heart failure (HCC)     chronic diastolic heart failure    High cholesterol     Hypertension     Ill-defined condition     chronic osteomyelitis left ankle and foot    Ill-defined condition     chronic arteriosclerosis    PVD (peripheral vascular disease) ([facility]     Stroke ([facility]     cva     Past Surgical History:   Procedure Laterality Date    COLONOSCOPY N/A 6/1/2018    COLONOSCOPY, POLYPECTOMY performed by Shama Bishop MD at [facility] ENDOSCOPY    HX CATARACT REMOVAL      HX ORTHOPAEDIC Left 2021    ankle washout, external fixator, would vac    HX ORTHOPAEDIC      external fixator removed     Barriers to Learning/Limitations: yes;  physical  Compensate with: visual, verbal, tactile, kinesthetic cues/model    Home Situation:   Home Situation  Home Environment: Private residence  # Steps to Enter: 4  Rails to Enter: No  One/Two Story Residence: Two story  # of Interior Steps: 14  Interior Rails: Right  Lift Chair Available: No  Living Alone: No  Support Systems: Spouse/Significant Other  Patient Expects to be Discharged to[de-identified] Long-term care  Current DME Used/Available at Home: Cane, straight, Wheelchair (sliding board)  []  Right hand dominant   []  Left hand dominant    Cognitive/Behavioral Status:  Neurologic State: Alert  Orientation Level: Oriented to person;Oriented to place;Oriented to situation  Cognition: Follows commands  Safety/Judgement: Awareness of environment    Coordination: BUE  Coordination: Within functional limits  Fine Motor Skills-Upper: Left Intact; Right Intact    Gross Motor Skills-Upper: Left Intact; Right Intact    Strength: BUE  Strength: Generally decreased, functional    Tone & Sensation: BUE  Tone: Normal  Sensation: Impaired    Range of Motion: BUE  AROM: Generally decreased, functional    Functional Mobility and Transfers for ADLs:  Bed Mobility:  Rolling: Minimum assistance  Scooting: Contact guard assistance (repositiong in bed)    ADL Assessment:   Feeding: Setup;Modified independent  Oral Facial Hygiene/Grooming: Contact guard assistance (seated)  Bathing: Maximum assistance  Upper Body Dressing: Minimum assistance  Lower Body Dressing: Total assistance  Toileting: Total assistance    ADL Intervention:  Grooming  Grooming Assistance: Set-up; Supervision  Position Performed: Long sitting on bed  Washing Face: Supervision  Washing Hands: Supervision    Upper Body Bathing  Bathing Assistance: Set-up; Stand-by assistance  Position Performed: Long sitting on bed    Cognitive Retraining  Safety/Judgement: Awareness of environment    Pain:  Pain level pre-treatment: 7/10   Pain level post-treatment: 7/10   Pain Intervention(s): Medication provided by Nursing (see MAR); Rest, Ice, Repositioning   Response to intervention: Nurse notified, See doc flow sheet    Activity Tolerance:   Poor. Patient requires intermittent rest breaks. Patient limited by strength, ROM, pain, endurance. Patient unsteady. Please refer to the flowsheet for vital signs taken during this treatment. After treatment:   [] Patient left in no apparent distress sitting up in chair  [x] Patient left in no apparent distress in bed  [x] Call bell left within reach  [x] Nursing notified  [] Caregiver present  [] Bed alarm activated    COMMUNICATION/EDUCATION:   [x] Role of Occupational Therapy in the acute care setting  [x] Home safety education was provided and the patient/caregiver indicated understanding. [x] Patient/family have participated as able in goal setting and plan of care. [x] Patient/family agree to work toward stated goals and plan of care. [] Patient understands intent and goals of therapy, but is neutral about his/her participation. [] Patient is unable to participate in goal setting and plan of care. Thank you for this referral.  Melisa Chery OTR/L  Time Calculation: 15 mins    Eval Complexity: History: HIGH Complexity : Extensive review of history including physical, cognitive and psychosocial history ;    Examination: HIGH Complexity : 5 or more performance deficits relating to physical, cognitive , or psychosocial skils that result in activity limitations and / or participation restrictions; Decision Making:HIGH Complexity : Patient presents with comorbidities that affect occupational performance.  Signifigant modification of tasks or assistance (eg, physical or verbal) with assessment (s) is necessary to enable patient to complete evaluation

## 2021-11-02 NOTE — PROGRESS NOTES
Patient required Morphine 2 mg IV x one overnight for pain to left  BKA rating at 7/10, which allowed patient to sleep. Ace wrap and knee immobilizer in place. Vital signs stable, afebrile.    0726 - Bedside and Verbal shift change report given to VALERIA Lemos (oncoming nurse) by Carlton Toussaint (offgoing nurse). Report included the following information SBAR, Kardex, Intake/Output and MAR.

## 2021-11-02 NOTE — PROGRESS NOTES
Hospitalist Progress Note    Patient: Vladislav Glynn MRN: [id number]  CSN: [id number]    YOB: 1945  Age: 78 y.o. Sex: male    DOA: 10/26/2021 LOS:  LOS: 7 days                Assessment/Plan     Patient Active Problem List   Diagnosis Code    Atrial flutter (HCC) I48.92    DM2 (diabetes mellitus, type 2) ([address] E11.9    CAD (coronary artery disease) I25.10    ELINOR (acute kidney injury) ([address] N17.9    CKD (chronic kidney disease) stage 3, GFR 30-59 ml/min ([facility]) N18.30    Elevated brain natriuretic peptide (BNP) level R79.89    Diabetic ulcer of left foot ([facility]) E11.621, L97.529    COVID-19 virus infection U07.1    Acute osteomyelitis ([address] M86.10    Acute hematogenous osteomyelitis of left foot ([address] M86.072    Cellulitis of left foot L03. 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 male  with PMHX of hypertension, hyperlipidemia, stroke, PAD, A. fib, on Plavix who presents to the emergency department C/O low hemoglobin and worsening left foot gangrene.     Severe peripheral vascular disease with worsening gangrenous left foot -  Seen by ortho    s/p left BKA.     Diabetes -  ADA diet, SSI, fingerstick blood glucose before every meal nightly     History of atrial flutter -  On betablocker, rate controlled     Anemia-  Transfuse to keep Hb >7  Monitor H/H     Chronic kidney disease-  Monitor renal function.     DVT/GI prophylaxis    Disposition : will transfuse one more unit today, to get Hb above 7    Review of systems  General: No fevers or chills. Cardiovascular: No chest pain or pressure. No palpitations. Pulmonary: No shortness of breath. Gastrointestinal: No nausea, vomiting. Physical Exam:  General: Awake, cooperative, no acute distress    HEENT: NC, Atraumatic. PERRLA, anicteric sclerae. Lungs: CTA Bilaterally. No Wheezing/Rhonchi/Rales. Heart:  S1 S2,  No murmur, No Rubs, No Gallops  Abdomen: Soft, Non distended, Non tender.  +Bowel sounds,   Extremities: Severe necrotic left foot. Psych:   Not anxious or agitated. Neurologic:  No acute neurological deficit. Vital signs/Intake and Output:  Visit Vitals  /72 (BP 1 Location: Left upper arm, BP Patient Position: At rest)   Pulse 75   Temp 98.2 °F (36.8 °C)   Resp 15   Ht 5' 8\" (1.727 m)   Wt 81.1 kg (178 lb 14.4 oz)   SpO2 95%   BMI 27.20 kg/m²     Current Shift:  No intake/output data recorded. Last three shifts:  10/31 1901 - 11/02 0700  In: 451.8 [P.O.:193]  Out: 1728 [Urine:1725]            Labs: Results:       Chemistry No results for input(s): GLU, NA, K, CL, CO2, BUN, CREA, CA, AGAP, BUCR, TBIL, AP, TP, ALB, GLOB, AGRAT in the last 72 hours. No lab exists for component: GPT   CBC w/Diff Recent Labs     11/02/21  1115 11/01/21  0515   WBC  --  8.2   RBC  --  2.95*   HGB 6.7* 6.1*   HCT 22.9* 21.5*   PLT  --  449*      Cardiac Enzymes No results for input(s): CPK, CKND1, LEONEL in the last 72 hours. No lab exists for component: CKRMB, TROIP   Coagulation No results for input(s): PTP, INR, APTT, INREXT, INREXT in the last 72 hours. Lipid Panel No results found for: CHOL, CHOLPOCT, CHOLX, CHLST, CHOLV, 617941, HDL, HDLP, LDL, LDLC, DLDLP, 725825, VLDLC, VLDL, TGLX, TRIGL, TRIGP, TGLPOCT, CHHD, CHHDX   BNP No results for input(s): BNPP in the last 72 hours.    Liver Enzymes No results for input(s): TP, ALB, TBIL, AP in the last 72 hours.     No lab exists for component: SGOT, GPT, DBIL   Thyroid Studies Lab Results   Component Value Date/Time    TSH 1.72 02/11/2018 02:19 AM        Procedures/imaging: see electronic medical records for all procedures/Xrays and details which were not copied into this note but were reviewed prior to creation of Plan

## 2021-11-02 NOTE — PROGRESS NOTES
20:40 Assessment completed. Lung are clear bilat but are slightly decreased in the bases. Ace wrap on LLE remains C/D/I with immobilizer. Resting quietly in bed with TV on.    23:15 Bedside and Verbal shift change report given to [facility] (oncoming nurse) by Darrell Arenas RN (offgoing nurse). Report included the following information SBAR.

## 2021-11-02 NOTE — PROGRESS NOTES
Comprehensive Nutrition Assessment    Type and Reason for Visit: Initial (LOS)    Nutrition Recommendations/Plan: Supplement: will order Glucerna BID    Nutrition Assessment:  PMHx: HTN, hyperlipidemia, stroke, a fib, T2DM, CKD. Patient admitted with worsening foot gangrene- s/p left BKA 10/28/21. Estimated Daily Nutrient Needs:  Energy (kcal): 9508-6366; Weight Used for Energy Requirements: Adjusted (Adj IBW of 66kg)  Protein (g): 66-79; Weight Used for Protein Requirements: Adjusted (Adj IBW of 66kg; 1-1.2g/kg)  Fluid (ml/day): 3183-8653; Method Used for Fluid Requirements: 1 ml/kcal    Nutrition Related Findings:  Labs and meds reviewed- flomax, florastor, mvi, toprol xl, lactated ringers, humalog, ferrous sulfate, lipitor. +BM 11/1. Spoke with patient this AM. Breakfast tray was at bedside table- about 25-50% PO. Stated PO about 50% however, flowsheet documented 0-25% on most days. Patient reported being tired of the same hospital food. Offered Glucerna to patient- was agreeable to trying. Will order Glucerna, Chocolate, BID and follow up with patient while admitted      Wounds:    Surgical incision (redness, excoriation)       Current Nutrition Therapies:  ADULT DIET Regular; 4 carb choices (60 gm/meal)    Anthropometric Measures:  · Height:  5' 8\" (172.7 cm)  · Current Body Wt:  81.1 kg (178 lb 12.7 oz) (11/1/21)   · Admission Body Wt:  170 lb (MD note 10/27; suspect prior to BKA)    · Usual Body Wt:  80.7 kg (178 lb) (5/25/2021; prior to BKA during this admission)     · Ideal Body Wt:  154 lbs:  116.1 %   · Adjusted Body Weight:  189.3; Weight Adjustment for: Amputation   · Adjusted BMI:  28.8    · BMI Category: Overweight (BMI 25.0-29. 9)       Nutrition Diagnosis:   · Inadequate energy intake related to lack or limited access to food as evidenced by intake 0-25% (States tired of  the same hospital food)    Nutrition Interventions:   Food and/or Nutrient Delivery: Continue current diet, Start oral nutrition supplement  Nutrition Education and Counseling: No recommendations at this time  Coordination of Nutrition Care: Continue to monitor while inpatient    Goals:  Start Glucerna BID within the next 24-48 hours. PO intake >50% of most meals and supplements throughout the next 3-4 days.        Nutrition Monitoring and Evaluation:   Behavioral-Environmental Outcomes: None identified  Food/Nutrient Intake Outcomes: Food and nutrient intake, Supplement intake  Physical Signs/Symptoms Outcomes: Biochemical data, Meal time behavior, Skin, Weight, GI status, Nausea/vomiting    Discharge Planning:    Continue current diet     Electronically signed by Amalia Tomlinson RD on 11/2/2021 at 9:53 AM

## 2021-11-02 NOTE — PROGRESS NOTES
D/C Plan: [facility] 11/3/2021    CM spoke with provider and have been notified pt will return to [facility]. CM spoke with pt's son, Holly De La Rosa ([phone]), to provide an update on the above. Pt's son is in agreement and will notify his mother of plan transfer tomorrow. CM has requested a 2:00pm transport tomorrow. Transition of Care to SNF: [facility]  Communication to Patient/Family:  Met with patient and family, and they are agreeable to the transition plan. The Plan for Transition of Care is related to the following treatment goals: SNF    The Patient and/or patient representative  was provided with a choice of provider and agrees   with the discharge plan. Yes [x] No []    Freedom of choice list was provided with basic dialogue that supports the patient's individualized plan of care/goals and shares the quality data associated with the providers. Yes [x] No []    SNF/Rehab Transition:  Patient has been accepted to [facility] at [address] [place] [address]. and meets criteria for admission. Patient will transported by medical transport and expected to leave tomorrow (11/3/2021) at  2:00pm.    Communication to SNF/Rehab:  Bedside RN, has been notified to update the transition plan to the facility and call report [phone]    Discharge information has been updated on the AVS and sent via [employer] and or [facility] Link. Discharge instructions to be fax'd to facility at [phone]     Please include all hard scripts for controlled substances, med rec , AVS, and dc summary in packet. Please medicate for pain prior to dc if possible and needed to help offset delay when patient first arrives to facility.     Reviewed and confirmed with facility, [facility] can manage the patient care needs for the following:     Mary  with (X) only those applicable:  Medication:  []Medications are available at the facility  []IV Antibiotics []Controlled Substance - hard copies available sent. []Weekly Labs    Equipment:  []CPAP/BiPAP  []Wound Vacuum  []Mccray or Urinary Device  []PICC/Central Line  []Nebulizer  []Ventilator    Treatment:  []Isolation (for MRSA, VRE, etc.)  []Surgical Drain Management  []Tracheostomy Care  []Dressing Changes  []Dialysis with transportation  []PEG Care  []Oxygen  []Daily Weights for Heart Failure    Dietary:  []Any diet limitations  []Tube Feedings   []Total Parenteral Management (TPN)    Financial Resources:  []Medicaid Application Completed    []UAI Completed and copy given to pt/family    []A screening has previously been completed. []Level II Completed    [] Private pay individual who will not become   financially eligible for Medicaid within 6 months from admission to a [address] [place] [address]. [] Individual refused to have screening conducted. []Medicaid Application Completed    []The screening denied because it was determined individual did not need/did not qualify for nursing facility level of care. [] Out of state residents seeking direct admission to a [address] facility. [] Individuals who are inpatients of an out of state hospital, or in state or out of state veterans/ hospital and seek direct admission to a [address] facility  [] Individuals who are pateints or residents of a state owned/operated facility that is licensed by [employer] of [employer] ([employer]) and seek direct admission to [address] [place] [address]  [] A screening not required for enrollment in [address] [address] [address] services as set out in [address] [place] [address] 11-  [] [facility]) staff shall perform screenings of the [facility] clients. Advanced Care Plan:  []Surrogate Decision Maker of Care  []POA  []Communicated Code Status and copy sent.     Other:         Care Management Interventions  Mode of Transport at Discharge: BLS  Transition of Care Consult (CM Consult): Discharge Planning  Health Maintenance Reviewed: Yes  Support Systems: Spouse/Significant Other  The Plan for Transition of Care is Related to the Following Treatment Goals : SNF vs Acute Rehab  Discharge Location  Discharge Placement: Skilled nursing facility (vs acute rehab)

## 2021-11-02 NOTE — PROGRESS NOTES
Bedside and verbal shift change report recieved from [facility]  (offgoing nurse) given to Pham Bazan RN (oncoming nurse). Report included the following information SBAR, Kardex, Intake/Output, MAR and Recent Results       1208- Rapid COVID test  Completed, sent to lab awaiting results at this time. [address] with lab advised this nurse waiting on response from [address] in regards to 1 unit PRBC's. Advised this nurse waiting for email response at this time about obtaining blood product; however if sends blood product could receive within the next couple hours. Bedside and verbal shift change report given to VALERIA Callahan (oncoming nurse) by Lidia Coello RN (offgoing nurse).  Report included the following information SBAR, Kardex, Intake/Output, MAR and Recent Results

## 2021-11-03 NOTE — PROGRESS NOTES
8350  Bedside shift change report [facility] (oncoming nurse) by Isauro Medina (offgoing nurse). Report included the following information SBAR, Kardex, Intake/Output and MAR.     6566  Spoke to Dr. Keila Avendaño. Patient unable to get roxicodone until 1243. Pain 8/10. Verbal order with readback: gabapetin 300 mg TID starting now. 1130  Patient states the gabapentin helped a bit 6/10. States pain is not constant and comes and goes. 1500  Per patient, caal is chronic. He had it placed 8 months ago.

## 2021-11-03 NOTE — PROGRESS NOTES
1420  TRANSFER - OUT REPORT:    Verbal report given to Karma Whyte (name) on [facility].  being transferred to [facility] (unit) for routine progression of care. Report consisted of patients Situation, Background, Assessment and   Recommendations(SBAR). Information from the following report(s) SBAR, Kardex, Intake/Output and MAR was reviewed with the receiving nurse. Opportunity for questions and clarification was provided.       Patient transported with:   Holger Black

## 2021-11-03 NOTE — DISCHARGE SUMMARY
Discharge Summary    Patient: Christina Kowalski MRN: [id number]  CSN: [id number]    YOB: 1945  Age: 78 y.o.   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 ([facility] ICD-10-CM: E11.621, L97.509, E11.52  ICD-9-CM: 250.70, 250.80, 785.4, 707.15  10/26/2021 - Present        S/P foot surgery, left ICD-10-CM: Z98.890  ICD-9-CM: V45.89  9/7/2021 - Present        Chronic atrial fibrillation ([facility] ICD-10-CM: I48.20  ICD-9-CM: 427.31  5/22/2021 - Present        PVD (peripheral vascular disease) ([facility] ICD-10-CM: I73.9  ICD-9-CM: 443.9  5/22/2021 - Present        Osteomyelitis ([facility] ICD-10-CM: M86.9  ICD-9-CM: 730.20  5/4/2021 - Present        Trimalleolar fracture of ankle, closed, left, initial encounter ICD-10-CM: Z64.491T  ICD-9-CM: 824.6  5/4/2021 - Present        Displacement of peripherally inserted central catheter (PICC) ([facility] ICD-10-CM: Q50.251A  ICD-9-CM: 996.1  5/4/2021 - Present        Ulcer of left heel, with necrosis of bone ([facility] ICD-10-CM: L97.424  ICD-9-CM: 707.14, 730.17  4/21/2021 - Present        Acute hematogenous osteomyelitis of left foot ([facility] ICD-10-CM: K96.908  ICD-9-CM: 730.07  4/17/2021 - Present        Cellulitis of left foot ICD-10-CM: L03.116  ICD-9-CM: 682.7  4/17/2021 - Present        Diabetic foot ulcer with osteomyelitis ([facility] ICD-10-CM: E11.621, E11.69, L97.509, M86.9  ICD-9-CM: 250.80, 707.15, 730.27, 731.8  4/17/2021 - Present        Acute osteomyelitis [facility]) ICD-10-CM: M86.10  ICD-9-CM: 730.00  4/15/2021 - Present        Diabetic ulcer of left foot ([place] [address] ICD-10-CM: E11.621, G20.002  ICD-9-CM: 250.80, 707.15  4/14/2021 - Present        COVID-19 virus infection ICD-10-CM: U07.1  ICD-9-CM: 079.89  4/14/2021 - Present        CAD (coronary artery disease) ICD-10-CM: I25.10  ICD-9-CM: 414.00  5/29/2018 - Present        ELINOR (acute kidney injury) ([place] [address] ICD-10-CM: N17.9  ICD-9-CM: 584.9  5/29/2018 - Present        CKD (chronic kidney disease) stage 3, GFR 30-59 ml/min ([facility]) ICD-10-CM: N18.30  ICD-9-CM: 585.3  5/29/2018 - Present        Elevated brain natriuretic peptide (BNP) level ICD-10-CM: R79.89  ICD-9-CM: 790.99  5/29/2018 - Present        Atrial flutter (HCC) ICD-10-CM: U02.12  ICD-9-CM: 427.32  2/12/2018 - Present        DM2 (diabetes mellitus, type 2) ([place] [address] ICD-10-CM: E11.9  ICD-9-CM: 250.00  2/12/2018 - Present        RESOLVED: Diabetic gangrene ([place] [address] ICD-10-CM: E11.52  ICD-9-CM: 250.70, 785.4  4/17/2021 - 4/17/2021        RESOLVED: Acute renal insufficiency ICD-10-CM: N28.9  ICD-9-CM: 593.9  2/12/2018 - 4/22/2021        RESOLVED: Tachycardia ICD-10-CM: R00.0  ICD-9-CM: 785.0  2/10/2018 - 2/13/2018              Discharge Medications:     Current Discharge Medication List      START taking these medications    Details   insulin lispro (HUMALOG) 100 unit/mL injection INITIATE INSULIN CORRECTIVE PROTOCOL: Normal Insulin Sensitivity   For Blood Sugar (mg/dL) of:     Less than 150 =   0 units           150 -199 =   2 units  200 -249 =   4 units  250 -299 =   6 units  300 -349 =   8 units  350 and above = 10 units and Call Physician  If 2 glucose readings are above 200 mg/dL within a 24 hr period, proceed to \"Insulin Resistant\" dosing. Initiate Hypoglycemia protocol if blood glucose is <70 mg/dL Fast Acting - Administer Immediately - or within 15 minutes of start of meal, if mealtime coverage.   Qty: 1 Each, Refills: 0  Start date: 11/3/2021 gabapentin (NEURONTIN) 300 mg capsule Take 1 Capsule by mouth three (3) times daily. Max Daily Amount: 900 mg. Qty: 30 Capsule, Refills: 0  Start date: 11/3/2021    Associated Diagnoses: S/P foot surgery, left      oxyCODONE IR (ROXICODONE) 10 mg tab immediate release tablet Take 1 Tablet by mouth every six (6) hours as needed for Pain for up to 3 days. Max Daily Amount: 40 mg.  Qty: 12 Tablet, Refills: 0  Start date: 11/3/2021, End date: 11/6/2021    Associated Diagnoses: S/P foot surgery, left         CONTINUE these medications which have NOT CHANGED    Details   clopidogreL (Plavix) 75 mg tab Take  by mouth daily. Indications: afib      B.infantis-B.ani-B.long-B.bifi (Probiotic 4X) 10-15 mg TbEC Take  by mouth daily. multivitamin (ONE A DAY) tablet Take 1 Tablet by mouth daily. acetaminophen (TylenoL) 325 mg tablet Take 650 mg by mouth every four (4) hours as needed for Pain.      tamsulosin (FLOMAX) 0.4 mg capsule Take 2 Capsules by mouth daily. Qty: 30 Capsule, Refills: 0      atorvastatin (LIPITOR) 40 mg tablet Take 1 Tab by mouth nightly. Qty: 30 Tab, Refills: 0      metoprolol succinate (TOPROL-XL) 50 mg XL tablet Take 1 Tab by mouth daily.   Qty: 30 Tab, Refills: 0         STOP taking these medications       oxyCODONE-acetaminophen (Percocet) 5-325 mg per tablet Comments:   Reason for Stopping:         ondansetron (ZOFRAN ODT) 4 mg disintegrating tablet Comments:   Reason for Stopping:         insulin glargine (LANTUS,BASAGLAR) 100 unit/mL (3 mL) inpn Comments:   Reason for Stopping:         nut.tx.glucose intolerance,soy (BOOST DIABETIC PO) Comments:   Reason for Stopping:         hydrALAZINE (APRESOLINE) 25 mg tablet Comments:   Reason for Stopping:         simethicone (Gas Relief, simethicone,) 80 mg chewable tablet Comments:   Reason for Stopping:         insulin lispro (HUMALOG KWIKPEN INSULIN SC) Comments:   Reason for Stopping:         senna/docusate sodium (SENNA-C PLUS PO) Comments: Reason for Stopping:         isosorbide mononitrate ER (IMDUR) 30 mg tablet Comments:   Reason for Stopping:         OTHER Comments:   Reason for Stopping:         OTHER Comments:   Reason for Stopping:         miconazole (MICOTIN) 2 % topical cream Comments:   Reason for Stopping:         magnesium hydroxide (MILK OF MAGNESIA) 400 mg/5 mL suspension Comments:   Reason for Stopping:         bisacodyL (Dulcolax, bisacodyl,) 10 mg supp Comments:   Reason for Stopping:         ferrous sulfate 325 mg (65 mg iron) tablet Comments:   Reason for Stopping:         spironolactone (ALDACTONE) 25 mg tablet Comments:   Reason for Stopping:               Discharge Condition: Good    Procedures : left BKA    Consults: Orthopedics      PHYSICAL EXAM   Visit Vitals  BP (!) 150/59 (BP 1 Location: Left upper arm, BP Patient Position: At rest)   Pulse 62   Temp 98.3 °F (36.8 °C)   Resp 17   Ht 5' 8\" (1.727 m)   Wt 81.6 kg (180 lb)   SpO2 94%   BMI 27.37 kg/m²     General: Awake, cooperative, no acute distress    HEENT: NC, Atraumatic. PERRLA, EOMI. Anicteric sclerae. Lungs:  CTA Bilaterally. No Wheezing/Rhonchi/Rales. Heart:  Regular  rhythm,  No murmur, No Rubs, No Gallops  Abdomen: Soft, Non distended, Non tender. +Bowel sounds,   Extremities: No c/c/e  Psych:   Not anxious or agitated. Neurologic:  No acute neurological deficits. Admission HPI :   Getachew Huston. is a 78 y.o. male  with PMHX of hypertension, hyperlipidemia, stroke, PAD, A. fib, on Plavix who presents to the emergency department C/O low hemoglobin and worsening left foot gangrene. Patient initially had a nonhealing left diabetic foot wound, ankle fracture and subsequent gangrene status post exfix of left ankle fracture with removal of hardware last month.  Patient has been at [facility] rehab facility awaiting cardiac clearance for planned left BKA.  However he had outpatient labs with hemoglobin of 6.6 and worsening gangrene of the left foot to where \"it is about to fall off\" per nursing home staff.  Patient's orthopedist is Dr. Anne Munguia takes Percocet for pain, took 2 Percocet just prior to arrival.. Pt denies fever, chills, nightsweats, nausea, vomiting, SOB or chest pain. Reports pain in left leg and asking for pain medicine. Hospital Course :   Mr. Paula Hoang was admitted to medical floor. He was seen and followed by orthopedics. He s/p BKA, jolie and post op period unremarkable. Severe peripheral vascular disease with worsening gangrenous left foot -  Seen by ortho    s/p left BKA.     Diabetes -  ADA diet, SSI, fingerstick blood glucose before every meal nightly. Have not started on basal insulin. His blood sugars are reasonable with diabetic diet. Recommend gradually starting on basal insulin depending on blood sugars.     History of atrial flutter -  On betablocker, rate controlled     Anemia-  Acute blood loss  Received blood transfusion,   H/H now improved.      Chronic kidney disease-  Monitored renal function. Creatine in normal range    Activity: Activity as tolerated    Diet: Diabetic Diet    Follow-up: PCP, orthopedics    Disposition: Rehab    Minutes spent on discharge: 45       Labs: Results:       Chemistry Recent Labs     11/03/21  0205   *      K 3.8      CO2 28   BUN 14   CREA 0.88   CA 7.6*   AGAP 4   BUCR 16      CBC w/Diff Recent Labs     11/03/21  0205 11/02/21  2300 11/02/21  1115 11/01/21  0515 11/01/21  0515   WBC 7.1  --   --   --  8.2   RBC 3.59*  --   --   --  2.95*   HGB 8.1* 8.0* 6.7*   < > 6.1*   HCT 27.2* 27.4* 22.9*   < > 21.5*   *  --   --   --  449*    < > = values in this interval not displayed. Cardiac Enzymes No results for input(s): CPK, CKND1, LEONEL in the last 72 hours. No lab exists for component: CKRMB, TROIP   Coagulation No results for input(s): PTP, INR, APTT, INREXT in the last 72 hours.     Lipid Panel No results found for: CHOL, CHOLPOCT, CHOLX, CHLST, CHOLV, [id number], HDL, HDLP, LDL, LDLC, DLDLP, 409817, VLDLC, VLDL, TGLX, TRIGL, TRIGP, TGLPOCT, CHHD, CHHDX   BNP No results for input(s): BNPP in the last 72 hours. Liver Enzymes No results for input(s): TP, ALB, TBIL, AP in the last 72 hours. No lab exists for component: SGOT, GPT, DBIL   Thyroid Studies Lab Results   Component Value Date/Time    TSH 1.72 02/11/2018 02:19 AM            Significant Diagnostic Studies: XR CHEST PORT    Result Date: 10/26/2021  EXAM: CHEST RADIOGRAPH, SINGLE VIEW CLINICAL INDICATION/HISTORY: meets SIRS criteria COMPARISON: Single view chest 9/7/2021 TECHNIQUE: Portable frontal view of the chest was obtained. _______________ FINDINGS: SUPPORT DEVICES: None. HEART AND MEDIASTINUM: Cardiomediastinal silhouette appears within normal limits. Normal caliber thoracic aorta. No central vascular congestion. LUNGS AND PLEURAL SPACES: Patient is rotated to the right. Patient is also lordotically positioned. No definite focal infiltrate or effusion. No pneumothorax. BONY THORAX AND SOFT TISSUES: No acute osseous abnormality. _______________     No active cardiopulmonary disease. **   **        No results found for this or any previous visit. Please note that this dictation was completed with iTMan, the computer voice recognition software. Quite often unanticipated grammatical, syntax, homophones, and other interpretive errors are inadvertently transcribed by the computer software. Please disregard these errors. Please excuse any errors that have escaped final proofreading.      CC: Noreen Levi MD

## 2021-11-03 NOTE — PROGRESS NOTES
Problem: Pressure Injury - Risk of  Goal: *Prevention of pressure injury  Description: Document Anam Scale and appropriate interventions in the flowsheet. Outcome: Progressing Towards Goal  Note: Pressure Injury Interventions:  Sensory Interventions: Assess changes in LOC    Moisture Interventions: Absorbent underpads    Activity Interventions: Increase time out of bed    Mobility Interventions: Pressure redistribution bed/mattress (bed type)    Nutrition Interventions: Offer support with meals,snacks and hydration    Friction and Shear Interventions: Apply protective barrier, creams and emollients                Problem: Patient Education: Go to Patient Education Activity  Goal: Patient/Family Education  Outcome: Progressing Towards Goal     Problem: Falls - Risk of  Goal: *Absence of Falls  Description: Document Joana Fall Risk and appropriate interventions in the flowsheet.   Outcome: Progressing Towards Goal  Note: Fall Risk Interventions:  Mobility Interventions: Communicate number of staff needed for ambulation/transfer, PT Consult for mobility concerns, Patient to call before getting OOB    Mentation Interventions: Adequate sleep, hydration, pain control, Evaluate medications/consider consulting pharmacy, Toileting rounds    Medication Interventions: Evaluate medications/consider consulting pharmacy, Patient to call before getting OOB    Elimination Interventions: Call light in reach, Toileting schedule/hourly rounds    History of Falls Interventions: Door open when patient unattended, Evaluate medications/consider consulting pharmacy         Problem: Patient Education: Go to Patient Education Activity  Goal: Patient/Family Education  Outcome: Progressing Towards Goal     Problem: Patient Education: Go to Patient Education Activity  Goal: Patient/Family Education  Outcome: Progressing Towards Goal     Problem: Patient Education: Go to Patient Education Activity  Goal: Patient/Family Education  Outcome: Progressing Towards Goal

## 2021-11-03 NOTE — PROGRESS NOTES
Progress Note      Patient: Myra Lenz. Sex: male          DOA: 10/26/2021         YOB: 1945      Age:  78 y.o.        LOS:  LOS: 8 days               Subjective:     complianing of leg pain  Has not bee up much  No nausea  Some burning    Objective:      Visit Vitals  BP (!) 175/60 (BP 1 Location: Left upper arm, BP Patient Position: At rest)   Pulse 70   Temp 97.9 °F (36.6 °C)   Resp 16   Ht 5' 8\" (1.727 m)   Wt 81.6 kg (180 lb)   SpO2 93%   BMI 27.37 kg/m²       Physical Exam:  Leg doing well, can do SLR, knee rom goot    Intake and Output:  Current Shift:  No intake/output data recorded. Last three shifts:  11/01 1901 - 11/03 0700  In: 870 [P.O.:390]  Out: 1800 [Urine:1800]    Lab/Data Reviewed: All lab results for the last 24 hours reviewed. 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  Gabapentin for nerve pain

## 2022-01-01 ENCOUNTER — APPOINTMENT (OUTPATIENT)
Dept: GENERAL RADIOLOGY | Age: 77
DRG: 853 | End: 2022-01-01
Attending: EMERGENCY MEDICINE
Payer: MEDICARE

## 2022-01-01 ENCOUNTER — APPOINTMENT (OUTPATIENT)
Dept: CT IMAGING | Age: 77
DRG: 853 | End: 2022-01-01
Attending: EMERGENCY MEDICINE
Payer: MEDICARE

## 2022-01-01 ENCOUNTER — APPOINTMENT (OUTPATIENT)
Dept: GENERAL RADIOLOGY | Age: 77
DRG: 853 | End: 2022-01-01
Attending: INTERNAL MEDICINE
Payer: MEDICARE

## 2022-01-01 ENCOUNTER — APPOINTMENT (OUTPATIENT)
Dept: GENERAL RADIOLOGY | Age: 77
End: 2022-01-01
Attending: EMERGENCY MEDICINE
Payer: MEDICARE

## 2022-01-01 ENCOUNTER — APPOINTMENT (OUTPATIENT)
Dept: GENERAL RADIOLOGY | Age: 77
DRG: 853 | End: 2022-01-01
Attending: PHYSICIAN ASSISTANT
Payer: MEDICARE

## 2022-01-01 ENCOUNTER — HOSPITAL ENCOUNTER (EMERGENCY)
Age: 77
Discharge: HOME | End: 2022-06-18
Attending: EMERGENCY MEDICINE
Payer: MEDICARE

## 2022-01-01 ENCOUNTER — HOSPICE ADMISSION (OUTPATIENT)
Dept: HOSPICE | Facility: HOSPICE | Age: 77
End: 2022-01-01

## 2022-01-01 ENCOUNTER — ANESTHESIA EVENT (OUTPATIENT)
Dept: SURGERY | Age: 77
DRG: 853 | End: 2022-01-01
Payer: MEDICARE

## 2022-01-01 ENCOUNTER — APPOINTMENT (OUTPATIENT)
Dept: NON INVASIVE DIAGNOSTICS | Age: 77
DRG: 853 | End: 2022-01-01
Attending: INTERNAL MEDICINE
Payer: MEDICARE

## 2022-01-01 ENCOUNTER — APPOINTMENT (OUTPATIENT)
Dept: CT IMAGING | Age: 77
DRG: 853 | End: 2022-01-01
Attending: FAMILY MEDICINE
Payer: MEDICARE

## 2022-01-01 ENCOUNTER — APPOINTMENT (OUTPATIENT)
Dept: VASCULAR SURGERY | Age: 77
DRG: 853 | End: 2022-01-01
Attending: ORTHOPAEDIC SURGERY
Payer: MEDICARE

## 2022-01-01 ENCOUNTER — APPOINTMENT (OUTPATIENT)
Dept: MRI IMAGING | Age: 77
DRG: 853 | End: 2022-01-01
Attending: INTERNAL MEDICINE
Payer: MEDICARE

## 2022-01-01 ENCOUNTER — HOSPITAL ENCOUNTER (INPATIENT)
Age: 77
LOS: 24 days | DRG: 853 | End: 2022-09-20
Attending: EMERGENCY MEDICINE | Admitting: FAMILY MEDICINE
Payer: MEDICARE

## 2022-01-01 ENCOUNTER — HOSPITAL ENCOUNTER (INPATIENT)
Dept: ULTRASOUND IMAGING | Age: 77
Discharge: HOME | DRG: 853 | End: 2022-09-02
Attending: INTERNAL MEDICINE
Payer: MEDICARE

## 2022-01-01 ENCOUNTER — APPOINTMENT (OUTPATIENT)
Dept: GENERAL RADIOLOGY | Age: 77
DRG: 853 | End: 2022-01-01
Attending: HOSPITALIST
Payer: MEDICARE

## 2022-01-01 ENCOUNTER — ANESTHESIA (OUTPATIENT)
Dept: SURGERY | Age: 77
DRG: 853 | End: 2022-01-01
Payer: MEDICARE

## 2022-01-01 VITALS
TEMPERATURE: 101.9 F | RESPIRATION RATE: 18 BRPM | DIASTOLIC BLOOD PRESSURE: 57 MMHG | HEIGHT: 68 IN | BODY MASS INDEX: 20.43 KG/M2 | WEIGHT: 134.8 LBS | HEART RATE: 114 BPM | OXYGEN SATURATION: 97 % | SYSTOLIC BLOOD PRESSURE: 157 MMHG

## 2022-01-01 VITALS
SYSTOLIC BLOOD PRESSURE: 136 MMHG | RESPIRATION RATE: 21 BRPM | OXYGEN SATURATION: 95 % | DIASTOLIC BLOOD PRESSURE: 56 MMHG | TEMPERATURE: 97.8 F | HEART RATE: 95 BPM

## 2022-01-01 DIAGNOSIS — T83.511A URINARY TRACT INFECTION ASSOCIATED WITH INDWELLING URETHRAL CATHETER, INITIAL ENCOUNTER (HCC): Primary | ICD-10-CM

## 2022-01-01 DIAGNOSIS — I95.9 ACUTE HYPOTENSION: Primary | ICD-10-CM

## 2022-01-01 DIAGNOSIS — N39.0 URINARY TRACT INFECTION ASSOCIATED WITH INDWELLING URETHRAL CATHETER, INITIAL ENCOUNTER (HCC): Primary | ICD-10-CM

## 2022-01-01 DIAGNOSIS — T83.091A OBSTRUCTION OF FOLEY CATHETER, INITIAL ENCOUNTER (HCC): ICD-10-CM

## 2022-01-01 DIAGNOSIS — J18.9 COMMUNITY ACQUIRED PNEUMONIA OF RIGHT LOWER LOBE OF LUNG: ICD-10-CM

## 2022-01-01 LAB
ABO + RH BLD: NORMAL
ABO + RH BLD: NORMAL
ALBUMIN FLD-MCNC: 1.4 G/DL
ALBUMIN SERPL-MCNC: 1.7 G/DL (ref 3.4–5)
ALBUMIN SERPL-MCNC: 1.9 G/DL (ref 3.4–5)
ALBUMIN SERPL-MCNC: 1.9 G/DL (ref 3.4–5)
ALBUMIN SERPL-MCNC: 2 G/DL (ref 3.4–5)
ALBUMIN SERPL-MCNC: 2.1 G/DL (ref 3.4–5)
ALBUMIN SERPL-MCNC: 2.1 G/DL (ref 3.4–5)
ALBUMIN SERPL-MCNC: 2.8 G/DL (ref 3.4–5)
ALBUMIN SERPL-MCNC: 3.1 G/DL (ref 3.4–5)
ALBUMIN SERPL-MCNC: 3.1 G/DL (ref 3.4–5)
ALBUMIN SERPL-MCNC: 3.2 G/DL (ref 3.4–5)
ALBUMIN SERPL-MCNC: 3.4 G/DL (ref 3.4–5)
ALBUMIN SERPL-MCNC: 3.4 G/DL (ref 3.4–5)
ALBUMIN SERPL-MCNC: 3.6 G/DL (ref 3.4–5)
ALBUMIN SERPL-MCNC: 3.7 G/DL (ref 3.4–5)
ALBUMIN SERPL-MCNC: 3.8 G/DL (ref 3.4–5)
ALBUMIN SERPL-MCNC: 3.9 G/DL (ref 3.4–5)
ALBUMIN SERPL-MCNC: 4.1 G/DL (ref 3.4–5)
ALBUMIN/GLOB SERPL: 0.4 {RATIO} (ref 0.8–1.7)
ALBUMIN/GLOB SERPL: 0.5 {RATIO} (ref 0.8–1.7)
ALBUMIN/GLOB SERPL: 0.6 {RATIO} (ref 0.8–1.7)
ALBUMIN/GLOB SERPL: 0.6 {RATIO} (ref 0.8–1.7)
ALBUMIN/GLOB SERPL: 0.8 {RATIO} (ref 0.8–1.7)
ALBUMIN/GLOB SERPL: 0.8 {RATIO} (ref 0.8–1.7)
ALBUMIN/GLOB SERPL: 0.9 {RATIO} (ref 0.8–1.7)
ALBUMIN/GLOB SERPL: 1.1 {RATIO} (ref 0.8–1.7)
ALBUMIN/GLOB SERPL: 1.2 {RATIO} (ref 0.8–1.7)
ALBUMIN/GLOB SERPL: 1.4 {RATIO} (ref 0.8–1.7)
ALBUMIN/GLOB SERPL: 1.5 {RATIO} (ref 0.8–1.7)
ALBUMIN/GLOB SERPL: 1.5 {RATIO} (ref 0.8–1.7)
ALBUMIN/GLOB SERPL: 1.6 {RATIO} (ref 0.8–1.7)
ALP SERPL-CCNC: 100 U/L (ref 45–117)
ALP SERPL-CCNC: 105 U/L (ref 45–117)
ALP SERPL-CCNC: 108 U/L (ref 45–117)
ALP SERPL-CCNC: 108 U/L (ref 45–117)
ALP SERPL-CCNC: 114 U/L (ref 45–117)
ALP SERPL-CCNC: 118 U/L (ref 45–117)
ALP SERPL-CCNC: 121 U/L (ref 45–117)
ALP SERPL-CCNC: 140 U/L (ref 45–117)
ALP SERPL-CCNC: 140 U/L (ref 45–117)
ALP SERPL-CCNC: 150 U/L (ref 45–117)
ALP SERPL-CCNC: 150 U/L (ref 45–117)
ALP SERPL-CCNC: 175 U/L (ref 45–117)
ALP SERPL-CCNC: 177 U/L (ref 45–117)
ALP SERPL-CCNC: 193 U/L (ref 45–117)
ALP SERPL-CCNC: 204 U/L (ref 45–117)
ALP SERPL-CCNC: 90 U/L (ref 45–117)
ALP SERPL-CCNC: 99 U/L (ref 45–117)
ALT SERPL-CCNC: 12 U/L (ref 16–61)
ALT SERPL-CCNC: 12 U/L (ref 16–61)
ALT SERPL-CCNC: 14 U/L (ref 16–61)
ALT SERPL-CCNC: 15 U/L (ref 16–61)
ALT SERPL-CCNC: 17 U/L (ref 16–61)
ALT SERPL-CCNC: 17 U/L (ref 16–61)
ALT SERPL-CCNC: 18 U/L (ref 16–61)
ALT SERPL-CCNC: 18 U/L (ref 16–61)
ALT SERPL-CCNC: 19 U/L (ref 16–61)
ALT SERPL-CCNC: 21 U/L (ref 16–61)
ALT SERPL-CCNC: 24 U/L (ref 16–61)
ALT SERPL-CCNC: 26 U/L (ref 16–61)
ALT SERPL-CCNC: 32 U/L (ref 16–61)
AMMONIA PLAS-SCNC: 60 UMOL/L (ref 11–32)
ANION GAP SERPL CALC-SCNC: 10 MMOL/L (ref 3–18)
ANION GAP SERPL CALC-SCNC: 11 MMOL/L (ref 3–18)
ANION GAP SERPL CALC-SCNC: 12 MMOL/L (ref 3–18)
ANION GAP SERPL CALC-SCNC: 13 MMOL/L (ref 3–18)
ANION GAP SERPL CALC-SCNC: 14 MMOL/L (ref 3–18)
ANION GAP SERPL CALC-SCNC: 15 MMOL/L (ref 3–18)
ANION GAP SERPL CALC-SCNC: 15 MMOL/L (ref 3–18)
ANION GAP SERPL CALC-SCNC: 16 MMOL/L (ref 3–18)
ANION GAP SERPL CALC-SCNC: 5 MMOL/L (ref 3–18)
ANION GAP SERPL CALC-SCNC: 7 MMOL/L (ref 3–18)
ANION GAP SERPL CALC-SCNC: 8 MMOL/L (ref 3–18)
ANION GAP SERPL CALC-SCNC: 9 MMOL/L (ref 3–18)
APPEARANCE FLD: ABNORMAL
APPEARANCE UR: ABNORMAL
APPEARANCE UR: ABNORMAL
ARTERIAL PATENCY WRIST A: ABNORMAL
ARTERIAL PATENCY WRIST A: ABNORMAL
ARTERIAL PATENCY WRIST A: POSITIVE
AST SERPL-CCNC: 10 U/L (ref 10–38)
AST SERPL-CCNC: 12 U/L (ref 10–38)
AST SERPL-CCNC: 13 U/L (ref 10–38)
AST SERPL-CCNC: 17 U/L (ref 10–38)
AST SERPL-CCNC: 19 U/L (ref 10–38)
AST SERPL-CCNC: 20 U/L (ref 10–38)
AST SERPL-CCNC: 20 U/L (ref 10–38)
AST SERPL-CCNC: 22 U/L (ref 10–38)
AST SERPL-CCNC: 23 U/L (ref 10–38)
AST SERPL-CCNC: 24 U/L (ref 10–38)
AST SERPL-CCNC: 27 U/L (ref 10–38)
AST SERPL-CCNC: 27 U/L (ref 10–38)
AST SERPL-CCNC: 28 U/L (ref 10–38)
AST SERPL-CCNC: 33 U/L (ref 10–38)
AST SERPL-CCNC: 35 U/L (ref 10–38)
ATRIAL RATE: 109 BPM
ATRIAL RATE: 97 BPM
BACTERIA SPEC CULT: ABNORMAL
BACTERIA SPEC CULT: NORMAL
BACTERIA URNS QL MICRO: ABNORMAL /HPF
BACTERIA URNS QL MICRO: ABNORMAL /HPF
BASE DEFICIT BLD-SCNC: 14.2 MMOL/L
BASE DEFICIT BLD-SCNC: 19.9 MMOL/L
BASE DEFICIT BLD-SCNC: 2.4 MMOL/L
BASE DEFICIT BLD-SCNC: 23.8 MMOL/L
BASE DEFICIT BLD-SCNC: 3.1 MMOL/L
BASE DEFICIT BLD-SCNC: 3.9 MMOL/L
BASE DEFICIT BLD-SCNC: 4.4 MMOL/L
BASE DEFICIT BLD-SCNC: 6.5 MMOL/L
BASE DEFICIT BLD-SCNC: 9.6 MMOL/L
BASE DEFICIT BLDV-SCNC: 1.5 MMOL/L
BASOPHILS # BLD: 0 K/UL (ref 0–0.1)
BASOPHILS # BLD: 0.1 K/UL (ref 0–0.1)
BASOPHILS # BLD: 0.3 K/UL (ref 0–0.1)
BASOPHILS NFR BLD: 0 % (ref 0–2)
BASOPHILS NFR BLD: 1 % (ref 0–2)
BASOPHILS NFR BLD: 4 % (ref 0–2)
BDY SITE: ABNORMAL
BILIRUB SERPL-MCNC: 0.3 MG/DL (ref 0.2–1)
BILIRUB SERPL-MCNC: 0.5 MG/DL (ref 0.2–1)
BILIRUB SERPL-MCNC: 0.8 MG/DL (ref 0.2–1)
BILIRUB SERPL-MCNC: 0.8 MG/DL (ref 0.2–1)
BILIRUB SERPL-MCNC: 1 MG/DL (ref 0.2–1)
BILIRUB SERPL-MCNC: 1 MG/DL (ref 0.2–1)
BILIRUB SERPL-MCNC: 1.2 MG/DL (ref 0.2–1)
BILIRUB SERPL-MCNC: 1.4 MG/DL (ref 0.2–1)
BILIRUB SERPL-MCNC: 1.5 MG/DL (ref 0.2–1)
BILIRUB SERPL-MCNC: 1.6 MG/DL (ref 0.2–1)
BILIRUB SERPL-MCNC: 1.7 MG/DL (ref 0.2–1)
BILIRUB SERPL-MCNC: 2 MG/DL (ref 0.2–1)
BILIRUB UR QL: NEGATIVE
BILIRUB UR QL: NEGATIVE
BLD PROD TYP BPU: NORMAL
BLOOD GROUP ANTIBODIES SERPL: NORMAL
BLOOD GROUP ANTIBODIES SERPL: NORMAL
BNP SERPL-MCNC: 2508 PG/ML (ref 0–900)
BNP SERPL-MCNC: 6691 PG/ML (ref 0–1800)
BNP SERPL-MCNC: ABNORMAL PG/ML (ref 0–1800)
BODY FLD TYPE: NORMAL
BODY TEMPERATURE: 100.04
BODY TEMPERATURE: 97.7
BODY TEMPERATURE: 98.2
BODY TEMPERATURE: 98.2
BODY TEMPERATURE: 98.4
BPU ID: NORMAL
BUN SERPL-MCNC: 102 MG/DL (ref 7–18)
BUN SERPL-MCNC: 110 MG/DL (ref 7–18)
BUN SERPL-MCNC: 12 MG/DL (ref 7–18)
BUN SERPL-MCNC: 124 MG/DL (ref 7–18)
BUN SERPL-MCNC: 126 MG/DL (ref 7–18)
BUN SERPL-MCNC: 130 MG/DL (ref 7–18)
BUN SERPL-MCNC: 150 MG/DL (ref 7–18)
BUN SERPL-MCNC: 154 MG/DL (ref 7–18)
BUN SERPL-MCNC: 156 MG/DL (ref 7–18)
BUN SERPL-MCNC: 156 MG/DL (ref 7–18)
BUN SERPL-MCNC: 171 MG/DL (ref 7–18)
BUN SERPL-MCNC: 35 MG/DL (ref 7–18)
BUN SERPL-MCNC: 36 MG/DL (ref 7–18)
BUN SERPL-MCNC: 36 MG/DL (ref 7–18)
BUN SERPL-MCNC: 37 MG/DL (ref 7–18)
BUN SERPL-MCNC: 39 MG/DL (ref 7–18)
BUN SERPL-MCNC: 40 MG/DL (ref 7–18)
BUN SERPL-MCNC: 41 MG/DL (ref 7–18)
BUN SERPL-MCNC: 43 MG/DL (ref 7–18)
BUN SERPL-MCNC: 44 MG/DL (ref 7–18)
BUN SERPL-MCNC: 44 MG/DL (ref 7–18)
BUN SERPL-MCNC: 45 MG/DL (ref 7–18)
BUN SERPL-MCNC: 45 MG/DL (ref 7–18)
BUN SERPL-MCNC: 48 MG/DL (ref 7–18)
BUN SERPL-MCNC: 50 MG/DL (ref 7–18)
BUN SERPL-MCNC: 51 MG/DL (ref 7–18)
BUN SERPL-MCNC: 68 MG/DL (ref 7–18)
BUN SERPL-MCNC: 76 MG/DL (ref 7–18)
BUN SERPL-MCNC: 79 MG/DL (ref 7–18)
BUN SERPL-MCNC: 79 MG/DL (ref 7–18)
BUN SERPL-MCNC: 88 MG/DL (ref 7–18)
BUN SERPL-MCNC: 95 MG/DL (ref 7–18)
BUN/CREAT SERPL: 10 (ref 12–20)
BUN/CREAT SERPL: 25 (ref 12–20)
BUN/CREAT SERPL: 26 (ref 12–20)
BUN/CREAT SERPL: 26 (ref 12–20)
BUN/CREAT SERPL: 27 (ref 12–20)
BUN/CREAT SERPL: 28 (ref 12–20)
BUN/CREAT SERPL: 28 (ref 12–20)
BUN/CREAT SERPL: 29 (ref 12–20)
BUN/CREAT SERPL: 30 (ref 12–20)
BUN/CREAT SERPL: 31 (ref 12–20)
BUN/CREAT SERPL: 32 (ref 12–20)
BUN/CREAT SERPL: 32 (ref 12–20)
BUN/CREAT SERPL: 33 (ref 12–20)
BUN/CREAT SERPL: 35 (ref 12–20)
BUN/CREAT SERPL: 36 (ref 12–20)
BUN/CREAT SERPL: 36 (ref 12–20)
BUN/CREAT SERPL: 37 (ref 12–20)
BUN/CREAT SERPL: 37 (ref 12–20)
BUN/CREAT SERPL: 38 (ref 12–20)
CA-I SERPL-SCNC: 1.04 MMOL/L (ref 1.12–1.32)
CA-I SERPL-SCNC: 1.06 MMOL/L (ref 1.12–1.32)
CA-I SERPL-SCNC: 1.06 MMOL/L (ref 1.12–1.32)
CA-I SERPL-SCNC: 1.13 MMOL/L (ref 1.12–1.32)
CA-I SERPL-SCNC: 1.14 MMOL/L (ref 1.12–1.32)
CA-I SERPL-SCNC: 1.16 MMOL/L (ref 1.12–1.32)
CA-I SERPL-SCNC: 1.19 MMOL/L (ref 1.12–1.32)
CA-I SERPL-SCNC: 1.2 MMOL/L (ref 1.12–1.32)
CA-I SERPL-SCNC: 1.23 MMOL/L (ref 1.12–1.32)
CA-I SERPL-SCNC: 1.23 MMOL/L (ref 1.12–1.32)
CALCIUM SERPL-MCNC: 7.6 MG/DL (ref 8.5–10.1)
CALCIUM SERPL-MCNC: 7.7 MG/DL (ref 8.5–10.1)
CALCIUM SERPL-MCNC: 7.8 MG/DL (ref 8.5–10.1)
CALCIUM SERPL-MCNC: 7.9 MG/DL (ref 8.5–10.1)
CALCIUM SERPL-MCNC: 8.1 MG/DL (ref 8.5–10.1)
CALCIUM SERPL-MCNC: 8.1 MG/DL (ref 8.5–10.1)
CALCIUM SERPL-MCNC: 8.4 MG/DL (ref 8.5–10.1)
CALCIUM SERPL-MCNC: 8.5 MG/DL (ref 8.5–10.1)
CALCIUM SERPL-MCNC: 8.7 MG/DL (ref 8.5–10.1)
CALCIUM SERPL-MCNC: 8.8 MG/DL (ref 8.5–10.1)
CALCIUM SERPL-MCNC: 8.9 MG/DL (ref 8.5–10.1)
CALCIUM SERPL-MCNC: 9 MG/DL (ref 8.5–10.1)
CALCIUM SERPL-MCNC: 9.1 MG/DL (ref 8.5–10.1)
CALCIUM SERPL-MCNC: 9.3 MG/DL (ref 8.5–10.1)
CALCULATED P AXIS, ECG09: 65 DEGREES
CALCULATED P AXIS, ECG09: 77 DEGREES
CALCULATED R AXIS, ECG10: 59 DEGREES
CALCULATED R AXIS, ECG10: 62 DEGREES
CALCULATED R AXIS, ECG10: 63 DEGREES
CALCULATED T AXIS, ECG11: 13 DEGREES
CALCULATED T AXIS, ECG11: 87 DEGREES
CALCULATED T AXIS, ECG11: 89 DEGREES
CALLED TO:,BCALL1: NORMAL
CALLED TO:,BCALL2: NORMAL
CC UR VC: ABNORMAL
CC UR VC: NORMAL
CHLORIDE SERPL-SCNC: 102 MMOL/L (ref 100–111)
CHLORIDE SERPL-SCNC: 104 MMOL/L (ref 100–111)
CHLORIDE SERPL-SCNC: 105 MMOL/L (ref 100–111)
CHLORIDE SERPL-SCNC: 106 MMOL/L (ref 100–111)
CHLORIDE SERPL-SCNC: 107 MMOL/L (ref 100–111)
CHLORIDE SERPL-SCNC: 108 MMOL/L (ref 100–111)
CHLORIDE SERPL-SCNC: 109 MMOL/L (ref 100–111)
CHLORIDE SERPL-SCNC: 110 MMOL/L (ref 100–111)
CHLORIDE SERPL-SCNC: 111 MMOL/L (ref 100–111)
CHLORIDE SERPL-SCNC: 111 MMOL/L (ref 100–111)
CHLORIDE SERPL-SCNC: 113 MMOL/L (ref 100–111)
CHLORIDE SERPL-SCNC: 117 MMOL/L (ref 100–111)
CHOLEST FLD-MCNC: <50 MG/DL
CO2 SERPL-SCNC: 12 MMOL/L (ref 21–32)
CO2 SERPL-SCNC: 16 MMOL/L (ref 21–32)
CO2 SERPL-SCNC: 17 MMOL/L (ref 21–32)
CO2 SERPL-SCNC: 18 MMOL/L (ref 21–32)
CO2 SERPL-SCNC: 20 MMOL/L (ref 21–32)
CO2 SERPL-SCNC: 20 MMOL/L (ref 21–32)
CO2 SERPL-SCNC: 21 MMOL/L (ref 21–32)
CO2 SERPL-SCNC: 22 MMOL/L (ref 21–32)
CO2 SERPL-SCNC: 23 MMOL/L (ref 21–32)
CO2 SERPL-SCNC: 24 MMOL/L (ref 21–32)
CO2 SERPL-SCNC: 25 MMOL/L (ref 21–32)
CO2 SERPL-SCNC: 26 MMOL/L (ref 21–32)
CO2 SERPL-SCNC: 27 MMOL/L (ref 21–32)
CO2 SERPL-SCNC: 7 MMOL/L (ref 21–32)
CO2 SERPL-SCNC: 7 MMOL/L (ref 21–32)
CO2 SERPL-SCNC: 9 MMOL/L (ref 21–32)
CO2 SERPL-SCNC: 9 MMOL/L (ref 21–32)
COLOR FLD: ABNORMAL
COLOR UR: YELLOW
COLOR UR: YELLOW
COVID-19 RAPID TEST, COVR: NOT DETECTED
CREAT SERPL-MCNC: 1.19 MG/DL (ref 0.6–1.3)
CREAT SERPL-MCNC: 1.2 MG/DL (ref 0.6–1.3)
CREAT SERPL-MCNC: 1.21 MG/DL (ref 0.6–1.3)
CREAT SERPL-MCNC: 1.27 MG/DL (ref 0.6–1.3)
CREAT SERPL-MCNC: 1.31 MG/DL (ref 0.6–1.3)
CREAT SERPL-MCNC: 1.32 MG/DL (ref 0.6–1.3)
CREAT SERPL-MCNC: 1.33 MG/DL (ref 0.6–1.3)
CREAT SERPL-MCNC: 1.36 MG/DL (ref 0.6–1.3)
CREAT SERPL-MCNC: 1.36 MG/DL (ref 0.6–1.3)
CREAT SERPL-MCNC: 1.38 MG/DL (ref 0.6–1.3)
CREAT SERPL-MCNC: 1.39 MG/DL (ref 0.6–1.3)
CREAT SERPL-MCNC: 1.39 MG/DL (ref 0.6–1.3)
CREAT SERPL-MCNC: 1.4 MG/DL (ref 0.6–1.3)
CREAT SERPL-MCNC: 1.42 MG/DL (ref 0.6–1.3)
CREAT SERPL-MCNC: 1.44 MG/DL (ref 0.6–1.3)
CREAT SERPL-MCNC: 1.44 MG/DL (ref 0.6–1.3)
CREAT SERPL-MCNC: 1.46 MG/DL (ref 0.6–1.3)
CREAT SERPL-MCNC: 1.49 MG/DL (ref 0.6–1.3)
CREAT SERPL-MCNC: 1.56 MG/DL (ref 0.6–1.3)
CREAT SERPL-MCNC: 1.61 MG/DL (ref 0.6–1.3)
CREAT SERPL-MCNC: 1.73 MG/DL (ref 0.6–1.3)
CREAT SERPL-MCNC: 2.26 MG/DL (ref 0.6–1.3)
CREAT SERPL-MCNC: 2.52 MG/DL (ref 0.6–1.3)
CREAT SERPL-MCNC: 2.58 MG/DL (ref 0.6–1.3)
CREAT SERPL-MCNC: 2.65 MG/DL (ref 0.6–1.3)
CREAT SERPL-MCNC: 2.98 MG/DL (ref 0.6–1.3)
CREAT SERPL-MCNC: 3.25 MG/DL (ref 0.6–1.3)
CREAT SERPL-MCNC: 3.31 MG/DL (ref 0.6–1.3)
CREAT SERPL-MCNC: 3.8 MG/DL (ref 0.6–1.3)
CREAT SERPL-MCNC: 3.82 MG/DL (ref 0.6–1.3)
CREAT SERPL-MCNC: 3.96 MG/DL (ref 0.6–1.3)
CREAT SERPL-MCNC: 4 MG/DL (ref 0.6–1.3)
CREAT SERPL-MCNC: 4.11 MG/DL (ref 0.6–1.3)
CREAT SERPL-MCNC: 4.12 MG/DL (ref 0.6–1.3)
CREAT SERPL-MCNC: 4.27 MG/DL (ref 0.6–1.3)
CREAT SERPL-MCNC: 4.29 MG/DL (ref 0.6–1.3)
CREAT SERPL-MCNC: 4.66 MG/DL (ref 0.6–1.3)
CROSSMATCH RESULT,%XM: NORMAL
DIAGNOSIS, 93000: NORMAL
DIFFERENTIAL METHOD BLD: ABNORMAL
ECHO AO ASC DIAM: 3 CM
ECHO AO ASCENDING AORTA INDEX: 2.11 CM/M2
ECHO AO ROOT DIAM: 3.7 CM
ECHO AO ROOT INDEX: 2.61 CM/M2
ECHO AV AREA PEAK VELOCITY: 2.2 CM2
ECHO AV AREA VTI: 2.3 CM2
ECHO AV AREA/BSA PEAK VELOCITY: 1.5 CM2/M2
ECHO AV AREA/BSA VTI: 1.6 CM2/M2
ECHO AV MEAN GRADIENT: 6 MMHG
ECHO AV MEAN VELOCITY: 1.2 M/S
ECHO AV PEAK GRADIENT: 12 MMHG
ECHO AV PEAK VELOCITY: 1.7 M/S
ECHO AV VELOCITY RATIO: 0.76
ECHO AV VTI: 36 CM
ECHO LA DIAMETER INDEX: 3.03 CM/M2
ECHO LA DIAMETER: 4.3 CM
ECHO LA TO AORTIC ROOT RATIO: 1.16
ECHO LA VOL 2C: 51 ML (ref 18–58)
ECHO LA VOL 4C: 38 ML (ref 18–58)
ECHO LA VOL BP: 44 ML (ref 18–58)
ECHO LA VOL/BSA BIPLANE: 31 ML/M2 (ref 16–34)
ECHO LA VOLUME AREA LENGTH: 48 ML
ECHO LA VOLUME INDEX A2C: 36 ML/M2 (ref 16–34)
ECHO LA VOLUME INDEX A4C: 27 ML/M2 (ref 16–34)
ECHO LA VOLUME INDEX AREA LENGTH: 34 ML/M2 (ref 16–34)
ECHO LV E' LATERAL VELOCITY: 9 CM/S
ECHO LV E' SEPTAL VELOCITY: 8 CM/S
ECHO LV EDV A2C: 56 ML
ECHO LV EDV A4C: 89 ML
ECHO LV EDV BP: 72 ML (ref 67–155)
ECHO LV EDV INDEX A4C: 63 ML/M2
ECHO LV EDV INDEX BP: 51 ML/M2
ECHO LV EDV NDEX A2C: 39 ML/M2
ECHO LV EJECTION FRACTION A2C: 69 %
ECHO LV EJECTION FRACTION A4C: 63 %
ECHO LV EJECTION FRACTION BIPLANE: 66 % (ref 55–100)
ECHO LV ESV A2C: 17 ML
ECHO LV ESV A4C: 33 ML
ECHO LV ESV BP: 24 ML (ref 22–58)
ECHO LV ESV INDEX A2C: 12 ML/M2
ECHO LV ESV INDEX A4C: 23 ML/M2
ECHO LV ESV INDEX BP: 17 ML/M2
ECHO LV FRACTIONAL SHORTENING: 47 % (ref 28–44)
ECHO LV INTERNAL DIMENSION DIASTOLE INDEX: 4.01 CM/M2
ECHO LV INTERNAL DIMENSION DIASTOLIC: 5.7 CM (ref 4.2–5.9)
ECHO LV INTERNAL DIMENSION SYSTOLIC INDEX: 2.11 CM/M2
ECHO LV INTERNAL DIMENSION SYSTOLIC: 3 CM
ECHO LV IVSD: 0.7 CM (ref 0.6–1)
ECHO LV MASS 2D: 170.2 G (ref 88–224)
ECHO LV MASS INDEX 2D: 119.8 G/M2 (ref 49–115)
ECHO LV POSTERIOR WALL DIASTOLIC: 0.9 CM (ref 0.6–1)
ECHO LV RELATIVE WALL THICKNESS RATIO: 0.32
ECHO LVOT AREA: 3.1 CM2
ECHO LVOT AV VTI INDEX: 0.77
ECHO LVOT DIAM: 2 CM
ECHO LVOT MEAN GRADIENT: 3 MMHG
ECHO LVOT PEAK GRADIENT: 6 MMHG
ECHO LVOT PEAK VELOCITY: 1.3 M/S
ECHO LVOT STROKE VOLUME INDEX: 61 ML/M2
ECHO LVOT SV: 86.7 ML
ECHO LVOT VTI: 27.6 CM
ECHO MV A VELOCITY: 0.82 M/S
ECHO MV E DECELERATION TIME (DT): 156.4 MS
ECHO MV E VELOCITY: 0.8 M/S
ECHO MV E/A RATIO: 0.98
ECHO MV E/E' LATERAL: 8.89
ECHO MV E/E' RATIO (AVERAGED): 9.44
ECHO MV E/E' SEPTAL: 10
ECHO RV FREE WALL PEAK S': 19 CM/S
ECHO RV INTERNAL DIMENSION: 4.2 CM
ECHO RV TAPSE: 2.3 CM (ref 1.7–?)
ECHO RVOT MEAN GRADIENT: 2 MMHG
ECHO RVOT PEAK GRADIENT: 3 MMHG
ECHO RVOT PEAK VELOCITY: 0.9 M/S
ECHO RVOT VTI: 19 CM
EOSINOPHIL # BLD: 0 K/UL (ref 0–0.4)
EOSINOPHIL # BLD: 0.1 K/UL (ref 0–0.4)
EOSINOPHIL # BLD: 0.2 K/UL (ref 0–0.4)
EOSINOPHIL # BLD: 0.3 K/UL (ref 0–0.4)
EOSINOPHIL # BLD: 0.3 K/UL (ref 0–0.4)
EOSINOPHIL NFR BLD: 0 % (ref 0–5)
EOSINOPHIL NFR BLD: 1 % (ref 0–5)
EOSINOPHIL NFR BLD: 2 % (ref 0–5)
EOSINOPHIL NFR BLD: 3 % (ref 0–5)
EOSINOPHIL NFR BLD: 4 % (ref 0–5)
EOSINOPHIL NFR BLD: 4 % (ref 0–5)
EOSINOPHIL NFR FLD MANUAL: 0 %
EPITH CASTS URNS QL MICRO: ABNORMAL /LPF (ref 0–5)
EPITH CASTS URNS QL MICRO: ABNORMAL /LPF (ref 0–5)
ERYTHROCYTE [DISTWIDTH] IN BLOOD BY AUTOMATED COUNT: 19.3 % (ref 11.6–14.5)
ERYTHROCYTE [DISTWIDTH] IN BLOOD BY AUTOMATED COUNT: 19.7 % (ref 11.6–14.5)
ERYTHROCYTE [DISTWIDTH] IN BLOOD BY AUTOMATED COUNT: 19.8 % (ref 11.6–14.5)
ERYTHROCYTE [DISTWIDTH] IN BLOOD BY AUTOMATED COUNT: 19.9 % (ref 11.6–14.5)
ERYTHROCYTE [DISTWIDTH] IN BLOOD BY AUTOMATED COUNT: 20 % (ref 11.6–14.5)
ERYTHROCYTE [DISTWIDTH] IN BLOOD BY AUTOMATED COUNT: 20.1 % (ref 11.6–14.5)
ERYTHROCYTE [DISTWIDTH] IN BLOOD BY AUTOMATED COUNT: 20.2 % (ref 11.6–14.5)
ERYTHROCYTE [DISTWIDTH] IN BLOOD BY AUTOMATED COUNT: 20.3 % (ref 11.6–14.5)
ERYTHROCYTE [DISTWIDTH] IN BLOOD BY AUTOMATED COUNT: 20.3 % (ref 11.6–14.5)
ERYTHROCYTE [DISTWIDTH] IN BLOOD BY AUTOMATED COUNT: 20.6 % (ref 11.6–14.5)
ERYTHROCYTE [DISTWIDTH] IN BLOOD BY AUTOMATED COUNT: 20.7 % (ref 11.6–14.5)
ERYTHROCYTE [DISTWIDTH] IN BLOOD BY AUTOMATED COUNT: 20.8 % (ref 11.6–14.5)
ERYTHROCYTE [DISTWIDTH] IN BLOOD BY AUTOMATED COUNT: 21.3 % (ref 11.6–14.5)
ERYTHROCYTE [DISTWIDTH] IN BLOOD BY AUTOMATED COUNT: 21.5 % (ref 11.6–14.5)
ERYTHROCYTE [DISTWIDTH] IN BLOOD BY AUTOMATED COUNT: 22 % (ref 11.6–14.5)
EST. AVERAGE GLUCOSE BLD GHB EST-MCNC: 123 MG/DL
GAS FLOW.O2 O2 DELIVERY SYS: ABNORMAL L/MIN
GAS FLOW.O2 SETTING OXYMISER: 16 BPM
GAS FLOW.O2 SETTING OXYMISER: 20 BPM
GAS FLOW.O2 SETTING OXYMISER: 20 BPM
GLOBULIN SER CALC-MCNC: 2.2 G/DL (ref 2–4)
GLOBULIN SER CALC-MCNC: 2.3 G/DL (ref 2–4)
GLOBULIN SER CALC-MCNC: 2.4 G/DL (ref 2–4)
GLOBULIN SER CALC-MCNC: 2.5 G/DL (ref 2–4)
GLOBULIN SER CALC-MCNC: 2.6 G/DL (ref 2–4)
GLOBULIN SER CALC-MCNC: 2.7 G/DL (ref 2–4)
GLOBULIN SER CALC-MCNC: 2.8 G/DL (ref 2–4)
GLOBULIN SER CALC-MCNC: 2.9 G/DL (ref 2–4)
GLOBULIN SER CALC-MCNC: 3 G/DL (ref 2–4)
GLOBULIN SER CALC-MCNC: 3.3 G/DL (ref 2–4)
GLOBULIN SER CALC-MCNC: 3.4 G/DL (ref 2–4)
GLOBULIN SER CALC-MCNC: 3.7 G/DL (ref 2–4)
GLOBULIN SER CALC-MCNC: 3.9 G/DL (ref 2–4)
GLOBULIN SER CALC-MCNC: 4.3 G/DL (ref 2–4)
GLUCOSE BLD STRIP.AUTO-MCNC: 101 MG/DL (ref 70–110)
GLUCOSE BLD STRIP.AUTO-MCNC: 107 MG/DL (ref 70–110)
GLUCOSE BLD STRIP.AUTO-MCNC: 108 MG/DL (ref 70–110)
GLUCOSE BLD STRIP.AUTO-MCNC: 111 MG/DL (ref 70–110)
GLUCOSE BLD STRIP.AUTO-MCNC: 115 MG/DL (ref 70–110)
GLUCOSE BLD STRIP.AUTO-MCNC: 115 MG/DL (ref 70–110)
GLUCOSE BLD STRIP.AUTO-MCNC: 117 MG/DL (ref 70–110)
GLUCOSE BLD STRIP.AUTO-MCNC: 118 MG/DL (ref 70–110)
GLUCOSE BLD STRIP.AUTO-MCNC: 119 MG/DL (ref 70–110)
GLUCOSE BLD STRIP.AUTO-MCNC: 119 MG/DL (ref 70–110)
GLUCOSE BLD STRIP.AUTO-MCNC: 122 MG/DL (ref 70–110)
GLUCOSE BLD STRIP.AUTO-MCNC: 123 MG/DL (ref 70–110)
GLUCOSE BLD STRIP.AUTO-MCNC: 123 MG/DL (ref 70–110)
GLUCOSE BLD STRIP.AUTO-MCNC: 125 MG/DL (ref 70–110)
GLUCOSE BLD STRIP.AUTO-MCNC: 126 MG/DL (ref 70–110)
GLUCOSE BLD STRIP.AUTO-MCNC: 127 MG/DL (ref 70–110)
GLUCOSE BLD STRIP.AUTO-MCNC: 127 MG/DL (ref 70–110)
GLUCOSE BLD STRIP.AUTO-MCNC: 129 MG/DL (ref 70–110)
GLUCOSE BLD STRIP.AUTO-MCNC: 133 MG/DL (ref 70–110)
GLUCOSE BLD STRIP.AUTO-MCNC: 135 MG/DL (ref 70–110)
GLUCOSE BLD STRIP.AUTO-MCNC: 136 MG/DL (ref 70–110)
GLUCOSE BLD STRIP.AUTO-MCNC: 137 MG/DL (ref 70–110)
GLUCOSE BLD STRIP.AUTO-MCNC: 139 MG/DL (ref 70–110)
GLUCOSE BLD STRIP.AUTO-MCNC: 140 MG/DL (ref 70–110)
GLUCOSE BLD STRIP.AUTO-MCNC: 142 MG/DL (ref 70–110)
GLUCOSE BLD STRIP.AUTO-MCNC: 142 MG/DL (ref 70–110)
GLUCOSE BLD STRIP.AUTO-MCNC: 143 MG/DL (ref 70–110)
GLUCOSE BLD STRIP.AUTO-MCNC: 143 MG/DL (ref 70–110)
GLUCOSE BLD STRIP.AUTO-MCNC: 144 MG/DL (ref 70–110)
GLUCOSE BLD STRIP.AUTO-MCNC: 145 MG/DL (ref 70–110)
GLUCOSE BLD STRIP.AUTO-MCNC: 147 MG/DL (ref 70–110)
GLUCOSE BLD STRIP.AUTO-MCNC: 148 MG/DL (ref 70–110)
GLUCOSE BLD STRIP.AUTO-MCNC: 150 MG/DL (ref 70–110)
GLUCOSE BLD STRIP.AUTO-MCNC: 151 MG/DL (ref 70–110)
GLUCOSE BLD STRIP.AUTO-MCNC: 152 MG/DL (ref 70–110)
GLUCOSE BLD STRIP.AUTO-MCNC: 153 MG/DL (ref 70–110)
GLUCOSE BLD STRIP.AUTO-MCNC: 155 MG/DL (ref 70–110)
GLUCOSE BLD STRIP.AUTO-MCNC: 157 MG/DL (ref 70–110)
GLUCOSE BLD STRIP.AUTO-MCNC: 159 MG/DL (ref 70–110)
GLUCOSE BLD STRIP.AUTO-MCNC: 162 MG/DL (ref 70–110)
GLUCOSE BLD STRIP.AUTO-MCNC: 164 MG/DL (ref 70–110)
GLUCOSE BLD STRIP.AUTO-MCNC: 165 MG/DL (ref 70–110)
GLUCOSE BLD STRIP.AUTO-MCNC: 166 MG/DL (ref 70–110)
GLUCOSE BLD STRIP.AUTO-MCNC: 169 MG/DL (ref 70–110)
GLUCOSE BLD STRIP.AUTO-MCNC: 170 MG/DL (ref 70–110)
GLUCOSE BLD STRIP.AUTO-MCNC: 170 MG/DL (ref 70–110)
GLUCOSE BLD STRIP.AUTO-MCNC: 171 MG/DL (ref 70–110)
GLUCOSE BLD STRIP.AUTO-MCNC: 172 MG/DL (ref 70–110)
GLUCOSE BLD STRIP.AUTO-MCNC: 179 MG/DL (ref 70–110)
GLUCOSE BLD STRIP.AUTO-MCNC: 184 MG/DL (ref 70–110)
GLUCOSE BLD STRIP.AUTO-MCNC: 185 MG/DL (ref 70–110)
GLUCOSE BLD STRIP.AUTO-MCNC: 187 MG/DL (ref 70–110)
GLUCOSE BLD STRIP.AUTO-MCNC: 187 MG/DL (ref 70–110)
GLUCOSE BLD STRIP.AUTO-MCNC: 188 MG/DL (ref 70–110)
GLUCOSE BLD STRIP.AUTO-MCNC: 188 MG/DL (ref 70–110)
GLUCOSE BLD STRIP.AUTO-MCNC: 190 MG/DL (ref 70–110)
GLUCOSE BLD STRIP.AUTO-MCNC: 190 MG/DL (ref 70–110)
GLUCOSE BLD STRIP.AUTO-MCNC: 191 MG/DL (ref 70–110)
GLUCOSE BLD STRIP.AUTO-MCNC: 195 MG/DL (ref 70–110)
GLUCOSE BLD STRIP.AUTO-MCNC: 197 MG/DL (ref 70–110)
GLUCOSE BLD STRIP.AUTO-MCNC: 197 MG/DL (ref 70–110)
GLUCOSE BLD STRIP.AUTO-MCNC: 201 MG/DL (ref 70–110)
GLUCOSE BLD STRIP.AUTO-MCNC: 202 MG/DL (ref 70–110)
GLUCOSE BLD STRIP.AUTO-MCNC: 203 MG/DL (ref 70–110)
GLUCOSE BLD STRIP.AUTO-MCNC: 203 MG/DL (ref 70–110)
GLUCOSE BLD STRIP.AUTO-MCNC: 204 MG/DL (ref 70–110)
GLUCOSE BLD STRIP.AUTO-MCNC: 206 MG/DL (ref 70–110)
GLUCOSE BLD STRIP.AUTO-MCNC: 207 MG/DL (ref 70–110)
GLUCOSE BLD STRIP.AUTO-MCNC: 210 MG/DL (ref 70–110)
GLUCOSE BLD STRIP.AUTO-MCNC: 221 MG/DL (ref 70–110)
GLUCOSE BLD STRIP.AUTO-MCNC: 222 MG/DL (ref 70–110)
GLUCOSE BLD STRIP.AUTO-MCNC: 223 MG/DL (ref 70–110)
GLUCOSE BLD STRIP.AUTO-MCNC: 228 MG/DL (ref 70–110)
GLUCOSE BLD STRIP.AUTO-MCNC: 230 MG/DL (ref 70–110)
GLUCOSE BLD STRIP.AUTO-MCNC: 237 MG/DL (ref 70–110)
GLUCOSE BLD STRIP.AUTO-MCNC: 65 MG/DL (ref 70–110)
GLUCOSE BLD STRIP.AUTO-MCNC: 65 MG/DL (ref 70–110)
GLUCOSE BLD STRIP.AUTO-MCNC: 72 MG/DL (ref 70–110)
GLUCOSE BLD STRIP.AUTO-MCNC: 79 MG/DL (ref 70–110)
GLUCOSE BLD STRIP.AUTO-MCNC: 84 MG/DL (ref 70–110)
GLUCOSE BLD STRIP.AUTO-MCNC: 85 MG/DL (ref 70–110)
GLUCOSE BLD STRIP.AUTO-MCNC: 90 MG/DL (ref 70–110)
GLUCOSE BLD STRIP.AUTO-MCNC: 91 MG/DL (ref 70–110)
GLUCOSE BLD STRIP.AUTO-MCNC: 93 MG/DL (ref 70–110)
GLUCOSE BLD STRIP.AUTO-MCNC: 95 MG/DL (ref 70–110)
GLUCOSE BLD STRIP.AUTO-MCNC: 97 MG/DL (ref 70–110)
GLUCOSE BLD STRIP.AUTO-MCNC: 98 MG/DL (ref 70–110)
GLUCOSE FLD-MCNC: 69 MG/DL
GLUCOSE SERPL-MCNC: 100 MG/DL (ref 74–99)
GLUCOSE SERPL-MCNC: 107 MG/DL (ref 74–99)
GLUCOSE SERPL-MCNC: 117 MG/DL (ref 74–99)
GLUCOSE SERPL-MCNC: 119 MG/DL (ref 74–99)
GLUCOSE SERPL-MCNC: 123 MG/DL (ref 74–99)
GLUCOSE SERPL-MCNC: 126 MG/DL (ref 74–99)
GLUCOSE SERPL-MCNC: 128 MG/DL (ref 74–99)
GLUCOSE SERPL-MCNC: 132 MG/DL (ref 74–99)
GLUCOSE SERPL-MCNC: 141 MG/DL (ref 74–99)
GLUCOSE SERPL-MCNC: 142 MG/DL (ref 74–99)
GLUCOSE SERPL-MCNC: 143 MG/DL (ref 74–99)
GLUCOSE SERPL-MCNC: 144 MG/DL (ref 74–99)
GLUCOSE SERPL-MCNC: 145 MG/DL (ref 74–99)
GLUCOSE SERPL-MCNC: 146 MG/DL (ref 74–99)
GLUCOSE SERPL-MCNC: 146 MG/DL (ref 74–99)
GLUCOSE SERPL-MCNC: 148 MG/DL (ref 74–99)
GLUCOSE SERPL-MCNC: 149 MG/DL (ref 74–99)
GLUCOSE SERPL-MCNC: 151 MG/DL (ref 74–99)
GLUCOSE SERPL-MCNC: 151 MG/DL (ref 74–99)
GLUCOSE SERPL-MCNC: 157 MG/DL (ref 74–99)
GLUCOSE SERPL-MCNC: 158 MG/DL (ref 74–99)
GLUCOSE SERPL-MCNC: 161 MG/DL (ref 74–99)
GLUCOSE SERPL-MCNC: 161 MG/DL (ref 74–99)
GLUCOSE SERPL-MCNC: 165 MG/DL (ref 74–99)
GLUCOSE SERPL-MCNC: 165 MG/DL (ref 74–99)
GLUCOSE SERPL-MCNC: 170 MG/DL (ref 74–99)
GLUCOSE SERPL-MCNC: 179 MG/DL (ref 74–99)
GLUCOSE SERPL-MCNC: 188 MG/DL (ref 74–99)
GLUCOSE SERPL-MCNC: 193 MG/DL (ref 74–99)
GLUCOSE SERPL-MCNC: 199 MG/DL (ref 74–99)
GLUCOSE SERPL-MCNC: 206 MG/DL (ref 74–99)
GLUCOSE SERPL-MCNC: 207 MG/DL (ref 74–99)
GLUCOSE SERPL-MCNC: 209 MG/DL (ref 74–99)
GLUCOSE SERPL-MCNC: 233 MG/DL (ref 74–99)
GLUCOSE SERPL-MCNC: 250 MG/DL (ref 74–99)
GLUCOSE SERPL-MCNC: 64 MG/DL (ref 74–99)
GLUCOSE SERPL-MCNC: 84 MG/DL (ref 74–99)
GLUCOSE SERPL-MCNC: 88 MG/DL (ref 74–99)
GLUCOSE SERPL-MCNC: 90 MG/DL (ref 74–99)
GLUCOSE UR STRIP.AUTO-MCNC: NEGATIVE MG/DL
GLUCOSE UR STRIP.AUTO-MCNC: NEGATIVE MG/DL
GRAM STN SPEC: NORMAL
HBA1C MFR BLD: 5.9 % (ref 4.2–5.6)
HCO3 BLD-SCNC: 12 MMOL/L (ref 22–26)
HCO3 BLD-SCNC: 14.2 MMOL/L (ref 22–26)
HCO3 BLD-SCNC: 16.9 MMOL/L (ref 22–26)
HCO3 BLD-SCNC: 19.4 MMOL/L (ref 22–26)
HCO3 BLD-SCNC: 19.8 MMOL/L (ref 22–26)
HCO3 BLD-SCNC: 21.6 MMOL/L (ref 22–26)
HCO3 BLD-SCNC: 22.1 MMOL/L (ref 22–26)
HCO3 BLD-SCNC: 5 MMOL/L (ref 22–26)
HCO3 BLD-SCNC: 9.4 MMOL/L (ref 22–26)
HCO3 BLDV-SCNC: 22.8 MMOL/L (ref 23–28)
HCT VFR BLD AUTO: 19.6 % (ref 36–48)
HCT VFR BLD AUTO: 20.5 % (ref 36–48)
HCT VFR BLD AUTO: 21.2 % (ref 36–48)
HCT VFR BLD AUTO: 22.3 % (ref 36–48)
HCT VFR BLD AUTO: 22.3 % (ref 36–48)
HCT VFR BLD AUTO: 22.4 % (ref 36–48)
HCT VFR BLD AUTO: 22.9 % (ref 36–48)
HCT VFR BLD AUTO: 23.2 % (ref 36–48)
HCT VFR BLD AUTO: 23.2 % (ref 36–48)
HCT VFR BLD AUTO: 23.9 % (ref 36–48)
HCT VFR BLD AUTO: 23.9 % (ref 36–48)
HCT VFR BLD AUTO: 25.4 % (ref 36–48)
HCT VFR BLD AUTO: 25.5 % (ref 36–48)
HCT VFR BLD AUTO: 26.6 % (ref 36–48)
HCT VFR BLD AUTO: 26.9 % (ref 36–48)
HCT VFR BLD AUTO: 26.9 % (ref 36–48)
HCT VFR BLD AUTO: 27.3 % (ref 36–48)
HCT VFR BLD AUTO: 27.5 % (ref 36–48)
HCT VFR BLD AUTO: 28.2 % (ref 36–48)
HCT VFR BLD AUTO: 29.1 % (ref 36–48)
HCT VFR BLD AUTO: 29.2 % (ref 36–48)
HCT VFR BLD AUTO: 30.5 % (ref 36–48)
HCT VFR BLD AUTO: 30.9 % (ref 36–48)
HCT VFR BLD AUTO: 33.9 % (ref 36–48)
HGB BLD-MCNC: 10.3 G/DL (ref 13–16)
HGB BLD-MCNC: 6.3 G/DL (ref 13–16)
HGB BLD-MCNC: 6.3 G/DL (ref 13–16)
HGB BLD-MCNC: 6.7 G/DL (ref 13–16)
HGB BLD-MCNC: 6.9 G/DL (ref 13–16)
HGB BLD-MCNC: 7.2 G/DL (ref 13–16)
HGB BLD-MCNC: 7.2 G/DL (ref 13–16)
HGB BLD-MCNC: 7.3 G/DL (ref 13–16)
HGB BLD-MCNC: 7.3 G/DL (ref 13–16)
HGB BLD-MCNC: 7.4 G/DL (ref 13–16)
HGB BLD-MCNC: 7.5 G/DL (ref 13–16)
HGB BLD-MCNC: 7.9 G/DL (ref 13–16)
HGB BLD-MCNC: 8.1 G/DL (ref 13–16)
HGB BLD-MCNC: 8.3 G/DL (ref 13–16)
HGB BLD-MCNC: 8.4 G/DL (ref 13–16)
HGB BLD-MCNC: 8.4 G/DL (ref 13–16)
HGB BLD-MCNC: 8.5 G/DL (ref 13–16)
HGB BLD-MCNC: 8.7 G/DL (ref 13–16)
HGB BLD-MCNC: 8.7 G/DL (ref 13–16)
HGB BLD-MCNC: 9.2 G/DL (ref 13–16)
HGB BLD-MCNC: 9.4 G/DL (ref 13–16)
HGB BLD-MCNC: 9.6 G/DL (ref 13–16)
HGB UR QL STRIP: ABNORMAL
HGB UR QL STRIP: ABNORMAL
HISTORY CHECKED?,CKHIST: NORMAL
IMM GRANULOCYTES # BLD AUTO: 0 K/UL
IMM GRANULOCYTES # BLD AUTO: 0.1 K/UL (ref 0–0.04)
IMM GRANULOCYTES NFR BLD AUTO: 0 %
IMM GRANULOCYTES NFR BLD AUTO: 1 % (ref 0–0.5)
IMM GRANULOCYTES NFR BLD AUTO: 2 % (ref 0–0.5)
INR PPP: 1.2 (ref 0.8–1.2)
INR PPP: 1.3 (ref 0.8–1.2)
INR PPP: 1.4 (ref 0.8–1.2)
INR PPP: 1.5 (ref 0.8–1.2)
INR PPP: 1.6 (ref 0.8–1.2)
INSPIRATION.DURATION SETTING TIME VENT: 0.9 SEC
KETONES UR QL STRIP.AUTO: ABNORMAL MG/DL
KETONES UR QL STRIP.AUTO: NEGATIVE MG/DL
LACTATE BLD-SCNC: 1.65 MMOL/L (ref 0.4–2)
LACTATE SERPL-SCNC: 1.6 MMOL/L (ref 0.4–2)
LACTATE SERPL-SCNC: 1.7 MMOL/L (ref 0.4–2)
LDH FLD L TO P-CCNC: 202 U/L
LDH SERPL L TO P-CCNC: 127 U/L (ref 81–234)
LEUKOCYTE ESTERASE UR QL STRIP.AUTO: ABNORMAL
LEUKOCYTE ESTERASE UR QL STRIP.AUTO: ABNORMAL
LIPASE SERPL-CCNC: 202 U/L (ref 73–393)
LYMPHOCYTES # BLD: 0.6 K/UL (ref 0.9–3.6)
LYMPHOCYTES # BLD: 0.6 K/UL (ref 0.9–3.6)
LYMPHOCYTES # BLD: 0.7 K/UL (ref 0.9–3.6)
LYMPHOCYTES # BLD: 0.9 K/UL (ref 0.9–3.6)
LYMPHOCYTES # BLD: 0.9 K/UL (ref 0.9–3.6)
LYMPHOCYTES # BLD: 1 K/UL (ref 0.9–3.6)
LYMPHOCYTES # BLD: 1 K/UL (ref 0.9–3.6)
LYMPHOCYTES # BLD: 1.1 K/UL (ref 0.9–3.6)
LYMPHOCYTES # BLD: 1.2 K/UL (ref 0.9–3.6)
LYMPHOCYTES # BLD: 1.3 K/UL (ref 0.9–3.6)
LYMPHOCYTES # BLD: 1.4 K/UL (ref 0.9–3.6)
LYMPHOCYTES # BLD: 1.4 K/UL (ref 0.9–3.6)
LYMPHOCYTES # BLD: 1.5 K/UL (ref 0.9–3.6)
LYMPHOCYTES # BLD: 1.7 K/UL (ref 0.9–3.6)
LYMPHOCYTES # BLD: 1.8 K/UL (ref 0.9–3.6)
LYMPHOCYTES # BLD: 1.8 K/UL (ref 0.9–3.6)
LYMPHOCYTES # BLD: 3.2 K/UL (ref 0.9–3.6)
LYMPHOCYTES # BLD: 5 K/UL (ref 0.9–3.6)
LYMPHOCYTES NFR BLD: 13 % (ref 21–52)
LYMPHOCYTES NFR BLD: 13 % (ref 21–52)
LYMPHOCYTES NFR BLD: 14 % (ref 21–52)
LYMPHOCYTES NFR BLD: 15 % (ref 21–52)
LYMPHOCYTES NFR BLD: 16 % (ref 21–52)
LYMPHOCYTES NFR BLD: 17 % (ref 21–52)
LYMPHOCYTES NFR BLD: 19 % (ref 21–52)
LYMPHOCYTES NFR BLD: 2 % (ref 21–52)
LYMPHOCYTES NFR BLD: 20 % (ref 21–52)
LYMPHOCYTES NFR BLD: 25 % (ref 21–52)
LYMPHOCYTES NFR BLD: 25 % (ref 21–52)
LYMPHOCYTES NFR BLD: 3 % (ref 21–52)
LYMPHOCYTES NFR BLD: 30 % (ref 21–52)
LYMPHOCYTES NFR BLD: 38 % (ref 21–52)
LYMPHOCYTES NFR BLD: 40 % (ref 21–52)
LYMPHOCYTES NFR BLD: 6 % (ref 21–52)
LYMPHOCYTES NFR BLD: 8 % (ref 21–52)
LYMPHOCYTES NFR BLD: 8 % (ref 21–52)
LYMPHOCYTES NFR FLD: 39 %
MACROPHAGES NFR FLD: 41 %
MAGNESIUM SERPL-MCNC: 1.2 MG/DL (ref 1.6–2.6)
MAGNESIUM SERPL-MCNC: 1.4 MG/DL (ref 1.6–2.6)
MAGNESIUM SERPL-MCNC: 1.5 MG/DL (ref 1.6–2.6)
MAGNESIUM SERPL-MCNC: 1.6 MG/DL (ref 1.6–2.6)
MAGNESIUM SERPL-MCNC: 1.7 MG/DL (ref 1.6–2.6)
MAGNESIUM SERPL-MCNC: 1.8 MG/DL (ref 1.6–2.6)
MAGNESIUM SERPL-MCNC: 1.8 MG/DL (ref 1.6–2.6)
MAGNESIUM SERPL-MCNC: 1.9 MG/DL (ref 1.6–2.6)
MAGNESIUM SERPL-MCNC: 2 MG/DL (ref 1.6–2.6)
MAGNESIUM SERPL-MCNC: 2.1 MG/DL (ref 1.6–2.6)
MAGNESIUM SERPL-MCNC: 2.1 MG/DL (ref 1.6–2.6)
MAGNESIUM SERPL-MCNC: 2.2 MG/DL (ref 1.6–2.6)
MCH RBC QN AUTO: 24.7 PG (ref 24–34)
MCH RBC QN AUTO: 24.9 PG (ref 24–34)
MCH RBC QN AUTO: 25 PG (ref 24–34)
MCH RBC QN AUTO: 25.2 PG (ref 24–34)
MCH RBC QN AUTO: 25.5 PG (ref 24–34)
MCH RBC QN AUTO: 25.5 PG (ref 24–34)
MCH RBC QN AUTO: 25.7 PG (ref 24–34)
MCH RBC QN AUTO: 25.9 PG (ref 24–34)
MCH RBC QN AUTO: 25.9 PG (ref 24–34)
MCH RBC QN AUTO: 26 PG (ref 24–34)
MCH RBC QN AUTO: 26 PG (ref 24–34)
MCH RBC QN AUTO: 26.5 PG (ref 24–34)
MCH RBC QN AUTO: 26.7 PG (ref 24–34)
MCH RBC QN AUTO: 26.8 PG (ref 24–34)
MCH RBC QN AUTO: 26.9 PG (ref 24–34)
MCH RBC QN AUTO: 26.9 PG (ref 24–34)
MCH RBC QN AUTO: 27 PG (ref 24–34)
MCH RBC QN AUTO: 27.1 PG (ref 24–34)
MCH RBC QN AUTO: 27.5 PG (ref 24–34)
MCHC RBC AUTO-ENTMCNC: 28.6 G/DL (ref 31–37)
MCHC RBC AUTO-ENTMCNC: 29.8 G/DL (ref 31–37)
MCHC RBC AUTO-ENTMCNC: 29.9 G/DL (ref 31–37)
MCHC RBC AUTO-ENTMCNC: 30 G/DL (ref 31–37)
MCHC RBC AUTO-ENTMCNC: 30.1 G/DL (ref 31–37)
MCHC RBC AUTO-ENTMCNC: 30.4 G/DL (ref 31–37)
MCHC RBC AUTO-ENTMCNC: 30.7 G/DL (ref 31–37)
MCHC RBC AUTO-ENTMCNC: 30.8 G/DL (ref 31–37)
MCHC RBC AUTO-ENTMCNC: 30.8 G/DL (ref 31–37)
MCHC RBC AUTO-ENTMCNC: 30.9 G/DL (ref 31–37)
MCHC RBC AUTO-ENTMCNC: 31 G/DL (ref 31–37)
MCHC RBC AUTO-ENTMCNC: 31.1 G/DL (ref 31–37)
MCHC RBC AUTO-ENTMCNC: 31.1 G/DL (ref 31–37)
MCHC RBC AUTO-ENTMCNC: 31.2 G/DL (ref 31–37)
MCHC RBC AUTO-ENTMCNC: 31.4 G/DL (ref 31–37)
MCHC RBC AUTO-ENTMCNC: 31.4 G/DL (ref 31–37)
MCHC RBC AUTO-ENTMCNC: 31.5 G/DL (ref 31–37)
MCHC RBC AUTO-ENTMCNC: 32.1 G/DL (ref 31–37)
MCHC RBC AUTO-ENTMCNC: 32.3 G/DL (ref 31–37)
MCHC RBC AUTO-ENTMCNC: 32.5 G/DL (ref 31–37)
MCHC RBC AUTO-ENTMCNC: 33.2 G/DL (ref 31–37)
MCV RBC AUTO: 78.2 FL (ref 78–100)
MCV RBC AUTO: 79.4 FL (ref 78–100)
MCV RBC AUTO: 79.7 FL (ref 78–100)
MCV RBC AUTO: 80 FL (ref 78–100)
MCV RBC AUTO: 81 FL (ref 78–100)
MCV RBC AUTO: 82.3 FL (ref 78–100)
MCV RBC AUTO: 82.4 FL (ref 78–100)
MCV RBC AUTO: 83 FL (ref 78–100)
MCV RBC AUTO: 83 FL (ref 78–100)
MCV RBC AUTO: 83.2 FL (ref 78–100)
MCV RBC AUTO: 83.5 FL (ref 78–100)
MCV RBC AUTO: 85 FL (ref 78–100)
MCV RBC AUTO: 85.4 FL (ref 78–100)
MCV RBC AUTO: 86.1 FL (ref 78–100)
MCV RBC AUTO: 86.1 FL (ref 78–100)
MCV RBC AUTO: 86.5 FL (ref 78–100)
MCV RBC AUTO: 86.8 FL (ref 78–100)
MCV RBC AUTO: 87.1 FL (ref 78–100)
MCV RBC AUTO: 87.3 FL (ref 78–100)
MCV RBC AUTO: 90.1 FL (ref 78–100)
MCV RBC AUTO: 90.4 FL (ref 78–100)
METAMYELOCYTES NFR BLD MANUAL: 1 %
METAMYELOCYTES NFR BLD MANUAL: 1 %
METAMYELOCYTES NFR BLD MANUAL: 2 %
MONOCYTES # BLD: 0.2 K/UL (ref 0.05–1.2)
MONOCYTES # BLD: 0.3 K/UL (ref 0.05–1.2)
MONOCYTES # BLD: 0.4 K/UL (ref 0.05–1.2)
MONOCYTES # BLD: 0.5 K/UL (ref 0.05–1.2)
MONOCYTES # BLD: 0.6 K/UL (ref 0.05–1.2)
MONOCYTES # BLD: 0.7 K/UL (ref 0.05–1.2)
MONOCYTES # BLD: 0.7 K/UL (ref 0.05–1.2)
MONOCYTES # BLD: 0.8 K/UL (ref 0.05–1.2)
MONOCYTES # BLD: 0.8 K/UL (ref 0.05–1.2)
MONOCYTES # BLD: 0.9 K/UL (ref 0.05–1.2)
MONOCYTES # BLD: 1.5 K/UL (ref 0.05–1.2)
MONOCYTES # BLD: 1.8 K/UL (ref 0.05–1.2)
MONOCYTES NFR BLD: 10 % (ref 3–10)
MONOCYTES NFR BLD: 11 % (ref 3–10)
MONOCYTES NFR BLD: 12 % (ref 3–10)
MONOCYTES NFR BLD: 3 % (ref 3–10)
MONOCYTES NFR BLD: 4 % (ref 3–10)
MONOCYTES NFR BLD: 5 % (ref 3–10)
MONOCYTES NFR BLD: 6 % (ref 3–10)
MONOCYTES NFR BLD: 8 % (ref 3–10)
MONOCYTES NFR BLD: 8 % (ref 3–10)
MONOCYTES NFR FLD: 0 %
NEUTROPHILS NFR FLD: 20 %
NEUTS BAND # FLD: 0 %
NEUTS BAND NFR BLD MANUAL: 1 % (ref 0–5)
NEUTS BAND NFR BLD MANUAL: 3 % (ref 0–5)
NEUTS BAND NFR BLD MANUAL: 3 % (ref 0–5)
NEUTS SEG # BLD: 2.8 K/UL (ref 1.8–8)
NEUTS SEG # BLD: 27.8 K/UL (ref 1.8–8)
NEUTS SEG # BLD: 3.2 K/UL (ref 1.8–8)
NEUTS SEG # BLD: 3.6 K/UL (ref 1.8–8)
NEUTS SEG # BLD: 32.5 K/UL (ref 1.8–8)
NEUTS SEG # BLD: 4.2 K/UL (ref 1.8–8)
NEUTS SEG # BLD: 4.5 K/UL (ref 1.8–8)
NEUTS SEG # BLD: 4.8 K/UL (ref 1.8–8)
NEUTS SEG # BLD: 5.1 K/UL (ref 1.8–8)
NEUTS SEG # BLD: 5.1 K/UL (ref 1.8–8)
NEUTS SEG # BLD: 5.7 K/UL (ref 1.8–8)
NEUTS SEG # BLD: 6.1 K/UL (ref 1.8–8)
NEUTS SEG # BLD: 6.2 K/UL (ref 1.8–8)
NEUTS SEG # BLD: 6.3 K/UL (ref 1.8–8)
NEUTS SEG # BLD: 6.5 K/UL (ref 1.8–8)
NEUTS SEG # BLD: 6.8 K/UL (ref 1.8–8)
NEUTS SEG # BLD: 7 K/UL (ref 1.8–8)
NEUTS SEG # BLD: 7.4 K/UL (ref 1.8–8)
NEUTS SEG # BLD: 7.8 K/UL (ref 1.8–8)
NEUTS SEG # BLD: 9.4 K/UL (ref 1.8–8)
NEUTS SEG NFR BLD: 53 % (ref 40–73)
NEUTS SEG NFR BLD: 53 % (ref 40–73)
NEUTS SEG NFR BLD: 61 % (ref 40–73)
NEUTS SEG NFR BLD: 64 % (ref 40–73)
NEUTS SEG NFR BLD: 65 % (ref 40–73)
NEUTS SEG NFR BLD: 66 % (ref 40–73)
NEUTS SEG NFR BLD: 67 % (ref 40–73)
NEUTS SEG NFR BLD: 72 % (ref 40–73)
NEUTS SEG NFR BLD: 74 % (ref 40–73)
NEUTS SEG NFR BLD: 75 % (ref 40–73)
NEUTS SEG NFR BLD: 77 % (ref 40–73)
NEUTS SEG NFR BLD: 77 % (ref 40–73)
NEUTS SEG NFR BLD: 78 % (ref 40–73)
NEUTS SEG NFR BLD: 80 % (ref 40–73)
NEUTS SEG NFR BLD: 83 % (ref 40–73)
NEUTS SEG NFR BLD: 85 % (ref 40–73)
NEUTS SEG NFR BLD: 88 % (ref 40–73)
NEUTS SEG NFR BLD: 91 % (ref 40–73)
NITRITE UR QL STRIP.AUTO: NEGATIVE
NITRITE UR QL STRIP.AUTO: NEGATIVE
NRBC # BLD: 0 K/UL (ref 0–0.01)
NRBC # BLD: 0.02 K/UL (ref 0–0.01)
NRBC BLD-RTO: 0 PER 100 WBC
NUC CELL # FLD: 617 /CU MM
O2/TOTAL GAS SETTING VFR VENT: 100 %
O2/TOTAL GAS SETTING VFR VENT: 2 %
O2/TOTAL GAS SETTING VFR VENT: 30 %
O2/TOTAL GAS SETTING VFR VENT: 35 %
O2/TOTAL GAS SETTING VFR VENT: 35 %
O2/TOTAL GAS SETTING VFR VENT: 50 %
P-R INTERVAL, ECG05: 164 MS
P-R INTERVAL, ECG05: 178 MS
PAW @ MEAN EXP FLOW ON VENT: 10 CMH2O
PCO2 BLD: 18.3 MMHG (ref 35–45)
PCO2 BLD: 22.8 MMHG (ref 35–45)
PCO2 BLD: 24.7 MMHG (ref 35–45)
PCO2 BLD: 27.9 MMHG (ref 35–45)
PCO2 BLD: 29.7 MMHG (ref 35–45)
PCO2 BLD: 29.9 MMHG (ref 35–45)
PCO2 BLD: 34.7 MMHG (ref 35–45)
PCO2 BLD: 35.6 MMHG (ref 35–45)
PCO2 BLD: 37.6 MMHG (ref 35–45)
PCO2 BLDV: 36.1 MMHG (ref 41–51)
PEEP RESPIRATORY: 5 CMH2O
PH BLD: 7.04 [PH] (ref 7.35–7.45)
PH BLD: 7.04 [PH] (ref 7.35–7.45)
PH BLD: 7.24 [PH] (ref 7.35–7.45)
PH BLD: 7.38 [PH] (ref 7.35–7.45)
PH BLD: 7.39 [PH] (ref 7.35–7.45)
PH BLD: 7.4 [PH] (ref 7.35–7.45)
PH BLD: 7.42 [PH] (ref 7.35–7.45)
PH BLD: 7.43 [PH] (ref 7.35–7.45)
PH BLD: 7.44 [PH] (ref 7.35–7.45)
PH BLDV: 7.41 [PH] (ref 7.32–7.42)
PH FLD: 7.7 [PH]
PH UR STRIP: 5.5 [PH] (ref 5–8)
PH UR STRIP: 6 [PH] (ref 5–8)
PHOSPHATE SERPL-MCNC: 1.9 MG/DL (ref 2.5–4.9)
PHOSPHATE SERPL-MCNC: 1.9 MG/DL (ref 2.5–4.9)
PHOSPHATE SERPL-MCNC: 2.3 MG/DL (ref 2.5–4.9)
PHOSPHATE SERPL-MCNC: 2.8 MG/DL (ref 2.5–4.9)
PHOSPHATE SERPL-MCNC: 3.2 MG/DL (ref 2.5–4.9)
PHOSPHATE SERPL-MCNC: 3.3 MG/DL (ref 2.5–4.9)
PHOSPHATE SERPL-MCNC: 3.8 MG/DL (ref 2.5–4.9)
PHOSPHATE SERPL-MCNC: 4.4 MG/DL (ref 2.5–4.9)
PHOSPHATE SERPL-MCNC: 4.8 MG/DL (ref 2.5–4.9)
PHOSPHATE SERPL-MCNC: 5.7 MG/DL (ref 2.5–4.9)
PHOSPHATE SERPL-MCNC: 6.3 MG/DL (ref 2.5–4.9)
PIP ISTAT,IPIP: 17
PIP ISTAT,IPIP: 18
PIP ISTAT,IPIP: 20
PLATELET # BLD AUTO: 205 K/UL (ref 135–420)
PLATELET # BLD AUTO: 206 K/UL (ref 135–420)
PLATELET # BLD AUTO: 210 K/UL (ref 135–420)
PLATELET # BLD AUTO: 225 K/UL (ref 135–420)
PLATELET # BLD AUTO: 227 K/UL (ref 135–420)
PLATELET # BLD AUTO: 234 K/UL (ref 135–420)
PLATELET # BLD AUTO: 243 K/UL (ref 135–420)
PLATELET # BLD AUTO: 247 K/UL (ref 135–420)
PLATELET # BLD AUTO: 251 K/UL (ref 135–420)
PLATELET # BLD AUTO: 261 K/UL (ref 135–420)
PLATELET # BLD AUTO: 263 K/UL (ref 135–420)
PLATELET # BLD AUTO: 276 K/UL (ref 135–420)
PLATELET # BLD AUTO: 302 K/UL (ref 135–420)
PLATELET # BLD AUTO: 322 K/UL (ref 135–420)
PLATELET # BLD AUTO: 334 K/UL (ref 135–420)
PLATELET # BLD AUTO: 352 K/UL (ref 135–420)
PLATELET # BLD AUTO: 430 K/UL (ref 135–420)
PLATELET # BLD AUTO: 492 K/UL (ref 135–420)
PLATELET # BLD AUTO: 518 K/UL (ref 135–420)
PLATELET # BLD AUTO: 556 K/UL (ref 135–420)
PLATELET # BLD AUTO: 608 K/UL (ref 135–420)
PLATELET COMMENTS,PCOM: ABNORMAL
PMV BLD AUTO: 10 FL (ref 9.2–11.8)
PMV BLD AUTO: 10.2 FL (ref 9.2–11.8)
PMV BLD AUTO: 10.2 FL (ref 9.2–11.8)
PMV BLD AUTO: 10.3 FL (ref 9.2–11.8)
PMV BLD AUTO: 10.5 FL (ref 9.2–11.8)
PMV BLD AUTO: 10.5 FL (ref 9.2–11.8)
PMV BLD AUTO: 10.6 FL (ref 9.2–11.8)
PMV BLD AUTO: 10.7 FL (ref 9.2–11.8)
PMV BLD AUTO: 10.9 FL (ref 9.2–11.8)
PMV BLD AUTO: 11.1 FL (ref 9.2–11.8)
PMV BLD AUTO: 11.1 FL (ref 9.2–11.8)
PMV BLD AUTO: 11.3 FL (ref 9.2–11.8)
PMV BLD AUTO: 9.3 FL (ref 9.2–11.8)
PMV BLD AUTO: 9.6 FL (ref 9.2–11.8)
PMV BLD AUTO: 9.7 FL (ref 9.2–11.8)
PMV BLD AUTO: 9.7 FL (ref 9.2–11.8)
PMV BLD AUTO: 9.9 FL (ref 9.2–11.8)
PO2 BLD: 119 MMHG (ref 80–100)
PO2 BLD: 120 MMHG (ref 80–100)
PO2 BLD: 125 MMHG (ref 80–100)
PO2 BLD: 126 MMHG (ref 80–100)
PO2 BLD: 206 MMHG (ref 80–100)
PO2 BLD: 53 MMHG (ref 80–100)
PO2 BLD: 65 MMHG (ref 80–100)
PO2 BLD: 90 MMHG (ref 80–100)
PO2 BLD: 91 MMHG (ref 80–100)
PO2 BLDV: 152 MMHG (ref 25–40)
POTASSIUM SERPL-SCNC: 2.4 MMOL/L (ref 3.5–5.5)
POTASSIUM SERPL-SCNC: 2.8 MMOL/L (ref 3.5–5.5)
POTASSIUM SERPL-SCNC: 2.9 MMOL/L (ref 3.5–5.5)
POTASSIUM SERPL-SCNC: 2.9 MMOL/L (ref 3.5–5.5)
POTASSIUM SERPL-SCNC: 3.1 MMOL/L (ref 3.5–5.5)
POTASSIUM SERPL-SCNC: 3.2 MMOL/L (ref 3.5–5.5)
POTASSIUM SERPL-SCNC: 3.3 MMOL/L (ref 3.5–5.5)
POTASSIUM SERPL-SCNC: 3.4 MMOL/L (ref 3.5–5.5)
POTASSIUM SERPL-SCNC: 3.5 MMOL/L (ref 3.5–5.5)
POTASSIUM SERPL-SCNC: 3.6 MMOL/L (ref 3.5–5.5)
POTASSIUM SERPL-SCNC: 3.7 MMOL/L (ref 3.5–5.5)
POTASSIUM SERPL-SCNC: 3.8 MMOL/L (ref 3.5–5.5)
POTASSIUM SERPL-SCNC: 3.9 MMOL/L (ref 3.5–5.5)
POTASSIUM SERPL-SCNC: 3.9 MMOL/L (ref 3.5–5.5)
POTASSIUM SERPL-SCNC: 4 MMOL/L (ref 3.5–5.5)
POTASSIUM SERPL-SCNC: 4 MMOL/L (ref 3.5–5.5)
POTASSIUM SERPL-SCNC: 4.1 MMOL/L (ref 3.5–5.5)
POTASSIUM SERPL-SCNC: 4.2 MMOL/L (ref 3.5–5.5)
POTASSIUM SERPL-SCNC: 4.2 MMOL/L (ref 3.5–5.5)
POTASSIUM SERPL-SCNC: 4.4 MMOL/L (ref 3.5–5.5)
POTASSIUM SERPL-SCNC: 4.5 MMOL/L (ref 3.5–5.5)
POTASSIUM SERPL-SCNC: 4.9 MMOL/L (ref 3.5–5.5)
POTASSIUM SERPL-SCNC: 5.2 MMOL/L (ref 3.5–5.5)
POTASSIUM SERPL-SCNC: 6 MMOL/L (ref 3.5–5.5)
POTASSIUM SERPL-SCNC: 6.1 MMOL/L (ref 3.5–5.5)
POTASSIUM SERPL-SCNC: 6.5 MMOL/L (ref 3.5–5.5)
POTASSIUM SERPL-SCNC: 6.6 MMOL/L (ref 3.5–5.5)
POTASSIUM SERPL-SCNC: 7.6 MMOL/L (ref 3.5–5.5)
PRESSURE SUPPORT SETTING VENT: 7 CMH2O
PRESSURE SUPPORT SETTING VENT: 7 CMH2O
PROCALCITONIN SERPL-MCNC: 0.98 NG/ML
PROT FLD-MCNC: 2.8 G/DL
PROT SERPL-MCNC: 4.4 G/DL (ref 6.4–8.2)
PROT SERPL-MCNC: 4.7 G/DL (ref 6.4–8.2)
PROT SERPL-MCNC: 5.1 G/DL (ref 6.4–8.2)
PROT SERPL-MCNC: 5.3 G/DL (ref 6.4–8.2)
PROT SERPL-MCNC: 5.6 G/DL (ref 6.4–8.2)
PROT SERPL-MCNC: 5.8 G/DL (ref 6.4–8.2)
PROT SERPL-MCNC: 5.8 G/DL (ref 6.4–8.2)
PROT SERPL-MCNC: 5.9 G/DL (ref 6.4–8.2)
PROT SERPL-MCNC: 6 G/DL (ref 6.4–8.2)
PROT SERPL-MCNC: 6 G/DL (ref 6.4–8.2)
PROT SERPL-MCNC: 6.2 G/DL (ref 6.4–8.2)
PROT SERPL-MCNC: 6.2 G/DL (ref 6.4–8.2)
PROT SERPL-MCNC: 6.3 G/DL (ref 6.4–8.2)
PROT SERPL-MCNC: 6.4 G/DL (ref 6.4–8.2)
PROT SERPL-MCNC: 7.1 G/DL (ref 6.4–8.2)
PROT UR STRIP-MCNC: 100 MG/DL
PROT UR STRIP-MCNC: >1000 MG/DL
PROTHROMBIN TIME: 16 SEC (ref 11.5–15.2)
PROTHROMBIN TIME: 16.5 SEC (ref 11.5–15.2)
PROTHROMBIN TIME: 16.6 SEC (ref 11.5–15.2)
PROTHROMBIN TIME: 16.6 SEC (ref 11.5–15.2)
PROTHROMBIN TIME: 16.8 SEC (ref 11.5–15.2)
PROTHROMBIN TIME: 17.4 SEC (ref 11.5–15.2)
PROTHROMBIN TIME: 18.1 SEC (ref 11.5–15.2)
PROTHROMBIN TIME: 19.5 SEC (ref 11.5–15.2)
Q-T INTERVAL, ECG07: 342 MS
Q-T INTERVAL, ECG07: 358 MS
Q-T INTERVAL, ECG07: 366 MS
QRS DURATION, ECG06: 130 MS
QRS DURATION, ECG06: 82 MS
QRS DURATION, ECG06: 84 MS
QTC CALCULATION (BEZET), ECG08: 454 MS
QTC CALCULATION (BEZET), ECG08: 460 MS
QTC CALCULATION (BEZET), ECG08: 469 MS
RBC # BLD AUTO: 2.45 M/UL (ref 4.35–5.65)
RBC # BLD AUTO: 2.53 M/UL (ref 4.35–5.65)
RBC # BLD AUTO: 2.66 M/UL (ref 4.35–5.65)
RBC # BLD AUTO: 2.68 M/UL (ref 4.35–5.65)
RBC # BLD AUTO: 2.78 M/UL (ref 4.35–5.65)
RBC # BLD AUTO: 2.82 M/UL (ref 4.35–5.65)
RBC # BLD AUTO: 2.85 M/UL (ref 4.35–5.65)
RBC # BLD AUTO: 2.88 M/UL (ref 4.35–5.65)
RBC # BLD AUTO: 2.91 M/UL (ref 4.35–5.65)
RBC # BLD AUTO: 2.96 M/UL (ref 4.35–5.65)
RBC # BLD AUTO: 3.13 M/UL (ref 4.35–5.65)
RBC # BLD AUTO: 3.13 M/UL (ref 4.35–5.65)
RBC # BLD AUTO: 3.18 M/UL (ref 4.35–5.65)
RBC # BLD AUTO: 3.22 M/UL (ref 4.35–5.65)
RBC # BLD AUTO: 3.29 M/UL (ref 4.35–5.65)
RBC # BLD AUTO: 3.38 M/UL (ref 4.35–5.65)
RBC # BLD AUTO: 3.39 M/UL (ref 4.35–5.65)
RBC # BLD AUTO: 3.42 M/UL (ref 4.35–5.65)
RBC # BLD AUTO: 3.5 M/UL (ref 4.35–5.65)
RBC # BLD AUTO: 3.56 M/UL (ref 4.35–5.65)
RBC # BLD AUTO: 3.75 M/UL (ref 4.35–5.65)
RBC # FLD: ABNORMAL /CU MM
RBC #/AREA URNS HPF: ABNORMAL /HPF (ref 0–5)
RBC #/AREA URNS HPF: ABNORMAL /HPF (ref 0–5)
RBC MORPH BLD: ABNORMAL
RIGHT CFA DIST SYS PSV: 174.7 CM/S
RIGHT DIST ATA VELOCITY: 76.9 CM/S
RIGHT DIST PTA PSV: 51.1 CM/S
RIGHT POP A PROX VEL RATIO: 1.08
RIGHT POPLITEAL DIST SYS PSV: 80.6 CM/S
RIGHT POPLITEAL PROX SYS PSV: 84.3 CM/S
RIGHT PROX PFA A PSV: 29 CM/S
RIGHT SFA DIST VEL RATIO: 1.49
RIGHT SFA MID VEL RATIO: 0.6
RIGHT SFA PROX VEL RATIO: 0.5
RIGHT SUPER FEMORAL DIST SYS PSV: 78.1 CM/S
RIGHT SUPER FEMORAL MID SYS PSV: 52.3 CM/S
RIGHT SUPER FEMORAL PROX SYS PSV: 86.7 CM/S
SAO2 % BLD: 85.2 % (ref 92–97)
SAO2 % BLD: 92.3 % (ref 92–97)
SAO2 % BLD: 92.4 % (ref 92–97)
SAO2 % BLD: 97.3 % (ref 92–97)
SAO2 % BLD: 98.1 % (ref 92–97)
SAO2 % BLD: 98.8 % (ref 92–97)
SAO2 % BLD: 99 % (ref 92–97)
SAO2 % BLD: 99.1 % (ref 92–97)
SAO2 % BLD: 99.2 % (ref 92–97)
SAO2 % BLDV: 99.4 % (ref 65–88)
SERVICE CMNT-IMP: ABNORMAL
SERVICE CMNT-IMP: NORMAL
SODIUM SERPL-SCNC: 132 MMOL/L (ref 136–145)
SODIUM SERPL-SCNC: 133 MMOL/L (ref 136–145)
SODIUM SERPL-SCNC: 134 MMOL/L (ref 136–145)
SODIUM SERPL-SCNC: 135 MMOL/L (ref 136–145)
SODIUM SERPL-SCNC: 136 MMOL/L (ref 136–145)
SODIUM SERPL-SCNC: 137 MMOL/L (ref 136–145)
SODIUM SERPL-SCNC: 138 MMOL/L (ref 136–145)
SODIUM SERPL-SCNC: 139 MMOL/L (ref 136–145)
SODIUM SERPL-SCNC: 140 MMOL/L (ref 136–145)
SODIUM SERPL-SCNC: 141 MMOL/L (ref 136–145)
SODIUM SERPL-SCNC: 142 MMOL/L (ref 136–145)
SODIUM SERPL-SCNC: 142 MMOL/L (ref 136–145)
SODIUM SERPL-SCNC: 144 MMOL/L (ref 136–145)
SODIUM SERPL-SCNC: 146 MMOL/L (ref 136–145)
SODIUM SERPL-SCNC: 149 MMOL/L (ref 136–145)
SODIUM SERPL-SCNC: 151 MMOL/L (ref 136–145)
SOURCE, COVRS: NORMAL
SP GR UR REFRACTOMETRY: 1.01 (ref 1–1.03)
SP GR UR REFRACTOMETRY: 1.02 (ref 1–1.03)
SPECIMEN EXP DATE BLD: NORMAL
SPECIMEN EXP DATE BLD: NORMAL
SPECIMEN SOURCE FLD: ABNORMAL
SPECIMEN SOURCE FLD: NORMAL
SPECIMEN TYPE: ABNORMAL
STATUS OF UNIT,%ST: NORMAL
TOTAL RESP. RATE, ITRR: 16
TOTAL RESP. RATE, ITRR: 16
TROPONIN-HIGH SENSITIVITY: 11 NG/L (ref 0–78)
TROPONIN-HIGH SENSITIVITY: 12 NG/L (ref 0–78)
TROPONIN-HIGH SENSITIVITY: 13 NG/L (ref 0–78)
TSH SERPL DL<=0.05 MIU/L-ACNC: 4.73 UIU/ML (ref 0.36–3.74)
UNIT DIVISION, %UDIV: 0
UROBILINOGEN UR QL STRIP.AUTO: 0.2 EU/DL (ref 0.2–1)
UROBILINOGEN UR QL STRIP.AUTO: 1 EU/DL (ref 0.2–1)
VANCOMYCIN SERPL-MCNC: 14.2 UG/ML (ref 5–40)
VANCOMYCIN SERPL-MCNC: 9.2 UG/ML (ref 5–40)
VENTILATION MODE VENT: ABNORMAL
VENTRICULAR RATE, ECG03: 109 BPM
VENTRICULAR RATE, ECG03: 97 BPM
VENTRICULAR RATE, ECG03: 99 BPM
VT SETTING VENT: 400 ML
WBC # BLD AUTO: 11 K/UL (ref 4.6–13.2)
WBC # BLD AUTO: 12.6 K/UL (ref 4.6–13.2)
WBC # BLD AUTO: 30.5 K/UL (ref 4.6–13.2)
WBC # BLD AUTO: 35.4 K/UL (ref 4.6–13.2)
WBC # BLD AUTO: 4.4 K/UL (ref 4.6–13.2)
WBC # BLD AUTO: 42.6 K/UL (ref 4.6–13.2)
WBC # BLD AUTO: 5 K/UL (ref 4.6–13.2)
WBC # BLD AUTO: 5.9 K/UL (ref 4.6–13.2)
WBC # BLD AUTO: 6.2 K/UL (ref 4.6–13.2)
WBC # BLD AUTO: 6.6 K/UL (ref 4.6–13.2)
WBC # BLD AUTO: 7.2 K/UL (ref 4.6–13.2)
WBC # BLD AUTO: 7.3 K/UL (ref 4.6–13.2)
WBC # BLD AUTO: 7.5 K/UL (ref 4.6–13.2)
WBC # BLD AUTO: 8.1 K/UL (ref 4.6–13.2)
WBC # BLD AUTO: 8.3 K/UL (ref 4.6–13.2)
WBC # BLD AUTO: 8.3 K/UL (ref 4.6–13.2)
WBC # BLD AUTO: 8.4 K/UL (ref 4.6–13.2)
WBC # BLD AUTO: 8.5 K/UL (ref 4.6–13.2)
WBC # BLD AUTO: 8.9 K/UL (ref 4.6–13.2)
WBC # BLD AUTO: 9.4 K/UL (ref 4.6–13.2)
WBC # BLD AUTO: 9.4 K/UL (ref 4.6–13.2)
WBC MORPH BLD: ABNORMAL
WBC URNS QL MICRO: ABNORMAL /HPF (ref 0–5)
WBC URNS QL MICRO: ABNORMAL /HPF (ref 0–5)
YEAST URNS QL MICRO: ABNORMAL

## 2022-01-01 PROCEDURE — 85025 COMPLETE CBC W/AUTO DIFF WBC: CPT

## 2022-01-01 PROCEDURE — 74011636637 HC RX REV CODE- 636/637: Performed by: INTERNAL MEDICINE

## 2022-01-01 PROCEDURE — P9047 ALBUMIN (HUMAN), 25%, 50ML: HCPCS | Performed by: INTERNAL MEDICINE

## 2022-01-01 PROCEDURE — 36415 COLL VENOUS BLD VENIPUNCTURE: CPT

## 2022-01-01 PROCEDURE — 77010033678 HC OXYGEN DAILY

## 2022-01-01 PROCEDURE — 77030020782 HC GWN BAIR PAWS FLX 3M -B: Performed by: SURGERY

## 2022-01-01 PROCEDURE — 80048 BASIC METABOLIC PNL TOTAL CA: CPT

## 2022-01-01 PROCEDURE — 92610 EVALUATE SWALLOWING FUNCTION: CPT

## 2022-01-01 PROCEDURE — 84100 ASSAY OF PHOSPHORUS: CPT

## 2022-01-01 PROCEDURE — 99285 EMERGENCY DEPT VISIT HI MDM: CPT

## 2022-01-01 PROCEDURE — 74011000250 HC RX REV CODE- 250: Performed by: FAMILY MEDICINE

## 2022-01-01 PROCEDURE — 96368 THER/DIAG CONCURRENT INF: CPT

## 2022-01-01 PROCEDURE — 74011000258 HC RX REV CODE- 258: Performed by: INTERNAL MEDICINE

## 2022-01-01 PROCEDURE — C9113 INJ PANTOPRAZOLE SODIUM, VIA: HCPCS | Performed by: INTERNAL MEDICINE

## 2022-01-01 PROCEDURE — 74011250636 HC RX REV CODE- 250/636: Performed by: INTERNAL MEDICINE

## 2022-01-01 PROCEDURE — 86900 BLOOD TYPING SEROLOGIC ABO: CPT

## 2022-01-01 PROCEDURE — 74011000250 HC RX REV CODE- 250: Performed by: INTERNAL MEDICINE

## 2022-01-01 PROCEDURE — 83880 ASSAY OF NATRIURETIC PEPTIDE: CPT

## 2022-01-01 PROCEDURE — 83735 ASSAY OF MAGNESIUM: CPT

## 2022-01-01 PROCEDURE — 36600 WITHDRAWAL OF ARTERIAL BLOOD: CPT

## 2022-01-01 PROCEDURE — 99232 SBSQ HOSP IP/OBS MODERATE 35: CPT | Performed by: NURSE PRACTITIONER

## 2022-01-01 PROCEDURE — 77030016154: Performed by: SURGERY

## 2022-01-01 PROCEDURE — 87205 SMEAR GRAM STAIN: CPT

## 2022-01-01 PROCEDURE — 85610 PROTHROMBIN TIME: CPT

## 2022-01-01 PROCEDURE — 74011000250 HC RX REV CODE- 250: Performed by: EMERGENCY MEDICINE

## 2022-01-01 PROCEDURE — 94003 VENT MGMT INPAT SUBQ DAY: CPT

## 2022-01-01 PROCEDURE — 5A1955Z RESPIRATORY VENTILATION, GREATER THAN 96 CONSECUTIVE HOURS: ICD-10-PCS | Performed by: SURGERY

## 2022-01-01 PROCEDURE — 74176 CT ABD & PELVIS W/O CONTRAST: CPT

## 2022-01-01 PROCEDURE — 85018 HEMOGLOBIN: CPT

## 2022-01-01 PROCEDURE — P9047 ALBUMIN (HUMAN), 25%, 50ML: HCPCS | Performed by: HOSPITALIST

## 2022-01-01 PROCEDURE — 84132 ASSAY OF SERUM POTASSIUM: CPT

## 2022-01-01 PROCEDURE — 74018 RADEX ABDOMEN 1 VIEW: CPT

## 2022-01-01 PROCEDURE — 51798 US URINE CAPACITY MEASURE: CPT

## 2022-01-01 PROCEDURE — 36430 TRANSFUSION BLD/BLD COMPNT: CPT

## 2022-01-01 PROCEDURE — 77030008462 HC STPLR SKN PROX J&J -A: Performed by: SURGERY

## 2022-01-01 PROCEDURE — 88112 CYTOPATH CELL ENHANCE TECH: CPT

## 2022-01-01 PROCEDURE — 94640 AIRWAY INHALATION TREATMENT: CPT

## 2022-01-01 PROCEDURE — 87635 SARS-COV-2 COVID-19 AMP PRB: CPT

## 2022-01-01 PROCEDURE — 74011250636 HC RX REV CODE- 250/636: Performed by: FAMILY MEDICINE

## 2022-01-01 PROCEDURE — 65270000046 HC RM TELEMETRY

## 2022-01-01 PROCEDURE — 99222 1ST HOSP IP/OBS MODERATE 55: CPT | Performed by: NURSE PRACTITIONER

## 2022-01-01 PROCEDURE — 87116 MYCOBACTERIA CULTURE: CPT

## 2022-01-01 PROCEDURE — 74011250636 HC RX REV CODE- 250/636: Performed by: HOSPITALIST

## 2022-01-01 PROCEDURE — 87077 CULTURE AEROBIC IDENTIFY: CPT

## 2022-01-01 PROCEDURE — 74011636637 HC RX REV CODE- 636/637: Performed by: EMERGENCY MEDICINE

## 2022-01-01 PROCEDURE — 82330 ASSAY OF CALCIUM: CPT

## 2022-01-01 PROCEDURE — 82803 BLOOD GASES ANY COMBINATION: CPT

## 2022-01-01 PROCEDURE — 74011250636 HC RX REV CODE- 250/636: Performed by: EMERGENCY MEDICINE

## 2022-01-01 PROCEDURE — 65610000006 HC RM INTENSIVE CARE

## 2022-01-01 PROCEDURE — 92526 ORAL FUNCTION THERAPY: CPT

## 2022-01-01 PROCEDURE — 87040 BLOOD CULTURE FOR BACTERIA: CPT

## 2022-01-01 PROCEDURE — 65270000029 HC RM PRIVATE

## 2022-01-01 PROCEDURE — 84145 PROCALCITONIN (PCT): CPT

## 2022-01-01 PROCEDURE — 99233 SBSQ HOSP IP/OBS HIGH 50: CPT | Performed by: NURSE PRACTITIONER

## 2022-01-01 PROCEDURE — 74011250637 HC RX REV CODE- 250/637: Performed by: FAMILY MEDICINE

## 2022-01-01 PROCEDURE — 74011000258 HC RX REV CODE- 258: Performed by: EMERGENCY MEDICINE

## 2022-01-01 PROCEDURE — 74011250637 HC RX REV CODE- 250/637: Performed by: STUDENT IN AN ORGANIZED HEALTH CARE EDUCATION/TRAINING PROGRAM

## 2022-01-01 PROCEDURE — 76060000033 HC ANESTHESIA 1 TO 1.5 HR: Performed by: SURGERY

## 2022-01-01 PROCEDURE — 77030008683 HC TU ET CUF COVD -A: Performed by: SPECIALIST

## 2022-01-01 PROCEDURE — 77030003029 HC SUT VCRL J&J -B: Performed by: SURGERY

## 2022-01-01 PROCEDURE — 74011000250 HC RX REV CODE- 250: Performed by: HOSPITALIST

## 2022-01-01 PROCEDURE — 82962 GLUCOSE BLOOD TEST: CPT

## 2022-01-01 PROCEDURE — 77030005513 HC CATH URETH FOL11 MDII -B

## 2022-01-01 PROCEDURE — 80053 COMPREHEN METABOLIC PANEL: CPT

## 2022-01-01 PROCEDURE — 74011250637 HC RX REV CODE- 250/637: Performed by: INTERNAL MEDICINE

## 2022-01-01 PROCEDURE — 94002 VENT MGMT INPAT INIT DAY: CPT

## 2022-01-01 PROCEDURE — 89051 BODY FLUID CELL COUNT: CPT

## 2022-01-01 PROCEDURE — 84157 ASSAY OF PROTEIN OTHER: CPT

## 2022-01-01 PROCEDURE — 93306 TTE W/DOPPLER COMPLETE: CPT

## 2022-01-01 PROCEDURE — 71045 X-RAY EXAM CHEST 1 VIEW: CPT

## 2022-01-01 PROCEDURE — 96375 TX/PRO/DX INJ NEW DRUG ADDON: CPT

## 2022-01-01 PROCEDURE — 74011250637 HC RX REV CODE- 250/637: Performed by: HOSPITALIST

## 2022-01-01 PROCEDURE — 83036 HEMOGLOBIN GLYCOSYLATED A1C: CPT

## 2022-01-01 PROCEDURE — 83605 ASSAY OF LACTIC ACID: CPT

## 2022-01-01 PROCEDURE — 0BH17EZ INSERTION OF ENDOTRACHEAL AIRWAY INTO TRACHEA, VIA NATURAL OR ARTIFICIAL OPENING: ICD-10-PCS | Performed by: SURGERY

## 2022-01-01 PROCEDURE — 96365 THER/PROPH/DIAG IV INF INIT: CPT

## 2022-01-01 PROCEDURE — 96367 TX/PROPH/DG ADDL SEQ IV INF: CPT

## 2022-01-01 PROCEDURE — 83986 ASSAY PH BODY FLUID NOS: CPT

## 2022-01-01 PROCEDURE — 77030031138 HC SUT VCRL1 J&J -A: Performed by: SURGERY

## 2022-01-01 PROCEDURE — 84484 ASSAY OF TROPONIN QUANT: CPT

## 2022-01-01 PROCEDURE — 94761 N-INVAS EAR/PLS OXIMETRY MLT: CPT

## 2022-01-01 PROCEDURE — 99233 SBSQ HOSP IP/OBS HIGH 50: CPT | Performed by: INTERNAL MEDICINE

## 2022-01-01 PROCEDURE — 83615 LACTATE (LD) (LDH) ENZYME: CPT

## 2022-01-01 PROCEDURE — 86923 COMPATIBILITY TEST ELECTRIC: CPT

## 2022-01-01 PROCEDURE — C1781 MESH (IMPLANTABLE): HCPCS | Performed by: SURGERY

## 2022-01-01 PROCEDURE — 87102 FUNGUS ISOLATION CULTURE: CPT

## 2022-01-01 PROCEDURE — 77030040393 HC DRSG OPTIFOAM GENT MDII -B

## 2022-01-01 PROCEDURE — 77030008477 HC STYL SATN SLP COVD -A: Performed by: SPECIALIST

## 2022-01-01 PROCEDURE — 77030040392 HC DRSG OPTIFOAM MDII -A

## 2022-01-01 PROCEDURE — 77030020291 HC FLEXSEAL FMS BMS -C

## 2022-01-01 PROCEDURE — 87186 SC STD MICRODIL/AGAR DIL: CPT

## 2022-01-01 PROCEDURE — 77030040830 HC CATH URETH FOL MDII -A: Performed by: SURGERY

## 2022-01-01 PROCEDURE — 74011636637 HC RX REV CODE- 636/637: Performed by: HOSPITALIST

## 2022-01-01 PROCEDURE — 77030040361 HC SLV COMPR DVT MDII -B: Performed by: SURGERY

## 2022-01-01 PROCEDURE — 77030011264 HC ELECTRD BLD EXT COVD -A: Performed by: SURGERY

## 2022-01-01 PROCEDURE — 70450 CT HEAD/BRAIN W/O DYE: CPT

## 2022-01-01 PROCEDURE — 2709999900 HC NON-CHARGEABLE SUPPLY

## 2022-01-01 PROCEDURE — 77030006643: Performed by: SPECIALIST

## 2022-01-01 PROCEDURE — 77030031139 HC SUT VCRL2 J&J -A: Performed by: SURGERY

## 2022-01-01 PROCEDURE — 95816 EEG AWAKE AND DROWSY: CPT | Performed by: FAMILY MEDICINE

## 2022-01-01 PROCEDURE — 87086 URINE CULTURE/COLONY COUNT: CPT

## 2022-01-01 PROCEDURE — 74011000250 HC RX REV CODE- 250: Performed by: NURSE ANESTHETIST, CERTIFIED REGISTERED

## 2022-01-01 PROCEDURE — 94760 N-INVAS EAR/PLS OXIMETRY 1: CPT

## 2022-01-01 PROCEDURE — 82042 OTHER SOURCE ALBUMIN QUAN EA: CPT

## 2022-01-01 PROCEDURE — 2709999900 HC NON-CHARGEABLE SUPPLY: Performed by: SURGERY

## 2022-01-01 PROCEDURE — 81001 URINALYSIS AUTO W/SCOPE: CPT

## 2022-01-01 PROCEDURE — 32555 ASPIRATE PLEURA W/ IMAGING: CPT

## 2022-01-01 PROCEDURE — 74011250636 HC RX REV CODE- 250/636

## 2022-01-01 PROCEDURE — 74011000258 HC RX REV CODE- 258: Performed by: FAMILY MEDICINE

## 2022-01-01 PROCEDURE — 74011000250 HC RX REV CODE- 250

## 2022-01-01 PROCEDURE — 77030013079 HC BLNKT BAIR HGGR 3M -A: Performed by: SPECIALIST

## 2022-01-01 PROCEDURE — 99231 SBSQ HOSP IP/OBS SF/LOW 25: CPT | Performed by: NURSE PRACTITIONER

## 2022-01-01 PROCEDURE — 85027 COMPLETE CBC AUTOMATED: CPT

## 2022-01-01 PROCEDURE — 93005 ELECTROCARDIOGRAM TRACING: CPT

## 2022-01-01 PROCEDURE — 74011250636 HC RX REV CODE- 250/636: Performed by: NURSE ANESTHETIST, CERTIFIED REGISTERED

## 2022-01-01 PROCEDURE — 0W993ZZ DRAINAGE OF RIGHT PLEURAL CAVITY, PERCUTANEOUS APPROACH: ICD-10-PCS | Performed by: SURGERY

## 2022-01-01 PROCEDURE — 82140 ASSAY OF AMMONIA: CPT

## 2022-01-01 PROCEDURE — 0YU50JZ SUPPLEMENT RIGHT INGUINAL REGION WITH SYNTHETIC SUBSTITUTE, OPEN APPROACH: ICD-10-PCS | Performed by: SURGERY

## 2022-01-01 PROCEDURE — 76010000149 HC OR TIME 1 TO 1.5 HR: Performed by: SURGERY

## 2022-01-01 PROCEDURE — 77030002966 HC SUT PDS J&J -A: Performed by: SURGERY

## 2022-01-01 PROCEDURE — 74011250637 HC RX REV CODE- 250/637: Performed by: NURSE PRACTITIONER

## 2022-01-01 PROCEDURE — P9016 RBC LEUKOCYTES REDUCED: HCPCS

## 2022-01-01 PROCEDURE — 74011000258 HC RX REV CODE- 258: Performed by: NURSE ANESTHETIST, CERTIFIED REGISTERED

## 2022-01-01 PROCEDURE — 82945 GLUCOSE OTHER FLUID: CPT

## 2022-01-01 PROCEDURE — 84311 SPECTROPHOTOMETRY: CPT

## 2022-01-01 PROCEDURE — 88302 TISSUE EXAM BY PATHOLOGIST: CPT

## 2022-01-01 PROCEDURE — 80202 ASSAY OF VANCOMYCIN: CPT

## 2022-01-01 PROCEDURE — 88305 TISSUE EXAM BY PATHOLOGIST: CPT

## 2022-01-01 PROCEDURE — 84443 ASSAY THYROID STIM HORMONE: CPT

## 2022-01-01 PROCEDURE — 93926 LOWER EXTREMITY STUDY: CPT

## 2022-01-01 PROCEDURE — 74011250637 HC RX REV CODE- 250/637: Performed by: EMERGENCY MEDICINE

## 2022-01-01 PROCEDURE — 87015 SPECIMEN INFECT AGNT CONCNTJ: CPT

## 2022-01-01 PROCEDURE — 87070 CULTURE OTHR SPECIMN AEROBIC: CPT

## 2022-01-01 PROCEDURE — 83690 ASSAY OF LIPASE: CPT

## 2022-01-01 PROCEDURE — 70551 MRI BRAIN STEM W/O DYE: CPT

## 2022-01-01 PROCEDURE — 77030003028 HC SUT VCRL J&J -A: Performed by: SURGERY

## 2022-01-01 DEVICE — MESH SURG W6XL6IN POLY POLYLACTIC ACID MFIL KNIT RECTANG: Type: IMPLANTABLE DEVICE | Site: GROIN | Status: FUNCTIONAL

## 2022-01-01 RX ORDER — CALCIUM GLUCONATE 20 MG/ML
2 INJECTION, SOLUTION INTRAVENOUS ONCE
Status: COMPLETED | OUTPATIENT
Start: 2022-01-01 | End: 2022-01-01

## 2022-01-01 RX ORDER — SODIUM BICARBONATE 650 MG/1
650 TABLET ORAL 3 TIMES DAILY
Status: DISCONTINUED | OUTPATIENT
Start: 2022-01-01 | End: 2022-01-01

## 2022-01-01 RX ORDER — ALBUMIN HUMAN 250 G/1000ML
12.5 SOLUTION INTRAVENOUS EVERY 6 HOURS
Status: DISCONTINUED | OUTPATIENT
Start: 2022-01-01 | End: 2022-01-01

## 2022-01-01 RX ORDER — MAGNESIUM SULFATE 1 G/100ML
1 INJECTION INTRAVENOUS ONCE
Status: COMPLETED | OUTPATIENT
Start: 2022-01-01 | End: 2022-01-01

## 2022-01-01 RX ORDER — MAGNESIUM SULFATE HEPTAHYDRATE 40 MG/ML
2 INJECTION, SOLUTION INTRAVENOUS ONCE
Status: COMPLETED | OUTPATIENT
Start: 2022-01-01 | End: 2022-01-01

## 2022-01-01 RX ORDER — SODIUM CHLORIDE 9 MG/ML
250 INJECTION, SOLUTION INTRAVENOUS AS NEEDED
Status: DISCONTINUED | OUTPATIENT
Start: 2022-01-01 | End: 2022-01-01

## 2022-01-01 RX ORDER — MAGNESIUM SULFATE 100 %
4 CRYSTALS MISCELLANEOUS AS NEEDED
Status: DISCONTINUED | OUTPATIENT
Start: 2022-01-01 | End: 2022-01-01

## 2022-01-01 RX ORDER — SODIUM BICARBONATE 84 MG/ML
100 INJECTION, SOLUTION INTRAVENOUS ONCE
Status: COMPLETED | OUTPATIENT
Start: 2022-01-01 | End: 2022-01-01

## 2022-01-01 RX ORDER — IPRATROPIUM BROMIDE 0.5 MG/2.5ML
0.5 SOLUTION RESPIRATORY (INHALATION)
Status: DISCONTINUED | OUTPATIENT
Start: 2022-01-01 | End: 2022-01-01

## 2022-01-01 RX ORDER — SAME BUTANEDISULFONATE/BETAINE 400-600 MG
250 POWDER IN PACKET (EA) ORAL 2 TIMES DAILY
Status: DISCONTINUED | OUTPATIENT
Start: 2022-01-01 | End: 2022-01-01

## 2022-01-01 RX ORDER — POTASSIUM CHLORIDE 750 MG/1
10 TABLET, EXTENDED RELEASE ORAL ONCE
Status: COMPLETED | OUTPATIENT
Start: 2022-01-01 | End: 2022-01-01

## 2022-01-01 RX ORDER — ETOMIDATE 2 MG/ML
INJECTION INTRAVENOUS AS NEEDED
Status: DISCONTINUED | OUTPATIENT
Start: 2022-01-01 | End: 2022-01-01 | Stop reason: HOSPADM

## 2022-01-01 RX ORDER — MIDAZOLAM IN 0.9 % SOD.CHLORID 1 MG/ML
0-5 PLASTIC BAG, INJECTION (ML) INTRAVENOUS
Status: DISCONTINUED | OUTPATIENT
Start: 2022-01-01 | End: 2022-01-01

## 2022-01-01 RX ORDER — ACETAMINOPHEN 650 MG/1
650 SUPPOSITORY RECTAL
Status: DISCONTINUED | OUTPATIENT
Start: 2022-01-01 | End: 2022-01-01

## 2022-01-01 RX ORDER — ROCURONIUM BROMIDE 10 MG/ML
INJECTION, SOLUTION INTRAVENOUS AS NEEDED
Status: DISCONTINUED | OUTPATIENT
Start: 2022-01-01 | End: 2022-01-01 | Stop reason: HOSPADM

## 2022-01-01 RX ORDER — CALCIUM GLUCONATE 94 MG/ML
1 INJECTION, SOLUTION INTRAVENOUS
Status: COMPLETED | OUTPATIENT
Start: 2022-01-01 | End: 2022-01-01

## 2022-01-01 RX ORDER — MIDAZOLAM HYDROCHLORIDE 1 MG/ML
0.5 INJECTION, SOLUTION INTRAMUSCULAR; INTRAVENOUS
Status: DISCONTINUED | OUTPATIENT
Start: 2022-01-01 | End: 2022-01-01

## 2022-01-01 RX ORDER — LORAZEPAM 2 MG/ML
4 INJECTION INTRAMUSCULAR
Status: DISCONTINUED | OUTPATIENT
Start: 2022-01-01 | End: 2022-01-01

## 2022-01-01 RX ORDER — POTASSIUM CHLORIDE 20 MEQ/1
40 TABLET, EXTENDED RELEASE ORAL ONCE
Status: COMPLETED | OUTPATIENT
Start: 2022-01-01 | End: 2022-01-01

## 2022-01-01 RX ORDER — POVIDONE-IODINE 10 %
SOLUTION, NON-ORAL TOPICAL
Status: DISPENSED | OUTPATIENT
Start: 2022-01-01 | End: 2022-01-01

## 2022-01-01 RX ORDER — FLUCONAZOLE 150 MG/1
150 TABLET ORAL
Qty: 1 TABLET | Refills: 0 | Status: SHIPPED | OUTPATIENT
Start: 2022-01-01 | End: 2022-01-01

## 2022-01-01 RX ORDER — INSULIN GLARGINE 100 [IU]/ML
5 INJECTION, SOLUTION SUBCUTANEOUS ONCE
Status: DISCONTINUED | OUTPATIENT
Start: 2022-01-01 | End: 2022-01-01

## 2022-01-01 RX ORDER — PHENYTOIN SODIUM 50 MG/ML
300 INJECTION, SOLUTION INTRAMUSCULAR; INTRAVENOUS EVERY 8 HOURS
Status: DISCONTINUED | OUTPATIENT
Start: 2022-01-01 | End: 2022-01-01

## 2022-01-01 RX ORDER — SODIUM POLYSTYRENE SULFONATE 15 G/60ML
15 SUSPENSION ORAL; RECTAL
Status: COMPLETED | OUTPATIENT
Start: 2022-01-01 | End: 2022-01-01

## 2022-01-01 RX ORDER — MIDODRINE HYDROCHLORIDE 2.5 MG/1
2.5 TABLET ORAL 2 TIMES DAILY
Status: DISCONTINUED | OUTPATIENT
Start: 2022-01-01 | End: 2022-01-01

## 2022-01-01 RX ORDER — NOREPINEPHRINE BITARTRATE/D5W 8 MG/250ML
2-16 PLASTIC BAG, INJECTION (ML) INTRAVENOUS
Status: DISCONTINUED | OUTPATIENT
Start: 2022-01-01 | End: 2022-01-01

## 2022-01-01 RX ORDER — FUROSEMIDE 10 MG/ML
40 INJECTION INTRAMUSCULAR; INTRAVENOUS ONCE
Status: COMPLETED | OUTPATIENT
Start: 2022-01-01 | End: 2022-01-01

## 2022-01-01 RX ORDER — HYDRALAZINE HYDROCHLORIDE 20 MG/ML
20 INJECTION INTRAMUSCULAR; INTRAVENOUS
Status: DISCONTINUED | OUTPATIENT
Start: 2022-01-01 | End: 2022-01-01

## 2022-01-01 RX ORDER — FUROSEMIDE 10 MG/ML
20 INJECTION INTRAMUSCULAR; INTRAVENOUS DAILY
Status: DISCONTINUED | OUTPATIENT
Start: 2022-01-01 | End: 2022-01-01

## 2022-01-01 RX ORDER — DEXTROSE MONOHYDRATE 100 MG/ML
100 INJECTION, SOLUTION INTRAVENOUS ONCE
Status: COMPLETED | OUTPATIENT
Start: 2022-01-01 | End: 2022-01-01

## 2022-01-01 RX ORDER — LANOLIN ALCOHOL/MO/W.PET/CERES
400 CREAM (GRAM) TOPICAL ONCE
Status: COMPLETED | OUTPATIENT
Start: 2022-01-01 | End: 2022-01-01

## 2022-01-01 RX ORDER — MIDAZOLAM IN 0.9 % SOD.CHLORID 1 MG/ML
0-5 PLASTIC BAG, INJECTION (ML) INTRAVENOUS
Status: DISCONTINUED | OUTPATIENT
Start: 2022-01-01 | End: 2022-01-01 | Stop reason: SDUPTHER

## 2022-01-01 RX ORDER — SODIUM CHLORIDE 0.9 % (FLUSH) 0.9 %
5-40 SYRINGE (ML) INJECTION AS NEEDED
Status: DISCONTINUED | OUTPATIENT
Start: 2022-01-01 | End: 2022-01-01

## 2022-01-01 RX ORDER — SODIUM BICARBONATE 1 MEQ/ML
50 SYRINGE (ML) INTRAVENOUS ONCE
Status: DISCONTINUED | OUTPATIENT
Start: 2022-01-01 | End: 2022-01-01

## 2022-01-01 RX ORDER — IPRATROPIUM BROMIDE 0.5 MG/2.5ML
0.5 SOLUTION RESPIRATORY (INHALATION) EVERY 8 HOURS
Status: DISCONTINUED | OUTPATIENT
Start: 2022-01-01 | End: 2022-01-01

## 2022-01-01 RX ORDER — LIDOCAINE HYDROCHLORIDE 10 MG/ML
10 INJECTION, SOLUTION EPIDURAL; INFILTRATION; INTRACAUDAL; PERINEURAL
Status: COMPLETED | OUTPATIENT
Start: 2022-01-01 | End: 2022-01-01

## 2022-01-01 RX ORDER — SCOLOPAMINE TRANSDERMAL SYSTEM 1 MG/1
1 PATCH, EXTENDED RELEASE TRANSDERMAL
Status: DISCONTINUED | OUTPATIENT
Start: 2022-01-01 | End: 2022-09-21 | Stop reason: HOSPADM

## 2022-01-01 RX ORDER — PHENYLEPHRINE HCL IN 0.9% NACL 1 MG/10 ML
SYRINGE (ML) INTRAVENOUS AS NEEDED
Status: DISCONTINUED | OUTPATIENT
Start: 2022-01-01 | End: 2022-01-01 | Stop reason: HOSPADM

## 2022-01-01 RX ORDER — ONDANSETRON 4 MG/1
4 TABLET, ORALLY DISINTEGRATING ORAL
Status: DISCONTINUED | OUTPATIENT
Start: 2022-01-01 | End: 2022-01-01

## 2022-01-01 RX ORDER — SODIUM CHLORIDE 9 MG/ML
INJECTION, SOLUTION INTRAVENOUS
Status: DISCONTINUED | OUTPATIENT
Start: 2022-01-01 | End: 2022-01-01 | Stop reason: HOSPADM

## 2022-01-01 RX ORDER — POTASSIUM CHLORIDE 20 MEQ/1
40 TABLET, EXTENDED RELEASE ORAL 2 TIMES DAILY
Status: DISCONTINUED | OUTPATIENT
Start: 2022-01-01 | End: 2022-01-01 | Stop reason: ALTCHOICE

## 2022-01-01 RX ORDER — METOPROLOL TARTRATE 5 MG/5ML
1.25 INJECTION INTRAVENOUS EVERY 6 HOURS
Status: DISCONTINUED | OUTPATIENT
Start: 2022-01-01 | End: 2022-01-01

## 2022-01-01 RX ORDER — DEXTROSE MONOHYDRATE 100 MG/ML
0-250 INJECTION, SOLUTION INTRAVENOUS AS NEEDED
Status: DISCONTINUED | OUTPATIENT
Start: 2022-01-01 | End: 2022-01-01

## 2022-01-01 RX ORDER — OXYCODONE HYDROCHLORIDE 5 MG/1
5 TABLET ORAL
Status: DISCONTINUED | OUTPATIENT
Start: 2022-01-01 | End: 2022-01-01

## 2022-01-01 RX ORDER — ALBUMIN HUMAN 250 G/1000ML
25 SOLUTION INTRAVENOUS EVERY 6 HOURS
Status: DISCONTINUED | OUTPATIENT
Start: 2022-01-01 | End: 2022-01-01

## 2022-01-01 RX ORDER — SODIUM CHLORIDE 9 MG/ML
5 INJECTION, SOLUTION INTRAVENOUS CONTINUOUS
Status: DISCONTINUED | OUTPATIENT
Start: 2022-01-01 | End: 2022-01-01

## 2022-01-01 RX ORDER — BUDESONIDE 0.5 MG/2ML
500 INHALANT ORAL
Status: DISCONTINUED | OUTPATIENT
Start: 2022-01-01 | End: 2022-01-01

## 2022-01-01 RX ORDER — FUROSEMIDE 10 MG/ML
20 INJECTION INTRAMUSCULAR; INTRAVENOUS 2 TIMES DAILY
Status: DISCONTINUED | OUTPATIENT
Start: 2022-01-01 | End: 2022-01-01

## 2022-01-01 RX ORDER — LORAZEPAM 2 MG/ML
1 INJECTION, SOLUTION INTRAMUSCULAR; INTRAVENOUS ONCE
Status: COMPLETED | OUTPATIENT
Start: 2022-01-01 | End: 2022-01-01

## 2022-01-01 RX ORDER — FUROSEMIDE 10 MG/ML
40 INJECTION INTRAMUSCULAR; INTRAVENOUS 2 TIMES DAILY
Status: DISCONTINUED | OUTPATIENT
Start: 2022-01-01 | End: 2022-01-01

## 2022-01-01 RX ORDER — DEXTROSE MONOHYDRATE 100 MG/ML
250 INJECTION, SOLUTION INTRAVENOUS ONCE
Status: COMPLETED | OUTPATIENT
Start: 2022-01-01 | End: 2022-01-01

## 2022-01-01 RX ORDER — POTASSIUM CHLORIDE 7.45 MG/ML
10 INJECTION INTRAVENOUS
Status: COMPLETED | OUTPATIENT
Start: 2022-01-01 | End: 2022-01-01

## 2022-01-01 RX ORDER — LEVOFLOXACIN 5 MG/ML
750 INJECTION, SOLUTION INTRAVENOUS EVERY 24 HOURS
Status: DISCONTINUED | OUTPATIENT
Start: 2022-01-01 | End: 2022-01-01

## 2022-01-01 RX ORDER — INSULIN GLARGINE 100 [IU]/ML
20 INJECTION, SOLUTION SUBCUTANEOUS DAILY
Status: DISCONTINUED | OUTPATIENT
Start: 2022-01-01 | End: 2022-01-01

## 2022-01-01 RX ORDER — LORAZEPAM 2 MG/ML
2 INJECTION, SOLUTION INTRAMUSCULAR; INTRAVENOUS
Status: DISCONTINUED | OUTPATIENT
Start: 2022-01-01 | End: 2022-01-01 | Stop reason: RX

## 2022-01-01 RX ORDER — INSULIN GLARGINE 100 [IU]/ML
15 INJECTION, SOLUTION SUBCUTANEOUS DAILY
Status: DISCONTINUED | OUTPATIENT
Start: 2022-01-01 | End: 2022-01-01

## 2022-01-01 RX ORDER — SODIUM CHLORIDE 0.9 % (FLUSH) 0.9 %
5-40 SYRINGE (ML) INJECTION AS NEEDED
Status: DISCONTINUED | OUTPATIENT
Start: 2022-01-01 | End: 2022-01-01 | Stop reason: SDUPTHER

## 2022-01-01 RX ORDER — ALBUMIN HUMAN 250 G/1000ML
25 SOLUTION INTRAVENOUS EVERY 12 HOURS
Status: DISCONTINUED | OUTPATIENT
Start: 2022-01-01 | End: 2022-01-01

## 2022-01-01 RX ORDER — DEXTROSE MONOHYDRATE 50 MG/ML
50 INJECTION, SOLUTION INTRAVENOUS CONTINUOUS
Status: DISCONTINUED | OUTPATIENT
Start: 2022-01-01 | End: 2022-01-01

## 2022-01-01 RX ORDER — SODIUM BICARBONATE 1 MEQ/ML
100 SYRINGE (ML) INTRAVENOUS ONCE
Status: COMPLETED | OUTPATIENT
Start: 2022-01-01 | End: 2022-01-01

## 2022-01-01 RX ORDER — INSULIN LISPRO 100 [IU]/ML
INJECTION, SOLUTION INTRAVENOUS; SUBCUTANEOUS
Status: DISCONTINUED | OUTPATIENT
Start: 2022-01-01 | End: 2022-01-01

## 2022-01-01 RX ORDER — HEPARIN SODIUM 5000 [USP'U]/ML
5000 INJECTION, SOLUTION INTRAVENOUS; SUBCUTANEOUS EVERY 8 HOURS
Status: DISCONTINUED | OUTPATIENT
Start: 2022-01-01 | End: 2022-01-01

## 2022-01-01 RX ORDER — DIAZEPAM 10 MG/2ML
0.15 INJECTION INTRAMUSCULAR
Status: DISCONTINUED | OUTPATIENT
Start: 2022-01-01 | End: 2022-09-21 | Stop reason: HOSPADM

## 2022-01-01 RX ORDER — MIDAZOLAM HYDROCHLORIDE 1 MG/ML
10 INJECTION, SOLUTION INTRAMUSCULAR; INTRAVENOUS ONCE
Status: DISCONTINUED | OUTPATIENT
Start: 2022-01-01 | End: 2022-01-01

## 2022-01-01 RX ORDER — ATORVASTATIN CALCIUM 20 MG/1
40 TABLET, FILM COATED ORAL
Status: DISCONTINUED | OUTPATIENT
Start: 2022-01-01 | End: 2022-01-01

## 2022-01-01 RX ORDER — ACETAMINOPHEN 325 MG/1
650 TABLET ORAL
Status: DISCONTINUED | OUTPATIENT
Start: 2022-01-01 | End: 2022-01-01

## 2022-01-01 RX ORDER — POTASSIUM CHLORIDE 20 MEQ/1
40 TABLET, EXTENDED RELEASE ORAL DAILY
Status: DISCONTINUED | OUTPATIENT
Start: 2022-01-01 | End: 2022-01-01 | Stop reason: ALTCHOICE

## 2022-01-01 RX ORDER — SODIUM CHLORIDE 0.9 % (FLUSH) 0.9 %
5-40 SYRINGE (ML) INJECTION EVERY 8 HOURS
Status: DISCONTINUED | OUTPATIENT
Start: 2022-01-01 | End: 2022-01-01 | Stop reason: SDUPTHER

## 2022-01-01 RX ORDER — LEVOFLOXACIN 750 MG/1
750 TABLET ORAL DAILY
Qty: 5 TABLET | Refills: 0 | Status: SHIPPED | OUTPATIENT
Start: 2022-01-01

## 2022-01-01 RX ORDER — INSULIN LISPRO 100 [IU]/ML
INJECTION, SOLUTION INTRAVENOUS; SUBCUTANEOUS EVERY 6 HOURS
Status: DISCONTINUED | OUTPATIENT
Start: 2022-01-01 | End: 2022-01-01

## 2022-01-01 RX ORDER — MAGNESIUM SULFATE 1 G/100ML
1 INJECTION INTRAVENOUS
Status: COMPLETED | OUTPATIENT
Start: 2022-01-01 | End: 2022-01-01

## 2022-01-01 RX ORDER — ONDANSETRON 2 MG/ML
4 INJECTION INTRAMUSCULAR; INTRAVENOUS
Status: DISCONTINUED | OUTPATIENT
Start: 2022-01-01 | End: 2022-09-21 | Stop reason: HOSPADM

## 2022-01-01 RX ORDER — CHLORHEXIDINE GLUCONATE 1.2 MG/ML
10 RINSE ORAL EVERY 12 HOURS
Status: DISCONTINUED | OUTPATIENT
Start: 2022-01-01 | End: 2022-01-01

## 2022-01-01 RX ORDER — POTASSIUM CHLORIDE 7.45 MG/ML
10 INJECTION INTRAVENOUS
Status: DISPENSED | OUTPATIENT
Start: 2022-01-01 | End: 2022-01-01

## 2022-01-01 RX ORDER — ACETAMINOPHEN 325 MG/1
650 TABLET ORAL
Status: DISCONTINUED | OUTPATIENT
Start: 2022-01-01 | End: 2022-09-21 | Stop reason: HOSPADM

## 2022-01-01 RX ORDER — LORAZEPAM 2 MG/ML
INJECTION, SOLUTION INTRAMUSCULAR; INTRAVENOUS
Status: COMPLETED
Start: 2022-01-01 | End: 2022-01-01

## 2022-01-01 RX ORDER — HYDROMORPHONE HYDROCHLORIDE 1 MG/ML
1 INJECTION, SOLUTION INTRAMUSCULAR; INTRAVENOUS; SUBCUTANEOUS
Status: DISCONTINUED | OUTPATIENT
Start: 2022-01-01 | End: 2022-01-01

## 2022-01-01 RX ORDER — METRONIDAZOLE 500 MG/100ML
500 INJECTION, SOLUTION INTRAVENOUS EVERY 8 HOURS
Status: DISCONTINUED | OUTPATIENT
Start: 2022-01-01 | End: 2022-01-01

## 2022-01-01 RX ORDER — FENTANYL CITRATE 50 UG/ML
50 INJECTION, SOLUTION INTRAMUSCULAR; INTRAVENOUS
Status: DISCONTINUED | OUTPATIENT
Start: 2022-01-01 | End: 2022-01-01

## 2022-01-01 RX ORDER — MORPHINE SULFATE 20 MG/ML
5 SOLUTION ORAL
Status: DISCONTINUED | OUTPATIENT
Start: 2022-01-01 | End: 2022-09-21 | Stop reason: HOSPADM

## 2022-01-01 RX ORDER — LEVOFLOXACIN 5 MG/ML
500 INJECTION, SOLUTION INTRAVENOUS
Status: DISCONTINUED | OUTPATIENT
Start: 2022-01-01 | End: 2022-01-01

## 2022-01-01 RX ORDER — POTASSIUM CHLORIDE 20 MEQ/1
40 TABLET, EXTENDED RELEASE ORAL
Status: COMPLETED | OUTPATIENT
Start: 2022-01-01 | End: 2022-01-01

## 2022-01-01 RX ORDER — DEXTROSE MONOHYDRATE 100 MG/ML
INJECTION, SOLUTION INTRAVENOUS
Status: COMPLETED
Start: 2022-01-01 | End: 2022-01-01

## 2022-01-01 RX ORDER — MIDAZOLAM HYDROCHLORIDE 1 MG/ML
10 INJECTION, SOLUTION INTRAMUSCULAR; INTRAVENOUS ONCE
Status: DISCONTINUED | OUTPATIENT
Start: 2022-01-01 | End: 2022-01-01 | Stop reason: RX

## 2022-01-01 RX ORDER — INSULIN GLARGINE 100 [IU]/ML
5 INJECTION, SOLUTION SUBCUTANEOUS DAILY
Status: DISCONTINUED | OUTPATIENT
Start: 2022-01-01 | End: 2022-01-01

## 2022-01-01 RX ORDER — HYDROMORPHONE HYDROCHLORIDE 1 MG/ML
0.25 INJECTION, SOLUTION INTRAMUSCULAR; INTRAVENOUS; SUBCUTANEOUS
Status: DISCONTINUED | OUTPATIENT
Start: 2022-01-01 | End: 2022-01-01

## 2022-01-01 RX ORDER — FACIAL-BODY WIPES
10 EACH TOPICAL DAILY PRN
Status: DISCONTINUED | OUTPATIENT
Start: 2022-01-01 | End: 2022-09-21 | Stop reason: HOSPADM

## 2022-01-01 RX ORDER — LEVETIRACETAM 500 MG/1
500 TABLET ORAL 2 TIMES DAILY
Status: DISCONTINUED | OUTPATIENT
Start: 2022-01-01 | End: 2022-01-01

## 2022-01-01 RX ORDER — ARFORMOTEROL TARTRATE 15 UG/2ML
15 SOLUTION RESPIRATORY (INHALATION)
Status: DISCONTINUED | OUTPATIENT
Start: 2022-01-01 | End: 2022-01-01

## 2022-01-01 RX ORDER — PANTOPRAZOLE SODIUM 40 MG/1
40 TABLET, DELAYED RELEASE ORAL
Status: DISCONTINUED | OUTPATIENT
Start: 2022-01-01 | End: 2022-01-01

## 2022-01-01 RX ORDER — LEVOFLOXACIN 5 MG/ML
500 INJECTION, SOLUTION INTRAVENOUS EVERY 24 HOURS
Status: DISCONTINUED | OUTPATIENT
Start: 2022-01-01 | End: 2022-01-01

## 2022-01-01 RX ORDER — POLYETHYLENE GLYCOL 3350 17 G/17G
17 POWDER, FOR SOLUTION ORAL DAILY PRN
Status: DISCONTINUED | OUTPATIENT
Start: 2022-01-01 | End: 2022-01-01

## 2022-01-01 RX ORDER — MAGNESIUM SULFATE HEPTAHYDRATE 500 MG/ML
2 INJECTION, SOLUTION INTRAMUSCULAR; INTRAVENOUS ONCE
Status: COMPLETED | OUTPATIENT
Start: 2022-01-01 | End: 2022-01-01

## 2022-01-01 RX ORDER — SODIUM CHLORIDE 0.9 % (FLUSH) 0.9 %
5-40 SYRINGE (ML) INJECTION EVERY 8 HOURS
Status: DISCONTINUED | OUTPATIENT
Start: 2022-01-01 | End: 2022-01-01

## 2022-01-01 RX ORDER — LORAZEPAM 2 MG/ML
1 INJECTION, SOLUTION INTRAMUSCULAR; INTRAVENOUS
Status: DISCONTINUED | OUTPATIENT
Start: 2022-01-01 | End: 2022-01-01

## 2022-01-01 RX ORDER — DEXTROSE MONOHYDRATE AND SODIUM CHLORIDE 5; .45 G/100ML; G/100ML
45 INJECTION, SOLUTION INTRAVENOUS CONTINUOUS
Status: DISCONTINUED | OUTPATIENT
Start: 2022-01-01 | End: 2022-01-01

## 2022-01-01 RX ORDER — LORAZEPAM 2 MG/ML
1 CONCENTRATE ORAL
Status: DISCONTINUED | OUTPATIENT
Start: 2022-01-01 | End: 2022-09-21 | Stop reason: HOSPADM

## 2022-01-01 RX ORDER — ACETAMINOPHEN 650 MG/1
650 SUPPOSITORY RECTAL
Status: DISCONTINUED | OUTPATIENT
Start: 2022-01-01 | End: 2022-09-21 | Stop reason: HOSPADM

## 2022-01-01 RX ORDER — POVIDONE-IODINE 10 %
SOLUTION, NON-ORAL TOPICAL DAILY
Status: DISCONTINUED | OUTPATIENT
Start: 2022-01-01 | End: 2022-09-21 | Stop reason: HOSPADM

## 2022-01-01 RX ORDER — ONDANSETRON 4 MG/1
4 TABLET, ORALLY DISINTEGRATING ORAL
Status: DISCONTINUED | OUTPATIENT
Start: 2022-01-01 | End: 2022-09-21 | Stop reason: HOSPADM

## 2022-01-01 RX ORDER — MAGNESIUM SULFATE HEPTAHYDRATE 40 MG/ML
2 INJECTION, SOLUTION INTRAVENOUS AS NEEDED
Status: DISCONTINUED | OUTPATIENT
Start: 2022-01-01 | End: 2022-01-01

## 2022-01-01 RX ORDER — MIDAZOLAM HYDROCHLORIDE 5 MG/ML
10 INJECTION INTRAMUSCULAR; INTRAVENOUS ONCE
Status: COMPLETED | OUTPATIENT
Start: 2022-01-01 | End: 2022-01-01

## 2022-01-01 RX ORDER — METOPROLOL SUCCINATE 50 MG/1
50 TABLET, EXTENDED RELEASE ORAL DAILY
Status: DISCONTINUED | OUTPATIENT
Start: 2022-01-01 | End: 2022-01-01

## 2022-01-01 RX ADMIN — NOREPINEPHRINE BITARTRATE 16 MCG/MIN: 8 INJECTION, SOLUTION INTRAVENOUS at 01:15

## 2022-01-01 RX ADMIN — METOPROLOL TARTRATE 1.25 MG: 5 INJECTION INTRAVENOUS at 23:23

## 2022-01-01 RX ADMIN — INSULIN GLARGINE 15 UNITS: 100 INJECTION, SOLUTION SUBCUTANEOUS at 10:24

## 2022-01-01 RX ADMIN — HYDROMORPHONE HYDROCHLORIDE 1 MG: 1 INJECTION, SOLUTION INTRAMUSCULAR; INTRAVENOUS; SUBCUTANEOUS at 21:00

## 2022-01-01 RX ADMIN — SODIUM CHLORIDE, PRESERVATIVE FREE 7 ML: 5 INJECTION INTRAVENOUS at 15:39

## 2022-01-01 RX ADMIN — Medication 4 UNITS: at 05:19

## 2022-01-01 RX ADMIN — LORAZEPAM 1 MG: 2 INJECTION, SOLUTION INTRAMUSCULAR; INTRAVENOUS at 18:30

## 2022-01-01 RX ADMIN — POTASSIUM CHLORIDE 10 MEQ: 7.46 INJECTION, SOLUTION INTRAVENOUS at 05:16

## 2022-01-01 RX ADMIN — METRONIDAZOLE 500 MG: 500 INJECTION, SOLUTION INTRAVENOUS at 14:27

## 2022-01-01 RX ADMIN — MAGNESIUM SULFATE HEPTAHYDRATE 2 G: 40 INJECTION, SOLUTION INTRAVENOUS at 03:14

## 2022-01-01 RX ADMIN — LEVOFLOXACIN 500 MG: 5 INJECTION, SOLUTION INTRAVENOUS at 13:36

## 2022-01-01 RX ADMIN — ARFORMOTEROL TARTRATE 15 MCG: 15 SOLUTION RESPIRATORY (INHALATION) at 07:48

## 2022-01-01 RX ADMIN — HEPARIN SODIUM 5000 UNITS: 5000 INJECTION INTRAVENOUS; SUBCUTANEOUS at 13:24

## 2022-01-01 RX ADMIN — Medication 250 MG: at 20:05

## 2022-01-01 RX ADMIN — POTASSIUM CHLORIDE 10 MEQ: 7.46 INJECTION, SOLUTION INTRAVENOUS at 10:31

## 2022-01-01 RX ADMIN — SODIUM CHLORIDE, PRESERVATIVE FREE 10 ML: 5 INJECTION INTRAVENOUS at 22:15

## 2022-01-01 RX ADMIN — SODIUM CHLORIDE, PRESERVATIVE FREE 40 MG: 5 INJECTION INTRAVENOUS at 21:42

## 2022-01-01 RX ADMIN — ARFORMOTEROL TARTRATE 15 MCG: 15 SOLUTION RESPIRATORY (INHALATION) at 20:21

## 2022-01-01 RX ADMIN — POTASSIUM BICARBONATE 40 MEQ: 782 TABLET, EFFERVESCENT ORAL at 09:28

## 2022-01-01 RX ADMIN — COLLAGENASE SANTYL: 250 OINTMENT TOPICAL at 09:00

## 2022-01-01 RX ADMIN — SODIUM CHLORIDE, PRESERVATIVE FREE 40 MG: 5 INJECTION INTRAVENOUS at 10:58

## 2022-01-01 RX ADMIN — SODIUM CHLORIDE, PRESERVATIVE FREE 10 ML: 5 INJECTION INTRAVENOUS at 21:17

## 2022-01-01 RX ADMIN — HYDROMORPHONE HYDROCHLORIDE 1 MG: 1 INJECTION, SOLUTION INTRAMUSCULAR; INTRAVENOUS; SUBCUTANEOUS at 17:04

## 2022-01-01 RX ADMIN — ARFORMOTEROL TARTRATE 15 MCG: 15 SOLUTION RESPIRATORY (INHALATION) at 08:00

## 2022-01-01 RX ADMIN — MIDAZOLAM 0.5 MG: 1 INJECTION INTRAMUSCULAR; INTRAVENOUS at 18:06

## 2022-01-01 RX ADMIN — MIDAZOLAM 1 MG/HR: 5 INJECTION INTRAMUSCULAR; INTRAVENOUS at 20:11

## 2022-01-01 RX ADMIN — PIPERACILLIN AND TAZOBACTAM 3.38 G: 3; .375 INJECTION, POWDER, FOR SOLUTION INTRAVENOUS at 21:01

## 2022-01-01 RX ADMIN — HYDRALAZINE HYDROCHLORIDE 20 MG: 20 INJECTION INTRAMUSCULAR; INTRAVENOUS at 20:21

## 2022-01-01 RX ADMIN — SODIUM CHLORIDE: 900 INJECTION, SOLUTION INTRAVENOUS at 16:23

## 2022-01-01 RX ADMIN — SODIUM CHLORIDE, PRESERVATIVE FREE 10 ML: 5 INJECTION INTRAVENOUS at 20:51

## 2022-01-01 RX ADMIN — SODIUM CHLORIDE, PRESERVATIVE FREE 10 ML: 5 INJECTION INTRAVENOUS at 14:00

## 2022-01-01 RX ADMIN — METRONIDAZOLE 500 MG: 500 INJECTION, SOLUTION INTRAVENOUS at 21:40

## 2022-01-01 RX ADMIN — BUDESONIDE 500 MCG: 0.5 INHALANT RESPIRATORY (INHALATION) at 07:34

## 2022-01-01 RX ADMIN — SODIUM CHLORIDE, PRESERVATIVE FREE 10 ML: 5 INJECTION INTRAVENOUS at 20:54

## 2022-01-01 RX ADMIN — METRONIDAZOLE 500 MG: 500 INJECTION, SOLUTION INTRAVENOUS at 13:37

## 2022-01-01 RX ADMIN — SODIUM CHLORIDE, PRESERVATIVE FREE 40 MG: 5 INJECTION INTRAVENOUS at 20:50

## 2022-01-01 RX ADMIN — SODIUM CHLORIDE, PRESERVATIVE FREE 40 MG: 5 INJECTION INTRAVENOUS at 09:28

## 2022-01-01 RX ADMIN — HYDRALAZINE HYDROCHLORIDE 20 MG: 20 INJECTION INTRAMUSCULAR; INTRAVENOUS at 23:42

## 2022-01-01 RX ADMIN — HYDROMORPHONE HYDROCHLORIDE 1 MG: 1 INJECTION, SOLUTION INTRAMUSCULAR; INTRAVENOUS; SUBCUTANEOUS at 16:12

## 2022-01-01 RX ADMIN — HEPARIN SODIUM 5000 UNITS: 5000 INJECTION INTRAVENOUS; SUBCUTANEOUS at 14:00

## 2022-01-01 RX ADMIN — ALBUMIN (HUMAN) 12.5 G: 0.25 INJECTION, SOLUTION INTRAVENOUS at 18:56

## 2022-01-01 RX ADMIN — SODIUM CHLORIDE, PRESERVATIVE FREE 10 ML: 5 INJECTION INTRAVENOUS at 05:28

## 2022-01-01 RX ADMIN — LACTULOSE 15 ML: 20 SOLUTION ORAL at 09:40

## 2022-01-01 RX ADMIN — HEPARIN SODIUM 5000 UNITS: 5000 INJECTION INTRAVENOUS; SUBCUTANEOUS at 21:26

## 2022-01-01 RX ADMIN — POTASSIUM CHLORIDE 40 MEQ: 1500 TABLET, EXTENDED RELEASE ORAL at 10:33

## 2022-01-01 RX ADMIN — ARFORMOTEROL TARTRATE 15 MCG: 15 SOLUTION RESPIRATORY (INHALATION) at 07:21

## 2022-01-01 RX ADMIN — FUROSEMIDE 40 MG: 10 INJECTION, SOLUTION INTRAMUSCULAR; INTRAVENOUS at 21:48

## 2022-01-01 RX ADMIN — MIDODRINE HYDROCHLORIDE 2.5 MG: 2.5 TABLET ORAL at 12:07

## 2022-01-01 RX ADMIN — LEVETIRACETAM 500 MG: 100 INJECTION, SOLUTION INTRAVENOUS at 21:59

## 2022-01-01 RX ADMIN — Medication 3 UNITS: at 12:00

## 2022-01-01 RX ADMIN — CHLORHEXIDINE GLUCONATE 10 ML: 1.2 RINSE BUCCAL at 20:29

## 2022-01-01 RX ADMIN — MIDAZOLAM 0.5 MG: 1 INJECTION INTRAMUSCULAR; INTRAVENOUS at 12:29

## 2022-01-01 RX ADMIN — SODIUM CHLORIDE, PRESERVATIVE FREE 10 ML: 5 INJECTION INTRAVENOUS at 05:39

## 2022-01-01 RX ADMIN — HYDROMORPHONE HYDROCHLORIDE 1 MG: 1 INJECTION, SOLUTION INTRAMUSCULAR; INTRAVENOUS; SUBCUTANEOUS at 09:44

## 2022-01-01 RX ADMIN — METOPROLOL TARTRATE 1.25 MG: 5 INJECTION INTRAVENOUS at 17:30

## 2022-01-01 RX ADMIN — BUDESONIDE 500 MCG: 0.5 INHALANT RESPIRATORY (INHALATION) at 07:20

## 2022-01-01 RX ADMIN — VANCOMYCIN HYDROCHLORIDE 500 MG: 500 INJECTION, POWDER, LYOPHILIZED, FOR SOLUTION INTRAVENOUS at 15:28

## 2022-01-01 RX ADMIN — SODIUM CHLORIDE, PRESERVATIVE FREE 10 ML: 5 INJECTION INTRAVENOUS at 07:02

## 2022-01-01 RX ADMIN — PHENYTOIN SODIUM 300 MG: 50 INJECTION INTRAMUSCULAR; INTRAVENOUS at 19:41

## 2022-01-01 RX ADMIN — METOPROLOL SUCCINATE 50 MG: 50 TABLET, EXTENDED RELEASE ORAL at 09:28

## 2022-01-01 RX ADMIN — SODIUM CHLORIDE, PRESERVATIVE FREE 10 ML: 5 INJECTION INTRAVENOUS at 21:28

## 2022-01-01 RX ADMIN — POTASSIUM CHLORIDE 10 MEQ: 7.45 INJECTION INTRAVENOUS at 10:23

## 2022-01-01 RX ADMIN — HEPARIN SODIUM 5000 UNITS: 5000 INJECTION INTRAVENOUS; SUBCUTANEOUS at 06:07

## 2022-01-01 RX ADMIN — ALBUMIN (HUMAN) 25 G: 0.25 INJECTION, SOLUTION INTRAVENOUS at 20:51

## 2022-01-01 RX ADMIN — IPRATROPIUM BROMIDE 0.5 MG: 0.5 SOLUTION RESPIRATORY (INHALATION) at 07:08

## 2022-01-01 RX ADMIN — DEXTROSE MONOHYDRATE 50 ML/HR: 50 INJECTION, SOLUTION INTRAVENOUS at 23:57

## 2022-01-01 RX ADMIN — ETOMIDATE 8 MG: 2 INJECTION, SOLUTION INTRAVENOUS at 16:10

## 2022-01-01 RX ADMIN — MAGNESIUM SULFATE HEPTAHYDRATE 1 G: 1 INJECTION, SOLUTION INTRAVENOUS at 08:23

## 2022-01-01 RX ADMIN — HEPARIN SODIUM 5000 UNITS: 5000 INJECTION INTRAVENOUS; SUBCUTANEOUS at 22:40

## 2022-01-01 RX ADMIN — POTASSIUM BICARBONATE 40 MEQ: 782 TABLET, EFFERVESCENT ORAL at 11:00

## 2022-01-01 RX ADMIN — SODIUM CHLORIDE, PRESERVATIVE FREE 40 MG: 5 INJECTION INTRAVENOUS at 21:36

## 2022-01-01 RX ADMIN — Medication 100 MCG: at 16:10

## 2022-01-01 RX ADMIN — Medication 3 UNITS: at 17:23

## 2022-01-01 RX ADMIN — DEXTROSE MONOHYDRATE 100 ML: 100 INJECTION, SOLUTION INTRAVENOUS at 14:00

## 2022-01-01 RX ADMIN — COLLAGENASE SANTYL: 250 OINTMENT TOPICAL at 10:20

## 2022-01-01 RX ADMIN — SODIUM BICARBONATE 650 MG: 650 TABLET ORAL at 21:48

## 2022-01-01 RX ADMIN — SODIUM BICARBONATE: 84 INJECTION, SOLUTION INTRAVENOUS at 11:54

## 2022-01-01 RX ADMIN — METOPROLOL TARTRATE 1.25 MG: 5 INJECTION INTRAVENOUS at 05:34

## 2022-01-01 RX ADMIN — SODIUM CHLORIDE, PRESERVATIVE FREE 10 ML: 5 INJECTION INTRAVENOUS at 13:22

## 2022-01-01 RX ADMIN — POVIDONE-IODINE: 10 SOLUTION TOPICAL at 09:00

## 2022-01-01 RX ADMIN — MAGNESIUM SULFATE HEPTAHYDRATE 2 G: 40 INJECTION, SOLUTION INTRAVENOUS at 21:58

## 2022-01-01 RX ADMIN — SODIUM ZIRCONIUM CYCLOSILICATE 10 G: 5 POWDER, FOR SUSPENSION ORAL at 21:30

## 2022-01-01 RX ADMIN — SODIUM CHLORIDE, PRESERVATIVE FREE 10 ML: 5 INJECTION INTRAVENOUS at 22:08

## 2022-01-01 RX ADMIN — PANTOPRAZOLE SODIUM 40 MG: 40 TABLET, DELAYED RELEASE ORAL at 08:47

## 2022-01-01 RX ADMIN — Medication 3 UNITS: at 06:07

## 2022-01-01 RX ADMIN — CHLORHEXIDINE GLUCONATE 10 ML: 1.2 RINSE BUCCAL at 09:02

## 2022-01-01 RX ADMIN — SODIUM CHLORIDE, PRESERVATIVE FREE 10 ML: 5 INJECTION INTRAVENOUS at 13:58

## 2022-01-01 RX ADMIN — Medication 3 UNITS: at 18:25

## 2022-01-01 RX ADMIN — SODIUM CHLORIDE, PRESERVATIVE FREE 40 MG: 5 INJECTION INTRAVENOUS at 20:25

## 2022-01-01 RX ADMIN — SODIUM CHLORIDE, PRESERVATIVE FREE 10 ML: 5 INJECTION INTRAVENOUS at 21:48

## 2022-01-01 RX ADMIN — BUDESONIDE 500 MCG: 0.5 INHALANT RESPIRATORY (INHALATION) at 19:35

## 2022-01-01 RX ADMIN — IPRATROPIUM BROMIDE 0.5 MG: 0.5 SOLUTION RESPIRATORY (INHALATION) at 20:46

## 2022-01-01 RX ADMIN — IPRATROPIUM BROMIDE 0.5 MG: 0.5 SOLUTION RESPIRATORY (INHALATION) at 19:35

## 2022-01-01 RX ADMIN — IPRATROPIUM BROMIDE 0.5 MG: 0.5 SOLUTION RESPIRATORY (INHALATION) at 07:41

## 2022-01-01 RX ADMIN — SODIUM CHLORIDE, PRESERVATIVE FREE 10 ML: 5 INJECTION INTRAVENOUS at 06:07

## 2022-01-01 RX ADMIN — SODIUM BICARBONATE: 84 INJECTION, SOLUTION INTRAVENOUS at 12:45

## 2022-01-01 RX ADMIN — VANCOMYCIN HYDROCHLORIDE 500 MG: 500 INJECTION, POWDER, LYOPHILIZED, FOR SOLUTION INTRAVENOUS at 19:22

## 2022-01-01 RX ADMIN — SODIUM BICARBONATE: 84 INJECTION, SOLUTION INTRAVENOUS at 05:26

## 2022-01-01 RX ADMIN — ALBUMIN (HUMAN) 25 G: 0.25 INJECTION, SOLUTION INTRAVENOUS at 08:36

## 2022-01-01 RX ADMIN — ARFORMOTEROL TARTRATE 15 MCG: 15 SOLUTION RESPIRATORY (INHALATION) at 07:20

## 2022-01-01 RX ADMIN — BUDESONIDE 500 MCG: 0.5 INHALANT RESPIRATORY (INHALATION) at 07:41

## 2022-01-01 RX ADMIN — FUROSEMIDE 40 MG: 10 INJECTION, SOLUTION INTRAMUSCULAR; INTRAVENOUS at 09:28

## 2022-01-01 RX ADMIN — Medication 3 UNITS: at 23:42

## 2022-01-01 RX ADMIN — PANTOPRAZOLE SODIUM 40 MG: 40 TABLET, DELAYED RELEASE ORAL at 07:00

## 2022-01-01 RX ADMIN — Medication 250 MG: at 09:00

## 2022-01-01 RX ADMIN — METOPROLOL TARTRATE 1.25 MG: 5 INJECTION INTRAVENOUS at 23:44

## 2022-01-01 RX ADMIN — METRONIDAZOLE 500 MG: 500 INJECTION, SOLUTION INTRAVENOUS at 04:44

## 2022-01-01 RX ADMIN — SODIUM BICARBONATE 650 MG: 650 TABLET ORAL at 09:40

## 2022-01-01 RX ADMIN — CALCIUM GLUCONATE 1 G: 98 INJECTION, SOLUTION INTRAVENOUS at 14:00

## 2022-01-01 RX ADMIN — CHLORHEXIDINE GLUCONATE 10 ML: 1.2 RINSE BUCCAL at 20:51

## 2022-01-01 RX ADMIN — ALBUMIN (HUMAN) 12.5 G: 0.25 INJECTION, SOLUTION INTRAVENOUS at 07:20

## 2022-01-01 RX ADMIN — METRONIDAZOLE 500 MG: 500 INJECTION, SOLUTION INTRAVENOUS at 06:00

## 2022-01-01 RX ADMIN — SODIUM CHLORIDE 0.4 MCG/KG/HR: 9 INJECTION, SOLUTION INTRAVENOUS at 12:21

## 2022-01-01 RX ADMIN — ARFORMOTEROL TARTRATE 15 MCG: 15 SOLUTION RESPIRATORY (INHALATION) at 08:09

## 2022-01-01 RX ADMIN — METRONIDAZOLE 500 MG: 500 INJECTION, SOLUTION INTRAVENOUS at 20:05

## 2022-01-01 RX ADMIN — METOPROLOL SUCCINATE 50 MG: 50 TABLET, EXTENDED RELEASE ORAL at 13:47

## 2022-01-01 RX ADMIN — SODIUM CHLORIDE, PRESERVATIVE FREE 40 MG: 5 INJECTION INTRAVENOUS at 09:04

## 2022-01-01 RX ADMIN — MIDODRINE HYDROCHLORIDE 2.5 MG: 2.5 TABLET ORAL at 09:40

## 2022-01-01 RX ADMIN — POTASSIUM CHLORIDE 10 MEQ: 7.46 INJECTION, SOLUTION INTRAVENOUS at 09:05

## 2022-01-01 RX ADMIN — FUROSEMIDE 20 MG: 10 INJECTION, SOLUTION INTRAMUSCULAR; INTRAVENOUS at 08:47

## 2022-01-01 RX ADMIN — HEPARIN SODIUM 5000 UNITS: 5000 INJECTION INTRAVENOUS; SUBCUTANEOUS at 05:07

## 2022-01-01 RX ADMIN — ARFORMOTEROL TARTRATE 15 MCG: 15 SOLUTION RESPIRATORY (INHALATION) at 07:34

## 2022-01-01 RX ADMIN — Medication 2 UNITS: at 00:59

## 2022-01-01 RX ADMIN — SODIUM CHLORIDE 500 MG: 9 INJECTION, SOLUTION INTRAVENOUS at 13:09

## 2022-01-01 RX ADMIN — SODIUM BICARBONATE: 84 INJECTION, SOLUTION INTRAVENOUS at 20:41

## 2022-01-01 RX ADMIN — CHLORHEXIDINE GLUCONATE 10 ML: 1.2 RINSE BUCCAL at 09:40

## 2022-01-01 RX ADMIN — ALBUMIN (HUMAN) 25 G: 0.25 INJECTION, SOLUTION INTRAVENOUS at 21:29

## 2022-01-01 RX ADMIN — SODIUM CHLORIDE, PRESERVATIVE FREE 10 ML: 5 INJECTION INTRAVENOUS at 05:41

## 2022-01-01 RX ADMIN — FUROSEMIDE 20 MG: 10 INJECTION, SOLUTION INTRAMUSCULAR; INTRAVENOUS at 12:07

## 2022-01-01 RX ADMIN — ALBUMIN (HUMAN) 12.5 G: 0.25 INJECTION, SOLUTION INTRAVENOUS at 23:37

## 2022-01-01 RX ADMIN — PANTOPRAZOLE SODIUM 40 MG: 40 TABLET, DELAYED RELEASE ORAL at 09:28

## 2022-01-01 RX ADMIN — MAGNESIUM SULFATE HEPTAHYDRATE 2 G: 40 INJECTION, SOLUTION INTRAVENOUS at 04:07

## 2022-01-01 RX ADMIN — Medication 2 UNITS: at 12:30

## 2022-01-01 RX ADMIN — BUDESONIDE 500 MCG: 0.5 INHALANT RESPIRATORY (INHALATION) at 20:21

## 2022-01-01 RX ADMIN — METOPROLOL TARTRATE 1.25 MG: 5 INJECTION INTRAVENOUS at 23:50

## 2022-01-01 RX ADMIN — Medication 3 UNITS: at 12:44

## 2022-01-01 RX ADMIN — HEPARIN SODIUM 5000 UNITS: 5000 INJECTION INTRAVENOUS; SUBCUTANEOUS at 21:40

## 2022-01-01 RX ADMIN — Medication 6 UNITS: at 22:09

## 2022-01-01 RX ADMIN — DEXTROSE MONOHYDRATE 250 ML: 100 INJECTION, SOLUTION INTRAVENOUS at 11:33

## 2022-01-01 RX ADMIN — SODIUM CHLORIDE, PRESERVATIVE FREE 10 ML: 5 INJECTION INTRAVENOUS at 14:07

## 2022-01-01 RX ADMIN — ARFORMOTEROL TARTRATE 15 MCG: 15 SOLUTION RESPIRATORY (INHALATION) at 07:01

## 2022-01-01 RX ADMIN — SODIUM CHLORIDE, PRESERVATIVE FREE 10 ML: 5 INJECTION INTRAVENOUS at 05:18

## 2022-01-01 RX ADMIN — ALBUMIN (HUMAN) 25 G: 0.25 INJECTION, SOLUTION INTRAVENOUS at 15:39

## 2022-01-01 RX ADMIN — IPRATROPIUM BROMIDE 0.5 MG: 0.5 SOLUTION RESPIRATORY (INHALATION) at 07:12

## 2022-01-01 RX ADMIN — ALBUMIN (HUMAN) 25 G: 0.25 INJECTION, SOLUTION INTRAVENOUS at 09:56

## 2022-01-01 RX ADMIN — POTASSIUM CHLORIDE 10 MEQ: 7.46 INJECTION, SOLUTION INTRAVENOUS at 04:12

## 2022-01-01 RX ADMIN — SODIUM CHLORIDE 2430 ML: 9 INJECTION, SOLUTION INTRAVENOUS at 10:26

## 2022-01-01 RX ADMIN — SODIUM CHLORIDE, PRESERVATIVE FREE 10 ML: 5 INJECTION INTRAVENOUS at 05:21

## 2022-01-01 RX ADMIN — METOPROLOL TARTRATE 1.25 MG: 5 INJECTION INTRAVENOUS at 23:11

## 2022-01-01 RX ADMIN — BUDESONIDE 500 MCG: 0.5 INHALANT RESPIRATORY (INHALATION) at 20:02

## 2022-01-01 RX ADMIN — ALBUMIN (HUMAN) 12.5 G: 0.25 INJECTION, SOLUTION INTRAVENOUS at 05:26

## 2022-01-01 RX ADMIN — POTASSIUM CHLORIDE 10 MEQ: 7.45 INJECTION INTRAVENOUS at 18:30

## 2022-01-01 RX ADMIN — SODIUM CHLORIDE 5 ML/HR: 9 INJECTION, SOLUTION INTRAVENOUS at 10:00

## 2022-01-01 RX ADMIN — SODIUM CHLORIDE, PRESERVATIVE FREE 40 MG: 5 INJECTION INTRAVENOUS at 14:00

## 2022-01-01 RX ADMIN — DEXTROSE MONOHYDRATE AND SODIUM CHLORIDE 25 ML/HR: 5; .45 INJECTION, SOLUTION INTRAVENOUS at 19:57

## 2022-01-01 RX ADMIN — Medication 3 UNITS: at 16:59

## 2022-01-01 RX ADMIN — SODIUM CHLORIDE, PRESERVATIVE FREE 10 ML: 5 INJECTION INTRAVENOUS at 21:24

## 2022-01-01 RX ADMIN — ATORVASTATIN CALCIUM 40 MG: 20 TABLET, FILM COATED ORAL at 22:26

## 2022-01-01 RX ADMIN — ALBUMIN (HUMAN) 12.5 G: 0.25 INJECTION, SOLUTION INTRAVENOUS at 05:04

## 2022-01-01 RX ADMIN — HYDROMORPHONE HYDROCHLORIDE 0.25 MG: 1 INJECTION, SOLUTION INTRAMUSCULAR; INTRAVENOUS; SUBCUTANEOUS at 21:48

## 2022-01-01 RX ADMIN — LACTULOSE 15 ML: 20 SOLUTION ORAL at 08:26

## 2022-01-01 RX ADMIN — ARFORMOTEROL TARTRATE 15 MCG: 15 SOLUTION RESPIRATORY (INHALATION) at 19:57

## 2022-01-01 RX ADMIN — HEPARIN SODIUM 5000 UNITS: 5000 INJECTION INTRAVENOUS; SUBCUTANEOUS at 21:02

## 2022-01-01 RX ADMIN — ARFORMOTEROL TARTRATE 15 MCG: 15 SOLUTION RESPIRATORY (INHALATION) at 09:12

## 2022-01-01 RX ADMIN — HYDROMORPHONE HYDROCHLORIDE 1 MG: 1 INJECTION, SOLUTION INTRAMUSCULAR; INTRAVENOUS; SUBCUTANEOUS at 05:23

## 2022-01-01 RX ADMIN — Medication 4 UNITS: at 12:12

## 2022-01-01 RX ADMIN — INSULIN HUMAN 10 UNITS: 100 INJECTION, SOLUTION PARENTERAL at 13:59

## 2022-01-01 RX ADMIN — ALBUMIN (HUMAN) 25 G: 0.25 INJECTION, SOLUTION INTRAVENOUS at 21:36

## 2022-01-01 RX ADMIN — ALBUMIN (HUMAN) 25 G: 0.25 INJECTION, SOLUTION INTRAVENOUS at 09:04

## 2022-01-01 RX ADMIN — INSULIN GLARGINE 20 UNITS: 100 INJECTION, SOLUTION SUBCUTANEOUS at 10:28

## 2022-01-01 RX ADMIN — METOPROLOL TARTRATE 1.25 MG: 5 INJECTION INTRAVENOUS at 11:58

## 2022-01-01 RX ADMIN — HYDROMORPHONE HYDROCHLORIDE 1 MG: 1 INJECTION, SOLUTION INTRAMUSCULAR; INTRAVENOUS; SUBCUTANEOUS at 03:48

## 2022-01-01 RX ADMIN — CALCIUM GLUCONATE 2 G: 20 INJECTION, SOLUTION INTRAVENOUS at 11:45

## 2022-01-01 RX ADMIN — FUROSEMIDE 20 MG: 10 INJECTION, SOLUTION INTRAMUSCULAR; INTRAVENOUS at 08:32

## 2022-01-01 RX ADMIN — SODIUM CHLORIDE, PRESERVATIVE FREE 10 ML: 5 INJECTION INTRAVENOUS at 22:14

## 2022-01-01 RX ADMIN — PIPERACILLIN AND TAZOBACTAM 3.38 G: 3; .375 INJECTION, POWDER, FOR SOLUTION INTRAVENOUS at 09:06

## 2022-01-01 RX ADMIN — ATORVASTATIN CALCIUM 40 MG: 20 TABLET, FILM COATED ORAL at 20:05

## 2022-01-01 RX ADMIN — ALBUMIN (HUMAN) 25 G: 0.25 INJECTION, SOLUTION INTRAVENOUS at 09:52

## 2022-01-01 RX ADMIN — METOPROLOL TARTRATE 1.25 MG: 5 INJECTION INTRAVENOUS at 12:26

## 2022-01-01 RX ADMIN — HYDROMORPHONE HYDROCHLORIDE 1 MG: 1 INJECTION, SOLUTION INTRAMUSCULAR; INTRAVENOUS; SUBCUTANEOUS at 18:22

## 2022-01-01 RX ADMIN — IPRATROPIUM BROMIDE 0.5 MG: 0.5 SOLUTION RESPIRATORY (INHALATION) at 07:02

## 2022-01-01 RX ADMIN — SODIUM CHLORIDE, PRESERVATIVE FREE 40 MG: 5 INJECTION INTRAVENOUS at 20:24

## 2022-01-01 RX ADMIN — ALBUMIN (HUMAN) 12.5 G: 0.25 INJECTION, SOLUTION INTRAVENOUS at 13:59

## 2022-01-01 RX ADMIN — POTASSIUM CHLORIDE 10 MEQ: 7.45 INJECTION INTRAVENOUS at 01:24

## 2022-01-01 RX ADMIN — HYDROMORPHONE HYDROCHLORIDE 1 MG: 1 INJECTION, SOLUTION INTRAMUSCULAR; INTRAVENOUS; SUBCUTANEOUS at 18:49

## 2022-01-01 RX ADMIN — IPRATROPIUM BROMIDE 0.5 MG: 0.5 SOLUTION RESPIRATORY (INHALATION) at 19:53

## 2022-01-01 RX ADMIN — METOPROLOL TARTRATE 1.25 MG: 5 INJECTION INTRAVENOUS at 18:21

## 2022-01-01 RX ADMIN — Medication 2 UNITS: at 12:07

## 2022-01-01 RX ADMIN — SODIUM CHLORIDE, PRESERVATIVE FREE 10 ML: 5 INJECTION INTRAVENOUS at 13:25

## 2022-01-01 RX ADMIN — ATORVASTATIN CALCIUM 40 MG: 20 TABLET, FILM COATED ORAL at 21:58

## 2022-01-01 RX ADMIN — LEVETIRACETAM 500 MG: 100 INJECTION, SOLUTION INTRAVENOUS at 10:14

## 2022-01-01 RX ADMIN — Medication 3 UNITS: at 17:44

## 2022-01-01 RX ADMIN — COLLAGENASE SANTYL: 250 OINTMENT TOPICAL at 09:28

## 2022-01-01 RX ADMIN — FUROSEMIDE 20 MG: 10 INJECTION, SOLUTION INTRAMUSCULAR; INTRAVENOUS at 08:23

## 2022-01-01 RX ADMIN — BUDESONIDE 500 MCG: 0.5 INHALANT RESPIRATORY (INHALATION) at 09:12

## 2022-01-01 RX ADMIN — Medication 4 UNITS: at 00:09

## 2022-01-01 RX ADMIN — SODIUM CHLORIDE, PRESERVATIVE FREE 10 ML: 5 INJECTION INTRAVENOUS at 21:06

## 2022-01-01 RX ADMIN — MAGNESIUM SULFATE HEPTAHYDRATE 2 G: 40 INJECTION, SOLUTION INTRAVENOUS at 13:14

## 2022-01-01 RX ADMIN — HEPARIN SODIUM 5000 UNITS: 5000 INJECTION INTRAVENOUS; SUBCUTANEOUS at 13:37

## 2022-01-01 RX ADMIN — LACTULOSE 15 ML: 20 SOLUTION ORAL at 12:07

## 2022-01-01 RX ADMIN — SODIUM CHLORIDE 0.5 MCG/KG/HR: 9 INJECTION, SOLUTION INTRAVENOUS at 19:25

## 2022-01-01 RX ADMIN — ARFORMOTEROL TARTRATE 15 MCG: 15 SOLUTION RESPIRATORY (INHALATION) at 19:54

## 2022-01-01 RX ADMIN — SODIUM CHLORIDE, PRESERVATIVE FREE 10 ML: 5 INJECTION INTRAVENOUS at 21:37

## 2022-01-01 RX ADMIN — FUROSEMIDE 40 MG: 10 INJECTION, SOLUTION INTRAMUSCULAR; INTRAVENOUS at 20:19

## 2022-01-01 RX ADMIN — INSULIN GLARGINE 5 UNITS: 100 INJECTION, SOLUTION SUBCUTANEOUS at 11:55

## 2022-01-01 RX ADMIN — LACTULOSE 15 ML: 20 SOLUTION ORAL at 20:00

## 2022-01-01 RX ADMIN — Medication 3 UNITS: at 13:00

## 2022-01-01 RX ADMIN — PIPERACILLIN AND TAZOBACTAM 3.38 G: 3; .375 INJECTION, POWDER, FOR SOLUTION INTRAVENOUS at 10:28

## 2022-01-01 RX ADMIN — POTASSIUM CHLORIDE 10 MEQ: 7.46 INJECTION, SOLUTION INTRAVENOUS at 04:08

## 2022-01-01 RX ADMIN — ARFORMOTEROL TARTRATE 15 MCG: 15 SOLUTION RESPIRATORY (INHALATION) at 19:18

## 2022-01-01 RX ADMIN — PIPERACILLIN AND TAZOBACTAM 3.38 G: 3; .375 INJECTION, POWDER, FOR SOLUTION INTRAVENOUS at 01:16

## 2022-01-01 RX ADMIN — CHLORHEXIDINE GLUCONATE 10 ML: 1.2 RINSE BUCCAL at 11:55

## 2022-01-01 RX ADMIN — ARFORMOTEROL TARTRATE 15 MCG: 15 SOLUTION RESPIRATORY (INHALATION) at 07:08

## 2022-01-01 RX ADMIN — POTASSIUM CHLORIDE 10 MEQ: 7.45 INJECTION INTRAVENOUS at 08:00

## 2022-01-01 RX ADMIN — SODIUM CHLORIDE, PRESERVATIVE FREE 40 MG: 5 INJECTION INTRAVENOUS at 21:16

## 2022-01-01 RX ADMIN — COLLAGENASE SANTYL: 250 OINTMENT TOPICAL at 12:31

## 2022-01-01 RX ADMIN — ALBUMIN (HUMAN) 25 G: 0.25 INJECTION, SOLUTION INTRAVENOUS at 20:52

## 2022-01-01 RX ADMIN — MIDODRINE HYDROCHLORIDE 2.5 MG: 2.5 TABLET ORAL at 09:04

## 2022-01-01 RX ADMIN — SODIUM CHLORIDE, PRESERVATIVE FREE 40 MG: 5 INJECTION INTRAVENOUS at 21:01

## 2022-01-01 RX ADMIN — SODIUM CHLORIDE, PRESERVATIVE FREE 40 MG: 5 INJECTION INTRAVENOUS at 10:28

## 2022-01-01 RX ADMIN — ARFORMOTEROL TARTRATE 15 MCG: 15 SOLUTION RESPIRATORY (INHALATION) at 19:35

## 2022-01-01 RX ADMIN — PIPERACILLIN AND TAZOBACTAM 3.38 G: 3; .375 INJECTION, POWDER, FOR SOLUTION INTRAVENOUS at 11:57

## 2022-01-01 RX ADMIN — Medication 3 UNITS: at 12:14

## 2022-01-01 RX ADMIN — ARFORMOTEROL TARTRATE 15 MCG: 15 SOLUTION RESPIRATORY (INHALATION) at 20:00

## 2022-01-01 RX ADMIN — MIDODRINE HYDROCHLORIDE 2.5 MG: 2.5 TABLET ORAL at 20:05

## 2022-01-01 RX ADMIN — SODIUM CHLORIDE, PRESERVATIVE FREE 10 ML: 5 INJECTION INTRAVENOUS at 05:00

## 2022-01-01 RX ADMIN — PIPERACILLIN AND TAZOBACTAM 2.25 G: 2; .25 INJECTION, POWDER, FOR SOLUTION INTRAVENOUS at 03:19

## 2022-01-01 RX ADMIN — MIDODRINE HYDROCHLORIDE 2.5 MG: 2.5 TABLET ORAL at 08:26

## 2022-01-01 RX ADMIN — ARFORMOTEROL TARTRATE 15 MCG: 15 SOLUTION RESPIRATORY (INHALATION) at 19:39

## 2022-01-01 RX ADMIN — SODIUM CHLORIDE, PRESERVATIVE FREE 10 ML: 5 INJECTION INTRAVENOUS at 22:09

## 2022-01-01 RX ADMIN — SODIUM CHLORIDE, PRESERVATIVE FREE 40 MG: 5 INJECTION INTRAVENOUS at 20:01

## 2022-01-01 RX ADMIN — COLLAGENASE SANTYL: 250 OINTMENT TOPICAL at 10:57

## 2022-01-01 RX ADMIN — PIPERACILLIN AND TAZOBACTAM 3.38 G: 3; .375 INJECTION, POWDER, FOR SOLUTION INTRAVENOUS at 18:24

## 2022-01-01 RX ADMIN — PIPERACILLIN AND TAZOBACTAM 3.38 G: 3; .375 INJECTION, POWDER, FOR SOLUTION INTRAVENOUS at 10:24

## 2022-01-01 RX ADMIN — Medication 400 MG: at 09:38

## 2022-01-01 RX ADMIN — ALBUMIN (HUMAN) 25 G: 0.25 INJECTION, SOLUTION INTRAVENOUS at 10:07

## 2022-01-01 RX ADMIN — Medication 2 UNITS: at 05:31

## 2022-01-01 RX ADMIN — ALBUMIN (HUMAN) 25 G: 0.25 INJECTION, SOLUTION INTRAVENOUS at 22:13

## 2022-01-01 RX ADMIN — SODIUM CHLORIDE, PRESERVATIVE FREE 10 ML: 5 INJECTION INTRAVENOUS at 22:26

## 2022-01-01 RX ADMIN — SODIUM BICARBONATE: 84 INJECTION, SOLUTION INTRAVENOUS at 02:46

## 2022-01-01 RX ADMIN — SODIUM BICARBONATE 650 MG: 650 TABLET ORAL at 12:07

## 2022-01-01 RX ADMIN — PIPERACILLIN AND TAZOBACTAM 3.38 G: 3; .375 INJECTION, POWDER, FOR SOLUTION INTRAVENOUS at 21:42

## 2022-01-01 RX ADMIN — PIPERACILLIN AND TAZOBACTAM 3.38 G: 3; .375 INJECTION, POWDER, FOR SOLUTION INTRAVENOUS at 21:27

## 2022-01-01 RX ADMIN — METOPROLOL TARTRATE 1.25 MG: 5 INJECTION INTRAVENOUS at 06:00

## 2022-01-01 RX ADMIN — PIPERACILLIN AND TAZOBACTAM 3.38 G: 3; .375 INJECTION, POWDER, FOR SOLUTION INTRAVENOUS at 09:29

## 2022-01-01 RX ADMIN — Medication 3 UNITS: at 01:09

## 2022-01-01 RX ADMIN — CALCIUM GLUCONATE 2 G: 20 INJECTION, SOLUTION INTRAVENOUS at 10:23

## 2022-01-01 RX ADMIN — HEPARIN SODIUM 5000 UNITS: 5000 INJECTION INTRAVENOUS; SUBCUTANEOUS at 21:24

## 2022-01-01 RX ADMIN — SODIUM CHLORIDE 0.6 MCG/KG/HR: 9 INJECTION, SOLUTION INTRAVENOUS at 18:06

## 2022-01-01 RX ADMIN — IPRATROPIUM BROMIDE 0.5 MG: 0.5 SOLUTION RESPIRATORY (INHALATION) at 00:46

## 2022-01-01 RX ADMIN — ALBUMIN (HUMAN) 25 G: 0.25 INJECTION, SOLUTION INTRAVENOUS at 23:21

## 2022-01-01 RX ADMIN — HEPARIN SODIUM 5000 UNITS: 5000 INJECTION INTRAVENOUS; SUBCUTANEOUS at 15:09

## 2022-01-01 RX ADMIN — IPRATROPIUM BROMIDE 0.5 MG: 0.5 SOLUTION RESPIRATORY (INHALATION) at 15:15

## 2022-01-01 RX ADMIN — SODIUM CHLORIDE, PRESERVATIVE FREE 10 ML: 5 INJECTION INTRAVENOUS at 17:44

## 2022-01-01 RX ADMIN — SODIUM CHLORIDE, PRESERVATIVE FREE 40 MG: 5 INJECTION INTRAVENOUS at 09:29

## 2022-01-01 RX ADMIN — SODIUM CHLORIDE, PRESERVATIVE FREE 10 ML: 5 INJECTION INTRAVENOUS at 06:45

## 2022-01-01 RX ADMIN — MAGNESIUM SULFATE HEPTAHYDRATE 1 G: 1 INJECTION, SOLUTION INTRAVENOUS at 20:36

## 2022-01-01 RX ADMIN — HYDROMORPHONE HYDROCHLORIDE 1 MG: 1 INJECTION, SOLUTION INTRAMUSCULAR; INTRAVENOUS; SUBCUTANEOUS at 22:38

## 2022-01-01 RX ADMIN — SODIUM CHLORIDE, PRESERVATIVE FREE 40 MG: 5 INJECTION INTRAVENOUS at 09:27

## 2022-01-01 RX ADMIN — METOPROLOL TARTRATE 1.25 MG: 5 INJECTION INTRAVENOUS at 13:38

## 2022-01-01 RX ADMIN — CHLORHEXIDINE GLUCONATE 10 ML: 1.2 RINSE BUCCAL at 09:28

## 2022-01-01 RX ADMIN — ALBUMIN (HUMAN) 25 G: 0.25 INJECTION, SOLUTION INTRAVENOUS at 00:55

## 2022-01-01 RX ADMIN — POTASSIUM CHLORIDE 10 MEQ: 7.45 INJECTION INTRAVENOUS at 09:07

## 2022-01-01 RX ADMIN — MIDAZOLAM 3 MG/HR: 5 INJECTION INTRAMUSCULAR; INTRAVENOUS at 05:04

## 2022-01-01 RX ADMIN — HYDROMORPHONE HYDROCHLORIDE 1 MG: 1 INJECTION, SOLUTION INTRAMUSCULAR; INTRAVENOUS; SUBCUTANEOUS at 20:24

## 2022-01-01 RX ADMIN — HEPARIN SODIUM 5000 UNITS: 5000 INJECTION INTRAVENOUS; SUBCUTANEOUS at 06:00

## 2022-01-01 RX ADMIN — SODIUM BICARBONATE 650 MG: 650 TABLET ORAL at 21:03

## 2022-01-01 RX ADMIN — Medication 4 UNITS: at 05:43

## 2022-01-01 RX ADMIN — POTASSIUM CHLORIDE 10 MEQ: 7.45 INJECTION INTRAVENOUS at 18:18

## 2022-01-01 RX ADMIN — SODIUM BICARBONATE 100 MEQ: 84 INJECTION, SOLUTION INTRAVENOUS at 18:28

## 2022-01-01 RX ADMIN — POTASSIUM BICARBONATE 40 MEQ: 782 TABLET, EFFERVESCENT ORAL at 08:46

## 2022-01-01 RX ADMIN — SODIUM CHLORIDE, PRESERVATIVE FREE 10 ML: 5 INJECTION INTRAVENOUS at 18:14

## 2022-01-01 RX ADMIN — METOPROLOL TARTRATE 1.25 MG: 5 INJECTION INTRAVENOUS at 17:20

## 2022-01-01 RX ADMIN — ARFORMOTEROL TARTRATE 15 MCG: 15 SOLUTION RESPIRATORY (INHALATION) at 20:41

## 2022-01-01 RX ADMIN — FUROSEMIDE 20 MG: 10 INJECTION, SOLUTION INTRAMUSCULAR; INTRAVENOUS at 20:25

## 2022-01-01 RX ADMIN — BUDESONIDE 500 MCG: 0.5 INHALANT RESPIRATORY (INHALATION) at 20:46

## 2022-01-01 RX ADMIN — NOREPINEPHRINE BITARTRATE 13 MCG/MIN: 8 INJECTION, SOLUTION INTRAVENOUS at 12:12

## 2022-01-01 RX ADMIN — METRONIDAZOLE 500 MG: 500 INJECTION, SOLUTION INTRAVENOUS at 21:24

## 2022-01-01 RX ADMIN — SODIUM CHLORIDE, PRESERVATIVE FREE 40 MG: 5 INJECTION INTRAVENOUS at 09:46

## 2022-01-01 RX ADMIN — METOPROLOL TARTRATE 1.25 MG: 5 INJECTION INTRAVENOUS at 11:27

## 2022-01-01 RX ADMIN — IPRATROPIUM BROMIDE 0.5 MG: 0.5 SOLUTION RESPIRATORY (INHALATION) at 13:32

## 2022-01-01 RX ADMIN — MIDAZOLAM 0.5 MG: 1 INJECTION INTRAMUSCULAR; INTRAVENOUS at 05:17

## 2022-01-01 RX ADMIN — COLLAGENASE SANTYL: 250 OINTMENT TOPICAL at 16:30

## 2022-01-01 RX ADMIN — ACETAMINOPHEN 650 MG: 325 TABLET ORAL at 18:49

## 2022-01-01 RX ADMIN — HYDROMORPHONE HYDROCHLORIDE 1 MG: 1 INJECTION, SOLUTION INTRAMUSCULAR; INTRAVENOUS; SUBCUTANEOUS at 21:24

## 2022-01-01 RX ADMIN — SODIUM CHLORIDE, PRESERVATIVE FREE 10 ML: 5 INJECTION INTRAVENOUS at 15:42

## 2022-01-01 RX ADMIN — SODIUM CHLORIDE, PRESERVATIVE FREE 10 ML: 5 INJECTION INTRAVENOUS at 22:00

## 2022-01-01 RX ADMIN — FUROSEMIDE 40 MG: 10 INJECTION, SOLUTION INTRAMUSCULAR; INTRAVENOUS at 18:15

## 2022-01-01 RX ADMIN — Medication 3 UNITS: at 17:21

## 2022-01-01 RX ADMIN — SODIUM CHLORIDE, PRESERVATIVE FREE 10 ML: 5 INJECTION INTRAVENOUS at 16:13

## 2022-01-01 RX ADMIN — SODIUM CHLORIDE, PRESERVATIVE FREE 40 MG: 5 INJECTION INTRAVENOUS at 08:36

## 2022-01-01 RX ADMIN — FUROSEMIDE 20 MG: 10 INJECTION, SOLUTION INTRAMUSCULAR; INTRAVENOUS at 20:52

## 2022-01-01 RX ADMIN — INSULIN GLARGINE 15 UNITS: 100 INJECTION, SOLUTION SUBCUTANEOUS at 08:27

## 2022-01-01 RX ADMIN — ARFORMOTEROL TARTRATE 15 MCG: 15 SOLUTION RESPIRATORY (INHALATION) at 07:12

## 2022-01-01 RX ADMIN — METOPROLOL SUCCINATE 50 MG: 50 TABLET, EXTENDED RELEASE ORAL at 08:47

## 2022-01-01 RX ADMIN — Medication 6 UNITS: at 18:21

## 2022-01-01 RX ADMIN — SODIUM CHLORIDE, PRESERVATIVE FREE 40 MG: 5 INJECTION INTRAVENOUS at 20:18

## 2022-01-01 RX ADMIN — POTASSIUM CHLORIDE 10 MEQ: 7.45 INJECTION INTRAVENOUS at 04:11

## 2022-01-01 RX ADMIN — SODIUM CHLORIDE 0.5 MCG/KG/HR: 9 INJECTION, SOLUTION INTRAVENOUS at 05:40

## 2022-01-01 RX ADMIN — POTASSIUM CHLORIDE 10 MEQ: 7.45 INJECTION INTRAVENOUS at 15:30

## 2022-01-01 RX ADMIN — LEVETIRACETAM 500 MG: 100 INJECTION, SOLUTION INTRAVENOUS at 10:46

## 2022-01-01 RX ADMIN — POTASSIUM CHLORIDE 10 MEQ: 7.45 INJECTION INTRAVENOUS at 05:08

## 2022-01-01 RX ADMIN — ALBUMIN (HUMAN) 25 G: 0.25 INJECTION, SOLUTION INTRAVENOUS at 08:16

## 2022-01-01 RX ADMIN — POTASSIUM BICARBONATE 40 MEQ: 782 TABLET, EFFERVESCENT ORAL at 12:07

## 2022-01-01 RX ADMIN — LEVOFLOXACIN 500 MG: 5 INJECTION, SOLUTION INTRAVENOUS at 11:55

## 2022-01-01 RX ADMIN — ALBUMIN (HUMAN) 25 G: 0.25 INJECTION, SOLUTION INTRAVENOUS at 17:04

## 2022-01-01 RX ADMIN — HEPARIN SODIUM 5000 UNITS: 5000 INJECTION INTRAVENOUS; SUBCUTANEOUS at 13:29

## 2022-01-01 RX ADMIN — FENTANYL CITRATE 50 MCG: 50 INJECTION INTRAMUSCULAR; INTRAVENOUS at 14:42

## 2022-01-01 RX ADMIN — ARFORMOTEROL TARTRATE 15 MCG: 15 SOLUTION RESPIRATORY (INHALATION) at 07:41

## 2022-01-01 RX ADMIN — PIPERACILLIN AND TAZOBACTAM 3.38 G: 3; .375 INJECTION, POWDER, FOR SOLUTION INTRAVENOUS at 21:21

## 2022-01-01 RX ADMIN — LEVOFLOXACIN 750 MG: 5 INJECTION, SOLUTION INTRAVENOUS at 10:22

## 2022-01-01 RX ADMIN — SODIUM CHLORIDE, PRESERVATIVE FREE 40 MG: 5 INJECTION INTRAVENOUS at 08:27

## 2022-01-01 RX ADMIN — POTASSIUM CHLORIDE 10 MEQ: 7.46 INJECTION, SOLUTION INTRAVENOUS at 08:18

## 2022-01-01 RX ADMIN — BUDESONIDE 500 MCG: 0.5 INHALANT RESPIRATORY (INHALATION) at 07:19

## 2022-01-01 RX ADMIN — SODIUM CHLORIDE, PRESERVATIVE FREE 10 ML: 5 INJECTION INTRAVENOUS at 05:34

## 2022-01-01 RX ADMIN — Medication 4 UNITS: at 18:56

## 2022-01-01 RX ADMIN — Medication 250 MG: at 09:28

## 2022-01-01 RX ADMIN — POTASSIUM CHLORIDE 10 MEQ: 7.46 INJECTION, SOLUTION INTRAVENOUS at 18:02

## 2022-01-01 RX ADMIN — SODIUM CHLORIDE, PRESERVATIVE FREE 40 MG: 5 INJECTION INTRAVENOUS at 11:55

## 2022-01-01 RX ADMIN — POTASSIUM CHLORIDE 10 MEQ: 7.46 INJECTION, SOLUTION INTRAVENOUS at 06:10

## 2022-01-01 RX ADMIN — PIPERACILLIN AND TAZOBACTAM 2.25 G: 2; .25 INJECTION, POWDER, FOR SOLUTION INTRAVENOUS at 22:40

## 2022-01-01 RX ADMIN — Medication 4 UNITS: at 11:33

## 2022-01-01 RX ADMIN — NOREPINEPHRINE BITARTRATE 12 MCG/MIN: 1 INJECTION, SOLUTION, CONCENTRATE INTRAVENOUS at 16:06

## 2022-01-01 RX ADMIN — HEPARIN SODIUM 5000 UNITS: 5000 INJECTION INTRAVENOUS; SUBCUTANEOUS at 05:39

## 2022-01-01 RX ADMIN — SODIUM CHLORIDE, PRESERVATIVE FREE 40 MG: 5 INJECTION INTRAVENOUS at 20:58

## 2022-01-01 RX ADMIN — HEPARIN SODIUM 5000 UNITS: 5000 INJECTION INTRAVENOUS; SUBCUTANEOUS at 21:47

## 2022-01-01 RX ADMIN — COLLAGENASE SANTYL: 250 OINTMENT TOPICAL at 08:47

## 2022-01-01 RX ADMIN — HYDROMORPHONE HYDROCHLORIDE 1 MG: 1 INJECTION, SOLUTION INTRAMUSCULAR; INTRAVENOUS; SUBCUTANEOUS at 09:28

## 2022-01-01 RX ADMIN — METOPROLOL TARTRATE 1.25 MG: 5 INJECTION INTRAVENOUS at 05:45

## 2022-01-01 RX ADMIN — ALBUMIN (HUMAN) 12.5 G: 0.25 INJECTION, SOLUTION INTRAVENOUS at 00:10

## 2022-01-01 RX ADMIN — Medication 4 UNITS: at 23:38

## 2022-01-01 RX ADMIN — MIDODRINE HYDROCHLORIDE 2.5 MG: 2.5 TABLET ORAL at 09:27

## 2022-01-01 RX ADMIN — SODIUM CHLORIDE, PRESERVATIVE FREE 10 ML: 5 INJECTION INTRAVENOUS at 21:03

## 2022-01-01 RX ADMIN — SODIUM BICARBONATE 650 MG: 650 TABLET ORAL at 15:09

## 2022-01-01 RX ADMIN — SODIUM CHLORIDE 0.5 MCG/KG/HR: 9 INJECTION, SOLUTION INTRAVENOUS at 06:45

## 2022-01-01 RX ADMIN — ALBUMIN (HUMAN) 25 G: 0.25 INJECTION, SOLUTION INTRAVENOUS at 05:08

## 2022-01-01 RX ADMIN — PIPERACILLIN AND TAZOBACTAM 3.38 G: 3; .375 INJECTION, POWDER, FOR SOLUTION INTRAVENOUS at 09:19

## 2022-01-01 RX ADMIN — ALBUMIN (HUMAN) 25 G: 0.25 INJECTION, SOLUTION INTRAVENOUS at 11:46

## 2022-01-01 RX ADMIN — LIDOCAINE HYDROCHLORIDE 10 ML: 10 INJECTION, SOLUTION EPIDURAL; INFILTRATION; INTRACAUDAL; PERINEURAL at 15:00

## 2022-01-01 RX ADMIN — SODIUM BICARBONATE 650 MG: 650 TABLET ORAL at 08:27

## 2022-01-01 RX ADMIN — Medication 3 UNITS: at 07:30

## 2022-01-01 RX ADMIN — Medication 3 UNITS: at 12:12

## 2022-01-01 RX ADMIN — MIDAZOLAM 3 MG/HR: 5 INJECTION INTRAMUSCULAR; INTRAVENOUS at 13:45

## 2022-01-01 RX ADMIN — Medication 250 MG: at 08:47

## 2022-01-01 RX ADMIN — POTASSIUM CHLORIDE 10 MEQ: 7.46 INJECTION, SOLUTION INTRAVENOUS at 06:33

## 2022-01-01 RX ADMIN — PHYTONADIONE 5 MG: 10 INJECTION, EMULSION INTRAMUSCULAR; INTRAVENOUS; SUBCUTANEOUS at 11:58

## 2022-01-01 RX ADMIN — POTASSIUM BICARBONATE 40 MEQ: 782 TABLET, EFFERVESCENT ORAL at 09:27

## 2022-01-01 RX ADMIN — VASOPRESSIN 0.03 UNITS/MIN: 20 INJECTION INTRAVENOUS at 19:22

## 2022-01-01 RX ADMIN — POTASSIUM CHLORIDE 40 MEQ: 1500 TABLET, EXTENDED RELEASE ORAL at 12:45

## 2022-01-01 RX ADMIN — POTASSIUM CHLORIDE 10 MEQ: 7.45 INJECTION INTRAVENOUS at 07:01

## 2022-01-01 RX ADMIN — SODIUM CHLORIDE, PRESERVATIVE FREE 10 ML: 5 INJECTION INTRAVENOUS at 05:20

## 2022-01-01 RX ADMIN — Medication 6 UNITS: at 00:23

## 2022-01-01 RX ADMIN — LEVOFLOXACIN 500 MG: 5 INJECTION, SOLUTION INTRAVENOUS at 12:27

## 2022-01-01 RX ADMIN — POTASSIUM CHLORIDE 10 MEQ: 7.45 INJECTION INTRAVENOUS at 15:09

## 2022-01-01 RX ADMIN — ALBUMIN (HUMAN) 25 G: 0.25 INJECTION, SOLUTION INTRAVENOUS at 21:15

## 2022-01-01 RX ADMIN — POTASSIUM BICARBONATE 40 MEQ: 782 TABLET, EFFERVESCENT ORAL at 14:40

## 2022-01-01 RX ADMIN — MAGNESIUM SULFATE HEPTAHYDRATE 1 G: 1 INJECTION, SOLUTION INTRAVENOUS at 05:06

## 2022-01-01 RX ADMIN — POTASSIUM CHLORIDE 10 MEQ: 7.46 INJECTION, SOLUTION INTRAVENOUS at 11:30

## 2022-01-01 RX ADMIN — DEXTROSE MONOHYDRATE AND SODIUM CHLORIDE 25 ML/HR: 5; .45 INJECTION, SOLUTION INTRAVENOUS at 09:17

## 2022-01-01 RX ADMIN — METRONIDAZOLE 500 MG: 500 INJECTION, SOLUTION INTRAVENOUS at 04:27

## 2022-01-01 RX ADMIN — FUROSEMIDE 40 MG: 10 INJECTION, SOLUTION INTRAMUSCULAR; INTRAVENOUS at 09:06

## 2022-01-01 RX ADMIN — BUDESONIDE 500 MCG: 0.5 INHALANT RESPIRATORY (INHALATION) at 20:54

## 2022-01-01 RX ADMIN — CHLORHEXIDINE GLUCONATE 10 ML: 1.2 RINSE BUCCAL at 21:21

## 2022-01-01 RX ADMIN — HEPARIN SODIUM 5000 UNITS: 5000 INJECTION INTRAVENOUS; SUBCUTANEOUS at 13:11

## 2022-01-01 RX ADMIN — Medication 3 UNITS: at 15:41

## 2022-01-01 RX ADMIN — BUDESONIDE 500 MCG: 0.5 INHALANT RESPIRATORY (INHALATION) at 09:33

## 2022-01-01 RX ADMIN — METRONIDAZOLE 500 MG: 500 INJECTION, SOLUTION INTRAVENOUS at 20:20

## 2022-01-01 RX ADMIN — VANCOMYCIN HYDROCHLORIDE 1000 MG: 1 INJECTION, POWDER, LYOPHILIZED, FOR SOLUTION INTRAVENOUS at 10:45

## 2022-01-01 RX ADMIN — SODIUM BICARBONATE 100 MEQ: 84 INJECTION INTRAVENOUS at 13:59

## 2022-01-01 RX ADMIN — HEPARIN SODIUM 5000 UNITS: 5000 INJECTION INTRAVENOUS; SUBCUTANEOUS at 15:42

## 2022-01-01 RX ADMIN — POTASSIUM CHLORIDE 10 MEQ: 7.46 INJECTION, SOLUTION INTRAVENOUS at 03:05

## 2022-01-01 RX ADMIN — Medication 250 MG: at 13:47

## 2022-01-01 RX ADMIN — Medication 3 UNITS: at 05:20

## 2022-01-01 RX ADMIN — ALBUMIN (HUMAN) 25 G: 0.25 INJECTION, SOLUTION INTRAVENOUS at 11:33

## 2022-01-01 RX ADMIN — LEVETIRACETAM 500 MG: 100 INJECTION, SOLUTION INTRAVENOUS at 21:24

## 2022-01-01 RX ADMIN — LEVOFLOXACIN 500 MG: 5 INJECTION, SOLUTION INTRAVENOUS at 13:12

## 2022-01-01 RX ADMIN — SODIUM CHLORIDE, PRESERVATIVE FREE 10 ML: 5 INJECTION INTRAVENOUS at 21:26

## 2022-01-01 RX ADMIN — LEVETIRACETAM 500 MG: 100 INJECTION, SOLUTION INTRAVENOUS at 09:13

## 2022-01-01 RX ADMIN — Medication 3 UNITS: at 17:20

## 2022-01-01 RX ADMIN — LEVOFLOXACIN 500 MG: 5 INJECTION, SOLUTION INTRAVENOUS at 13:29

## 2022-01-01 RX ADMIN — HYDROMORPHONE HYDROCHLORIDE 1 MG: 1 INJECTION, SOLUTION INTRAMUSCULAR; INTRAVENOUS; SUBCUTANEOUS at 16:42

## 2022-01-01 RX ADMIN — POTASSIUM CHLORIDE 10 MEQ: 7.46 INJECTION, SOLUTION INTRAVENOUS at 07:22

## 2022-01-01 RX ADMIN — ALBUMIN (HUMAN) 25 G: 0.25 INJECTION, SOLUTION INTRAVENOUS at 00:05

## 2022-01-01 RX ADMIN — SODIUM CHLORIDE, PRESERVATIVE FREE 10 ML: 5 INJECTION INTRAVENOUS at 13:23

## 2022-01-01 RX ADMIN — PIPERACILLIN AND TAZOBACTAM 3.38 G: 3; .375 INJECTION, POWDER, FOR SOLUTION INTRAVENOUS at 21:48

## 2022-01-01 RX ADMIN — HEPARIN SODIUM 5000 UNITS: 5000 INJECTION INTRAVENOUS; SUBCUTANEOUS at 05:08

## 2022-01-01 RX ADMIN — COLLAGENASE SANTYL: 250 OINTMENT TOPICAL at 11:24

## 2022-01-01 RX ADMIN — DEXTROSE MONOHYDRATE 50 ML/HR: 50 INJECTION, SOLUTION INTRAVENOUS at 03:00

## 2022-01-01 RX ADMIN — BUDESONIDE 500 MCG: 0.5 INHALANT RESPIRATORY (INHALATION) at 19:42

## 2022-01-01 RX ADMIN — SODIUM PHOSPHATE, MONOBASIC, MONOHYDRATE: 276; 142 INJECTION, SOLUTION INTRAVENOUS at 08:23

## 2022-01-01 RX ADMIN — CHLORHEXIDINE GLUCONATE 10 ML: 1.2 RINSE BUCCAL at 20:00

## 2022-01-01 RX ADMIN — POTASSIUM CHLORIDE 10 MEQ: 7.45 INJECTION INTRAVENOUS at 16:46

## 2022-01-01 RX ADMIN — POTASSIUM CHLORIDE 10 MEQ: 7.45 INJECTION INTRAVENOUS at 02:37

## 2022-01-01 RX ADMIN — ARFORMOTEROL TARTRATE 15 MCG: 15 SOLUTION RESPIRATORY (INHALATION) at 19:43

## 2022-01-01 RX ADMIN — HYDROMORPHONE HYDROCHLORIDE 1 MG: 1 INJECTION, SOLUTION INTRAMUSCULAR; INTRAVENOUS; SUBCUTANEOUS at 05:51

## 2022-01-01 RX ADMIN — VASOPRESSIN 0.03 UNITS/MIN: 20 INJECTION INTRAVENOUS at 03:46

## 2022-01-01 RX ADMIN — ALBUMIN (HUMAN) 12.5 G: 0.25 INJECTION, SOLUTION INTRAVENOUS at 18:29

## 2022-01-01 RX ADMIN — ALBUMIN (HUMAN) 25 G: 0.25 INJECTION, SOLUTION INTRAVENOUS at 05:39

## 2022-01-01 RX ADMIN — MORPHINE SULFATE 5 MG: 10 SOLUTION ORAL at 11:00

## 2022-01-01 RX ADMIN — LACTULOSE 15 ML: 20 SOLUTION ORAL at 21:02

## 2022-01-01 RX ADMIN — SODIUM CHLORIDE, PRESERVATIVE FREE 10 ML: 5 INJECTION INTRAVENOUS at 05:38

## 2022-01-01 RX ADMIN — POTASSIUM CHLORIDE 10 MEQ: 10 TABLET, EXTENDED RELEASE ORAL at 07:19

## 2022-01-01 RX ADMIN — LEVETIRACETAM 500 MG: 100 INJECTION, SOLUTION INTRAVENOUS at 21:48

## 2022-01-01 RX ADMIN — MIDAZOLAM HYDROCHLORIDE 10 MG: 5 INJECTION, SOLUTION INTRAMUSCULAR; INTRAVENOUS at 17:57

## 2022-01-01 RX ADMIN — CHLORHEXIDINE GLUCONATE 10 ML: 1.2 RINSE BUCCAL at 08:26

## 2022-01-01 RX ADMIN — ARFORMOTEROL TARTRATE 15 MCG: 15 SOLUTION RESPIRATORY (INHALATION) at 20:54

## 2022-01-01 RX ADMIN — SODIUM CHLORIDE, PRESERVATIVE FREE 40 MG: 5 INJECTION INTRAVENOUS at 09:06

## 2022-01-01 RX ADMIN — HEPARIN SODIUM 5000 UNITS: 5000 INJECTION INTRAVENOUS; SUBCUTANEOUS at 05:21

## 2022-01-01 RX ADMIN — SODIUM CHLORIDE, PRESERVATIVE FREE 10 ML: 5 INJECTION INTRAVENOUS at 15:13

## 2022-01-01 RX ADMIN — PIPERACILLIN AND TAZOBACTAM 4.5 G: 4; .5 INJECTION, POWDER, LYOPHILIZED, FOR SOLUTION INTRAVENOUS at 10:45

## 2022-01-01 RX ADMIN — HEPARIN SODIUM 5000 UNITS: 5000 INJECTION INTRAVENOUS; SUBCUTANEOUS at 05:06

## 2022-01-01 RX ADMIN — ALBUMIN (HUMAN) 12.5 G: 0.25 INJECTION, SOLUTION INTRAVENOUS at 14:00

## 2022-01-01 RX ADMIN — ARFORMOTEROL TARTRATE 15 MCG: 15 SOLUTION RESPIRATORY (INHALATION) at 20:15

## 2022-01-01 RX ADMIN — ALBUMIN (HUMAN) 25 G: 0.25 INJECTION, SOLUTION INTRAVENOUS at 20:23

## 2022-01-01 RX ADMIN — INSULIN HUMAN 10 UNITS: 100 INJECTION, SOLUTION PARENTERAL at 11:33

## 2022-01-01 RX ADMIN — ALBUMIN (HUMAN) 12.5 G: 0.25 INJECTION, SOLUTION INTRAVENOUS at 18:41

## 2022-01-01 RX ADMIN — ARFORMOTEROL TARTRATE 15 MCG: 15 SOLUTION RESPIRATORY (INHALATION) at 10:58

## 2022-01-01 RX ADMIN — SODIUM CHLORIDE 500 MG: 9 INJECTION, SOLUTION INTRAVENOUS at 13:20

## 2022-01-01 RX ADMIN — SODIUM BICARBONATE 650 MG: 650 TABLET ORAL at 15:02

## 2022-01-01 RX ADMIN — FUROSEMIDE 20 MG: 10 INJECTION, SOLUTION INTRAMUSCULAR; INTRAVENOUS at 20:07

## 2022-01-01 RX ADMIN — Medication 6 UNITS: at 22:21

## 2022-01-01 RX ADMIN — BUDESONIDE 500 MCG: 0.5 INHALANT RESPIRATORY (INHALATION) at 07:01

## 2022-01-01 RX ADMIN — MAGNESIUM SULFATE HEPTAHYDRATE 2 G: 500 INJECTION, SOLUTION INTRAMUSCULAR; INTRAVENOUS at 19:15

## 2022-01-01 RX ADMIN — SODIUM CHLORIDE, PRESERVATIVE FREE 40 MG: 5 INJECTION INTRAVENOUS at 09:01

## 2022-01-01 RX ADMIN — IPRATROPIUM BROMIDE 0.5 MG: 0.5 SOLUTION RESPIRATORY (INHALATION) at 07:34

## 2022-01-01 RX ADMIN — SODIUM CHLORIDE, PRESERVATIVE FREE 10 ML: 5 INJECTION INTRAVENOUS at 06:32

## 2022-01-01 RX ADMIN — NOREPINEPHRINE BITARTRATE 4 MCG/MIN: 8 INJECTION, SOLUTION INTRAVENOUS at 15:23

## 2022-01-01 RX ADMIN — BUDESONIDE 500 MCG: 0.5 INHALANT RESPIRATORY (INHALATION) at 07:12

## 2022-01-01 RX ADMIN — SODIUM CHLORIDE, PRESERVATIVE FREE 40 MG: 5 INJECTION INTRAVENOUS at 22:12

## 2022-01-01 RX ADMIN — HYDROMORPHONE HYDROCHLORIDE 1 MG: 1 INJECTION, SOLUTION INTRAMUSCULAR; INTRAVENOUS; SUBCUTANEOUS at 10:32

## 2022-01-01 RX ADMIN — Medication 250 MG: at 21:58

## 2022-01-01 RX ADMIN — ALBUMIN (HUMAN) 12.5 G: 0.25 INJECTION, SOLUTION INTRAVENOUS at 12:30

## 2022-01-01 RX ADMIN — FUROSEMIDE 20 MG: 10 INJECTION, SOLUTION INTRAMUSCULAR; INTRAVENOUS at 13:38

## 2022-01-01 RX ADMIN — SODIUM CHLORIDE, PRESERVATIVE FREE 40 MG: 5 INJECTION INTRAVENOUS at 21:21

## 2022-01-01 RX ADMIN — POTASSIUM BICARBONATE 40 MEQ: 782 TABLET, EFFERVESCENT ORAL at 20:24

## 2022-01-01 RX ADMIN — FUROSEMIDE 20 MG: 10 INJECTION, SOLUTION INTRAMUSCULAR; INTRAVENOUS at 08:27

## 2022-01-01 RX ADMIN — Medication 4 UNITS: at 18:24

## 2022-01-01 RX ADMIN — PIPERACILLIN AND TAZOBACTAM 2.25 G: 2; .25 INJECTION, POWDER, FOR SOLUTION INTRAVENOUS at 16:12

## 2022-01-01 RX ADMIN — MAGNESIUM SULFATE HEPTAHYDRATE 2 G: 40 INJECTION, SOLUTION INTRAVENOUS at 11:33

## 2022-01-01 RX ADMIN — HEPARIN SODIUM 5000 UNITS: 5000 INJECTION INTRAVENOUS; SUBCUTANEOUS at 05:34

## 2022-01-01 RX ADMIN — Medication 250 MG: at 22:26

## 2022-01-01 RX ADMIN — HEPARIN SODIUM 5000 UNITS: 5000 INJECTION INTRAVENOUS; SUBCUTANEOUS at 21:59

## 2022-01-01 RX ADMIN — MIDODRINE HYDROCHLORIDE 2.5 MG: 2.5 TABLET ORAL at 21:03

## 2022-01-01 RX ADMIN — ACETAMINOPHEN 650 MG: 650 SUPPOSITORY RECTAL at 11:21

## 2022-01-01 RX ADMIN — HEPARIN SODIUM 5000 UNITS: 5000 INJECTION INTRAVENOUS; SUBCUTANEOUS at 16:12

## 2022-01-01 RX ADMIN — SODIUM CHLORIDE 500 MG: 9 INJECTION, SOLUTION INTRAVENOUS at 00:59

## 2022-01-01 RX ADMIN — ARFORMOTEROL TARTRATE 15 MCG: 15 SOLUTION RESPIRATORY (INHALATION) at 07:32

## 2022-01-01 RX ADMIN — HEPARIN SODIUM 5000 UNITS: 5000 INJECTION INTRAVENOUS; SUBCUTANEOUS at 21:36

## 2022-01-01 RX ADMIN — MAGNESIUM SULFATE HEPTAHYDRATE 1 G: 1 INJECTION, SOLUTION INTRAVENOUS at 13:23

## 2022-01-01 RX ADMIN — Medication 6 UNITS: at 13:11

## 2022-01-01 RX ADMIN — VASOPRESSIN 0.04 UNITS/MIN: 20 INJECTION INTRAVENOUS at 01:15

## 2022-01-01 RX ADMIN — HEPARIN SODIUM 5000 UNITS: 5000 INJECTION INTRAVENOUS; SUBCUTANEOUS at 05:35

## 2022-01-01 RX ADMIN — BUDESONIDE 500 MCG: 0.5 INHALANT RESPIRATORY (INHALATION) at 19:58

## 2022-01-01 RX ADMIN — ALBUMIN (HUMAN) 12.5 G: 0.25 INJECTION, SOLUTION INTRAVENOUS at 23:38

## 2022-01-01 RX ADMIN — BUDESONIDE 500 MCG: 0.5 INHALANT RESPIRATORY (INHALATION) at 08:09

## 2022-01-01 RX ADMIN — POTASSIUM BICARBONATE 40 MEQ: 782 TABLET, EFFERVESCENT ORAL at 09:38

## 2022-01-01 RX ADMIN — INSULIN GLARGINE 5 UNITS: 100 INJECTION, SOLUTION SUBCUTANEOUS at 09:02

## 2022-01-01 RX ADMIN — POTASSIUM CHLORIDE 10 MEQ: 7.46 INJECTION, SOLUTION INTRAVENOUS at 10:49

## 2022-01-01 RX ADMIN — SODIUM CHLORIDE, PRESERVATIVE FREE 40 MG: 5 INJECTION INTRAVENOUS at 08:15

## 2022-01-01 RX ADMIN — SODIUM CHLORIDE, PRESERVATIVE FREE 10 ML: 5 INJECTION INTRAVENOUS at 04:59

## 2022-01-01 RX ADMIN — SODIUM CHLORIDE, PRESERVATIVE FREE 40 MG: 5 INJECTION INTRAVENOUS at 09:40

## 2022-01-01 RX ADMIN — ARFORMOTEROL TARTRATE 15 MCG: 15 SOLUTION RESPIRATORY (INHALATION) at 21:05

## 2022-01-01 RX ADMIN — CHLORHEXIDINE GLUCONATE 10 ML: 1.2 RINSE BUCCAL at 21:01

## 2022-01-01 RX ADMIN — FUROSEMIDE 20 MG: 10 INJECTION, SOLUTION INTRAMUSCULAR; INTRAVENOUS at 09:40

## 2022-01-01 RX ADMIN — Medication 4 UNITS: at 05:18

## 2022-01-01 RX ADMIN — SODIUM CHLORIDE, PRESERVATIVE FREE 40 MG: 5 INJECTION INTRAVENOUS at 20:52

## 2022-01-01 RX ADMIN — PHYTONADIONE 10 MG: 10 INJECTION, EMULSION INTRAMUSCULAR; INTRAVENOUS; SUBCUTANEOUS at 15:23

## 2022-01-01 RX ADMIN — POTASSIUM BICARBONATE 10 MEQ: 782 TABLET, EFFERVESCENT ORAL at 05:06

## 2022-01-01 RX ADMIN — POTASSIUM CHLORIDE 10 MEQ: 7.46 INJECTION, SOLUTION INTRAVENOUS at 09:00

## 2022-01-01 RX ADMIN — SODIUM CHLORIDE, PRESERVATIVE FREE 10 ML: 5 INJECTION INTRAVENOUS at 21:30

## 2022-01-01 RX ADMIN — BUDESONIDE 500 MCG: 0.5 INHALANT RESPIRATORY (INHALATION) at 07:08

## 2022-01-01 RX ADMIN — Medication 3 UNITS: at 18:21

## 2022-01-01 RX ADMIN — Medication 4 UNITS: at 18:42

## 2022-01-01 RX ADMIN — ALBUMIN (HUMAN) 25 G: 0.25 INJECTION, SOLUTION INTRAVENOUS at 09:38

## 2022-01-01 RX ADMIN — POTASSIUM CHLORIDE 10 MEQ: 7.46 INJECTION, SOLUTION INTRAVENOUS at 05:10

## 2022-01-01 RX ADMIN — METRONIDAZOLE 500 MG: 500 INJECTION, SOLUTION INTRAVENOUS at 05:06

## 2022-01-01 RX ADMIN — SODIUM CHLORIDE 500 MG: 9 INJECTION, SOLUTION INTRAVENOUS at 01:08

## 2022-01-01 RX ADMIN — ROCURONIUM BROMIDE 50 MG: 10 INJECTION, SOLUTION INTRAVENOUS at 16:10

## 2022-01-01 RX ADMIN — LEVETIRACETAM 500 MG: 100 INJECTION, SOLUTION INTRAVENOUS at 23:10

## 2022-01-01 RX ADMIN — SODIUM CHLORIDE, PRESERVATIVE FREE 10 ML: 5 INJECTION INTRAVENOUS at 20:37

## 2022-01-01 RX ADMIN — ARFORMOTEROL TARTRATE 15 MCG: 15 SOLUTION RESPIRATORY (INHALATION) at 20:46

## 2022-01-01 RX ADMIN — BUDESONIDE 500 MCG: 0.5 INHALANT RESPIRATORY (INHALATION) at 19:54

## 2022-01-01 RX ADMIN — CHLORHEXIDINE GLUCONATE 10 ML: 1.2 RINSE BUCCAL at 21:24

## 2022-01-01 RX ADMIN — POTASSIUM CHLORIDE 10 MEQ: 7.45 INJECTION INTRAVENOUS at 06:14

## 2022-01-01 RX ADMIN — IPRATROPIUM BROMIDE 0.5 MG: 0.5 SOLUTION RESPIRATORY (INHALATION) at 15:28

## 2022-01-01 RX ADMIN — POTASSIUM CHLORIDE 40 MEQ: 1500 TABLET, EXTENDED RELEASE ORAL at 06:07

## 2022-01-01 RX ADMIN — SODIUM CHLORIDE, PRESERVATIVE FREE 10 ML: 5 INJECTION INTRAVENOUS at 16:38

## 2022-01-01 RX ADMIN — HEPARIN SODIUM 5000 UNITS: 5000 INJECTION INTRAVENOUS; SUBCUTANEOUS at 21:42

## 2022-01-01 RX ADMIN — BUDESONIDE 500 MCG: 0.5 INHALANT RESPIRATORY (INHALATION) at 19:53

## 2022-01-01 RX ADMIN — METRONIDAZOLE 500 MG: 500 INJECTION, SOLUTION INTRAVENOUS at 12:00

## 2022-01-01 RX ADMIN — CALCIUM GLUCONATE 2 G: 20 INJECTION, SOLUTION INTRAVENOUS at 02:46

## 2022-01-01 RX ADMIN — Medication 3 UNITS: at 07:02

## 2022-01-01 RX ADMIN — SODIUM CHLORIDE, PRESERVATIVE FREE 10 ML: 5 INJECTION INTRAVENOUS at 05:07

## 2022-01-01 RX ADMIN — POTASSIUM CHLORIDE 10 MEQ: 7.45 INJECTION INTRAVENOUS at 17:20

## 2022-01-01 RX ADMIN — CALCIUM GLUCONATE 1 G: 98 INJECTION, SOLUTION INTRAVENOUS at 11:33

## 2022-01-01 RX ADMIN — SODIUM CHLORIDE, PRESERVATIVE FREE 40 MG: 5 INJECTION INTRAVENOUS at 21:29

## 2022-01-01 RX ADMIN — POTASSIUM CHLORIDE 10 MEQ: 7.46 INJECTION, SOLUTION INTRAVENOUS at 10:00

## 2022-01-01 RX ADMIN — POTASSIUM CHLORIDE 10 MEQ: 7.45 INJECTION INTRAVENOUS at 16:30

## 2022-01-01 RX ADMIN — SODIUM CHLORIDE, PRESERVATIVE FREE 40 MG: 5 INJECTION INTRAVENOUS at 21:24

## 2022-01-01 RX ADMIN — SODIUM CHLORIDE, PRESERVATIVE FREE 10 ML: 5 INJECTION INTRAVENOUS at 06:41

## 2022-01-01 RX ADMIN — ARFORMOTEROL TARTRATE 15 MCG: 15 SOLUTION RESPIRATORY (INHALATION) at 19:53

## 2022-01-01 RX ADMIN — SODIUM CHLORIDE, PRESERVATIVE FREE 10 ML: 5 INJECTION INTRAVENOUS at 14:50

## 2022-01-01 RX ADMIN — PIPERACILLIN AND TAZOBACTAM 3.38 G: 3; .375 INJECTION, POWDER, FOR SOLUTION INTRAVENOUS at 09:28

## 2022-01-01 RX ADMIN — METOPROLOL TARTRATE 1.25 MG: 5 INJECTION INTRAVENOUS at 05:06

## 2022-01-01 RX ADMIN — ALBUMIN (HUMAN) 25 G: 0.25 INJECTION, SOLUTION INTRAVENOUS at 09:28

## 2022-01-01 RX ADMIN — SODIUM CHLORIDE, PRESERVATIVE FREE 40 MG: 5 INJECTION INTRAVENOUS at 20:37

## 2022-01-01 RX ADMIN — POTASSIUM CHLORIDE 40 MEQ: 1500 TABLET, EXTENDED RELEASE ORAL at 17:04

## 2022-01-01 RX ADMIN — SODIUM CHLORIDE, PRESERVATIVE FREE 10 ML: 5 INJECTION INTRAVENOUS at 21:21

## 2022-01-01 RX ADMIN — ALBUMIN (HUMAN) 25 G: 0.25 INJECTION, SOLUTION INTRAVENOUS at 18:30

## 2022-01-01 RX ADMIN — POTASSIUM CHLORIDE 10 MEQ: 7.46 INJECTION, SOLUTION INTRAVENOUS at 18:53

## 2022-01-01 RX ADMIN — SODIUM CHLORIDE 0.5 MCG/KG/HR: 9 INJECTION, SOLUTION INTRAVENOUS at 21:01

## 2022-01-01 RX ADMIN — SODIUM POLYSTYRENE SULFONATE 15 G: 15 SUSPENSION ORAL; RECTAL at 11:33

## 2022-01-01 RX ADMIN — ARFORMOTEROL TARTRATE 15 MCG: 15 SOLUTION RESPIRATORY (INHALATION) at 07:19

## 2022-01-01 RX ADMIN — HYDROMORPHONE HYDROCHLORIDE 1 MG: 1 INJECTION, SOLUTION INTRAMUSCULAR; INTRAVENOUS; SUBCUTANEOUS at 04:49

## 2022-01-01 RX ADMIN — VASOPRESSIN 0.03 UNITS/MIN: 20 INJECTION INTRAVENOUS at 15:52

## 2022-01-01 RX ADMIN — METRONIDAZOLE 500 MG: 500 INJECTION, SOLUTION INTRAVENOUS at 12:27

## 2022-01-01 RX ADMIN — LEVETIRACETAM 500 MG: 100 INJECTION, SOLUTION INTRAVENOUS at 10:18

## 2022-01-01 RX ADMIN — MIDAZOLAM 0.5 MG: 1 INJECTION INTRAMUSCULAR; INTRAVENOUS at 23:20

## 2022-01-01 RX ADMIN — MAGNESIUM SULFATE HEPTAHYDRATE 2 G: 40 INJECTION, SOLUTION INTRAVENOUS at 12:37

## 2022-01-01 RX ADMIN — METOPROLOL TARTRATE 1.25 MG: 5 INJECTION INTRAVENOUS at 17:43

## 2022-01-01 RX ADMIN — HYDROMORPHONE HYDROCHLORIDE 1 MG: 1 INJECTION, SOLUTION INTRAMUSCULAR; INTRAVENOUS; SUBCUTANEOUS at 23:38

## 2022-01-01 RX ADMIN — SODIUM CHLORIDE, PRESERVATIVE FREE 10 ML: 5 INJECTION INTRAVENOUS at 05:12

## 2022-01-01 RX ADMIN — SODIUM CHLORIDE, PRESERVATIVE FREE 10 ML: 5 INJECTION INTRAVENOUS at 06:00

## 2022-01-01 RX ADMIN — Medication 3 UNITS: at 18:46

## 2022-06-18 NOTE — ED PROVIDER NOTES
EMERGENCY DEPARTMENT HISTORY AND PHYSICAL EXAM    Date: 6/18/2022  Patient Name: Parth Brothers. History of Presenting Illness     Chief Complaint   Patient presents with    Urinary Catheter Problem       History Provided By: Patient and Patient's Son     History Saleem Pederson):   10:01 AM  Parht Diego is a 78 y.o. male with a PMHX of A. fib, asthma, CAD, CKD 3, diabetes, CHF, hypertension, hyperlipidemia, CVA who presents to the emergency department (room 3) C/O obstructed urinary catheter onset today. Associated sxs include mild abdominal pain. Pt denies any other sxs or complaints. Patient arrived with mild tachycardia and high respiratory rate. He was just discharged from [facility] yesterday. He came because his catheter was obstructed. He denies chest pain shortness of breath or other complaints. Chief Complaint: Catheter obstruction  Onset: 1 day  Timing:  Acute  Context: Symptoms started spontaneously, symptoms have rapidly worsened since onset  Location: Mccray catheter  Quality: Sharp  Severity: Moderate  Modifying Factors: Nothing makes it better, or worse.   Associated Symptoms: denies any other associated signs or symptoms    PCP: Kathia Arreola MD     Past History         Past Medical History:  Past Medical History:   Diagnosis Date    A-fib [facility])     Asthma     CAD (coronary artery disease)     Chronic kidney disease     stage 3    COVID-19     Diabetes ([address]     GERD (gastroesophageal reflux disease)     Heart failure (HCC)     chronic diastolic heart failure    High cholesterol     Hypertension     Ill-defined condition     chronic osteomyelitis left ankle and foot    Ill-defined condition     chronic arteriosclerosis    PVD (peripheral vascular disease) ([address]     Stroke ([address]     cva       Past Surgical History:  Past Surgical History:   Procedure Laterality Date    COLONOSCOPY N/A 6/1/2018    COLONOSCOPY, POLYPECTOMY performed by Gennaro Sanz MD at [facility] ENDOSCOPY    HX CATARACT REMOVAL      HX ORTHOPAEDIC Left 2021    ankle washout, external fixator, would vac    HX ORTHOPAEDIC      external fixator removed       Family History:  No family history on file. Reviewed and non-contributory    Social History:  Social History     Tobacco Use    Smoking status: Former Smoker    Smokeless tobacco: Never Used   Substance Use Topics    Alcohol use: Not Currently    Drug use: No       Medications:  Current Outpatient Medications   Medication Sig Dispense Refill    insulin lispro (HUMALOG) 100 unit/mL injection INITIATE INSULIN CORRECTIVE PROTOCOL: Normal Insulin Sensitivity   For Blood Sugar (mg/dL) of:     Less than 150 =   0 units           150 -199 =   2 units  200 -249 =   4 units  250 -299 =   6 units  300 -349 =   8 units  350 and above = 10 units and Call Physician  If 2 glucose readings are above 200 mg/dL within a 24 hr period, proceed to \"Insulin Resistant\" dosing. Initiate Hypoglycemia protocol if blood glucose is <70 mg/dL Fast Acting - Administer Immediately - or within 15 minutes of start of meal, if mealtime coverage. 1 Each 0    gabapentin (NEURONTIN) 300 mg capsule Take 1 Capsule by mouth three (3) times daily. Max Daily Amount: 900 mg. 30 Capsule 0    clopidogreL (Plavix) 75 mg tab Take  by mouth daily. Indications: afib      B.infantis-B.ani-B.long-B.bifi (Probiotic 4X) 10-15 mg TbEC Take  by mouth daily.  multivitamin (ONE A DAY) tablet Take 1 Tablet by mouth daily.  acetaminophen (TylenoL) 325 mg tablet Take 650 mg by mouth every four (4) hours as needed for Pain.  tamsulosin (FLOMAX) 0.4 mg capsule Take 2 Capsules by mouth daily. 30 Capsule 0    atorvastatin (LIPITOR) 40 mg tablet Take 1 Tab by mouth nightly. 30 Tab 0    metoprolol succinate (TOPROL-XL) 50 mg XL tablet Take 1 Tab by mouth daily.  (Patient taking differently: Take 100 mg by mouth daily.) 30 Tab 0       Allergies:  No Known Allergies    Review of Systems Review of Systems   Constitutional: Negative for chills and fever. HENT: Negative for rhinorrhea and sore throat. Eyes: Negative for pain and visual disturbance. Respiratory: Negative for chest tightness, shortness of breath and wheezing. Cardiovascular: Negative for chest pain and palpitations. Gastrointestinal: Negative for abdominal pain, diarrhea, nausea and vomiting. Genitourinary:        Catheter problem   Musculoskeletal: Negative for arthralgias and myalgias. Skin: Negative for rash and wound. Neurological: Negative for speech difficulty, light-headedness and headaches. Psychiatric/Behavioral: Negative for agitation and confusion. All other systems reviewed and are negative. Physical Exam     Vitals:    06/18/22 1131 06/18/22 1216 06/18/22 1317 06/18/22 1347   BP: (!) 112/51 (!) 112/53 (!) 136/53 135/60   Pulse: (!) 103 98 95 95   Resp: 27 20 25 25   Temp:  97.8 °F (36.6 °C)     SpO2: 95% 94% 94% 94%       Physical Exam  Vitals and nursing note reviewed. Constitutional:       General: He is not in acute distress. Appearance: Normal appearance. He is normal weight. He is not ill-appearing. HENT:      Head: Normocephalic and atraumatic. Nose: Nose normal. No rhinorrhea. Mouth/Throat:      Mouth: Mucous membranes are moist.      Pharynx: No oropharyngeal exudate or posterior oropharyngeal erythema. Eyes:      Extraocular Movements: Extraocular movements intact. Conjunctiva/sclera: Conjunctivae normal.      Pupils: Pupils are equal, round, and reactive to light. Cardiovascular:      Rate and Rhythm: Normal rate and regular rhythm. Heart sounds: No murmur heard. No friction rub. No gallop. Pulmonary:      Effort: Pulmonary effort is normal. No respiratory distress. Breath sounds: Normal breath sounds. No wheezing, rhonchi or rales. Abdominal:      General: Bowel sounds are normal.      Palpations: Abdomen is soft. Tenderness:  There is no abdominal tenderness. There is no guarding or rebound. Genitourinary:     Comments: Mccray in place  Musculoskeletal:         General: No swelling, tenderness or deformity. Normal range of motion. Cervical back: Normal range of motion and neck supple. No rigidity. 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 Feet:      Comments: Padded boot on right foot  Lymphadenopathy:      Cervical: No cervical adenopathy. Skin:     General: Skin is warm and dry. Findings: No rash. Neurological:      General: No focal deficit present. Mental Status: He is alert and oriented to person, place, and time.    Psychiatric:         Mood and Affect: Mood normal.         Behavior: Behavior normal.         Diagnostic Study Results     Labs -  Recent Results (from the past 12 hour(s))   EKG, 12 LEAD, INITIAL    Collection Time: 06/18/22 10:14 AM   Result Value Ref Range    Ventricular Rate 109 BPM    Atrial Rate 109 BPM    P-R Interval 164 ms    QRS Duration 82 ms    Q-T Interval 342 ms    QTC Calculation (Bezet) 460 ms    Calculated P Axis 65 degrees    Calculated R Axis 63 degrees    Calculated T Axis 87 degrees    Diagnosis       Sinus tachycardia  Anteroseptal infarct , age undetermined  Abnormal ECG  When compared with ECG of 28-OCT-2021 07:09,  premature ventricular complexes are no longer present  Anteroseptal infarct is now present  Confirmed by Timo Dave MD, -- ([id number]) on 6/18/2022 12:49:50 PM     CBC WITH AUTOMATED DIFF    Collection Time: 06/18/22 10:15 AM   Result Value Ref Range    WBC 8.3 4.6 - 13.2 K/uL    RBC 3.22 (L) 4.35 - 5.65 M/uL    HGB 8.1 (L) 13.0 - 16.0 g/dL    HCT 26.9 (L) 36.0 - 48.0 %    MCV 83.5 78.0 - 100.0 FL    MCH 25.2 24.0 - 34.0 PG    MCHC 30.1 (L) 31.0 - 37.0 g/dL    RDW 19.3 (H) 11.6 - 14.5 %    PLATELET 744 (H) 696 - 420 K/uL    MPV 10.2 9.2 - 11.8 FL    NRBC 0.0 0  WBC    ABSOLUTE NRBC 0.00 0.00 - 0.01 K/uL    NEUTROPHILS 53 40 - 73 %    BAND NEUTROPHILS 1 0 - 5 %    LYMPHOCYTES 38 21 - 52 %    MONOCYTES 4 3 - 10 %    EOSINOPHILS 4 0 - 5 %    BASOPHILS 0 0 - 2 %    IMMATURE GRANULOCYTES 0 %    ABS. NEUTROPHILS 4.5 1.8 - 8.0 K/UL    ABS. LYMPHOCYTES 3.2 0.9 - 3.6 K/UL    ABS. MONOCYTES 0.3 0.05 - 1.2 K/UL    ABS. EOSINOPHILS 0.3 0.0 - 0.4 K/UL    ABS. BASOPHILS 0.0 0.0 - 0.1 K/UL    ABS. IMM. GRANS. 0.0 K/UL    DF MANUAL      PLATELET COMMENTS CLUMPED PLATELETS      RBC COMMENTS ANISOCYTOSIS  2+        RBC COMMENTS BASOPHILIC STIPPLING  1+        RBC COMMENTS OVALOCYTES  2+        WBC COMMENTS Giant platelets     METABOLIC PANEL, COMPREHENSIVE    Collection Time: 06/18/22 10:15 AM   Result Value Ref Range    Sodium 140 136 - 145 mmol/L    Potassium 3.4 (L) 3.5 - 5.5 mmol/L    Chloride 106 100 - 111 mmol/L    CO2 27 21 - 32 mmol/L    Anion gap 7 3.0 - 18 mmol/L    Glucose 157 (H) 74 - 99 mg/dL    BUN 12 7.0 - 18 MG/DL    Creatinine 1.19 0.6 - 1.3 MG/DL    BUN/Creatinine ratio 10 (L) 12 - 20      GFR est AA >60 >60 ml/min/1.73m2    GFR est non-AA 60 (L) >60 ml/min/1.73m2    Calcium 8.1 (L) 8.5 - 10.1 MG/DL    Bilirubin, total 0.5 0.2 - 1.0 MG/DL    ALT (SGPT) 18 16 - 61 U/L    AST (SGOT) 27 10 - 38 U/L    Alk.  phosphatase 193 (H) 45 - 117 U/L    Protein, total 6.0 (L) 6.4 - 8.2 g/dL    Albumin 1.7 (L) 3.4 - 5.0 g/dL    Globulin 4.3 (H) 2.0 - 4.0 g/dL    A-G Ratio 0.4 (L) 0.8 - 1.7     TROPONIN-HIGH SENSITIVITY    Collection Time: 06/18/22 10:15 AM   Result Value Ref Range    Troponin-High Sensitivity 13 0 - 78 ng/L   URINALYSIS W/ RFLX MICROSCOPIC    Collection Time: 06/18/22 10:15 AM   Result Value Ref Range    Color YELLOW      Appearance CLOUDY      Specific gravity 1.011 1.005 - 1.030      pH (UA) 5.5 5.0 - 8.0      Protein 100 (A) NEG mg/dL    Glucose Negative NEG mg/dL    Ketone Negative NEG mg/dL    Bilirubin Negative NEG      Blood MODERATE (A) NEG      Urobilinogen 0.2 0.2 - 1.0 EU/dL    Nitrites Negative NEG      Leukocyte Esterase LARGE (A) NEG     URINE MICROSCOPIC ONLY    Collection Time: 06/18/22 10:15 AM   Result Value Ref Range    WBC TOO NUMEROUS TO COUNT 0 - 5 /hpf    RBC 11 to 20 0 - 5 /hpf    Epithelial cells FEW 0 - 5 /lpf    Bacteria 1+ (A) NEG /hpf    Yeast 3+ (A) NEG   NT-PRO BNP    Collection Time: 06/18/22 10:15 AM   Result Value Ref Range    NT pro-BNP 2,508 (H) 0 - 900 PG/ML   POC LACTIC ACID    Collection Time: 06/18/22 10:55 AM   Result Value Ref Range    Lactic Acid (POC) 1.65 0.40 - 2.00 mmol/L   EKG, 12 LEAD, SUBSEQUENT    Collection Time: 06/18/22 12:08 PM   Result Value Ref Range    Ventricular Rate 97 BPM    Atrial Rate 97 BPM    P-R Interval 178 ms    QRS Duration 84 ms    Q-T Interval 358 ms    QTC Calculation (Bezet) 454 ms    Calculated P Axis 77 degrees    Calculated R Axis 62 degrees    Calculated T Axis 89 degrees    Diagnosis       Normal sinus rhythm  Nonspecific T wave abnormality  Abnormal ECG  When compared with ECG of 18-JUN-2022 10:14,  Criteria for Anteroseptal infarct are no longer present  Confirmed by Anthony Andersen MD, -- ([id number]) on 6/18/2022 12:50:19 PM     TROPONIN-HIGH SENSITIVITY    Collection Time: 06/18/22 12:36 PM   Result Value Ref Range    Troponin-High Sensitivity 11 0 - 78 ng/L       Radiologic Studies -   XR CHEST PORT   Final Result      Right-sided pleural effusion with adjacent airspace disease, atelectasis versus   pneumonia. CT Results  (Last 48 hours)    None        CXR Results  (Last 48 hours)               06/18/22 1059  XR CHEST PORT Final result    Impression:      Right-sided pleural effusion with adjacent airspace disease, atelectasis versus   pneumonia. Narrative:  Portable Chest         History: SIRS, tachypnea and tachycardia       Comparison: Portable chest 10/26/2021       Portable view of the chest demonstrates the cardiomediastinal silhouette is   within normal limits. Patient is mildly rotated to the right. Normal variant   azygos fissure is present.  Hazy opacities present right lung base with blunting   of costophrenic angle suspicious for pleural effusion. Some additional right   lower lobe airspace disease is present. Left lung is clear. Degenerative changes   are in the spine. Medications given in the ED-  Medications - No data to display    Procedures     Procedures    ED Course     I Abdias Atkinson MD) am the first provider for this patient. I reviewed the vital signs, available nursing notes, past medical history, past surgical history, family history and social history. Records Reviewed: Nursing Notes and Old Medical Records    Patient was placed on oral vancomycin on discharge from [facility]. Cardiac Monitor:  Rate: 114 bpm  Rhythm: tachycardic rhythm    Pulse Oximetry Analysis - 94% on RA    EKG interpretation: (Preliminary)  Rhythm: Sinus tachycardia. Rate: 109 bpm; no STEMI  EKG read by Abdias Atkinson MD at 10:14 AM    EKG interpretation: (Repeat)  Rhythm: NSR. Rate: 97 bpm; no STEMI  EKG read by Abdias Atkinson MD at 12:08 PM    10:01 AM Initial assessment performed. The patients presenting problems have been discussed, and they are in agreement with the care plan formulated and outlined with them. I have encouraged them to ask questions as they arise throughout their visit. ED Course as of 06/18/22 1354   Sat Jun 18, 2022   1353 Improving. No indication for admission at this time. Patient agreeable. He is already on antibiotics for his UTI from [facility]. He is unsure which ones. Will discharge home and call back if resistant UTI. [JM]      ED Course User Index  [JM] Yadira Angel MD       Medical Decision Making     Provider Notes (Medical Decision Making):   DDX: Catheter obstruction, sepsis, tachycardia, unlikely ACS, CHF, UTI    Discussion:  78 y.o. male here for obstruction of his Mccray catheter. He initially met the sepsis protocol but did not have an elevated lactic acid.   Patient had a slightly elevated cardiac marker which is most likely due to his chronic kidney disease. He was placed on oral vancomycin on discharge from [facility]. He does have some atelectasis versus infiltrate on his chest x-ray and signs of UTI. Will cover with Levaquin. Patient may follow-up with his primary care doctor. Patient and family understand and agree with this plan. Diagnosis and Disposition     DISCHARGE NOTE:  2:03 PM   Brodie Larios JrKevin's  results have been reviewed with him. He has been counseled regarding his diagnosis, treatment, and plan. He verbally conveys understanding and agreement of the signs, symptoms, diagnosis, treatment and prognosis and additionally agrees to follow up as discussed. He also agrees with the care-plan and conveys that all of his questions have been answered. I have also provided discharge instructions for him that include: educational information regarding their diagnosis and treatment, and list of reasons why they would want to return to the ED prior to their follow-up appointment, should his condition change. He has been provided with education for proper emergency department utilization. CLINICAL IMPRESSION:    1. Urinary tract infection associated with indwelling urethral catheter, initial encounter ([facility] [address]    2. Obstruction of Mccray catheter, initial encounter ([facility] [address]        PLAN:  1. D/C Home  2. Current Discharge Medication List        3. Follow-up Information     Follow up With Specialties Details Why Contact Info    Dusty Infante MD Internal Medicine Physician Schedule an appointment as soon as possible for a visit  As soon as possible, For follow up from Emergency Department visit. [address] [place] [id number] [phone]      [facility] EMERGENCY DEPT Emergency Medicine  As needed; If symptoms worsen [address]  [phone] [facility] Aisha Silverio MD am the primary clinician of record.     Lorraine Disclaimer     Please note that this dictation was completed with [employer], the computer voice recognition software. Quite often unanticipated grammatical, syntax, homophones, and other interpretive errors are inadvertently transcribed by the computer software. Please disregard these errors. Please excuse any errors that have escaped final proofreading.     Kori Nation MD

## 2022-06-24 NOTE — CALL BACK NOTE
Urine culture/urinalysis showed yeast, not treated at time of ED visit. Patient apparently has Mccray catheter in place. Attempted to contact by primary and mobile numbers, voicemail left for patient or son to return call. Prescription for Diflucan sent to his pharmacy.

## 2022-06-24 NOTE — CALL BACK NOTE
Patient's son called back. Notified of positive yeast on urinalysis and Diflucan sent to his [facility]. Son states he will notify patient and patient's wife as he just returned home from rehab facility.

## 2022-08-10 NOTE — DISCHARGE INSTRUCTIONS
Discussed lid hygiene, warm compress and eyelid wash. Patient Education        Below-the-Knee Leg Amputation: What to Expect at Home  Your Recovery  A below-the-knee amputation is surgery to remove your leg below the knee. Your doctor removed the leg while keeping as much healthy bone, skin, blood vessel, and nerve tissue as possible. After the surgery, you will probably have bandages, a rigid dressing, or a cast over the remaining part of your leg (remaining limb). The leg may be swollen for at least 4 weeks after your surgery. If you have a rigid dressing or cast, your doctor will set up regular visits to change the dressing or cast and check the healing. If you have elastic bandages, your doctor will tell you how to change them. You may have pain in your remaining limb. You also may think you have feeling or pain where your leg was. This is called phantom pain. It is common and may come and go for a year or longer. Your doctor can give you medicine for both types of pain. You may have already started a rehabilitation program (rehab). You will continue this under the guidance of your doctor or physical therapist. [facility] will need to do a lot of work to recondition your muscles and relearn activities, balance, and coordination. The rehab can last as long as a year. You may have been fitted with a temporary artificial leg while you were still in the hospital. If this is the case, your doctor will teach you how to care for it. If you are getting an artificial leg, you may need to get used to it before you go back to work and your other activities. You will probably not wear it all the time, so you will need to learn how to use a wheelchair, crutches, or other device. You will have to make changes in your home. Your workplace may be able to make allowances for you. Having your leg amputated can be traumatic. And learning to live with new limits can be hard and frustrating. Many people feel depressed and may grieve for their former lifestyle.  It's important to understand these feelings. Talking with your family, friends, and health professionals about your frustrations is an important part of your recovery. You may also find that it helps to talk with a person who has had an amputation. Remember that even though you've lost a limb, it doesn't change who you are or prevent you from enjoying life. You'll have to adapt and learn new ways to do things. But you can still work and take part in sports and activities. And you can still learn, love, play, and live life to its fullest.  Many organizations can help you adjust to your new life. For example, you can go to [facility]. org for information and support. This care sheet gives you a general idea about how long it will take for you to recover. But each person recovers at a different pace. Follow the steps below to get better as quickly as possible. How can you care for yourself at home? Activity    · Be active. Talk to your doctor about what you can do. If you are active and use your remaining limb, it will heal faster.     · You may shower when your doctor okays it. Wash the remaining limb with soap and water, and pat it dry. You may need help doing this at first.     · You may need to adapt your car to your situation before you drive.     · You will probably be able to return to work and your usual routine when your remaining limb heals. This can be as soon as 4 to 8 weeks after surgery, but it may take longer. Diet    · You can eat your normal diet. If your stomach is upset, try bland, low-fat foods like plain rice, broiled chicken, toast, and yogurt.     · You may notice that your bowel movements are not regular right after your surgery. This is common. Try to avoid constipation and straining with bowel movements. Take a fiber supplement every day. If you have not had a bowel movement after a couple of days, ask your doctor about taking a mild laxative.    Medicines    · Your doctor will tell you if and when you can restart your medicines. He or she will also give you instructions about taking any new medicines.     · If you take aspirin or some other blood thinner, ask your doctor if and when to start taking it again. Make sure that you understand exactly what your doctor wants you to do.     · Be safe with medicines. Take pain medicines exactly as directed. ? If the doctor gave you a prescription medicine for pain, take it as prescribed. ? If you are not taking a prescription pain medicine, ask your doctor if you can take an over-the-counter medicine.     · If you think your pain medicine is making you sick to your stomach:  ? Take your medicine after meals (unless your doctor has told you not to). ? Ask your doctor for a different pain medicine.     · If your doctor prescribed antibiotics, take them as directed. Do not stop taking them just because you feel better. You need to take the full course of antibiotics. Remaining limb care    · You may have bandages, a rigid dressing, or a cast on your remaining limb. Your doctor will tell you how to take care of it. Depending on your dressing and the doctor's instructions:  ? Check your remaining limb daily for irritation, skin breaks, and redness. Tell your doctor about any problems you see. ? Wash your remaining limb with mild soap and warm water every night. Pat it dry.     · If you have a temporary artificial leg, remove it before you go to sleep. Exercise    · Rehabilitation is a series of exercises you do after your surgery. This helps you learn to use your remaining limb and artificial leg. You will work with your doctor and physical therapist to plan this exercise program. To get the best results, you need to do the exercises correctly and as often and as long as your doctor tells you. Your rehab program will give you a number of exercises to do. Always do them as your therapist tells you. Other instructions    · Preventing contractures is very important. A contracture occurs when a joint becomes stuck in one position. If this happens, it may be hard or impossible to straighten your remaining limb and use an artificial leg.  ? Make sure you put equal weight on both hips when you sit. Use firm chairs, and sit up straight. ? Keep your remaining limb flat with your knees straight and your legs together while you are lying on your back. ? Lie on your stomach as much as possible to stretch your hip joint. ? Do not sit for more than an hour or two. Stand, or lie on your stomach now and then. ? Do not put pillows under your hips or knees or between your thighs. ? Do not rest your remaining limb on crutch handles or a wheelchair. Follow-up care is a key part of your treatment and safety. Be sure to make and go to all appointments, and call your doctor if you are having problems. It's also a good idea to know your test results and keep a list of the medicines you take. When should you call for help? Call 911 anytime you think you may need emergency care. For example, call if:    · You passed out (lost consciousness).     · You have chest pain, are short of breath, or you cough up blood. Call your doctor now or seek immediate medical care if:    · You have pain that does not get better after you take pain medicine.     · You are sick to your stomach or cannot drink fluids.     · You have loose stitches, or your incision comes open.     · You have signs of a blood clot in your leg (called a deep vein thrombosis), such as:  ? Pain in your calf, back of the knee, thigh, or groin. ? Redness or swelling in your leg.     · You have signs of infection, such as:  ? Increased pain, swelling, warmth, or redness. ? Red streaks leading from the incision. ? Pus draining from the incision. ? A fever.     · You bleed through your bandage. Watch closely for any changes in your health, and be sure to contact your doctor if you have any problems. Where can you learn more?   Go to http://www.gray.com/  Enter N778 in the search box to learn more about \"Below-the-Knee Leg Amputation: What to Expect at Home. \"  Current as of: July 1, 2021               Content Version: 13.0  © 6079-9871 [employer]. Care instructions adapted under license by [employer] (which disclaims liability or warranty for this information). If you have questions about a medical condition or this instruction, always ask your healthcare professional. [place] [address] any warranty or liability for your use of this information.

## 2022-08-27 PROBLEM — L97.529 DIABETIC ULCER OF LEFT FOOT (HCC): Status: RESOLVED | Noted: 2021-04-14 | Resolved: 2022-01-01

## 2022-08-27 PROBLEM — L97.519 RIGHT FOOT ULCER (HCC): Status: ACTIVE | Noted: 2022-01-01

## 2022-08-27 PROBLEM — E11.69 DIABETIC FOOT ULCER WITH OSTEOMYELITIS (HCC): Status: RESOLVED | Noted: 2021-04-17 | Resolved: 2022-01-01

## 2022-08-27 PROBLEM — E87.20 ACIDOSIS: Status: ACTIVE | Noted: 2022-01-01

## 2022-08-27 PROBLEM — E11.52 DIABETES MELLITUS WITH FOOT ULCER AND GANGRENE (HCC): Status: RESOLVED | Noted: 2021-01-01 | Resolved: 2022-01-01

## 2022-08-27 PROBLEM — I48.20 CHRONIC ATRIAL FIBRILLATION (HCC): Status: RESOLVED | Noted: 2021-05-22 | Resolved: 2022-01-01

## 2022-08-27 PROBLEM — L97.424 ULCER OF LEFT HEEL, WITH NECROSIS OF BONE (HCC): Status: RESOLVED | Noted: 2021-04-21 | Resolved: 2022-01-01

## 2022-08-27 PROBLEM — D72.829 LEUKOCYTOSIS: Status: ACTIVE | Noted: 2022-01-01

## 2022-08-27 PROBLEM — I48.0 PAROXYSMAL ATRIAL FIBRILLATION (HCC): Status: ACTIVE | Noted: 2022-01-01

## 2022-08-27 PROBLEM — L97.509 DIABETIC FOOT ULCER WITH OSTEOMYELITIS (HCC): Status: RESOLVED | Noted: 2021-04-17 | Resolved: 2022-01-01

## 2022-08-27 PROBLEM — L97.509 DIABETES MELLITUS WITH FOOT ULCER AND GANGRENE (HCC): Status: RESOLVED | Noted: 2021-01-01 | Resolved: 2022-01-01

## 2022-08-27 PROBLEM — K43.6 STRANGULATED HERNIA OF ABDOMINAL WALL: Status: RESOLVED | Noted: 2022-01-01 | Resolved: 2022-01-01

## 2022-08-27 PROBLEM — L97.519 DIABETIC ULCER OF RIGHT FOOT (HCC): Status: ACTIVE | Noted: 2022-01-01

## 2022-08-27 PROBLEM — Z98.890 S/P FOOT SURGERY, LEFT: Status: RESOLVED | Noted: 2021-09-07 | Resolved: 2022-01-01

## 2022-08-27 PROBLEM — I48.92 ATRIAL FLUTTER (HCC): Status: RESOLVED | Noted: 2018-02-12 | Resolved: 2022-01-01

## 2022-08-27 PROBLEM — E11.621 DIABETIC ULCER OF LEFT FOOT (HCC): Status: RESOLVED | Noted: 2021-04-14 | Resolved: 2022-01-01

## 2022-08-27 PROBLEM — K40.90 RIGHT INGUINAL HERNIA: Status: ACTIVE | Noted: 2022-01-01

## 2022-08-27 PROBLEM — U07.1 COVID-19 VIRUS INFECTION: Status: RESOLVED | Noted: 2021-04-14 | Resolved: 2022-01-01

## 2022-08-27 PROBLEM — E87.5 HYPERKALEMIA: Status: ACTIVE | Noted: 2022-01-01

## 2022-08-27 PROBLEM — T82.524A DISPLACEMENT OF PERIPHERALLY INSERTED CENTRAL CATHETER (PICC) (HCC): Status: RESOLVED | Noted: 2021-05-04 | Resolved: 2022-01-01

## 2022-08-27 PROBLEM — E43 SEVERE PROTEIN-CALORIE MALNUTRITION (HCC): Status: ACTIVE | Noted: 2022-01-01

## 2022-08-27 PROBLEM — S82.852A TRIMALLEOLAR FRACTURE OF ANKLE, CLOSED, LEFT, INITIAL ENCOUNTER: Status: RESOLVED | Noted: 2021-05-04 | Resolved: 2022-01-01

## 2022-08-27 PROBLEM — M86.10 ACUTE OSTEOMYELITIS (HCC): Status: RESOLVED | Noted: 2021-04-15 | Resolved: 2022-01-01

## 2022-08-27 PROBLEM — R41.82 AMS (ALTERED MENTAL STATUS): Status: ACTIVE | Noted: 2022-01-01

## 2022-08-27 PROBLEM — K40.30 STRANGULATED INGUINAL HERNIA: Status: ACTIVE | Noted: 2022-01-01

## 2022-08-27 PROBLEM — E87.29 HIGH ANION GAP METABOLIC ACIDOSIS: Status: ACTIVE | Noted: 2022-01-01

## 2022-08-27 PROBLEM — E11.621 DIABETIC FOOT ULCER WITH OSTEOMYELITIS (HCC): Status: RESOLVED | Noted: 2021-04-17 | Resolved: 2022-01-01

## 2022-08-27 PROBLEM — A41.9 SEPSIS (HCC): Status: ACTIVE | Noted: 2022-01-01

## 2022-08-27 PROBLEM — L03.116 CELLULITIS OF LEFT FOOT: Status: RESOLVED | Noted: 2021-04-17 | Resolved: 2022-01-01

## 2022-08-27 PROBLEM — K43.6 STRANGULATED HERNIA OF ABDOMINAL WALL: Status: ACTIVE | Noted: 2022-01-01

## 2022-08-27 PROBLEM — I96 GANGRENE OF LEFT FOOT (HCC): Status: RESOLVED | Noted: 2021-01-01 | Resolved: 2022-01-01

## 2022-08-27 PROBLEM — M86.9 DIABETIC FOOT ULCER WITH OSTEOMYELITIS (HCC): Status: RESOLVED | Noted: 2021-04-17 | Resolved: 2022-01-01

## 2022-08-27 PROBLEM — M86.072 ACUTE HEMATOGENOUS OSTEOMYELITIS OF LEFT FOOT (HCC): Status: RESOLVED | Noted: 2021-04-17 | Resolved: 2022-01-01

## 2022-08-27 PROBLEM — J90 PLEURAL EFFUSION ON RIGHT: Status: ACTIVE | Noted: 2022-01-01

## 2022-08-27 PROBLEM — D64.9 ANEMIA: Status: ACTIVE | Noted: 2022-01-01

## 2022-08-27 PROBLEM — I73.9 PVD (PERIPHERAL VASCULAR DISEASE) (HCC): Status: RESOLVED | Noted: 2021-05-22 | Resolved: 2022-01-01

## 2022-08-27 PROBLEM — M86.9 OSTEOMYELITIS (HCC): Status: RESOLVED | Noted: 2021-05-04 | Resolved: 2022-01-01

## 2022-08-27 PROBLEM — E11.621 DIABETES MELLITUS WITH FOOT ULCER AND GANGRENE (HCC): Status: RESOLVED | Noted: 2021-01-01 | Resolved: 2022-01-01

## 2022-08-27 PROBLEM — Z89.512 S/P BKA (BELOW KNEE AMPUTATION), LEFT (HCC): Status: ACTIVE | Noted: 2022-01-01

## 2022-08-27 PROBLEM — Z97.8 CHRONIC INDWELLING FOLEY CATHETER: Status: ACTIVE | Noted: 2022-01-01

## 2022-08-27 PROBLEM — E11.621 DIABETIC ULCER OF RIGHT FOOT (HCC): Status: ACTIVE | Noted: 2022-01-01

## 2022-08-27 NOTE — ANESTHESIA PREPROCEDURE EVALUATION
Relevant Problems   CARDIOVASCULAR   (+) CAD (coronary artery disease)   (+) Paroxysmal atrial fibrillation (HCC)      RENAL FAILURE   (+) Acute renal failure (ARF) ([facility])   (+) CKD (chronic kidney disease) stage 3, GFR 30-59 ml/min (HCC)      ENDOCRINE   (+) DM2 (diabetes mellitus, type 2) ([facility])      HEMATOLOGY   (+) Anemia       Anesthetic History   No history of anesthetic complications            Review of Systems / Medical History  Patient summary reviewed, nursing notes reviewed and pertinent labs reviewed    Pulmonary  Within defined limits            Pertinent negatives: No asthma     Neuro/Psych       CVA    Pertinent negatives: No TIA   Cardiovascular    Hypertension        Dysrhythmias (last EKG - sinus) : atrial fibrillation  CAD    Exercise tolerance: <4 METS  Comments: Echo - in chart - good LV fxn   GI/Hepatic/Renal     GERD    Renal disease: ARF and CRI       Endo/Other    Diabetes         Other Findings              Physical Exam    Airway            Comments: Not cooperative/edentulous/appears adequate Cardiovascular               Dental    Dentition: Edentulous     Pulmonary                 Abdominal         Other Findings            Anesthetic Plan    ASA: 5, emergent  Anesthesia type: general          Induction: Intravenous  Anesthetic plan and risks discussed with: Spouse      Critically ill - discussed possibility of intraoperative mortality with wife - she DOES NOT want compressions done or defibrillation. Acute renal failure on top of chronic renal insufficiency now with acidosis and hyperkalemia. He is obtunded and hypotensive with acute intraabdominal process. Potassium slightly lower after insulin and bicarb drip - will avoid succinylcholine and hyperventilate to ETCO2 28-32. Levophed initiated in ED after profound hypotension associated with administration of IV fentanyl.

## 2022-08-27 NOTE — ANESTHESIA POSTPROCEDURE EVALUATION
Hemodynamically stable in ICU. Full report given by CRNA to ICU RN. No apparent anesthesia complications.

## 2022-08-27 NOTE — H&P
History & Physical    Patient: Clark Mcmillan MRN: [id number]  CSN: [id number]    YOB: 1945  Age: 78 y.o. Sex: male      DOA: 8/27/2022  Primary Care Provider:  Adilene Mcneal MD      Assessment/Plan   Clark Mcmillan is a 78 y.o. 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 She was admitted for severe septic shock with multiorgan and hypotension involvement due to multiple possible sources of infection, severe hyperkalemia, acute metabolic encephalopathy, metabolic acidosis, strangulated left inguinal hernia, anemia requiring blood transfusion, mild hypoxia coagulable state. Critical care intensivist and general surgery consulted. CRITICAL CARE PLAN    Resp - maintaining airway currently, on oxygen in ED, HOB>30 Daily CXR. ID - multi-source septic shock, strangulated right inguinal hernia, sacral ulcer and medial maleolar and heel infected ulcers, infected urine, IVF resuscitation in ED,   Vanc/zosyn/levaquin  Follow up blood and urine cx. Trend temps, wbc, LA improving. CVS - shock, profound hypotension improved, pressor support not required now, 2 midlines and 1 PIV in planned for access, Monitor HD. Wean pressors, levo for MAP>65. Heme/onc - low H/H, getting one unit of prbcs now, mild hypercoagulable, INR elevated at 1.5, Follow H&H, plts. INR. Renal - acute renal failure, likely due to septic shock, very minimal urine output, caal to measure output overall in critically ill patient and pt with h/o urinary retention, renal consult placed, trend BUN, Cr, follow I/O, caal in place. Check and replace Mg, K, phos.     Endocrine -  Follow FSG    Neuro -acute encephalopathy, only can answer name. CT negative      Pain/Sedation - Fentanyl, ativan/versed prn. Sedation bundle. GI - right strangulated inguinal hernia, general surgery consulted, will be going to OR soon -Dr. Marilyn Vargas, NPO for now. NG tube, tube feeding. F/E/N -hyperkalemia, improved with calcium gluconate, glucose, insulin and kayexalate, slight improvement, will trend   Metabolic acidosis -sodium bicarbonate ordered   Poor nutrition     Prophylaxis - DVT: heparin, GI: protonix     D/w intensivist -Dr. Stephanie Guillaume     Wife at bedside, updated on plan, advised wife to advise children to come to hospital. Son and daughter aware, 2nd son -pt's wife has attempted to contact him     Very poor prognosis. Death likely. Palliative care consult placed.    Patient Active Problem List   Diagnosis Code    DM2 (diabetes mellitus, type 2) ([facility] E11.9    CAD (coronary artery disease) I25.10    Acute renal failure (ARF) ([facility]) N17.9    CKD (chronic kidney disease) stage 3, GFR 30-59 ml/min ([facility]) N18.30    Elevated brain natriuretic peptide (BNP) level R79.89    UTI (urinary tract infection) N39.0    High anion gap metabolic acidosis A93.6    Hyperkalemia E87.5    Sepsis ([facility]) A41.9    AMS (altered mental status) R41.82    Strangulated inguinal hernia K40.30    Right inguinal hernia K40.90    Leukocytosis D72.829    Anemia D64.9    Chronic indwelling Mccray catheter Z97.8    S/P BKA (below knee amputation), left ([facility]) Z89.512    Right foot ulcer ([facility]) L97.519    Paroxysmal atrial fibrillation ([facility]) I48.0    Diabetic ulcer of right foot ([facility]) E11.621, L97.519    Pleural effusion on right J90    Severe protein-calorie malnutrition ([facility]) E43     Estimated length of stay : 5-7 days     CC: profound hypotension        HPI:     Jocy Galindo. is a 78 y.o. 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 He presents to the ED very septic with hypotension 70 over palp initially, tachycardic, bright red blood in diaper, profoundly altered. He was given 30 mg/kg resuscitation IV fluid but his blood pressure up to 152/71 at the time of admission. He was also found to have a profoundly elevated potassium of 7.0 he was given several regimens to lower his potassium currently it was lowered to 6.6 and then to 6.1. Patient's initial bicarb was 5 and had to be given amps of bicarb. Patient overall was found to be in multiorgan septic shock from multiple sources of infection. The most life ultimately source lower contributed to septic shock was a strangulated left inguinal hernia. General surgery has been called and patient was urgently going to the OR. Past Medical History:   Diagnosis Date    A-fib ([facility] [address]     Asthma     CAD (coronary artery disease)     Chronic kidney disease     stage 3    COVID-19     Diabetes (HCC)     GERD (gastroesophageal reflux disease)     Heart failure (HCC)     chronic diastolic heart failure    High cholesterol     Hypertension     Ill-defined condition     chronic osteomyelitis left ankle and foot    Ill-defined condition     chronic arteriosclerosis    PVD (peripheral vascular disease) ([facility] [address]     Stroke ([facility] [address]     cva       Past Surgical History:   Procedure Laterality Date    COLONOSCOPY N/A 6/1/2018    COLONOSCOPY, POLYPECTOMY performed by Mayra Kemp MD at THE [facility] ENDOSCOPY    HX CATARACT REMOVAL      HX ORTHOPAEDIC Left 2021    ankle washout, external fixator, would vac    HX ORTHOPAEDIC      external fixator removed       History reviewed. No pertinent family history.     Social History     Socioeconomic History    Marital status:    Tobacco Use    Smoking status: Former    Smokeless tobacco: Never   Substance and Sexual Activity Alcohol use: Not Currently    Drug use: No       Prior to Admission medications    Medication Sig Start Date End Date Taking? Authorizing Provider   levoFLOXacin (Levaquin) 750 mg tablet Take 1 Tablet by mouth daily. 6/18/22   Geraldine Abernathy MD   insulin lispro (HUMALOG) 100 unit/mL injection INITIATE INSULIN CORRECTIVE PROTOCOL: Normal Insulin Sensitivity   For Blood Sugar (mg/dL) of:     Less than 150 =   0 units           150 -199 =   2 units  200 -249 =   4 units  250 -299 =   6 units  300 -349 =   8 units  350 and above = 10 units and Call Physician  If 2 glucose readings are above 200 mg/dL within a 24 hr period, proceed to \"Insulin Resistant\" dosing. Initiate Hypoglycemia protocol if blood glucose is <70 mg/dL Fast Acting - Administer Immediately - or within 15 minutes of start of meal, if mealtime coverage. 11/3/21   Tianna Zamora MD   gabapentin (NEURONTIN) 300 mg capsule Take 1 Capsule by mouth three (3) times daily. Max Daily Amount: 900 mg. 11/3/21   Tianna Zamora MD   clopidogreL (Plavix) 75 mg tab Take  by mouth daily. Indications: afib    Provider, Historical   B.infantis-B.ani-B.long-B.bifi (Probiotic 4X) 10-15 mg TbEC Take  by mouth daily. Provider, Historical   multivitamin (ONE A DAY) tablet Take 1 Tablet by mouth daily. Provider, Historical   acetaminophen (TylenoL) 325 mg tablet Take 650 mg by mouth every four (4) hours as needed for Pain. Provider, Historical   tamsulosin (FLOMAX) 0.4 mg capsule Take 2 Capsules by mouth daily. 5/25/21   Angy Avendaño MD   atorvastatin (LIPITOR) 40 mg tablet Take 1 Tab by mouth nightly. 2/15/18   Carla Perales PA-C   metoprolol succinate (TOPROL-XL) 50 mg XL tablet Take 1 Tab by mouth daily. Patient taking differently: Take 100 mg by mouth daily.  2/16/18   Carla Perales PA-C       No Known Allergies    Review of Systems  UTO due to altered state       Physical Exam:     Physical Exam:  Visit Vitals  BP (!) 87/69   Pulse 84 Temp 96.8 °F (36 °C)   Resp 19   Ht 5' 8\" (1.727 m)   Wt 38.6 kg (85 lb)   SpO2 100%   BMI 12.92 kg/m²      O2 Device: Non-rebreather mask    Temp (24hrs), Av.8 °F (36 °C), Min:96.8 °F (36 °C), Max:96.8 °F (36 °C)    No intake/output data recorded. No intake/output data recorded. General:  Awake, acute distress, ill-appearing, toxic-appearing, oriented to his name only    Head:  Normocephalic, without obvious abnormality, atraumatic. Eyes:  Conjunctivae/corneas clear, sclera anicteric, PERRL, EOMs intact. Nose: Nares normal. No drainage or sinus tenderness. Throat: Lips, dry mucosa, and tongue normal.    Neck: Supple, symmetrical, trachea midline, no adenopathy. Lungs:   +tachypnea, +rhonchi        Heart:  Tachycardic rate and rhythm, S1, S2 normal, no murmur, click, rub or gallop. Abdomen: Soft, non-tender. Bowel sounds normal. No masses,  No organomegaly. Extremities: Extremities normal, atraumatic, no cyanosis or edema. Capillary refill normal.   Pulses: 2+ and symmetric all extremities. Skin: Skin color pale, turgor normal. No rashes or lesions   Neurologic: Altered, moaning, grunting, can only answer his name        Labs Reviewed:    Lab results reviewed. For significant abnormal values and values requiring intervention, see assessment and plan.   Recent Results (from the past 24 hour(s))   LACTIC ACID    Collection Time: 22 10:30 AM   Result Value Ref Range    Lactic acid 1.6 0.4 - 2.0 MMOL/L   CBC WITH AUTOMATED DIFF    Collection Time: 22 10:30 AM   Result Value Ref Range    WBC 30.5 (H) 4.6 - 13.2 K/uL    RBC 2.96 (L) 4.35 - 5.65 M/uL    HGB 7.3 (L) 13.0 - 16.0 g/dL    HCT 25.5 (L) 36.0 - 48.0 %    MCV 86.1 78.0 - 100.0 FL    MCH 24.7 24.0 - 34.0 PG    MCHC 28.6 (L) 31.0 - 37.0 g/dL    RDW 20.8 (H) 11.6 - 14.5 %    PLATELET 850 (H) 994 - 420 K/uL    MPV 9.6 9.2 - 11.8 FL    NRBC 0.0 0  WBC    ABSOLUTE NRBC 0.00 0.00 - 0.01 K/uL    NEUTROPHILS 88 (H) 40 - 73 % BAND NEUTROPHILS 3 0 - 5 %    LYMPHOCYTES 2 (L) 21 - 52 %    MONOCYTES 5 3 - 10 %    EOSINOPHILS 0 0 - 5 %    BASOPHILS 0 0 - 2 %    METAMYELOCYTES 2 (H) 0 %    IMMATURE GRANULOCYTES 0 %    ABS. NEUTROPHILS 27.8 (H) 1.8 - 8.0 K/UL    ABS. LYMPHOCYTES 0.6 (L) 0.9 - 3.6 K/UL    ABS. MONOCYTES 1.5 (H) 0.05 - 1.2 K/UL    ABS. EOSINOPHILS 0.0 0.0 - 0.4 K/UL    ABS. BASOPHILS 0.0 0.0 - 0.1 K/UL    ABS. IMM. GRANS. 0.0 K/UL    DF MANUAL      PLATELET COMMENTS Increased Platelets      RBC COMMENTS ANISOCYTOSIS  2+        RBC COMMENTS POIKILOCYTOSIS  2+        RBC COMMENTS OVALOCYTES  1+        RBC COMMENTS ARCENIO CELLS  2+       TYPE & SCREEN    Collection Time: 08/27/22 10:30 AM   Result Value Ref Range    Crossmatch Expiration 08/30/2022,2359     ABO/Rh(D) O POSITIVE     Antibody screen NEG     CALLED TO: [facility] 08/27/22 AT 1134 BY ANI     Unit number [id number]     Blood component type RC LR     Unit division 00     Status of unit ISSUED     Crossmatch result Compatible    PROTHROMBIN TIME + INR    Collection Time: 08/27/22 10:30 AM   Result Value Ref Range    Prothrombin time 18.1 (H) 11.5 - 15.2 sec    INR 1.5 (H) 0.8 - 1.2     METABOLIC PANEL, COMPREHENSIVE    Collection Time: 08/27/22 10:30 AM   Result Value Ref Range    Sodium 132 (L) 136 - 145 mmol/L    Potassium 7.6 (HH) 3.5 - 5.5 mmol/L    Chloride 109 100 - 111 mmol/L    CO2 7 (LL) 21 - 32 mmol/L    Anion gap 16 3.0 - 18 mmol/L    Glucose 145 (H) 74 - 99 mg/dL     (H) 7.0 - 18 MG/DL    Creatinine 4.66 (H) 0.6 - 1.3 MG/DL    BUN/Creatinine ratio 37 (H) 12 - 20      GFR est AA 15 (L) >60 ml/min/1.73m2    GFR est non-AA 12 (L) >60 ml/min/1.73m2    Calcium 8.5 8.5 - 10.1 MG/DL    Bilirubin, total 0.3 0.2 - 1.0 MG/DL    ALT (SGPT) 32 16 - 61 U/L    AST (SGOT) 28 10 - 38 U/L    Alk.  phosphatase 150 (H) 45 - 117 U/L    Protein, total 5.8 (L) 6.4 - 8.2 g/dL    Albumin 1.9 (L) 3.4 - 5.0 g/dL    Globulin 3.9 2.0 - 4.0 g/dL    A-G Ratio 0.5 (L) 0.8 - 1.7 NT-PRO BNP    Collection Time: 08/27/22 10:30 AM   Result Value Ref Range    NT pro-BNP 6,691 (H) 0 - 1,800 PG/ML   TROPONIN-HIGH SENSITIVITY    Collection Time: 08/27/22 10:30 AM   Result Value Ref Range    Troponin-High Sensitivity 12 0 - 78 ng/L   LIPASE    Collection Time: 08/27/22 10:30 AM   Result Value Ref Range    Lipase 202 73 - 393 U/L   MAGNESIUM    Collection Time: 08/27/22 10:30 AM   Result Value Ref Range    Magnesium 1.2 (L) 1.6 - 2.6 mg/dL   COVID-19 RAPID TEST    Collection Time: 08/27/22 10:30 AM   Result Value Ref Range    Specimen source SWAB      COVID-19 rapid test Not detected NOTD     BLOOD GAS, ARTERIAL POC    Collection Time: 08/27/22 10:39 AM   Result Value Ref Range    Device: Non rebreather      FIO2 (POC) 100 %    pH (POC) 7.04 (LL) 7.35 - 7.45      pCO2 (POC) 18.3 (L) 35.0 - 45.0 MMHG    pO2 (POC) 91 80 - 100 MMHG    HCO3 (POC) 5.0 (L) 22 - 26 MMOL/L    sO2 (POC) 92.3 92 - 97 %    Base deficit (POC) 23.8 mmol/L    Allens test (POC) Positive      Site RIGHT BRACHIAL      Specimen type (POC) ARTERIAL      Performed by [facility], [facility]    Collection Time: 08/27/22 11:15 AM   Result Value Ref Range    HISTORY CHECKED?  Historical check performed    METABOLIC PANEL, BASIC    Collection Time: 08/27/22  1:15 PM   Result Value Ref Range    Sodium 133 (L) 136 - 145 mmol/L    Potassium 6.6 (HH) 3.5 - 5.5 mmol/L    Chloride 111 100 - 111 mmol/L    CO2 7 (LL) 21 - 32 mmol/L    Anion gap 15 3.0 - 18 mmol/L    Glucose 209 (H) 74 - 99 mg/dL     (H) 7.0 - 18 MG/DL    Creatinine 4.27 (H) 0.6 - 1.3 MG/DL    BUN/Creatinine ratio 37 (H) 12 - 20      GFR est AA 17 (L) >60 ml/min/1.73m2    GFR est non-AA 14 (L) >60 ml/min/1.73m2    Calcium 8.1 (L) 8.5 - 45.3 MG/DL   METABOLIC PANEL, BASIC    Collection Time: 08/27/22  2:25 PM   Result Value Ref Range    Sodium 136 136 - 145 mmol/L    Potassium 6.1 (HH) 3.5 - 5.5 mmol/L    Chloride 111 100 - 111 mmol/L    CO2 9 (LL) 21 - 32 mmol/L    Anion gap 16 3.0 - 18 mmol/L    Glucose 250 (H) 74 - 99 mg/dL     (H) 7.0 - 18 MG/DL    Creatinine 4.29 (H) 0.6 - 1.3 MG/DL    BUN/Creatinine ratio 36 (H) 12 - 20      GFR est AA 16 (L) >60 ml/min/1.73m2    GFR est non-AA 14 (L) >60 ml/min/1.73m2    Calcium 7.8 (L) 8.5 - 10.1 MG/DL   MAGNESIUM    Collection Time: 08/27/22  2:25 PM   Result Value Ref Range    Magnesium 1.6 1.6 - 2.6 mg/dL   URINALYSIS W/ RFLX MICROSCOPIC    Collection Time: 08/27/22  2:27 PM   Result Value Ref Range    Color YELLOW      Appearance TURBID      Specific gravity 1.019 1.005 - 1.030      pH (UA) 6.0 5.0 - 8.0      Protein >1,000 (A) NEG mg/dL    Glucose Negative NEG mg/dL    Ketone TRACE (A) NEG mg/dL    Bilirubin Negative NEG      Blood LARGE (A) NEG      Urobilinogen 1.0 0.2 - 1.0 EU/dL    Nitrites Negative NEG      Leukocyte Esterase LARGE (A) NEG     URINE MICROSCOPIC ONLY    Collection Time: 08/27/22  2:27 PM   Result Value Ref Range    WBC TOO NUMEROUS TO COUNT 0 - 5 /hpf    RBC 4 to 10 0 - 5 /hpf    Epithelial cells FEW 0 - 5 /lpf    Bacteria 1+ (A) NEG /hpf       Procedures/imaging: see electronic medical records for all procedures/Xrays and details which were not copied into this note but were reviewed prior to creation of Plan    [id number], 60minutes of critical care time spent in the direct evaluation and treatment of this high risk patient. The reason for providing this level of medical care for this critically ill patient was due a critical illness that impaired one or more vital organ systems such that there was a high probability of imminent or life threatening deterioration in the patients condition. This care involved high complexity decision making to assess, manipulate, and support vital system functions, to treat this degreee vital organ system failure and to prevent further life threatening deterioration of the patients condition.       CC: Lance Min MD

## 2022-08-27 NOTE — CONSULTS
Consult Note    Patient: Juliane Amos MRN: [id number]  CSN: [id number]    YOB: 1945  Age: 78 y.o. Sex: male    DOA: 8/27/2022 LOS:  LOS: 0 days        Requesting Physician: Dr. Redgie Dakin for Consultation: sepsis               HPI:     Juliane Amos is a 78 y.o. male who has been seen with extensive and severe medical history including A. fib, CAD, chronic kidney disease, diabetes, heart failure, hypertension, hyperlipidemia, peripheral vascular disease, and prior stroke presents emergency department acute distress hypertensive and overtly septic appearing on arrival IV was started code sepsis was called blood cultures were pulled fluid resuscitation was given. Spectrum antibiotics ordered. Patient has urinary catheter in place and this appeared to be grossly purulent and with debris. COVID-19 testing was also sent. Patient is altered and cannot help with his care or answer questions. Past Medical History:   Diagnosis Date    A-fib ([facility] [address]     Asthma     CAD (coronary artery disease)     Chronic kidney disease     stage 3    COVID-19     Diabetes (HCC)     GERD (gastroesophageal reflux disease)     Heart failure (HCC)     chronic diastolic heart failure    High cholesterol     Hypertension     Ill-defined condition     chronic osteomyelitis left ankle and foot    Ill-defined condition     chronic arteriosclerosis    PVD (peripheral vascular disease) ([facility] [address]     Stroke ([facility] [address]     cva       Past Surgical History:   Procedure Laterality Date    COLONOSCOPY N/A 6/1/2018    COLONOSCOPY, POLYPECTOMY performed by Anushka Ferrell MD at THE [facility] ENDOSCOPY    HX CATARACT REMOVAL      HX ORTHOPAEDIC Left 2021    ankle washout, external fixator, would vac    HX ORTHOPAEDIC      external fixator removed       History reviewed. No pertinent family history.     Social History     Socioeconomic History    Marital status:    Tobacco Use    Smoking status: Former    Smokeless tobacco: Never Substance and Sexual Activity    Alcohol use: Not Currently    Drug use: No       Prior to Admission medications    Medication Sig Start Date End Date Taking? Authorizing Provider   levoFLOXacin (Levaquin) 750 mg tablet Take 1 Tablet by mouth daily. 6/18/22   Deedee Rodriguez MD   insulin lispro (HUMALOG) 100 unit/mL injection INITIATE INSULIN CORRECTIVE PROTOCOL: Normal Insulin Sensitivity   For Blood Sugar (mg/dL) of:     Less than 150 =   0 units           150 -199 =   2 units  200 -249 =   4 units  250 -299 =   6 units  300 -349 =   8 units  350 and above = 10 units and Call Physician  If 2 glucose readings are above 200 mg/dL within a 24 hr period, proceed to \"Insulin Resistant\" dosing. Initiate Hypoglycemia protocol if blood glucose is <70 mg/dL Fast Acting - Administer Immediately - or within 15 minutes of start of meal, if mealtime coverage. 11/3/21   Dede Kidd MD   gabapentin (NEURONTIN) 300 mg capsule Take 1 Capsule by mouth three (3) times daily. Max Daily Amount: 900 mg. 11/3/21   Dede Kidd MD   clopidogreL (Plavix) 75 mg tab Take  by mouth daily. Indications: afib    Provider, Historical   B.infantis-B.ani-B.long-B.bifi (Probiotic 4X) 10-15 mg TbEC Take  by mouth daily. Provider, Historical   multivitamin (ONE A DAY) tablet Take 1 Tablet by mouth daily. Provider, Historical   acetaminophen (TylenoL) 325 mg tablet Take 650 mg by mouth every four (4) hours as needed for Pain. Provider, Historical   tamsulosin (FLOMAX) 0.4 mg capsule Take 2 Capsules by mouth daily. 5/25/21   Chio Jacques MD   atorvastatin (LIPITOR) 40 mg tablet Take 1 Tab by mouth nightly. 2/15/18   Eve Ortiz PA-C   metoprolol succinate (TOPROL-XL) 50 mg XL tablet Take 1 Tab by mouth daily. Patient taking differently: Take 100 mg by mouth daily. 2/16/18   Eve Ortiz PA-C       No Known Allergies    Review of Systems  Review of systems not obtained due to patient factors.       Physical Exam:      Visit Vitals  BP (!) 87/69   Pulse 84   Temp 96.8 °F (36 °C)   Resp 19   Ht 5' 8\" (1.727 m)   Wt 38.6 kg (85 lb)   SpO2 100%   BMI 12.92 kg/m²         Physical Exam:    GENERAL: delirious, severe distress, toxic, LUNG: diminished breath sounds , HEART: regularly irregular rhythm, ABDOMEN: abnormal findings:  distended, tenderness rebound present in the entire abdomen, with v lg hernia in right groin with strangulated bowel, EXTREMITIES:  edema L BKA, R severe ulcer with black eshar foot, sacral decub    Labs Reviewed:      Labs: Results:       Chemistry Recent Labs     08/27/22  1425 08/27/22  1315 08/27/22  1030   * 209* 145*    133* 132*   K 6.1* 6.6* 7.6*    111 109   CO2 9* 7* 7*   * 156* 171*   CREA 4.29* 4.27* 4.66*   CA 7.8* 8.1* 8.5   AGAP 16 15 16   BUCR 36* 37* 37*   AP  --   --  150*   TP  --   --  5.8*   ALB  --   --  1.9*   GLOB  --   --  3.9   AGRAT  --   --  0.5*      CBC w/Diff Recent Labs     08/27/22  1030   WBC 30.5*   RBC 2.96*   HGB 7.3*   HCT 25.5*   *   GRANS 88*   LYMPH 2*   EOS 0      Cardiac Enzymes No results for input(s): CPK, CKND1, LEONEL in the last 72 hours. No lab exists for component: CKRMB, TROIP   Coagulation Recent Labs     08/27/22  1030   PTP 18.1*   INR 1.5*       Lipid Panel No results found for: CHOL, CHOLPOCT, CHOLX, CHLST, CHOLV, 067186, HDL, HDLP, LDL, LDLC, DLDLP, 454647, VLDLC, VLDL, TGLX, TRIGL, TRIGP, TGLPOCT, CHHD, CHHDX   BNP No results for input(s): BNPP in the last 72 hours.    Liver Enzymes Recent Labs     08/27/22  1030   TP 5.8*   ALB 1.9*   *      Thyroid Studies Lab Results   Component Value Date/Time    TSH 1.72 02/11/2018 02:19 AM            Imaging:  images and reports reviewed      Assessment/Plan     Patient Active Problem List   Diagnosis Code    DM2 (diabetes mellitus, type 2) ([facility] E11.9    CAD (coronary artery disease) I25.10    Acute renal failure (ARF) (HCC) N17.9    CKD (chronic kidney disease) stage 3, GFR 30-59 ml/min (HCC) N18.30    Elevated brain natriuretic peptide (BNP) level R79.89    UTI (urinary tract infection) N39.0    High anion gap metabolic acidosis U96.1    Hyperkalemia E87.5    Sepsis (HCC) A41.9    AMS (altered mental status) R41.82    Strangulated inguinal hernia K40.30    Right inguinal hernia K40.90    Leukocytosis D72.829    Anemia D64.9    Chronic indwelling Mccray catheter Z97.8    S/P BKA (below knee amputation), left (HCC) Z89.512    Right foot ulcer (HCC) L97.519    Paroxysmal atrial fibrillation (HCC) I48.0    Diabetic ulcer of right foot (HCC) E11.621, L97.519    Pleural effusion on right J90    Severe protein-calorie malnutrition (HCC) E43       Pt in severe distress, septic shock with severe metabolic derangements. Strangulated bowel in R hernia. Pt wife explained pt will certainly die without surgery but has a very high risk of dying intraop or post op. Ex lap with resection and ileostomy is life saving plan without heroics to get pt back to ICU for resuscitation. RBA discussed at length. Critical pt and very high risk of death. Pt wife says she and the family want surgery despite high risk of death.

## 2022-08-27 NOTE — OP NOTES
OPERATIVE NOTE    Patient: Otila Kawasaki. MRN: [id number]  [facility]: [id number]    YOB: 1945  Age: 78 y.o. Sex: male      Indications: This is a 78y.o. year-old male who presents with sepsis due to incarcerated right inguinal hernia. Date of Procedure: 8/27/2022     Preoperative Diagnosis: Inguinal Hernia      Postoperative Diagnosis: Inguinal Hernia      Procedure: Procedure(s):  EXPLORATORY LAPAROTOMY, REDUCTION INCARCERATED RIGHT GROIN HERNIA WITH HERNIA REPAIR WITH MESH    Surgeon(s): Surgeon(s) and Role:     Kvng Mantilla,  - Primary    Assistant(s): Circ-1: Tessy Moy RN  Scrub Tech-1: Bridgette Baez  Surg Asst-1: Bozena DYER    Anesthesia: General     Procedure: He was placed in the supine position. His abdomen and groin were prepped and draped in the usual fashion. An incision was made in low midline with the knife and then carried down through the subcutaneous tissues with the electrocautery. A huge right groin hernia was identified with small and large bowel within. The contents were reduced successfully and fully examined. The bowel was completely viable and did not require resection. The direct hernia sac was dissected away from the cord structures transected - leaving the distal sac in situ for safety of the patient. The sac was then ligated high at the internal ring using a 2-0 silk suture ligature purse string. The defect in the inguinal floor was repaired with that suture and then repaired using Progrip mesh. The mesh was fashioned, placed over the inguinal floor and allowed to  to the pubic tubercle shelving edge of the inguinal ligament and transversalis fascia using a self-griping nature of the mesh. The mesh was then wrapped around the cord structures in the usual fashion. A single 2-0 silk interrupted suture was placed through the middle of the mesh attached to the defect repair.   A single 2-0 pds interrupted suture was placed through the mesh into the pubic tubercle medially to allow placement. After adequate hemostasis was seen the area was irrigated and viable bowel once again visualized. The abdomen was irrigated and irrigant removed via suction. The abdomen was inspected and found to be hemostatic and the anastomosis in good position without torsion, tension or herniation of loops. Omentum was placed over the bowel to the mesh. The midline fascia was closed a running #1 PDS suture. The wounds were irrigated and a closed with staples and Mepilex Borders. The patient was transported to the ICU in critical condition. having tolerated the procedure well. Sponge/ Needle/ Instrument count reported as correct. The patient was transferred to the recovery room in stable condition. Estimated Blood Loss: * No values recorded between 8/27/2022  2:24 PM and 8/27/2022  5:27 PM *    Specimens:   ID Type Source Tests Collected by Time Destination   1 : RIGHT HERNIA [address] Preservative Abdomen  Jeni Javier, DO 8/27/2022 1634 Pathology        Drains: none            Complications: None; patient tolerated the procedure well.     Virginia Forrester,   8/27/2022  5:27 PM

## 2022-08-27 NOTE — ED NOTES
Pt presents via EMS from home for decreased appetite, tremors and AMS.    Upon arrival, pt is alert, decreased command following and incomprehensible speech  Sepsis workup initiated    Mccray catheter POA, replaced per MD order  Sacral wound POA- mepolex applied  R heel wound- boot POA  Enlarged scrotum POA  HypoTN- target fluids/broad spectrum ABX initiated    RT at bedside for ABG- keep NRB on

## 2022-08-27 NOTE — PROGRESS NOTES
[address] [facility] Pharmacokinetic Monitoring Service - Vancomycin     Syeda Aburto is a 78 y.o. male starting on vancomycin therapy for sepsis. Pharmacy consulted by Luz Banegas for monitoring and adjustment. Target Concentration: Dosing based on anticipated concentration <15 mg/L due to renal impairment/insufficiency    Additional Antimicrobials: Levaquin    Pertinent Laboratory Values: Wt Readings from Last 1 Encounters:   08/27/22 38.6 kg (85 lb)     Temp Readings from Last 1 Encounters:   08/27/22 96.8 °F (36 °C)     No components found for: PROCAL  Estimated Creatinine Clearance: 7.5 mL/min (A) (based on SCr of 4.66 mg/dL (H)).   Recent Labs     08/27/22  1030   WBC 30.5*     Plan:  Concentration-guided dosing due to renal impairment/insufficiency  Start vancomycin 1000 mg IV x once 1045 8/27/22  Renal labs as indicated   Vancomycin concentration ordered for 8/28/22 @ 1100   Pharmacy will continue to monitor patient and adjust therapy as indicated    Thank you for the consult,  MICHELLE Anne  8/27/2022 12:39 PM

## 2022-08-27 NOTE — ED PROVIDER NOTES
81-year-old male with extensive and severe medical history including A. fib, CAD, chronic kidney disease, diabetes, heart failure, hypertension, hyperlipidemia, peripheral vascular disease, and prior stroke presents emergency department acute distress hypertensive and overtly septic appearing on arrival IV was started code sepsis was called blood cultures were pulled fluid resuscitation was given. Spectrum antibiotics ordered. Patient has urinary catheter in place and this appeared to be grossly purulent and with debris. COVID-19 testing was also sent. Patient is altered and cannot help with his care or answer questions. Past Medical History:   Diagnosis Date    A-fib ([facility] [address]     Asthma     CAD (coronary artery disease)     Chronic kidney disease     stage 3    COVID-19     Diabetes (HCC)     GERD (gastroesophageal reflux disease)     Heart failure (HCC)     chronic diastolic heart failure    High cholesterol     Hypertension     Ill-defined condition     chronic osteomyelitis left ankle and foot    Ill-defined condition     chronic arteriosclerosis    PVD (peripheral vascular disease) ([facility] [address]     Stroke ([facility] [address]     cva       Past Surgical History:   Procedure Laterality Date    COLONOSCOPY N/A 6/1/2018    COLONOSCOPY, POLYPECTOMY performed by Renato Pagan MD at [facility] ENDOSCOPY    HX CATARACT REMOVAL      HX ORTHOPAEDIC Left 2021    ankle washout, external fixator, would vac    HX ORTHOPAEDIC      external fixator removed         No family history on file.     Social History     Socioeconomic History    Marital status:      Spouse name: Not on file    Number of children: Not on file    Years of education: Not on file    Highest education level: Not on file   Occupational History    Not on file   Tobacco Use    Smoking status: Former    Smokeless tobacco: Never   Substance and Sexual Activity    Alcohol use: Not Currently    Drug use: No    Sexual activity: Not on file   Other Topics Concern    Not on file   Social History Narrative    Not on file     Social Determinants of Health     Financial Resource Strain: Not on file   Food Insecurity: Not on file   Transportation Needs: Not on file   Physical Activity: Not on file   Stress: Not on file   Social Connections: Not on file   Intimate Partner Violence: Not on file   Housing Stability: Not on file         ALLERGIES: Patient has no known allergies. Review of Systems   Unable to perform ROS: Patient unresponsive     Vitals:    08/27/22 1059 08/27/22 1112 08/27/22 1127 08/27/22 1234   BP: (!) 95/37 122/77     Pulse:  94 92    Resp:  18 25    Temp:    96.8 °F (36 °C)   SpO2:  100% 100%    Weight:       Height:                Physical Exam  Constitutional:       General: He is in acute distress. Appearance: He is ill-appearing and toxic-appearing. HENT:      Head: Normocephalic and atraumatic. Nose: Nose normal. No congestion or rhinorrhea. Mouth/Throat:      Mouth: Mucous membranes are dry. Pharynx: Oropharynx is clear. No oropharyngeal exudate or posterior oropharyngeal erythema. Eyes:      Extraocular Movements: Extraocular movements intact. Pupils: Pupils are equal, round, and reactive to light. Cardiovascular:      Rate and Rhythm: Tachycardia present. Rhythm irregular. Pulses: Normal pulses. Heart sounds: Murmur heard. No friction rub. No gallop. Pulmonary:      Effort: Respiratory distress present. Breath sounds: No stridor. Rhonchi present. No wheezing or rales. Chest:      Chest wall: No tenderness. Abdominal:      General: Abdomen is flat. Bowel sounds are normal.      Palpations: Abdomen is soft. Skin:     General: Skin is dry. Capillary Refill: Capillary refill takes 2 to 3 seconds. Coloration: Skin is pale. Neurological:      Mental Status: He is disoriented.         MDM  Number of Diagnoses or Management Options  Diagnosis management comments: 70-year-old male with multiple medical problems see HPI presents emergency department acutely altered and in overt distress. Patient has had decreased level of consciousness over the last 2 days and was sent to the ER via ambulance today. He arrives severely hypertensive and tachycardic. Resuscitative efforts were begun immediately code sepsis was called. Patient has indwelling Mccray and the output has been grossly purulent. In addition he had gross blood in his stool on arrival to the emergency department in his diaper. 2 IVs were started as well as midline placed he was given full 30 cc/kg fluid resuscitation. Broad-spectrum antibiotics for sepsis of unknown origin were were given. Labs returned showing severe hyperkalemia as well as decreased bicarb. Patient has an acute on chronic renal failure noted and overt acute on chronic anemia patient usually has a hemoglobin of between 8.1 and 8.5 today he is 7.1 and while he has not less than 7 due to his emergent acute medical conditions transfusion indicated. To treat hyperkalemia insulin and glucose was ordered calcium gluconate was ordered patient was given Kayexalate. 1 unit PRBCs was ordered patient was placed on a bicarb drip. Scans of the head and abdomen were ordered. Mccray was changed. CT of the abdomen is significant for right-sided strangulated inguinal hernia with small bowel. I discussed this with Dr. Franklin Murillo the on-call surgeon given the patient is critically ill this is a medical surgical emergency and patient needs to have bowel resected immediately. This case was also discussed with Dr. Adriano Santos. Patient continues to be treated for hyperkalemia low bicarb. We are continuously checking his potassium and intervening as necessary with both insulin glucose, and bicarb infusion as well as Kayexalate and calcium. Vitals have improved since arrival perfusing blood pressure noted CT of the head showed no intracranial hemorrhage.   Is acutely ill and the worry is that he will not survive the surgery however he has multiple medical problems and multiple sources of sepsis as has small bowel even with surgery he may pass. This case was discussed with the family and was david discussion including what options they wanted they do want surgery and Dr. April Gallegos is amendable to taking this patient to the operating room however they do not want any resuscitative efforts as far as CPR if patient is to lose pulses at any point during surgery or in the ICU.   Patient was admitted to the ICU through the hospitalist and taken to the operating room by Dr. April Moya as well as Dr. Suad Barboza have seen this patient in the ED           Critical Care    Date/Time: 8/27/2022 1:35 PM  Performed by: Agnieszka Martinez MD  Authorized by: Agnieszka Martinez MD     Critical care provider statement:     Critical care time (minutes):  90    Critical care time was exclusive of:  Separately billable procedures and treating other patients and teaching time    Critical care was necessary to treat or prevent imminent or life-threatening deterioration of the following conditions:  Sepsis, shock, dehydration, metabolic crisis and renal failure    Critical care was time spent personally by me on the following activities:  Blood draw for specimens, development of treatment plan with patient or surrogate, discussions with consultants, evaluation of patient's response to treatment, examination of patient, interpretation of cardiac output measurements, obtaining history from patient or surrogate, ordering and performing treatments and interventions, ordering and review of laboratory studies, ordering and review of radiographic studies, pulse oximetry, re-evaluation of patient's condition and review of old charts

## 2022-08-27 NOTE — PROGRESS NOTES
PACU FAMILY UPDATE    Spoke with wife and son in waiting room and discussed the findings and the conduct of the case as well as post-op course expectations. All questions were answered.

## 2022-08-27 NOTE — CONSULTS
[facility]  Pulmonary, Critical Care, and Sleep Medicine    Name: Noni Durant. MRN: [id number]   : 1945 Hospital: [facility] [place]    Date: 2022  Room: [address] Note                                              Consult requesting physician: Dr. Dr Kristen Guallpa  Reason for Consult: Strangulated bowel with complications      IMPRESSION:   Strangulated bowel/inguinal hernia  High gap metabolic acidosis  Acute renal failure  Right inguinal hernia  Hyperkalemia  Sepsis  Altered mentation  Leukocytosis  Anemia  UTI  Right pleural effusion  Chronic Mccray catheter  Right foot ulcers  Type 2 diabetes mellitus  Paroxysmal atrial fibrillation  Left below-knee amputation  Severe malnutrition      Patient Active Problem List   Diagnosis Code    DM2 (diabetes mellitus, type 2) ([facility]) E11.9    CAD (coronary artery disease) I25.10    Acute renal failure (ARF) ([facility]) N17.9    CKD (chronic kidney disease) stage 3, GFR 30-59 ml/min ([facility]) N18.30    Elevated brain natriuretic peptide (BNP) level R79.89    UTI (urinary tract infection) N39.0    High anion gap metabolic acidosis S35.3    Hyperkalemia E87.5    Sepsis ([facility]) A41.9    AMS (altered mental status) R41.82    Strangulated inguinal hernia K40.30    Right inguinal hernia K40.90    Leukocytosis D72.829    Anemia D64.9    Chronic indwelling Mccray catheter Z97.8    S/P BKA (below knee amputation), left ([facility]) Z89.512    Right foot ulcer ([facility]) L97.519    Paroxysmal atrial fibrillation ([facility]) I48.0    Diabetic ulcer of right foot ([facility]) E11.621, L97.519    Pleural effusion on right J90    Severe protein-calorie malnutrition ([facility] [address] E43         Code status: Full Code      RECOMMENDATIONS:   Patient is critically ill and presenting with large right inguinal strangulated hernia, with anemia, metabolic acidosis, acute renal failure and hyperkalemia.   Respiratory: Monitor respirations; currently on nasal cannula oxygen; low threshold for intubation. Chest x-ray and CT abdomen right lower lung shows large right-sided pleural effusion with underlying atelectasis of the right lower lobe segments. Monitor pleural effusion, once clinically stable, consider diagnostic/therapeutic thoracentesis. ABG-severe metabolic acidosis-bicarb drip. Keep SPO2 >=92%. HOB 30 degree elevation all the time. Aggressive pulmonary toileting. Aspiration precautions. Incentive spirometry as tolerated. CVS: Monitor hemodynamics; currently in sinus rhythm-watch for atrial fibrillation. Prior Echo showed normal LV function; no pulmonary hypertension reported. Troponin not elevated; proBNP elevated. Patient likely has diastolic CHF with chronic diabetes and hypertension. ID: Patient likely has bowel sepsis; lactic acid not elevated; leukocytosis present. Exam broad-spectrum antibiotic-levofloxacin, Zosyn and IV vancomycin-renal dosing. Follow cultures. Deescalate antibiotic when appropriate. Hematology/Oncology: Patient currently getting 1 unit PRBC. Platelets-reactive thrombocytosis. INR 1.5-vitamin K 10 mg IV ordered. Renal: Patient with severe metabolic acidosis. 2 Amp of bicarb push; followed by bicarb drip. Mccray catheter has been changed in the ER; monitor renal function and urine output. IV fluids and albumin. GI/: NG tube with suction ordered. LFTs and lipase-normal.  CT abdomen/pelvis- Large right inguinal hernia with large and small bowel. There is diffuse wall  thickening of the ascending colon concerning for strangulation. No evidence for bowel obstruction. Surgeon Dr. Jonny Campos has been consulted by ER physician for evaluation for emergent surgery. Endocrine: Monitor BS. SSI. Check hemoglobin A1c. Neurology: Acute metabolic encephalopathy from sepsis. CT head-nil acute. Skin/Wound: Right foot ulcer-wound care consult. Electrolytes: Replace electrolytes per ICU electrolyte replacement protocol; caution renal failure.   IVF: Bicarb drip 75 ml per hour; albumin infusion every 6 hours. Nutrition: N.p.o. for now. Prophylaxis: DVT Prophylaxis: None- due to anemia, strangulated bowel with risks of bleeding, left BKA, right foot diabetic ulcers. GI Prophylaxis: Protonix. Restraints: Prn Wrist soft restraints for patient interfering with medical therapy/management and patient safety. Lines/Tubes: PIV, midline  Mccray: 8/27 changed (Medically necessary for strict input/output monitoring in critically ill patient, will remove it when not needed. Mccray bundle followed). Advance Directive/Palliative Care: consulted; overall prognosis appears to be poor. Full code status for now pending Surgical evaluation and options. Quality Care: PPI, DVT prophylaxis, HOB elevated, Infection control all reviewed and addressed. Care of plan d/w Dr Mayra Robledo, Dr Dinora Paige, JUAN RN. D.w patient wife at bedside, and updated management plans in detail; patient critically ill; patient unlikely to survive without surgery for strangulated bowel; patient high risk of mortality with surgery; likely will be ventilator dependent postop, with high risk of multiorgan failure and complications. Patient wife called her son in [place], and after discussion would like to proceed with high risk surgery. High complexity decision making was performed during the evaluation of this patient at high risk for decompensation with multiple organ involvement. Total critical care time spent rendering care exclusive of procedures/family discussion/coordination of care: 52 minutes. Subjective/History of Present Illness:     Patient is a 78 y.o. male with PMHx significant for multiple medical problems.   He history of atrial flutter/atrial fibrillation, coronary artery disease, chronic kidney disease stage III, diabetes mellitus type 2 with leg ulcerations, history of CHF, hypertension and hyperlipidemia, history of peripheral vascular disease, history of prior CVA.  Patient was admitted October 2021 with evidence of left leg diabetic ulcerations and gangrene. He subsequently underwent left below-knee amputation. Patient has chronic indwelling Mccray catheter. The patient has come to the ER brought by family/wife acutely unwell, confused and agitated. The patient has been found to have severe right-sided inguinal hernia, and subsequent CT abdomen/pelvis study showing evidence of strangulation of the bowel. The patient is also anemic, and severe metabolic acidosis, and acute renal failure. Urine output is poor from the Mccray catheter, and appears to be purulent. Mccray catheter has been replaced in the ER. Patient seen in ER. He is confused and agitated. No vomiting or diarrhea reported. No hematemesis or hemoptysis or rectal bleeding. Telemetry-sinus rhythm currently; blood pressure-stable. S/p left BKA 10/28/2021    Review of Systems:  ROS not obtained due to patient factor.        No Known Allergies   Past Medical History:   Diagnosis Date    A-fib (HCC)     Asthma     CAD (coronary artery disease)     Chronic kidney disease     stage 3    COVID-19     Diabetes (HCC)     GERD (gastroesophageal reflux disease)     Heart failure (HCC)     chronic diastolic heart failure    High cholesterol     Hypertension     Ill-defined condition     chronic osteomyelitis left ankle and foot    Ill-defined condition     chronic arteriosclerosis    PVD (peripheral vascular disease) ([address]     Stroke ([facility] [address]     cva      Past Surgical History:   Procedure Laterality Date    COLONOSCOPY N/A 6/1/2018    COLONOSCOPY, POLYPECTOMY performed by Gabby Leos MD at [facility] ENDOSCOPY    HX CATARACT REMOVAL      HX ORTHOPAEDIC Left 2021    ankle washout, external fixator, would vac    HX ORTHOPAEDIC      external fixator removed      Social History     Tobacco Use    Smoking status: Former    Smokeless tobacco: Never   Substance Use Topics    Alcohol use: Not Currently      No family history on file. Prior to Admission medications    Medication Sig Start Date End Date Taking? Authorizing Provider   levoFLOXacin (Levaquin) 750 mg tablet Take 1 Tablet by mouth daily. 6/18/22   Shanell Sommer MD   insulin lispro (HUMALOG) 100 unit/mL injection INITIATE INSULIN CORRECTIVE PROTOCOL: Normal Insulin Sensitivity   For Blood Sugar (mg/dL) of:     Less than 150 =   0 units           150 -199 =   2 units  200 -249 =   4 units  250 -299 =   6 units  300 -349 =   8 units  350 and above = 10 units and Call Physician  If 2 glucose readings are above 200 mg/dL within a 24 hr period, proceed to \"Insulin Resistant\" dosing. Initiate Hypoglycemia protocol if blood glucose is <70 mg/dL Fast Acting - Administer Immediately - or within 15 minutes of start of meal, if mealtime coverage. 11/3/21   Awa Lui MD   gabapentin (NEURONTIN) 300 mg capsule Take 1 Capsule by mouth three (3) times daily. Max Daily Amount: 900 mg. 11/3/21   Awa Lui MD   clopidogreL (Plavix) 75 mg tab Take  by mouth daily. Indications: afib    Provider, Historical   B.infantis-B.ani-B.long-B.bifi (Probiotic 4X) 10-15 mg TbEC Take  by mouth daily. Provider, Historical   multivitamin (ONE A DAY) tablet Take 1 Tablet by mouth daily. Provider, Historical   acetaminophen (TylenoL) 325 mg tablet Take 650 mg by mouth every four (4) hours as needed for Pain. Provider, Historical   tamsulosin (FLOMAX) 0.4 mg capsule Take 2 Capsules by mouth daily. 5/25/21   Sachi Ghotra MD   atorvastatin (LIPITOR) 40 mg tablet Take 1 Tab by mouth nightly. 2/15/18   Ion Pryor PA-C   metoprolol succinate (TOPROL-XL) 50 mg XL tablet Take 1 Tab by mouth daily. Patient taking differently: Take 100 mg by mouth daily.  2/16/18   Ion Pryor PA-C     Current Facility-Administered Medications   Medication Dose Route Frequency    sodium bicarbonate (8.4%) 100 mEq in dextrose 5% 1,000 mL infusion   IntraVENous CONTINUOUS [START ON 2022] Vancomycin level due at 1100 22  1 Each Other ONCE    Vancomycin - Rx to dose and monitor  1 Each Other Rx Dosing/Monitoring    [START ON 2022] levoFLOXacin (LEVAQUIN) 500 mg in D5W IVPB  500 mg IntraVENous Q48H    albumin human 25% (BUMINATE) solution 12.5 g  12.5 g IntraVENous Q6H    piperacillin-tazobactam (ZOSYN) 3.375 g in 0.9% sodium chloride (MBP/ADV) 100 mL MBP  3.375 g IntraVENous Q8H    pantoprazole (PROTONIX) 40 mg in 0.9% sodium chloride 10 mL injection  40 mg IntraVENous Q12H    sodium chloride (NS) flush 5-40 mL  5-40 mL IntraVENous Q8H         Objective:   Vital Signs:    Visit Vitals  BP (!) 139/92   Pulse 88   Temp 96.8 °F (36 °C)   Resp 20   Ht 5' 8\" (1.727 m)   Wt 38.6 kg (85 lb)   SpO2 100%   BMI 12.92 kg/m²               Temp (24hrs), Av.8 °F (36 °C), Min:96.8 °F (36 °C), Max:96.8 °F (36 °C)       Intake/Output:   Last shift:      No intake/output data recorded. Last 3 shifts: No intake/output data recorded.     No intake or output data in the 24 hours ending 22 1421    Last 3 Recorded Weights in this Encounter    22 1021   Weight: 38.6 kg (85 lb)       Physical Exam:   Patient appears chronically ill; confused and agitated; currently on nasal cannula oxygen; acyanotic  HEENT: pupils not dilated, reactive, no scleral jaundice, moist oral mucosa, no nasal drainage; neck supple  Neck: No adenopathy or thyroid swelling  CVS: S1S2 no murmurs; JVD not elevated; telemetry-sinus rhythm  RS: Mod air entry bilaterally, decreased BS right lower chest; no wheezes or crackles; not tachypneic or in distress  Abd: soft, mild abdominal distention, no guarding or rigidity or rebound; no hepatosplenomegaly, bowel sounds hypoactive  Neuro: Confused and agitated; appears to be uncomfortable; moving upper and extremities   Extrm: no leg edema or swelling or clubbing; left below-knee amputation  Skin: Large ulceration right medial malleolus and right heel  Lymphatic: no cervical or supraclavicular adenopathy  Groin: Large right inguinal hernia      Data:       Recent Results (from the past 24 hour(s))   LACTIC ACID    Collection Time: 08/27/22 10:30 AM   Result Value Ref Range    Lactic acid 1.6 0.4 - 2.0 MMOL/L   CBC WITH AUTOMATED DIFF    Collection Time: 08/27/22 10:30 AM   Result Value Ref Range    WBC 30.5 (H) 4.6 - 13.2 K/uL    RBC 2.96 (L) 4.35 - 5.65 M/uL    HGB 7.3 (L) 13.0 - 16.0 g/dL    HCT 25.5 (L) 36.0 - 48.0 %    MCV 86.1 78.0 - 100.0 FL    MCH 24.7 24.0 - 34.0 PG    MCHC 28.6 (L) 31.0 - 37.0 g/dL    RDW 20.8 (H) 11.6 - 14.5 %    PLATELET 732 (H) 421 - 420 K/uL    MPV 9.6 9.2 - 11.8 FL    NRBC 0.0 0  WBC    ABSOLUTE NRBC 0.00 0.00 - 0.01 K/uL    NEUTROPHILS 88 (H) 40 - 73 %    BAND NEUTROPHILS 3 0 - 5 %    LYMPHOCYTES 2 (L) 21 - 52 %    MONOCYTES 5 3 - 10 %    EOSINOPHILS 0 0 - 5 %    BASOPHILS 0 0 - 2 %    METAMYELOCYTES 2 (H) 0 %    IMMATURE GRANULOCYTES 0 %    ABS. NEUTROPHILS 27.8 (H) 1.8 - 8.0 K/UL    ABS. LYMPHOCYTES 0.6 (L) 0.9 - 3.6 K/UL    ABS. MONOCYTES 1.5 (H) 0.05 - 1.2 K/UL    ABS. EOSINOPHILS 0.0 0.0 - 0.4 K/UL    ABS. BASOPHILS 0.0 0.0 - 0.1 K/UL    ABS. IMM.  GRANS. 0.0 K/UL    DF MANUAL      PLATELET COMMENTS Increased Platelets      RBC COMMENTS ANISOCYTOSIS  2+        RBC COMMENTS POIKILOCYTOSIS  2+        RBC COMMENTS OVALOCYTES  1+        RBC COMMENTS ARCENIO CELLS  2+       TYPE & SCREEN    Collection Time: 08/27/22 10:30 AM   Result Value Ref Range    Crossmatch Expiration 08/30/2022,2359     ABO/Rh(D) O POSITIVE     Antibody screen NEG     CALLED TO: [facility] 08/27/22 AT 1134 BY TONOI     Unit number [id number]     Blood component type RC LR     Unit division 00     Status of unit ISSUED     Crossmatch result Compatible    PROTHROMBIN TIME + INR    Collection Time: 08/27/22 10:30 AM   Result Value Ref Range    Prothrombin time 18.1 (H) 11.5 - 15.2 sec    INR 1.5 (H) 0.8 - 1.2     METABOLIC PANEL, COMPREHENSIVE    Collection Time: 08/27/22 10:30 AM   Result Value Ref Range    Sodium 132 (L) 136 - 145 mmol/L    Potassium 7.6 (HH) 3.5 - 5.5 mmol/L    Chloride 109 100 - 111 mmol/L    CO2 7 (LL) 21 - 32 mmol/L    Anion gap 16 3.0 - 18 mmol/L    Glucose 145 (H) 74 - 99 mg/dL     (H) 7.0 - 18 MG/DL    Creatinine 4.66 (H) 0.6 - 1.3 MG/DL    BUN/Creatinine ratio 37 (H) 12 - 20      GFR est AA 15 (L) >60 ml/min/1.73m2    GFR est non-AA 12 (L) >60 ml/min/1.73m2    Calcium 8.5 8.5 - 10.1 MG/DL    Bilirubin, total 0.3 0.2 - 1.0 MG/DL    ALT (SGPT) 32 16 - 61 U/L    AST (SGOT) 28 10 - 38 U/L    Alk. phosphatase 150 (H) 45 - 117 U/L    Protein, total 5.8 (L) 6.4 - 8.2 g/dL    Albumin 1.9 (L) 3.4 - 5.0 g/dL    Globulin 3.9 2.0 - 4.0 g/dL    A-G Ratio 0.5 (L) 0.8 - 1.7     NT-PRO BNP    Collection Time: 08/27/22 10:30 AM   Result Value Ref Range    NT pro-BNP 6,691 (H) 0 - 1,800 PG/ML   TROPONIN-HIGH SENSITIVITY    Collection Time: 08/27/22 10:30 AM   Result Value Ref Range    Troponin-High Sensitivity 12 0 - 78 ng/L   LIPASE    Collection Time: 08/27/22 10:30 AM   Result Value Ref Range    Lipase 202 73 - 393 U/L   MAGNESIUM    Collection Time: 08/27/22 10:30 AM   Result Value Ref Range    Magnesium 1.2 (L) 1.6 - 2.6 mg/dL   COVID-19 RAPID TEST    Collection Time: 08/27/22 10:30 AM   Result Value Ref Range    Specimen source SWAB      COVID-19 rapid test Not detected NOTD     BLOOD GAS, ARTERIAL POC    Collection Time: 08/27/22 10:39 AM   Result Value Ref Range    Device: Non rebreather      FIO2 (POC) 100 %    pH (POC) 7.04 (LL) 7.35 - 7.45      pCO2 (POC) 18.3 (L) 35.0 - 45.0 MMHG    pO2 (POC) 91 80 - 100 MMHG    HCO3 (POC) 5.0 (L) 22 - 26 MMOL/L    sO2 (POC) 92.3 92 - 97 %    Base deficit (POC) 23.8 mmol/L    Allens test (POC) Positive      Site RIGHT BRACHIAL      Specimen type (POC) ARTERIAL      Performed by [facility]    Collection Time: 08/27/22 11:15 AM   Result Value Ref Range    HISTORY CHECKED?  Historical check performed METABOLIC PANEL, BASIC    Collection Time: 08/27/22  1:15 PM   Result Value Ref Range    Sodium 133 (L) 136 - 145 mmol/L    Potassium 6.6 (HH) 3.5 - 5.5 mmol/L    Chloride 111 100 - 111 mmol/L    CO2 7 (LL) 21 - 32 mmol/L    Anion gap 15 3.0 - 18 mmol/L    Glucose 209 (H) 74 - 99 mg/dL     (H) 7.0 - 18 MG/DL    Creatinine 4.27 (H) 0.6 - 1.3 MG/DL    BUN/Creatinine ratio 37 (H) 12 - 20      GFR est AA 17 (L) >60 ml/min/1.73m2    GFR est non-AA 14 (L) >60 ml/min/1.73m2    Calcium 8.1 (L) 8.5 - 10.1 MG/DL         Chemistry Recent Labs     08/27/22  1315 08/27/22  1030   * 145*   * 132*   K 6.6* 7.6*    109   CO2 7* 7*   * 171*   CREA 4.27* 4.66*   CA 8.1* 8.5   MG  --  1.2*   AGAP 15 16   BUCR 37* 37*   AP  --  150*   TP  --  5.8*   ALB  --  1.9*   GLOB  --  3.9   AGRAT  --  0.5*        Lactic Acid Lactic acid   Date Value Ref Range Status   08/27/2022 1.6 0.4 - 2.0 MMOL/L Final     Recent Labs     08/27/22  1030   LAC 1.6        Liver Enzymes Protein, total   Date Value Ref Range Status   08/27/2022 5.8 (L) 6.4 - 8.2 g/dL Final     Albumin   Date Value Ref Range Status   08/27/2022 1.9 (L) 3.4 - 5.0 g/dL Final     Globulin   Date Value Ref Range Status   08/27/2022 3.9 2.0 - 4.0 g/dL Final     A-G Ratio   Date Value Ref Range Status   08/27/2022 0.5 (L) 0.8 - 1.7   Final     Alk.  phosphatase   Date Value Ref Range Status   08/27/2022 150 (H) 45 - 117 U/L Final     Recent Labs     08/27/22  1030   TP 5.8*   ALB 1.9*   GLOB 3.9   AGRAT 0.5*   *        CBC w/Diff Recent Labs     08/27/22  1030   WBC 30.5*   RBC 2.96*   HGB 7.3*   HCT 25.5*   *   GRANS 88*   LYMPH 2*   EOS 0        Cardiac Enzymes No results found for: CPK, CK, CKMMB, CKMB, RCK3, CKMBT, CKNDX, CKND1, LEONEL, TROPT, TROIQ, WARD, TROPT, TNIPOC, BNP, BNPP     BNP No results found for: BNP, BNPP, XBNPT     Coagulation Recent Labs     08/27/22  1030   PTP 18.1*   INR 1.5*         Thyroid  Lab Results   Component Value Date/Time    TSH 1.72 02/11/2018 02:19 AM       No results found for: T4     Urinalysis Lab Results   Component Value Date/Time    Color YELLOW 06/18/2022 10:15 AM    Appearance CLOUDY 06/18/2022 10:15 AM    Specific gravity 1.011 06/18/2022 10:15 AM    pH (UA) 5.5 06/18/2022 10:15 AM    Protein 100 (A) 06/18/2022 10:15 AM    Glucose Negative 06/18/2022 10:15 AM    Ketone Negative 06/18/2022 10:15 AM    Bilirubin Negative 06/18/2022 10:15 AM    Urobilinogen 0.2 06/18/2022 10:15 AM    Nitrites Negative 06/18/2022 10:15 AM    Leukocyte Esterase LARGE (A) 06/18/2022 10:15 AM    Epithelial cells FEW 06/18/2022 10:15 AM    Bacteria 1+ (A) 06/18/2022 10:15 AM    WBC TOO NUMEROUS TO COUNT 06/18/2022 10:15 AM    RBC 11 to 20 06/18/2022 10:15 AM          Culture data during this hospitalization. All Micro Results       Procedure Component Value Units Date/Time    CULTURE, BLOOD [[id number]] Collected: 08/27/22 1100    Order Status: Sent Specimen: Blood Updated: 08/27/22 1127    COVID-19 RAPID TEST [[id number]] Collected: 08/27/22 1030    Order Status: Completed Specimen: Nasopharyngeal Updated: 08/27/22 1057     Specimen source SWAB        COVID-19 rapid test Not detected        Comment: Rapid Abbott ID Now       Rapid NAAT:  The specimen is NEGATIVE for SARS-CoV-2, the novel coronavirus associated with COVID-19. Negative results should be treated as presumptive and, if inconsistent with clinical signs and symptoms or necessary for patient management, should be tested with an alternative molecular assay. Negative results do not preclude SARS-CoV-2 infection and should not be used as the sole basis for patient management decisions. This test has been authorized by the FDA under an Emergency Use Authorization (EUA) for use by authorized laboratories.    Fact sheet for Healthcare Providers: ConventionUpdate.co.nz  Fact sheet for Patients: ConventionUpdate.co.nz Methodology: Isothermal Nucleic Acid Amplification         CULTURE, BLOOD [[id number]] Collected: 08/27/22 1030    Order Status: Sent Specimen: Blood Updated: 08/27/22 1042             ECHO May 2021 Interpretation Summary  Result status: Final result  Contrast used: DEFINITY. Left Ventricle: Normal cavity size, wall thickness and systolic function (ejection fraction normal). The estimated EF is 55 - 60%. There is mild (grade 1) left ventricular diastolic dysfunction E'E= 10.41. Wall Scoring: The left ventricular wall motion is normal.  Tricuspid Valve: Tricuspid valve not well visualized. No stenosis. Tricuspid regurgitation is inadequate for estimation of right ventricular systolic pressure. Images report reviewed by me:  CT 8/27/2022 (Most Recent)  Results from Hospital Encounter encounter on 08/27/22    CT HEAD WO CONT    Narrative  EXAM: CT head    INDICATION: Altered mental status. COMPARISON: None. TECHNIQUE: Axial CT imaging of the head was performed without intravenous  contrast.    One or more dose reduction techniques were used on this CT: automated exposure  control, adjustment of the mAs and/or kVp according to patient size, and  iterative reconstruction techniques. The specific techniques used on this CT  exam have been documented in the patient's electronic medical record. Digital  Imaging and Communications in Medicine (DICOM) format image data are available  to nonaffiliated external healthcare facilities or entities on a secure, media  free, reciprocally searchable basis with patient authorization for at least a  12-month period after this study. Patient motion degrades image quality. _______________    FINDINGS:    BRAIN AND POSTERIOR FOSSA:  Mild periventricular areas of decreased attenuation are identified. Gray-white matter interfaces are preserved. No intraparenchymal hemorrhage, mass effect or midline shift. The ventricular system is midline and symmetric.   Atherosclerotic vascular calcifications are identified. EXTRA-AXIAL SPACES AND MENINGES:  There is no evidence for extra-axial mass lesion or fluid collection. CALVARIUM:  Intact. SINUSES:  Sphenoid sinus mucosal thickening. OTHER:  Orbits are grossly intact. Facial soft tissue are within normal limits. _______________    Impression  1. No evidence for intracranial hemorrhage, mass effect or midline shift. 2.  Mild periventricular areas of microvascular ischemic change. 3.  Cerebral atherosclerosis. 4.  Atrophy. If there are continued symptoms, a MRI is recommended for further evaluation. CT abdomen/pelvis 8/27/2022  IMPRESSION  1. Large right inguinal hernia with large and small bowel. There is diffuse wall  thickening of the ascending colon concerning for strangulation. No evidence for  bowel obstruction. 2. Right pleural effusion and bilateral basilar atelectasis. 3. Gastric distention. CXR reviewed by me:  XR (Most Recent). Results from Hospital Encounter encounter on 08/27/22    XR CHEST PORT    Narrative  EXAM: CHEST RADIOGRAPHS    CLINICAL INDICATION/HISTORY: Sepsis  > Additional: None    COMPARISON: 6/18/2022. TECHNIQUE: PA and lateral views of the chest    _______________    FINDINGS:    HEART AND MEDIASTINUM: No appreciable cardiomegaly. Remaining mediastinal  contours within normal limits. LUNGS AND PLEURAL SPACES: Left lung is clear. Right pleural effusion with  adjacent opacity. BONY THORAX AND SOFT TISSUES: Unremarkable.    _______________    Impression  1. Right pleural effusion with probable adjacent atelectasis. Please note: Voice-recognition software may have been used to generate this report, which may have resulted in some phonetic-based errors in grammar and contents. Even though attempts were made to correct all the mistakes, some may have been missed, and remained in the body of the document.       Pati Nguyen MD  8/27/2022

## 2022-08-27 NOTE — PROGRESS NOTES
Pharmacy Dosing Services: Renal Dosing    The following medication: Levaquin was automatically dose-adjusted per THE [facility] P&T Committee Protocol, with respect to renal function. Consult provided for this   78 y.o. , male , for the indication of sepsis. Pt Weight:   Wt Readings from Last 1 Encounters:   08/27/22 38.6 kg (85 lb)         Previous Regimen Levaquin 750 mg IV q24h   Serum Creatinine Lab Results   Component Value Date/Time    Creatinine 4.66 (H) 08/27/2022 10:30 AM       Creatinine Clearance Estimated Creatinine Clearance: 7.5 mL/min (A) (based on SCr of 4.66 mg/dL (H)). BUN Lab Results   Component Value Date/Time     (H) 08/27/2022 10:30 AM       Dosage changed to:  Levaquin 750 mg IV x once dose then 500 mg IV q48h    Pharmacy to continue to monitor patient daily. Will make dosage adjustments based upon changing renal function.   Signed Eric Hendrix, [address]

## 2022-08-27 NOTE — PROGRESS NOTES
Pulm/CC    S/p EXPLORATORY LAPAROTOMY, REDUCTION INCARCERATED RIGHT GROIN HERNIA WITH HERNIA REPAIR WITH MESH. The bowel was completely viable and did not require resection. Patient has come to icu intubated post-op due to hemodynamic instability, metabolic derangements and critically ill state. Vent settings ordered-VCV mode 15/400/100%/peep 5; wean fio2 for O2 sats >92%  Vent and sedation bundles  Sedation - midazolam infusion; prn iv dilaudid; patient became hypotensive in ER after 1 fentanyl dose. Pressors - levophed and vasopressin; keep SBP >90 mmhg  IV fluids (bicarb drip), albumin and antibiotics  Check labs, ABG and CXR   Monitor UOP and renal fn; if K remains elevated, give Guillaume Gabriel  Nephrology consult- Dr Chong Daily   DVT proph - none currently; see consult note  GI proph - Pantoprazole    Prognosis poor.     Additional CC time 15 mins      Lily Lara MD

## 2022-08-27 NOTE — ED NOTES
Pt continues to remain hypotensive following 50mcg fentanyl administration at 1442.      Levo initiated

## 2022-08-27 NOTE — ROUTINE PROCESS
[facility] Medic Code Sepsis    Time of RRT: N/A  Time of Code Sepsis: 10:24  Team:  Physician DR. SANTILLAN  Bedside Nurse VALERIA Taylor  Medic RRTM CHELY  Supervisor VALERIA Gonzalez  SIRS # 1 RR - 24  SIRS # 2 WBC - 30.5  Possible source of infection: UTI  Organ dysfunction related to sepsis: CREATININE - 4.66 @ 11:24  Labs:  (pull in Chem 7 and CBC)  Initial Vitals: (pull in from EPIC)  Blood Cultures collected times 2 OR collected within last 24 hours: 10:30    Lactic Acid Collection time OR within last 6 hours: 10:30  Antibiotic Start time: 10:22  Lactic Acid Result: 1.6  Target Fluid Bolus Amount: 2,430 ml  Time of Fluid Bolus initiated: 10:26  Reason for no target fluid bolus: N/A  New PIV / or line: YES     Time of Fluid Bolus Completion:  12:45  Cardiopulmonary response after fluid completion: SLIGHT IMPROVEMENT   Time of Repeat Lactic Acid: NOT INDICATED DUE TO INITIAL LA  < 2 .0  Repeat Lactic Acid Result: N/A  Repeat Vitals: (pull in from EPIC)  Vasopressors Needed? YES, STARTED @ 15:23  Debriefed with RN YES, RETURNED TO [address] [facility] MIDLINE FOR ADDITIONAL ACCESS/BLOOD ADMIN. NOTHING MORE NEEDED. Additional Notes: PATIENT WENT TO SURGERY  Transfer to higher level of care? EXPECTED TO, POST OP.

## 2022-08-27 NOTE — ED NOTES
Report to KAIN Fox RN  Pt on 12 mcg/min levophed  Sodium bicarbonate at 75ml/hr  Continues to be altered  Wife at bedside

## 2022-08-27 NOTE — PROGRESS NOTES
PATIENT ARRIVED FROM OR INTUBATED WITH A #7.0 ETT AND SECURED @ 21LL. BS CLEAR ON RIGHT/MORE DIMINISHED ON LEFT. CXR PENDING. PLACED PATIENT ON VENT WITH SETTINGS: A/C 16//50%/PEEP 5.

## 2022-08-27 NOTE — PROGRESS NOTES
1701 Received report from OR. Reported EBL <25mls. Pt intubated and to remain intubated until further orders. 1745 Patient arrived from OR. Patient intubated and requiring Levophed for BP support. Wife and Son received update at bedside. Left AC IV line dislodged during xray. 1900 Bedside shift report given to JORDIN Mcmillan RN.

## 2022-08-27 NOTE — BRIEF OP NOTE
Brief Postoperative Note    Patient: Messi Diehl. YOB: 1945  MRN: [id number]    Date of Procedure: 8/27/2022     Pre-Op Diagnosis: Strangulated bowel    Post-Op Diagnosis:  incarcerated bowel       Procedure(s):  EXPLORATORY LAPAROTOMY, REDUCTION INCARCERATED RIGHT GROIN HERNIA WITH HERNIA REPAIR WITH MESH    Surgeon(s):  Ellen Alva DO    Surgical Assistant: Surg Asst-1: Raad DYER    Anesthesia: General     Estimated Blood Loss (mL): Minimal    Complications: None    Specimens:   ID Type Source Tests Collected by Time Destination   1 : RIGHT HERNIA [address] Preservative Abdomen  Ellen Alva DO 8/27/2022 1634 Pathology        Implants: * No implants in log *    Drains: * No LDAs found *    Findings: huge incarcerated r groin hernia.  Completely viable bowel - both large and small contained within    Electronically Signed by Emiliano Orozco DO on 8/27/2022 at 5:25 PM

## 2022-08-27 NOTE — Clinical Note
Status[de-identified] INPATIENT [101]   Type of Bed: Intensive Care [6]   Cardiac Monitoring Required?: Yes   Inpatient Hospitalization Certified Necessary for the Following Reasons: 4. Patient requires ICU level of care interventions (further clarification in H&P documentation)   Admitting Diagnosis: Sepsis ([address] [7281974]   Admitting Diagnosis: Strangulated hernia of abdominal wall [9720823]   Admitting Diagnosis: UTI (urinary tract infection) [740430]   Admitting Diagnosis: AMS (altered mental status) [5264560]   Admitting Diagnosis: Acidosis [276. 2. ICD-9-CM]   Admitting Diagnosis: Hyperkalemia [165423]   Admitting Physician: Rd Fuentes [[id number]]   Attending Physician: Rd Fuentes [[id number]]   Estimated Length of Stay: 7+ Midnights   Discharge Plan[de-identified] Extended Care Facility (e.g. Adult Home, Nursing Home, etc.)

## 2022-08-28 PROBLEM — J96.01 ACUTE RESPIRATORY FAILURE WITH HYPOXEMIA (HCC): Status: ACTIVE | Noted: 2022-01-01

## 2022-08-28 PROBLEM — R65.21 SEPTIC SHOCK (HCC): Status: ACTIVE | Noted: 2022-01-01

## 2022-08-28 PROBLEM — A41.9 SEPTIC SHOCK (HCC): Status: ACTIVE | Noted: 2022-01-01

## 2022-08-28 NOTE — PROGRESS NOTES
[facility]  Pulmonary, Critical Care, and Sleep Medicine    Name: Juliane Vanegas.  MRN: [id number]   : 1945 Hospital: [facility] [place]    Date: 2022  Room: [address] [place] [address] Note                                              Consult requesting physician: Dr. Dr David Worthington  Reason for Consult: Strangulated bowel with complications      IMPRESSION:   Strangulated bowel/inguinal hernia  Acute respiratory failure with hypoxemia  Septic shock  High gap metabolic acidosis  Acute renal failure  Right inguinal hernia  Hyperkalemia  Sepsis  Altered mentation  Leukocytosis  Anemia  UTI  Right pleural effusion  Chronic Mccray catheter  Right foot ulcers  Type 2 diabetes mellitus  Paroxysmal atrial fibrillation  Left below-knee amputation  Severe malnutrition      Patient Active Problem List   Diagnosis Code    DM2 (diabetes mellitus, type 2) ([facility]) E11.9    CAD (coronary artery disease) I25.10    Acute renal failure (ARF) ([facility]) N17.9    CKD (chronic kidney disease) stage 3, GFR 30-59 ml/min ([facility]) N18.30    Elevated brain natriuretic peptide (BNP) level R79.89    UTI (urinary tract infection) N39.0    High anion gap metabolic acidosis Y01.2    Hyperkalemia E87.5    Sepsis ([facility]) A41.9    AMS (altered mental status) R41.82    Strangulated inguinal hernia K40.30    Right inguinal hernia K40.90    Leukocytosis D72.829    Anemia D64.9    Chronic indwelling Mccray catheter Z97.8    S/P BKA (below knee amputation), left ([facility]) Z89.512    Right foot ulcer ([facility]) L97.519    Paroxysmal atrial fibrillation ([facility]) I48.0    Diabetic ulcer of right foot ([facility]) E11.621, L97.519    Pleural effusion on right J90    Severe protein-calorie malnutrition ([facility]) E43    Acute respiratory failure with hypoxemia ([facility]) J96.01    Septic shock ([facility]) A41.9, R65.21         Code status: Full Code      RECOMMENDATIONS:   Patient is critically ill and presenting with large right inguinal strangulated hernia, with anemia, metabolic acidosis, acute renal failure and hyperkalemia. S/p laparotomy and reduction of incarcerated right groin hernia without need for bowel resection-8/27/2022. Respiratory: Patient remains intubated postop due to critically ill state, hemodynamic instability and metabolic derangements. Clinically, no bloody secretions noted from ET tube today morning. VCV ventilation-FiO2 down to 30%; PEEP 5. ABG shows improvement in metabolic acidosis. Chest x-ray shows ET tube to be high-advanced 2 cm-discussed with RT; OG tube in good position; decreased right lung volume overall, with effusion; no focal consolidations. Continue ventilator and sedation bundles. Sedation-midazolam infusion; prn Dilaudid; patient dropped blood pressure in ER with fentanyl. CT abdomen on admission-right lower lung shows large right-sided pleural effusion with underlying atelectasis of the right lower lobe segments. Monitor pleural effusion, once clinically stable, consider diagnostic/therapeutic thoracentesis. Keep SPO2 >=92%. HOB 30 degree elevation all the time. Aggressive pulmonary toileting. Aspiration precautions. CVS: Patient on pressors; Levophed and vasopressin; wean for systolic blood pressure greater than 95 mmHg. Patient currently in atrial fibrillation. Prior Echo showed normal LV function; no pulmonary hypertension reported. Repeat echocardiogram.  Troponin not elevated; proBNP elevated. Patient likely has diastolic CHF with chronic diabetes and hypertension. ID: Patient likely has bowel sepsis; lactic acid not elevated. Leukocytosis-remains elevated, predominantly neutrophilic, no significant bands reported. UA-Positive for UTI; urine culture-pending. Continue broad-spectrum antibiotic-levofloxacin, Zosyn and IV vancomycin-renal dosing. Blood cultures negative; rapid COVID test negative. Deescalate antibiotic when appropriate. Hematology/Oncology: Patient s/p 1 unit PRBC in the ER.   Hemoglobin 7.4 this morning. Platelets-reactive thrombocytosis. INR 1.4 this morning; plan for 5 mg vitamin K IV. Renal: Patient with severe metabolic acidosis. ABG shows improvement in metabolic acidosis; patient remains in failure; creatinine 4.11  Potassium stable-4.9. Continue bicarb drip and albumin. Nephrology consulted-Dr. Alexys Lou. GI/: Chronic Mccray catheter-changed in ER  LFTs and lipase-normal.  Albumin-low. CT abdomen/pelvis- Large right inguinal hernia with large and small bowel. There is diffuse wall  thickening of the ascending colon concerning for strangulation. No evidence for bowel obstruction. S/p exploratory laparotomy, reduction of incarcerated hernia; bowel was viable, and did not need resection. Endocrine: Monitor BS-improved; continue sliding scale insulin. Hemoglobin A1c-5.9 on admission. Check TSH. Neurology: Acute metabolic encephalopathy from sepsis. CT head-nil acute. Skin/Wound: Right foot ulcer-wound care consulted. Electrolytes: Replace electrolytes per ICU electrolyte replacement protocol; caution renal failure. IVF: Bicarb drip 75 ml per hour; albumin infusion every 6 hours. Nutrition: N.p.o. for now. Prophylaxis: DVT Prophylaxis: None- due to anemia, left BKA, right foot diabetic ulcers. GI Prophylaxis: Protonix. Restraints: Prn Wrist soft restraints for patient interfering with medical therapy/management and patient safety. Lines/Tubes: PIV, midline  ET tube 8/27  OG tube 8/27  Mccray: 8/27 changed in ER (Medically necessary for strict input/output monitoring in critically ill patient, will remove it when not needed. Mccray bundle followed). Advance Directive/Palliative Care: consulted; overall prognosis appears to be poor. Full code status per family wishes. Quality Care: PPI, DVT prophylaxis, HOB elevated, Infection control all reviewed and addressed. Care of plan d/w icu RT and RN. Update family today when available.     High complexity decision making was performed during the evaluation of this patient at high risk for decompensation with multiple organ involvement. Total critical care time spent rendering care exclusive of procedures/family discussion/coordination of care: 48 mins           Subjective/History of Present Illness:     Patient is a 78 y.o. male with PMHx significant for multiple medical problems. He history of atrial flutter/atrial fibrillation, coronary artery disease, chronic kidney disease stage III, diabetes mellitus type 2 with leg ulcerations, history of CHF, hypertension and hyperlipidemia, history of peripheral vascular disease, history of prior CVA. Patient was admitted October 2021 with evidence of left leg diabetic ulcerations and gangrene. He subsequently underwent left below-knee amputation. Patient has chronic indwelling Mccray catheter. The patient has come to the ER brought by family/wife acutely unwell, confused and agitated. The patient has been found to have severe right-sided inguinal hernia, and subsequent CT abdomen/pelvis study showing evidence of strangulation of the bowel. The patient is also anemic, and severe metabolic acidosis, and acute renal failure. Urine output is poor from the Mccray catheter, and appears to be purulent. Mccray catheter has been replaced in the ER. S/p EXPLORATORY LAPAROTOMY, REDUCTION INCARCERATED RIGHT GROIN HERNIA WITH HERNIA REPAIR WITH MESH 8/27/2022 - Dr Rogelio Schroeder  The bowel was completely viable and did not require resection. 8/28/2022  Patient seen in ICU. Patient is intubated and sedated. Mild bloody secretions reported from ET tube. No hematemesis or rectal bleeding. No vomiting or diarrhea. Telemetry-Atrial fibrillation. Urine output-poor 125 ml overnight. Drips-Levophed 13 mcg/min, vasopressin 0.03 units/min, midazolam 2 mg/h, sodium bicarb 75 ml per hour    S/p left BKA 10/28/2021    Review of Systems:  ROS not obtained due to patient factor.        No Known Allergies   Past Medical History:   Diagnosis Date    A-fib ([facility]     Asthma     CAD (coronary artery disease)     Chronic kidney disease     stage 3    COVID-19     Diabetes (HCC)     GERD (gastroesophageal reflux disease)     Heart failure (HCC)     chronic diastolic heart failure    High cholesterol     Hypertension     Ill-defined condition     chronic osteomyelitis left ankle and foot    Ill-defined condition     chronic arteriosclerosis    PVD (peripheral vascular disease) ([facility]     Stroke ([facility]     cva      Past Surgical History:   Procedure Laterality Date    COLONOSCOPY N/A 6/1/2018    COLONOSCOPY, POLYPECTOMY performed by Arina Hussein MD at [facility] ENDOSCOPY    HX CATARACT REMOVAL      HX ORTHOPAEDIC Left 2021    ankle washout, external fixator, would vac    HX ORTHOPAEDIC      external fixator removed      Social History     Tobacco Use    Smoking status: Former    Smokeless tobacco: Never   Substance Use Topics    Alcohol use: Not Currently      History reviewed. No pertinent family history. Prior to Admission medications    Medication Sig Start Date End Date Taking? Authorizing Provider   levoFLOXacin (Levaquin) 750 mg tablet Take 1 Tablet by mouth daily. 6/18/22   Miki Squires MD   insulin lispro (HUMALOG) 100 unit/mL injection INITIATE INSULIN CORRECTIVE PROTOCOL: Normal Insulin Sensitivity   For Blood Sugar (mg/dL) of:     Less than 150 =   0 units           150 -199 =   2 units  200 -249 =   4 units  250 -299 =   6 units  300 -349 =   8 units  350 and above = 10 units and Call Physician  If 2 glucose readings are above 200 mg/dL within a 24 hr period, proceed to \"Insulin Resistant\" dosing. Initiate Hypoglycemia protocol if blood glucose is <70 mg/dL Fast Acting - Administer Immediately - or within 15 minutes of start of meal, if mealtime coverage. 11/3/21   Edmundo Jorgensen MD   gabapentin (NEURONTIN) 300 mg capsule Take 1 Capsule by mouth three (3) times daily.  Max Daily Amount: 900 mg. 11/3/21   Marleny Peterson MD   clopidogreL (Plavix) 75 mg tab Take  by mouth daily. Indications: afib    Provider, Historical   B.infantis-B.ani-B.long-B.bifi (Probiotic 4X) 10-15 mg TbEC Take  by mouth daily. Provider, Historical   multivitamin (ONE A DAY) tablet Take 1 Tablet by mouth daily. Provider, Historical   acetaminophen (TylenoL) 325 mg tablet Take 650 mg by mouth every four (4) hours as needed for Pain. Provider, Historical   tamsulosin (FLOMAX) 0.4 mg capsule Take 2 Capsules by mouth daily. 5/25/21   Ayad Phan MD   atorvastatin (LIPITOR) 40 mg tablet Take 1 Tab by mouth nightly. 2/15/18   Dane Ceballos PA-C   metoprolol succinate (TOPROL-XL) 50 mg XL tablet Take 1 Tab by mouth daily. Patient taking differently: Take 100 mg by mouth daily.  2/16/18   Dane Ceballos PA-C     Current Facility-Administered Medications   Medication Dose Route Frequency    sodium bicarbonate (8.4%) 100 mEq in dextrose 5% 1,000 mL infusion   IntraVENous CONTINUOUS    Vancomycin level due at 1100 8/28/22  1 Each Other ONCE    Vancomycin - Rx to dose and monitor  1 Each Other Rx Dosing/Monitoring    [START ON 8/29/2022] levoFLOXacin (LEVAQUIN) 500 mg in D5W IVPB  500 mg IntraVENous Q48H    albumin human 25% (BUMINATE) solution 12.5 g  12.5 g IntraVENous Q6H    piperacillin-tazobactam (ZOSYN) 3.375 g in 0.9% sodium chloride (MBP/ADV) 100 mL MBP  3.375 g IntraVENous Q8H    pantoprazole (PROTONIX) 40 mg in 0.9% sodium chloride 10 mL injection  40 mg IntraVENous Q12H    sodium chloride (NS) flush 5-40 mL  5-40 mL IntraVENous Q8H    insulin lispro (HUMALOG) injection   SubCUTAneous Q6H    chlorhexidine (PERIDEX) 0.12 % mouthwash 10 mL  10 mL Oral Q12H    vasopressin (VASOSTRICT) 20 Units in 0.9% sodium chloride 100 mL infusion  0-0.04 Units/min IntraVENous TITRATE    midazolam in normal saline (VERSED) 1 mg/mL infusion  0-5 mg/hr IntraVENous TITRATE    NOREPINephrine (LEVOPHED) 8 mg in 5% dextrose 250mL (32 mcg/mL) infusion  2-16 mcg/min IntraVENous TITRATE         Objective:   Vital Signs:    Visit Vitals  BP (!) 123/51   Pulse 88   Temp 97.9 °F (36.6 °C)   Resp 22   Ht 5' 8\" (1.727 m)   Wt 38.6 kg (85 lb)   SpO2 100%   BMI 12.92 kg/m²       O2 Device: Endotracheal tube, Ventilator       Temp (24hrs), Av.7 °F (35.9 °C), Min:91.9 °F (33.3 °C), Max:98.2 °F (36.8 °C)       Intake/Output:   Last shift:      No intake/output data recorded.     Last 3 shifts:  1901 -  0700  In: 861.2 [I.V.:861.2]  Out: 142 [Urine:142]      Intake/Output Summary (Last 24 hours) at 2022 0910  Last data filed at 2022 0400  Gross per 24 hour   Intake 861.23 ml   Output 142 ml   Net 719.23 ml       Last 3 Recorded Weights in this Encounter    22 1021   Weight: 38.6 kg (85 lb)       Physical Exam:   Patient appears chronically ill; currently intubated; acyanotic  HEENT: pupils not dilated, reactive, no scleral jaundice, moist oral mucosa, no nasal drainage  Neck: No adenopathy or thyroid swelling  Orotracheal and orogastric tubes-no bleeding or secretions  CVS: S1S2 no murmurs; JVD not elevated; telemetry-sinus rhythm  RS: Mod air entry bilaterally, decreased BS right lower chest, no obvious wheezes or crackles; not tachypneic or in distress  Abd: soft, nontender, not distended, no guarding or rigidity, bowel sounds present, no hepatosplenomegaly  Right lower abdomen laparotomy scar-no bleeding or hematoma  Neuro: Intubated and sedated; limited exam    Extrm: no leg edema or swelling or clubbing; left below-knee amputation  Skin: Large ulceration right medial malleolus and right heel  Lymphatic: no cervical or supraclavicular adenopathy  Groin: Large right inguinal hernia no longer present      Data:       Recent Results (from the past 12 hour(s))   GLUCOSE, POC    Collection Time: 22 11:23 PM   Result Value Ref Range    Glucose (POC) 221 (H) 70 - 110 mg/dL   CBC WITH AUTOMATED DIFF    Collection Time: 08/28/22  1:17 AM   Result Value Ref Range    WBC 35.4 (H) 4.6 - 13.2 K/uL    RBC 2.78 (L) 4.35 - 5.65 M/uL    HGB 7.2 (L) 13.0 - 16.0 g/dL    HCT 22.9 (L) 36.0 - 48.0 %    MCV 82.4 78.0 - 100.0 FL    MCH 25.9 24.0 - 34.0 PG    MCHC 31.4 31.0 - 37.0 g/dL    RDW 20.0 (H) 11.6 - 14.5 %    PLATELET 769 (H) 992 - 420 K/uL    MPV 9.7 9.2 - 11.8 FL    NRBC 0.0 0  WBC    ABSOLUTE NRBC 0.00 0.00 - 0.01 K/uL    NEUTROPHILS 91 (H) 40 - 73 %    BAND NEUTROPHILS 1 0 - 5 %    LYMPHOCYTES 3 (L) 21 - 52 %    MONOCYTES 5 3 - 10 %    EOSINOPHILS 0 0 - 5 %    BASOPHILS 0 0 - 2 %    IMMATURE GRANULOCYTES 0 %    ABS. NEUTROPHILS 32.5 (H) 1.8 - 8.0 K/UL    ABS. LYMPHOCYTES 1.1 0.9 - 3.6 K/UL    ABS. MONOCYTES 1.8 (H) 0.05 - 1.2 K/UL    ABS. EOSINOPHILS 0.0 0.0 - 0.4 K/UL    ABS. BASOPHILS 0.0 0.0 - 0.1 K/UL    ABS. IMM. GRANS. 0.0 K/UL    DF MANUAL      PLATELET COMMENTS Increased Platelets      RBC COMMENTS ANISOCYTOSIS  2+        RBC COMMENTS POIKILOCYTOSIS  1+        RBC COMMENTS OVALOCYTES  1+        RBC COMMENTS ARCENIO CELLS  1+       METABOLIC PANEL, COMPREHENSIVE    Collection Time: 08/28/22  1:17 AM   Result Value Ref Range    Sodium 135 (L) 136 - 145 mmol/L    Potassium 5.2 3.5 - 5.5 mmol/L    Chloride 107 100 - 111 mmol/L    CO2 12 (L) 21 - 32 mmol/L    Anion gap 16 3.0 - 18 mmol/L    Glucose 233 (H) 74 - 99 mg/dL     (H) 7.0 - 18 MG/DL    Creatinine 4.11 (H) 0.6 - 1.3 MG/DL    BUN/Creatinine ratio 36 (H) 12 - 20      GFR est AA 17 (L) >60 ml/min/1.73m2    GFR est non-AA 14 (L) >60 ml/min/1.73m2    Calcium 7.8 (L) 8.5 - 10.1 MG/DL    Bilirubin, total 0.8 0.2 - 1.0 MG/DL    ALT (SGPT) 26 16 - 61 U/L    AST (SGOT) 33 10 - 38 U/L    Alk.  phosphatase 108 45 - 117 U/L    Protein, total 5.8 (L) 6.4 - 8.2 g/dL    Albumin 2.1 (L) 3.4 - 5.0 g/dL    Globulin 3.7 2.0 - 4.0 g/dL    A-G Ratio 0.6 (L) 0.8 - 1.7     MAGNESIUM    Collection Time: 08/28/22  1:17 AM   Result Value Ref Range    Magnesium 2.2 1.6 - 2.6 mg/dL PROTHROMBIN TIME + INR    Collection Time: 08/28/22  1:17 AM   Result Value Ref Range    Prothrombin time 17.4 (H) 11.5 - 15.2 sec    INR 1.4 (H) 0.8 - 1.2     CALCIUM, IONIZED    Collection Time: 08/28/22  1:17 AM   Result Value Ref Range    Ionized Calcium 1.06 (L) 1.12 - 1.32 MMOL/L   PHOSPHORUS    Collection Time: 08/28/22  1:17 AM   Result Value Ref Range    Phosphorus 4.8 2.5 - 4.9 MG/DL   BLOOD GAS, ARTERIAL POC    Collection Time: 08/28/22  4:14 AM   Result Value Ref Range    Device: ADULT VENT      FIO2 (POC) 35 %    pH (POC) 7.24 (LL) 7.35 - 7.45      pCO2 (POC) 27.9 (L) 35.0 - 45.0 MMHG    pO2 (POC) 120 (H) 80 - 100 MMHG    HCO3 (POC) 12.0 (L) 22 - 26 MMOL/L    sO2 (POC) 98.1 (H) 92 - 97 %    Base deficit (POC) 14.2 mmol/L    Mode Volume Control      Tidal volume 400 ml    Set Rate 16 bpm    PEEP/CPAP (POC) 5 cmH2O    PIP (POC) 17      Allens test (POC) NOT APPLICABLE      Site RIGHT BRACHIAL      Patient temp. 97.7      Specimen type (POC) ARTERIAL      Performed by Shi Campos    GLUCOSE, POC    Collection Time: 08/28/22  5:25 AM   Result Value Ref Range    Glucose (POC) 172 (H) 70 - 110 mg/dL   HGB & HCT    Collection Time: 08/28/22  8:04 AM   Result Value Ref Range    HGB 7.4 (L) 13.0 - 16.0 g/dL    HCT 23.9 (L) 36.0 - 48.0 %   CALCIUM, IONIZED    Collection Time: 08/28/22  8:04 AM   Result Value Ref Range    Ionized Calcium 1.20 1. 12 - 1.32 MMOL/L   MAGNESIUM    Collection Time: 08/28/22  8:04 AM   Result Value Ref Range    Magnesium 2.1 1.6 - 2.6 mg/dL   PHOSPHORUS    Collection Time: 08/28/22  8:04 AM   Result Value Ref Range    Phosphorus 4.4 2.5 - 4.9 MG/DL   POTASSIUM    Collection Time: 08/28/22  8:04 AM   Result Value Ref Range    Potassium 4.9 3.5 - 5.5 mmol/L           Chemistry Recent Labs     08/28/22  0804 08/28/22  0117 08/27/22  2013 08/27/22  1838 08/27/22  1425 08/27/22  1425 08/27/22  1315 08/27/22  1030   GLU  --  233*  --  179*  --  250*   < > 145*   NA  --  135*  --  135*  -- 136   < > 132*   K 4.9 5.2 6.0* 6.5*  --  6.1*   < > 7.6*   CL  --  107  --  110  --  111   < > 109   CO2  --  12*  --  9*  --  9*   < > 7*   BUN  --  150*  --  156*  --  154*   < > 171*   CREA  --  4.11*  --  4.12*  --  4.29*   < > 4.66*   CA  --  7.8*  --  7.9*  --  7.8*   < > 8.5   MG 2.1 2.2 1.5* 1.6  --  1.6  --  1.2*   PHOS 4.4 4.8 5.7* 6.3*   < >  --   --   --    AGAP  --  16  --  16  --  16   < > 16   BUCR  --  36*  --  38*  --  36*   < > 37*   AP  --  108  --   --   --   --   --  150*   TP  --  5.8*  --   --   --   --   --  5.8*   ALB  --  2.1*  --   --   --   --   --  1.9*   GLOB  --  3.7  --   --   --   --   --  3.9   AGRAT  --  0.6*  --   --   --   --   --  0.5*    < > = values in this interval not displayed. Lactic Acid Lactic acid   Date Value Ref Range Status   08/27/2022 1.7 0.4 - 2.0 MMOL/L Final     Recent Labs     08/27/22  1838 08/27/22  1030   LAC 1.7 1.6          Liver Enzymes Protein, total   Date Value Ref Range Status   08/28/2022 5.8 (L) 6.4 - 8.2 g/dL Final     Albumin   Date Value Ref Range Status   08/28/2022 2.1 (L) 3.4 - 5.0 g/dL Final     Globulin   Date Value Ref Range Status   08/28/2022 3.7 2.0 - 4.0 g/dL Final     A-G Ratio   Date Value Ref Range Status   08/28/2022 0.6 (L) 0.8 - 1.7   Final     Alk.  phosphatase   Date Value Ref Range Status   08/28/2022 108 45 - 117 U/L Final     Recent Labs     08/28/22  0117 08/27/22  1030   TP 5.8* 5.8*   ALB 2.1* 1.9*   GLOB 3.7 3.9   AGRAT 0.6* 0.5*    150*          CBC w/Diff Recent Labs     08/28/22  0804 08/28/22  0117 08/27/22  1838 08/27/22  1030   WBC  --  35.4* 42.6* 30.5*   RBC  --  2.78* 3.38* 2.96*   HGB 7.4* 7.2* 8.7* 7.3*   HCT 23.9* 22.9* 29.1* 25.5*   PLT  --  518* 556* 608*   GRANS  --  91*  --  88*   LYMPH  --  3*  --  2*   EOS  --  0  --  0          Cardiac Enzymes No results found for: CPK, CK, CKMMB, CKMB, RCK3, CKMBT, CKNDX, CKND1, LEONEL, TROPT, TROIQ, WARD, TROPT, TNIPOC, BNP, BNPP     BNP No results found for: BNP, BNPP, XBNPT     Coagulation Recent Labs     08/28/22  0117 08/27/22  1838 08/27/22  1030   PTP 17.4* 19.5* 18.1*   INR 1.4* 1.6* 1.5*           Thyroid  Lab Results   Component Value Date/Time    TSH 1.72 02/11/2018 02:19 AM       No results found for: T4     Urinalysis Lab Results   Component Value Date/Time    Color YELLOW 08/27/2022 02:27 PM    Appearance TURBID 08/27/2022 02:27 PM    Specific gravity 1.019 08/27/2022 02:27 PM    pH (UA) 6.0 08/27/2022 02:27 PM    Protein >1,000 (A) 08/27/2022 02:27 PM    Glucose Negative 08/27/2022 02:27 PM    Ketone TRACE (A) 08/27/2022 02:27 PM    Bilirubin Negative 08/27/2022 02:27 PM    Urobilinogen 1.0 08/27/2022 02:27 PM    Nitrites Negative 08/27/2022 02:27 PM    Leukocyte Esterase LARGE (A) 08/27/2022 02:27 PM    Epithelial cells FEW 08/27/2022 02:27 PM    Bacteria 1+ (A) 08/27/2022 02:27 PM    WBC TOO NUMEROUS TO COUNT 08/27/2022 02:27 PM    RBC 4 to 10 08/27/2022 02:27 PM          Culture data during this hospitalization.    All Micro Results       Procedure Component Value Units Date/Time    CULTURE, URINE [[id number]] Collected: 08/27/22 1442    Order Status: Sent Specimen: Cath Urine Updated: 08/28/22 0856    CULTURE, BLOOD [[id number]] Collected: 08/27/22 1030    Order Status: Completed Specimen: Blood Updated: 08/28/22 0801     Special Requests: NO SPECIAL REQUESTS        Culture result: NO GROWTH AFTER 21 HOURS       CULTURE, BLOOD [[id number]] Collected: 08/27/22 1100    Order Status: Completed Specimen: Blood Updated: 08/28/22 0801     Special Requests: NO SPECIAL REQUESTS        Culture result: NO GROWTH AFTER 20 HOURS       CULTURE, BLOOD [[id number]]     Order Status: Canceled Specimen: Blood     COVID-19 RAPID TEST [[id number]] Collected: 08/27/22 1030    Order Status: Completed Specimen: Nasopharyngeal Updated: 08/27/22 1057     Specimen source SWAB        COVID-19 rapid test Not detected        Comment: Rapid Abbott ID Now       Rapid NAAT:  The specimen is NEGATIVE for SARS-CoV-2, the novel coronavirus associated with COVID-19. Negative results should be treated as presumptive and, if inconsistent with clinical signs and symptoms or necessary for patient management, should be tested with an alternative molecular assay. Negative results do not preclude SARS-CoV-2 infection and should not be used as the sole basis for patient management decisions. This test has been authorized by the FDA under an Emergency Use Authorization (EUA) for use by authorized laboratories. Fact sheet for Healthcare Providers: [employer]  Fact sheet for Patients: [employer]       Methodology: Isothermal Nucleic Acid Amplification                  ECHO May 2021 Interpretation Summary  Result status: Final result  Contrast used: DEFINITY. Left Ventricle: Normal cavity size, wall thickness and systolic function (ejection fraction normal). The estimated EF is 55 - 60%. There is mild (grade 1) left ventricular diastolic dysfunction E'E= 10.41. Wall Scoring: The left ventricular wall motion is normal.  Tricuspid Valve: Tricuspid valve not well visualized. No stenosis. Tricuspid regurgitation is inadequate for estimation of right ventricular systolic pressure. Images report reviewed by me:  CT 8/27/2022 (Most Recent)  Results from Hospital Encounter encounter on 08/27/22    CT HEAD WO CONT    Narrative  EXAM: CT head    INDICATION: Altered mental status. COMPARISON: None. TECHNIQUE: Axial CT imaging of the head was performed without intravenous  contrast.    One or more dose reduction techniques were used on this CT: automated exposure  control, adjustment of the mAs and/or kVp according to patient size, and  iterative reconstruction techniques. The specific techniques used on this CT  exam have been documented in the patient's electronic medical record.   Digital  Imaging and Communications in Medicine (DICOM) format image data are available  to nonaffiliated external healthcare facilities or entities on a secure, media  free, reciprocally searchable basis with patient authorization for at least a  12-month period after this study. Patient motion degrades image quality. _______________    FINDINGS:    BRAIN AND POSTERIOR FOSSA:  Mild periventricular areas of decreased attenuation are identified. Gray-white matter interfaces are preserved. No intraparenchymal hemorrhage, mass effect or midline shift. The ventricular system is midline and symmetric. Atherosclerotic vascular calcifications are identified. EXTRA-AXIAL SPACES AND MENINGES:  There is no evidence for extra-axial mass lesion or fluid collection. CALVARIUM:  Intact. SINUSES:  Sphenoid sinus mucosal thickening. OTHER:  Orbits are grossly intact. Facial soft tissue are within normal limits. _______________    Impression  1. No evidence for intracranial hemorrhage, mass effect or midline shift. 2.  Mild periventricular areas of microvascular ischemic change. 3.  Cerebral atherosclerosis. 4.  Atrophy. If there are continued symptoms, a MRI is recommended for further evaluation. CT abdomen/pelvis 8/27/2022  IMPRESSION  1. Large right inguinal hernia with large and small bowel. There is diffuse wall  thickening of the ascending colon concerning for strangulation. No evidence for  bowel obstruction. 2. Right pleural effusion and bilateral basilar atelectasis. 3. Gastric distention. CXR reviewed by me:  XR 8/27 (Most Recent). Results from Hospital Encounter encounter on 08/27/22    XR CHEST PORT    Narrative  EXAM: CHEST RADIOGRAPH    CLINICAL INDICATION/HISTORY: intubated  -Additional: None    COMPARISON: August 27, 2022    TECHNIQUE: Portable frontal view of the chest    _______________    FINDINGS:    SUPPORT DEVICES: An endotracheal tube is positioned 6 cm above the alise.     HEART AND MEDIASTINUM: Heart size and mediastinal contour are midline and within  normal limits. LUNGS AND PLEURAL SPACES: There are opacities throughout both lungs but more  confluent within the right mid and lower lung zones. No pneumothorax. BONY THORAX AND SOFT TISSUES: Unremarkable.    _______________    Impression  Interval right greater than left airspace disease    Small to moderate right pleural effusion         AXR 8/27  FINDINGS:   SUPPORT DEVICES: A nasogastric tube is positioned in the stomach. BOWEL GAS PATTERN: The gas pattern is nonobstructive. SOFT TISSUES: Unremarkable. BONES: Degenerative changes are seen throughout the spine. ADDITIONAL FINDINGS: None. IMPRESSION   No significant abnormality. Please note: Voice-recognition software may have been used to generate this report, which may have resulted in some phonetic-based errors in grammar and contents. Even though attempts were made to correct all the mistakes, some may have been missed, and remained in the body of the document.       Henry Suggs MD  8/28/2022

## 2022-08-28 NOTE — PROGRESS NOTES
[address] [facility] Pharmacokinetic Monitoring Service - Vancomycin    Consulting Provider: Simona Bonilla   Indication: Sepsis  Target Concentration: Dosing based on anticipated concentration <15 mg/L due to renal impairment/insufficiency  Day of Therapy: 2  Additional Antimicrobials: Zosyn    Pertinent Laboratory Values: Wt Readings from Last 1 Encounters:   08/28/22 38.6 kg (85 lb)     Temp Readings from Last 1 Encounters:   08/28/22 98.2 °F (36.8 °C)     No components found for: PROCAL  Estimated Creatinine Clearance: 8.5 mL/min (A) (based on SCr of 4.11 mg/dL (H)).   Recent Labs     08/28/22  0117 08/27/22  1838   WBC 35.4* 42.6*     Assessment:  Date/Time Current Dose Concentration Timing of Concentration (h) AUC   8/28/22 1126 Vancomycin 1 g IV x once 9.2 mcg/ml 24 hrs since last dose N/A   Note: Serum concentrations collected for AUC dosing may appear elevated if collected in close proximity to the dose administered, this is not necessarily an indication of toxicity    Plan:  Next dose: vancomycin 500 mg IV x once @ 1500 8/28/22  Pharmacy will continue to monitor patient and adjust therapy as indicated    Thank you for the consult,  MICHELLE Ahumada  8/28/2022 1:52 PM

## 2022-08-28 NOTE — CONSULTS
RENAL CONSULT  2022    Patient:  Romel Shields :  1945  Gender:  male  MRN #:  [id number]    Consulting Physician:  Candido Ledbetter DO,  Assessment:    )Principal Problem:    Sepsis ([address] (2022)    Active Problems:    Acute renal failure (ARF) ([address] (2018)      UTI (urinary tract infection) (10/29/2021)      High anion gap metabolic acidosis ()      Hyperkalemia (2022)      AMS (altered mental status) (2022)      Strangulated inguinal hernia (2022)      Right inguinal hernia (2022)      Leukocytosis (2022)      Anemia (2022)      Chronic indwelling Caal catheter (2022)      S/P BKA (below knee amputation), left ([address] (2022)      Right foot ulcer ([address] (2022)      Paroxysmal atrial fibrillation ([address] (2022)      Diabetic ulcer of right foot ([address] (2022)      Pleural effusion on right (2022)      Severe protein-calorie malnutrition ([address] (2022)      Acute respiratory failure with hypoxemia ([address] (2022)      Septic shock ([address] (2022)      Plan:    Acidosis and Hyperkalemia are better with bicarb gttp. Too ill to tolerate Intermittent HD. D/w wife to consider hospice. Reevaluated for IHD in Am  Thank you for consulting us. History of Present Illness:  Romel Shields is a 78y.o. year old male with ongoing DM, CHF, chronic caal catheter has been admitted with Shock due strangulated hernia who went to OR. He is anuric overnight  Wife at the bedside.        Past Medical History:   Diagnosis Date    A-fib ([address]     Asthma     CAD (coronary artery disease)     Chronic kidney disease     stage 3    COVID-19     Diabetes (HCC)     GERD (gastroesophageal reflux disease)     Heart failure (HCC)     chronic diastolic heart failure    High cholesterol     Hypertension     Ill-defined condition     chronic osteomyelitis left ankle and foot    Ill-defined condition     chronic arteriosclerosis    PVD (peripheral vascular disease) ([facility]     Stroke [facility])     cva     Past Surgical History:   Procedure Laterality Date    COLONOSCOPY N/A 6/1/2018    COLONOSCOPY, POLYPECTOMY performed by Codey Veras MD at [facility] ENDOSCOPY    HX CATARACT REMOVAL      HX ORTHOPAEDIC Left 2021    ankle washout, external fixator, would vac    HX ORTHOPAEDIC      external fixator removed     History reviewed. No pertinent family history.   No Known Allergies  Current Facility-Administered Medications   Medication Dose Route Frequency Provider Last Rate Last Admin    phytonadione (vitamin K1) (AQUA-MEPHYTON) 5 mg in 0.9% sodium chloride 50 mL IVPB  5 mg IntraVENous ONCE Cecy Blancas MD        piperacillin-tazobactam (ZOSYN) 3.375 g in 0.9% sodium chloride (MBP/ADV) 100 mL MBP  3.375 g IntraVENous Q12H Cecy Blancas MD        0.9% sodium chloride infusion 250 mL  250 mL IntraVENous PRN Shira Cervantes MD        sodium bicarbonate (8.4%) 100 mEq in dextrose 5% 1,000 mL infusion   IntraVENous CONTINUOUS Cecy Blancas MD 75 mL/hr at 08/28/22 0526 New Bag at 08/28/22 0526    Vancomycin level due at 1100 8/28/22  1 Each Other ONCE Shira Cervantes MD        Vancomycin - Rx to dose and monitor  1 Each Other Rx Dosing/Monitoring Shira Cervantes MD        [facility] ON 8/29/2022] levoFLOXacin (LEVAQUIN) 500 mg in D5W IVPB  500 mg IntraVENous Q48H Shira Cervantes MD        albumin human 25% (BUMINATE) solution 12.5 g  12.5 g IntraVENous Q6H Cecy Blancas MD 0 mL/hr at 08/27/22 1528 12.5 g at 08/28/22 0526    pantoprazole (PROTONIX) 40 mg in 0.9% sodium chloride 10 mL injection  40 mg IntraVENous Q12H Cecy Blancas MD   40 mg at 08/28/22 4698    sodium chloride (NS) flush 5-40 mL  5-40 mL IntraVENous Q8H Ronnell Kidd MD   10 mL at 08/28/22 0528    sodium chloride (NS) flush 5-40 mL  5-40 mL IntraVENous PRN Ronnell Kidd MD        acetaminophen (TYLENOL) tablet 650 mg 650 mg Oral Q6H PRN Marc Kidd MD        Or    acetaminophen (TYLENOL) suppository 650 mg  650 mg Rectal Q6H PRN Tara Kidd MD        polyethylene glycol (MIRALAX) packet 17 g  17 g Oral DAILY PRN Tara Kidd MD        ondansetron (ZOFRAN ODT) tablet 4 mg  4 mg Oral Q8H PRN Taar Kidd MD        Or    ondansetron (ZOFRAN) injection 4 mg  4 mg IntraVENous Q6H PRN Tara Kidd MD        magnesium sulfate 2 g/50 ml IVPB (premix or compounded)  2 g IntraVENous PRN Marc Kidd MD 25 mL/hr at 08/27/22 2158 2 g at 08/27/22 2158    fentaNYL citrate (PF) injection 50 mcg  50 mcg IntraVENous Q4H PRN Kiesha Schuler MD   50 mcg at 08/27/22 1442    insulin lispro (HUMALOG) injection   SubCUTAneous Q6H Kiesha Schuler MD   2 Units at 08/28/22 0531    glucose chewable tablet 16 g  4 Tablet Oral PRN Kiesha Schuler MD        glucagon (GLUCAGEN) injection 1 mg  1 mg IntraMUSCular PRN Kiesha Schuler MD        dextrose 10% infusion 0-250 mL  0-250 mL IntraVENous PRN Kiesha Schuler MD        chlorhexidine (PERIDEX) 0.12 % mouthwash 10 mL  10 mL Oral Q12H Kiesha Schuler MD   10 mL at 08/28/22 0928    vasopressin (VASOSTRICT) 20 Units in 0.9% sodium chloride 100 mL infusion  0-0.04 Units/min IntraVENous TITRATE Kiesha Schuler MD 9 mL/hr at 08/28/22 0530 0.03 Units/min at 08/28/22 0530    HYDROmorphone (DILAUDID) injection 1 mg  1 mg IntraVENous Q4H PRN Kiesha Schuler MD   1 mg at 08/28/22 0928    midazolam in normal saline (VERSED) 1 mg/mL infusion  0-5 mg/hr IntraVENous TITRATE Kiesha Schuler MD 2 mL/hr at 08/28/22 0251 2 mg/hr at 08/28/22 0251    NOREPINephrine (LEVOPHED) 8 mg in 5% dextrose 250mL (32 mcg/mL) infusion  2-16 mcg/min IntraVENous TITRATE Kiesha Schuler MD 24.4 mL/hr at 08/28/22 0905 13 mcg/min at 08/28/22 0905         Objective:  Visit Vitals  BP (!) 127/43 Pulse 83   Temp 98.2 °F (36.8 °C)   Resp 20   Ht 5' 8\" (1.727 m)   Wt 38.6 kg (85 lb)   SpO2 99%   BMI 12.92 kg/m²         Intubated  On 2 pressor  Edema sacrial noted. Right ankle ulcer noted  Left BKA  Abdomen is soft. Laboratory Data:  Lab Results   Component Value Date     (H) 08/28/2022     (H) 08/27/2022     (H) 08/27/2022     (L) 08/28/2022     (L) 08/27/2022     08/27/2022    CO2 12 (L) 08/28/2022    CO2 9 (LL) 08/27/2022    CO2 9 (LL) 08/27/2022     Lab Results   Component Value Date    WBC 35.4 (H) 08/28/2022    HGB 7.4 (L) 08/28/2022    HCT 23.9 (L) 08/28/2022     Imaging have been reviewed:  )CT HEAD WO CONT    Result Date: 8/27/2022  EXAM: CT head INDICATION: Altered mental status. COMPARISON: None. TECHNIQUE: Axial CT imaging of the head was performed without intravenous contrast.  One or more dose reduction techniques were used on this CT: automated exposure control, adjustment of the mAs and/or kVp according to patient size, and iterative reconstruction techniques. The specific techniques used on this CT exam have been documented in the patient's electronic medical record. Digital Imaging and Communications in Medicine (DICOM) format image data are available to nonaffiliated external healthcare facilities or entities on a secure, media free, reciprocally searchable basis with patient authorization for at least a 12-month period after this study. Patient motion degrades image quality. _______________ FINDINGS: BRAIN AND POSTERIOR FOSSA: Mild periventricular areas of decreased attenuation are identified. Gray-white matter interfaces are preserved. No intraparenchymal hemorrhage, mass effect or midline shift. The ventricular system is midline and symmetric. Atherosclerotic vascular calcifications are identified. EXTRA-AXIAL SPACES AND MENINGES: There is no evidence for extra-axial mass lesion or fluid collection. CALVARIUM: Intact.  SINUSES: Sphenoid sinus mucosal thickening. OTHER: Orbits are grossly intact. Facial soft tissue are within normal limits. _______________     1. No evidence for intracranial hemorrhage, mass effect or midline shift. 2.  Mild periventricular areas of microvascular ischemic change. 3.  Cerebral atherosclerosis. 4.  Atrophy. If there are continued symptoms, a MRI is recommended for further evaluation. CT ABD PELV WO CONT    Result Date: 8/27/2022  EXAM: CT of the abdomen and pelvis INDICATION: Pain. COMPARISON: May 14, 2021. TECHNIQUE: Axial CT imaging of the abdomen and pelvis was performed without intravenous contrast. Multiplanar reformats were generated. One or more dose reduction techniques were used on this CT: automated exposure control, adjustment of the mAs and/or kVp according to patient size, and iterative reconstruction techniques. The specific techniques used on this CT exam have been documented in the patient's electronic medical record. Digital Imaging and Communications in Medicine (DICOM) format image data are available to nonaffiliated external healthcare facilities or entities on a secure, media free, reciprocally searchable basis with patient authorization for at least a 12-month period after this study. _______________ FINDINGS: LOWER CHEST: Moderate right pleural effusion with adjacent atelectasis. LIVER, BILIARY: Liver is normal. No biliary dilation. Gallbladder is unremarkable. PANCREAS: Normal. SPLEEN: Splenic granuloma. ADRENALS: Normal. KIDNEYS: Normal. LYMPH NODES: No enlarged lymph nodes. GASTROINTESTINAL TRACT: Uncomplicated sigmoid diverticula. Gastric distention. PELVIC ORGANS: Unremarkable. VASCULATURE: Atherosclerotic vascular calcifications. BONES: No acute or aggressive osseous abnormalities identified. OTHER: Large right inguinal hernia with large and small bowel. There is diffuse circumferential wall thickening of ascending colon within the hernia. _______________     1.  Large right inguinal hernia with large and small bowel. There is diffuse wall thickening of the ascending colon concerning for strangulation. No evidence for bowel obstruction. 2. Right pleural effusion and bilateral basilar atelectasis. 3. Gastric distention. XR CHEST PORT    Result Date: 8/28/2022  EXAM: CHEST RADIOGRAPH CLINICAL INDICATION/HISTORY: intubated   > Additional: None COMPARISON: 8/27/2022. TECHNIQUE: Portable frontal view of the chest _______________ FINDINGS: SUPPORT DEVICES: Endotracheal tube distal tip projects 5.5 cm above the alise. Enteric tube distal tip projects below the left hemidiaphragm likely in the stomach. HEART AND MEDIASTINUM: No appreciable cardiomegaly. Remaining mediastinal contours are stable. LUNGS AND PLEURAL SPACES: Right pleural effusion with adjacent opacity. No evidence for pneumothorax. Left lung is clear. BONY THORAX AND SOFT TISSUES: Unremarkable. _______________     1. Right pleural effusion with adjacent atelectasis/infiltrates. 2. Supporting tubes appear stable. XR CHEST PORT    Result Date: 8/27/2022  EXAM: CHEST RADIOGRAPH CLINICAL INDICATION/HISTORY: intubated -Additional: None COMPARISON: August 27, 2022 TECHNIQUE: Portable frontal view of the chest _______________ FINDINGS: SUPPORT DEVICES: An endotracheal tube is positioned 6 cm above the alise. HEART AND MEDIASTINUM: Heart size and mediastinal contour are midline and within normal limits. LUNGS AND PLEURAL SPACES: There are opacities throughout both lungs but more confluent within the right mid and lower lung zones. No pneumothorax. BONY THORAX AND SOFT TISSUES: Unremarkable. _______________     Interval right greater than left airspace disease Small to moderate right pleural effusion    XR CHEST PORT    Result Date: 8/27/2022  EXAM: CHEST RADIOGRAPHS CLINICAL INDICATION/HISTORY: Sepsis   > Additional: None COMPARISON: 6/18/2022.  TECHNIQUE: PA and lateral views of the chest _______________ FINDINGS: HEART AND MEDIASTINUM: No appreciable cardiomegaly. Remaining mediastinal contours within normal limits. LUNGS AND PLEURAL SPACES: Left lung is clear. Right pleural effusion with adjacent opacity. BONY THORAX AND SOFT TISSUES: Unremarkable. _______________     1. Right pleural effusion with probable adjacent atelectasis. XR CHEST PORT    Result Date: 6/18/2022  Portable Chest  History: SIRS, tachypnea and tachycardia Comparison: Portable chest 10/26/2021 Portable view of the chest demonstrates the cardiomediastinal silhouette is within normal limits. Patient is mildly rotated to the right. Normal variant azygos fissure is present. Hazy opacities present right lung base with blunting of costophrenic angle suspicious for pleural effusion. Some additional right lower lobe airspace disease is present. Left lung is clear. Degenerative changes are in the spine. Right-sided pleural effusion with adjacent airspace disease, atelectasis versus pneumonia. XR ABD PORT  1 V    Result Date: 8/27/2022  EXAM: ABDOMINAL RADIOGRAPHS CLINICAL INDICATION/HISTORY: OG placement -Additional: None COMPARISON: None TECHNIQUE: Single supine view of the abdomen _______________ FINDINGS: SUPPORT DEVICES: A nasogastric tube is positioned in the stomach. BOWEL GAS PATTERN: The gas pattern is nonobstructive. George Ishmael SOFT TISSUES: Unremarkable. BONES: Degenerative changes are seen throughout the spine. ADDITIONAL FINDINGS: None. _______________     No significant abnormality.        Total critical time spent 61 minutes    )Oswald Trujillo DO,

## 2022-08-28 NOTE — PROGRESS NOTES
Hospitalist Progress Note    Patient: Claudy Mckinney MRN: [id number]  CSN: [id number]    YOB: 1945  Age: 78 y.o. Sex: male    DOA: 8/27/2022 LOS:  LOS: 1 day          Chief Complaint:    severe septic shock with multiorgan and hypotension involvement due to multiple possible sources of infection, severe hyperkalemia, acute metabolic encephalopathy, metabolic acidosis, strangulated left inguinal hernia, anemia requiring blood transfusion, mild hypercoagulable state    Assessment/Plan   78 y.o. 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 Admitted for severe septic shock with multiorgan and hypotension involvement due to multiple possible sources of infection, severe hyperkalemia, acute metabolic encephalopathy, metabolic acidosis, strangulated left inguinal hernia, anemia requiring blood transfusion, mild hypoxia coagulable state. CRITICAL CARE PLAN     Resp - intubated from OR yesterday, vent mgmt per intensivist, HOB>30 Daily CXR. ID - multi-source septic shock, strangulated right inguinal hernia, sacral ulcer and medial maleolar and heel infected ulcers, infected urine,   Vanc/zosyn/levaquin  Follow up blood and urine cx. Trend temps, wbc, LA improving. CVS - shock, profound hypotension requiring pressor support after receiving fentanyl, overnight nurse able to wean pressor support down, SBP >90, Monitor HD. Wean pressors,      Heme/onc - low H/H, s/p transfusion of one unit of prbcs yesterday, mild hypercoagulable, INR elevated now 1.4, Follow H&H, plts. INR.      Renal - acute renal failure, likely due to septic shock, very minimal urine output but slight improvement overnight, RN reports total of abut 100cc of very cloudy purulent urine, urine cultures pending, on IV abx, continue caal to measure output overall in critically ill patient and pt with h/o urinary retention, renal consult placed, trend BUN, Cr, Check and replace Mg, K, phos. Endocrine -  Follow FSG     Neuro -acute encephalopathy, now vented from OR, CT negative       Pain/Sedation - Fentanyl, ativan/versed prn. Sedation bundle. GI - right incarcerated inguinal hernia, s/p ex lap with reduction of incarcerated right groin hernia with hernia mesh, general surgery following, NPO for now. NG tube, tube feeding. F/E/N -hyperkalemia has now resolved with multiple K lowering regimens, daily bmp   Metabolic acidosis -sodium bicarbonate ordered, C02 improving   Poor nutrition      Prophylaxis - DVT: heparin, GI: protonix       Very poor prognosis. Death likely. Palliative care consult placed.     Will need to update CODE STATUS to DNR after 24 hours of going to OR for surgical procedure    Palliative care consult placed yesterday     Disposition : TBD  Patient Active Problem List   Diagnosis Code    DM2 (diabetes mellitus, type 2) ([facility] E11.9    CAD (coronary artery disease) I25.10    Acute renal failure (ARF) ([facility]) N17.9    CKD (chronic kidney disease) stage 3, GFR 30-59 ml/min ([facility]) N18.30    Elevated brain natriuretic peptide (BNP) level R79.89    UTI (urinary tract infection) N39.0    High anion gap metabolic acidosis J59.8    Hyperkalemia E87.5    Sepsis ([facility]) A41.9    AMS (altered mental status) R41.82    Strangulated inguinal hernia K40.30    Right inguinal hernia K40.90    Leukocytosis D72.829    Anemia D64.9    Chronic indwelling Caal catheter Z97.8    S/P BKA (below knee amputation), left (HCC) Z89.512    Right foot ulcer (HCC) L97.519    Paroxysmal atrial fibrillation ([facility]) I48.0    Diabetic ulcer of right foot ([facility]) E11.621, L97.519    Pleural effusion on right J90    Severe protein-calorie malnutrition ([facility] E43 Subjective:    Vented, sedated    Review of systems:    UTO due to medical condition      Vital signs/Intake and Output:  Visit Vitals  BP (!) 123/51   Pulse 88   Temp 97.9 °F (36.6 °C)   Resp 22   Ht 5' 8\" (1.727 m)   Wt 38.6 kg (85 lb)   SpO2 100%   BMI 12.92 kg/m²     Current Shift:  No intake/output data recorded. Last three shifts:  08/26 1901 - 08/28 0700  In: 861.2 [I.V.:861.2]  Out: 142 [Urine:142]    Exam:    General: vented, sedated, ill-appearing, chornically ill, debilitated  Head/Neck: NCAT, supple, No masses, No lymphadenopathy  CVS:Regular rate and rhythm, no M/R/G, S1/S2 heard, no thrill  Lungs:Clear to auscultation bilaterally, no wheezes, rhonchi, or rales  Abdomen: midline surgical incision C/D/I, Soft, Nontender, No distention, Normal Bowel sounds, No hepatomegaly  Extremities: see skin below, surgical BKA LLE  Skin: pale, infected ulcers with black eschar medial malleolus and heel on right foot  Neuro: vented and sedated  Psych: vented and sedated                Labs: Results:       Chemistry Recent Labs     08/28/22  0117 08/27/22  2013 08/27/22  1838 08/27/22  1425 08/27/22  1315 08/27/22  1030   *  --  179* 250*   < > 145*   *  --  135* 136   < > 132*   K 5.2 6.0* 6.5* 6.1*   < > 7.6*     --  110 111   < > 109   CO2 12*  --  9* 9*   < > 7*   *  --  156* 154*   < > 171*   CREA 4.11*  --  4.12* 4.29*   < > 4.66*   CA 7.8*  --  7.9* 7.8*   < > 8.5   AGAP 16  --  16 16   < > 16   BUCR 36*  --  38* 36*   < > 37*     --   --   --   --  150*   TP 5.8*  --   --   --   --  5.8*   ALB 2.1*  --   --   --   --  1.9*   GLOB 3.7  --   --   --   --  3.9   AGRAT 0.6*  --   --   --   --  0.5*    < > = values in this interval not displayed.       CBC w/Diff Recent Labs     08/28/22  0117 08/27/22  1838 08/27/22  1030   WBC 35.4* 42.6* 30.5*   RBC 2.78* 3.38* 2.96*   HGB 7.2* 8.7* 7.3*   HCT 22.9* 29.1* 25.5*   * 556* 608*   GRANS 91*  --  88*   LYMPH 3*  --  2* EOS 0  --  0      Cardiac Enzymes No results for input(s): CPK, CKND1, LEONEL in the last 72 hours. No lab exists for component: CKRMB, TROIP   Coagulation Recent Labs     08/28/22  0117 08/27/22  1838   PTP 17.4* 19.5*   INR 1.4* 1.6*       Lipid Panel No results found for: CHOL, CHOLPOCT, CHOLX, CHLST, CHOLV, 254520, HDL, HDLP, LDL, LDLC, DLDLP, 527118, VLDLC, VLDL, TGLX, TRIGL, TRIGP, TGLPOCT, CHHD, CHHDX   BNP No results for input(s): BNPP in the last 72 hours. Liver Enzymes Recent Labs     08/28/22  0117   TP 5.8*   ALB 2.1*         Thyroid Studies Lab Results   Component Value Date/Time    TSH 1.72 02/11/2018 02:19 AM        Procedures/imaging: see electronic medical records for all procedures/Xrays and details which were not copied into this note but were reviewed prior to creation of Plan    [id number], 35 minutes of critical care time spent in the direct evaluation and treatment of this high risk patient. The reason for providing this level of medical care for this critically ill patient was due a critical illness that impaired one or more vital organ systems such that there was a high probability of imminent or life threatening deterioration in the patients condition. This care involved high complexity decision making to assess, manipulate, and support vital system functions, to treat this degreee vital organ system failure and to prevent further life threatening deterioration of the patients condition.   Shruthi Holley MD

## 2022-08-28 NOTE — PROGRESS NOTES
Pt POD 1 from emergency surgery. Incarcerated right groin hernia  Pt septic - likely multiple sources.   Critical overall  GI - stable, wound CDI    Mgmt per ICU team

## 2022-08-28 NOTE — PROGRESS NOTES
Pharmacy Dosing Services: Renal Dosing    The following medication: Zosyn was automatically dose-adjusted per THE [facility] P&T Committee Protocol, with respect to renal function. Consult provided for this   78 y.o. , male , for the indication of intra-abdominal infecttion. Pt Weight:   Wt Readings from Last 1 Encounters:   08/27/22 38.6 kg (85 lb)         Previous Regimen Zosyn 3.375 g IV q8h over 30 min   Serum Creatinine Lab Results   Component Value Date/Time    Creatinine 4.11 (H) 08/28/2022 01:17 AM       Creatinine Clearance Estimated Creatinine Clearance: 8.5 mL/min (A) (based on SCr of 4.11 mg/dL (H)). BUN Lab Results   Component Value Date/Time     (H) 08/28/2022 01:17 AM       Dosage changed to:  Zosyn 3.375 g IV q12h over 240 min    Pharmacy to continue to monitor patient daily. Will make dosage adjustments based upon changing renal function.   Signed Darin Ha, [address]

## 2022-08-28 NOTE — PROGRESS NOTES
Bedside report received from [facility], [address] [place] [address]. Assumed care of pt at this time. Patient remains intubated. Responding to pain -- patient placed on Versed drip and restraints. CO2 Moniter set up by RT -- CO2 reading in the 20s. Patient remains on Levo and Vaso. On 2 of Versed, given Dilaudid for pain. Midline incision in place that is clean, dry and intact. Bowel sounds hypoactive. Mccray in place. Bedside shift change report given to Kenny Epps RN (oncoming nurse) by Chelsie MARTINS RN (offgoing nurse). Report included the following information SBAR, Kardex and MAR.

## 2022-08-28 NOTE — PROGRESS NOTES
D/C plan: TBD s/p completion of palliative care consult to establish [address]. Chart review assessment completed. Pt lives at home w/ his spouse. Per chart; prognosis is poor. CM is following for completion of palliative care consult to establish [address]. Pt is on a vent. Reason for Admission:  Multi-source                      RUR Score:       18%              Plan for utilizing home health:    TBD      PCP: First and Last name:  Poly Montoya MD     Name of Practice:    Are you a current patient: Yes/No:    Approximate date of last visit:    Can you participate in a virtual visit with your PCP:                     Current Advanced Directive/Advance Care Plan: Full Code      Healthcare Decision Maker:   Click here to complete [place] 3D including selection of the Wasabi 3D Relationship (ie \"Primary\")             Primary Decision MakerNoaracelynaa Cortezisha - [phone]    Secondary Decision Maker: Dean Elizabeth - [phone]                  Transition of Care Plan:     TBD pending GOC. Care Management Interventions  PCP Verified by CM: Yes (Per Chart: Dr. Conner Notice )  Mode of Transport at Discharge: BLS  Transition of Care Consult (CM Consult): Discharge Planning  Discharge Durable Medical Equipment: No  Physical Therapy Consult: No  Occupational Therapy Consult: No  Speech Therapy Consult: No  Support Systems: Spouse/Significant Other  Confirm Follow Up Transport: Family  The Plan for Transition of Care is Related to the Following Treatment Goals : Palliative care conulted. CM to continue to follow for [address] to be established and to assist with discharge planning.   Discharge Location  Patient Expects to be Discharged to[de-identified] Unable to determine at this time

## 2022-08-29 NOTE — ACP (ADVANCE CARE PLANNING)
Advance Care Planning     General Advance Care Planning (ACP) Conversation      Date of Conversation: 8/29/2022    Conducted with: Patient's wife Bret Rodney), patient's son (Subha Camacho), Tex Ordoñez NP (Palliative) and this writer. Healthcare Decision Maker:     Patient does not have an official healthcare decision maker document, on file. Patient's legal next-of-kin and his default healthcare decision maker is his wife (Bret Rodney, #[phone]). Patient also has a son on file Subha Camacho, #[phone]); who is very involved in patient's care. Patient's wife Blair Jenkins) is the main point of contact and healthcare decision maker. She speaks limited [place]; however, can understand and communicate in simple [place] language. Content/Action Overview: This writer, along with Tex Ordoñez NP, with the Palliative Care team; visited with patient today to offer support. Patient is currently intubated (PEEP 5, FiO2 30). Patient is also sedated. Patient's wife Blair Jenkins) was at the bedside, at the time of this visit. Patient resides with his wife. Wife stated that she has been assisting patient with his ADLs and IADLs. Wife stated that he has not been eating recently. Wife also stated that patient has been getting home health services. Patient does not have a formal healthcare decision maker document, on file. His wife is his default healthcare decision maker. When asked about goals of care moving forward. Wife was very clear that patient would not want any attempts at resuscitation, in the event that his heart and breathing were to stop. The Palliative Care team would like the opportunity to speak with wife and the other family members, to introduce hospice services. The Palliative Care team suggested a family meeting for tomorrow (8/30/20220). Wife will phone family and arrange a time that everyone can come in or at least be available by phone.  The Palliative Care team then reached out to patient's son Kodi ) and informed him of the conversation and plan to have the meeting. Sebastián Clark is on-board with the meeting. Once patient's wife has a specific time set, she will phone Palliative and the meeting will be arranged. The Palliative Care team updated patient's code status, according to the wishes of DNR. With patient already being intubated; patient has been updated to a partial code. At this time, patient is a PARTIAL CODE. Length of Voluntary ACP Conversation in minutes:  30 minutes        Tanner DRUMMOND  [facility]., MSW  Palliative Care   #[phone]

## 2022-08-29 NOTE — PROGRESS NOTES
conducted an initial consultation and spiritual assessment for Royal Burns, who is a 78 y. o.,male. Patient is on vent and is not responsive to me. His daughter and her partner were here earlier. Wife came in a spoke about hard decisions ahead. She stated that caring for him has been \"harder and harder. \"  Wife knows \"what the right decision is, but I want the family involved. \"  Patient is Lutheran. Wife was pleased that Father Sharad Lora would come in and give patient Sacrament of the [email or web address] The reason the Patient came to the hospital is:   Patient Active Problem List    Diagnosis Date Noted    Acute respiratory failure with hypoxemia ([address] 08/28/2022    Septic shock ([address] 08/28/2022    High anion gap metabolic acidosis 57/82/6153    Hyperkalemia 08/27/2022    Sepsis ([address] 08/27/2022    AMS (altered mental status) 08/27/2022    Strangulated inguinal hernia 08/27/2022    Right inguinal hernia 08/27/2022    Leukocytosis 08/27/2022    Anemia 08/27/2022    Chronic indwelling Mccray catheter 08/27/2022    S/P BKA (below knee amputation), left ([address] 08/27/2022    Right foot ulcer ([address] 08/27/2022    Paroxysmal atrial fibrillation ([address] 08/27/2022    Diabetic ulcer of right foot ([address] 08/27/2022    Pleural effusion on right 08/27/2022    Severe protein-calorie malnutrition ([address] 08/27/2022    UTI (urinary tract infection) 10/29/2021    CAD (coronary artery disease) 05/29/2018    Acute renal failure (ARF) ([address] 05/29/2018    CKD (chronic kidney disease) stage 3, GFR 30-59 ml/min ([facility]) 05/29/2018    Elevated brain natriuretic peptide (BNP) level 05/29/2018    DM2 (diabetes mellitus, type 2) ([address] 02/12/2018        Initiated a relationship of care and support. Listened empathically. Offered assurance of continued prayers on patients behalf. Chart reviewed.  confirmed Patient's Pentecostalism Affiliation. Wife expressed gratitude for Spiritual Care visit.   Patient was reviewed in ICU Interdisciplinary Rounds. Chaplains will continue to follow and will provide pastoral care as needed or requested.  recommends bedside caregivers page  on duty if patient shows signs of acute spiritual or emotional distress. [address] [facility] Karissa Montes M.Div.   Board Certified   [phone] - Office

## 2022-08-29 NOTE — PROGRESS NOTES
1945- Report and care received, assesment completed per flow sheet. Intubated, sedated, opens eyes and noted head/upper body to twitch with stimulation which is not new per day RN. Stopped as soon as stimulation ceased. ETT secured, vent settings noted.  updated regarding atrial fibrillation and that wife desires him to be a DNR per report once 24 hour surgical period completed at 1900.     2300- Reassessment without change. Bath and linen change completed. 0430- Reassessment without change.

## 2022-08-29 NOTE — PROGRESS NOTES
Comprehensive Nutrition Assessment    Type and Reason for Visit: Initial, Consult    Nutrition Recommendations/Plan:   Will place feeding order to meet nutritional needs. Feeding recommendations below. Avoid prolong NPO status. Monitor feeding tolerance. 08/29/22 1028   Enteral Nutrition   EN Formula Renal   Schedule Continuous   Feeding Regimen Goal: Nepro at 45ml/hr. Initiate at 10ml/hr. Increase by 10ml q6 until goal rate. Additives/Modulars None   Water Flushes 150ml q4   Goal EN & Flush Order Provides Nepro @ 45ml/hr:   1782kcal, 80g PRO, 165g CHO, 718ml free water + 900ml fluesh (1618ml)        Malnutrition Assessment:  Malnutrition Status: At risk for malnutrition (specify) (08/29/22 1028)      Nutrition Assessment:    79yo M. H/o HTN, hyperlipidemia, stroke, CAD, DM, CKD. Admitted with profound hypotension, multiorgan SS, left strangulated inguinal hernia. S/p EXPLORATORY LAPAROTOMY, REDUCTION INCARCERATED RIGHT GROIN HERNIA WITH HERNIA REPAIR WITH MESH 8/27/2022. Pt on Vent. Wt Hx: 85lb (8/29/22), 160lb (6/6/22). Last RD note pt was 178lb (11/2021)    Nutrition Related Findings:    Lab: GFR est nonAA 15, , creatinine 4.00, Na 135. Med: lantus, humalog, protonix. Wound Type: Surgical incision    Current Nutrition Intake & Therapies:  Average Meal Intake: NPO     DIET NPO    Anthropometric Measures:  Height: 5' 8\" (172.7 cm)  Ideal Body Weight (IBW): 154 lbs (70 kg)     Current Body Wt:  82.8 kg (182 lb 8.7 oz), 118.5 % IBW. Current BMI (kg/m2): 27.8  Usual Body Weight: 72.6 kg (160 lb) (6/6/2022)  % Weight Change (Calculated): 14.1  Weight Adjustment: Amputation  Total Amputation Percentage: 5.9  Adjusted Ideal Body Weight (lbs) (Calculated): 144.9  Adjusted Ideal Body Weight (kg) (Calculated): 65.86 kg  Adjusted % IBW (Calculated): 126  Adjusted BMI (Calculated): 29.4  BMI Category: Overweight (BMI 25.0-29. 9)    Estimated Daily Nutrient Needs:  Energy Requirements Based On: Formula  Weight Used for Energy Requirements: Current  Energy (kcal/day): 1759  Weight Used for Protein Requirements: Ideal (1.2-1.7g)  Protein (g/day):   Method Used for Fluid Requirements: 1 ml/kcal  Fluid (ml/day): 3274    Nutrition Diagnosis:   Inadequate oral intake related to impaired respiratory function as evidenced by NPO or clear liquid status due to medical condition    Nutrition Interventions:   Food and/or Nutrient Delivery: Continue NPO, Start tube feeding  Nutrition Education/Counseling: No recommendations at this time  Coordination of Nutrition Care: Continue to monitor while inpatient       Goals:     Goals: Initiate nutrition support, by next RD assessment       Nutrition Monitoring and Evaluation:   Behavioral-Environmental Outcomes: None identified  Food/Nutrient Intake Outcomes: Diet advancement/tolerance, Enteral nutrition intake/tolerance  Physical Signs/Symptoms Outcomes: Biochemical data, GI status, Skin, Weight    Discharge Planning:     Too soon to determine    Nicola Velázquez RD

## 2022-08-29 NOTE — PROGRESS NOTES
Pulmonary Specialists  Pulmonary, Critical Care, and Sleep Medicine    Name: Danish Cooper  MRN: [id number]   : 1945 Hospital: [facility] [place]    Date: 2022  Room: [facility]/[address] [place] [address] Note                                              Consult requesting physician: Dr. Dr Roosevelt Gu  Reason for Consult: Strangulated bowel with complications, shock, respiratory failure       IMPRESSION:   Strangulated bowel/inguinal hernia  Acute respiratory failure with hypoxemia  Septic shock  High gap metabolic acidosis  Acute renal failure  Right inguinal hernia  Hyperkalemia  Sepsis  Altered mentation  Leukocytosis  Anemia  UTI  Right pleural effusion  Chronic Mccray catheter  Right foot ulcers  Type 2 diabetes mellitus  Paroxysmal atrial fibrillation  Left below-knee amputation  Severe malnutrition      Patient Active Problem List   Diagnosis Code    DM2 (diabetes mellitus, type 2) ([facility]) E11.9    CAD (coronary artery disease) I25.10    Acute renal failure (ARF) ([facility]) N17.9    CKD (chronic kidney disease) stage 3, GFR 30-59 ml/min ([facility]) N18.30    Elevated brain natriuretic peptide (BNP) level R79.89    UTI (urinary tract infection) N39.0    High anion gap metabolic acidosis Z75.0    Hyperkalemia E87.5    Sepsis ([facility]) A41.9    AMS (altered mental status) R41.82    Strangulated inguinal hernia K40.30    Right inguinal hernia K40.90    Leukocytosis D72.829    Anemia D64.9    Chronic indwelling Mccray catheter Z97.8    S/P BKA (below knee amputation), left ([facility]) Z89.512    Right foot ulcer ([facility]) L97.519    Paroxysmal atrial fibrillation ([facility]) I48.0    Diabetic ulcer of right foot ([facility]) E11.621, L97.519    Pleural effusion on right J90    Severe protein-calorie malnutrition ([facility]) E43    Acute respiratory failure with hypoxemia ([facility]) J96.01    Septic shock ([facility]) A41.9, R65.21         Code status: Full Code      RECOMMENDATIONS:   Patient is critically ill and presenting with large right inguinal strangulated hernia, with anemia, metabolic acidosis, acute renal failure and hyperkalemia. S/p laparotomy and reduction of incarcerated right groin hernia without need for bowel resection-8/27/2022. Respiratory: Patient remains intubated postop due to critically ill state, hemodynamic instability and metabolic derangements. Clinically, no bloody secretions noted from ET tube today morning. VCV ventilation-FiO2 down to 30%; PEEP 5. ABGstable 7.40/22/125/99  IMPRESSION  1. Right pleural effusion and subjacent atelectasis/infiltrate, perhaps slightly  progressed. 2. Tubes and lines stable in visualized extent. Increase diuresis check BNP  Continue ventilator and sedation bundles. Sedation-midazolam infusion; prn Dilaudid; patient dropped blood pressure in ER with fentanyl. CT abdomen on admission-right lower lung shows large right-sided pleural effusion with underlying atelectasis of the right lower lobe segments. Keep SPO2 >=92%. HOB 30 degree elevation all the time. Aggressive pulmonary toileting. Aspiration precautions. CVS: Patient on pressors; Levophed now off still on vasopressin vasopressin; wean for systolic blood pressure greater than 95 mmHg. Patient currently in atrial fibrillation. Prior Echo showed normal LV function; no pulmonary hypertension reported. Repeat echocardiogram diastolic dysfunction Ef normal   Troponin not elevated; proBNP elevated. Patient likely has diastolic CHF with chronic diabetes and hypertension. ID: Wbc improved 9.4  Patient likely has bowel sepsis; lactic acid not elevated. Necrotic lower extremity possible second source of infection   UA-Positive for UTI; urine culture-pending. Continue broad-spectrum antibiotic-levofloxacin, Zosyn and IV vancomycin-renal dosing. Blood cultures negative; rapid COVID test negative. Deescalate antibiotic when appropriate. Hematology/Oncology: Patient s/p 1 unit PRBC in the ER.   Hemoglobin 7.4 this morning. Platelets-reactive thrombocytosis. INR 1.4 this morning; plan for 5 mg vitamin K IV. Renal: Patient with severe metabolic acidosis. contineu biocarbonate hope to stop tomoorw   ABG shows improvement in metabolic acidosis; patient remains in failure; creatinine 4.11  Potassium stable-4.9. Continue bicarb drip and albumin. Nephrology consulted-Dr. Baudilio Trevizo. GI/: Chronic Mccray catheter-changed in ER  LFTs and lipase-normal.  Albumin-low. CT abdomen/pelvis- Large right inguinal hernia with large and small bowel. There is diffuse wall  thickening of the ascending colon concerning for strangulation. No evidence for bowel obstruction. S/p exploratory laparotomy, reduction of incarcerated hernia; bowel was viable, and did not need resection. Endocrine: Monitor BS-improved; continue sliding scale insulin. Hemoglobin A1c-5.9 on admission. Check TSH. Neurology: Acute metabolic encephalopathy from sepsis. CT head-nil acute. Skin/Wound: Right foot ulcer-wound care consulted. Electrolytes: Replace electrolytes per ICU electrolyte replacement protocol; caution renal failure. IVF: Bicarb drip 75 ml per hour; albumin infusion every 6 hours. Nutrition: N.p.o. for now. Prophylaxis: DVT Prophylaxis: None- due to anemia, left BKA, right foot diabetic ulcers. GI Prophylaxis: Protonix. Restraints: Prn Wrist soft restraints for patient interfering with medical therapy/management and patient safety. Lines/Tubes: PIV, midline  ET tube 8/27  OG tube 8/27  Mccray: 8/27 changed in ER (Medically necessary for strict input/output monitoring in critically ill patient, will remove it when not needed. Mccray bundle followed). Advance Directive/Palliative Care: consulted; overall prognosis appears to be poor. Full code status per family wishes. Quality Care: PPI, DVT prophylaxis, HOB elevated, Infection control all reviewed and addressed. Care of plan d/w icu RT and RN. Update family today when available.     High complexity decision making was performed during the evaluation of this patient at high risk for decompensation with multiple organ involvement. Total critical care time spent rendering care exclusive of procedures/family discussion/coordination of care: 50 mins           Subjective/History of Present Illness:     Patient is a 78 y.o. male with PMHx significant for multiple medical problems. He history of atrial flutter/atrial fibrillation, coronary artery disease, chronic kidney disease stage III, diabetes mellitus type 2 with leg ulcerations, history of CHF, hypertension and hyperlipidemia, history of peripheral vascular disease, history of prior CVA. Patient was admitted October 2021 with evidence of left leg diabetic ulcerations and gangrene. He subsequently underwent left below-knee amputation. Patient has chronic indwelling Mccray catheter. The patient has come to the ER brought by family/wife acutely unwell, confused and agitated. The patient has been found to have severe right-sided inguinal hernia, and subsequent CT abdomen/pelvis study showing evidence of strangulation of the bowel. The patient is also anemic, and severe metabolic acidosis, and acute renal failure. Urine output is poor from the Mccray catheter, and appears to be purulent. Mccray catheter has been replaced in the ER. S/p EXPLORATORY LAPAROTOMY, REDUCTION INCARCERATED RIGHT GROIN HERNIA WITH HERNIA REPAIR WITH MESH 8/27/2022 - Dr Hanna Chance  The bowel was completely viable and did not require resection. 8/29/2022  Patient seen in ICU. Patient is intubated and sedated. Shock slighly better off levophed still on vasopressin  Back DNR    Drips-Levophed now off  vasopressin 0.03 units/min, midazolam 2 mg/h, sodium bicarb 75 ml per hour    S/p left BKA 10/28/2021    Review of Systems:  ROS not obtained due to patient factor.        No Known Allergies   Past Medical History:   Diagnosis Date    A-fib ([facility] [address]     Asthma     CAD (coronary artery disease)     Chronic kidney disease     stage 3    COVID-19     Diabetes (HCC)     GERD (gastroesophageal reflux disease)     Heart failure (HCC)     chronic diastolic heart failure    High cholesterol     Hypertension     Ill-defined condition     chronic osteomyelitis left ankle and foot    Ill-defined condition     chronic arteriosclerosis    PVD (peripheral vascular disease) ([facility] [address]     Stroke [facility])     cva      Past Surgical History:   Procedure Laterality Date    COLONOSCOPY N/A 6/1/2018    COLONOSCOPY, POLYPECTOMY performed by Melyssa Marmolejo MD at [facility] ENDOSCOPY    HX CATARACT REMOVAL      HX ORTHOPAEDIC Left 2021    ankle washout, external fixator, would vac    HX ORTHOPAEDIC      external fixator removed      Social History     Tobacco Use    Smoking status: Former    Smokeless tobacco: Never   Substance Use Topics    Alcohol use: Not Currently      History reviewed. No pertinent family history. Prior to Admission medications    Medication Sig Start Date End Date Taking? Authorizing Provider   levoFLOXacin (Levaquin) 750 mg tablet Take 1 Tablet by mouth daily. 6/18/22   Laurie Earl MD   insulin lispro (HUMALOG) 100 unit/mL injection INITIATE INSULIN CORRECTIVE PROTOCOL: Normal Insulin Sensitivity   For Blood Sugar (mg/dL) of:     Less than 150 =   0 units           150 -199 =   2 units  200 -249 =   4 units  250 -299 =   6 units  300 -349 =   8 units  350 and above = 10 units and Call Physician  If 2 glucose readings are above 200 mg/dL within a 24 hr period, proceed to \"Insulin Resistant\" dosing. Initiate Hypoglycemia protocol if blood glucose is <70 mg/dL Fast Acting - Administer Immediately - or within 15 minutes of start of meal, if mealtime coverage. 11/3/21   Fanny Topete MD   gabapentin (NEURONTIN) 300 mg capsule Take 1 Capsule by mouth three (3) times daily.  Max Daily Amount: 900 mg. 11/3/21   Fanny Topete MD   clopidogreL (Plavix) 75 mg tab Take  by mouth daily. Indications: afib    Provider, Historical   B.infantis-B.ani-B.long-B.bifi (Probiotic 4X) 10-15 mg TbEC Take  by mouth daily. Provider, Historical   multivitamin (ONE A DAY) tablet Take 1 Tablet by mouth daily. Provider, Historical   acetaminophen (TylenoL) 325 mg tablet Take 650 mg by mouth every four (4) hours as needed for Pain. Provider, Historical   tamsulosin (FLOMAX) 0.4 mg capsule Take 2 Capsules by mouth daily. 5/25/21   Chio Jacques MD   atorvastatin (LIPITOR) 40 mg tablet Take 1 Tab by mouth nightly. 2/15/18   Eve Ortiz PA-C   metoprolol succinate (TOPROL-XL) 50 mg XL tablet Take 1 Tab by mouth daily. Patient taking differently: Take 100 mg by mouth daily.  2/16/18   Eve Ortiz PA-C     Current Facility-Administered Medications   Medication Dose Route Frequency    insulin glargine (LANTUS) injection 5 Units  5 Units SubCUTAneous DAILY    arformoteroL (BROVANA) neb solution 15 mcg  15 mcg Nebulization BID RT    [Held by provider] heparin (porcine) injection 5,000 Units  5,000 Units SubCUTAneous Q8H    piperacillin-tazobactam (ZOSYN) 3.375 g in 0.9% sodium chloride (MBP/ADV) 100 mL MBP  3.375 g IntraVENous Q12H    midazolam in normal saline (VERSED) 1 mg/mL infusion  0-5 mg/hr IntraVENous TITRATE    Vancomycin level due 8/29/22 1500  1 Each Other ONCE    sodium bicarbonate (8.4%) 100 mEq in dextrose 5% 1,000 mL infusion   IntraVENous CONTINUOUS    Vancomycin - Rx to dose and monitor  1 Each Other Rx Dosing/Monitoring    levoFLOXacin (LEVAQUIN) 500 mg in D5W IVPB  500 mg IntraVENous Q48H    albumin human 25% (BUMINATE) solution 12.5 g  12.5 g IntraVENous Q6H    pantoprazole (PROTONIX) 40 mg in 0.9% sodium chloride 10 mL injection  40 mg IntraVENous Q12H    sodium chloride (NS) flush 5-40 mL  5-40 mL IntraVENous Q8H    insulin lispro (HUMALOG) injection   SubCUTAneous Q6H    chlorhexidine (PERIDEX) 0.12 % mouthwash 10 mL  10 mL Oral Q12H    vasopressin (VASOSTRICT) 20 Units in 0.9% sodium chloride 100 mL infusion  0-0.04 Units/min IntraVENous TITRATE    NOREPINephrine (LEVOPHED) 8 mg in 5% dextrose 250mL (32 mcg/mL) infusion  2-16 mcg/min IntraVENous TITRATE         Objective:   Vital Signs:    Visit Vitals  BP (!) 121/54   Pulse 98   Temp 98.6 °F (37 °C)   Resp 27   Ht 5' 8\" (1.727 m)   Wt 82.8 kg (182 lb 8.7 oz)   SpO2 99%   BMI 27.76 kg/m²       O2 Device: Endotracheal tube, Ventilator       Temp (24hrs), Av.4 °F (36.9 °C), Min:98.1 °F (36.7 °C), Max:99 °F (37.2 °C)       Intake/Output:   Last shift:       07 -  190  In: -   Out: 150 [Urine:150]    Last 3 shifts:  190 -  07  In: 3013.5 [I.V.:3013.5]  Out: 317 [Urine:825]      Intake/Output Summary (Last 24 hours) at 2022 1331  Last data filed at 2022 0800  Gross per 24 hour   Intake 1865.35 ml   Output 700 ml   Net 1165.35 ml         Last 3 Recorded Weights in this Encounter    22 1021 22 1107 22 0940   Weight: 38.6 kg (85 lb) 38.6 kg (85 lb) 82.8 kg (182 lb 8.7 oz)       Physical Exam:   Patient appears chronically ill; currently intubated;comfortable off sedation opens eyes   HEENT: pupils not dilated, reactive, no scleral jaundice, moist oral mucosa, no nasal drainage  Neck: No adenopathy or thyroid swelling  Orotracheal and orogastric tubes-no bleeding or secretions  CVS: S1S2 no murmurs; JVD not elevated; telemetry-sinus rhythm  RS: Mod air entry bilaterally, decreased BS right lower chest, no obvious wheezes or crackles; not tachypneic or in distress  Abd: soft, nontender, not distended, no guarding or rigidity, bowel sounds present, no hepatosplenomegaly  Right lower abdomen laparotomy scar-no bleeding or hematoma  Neuro: Intubated and sedated; limited exam    Extrm: no leg edema or swelling or clubbing; left below-knee amputation  Skin: Large ulceration right medial malleolus and right heel, black in appearance, no discharge    Lymphatic: no cervical or supraclavicular adenopathy  Groin: Large right inguinal hernia no longer present      Data:       Recent Results (from the past 12 hour(s))   BLOOD GAS, ARTERIAL POC    Collection Time: 08/29/22  3:56 AM   Result Value Ref Range    Device: ADULT VENT      FIO2 (POC) 30 %    pH (POC) 7.40 7.35 - 7.45      pCO2 (POC) 22.8 (L) 35.0 - 45.0 MMHG    pO2 (POC) 125 (H) 80 - 100 MMHG    HCO3 (POC) 14.2 (L) 22 - 26 MMOL/L    sO2 (POC) 99.0 (H) 92 - 97 %    Base deficit (POC) 9.6 mmol/L    Mode Volume Control      Tidal volume 400 ml    Set Rate 20 bpm    PEEP/CPAP (POC) 5 cmH2O    PIP (POC) 18      Allens test (POC) Positive      Site RIGHT RADIAL      Patient temp. 98.4      Specimen type (POC) ARTERIAL      Performed by Amy Madera    CBC WITH AUTOMATED DIFF    Collection Time: 08/29/22  5:08 AM   Result Value Ref Range    WBC 11.0 4.6 - 13.2 K/uL    RBC 2.53 (L) 4.35 - 5.65 M/uL    HGB 6.3 (L) 13.0 - 16.0 g/dL    HCT 20.5 (L) 36.0 - 48.0 %    MCV 81.0 78.0 - 100.0 FL    MCH 24.9 24.0 - 34.0 PG    MCHC 30.7 (L) 31.0 - 37.0 g/dL    RDW 20.8 (H) 11.6 - 14.5 %    PLATELET 259 952 - 554 K/uL    MPV 9.9 9.2 - 11.8 FL    NRBC 0.0 0  WBC    ABSOLUTE NRBC 0.00 0.00 - 0.01 K/uL    NEUTROPHILS 85 (H) 40 - 73 %    LYMPHOCYTES 8 (L) 21 - 52 %    MONOCYTES 6 3 - 10 %    EOSINOPHILS 0 0 - 5 %    BASOPHILS 0 0 - 2 %    IMMATURE GRANULOCYTES 0 %    ABS. NEUTROPHILS 9.4 (H) 1.8 - 8.0 K/UL    ABS. LYMPHOCYTES 0.9 0.9 - 3.6 K/UL    ABS. MONOCYTES 0.7 0.05 - 1.2 K/UL    ABS. EOSINOPHILS 0.0 0.0 - 0.4 K/UL    ABS. BASOPHILS 0.0 0.0 - 0.1 K/UL    ABS. IMM.  GRANS. 0.0 K/UL    DF MANUAL      METAMYELOCYTES 1 (H) 0 %    RBC COMMENTS ARCENIO CELLS  2+        RBC COMMENTS ANISOCYTOSIS  1+        RBC COMMENTS OVALOCYTES  1+        RBC COMMENTS POIKILOCYTOSIS  1+       METABOLIC PANEL, COMPREHENSIVE    Collection Time: 08/29/22  5:08 AM   Result Value Ref Range    Sodium 135 (L) 136 - 145 mmol/L    Potassium 4.0 3.5 - 5.5 mmol/L    Chloride 105 100 - 111 mmol/L    CO2 16 (L) 21 - 32 mmol/L    Anion gap 14 3.0 - 18 mmol/L    Glucose 161 (H) 74 - 99 mg/dL     (H) 7.0 - 18 MG/DL    Creatinine 4.00 (H) 0.6 - 1.3 MG/DL    BUN/Creatinine ratio 33 (H) 12 - 20      GFR est AA 18 (L) >60 ml/min/1.73m2    GFR est non-AA 15 (L) >60 ml/min/1.73m2    Calcium 7.8 (L) 8.5 - 10.1 MG/DL    Bilirubin, total 1.0 0.2 - 1.0 MG/DL    ALT (SGPT) 18 16 - 61 U/L    AST (SGOT) 22 10 - 38 U/L    Alk. phosphatase 90 45 - 117 U/L    Protein, total 4.7 (L) 6.4 - 8.2 g/dL    Albumin 2.1 (L) 3.4 - 5.0 g/dL    Globulin 2.6 2.0 - 4.0 g/dL    A-G Ratio 0.8 0.8 - 1.7     MAGNESIUM    Collection Time: 08/29/22  5:08 AM   Result Value Ref Range    Magnesium 1.9 1.6 - 2.6 mg/dL   PROTHROMBIN TIME + INR    Collection Time: 08/29/22  5:08 AM   Result Value Ref Range    Prothrombin time 16.5 (H) 11.5 - 15.2 sec    INR 1.3 (H) 0.8 - 1.2     CALCIUM, IONIZED    Collection Time: 08/29/22  5:08 AM   Result Value Ref Range    Ionized Calcium 1.06 (L) 1.12 - 1.32 MMOL/L   PHOSPHORUS    Collection Time: 08/29/22  5:08 AM   Result Value Ref Range    Phosphorus 3.8 2.5 - 4.9 MG/DL   GLUCOSE, POC    Collection Time: 08/29/22  5:36 AM   Result Value Ref Range    Glucose (POC) 202 (H) 70 - 110 mg/dL   CBC WITH AUTOMATED DIFF    Collection Time: 08/29/22  9:28 AM   Result Value Ref Range    WBC 9.4 4.6 - 13.2 K/uL    RBC 2.45 (L) 4.35 - 5.65 M/uL    HGB 6.3 (L) 13.0 - 16.0 g/dL    HCT 19.6 (L) 36.0 - 48.0 %    MCV 80.0 78.0 - 100.0 FL    MCH 25.7 24.0 - 34.0 PG    MCHC 32.1 31.0 - 37.0 g/dL    RDW 20.7 (H) 11.6 - 14.5 %    PLATELET 112 277 - 339 K/uL    MPV 10.0 9.2 - 11.8 FL    NRBC 0.0 0  WBC    ABSOLUTE NRBC 0.00 0.00 - 0.01 K/uL    NEUTROPHILS 80 (H) 40 - 73 %    BAND NEUTROPHILS 3 0 - 5 %    LYMPHOCYTES 6 (L) 21 - 52 %    MONOCYTES 10 3 - 10 %    EOSINOPHILS 1 0 - 5 %    BASOPHILS 0 0 - 2 %    IMMATURE GRANULOCYTES 0 %    ABS. NEUTROPHILS 7.8 1.8 - 8.0 K/UL    ABS.  LYMPHOCYTES 0.6 (L) 0.9 - 3.6 K/UL    ABS. MONOCYTES 0.9 0.05 - 1.2 K/UL    ABS. EOSINOPHILS 0.1 0.0 - 0.4 K/UL    ABS. BASOPHILS 0.0 0.0 - 0.1 K/UL    ABS. IMM. GRANS. 0.0 K/UL    DF MANUAL      PLATELET COMMENTS ADEQUATE PLATELETS      RBC COMMENTS MICROCYTOSIS  1+        RBC COMMENTS OVALOCYTES  1+        RBC COMMENTS ANISOCYTOSIS  2+        RBC COMMENTS ARCENIO CELLS  1+        RBC COMMENTS SCHISTOCYTES  OCCASIONAL       METABOLIC PANEL, BASIC    Collection Time: 08/29/22  9:28 AM   Result Value Ref Range    Sodium 134 (L) 136 - 145 mmol/L    Potassium 3.7 3.5 - 5.5 mmol/L    Chloride 104 100 - 111 mmol/L    CO2 17 (L) 21 - 32 mmol/L    Anion gap 13 3.0 - 18 mmol/L    Glucose 161 (H) 74 - 99 mg/dL     (H) 7.0 - 18 MG/DL    Creatinine 3.96 (H) 0.6 - 1.3 MG/DL    BUN/Creatinine ratio 32 (H) 12 - 20      GFR est AA 18 (L) >60 ml/min/1.73m2    GFR est non-AA 15 (L) >60 ml/min/1.73m2    Calcium 7.7 (L) 8.5 - 10.1 MG/DL   RBC, ALLOCATE    Collection Time: 08/29/22 11:00 AM   Result Value Ref Range    HISTORY CHECKED? Historical check performed    GLUCOSE, POC    Collection Time: 08/29/22 12:18 PM   Result Value Ref Range    Glucose (POC) 187 (H) 70 - 110 mg/dL   RBC, ALLOCATE    Collection Time: 08/29/22 12:45 PM   Result Value Ref Range    HISTORY CHECKED?  Historical check performed            Chemistry Recent Labs     08/29/22  0928 08/29/22  0508 08/28/22  0804 08/28/22  0117 08/27/22  1315 08/27/22  1030   * 161*  --  233*   < > 145*   * 135*  --  135*   < > 132*   K 3.7 4.0 4.9 5.2   < > 7.6*    105  --  107   < > 109   CO2 17* 16*  --  12*   < > 7*   * 130*  --  150*   < > 171*   CREA 3.96* 4.00*  --  4.11*   < > 4.66*   CA 7.7* 7.8*  --  7.8*   < > 8.5   MG  --  1.9 2.1 2.2   < > 1.2*   PHOS  --  3.8 4.4 4.8   < >  --    AGAP 13 14  --  16   < > 16   BUCR 32* 33*  --  36*   < > 37*   AP  --  90  --  108  --  150*   TP  --  4.7*  --  5.8*  --  5.8*   ALB  --  2.1*  --  2.1*  --  1.9*   GLOB  --  2.6  --  3.7  -- 3. 9   AGRAT  --  0.8  --  0.6*  --  0.5*    < > = values in this interval not displayed. Lactic Acid Lactic acid   Date Value Ref Range Status   08/27/2022 1.7 0.4 - 2.0 MMOL/L Final     Recent Labs     08/27/22  1838 08/27/22  1030   LAC 1.7 1.6          Liver Enzymes Protein, total   Date Value Ref Range Status   08/29/2022 4.7 (L) 6.4 - 8.2 g/dL Final     Albumin   Date Value Ref Range Status   08/29/2022 2.1 (L) 3.4 - 5.0 g/dL Final     Globulin   Date Value Ref Range Status   08/29/2022 2.6 2.0 - 4.0 g/dL Final     A-G Ratio   Date Value Ref Range Status   08/29/2022 0.8 0.8 - 1.7   Final     Alk. phosphatase   Date Value Ref Range Status   08/29/2022 90 45 - 117 U/L Final     Recent Labs     08/29/22  0508 08/28/22  0117 08/27/22  1030   TP 4.7* 5.8* 5.8*   ALB 2.1* 2.1* 1.9*   GLOB 2.6 3.7 3.9   AGRAT 0.8 0.6* 0.5*   AP 90 108 150*          CBC w/Diff Recent Labs     08/29/22  0928 08/29/22  0508 08/28/22  1126 08/28/22  0804 08/28/22  0117   WBC 9.4 11.0  --   --  35.4*   RBC 2.45* 2.53*  --   --  2.78*   HGB 6.3* 6.3* 7.3*   < > 7.2*   HCT 19.6* 20.5* 23.2*   < > 22.9*    352  --   --  518*   GRANS 80* 85*  --   --  91*   LYMPH 6* 8*  --   --  3*   EOS 1 0  --   --  0    < > = values in this interval not displayed.           Cardiac Enzymes No results found for: CPK, CK, CKMMB, CKMB, RCK3, CKMBT, CKNDX, CKND1, LEONEL, TROPT, TROIQ, WARD, TROPT, TNIPOC, BNP, BNPP     BNP No results found for: BNP, BNPP, XBNPT     Coagulation Recent Labs     08/29/22  0508 08/28/22  0117 08/27/22  1838   PTP 16.5* 17.4* 19.5*   INR 1.3* 1.4* 1.6*           Thyroid  Lab Results   Component Value Date/Time    TSH 4.73 (H) 08/28/2022 08:04 AM       No results found for: T4     Urinalysis Lab Results   Component Value Date/Time    Color YELLOW 08/27/2022 02:27 PM    Appearance TURBID 08/27/2022 02:27 PM    Specific gravity 1.019 08/27/2022 02:27 PM    pH (UA) 6.0 08/27/2022 02:27 PM    Protein >1,000 (A) 08/27/2022 02:27 PM    Glucose Negative 08/27/2022 02:27 PM    Ketone TRACE (A) 08/27/2022 02:27 PM    Bilirubin Negative 08/27/2022 02:27 PM    Urobilinogen 1.0 08/27/2022 02:27 PM    Nitrites Negative 08/27/2022 02:27 PM    Leukocyte Esterase LARGE (A) 08/27/2022 02:27 PM    Epithelial cells FEW 08/27/2022 02:27 PM    Bacteria 1+ (A) 08/27/2022 02:27 PM    WBC TOO NUMEROUS TO COUNT 08/27/2022 02:27 PM    RBC 4 to 10 08/27/2022 02:27 PM          Culture data during this hospitalization. All Micro Results       Procedure Component Value Units Date/Time    CULTURE, BLOOD [[id number]] Collected: 08/27/22 1030    Order Status: Completed Specimen: Blood Updated: 08/29/22 0720     Special Requests: NO SPECIAL REQUESTS        Culture result: NO GROWTH 2 DAYS       CULTURE, BLOOD [[id number]] Collected: 08/27/22 1100    Order Status: Completed Specimen: Blood Updated: 08/29/22 0720     Special Requests: NO SPECIAL REQUESTS        Culture result: NO GROWTH 2 DAYS       CULTURE, URINE [[id number]] Collected: 08/27/22 1442    Order Status: Completed Specimen: Cath Urine Updated: 08/29/22 0642     Special Requests: NO SPECIAL REQUESTS        [place] Count --        [id number]  COLONIES/mL       Culture result:       >2 ORGANISMS - CONTAMINATED SPECIMEN. SUGGEST RECOLLECTION          CULTURE, BLOOD [[id number]]     Order Status: Canceled Specimen: Blood     COVID-19 RAPID TEST [[id number]] Collected: 08/27/22 1030    Order Status: Completed Specimen: Nasopharyngeal Updated: 08/27/22 1057     Specimen source SWAB        COVID-19 rapid test Not detected        Comment: Rapid Abbott ID Now       Rapid NAAT:  The specimen is NEGATIVE for SARS-CoV-2, the novel coronavirus associated with COVID-19. Negative results should be treated as presumptive and, if inconsistent with clinical signs and symptoms or necessary for patient management, should be tested with an alternative molecular assay.   Negative results do not preclude SARS-CoV-2 infection and should not be used as the sole basis for patient management decisions. This test has been authorized by the FDA under an Emergency Use Authorization (EUA) for use by authorized laboratories. Fact sheet for Healthcare Providers: [email or web address]  Fact sheet for Patients: [employer]       Methodology: Isothermal Nucleic Acid Amplification                  ECHO May 2021 Interpretation Summary  Result status: Final result  Contrast used: DEFINITY. Left Ventricle: Normal cavity size, wall thickness and systolic function (ejection fraction normal). The estimated EF is 55 - 60%. There is mild (grade 1) left ventricular diastolic dysfunction E'E= 10.41. Wall Scoring: The left ventricular wall motion is normal.  Tricuspid Valve: Tricuspid valve not well visualized. No stenosis. Tricuspid regurgitation is inadequate for estimation of right ventricular systolic pressure. Images report reviewed by me:  CT 8/27/2022 (Most Recent)  Results from Hospital Encounter encounter on 08/27/22    CT HEAD WO CONT    Narrative  EXAM: CT head    INDICATION: Altered mental status. COMPARISON: None. TECHNIQUE: Axial CT imaging of the head was performed without intravenous  contrast.    One or more dose reduction techniques were used on this CT: automated exposure  control, adjustment of the mAs and/or kVp according to patient size, and  iterative reconstruction techniques. The specific techniques used on this CT  exam have been documented in the patient's electronic medical record. Digital  Imaging and Communications in Medicine (DICOM) format image data are available  to nonaffiliated external healthcare facilities or entities on a secure, media  free, reciprocally searchable basis with patient authorization for at least a  12-month period after this study. Patient motion degrades image quality.     _______________    FINDINGS:    BRAIN AND POSTERIOR FOSSA:  Mild periventricular areas of decreased attenuation are identified. Gray-white matter interfaces are preserved. No intraparenchymal hemorrhage, mass effect or midline shift. The ventricular system is midline and symmetric. Atherosclerotic vascular calcifications are identified. EXTRA-AXIAL SPACES AND MENINGES:  There is no evidence for extra-axial mass lesion or fluid collection. CALVARIUM:  Intact. SINUSES:  Sphenoid sinus mucosal thickening. OTHER:  Orbits are grossly intact. Facial soft tissue are within normal limits. _______________    Impression  1. No evidence for intracranial hemorrhage, mass effect or midline shift. 2.  Mild periventricular areas of microvascular ischemic change. 3.  Cerebral atherosclerosis. 4.  Atrophy. If there are continued symptoms, a MRI is recommended for further evaluation. CT abdomen/pelvis 8/27/2022  IMPRESSION  1. Large right inguinal hernia with large and small bowel. There is diffuse wall  thickening of the ascending colon concerning for strangulation. No evidence for  bowel obstruction. 2. Right pleural effusion and bilateral basilar atelectasis. 3. Gastric distention. CXR reviewed by me:  XR 8/27 (Most Recent). Results from Hospital Encounter encounter on 08/27/22    XR CHEST PORT    Narrative  HISTORY:  -Provided with order: intubated  -Additional: None    Technique : AP PORTABLE CHEST    Comparison : None. FINDINGS:    HEART AND MEDIASTINUM: Anterior endotracheal tube tip estimated at 6.1 cm above  alise. Enteric tube tip projects over left upper quadrant expected location  gastric fundus. Esophageal temperature probe tip projects over lower thoracic  . Stable cardiomediastinal silhouette allowing for angulation. LUNGS AND PLEURAL SPACES: Progressive right pleural effusion and patchy  opacities in the right lung. Left lung relatively clear allowing for technique.     BONY THORAX AND SOFT TISSUES: No gross acute osseous abnormality. Impression  1. Right pleural effusion and subjacent atelectasis/infiltrate, perhaps slightly  progressed. 2. Tubes and lines stable in visualized extent. AXR 8/27  FINDINGS:   SUPPORT DEVICES: A nasogastric tube is positioned in the stomach. BOWEL GAS PATTERN: The gas pattern is nonobstructive. SOFT TISSUES: Unremarkable. BONES: Degenerative changes are seen throughout the spine. ADDITIONAL FINDINGS: None. IMPRESSION   No significant abnormality. Please note: Voice-recognition software may have been used to generate this report, which may have resulted in some phonetic-based errors in grammar and contents. Even though attempts were made to correct all the mistakes, some may have been missed, and remained in the body of the document.       Jocelynn Archuleta MD  8/29/2022

## 2022-08-29 NOTE — CONSULTS
Palliative Medicine Consult    Patient Name: Darian Esquivel YOB: 1945    Date of Initial Consult: 8/29/2022  Reason for Consult: Goals of care discussions  Requesting Provider: Dr. Allie Parmar   Primary Care Physician: Angélica Masters MD      SUMMARY:   Darian Esquivel is a 78 y.o. with a past history of hypertension, hyperlipidemia, stroke, PAD, chronic atrial fibrillation, on Plavix, DM, CKD, previous, COVID-19 infection, chronic indwelling urinary catheter for urinary retention, and severe peripheral vascular disease with worsening gangrenous left foot necessitating left BKA during 8 day hospitalization last year, who was admitted on 8/27/2022 from home with a diagnosis of incarcerated Right inguinal hernia, severe septic shock with hypotension, severe hyperkalemia, acute metabolic encephalopathy, and anemia. Current medical issues leading to Palliative Medicine involvement include: goals of care discussions. PALLIATIVE DIAGNOSES:   Goals of care discussions  Septic shock  Acute respiratory failure   incarcerated Right inguinal hernia  Advanced age/debility       PLAN:   Goals of care discussions: Palliative medicine team including  CHERYLE Murry and I met with patient at patient's bedside. Patient is orally intubated on mechanical ventilation, frequent movements in bed but no command following. Wife Sabrina Severs at bedside (Wife notes that English is her second language but she states she understands our conversation and answering questions appropriately. Offered translation services but wife declined.) Wife confirms DNR in the event of cardiac arrest. Patient is a PARTIAL CODE: No CPR (including no chest compressions, no shock, and no ACLS meds in the event of cardiac arrest). Continue intubation at this time. Wife requesting a meeting with their children involved to address further goals of care.  Wife will call me with a time for tomorrow once she has spoken to all of her children. Received a call from patient's son Kenya Ponce and explained to him the desire to meet for family meeting. Kenya Ponce confirms DNR status upon cardiac arrest. Further goals of care discussions will occur at family meeting tomorrow. Awaiting response from wife with exact time. Septic shock:   multiple possible sources of infection, incarcerated Right inguinal hernia s/p laparotomy and reduction of incarcerated right groin hernia without need for bowel resection-8/27/2022. UA-Positive for UTI; urine culture obtained. sacral ulcer and medial maleolar and heel infected ulcers. On broad spectrum antibiotics per primary team.   Acute respiratory failure: Patient remains intubated postop due to critical illness. incarcerated Right inguinal hernia: s/p laparotomy and reduction of incarcerated right groin hernia without need for bowel resection-8/27/2022. Advanced age/debility: 78year old male who is critically ill, needs assist with all ADLs. Prior to admission, patient lived at home with his spouse and required assistance with functional ADLs. Initial consult note routed to primary continuity provider  Communicated plan of care with: Palliative IDT       GOALS OF CARE / TREATMENT PREFERENCES:   [====Goals of Care====]  GOALS OF CARE: PARTIAL CODE: No CPR (including no chest compressions, no shock, and no ACLS meds in the event of cardiac arrest). Continue intubation at this time. Patient/Health Care Proxy Stated Goals: Prolong life      TREATMENT PREFERENCES:   Code Status: Partial Code    Advance Care Planning:  Advance Care Planning 10/26/2021   Patient's Healthcare Decision Maker is: -   Primary Decision Maker Name -   Primary Decision Maker Phone Number -   Primary Decision Maker Relationship to Patient -   Confirm Advance Directive None   Patient Would Like to Complete Advance Directive No       Medical Interventions:  Other (comment) (continue intubation, no CPR in the event of cardiac arrest.) The palliative care team has discussed with patient / health care proxy about goals of care / treatment preferences for patient.  [====Goals of Care====]         HISTORY:     History obtained from: patient's chart, family    CHIEF COMPLAINT: incarcerated Right inguinal hernia, POD 2.     HPI/SUBJECTIVE:    The patient is:   [] Verbal and participatory  [x] Non-participatory due to:   Orally intubated, sedated, no command following     Clinical Pain Assessment (nonverbal scale for severity on nonverbal patients):   Clinical Pain Assessment  Severity: 0    Adult Nonverbal Pain Scale  Face: No particular expression or smile  Activity (Movement): Seeking attention through movement or slow, cautious movement  Guarding: Lying quietly, no positioning of hands over areas of body  Physiology (Vital Signs): Stable vital signs  Respiratory: Baseline RR/SpO2 compliant with ventilator  Total Score: 1       FUNCTIONAL ASSESSMENT:     Palliative Performance Scale (PPS):  PPS: 30       PSYCHOSOCIAL/SPIRITUAL SCREENING:     Advance Care Planning:  Advance Care Planning 10/26/2021   Patient's Healthcare Decision Maker is: -   Primary Decision Maker Name -   Primary Decision Maker Phone Number -   Primary Decision Maker Relationship to Patient -   Confirm Advance Directive None   Patient Would Like to Complete Advance Directive No        Any spiritual / [employer] concerns: unable to assess  [] Yes /  [] No    Caregiver Burnout:  [] Yes /  [] No /  [x] No Caregiver Present      Anticipatory grief assessment: unable to assess  [] Normal  / [] Maladaptive          REVIEW OF SYSTEMS:     Positive and pertinent negative findings in ROS are noted above in HPI. The following systems were [] reviewed / [x] unable to be reviewed as noted in HPI  Other findings are noted below.   Systems: constitutional, ears/nose/mouth/throat, respiratory, gastrointestinal, genitourinary, musculoskeletal, integumentary, neurologic, psychiatric, endocrine. Positive findings noted below. Modified ESAS Completed by: provider           Pain: 0           Dyspnea: 0                    PHYSICAL EXAM:     From RN flowsheet:  Wt Readings from Last 3 Encounters:   08/29/22 82.8 kg (182 lb 8.7 oz)   11/02/21 81.6 kg (180 lb)   10/11/21 76.7 kg (169 lb)     Blood pressure (!) 121/54, pulse 98, temperature 98.6 °F (37 °C), resp. rate 27, height 5' 8\" (1.727 m), weight 82.8 kg (182 lb 8.7 oz), SpO2 99 %.     Pain Scale 1: FLACC  Pain Intensity 1: 0              Pain Intervention(s) 1: Medication (see MAR)      Constitutional: orally intubated and sedated, appears acute on chronically ill  Eyes: closed  ENMT: orally intubated  Cardiovascular: regular rhythm, distal pulses intact  Respiratory: orally intubated on mechanical ventilator  Gastrointestinal: midline surgical incision CDI, no distention  Musculoskeletal: no deformity, no tenderness to palpation, L BKA  Skin: warm, dry, black eschar right ankle and right heel  Neurologic: not following commands, moving all extremities, sedated       HISTORY:     Principal Problem:    Sepsis ([address] (8/27/2022)    Active Problems:    Acute renal failure (ARF) ([address] (5/29/2018)      UTI (urinary tract infection) (10/29/2021)      High anion gap metabolic acidosis (5/40/2076)      Hyperkalemia (8/27/2022)      AMS (altered mental status) (8/27/2022)      Strangulated inguinal hernia (8/27/2022)      Right inguinal hernia (8/27/2022)      Leukocytosis (8/27/2022)      Anemia (8/27/2022)      Chronic indwelling Mccray catheter (8/27/2022)      S/P BKA (below knee amputation), left ([address] (8/27/2022)      Right foot ulcer ([address] (8/27/2022)      Paroxysmal atrial fibrillation ([address] (8/27/2022)      Diabetic ulcer of right foot ([address] (8/27/2022)      Pleural effusion on right (8/27/2022)      Severe protein-calorie malnutrition ([facility] (8/27/2022)      Acute respiratory failure with hypoxemia ([facility] (8/28/2022)      Septic shock ([facility] (8/28/2022)    Past Medical History:   Diagnosis Date    A-fib (HCC)     Asthma     CAD (coronary artery disease)     Chronic kidney disease     stage 3    COVID-19     Diabetes (HCC)     GERD (gastroesophageal reflux disease)     Heart failure (HCC)     chronic diastolic heart failure    High cholesterol     Hypertension     Ill-defined condition     chronic osteomyelitis left ankle and foot    Ill-defined condition     chronic arteriosclerosis    PVD (peripheral vascular disease) ([address]     Stroke [facility])     cva      Past Surgical History:   Procedure Laterality Date    COLONOSCOPY N/A 6/1/2018    COLONOSCOPY, POLYPECTOMY performed by Wayne Mason MD at THE [facility] ENDOSCOPY    HX CATARACT REMOVAL      HX ORTHOPAEDIC Left 2021    ankle washout, external fixator, would vac    HX ORTHOPAEDIC      external fixator removed      History reviewed. No pertinent family history. History reviewed, no pertinent family history.   Social History     Tobacco Use    Smoking status: Former    Smokeless tobacco: Never   Substance Use Topics    Alcohol use: Not Currently     No Known Allergies   Current Facility-Administered Medications   Medication Dose Route Frequency    insulin glargine (LANTUS) injection 5 Units  5 Units SubCUTAneous DAILY    arformoteroL (BROVANA) neb solution 15 mcg  15 mcg Nebulization BID RT    0.9% sodium chloride infusion 250 mL  250 mL IntraVENous PRN    0.9% sodium chloride infusion 250 mL  250 mL IntraVENous PRN    piperacillin-tazobactam (ZOSYN) 3.375 g in 0.9% sodium chloride (MBP/ADV) 100 mL MBP  3.375 g IntraVENous Q12H    midazolam in normal saline (VERSED) 1 mg/mL infusion  0-5 mg/hr IntraVENous TITRATE    Vancomycin level due 8/29/22 1500  1 Each Other ONCE    0.9% sodium chloride infusion 250 mL  250 mL IntraVENous PRN    sodium bicarbonate (8.4%) 100 mEq in dextrose 5% 1,000 mL infusion   IntraVENous CONTINUOUS    Vancomycin - Rx to dose and monitor  1 Each Other Rx Dosing/Monitoring levoFLOXacin (LEVAQUIN) 500 mg in D5W IVPB  500 mg IntraVENous Q48H    albumin human 25% (BUMINATE) solution 12.5 g  12.5 g IntraVENous Q6H    pantoprazole (PROTONIX) 40 mg in 0.9% sodium chloride 10 mL injection  40 mg IntraVENous Q12H    sodium chloride (NS) flush 5-40 mL  5-40 mL IntraVENous Q8H    sodium chloride (NS) flush 5-40 mL  5-40 mL IntraVENous PRN    acetaminophen (TYLENOL) tablet 650 mg  650 mg Oral Q6H PRN    Or    acetaminophen (TYLENOL) suppository 650 mg  650 mg Rectal Q6H PRN    polyethylene glycol (MIRALAX) packet 17 g  17 g Oral DAILY PRN    ondansetron (ZOFRAN ODT) tablet 4 mg  4 mg Oral Q8H PRN    Or    ondansetron (ZOFRAN) injection 4 mg  4 mg IntraVENous Q6H PRN    magnesium sulfate 2 g/50 ml IVPB (premix or compounded)  2 g IntraVENous PRN    fentaNYL citrate (PF) injection 50 mcg  50 mcg IntraVENous Q4H PRN    insulin lispro (HUMALOG) injection   SubCUTAneous Q6H    glucose chewable tablet 16 g  4 Tablet Oral PRN    glucagon (GLUCAGEN) injection 1 mg  1 mg IntraMUSCular PRN    dextrose 10% infusion 0-250 mL  0-250 mL IntraVENous PRN    chlorhexidine (PERIDEX) 0.12 % mouthwash 10 mL  10 mL Oral Q12H    vasopressin (VASOSTRICT) 20 Units in 0.9% sodium chloride 100 mL infusion  0-0.04 Units/min IntraVENous TITRATE    HYDROmorphone (DILAUDID) injection 1 mg  1 mg IntraVENous Q4H PRN    NOREPINephrine (LEVOPHED) 8 mg in 5% dextrose 250mL (32 mcg/mL) infusion  2-16 mcg/min IntraVENous TITRATE          LAB AND IMAGING FINDINGS:     Lab Results   Component Value Date/Time    WBC 9.4 08/29/2022 09:28 AM    HGB 6.3 (L) 08/29/2022 09:28 AM    PLATELET 528 72/90/3727 09:28 AM     Lab Results   Component Value Date/Time    Sodium 134 (L) 08/29/2022 09:28 AM    Potassium 3.7 08/29/2022 09:28 AM    Chloride 104 08/29/2022 09:28 AM    CO2 17 (L) 08/29/2022 09:28 AM     (H) 08/29/2022 09:28 AM    Creatinine 3.96 (H) 08/29/2022 09:28 AM    Calcium 7.7 (L) 08/29/2022 09:28 AM    Magnesium 1.9 08/29/2022 05:08 AM    Phosphorus 3.8 08/29/2022 05:08 AM      Lab Results   Component Value Date/Time    Alk. phosphatase 90 08/29/2022 05:08 AM    Protein, total 4.7 (L) 08/29/2022 05:08 AM    Albumin 2.1 (L) 08/29/2022 05:08 AM    Globulin 2.6 08/29/2022 05:08 AM     Lab Results   Component Value Date/Time    INR 1.3 (H) 08/29/2022 05:08 AM    Prothrombin time 16.5 (H) 08/29/2022 05:08 AM    aPTT 118.5 (H) 05/17/2021 08:20 AM      Lab Results   Component Value Date/Time    Iron 10 (L) 05/06/2021 06:40 AM    TIBC 186 (L) 05/06/2021 06:40 AM    Iron % saturation 5 (L) 05/06/2021 06:40 AM    Ferritin 125 05/06/2021 06:40 AM      No results found for: PH, PCO2, PO2  No components found for: [place]   Lab Results   Component Value Date/Time    CK 46 05/29/2018 05:45 PM    CK - MB 1.4 05/29/2018 05:45 PM                Total time: 50 minutes  Counseling / coordination time, spent as noted above:   > 50% counseling / coordination?: yes, patient, family, and medical team    Prolonged service was provided for  []30 min   []75 min in face to face time in the presence of the patient, spent as noted above.   Time Start:   Time End:

## 2022-08-29 NOTE — DIABETES MGMT
GLYCEMIC CONTROL PLAN OF CARE      Diabetes/ Glycemic Control Plan of Care  Recommendations: consistent corrective last 48 hours  - recommend conservative dose of basal insulin  - recommend - Humalog Very Insulin Resistant Corrective Coverage     Assessment: known h/o T2DM, HbA1C within recommended range for age + comorbids  DX:  emergency surgery for strangulated bowel/inguinal hernia  1. Acute hypotension  ECHO ADULT COMPLETE         Fasting/ Morning blood glucose:   Lab Results   Component Value Date/Time    Glucose 161 (H) 08/29/2022 05:08 AM    Glucose (POC) 202 (H) 08/29/2022 05:36 AM     Recent blood glucose:   Lab Results   Component Value Date/Time     (H) 08/29/2022 05:08 AM    GLUCPOC 202 (H) 08/29/2022 05:36 AM    GLUCPOC 206 (H) 08/28/2022 11:35 PM    GLUCPOC 210 (H) 08/28/2022 06:29 PM         IV Fluids containing dextrose: yes  Steroids:   no    Blood glucose values: 161-206    Within target range (non-ICU: <140; ICU<180):  [] Yes    [x] No  Current insulin orders: Lantus 5 units + - Humalog Normal Insulin Sensitivity Corrective Coverage  Total Daily Dose previous 24 hrs = 18 units - Humalog Normal Insulin Sensitivity Corrective Coverage    Current A1c:   Lab Results   Component Value Date/Time    Hemoglobin A1c 5.9 (H) 08/27/2022 10:30 AM      equivalent  to ave Blood Glucose of 117 mg/dl for 2-3 months prior to admission  Adequate glycemic control PTA:   [x] Yes   [] No  Nutrition/Diet:   Active Orders   Diet    DIET NPO      Meal Intake:  No data found. Supplement Intake:  No data found.     Home diabetes medications:   Key Antihyperglycemic Medications               insulin lispro (HUMALOG) 100 unit/mL injection INITIATE INSULIN CORRECTIVE PROTOCOL: Normal Insulin Sensitivity   For Blood Sugar (mg/dL) of:     Less than 150 =   0 units           150 -199 =   2 units  200 -249 =   4 units  250 -299 =   6 units  300 -349 =   8 units  350 and above = 10 units and Call Physician  If 2 glucose readings are above 200 mg/dL within a 24 hr period, proceed to \"Insulin Resistant\" dosing. Initiate Hypoglycemia protocol if blood glucose is <70 mg/dL Fast Acting - Administer Immediately - or within 15 minutes of start of meal, if mealtime coverage. Plan/Goals:   Blood Glucose will be within target range within 72 hours  Reinforce dietary and medication compliance at home.        Education:  [] Refer to Diabetes Education Record                       [x] Education not indicated at this time       Becky Daniel MS, RN, CDE  Glycemic Control Team  [phone]

## 2022-08-29 NOTE — ACP (ADVANCE CARE PLANNING)
[address] [facility] [phone]  [phone]         Family Meeting: The Palliative Care team reached out to patient's wife Dandy Kee to arrange the family meeting. Wife stated that she was able to talk to the rest of the family and they have all agreed on the time 12:30PM tomorrow (8/30/2022). The Palliative Care team will discuss goals of care moving forward, with the family. At this time, patient will remain a PARTIAL CODE. Tanner Schultz., MSW  Palliative Care   #[phone]

## 2022-08-29 NOTE — PROGRESS NOTES
[address] [facility] Pharmacokinetic Monitoring Service - Vancomycin    Consulting Provider: Dr. Samuel Berg   Indication: sepsis of unknown etiology  Target Concentration: Dosing based on anticipated concentration <15 mg/L due to renal impairment/insufficiency  Day of Therapy: 3  Additional Antimicrobials: Zosyn / Levaquin     Pertinent Laboratory Values: Wt Readings from Last 1 Encounters:   08/29/22 82.8 kg (182 lb 8.7 oz)     Temp Readings from Last 1 Encounters:   08/29/22 98.8 °F (37.1 °C)     No components found for: PROCAL  Estimated Creatinine Clearance: 16.9 mL/min (A) (based on SCr of 3.96 mg/dL (H)). Recent Labs     08/29/22  0928 08/29/22  0508   WBC 9.4 11.0     Assessment:  Date/Time Concentration   8/29/22 Random = 14.2     Plan:   Will order Vancomycin 500 mg IVPB x 1 dose today   Pharmacy will continue to monitor patient and adjust therapy as indicated    DINO [facility]   8/29/2022 5:17 PM

## 2022-08-29 NOTE — PROGRESS NOTES
Patient:  Rober Scanlon :  1945  Gender:  male  MRN #:  [id number]    Assessment:   Rober Scanlon is a 78 y.o. male with hypertension, hyperlipidemia, stroke , chronic atrial fibrillation, on Plavix, DM,previous, COVID-19 infection, chronic indwelling urinary catheter for urinary retention, severe peripheral vascular disease left BKA admitted in ICU with septic shock , acute renal failure, hyperkalemia , metabolic acidosis and strangulated left inguinal hernia  s/p emergency  surgery   Still on mechanical vent and on vasopressors       Septic shock   Acute renal failure  Hyperkalemia- Improved  Metabolic acidosis   Respiratory failure       Plan:   He has decent urine output overnight with improvement in renal function, creatinine down to 3.9 mg/dl from 4.6 mg/dl   Potassium improved, still has metabolic acidosis , continue sodium bicarbonate drip   Close monitoring of serum potassium while correcting acidosis, replace prn to keep level around 4.5 meq/dl   Continue vasopressors to keep MAP 65 MMHG   As of now he jeffers snot have urgency clinical and metabolic indication of renal replacement therapy , close monitoring of renal function trajectory   Diuretics IV prn for volume overload  Minimize volume infusion   Avoid NSAIDS, contrast and nephrotoxin   Dose all meds and antibiotics for current eGFR  Repeat CBC and transfuse prn to keep hemoglobin > 7 gram/dl     Subjective/Interval Events    Decent urine output  BP stable on vasopressors  Unconscious in mechanical vent   Unable to obtained review of systems         Intake/Output Summary (Last 24 hours) at 2022 1106  Last data filed at 2022 0800  Gross per 24 hour   Intake 2575.06 ml   Output 850 ml   Net 1725.06 ml         Blood pressure (!) 121/54, pulse 81, temperature 98.6 °F (37 °C), resp. rate 20, height 5' 8\" (1.727 m), weight 82.8 kg (182 lb 8.7 oz), SpO2 98 %.     Exam:    Unconscious in vent   HEENT:ET tube in place , no neck swelling  Lung: clear to auscultation,  Heart: s1s2 heard   Abdomen: soft, mild distension   Ext: no edema   CNS- sedated     [unfilled]   Recent Labs     08/29/22 0928   WBC 9.4   HCT 19.6*   MCV 80.0     Recent Labs     08/29/22 0928 08/29/22  0508   * 135*   CO2 17* 16*   * 130*   INR  --  1.3*     Recent Labs     08/29/22  0508   AGRAT 0.8         Approx 30      minutes of critical care time spent in the direct evaluation and formulation of treatment plan of this high risk patient. The reason for providing this level of medical care was due a critical illness that impaired one or more vital organ systems such that there was a high probability of imminent or life threatening deterioration in the patients condition. This care involved high complexity decision making to assess, manipulate, and support vital system functions, to treat this degreee vital organ system failure and to prevent further life threatening deterioration of the patients condition.          Junior Chaudhry MD  Nephrology -[facility], [facility].

## 2022-08-29 NOTE — PROGRESS NOTES
Hospitalist Progress Note    Patient: Jeancarlos Burns. MRN: [id number]  [facility]: [id number]    YOB: 1945  Age: 78 y.o. Sex: male    DOA: 8/27/2022 LOS:  LOS: 2 days          Chief Complaint:    severe septic shock with multiorgan and hypotension involvement due to multiple possible sources of infection, severe hyperkalemia, acute metabolic encephalopathy, metabolic acidosis, strangulated left inguinal hernia, anemia requiring blood transfusion, mild hypercoagulable state    Assessment/Plan   78 y.o. 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 Admitted for severe septic shock with multiorgan and hypotension involvement due to multiple possible sources of infection, severe hyperkalemia, acute metabolic encephalopathy, metabolic acidosis, strangulated left inguinal hernia, anemia requiring blood transfusion, mild hypoxia coagulable state.      Severe septic shock with multi-organ and hypotension due to multiple possible sources of infection -pancultures, broad spectrum intravenous antibiotics, trending daily CBCs    Incarcerated right inguinal hernia -status post ex lap with reduction of incarcerated right groin hernia with hernia mesh    Infected sacral ulcer -weeping purulent material, wound consult    Infected medial maleolar and heel ulcers -weeping necrotic and purulent material, will need BKA    Urinary Tract Infection -cultures pending, IV broad-spectrum antibiotics    Anemia -s/p transfusion -trend CBC, transfuse if hemoglobin less than 7    Acute renal failure -likely due to profound shock, appreciate nephrology expertise, urine output improving    Electrolyte derangements, namely hyperkalemia -improved    Metabolic acidosis -improved continue bicarb drip    Acute encephalopathy -not improving, CT head negative    S/p intubation from OR -still sedated and vented    Palliative care following, profound overwhelming medical condition, palliative care is working with family in order to determine the best decision for patient. Wife is decided that she wants patient not to be resuscitated from a cardiac perspective. Patient still intubated and vented so CODE STATUS updated to partial.  Palliative care team is working with family to try to have a family meeting with patient's wife and 3 children so that goals of care can be discussed and hospice/comfort care options will be discussed. Outcome of this discussion will determine what medical decisions we may Namely whether or not patient will move forward with having the BKA that he needs. Prophylaxis - DVT: heparin, GI: protonix       Very poor prognosis.      Disposition : TBD  Patient Active Problem List   Diagnosis Code    DM2 (diabetes mellitus, type 2) ([facility] [address] E11.9    CAD (coronary artery disease) I25.10    Acute renal failure (ARF) ([facility]) N17.9    CKD (chronic kidney disease) stage 3, GFR 30-59 ml/min ([facility]) N18.30    Elevated brain natriuretic peptide (BNP) level R79.89    UTI (urinary tract infection) N39.0    High anion gap metabolic acidosis V83.0    Hyperkalemia E87.5    Sepsis ([facility]) A41.9    AMS (altered mental status) R41.82    Strangulated inguinal hernia K40.30    Right inguinal hernia K40.90    Leukocytosis D72.829    Anemia D64.9    Chronic indwelling Mccray catheter Z97.8    S/P BKA (below knee amputation), left ([facility]) Z89.512    Right foot ulcer ([facility]) L97.519    Paroxysmal atrial fibrillation ([facility]) I48.0    Diabetic ulcer of right foot ([facility]) E11.621, L97.519    Pleural effusion on right J90    Severe protein-calorie malnutrition ([facility]) E43    Acute respiratory failure with hypoxemia ([facility]) J96.01    Septic shock ([facility]) A41.9, R65.21 Subjective:    Vented, sedated    Review of systems:    UTO due to medical condition      Vital signs/Intake and Output:  Visit Vitals  BP (!) 121/54   Pulse 98   Temp 98.6 °F (37 °C)   Resp 27   Ht 5' 8\" (1.727 m)   Wt 82.8 kg (182 lb 8.7 oz)   SpO2 99%   BMI 27.76 kg/m²     Current Shift:  08/29 0701 - 08/29 1900  In: -   Out: 150 [Urine:150]  Last three shifts:  08/27 1901 - 08/29 0700  In: 3013.5 [I.V.:3013.5]  Out: 825 [Urine:825]    Exam:    General: vented, sedated, ill-appearing, chornically ill, debilitated  Head/Neck: NCAT, supple, No masses, No lymphadenopathy  CVS:Regular rate and rhythm, no M/R/G, S1/S2 heard, no thrill  Lungs:Clear to auscultation bilaterally, no wheezes, rhonchi, or rales  Abdomen: midline surgical incision C/D/I, Soft, Nontender, No distention, Normal Bowel sounds, No hepatomegaly  Extremities: see skin below, surgical BKA LLE  Skin: pale, infected ulcers with black eschar medial malleolus and heel on right foot  Neuro: vented and sedated  Psych: vented and sedated                Labs: Results:       Chemistry Recent Labs     08/29/22  0928 08/29/22  0508 08/28/22  0804 08/28/22  0117 08/27/22  1315 08/27/22  1030   * 161*  --  233*   < > 145*   * 135*  --  135*   < > 132*   K 3.7 4.0 4.9 5.2   < > 7.6*    105  --  107   < > 109   CO2 17* 16*  --  12*   < > 7*   * 130*  --  150*   < > 171*   CREA 3.96* 4.00*  --  4.11*   < > 4.66*   CA 7.7* 7.8*  --  7.8*   < > 8.5   AGAP 13 14  --  16   < > 16   BUCR 32* 33*  --  36*   < > 37*   AP  --  90  --  108  --  150*   TP  --  4.7*  --  5.8*  --  5.8*   ALB  --  2.1*  --  2.1*  --  1.9*   GLOB  --  2.6  --  3.7  --  3.9   AGRAT  --  0.8  --  0.6*  --  0.5*    < > = values in this interval not displayed.         CBC w/Diff Recent Labs     08/29/22  0928 08/29/22  0508 08/28/22  1126 08/28/22  0804 08/28/22  0117   WBC 9.4 11.0  --   --  35.4*   RBC 2.45* 2.53*  --   --  2.78*   HGB 6.3* 6.3* 7.3*   < > 7.2* HCT 19.6* 20.5* 23.2*   < > 22.9*    352  --   --  518*   GRANS 80* 85*  --   --  91*   LYMPH 6* 8*  --   --  3*   EOS 1 0  --   --  0    < > = values in this interval not displayed. Cardiac Enzymes No results for input(s): CPK, CKND1, LEONEL in the last 72 hours. No lab exists for component: CKRMB, TROIP   Coagulation Recent Labs     08/29/22  0508 08/28/22  0117   PTP 16.5* 17.4*   INR 1.3* 1.4*         Lipid Panel No results found for: CHOL, CHOLPOCT, CHOLX, CHLST, CHOLV, 204716, HDL, HDLP, LDL, LDLC, DLDLP, 394062, VLDLC, VLDL, TGLX, TRIGL, TRIGP, TGLPOCT, CHHD, CHHDX   BNP No results for input(s): BNPP in the last 72 hours.    Liver Enzymes Recent Labs     08/29/22  0508   TP 4.7*   ALB 2.1*   AP 90        Thyroid Studies Lab Results   Component Value Date/Time    TSH 4.73 (H) 08/28/2022 08:04 AM        Procedures/imaging: see electronic medical records for all procedures/Xrays and details which were not copied into this note but were reviewed prior to creation of Aguila Massey MD

## 2022-08-29 NOTE — PROGRESS NOTES
Father Sarabjit Crook visited with   Ran Johnson, who is a 78 y. o.,male. Father Sarabjit Crook provided [employer]. Chaplains will continue to follow and will provide pastoral care on an as needed/requested basis.  recommends bedside caregivers page  on duty if patient shows signs of acute spiritual or emotional distress.      Fr Musa Sutton, [phone] [place] - Office

## 2022-08-30 NOTE — PROGRESS NOTES
Pt is currently intubated. Noted plan for a family meeting with Palliative Care today. CM to await outcome of meeting to further assist with care transition.

## 2022-08-30 NOTE — PROGRESS NOTES
Palliative Medicine Consult    Patient Name: Farzaneh Felton. YOB: 1945    Date of Initial Consult: 8/29/2022  Date of follow up: 8/30/2022  Reason for Consult: Goals of care discussions  Requesting Provider: Dr. Gareth Lipscomb   Primary Care Physician: Murtaza Chung MD      SUMMARY:   Farzaneh Patel is a 78 y.o. with a past history of hypertension, hyperlipidemia, stroke, PAD, chronic atrial fibrillation, on Plavix, DM, CKD, previous, COVID-19 infection, chronic indwelling urinary catheter for urinary retention, and severe peripheral vascular disease with worsening gangrenous left foot necessitating left BKA during 8 day hospitalization last year, who was admitted on 8/27/2022 from home with a diagnosis of incarcerated Right inguinal hernia, severe septic shock with hypotension, severe hyperkalemia, acute metabolic encephalopathy, and anemia. Current medical issues leading to Palliative Medicine involvement include: goals of care discussions. 8/30/2022: Patient was off sedation, moving head back and forth, appears in distress. PALLIATIVE DIAGNOSES:   Goals of care discussions  Septic shock  Acute respiratory failure   incarcerated Right inguinal hernia  Advanced age/debility       PLAN:   8/30/2022: Palliative medicine team including Todd Hanley and I met with patient at patient's bedside today. Patient was off sedation, moving head back and forth, appears in distress. Family meeting today consisting of palliative team, patient's family (wife Heath Lane, children [place]rero Dickinson, and [place]) hositalist Dr. Bárbara Godoy, and intensivist Dr. Chencho Schulz. Medical update provided. Discussed SBT outcome today with potential for medical extubation tomorrow depending on how his SBT goes tomorrow. Discussed the benefits and burdens of reintubation in the event of respiratory decline post medical extubation.  Discussed overall level of care, with concern for RLL chronic vascular ulcers with previous recommendations from [facility] (6/9/2022) for consideration of Right AKA. Patient has historically declined this amputation. Discussed current quality of life. Family describes patient as a man who values his independence, and patient has been bothered by the fact that he hasn't  walked in over a year. Discussed that septic shock is improving and this event was likely related to bowel sepsis, but that patient is at high risk for recurrent sepsis and further clinical decline due to chronic infection in RLL, with no plan for amputation. Discussed the benefits and burdens of continuing current level of care versus implementation of comfort measures with hospice at discharge. Family clear that he would not find reintubation acceptable. Family is hopeful that if patient is medically extubated, he may wake up enough to participate in goals of care discussions. For now, patient is a DNR/do not re-intubate for any reason. Further goals of care discussions ongoing. See previous discussions below:    8/29/2022:Goals of care discussions: Palliative medicine team including  CHERYLE Sharma and I met with patient at patient's bedside. Patient is orally intubated on mechanical ventilation, frequent movements in bed but no command following. Wife Otilio Ray at bedside (Wife notes that English is her second language but she states she understands our conversation and answering questions appropriately. Offered translation services but wife declined.) Wife confirms DNR in the event of cardiac arrest. Patient is a PARTIAL CODE: No CPR (including no chest compressions, no shock, and no ACLS meds in the event of cardiac arrest). Continue intubation at this time. Wife requesting a meeting with their children involved to address further goals of care. Wife will call me with a time for tomorrow once she has spoken to all of her children.  Received a call from patient's son Lili Johnson and explained to him the desire to meet for family meeting. John Allen confirms DNR status upon cardiac arrest. Further goals of care discussions will occur at family meeting tomorrow. Awaiting response from wife with exact time. Septic shock:   multiple possible sources of infection, incarcerated Right inguinal hernia s/p laparotomy and reduction of incarcerated right groin hernia without need for bowel resection-8/27/2022. UA-Positive for UTI; urine culture obtained. sacral ulcer and medial maleolar and heel infected ulcers. On broad spectrum antibiotics per primary team.   Acute respiratory failure: Patient remains intubated postop due to critical illness. incarcerated Right inguinal hernia: s/p laparotomy and reduction of incarcerated right groin hernia without need for bowel resection-8/27/2022. Advanced age/debility: 78year old male who is critically ill, needs assist with all ADLs. Prior to admission, patient lived at home with his spouse and required assistance with functional ADLs. Initial consult note routed to primary continuity provider  Communicated plan of care with: Palliative IDT       GOALS OF CARE / TREATMENT PREFERENCES:   [====Goals of Care====]  GOALS OF CARE: DNR/do not re-intubate for any reason. Patient/Health Care Proxy Stated Goals: Prolong life      TREATMENT PREFERENCES:   Code Status: DNR    Advance Care Planning:  Advance Care Planning 8/29/2022   Patient's Healthcare Decision Maker is: Legal Next of Kin   Primary Decision Maker Name -   Primary Decision Maker Phone Number -   Primary Decision Maker Relationship to Patient -   Confirm Advance Directive None   Patient Would Like to Complete Advance Directive Unable       Medical Interventions:  Other (comment) (if extubated, do not reintubate)            The palliative care team has discussed with patient / health care proxy about goals of care / treatment preferences for patient.  [====Goals of Care====]         HISTORY:     History obtained from: patient's chart, family    CHIEF COMPLAINT: incarcerated Right inguinal hernia, POD 2.     HPI/SUBJECTIVE:    The patient is:   [] Verbal and participatory  [x] Non-participatory due to:   Orally intubated, sedated, no command following     Clinical Pain Assessment (nonverbal scale for severity on nonverbal patients):   Clinical Pain Assessment  Severity: 0    Adult Nonverbal Pain Scale  Face: Occasional grimace, tearing, frowning, wrinkled forehead  Activity (Movement): Seeking attention through movement or slow, cautious movement  Guarding: Lying quietly, no positioning of hands over areas of body  Physiology (Vital Signs): Stable vital signs  Respiratory: Baseline RR/SpO2 compliant with ventilator  Total Score: 2       FUNCTIONAL ASSESSMENT:     Palliative Performance Scale (PPS):  PPS: 30       PSYCHOSOCIAL/SPIRITUAL SCREENING:     Advance Care Planning:  Advance Care Planning 8/29/2022   Patient's Healthcare Decision Maker is: Legal Next of Kin   Primary Decision Maker Name -   Primary Decision Maker Phone Number -   Primary Decision Maker Relationship to Patient -   Confirm Advance Directive None   Patient Would Like to Complete Advance Directive Unable        Any spiritual / Yazidi concerns: unable to assess  [] Yes /  [] No    Caregiver Burnout:  [] Yes /  [] No /  [x] No Caregiver Present      Anticipatory grief assessment: unable to assess  [] Normal  / [] Maladaptive          REVIEW OF SYSTEMS:     Positive and pertinent negative findings in ROS are noted above in HPI. The following systems were [] reviewed / [x] unable to be reviewed as noted in HPI  Other findings are noted below. Systems: constitutional, ears/nose/mouth/throat, respiratory, gastrointestinal, genitourinary, musculoskeletal, integumentary, neurologic, psychiatric, endocrine. Positive findings noted below. Modified ESAS Completed by: provider           Pain: 0           Dyspnea: 0                    PHYSICAL EXAM:     From RN flowsheet:   Wt Readings from Last 3 Encounters:   08/29/22 82.8 kg (182 lb 8.7 oz)   11/02/21 81.6 kg (180 lb)   10/11/21 76.7 kg (169 lb)     Blood pressure (!) 128/52, pulse 80, temperature 97.9 °F (36.6 °C), resp. rate 16, height 5' 8\" (1.727 m), weight 82.8 kg (182 lb 8.7 oz), SpO2 97 %.     Pain Scale 1: FLACC  Pain Intensity 1: 0              Pain Intervention(s) 1: Medication (see MAR)      Constitutional: orally intubated, appears acute on chronically ill  Eyes: closed  ENMT: orally intubated  Cardiovascular: regular rhythm, distal pulses intact  Respiratory: orally intubated on mechanical ventilator  Gastrointestinal: midline surgical incision CDI, no distention  Musculoskeletal: no deformity, no tenderness to palpation, L BKA  Skin: warm, dry, black eschar right ankle and right heel  Neurologic: not following commands, moving all extremities       HISTORY:     Principal Problem:    Sepsis ([address] (8/27/2022)    Active Problems:    Acute renal failure (ARF) ([address] (5/29/2018)      UTI (urinary tract infection) (10/29/2021)      High anion gap metabolic acidosis (5/70/4835)      Hyperkalemia (8/27/2022)      AMS (altered mental status) (8/27/2022)      Strangulated inguinal hernia (8/27/2022)      Right inguinal hernia (8/27/2022)      Leukocytosis (8/27/2022)      Anemia (8/27/2022)      Chronic indwelling Mccray catheter (8/27/2022)      S/P BKA (below knee amputation), left ([address] (8/27/2022)      Right foot ulcer ([address] (8/27/2022)      Paroxysmal atrial fibrillation ([address] (8/27/2022)      Diabetic ulcer of right foot ([address] (8/27/2022)      Pleural effusion on right (8/27/2022)      Severe protein-calorie malnutrition ([address] (8/27/2022)      Acute respiratory failure with hypoxemia ([address] (8/28/2022)      Septic shock ([facility] (8/28/2022)    Past Medical History:   Diagnosis Date    A-fib ([place] [id number].)     Asthma     CAD (coronary artery disease)     Chronic kidney disease     stage 3    COVID-19     Diabetes ([place] [address].)     GERD (gastroesophageal reflux disease)     Heart failure (HCC)     chronic diastolic heart failure    High cholesterol     Hypertension     Ill-defined condition     chronic osteomyelitis left ankle and foot    Ill-defined condition     chronic arteriosclerosis    PVD (peripheral vascular disease) ([address]     Stroke [facility])     cva      Past Surgical History:   Procedure Laterality Date    COLONOSCOPY N/A 6/1/2018    COLONOSCOPY, POLYPECTOMY performed by Jonathon Sterling MD at [facility] ENDOSCOPY    HX CATARACT REMOVAL      HX ORTHOPAEDIC Left 2021    ankle washout, external fixator, would vac    HX ORTHOPAEDIC      external fixator removed      History reviewed. No pertinent family history. History reviewed, no pertinent family history.   Social History     Tobacco Use    Smoking status: Former    Smokeless tobacco: Never   Substance Use Topics    Alcohol use: Not Currently     No Known Allergies   Current Facility-Administered Medications   Medication Dose Route Frequency    ELECTROLYTE REPLACEMENT PROTOCOL - Potassium Renal Dosing  1 Each Other PRN    sodium bicarbonate tablet 650 mg  650 mg Oral TID    0.9% sodium chloride infusion  5 mL/hr IntraVENous CONTINUOUS    insulin glargine (LANTUS) injection 15 Units  15 Units SubCUTAneous DAILY    ELECTROLYTE REPLACEMENT PROTOCOL - Potassium Standard Dosing   1 Each Other PRN    ELECTROLYTE REPLACEMENT PROTOCOL - Magnesium   1 Each Other PRN    ELECTROLYTE REPLACEMENT PROTOCOL  - Phosphorus Renal Dosing  1 Each Other PRN    ELECTROLYTE REPLACEMENT PROTOCOL - Calcium   1 Each Other PRN    lactulose (CHRONULAC) 10 gram/15 mL solution 15 mL  15 mL Oral BID    midodrine (PROAMATINE) tablet 2.5 mg  2.5 mg Oral BID    dexmedeTOMidine (PRECEDEX) 400 mcg in 0.9% sodium chloride 100 mL infusion  0.2-0.7 mcg/kg/hr IntraVENous TITRATE    albumin human 25% (BUMINATE) solution 25 g  25 g IntraVENous Q6H    furosemide (LASIX) injection 20 mg  20 mg IntraVENous DAILY    midazolam (VERSED) injection 0.5 mg  0.5 mg IntraVENous BID PRN    arformoteroL (BROVANA) neb solution 15 mcg  15 mcg Nebulization BID RT    0.9% sodium chloride infusion 250 mL  250 mL IntraVENous PRN    0.9% sodium chloride infusion 250 mL  250 mL IntraVENous PRN    heparin (porcine) injection 5,000 Units  5,000 Units SubCUTAneous Q8H    piperacillin-tazobactam (ZOSYN) 3.375 g in 0.9% sodium chloride (MBP/ADV) 100 mL MBP  3.375 g IntraVENous Q12H    0.9% sodium chloride infusion 250 mL  250 mL IntraVENous PRN    pantoprazole (PROTONIX) 40 mg in 0.9% sodium chloride 10 mL injection  40 mg IntraVENous Q12H    sodium chloride (NS) flush 5-40 mL  5-40 mL IntraVENous Q8H    sodium chloride (NS) flush 5-40 mL  5-40 mL IntraVENous PRN    acetaminophen (TYLENOL) tablet 650 mg  650 mg Oral Q6H PRN    Or    acetaminophen (TYLENOL) suppository 650 mg  650 mg Rectal Q6H PRN    polyethylene glycol (MIRALAX) packet 17 g  17 g Oral DAILY PRN    ondansetron (ZOFRAN ODT) tablet 4 mg  4 mg Oral Q8H PRN    Or    ondansetron (ZOFRAN) injection 4 mg  4 mg IntraVENous Q6H PRN    magnesium sulfate 2 g/50 ml IVPB (premix or compounded)  2 g IntraVENous PRN    insulin lispro (HUMALOG) injection   SubCUTAneous Q6H    glucose chewable tablet 16 g  4 Tablet Oral PRN    glucagon (GLUCAGEN) injection 1 mg  1 mg IntraMUSCular PRN    dextrose 10% infusion 0-250 mL  0-250 mL IntraVENous PRN    chlorhexidine (PERIDEX) 0.12 % mouthwash 10 mL  10 mL Oral Q12H    [Held by provider] vasopressin (VASOSTRICT) 20 Units in 0.9% sodium chloride 100 mL infusion  0-0.04 Units/min IntraVENous TITRATE    HYDROmorphone (DILAUDID) injection 1 mg  1 mg IntraVENous Q4H PRN          LAB AND IMAGING FINDINGS:     Lab Results   Component Value Date/Time    WBC 8.9 08/30/2022 05:31 AM    HGB 7.4 (L) 08/30/2022 05:31 AM    PLATELET 129 25/28/7528 05:31 AM     Lab Results   Component Value Date/Time    Sodium 135 (L) 08/30/2022 05:31 AM    Potassium 2.9 (LL) 08/30/2022 05:31 AM Chloride 102 08/30/2022 05:31 AM    CO2 18 (L) 08/30/2022 05:31 AM     (H) 08/30/2022 05:31 AM    Creatinine 3.82 (H) 08/30/2022 05:31 AM    Calcium 7.6 (L) 08/30/2022 05:31 AM    Magnesium 1.6 08/30/2022 05:31 AM    Phosphorus 3.3 08/30/2022 05:31 AM      Lab Results   Component Value Date/Time    Alk. phosphatase 105 08/30/2022 05:31 AM    Protein, total 4.4 (L) 08/30/2022 05:31 AM    Albumin 2.0 (L) 08/30/2022 05:31 AM    Globulin 2.4 08/30/2022 05:31 AM     Lab Results   Component Value Date/Time    INR 1.3 (H) 08/30/2022 05:31 AM    Prothrombin time 16.6 (H) 08/30/2022 05:31 AM    aPTT 118.5 (H) 05/17/2021 08:20 AM      Lab Results   Component Value Date/Time    Iron 10 (L) 05/06/2021 06:40 AM    TIBC 186 (L) 05/06/2021 06:40 AM    Iron % saturation 5 (L) 05/06/2021 06:40 AM    Ferritin 125 05/06/2021 06:40 AM      No results found for: PH, PCO2, PO2  No components found for: [place]   Lab Results   Component Value Date/Time    CK 46 05/29/2018 05:45 PM    CK - MB 1.4 05/29/2018 05:45 PM                Total time: 35 minutes  Counseling / coordination time, spent as noted above:   > 50% counseling / coordination?: yes, patient, family, and medical team    Prolonged service was provided for  []30 min   []75 min in face to face time in the presence of the patient, spent as noted above.   Time Start:   Time End:

## 2022-08-30 NOTE — PROGRESS NOTES
0700 : Bedside and Verbal shift change report given to Jose Jacome RN (oncoming nurse) by Cori Dalal RN (offgoing nurse). Report included the following information SBAR, Kardex, Intake/Output, MAR, Recent Results, and Cardiac Rhythm NSR with PVS's .     0800 : Shift assessment completed; see Flowsheet. Patient remains intubated, sedated and restrained. Patient is on full vent support with plans for SBT. Pt. Dileep Lopezts no commands and responds to pain. Patient is resting comfortably in bed, critical care continues. 7819 : Patient's sedation is turned off. Patient placed on SBT. No RR distress and tolerating trial well. Will contnue to monitor. 1130 : Patient placed back on full Ventilator support. Tolerated trail well with no RR distress or complications. 1200 : Reassessment completed, no changes noted. Wife at bedside. All needs are addressed. Critical care continues. 1230 : Patient has increased agitation and restlessness. At risk for extubation, PRN Versed is given and Precedex started. Tube feeding remains clamped due to at risk for aspiration. 1600 : Patient reassessment completed; see Flowsheet. Patient remains intubated, sedated and restrained. Tube feeding is turned back on and advanced to 30 mL/hr. Patient tolerating well. All patient needs are addressed. Critical care continues. 1800 : Patient became increasingly agitated and restless. PRN Versed given. RASS +3. Will reassess and continue to monitor. 1830 : Patient resting comfortably in bed. All needs are addressed, critical care continues. 1900 : Bedside and Verbal shift change report given to Chago Vergara RN (oncoming nurse) by Jose Jacome RN (offgoing nurse). Report included the following information SBAR, Kardex, Intake/Output, MAR, Recent Results, and Cardiac Rhythm NSR with multiple PVC's .

## 2022-08-30 NOTE — PROGRESS NOTES
Palliative Medicine    CODE STATUS: DNR/do not reintubate    AMD Status: none on file. His wife, Emily Hopkins is legal next of kin     3/30/2022 1230 Seen today in room ICU 5 along with Sonya Hunter NP. Lying in bed. Intubated/ventilated. FiO2 30% PEEP 5 Sedation turned off earlier this morning and precedex had just been started. Moving his head around in the bed. No purposeful movement. Family meeting in ICU waiting room with wife, Emily Hopkins, daughter, Khang Camacho, sons Akshat Galvan and Mayuri Lynne, Dr Aj Briceno, and Dr Vicky Loera. Medical update given including possible plans for medical extubation tomorrow if SBT goes well. Lengthy discussion re: benefits and burdens of re-intubation. Many thoughtful questions asked and discussed. All family members agreed to no re-intubation and to use all non-invasive respiratory modalities for respiratory distress, if it occurs. 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 Ortho consult pending. Discussed that there may be a recommendation for amputation but that the patient had decline such a procedure in the past. The hope is that the patient can be successfully extubated and clear cognitively to a point where he can engage in goals of care discussions. Ultimately there may be a discussion of transition to comfort measures with hospice at discharge but those decisions are not necessary today. Will await to see how he does relative to the ventilator and proceed from that point. All family was in agreement with that plan. Disposition plan: to be determined based on response to treatment and family decisions    Palliative care will continue to follow Pradip Torres  and his family during his hospitalization and support them as they make healthcare decisions and define goals of care.       Benny Saravia, RN, MSN  Palliative Medicine  P: [phone]

## 2022-08-30 NOTE — PROGRESS NOTES
Patient:  Syeda Aburto :  1945  Gender:  male  MRN #:  [id number]    Assessment:   Syeda Aburto is a 78 y.o. male with hypertension, hyperlipidemia, stroke , chronic atrial fibrillation, on Plavix, DM,previous, COVID-19 infection, chronic indwelling urinary catheter for urinary retention, severe peripheral vascular disease left BKA admitted in ICU with septic shock , acute renal failure, hyperkalemia , metabolic acidosis and strangulated left inguinal hernia  s/p emergency  surgery   Still on mechanical vent, BP stable off vasopressors       Septic shock   Acute renal failure  Hypokalemia   Metabolic acidosis   Respiratory failure       Plan:   He has decent urine output overnight with improvement in renal function slowly   Agree to stop sodium bicarbonate drip and start tablet 650 mg TID   KCL 10 meq x 4 runs and 40 meq KCL supplement for hypokalemia, Close monitoring of serum potassium while correcting acidosis, replace prn to keep level around 4.5 meq/dl   NO indications of renal replacement therapy   Diuretics  prn for volume overload  Minimize volume infusion   Avoid NSAIDS, contrast and nephrotoxin   Dose all meds and antibiotics for current eGFR  Transfuse prn to keep hemoglobin > 7 gram/dl     Subjective/Interval Events    Decent urine output  BP stable , off vasopressors  Unconscious in mechanical vent   Unable to obtained review of systems         Intake/Output Summary (Last 24 hours) at 2022 1239  Last data filed at 2022 0800  Gross per 24 hour   Intake 3234.7 ml   Output 800 ml   Net 2434.7 ml           Blood pressure (!) 128/52, pulse 80, temperature 97.9 °F (36.6 °C), resp. rate 16, height 5' 8\" (1.727 m), weight 82.8 kg (182 lb 8.7 oz), SpO2 97 %.     Exam:    Unconscious in vent   HEENT:ET tube in place , no neck swelling  Lung: clear to auscultation,  Heart: s1s2 heard   Abdomen: soft, mild distension   Ext: no edema in RLE,   CNS- sedated       Recent Labs 08/30/22  0531   WBC 8.9   HCT 22.3*   MCV 78.2       Recent Labs     08/30/22  0531   *   CO2 18*   *   INR 1.3*       Recent Labs     08/30/22  0531   AGRAT 0.8       Discussed with patient's wife and ICU team     Approx 31 minutes of critical care time spent in the direct evaluation and formulation of treatment plan of this high risk patient. The reason for providing this level of medical care was due a critical illness that impaired one or more vital organ systems such that there was a high probability of imminent or life threatening deterioration in the patients condition. This care involved high complexity decision making to assess, manipulate, and support vital system functions, to treat this degreee vital organ system failure and to prevent further life threatening deterioration of the patients condition.          Leidy Bey MD  Nephrology -[facility], [facility].

## 2022-08-30 NOTE — PROGRESS NOTES
1945- Report and care received, assessment completed per flow sheet. Intubated, sedated, soft bilateral wrist restraints in place to protect integrity of lines/tubes, flow sheet in progress. Low BP noted, however, just received dilaudid, will continue to monitor. 0000- Reassessment without change. 0200- Bath and linen change completed. 0345- Reassessment without change.

## 2022-08-30 NOTE — PROGRESS NOTES
Patient placed on SBT PS-8 Peep-5 Patient tolerated it from 9:00-11:30am without anyt complications. Patients  Starting vitals were HR-74, Spo2-99% RR-16, RSBI-27. Patients ending vitals were HR-80 SpO2-97%, RR-16. RSBI-38. Patient placed back on previous vent settings.

## 2022-08-30 NOTE — CONSULTS
[facility]  (A Division of [address] [place] [address])                                                                                                                      Madiha Mcdonnell MD  Office #: - Option # [phone]  Fax #: [phone]     Date of Admission: 8/27/2022Date of Note: 8/30/2022    Reason for Referral: Evaluation and antibiotic management of septic shock. Thank you for involving me in the care of this patient. Please do not hesitate to contact me on the above number if question or concern. Current Antimicrobials:    Prior Antimicrobials:    Vanco/Zosyn/Levofloxacin 8/27 to date # D 3          Assessment- ID related:  --------------------------------------------------------------------------  Critically ill patient:    Septic shock with ELINOR/ resp failure and encephalopathy 2/2 below  Leukemoid reaction- Improved  Strangulated and incarcerated R groin hernia -post X-lap 8/27- viable annie;  BCX:  NGSF 8/27  UCX: >2 organism on cx  BL lung infiltrates due to atelectasis/ consolidation /has pleural effusion   PAD  Unstagable eschar, skin necrosis, on RLE  L BKA  DMT2  S/P resp failure- post op, remains intubated   Recommendation for ID issues I am following:  ------------------------------------------------------------------------------    Cont to cover broadly with Zosyn-   --DC vanco/Levofloxacin    Adjust abx dose to CrCl      With his PAD, wound healing unlikely on his RLE-- consider vascular consultation           HPI:  Jeancarlos Burns. is a 78 y.o. WHITE/NON- with PMH DM, hx of stroke, PAD- with L BKA --admitted in septic shock, leukmoid reaction and acute renal failure on 8/27 from strangulated/incarcerated r groin hernia. Underwent X-lap, bowel was viable and no bowel resection done. Improved septic shock with surgery and triple ABX-- bcx negative. Has BL lung infiltrate with effusion as well on CXR.  Nexrotic/eschar wound lesion on RLE.    Intubated but off pressor at this time. Active Hospital Problems    Diagnosis Date Noted    Acute respiratory failure with hypoxemia ([address] 08/28/2022    Septic shock ([address] 08/28/2022    High anion gap metabolic acidosis 42/70/3612    Hyperkalemia 08/27/2022    Sepsis ([address] 08/27/2022    AMS (altered mental status) 08/27/2022    Strangulated inguinal hernia 08/27/2022    Right inguinal hernia 08/27/2022    Leukocytosis 08/27/2022    Anemia 08/27/2022    Chronic indwelling Mccray catheter 08/27/2022    S/P BKA (below knee amputation), left ([address] 08/27/2022    Right foot ulcer ([address] 08/27/2022    Paroxysmal atrial fibrillation ([address] 08/27/2022    Diabetic ulcer of right foot ([address] 08/27/2022    Pleural effusion on right 08/27/2022    Severe protein-calorie malnutrition ([address] 08/27/2022    UTI (urinary tract infection) 10/29/2021    Acute renal failure (ARF) ([address] 05/29/2018     Past Medical History:   Diagnosis Date    A-fib ([address]     Asthma     CAD (coronary artery disease)     Chronic kidney disease     stage 3    COVID-19     Diabetes (HCC)     GERD (gastroesophageal reflux disease)     Heart failure (HCC)     chronic diastolic heart failure    High cholesterol     Hypertension     Ill-defined condition     chronic osteomyelitis left ankle and foot    Ill-defined condition     chronic arteriosclerosis    PVD (peripheral vascular disease) ([address]     Stroke ([address]     cva     Past Surgical History:   Procedure Laterality Date    COLONOSCOPY N/A 6/1/2018    COLONOSCOPY, POLYPECTOMY performed by Melyssa Marmolejo MD at [facility] ENDOSCOPY    HX CATARACT REMOVAL      HX ORTHOPAEDIC Left 2021    ankle washout, external fixator, would vac    HX ORTHOPAEDIC      external fixator removed     History reviewed. No pertinent family history.   Social History     Socioeconomic History    Marital status:      Spouse name: Not on file    Number of children: Not on file    Years of education: Not on file    Highest education level: Not on file   Occupational History    Not on file   Tobacco Use    Smoking status: Former    Smokeless tobacco: Never   Substance and Sexual Activity    Alcohol use: Not Currently    Drug use: No    Sexual activity: Not on file   Other Topics Concern    Not on file   Social History Narrative    Not on file     Social Determinants of Health     Financial Resource Strain: Not on file   Food Insecurity: Not on file   Transportation Needs: Not on file   Physical Activity: Not on file   Stress: Not on file   Social Connections: Not on file   Intimate Partner Violence: Not on file   Housing Stability: Not on file       Allergies:  Patient has no known allergies.      Medications:  Current Facility-Administered Medications   Medication Dose Route Frequency    ELECTROLYTE REPLACEMENT PROTOCOL - Potassium Renal Dosing  1 Each Other PRN    sodium bicarbonate tablet 650 mg  650 mg Oral TID    0.9% sodium chloride infusion  5 mL/hr IntraVENous CONTINUOUS    insulin glargine (LANTUS) injection 15 Units  15 Units SubCUTAneous DAILY    ELECTROLYTE REPLACEMENT PROTOCOL - Potassium Standard Dosing   1 Each Other PRN    ELECTROLYTE REPLACEMENT PROTOCOL - Magnesium   1 Each Other PRN    ELECTROLYTE REPLACEMENT PROTOCOL  - Phosphorus Renal Dosing  1 Each Other PRN    ELECTROLYTE REPLACEMENT PROTOCOL - Calcium   1 Each Other PRN    lactulose (CHRONULAC) 10 gram/15 mL solution 15 mL  15 mL Oral BID    midodrine (PROAMATINE) tablet 2.5 mg  2.5 mg Oral BID    dexmedeTOMidine (PRECEDEX) 400 mcg in 0.9% sodium chloride 100 mL infusion  0.2-0.7 mcg/kg/hr IntraVENous TITRATE    albumin human 25% (BUMINATE) solution 25 g  25 g IntraVENous Q6H    furosemide (LASIX) injection 20 mg  20 mg IntraVENous DAILY    midazolam (VERSED) injection 0.5 mg  0.5 mg IntraVENous BID PRN    arformoteroL (BROVANA) neb solution 15 mcg  15 mcg Nebulization BID RT    0.9% sodium chloride infusion 250 mL  250 mL IntraVENous PRN    0.9% sodium chloride infusion 250 mL  250 mL IntraVENous PRN    heparin (porcine) injection 5,000 Units  5,000 Units SubCUTAneous Q8H    piperacillin-tazobactam (ZOSYN) 3.375 g in 0.9% sodium chloride (MBP/ADV) 100 mL MBP  3.375 g IntraVENous Q12H    0.9% sodium chloride infusion 250 mL  250 mL IntraVENous PRN    pantoprazole (PROTONIX) 40 mg in 0.9% sodium chloride 10 mL injection  40 mg IntraVENous Q12H    sodium chloride (NS) flush 5-40 mL  5-40 mL IntraVENous Q8H    sodium chloride (NS) flush 5-40 mL  5-40 mL IntraVENous PRN    acetaminophen (TYLENOL) tablet 650 mg  650 mg Oral Q6H PRN    Or    acetaminophen (TYLENOL) suppository 650 mg  650 mg Rectal Q6H PRN    polyethylene glycol (MIRALAX) packet 17 g  17 g Oral DAILY PRN    ondansetron (ZOFRAN ODT) tablet 4 mg  4 mg Oral Q8H PRN    Or    ondansetron (ZOFRAN) injection 4 mg  4 mg IntraVENous Q6H PRN    magnesium sulfate 2 g/50 ml IVPB (premix or compounded)  2 g IntraVENous PRN    insulin lispro (HUMALOG) injection   SubCUTAneous Q6H    glucose chewable tablet 16 g  4 Tablet Oral PRN    glucagon (GLUCAGEN) injection 1 mg  1 mg IntraMUSCular PRN    dextrose 10% infusion 0-250 mL  0-250 mL IntraVENous PRN    chlorhexidine (PERIDEX) 0.12 % mouthwash 10 mL  10 mL Oral Q12H    [Held by provider] vasopressin (VASOSTRICT) 20 Units in 0.9% sodium chloride 100 mL infusion  0-0.04 Units/min IntraVENous TITRATE    HYDROmorphone (DILAUDID) injection 1 mg  1 mg IntraVENous Q4H PRN        ROS:  Pertinent items are noted in the History of Present Illness. Physical Exam:    Temp (24hrs), Av °F (36.7 °C), Min:96.3 °F (35.7 °C), Max:100.6 °F (38.1 °C)    Visit Vitals  BP (!) 128/52   Pulse 80   Temp 97.9 °F (36.6 °C)   Resp 16   Ht 5' 8\" (1.727 m)   Wt 82.8 kg (182 lb 8.7 oz)   SpO2 97%   BMI 27.76 kg/m²          GEN: WD acutely sick patient, intubated    PIV-R    HEENT: Unicteric. EOMI intact  --OT and ET in place  CHEST: Non laboured breathing.  CTA   CVS:RRR, no mur/gallop  ABD: Obese/soft. Non tender. Non distended. Surgical wound dressed, skin looks ok  LISET: caal in place  EXT: No apparent swelling or redness on UE/LE joints-edematous hands  -L BKA  -R foot is dressed, wound not seen  Skin: Dry and intact. bruisings  CNS: intubated/non verbal       Microbiology  All Micro Results       Procedure Component Value Units Date/Time    CULTURE, RESPIRATORY/SPUTUM/BRONCH Mark Nagel [[id number]]     Order Status: Sent Specimen: Sputum from Tracheal Aspirate     CULTURE, BLOOD [[id number]] Collected: 08/27/22 1100    Order Status: Completed Specimen: Blood Updated: 08/30/22 0854     Special Requests: NO SPECIAL REQUESTS        Culture result: NO GROWTH 3 DAYS       CULTURE, BLOOD [[id number]] Collected: 08/27/22 1030    Order Status: Completed Specimen: Blood Updated: 08/30/22 0854     Special Requests: NO SPECIAL REQUESTS        Culture result: NO GROWTH 3 DAYS       CULTURE, URINE [[id number]] Collected: 08/27/22 1442    Order Status: Completed Specimen: Cath Urine Updated: 08/29/22 0642     Special Requests: NO SPECIAL REQUESTS        [place] Count --        [id number]  COLONIES/mL       Culture result:       >2 ORGANISMS - CONTAMINATED SPECIMEN. SUGGEST RECOLLECTION          CULTURE, BLOOD [[id number]]     Order Status: Canceled Specimen: Blood     COVID-19 RAPID TEST [[id number]] Collected: 08/27/22 1030    Order Status: Completed Specimen: Nasopharyngeal Updated: 08/27/22 1057     Specimen source SWAB        COVID-19 rapid test Not detected        Comment: Rapid Abbott ID Now       Rapid NAAT:  The specimen is NEGATIVE for SARS-CoV-2, the novel coronavirus associated with COVID-19. Negative results should be treated as presumptive and, if inconsistent with clinical signs and symptoms or necessary for patient management, should be tested with an alternative molecular assay.   Negative results do not preclude SARS-CoV-2 infection and should not be used as the sole basis for patient management decisions. This test has been authorized by the FDA under an Emergency Use Authorization (EUA) for use by authorized laboratories. Fact sheet for Healthcare Providers: ConventionUpdate.co.nz  Fact sheet for Patients: ConventionUpdate.co.nz       Methodology: Isothermal Nucleic Acid Amplification                   Lab results:    Chemistry  Recent Labs     08/30/22  0531 08/29/22  0928 08/29/22  0508 08/28/22  0804 08/28/22  0117   * 161* 161*  --  233*   * 134* 135*  --  135*   K 2.9* 3.7 4.0   < > 5.2    104 105  --  107   CO2 18* 17* 16*  --  12*   * 126* 130*  --  150*   CREA 3.82* 3.96* 4.00*  --  4.11*   CA 7.6* 7.7* 7.8*  --  7.8*   AGAP 15 13 14  --  16   BUCR 32* 32* 33*  --  36*     --  90  --  108   TP 4.4*  --  4.7*  --  5.8*   ALB 2.0*  --  2.1*  --  2.1*   GLOB 2.4  --  2.6  --  3.7   AGRAT 0.8  --  0.8  --  0.6*    < > = values in this interval not displayed. CBC w/ Diff  Recent Labs     08/30/22  0531 08/29/22  1941 08/29/22  0928 08/29/22  0508   WBC 8.9  --  9.4 11.0   RBC 2.85*  --  2.45* 2.53*   HGB 7.4* 7.4* 6.3* 6.3*   HCT 22.3* 22.4* 19.6* 20.5*     --  322 352   GRANS 83*  --  80* 85*   LYMPH 8*  --  6* 8*   EOS 1  --  1 0       CT HEAD WO CONT    Result Date: 8/27/2022  FINDINGS: BRAIN AND POSTERIOR FOSSA: Mild periventricular areas of decreased attenuation are identified. Gray-white matter interfaces are preserved. No intraparenchymal hemorrhage, mass effect or midline shift. The ventricular system is midline and symmetric. Atherosclerotic vascular calcifications are identified. EXTRA-AXIAL SPACES AND MENINGES: There is no evidence for extra-axial mass lesion or fluid collection. CALVARIUM: Intact. SINUSES: Sphenoid sinus mucosal thickening. OTHER: Orbits are grossly intact. Facial soft tissue are within normal limits. _______________     1.   No evidence for intracranial hemorrhage, mass effect or midline shift. 2.  Mild periventricular areas of microvascular ischemic change. 3.  Cerebral atherosclerosis. 4.  Atrophy. If there are continued symptoms, a MRI is recommended for further evaluation. CT ABD PELV WO CONT    Result Date: 8/27/2022  FINDINGS: LOWER CHEST: Moderate right pleural effusion with adjacent atelectasis. LIVER, BILIARY: Liver is normal. No biliary dilation. Gallbladder is unremarkable. PANCREAS: Normal. SPLEEN: Splenic granuloma. ADRENALS: Normal. KIDNEYS: Normal. LYMPH NODES: No enlarged lymph nodes. GASTROINTESTINAL TRACT: Uncomplicated sigmoid diverticula. Gastric distention. PELVIC ORGANS: Unremarkable. VASCULATURE: Atherosclerotic vascular calcifications. BONES: No acute or aggressive osseous abnormalities identified. OTHER: Large right inguinal hernia with large and small bowel. There is diffuse circumferential wall thickening of ascending colon within the hernia. _______________     1. Large right inguinal hernia with large and small bowel. There is diffuse wall thickening of the ascending colon concerning for strangulation. No evidence for bowel obstruction. 2. Right pleural effusion and bilateral basilar atelectasis. 3. Gastric distention. XR CHEST PORT    Result Date: 8/30/2022  EXAM:  PORTABLE CHEST INDICATION:  Follow up. TECHNIQUE:  Portable, AP view. COMPARISON:  08/29/2022 ____________________ FINDINGS:  SUPPORT DEVICES: Endotracheal tube approximately 4 cm above the alise. OG tube is traversing below left hemidiaphragm, tip not imaged. Esophageal probe is present at the level of the endotracheal tube. HEART AND MEDIASTINUM: Stable. LUNGS AND PLEURAL SPACES: Persistent right basilar opacity consistent with pleural effusion with adjacent consolidation. No pneumothorax. BONY THORAX AND SOFT TISSUES: No acute osseous abnormality. ____________________     1. Endotracheal tube approximately 4 cm above the alise.  2. No significant change in right basilar opacity consistent with pleural effusion with adjacent consolidation, atelectasis or pneumonia. *  **     XR CHEST PORT    Result Date: 8/29/2022  FINDINGS: HEART AND MEDIASTINUM: Anterior endotracheal tube tip estimated at 6.1 cm above alise. Enteric tube tip projects over left upper quadrant expected location gastric fundus. Esophageal temperature probe tip projects over lower thoracic . Stable cardiomediastinal silhouette allowing for angulation. LUNGS AND PLEURAL SPACES: Progressive right pleural effusion and patchy opacities in the right lung. Left lung relatively clear allowing for technique. BONY THORAX AND SOFT TISSUES: No gross acute osseous abnormality. 1. Right pleural effusion and subjacent atelectasis/infiltrate, perhaps slightly progressed. 2. Tubes and lines stable in visualized extent. XR CHEST PORT    Result Date: 8/28/2022  FINDINGS: SUPPORT DEVICES: Endotracheal tube distal tip projects 5.5 cm above the alise. Enteric tube distal tip projects below the left hemidiaphragm likely in the stomach. HEART AND MEDIASTINUM: No appreciable cardiomegaly. Remaining mediastinal contours are stable. LUNGS AND PLEURAL SPACES: Right pleural effusion with adjacent opacity. No evidence for pneumothorax. Left lung is clear. BONY THORAX AND SOFT TISSUES: Unremarkable. _______________     1. Right pleural effusion with adjacent atelectasis/infiltrates. 2. Supporting tubes appear stable. XR CHEST PORT    Result Date: 8/27/2022  FINDINGS: SUPPORT DEVICES: An endotracheal tube is positioned 6 cm above the alise. HEART AND MEDIASTINUM: Heart size and mediastinal contour are midline and within normal limits. LUNGS AND PLEURAL SPACES: There are opacities throughout both lungs but more confluent within the right mid and lower lung zones. No pneumothorax.  BONY THORAX AND SOFT TISSUES: Unremarkable. _______________     Interval right greater than left airspace disease Small to moderate right pleural effusion    XR CHEST PORT    Result Date: 8/27/2022FINDINGS: HEART AND MEDIASTINUM: No appreciable cardiomegaly. Remaining mediastinal contours within normal limits. LUNGS AND PLEURAL SPACES: Left lung is clear. Right pleural effusion with adjacent opacity. BONY THORAX AND SOFT TISSUES: Unremarkable. _______________     1. Right pleural effusion with probable adjacent atelectasis. XR ABD PORT  1 V    Result Date: 8/27/2022  INDINGS: SUPPORT DEVICES: A nasogastric tube is positioned in the stomach. BOWEL GAS PATTERN: The gas pattern is nonobstructive. Yunier Confer SOFT TISSUES: Unremarkable. BONES: Degenerative changes are seen throughout the spine. ADDITIONAL FINDINGS: None. _______________     No significant abnormality. ECHO ADULT COMPLETE    Result Date: 8/28/2022  Formatting of this result is different from the original.   Left Ventricle: Normal left ventricular systolic function with a visually estimated EF of 60 - 65%. Left ventricle size is normal. Normal wall thickness. Normal wall motion. Grade I diastolic dysfunction present with normal LV EF. Unable to assess RVSP due to inadequate or insignificant tricuspid regurgitation.      Procedures/imaging: see electronic medical records for all procedures/Xrays and details which were not copied into this note but were reviewed prior to creation of Plan

## 2022-08-30 NOTE — PROGRESS NOTES
Problem: Ventilator Management  Goal: *Adequate oxygenation and ventilation  Outcome: Progressing Towards Goal  Goal: *Patient maintains clear airway/free of aspiration  Outcome: Progressing Towards Goal  Goal: *Absence of infection signs and symptoms  Outcome: Progressing Towards Goal  Goal: *Normal spontaneous ventilation  Outcome: Progressing Towards Goal     Problem: Pressure Injury - Risk of  Goal: *Prevention of pressure injury  Description: Document Anam Scale and appropriate interventions in the flowsheet. Outcome: Progressing Towards Goal  Note: Pressure Injury Interventions:  Sensory Interventions: (P) Assess changes in LOC, Assess need for specialty bed, Avoid rigorous massage over bony prominences, Check visual cues for pain, Keep linens dry and wrinkle-free, Float heels, Maintain/enhance activity level, Minimize linen layers, Pressure redistribution bed/mattress (bed type), Pad between skin to skin    Moisture Interventions: (P) Absorbent underpads, Apply protective barrier, creams and emollients, Assess need for specialty bed, Minimize layers    Activity Interventions: (P) Assess need for specialty bed, Pressure redistribution bed/mattress(bed type)    Mobility Interventions: (P) Assess need for specialty bed, HOB 30 degrees or less, Pressure redistribution bed/mattress (bed type), Turn and reposition approx.  every two hours(pillow and wedges)    Nutrition Interventions: (P) Document food/fluid/supplement intake    Friction and Shear Interventions: (P) Apply protective barrier, creams and emollients, HOB 30 degrees or less, Foam dressings/transparent film/skin sealants, Feet elevated on foot rest, Lift sheet, Transferring/repositioning devices, Minimize layers                Problem: Patient Education: Go to Patient Education Activity  Goal: Patient/Family Education  Outcome: Progressing Towards Goal     Problem: Patient Education: Go to Patient Education Activity  Goal: Patient/Family Education  Outcome: Progressing Towards Goal     Problem: Falls - Risk of  Goal: *Absence of Falls  Description: Document Coty Blood Fall Risk and appropriate interventions in the flowsheet.   Outcome: Progressing Towards Goal  Note: Fall Risk Interventions:       Mentation Interventions: (P) Adequate sleep, hydration, pain control, Bed/chair exit alarm, Door open when patient unattended, Evaluate medications/consider consulting pharmacy, More frequent rounding, Room close to nurse's station, Update white board    Medication Interventions: (P) Assess postural VS orthostatic hypotension, Evaluate medications/consider consulting pharmacy, Bed/chair exit alarm    Elimination Interventions: (P) Bed/chair exit alarm, Call light in reach, Toileting schedule/hourly rounds              Problem: Patient Education: Go to Patient Education Activity  Goal: Patient/Family Education  Outcome: Progressing Towards Goal     Problem: Non-Violent Restraints  Goal: Removal from restraints as soon as assessed to be safe  Outcome: Progressing Towards Goal  Goal: No harm/injury to patient while restraints in use  Outcome: Progressing Towards Goal  Goal: Patient's dignity will be maintained  Outcome: Progressing Towards Goal  Goal: Patient Interventions  Outcome: Progressing Towards Goal

## 2022-08-30 NOTE — WOUND CARE
[facility]. MEDICAL RECORD NUMBER:  [id number]  AGE: 78 y.o. GENDER: male  : 1945  TODAY'S DATE:  2022    GENERAL     [] Follow-up   [x] New Consult    Robmarley Becerra. is a 78 y.o. male referred by:   [] Physician  [x] Nursing  [] Other:         PAST MEDICAL HISTORY    Past Medical History:   Diagnosis Date    A-fib ([facility] [address]     Asthma     CAD (coronary artery disease)     Chronic kidney disease     stage 3    COVID-19     Diabetes (HCC)     GERD (gastroesophageal reflux disease)     Heart failure (HCC)     chronic diastolic heart failure    High cholesterol     Hypertension     Ill-defined condition     chronic osteomyelitis left ankle and foot    Ill-defined condition     chronic arteriosclerosis    PVD (peripheral vascular disease) ([facility] [address]     Stroke ([facility] [address]     cva        PAST SURGICAL HISTORY    Past Surgical History:   Procedure Laterality Date    COLONOSCOPY N/A 2018    COLONOSCOPY, POLYPECTOMY performed by Bam Mays MD at THE [facility] ENDOSCOPY    HX CATARACT REMOVAL      HX ORTHOPAEDIC Left     ankle washout, external fixator, would vac    HX ORTHOPAEDIC      external fixator removed       FAMILY HISTORY    History reviewed. No pertinent family history. SOCIAL HISTORY    Social History     Tobacco Use    Smoking status: Former    Smokeless tobacco: Never   Substance Use Topics    Alcohol use: Not Currently    Drug use: No       ALLERGIES    No Known Allergies    MEDICATIONS    No current facility-administered medications on file prior to encounter. Current Outpatient Medications on File Prior to Encounter   Medication Sig Dispense Refill    levoFLOXacin (Levaquin) 750 mg tablet Take 1 Tablet by mouth daily.  5 Tablet 0    insulin lispro (HUMALOG) 100 unit/mL injection INITIATE INSULIN CORRECTIVE PROTOCOL: Normal Insulin Sensitivity   For Blood Sugar (mg/dL) of:     Less than 150 =   0 units           150 -199 =   2 units  200 -249 =   4 units  250 -299 =   6 units  300 -349 =   8 units  350 and above = 10 units and Call Physician  If 2 glucose readings are above 200 mg/dL within a 24 hr period, proceed to \"Insulin Resistant\" dosing. Initiate Hypoglycemia protocol if blood glucose is <70 mg/dL Fast Acting - Administer Immediately - or within 15 minutes of start of meal, if mealtime coverage. 1 Each 0    gabapentin (NEURONTIN) 300 mg capsule Take 1 Capsule by mouth three (3) times daily. Max Daily Amount: 900 mg. 30 Capsule 0    clopidogreL (Plavix) 75 mg tab Take  by mouth daily. Indications: afib      B.infantis-B.ani-B.long-B.bifi (Probiotic 4X) 10-15 mg TbEC Take  by mouth daily. multivitamin (ONE A DAY) tablet Take 1 Tablet by mouth daily. acetaminophen (TylenoL) 325 mg tablet Take 650 mg by mouth every four (4) hours as needed for Pain.      tamsulosin (FLOMAX) 0.4 mg capsule Take 2 Capsules by mouth daily. 30 Capsule 0    atorvastatin (LIPITOR) 40 mg tablet Take 1 Tab by mouth nightly. 30 Tab 0    metoprolol succinate (TOPROL-XL) 50 mg XL tablet Take 1 Tab by mouth daily.  (Patient taking differently: Take 100 mg by mouth daily.) 30 Tab 0       Wt Readings from Last 3 Encounters:   08/29/22 82.8 kg (182 lb 8.7 oz)   11/02/21 81.6 kg (180 lb)   10/11/21 76.7 kg (169 lb)       [email or web address] Vitals  BP (!) 115/48   Pulse 73   Temp 97.7 °F (36.5 °C)   Resp 16   Ht 5' 8\" (1.727 m)   Wt 82.8 kg (182 lb 8.7 oz)   SpO2 99%   BMI 27.76 kg/m²       ASSESSMENT     Skin impairment Identification:  Type: pressure    Contributing Factors: decreased mobility        Wound Ankle Right;Medial;Inner Eschar (Active)   Wound Image   08/29/22 1500   Wound Etiology Pressure Unstageable 08/29/22 1500   Cleansed Wound cleanser 08/29/22 1500   Dressing/Treatment Alginate;Gauze dressing/dressing sponge;Roll gauze;Coban/self-adherent bandages 08/29/22 1500   Wound Length (cm) 6 cm 08/29/22 1500   Wound Width (cm) 6.5 cm 08/29/22 1500   Wound Surface Area (cm^2) 39 cm^2 08/29/22 1500   Wound Assessment Eschar dry 08/29/22 1500   Drainage Amount None 08/29/22 1500   Drainage Description Serosanguinous 08/29/22 0800   Wound Odor Mild 08/29/22 1500   Neha-Wound/Incision Assessment Blanchable erythema 08/29/22 1500   Edges Defined edges 08/29/22 1500   Wound Thickness Description Full thickness 08/29/22 1500   Number of days: 2       Wound Heel Right;Lateral 08/29/22 (Active)   Wound Image   08/29/22 1500   Wound Etiology Pressure Unstageable 08/29/22 1500   Cleansed Wound cleanser 08/29/22 1500   Dressing/Treatment Alginate;Gauze dressing/dressing sponge;Roll gauze;Coban/self-adherent bandages 08/29/22 1500   Wound Length (cm) 7 cm 08/29/22 1500   Wound Width (cm) 7.2 cm 08/29/22 1500   Wound Surface Area (cm^2) 50.4 cm^2 08/29/22 1500   Wound Assessment Eschar dry;Eschar moist 08/29/22 1500   Drainage Amount Moderate 08/29/22 1500   Drainage Description Brown 08/29/22 1500   Wound Odor Moderate 08/29/22 1500   Neha-Wound/Incision Assessment Blanchable erythema 08/29/22 1500   Edges Defined edges 08/29/22 1500   Wound Thickness Description Full thickness 08/29/22 1500   Number of days: 1       Wound Foot Right;Lateral (Active)   Wound Image   08/29/22 1500   Wound Etiology Pressure Unstageable 08/29/22 1500   Cleansed Wound cleanser 08/29/22 1500   Dressing/Treatment Alginate;Gauze dressing/dressing sponge;Roll gauze;Coban/self-adherent bandages 08/29/22 1500   Wound Length (cm) 2.2 cm 08/29/22 1500   Wound Width (cm) 2.5 cm 08/29/22 1500   Wound Surface Area (cm^2) 5.5 cm^2 08/29/22 1500   Wound Assessment Eschar dry 08/29/22 1500   Drainage Amount None 08/29/22 1500   Wound Odor None 08/29/22 1500   Neha-Wound/Incision Assessment Intact 08/29/22 1500   Edges Defined edges 08/29/22 1500   Wound Thickness Description Full thickness 08/29/22 1500   Number of days: 1              PLAN     Skin Care & Pressure Relief Recommendations  Minimize layers of linen  Pads under patient to optimize support surface  Turn/reposition approximately every 2 hours  Pillow wedges  Promote continence; Skin Protective lotion/cream to buttocks and sacrum daily and as needed with incontinence care  Offload heels pillows    Recommendations: If not made palliative patient needs referal to ortho or podiatry for debridement.     Teaching completed with:   [] Patient           [] Family member       [] Caregiver          [x] Nursing  [] Other    Patient/Caregiver Teaching:  Level of patient/caregiver understanding able to:   [x] Indicates understanding       [] Needs reinforcement  [] Unsuccessful      [] Verbal Understanding  [] Demonstrated understanding       [] No evidence of learning  [] Refused teaching         [] N/A       Electronically signed by Nancy Mora RN on 8/30/2022 at 9:16 AM

## 2022-08-30 NOTE — PROGRESS NOTES
[facility]  Pulmonary, Critical Care, and Sleep Medicine    Name: Lisa Perez.  MRN: [id number]   : 1945 Hospital: [facility] [place]    Date: 2022  Room: [address] [place] [address] Note                                              Consult requesting physician: Dr. Dr Ottoniel Benson  Reason for Consult: Strangulated bowel with complications, shock, respiratory failure       IMPRESSION:   Strangulated bowel/inguinal hernia  Acute respiratory failure with hypoxemia  Septic shock  High gap metabolic acidosis  Acute renal failure  Right inguinal hernia  Hyperkalemia  Sepsis  Altered mentation  Leukocytosis  Anemia  UTI  Right pleural effusion  Chronic Mccray catheter  Right foot ulcers  Type 2 diabetes mellitus  Paroxysmal atrial fibrillation  Left below-knee amputation  Severe malnutrition      Patient Active Problem List   Diagnosis Code    DM2 (diabetes mellitus, type 2) (Formerly [facility]) E11.9    CAD (coronary artery disease) I25.10    Acute renal failure (ARF) ([facility]ly [facility]) N17.9    CKD (chronic kidney disease) stage 3, GFR 30-59 ml/min ([facility]ly [facility]) N18.30    Elevated brain natriuretic peptide (BNP) level R79.89    UTI (urinary tract infection) N39.0    High anion gap metabolic acidosis K23.1    Hyperkalemia E87.5    Sepsis ([facility]) A41.9    AMS (altered mental status) R41.82    Strangulated inguinal hernia K40.30    Right inguinal hernia K40.90    Leukocytosis D72.829    Anemia D64.9    Chronic indwelling Mccray catheter Z97.8    S/P BKA (below knee amputation), left ([facility]) Z89.512    Right foot ulcer ([facility]) L97.519    Paroxysmal atrial fibrillation ([facility]) I48.0    Diabetic ulcer of right foot ([facility]) E11.621, L97.519    Pleural effusion on right J90    Severe protein-calorie malnutrition ([facility]) E43    Acute respiratory failure with hypoxemia ([facility]) J96.01    Septic shock ([facility]) A41.9, R65.21         Code status: Full Code      RECOMMENDATIONS:   Patient is critically ill and presenting with large right inguinal strangulated hernia, with anemia, metabolic acidosis, acute renal failure and hyperkalemia. S/p laparotomy and reduction of incarcerated right groin hernia without need for bowel resection-8/27/2022. Respiratory: Patient remains intubated postop due to critically ill state, hemodynamic instability and metabolic derangements. Clinically, no bloody secretions noted from ET tube today morning. VCV ventilation-FiO2 down to 30%; PEEP 5. ABGstable 7.40/22/125/99  CXR 8/30  IMPRESSION  1. Endotracheal tube approximately 4 cm above the alise. 2. No significant change in right basilar opacity consistent with pleural  effusion with adjacent consolidation, atelectasis or pneumonia. IIncrease diuresis check BNP  Add midodrine  SBT today did well for 1 hrs   Continue ventilator and sedation bundles. Sedation-midazolam infusion; prn Dilaudid; patient dropped blood pressure in ER with fentanyl. CT abdomen on admission-right lower lung shows large right-sided pleural effusion with underlying atelectasis of the right lower lobe segments. Keep SPO2 >=92%. HOB 30 degree elevation all the time. Aggressive pulmonary toileting. Aspiration precautions. CVS: Patient off pressors;BP BP but needs diuresis add midorine low dose    wean for systolic blood pressure greater than 95 mmHg. Patient currently in atrial fibrillation. Prior Echo showed normal LV function; no pulmonary hypertension reported. Repeat echocardiogram diastolic dysfunction Ef normal   Troponin not elevated; proBNP elevated. Patient likely has diastolic CHF with chronic diabetes and hypertension. ID: Wbc improved 8.9  Patient likely has bowel sepsis; lactic acid not elevated. Necrotic lower extremity possible second source of infection   UA-Positive for UTI; urine culture-pending. Continue broad-spectrum antibiotic-levofloxacin, Zosyn and IV vancomycin-renal dosing. Blood cultures negative; rapid COVID test negative.   Deescalate antibiotic when appropriate. Hematology/Oncology: Patient s/p 1 unit PRBC in the ER. Hemoglobin 7.4 this morning. Platelets-reactive thrombocytosis. INR 1.4 this morning; plan for 5 mg vitamin K IV. Start sq heaprin for dvt prophylaxis and monitor hb  Renal: metabolic acidosis better stop iv start oral bicarbonate   Contineu albumins malnutrition   Nephrology consulted-Dr. Jake Smith. GI/: Chronic Mccray catheter-changed in ER  LFTs and lipase-normal.  Albumin-low. CT abdomen/pelvis- Large right inguinal hernia with large and small bowel. There is diffuse wall  thickening of the ascending colon concerning for strangulation. No evidence for bowel obstruction. S/p exploratory laparotomy, reduction of incarcerated hernia; bowel was viable, and did not need resection. Endocrine: Monitor BS-improved; continue sliding scale insulin. Hemoglobin A1c-5.9 on admission. Check TSH. Neurology: Acute metabolic encephalopathy from sepsis. CT head-nil acute. Skin/Wound: Right foot ulcer-wound care consulted. Electrolytes: Replace electrolytes per ICU electrolyte replacement protocol; caution renal failure. IVF: Bicarb drip 75 ml per hour; albumin infusion every 6 hours. Nutrition: N.p.o. for now. Prophylaxis: DVT Prophylaxis: None- due to anemia, left BKA, right foot diabetic ulcers. GI Prophylaxis: Protonix. Restraints: Prn Wrist soft restraints for patient interfering with medical therapy/management and patient safety. Lines/Tubes: PIV, midline  ET tube 8/27  OG tube 8/27  Mccray: 8/27 changed in ER (Medically necessary for strict input/output monitoring in critically ill patient, will remove it when not needed. Mccray bundle followed). Advance Directive/Palliative Care: consulted; overall prognosis appears to be poor. Full code status per family wishes. Quality Care: PPI, DVT prophylaxis, HOB elevated, Infection control all reviewed and addressed. Care of plan d/w icu RT and RN.   Update family today when available. High complexity decision making was performed during the evaluation of this patient at high risk for decompensation with multiple organ involvement. Total critical care time spent rendering care exclusive of procedures/family discussion/coordination of care: 40 mins  Family meeting            Subjective/History of Present Illness:     Patient is a 78 y.o. male with PMHx significant for multiple medical problems. He history of atrial flutter/atrial fibrillation, coronary artery disease, chronic kidney disease stage III, diabetes mellitus type 2 with leg ulcerations, history of CHF, hypertension and hyperlipidemia, history of peripheral vascular disease, history of prior CVA. Patient was admitted October 2021 with evidence of left leg diabetic ulcerations and gangrene. He subsequently underwent left below-knee amputation. Patient has chronic indwelling Mccray catheter. The patient has come to the ER brought by family/wife acutely unwell, confused and agitated. The patient has been found to have severe right-sided inguinal hernia, and subsequent CT abdomen/pelvis study showing evidence of strangulation of the bowel. The patient is also anemic, and severe metabolic acidosis, and acute renal failure. Urine output is poor from the Mccray catheter, and appears to be purulent. Mccray catheter has been replaced in the ER. S/p EXPLORATORY LAPAROTOMY, REDUCTION INCARCERATED RIGHT GROIN HERNIA WITH HERNIA REPAIR WITH MESH 8/27/2022 - Dr Jon Velasquez  The bowel was completely viable and did not require resection. 8/30/2022  Patient seen in ICU. Intubated off sedation following simple commands  Change to precedex  [facility]  Family meeting today   Consulted ortho  S/p left BKA 10/28/2021    Review of Systems:  ROS not obtained due to patient factor.        No Known Allergies   Past Medical History:   Diagnosis Date    A-fib ([facility] [address]     Asthma     CAD (coronary artery disease)     Chronic kidney disease     stage 3    COVID-19     Diabetes (HCC)     GERD (gastroesophageal reflux disease)     Heart failure (HCC)     chronic diastolic heart failure    High cholesterol     Hypertension     Ill-defined condition     chronic osteomyelitis left ankle and foot    Ill-defined condition     chronic arteriosclerosis    PVD (peripheral vascular disease) ([facility] [address]     Stroke [facility])     cva      Past Surgical History:   Procedure Laterality Date    COLONOSCOPY N/A 6/1/2018    COLONOSCOPY, POLYPECTOMY performed by Venancio Jean MD at THE [facility] ENDOSCOPY    HX CATARACT REMOVAL      HX ORTHOPAEDIC Left 2021    ankle washout, external fixator, would vac    HX ORTHOPAEDIC      external fixator removed      Social History     Tobacco Use    Smoking status: Former    Smokeless tobacco: Never   Substance Use Topics    Alcohol use: Not Currently      History reviewed. No pertinent family history. Prior to Admission medications    Medication Sig Start Date End Date Taking? Authorizing Provider   levoFLOXacin (Levaquin) 750 mg tablet Take 1 Tablet by mouth daily. 6/18/22   Janene Domínguez MD   insulin lispro (HUMALOG) 100 unit/mL injection INITIATE INSULIN CORRECTIVE PROTOCOL: Normal Insulin Sensitivity   For Blood Sugar (mg/dL) of:     Less than 150 =   0 units           150 -199 =   2 units  200 -249 =   4 units  250 -299 =   6 units  300 -349 =   8 units  350 and above = 10 units and Call Physician  If 2 glucose readings are above 200 mg/dL within a 24 hr period, proceed to \"Insulin Resistant\" dosing. Initiate Hypoglycemia protocol if blood glucose is <70 mg/dL Fast Acting - Administer Immediately - or within 15 minutes of start of meal, if mealtime coverage. 11/3/21   Leny Calderon MD   gabapentin (NEURONTIN) 300 mg capsule Take 1 Capsule by mouth three (3) times daily. Max Daily Amount: 900 mg. 11/3/21   Leny Calderon MD   clopidogreL (Plavix) 75 mg tab Take  by mouth daily.  Indications: afib    Provider, Historical   B.infantis-B.ani-B.long-B.bifi (Probiotic 4X) 10-15 mg TbEC Take  by mouth daily. Provider, Historical   multivitamin (ONE A DAY) tablet Take 1 Tablet by mouth daily. Provider, Historical   acetaminophen (TylenoL) 325 mg tablet Take 650 mg by mouth every four (4) hours as needed for Pain. Provider, Historical   tamsulosin (FLOMAX) 0.4 mg capsule Take 2 Capsules by mouth daily. 5/25/21   Suri Velez MD   atorvastatin (LIPITOR) 40 mg tablet Take 1 Tab by mouth nightly. 2/15/18   Monster Reddy PA-C   metoprolol succinate (TOPROL-XL) 50 mg XL tablet Take 1 Tab by mouth daily. Patient taking differently: Take 100 mg by mouth daily.  2/16/18   Monster Reddy PA-C     Current Facility-Administered Medications   Medication Dose Route Frequency    potassium chloride 10 mEq in 100 ml IVPB  10 mEq IntraVENous Q1H    sodium bicarbonate tablet 650 mg  650 mg Oral TID    0.9% sodium chloride infusion  50 mL/hr IntraVENous CONTINUOUS    insulin glargine (LANTUS) injection 15 Units  15 Units SubCUTAneous DAILY    arformoteroL (BROVANA) neb solution 15 mcg  15 mcg Nebulization BID RT    [Held by provider] heparin (porcine) injection 5,000 Units  5,000 Units SubCUTAneous Q8H    piperacillin-tazobactam (ZOSYN) 3.375 g in 0.9% sodium chloride (MBP/ADV) 100 mL MBP  3.375 g IntraVENous Q12H    [Held by provider] midazolam in normal saline (VERSED) 1 mg/mL infusion  0-5 mg/hr IntraVENous TITRATE    Vancomycin - Rx to dose and monitor  1 Each Other Rx Dosing/Monitoring    levoFLOXacin (LEVAQUIN) 500 mg in D5W IVPB  500 mg IntraVENous Q48H    albumin human 25% (BUMINATE) solution 12.5 g  12.5 g IntraVENous Q6H    pantoprazole (PROTONIX) 40 mg in 0.9% sodium chloride 10 mL injection  40 mg IntraVENous Q12H    sodium chloride (NS) flush 5-40 mL  5-40 mL IntraVENous Q8H    insulin lispro (HUMALOG) injection   SubCUTAneous Q6H    chlorhexidine (PERIDEX) 0.12 % mouthwash 10 mL  10 mL Oral Q12H    [Held by provider] vasopressin (VASOSTRICT) 20 Units in 0.9% sodium chloride 100 mL infusion  0-0.04 Units/min IntraVENous TITRATE    [Held by provider] NOREPINephrine (LEVOPHED) 8 mg in 5% dextrose 250mL (32 mcg/mL) infusion  2-16 mcg/min IntraVENous TITRATE         Objective:   Vital Signs:    Visit Vitals  BP (!) 115/48   Pulse 73   Temp 97.7 °F (36.5 °C)   Resp 16   Ht 5' 8\" (1.727 m)   Wt 82.8 kg (182 lb 8.7 oz)   SpO2 99%   BMI 27.76 kg/m²       O2 Device: Ventilator       Temp (24hrs), Av.2 °F (36.8 °C), Min:96.3 °F (35.7 °C), Max:100.6 °F (38.1 °C)       Intake/Output:   Last shift:      No intake/output data recorded.     Last 3 shifts:  1901 -  0700  In: 4487.1 [I.V.:3618.3]  Out: 1175 [Urine:1175]      Intake/Output Summary (Last 24 hours) at 2022 1025  Last data filed at 2022 0645  Gross per 24 hour   Intake 3084.7 ml   Output 625 ml   Net 2459.7 ml         Last 3 Recorded Weights in this Encounter    22 1021 22 1107 22 0940   Weight: 38.6 kg (85 lb) 38.6 kg (85 lb) 82.8 kg (182 lb 8.7 oz)       Physical Exam:   Patient appears chronically ill; currently intubated;comfortable off sedation opens eyes and follows simple commands   HEENT: pupils not dilated, reactive, no scleral jaundice, moist oral mucosa, no nasal drainage  Neck: No adenopathy or thyroid swelling  Orotracheal and orogastric tubes-no bleeding or secretions  CVS: S1S2 no murmurs; JVD not elevated; telemetry-sinus rhythm  RS: Mod air entry bilaterally, decreased BS right lower chest, no obvious wheezes or crackles; not tachypneic or in distress  Abd: soft, nontender, not distended, no guarding or rigidity, bowel sounds present, no hepatosplenomegaly  Right lower abdomen laparotomy scar-no bleeding or hematoma  Neuro: Intubated and sedated; limited exam    Extrm: no leg edema or swelling or clubbing; left below-knee amputation  Skin: Large ulceration right medial malleolus and right heel, black in appearance, no discharge    Lymphatic: no cervical or supraclavicular adenopathy  Groin: Large right inguinal hernia no longer present      Data:       Recent Results (from the past 12 hour(s))   GLUCOSE, POC    Collection Time: 08/29/22 11:34 PM   Result Value Ref Range    Glucose (POC) 223 (H) 70 - 110 mg/dL   BLOOD GAS, ARTERIAL POC    Collection Time: 08/30/22  4:29 AM   Result Value Ref Range    Device: AEROSOL MASK      FIO2 (POC) 30 %    pH (POC) 7.44 7.35 - 7.45      pCO2 (POC) 24.7 (L) 35.0 - 45.0 MMHG    pO2 (POC) 126 (H) 80 - 100 MMHG    HCO3 (POC) 16.9 (L) 22 - 26 MMOL/L    sO2 (POC) 99.1 (H) 92 - 97 %    Base deficit (POC) 6.5 mmol/L    Mode Volume Control      Tidal volume 400 ml    Set Rate 20 bpm    PEEP/CPAP (POC) 5 cmH2O    PIP (POC) 18      Allens test (POC) Positive      Site LEFT RADIAL      Patient temp. 98.2      Specimen type (POC) ARTERIAL      Performed by Justo Car    GLUCOSE, POC    Collection Time: 08/30/22  5:00 AM   Result Value Ref Range    Glucose (POC) 237 (H) 70 - 110 mg/dL   MAGNESIUM    Collection Time: 08/30/22  5:31 AM   Result Value Ref Range    Magnesium 1.6 1.6 - 2.6 mg/dL   PROTHROMBIN TIME + INR    Collection Time: 08/30/22  5:31 AM   Result Value Ref Range    Prothrombin time 16.6 (H) 11.5 - 15.2 sec    INR 1.3 (H) 0.8 - 1.2     CALCIUM, IONIZED    Collection Time: 08/30/22  5:31 AM   Result Value Ref Range    Ionized Calcium 1.04 (L) 1.12 - 1.32 MMOL/L   PHOSPHORUS    Collection Time: 08/30/22  5:31 AM   Result Value Ref Range    Phosphorus 3.3 2.5 - 4.9 MG/DL   CBC WITH AUTOMATED DIFF    Collection Time: 08/30/22  5:31 AM   Result Value Ref Range    WBC 8.9 4.6 - 13.2 K/uL    RBC 2.85 (L) 4.35 - 5.65 M/uL    HGB 7.4 (L) 13.0 - 16.0 g/dL    HCT 22.3 (L) 36.0 - 48.0 %    MCV 78.2 78.0 - 100.0 FL    MCH 26.0 24.0 - 34.0 PG    MCHC 33.2 31.0 - 37.0 g/dL    RDW 19.7 (H) 11.6 - 14.5 %    PLATELET 697 651 - 044 K/uL    MPV 9.3 9.2 - 11.8 FL    NRBC 0.0 0  WBC    ABSOLUTE NRBC 0. 00 0.00 - 0.01 K/uL    NEUTROPHILS 83 (H) 40 - 73 %    LYMPHOCYTES 8 (L) 21 - 52 %    MONOCYTES 8 3 - 10 %    EOSINOPHILS 1 0 - 5 %    BASOPHILS 0 0 - 2 %    IMMATURE GRANULOCYTES 0 %    ABS. NEUTROPHILS 7.4 1.8 - 8.0 K/UL    ABS. LYMPHOCYTES 0.7 (L) 0.9 - 3.6 K/UL    ABS. MONOCYTES 0.7 0.05 - 1.2 K/UL    ABS. EOSINOPHILS 0.1 0.0 - 0.4 K/UL    ABS. BASOPHILS 0.0 0.0 - 0.1 K/UL    ABS. IMM. GRANS. 0.0 K/UL    DF MANUAL      RBC COMMENTS ANISOCYTOSIS  2+        RBC COMMENTS POIKILOCYTOSIS  1+        RBC COMMENTS ARCENIO CELLS  1+        RBC COMMENTS OVALOCYTES  1+        RBC COMMENTS SCHISTOCYTES  OCCASIONAL       METABOLIC PANEL, COMPREHENSIVE    Collection Time: 08/30/22  5:31 AM   Result Value Ref Range    Sodium 135 (L) 136 - 145 mmol/L    Potassium 2.9 (LL) 3.5 - 5.5 mmol/L    Chloride 102 100 - 111 mmol/L    CO2 18 (L) 21 - 32 mmol/L    Anion gap 15 3.0 - 18 mmol/L    Glucose 199 (H) 74 - 99 mg/dL     (H) 7.0 - 18 MG/DL    Creatinine 3.82 (H) 0.6 - 1.3 MG/DL    BUN/Creatinine ratio 32 (H) 12 - 20      GFR est AA 19 (L) >60 ml/min/1.73m2    GFR est non-AA 16 (L) >60 ml/min/1.73m2    Calcium 7.6 (L) 8.5 - 10.1 MG/DL    Bilirubin, total 1.4 (H) 0.2 - 1.0 MG/DL    ALT (SGPT) 15 (L) 16 - 61 U/L    AST (SGOT) 20 10 - 38 U/L    Alk.  phosphatase 105 45 - 117 U/L    Protein, total 4.4 (L) 6.4 - 8.2 g/dL    Albumin 2.0 (L) 3.4 - 5.0 g/dL    Globulin 2.4 2.0 - 4.0 g/dL    A-G Ratio 0.8 0.8 - 1.7             Chemistry Recent Labs     08/30/22  0531 08/29/22  0928 08/29/22  0508 08/28/22  0804 08/28/22  0117   * 161* 161*  --  233*   * 134* 135*  --  135*   K 2.9* 3.7 4.0 4.9 5.2    104 105  --  107   CO2 18* 17* 16*  --  12*   * 126* 130*  --  150*   CREA 3.82* 3.96* 4.00*  --  4.11*   CA 7.6* 7.7* 7.8*  --  7.8*   MG 1.6  --  1.9 2.1 2.2   PHOS 3.3  --  3.8 4.4 4.8   AGAP 15 13 14  --  16   BUCR 32* 32* 33*  --  36*     --  90  --  108   TP 4.4*  --  4.7*  --  5.8*   ALB 2.0*  --  2.1* --  2.1*   GLOB 2.4  --  2.6  --  3.7   AGRAT 0.8  --  0.8  --  0.6*          Lactic Acid Lactic acid   Date Value Ref Range Status   08/27/2022 1.7 0.4 - 2.0 MMOL/L Final     Recent Labs     08/27/22  1838 08/27/22  1030   LAC 1.7 1.6          Liver Enzymes Protein, total   Date Value Ref Range Status   08/30/2022 4.4 (L) 6.4 - 8.2 g/dL Final     Albumin   Date Value Ref Range Status   08/30/2022 2.0 (L) 3.4 - 5.0 g/dL Final     Globulin   Date Value Ref Range Status   08/30/2022 2.4 2.0 - 4.0 g/dL Final     A-G Ratio   Date Value Ref Range Status   08/30/2022 0.8 0.8 - 1.7   Final     Alk.  phosphatase   Date Value Ref Range Status   08/30/2022 105 45 - 117 U/L Final     Recent Labs     08/30/22  0531 08/29/22  0508 08/28/22  0117   TP 4.4* 4.7* 5.8*   ALB 2.0* 2.1* 2.1*   GLOB 2.4 2.6 3.7   AGRAT 0.8 0.8 0.6*    90 108          CBC w/Diff Recent Labs     08/30/22  0531 08/29/22  1941 08/29/22  0928 08/29/22  0508   WBC 8.9  --  9.4 11.0   RBC 2.85*  --  2.45* 2.53*   HGB 7.4* 7.4* 6.3* 6.3*   HCT 22.3* 22.4* 19.6* 20.5*     --  322 352   GRANS 83*  --  80* 85*   LYMPH 8*  --  6* 8*   EOS 1  --  1 0          Cardiac Enzymes No results found for: CPK, CK, CKMMB, CKMB, RCK3, CKMBT, CKNDX, CKND1, LEONEL, TROPT, TROIQ, WARD, TROPT, TNIPOC, BNP, BNPP     BNP No results found for: BNP, BNPP, XBNPT     Coagulation Recent Labs     08/30/22  0531 08/29/22  0508 08/28/22  0117   PTP 16.6* 16.5* 17.4*   INR 1.3* 1.3* 1.4*           Thyroid  Lab Results   Component Value Date/Time    TSH 4.73 (H) 08/28/2022 08:04 AM       No results found for: T4     Urinalysis Lab Results   Component Value Date/Time    Color YELLOW 08/27/2022 02:27 PM    Appearance TURBID 08/27/2022 02:27 PM    Specific gravity 1.019 08/27/2022 02:27 PM    pH (UA) 6.0 08/27/2022 02:27 PM    Protein >1,000 (A) 08/27/2022 02:27 PM    Glucose Negative 08/27/2022 02:27 PM    Ketone TRACE (A) 08/27/2022 02:27 PM    Bilirubin Negative 08/27/2022 02:27 PM Urobilinogen 1.0 08/27/2022 02:27 PM    Nitrites Negative 08/27/2022 02:27 PM    Leukocyte Esterase LARGE (A) 08/27/2022 02:27 PM    Epithelial cells FEW 08/27/2022 02:27 PM    Bacteria 1+ (A) 08/27/2022 02:27 PM    WBC TOO NUMEROUS TO COUNT 08/27/2022 02:27 PM    RBC 4 to 10 08/27/2022 02:27 PM          Culture data during this hospitalization. All Micro Results       Procedure Component Value Units Date/Time    CULTURE, BLOOD [[id number]] Collected: 08/27/22 1100    Order Status: Completed Specimen: Blood Updated: 08/30/22 0854     Special Requests: NO SPECIAL REQUESTS        Culture result: NO GROWTH 3 DAYS       CULTURE, BLOOD [[id number]] Collected: 08/27/22 1030    Order Status: Completed Specimen: Blood Updated: 08/30/22 0854     Special Requests: NO SPECIAL REQUESTS        Culture result: NO GROWTH 3 DAYS       CULTURE, URINE [[id number]] Collected: 08/27/22 1442    Order Status: Completed Specimen: Cath Urine Updated: 08/29/22 0642     Special Requests: NO SPECIAL REQUESTS        [place] Count --        35903  COLONIES/mL       Culture result:       >2 ORGANISMS - CONTAMINATED SPECIMEN. SUGGEST RECOLLECTION          CULTURE, BLOOD [[id number]]     Order Status: Canceled Specimen: Blood     COVID-19 RAPID TEST [[id number]] Collected: 08/27/22 1030    Order Status: Completed Specimen: Nasopharyngeal Updated: 08/27/22 1057     Specimen source SWAB        COVID-19 rapid test Not detected        Comment: Rapid Abbott ID Now       Rapid NAAT:  The specimen is NEGATIVE for SARS-CoV-2, the novel coronavirus associated with COVID-19. Negative results should be treated as presumptive and, if inconsistent with clinical signs and symptoms or necessary for patient management, should be tested with an alternative molecular assay. Negative results do not preclude SARS-CoV-2 infection and should not be used as the sole basis for patient management decisions.        This test has been authorized by the FDA under an Emergency Use Authorization (EUA) for use by authorized laboratories. Fact sheet for Healthcare Providers: ConventionUpdate.co.nz  Fact sheet for Patients: ConventionUpdate.co.nz       Methodology: Isothermal Nucleic Acid Amplification                  ECHO May 2021 Interpretation Summary  Result status: Final result  Contrast used: DEFINITY. Left Ventricle: Normal cavity size, wall thickness and systolic function (ejection fraction normal). The estimated EF is 55 - 60%. There is mild (grade 1) left ventricular diastolic dysfunction E'E= 10.41. Wall Scoring: The left ventricular wall motion is normal.  Tricuspid Valve: Tricuspid valve not well visualized. No stenosis. Tricuspid regurgitation is inadequate for estimation of right ventricular systolic pressure. Images report reviewed by me:  CT 8/27/2022 (Most Recent)  Results from Hospital Encounter encounter on 08/27/22    CT HEAD WO CONT    Narrative  EXAM: CT head    INDICATION: Altered mental status. COMPARISON: None. TECHNIQUE: Axial CT imaging of the head was performed without intravenous  contrast.    One or more dose reduction techniques were used on this CT: automated exposure  control, adjustment of the mAs and/or kVp according to patient size, and  iterative reconstruction techniques. The specific techniques used on this CT  exam have been documented in the patient's electronic medical record. Digital  Imaging and Communications in Medicine (DICOM) format image data are available  to nonaffiliated external healthcare facilities or entities on a secure, media  free, reciprocally searchable basis with patient authorization for at least a  12-month period after this study. Patient motion degrades image quality. _______________    FINDINGS:    BRAIN AND POSTERIOR FOSSA:  Mild periventricular areas of decreased attenuation are identified. Gray-white matter interfaces are preserved.   No intraparenchymal hemorrhage, mass effect or midline shift. The ventricular system is midline and symmetric. Atherosclerotic vascular calcifications are identified. EXTRA-AXIAL SPACES AND MENINGES:  There is no evidence for extra-axial mass lesion or fluid collection. CALVARIUM:  Intact. SINUSES:  Sphenoid sinus mucosal thickening. OTHER:  Orbits are grossly intact. Facial soft tissue are within normal limits. _______________    Impression  1. No evidence for intracranial hemorrhage, mass effect or midline shift. 2.  Mild periventricular areas of microvascular ischemic change. 3.  Cerebral atherosclerosis. 4.  Atrophy. If there are continued symptoms, a MRI is recommended for further evaluation. CT abdomen/pelvis 8/27/2022  IMPRESSION  1. Large right inguinal hernia with large and small bowel. There is diffuse wall  thickening of the ascending colon concerning for strangulation. No evidence for  bowel obstruction. 2. Right pleural effusion and bilateral basilar atelectasis. 3. Gastric distention. CXR reviewed by me:  XR 8/27 (Most Recent). Results from [place] encounter on 08/27/22    XR CHEST PORT    Narrative  EXAM:  PORTABLE CHEST    INDICATION:  Follow up. TECHNIQUE:  Portable, AP view. COMPARISON:  08/29/2022    ____________________    FINDINGS:    SUPPORT DEVICES: Endotracheal tube approximately 4 cm above the alise. OG tube  is traversing below left hemidiaphragm, tip not imaged. Esophageal probe is  present at the level of the endotracheal tube. HEART AND MEDIASTINUM: Stable. LUNGS AND PLEURAL SPACES: Persistent right basilar opacity consistent with  pleural effusion with adjacent consolidation. No pneumothorax. BONY THORAX AND SOFT TISSUES: No acute osseous abnormality. ____________________    Impression  1. Endotracheal tube approximately 4 cm above the alise.   2. No significant change in right basilar opacity consistent with pleural  effusion with adjacent consolidation, atelectasis or pneumonia. *  **         AXR 8/27  FINDINGS:   SUPPORT DEVICES: A nasogastric tube is positioned in the stomach. BOWEL GAS PATTERN: The gas pattern is nonobstructive. SOFT TISSUES: Unremarkable. BONES: Degenerative changes are seen throughout the spine. ADDITIONAL FINDINGS: None. IMPRESSION   No significant abnormality. Please note: Voice-recognition software may have been used to generate this report, which may have resulted in some phonetic-based errors in grammar and contents. Even though attempts were made to correct all the mistakes, some may have been missed, and remained in the body of the document.       Shayy Kim MD  8/30/2022

## 2022-08-30 NOTE — ANTIMICROBIAL STEWARDSHIP
Antimicrobial Stewardship Chart review: With focus on prescribed Antimicrobials, reviewed clinical presentation that includes fever and VS curve or trend, labs, Microbiology, imaging reports and notes as appropriate. Based on this brief analysis, here below are the suggestion. Diagnostic indication in chart for the Antimicrobial/s: strangulated inguinal hernia- post X-lap, septic shock of GI etiology likely, possible UTI    CURRENT ANTIMICROBIALS: Vanco/Zosyn and levofloxacin    DISCONTINUE THE FOLLOWING ANTIBIOTIC(S):  Levofloxacin, Vancomycin      Rationale:    (  )  No evidence of bacterial infection                          ( X )  Microbiology report does not support use                          ( X )  Narrowing broad-spectrum empiric coverage                          (  )  Therapeutic duplication: overlapping antimicrobial spectrum                          (  )  Clinical situation dictates change                          (  )  Prolonged duration of therapy not needed                          (  )  Allergy/toxicity/drug interaction present                          (  ) More clinically/cost effective therapy available  ADD ANTIBIOTICS  Rationale:        (  ) Microbiology report supports use                          (  ) Expanded coverage needed: gm positive /negative / anaerobic / atypical infection possible                          (  ) More clinicaly/cost effective therapy than current regimen                          (  ) Needed for synergy                          (  ) Double coverage needed                          (  ) Less toxic therapy                          (  ) Antifungal therapy needed    No  Change in Antibiotics: ( )                               COMMENTS:       This communication is not a consultation. If a thorough analysis of the case is desired, please request an ID consultation. Aidee Kang MD  [facility])  Office #:     [phone]  [phone] [phone] Office Fax: [phone]

## 2022-08-30 NOTE — PROGRESS NOTES
Hospitalist Progress Note    Patient: Ary Gil. MRN: [id number]  CSN: [id number]    YOB: 1945  Age: 78 y.o. Sex: male    DOA: 8/27/2022 LOS:  LOS: 3 days          Chief Complaint:    severe septic shock with multiorgan and hypotension involvement due to multiple possible sources of infection, severe hyperkalemia, acute metabolic encephalopathy, metabolic acidosis, strangulated left inguinal hernia, anemia requiring blood transfusion, mild hypercoagulable state    Assessment/Plan   78 y.o. 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 Admitted for severe septic shock with multiorgan and hypotension involvement due to multiple possible sources of infection, severe hyperkalemia, acute metabolic encephalopathy, metabolic acidosis, strangulated left inguinal hernia, anemia requiring blood transfusion, mild hypoxia coagulable state.      Severe septic shock with multi-organ and hypotension due to multiple possible sources of infection -pancultures, broad spectrum intravenous antibiotics, trending daily CBCs    Incarcerated right inguinal hernia -status post ex lap with reduction of incarcerated right groin hernia with hernia mesh    Infected sacral ulcer -weeping purulent material, wound consult    Infected medial maleolar and heel ulcers -weeping necrotic and purulent material, will need BKA    Urinary Tract Infection -cultures pending, IV broad-spectrum antibiotics    Anemia -s/p transfusion -trend CBC, transfuse if hemoglobin less than 7    Acute renal failure -likely due to profound shock, appreciate nephrology expertise, urine output improving    Electrolyte derangements, namely hyperkalemia -improved    Metabolic acidosis -improved continue bicarb drip    Acute encephalopathy -not improving, CT head negative    S/p intubation from OR -still sedated and vented, patient's respiratory status is improving today and so Dr. Wilburn Severin plans to try and extubate tomorrow    Family meeting today with patient's wife 2 sons and daughter along with the palliative care team and Dr. Wilburn Severin. Family asked appropriate questions about patient's status and care that he is receiving and overall care plan. They have decided to make him for DNR and I have specified that once patient is extubated tomorrow he will not be reintubated for any reason. Will be ongoing conversation with family after patient is safely extubated to determine if patient's mental status can improve to the point where he is able to make decisions about Ultibro infected ulcers were also necrotic on his leg and the need for a AKA. Patient is mentally not oriented enough to make that determination, a follow-up family meeting will be needed so that the patient's next of kin legal whom is his wife can ultimately make the decision about surgery versus comfort care/hospice    Prophylaxis - DVT: heparin, GI: protonix       Very poor prognosis.      Disposition : TBD  Patient Active Problem List   Diagnosis Code    DM2 (diabetes mellitus, type 2) ([facility] E11.9    CAD (coronary artery disease) I25.10    Acute renal failure (ARF) ([facility]) N17.9    CKD (chronic kidney disease) stage 3, GFR 30-59 ml/min ([facility]) N18.30    Elevated brain natriuretic peptide (BNP) level R79.89    UTI (urinary tract infection) N39.0    High anion gap metabolic acidosis B82.8    Hyperkalemia E87.5    Sepsis ([facility]) A41.9    AMS (altered mental status) R41.82    Strangulated inguinal hernia K40.30    Right inguinal hernia K40.90    Leukocytosis D72.829    Anemia D64.9    Chronic indwelling Mccray catheter Z97.8    S/P BKA (below knee amputation), left ([facility] Z89.512    Right foot ulcer ([facility] L97.519    Paroxysmal atrial fibrillation ([facility]) I48.0    Diabetic ulcer of right foot ([facility] E11.621, L97.519    Pleural effusion on right J90    Severe protein-calorie malnutrition ([facility]) E43    Acute respiratory failure with hypoxemia ([facility]) J96.01    Septic shock ([facility]) A41.9, R65.21       Subjective:    Vented, sedated    Review of systems:    UTO due to medical condition      Vital signs/Intake and Output:  Visit Vitals  BP (!) 128/52   Pulse 80   Temp 97.9 °F (36.6 °C)   Resp 16   Ht 5' 8\" (1.727 m)   Wt 82.8 kg (182 lb 8.7 oz)   SpO2 97%   BMI 27.76 kg/m²     Current Shift:  08/30 0701 - 08/30 1900  In: 150   Out: 175 [Urine:175]  Last three shifts:  08/28 1901 - 08/30 0700  In: 4487.1 [I.V.:3618.3]  Out: 1175 [Urine:1175]    Exam:    General: vented, sedated, ill-appearing, chornically ill, debilitated  Head/Neck: NCAT, supple, No masses, No lymphadenopathy  CVS:Regular rate and rhythm, no M/R/G, S1/S2 heard, no thrill  Lungs:Clear to auscultation bilaterally, no wheezes, rhonchi, or rales  Abdomen: midline surgical incision C/D/I, Soft, Nontender, No distention, Normal Bowel sounds, No hepatomegaly  Extremities: see skin below, surgical BKA LLE  Skin: pale, infected ulcers with black eschar medial malleolus and heel on right foot  Neuro: vented and sedated  Psych: vented and sedated                Labs: Results:       Chemistry Recent Labs     08/30/22  0531 08/29/22  0928 08/29/22  0508 08/28/22  0804 08/28/22  0117   * 161* 161*  --  233*   * 134* 135*  --  135*   K 2.9* 3.7 4.0   < > 5.2    104 105  --  107   CO2 18* 17* 16*  --  12*   * 126* 130*  --  150*   CREA 3.82* 3.96* 4.00*  --  4.11*   CA 7.6* 7.7* 7.8*  --  7.8*   AGAP 15 13 14  --  16   BUCR 32* 32* 33*  --  36*     --  90  --  108   TP 4.4*  --  4.7*  --  5.8*   ALB 2.0*  --  2.1*  --  2.1*   GLOB 2.4  --  2.6  --  3.7   AGRAT 0.8  --  0.8  --  0.6*    < > = values in this interval not displayed.         CBC w/Diff Recent Labs     08/30/22  0531 08/29/22  1941 08/29/22  0928 08/29/22  0508   WBC 8.9  --  9.4 11.0   RBC 2.85*  --  2.45* 2.53*   HGB 7.4* 7.4* 6.3* 6.3*   HCT 22.3* 22.4* 19.6* 20.5*     --  322 352   GRANS 83*  --  80* 85*   LYMPH 8*  --  6* 8*   EOS 1  --  1 0        Cardiac Enzymes No results for input(s): CPK, CKND1, LEONEL in the last 72 hours. No lab exists for component: CKRMB, TROIP   Coagulation Recent Labs     08/30/22  0531 08/29/22  0508   PTP 16.6* 16.5*   INR 1.3* 1.3*         Lipid Panel No results found for: CHOL, CHOLPOCT, CHOLX, CHLST, CHOLV, 942785, HDL, HDLP, LDL, LDLC, DLDLP, 787199, VLDLC, VLDL, TGLX, TRIGL, TRIGP, TGLPOCT, CHHD, CHHDX   BNP No results for input(s): BNPP in the last 72 hours.    Liver Enzymes Recent Labs     08/30/22  0531   TP 4.4*   ALB 2.0*           Thyroid Studies Lab Results   Component Value Date/Time    TSH 4.73 (H) 08/28/2022 08:04 AM        Procedures/imaging: see electronic medical records for all procedures/Xrays and details which were not copied into this note but were reviewed prior to creation of Bertha Hammond MD

## 2022-08-30 NOTE — DIABETES MGMT
Diabetes/ Glycemic Control Plan of Care  Recommendations:   Recommend increasing Lantus to 10 units every 24 hours  Humalog Very Insulin Resistant Corrective Coverage    Assessment: Blood sugars elevating over the last 24 hours with initiation of continuous tube feeding - 237 mg/dL this AM, with a total of 19 units insulin (5 basal + 14 corrective). Recent Glucose Results:   Lab Results   Component Value Date/Time     (H) 08/30/2022 05:31 AM     (H) 08/29/2022 09:28 AM    GLUCPOC 237 (H) 08/30/2022 05:00 AM    GLUCPOC 223 (H) 08/29/2022 11:34 PM    GLUCPOC 203 (H) 08/29/2022 06:00 PM          Within target range (70-180mg/dL):  NO  Current insulin orders:    Lantus 5 units daily   Corrective Humalog Q6 hours - normal sensitivity scale  Total Daily Dose previous 24 hours = 19 units     Plan/Goals:   Blood glucose will be within target of 70 - 180 mg/dl within 72 hours  Reinforce dietary and medication compliance at home.        Education:  [] Refer to Diabetes Education Record                       [x] Education not indicated at this time       Christine Paiz RN, BSN, 1 [facility] [employer]  Professional   Glycemic Control Team   Phone:  [phone]  Tues - Thurs 8:30 - 4:30

## 2022-08-31 NOTE — PROGRESS NOTES
Bedside report received from [facility]. Assumed care of pt at this time. Remains intubated. No command following. Responds to pain. Bedside shift change report given to Daryle Flow, RN (oncoming nurse) by Nighat MARTINS RN (offgoing nurse). Report included the following information SBAR, Kardex and MAR.

## 2022-08-31 NOTE — DIABETES MGMT
Diabetes/ Glycemic Control Plan of Care  Recommendations:   Recommend increasing Lantus to 20 units every 24 hours    Assessment: Patient discussed during ICU interdisciplinary rounds. Tube feedings to be held for extubation today. Plans are to increase Lantus to 20 units every 24 hours, to begin tomorrow. Recent Glucose Results:   Lab Results   Component Value Date/Time     (H) 08/31/2022 03:12 AM    GLUCPOC 230 (H) 08/31/2022 05:19 AM    GLUCPOC 195 (H) 08/31/2022 12:03 AM    GLUCPOC 117 (H) 08/30/2022 05:58 PM      Within target range (70-180mg/dL):  NO  Current insulin orders:    Lantus 15 units every 24 hours   Corrective Humalog Q6 hours - normal sensitivity scale  Total Daily Dose previous 24 hours = 30 units     Plan/Goals:   Blood glucose will be within target of 70 - 180 mg/dl within 72 hours  Reinforce dietary and medication compliance at home.        Education:  [] Refer to Diabetes Education Record                       [x] Education not indicated at this time       Shelly Nielsen RN, BSN, 1 [facility] [employer]  Professional   Glycemic Control Team   Phone:  [phone]  Tues - Thurs 8:30 - 4:30

## 2022-08-31 NOTE — PROGRESS NOTES
Noted pt was being seen by [facility] prior to admission. Pt remains intubated and has been made a DNR. CM to continue to follow pt's progress and assist with care transition when medically stable to transition from an acute care setting.

## 2022-08-31 NOTE — PROGRESS NOTES
[facility]  (A Division of [address] [place] [address])                                                                                                                      Chago Espinoza MD  Office #: - Option # [phone]  Fax #: [phone]     Date of Admission: 8/27/2022Date of Note: 8/31/2022  Reason for Referral: Evaluation and antibiotic management of septic shock. Current Antimicrobials:    Prior Antimicrobials:    Zosyn 8/27 to date # D 4   Levofloxacin / Vancomycin 8/27 to 8/30       Assessment- ID related:  --------------------------------------------------------------------------  Critically ill patient:    Septic shock with ELINOR/ resp failure and encephalopathy 2/2 below  Strangulated and incarcerated R groin hernia -post X-lap 8/27- viable bowel  Leukemoid reaction- Improved  BCX:  NGSF 8/27  UCX: >2 organism on cx  BL lung infiltrates due to atelectasis/ consolidation /has pleural effusion   PAD  Unstagable eschar, skin necrosis, on RLE  L BKA  DMT2  S/P resp failure- post op, remains intubated   Recommendation for ID issues I am following:  ------------------------------------------------------------------------------  DW ICU RN    Cont to cover broadly with Zosyn- will complete emperic course  --procal level in am    Adjust abx dose to CrCl      With his PAD, wound healing unlikely on his RLE-- dont' think the source of his septic episode at this time. consider vascular consultation           Subjective:  Pt seen in ICU. Remains intubated, off sedation but not waking up except some shaking of his head during exam. CT head pending    Afebrile, SBT on going. Vent setting at lower oxygen levels  Notes/labs- reviewed . CXR this am:  Moderate right pleural effusion/atelectasis. HPI:  Lisa Drummondz. is a 78 y.o.  WHITE/NON- with PMH DM, hx of stroke, PAD- with L BKA --admitted in septic shock, leukmoid reaction and acute renal failure on 8/27 from strangulated/incarcerated r groin hernia. Underwent X-lap, bowel was viable and no bowel resection done. Improved septic shock with surgery and triple ABX-- bcx negative. Has BL lung infiltrate with effusion as well on CXR. Nexrotic/eschar wound lesion on RLE. Intubated but off pressor at this time.           Active Hospital Problems    Diagnosis Date Noted    Acute respiratory failure with hypoxemia ([address] 08/28/2022    Septic shock ([address] 08/28/2022    High anion gap metabolic acidosis 62/99/4137    Hyperkalemia 08/27/2022    Sepsis ([address] 08/27/2022    AMS (altered mental status) 08/27/2022    Strangulated inguinal hernia 08/27/2022    Right inguinal hernia 08/27/2022    Leukocytosis 08/27/2022    Anemia 08/27/2022    Chronic indwelling Mccray catheter 08/27/2022    S/P BKA (below knee amputation), left ([address] 08/27/2022    Right foot ulcer ([address] 08/27/2022    Paroxysmal atrial fibrillation ([address] 08/27/2022    Diabetic ulcer of right foot ([address] 08/27/2022    Pleural effusion on right 08/27/2022    Severe protein-calorie malnutrition ([address] 08/27/2022    UTI (urinary tract infection) 10/29/2021    Acute renal failure (ARF) ([address] 05/29/2018     Past Medical History:   Diagnosis Date    A-fib ([address]     Asthma     CAD (coronary artery disease)     Chronic kidney disease     stage 3    COVID-19     Diabetes (HCC)     GERD (gastroesophageal reflux disease)     Heart failure (HCC)     chronic diastolic heart failure    High cholesterol     Hypertension     Ill-defined condition     chronic osteomyelitis left ankle and foot    Ill-defined condition     chronic arteriosclerosis    PVD (peripheral vascular disease) ([address]     Stroke ([address]     cva     Past Surgical History:   Procedure Laterality Date    COLONOSCOPY N/A 6/1/2018    COLONOSCOPY, POLYPECTOMY performed by Glenn Noel MD at THE [facility] ENDOSCOPY    HX CATARACT REMOVAL      HX ORTHOPAEDIC Left 2021    ankle washout, external fixator, would vac    HX ORTHOPAEDIC      external fixator removed     History reviewed. No pertinent family history. Social History     Socioeconomic History    Marital status:      Spouse name: Not on file    Number of children: Not on file    Years of education: Not on file    Highest education level: Not on file   Occupational History    Not on file   Tobacco Use    Smoking status: Former    Smokeless tobacco: Never   Substance and Sexual Activity    Alcohol use: Not Currently    Drug use: No    Sexual activity: Not on file   Other Topics Concern    Not on file   Social History Narrative    Not on file     Social Determinants of Health     Financial Resource Strain: Not on file   Food Insecurity: Not on file   Transportation Needs: Not on file   Physical Activity: Not on file   Stress: Not on file   Social Connections: Not on file   Intimate Partner Violence: Not on file   Housing Stability: Not on file       Allergies:  Patient has no known allergies.      Medications:  Current Facility-Administered Medications   Medication Dose Route Frequency    [START ON 9/1/2022] insulin glargine (LANTUS) injection 20 Units  20 Units SubCUTAneous DAILY    furosemide (LASIX) injection 20 mg  20 mg IntraVENous BID    magnesium sulfate 1 g/100 ml IVPB (premix or compounded)  1 g IntraVENous ONCE    ELECTROLYTE REPLACEMENT PROTOCOL - Potassium Renal Dosing  1 Each Other PRN    sodium bicarbonate tablet 650 mg  650 mg Oral TID    0.9% sodium chloride infusion  5 mL/hr IntraVENous CONTINUOUS    ELECTROLYTE REPLACEMENT PROTOCOL - Potassium Standard Dosing   1 Each Other PRN    ELECTROLYTE REPLACEMENT PROTOCOL - Magnesium   1 Each Other PRN    ELECTROLYTE REPLACEMENT PROTOCOL  - Phosphorus Renal Dosing  1 Each Other PRN    ELECTROLYTE REPLACEMENT PROTOCOL - Calcium   1 Each Other PRN    lactulose (CHRONULAC) 10 gram/15 mL solution 15 mL  15 mL Oral BID    midodrine (PROAMATINE) tablet 2.5 mg  2.5 mg Oral BID    albumin human 25% (BUMINATE) solution 25 g  25 g IntraVENous Q6H    midazolam (VERSED) injection 0.5 mg  0.5 mg IntraVENous BID PRN    arformoteroL (BROVANA) neb solution 15 mcg  15 mcg Nebulization BID RT    0.9% sodium chloride infusion 250 mL  250 mL IntraVENous PRN    0.9% sodium chloride infusion 250 mL  250 mL IntraVENous PRN    heparin (porcine) injection 5,000 Units  5,000 Units SubCUTAneous Q8H    piperacillin-tazobactam (ZOSYN) 3.375 g in 0.9% sodium chloride (MBP/ADV) 100 mL MBP  3.375 g IntraVENous Q12H    0.9% sodium chloride infusion 250 mL  250 mL IntraVENous PRN    pantoprazole (PROTONIX) 40 mg in 0.9% sodium chloride 10 mL injection  40 mg IntraVENous Q12H    sodium chloride (NS) flush 5-40 mL  5-40 mL IntraVENous Q8H    sodium chloride (NS) flush 5-40 mL  5-40 mL IntraVENous PRN    acetaminophen (TYLENOL) tablet 650 mg  650 mg Oral Q6H PRN    Or    acetaminophen (TYLENOL) suppository 650 mg  650 mg Rectal Q6H PRN    polyethylene glycol (MIRALAX) packet 17 g  17 g Oral DAILY PRN    ondansetron (ZOFRAN ODT) tablet 4 mg  4 mg Oral Q8H PRN    Or    ondansetron (ZOFRAN) injection 4 mg  4 mg IntraVENous Q6H PRN    magnesium sulfate 2 g/50 ml IVPB (premix or compounded)  2 g IntraVENous PRN    insulin lispro (HUMALOG) injection   SubCUTAneous Q6H    glucose chewable tablet 16 g  4 Tablet Oral PRN    glucagon (GLUCAGEN) injection 1 mg  1 mg IntraMUSCular PRN    dextrose 10% infusion 0-250 mL  0-250 mL IntraVENous PRN    chlorhexidine (PERIDEX) 0.12 % mouthwash 10 mL  10 mL Oral Q12H    [Held by provider] vasopressin (VASOSTRICT) 20 Units in 0.9% sodium chloride 100 mL infusion  0-0.04 Units/min IntraVENous TITRATE    HYDROmorphone (DILAUDID) injection 1 mg  1 mg IntraVENous Q4H PRN        ROS:  Pertinent items are noted in the History of Present Illness.            Physical Exam:    Temp (24hrs), Av.5 °F (36.9 °C), Min:97.6 °F (36.4 °C), Max:99.5 °F (37.5 °C)    Visit Vitals  BP (!) 144/57   Pulse 90   Temp 99.3 °F (37.4 °C)   Resp 24   Ht 5' 8\" (1.727 m)   Wt 82.8 kg (182 lb 8.7 oz)   SpO2 96%   BMI 27.76 kg/m²          GEN: WD acutely sick patient, intubated    PIV-R    HEENT: Unicteric. EOMI intact  --OT and ET in place  CHEST: Non laboured breathing. CTA   CVS:RRR, no mur/gallop  ABD: Obese/soft. Non tender. Non distended. Surgical wound dressed, skin looks ok  LISET: caal in place  EXT: No apparent swelling or redness on UE/LE joints-edematous hands  -L BKA  -R foot is dressed, wound not seen  Skin: Dry and intact. bruisings  CNS: intubated/non verbal       Microbiology  All Micro Results       Procedure Component Value Units Date/Time    CULTURE, BLOOD [[id number]] Collected: 08/27/22 1030    Order Status: Completed Specimen: Blood Updated: 08/31/22 0659     Special Requests: NO SPECIAL REQUESTS        Culture result: NO GROWTH 4 DAYS       CULTURE, BLOOD [[id number]] Collected: 08/27/22 1100    Order Status: Completed Specimen: Blood Updated: 08/31/22 0659     Special Requests: NO SPECIAL REQUESTS        Culture result: NO GROWTH 4 DAYS       CULTURE, RESPIRATORY/SPUTUM/BRONCH Radha Plump [[id number]] Collected: 08/31/22 0018    Order Status: Sent Specimen: Sputum from Tracheal Aspirate Updated: 08/31/22 0038    CULTURE, URINE [[id number]] Collected: 08/27/22 1442    Order Status: Completed Specimen: Cath Urine Updated: 08/29/22 0642     Special Requests: NO SPECIAL REQUESTS        [place] Count --        23705  COLONIES/mL       Culture result:       >2 ORGANISMS - CONTAMINATED SPECIMEN. SUGGEST RECOLLECTION          CULTURE, BLOOD [[id number]]     Order Status: Canceled Specimen: Blood     COVID-19 RAPID TEST [[id number]] Collected: 08/27/22 1030    Order Status: Completed Specimen: Nasopharyngeal Updated: 08/27/22 1057     Specimen source SWAB        COVID-19 rapid test Not detected        Comment: Rapid Abbott ID Now       Rapid NAAT:  The specimen is NEGATIVE for SARS-CoV-2, the novel coronavirus associated with COVID-19.        Negative results should be treated as presumptive and, if inconsistent with clinical signs and symptoms or necessary for patient management, should be tested with an alternative molecular assay. Negative results do not preclude SARS-CoV-2 infection and should not be used as the sole basis for patient management decisions. This test has been authorized by the FDA under an Emergency Use Authorization (EUA) for use by authorized laboratories. Fact sheet for Healthcare Providers: [employer]  Fact sheet for Patients: [employer]       Methodology: Isothermal Nucleic Acid Amplification                   Lab results:    Chemistry  Recent Labs     08/31/22  1130 08/31/22  0312 08/30/22  1450 08/30/22  0531 08/29/22  0928 08/29/22  0508   GLU  --  188*  --  199* 161* 161*   NA  --  137  --  135* 134* 135*   K 3.9 3.8 3.8 2.9* 3.7 4.0   CL  --  104  --  102 104 105   CO2  --  23  --  18* 17* 16*   BUN  --  110*  --  124* 126* 130*   CREA  --  3.80*  --  3.82* 3.96* 4.00*   CA  --  9.0  --  7.6* 7.7* 7.8*   AGAP  --  10  --  15 13 14   BUCR  --  29*  --  32* 32* 33*   AP  --  175*  --  105  --  90   TP  --  5.3*  --  4.4*  --  4.7*   ALB  --  1.9*  --  2.0*  --  2.1*   GLOB  --  3.4  --  2.4  --  2.6   AGRAT  --  0.6*  --  0.8  --  0.8       CBC w/ Diff  Recent Labs     08/31/22  0312 08/30/22  0531 08/29/22  1941 08/29/22  0928   WBC 6.6 8.9  --  9.4   RBC 3.39* 2.85*  --  2.45*   HGB 8.7* 7.4* 7.4* 6.3*   HCT 26.9* 22.3* 22.4* 19.6*    276  --  322   GRANS 78* 83*  --  80*   LYMPH 13* 8*  --  6*   EOS 1 1  --  1       CT HEAD WO CONT    Result Date: 8/27/2022  FINDINGS: BRAIN AND POSTERIOR FOSSA: Mild periventricular areas of decreased attenuation are identified. Gray-white matter interfaces are preserved. No intraparenchymal hemorrhage, mass effect or midline shift. The ventricular system is midline and symmetric.  Atherosclerotic vascular calcifications are identified. EXTRA-AXIAL SPACES AND MENINGES: There is no evidence for extra-axial mass lesion or fluid collection. CALVARIUM: Intact. SINUSES: Sphenoid sinus mucosal thickening. OTHER: Orbits are grossly intact. Facial soft tissue are within normal limits. _______________     1. No evidence for intracranial hemorrhage, mass effect or midline shift. 2.  Mild periventricular areas of microvascular ischemic change. 3.  Cerebral atherosclerosis. 4.  Atrophy. If there are continued symptoms, a MRI is recommended for further evaluation. CT ABD PELV WO CONT    Result Date: 8/27/2022  FINDINGS: LOWER CHEST: Moderate right pleural effusion with adjacent atelectasis. LIVER, BILIARY: Liver is normal. No biliary dilation. Gallbladder is unremarkable. PANCREAS: Normal. SPLEEN: Splenic granuloma. ADRENALS: Normal. KIDNEYS: Normal. LYMPH NODES: No enlarged lymph nodes. GASTROINTESTINAL TRACT: Uncomplicated sigmoid diverticula. Gastric distention. PELVIC ORGANS: Unremarkable. VASCULATURE: Atherosclerotic vascular calcifications. BONES: No acute or aggressive osseous abnormalities identified. OTHER: Large right inguinal hernia with large and small bowel. There is diffuse circumferential wall thickening of ascending colon within the hernia. _______________     1. Large right inguinal hernia with large and small bowel. There is diffuse wall thickening of the ascending colon concerning for strangulation. No evidence for bowel obstruction. 2. Right pleural effusion and bilateral basilar atelectasis. 3. Gastric distention. XR CHEST PORT    Result Date: 8/30/2022  EXAM:  PORTABLE CHEST INDICATION:  Follow up. TECHNIQUE:  Portable, AP view. COMPARISON:  08/29/2022 ____________________ FINDINGS:  SUPPORT DEVICES: Endotracheal tube approximately 4 cm above the alise. OG tube is traversing below left hemidiaphragm, tip not imaged. Esophageal probe is present at the level of the endotracheal tube. HEART AND MEDIASTINUM: Stable.  LUNGS AND PLEURAL SPACES: Persistent right basilar opacity consistent with pleural effusion with adjacent consolidation. No pneumothorax. BONY THORAX AND SOFT TISSUES: No acute osseous abnormality. ____________________     1. Endotracheal tube approximately 4 cm above the alise. 2. No significant change in right basilar opacity consistent with pleural effusion with adjacent consolidation, atelectasis or pneumonia. *  **     XR CHEST PORT    Result Date: 8/29/2022  FINDINGS: HEART AND MEDIASTINUM: Anterior endotracheal tube tip estimated at 6.1 cm above alise. Enteric tube tip projects over left upper quadrant expected location gastric fundus. Esophageal temperature probe tip projects over lower thoracic . Stable cardiomediastinal silhouette allowing for angulation. LUNGS AND PLEURAL SPACES: Progressive right pleural effusion and patchy opacities in the right lung. Left lung relatively clear allowing for technique. BONY THORAX AND SOFT TISSUES: No gross acute osseous abnormality. 1. Right pleural effusion and subjacent atelectasis/infiltrate, perhaps slightly progressed. 2. Tubes and lines stable in visualized extent. XR CHEST PORT    Result Date: 8/28/2022  FINDINGS: SUPPORT DEVICES: Endotracheal tube distal tip projects 5.5 cm above the alise. Enteric tube distal tip projects below the left hemidiaphragm likely in the stomach. HEART AND MEDIASTINUM: No appreciable cardiomegaly. Remaining mediastinal contours are stable. LUNGS AND PLEURAL SPACES: Right pleural effusion with adjacent opacity. No evidence for pneumothorax. Left lung is clear. BONY THORAX AND SOFT TISSUES: Unremarkable. _______________     1. Right pleural effusion with adjacent atelectasis/infiltrates. 2. Supporting tubes appear stable. XR CHEST PORT    Result Date: 8/27/2022  FINDINGS: SUPPORT DEVICES: An endotracheal tube is positioned 6 cm above the alise.  HEART AND MEDIASTINUM: Heart size and mediastinal contour are midline and within normal limits. LUNGS AND PLEURAL SPACES: There are opacities throughout both lungs but more confluent within the right mid and lower lung zones. No pneumothorax. BONY THORAX AND SOFT TISSUES: Unremarkable. _______________     Interval right greater than left airspace disease Small to moderate right pleural effusion    XR CHEST PORT    Result Date: 8/27/2022FINDINGS: HEART AND MEDIASTINUM: No appreciable cardiomegaly. Remaining mediastinal contours within normal limits. LUNGS AND PLEURAL SPACES: Left lung is clear. Right pleural effusion with adjacent opacity. BONY THORAX AND SOFT TISSUES: Unremarkable. _______________     1. Right pleural effusion with probable adjacent atelectasis. XR ABD PORT  1 V    Result Date: 8/27/2022  INDINGS: SUPPORT DEVICES: A nasogastric tube is positioned in the stomach. BOWEL GAS PATTERN: The gas pattern is nonobstructive. Murtaza Ra SOFT TISSUES: Unremarkable. BONES: Degenerative changes are seen throughout the spine. ADDITIONAL FINDINGS: None. _______________     No significant abnormality. ECHO ADULT COMPLETE    Result Date: 8/28/2022  Formatting of this result is different from the original.   Left Ventricle: Normal left ventricular systolic function with a visually estimated EF of 60 - 65%. Left ventricle size is normal. Normal wall thickness. Normal wall motion. Grade I diastolic dysfunction present with normal LV EF. Unable to assess RVSP due to inadequate or insignificant tricuspid regurgitation.      Procedures/imaging: see electronic medical records for all procedures/Xrays and details which were not copied into this note but were reviewed prior to creation of Plan

## 2022-08-31 NOTE — PROGRESS NOTES
RESPIRATORY NOTE:        Initiated SBT 7/5 30% FIO2, tolerating well, periodic agitation noted, is not responding to verbal stimuli at this time. Will monitor.

## 2022-08-31 NOTE — PROGRESS NOTES
0730-Receive pt sedated in bed on precedex, no sign of distress, vss on ventilator, no command following at this times, easily awakened, will continue to monitor  1000-Rounded on pt with treatment team  1200-Pt off sedation, still drowsy, tolerating SBT, wife at bedside, updated on pt status  1600-Pt remains stable, agitated at times  1915-Bedside and Verbal shift change report given to Kassy 83 (oncoming nurse) by Yokasta Sim RN (offgoing nurse). Report included the following information SBAR, Kardex, Intake/Output, MAR, Recent Results, and Cardiac Rhythm SR/ST .

## 2022-08-31 NOTE — PROGRESS NOTES
Pt POD 3 from emergency surgery. Incarcerated right groin hernia  Pt septic - likely multiple sources.   Critical overall  GI - stable, wound CDI    Mgmt per ICU team

## 2022-08-31 NOTE — PROGRESS NOTES
Patient:  Deanna Sibley :  1945  Gender:  male  MRN #:  [id number]    Assessment:   Deanna Sibley is a 78 y.o. male with hypertension, hyperlipidemia, stroke , chronic atrial fibrillation, on Plavix, DM,previous, COVID-19 infection, chronic indwelling urinary catheter for urinary retention, severe peripheral vascular disease left BKA admitted in ICU with septic shock , acute renal failure, hyperkalemia , metabolic acidosis and strangulated left inguinal hernia  s/p emergency  surgery   Still on mechanical vent, BP stable off vasopressors       Septic shock - Improved   Acute renal failure- Improving   Hypokalemia - Improved   Metabolic acidosis -Improved   Respiratory failure       Plan:   He has decent urine output overnight with improvement in renal function slowly   No indications of renal replacement therapy   Lasix 20 mg iv daily for volume management   Sodium bicarbonate 650 mg TID for metabolic acidosis for one more day , close monitoring of serum potassium, replace prn  Minimize volume infusion   Avoid NSAIDS, contrast and nephrotoxin   Dose all meds and antibiotics for current eGFR  Transfuse prn to keep hemoglobin > 7 gram/dl     Subjective/Interval Events    Decent urine output  BP stable , off vasopressors  Unconscious in mechanical vent   Unable to obtained review of systems         Intake/Output Summary (Last 24 hours) at 2022 0933  Last data filed at 2022 0800  Gross per 24 hour   Intake 2012.52 ml   Output 1900 ml   Net 112.52 ml           Blood pressure (!) 141/102, pulse 72, temperature 98.6 °F (37 °C), resp. rate 18, height 5' 8\" (1.727 m), weight 82.8 kg (182 lb 8.7 oz), SpO2 100 %.     Exam:    Unconscious in vent   Lung: clear to auscultation,  Heart: s1s2 heard   Abdomen: soft, mild distension   Ext: no edema in RLE,   CNS- sedated       Recent Labs     22   WBC 6.6   HCT 26.9*   MCV 79.4       Recent Labs     22      CO2 23   BUN 110*   INR 1.2       Recent Labs     08/31/22  0312   AGRAT 0.6*           Approx 30 minutes of critical care time spent in the direct evaluation and formulation of treatment plan of this high risk patient. The reason for providing this level of medical care was due a critical illness that impaired one or more vital organ systems such that there was a high probability of imminent or life threatening deterioration in the patients condition. This care involved high complexity decision making to assess, manipulate, and support vital system functions, to treat this degreee vital organ system failure and to prevent further life threatening deterioration of the patients condition.          Elinor Collins MD  Nephrology -[facility], [facility].

## 2022-08-31 NOTE — PROGRESS NOTES
Comprehensive Nutrition Assessment    Type and Reason for Visit: Reassess, Wound    Nutrition Recommendations/Plan:   If remains on vent, recommend continuing feeding at goal rate to meet needs. Pt at goal rate. If extubated, advance diet when clinically feasible. 08/31/22 0810   Enteral Nutrition   EN Formula Renal   Schedule Continuous   Feeding Regimen Goal: Nepro at 45ml/hr   Additives/Modulars None   Water Flushes 150ml q4   Current EN & Flush Order Provides Nepro @ 45ml/hr:   1782kcal, 80g PRO, 165g CHO, 718ml free water + 900ml fluesh (1618ml)        Malnutrition Assessment:  Malnutrition Status: At risk for malnutrition (specify) (08/29/22 1028)      Nutrition Assessment:    Admitted with profound hypotension, multiorgan SS, left strangulated inguinal hernia. SBT yesterday. Per wound care note 8/29- Pressure injury unstageable (R ankle, R heel, foot). Tube feeding infusing at 45ml/hr (goal) with 30ml residuals. Per Rounds- holding feeds for SBT possible extubation today. Nutrition Related Findings:    GFR est nonAA 16, , creatinine 3.80. Lasix, lantus, humalog, protonix. BM 8/27. Wound Type: Multiple, Pressure injury, Unstageable, Surgical incision (Unstageable of R ankle, R heel, foot)    Current Nutrition Intake & Therapies:  Average Meal Intake: NPO     DIET NPO  ADULT TUBE FEEDING Nasogastric; Renal; Delivery Method: Continuous; Continuous Initial Rate (mL/hr): 10; Continuous Advance Tube Feeding: Yes; Advancement Volume (mL/hr): 10; Advancement Frequency: Q 6 hours; Continuous Goal Rate (mL/hr): 45; Wate. .. Anthropometric Measures:  Height: 5' 8\" (172.7 cm)  Ideal Body Weight (IBW): 154 lbs (70 kg)     Current Body Wt:  82.8 kg (182 lb 8.7 oz), 118.5 % IBW.     Current BMI (kg/m2): 27.8  Usual Body Weight: 72.6 kg (160 lb) (6/6/2022)  % Weight Change (Calculated): 14.1  Weight Adjustment: Amputation  Total Amputation Percentage: 5.9  Adjusted Ideal Body Weight (lbs) (Calculated): 144.9  Adjusted Ideal Body Weight (kg) (Calculated): 65.86 kg  Adjusted % IBW (Calculated): 126  Adjusted BMI (Calculated): 29.4  BMI Category: Overweight (BMI 25.0-29. 9)    Estimated Daily Nutrient Needs:  Energy Requirements Based On: Formula  Weight Used for Energy Requirements: Current  Energy (kcal/day): 1759  Weight Used for Protein Requirements: Ideal (1.2-1.7g)  Protein (g/day):   Method Used for Fluid Requirements: 1 ml/kcal  Fluid (ml/day): 9796    Nutrition Diagnosis:   Inadequate oral intake related to impaired respiratory function as evidenced by NPO or clear liquid status due to medical condition, intubation, nutrition support-enteral nutrition    Nutrition Interventions:   Food and/or Nutrient Delivery: Continue tube feeding, Continue NPO  Nutrition Education/Counseling: No recommendations at this time  Coordination of Nutrition Care: Continue to monitor while inpatient       Goals:  Previous Goal Met: Goal(s) achieved  Goals: Meet at least 75% of estimated needs, by next RD assessment       Nutrition Monitoring and Evaluation:   Behavioral-Environmental Outcomes: None identified  Food/Nutrient Intake Outcomes: Diet advancement/tolerance, Enteral nutrition intake/tolerance  Physical Signs/Symptoms Outcomes: Biochemical data, Hemodynamic status, Skin, Weight, GI status    Discharge Planning:     Too soon to determine    Melissa Blanchard RD

## 2022-08-31 NOTE — PROGRESS NOTES
[facility]  Pulmonary, Critical Care, and Sleep Medicine    Name: Ran Fischer.  MRN: [id number]   : 1945 Hospital: [facility] [place]    Date: 2022  Room: [address] [address] Note                                              Consult requesting physician: Dr. Dr Victor Manuel Aguilar  Reason for Consult: Strangulated bowel with complications, shock, respiratory failure       IMPRESSION:   Strangulated bowel/inguinal hernia  Acute respiratory failure with hypoxemia  Septic shock  High gap metabolic acidosis  Acute renal failure  Right inguinal hernia  Hyperkalemia  Sepsis  Altered mentation  Leukocytosis  Anemia  UTI  Right pleural effusion  Chronic Mccray catheter  Right foot ulcers  Type 2 diabetes mellitus  Paroxysmal atrial fibrillation  Left below-knee amputation  Severe malnutrition      Patient Active Problem List   Diagnosis Code    DM2 (diabetes mellitus, type 2) ([facility]) E11.9    CAD (coronary artery disease) I25.10    Acute renal failure (ARF) ([facility]) N17.9    CKD (chronic kidney disease) stage 3, GFR 30-59 ml/min ([facility]) N18.30    Elevated brain natriuretic peptide (BNP) level R79.89    UTI (urinary tract infection) N39.0    High anion gap metabolic acidosis X35.3    Hyperkalemia E87.5    Sepsis ([facility]) A41.9    AMS (altered mental status) R41.82    Strangulated inguinal hernia K40.30    Right inguinal hernia K40.90    Leukocytosis D72.829    Anemia D64.9    Chronic indwelling Mccray catheter Z97.8    S/P BKA (below knee amputation), left ([facility]) Z89.512    Right foot ulcer ([facility]) L97.519    Paroxysmal atrial fibrillation ([facility]) I48.0    Diabetic ulcer of right foot ([facility]) E11.621, L97.519    Pleural effusion on right J90    Severe protein-calorie malnutrition ([facility]) E43    Acute respiratory failure with hypoxemia ([facility]) J96.01    Septic shock ([facility]) A41.9, R65.21         Code status: Full Code      RECOMMENDATIONS:   Patient is critically ill and presenting with large right inguinal strangulated hernia, with anemia, metabolic acidosis, acute renal failure and hyperkalemia. S/p laparotomy and reduction of incarcerated right groin hernia without need for bowel resection-8/27/2022. Respiratory: Patient remains intubated postop due to critically ill state, hemodynamic instability and metabolic derangements. Sbt today hope to extubate  Stop sedation got prn meds   Abg  Cxr small right side effusion on lasix       Clinically, no bloody secretions noted from ET tube today morning. VCV ventilation-FiO2 down to 30%; PEEP 5. ABGstable 7.40/22/125/99  CXR 8/30  IMPRESSION  1. Endotracheal tube approximately 4 cm above the alise. 2. No significant change in right basilar opacity consistent with pleural  effusion with adjacent consolidation, atelectasis or pneumonia. IIncrease diuresis check BNP  Add midodrine  SBT today did well for 1 hrs   Continue ventilator and sedation bundles. Sedation-midazolam infusion; prn Dilaudid; patient dropped blood pressure in ER with fentanyl. CT abdomen on admission-right lower lung shows large right-sided pleural effusion with underlying atelectasis of the right lower lobe segments. Keep SPO2 >=92%. HOB 30 degree elevation all the time. Aggressive pulmonary toileting. Aspiration precautions. CVS: Patient off pressors;BP BP but needs diuresis add midorine low dose   Stable overnight   wean for systolic blood pressure greater than 95 mmHg. Patient currently in atrial fibrillation. Prior Echo showed normal LV function; no pulmonary hypertension reported. Repeat echocardiogram diastolic dysfunction Ef normal   Troponin not elevated; proBNP elevated. Patient likely has diastolic CHF with chronic diabetes and hypertension. ID: Wbc improved 8.9  Patient likely has bowel sepsis; lactic acid not elevated. Necrotic lower extremity possible second source of infection   UA-Positive for UTI; urine culture-pending.   Continue broad-spectrum antibiotic-levofloxacin, Zosyn and IV vancomycin-renal dosing. Blood cultures negative; rapid COVID test negative. Deescalate antibiotic when appropriate. Hematology/Oncology: Patient s/p 1 unit PRBC in the ER. Hemoglobin 8.7 this morning. Platelets-reactive thrombocytosis. INR 1.4 this morning; plan for 5 mg vitamin K IV. Start sq heaprin for dvt prophylaxis and monitor hb  Renal: metabolic acidosis better stop iv start oral bicarbonate   Contineu albumins malnutrition   Nephrology consulted-Dr. Maxim Carrillo. GI/: Chronic Mccray catheter-changed in ER  LFTs and lipase-normal.  Albumin-low. CT abdomen/pelvis- Large right inguinal hernia with large and small bowel. There is diffuse wall  thickening of the ascending colon concerning for strangulation. No evidence for bowel obstruction. S/p exploratory laparotomy, reduction of incarcerated hernia; bowel was viable, and did not need resection. Endocrine: Monitor BS-improved; continue sliding scale insulin. Hemoglobin A1c-5.9 on admission. Check TSH. Neurology: Acute metabolic encephalopathy from sepsis. CT head-nil acute. Skin/Wound: Right foot ulcer-wound care consulted. Electrolytes: Replace electrolytes per ICU electrolyte replacement protocol; caution renal failure. IVF: Bicarb drip 75 ml per hour; albumin infusion every 6 hours. Nutrition: N.p.o. for now. Prophylaxis: DVT Prophylaxis: None- due to anemia, left BKA, right foot diabetic ulcers. GI Prophylaxis: Protonix. Restraints: Prn Wrist soft restraints for patient interfering with medical therapy/management and patient safety. Lines/Tubes: PIV, midline  ET tube 8/27  OG tube 8/27  Mccray: 8/27 changed in ER (Medically necessary for strict input/output monitoring in critically ill patient, will remove it when not needed. Mccray bundle followed). Advance Directive/Palliative Care: consulted; overall prognosis appears to be poor. Full code status per family wishes.     Quality Care: PPI, DVT prophylaxis, HOB elevated, Infection control all reviewed and addressed. Care of plan d/w icu RT and RN. Update family today when available. High complexity decision making was performed during the evaluation of this patient at high risk for decompensation with multiple organ involvement. Total critical care time spent rendering care exclusive of procedures/family discussion/coordination of care: 45mins  Family meeting on 8/30 I attended now Dnr/DNI no reintubation if fails           Subjective/History of Present Illness:     Patient is a 78 y.o. male with PMHx significant for multiple medical problems. He history of atrial flutter/atrial fibrillation, coronary artery disease, chronic kidney disease stage III, diabetes mellitus type 2 with leg ulcerations, history of CHF, hypertension and hyperlipidemia, history of peripheral vascular disease, history of prior CVA. Patient was admitted October 2021 with evidence of left leg diabetic ulcerations and gangrene. He subsequently underwent left below-knee amputation. Patient has chronic indwelling Mccray catheter. The patient has come to the ER brought by family/wife acutely unwell, confused and agitated. The patient has been found to have severe right-sided inguinal hernia, and subsequent CT abdomen/pelvis study showing evidence of strangulation of the bowel. The patient is also anemic, and severe metabolic acidosis, and acute renal failure. Urine output is poor from the Mccray catheter, and appears to be purulent. Mccray catheter has been replaced in the ER. S/p EXPLORATORY LAPAROTOMY, REDUCTION INCARCERATED RIGHT GROIN HERNIA WITH HERNIA REPAIR WITH MESH 8/27/2022 - Dr Hanna hCance  The bowel was completely viable and did not require resection. 8/31/2022  Patient seen in ICU.   Intubated off sedation following simple commands  Off precedex Sbt hope to extubate    S/p left BKA 10/28/2021    Review of Systems:  ROS not obtained due to patient factor. No Known Allergies   Past Medical History:   Diagnosis Date    A-fib (HCC)     Asthma     CAD (coronary artery disease)     Chronic kidney disease     stage 3    COVID-19     Diabetes (HCC)     GERD (gastroesophageal reflux disease)     Heart failure (HCC)     chronic diastolic heart failure    High cholesterol     Hypertension     Ill-defined condition     chronic osteomyelitis left ankle and foot    Ill-defined condition     chronic arteriosclerosis    PVD (peripheral vascular disease) ([facility] [address]     Stroke ([facility] [address]     cva      Past Surgical History:   Procedure Laterality Date    COLONOSCOPY N/A 6/1/2018    COLONOSCOPY, POLYPECTOMY performed by Luis Eduardo Lyon MD at [facility] ENDOSCOPY    HX CATARACT REMOVAL      HX ORTHOPAEDIC Left 2021    ankle washout, external fixator, would vac    HX ORTHOPAEDIC      external fixator removed      Social History     Tobacco Use    Smoking status: Former    Smokeless tobacco: Never   Substance Use Topics    Alcohol use: Not Currently      History reviewed. No pertinent family history. Prior to Admission medications    Medication Sig Start Date End Date Taking? Authorizing Provider   levoFLOXacin (Levaquin) 750 mg tablet Take 1 Tablet by mouth daily. 6/18/22   Enrrique Anna MD   insulin lispro (HUMALOG) 100 unit/mL injection INITIATE INSULIN CORRECTIVE PROTOCOL: Normal Insulin Sensitivity   For Blood Sugar (mg/dL) of:     Less than 150 =   0 units           150 -199 =   2 units  200 -249 =   4 units  250 -299 =   6 units  300 -349 =   8 units  350 and above = 10 units and Call Physician  If 2 glucose readings are above 200 mg/dL within a 24 hr period, proceed to \"Insulin Resistant\" dosing. Initiate Hypoglycemia protocol if blood glucose is <70 mg/dL Fast Acting - Administer Immediately - or within 15 minutes of start of meal, if mealtime coverage. 11/3/21   Jose Raul Gallegos MD   gabapentin (NEURONTIN) 300 mg capsule Take 1 Capsule by mouth three (3) times daily. Max Daily Amount: 900 mg. 11/3/21   Narendra Isbell MD   clopidogreL (Plavix) 75 mg tab Take  by mouth daily. Indications: afib    Provider, Historical   B.infantis-B.ani-B.long-B.bifi (Probiotic 4X) 10-15 mg TbEC Take  by mouth daily. Provider, Historical   multivitamin (ONE A DAY) tablet Take 1 Tablet by mouth daily. Provider, Historical   acetaminophen (TylenoL) 325 mg tablet Take 650 mg by mouth every four (4) hours as needed for Pain. Provider, Historical   tamsulosin (FLOMAX) 0.4 mg capsule Take 2 Capsules by mouth daily. 5/25/21   Parviz Welsh MD   atorvastatin (LIPITOR) 40 mg tablet Take 1 Tab by mouth nightly. 2/15/18   Desire Alva PA-C   metoprolol succinate (TOPROL-XL) 50 mg XL tablet Take 1 Tab by mouth daily. Patient taking differently: Take 100 mg by mouth daily.  2/16/18   Desire Alva PA-C     Current Facility-Administered Medications   Medication Dose Route Frequency    [START ON 9/1/2022] insulin glargine (LANTUS) injection 20 Units  20 Units SubCUTAneous DAILY    furosemide (LASIX) injection 20 mg  20 mg IntraVENous BID    sodium bicarbonate tablet 650 mg  650 mg Oral TID    0.9% sodium chloride infusion  5 mL/hr IntraVENous CONTINUOUS    lactulose (CHRONULAC) 10 gram/15 mL solution 15 mL  15 mL Oral BID    midodrine (PROAMATINE) tablet 2.5 mg  2.5 mg Oral BID    dexmedeTOMidine (PRECEDEX) 400 mcg in 0.9% sodium chloride 100 mL infusion  0.2-0.7 mcg/kg/hr IntraVENous TITRATE    albumin human 25% (BUMINATE) solution 25 g  25 g IntraVENous Q6H    arformoteroL (BROVANA) neb solution 15 mcg  15 mcg Nebulization BID RT    heparin (porcine) injection 5,000 Units  5,000 Units SubCUTAneous Q8H    piperacillin-tazobactam (ZOSYN) 3.375 g in 0.9% sodium chloride (MBP/ADV) 100 mL MBP  3.375 g IntraVENous Q12H    pantoprazole (PROTONIX) 40 mg in 0.9% sodium chloride 10 mL injection  40 mg IntraVENous Q12H    sodium chloride (NS) flush 5-40 mL  5-40 mL IntraVENous Q8H    insulin lispro (HUMALOG) injection   SubCUTAneous Q6H    chlorhexidine (PERIDEX) 0.12 % mouthwash 10 mL  10 mL Oral Q12H    [Held by provider] vasopressin (VASOSTRICT) 20 Units in 0.9% sodium chloride 100 mL infusion  0-0.04 Units/min IntraVENous TITRATE         Objective:   Vital Signs:    Visit Vitals  BP (!) 141/102   Pulse 72   Temp 98.6 °F (37 °C)   Resp 18   Ht 5' 8\" (1.727 m)   Wt 82.8 kg (182 lb 8.7 oz)   SpO2 100%   BMI 27.76 kg/m²       O2 Device: Endotracheal tube, Ventilator       Temp (24hrs), Av.4 °F (36.9 °C), Min:97.6 °F (36.4 °C), Max:99.5 °F (37.5 °C)       Intake/Output:   Last shift:       0701 -  1900  In: 150   Out: -     Last 3 shifts:  190 -  0700  In: 3729 [I.V.:2289]  Out: 7086 [Urine:2475]      Intake/Output Summary (Last 24 hours) at 2022 1056  Last data filed at 2022 0800  Gross per 24 hour   Intake 2012.52 ml   Output 1900 ml   Net 112.52 ml         Last 3 Recorded Weights in this Encounter    22 1021 22 1107 22 0940   Weight: 38.6 kg (85 lb) 38.6 kg (85 lb) 82.8 kg (182 lb 8.7 oz)       Physical Exam:   Patient appears chronically ill; currently intubated;comfortable off sedation opens eyes and follows simple commands  but sleepy  HEENT: pupils not dilated, reactive, no scleral jaundice, moist oral mucosa, no nasal drainage  Neck: No adenopathy or thyroid swelling  Orotracheal and orogastric tubes-no bleeding or secretions  CVS: S1S2 no murmurs; JVD not elevated; telemetry-sinus rhythm  RS: Mod air entry bilaterally, decreased BS right lower chest, no obvious wheezes or crackles; not tachypneic or in distress  Abd: soft, nontender, not distended, no guarding or rigidity, bowel sounds present, no hepatosplenomegaly  Right lower abdomen laparotomy scar-no bleeding or hematoma  Neuro: Intubated and sedated; limited exam    Extrm: no leg edema or swelling or clubbing; left below-knee amputation  Skin: Large ulceration right medial malleolus and right heel, black in appearance, no discharge    Lymphatic: no cervical or supraclavicular adenopathy  Groin: Large right inguinal hernia no longer present      Data:       Recent Results (from the past 12 hour(s))   GLUCOSE, POC    Collection Time: 08/31/22 12:03 AM   Result Value Ref Range    Glucose (POC) 195 (H) 70 - 110 mg/dL   MAGNESIUM    Collection Time: 08/31/22  3:12 AM   Result Value Ref Range    Magnesium 1.7 1.6 - 2.6 mg/dL   PROTHROMBIN TIME + INR    Collection Time: 08/31/22  3:12 AM   Result Value Ref Range    Prothrombin time 16.0 (H) 11.5 - 15.2 sec    INR 1.2 0.8 - 1.2     CALCIUM, IONIZED    Collection Time: 08/31/22  3:12 AM   Result Value Ref Range    Ionized Calcium 1.23 1.12 - 1.32 MMOL/L   PHOSPHORUS    Collection Time: 08/31/22  3:12 AM   Result Value Ref Range    Phosphorus 3.2 2.5 - 4.9 MG/DL   CBC WITH AUTOMATED DIFF    Collection Time: 08/31/22  3:12 AM   Result Value Ref Range    WBC 6.6 4.6 - 13.2 K/uL    RBC 3.39 (L) 4.35 - 5.65 M/uL    HGB 8.7 (L) 13.0 - 16.0 g/dL    HCT 26.9 (L) 36.0 - 48.0 %    MCV 79.4 78.0 - 100.0 FL    MCH 25.7 24.0 - 34.0 PG    MCHC 32.3 31.0 - 37.0 g/dL    RDW 20.0 (H) 11.6 - 14.5 %    PLATELET 583 607 - 864 K/uL    MPV 9.9 9.2 - 11.8 FL    NRBC 0.0 0  WBC    ABSOLUTE NRBC 0.00 0.00 - 0.01 K/uL    NEUTROPHILS 78 (H) 40 - 73 %    LYMPHOCYTES 13 (L) 21 - 52 %    MONOCYTES 6 3 - 10 %    EOSINOPHILS 1 0 - 5 %    BASOPHILS 1 0 - 2 %    METAMYELOCYTES 1 (H) 0 %    IMMATURE GRANULOCYTES 0 %    ABS. NEUTROPHILS 5.1 1.8 - 8.0 K/UL    ABS. LYMPHOCYTES 0.9 0.9 - 3.6 K/UL    ABS. MONOCYTES 0.4 0.05 - 1.2 K/UL    ABS. EOSINOPHILS 0.1 0.0 - 0.4 K/UL    ABS. BASOPHILS 0.1 0.0 - 0.1 K/UL    ABS. IMM.  GRANS. 0.0 K/UL    DF MANUAL      PLATELET COMMENTS ADEQUATE PLATELETS      RBC COMMENTS ANISOCYTOSIS  1+        RBC COMMENTS OVALOCYTES  1+        RBC COMMENTS ARCENIO CELLS  1+        RBC COMMENTS POIKILOCYTOSIS  1+       METABOLIC PANEL, COMPREHENSIVE    Collection Time: 08/31/22  3:12 AM   Result Value Ref Range    Sodium 137 136 - 145 mmol/L    Potassium 3.8 3.5 - 5.5 mmol/L    Chloride 104 100 - 111 mmol/L    CO2 23 21 - 32 mmol/L    Anion gap 10 3.0 - 18 mmol/L    Glucose 188 (H) 74 - 99 mg/dL     (H) 7.0 - 18 MG/DL    Creatinine 3.80 (H) 0.6 - 1.3 MG/DL    BUN/Creatinine ratio 29 (H) 12 - 20      GFR est AA 19 (L) >60 ml/min/1.73m2    GFR est non-AA 16 (L) >60 ml/min/1.73m2    Calcium 9.0 8.5 - 10.1 MG/DL    Bilirubin, total 0.8 0.2 - 1.0 MG/DL    ALT (SGPT) 19 16 - 61 U/L    AST (SGOT) 23 10 - 38 U/L    Alk. phosphatase 175 (H) 45 - 117 U/L    Protein, total 5.3 (L) 6.4 - 8.2 g/dL    Albumin 1.9 (L) 3.4 - 5.0 g/dL    Globulin 3.4 2.0 - 4.0 g/dL    A-G Ratio 0.6 (L) 0.8 - 1.7     BLOOD GAS, ARTERIAL POC    Collection Time: 08/31/22  4:29 AM   Result Value Ref Range    Device: ADULT VENT      FIO2 (POC) 30 %    pH (POC) 7.42 7.35 - 7.45      pCO2 (POC) 29.7 (L) 35.0 - 45.0 MMHG    pO2 (POC) 119 (H) 80 - 100 MMHG    HCO3 (POC) 19.4 (L) 22 - 26 MMOL/L    sO2 (POC) 98.8 (H) 92 - 97 %    Base deficit (POC) 4.4 mmol/L    Tidal volume 400 ml    PEEP/CPAP (POC) 5 cmH2O    PIP (POC) 18      Allens test (POC) Positive      Total resp.  rate 16      Site LR      Patient temp. 98.2      Specimen type (POC) ARTERIAL     GLUCOSE, POC    Collection Time: 08/31/22  5:19 AM   Result Value Ref Range    Glucose (POC) 230 (H) 70 - 110 mg/dL           Chemistry Recent Labs     08/31/22  0312 08/30/22  1450 08/30/22  0531 08/29/22  0928 08/29/22  0508   *  --  199* 161* 161*     --  135* 134* 135*   K 3.8 3.8 2.9* 3.7 4.0     --  102 104 105   CO2 23  --  18* 17* 16*   *  --  124* 126* 130*   CREA 3.80*  --  3.82* 3.96* 4.00*   CA 9.0  --  7.6* 7.7* 7.8*   MG 1.7  --  1.6  --  1.9   PHOS 3.2  --  3.3  --  3.8   AGAP 10  --  15 13 14   BUCR 29*  --  32* 32* 33*   *  --  105  --  90   TP 5.3*  --  4.4*  --  4.7*   ALB 1.9*  --  2.0*  --  2.1*   GLOB 3.4  --  2.4 --  2.6   AGRAT 0.6*  --  0.8  --  0.8          Lactic Acid Lactic acid   Date Value Ref Range Status   08/27/2022 1.7 0.4 - 2.0 MMOL/L Final     No results for input(s): LAC in the last 72 hours. Liver Enzymes Protein, total   Date Value Ref Range Status   08/31/2022 5.3 (L) 6.4 - 8.2 g/dL Final     Albumin   Date Value Ref Range Status   08/31/2022 1.9 (L) 3.4 - 5.0 g/dL Final     Globulin   Date Value Ref Range Status   08/31/2022 3.4 2.0 - 4.0 g/dL Final     A-G Ratio   Date Value Ref Range Status   08/31/2022 0.6 (L) 0.8 - 1.7   Final     Alk.  phosphatase   Date Value Ref Range Status   08/31/2022 175 (H) 45 - 117 U/L Final     Recent Labs     08/31/22 0312 08/30/22  0531 08/29/22  0508   TP 5.3* 4.4* 4.7*   ALB 1.9* 2.0* 2.1*   GLOB 3.4 2.4 2.6   AGRAT 0.6* 0.8 0.8   * 105 90          CBC w/Diff Recent Labs     08/31/22 0312 08/30/22  0531 08/29/22  1941 08/29/22  0928   WBC 6.6 8.9  --  9.4   RBC 3.39* 2.85*  --  2.45*   HGB 8.7* 7.4* 7.4* 6.3*   HCT 26.9* 22.3* 22.4* 19.6*    276  --  322   GRANS 78* 83*  --  80*   LYMPH 13* 8*  --  6*   EOS 1 1  --  1          Cardiac Enzymes No results found for: CPK, CK, CKMMB, CKMB, RCK3, CKMBT, CKNDX, CKND1, LEONEL, TROPT, TROIQ, WARD, TROPT, TNIPOC, BNP, BNPP     BNP No results found for: BNP, BNPP, XBNPT     Coagulation Recent Labs     08/31/22 0312 08/30/22  0531 08/29/22  0508   PTP 16.0* 16.6* 16.5*   INR 1.2 1.3* 1.3*           Thyroid  Lab Results   Component Value Date/Time    TSH 4.73 (H) 08/28/2022 08:04 AM       No results found for: T4     Urinalysis Lab Results   Component Value Date/Time    Color YELLOW 08/27/2022 02:27 PM    Appearance TURBID 08/27/2022 02:27 PM    Specific gravity 1.019 08/27/2022 02:27 PM    pH (UA) 6.0 08/27/2022 02:27 PM    Protein >1,000 (A) 08/27/2022 02:27 PM    Glucose Negative 08/27/2022 02:27 PM    Ketone TRACE (A) 08/27/2022 02:27 PM    Bilirubin Negative 08/27/2022 02:27 PM    Urobilinogen 1.0 08/27/2022 02:27 PM    Nitrites Negative 08/27/2022 02:27 PM    Leukocyte Esterase LARGE (A) 08/27/2022 02:27 PM    Epithelial cells FEW 08/27/2022 02:27 PM    Bacteria 1+ (A) 08/27/2022 02:27 PM    WBC TOO NUMEROUS TO COUNT 08/27/2022 02:27 PM    RBC 4 to 10 08/27/2022 02:27 PM          Culture data during this hospitalization. All Micro Results       Procedure Component Value Units Date/Time    CULTURE, BLOOD [[id number]] Collected: 08/27/22 1030    Order Status: Completed Specimen: Blood Updated: 08/31/22 0659     Special Requests: NO SPECIAL REQUESTS        Culture result: NO GROWTH 4 DAYS       CULTURE, BLOOD [[id number]] Collected: 08/27/22 1100    Order Status: Completed Specimen: Blood Updated: 08/31/22 0659     Special Requests: NO SPECIAL REQUESTS        Culture result: NO GROWTH 4 DAYS       CULTURE, RESPIRATORY/SPUTUM/BRONCH [place] Ke [[id number]] Collected: 08/31/22 0018    Order Status: Sent Specimen: Sputum from Tracheal Aspirate Updated: 08/31/22 0038    CULTURE, URINE [[id number]] Collected: 08/27/22 1442    Order Status: Completed Specimen: Cath Urine Updated: 08/29/22 0642     Special Requests: NO SPECIAL REQUESTS        [place] Count --        [id number]  COLONIES/mL       Culture result:       >2 ORGANISMS - CONTAMINATED SPECIMEN. SUGGEST RECOLLECTION          CULTURE, BLOOD [[id number]]     Order Status: Canceled Specimen: Blood     COVID-19 RAPID TEST [[id number]] Collected: 08/27/22 1030    Order Status: Completed Specimen: Nasopharyngeal Updated: 08/27/22 1057     Specimen source SWAB        COVID-19 rapid test Not detected        Comment: Rapid Abbott ID Now       Rapid NAAT:  The specimen is NEGATIVE for SARS-CoV-2, the novel coronavirus associated with COVID-19. Negative results should be treated as presumptive and, if inconsistent with clinical signs and symptoms or necessary for patient management, should be tested with an alternative molecular assay.   Negative results do not preclude SARS-CoV-2 infection and should not be used as the sole basis for patient management decisions. This test has been authorized by the FDA under an Emergency Use Authorization (EUA) for use by authorized laboratories. Fact sheet for Healthcare Providers: ConventionUpdate.co.nz  Fact sheet for Patients: ConventionUpdate.co.nz       Methodology: Isothermal Nucleic Acid Amplification                  ECHO May 2021 Interpretation Summary  Result status: Final result  Contrast used: DEFINITY. Left Ventricle: Normal cavity size, wall thickness and systolic function (ejection fraction normal). The estimated EF is 55 - 60%. There is mild (grade 1) left ventricular diastolic dysfunction E'E= 10.41. Wall Scoring: The left ventricular wall motion is normal.  Tricuspid Valve: Tricuspid valve not well visualized. No stenosis. Tricuspid regurgitation is inadequate for estimation of right ventricular systolic pressure. Images report reviewed by me:  CT 8/27/2022 (Most Recent)  Results from Hospital Encounter encounter on 08/27/22    CT HEAD WO CONT    Narrative  EXAM: CT head    INDICATION: Altered mental status. COMPARISON: None. TECHNIQUE: Axial CT imaging of the head was performed without intravenous  contrast.    One or more dose reduction techniques were used on this CT: automated exposure  control, adjustment of the mAs and/or kVp according to patient size, and  iterative reconstruction techniques. The specific techniques used on this CT  exam have been documented in the patient's electronic medical record. Digital  Imaging and Communications in Medicine (DICOM) format image data are available  to nonaffiliated external healthcare facilities or entities on a secure, media  free, reciprocally searchable basis with patient authorization for at least a  12-month period after this study. Patient motion degrades image quality.     _______________    FINDINGS:    BRAIN AND POSTERIOR FOSSA:  Mild periventricular areas of decreased attenuation are identified. Gray-white matter interfaces are preserved. No intraparenchymal hemorrhage, mass effect or midline shift. The ventricular system is midline and symmetric. Atherosclerotic vascular calcifications are identified. EXTRA-AXIAL SPACES AND MENINGES:  There is no evidence for extra-axial mass lesion or fluid collection. CALVARIUM:  Intact. SINUSES:  Sphenoid sinus mucosal thickening. OTHER:  Orbits are grossly intact. Facial soft tissue are within normal limits. _______________    Impression  1. No evidence for intracranial hemorrhage, mass effect or midline shift. 2.  Mild periventricular areas of microvascular ischemic change. 3.  Cerebral atherosclerosis. 4.  Atrophy. If there are continued symptoms, a MRI is recommended for further evaluation. CT abdomen/pelvis 8/27/2022  IMPRESSION  1. Large right inguinal hernia with large and small bowel. There is diffuse wall  thickening of the ascending colon concerning for strangulation. No evidence for  bowel obstruction. 2. Right pleural effusion and bilateral basilar atelectasis. 3. Gastric distention. CXR reviewed by me:  XR 8/27 (Most Recent). Results from Hospital Encounter encounter on 08/27/22    XR CHEST PORT    Narrative  EXAM: XR CHEST PORT    CLINICAL INDICATION/HISTORY: intubated  -Additional: None    COMPARISON: 8/30/2022    TECHNIQUE: Portable frontal view of the chest    _______________    FINDINGS:    SUPPORT DEVICES: Endotracheal and enteric tubes unchanged. HEART AND MEDIASTINUM: Cardiomediastinal silhouette within normal limits. LUNGS AND PLEURAL SPACES: Patient rotated to right. Moderate right pleural  effusion/atelectasis. No pneumothorax.    _______________    Impression  Moderate right pleural effusion/atelectasis. AXR 8/27  FINDINGS:   SUPPORT DEVICES: A nasogastric tube is positioned in the stomach.    BOWEL GAS PATTERN: The gas pattern is nonobstructive. SOFT TISSUES: Unremarkable. BONES: Degenerative changes are seen throughout the spine. ADDITIONAL FINDINGS: None. IMPRESSION   No significant abnormality. Please note: Voice-recognition software may have been used to generate this report, which may have resulted in some phonetic-based errors in grammar and contents. Even though attempts were made to correct all the mistakes, some may have been missed, and remained in the body of the document.       Vinay Leon MD  8/31/2022

## 2022-08-31 NOTE — PROGRESS NOTES
RESPIRATORY NOTE:          Pt back on full support, increased HR, agitation noted, continuing to suction moderate amts of purulent secretions, will continue to monitor.

## 2022-08-31 NOTE — PROGRESS NOTES
Palliative Medicine    CODE STATUS: DNR/do not reintubate     AMD Status: none on file. His wife, [employer], is legal next of kin     8/31/2022 0900 Seen today in room ICU 5 along with Norma Javed NP. Lying in bed. Precedex on. Not able to follow directions. Moves head about non-purposively. SBT ongoing. Disposition plan: to be determined based on response to treatment and family decisions    Palliative care will continue to follow Constantine Donald  and his family during his hospitalization and support them as they make healthcare decisions and define goals of care.       Chauncey Short, RN, MSN  Palliative Medicine  P: [phone]

## 2022-09-01 NOTE — PROGRESS NOTES
Patient:  Messi Diehl. :  1945  Gender:  male  MRN #:  [id number]    Assessment:   Messi Loomis is a 78 y.o. male with hypertension, hyperlipidemia, stroke , chronic atrial fibrillation, on Plavix, DM,previous, COVID-19 infection, chronic indwelling urinary catheter for urinary retention, severe peripheral vascular disease left BKA admitted in ICU with septic shock , acute renal failure, hyperkalemia , metabolic acidosis and strangulated left inguinal hernia  s/p emergency  surgery   Still on mechanical vent, BP stable off vasopressors       Septic shock - Improved   Acute renal failure- Improving   Hypokalemia -Fluctuating   Metabolic acidosis -Improved   Respiratory failure       Plan:   He has decent urine output overnight with improvement in renal function slowly   No indications of renal replacement therapy   Lasix 20 mg iv daily for volume management   Sodium bicarbonate 650 mg TID for metabolic acidosis ,close monitoring of serum potassium, replace prn  Minimize volume infusion   Avoid NSAIDS, contrast and nephrotoxin   Dose all meds and antibiotics for current eGFR  Transfuse prn to keep hemoglobin > 7 gram/dl   KCL 40 meq for hypokalemia     Subjective/Interval Events    Decent urine output  BP stable  Unconscious in mechanical vent   Unable to obtained review of systems         Intake/Output Summary (Last 24 hours) at 2022 1005  Last data filed at 2022 0647  Gross per 24 hour   Intake 1287.57 ml   Output 2450 ml   Net -1162.43 ml           Blood pressure (!) 155/58, pulse 84, temperature 99 °F (37.2 °C), resp. rate 17, height 5' 8\" (1.727 m), weight 82.8 kg (182 lb 8.7 oz), SpO2 100 %.     Exam:    Unconscious in vent   Lung: clear to auscultation,  Heart: s1s2 heard   Abdomen: soft, mild distension   Ext: no edema in RLE,   CNS- sedated       Recent Labs     22  0312   WBC 6.6   HCT 26.9*   MCV 79.4       Recent Labs     22  0450      CO2 22   * INR 1.3*       Recent Labs     09/01/22  0450   AGRAT 1.2       Discussed with family at bedside     Approx 31 minutes of critical care time spent in the direct evaluation and formulation of treatment plan of this high risk patient. The reason for providing this level of medical care was due a critical illness that impaired one or more vital organ systems such that there was a high probability of imminent or life threatening deterioration in the patients condition. This care involved high complexity decision making to assess, manipulate, and support vital system functions, to treat this degreee vital organ system failure and to prevent further life threatening deterioration of the patients condition.          Paty Mendoza MD  Nephrology -[facility], [facility].

## 2022-09-01 NOTE — PROGRESS NOTES
Palliative Medicine    CODE STATUS: DNR/DNI    AMD Status: none on file. His wife, Mela Brantley, is his legal next o fkin     9/1/2022 4318 Seen today in room ICU 5 along with Lorraine Dunlap NP. Lying in bed. Intubated/ventilated. Occasionally turns head side to side. Not able to follow directions. Extubated after our visit. Will await to see if he clears cognitively for goals of care discussions. Disposition plan: to be determined based on response to treatment and family decisions    Palliative care will continue to follow Carla Alexis  and his family during his hospitalization and support them as they make healthcare decisions and define goals of care.       Jeana Mata, RN, MSN  Palliative Medicine  P: [phone]

## 2022-09-01 NOTE — PROGRESS NOTES
Hospitalist Progress Note    Patient: Claudy Mckinney MRN: [id number]  CSN: [id number]    YOB: 1945  Age: 78 y.o. Sex: male    DOA: 8/27/2022 LOS:  LOS: 5 days          Chief Complaint:    sepsis         Assessment/Plan   78 y.o. 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 Admitted for severe septic shock with multiorgan and hypotension involvement due to multiple possible sources of infection, severe hyperkalemia, acute metabolic encephalopathy, metabolic acidosis, strangulated left inguinal hernia, anemia requiring blood transfusion, mild hypoxia coagulable state.       Severe septic shock has improved, off pressors    Acute resp failure-improved-may be weaned from vent later today     Incarcerated right inguinal hernia -status post ex lap with reduction of incarcerated right groin hernia with hernia mesh     Infected sacral ulcer -weeping purulent material     Infected medial maleolar and heel ulcers -on IV zosyn     Anemia -s/p transfusion -t     Acute renal failure -likely due to profound shock, appreciate nephrology expertise, urine output improving, cr slowly improving     hyperkalemia -improved     Metabolic acidosis -improved      Acute encephalopathy     Prophylaxis - DVT: heparin, GI: protonix       Poor prognosis with multiple medical ailments and advanced PVD, diabetes complications, prior BKA< wounds and debilitated state    Hospice would certainly be reasonable option for disposition  Family hoping he will extubate and they can review options for goals of care further   D/w pall care team    DNR     Disposition :  Patient Active Problem List   Diagnosis Code    DM2 (diabetes mellitus, type 2) ([facility]) E11.9    CAD (coronary artery disease) I25.10    Acute renal failure (ARF) ([facility]) N17.9    CKD (chronic kidney disease) stage 3, GFR 30-59 ml/min ([facility]) N18.30    Elevated brain natriuretic peptide (BNP) level R79.89    UTI (urinary tract infection) N39.0    High anion gap metabolic acidosis D89.0    Hyperkalemia E87.5    Sepsis ([facility]) A41.9    AMS (altered mental status) R41.82    Strangulated inguinal hernia K40.30    Right inguinal hernia K40.90    Leukocytosis D72.829    Anemia D64.9    Chronic indwelling Mccray catheter Z97.8    S/P BKA (below knee amputation), left ([facility]) Z89.512    Right foot ulcer ([facility]) L97.519    Paroxysmal atrial fibrillation ([facility]) I48.0    Diabetic ulcer of right foot ([facility]) E11.621, L97.519    Pleural effusion on right J90    Severe protein-calorie malnutrition ([facility]) E43    Acute respiratory failure with hypoxemia ([facility]) J96.01    Septic shock ([facility]) A41.9, R65.21       Subjective:  He is moving around, keeps eyes closed  intubated      Review of systems:    UTO due to gravity of illness and current state      Vital signs/Intake and Output:  Visit Vitals  BP (!) 155/58   Pulse 84   Temp 99 °F (37.2 °C)   Resp 17   Ht 5' 8\" (1.727 m)   Wt 82.8 kg (182 lb 8.7 oz)   SpO2 100%   BMI 27.76 kg/m²     Current Shift:  No intake/output data recorded.   Last three shifts:  08/30 1901 - 09/01 0700  In: 2314.1 [I.V.:812.1]  Out: 3350 [Urine:3350]    Exam:    General: chronically ill appearing Wm intubated, moves head and arms, NAD  CVS:Regular rate and rhythm, no M/R/G, S1/S2 heard, no thrill  Lungs:Clear to auscultation bilaterally, no wheezes, rhonchi, or rales  Abdomen: Soft, Nontender, No distention, Normal Bowel sounds,  Extremities: right foot dressed, Left BKA  Neuro:grossly normal                 Labs: Results:       Chemistry Recent Labs     09/01/22  0450 08/31/22  1130 08/31/22  0312 08/30/22  1450 08/30/22  0531   *  --  188*  --  199*     --  137  --  135*   K 3.4* 3.9 3.8   < > 2.9*     --  104  --  102   CO2 22  --  23  --  18*   *  --  110*  --  124*   CREA 3.31*  --  3.80*  --  3.82*   CA 8.7  --  9.0  --  7.6*   AGAP 13  --  10  --  15   BUCR 31*  --  29*  --  32*   *  --  175*  --  105   TP 5.1*  --  5.3*  --  4.4*   ALB 2.8*  --  1.9*  --  2.0*   GLOB 2.3  --  3.4  --  2.4   AGRAT 1.2  --  0.6*  --  0.8    < > = values in this interval not displayed. CBC w/Diff Recent Labs     08/31/22 0312 08/30/22  0531 08/29/22  1941 08/29/22  0928   WBC 6.6 8.9  --  9.4   RBC 3.39* 2.85*  --  2.45*   HGB 8.7* 7.4* 7.4* 6.3*   HCT 26.9* 22.3* 22.4* 19.6*    276  --  322   GRANS 78* 83*  --  80*   LYMPH 13* 8*  --  6*   EOS 1 1  --  1      Cardiac Enzymes No results for input(s): CPK, CKND1, LEONEL in the last 72 hours. No lab exists for component: CKRMB, TROIP   Coagulation Recent Labs     09/01/22 0450 08/31/22 0312   PTP 16.6* 16.0*   INR 1.3* 1.2       Lipid Panel No results found for: CHOL, CHOLPOCT, CHOLX, CHLST, CHOLV, 927667, HDL, HDLP, LDL, LDLC, DLDLP, 352550, VLDLC, VLDL, TGLX, TRIGL, TRIGP, TGLPOCT, CHHD, CHHDX   BNP No results for input(s): BNPP in the last 72 hours.    Liver Enzymes Recent Labs     09/01/22 0450   TP 5.1*   ALB 2.8*   *      Thyroid Studies Lab Results   Component Value Date/Time    TSH 4.73 (H) 08/28/2022 08:04 AM        Procedures/imaging: see electronic medical records for all procedures/Xrays and details which were not copied into this note but were reviewed prior to creation of Yogi Shaffer MD

## 2022-09-01 NOTE — PROGRESS NOTES
Physician Progress Note      Andry Hernandes  [facility] #:                  [id number]  :                       1945  ADMIT DATE:       2022 10:17 AM  DISCH DATE:  RESPONDING  PROVIDER #:        Adela Quinn MD          QUERY TEXT:    Pt admitted with UTI. Pt noted to have chronic indwelling urinary catheter. If possible, please document in the progress notes and discharge summary if you are evaluating and/or treating any of the following: The medical record reflects the following:  Risk Factors: urinary retention    Clinical Indicators:  2022, H&P, Dr. Zuhair Montero - acute renal failure, likely due to septic shock, very minimal urine output, caal to measure output overall in critically ill patient and pt with h/o urinary retention, renal consult placed, trend BUN, Cr, follow I/O, caal in place. Chronic indwelling Caal catheter \"  22 14:27, urinalysis report:  Color: YELLOW  Appearance: TURBID  Specific gravity: 1.019  pH (UA): 6.0  Protein: >1,000 ! Glucose: Negative  Ketone: TRACE ! Blood: LARGE ! Bilirubin: Negative  Urobilinogen: 1.0  Nitrites: Negative  Leukocyte Esterase: LARGE ! Epithelial cells: FEW  WBC: TOO NUMEROUS TO COUNT  RBC: 4 to 10  Bacteria: 1+ ! Treatment: Zosyn IV, levofloxacin IV    Thank you,  KRISTINA Hernadez RN, CDS  Options provided:  -- UTI due to chronic indwelling urinary catheter  -- UTI not due to indwelling urinary catheter  -- Other - I will add my own diagnosis  -- Disagree - Not applicable / Not valid  -- Disagree - Clinically unable to determine / Unknown  -- Refer to Clinical Documentation Reviewer    PROVIDER RESPONSE TEXT:    UTI is due to the chronic indwelling urinary catheter. Query created by: Darylene Basta on 2022 1:53 PM      QUERY TEXT:    Patient admitted with sepsis. Per 2022 wound care note, noted to also have pressure ulcer.  If possible, please document in progress notes and discharge summary the type of ulcer, site of the ulcer and present on admission status of the ulcer: The medical record reflects the following:  Risk Factors: poor mobility, diabetes, CKD, PAD    Clinical Indicators:  8/29/2022, wound care note, Taisha Larose RN  \"right ankle pressure ulcer, unstageable  right lateral heel pressure ulcer, unstageable  right foot pressure ulcer, unstageable\"  8/30/2022, PN, Dr. Cruz China:  \"Skin: pale, infected ulcers with black eschar medial malleolus and heel on right foot\"    Treatment: Alginate    Thank you,  KRISTINA Hernadez RN, CDS  [email or web address]  Options provided:  -- Decubitus Pressure Ulcer to right ankle, unstageable  right lateral heel, unstageable  right foot, unstageable present on admission  -- Other - I will add my own diagnosis  -- Disagree - Not applicable / Not valid  -- Disagree - Clinically unable to determine / Unknown  -- Refer to Clinical Documentation Reviewer    PROVIDER RESPONSE TEXT:    This patient has a decubitus pressure ulcer to right ankle, unstageable; right lateral heel, unstageable; right foot, unstageable that was present on admission. Query created by:  Darylene Basta on 8/31/2022 8:25 AM      Electronically signed by:  Adela Quinn MD 9/1/2022 1:28 PM

## 2022-09-01 NOTE — PROGRESS NOTES
@2000 pt taken over awake in bed on ventilator moving head continuously from left to right biting on ET-tube. Remains on Precedex drip. OG tube clamped awaiting results of KUB to restart tube feeds. Mccray in-situ draining clear yellow urine. Assessment done and charted in relevant flow sheets. Nursing management and care continues. @0000 pt reassessed no changes observation continues. @0400 pt has intermittent periods of agitation or restlessness. Reassessment completed no new changes. @5657 Bedside and Verbal shift change report given to Ilia Garibay (oncoming nurse) by Wendy Mckeon (offgoing nurse). Report included the following information SBAR, Kardex, Intake/Output, MAR, Recent Results, Med Rec Status, and Alarm Parameters .

## 2022-09-01 NOTE — PROGRESS NOTES
[facility]  Pulmonary, Critical Care, and Sleep Medicine    Name: Merry Calhoun.  MRN: [id number]   : 1945 Hospital: [facility] [place]    Date: 2022  Room: [address] [place] [address] Note                                              Consult requesting physician: Dr. Dr Uche Arias  Reason for Consult: Strangulated bowel with complications, shock, respiratory failure       IMPRESSION:   Strangulated bowel/inguinal hernia  Acute respiratory failure with hypoxemia  Septic shock  High gap metabolic acidosis  Acute renal failure  Right inguinal hernia  Hyperkalemia  Sepsis  Altered mentation  Leukocytosis  Anemia  UTI  Right pleural effusion  Chronic Mccray catheter  Right foot ulcers  Type 2 diabetes mellitus  Paroxysmal atrial fibrillation  Left below-knee amputation  Severe malnutrition      Patient Active Problem List   Diagnosis Code    DM2 (diabetes mellitus, type 2) ([facility]) E11.9    CAD (coronary artery disease) I25.10    Acute renal failure (ARF) ([facility]) N17.9    CKD (chronic kidney disease) stage 3, GFR 30-59 ml/min ([facility]) N18.30    Elevated brain natriuretic peptide (BNP) level R79.89    UTI (urinary tract infection) N39.0    High anion gap metabolic acidosis M66.0    Hyperkalemia E87.5    Sepsis ([facility]) A41.9    AMS (altered mental status) R41.82    Strangulated inguinal hernia K40.30    Right inguinal hernia K40.90    Leukocytosis D72.829    Anemia D64.9    Chronic indwelling Mccray catheter Z97.8    S/P BKA (below knee amputation), left ([facility]) Z89.512    Right foot ulcer ([facility]) L97.519    Paroxysmal atrial fibrillation ([facility]) I48.0    Diabetic ulcer of right foot ([facility]) E11.621, L97.519    Pleural effusion on right J90    Severe protein-calorie malnutrition ([facility]) E43    Acute respiratory failure with hypoxemia ([facility]) J96.01    Septic shock ([facility]) A41.9, R65.21         Code status: Full Code      RECOMMENDATIONS:   Patient is critically ill and presenting with large right inguinal strangulated hernia, with anemia, metabolic acidosis, acute renal failure and hyperkalemia. S/p laparotomy and reduction of incarcerated right groin hernia without need for bowel resection-8/27/2022. Respiratory: Patient remains intubated postop due to critically ill state, hemodynamic instability and metabolic derangements. Sbt passed hope to extubate  Abg 7.4/36/152/99  Off sedation   Abg  Cxr still pulmonary congestion increse lasix   VCV ventilation-FiO2 down to 30%; PEEP 5. CXR 8/30  IMPRESSION  1. Endotracheal tube approximately 4 cm above the alise. 2. No significant change in right basilar opacity consistent with pleural  effusion with adjacent consolidation, atelectasis or pneumonia. IIncrease diuresis check BNP  Add midodrine  SBT today did well for 1 hrs   Continue ventilator and sedation bundles. Sedation-midazolam infusion; prn Dilaudid; patient dropped blood pressure in ER with fentanyl. CT abdomen on admission-right lower lung shows large right-sided pleural effusion with underlying atelectasis of the right lower lobe segments. Keep SPO2 >=92%. HOB 30 degree elevation all the time. Aggressive pulmonary toileting. Aspiration precautions. CVS: Patient off pressors;BP BP but needs diuresis add midorine low dose   Stable overnight   wean for systolic blood pressure greater than 95 mmHg. Patient currently in atrial fibrillation. Prior Echo showed normal LV function; no pulmonary hypertension reported. Repeat echocardiogram diastolic dysfunction Ef normal   Troponin not elevated; proBNP elevated. Patient likely has diastolic CHF with chronic diabetes and hypertension. ID: Wbc improved 8.9  Patient likely has bowel sepsis; lactic acid not elevated. Necrotic lower extremity possible second source of infection   UA-Positive for UTI; urine culture-pending. Continue broad-spectrum antibiotic-levofloxacin, Zosyn and IV vancomycin-renal dosing.   Blood cultures negative; rapid COVID test negative. Deescalate antibiotic when appropriate. Hematology/Oncology: Patient s/p 1 unit PRBC in the ER. Hemoglobin 8.7 this morning. Platelets-reactive thrombocytosis. INR 1.4 this morning; plan for 5 mg vitamin K IV. Start sq heaprin for dvt prophylaxis and monitor hb  Renal: metabolic acidosis better stop iv start oral bicarbonate   Contineu albumins malnutrition   Nephrology consulted-Dr. Johnathan Guerin. GI/: Chronic Mccray catheter-changed in ER  LFTs and lipase-normal.  Albumin-low. CT abdomen/pelvis- Large right inguinal hernia with large and small bowel. There is diffuse wall  thickening of the ascending colon concerning for strangulation. No evidence for bowel obstruction. S/p exploratory laparotomy, reduction of incarcerated hernia; bowel was viable, and did not need resection. Endocrine: Monitor BS-improved; continue sliding scale insulin. Hemoglobin A1c-5.9 on admission. Check TSH. Neurology: Acute metabolic encephalopathy from sepsis. CT head-nil acute. Skin/Wound: Right foot ulcer-wound care consulted. Electrolytes: Replace electrolytes per ICU electrolyte replacement protocol; caution renal failure. IVF: Bicarb drip 75 ml per hour; albumin infusion every 6 hours. Nutrition: N.p.o. for now. Prophylaxis: DVT Prophylaxis: None- due to anemia, left BKA, right foot diabetic ulcers. GI Prophylaxis: Protonix. Restraints: Prn Wrist soft restraints for patient interfering with medical therapy/management and patient safety. Lines/Tubes: PIV, midline  ET tube 8/27  OG tube 8/27  Mccray: 8/27 changed in ER (Medically necessary for strict input/output monitoring in critically ill patient, will remove it when not needed. Mccray bundle followed). Advance Directive/Palliative Care: consulted; overall prognosis appears to be poor. Full code status per family wishes. Quality Care: PPI, DVT prophylaxis, HOB elevated, Infection control all reviewed and addressed.     Care of plan d/w icu RT and RN.  Update family today when available. High complexity decision making was performed during the evaluation of this patient at high risk for decompensation with multiple organ involvement. Total critical care time spent rendering care exclusive of procedures/family discussion/coordination of care: 45mins  Family meeting on 8/30 I attended now Dnr/DNI no reintubation if fails           Subjective/History of Present Illness:     Patient is a 78 y.o. male with PMHx significant for multiple medical problems. He history of atrial flutter/atrial fibrillation, coronary artery disease, chronic kidney disease stage III, diabetes mellitus type 2 with leg ulcerations, history of CHF, hypertension and hyperlipidemia, history of peripheral vascular disease, history of prior CVA. Patient was admitted October 2021 with evidence of left leg diabetic ulcerations and gangrene. He subsequently underwent left below-knee amputation. Patient has chronic indwelling Mccray catheter. The patient has come to the ER brought by family/wife acutely unwell, confused and agitated. The patient has been found to have severe right-sided inguinal hernia, and subsequent CT abdomen/pelvis study showing evidence of strangulation of the bowel. The patient is also anemic, and severe metabolic acidosis, and acute renal failure. Urine output is poor from the Mccray catheter, and appears to be purulent. Mccray catheter has been replaced in the ER. S/p EXPLORATORY LAPAROTOMY, REDUCTION INCARCERATED RIGHT GROIN HERNIA WITH HERNIA REPAIR WITH MESH 8/27/2022 - Dr Madison Dodson  The bowel was completely viable and did not require resection. 9/1/2022  Patient seen in ICU. Off sedated passed SBT  Extubate today  Increase diuresis    S/p left BKA 10/28/2021    Review of Systems:  ROS not obtained due to patient factor.        No Known Allergies   Past Medical History:   Diagnosis Date    A-fib ([facility] [address]     Asthma     CAD (coronary artery disease) Chronic kidney disease     stage 3    COVID-19     Diabetes (HCC)     GERD (gastroesophageal reflux disease)     Heart failure (HCC)     chronic diastolic heart failure    High cholesterol     Hypertension     Ill-defined condition     chronic osteomyelitis left ankle and foot    Ill-defined condition     chronic arteriosclerosis    PVD (peripheral vascular disease) ([facility] [address]     Stroke [facility])     cva      Past Surgical History:   Procedure Laterality Date    COLONOSCOPY N/A 6/1/2018    COLONOSCOPY, POLYPECTOMY performed by Ld Britt MD at [facility] ENDOSCOPY    HX CATARACT REMOVAL      HX ORTHOPAEDIC Left 2021    ankle washout, external fixator, would vac    HX ORTHOPAEDIC      external fixator removed      Social History     Tobacco Use    Smoking status: Former    Smokeless tobacco: Never   Substance Use Topics    Alcohol use: Not Currently      History reviewed. No pertinent family history. Prior to Admission medications    Medication Sig Start Date End Date Taking? Authorizing Provider   levoFLOXacin (Levaquin) 750 mg tablet Take 1 Tablet by mouth daily. 6/18/22   Gopal Butler MD   insulin lispro (HUMALOG) 100 unit/mL injection INITIATE INSULIN CORRECTIVE PROTOCOL: Normal Insulin Sensitivity   For Blood Sugar (mg/dL) of:     Less than 150 =   0 units           150 -199 =   2 units  200 -249 =   4 units  250 -299 =   6 units  300 -349 =   8 units  350 and above = 10 units and Call Physician  If 2 glucose readings are above 200 mg/dL within a 24 hr period, proceed to \"Insulin Resistant\" dosing. Initiate Hypoglycemia protocol if blood glucose is <70 mg/dL Fast Acting - Administer Immediately - or within 15 minutes of start of meal, if mealtime coverage. 11/3/21   Tangela Tom MD   gabapentin (NEURONTIN) 300 mg capsule Take 1 Capsule by mouth three (3) times daily. Max Daily Amount: 900 mg. 11/3/21   Tangela Tom MD   clopidogreL (Plavix) 75 mg tab Take  by mouth daily.  Indications: afib Provider, Historical   B.infantis-B.ani-B.long-B.bifi (Probiotic 4X) 10-15 mg TbEC Take  by mouth daily. Provider, Historical   multivitamin (ONE A DAY) tablet Take 1 Tablet by mouth daily. Provider, Historical   acetaminophen (TylenoL) 325 mg tablet Take 650 mg by mouth every four (4) hours as needed for Pain. Provider, Historical   tamsulosin (FLOMAX) 0.4 mg capsule Take 2 Capsules by mouth daily. 5/25/21   Eliceo Holbrook MD   atorvastatin (LIPITOR) 40 mg tablet Take 1 Tab by mouth nightly. 2/15/18   Chantell Mckeon PA-C   metoprolol succinate (TOPROL-XL) 50 mg XL tablet Take 1 Tab by mouth daily. Patient taking differently: Take 100 mg by mouth daily.  2/16/18   Chantell Mckeon PA-C     Current Facility-Administered Medications   Medication Dose Route Frequency    potassium chloride (K-DUR, KLOR-CON M20) SR tablet 40 mEq  40 mEq Oral ONCE    insulin glargine (LANTUS) injection 20 Units  20 Units SubCUTAneous DAILY    furosemide (LASIX) injection 20 mg  20 mg IntraVENous BID    dexmedeTOMidine (PRECEDEX) 400 mcg in 0.9% sodium chloride 100 mL infusion  0.1-1.5 mcg/kg/hr IntraVENous TITRATE    sodium bicarbonate tablet 650 mg  650 mg Oral TID    0.9% sodium chloride infusion  5 mL/hr IntraVENous CONTINUOUS    lactulose (CHRONULAC) 10 gram/15 mL solution 15 mL  15 mL Oral BID    midodrine (PROAMATINE) tablet 2.5 mg  2.5 mg Oral BID    albumin human 25% (BUMINATE) solution 25 g  25 g IntraVENous Q6H    arformoteroL (BROVANA) neb solution 15 mcg  15 mcg Nebulization BID RT    heparin (porcine) injection 5,000 Units  5,000 Units SubCUTAneous Q8H    piperacillin-tazobactam (ZOSYN) 3.375 g in 0.9% sodium chloride (MBP/ADV) 100 mL MBP  3.375 g IntraVENous Q12H    pantoprazole (PROTONIX) 40 mg in 0.9% sodium chloride 10 mL injection  40 mg IntraVENous Q12H    sodium chloride (NS) flush 5-40 mL  5-40 mL IntraVENous Q8H    insulin lispro (HUMALOG) injection   SubCUTAneous Q6H    chlorhexidine (PERIDEX) 0.12 % mouthwash 10 mL  10 mL Oral Q12H    [Held by provider] vasopressin (VASOSTRICT) 20 Units in 0.9% sodium chloride 100 mL infusion  0-0.04 Units/min IntraVENous TITRATE         Objective:   Vital Signs:    Visit Vitals  BP (!) 155/58   Pulse 84   Temp 99 °F (37.2 °C)   Resp 17   Ht 5' 8\" (1.727 m)   Wt 82.8 kg (182 lb 8.7 oz)   SpO2 100%   BMI 27.76 kg/m²       O2 Device: Endotracheal tube, Ventilator       Temp (24hrs), Av.3 °F (37.4 °C), Min:97.2 °F (36.2 °C), Max:100.4 °F (38 °C)       Intake/Output:   Last shift:      No intake/output data recorded.     Last 3 shifts: 1901 -  0700  In: 2314.1 [I.V.:812.1]  Out: 3350 [Urine:3350]      Intake/Output Summary (Last 24 hours) at 2022 1030  Last data filed at 2022 3892  Gross per 24 hour   Intake 1287.57 ml   Output 2450 ml   Net -1162.43 ml         Last 3 Recorded Weights in this Encounter    22 1021 22 1107 22 0940   Weight: 38.6 kg (85 lb) 38.6 kg (85 lb) 82.8 kg (182 lb 8.7 oz)       Physical Exam:   Patient appears chronically ill; currently intubated;comfortable off sedation opens eyes and follows simple commands  but sleepy  HEENT: pupils not dilated, reactive, no scleral jaundice, moist oral mucosa, no nasal drainage  Neck: No adenopathy or thyroid swelling  Orotracheal and orogastric tubes-no bleeding or secretions  CVS: S1S2 no murmurs; JVD not elevated; telemetry-sinus rhythm  RS: Mod air entry bilaterally, decreased BS right lower chest, no obvious wheezes or crackles; not tachypneic or in distress  Abd: soft, nontender, not distended, no guarding or rigidity, bowel sounds present, no hepatosplenomegaly  Right lower abdomen laparotomy scar-no bleeding or hematoma  Neuro: Intubated and sedated; limited exam    Extrm: no leg edema or swelling or clubbing; left below-knee amputation  Skin: Large ulceration right medial malleolus and right heel, black in appearance, no discharge    Lymphatic: no cervical or supraclavicular adenopathy  Groin: Large right inguinal hernia no longer present      Data:       Recent Results (from the past 12 hour(s))   GLUCOSE, POC    Collection Time: 08/31/22 11:21 PM   Result Value Ref Range    Glucose (POC) 93 70 - 110 mg/dL   MAGNESIUM    Collection Time: 09/01/22  4:50 AM   Result Value Ref Range    Magnesium 1.9 1.6 - 2.6 mg/dL   PROTHROMBIN TIME + INR    Collection Time: 09/01/22  4:50 AM   Result Value Ref Range    Prothrombin time 16.6 (H) 11.5 - 15.2 sec    INR 1.3 (H) 0.8 - 1.2     CALCIUM, IONIZED    Collection Time: 09/01/22  4:50 AM   Result Value Ref Range    Ionized Calcium 1.14 1.12 - 1.32 MMOL/L   PHOSPHORUS    Collection Time: 09/01/22  4:50 AM   Result Value Ref Range    Phosphorus 2.8 2.5 - 4.9 MG/DL   METABOLIC PANEL, COMPREHENSIVE    Collection Time: 09/01/22  4:50 AM   Result Value Ref Range    Sodium 140 136 - 145 mmol/L    Potassium 3.4 (L) 3.5 - 5.5 mmol/L    Chloride 105 100 - 111 mmol/L    CO2 22 21 - 32 mmol/L    Anion gap 13 3.0 - 18 mmol/L    Glucose 117 (H) 74 - 99 mg/dL     (H) 7.0 - 18 MG/DL    Creatinine 3.31 (H) 0.6 - 1.3 MG/DL    BUN/Creatinine ratio 31 (H) 12 - 20      GFR est AA 22 (L) >60 ml/min/1.73m2    GFR est non-AA 18 (L) >60 ml/min/1.73m2    Calcium 8.7 8.5 - 10.1 MG/DL    Bilirubin, total 1.2 (H) 0.2 - 1.0 MG/DL    ALT (SGPT) 24 16 - 61 U/L    AST (SGOT) 35 10 - 38 U/L    Alk.  phosphatase 204 (H) 45 - 117 U/L    Protein, total 5.1 (L) 6.4 - 8.2 g/dL    Albumin 2.8 (L) 3.4 - 5.0 g/dL    Globulin 2.3 2.0 - 4.0 g/dL    A-G Ratio 1.2 0.8 - 1.7     GLUCOSE, POC    Collection Time: 09/01/22  5:27 AM   Result Value Ref Range    Glucose (POC) 140 (H) 70 - 110 mg/dL   BLOOD GAS, ARTERIAL POC    Collection Time: 09/01/22  5:50 AM   Result Value Ref Range    Device: ADULT VENT      FIO2 (POC) 30 %    pH (POC) 7.38 7.35 - 7.45      pCO2 (POC) 37.6 35.0 - 45.0 MMHG    pO2 (POC) 53 (L) 80 - 100 MMHG    HCO3 (POC) 22.1 22 - 26 MMOL/L    sO2 (POC) 85.2 (L) 92 - 97 %    Base deficit (POC) 2.4 mmol/L    Mode ASSIST CONTROL      Tidal volume 400 ml    Set Rate 16 bpm    PEEP/CPAP (POC) 5 cmH2O    Mean Airway Pressure 10 cmH2O    PIP (POC) 20      Allens test (POC) NOT APPLICABLE      Inspiratory Time 0.9 sec    Total resp. rate 16      Site RIGHT RADIAL      Patient temp. 100.04      Specimen type (POC) ARTERIAL      Performed by [employer]            Chemistry Recent Labs     09/01/22  0450 08/31/22  1753 08/31/22  1130 08/31/22  0312 08/30/22  1450 08/30/22  0531   *  --   --  188*  --  199*     --   --  137  --  135*   K 3.4*  --  3.9 3.8   < > 2.9*     --   --  104  --  102   CO2 22  --   --  23  --  18*   *  --   --  110*  --  124*   CREA 3.31*  --   --  3.80*  --  3.82*   CA 8.7  --   --  9.0  --  7.6*   MG 1.9 1.8 1.7 1.7  --  1.6   PHOS 2.8  --   --  3.2  --  3.3   AGAP 13  --   --  10  --  15   BUCR 31*  --   --  29*  --  32*   *  --   --  175*  --  105   TP 5.1*  --   --  5.3*  --  4.4*   ALB 2.8*  --   --  1.9*  --  2.0*   GLOB 2.3  --   --  3.4  --  2.4   AGRAT 1.2  --   --  0.6*  --  0.8    < > = values in this interval not displayed. Lactic Acid Lactic acid   Date Value Ref Range Status   08/27/2022 1.7 0.4 - 2.0 MMOL/L Final     No results for input(s): LAC in the last 72 hours. Liver Enzymes Protein, total   Date Value Ref Range Status   09/01/2022 5.1 (L) 6.4 - 8.2 g/dL Final     Albumin   Date Value Ref Range Status   09/01/2022 2.8 (L) 3.4 - 5.0 g/dL Final     Globulin   Date Value Ref Range Status   09/01/2022 2.3 2.0 - 4.0 g/dL Final     A-G Ratio   Date Value Ref Range Status   09/01/2022 1.2 0.8 - 1.7   Final     Alk.  phosphatase   Date Value Ref Range Status   09/01/2022 204 (H) 45 - 117 U/L Final     Recent Labs     09/01/22  0450 08/31/22  0312 08/30/22  0531   TP 5.1* 5.3* 4.4*   ALB 2.8* 1.9* 2.0*   GLOB 2.3 3.4 2.4   AGRAT 1.2 0.6* 0.8   * 175* 105          CBC w/Diff Recent Labs 08/31/22 0312 08/30/22  0531 08/29/22  1941   WBC 6.6 8.9  --    RBC 3.39* 2.85*  --    HGB 8.7* 7.4* 7.4*   HCT 26.9* 22.3* 22.4*    276  --    GRANS 78* 83*  --    LYMPH 13* 8*  --    EOS 1 1  --           Cardiac Enzymes No results found for: CPK, CK, CKMMB, CKMB, RCK3, CKMBT, CKNDX, CKND1, LEONEL, TROPT, TROIQ, WARD, TROPT, TNIPOC, BNP, BNPP     BNP No results found for: BNP, BNPP, XBNPT     Coagulation Recent Labs     09/01/22  0450 08/31/22 0312 08/30/22  0531   PTP 16.6* 16.0* 16.6*   INR 1.3* 1.2 1.3*           Thyroid  Lab Results   Component Value Date/Time    TSH 4.73 (H) 08/28/2022 08:04 AM       No results found for: T4     Urinalysis Lab Results   Component Value Date/Time    Color YELLOW 08/27/2022 02:27 PM    Appearance TURBID 08/27/2022 02:27 PM    Specific gravity 1.019 08/27/2022 02:27 PM    pH (UA) 6.0 08/27/2022 02:27 PM    Protein >1,000 (A) 08/27/2022 02:27 PM    Glucose Negative 08/27/2022 02:27 PM    Ketone TRACE (A) 08/27/2022 02:27 PM    Bilirubin Negative 08/27/2022 02:27 PM    Urobilinogen 1.0 08/27/2022 02:27 PM    Nitrites Negative 08/27/2022 02:27 PM    Leukocyte Esterase LARGE (A) 08/27/2022 02:27 PM    Epithelial cells FEW 08/27/2022 02:27 PM    Bacteria 1+ (A) 08/27/2022 02:27 PM    WBC TOO NUMEROUS TO COUNT 08/27/2022 02:27 PM    RBC 4 to 10 08/27/2022 02:27 PM          Culture data during this hospitalization.    All Micro Results       Procedure Component Value Units Date/Time    CULTURE, BLOOD [[id number]] Collected: 08/27/22 1030    Order Status: Completed Specimen: Blood Updated: 09/01/22 0829     Special Requests: NO SPECIAL REQUESTS        Culture result: NO GROWTH 5 DAYS       CULTURE, BLOOD [[id number]] Collected: 08/27/22 1100    Order Status: Completed Specimen: Blood Updated: 09/01/22 0829     Special Requests: NO SPECIAL REQUESTS        Culture result: NO GROWTH 5 DAYS       CULTURE, RESPIRATORY/SPUTUM/BRONCH Orysia Devoid [[id number]] Collected: 08/31/22 0018 Order Status: Completed Specimen: Sputum from Tracheal Aspirate Updated: 09/01/22 0047     Special Requests: NO SPECIAL REQUESTS        GRAM STAIN 1+ WBCS SEEN               RARE EPITHELIAL CELLS SEEN                  OCCASIONAL GRAM POSITIVE COCCI IN CLUSTERS           Culture result: PENDING    CULTURE, URINE [[id number]] Collected: 08/27/22 1442    Order Status: Completed Specimen: Cath Urine Updated: 08/29/22 0642     Special Requests: NO SPECIAL REQUESTS        Dougherty Count --        64286  COLONIES/mL       Culture result:       >2 ORGANISMS - CONTAMINATED SPECIMEN. SUGGEST RECOLLECTION          CULTURE, BLOOD [[id number]]     Order Status: Canceled Specimen: Blood     COVID-19 RAPID TEST [[id number]] Collected: 08/27/22 1030    Order Status: Completed Specimen: Nasopharyngeal Updated: 08/27/22 1057     Specimen source SWAB        COVID-19 rapid test Not detected        Comment: Rapid Abbott ID Now       Rapid NAAT:  The specimen is NEGATIVE for SARS-CoV-2, the novel coronavirus associated with COVID-19. Negative results should be treated as presumptive and, if inconsistent with clinical signs and symptoms or necessary for patient management, should be tested with an alternative molecular assay. Negative results do not preclude SARS-CoV-2 infection and should not be used as the sole basis for patient management decisions. This test has been authorized by the FDA under an Emergency Use Authorization (EUA) for use by authorized laboratories. Fact sheet for Healthcare Providers: ConventionUpdate.co.nz  Fact sheet for Patients: ConventionUpdate.co.nz       Methodology: Isothermal Nucleic Acid Amplification                  ECHO May 2021 Interpretation Summary  Result status: Final result  Contrast used: DEFINITY. Left Ventricle: Normal cavity size, wall thickness and systolic function (ejection fraction normal). The estimated EF is 55 - 60%.  There is mild (grade 1) left ventricular diastolic dysfunction E'E= 10.41. Wall Scoring: The left ventricular wall motion is normal.  Tricuspid Valve: Tricuspid valve not well visualized. No stenosis. Tricuspid regurgitation is inadequate for estimation of right ventricular systolic pressure. Images report reviewed by me:  CT 8/27/2022 (Most Recent)  Results from Hospital Encounter encounter on 08/27/22    CT HEAD WO CONT    Narrative  EXAM: CT head    INDICATION: Altered mental status. COMPARISON: None. TECHNIQUE: Axial CT imaging of the head was performed without intravenous  contrast.    One or more dose reduction techniques were used on this CT: automated exposure  control, adjustment of the mAs and/or kVp according to patient size, and  iterative reconstruction techniques. The specific techniques used on this CT  exam have been documented in the patient's electronic medical record. Digital  Imaging and Communications in Medicine (DICOM) format image data are available  to nonaffiliated external healthcare facilities or entities on a secure, media  free, reciprocally searchable basis with patient authorization for at least a  12-month period after this study. Patient motion degrades image quality. _______________    FINDINGS:    BRAIN AND POSTERIOR FOSSA:  Mild periventricular areas of decreased attenuation are identified. Gray-white matter interfaces are preserved. No intraparenchymal hemorrhage, mass effect or midline shift. The ventricular system is midline and symmetric. Atherosclerotic vascular calcifications are identified. EXTRA-AXIAL SPACES AND MENINGES:  There is no evidence for extra-axial mass lesion or fluid collection. CALVARIUM:  Intact. SINUSES:  Sphenoid sinus mucosal thickening. OTHER:  Orbits are grossly intact. Facial soft tissue are within normal limits. _______________    Impression  1. No evidence for intracranial hemorrhage, mass effect or midline shift.   2.  Mild periventricular areas of microvascular ischemic change. 3.  Cerebral atherosclerosis. 4.  Atrophy. If there are continued symptoms, a MRI is recommended for further evaluation. CT abdomen/pelvis 8/27/2022  IMPRESSION  1. Large right inguinal hernia with large and small bowel. There is diffuse wall  thickening of the ascending colon concerning for strangulation. No evidence for  bowel obstruction. 2. Right pleural effusion and bilateral basilar atelectasis. 3. Gastric distention. CXR reviewed by me:  XR 8/27 (Most Recent). Results from [place] encounter on 08/27/22    XR ABD (KUB)    Narrative  PORTABLE ABDOMEN    Indication:  OGT placement. Technique: Portable supine AP view of the abdomen was obtained. Comparison studies:  August 27, 2022. Findings: Moderate rotation. Gastric tube terminates in the fundus of the  stomach. Nonobstructive gas pattern. Telemetry leads. Lower abdominal skin  closing staples. Minor scattered osseous degenerative changes. Impression  Moderate rotation. Gastric tube terminates in the fundus of the stomach. Nonobstructive gas pattern. AXR 8/27  FINDINGS:   SUPPORT DEVICES: A nasogastric tube is positioned in the stomach. BOWEL GAS PATTERN: The gas pattern is nonobstructive. SOFT TISSUES: Unremarkable. BONES: Degenerative changes are seen throughout the spine. ADDITIONAL FINDINGS: None. IMPRESSION   No significant abnormality. Please note: Voice-recognition software may have been used to generate this report, which may have resulted in some phonetic-based errors in grammar and contents. Even though attempts were made to correct all the mistakes, some may have been missed, and remained in the body of the document.       Carlos Vargas MD  9/1/2022

## 2022-09-01 NOTE — PROGRESS NOTES
Per RN Miguel Brooke, unable to come to CT jerry, [facility]

## 2022-09-01 NOTE — PROGRESS NOTES
[facility]  (A Division of [address] [place] [address])                                                                                                                      Dyllan Lundberg MD  Office #: - Option # [phone]  Fax #: [phone]     Date of Admission: 8/27/2022Date of Note: 9/1/2022  Reason for Referral: Evaluation and antibiotic management of septic shock. Current Antimicrobials:    Prior Antimicrobials:    Zosyn 8/27 to date # D 5   Levofloxacin / Vancomycin 8/27 to 8/30       Assessment- ID related:  --------------------------------------------------------------------------  Critically ill patient with poor prognosis:    Septic shock with ELINOR/ resp failure and encephalopathy 2/2 below: Improved  Encephalopathy-- persisting  Strangulated and incarcerated R groin hernia -post X-lap 8/27- viable bowel per op note, no resection  Leukemoid reaction- Improved  S/P resp failure- post op, remains intubated--increasing interstiial edema and R P.effusion  BCX:  NGSF 8/27  UCX: >2 organism on cx  BL lung infiltrates due to atelectasis/ consolidation /has pleural effusion   PAD  Unstagable eschar, skin necrosis, on RLE  L BKA  DMT2   Recommendation for ID issues I am following:  ------------------------------------------------------------------------------  DW ICU RN    Cont to cover broadly with Zosyn- will complete emperic course. Adjust abx dose to CrCl  --FU procal level   --Evolving sacral DU per IM note-- wound team for further review. --diuresis/ fluid management per ICU            With his PAD and other co-morbidities,  wound healing unlikely on his RLE-- dont' think the source of his septic episode at this time. BKA indicated-- if he recovers from current condition           Subjective:  Pt seen in ICU. Remains intubated and non verbal,  on Precedex drip today. CT head pending. Afebrile. Off pressors  DW nursing    Notes/labs- reviewed .   CXR this am:    1. Increasing interstitial edema and/or interstitial infiltration with  increasing right pleural effusion. HPI:  Romel Shields is a 78 y.o. WHITE/NON- with PMH DM, hx of stroke, PAD- with L BKA --admitted in septic shock, leukmoid reaction and acute renal failure on 8/27 from strangulated/incarcerated r groin hernia. Underwent X-lap, bowel was viable and no bowel resection done. Improved septic shock with surgery and triple ABX-- bcx negative. Has BL lung infiltrate with effusion as well on CXR. Nexrotic/eschar wound lesion on RLE.             Active Hospital Problems    Diagnosis Date Noted    Acute respiratory failure with hypoxemia ([address] 08/28/2022    Septic shock (HCC) 08/28/2022    High anion gap metabolic acidosis 45/20/9874    Hyperkalemia 08/27/2022    Sepsis ([address] 08/27/2022    AMS (altered mental status) 08/27/2022    Strangulated inguinal hernia 08/27/2022    Right inguinal hernia 08/27/2022    Leukocytosis 08/27/2022    Anemia 08/27/2022    Chronic indwelling Mccray catheter 08/27/2022    S/P BKA (below knee amputation), left ([address] 08/27/2022    Right foot ulcer ([address] 08/27/2022    Paroxysmal atrial fibrillation ([address] 08/27/2022    Diabetic ulcer of right foot ([address] 08/27/2022    Pleural effusion on right 08/27/2022    Severe protein-calorie malnutrition ([address] 08/27/2022    UTI (urinary tract infection) 10/29/2021    Acute renal failure (ARF) ([address] 05/29/2018     Past Medical History:   Diagnosis Date    A-fib ([address]     Asthma     CAD (coronary artery disease)     Chronic kidney disease     stage 3    COVID-19     Diabetes (HCC)     GERD (gastroesophageal reflux disease)     Heart failure (HCC)     chronic diastolic heart failure    High cholesterol     Hypertension     Ill-defined condition     chronic osteomyelitis left ankle and foot    Ill-defined condition     chronic arteriosclerosis    PVD (peripheral vascular disease) ([address]     Stroke [facility])     cva     Past Surgical History:   Procedure Laterality Date    COLONOSCOPY N/A 6/1/2018    COLONOSCOPY, POLYPECTOMY performed by Mari Vail MD at THE [facility]    HX CATARACT REMOVAL      HX ORTHOPAEDIC Left 2021    ankle washout, external fixator, would vac    HX ORTHOPAEDIC      external fixator removed     History reviewed. No pertinent family history. Social History     Socioeconomic History    Marital status:      Spouse name: Not on file    Number of children: Not on file    Years of education: Not on file    Highest education level: Not on file   Occupational History    Not on file   Tobacco Use    Smoking status: Former    Smokeless tobacco: Never   Substance and Sexual Activity    Alcohol use: Not Currently    Drug use: No    Sexual activity: Not on file   Other Topics Concern    Not on file   Social History Narrative    Not on file     Social Determinants of Health     Financial Resource Strain: Not on file   Food Insecurity: Not on file   Transportation Needs: Not on file   Physical Activity: Not on file   Stress: Not on file   Social Connections: Not on file   Intimate Partner Violence: Not on file   Housing Stability: Not on file       Allergies:  Patient has no known allergies.      Medications:  Current Facility-Administered Medications   Medication Dose Route Frequency    insulin glargine (LANTUS) injection 20 Units  20 Units SubCUTAneous DAILY    furosemide (LASIX) injection 20 mg  20 mg IntraVENous BID    dexmedeTOMidine (PRECEDEX) 400 mcg in 0.9% sodium chloride 100 mL infusion  0.1-1.5 mcg/kg/hr IntraVENous TITRATE    ELECTROLYTE REPLACEMENT PROTOCOL - Potassium Renal Dosing  1 Each Other PRN    sodium bicarbonate tablet 650 mg  650 mg Oral TID    0.9% sodium chloride infusion  5 mL/hr IntraVENous CONTINUOUS    ELECTROLYTE REPLACEMENT PROTOCOL - Potassium Standard Dosing   1 Each Other PRN    ELECTROLYTE REPLACEMENT PROTOCOL - Magnesium   1 Each Other PRN    ELECTROLYTE REPLACEMENT PROTOCOL  - Phosphorus Renal Dosing  1 Each Other PRN    ELECTROLYTE REPLACEMENT PROTOCOL - Calcium   1 Each Other PRN    lactulose (CHRONULAC) 10 gram/15 mL solution 15 mL  15 mL Oral BID    midodrine (PROAMATINE) tablet 2.5 mg  2.5 mg Oral BID    albumin human 25% (BUMINATE) solution 25 g  25 g IntraVENous Q6H    midazolam (VERSED) injection 0.5 mg  0.5 mg IntraVENous BID PRN    arformoteroL (BROVANA) neb solution 15 mcg  15 mcg Nebulization BID RT    0.9% sodium chloride infusion 250 mL  250 mL IntraVENous PRN    0.9% sodium chloride infusion 250 mL  250 mL IntraVENous PRN    heparin (porcine) injection 5,000 Units  5,000 Units SubCUTAneous Q8H    piperacillin-tazobactam (ZOSYN) 3.375 g in 0.9% sodium chloride (MBP/ADV) 100 mL MBP  3.375 g IntraVENous Q12H    0.9% sodium chloride infusion 250 mL  250 mL IntraVENous PRN    pantoprazole (PROTONIX) 40 mg in 0.9% sodium chloride 10 mL injection  40 mg IntraVENous Q12H    sodium chloride (NS) flush 5-40 mL  5-40 mL IntraVENous Q8H    sodium chloride (NS) flush 5-40 mL  5-40 mL IntraVENous PRN    acetaminophen (TYLENOL) tablet 650 mg  650 mg Oral Q6H PRN    Or    acetaminophen (TYLENOL) suppository 650 mg  650 mg Rectal Q6H PRN    polyethylene glycol (MIRALAX) packet 17 g  17 g Oral DAILY PRN    ondansetron (ZOFRAN ODT) tablet 4 mg  4 mg Oral Q8H PRN    Or    ondansetron (ZOFRAN) injection 4 mg  4 mg IntraVENous Q6H PRN    magnesium sulfate 2 g/50 ml IVPB (premix or compounded)  2 g IntraVENous PRN    insulin lispro (HUMALOG) injection   SubCUTAneous Q6H    glucose chewable tablet 16 g  4 Tablet Oral PRN    glucagon (GLUCAGEN) injection 1 mg  1 mg IntraMUSCular PRN    dextrose 10% infusion 0-250 mL  0-250 mL IntraVENous PRN    chlorhexidine (PERIDEX) 0.12 % mouthwash 10 mL  10 mL Oral Q12H    [Held by provider] vasopressin (VASOSTRICT) 20 Units in 0.9% sodium chloride 100 mL infusion  0-0.04 Units/min IntraVENous TITRATE    HYDROmorphone (DILAUDID) injection 1 mg  1 mg IntraVENous Q4H PRN ROS:  Pertinent items are noted in the History of Present Illness. Physical Exam:    Temp (24hrs), Av.3 °F (37.4 °C), Min:97.2 °F (36.2 °C), Max:100.4 °F (38 °C)    Visit Vitals  BP (!) 155/58   Pulse 84   Temp 99 °F (37.2 °C)   Resp 17   Ht 5' 8\" (1.727 m)   Wt 82.8 kg (182 lb 8.7 oz)   SpO2 100%   BMI 27.76 kg/m²          GEN: WD acutely sick patient, intubated- sedated    PIV-R    HEENT: Unicteric. EOMI intact  --OT and ET in place  CHEST: Non laboured breathing. CTA   CVS:RRR, no mur/gallop  ABD: Obese/soft. Non tender. Non distended. Surgical wound dressed, skin looks ok  LISET: caal in place  EXT: No apparent swelling or redness on UE/LE joints-edematous hands  -L BKA  -R foot is dressed, wound not seen- pictures reviewed-- last on   Skin: Dry and intact. bruisings  CNS: intubated/non verbal       Microbiology  All Micro Results       Procedure Component Value Units Date/Time    CULTURE, BLOOD [[id number]] Collected: 22 1030    Order Status: Completed Specimen: Blood Updated: 22     Special Requests: NO SPECIAL REQUESTS        Culture result: NO GROWTH 5 DAYS       CULTURE, BLOOD [[id number]] Collected: 22 1100    Order Status: Completed Specimen: Blood Updated: 22     Special Requests: NO SPECIAL REQUESTS        Culture result: NO GROWTH 5 DAYS       CULTURE, RESPIRATORY/SPUTUM/BRONCH Benetta Balling [[id number]] Collected: 22 0018    Order Status: Completed Specimen: Sputum from Tracheal Aspirate Updated: 22 0047     Special Requests: NO SPECIAL REQUESTS        GRAM STAIN 1+ WBCS SEEN               RARE EPITHELIAL CELLS SEEN                  OCCASIONAL GRAM POSITIVE COCCI IN CLUSTERS           Culture result: PENDING    CULTURE, URINE [[id number]] Collected: 22 1442    Order Status: Completed Specimen: Cath Urine Updated: 22 0642     Special Requests: NO SPECIAL REQUESTS        [place] Count --        [id number]  COLONIES/mL       Culture result:       >2 ORGANISMS - CONTAMINATED SPECIMEN. SUGGEST RECOLLECTION          CULTURE, BLOOD [[id number]]     Order Status: Canceled Specimen: Blood     COVID-19 RAPID TEST [[id number]] Collected: 08/27/22 1030    Order Status: Completed Specimen: Nasopharyngeal Updated: 08/27/22 1057     Specimen source SWAB        COVID-19 rapid test Not detected        Comment: Rapid Abbott ID Now       Rapid NAAT:  The specimen is NEGATIVE for SARS-CoV-2, the novel coronavirus associated with COVID-19. Negative results should be treated as presumptive and, if inconsistent with clinical signs and symptoms or necessary for patient management, should be tested with an alternative molecular assay. Negative results do not preclude SARS-CoV-2 infection and should not be used as the sole basis for patient management decisions. This test has been authorized by the FDA under an Emergency Use Authorization (EUA) for use by authorized laboratories. Fact sheet for Healthcare Providers: ConventionMercauxdate.co.nz  Fact sheet for Patients: ConventionMercauxdate.co.nz       Methodology: Isothermal Nucleic Acid Amplification                   Lab results:    Chemistry  Recent Labs     09/01/22  0450 08/31/22  1130 08/31/22  0312 08/30/22  1450 08/30/22  0531   *  --  188*  --  199*     --  137  --  135*   K 3.4* 3.9 3.8   < > 2.9*     --  104  --  102   CO2 22  --  23  --  18*   *  --  110*  --  124*   CREA 3.31*  --  3.80*  --  3.82*   CA 8.7  --  9.0  --  7.6*   AGAP 13  --  10  --  15   BUCR 31*  --  29*  --  32*   *  --  175*  --  105   TP 5.1*  --  5.3*  --  4.4*   ALB 2.8*  --  1.9*  --  2.0*   GLOB 2.3  --  3.4  --  2.4   AGRAT 1.2  --  0.6*  --  0.8    < > = values in this interval not displayed.        CBC w/ Diff  Recent Labs     08/31/22  0312 08/30/22  0531 08/29/22  1941 08/29/22  0928   WBC 6.6 8.9  --  9.4   RBC 3.39* 2.85*  --  2.45*   HGB 8.7* 7.4* 7. 4* 6.3*   HCT 26.9* 22.3* 22.4* 19.6*    276  --  322   GRANS 78* 83*  --  80*   LYMPH 13* 8*  --  6*   EOS 1 1  --  1       CT HEAD WO CONT    Result Date: 8/27/2022  FINDINGS: BRAIN AND POSTERIOR FOSSA: Mild periventricular areas of decreased attenuation are identified. Gray-white matter interfaces are preserved. No intraparenchymal hemorrhage, mass effect or midline shift. The ventricular system is midline and symmetric. Atherosclerotic vascular calcifications are identified. EXTRA-AXIAL SPACES AND MENINGES: There is no evidence for extra-axial mass lesion or fluid collection. CALVARIUM: Intact. SINUSES: Sphenoid sinus mucosal thickening. OTHER: Orbits are grossly intact. Facial soft tissue are within normal limits. _______________     1. No evidence for intracranial hemorrhage, mass effect or midline shift. 2.  Mild periventricular areas of microvascular ischemic change. 3.  Cerebral atherosclerosis. 4.  Atrophy. If there are continued symptoms, a MRI is recommended for further evaluation. CT ABD PELV WO CONT    Result Date: 8/27/2022  FINDINGS: LOWER CHEST: Moderate right pleural effusion with adjacent atelectasis. LIVER, BILIARY: Liver is normal. No biliary dilation. Gallbladder is unremarkable. PANCREAS: Normal. SPLEEN: Splenic granuloma. ADRENALS: Normal. KIDNEYS: Normal. LYMPH NODES: No enlarged lymph nodes. GASTROINTESTINAL TRACT: Uncomplicated sigmoid diverticula. Gastric distention. PELVIC ORGANS: Unremarkable. VASCULATURE: Atherosclerotic vascular calcifications. BONES: No acute or aggressive osseous abnormalities identified. OTHER: Large right inguinal hernia with large and small bowel. There is diffuse circumferential wall thickening of ascending colon within the hernia. _______________     1. Large right inguinal hernia with large and small bowel. There is diffuse wall thickening of the ascending colon concerning for strangulation. No evidence for bowel obstruction.  2. Right pleural effusion and bilateral basilar atelectasis. 3. Gastric distention. XR CHEST PORT    Result Date: 8/30/2022  EXAM:  PORTABLE CHEST INDICATION:  Follow up. TECHNIQUE:  Portable, AP view. COMPARISON:  08/29/2022 ____________________ FINDINGS:  SUPPORT DEVICES: Endotracheal tube approximately 4 cm above the alise. OG tube is traversing below left hemidiaphragm, tip not imaged. Esophageal probe is present at the level of the endotracheal tube. HEART AND MEDIASTINUM: Stable. LUNGS AND PLEURAL SPACES: Persistent right basilar opacity consistent with pleural effusion with adjacent consolidation. No pneumothorax. BONY THORAX AND SOFT TISSUES: No acute osseous abnormality. ____________________     1. Endotracheal tube approximately 4 cm above the alise. 2. No significant change in right basilar opacity consistent with pleural effusion with adjacent consolidation, atelectasis or pneumonia. *  **     XR CHEST PORT    Result Date: 8/29/2022  FINDINGS: HEART AND MEDIASTINUM: Anterior endotracheal tube tip estimated at 6.1 cm above alise. Enteric tube tip projects over left upper quadrant expected location gastric fundus. Esophageal temperature probe tip projects over lower thoracic . Stable cardiomediastinal silhouette allowing for angulation. LUNGS AND PLEURAL SPACES: Progressive right pleural effusion and patchy opacities in the right lung. Left lung relatively clear allowing for technique. BONY THORAX AND SOFT TISSUES: No gross acute osseous abnormality. 1. Right pleural effusion and subjacent atelectasis/infiltrate, perhaps slightly progressed. 2. Tubes and lines stable in visualized extent. XR CHEST PORT    Result Date: 8/28/2022  FINDINGS: SUPPORT DEVICES: Endotracheal tube distal tip projects 5.5 cm above the alise. Enteric tube distal tip projects below the left hemidiaphragm likely in the stomach. HEART AND MEDIASTINUM: No appreciable cardiomegaly. Remaining mediastinal contours are stable.  LUNGS AND PLEURAL SPACES: Right pleural effusion with adjacent opacity. No evidence for pneumothorax. Left lung is clear. BONY THORAX AND SOFT TISSUES: Unremarkable. _______________     1. Right pleural effusion with adjacent atelectasis/infiltrates. 2. Supporting tubes appear stable. XR CHEST PORT    Result Date: 8/27/2022  FINDINGS: SUPPORT DEVICES: An endotracheal tube is positioned 6 cm above the alise. HEART AND MEDIASTINUM: Heart size and mediastinal contour are midline and within normal limits. LUNGS AND PLEURAL SPACES: There are opacities throughout both lungs but more confluent within the right mid and lower lung zones. No pneumothorax. BONY THORAX AND SOFT TISSUES: Unremarkable. _______________     Interval right greater than left airspace disease Small to moderate right pleural effusion    XR CHEST PORT    Result Date: 8/27/2022FINDINGS: HEART AND MEDIASTINUM: No appreciable cardiomegaly. Remaining mediastinal contours within normal limits. LUNGS AND PLEURAL SPACES: Left lung is clear. Right pleural effusion with adjacent opacity. BONY THORAX AND SOFT TISSUES: Unremarkable. _______________     1. Right pleural effusion with probable adjacent atelectasis. XR ABD PORT  1 V    Result Date: 8/27/2022  INDINGS: SUPPORT DEVICES: A nasogastric tube is positioned in the stomach. BOWEL GAS PATTERN: The gas pattern is nonobstructive. Maya [place] SOFT TISSUES: Unremarkable. BONES: Degenerative changes are seen throughout the spine. ADDITIONAL FINDINGS: None. _______________     No significant abnormality. ECHO ADULT COMPLETE    Result Date: 8/28/2022  Formatting of this result is different from the original.   Left Ventricle: Normal left ventricular systolic function with a visually estimated EF of 60 - 65%. Left ventricle size is normal. Normal wall thickness. Normal wall motion. Grade I diastolic dysfunction present with normal LV EF. Unable to assess RVSP due to inadequate or insignificant tricuspid regurgitation. Procedures/imaging: see electronic medical records for all procedures/Xrays and details which were not copied into this note but were reviewed prior to creation of Plan

## 2022-09-01 NOTE — PROGRESS NOTES
Hospitalist Progress Note    Patient: Lisa Perez. MRN: [id number]  [facility]: [id number]    YOB: 1945  Age: 78 y.o. Sex: male    DOA: 8/27/2022 LOS:  LOS: 4 days          Chief Complaint:    severe septic shock with multiorgan and hypotension involvement due to multiple possible sources of infection, severe hyperkalemia, acute metabolic encephalopathy, metabolic acidosis, strangulated left inguinal hernia, anemia requiring blood transfusion, mild hypercoagulable state    Assessment/Plan   78 y.o. 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 Admitted for severe septic shock with multiorgan and hypotension involvement due to multiple possible sources of infection, severe hyperkalemia, acute metabolic encephalopathy, metabolic acidosis, strangulated left inguinal hernia, anemia requiring blood transfusion, mild hypoxia coagulable state.      Severe septic shock with multi-organ and hypotension due to multiple possible sources of infection -pancultures, broad spectrum intravenous antibiotics, trending daily CBCs    Incarcerated right inguinal hernia -status post ex lap with reduction of incarcerated right groin hernia with hernia mesh    Infected sacral ulcer -weeping purulent material, wound consult    Infected medial maleolar and heel ulcers -weeping necrotic and purulent material, will need BKA    Urinary Tract Infection -cultures pending, IV broad-spectrum antibiotics    Anemia -s/p transfusion -trend CBC, transfuse if hemoglobin less than 7    Acute renal failure -likely due to profound shock, appreciate nephrology expertise, urine output improving    Electrolyte derangements, namely hyperkalemia -improved    Metabolic acidosis -improved continue bicarb drip    Acute encephalopathy -not improving, CT head negative    S/p intubation from OR -still sedated and vented, Dr. Alexys Falk will determine safe and best time to extubate patient. SBT today     Prophylaxis - DVT: heparin, GI: protonix       Very poor prognosis. Disposition : TBD  Patient Active Problem List   Diagnosis Code    DM2 (diabetes mellitus, type 2) ([facility] E11.9    CAD (coronary artery disease) I25.10    Acute renal failure (ARF) ([facility]) N17.9    CKD (chronic kidney disease) stage 3, GFR 30-59 ml/min ([facility]) N18.30    Elevated brain natriuretic peptide (BNP) level R79.89    UTI (urinary tract infection) N39.0    High anion gap metabolic acidosis Q98.2    Hyperkalemia E87.5    Sepsis ([facility]) A41.9    AMS (altered mental status) R41.82    Strangulated inguinal hernia K40.30    Right inguinal hernia K40.90    Leukocytosis D72.829    Anemia D64.9    Chronic indwelling Mccray catheter Z97.8    S/P BKA (below knee amputation), left ([facility]) Z89.512    Right foot ulcer ([facility]) L97.519    Paroxysmal atrial fibrillation ([facility]) I48.0    Diabetic ulcer of right foot ([facility]) E11.621, L97.519    Pleural effusion on right J90    Severe protein-calorie malnutrition ([facility]) E43    Acute respiratory failure with hypoxemia ([facility]) J96.01    Septic shock ([facility]) A41.9, R65.21       Subjective:    Vented, sedated    Review of systems:    UTO due to medical condition      Vital signs/Intake and Output:  Visit Vitals  BP (!) 123/53   Pulse 75   Temp 98.8 °F (37.1 °C)   Resp 16   Ht 5' 8\" (1.727 m)   Wt 82.8 kg (182 lb 8.7 oz)   SpO2 97%   BMI 27.76 kg/m²     Current Shift:  No intake/output data recorded.   Last three shifts:  08/30 0701 - 08/31 1900  In: 3253.1 [I.V.:1698.1]  Out: 1800 [Urine:2975]    Exam:    General: vented, sedated, ill-appearing, chornically ill, debilitated  Head/Neck: NCAT, supple, No masses, No lymphadenopathy  CVS:Regular rate and rhythm, no M/R/G, S1/S2 heard, no thrill  Lungs:Clear to auscultation bilaterally, no wheezes, rhonchi, or rales  Abdomen: midline surgical incision C/D/I, Soft, Nontender, No distention, Normal Bowel sounds, No hepatomegaly  Extremities: see skin below, surgical BKA LLE  Skin: pale, infected ulcers with black eschar medial malleolus and heel on right foot  Neuro: vented and sedated  Psych: vented and sedated                Labs: Results:       Chemistry Recent Labs     08/31/22  1130 08/31/22  0312 08/30/22  1450 08/30/22  0531 08/29/22  0928 08/29/22  0508   GLU  --  188*  --  199* 161* 161*   NA  --  137  --  135* 134* 135*   K 3.9 3.8 3.8 2.9* 3.7 4.0   CL  --  104  --  102 104 105   CO2  --  23  --  18* 17* 16*   BUN  --  110*  --  124* 126* 130*   CREA  --  3.80*  --  3.82* 3.96* 4.00*   CA  --  9.0  --  7.6* 7.7* 7.8*   AGAP  --  10  --  15 13 14   BUCR  --  29*  --  32* 32* 33*   AP  --  175*  --  105  --  90   TP  --  5.3*  --  4.4*  --  4.7*   ALB  --  1.9*  --  2.0*  --  2.1*   GLOB  --  3.4  --  2.4  --  2.6   AGRAT  --  0.6*  --  0.8  --  0.8        CBC w/Diff Recent Labs     08/31/22  0312 08/30/22  0531 08/29/22  1941 08/29/22  0928   WBC 6.6 8.9  --  9.4   RBC 3.39* 2.85*  --  2.45*   HGB 8.7* 7.4* 7.4* 6.3*   HCT 26.9* 22.3* 22.4* 19.6*    276  --  322   GRANS 78* 83*  --  80*   LYMPH 13* 8*  --  6*   EOS 1 1  --  1        Cardiac Enzymes No results for input(s): CPK, CKND1, LEONEL in the last 72 hours. No lab exists for component: CKRMB, TROIP   Coagulation Recent Labs     08/31/22 0312 08/30/22  0531   PTP 16.0* 16.6*   INR 1.2 1.3*         Lipid Panel No results found for: CHOL, CHOLPOCT, CHOLX, CHLST, CHOLV, 905707, HDL, HDLP, LDL, LDLC, DLDLP, 063765, VLDLC, VLDL, TGLX, TRIGL, TRIGP, TGLPOCT, CHHD, CHHDX   BNP No results for input(s): BNPP in the last 72 hours.    Liver Enzymes Recent Labs     08/31/22 0312   TP 5.3*   ALB 1.9*   *        Thyroid Studies Lab Results   Component Value Date/Time    TSH 4.73 (H) 08/28/2022 08:04 AM        Procedures/imaging: see electronic medical records for all procedures/Xrays and details which were not copied into this note but were reviewed prior to creation of Sue Moreno MD

## 2022-09-01 NOTE — PROGRESS NOTES
Palliative Medicine Consult    Patient Name: Wilian Farias YOB: 1945    Date of Initial Consult: 8/29/2022  Date of follow up: 9/1/2022  Reason for Consult: Goals of care discussions  Requesting Provider: Dr. Queen Jurado   Primary Care Physician: Dave Mckenzie MD      SUMMARY:   Wilian Farias is a 78 y.o. with a past history of hypertension, hyperlipidemia, stroke, PAD, chronic atrial fibrillation, on Plavix, DM, CKD, previous, COVID-19 infection, chronic indwelling urinary catheter for urinary retention, and severe peripheral vascular disease with worsening gangrenous left foot necessitating left BKA during 8 day hospitalization last year, who was admitted on 8/27/2022 from home with a diagnosis of incarcerated Right inguinal hernia, severe septic shock with hypotension, severe hyperkalemia, acute metabolic encephalopathy, and anemia. Current medical issues leading to Palliative Medicine involvement include: goals of care discussions. 8/30/2022: Patient was off sedation, moving head back and forth, appears in distress. 9/1/2022: Patient sedation help, plan for SBT today, with possible medical extubation. PALLIATIVE DIAGNOSES:   Goals of care discussions  Septic shock  Acute respiratory failure   incarcerated Right inguinal hernia  Advanced age/debility       PLAN:   9/1/2022: Palliative medicine team including Dyllan Clark and I met with patient at patient's bedside today. Patient was being removed from sedation for SBT today. Spoke to hospitalist and intensivist. Patient may be stable enough to medically extubate depending on how he does today with SBT. Per previous goals of care discussions with family, we will wait to see if patient can be medically extubated and readdress further goals of care at that point (right leg necrotic tissue with possible need for amputation versus comfort measures).  Patient has historically declined amputation in the past. Goals of care discussions ongoing. Family hopes that patient can be safely extubated and would be able to participate in goals of care discussions. At this time, continue DNR/do not re-intubate for any reason. See previous discussions below:    8/30/2022: Palliative medicine team including Georgia Watson and MANUEL met with patient at patient's bedside today. Patient was off sedation, moving head back and forth, appears in distress. Family meeting today consisting of palliative team, patient's family (wife Juana Medeiros, children oPnce Valerio, and Zayra) hositalist Dr. Valery Patel, and intensivist Dr. Chayito Reed. Medical update provided. Discussed SBT outcome today with potential for medical extubation tomorrow depending on how his SBT goes tomorrow. Discussed the benefits and burdens of reintubation in the event of respiratory decline post medical extubation. Discussed overall level of care, with concern for RLL chronic vascular ulcers with previous recommendations from [facility] (6/9/2022) for consideration of Right AKA. Patient has historically declined this amputation. Discussed current quality of life. Family describes patient as a man who values his independence, and patient has been bothered by the fact that he hasn't  walked in over a year. Discussed that septic shock is improving and this event was likely related to bowel sepsis, but that patient is at high risk for recurrent sepsis and further clinical decline due to chronic infection in RLL, with no plan for amputation. Discussed the benefits and burdens of continuing current level of care versus implementation of comfort measures with hospice at discharge. Family clear that he would not find reintubation acceptable. Family is hopeful that if patient is medically extubated, he may wake up enough to participate in goals of care discussions. For now, patient is a DNR/do not re-intubate for any reason. Further goals of care discussions ongoing.      See previous discussions below:    8/29/2022:Goals of care discussions: Palliative medicine team including  CHERYLE Diop and I met with patient at patient's bedside. Patient is orally intubated on mechanical ventilation, frequent movements in bed but no command following. Wife Ciara Gatica at bedside (Wife notes that English is her second language but she states she understands our conversation and answering questions appropriately. Offered translation services but wife declined.) Wife confirms DNR in the event of cardiac arrest. Patient is a PARTIAL CODE: No CPR (including no chest compressions, no shock, and no ACLS meds in the event of cardiac arrest). Continue intubation at this time. Wife requesting a meeting with their children involved to address further goals of care. Wife will call me with a time for tomorrow once she has spoken to all of her children. Received a call from patient's son Pradip Patino and explained to him the desire to meet for family meeting. Pradip Patino confirms DNR status upon cardiac arrest. Further goals of care discussions will occur at family meeting tomorrow. Awaiting response from wife with exact time. Septic shock:   multiple possible sources of infection, incarcerated Right inguinal hernia s/p laparotomy and reduction of incarcerated right groin hernia without need for bowel resection-8/27/2022. UA-Positive for UTI; urine culture obtained. sacral ulcer and medial maleolar and heel infected ulcers. On broad spectrum antibiotics per primary team.   Acute respiratory failure: Patient remains intubated postop due to critical illness. incarcerated Right inguinal hernia: s/p laparotomy and reduction of incarcerated right groin hernia without need for bowel resection-8/27/2022. Advanced age/debility: 78year old male who is critically ill, needs assist with all ADLs. Prior to admission, patient lived at home with his spouse and required assistance with functional ADLs.    Initial consult note routed to primary continuity provider  Communicated plan of care with: Palliative IDT       GOALS OF CARE / TREATMENT PREFERENCES:   [====Goals of Care====]  GOALS OF CARE: DNR/do not re-intubate for any reason. Patient/Health Care Proxy Stated Goals: Prolong life      TREATMENT PREFERENCES:   Code Status: DNR    Advance Care Planning:  Advance Care Planning 8/29/2022   Patient's Healthcare Decision Maker is: Legal Next of Kin   Primary Decision Maker Name -   Primary Decision Maker Phone Number -   Primary Decision Maker Relationship to Patient -   Confirm Advance Directive None   Patient Would Like to Complete Advance Directive Unable       Medical Interventions: Other (comment) (continue current intubation in attempts to medically extubate but do not reintubate for any reason.)            The palliative care team has discussed with patient / health care proxy about goals of care / treatment preferences for patient.  [====Goals of Care====]         HISTORY:     History obtained from: patient's chart, family    CHIEF COMPLAINT: incarcerated Right inguinal hernia, POD 2.     HPI/SUBJECTIVE:    The patient is:   [] Verbal and participatory  [x] Non-participatory due to:   Orally intubated, sedated, no command following     Clinical Pain Assessment (nonverbal scale for severity on nonverbal patients):   Clinical Pain Assessment  Severity: 0    Adult Nonverbal Pain Scale  Face: Occasional grimace, tearing, frowning, wrinkled forehead  Activity (Movement): Seeking attention through movement or slow, cautious movement  Guarding: Lying quietly, no positioning of hands over areas of body  Physiology (Vital Signs):  Stable vital signs  Respiratory: Baseline RR/SpO2 compliant with ventilator  Total Score: 2       FUNCTIONAL ASSESSMENT:     Palliative Performance Scale (PPS):  PPS: 30       PSYCHOSOCIAL/SPIRITUAL SCREENING:     Advance Care Planning:  Advance Care Planning 8/29/2022   Patient's Healthcare Decision Maker is: Legal Next of Kin   Primary Decision Maker Name -   Primary Decision Maker Phone Number -   Primary Decision Maker Relationship to Patient -   Confirm Advance Directive None   Patient Would Like to Complete Advance Directive Unable        Any spiritual / Hoahaoism concerns: unable to assess  [] Yes /  [] No    Caregiver Burnout:  [] Yes /  [] No /  [x] No Caregiver Present      Anticipatory grief assessment: unable to assess  [] Normal  / [] Maladaptive          REVIEW OF SYSTEMS:     Positive and pertinent negative findings in ROS are noted above in HPI. The following systems were [] reviewed / [x] unable to be reviewed as noted in HPI  Other findings are noted below. Systems: constitutional, ears/nose/mouth/throat, respiratory, gastrointestinal, genitourinary, musculoskeletal, integumentary, neurologic, psychiatric, endocrine. Positive findings noted below. Modified ESAS Completed by: provider           Pain: 0   Anxiety: 3       Dyspnea: 0                    PHYSICAL EXAM:     From RN flowsheet:  Wt Readings from Last 3 Encounters:   08/29/22 82.8 kg (182 lb 8.7 oz)   11/02/21 81.6 kg (180 lb)   10/11/21 76.7 kg (169 lb)     Blood pressure (!) 155/58, pulse 84, temperature 99 °F (37.2 °C), resp. rate 17, height 5' 8\" (1.727 m), weight 82.8 kg (182 lb 8.7 oz), SpO2 100 %.     Pain Scale 1: Numeric (0 - 10)  Pain Intensity 1: 0     Pain Location 1: Abdomen  Pain Orientation 1: Anterior  Pain Description 1: Aching  Pain Intervention(s) 1: Medication (see MAR)      Constitutional: orally intubated, appears acute on chronically ill  Eyes: closed  ENMT: orally intubated  Cardiovascular: regular rhythm, distal pulses intact  Respiratory: orally intubated on mechanical ventilator  Gastrointestinal: midline surgical incision CDI, no distention  Musculoskeletal: no deformity, no tenderness to palpation, L BKA  Skin: warm, dry, black eschar right ankle and right heel  Neurologic: not following commands, moving all extremities       HISTORY:     Principal Problem:    Sepsis ([address] (8/27/2022)    Active Problems:    Acute renal failure (ARF) ([address] (5/29/2018)      UTI (urinary tract infection) (10/29/2021)      High anion gap metabolic acidosis (3/55/6380)      Hyperkalemia (8/27/2022)      AMS (altered mental status) (8/27/2022)      Strangulated inguinal hernia (8/27/2022)      Right inguinal hernia (8/27/2022)      Leukocytosis (8/27/2022)      Anemia (8/27/2022)      Chronic indwelling Mccray catheter (8/27/2022)      S/P BKA (below knee amputation), left ([address] (8/27/2022)      Right foot ulcer ([address] (8/27/2022)      Paroxysmal atrial fibrillation ([address] (8/27/2022)      Diabetic ulcer of right foot ([address] (8/27/2022)      Pleural effusion on right (8/27/2022)      Severe protein-calorie malnutrition ([address] (8/27/2022)      Acute respiratory failure with hypoxemia ([address] (8/28/2022)      Septic shock ([address] (8/28/2022)    Past Medical History:   Diagnosis Date    A-fib (HCC)     Asthma     CAD (coronary artery disease)     Chronic kidney disease     stage 3    COVID-19     Diabetes ([address]     GERD (gastroesophageal reflux disease)     Heart failure (HCC)     chronic diastolic heart failure    High cholesterol     Hypertension     Ill-defined condition     chronic osteomyelitis left ankle and foot    Ill-defined condition     chronic arteriosclerosis    PVD (peripheral vascular disease) ([address]     Stroke ([address]     cva      Past Surgical History:   Procedure Laterality Date    COLONOSCOPY N/A 6/1/2018    COLONOSCOPY, POLYPECTOMY performed by Luis Eduardo Lyon MD at [facility] ENDOSCOPY    HX CATARACT REMOVAL      HX ORTHOPAEDIC Left 2021    ankle washout, external fixator, would vac    HX ORTHOPAEDIC      external fixator removed      History reviewed. No pertinent family history. History reviewed, no pertinent family history.   Social History     Tobacco Use    Smoking status: Former    Smokeless tobacco: Never   Substance Use Topics Alcohol use: Not Currently     No Known Allergies   Current Facility-Administered Medications   Medication Dose Route Frequency    potassium chloride (K-DUR, KLOR-CON M20) SR tablet 40 mEq  40 mEq Oral ONCE    insulin glargine (LANTUS) injection 20 Units  20 Units SubCUTAneous DAILY    furosemide (LASIX) injection 20 mg  20 mg IntraVENous BID    dexmedeTOMidine (PRECEDEX) 400 mcg in 0.9% sodium chloride 100 mL infusion  0.1-1.5 mcg/kg/hr IntraVENous TITRATE    ELECTROLYTE REPLACEMENT PROTOCOL - Potassium Renal Dosing  1 Each Other PRN    sodium bicarbonate tablet 650 mg  650 mg Oral TID    0.9% sodium chloride infusion  5 mL/hr IntraVENous CONTINUOUS    ELECTROLYTE REPLACEMENT PROTOCOL - Potassium Standard Dosing   1 Each Other PRN    ELECTROLYTE REPLACEMENT PROTOCOL - Magnesium   1 Each Other PRN    ELECTROLYTE REPLACEMENT PROTOCOL  - Phosphorus Renal Dosing  1 Each Other PRN    ELECTROLYTE REPLACEMENT PROTOCOL - Calcium   1 Each Other PRN    lactulose (CHRONULAC) 10 gram/15 mL solution 15 mL  15 mL Oral BID    midodrine (PROAMATINE) tablet 2.5 mg  2.5 mg Oral BID    albumin human 25% (BUMINATE) solution 25 g  25 g IntraVENous Q6H    midazolam (VERSED) injection 0.5 mg  0.5 mg IntraVENous BID PRN    arformoteroL (BROVANA) neb solution 15 mcg  15 mcg Nebulization BID RT    0.9% sodium chloride infusion 250 mL  250 mL IntraVENous PRN    0.9% sodium chloride infusion 250 mL  250 mL IntraVENous PRN    heparin (porcine) injection 5,000 Units  5,000 Units SubCUTAneous Q8H    piperacillin-tazobactam (ZOSYN) 3.375 g in 0.9% sodium chloride (MBP/ADV) 100 mL MBP  3.375 g IntraVENous Q12H    0.9% sodium chloride infusion 250 mL  250 mL IntraVENous PRN    pantoprazole (PROTONIX) 40 mg in 0.9% sodium chloride 10 mL injection  40 mg IntraVENous Q12H    sodium chloride (NS) flush 5-40 mL  5-40 mL IntraVENous Q8H    sodium chloride (NS) flush 5-40 mL  5-40 mL IntraVENous PRN    acetaminophen (TYLENOL) tablet 650 mg  650 mg Oral Q6H PRN    Or    acetaminophen (TYLENOL) suppository 650 mg  650 mg Rectal Q6H PRN    polyethylene glycol (MIRALAX) packet 17 g  17 g Oral DAILY PRN    ondansetron (ZOFRAN ODT) tablet 4 mg  4 mg Oral Q8H PRN    Or    ondansetron (ZOFRAN) injection 4 mg  4 mg IntraVENous Q6H PRN    magnesium sulfate 2 g/50 ml IVPB (premix or compounded)  2 g IntraVENous PRN    insulin lispro (HUMALOG) injection   SubCUTAneous Q6H    glucose chewable tablet 16 g  4 Tablet Oral PRN    glucagon (GLUCAGEN) injection 1 mg  1 mg IntraMUSCular PRN    dextrose 10% infusion 0-250 mL  0-250 mL IntraVENous PRN    chlorhexidine (PERIDEX) 0.12 % mouthwash 10 mL  10 mL Oral Q12H    [Held by provider] vasopressin (VASOSTRICT) 20 Units in 0.9% sodium chloride 100 mL infusion  0-0.04 Units/min IntraVENous TITRATE    HYDROmorphone (DILAUDID) injection 1 mg  1 mg IntraVENous Q4H PRN          LAB AND IMAGING FINDINGS:     Lab Results   Component Value Date/Time    WBC 6.6 08/31/2022 03:12 AM    HGB 8.7 (L) 08/31/2022 03:12 AM    PLATELET 222 28/62/5055 03:12 AM     Lab Results   Component Value Date/Time    Sodium 140 09/01/2022 04:50 AM    Potassium 3.4 (L) 09/01/2022 04:50 AM    Chloride 105 09/01/2022 04:50 AM    CO2 22 09/01/2022 04:50 AM     (H) 09/01/2022 04:50 AM    Creatinine 3.31 (H) 09/01/2022 04:50 AM    Calcium 8.7 09/01/2022 04:50 AM    Magnesium 1.9 09/01/2022 04:50 AM    Phosphorus 2.8 09/01/2022 04:50 AM      Lab Results   Component Value Date/Time    Alk.  phosphatase 204 (H) 09/01/2022 04:50 AM    Protein, total 5.1 (L) 09/01/2022 04:50 AM    Albumin 2.8 (L) 09/01/2022 04:50 AM    Globulin 2.3 09/01/2022 04:50 AM     Lab Results   Component Value Date/Time    INR 1.3 (H) 09/01/2022 04:50 AM    Prothrombin time 16.6 (H) 09/01/2022 04:50 AM    aPTT 118.5 (H) 05/17/2021 08:20 AM      Lab Results   Component Value Date/Time    Iron 10 (L) 05/06/2021 06:40 AM    TIBC 186 (L) 05/06/2021 06:40 AM    Iron % saturation 5 (L) 05/06/2021 06:40 AM    Ferritin 125 05/06/2021 06:40 AM      No results found for: PH, PCO2, PO2  No components found for: [place]   Lab Results   Component Value Date/Time    CK 46 05/29/2018 05:45 PM    CK - MB 1.4 05/29/2018 05:45 PM                Total time: 15 minutes  Counseling / coordination time, spent as noted above:   > 50% counseling / coordination?: yes, patient, family, and medical team    Prolonged service was provided for  []30 min   []75 min in face to face time in the presence of the patient, spent as noted above.   Time Start:   Time End:

## 2022-09-02 NOTE — PROGRESS NOTES
Nutrition Note    Pt extubated 9/1 Pt extubated 9/1. Per SLP evaluation 9/2, pt is to remain NPO d/t risk of aspiration. Chart does not show tube feeds currently infusing, or any feeding tube in place. Recommend replacing feeding tube and restarting tube feeds.  Unable to discuss with MD. Will follow-up.      08/31/22 0810   Enteral Nutrition   EN Formula Renal   Schedule Continuous   Feeding Regimen Goal: Nepro at 45ml/hr   Additives/Modulars None   Water Flushes 150ml q4   Current EN & Flush Order Provides Nepro @ 45ml/hr:   1782kcal, 80g PRO, 165g CHO, 718ml free water + 900ml fluesh (1618ml)       Electronically signed by Carmella Paz RD on 9/2/2022 at 1:47 PM    Contact: [phone]

## 2022-09-02 NOTE — CONSULTS
Consult Note    Patient: Kellen Carolina Sex: male          DOA: 8/27/2022         YOB: 1945      Age:  78 y.o.        LOS:  LOS: 6 days              Assessment,   Nonhealing wound right foot and ankle  CKD  CAD  AF      Reviewed wounds from wound care notes  Did not look actively infected, poor blood supply  Pt said to\"cut if off\"  If pt would benefit would plan BKA on Tuesday after weekend to allow plan with home team and stabilization. Attempted to call son and wife with no answer. Please contact me if pt stable and would benefit from BKA. May benefit from lee's to make sure haven't changed since presious bk which he did not heal requiring aka. HPI:     Kellen Carolina is a 78 y.o. male who has been seen for non healing right foot wounds. Pt well known to me for previoius ankle fracture, calc infection, nonunion, bka, then aka. Family not presenet. Past Medical History:   Diagnosis Date    A-fib ([address]     Asthma     CAD (coronary artery disease)     Chronic kidney disease     stage 3    COVID-19     Diabetes (HCC)     GERD (gastroesophageal reflux disease)     Heart failure (HCC)     chronic diastolic heart failure    High cholesterol     Hypertension     Ill-defined condition     chronic osteomyelitis left ankle and foot    Ill-defined condition     chronic arteriosclerosis    PVD (peripheral vascular disease) ([address]     Stroke ([address]     cva       Past Surgical History:   Procedure Laterality Date    COLONOSCOPY N/A 6/1/2018    COLONOSCOPY, POLYPECTOMY performed by Luis Eduardo Lyon MD at THE [facility] ENDOSCOPY    HX CATARACT REMOVAL      HX ORTHOPAEDIC Left 2021    ankle washout, external fixator, would vac    HX ORTHOPAEDIC      external fixator removed       History reviewed. No pertinent family history.     Social History     Socioeconomic History    Marital status:    Tobacco Use    Smoking status: Former    Smokeless tobacco: Never   Substance and Sexual Activity    Alcohol use: Not Currently    Drug use: No       Prior to Admission medications    Medication Sig Start Date End Date Taking? Authorizing Provider   levoFLOXacin (Levaquin) 750 mg tablet Take 1 Tablet by mouth daily. 6/18/22   Ofe Jeffers MD   insulin lispro (HUMALOG) 100 unit/mL injection INITIATE INSULIN CORRECTIVE PROTOCOL: Normal Insulin Sensitivity   For Blood Sugar (mg/dL) of:     Less than 150 =   0 units           150 -199 =   2 units  200 -249 =   4 units  250 -299 =   6 units  300 -349 =   8 units  350 and above = 10 units and Call Physician  If 2 glucose readings are above 200 mg/dL within a 24 hr period, proceed to \"Insulin Resistant\" dosing. Initiate Hypoglycemia protocol if blood glucose is <70 mg/dL Fast Acting - Administer Immediately - or within 15 minutes of start of meal, if mealtime coverage. 11/3/21   Estiven Rivera MD   gabapentin (NEURONTIN) 300 mg capsule Take 1 Capsule by mouth three (3) times daily. Max Daily Amount: 900 mg. 11/3/21   Estiven Rivera MD   clopidogreL (Plavix) 75 mg tab Take  by mouth daily. Indications: afib    Provider, Historical   B.infantis-B.ani-B.long-B.bifi (Probiotic 4X) 10-15 mg TbEC Take  by mouth daily. Provider, Historical   multivitamin (ONE A DAY) tablet Take 1 Tablet by mouth daily. Provider, Historical   acetaminophen (TylenoL) 325 mg tablet Take 650 mg by mouth every four (4) hours as needed for Pain. Provider, Historical   tamsulosin (FLOMAX) 0.4 mg capsule Take 2 Capsules by mouth daily. 5/25/21   Rickey Hernandez MD   atorvastatin (LIPITOR) 40 mg tablet Take 1 Tab by mouth nightly. 2/15/18   Pretty Juares PA-C   metoprolol succinate (TOPROL-XL) 50 mg XL tablet Take 1 Tab by mouth daily. Patient taking differently: Take 100 mg by mouth daily.  2/16/18   Pretty Juares PA-C       No Known Allergies          Physical Exam:      Visit Vitals  BP (!) 143/49   Pulse 96   Temp 98 °F (36.7 °C)   Resp 26   Ht 5' 8\" (1.727 m)   Wt 82.8 kg (182 lb 8.7 oz)   SpO2 95%   BMI 27.76 kg/m²       Physical Exam:  Pt somewhat alert, recognized me, asked me to cut it off, unable to tell me how long had wounds, or if in pain    Labs Reviewed: All lab results for the last 24 hours reviewed. Assessment/Plan     Principal Problem:    Sepsis ([address] (8/27/2022)    Active Problems:    Acute renal failure (ARF) ([address] (5/29/2018)      UTI (urinary tract infection) (10/29/2021)      High anion gap metabolic acidosis (7/33/6124)      Hyperkalemia (8/27/2022)      AMS (altered mental status) (8/27/2022)      Strangulated inguinal hernia (8/27/2022)      Right inguinal hernia (8/27/2022)      Leukocytosis (8/27/2022)      Anemia (8/27/2022)      Chronic indwelling Mccray catheter (8/27/2022)      S/P BKA (below knee amputation), left ([address] (8/27/2022)      Right foot ulcer ([address] (8/27/2022)      Paroxysmal atrial fibrillation ([address] (8/27/2022)      Diabetic ulcer of right foot ([address] (8/27/2022)      Pleural effusion on right (8/27/2022)      Severe protein-calorie malnutrition ([address] (8/27/2022)      Acute respiratory failure with hypoxemia ([address] (8/28/2022)      Septic shock ([address] (8/28/2022)        Reviewed wounds from wound care notes  Did not look actively infected, poor blood supply  Pt said to\"cut if off\"  If pt would benefit would plan BKA on Tuesday after weekend to allow plan with home team and stabilization. Attempted to call son and wife with no answer. Please contact me if pt stable and would benefit from BKA. May benefit from lee's to make sure haven't changed since presious bk which he did not heal requiring aka.

## 2022-09-02 NOTE — PROGRESS NOTES
Patient:  Juliane Amos :  1945  Gender:  male  MRN #:  [id number]    Assessment:   Juliane Amos is a 78 y.o. male with hypertension, hyperlipidemia, stroke , chronic atrial fibrillation, on Plavix, DM,previous, COVID-19 infection, chronic indwelling urinary catheter for urinary retention, severe peripheral vascular disease left BKA admitted in ICU with septic shock , acute renal failure, hyperkalemia , metabolic acidosis and strangulated left inguinal hernia  s/p emergency  surgery   S/p extubation now , BP stable       Septic shock - Improved   Acute renal failure- Improving   Hypokalemia -Fluctuating   Metabolic acidosis -Improved   Respiratory failure - improving       Plan:   He has decent urine output overnight with improvement in renal function every day   No indications of renal replacement therapy   Diuretics only prn for volume management   Stop sodium bicarbonate tablet as acidosis improved   Minimize volume infusion   Avoid NSAIDS, contrast and nephrotoxin   Dose all meds and antibiotics for current eGFR  Transfuse prn to keep hemoglobin > 7 gram/dl   Agree with 10 meq KCL IV for 6 runs and repeat serum potassium level     Subjective/Interval Events    Decent urine output  BP stable  Unable to obtained review of systems         Intake/Output Summary (Last 24 hours) at 2022 1250  Last data filed at 2022 0400  Gross per 24 hour   Intake 320 ml   Output 2650 ml   Net -2330 ml           Blood pressure (!) 143/49, pulse 96, temperature 97.6 °F (36.4 °C), resp. rate 26, height 5' 8\" (1.727 m), weight 82.8 kg (182 lb 8.7 oz), SpO2 95 %.     Exam:    Not conversant   Lung: clear to auscultation,  Heart: s1s2 heard   Abdomen: soft, mild distension   Ext: no edema in RLE,         Recent Labs     22  0451   WBC 8.3   HCT 23.9*   MCV 83.0       Recent Labs     22  0451      CO2 26   BUN 88*   INR 1.3*       Recent Labs     22   AGRAT 1.5       I have reviewed patient's record for pertinent labs, radiology and other test . I have reviewed old records. I have ordered labs, medications and radiology as necessary and indicated per patient's condition . Total time spent is approximately 35 minutes. Greater than 50 % of that time was spent in counseling and or care coordination.        Justin Peters MD  Nephrology -[facility], [facility].

## 2022-09-02 NOTE — ROUTINE PROCESS
Ultrasound Guided Right Thoracentesis was preformed at bed side in ICU.  460ccs was drained and all sent to lab for testing.

## 2022-09-02 NOTE — PROGRESS NOTES
Problem: Dysphagia (Adult)  Goal: *Acute Goals and Plan of Care (Insert Text)  Description: Patient will:  1. Tolerate PO trials with 0 s/s overt distress in 4/5 trials. 2. Utilize compensatory swallow strategies/maneuvers (decrease bite/sip, size/rate, alt. liq/sol) with min cues in 4/5 trials. 3. Perform oral-motor/laryngeal exercises to increase oropharyngeal swallow function with min cues. 4. When medically stable, recommend objective swallow study (i.e., MBSS) per MD discretion to assess swallow integrity, r/o aspiration, and determine of safest LRD, min A.    Rec:     NPO except oral care TID  Aspiration precautions HOB >30 - 45  No oral meds  Outcome: Progressing Towards Goal      SPEECH LANGUAGE PATHOLOGY BEDSIDE SWALLOW   EVALUATION & TREATMENT     Patient: Lesley Shields. (71 y.o. male)  Date: 9/2/2022  Primary Diagnosis: Sepsis ([facility] [A41.9]  Strangulated hernia of abdominal wall [K43.6]  UTI (urinary tract infection) [N39.0]  AMS (altered mental status) [R41.82]  Acidosis [E87.2]  Hyperkalemia [E87.5]  Procedure(s) (LRB):  EXPLORATORY LAPAROTOMY, REDUCTION INCARCERATED RIGHT GROIN HERNIA WITH HERNIA REPAIR WITH MESH (N/A) 6 Days Post-Op   Precautions: Aspiration     PLOF:  regular, thin (per patient who appears to be unreliable historian)    ASSESSMENT :  Based on the objective data described below, the patient presents with overt s/sx of oropharyngeal dysphagia. Patient seen for bedside swallow evaluation s/p extubation on 9/1/22. Per RN, patient on room air. Patient repositioned to [facility] >45 degrees but required frequent repositioning. Patient with decreased arousal and oriented to self. Disoriented to place, situation, time. Eyes open to verbal/tactile stim. OM examination limited due poor command following. Patient with limited/poor natural dentition. Lingual/labial strength, coordination, and ROM appear reduced. Vocal quality c/b low volume, strain.   Patient given small spoon sip of thin liquid with immediate wet cough noted. Thin liquid trial d/c. Patient then given small spoon sip of mildly thick liquid (nectar) via spoon. Patient demo delayed wet cough and O2 desat to <70 requiring nurse intervention. Trials d/c Patient put on 4L O2 via NC and requiring suction due to wet sounding vocal quality. Patient 02 increased to 95 - 100 following suction and NC. Recommend patient remain NPO due to high likelihood of aspiration; aspiration precautions; oral care 3x/day with suction; non-oral meds. ST following. TREATMENT :  Skilled therapy initiated; Educated pt on aspiration precautions and importance of compensatory swallow techniques to decrease aspiration risk (decrease rate of intake & sip/bite size, upright @HOB for all po intake and ~30 minutes after po); verbalized comprehension. SLP to follow as indicated. Patient will benefit from skilled intervention to address the above impairments. Patient's rehabilitation potential is considered to be Guarded  Factors which may influence rehabilitation potential include:   []            None noted  [x]            Mental ability/status  [x]            Medical condition  []            Home/family situation and support systems  []            Safety awareness  []            Pain tolerance/management  []            Other:      PLAN :  Recommendations and Planned Interventions:  See above  Frequency/Duration: Patient will be followed by speech-language pathology 3 times a week to address goals. Discharge Recommendations: To Be Determined     SUBJECTIVE:   Patient stated [address]     OBJECTIVE:     Past Medical History:   Diagnosis Date    A-fib ([facility]     Asthma     CAD (coronary artery disease)     Chronic kidney disease     stage 3    COVID-19     Diabetes (HCC)     GERD (gastroesophageal reflux disease)     Heart failure (HCC)     chronic diastolic heart failure    High cholesterol     Hypertension     Ill-defined condition     chronic osteomyelitis left ankle and foot    Ill-defined condition     chronic arteriosclerosis    PVD (peripheral vascular disease) ([facility]     Stroke [facility])     cva     Past Surgical History:   Procedure Laterality Date    COLONOSCOPY N/A 6/1/2018    COLONOSCOPY, POLYPECTOMY performed by Shama Bishop MD at THE [facility] ENDOSCOPY    HX CATARACT REMOVAL      HX ORTHOPAEDIC Left 2021    ankle washout, external fixator, would vac    HX ORTHOPAEDIC      external fixator removed     Home Situation:   Home Situation  Support Systems: Spouse/Significant Other  Patient Expects to be Discharged to[de-identified] Unable to determine at this time    Diet prior to admission: regular, thin (per patient who appears to be unreliable historian)  Current Diet:  NPO     Cognitive and Communication Status:  Neurologic State: Eyes open spontaneously, Confused  Orientation Level: Oriented to person, Disoriented to situation, Disoriented to place, Disoriented to time  Cognition: Impaired decision making, Decreased command following       Oral Assessment:  Oral Assessment  Labial: Decreased rate;Decreased seal;Impaired coordination; Other (comment) (limited assessment due to poor command following)  Dentition: Natural;Limited;Poor  Oral Hygiene:  (fair)  Lingual:  (unable to assess due to poor command following)  Velum: Unable to visualize  Mandible:  (limited assessment due to poor command following)  Gag Reflex:  (did not test)  P.O. Trials:  Patient Position:  (HOB >45 degrees)  Vocal quality prior to P.O.: Low volume; Fatigue;Strain  Consistency Presented: Ice chips; Thin liquid; Nectar thick liquid  How Presented: Spoon;SLP-fed/presented     Bolus Acceptance: No impairment  Bolus Formation/Control: Impaired  Type of Impairment: Incomplete;Lip closure; Other (comment) (suspect premature spillage)  Propulsion: Discoordination  Oral Residue: None  Initiation of Swallow:  Other (comment) (suspect delay)  Laryngeal Elevation: Decreased;Weak  Aspiration Signs/Symptoms: Change vocal quality; Decrease in O2 saturations;Delayed cough/throat clear;Material suctioned;Strong cough; Watery eyes  Pharyngeal Phase Characteristics: Altered vocal quality; Easily fatigued ; Poor endurance; Suspected pharyngeal residue  Effective Modifications: None     Oral Phase Severity: Mild-moderate  Pharyngeal Phase Severity : Other (comment) (overt s/sx - suspect moderate)    PAIN:  Pain level pre-treatment: unable to verbalize/10   Pain level post-treatment: unable to verbalize/10   Pain Intervention(s): Repositioning  Response to intervention: Nurse notified    After treatment:   []            Patient left in no apparent distress sitting up in chair  [x]            Patient left in no apparent distress in bed  []            Call bell left within reach  [x]            Nursing notified  []            Family present  []            Caregiver present  []            Bed alarm activated    COMMUNICATION/EDUCATION:   [x]            Aspiration precautions; swallow safety; compensatory techniques. []            Patient/family have participated as able in goal setting and plan of care. []            Patient/family agree to work toward stated goals and plan of care. []            Patient understands intent and goals of therapy; neutral about participation. [x]            Patient unable to participate in goal setting/plan of care; educ ongoing with interdisciplinary staff  []         Posted safety precautions in patient's room.     Thank you for this referral.  SCOOBY Sinclair  Time Calculation: 27 mins  Evaluation Time: 15 minutes   Treatment Time: 12 minutes

## 2022-09-02 NOTE — ACP (ADVANCE CARE PLANNING)
Advance Care Planning     General Advance Care Planning (ACP) Conversation      Date of Conversation: 9/2/2022    Conducted with: Patient, patient's wife (Mitchell Mcardle), Cristi Styles NP (Palliative) and this writer. Healthcare Decision Maker:     Primary Decision Maker: Trini Fishman - Spouse - [phone]  Secondary Decision Maker: Radha Mendoza - Son - [phone]      Content/Action Overview: This writer, along with Cristi Styles NP, with the Palliative Care team; visited with patient today to offer support and to also discuss the decision of amputation. Patient has been extubated and he was alert; however, he is not cognitively able to participate in any goals of care discussions or decisions. Patient's wife Juan Escobedo) would like patient to clear enough to give his wishes regarding amputation or no amputation. The Palliative Care team will continue to monitor this situation for future goals of care decisions. At this time, patient will remain a DNR/DNI. Length of Voluntary ACP Conversation in minutes:  20 minutes        Tanner Proctor, MSW  Palliative Care   #[phone]

## 2022-09-02 NOTE — PROGRESS NOTES
Hospitalist Progress Note    Patient: Giuliana Villa MRN: [id number]  CSN: [id number]    YOB: 1945  Age: 78 y.o. Sex: male    DOA: 8/27/2022 LOS:  LOS: 6 days          Chief Complaint:    Resp failure      Assessment/Plan     78 y.o. 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 Admitted for severe septic shock with multiorgan and hypotension involvement due to multiple possible sources of infection, severe hyperkalemia, acute metabolic encephalopathy, metabolic acidosis, strangulated left inguinal hernia, anemia requiring blood transfusion, mild hypoxia coagulable state.       Severe septic shock has improved, off pressors     Acute resp failure-resolved, extubated  Not safe for PO yet, keep NPO     Incarcerated right inguinal hernia -status post ex lap with reduction of incarcerated right groin hernia with hernia mesh     Infected sacral ulcer -weeping purulent material, seen by wound care     Infected medial maleolar and heel ulcers -on IV zosyn     Anemia -s/p transfusion      Acute renal failure -likely due to profound shock, appreciate nephrology expertise, urine output improving, cr slowly improving     Hypokalemia-repletion     Metabolic acidosis -improved      Acute encephalopathy better         Poor prognosis with multiple medical ailments and advanced PVD, diabetes complications, prior BKA< wounds and debilitated state     Hospice would certainly be reasonable option for disposition    Transfer to floor    Comfort thoracentesis planned for large right pleural effusion    D/w pall care team     DNR  Disposition :  Patient Active Problem List   Diagnosis Code    DM2 (diabetes mellitus, type 2) ([facility] E11.9    CAD (coronary artery disease) I25.10    Acute renal failure (ARF) ([facility]) N17.9    CKD (chronic kidney disease) stage 3, GFR 30-59 ml/min ([facility]) N18.30    Elevated brain natriuretic peptide (BNP) level R79.89    UTI (urinary tract infection) N39.0    High anion gap metabolic acidosis E98.7    Hyperkalemia E87.5    Sepsis ([facility]) A41.9    AMS (altered mental status) R41.82    Strangulated inguinal hernia K40.30    Right inguinal hernia K40.90    Leukocytosis D72.829    Anemia D64.9    Chronic indwelling Mccray catheter Z97.8    S/P BKA (below knee amputation), left ([facility]) Z89.512    Right foot ulcer ([facility]) L97.519    Paroxysmal atrial fibrillation ([facility]) I48.0    Diabetic ulcer of right foot ([facility]) E11.621, L97.519    Pleural effusion on right J90    Severe protein-calorie malnutrition ([facility]) E43    Acute respiratory failure with hypoxemia ([facility]) J96.01    Septic shock ([facility]) A41.9, R65.21       Subjective:  Awake  States he is ok  Weak voice  Extubated and in no resp distress      Review of systems:    Limited by mild confusion and weakness  Respiratory: denies SOB, cough  Cardiovascular: denies chest pain  Gastrointestinal: denies nausea      Vital signs/Intake and Output:  Visit Vitals  BP (!) 143/49   Pulse 96   Temp 98 °F (36.7 °C)   Resp 26   Ht 5' 8\" (1.727 m)   Wt 82.8 kg (182 lb 8.7 oz)   SpO2 95%   BMI 27.76 kg/m²     Current Shift:  09/02 0701 - 09/02 1900  In: -   Out: 725 [Urine:725]  Last three shifts:  08/31 1901 - 09/02 0700  In: 1502.2 [I.V.:915.2]  Out: 4200 [Urine:4200]    Exam:    General: ill appearing pale weak elderly WM NAD  Head/Neck: NCAT, supple, No masses, No lymphadenopathy  CVS:Regular rate and rhythm, no M/R/G, S1/S2 heard, no thrill  Lungs:Clear to auscultation bilaterally, no wheezes, rhonchi, or rales  Abdomen: Soft, Nontender, No distention  Extremities: No C/C/E, pulses palpable 2+  Left BKA.  Right foot dressed  Neuro:grossly normal                 Labs: Results:       Chemistry Recent Labs     09/02/22  1211 09/02/22  0451 09/02/22  0035 09/01/22  1338 09/01/22  1030 09/01/22  0450 08/31/22  1130 08/31/22 0312   GLU  --  64*  --  165*  --  117*  --  188*   NA  --  144  --  141  --  140  --  137   K 3.3* 3.4* 3.1* 2.8*   < > 3.4*   < > 3.8   CL  --  109  --  106  --  105  --  104   CO2  --  26  --  24  --  22  --  23   BUN  --  88*  --  95*  --  102*  --  110*   CREA  --  2.98*  --  3.25*  --  3.31*  --  3.80*   CA  --  8.7  --  8.8  --  8.7  --  9.0   AGAP  --  9  --  11  --  13  --  10   BUCR  --  30*  --  29*  --  31*  --  29*   AP  --  140*  --   --   --  204*  --  175*   TP  --  5.6*  --   --   --  5.1*  --  5.3*   ALB  --  3.4  --   --   --  2.8*  --  1.9*   GLOB  --  2.2  --   --   --  2.3  --  3.4   AGRAT  --  1.5  --   --   --  1.2  --  0.6*    < > = values in this interval not displayed. CBC w/Diff Recent Labs     09/02/22 0451 09/01/22 1338 08/31/22 0312   WBC 8.3 8.1 6.6   RBC 2.88* 2.66* 3.39*   HGB 7.5* 6.9* 8.7*   HCT 23.9* 21.2* 26.9*    227 302   GRANS 75* 74* 78*   LYMPH 20* 14* 13*   EOS 1 3 1      Cardiac Enzymes No results for input(s): CPK, CKND1, LEONEL in the last 72 hours. No lab exists for component: CKRMB, TROIP   Coagulation Recent Labs     09/02/22  0451 09/01/22  0450   PTP 16.8* 16.6*   INR 1.3* 1.3*       Lipid Panel No results found for: CHOL, CHOLPOCT, CHOLX, CHLST, CHOLV, 525880, HDL, HDLP, LDL, LDLC, DLDLP, 727980, VLDLC, VLDL, TGLX, TRIGL, TRIGP, TGLPOCT, CHHD, CHHDX   BNP No results for input(s): BNPP in the last 72 hours.    Liver Enzymes Recent Labs     09/02/22  0451   TP 5.6*   ALB 3.4   *      Thyroid Studies Lab Results   Component Value Date/Time    TSH 4.73 (H) 08/28/2022 08:04 AM        Procedures/imaging: see electronic medical records for all procedures/Xrays and details which were not copied into this note but were reviewed prior to creation of Ples MD Gisela

## 2022-09-02 NOTE — PROGRESS NOTES
Palliative Medicine Consult    Patient Name: Romel Shields YOB: 1945    Date of Initial Consult: 8/29/2022  Date of follow up: 9/2/2022  Reason for Consult: Goals of care discussions  Requesting Provider: Dr. Shena Galdamez   Primary Care Physician: Terence Tavarez MD      SUMMARY:   Romel Shields is a 78 y.o. with a past history of hypertension, hyperlipidemia, stroke, PAD, chronic atrial fibrillation, on Plavix, DM, CKD, previous, COVID-19 infection, chronic indwelling urinary catheter for urinary retention, and severe peripheral vascular disease with worsening gangrenous left foot necessitating left BKA during 8 day hospitalization last year, who was admitted on 8/27/2022 from home with a diagnosis of incarcerated Right inguinal hernia, severe septic shock with hypotension, severe hyperkalemia, acute metabolic encephalopathy, and anemia. Current medical issues leading to Palliative Medicine involvement include: goals of care discussions. 8/30/2022: Patient was off sedation, moving head back and forth, appears in distress. 9/1/2022: Patient sedation help, plan for SBT today, with possible medical extubation. 9/2/2022: Patient was medically extubated on 9/1/2022, he is oriented to person and place, disoriented to current time and situation. Appears frail, fatigued, and weak. PALLIATIVE DIAGNOSES:   Goals of care discussions  Septic shock  Acute respiratory failure   incarcerated Right inguinal hernia  Advanced age/debility       PLAN:   9/2/2022: Palliative medicine team including  CHERYLE Quintero and MANUEL met with patient at patient's bedside. Patient is awake, alert, oriented to person and place, disoriented to current time and situation. He is not able to understand complex goals of care discussions at this time. Met with patient later in the day and wife was at bedside. Reviewed goals of care with wife.   Wife has a good understanding of current health situation; we explained that patient is too weak, and confused to participate in goals of care discussions at this time. Reviewed overall level of care, with concern for RLL chronic vascular ulcers with previous recommendations from [facility] (6/9/2022) for consideration of Right AKA. Patient has historically declined this amputation. Explained to wife that patient is likely an appropriate hospice candidate, and this should be considered as an option at discharge. Also discussed the benefits and burdens of continued aggressive measures versus implementing comfort measures with the support of hospice at discharge. Wife would like to give patient more time to see  if he clears mentally to participate in his goals of care, understanding she may have to make the decisions should patient not clear cognitively. At this time continue DNR/DNI. See previous discussions below:    9/1/2022: Palliative medicine team including Segun Adams and I met with patient at patient's bedside today. Patient was being removed from sedation for SBT today. Spoke to hospitalist and intensivist. Patient may be stable enough to medically extubate depending on how he does today with SBT. Per previous goals of care discussions with family, we will wait to see if patient can be medically extubated and readdress further goals of care at that point (right leg necrotic tissue with possible need for amputation versus comfort measures). Patient has historically declined amputation in the past. Goals of care discussions ongoing. Family hopes that patient can be safely extubated and would be able to participate in goals of care discussions. At this time, continue DNR/do not re-intubate for any reason. See previous discussions below:    8/30/2022: Palliative medicine team including Segun Adams and I met with patient at patient's bedside today. Patient was off sedation, moving head back and forth, appears in distress.  Family meeting today consisting of palliative team, patient's family (wife 608 Nikhil Santos Ryan, children Andrew Roche, and Ally Thakkar) hositalist Dr. Jeancarlos Freitas, and intensivist Dr. Rom Valera. Medical update provided. Discussed SBT outcome today with potential for medical extubation tomorrow depending on how his SBT goes tomorrow. Discussed the benefits and burdens of reintubation in the event of respiratory decline post medical extubation. Discussed overall level of care, with concern for RLL chronic vascular ulcers with previous recommendations from [facility] (6/9/2022) for consideration of Right AKA. Patient has historically declined this amputation. Discussed current quality of life. Family describes patient as a man who values his independence, and patient has been bothered by the fact that he hasn't  walked in over a year. Discussed that septic shock is improving and this event was likely related to bowel sepsis, but that patient is at high risk for recurrent sepsis and further clinical decline due to chronic infection in RLL, with no plan for amputation. Discussed the benefits and burdens of continuing current level of care versus implementation of comfort measures with hospice at discharge. Family clear that he would not find reintubation acceptable. Family is hopeful that if patient is medically extubated, he may wake up enough to participate in goals of care discussions. For now, patient is a DNR/do not re-intubate for any reason. Further goals of care discussions ongoing. See previous discussions below:    8/29/2022:Goals of care discussions: Palliative medicine team including  CHERYLE Baum and MANUEL met with patient at patient's bedside. Patient is orally intubated on mechanical ventilation, frequent movements in bed but no command following. Wife Francisco Jones at bedside (Wife notes that English is her second language but she states she understands our conversation and answering questions appropriately. Offered translation services but wife declined.) Wife confirms DNR in the event of cardiac arrest. Patient is a PARTIAL CODE: No CPR (including no chest compressions, no shock, and no ACLS meds in the event of cardiac arrest). Continue intubation at this time. Wife requesting a meeting with their children involved to address further goals of care. Wife will call me with a time for tomorrow once she has spoken to all of her children. Received a call from patient's son Willie and explained to him the desire to meet for family meeting. Willie confirms DNR status upon cardiac arrest. Further goals of care discussions will occur at family meeting tomorrow. Awaiting response from wife with exact time. Septic shock:   multiple possible sources of infection, incarcerated Right inguinal hernia s/p laparotomy and reduction of incarcerated right groin hernia without need for bowel resection-8/27/2022. UA-Positive for UTI; urine culture obtained. sacral ulcer and medial maleolar and heel infected ulcers. On broad spectrum antibiotics per primary team.   Acute respiratory failure: Patient remains intubated postop due to critical illness. incarcerated Right inguinal hernia: s/p laparotomy and reduction of incarcerated right groin hernia without need for bowel resection-8/27/2022. Advanced age/debility: 78year old male who is critically ill, needs assist with all ADLs. Prior to admission, patient lived at home with his spouse and required assistance with functional ADLs.    Initial consult note routed to primary continuity provider  Communicated plan of care with: Palliative IDT       GOALS OF CARE / TREATMENT PREFERENCES:   [====Goals of Care====]  GOALS OF CARE: DNR/DNI    Patient/Health Care Proxy Stated Goals: Prolong life      TREATMENT PREFERENCES:   Code Status: DNR    Advance Care Planning:  Advance Care Planning 8/29/2022   Patient's Healthcare Decision Maker is: Legal Next of Bela 71   Primary Decision Maker Name - Primary Decision Maker Phone Number -   Primary Decision Maker Relationship to Patient -   Confirm Advance Directive None   Patient Would Like to Complete Advance Directive Unable       Medical Interventions: Limited additional interventions            The palliative care team has discussed with patient / health care proxy about goals of care / treatment preferences for patient.  [====Goals of Care====]         HISTORY:     History obtained from: patient's chart, family    CHIEF COMPLAINT: incarcerated Right inguinal hernia, s/p surgery    HPI/SUBJECTIVE:    The patient is:   [] Verbal and participatory  [x] Non-participatory due to:   Oriented to person and place, confused to current time and situation     Clinical Pain Assessment (nonverbal scale for severity on nonverbal patients):   Clinical Pain Assessment  Severity: 0    Adult Nonverbal Pain Scale  Face: No particular expression or smile  Activity (Movement): Lying quietly, normal position  Guarding: Lying quietly, no positioning of hands over areas of body  Physiology (Vital Signs): Stable vital signs  Respiratory: Baseline RR/SpO2 compliant with ventilator  Total Score: 0       FUNCTIONAL ASSESSMENT:     Palliative Performance Scale (PPS):  PPS: 40       PSYCHOSOCIAL/SPIRITUAL SCREENING:     Advance Care Planning:  Advance Care Planning 8/29/2022   Patient's Healthcare Decision Maker is: Legal Next of Kin   Primary Decision Maker Name -   Primary Decision Maker Phone Number -   Primary Decision Maker Relationship to Patient -   Confirm Advance Directive None   Patient Would Like to Complete Advance Directive Unable        Any spiritual / Lutheran concerns: unable to assess  [] Yes /  [] No    Caregiver Burnout:  [] Yes /  [] No /  [x] No Caregiver Present      Anticipatory grief assessment: unable to assess  [] Normal  / [] Maladaptive          REVIEW OF SYSTEMS:     Positive and pertinent negative findings in ROS are noted above in HPI.   The following systems were [] reviewed / [x] unable to be reviewed as noted in HPI  Other findings are noted below. Systems: constitutional, ears/nose/mouth/throat, respiratory, gastrointestinal, genitourinary, musculoskeletal, integumentary, neurologic, psychiatric, endocrine. Positive findings noted below. Modified ESAS Completed by: provider   Fatigue: 5       Pain: 0   Anxiety: 3       Dyspnea: 0                    PHYSICAL EXAM:     From RN flowsheet:  Wt Readings from Last 3 Encounters:   08/29/22 82.8 kg (182 lb 8.7 oz)   11/02/21 81.6 kg (180 lb)   10/11/21 76.7 kg (169 lb)     Blood pressure (!) 143/49, pulse 96, temperature 97.6 °F (36.4 °C), resp. rate 26, height 5' 8\" (1.727 m), weight 82.8 kg (182 lb 8.7 oz), SpO2 95 %.     Pain Scale 1: Adult Nonverbal Pain Scale  Pain Intensity 1: 0     Pain Location 1: Abdomen  Pain Orientation 1: Anterior  Pain Description 1: Aching  Pain Intervention(s) 1: Medication (see MAR)      Constitutional: patient in NAD, confused, appears acute on chronically ill  Eyes: PERRL  ENMT: dry mucous membranes, hoarseness  Cardiovascular: regular rhythm, distal pulses intact  Respiratory: unlabored, symmetric, on NC at 2 L  Gastrointestinal: midline surgical incision CDI, no distention  Musculoskeletal: no deformity, no tenderness to palpation, L BKA  Skin: warm, dry, black eschar right ankle and right heel  Neurologic: following some commands, moving all extremities, oriented to person and place, confused to current time and situation       HISTORY:     Principal Problem:    Sepsis ([address] (8/27/2022)    Active Problems:    Acute renal failure (ARF) ([address] (5/29/2018)      UTI (urinary tract infection) (10/29/2021)      High anion gap metabolic acidosis (2/51/0476)      Hyperkalemia (8/27/2022)      AMS (altered mental status) (8/27/2022)      Strangulated inguinal hernia (8/27/2022)      Right inguinal hernia (8/27/2022)      Leukocytosis (8/27/2022)      Anemia (8/27/2022)      Chronic indwelling Mccray catheter (8/27/2022)      S/P BKA (below knee amputation), left ([address] (8/27/2022)      Right foot ulcer ([address] (8/27/2022)      Paroxysmal atrial fibrillation ([address] (8/27/2022)      Diabetic ulcer of right foot ([address] (8/27/2022)      Pleural effusion on right (8/27/2022)      Severe protein-calorie malnutrition ([address] (8/27/2022)      Acute respiratory failure with hypoxemia ([address] (8/28/2022)      Septic shock ([address] (8/28/2022)    Past Medical History:   Diagnosis Date    A-fib ([address]     Asthma     CAD (coronary artery disease)     Chronic kidney disease     stage 3    COVID-19     Diabetes ([address]     GERD (gastroesophageal reflux disease)     Heart failure (HCC)     chronic diastolic heart failure    High cholesterol     Hypertension     Ill-defined condition     chronic osteomyelitis left ankle and foot    Ill-defined condition     chronic arteriosclerosis    PVD (peripheral vascular disease) ([address]     Stroke ([address]     cva      Past Surgical History:   Procedure Laterality Date    COLONOSCOPY N/A 6/1/2018    COLONOSCOPY, POLYPECTOMY performed by Arina Hussein MD at [facility]    HX CATARACT REMOVAL      HX ORTHOPAEDIC Left 2021    ankle washout, external fixator, would vac    HX ORTHOPAEDIC      external fixator removed      History reviewed. No pertinent family history. History reviewed, no pertinent family history.   Social History     Tobacco Use    Smoking status: Former    Smokeless tobacco: Never   Substance Use Topics    Alcohol use: Not Currently     No Known Allergies   Current Facility-Administered Medications   Medication Dose Route Frequency    dextrose 5 % - 0.45% NaCl infusion  25 mL/hr IntraVENous CONTINUOUS    [Held by provider] furosemide (LASIX) injection 40 mg  40 mg IntraVENous BID    insulin glargine (LANTUS) injection 20 Units  20 Units SubCUTAneous DAILY    [Held by provider] dexmedeTOMidine (PRECEDEX) 400 mcg in 0.9% sodium chloride 100 mL infusion  0.1-1.5 mcg/kg/hr IntraVENous TITRATE    ELECTROLYTE REPLACEMENT PROTOCOL - Potassium Renal Dosing  1 Each Other PRN    0.9% sodium chloride infusion  5 mL/hr IntraVENous CONTINUOUS    ELECTROLYTE REPLACEMENT PROTOCOL - Potassium Standard Dosing   1 Each Other PRN    ELECTROLYTE REPLACEMENT PROTOCOL - Magnesium   1 Each Other PRN    ELECTROLYTE REPLACEMENT PROTOCOL  - Phosphorus Renal Dosing  1 Each Other PRN    ELECTROLYTE REPLACEMENT PROTOCOL - Calcium   1 Each Other PRN    lactulose (CHRONULAC) 10 gram/15 mL solution 15 mL  15 mL Oral BID    midodrine (PROAMATINE) tablet 2.5 mg  2.5 mg Oral BID    albumin human 25% (BUMINATE) solution 25 g  25 g IntraVENous Q6H    midazolam (VERSED) injection 0.5 mg  0.5 mg IntraVENous BID PRN    arformoteroL (BROVANA) neb solution 15 mcg  15 mcg Nebulization BID RT    0.9% sodium chloride infusion 250 mL  250 mL IntraVENous PRN    0.9% sodium chloride infusion 250 mL  250 mL IntraVENous PRN    [Held by provider] heparin (porcine) injection 5,000 Units  5,000 Units SubCUTAneous Q8H    piperacillin-tazobactam (ZOSYN) 3.375 g in 0.9% sodium chloride (MBP/ADV) 100 mL MBP  3.375 g IntraVENous Q12H    0.9% sodium chloride infusion 250 mL  250 mL IntraVENous PRN    pantoprazole (PROTONIX) 40 mg in 0.9% sodium chloride 10 mL injection  40 mg IntraVENous Q12H    sodium chloride (NS) flush 5-40 mL  5-40 mL IntraVENous Q8H    sodium chloride (NS) flush 5-40 mL  5-40 mL IntraVENous PRN    acetaminophen (TYLENOL) tablet 650 mg  650 mg Oral Q6H PRN    Or    acetaminophen (TYLENOL) suppository 650 mg  650 mg Rectal Q6H PRN    polyethylene glycol (MIRALAX) packet 17 g  17 g Oral DAILY PRN    ondansetron (ZOFRAN ODT) tablet 4 mg  4 mg Oral Q8H PRN    Or    ondansetron (ZOFRAN) injection 4 mg  4 mg IntraVENous Q6H PRN    magnesium sulfate 2 g/50 ml IVPB (premix or compounded)  2 g IntraVENous PRN    insulin lispro (HUMALOG) injection   SubCUTAneous Q6H    glucose chewable tablet 16 g  4 Tablet Oral PRN    glucagon (GLUCAGEN) injection 1 mg  1 mg IntraMUSCular PRN    dextrose 10% infusion 0-250 mL  0-250 mL IntraVENous PRN    chlorhexidine (PERIDEX) 0.12 % mouthwash 10 mL  10 mL Oral Q12H    [Held by provider] vasopressin (VASOSTRICT) 20 Units in 0.9% sodium chloride 100 mL infusion  0-0.04 Units/min IntraVENous TITRATE    HYDROmorphone (DILAUDID) injection 1 mg  1 mg IntraVENous Q4H PRN          LAB AND IMAGING FINDINGS:     Lab Results   Component Value Date/Time    WBC 8.3 09/02/2022 04:51 AM    HGB 7.5 (L) 09/02/2022 04:51 AM    PLATELET 740 11/49/1731 04:51 AM     Lab Results   Component Value Date/Time    Sodium 144 09/02/2022 04:51 AM    Potassium 3.3 (L) 09/02/2022 12:11 PM    Chloride 109 09/02/2022 04:51 AM    CO2 26 09/02/2022 04:51 AM    BUN 88 (H) 09/02/2022 04:51 AM    Creatinine 2.98 (H) 09/02/2022 04:51 AM    Calcium 8.7 09/02/2022 04:51 AM    Magnesium 1.7 09/02/2022 04:51 AM    Phosphorus 1.9 (L) 09/02/2022 04:51 AM      Lab Results   Component Value Date/Time    Alk.  phosphatase 140 (H) 09/02/2022 04:51 AM    Protein, total 5.6 (L) 09/02/2022 04:51 AM    Albumin 3.4 09/02/2022 04:51 AM    Globulin 2.2 09/02/2022 04:51 AM     Lab Results   Component Value Date/Time    INR 1.3 (H) 09/02/2022 04:51 AM    Prothrombin time 16.8 (H) 09/02/2022 04:51 AM    aPTT 118.5 (H) 05/17/2021 08:20 AM      Lab Results   Component Value Date/Time    Iron 10 (L) 05/06/2021 06:40 AM    TIBC 186 (L) 05/06/2021 06:40 AM    Iron % saturation 5 (L) 05/06/2021 06:40 AM    Ferritin 125 05/06/2021 06:40 AM      No results found for: PH, PCO2, PO2  No components found for: [place]   Lab Results   Component Value Date/Time    CK 46 05/29/2018 05:45 PM    CK - MB 1.4 05/29/2018 05:45 PM                Total time: 25 minutes  Counseling / coordination time, spent as noted above:   > 50% counseling / coordination?: yes, patient, family, and medical team    Prolonged service was provided for  []30 min   []75 min in face to face time in the presence of the patient, spent as noted above.   Time Start:   Time End:

## 2022-09-02 NOTE — PROGRESS NOTES
@2000 pt taken over awake in bed on 2L/nc flacc 0. Mccray and fecal management in-situ. Dressing to abdomen dry and intact. Assessment completed no changes observation continues. @0000 pt reassessed no new changes. @assessment completed condition remains the same nursing management continues. @0750 Bedside and Verbal shift change report given to Stephon Comer (oncoming nurse) by Laura Caraballo (offgoing nurse). Report included the following information SBAR, Kardex, Intake/Output, MAR, Recent Results, Med Rec Status, and Alarm Parameters . @5693  Bedside and Verbal shift change report given to Krystina SHAW (oncoming nurse) by Laura Caraballo (offgoing nurse). Report included the following information SBAR, Kardex, Intake/Output, MAR, Recent Results, Med Rec Status, and Alarm Parameters .

## 2022-09-02 NOTE — PROGRESS NOTES
[facility]  Pulmonary, Critical Care, and Sleep Medicine    Name: Carmencita Branham.  MRN: [id number]   : 1945 Hospital: [facility] [place]    Date: 2022  Room: [address] [place] [address] Note                                              Consult requesting physician: Dr. Dr Edgar Lorenz  Reason for Consult: Strangulated bowel with complications, shock, respiratory failure       IMPRESSION:   Strangulated bowel/inguinal hernia  Acute respiratory failure with hypoxemia  Septic shock  High gap metabolic acidosis  Acute renal failure  Right inguinal hernia  Hyperkalemia  Sepsis  Altered mentation  Leukocytosis  Anemia  UTI  Right pleural effusion  Chronic Mccray catheter  Right foot ulcers  Type 2 diabetes mellitus  Paroxysmal atrial fibrillation  Left below-knee amputation  Severe malnutrition      Patient Active Problem List   Diagnosis Code    DM2 (diabetes mellitus, type 2) ([facility]) E11.9    CAD (coronary artery disease) I25.10    Acute renal failure (ARF) ([facility]) N17.9    CKD (chronic kidney disease) stage 3, GFR 30-59 ml/min ([facility]) N18.30    Elevated brain natriuretic peptide (BNP) level R79.89    UTI (urinary tract infection) N39.0    High anion gap metabolic acidosis B76.7    Hyperkalemia E87.5    Sepsis ([facility]) A41.9    AMS (altered mental status) R41.82    Strangulated inguinal hernia K40.30    Right inguinal hernia K40.90    Leukocytosis D72.829    Anemia D64.9    Chronic indwelling Mccray catheter Z97.8    S/P BKA (below knee amputation), left ([facility]) Z89.512    Right foot ulcer ([facility]) L97.519    Paroxysmal atrial fibrillation ([facility]) I48.0    Diabetic ulcer of right foot ([facility]) E11.621, L97.519    Pleural effusion on right J90    Severe protein-calorie malnutrition ([facility]) E43    Acute respiratory failure with hypoxemia ([facility]) J96.01    Septic shock ([facility]) A41.9, R65.21         Code status: Full Code      RECOMMENDATIONS:   Patient is critically ill and presenting with large right inguinal strangulated hernia, with anemia, metabolic acidosis, acute renal failure and hyperkalemia. S/p laparotomy and reduction of incarcerated right groin hernia without need for bowel resection-8/27/2022. Respiratory: on NC  Extubated on on 9/1/2022  Stable on NC    Thoracenthesis today right sided not better  Follow up Cxr stable better aeration      IMPRESSION     Large right pleural effusion with adjacent atelectasis. I will consult for thoracenthesis for comfort oxygenation stable    CXR 8/30  IMPRESSION  1. Endotracheal tube approximately 4 cm above the alise. 2. No significant change in right basilar opacity consistent with pleural  effusion with adjacent consolidation, atelectasis or pneumonia. IIncrease diuresis check BNP  Add midodrine  SBT today did well for 1 hrs   Continue ventilator and sedation bundles. Sedation-midazolam infusion; prn Dilaudid; patient dropped blood pressure in ER with fentanyl. CT abdomen on admission-right lower lung shows large right-sided pleural effusion with underlying atelectasis of the right lower lobe segments. Keep SPO2 >=92%. HOB 30 degree elevation all the time. Aggressive pulmonary toileting. Aspiration precautions. CVS: BP stable    Patient off pressors;BP BP but needs diuresis add midorine low dose   Stable overnight   wean for systolic blood pressure greater than 95 mmHg. Patient currently in atrial fibrillation. Prior Echo showed normal LV function; no pulmonary hypertension reported. Repeat echocardiogram diastolic dysfunction Ef normal   Troponin not elevated; proBNP elevated. Patient likely has diastolic CHF with chronic diabetes and hypertension. ID: Wbc improved 8.9  Patient likely has bowel sepsis; lactic acid not elevated. Necrotic lower extremity possible second source of infection   UA-Positive for UTI; urine culture-pending. Continue broad-spectrum antibiotic-levofloxacin, Zosyn and IV vancomycin-renal dosing.   Blood cultures negative; rapid COVID test negative. Deescalate antibiotic when appropriate. Hematology/Oncology: Patient s/p 1 unit PRBC in the ER. Hemoglobin 8.7 this morning. Platelets-reactive thrombocytosis. INR 1.4 this morning; plan for 5 mg vitamin K IV. Start sq heaprin for dvt prophylaxis and monitor hb  Renal: metabolic acidosis better stop iv start oral bicarbonate   Contineu albumins malnutrition   Nephrology consulted-Dr. Jatinder Snyder. GI/: Chronic Mccray catheter-changed in ER  LFTs and lipase-normal.  Albumin-low. CT abdomen/pelvis- Large right inguinal hernia with large and small bowel. There is diffuse wall  thickening of the ascending colon concerning for strangulation. No evidence for bowel obstruction. S/p exploratory laparotomy, reduction of incarcerated hernia; bowel was viable, and did not need resection. Endocrine: Monitor BS-improved; continue sliding scale insulin. Hemoglobin A1c-5.9 on admission. Check TSH. Neurology: Acute metabolic encephalopathy from sepsis. CT head-nil acute. Skin/Wound: Right foot ulcer-wound care consulted. Electrolytes: Replace electrolytes per ICU electrolyte replacement protocol; caution renal failure. IVF: Bicarb drip 75 ml per hour; albumin infusion every 6 hours. Nutrition: N.p.o. for now. Prophylaxis: DVT Prophylaxis: None- due to anemia, left BKA, right foot diabetic ulcers. GI Prophylaxis: Protonix. Restraints: Prn Wrist soft restraints for patient interfering with medical therapy/management and patient safety. Lines/Tubes: PIV, midline  ET tube 8/27  OG tube 8/27  Mccray: 8/27 changed in ER (Medically necessary for strict input/output monitoring in critically ill patient, will remove it when not needed. Mccary bundle followed). Advance Directive/Palliative Care: consulted; overall prognosis appears to be poor. Full code status per family wishes. Quality Care: PPI, DVT prophylaxis, HOB elevated, Infection control all reviewed and addressed.     Care of plan d/w icu RT and RN. Update family today when available. High complexity decision making was performed during the evaluation of this patient at high risk for decompensation with multiple organ involvement. Family meeting on 8/30 I attended now Dnr/DNI no reintubation if fails           Subjective/History of Present Illness:     Patient is a 78 y.o. male with PMHx significant for multiple medical problems. He history of atrial flutter/atrial fibrillation, coronary artery disease, chronic kidney disease stage III, diabetes mellitus type 2 with leg ulcerations, history of CHF, hypertension and hyperlipidemia, history of peripheral vascular disease, history of prior CVA. Patient was admitted October 2021 with evidence of left leg diabetic ulcerations and gangrene. He subsequently underwent left below-knee amputation. Patient has chronic indwelling Mccray catheter. The patient has come to the ER brought by family/wife acutely unwell, confused and agitated. The patient has been found to have severe right-sided inguinal hernia, and subsequent CT abdomen/pelvis study showing evidence of strangulation of the bowel. The patient is also anemic, and severe metabolic acidosis, and acute renal failure. Urine output is poor from the Mccray catheter, and appears to be purulent. Mccray catheter has been replaced in the ER. S/p EXPLORATORY LAPAROTOMY, REDUCTION INCARCERATED RIGHT GROIN HERNIA WITH HERNIA REPAIR WITH MESH 8/27/2022 - Dr Schroeder Fitting  The bowel was completely viable and did not require resection. 9/2/2022  Respiratory stays stable on 2L  Large right side effusion tapped today most likley transudate( ph is normal)  Keep lasix and albumins  DNR/DNI  I will see prn  Call me if new issues   S/p left BKA 10/28/2021    Review of Systems:  ROS not obtained due to patient factor.        No Known Allergies   Past Medical History:   Diagnosis Date    A-fib ([facility] [address]     Asthma     CAD (coronary artery disease) Chronic kidney disease     stage 3    COVID-19     Diabetes (HCC)     GERD (gastroesophageal reflux disease)     Heart failure (HCC)     chronic diastolic heart failure    High cholesterol     Hypertension     Ill-defined condition     chronic osteomyelitis left ankle and foot    Ill-defined condition     chronic arteriosclerosis    PVD (peripheral vascular disease) ([facility] [address]     Stroke [facility])     cva      Past Surgical History:   Procedure Laterality Date    COLONOSCOPY N/A 6/1/2018    COLONOSCOPY, POLYPECTOMY performed by Rody Cortez MD at [facility] ENDOSCOPY    HX CATARACT REMOVAL      HX ORTHOPAEDIC Left 2021    ankle washout, external fixator, would vac    HX ORTHOPAEDIC      external fixator removed      Social History     Tobacco Use    Smoking status: Former    Smokeless tobacco: Never   Substance Use Topics    Alcohol use: Not Currently      History reviewed. No pertinent family history. Prior to Admission medications    Medication Sig Start Date End Date Taking? Authorizing Provider   levoFLOXacin (Levaquin) 750 mg tablet Take 1 Tablet by mouth daily. 6/18/22   Saige Anna MD   insulin lispro (HUMALOG) 100 unit/mL injection INITIATE INSULIN CORRECTIVE PROTOCOL: Normal Insulin Sensitivity   For Blood Sugar (mg/dL) of:     Less than 150 =   0 units           150 -199 =   2 units  200 -249 =   4 units  250 -299 =   6 units  300 -349 =   8 units  350 and above = 10 units and Call Physician  If 2 glucose readings are above 200 mg/dL within a 24 hr period, proceed to \"Insulin Resistant\" dosing. Initiate Hypoglycemia protocol if blood glucose is <70 mg/dL Fast Acting - Administer Immediately - or within 15 minutes of start of meal, if mealtime coverage. 11/3/21   Will Garland MD   gabapentin (NEURONTIN) 300 mg capsule Take 1 Capsule by mouth three (3) times daily. Max Daily Amount: 900 mg. 11/3/21   Will Garland MD   clopidogreL (Plavix) 75 mg tab Take  by mouth daily.  Indications: afib Provider, Historical   B.infantis-B.ani-B.long-B.bifi (Probiotic 4X) 10-15 mg TbEC Take  by mouth daily. Provider, Historical   multivitamin (ONE A DAY) tablet Take 1 Tablet by mouth daily. Provider, Historical   acetaminophen (TylenoL) 325 mg tablet Take 650 mg by mouth every four (4) hours as needed for Pain. Provider, Historical   tamsulosin (FLOMAX) 0.4 mg capsule Take 2 Capsules by mouth daily. 5/25/21   Rickey Hernandez MD   atorvastatin (LIPITOR) 40 mg tablet Take 1 Tab by mouth nightly. 2/15/18   Pretty Juares PA-C   metoprolol succinate (TOPROL-XL) 50 mg XL tablet Take 1 Tab by mouth daily. Patient taking differently: Take 100 mg by mouth daily.  2/16/18   Pretty Juares PA-C     Current Facility-Administered Medications   Medication Dose Route Frequency    sodium phosphate 3 mmol in 0.9% sodium chloride 250 mL infusion   IntraVENous ONCE    dextrose 5 % - 0.45% NaCl infusion  25 mL/hr IntraVENous CONTINUOUS    furosemide (LASIX) injection 40 mg  40 mg IntraVENous BID    insulin glargine (LANTUS) injection 20 Units  20 Units SubCUTAneous DAILY    [Held by provider] dexmedeTOMidine (PRECEDEX) 400 mcg in 0.9% sodium chloride 100 mL infusion  0.1-1.5 mcg/kg/hr IntraVENous TITRATE    sodium bicarbonate tablet 650 mg  650 mg Oral TID    0.9% sodium chloride infusion  5 mL/hr IntraVENous CONTINUOUS    lactulose (CHRONULAC) 10 gram/15 mL solution 15 mL  15 mL Oral BID    midodrine (PROAMATINE) tablet 2.5 mg  2.5 mg Oral BID    albumin human 25% (BUMINATE) solution 25 g  25 g IntraVENous Q6H    arformoteroL (BROVANA) neb solution 15 mcg  15 mcg Nebulization BID RT    heparin (porcine) injection 5,000 Units  5,000 Units SubCUTAneous Q8H    piperacillin-tazobactam (ZOSYN) 3.375 g in 0.9% sodium chloride (MBP/ADV) 100 mL MBP  3.375 g IntraVENous Q12H    pantoprazole (PROTONIX) 40 mg in 0.9% sodium chloride 10 mL injection  40 mg IntraVENous Q12H    sodium chloride (NS) flush 5-40 mL  5-40 mL IntraVENous Q8H    insulin lispro (HUMALOG) injection   SubCUTAneous Q6H    chlorhexidine (PERIDEX) 0.12 % mouthwash 10 mL  10 mL Oral Q12H    [Held by provider] vasopressin (VASOSTRICT) 20 Units in 0.9% sodium chloride 100 mL infusion  0-0.04 Units/min IntraVENous TITRATE         Objective:   Vital Signs:    Visit Vitals  BP (!) 146/47   Pulse 86   Temp 97.6 °F (36.4 °C)   Resp (!) 41   Ht 5' 8\" (1.727 m)   Wt 82.8 kg (182 lb 8.7 oz)   SpO2 100%   BMI 27.76 kg/m²       O2 Device: Nasal cannula   O2 Flow Rate (L/min): 2 l/min   Temp (24hrs), Av.7 °F (37.1 °C), Min:97.6 °F (36.4 °C), Max:99.5 °F (37.5 °C)       Intake/Output:   Last shift:      No intake/output data recorded.     Last 3 shifts:  1901 -  0700  In: 1502.2 [I.V.:915.2]  Out: 4200 [Urine:4200]      Intake/Output Summary (Last 24 hours) at 2022 0948  Last data filed at 2022 0400  Gross per 24 hour   Intake 340 ml   Output 2650 ml   Net -2310 ml         Last 3 Recorded Weights in this Encounter    22 1021 22 1107 22 0940   Weight: 38.6 kg (85 lb) 38.6 kg (85 lb) 82.8 kg (182 lb 8.7 oz)       Physical Exam:   Extubated awake orieneted x1   HEENT: pupils not dilated, reactive, no scleral jaundice, moist oral mucosa, no nasal drainage  Neck: No adenopathy or thyroid swelling  Orotracheal and orogastric tubes-no bleeding or secretions  CVS: S1S2 no murmurs; JVD not elevated; telemetry-sinus rhythm  RS: Mod air entry bilaterally, decreased BS right lower chest, no obvious wheezes or crackles; not tachypneic or in distress  Abd: soft, nontender, not distended, no guarding or rigidity, bowel sounds present, no hepatosplenomegaly  Right lower abdomen laparotomy scar-no bleeding or hematoma  Neuro: Intubated and sedated; limited exam    Extrm: no leg edema or swelling or clubbing; left below-knee amputation  Skin: Large ulceration right medial malleolus and right heel, black in appearance, no discharge    Lymphatic: no cervical or supraclavicular adenopathy  Groin: Large right inguinal hernia no longer present      Data:       Recent Results (from the past 12 hour(s))   GLUCOSE, POC    Collection Time: 09/01/22 11:23 PM   Result Value Ref Range    Glucose (POC) 65 (L) 70 - 110 mg/dL   GLUCOSE, POC    Collection Time: 09/02/22 12:09 AM   Result Value Ref Range    Glucose (POC) 90 70 - 110 mg/dL   POTASSIUM    Collection Time: 09/02/22 12:35 AM   Result Value Ref Range    Potassium 3.1 (L) 3.5 - 5.5 mmol/L   MAGNESIUM    Collection Time: 09/02/22  4:51 AM   Result Value Ref Range    Magnesium 1.7 1.6 - 2.6 mg/dL   PROTHROMBIN TIME + INR    Collection Time: 09/02/22  4:51 AM   Result Value Ref Range    Prothrombin time 16.8 (H) 11.5 - 15.2 sec    INR 1.3 (H) 0.8 - 1.2     CALCIUM, IONIZED    Collection Time: 09/02/22  4:51 AM   Result Value Ref Range    Ionized Calcium 1.16 1.12 - 1.32 MMOL/L   PHOSPHORUS    Collection Time: 09/02/22  4:51 AM   Result Value Ref Range    Phosphorus 1.9 (L) 2.5 - 4.9 MG/DL   CBC WITH AUTOMATED DIFF    Collection Time: 09/02/22  4:51 AM   Result Value Ref Range    WBC 8.3 4.6 - 13.2 K/uL    RBC 2.88 (L) 4.35 - 5.65 M/uL    HGB 7.5 (L) 13.0 - 16.0 g/dL    HCT 23.9 (L) 36.0 - 48.0 %    MCV 83.0 78.0 - 100.0 FL    MCH 26.0 24.0 - 34.0 PG    MCHC 31.4 31.0 - 37.0 g/dL    RDW 20.0 (H) 11.6 - 14.5 %    PLATELET 011 360 - 419 K/uL    MPV 10.2 9.2 - 11.8 FL    NRBC 0.0 0  WBC    ABSOLUTE NRBC 0.00 0.00 - 0.01 K/uL    NEUTROPHILS 75 (H) 40 - 73 %    LYMPHOCYTES 20 (L) 21 - 52 %    MONOCYTES 4 3 - 10 %    EOSINOPHILS 1 0 - 5 %    BASOPHILS 0 0 - 2 %    IMMATURE GRANULOCYTES 0 %    ABS. NEUTROPHILS 6.2 1.8 - 8.0 K/UL    ABS. LYMPHOCYTES 1.7 0.9 - 3.6 K/UL    ABS. MONOCYTES 0.3 0.05 - 1.2 K/UL    ABS. EOSINOPHILS 0.1 0.0 - 0.4 K/UL    ABS. BASOPHILS 0.0 0.0 - 0.1 K/UL    ABS. IMM.  GRANS. 0.0 K/UL    DF MANUAL      PLATELET COMMENTS ADEQUATE PLATELETS      RBC COMMENTS ANISOCYTOSIS  1+        RBC COMMENTS OVALOCYTES  1+        WBC COMMENTS REACTIVE LYMPHS     METABOLIC PANEL, COMPREHENSIVE    Collection Time: 09/02/22  4:51 AM   Result Value Ref Range    Sodium 144 136 - 145 mmol/L    Potassium 3.4 (L) 3.5 - 5.5 mmol/L    Chloride 109 100 - 111 mmol/L    CO2 26 21 - 32 mmol/L    Anion gap 9 3.0 - 18 mmol/L    Glucose 64 (L) 74 - 99 mg/dL    BUN 88 (H) 7.0 - 18 MG/DL    Creatinine 2.98 (H) 0.6 - 1.3 MG/DL    BUN/Creatinine ratio 30 (H) 12 - 20      GFR est AA 25 (L) >60 ml/min/1.73m2    GFR est non-AA 21 (L) >60 ml/min/1.73m2    Calcium 8.7 8.5 - 10.1 MG/DL    Bilirubin, total 1.2 (H) 0.2 - 1.0 MG/DL    ALT (SGPT) 21 16 - 61 U/L    AST (SGOT) 24 10 - 38 U/L    Alk.  phosphatase 140 (H) 45 - 117 U/L    Protein, total 5.6 (L) 6.4 - 8.2 g/dL    Albumin 3.4 3.4 - 5.0 g/dL    Globulin 2.2 2.0 - 4.0 g/dL    A-G Ratio 1.5 0.8 - 1.7     GLUCOSE, POC    Collection Time: 09/02/22  5:25 AM   Result Value Ref Range    Glucose (POC) 65 (L) 70 - 110 mg/dL   GLUCOSE, POC    Collection Time: 09/02/22  6:18 AM   Result Value Ref Range    Glucose (POC) 79 70 - 110 mg/dL   GLUCOSE, POC    Collection Time: 09/02/22  6:59 AM   Result Value Ref Range    Glucose (POC) 91 70 - 110 mg/dL           Chemistry Recent Labs     09/02/22  0451 09/02/22  0035 09/01/22  1338 09/01/22  1030 09/01/22  0450 08/31/22  1753 08/31/22  1130 08/31/22  0312   GLU 64*  --  165*  --  117*  --   --  188*     --  141  --  140  --   --  137   K 3.4* 3.1* 2.8*   < > 3.4*  --    < > 3.8     --  106  --  105  --   --  104   CO2 26  --  24  --  22  --   --  23   BUN 88*  --  95*  --  102*  --   --  110*   CREA 2.98*  --  3.25*  --  3.31*  --   --  3.80*   CA 8.7  --  8.8  --  8.7  --   --  9.0   MG 1.7  --   --   --  1.9 1.8   < > 1.7   PHOS 1.9*  --   --   --  2.8  --   --  3.2   AGAP 9  --  11  --  13  --   --  10   BUCR 30*  --  29*  --  31*  --   --  29*   *  --   --   --  204*  --   --  175*   TP 5.6*  --   --   --  5.1*  --   --  5.3*   ALB 3.4 --   --   --  2.8*  --   --  1.9*   GLOB 2.2  --   --   --  2.3  --   --  3.4   AGRAT 1.5  --   --   --  1.2  --   --  0.6*    < > = values in this interval not displayed. Lactic Acid Lactic acid   Date Value Ref Range Status   08/27/2022 1.7 0.4 - 2.0 MMOL/L Final     No results for input(s): LAC in the last 72 hours. Liver Enzymes Protein, total   Date Value Ref Range Status   09/02/2022 5.6 (L) 6.4 - 8.2 g/dL Final     Albumin   Date Value Ref Range Status   09/02/2022 3.4 3.4 - 5.0 g/dL Final     Globulin   Date Value Ref Range Status   09/02/2022 2.2 2.0 - 4.0 g/dL Final     A-G Ratio   Date Value Ref Range Status   09/02/2022 1.5 0.8 - 1.7   Final     Alk.  phosphatase   Date Value Ref Range Status   09/02/2022 140 (H) 45 - 117 U/L Final     Recent Labs     09/02/22  0451 09/01/22  0450 08/31/22  0312   TP 5.6* 5.1* 5.3*   ALB 3.4 2.8* 1.9*   GLOB 2.2 2.3 3.4   AGRAT 1.5 1.2 0.6*   * 204* 175*          CBC w/Diff Recent Labs     09/02/22  0451 09/01/22  1338 08/31/22  0312   WBC 8.3 8.1 6.6   RBC 2.88* 2.66* 3.39*   HGB 7.5* 6.9* 8.7*   HCT 23.9* 21.2* 26.9*    227 302   GRANS 75* 74* 78*   LYMPH 20* 14* 13*   EOS 1 3 1          Cardiac Enzymes No results found for: CPK, CK, CKMMB, CKMB, RCK3, CKMBT, CKNDX, CKND1, LEONEL, TROPT, TROIQ, WARD, TROPT, TNIPOC, BNP, BNPP     BNP No results found for: BNP, BNPP, XBNPT     Coagulation Recent Labs     09/02/22  0451 09/01/22  0450 08/31/22  0312   PTP 16.8* 16.6* 16.0*   INR 1.3* 1.3* 1.2           Thyroid  Lab Results   Component Value Date/Time    TSH 4.73 (H) 08/28/2022 08:04 AM       No results found for: T4     Urinalysis Lab Results   Component Value Date/Time    Color YELLOW 08/27/2022 02:27 PM    Appearance TURBID 08/27/2022 02:27 PM    Specific gravity 1.019 08/27/2022 02:27 PM    pH (UA) 6.0 08/27/2022 02:27 PM    Protein >1,000 (A) 08/27/2022 02:27 PM    Glucose Negative 08/27/2022 02:27 PM    Ketone TRACE (A) 08/27/2022 02:27 PM Bilirubin Negative 08/27/2022 02:27 PM    Urobilinogen 1.0 08/27/2022 02:27 PM    Nitrites Negative 08/27/2022 02:27 PM    Leukocyte Esterase LARGE (A) 08/27/2022 02:27 PM    Epithelial cells FEW 08/27/2022 02:27 PM    Bacteria 1+ (A) 08/27/2022 02:27 PM    WBC TOO NUMEROUS TO COUNT 08/27/2022 02:27 PM    RBC 4 to 10 08/27/2022 02:27 PM          Culture data during this hospitalization. All Micro Results       Procedure Component Value Units Date/Time    CULTURE, RESPIRATORY/SPUTUM/BRONCH Rekha Pucker STAIN [[id number]]  (Abnormal)  (Susceptibility) Collected: 08/31/22 0018    Order Status: Completed Specimen: Sputum from Tracheal Aspirate Updated: 09/02/22 0937     Special Requests: NO SPECIAL REQUESTS        GRAM STAIN 1+ WBCS SEEN               RARE EPITHELIAL CELLS SEEN                  OCCASIONAL GRAM POSITIVE COCCI IN CLUSTERS           Culture result:       HEAVY * Methicillin Resistant Staphylococcus aureus *                  NO NORMAL RESPIRATORY VICENTA ISOLATED          CULTURE, BLOOD [[id number]] Collected: 08/27/22 1030    Order Status: Completed Specimen: Blood Updated: 09/02/22 0755     Special Requests: NO SPECIAL REQUESTS        Culture result: NO GROWTH 6 DAYS       CULTURE, BLOOD [[id number]] Collected: 08/27/22 1100    Order Status: Completed Specimen: Blood Updated: 09/02/22 0755     Special Requests: NO SPECIAL REQUESTS        Culture result: NO GROWTH 6 DAYS       CULTURE, URINE [[id number]] Collected: 08/27/22 1442    Order Status: Completed Specimen: Cath Urine Updated: 08/29/22 0642     Special Requests: NO SPECIAL REQUESTS        [place] Count --        25446  COLONIES/mL       Culture result:       >2 ORGANISMS - CONTAMINATED SPECIMEN.  SUGGEST RECOLLECTION          CULTURE, BLOOD [[id number]]     Order Status: Canceled Specimen: Blood     COVID-19 RAPID TEST [[id number]] Collected: 08/27/22 1030    Order Status: Completed Specimen: Nasopharyngeal Updated: 08/27/22 1057     Specimen source SWAB COVID-19 rapid test Not detected        Comment: Rapid Abbott ID Now       Rapid NAAT:  The specimen is NEGATIVE for SARS-CoV-2, the novel coronavirus associated with COVID-19. Negative results should be treated as presumptive and, if inconsistent with clinical signs and symptoms or necessary for patient management, should be tested with an alternative molecular assay. Negative results do not preclude SARS-CoV-2 infection and should not be used as the sole basis for patient management decisions. This test has been authorized by the FDA under an Emergency Use Authorization (EUA) for use by authorized laboratories. Fact sheet for Healthcare Providers: AOI Medical.co.nz  Fact sheet for Patients: Collections.nz       Methodology: Isothermal Nucleic Acid Amplification                  ECHO May 2021 Interpretation Summary  Result status: Final result  Contrast used: DEFINITY. Left Ventricle: Normal cavity size, wall thickness and systolic function (ejection fraction normal). The estimated EF is 55 - 60%. There is mild (grade 1) left ventricular diastolic dysfunction E'E= 10.41. Wall Scoring: The left ventricular wall motion is normal.  Tricuspid Valve: Tricuspid valve not well visualized. No stenosis. Tricuspid regurgitation is inadequate for estimation of right ventricular systolic pressure. Images report reviewed by me:  CT 8/27/2022 (Most Recent)  Results from Hospital Encounter encounter on 08/27/22    CT HEAD WO CONT    Narrative  EXAM: CT head    INDICATION: Altered mental status. COMPARISON: None. TECHNIQUE: Axial CT imaging of the head was performed without intravenous  contrast.    One or more dose reduction techniques were used on this CT: automated exposure  control, adjustment of the mAs and/or kVp according to patient size, and  iterative reconstruction techniques.   The specific techniques used on this CT  exam have been documented in the patient's electronic medical record. Digital  Imaging and Communications in Medicine (DICOM) format image data are available  to nonaffiliated external healthcare facilities or entities on a secure, media  free, reciprocally searchable basis with patient authorization for at least a  12-month period after this study. Patient motion degrades image quality. _______________    FINDINGS:    BRAIN AND POSTERIOR FOSSA:  Mild periventricular areas of decreased attenuation are identified. Gray-white matter interfaces are preserved. No intraparenchymal hemorrhage, mass effect or midline shift. The ventricular system is midline and symmetric. Atherosclerotic vascular calcifications are identified. EXTRA-AXIAL SPACES AND MENINGES:  There is no evidence for extra-axial mass lesion or fluid collection. CALVARIUM:  Intact. SINUSES:  Sphenoid sinus mucosal thickening. OTHER:  Orbits are grossly intact. Facial soft tissue are within normal limits. _______________    Impression  1. No evidence for intracranial hemorrhage, mass effect or midline shift. 2.  Mild periventricular areas of microvascular ischemic change. 3.  Cerebral atherosclerosis. 4.  Atrophy. If there are continued symptoms, a MRI is recommended for further evaluation. CT abdomen/pelvis 8/27/2022  IMPRESSION  1. Large right inguinal hernia with large and small bowel. There is diffuse wall  thickening of the ascending colon concerning for strangulation. No evidence for  bowel obstruction. 2. Right pleural effusion and bilateral basilar atelectasis. 3. Gastric distention. CXR reviewed by me:  XR 8/27 (Most Recent). Results from [place] encounter on 08/27/22    XR ABD (KUB)    Narrative  PORTABLE ABDOMEN    Indication:  OGT placement. Technique: Portable supine AP view of the abdomen was obtained. Comparison studies:  August 27, 2022. Findings: Moderate rotation.  Gastric tube terminates in the fundus of the  stomach. Nonobstructive gas pattern. Telemetry leads. Lower abdominal skin  closing staples. Minor scattered osseous degenerative changes. Impression  Moderate rotation. Gastric tube terminates in the fundus of the stomach. Nonobstructive gas pattern. AXR 8/27  FINDINGS:   SUPPORT DEVICES: A nasogastric tube is positioned in the stomach. BOWEL GAS PATTERN: The gas pattern is nonobstructive. SOFT TISSUES: Unremarkable. BONES: Degenerative changes are seen throughout the spine. ADDITIONAL FINDINGS: None. IMPRESSION   No significant abnormality. Please note: Voice-recognition software may have been used to generate this report, which may have resulted in some phonetic-based errors in grammar and contents. Even though attempts were made to correct all the mistakes, some may have been missed, and remained in the body of the document.       Salma Kohli MD  9/2/2022

## 2022-09-02 NOTE — PROCEDURES
Interventional Radiology Brief Procedure Note    Performed By: Kush Warren PA-C    Attending Radiologist: Delio Contreras MD    Pre-operative Diagnosis:  Right pleural effusion    Post-operative Diagnosis: Same as pre-op diagnosis    Procedure(s) Performed:  US guided right thoracentesis    Anesthesia:  Local anesthesia    Findings:  460 cc of clear alanna pleural fluid was removed. Please see dictated report for details. Complications: None immediate, CXR pending    Estimated Blood Loss:  Minimal    Tubes and Drains: None    Specimens: Sent to the lab, as requested    Condition: Good    Disposition:  Procedure performed at patient's bedside in ICU.   Remain on unit and plan per primary team.      Kush Warren PA-C  [address] [id number]  Vascular & Interventional Radiology  9/2/2022

## 2022-09-02 NOTE — PROGRESS NOTES
[facility]  (A Division of [address] [place] [address])                                                                                                                      Amberly Lorenzo MD  Office #: - Option # [phone]  Fax #: [phone]     Date of Admission: 8/27/2022Date of Note: 9/2/2022  Reason for Referral: Evaluation and antibiotic management of septic shock. I am on call over the weekend-- [phone]. Please call via  or Perfect Serve for on call MD if questions or concerns. Thanks. Current Antimicrobials:    Prior Antimicrobials:    Zosyn 8/27 to date # D 7   Levofloxacin / Vancomycin 8/27 to 8/30       Assessment- ID related:  --------------------------------------------------------------------------  Critically ill patient with poor prognosis:    Septic shock with ELINOR/ resp failure and encephalopathy 2/2 below: Improved  Encephalopathy-- persisting  Strangulated and incarcerated R groin hernia -post X-lap 8/27- viable bowel per op note, no resection  Leukemoid reaction- Improved  S/P resp failure- post op, remains intubated--increasing interstiial edema and R P.effusion- extubated 9/1/22  -- Resp culture: MRSA colonized 8/31/22  BCX:  NGSF 8/27  UCX: >2 organism on cx  BL lung infiltrates due to atelectasis/ consolidation /has pleural effusion   PAD  Unstagable eschar, skin necrosis, on RLE  L BKA  DMT2   Recommendation for ID issues I am following:  ------------------------------------------------------------------------------  AUGUST ICU MD, pharmacist    Raghu  Zosyn- will complete emperic course on 9/3. Adjust abx dose to CrCl    --Evolving sacral DU per IM note-- wound team for further review- asked wound RN to check on that    --diuresis/ fluid management per ICU-- R pleural effusion is larger in am CXR      -> Is colonizer.  No need to treat at this time      With his PAD and other co-morbidities,  wound healing unlikely on his RLE-- dont' think the source of his septic episode at this time. BKA indicated-- if he recovers from current condition           Subjective:  Pt seen in ICU. Is extubated, on 3L NC. He is awake but not conversant. Not clear what his baseline-is  Afebrile  Not on pressors  DW nursing    Notes/labs- reviewed . CXR this am:    1. Increasing interstitial edema and/or interstitial infiltration with  increasing right pleural effusion. HPI:  Christina Kowalski is a 78 y.o. WHITE/NON- with PMH DM, hx of stroke, PAD- with L BKA --admitted in septic shock, leukmoid reaction and acute renal failure on 8/27 from strangulated/incarcerated r groin hernia. Underwent X-lap, bowel was viable and no bowel resection done. Improved septic shock with surgery and triple ABX-- bcx negative. Has BL lung infiltrate with effusion as well on CXR. Nexrotic/eschar wound lesion on RLE.             Active Hospital Problems    Diagnosis Date Noted    Acute respiratory failure with hypoxemia ([address] 08/28/2022    Septic shock (HCC) 08/28/2022    High anion gap metabolic acidosis 64/00/2237    Hyperkalemia 08/27/2022    Sepsis ([address] 08/27/2022    AMS (altered mental status) 08/27/2022    Strangulated inguinal hernia 08/27/2022    Right inguinal hernia 08/27/2022    Leukocytosis 08/27/2022    Anemia 08/27/2022    Chronic indwelling Mccray catheter 08/27/2022    S/P BKA (below knee amputation), left ([address] 08/27/2022    Right foot ulcer ([address] 08/27/2022    Paroxysmal atrial fibrillation ([address] 08/27/2022    Diabetic ulcer of right foot ([address] 08/27/2022    Pleural effusion on right 08/27/2022    Severe protein-calorie malnutrition ([address] 08/27/2022    UTI (urinary tract infection) 10/29/2021    Acute renal failure (ARF) ([address] 05/29/2018     Past Medical History:   Diagnosis Date    A-fib ([address]     Asthma     CAD (coronary artery disease)     Chronic kidney disease     stage 3    COVID-19     Diabetes ([address]     GERD (gastroesophageal reflux disease) Heart failure (HCC)     chronic diastolic heart failure    High cholesterol     Hypertension     Ill-defined condition     chronic osteomyelitis left ankle and foot    Ill-defined condition     chronic arteriosclerosis    PVD (peripheral vascular disease) [facility]     Stroke [facility])     cva     Past Surgical History:   Procedure Laterality Date    COLONOSCOPY N/A 6/1/2018    COLONOSCOPY, POLYPECTOMY performed by Malcolm Garcia MD at THE [facility] ENDOSCOPY    HX CATARACT REMOVAL      HX ORTHOPAEDIC Left 2021    ankle washout, external fixator, would vac    HX ORTHOPAEDIC      external fixator removed     History reviewed. No pertinent family history. Social History     Socioeconomic History    Marital status:      Spouse name: Not on file    Number of children: Not on file    Years of education: Not on file    Highest education level: Not on file   Occupational History    Not on file   Tobacco Use    Smoking status: Former    Smokeless tobacco: Never   Substance and Sexual Activity    Alcohol use: Not Currently    Drug use: No    Sexual activity: Not on file   Other Topics Concern    Not on file   Social History Narrative    Not on file     Social Determinants of Health     Financial Resource Strain: Not on file   Food Insecurity: Not on file   Transportation Needs: Not on file   Physical Activity: Not on file   Stress: Not on file   Social Connections: Not on file   Intimate Partner Violence: Not on file   Housing Stability: Not on file       Allergies:  Patient has no known allergies.      Medications:  Current Facility-Administered Medications   Medication Dose Route Frequency    sodium phosphate 3 mmol in 0.9% sodium chloride 250 mL infusion   IntraVENous ONCE    dextrose 5 % - 0.45% NaCl infusion  25 mL/hr IntraVENous CONTINUOUS    furosemide (LASIX) injection 40 mg  40 mg IntraVENous BID    insulin glargine (LANTUS) injection 20 Units  20 Units SubCUTAneous DAILY    [Held by provider] dexmedeTOMidine (PRECEDEX) 400 mcg in 0.9% sodium chloride 100 mL infusion  0.1-1.5 mcg/kg/hr IntraVENous TITRATE    ELECTROLYTE REPLACEMENT PROTOCOL - Potassium Renal Dosing  1 Each Other PRN    sodium bicarbonate tablet 650 mg  650 mg Oral TID    0.9% sodium chloride infusion  5 mL/hr IntraVENous CONTINUOUS    ELECTROLYTE REPLACEMENT PROTOCOL - Potassium Standard Dosing   1 Each Other PRN    ELECTROLYTE REPLACEMENT PROTOCOL - Magnesium   1 Each Other PRN    ELECTROLYTE REPLACEMENT PROTOCOL  - Phosphorus Renal Dosing  1 Each Other PRN    ELECTROLYTE REPLACEMENT PROTOCOL - Calcium   1 Each Other PRN    lactulose (CHRONULAC) 10 gram/15 mL solution 15 mL  15 mL Oral BID    midodrine (PROAMATINE) tablet 2.5 mg  2.5 mg Oral BID    albumin human 25% (BUMINATE) solution 25 g  25 g IntraVENous Q6H    midazolam (VERSED) injection 0.5 mg  0.5 mg IntraVENous BID PRN    arformoteroL (BROVANA) neb solution 15 mcg  15 mcg Nebulization BID RT    0.9% sodium chloride infusion 250 mL  250 mL IntraVENous PRN    0.9% sodium chloride infusion 250 mL  250 mL IntraVENous PRN    heparin (porcine) injection 5,000 Units  5,000 Units SubCUTAneous Q8H    piperacillin-tazobactam (ZOSYN) 3.375 g in 0.9% sodium chloride (MBP/ADV) 100 mL MBP  3.375 g IntraVENous Q12H    0.9% sodium chloride infusion 250 mL  250 mL IntraVENous PRN    pantoprazole (PROTONIX) 40 mg in 0.9% sodium chloride 10 mL injection  40 mg IntraVENous Q12H    sodium chloride (NS) flush 5-40 mL  5-40 mL IntraVENous Q8H    sodium chloride (NS) flush 5-40 mL  5-40 mL IntraVENous PRN    acetaminophen (TYLENOL) tablet 650 mg  650 mg Oral Q6H PRN    Or    acetaminophen (TYLENOL) suppository 650 mg  650 mg Rectal Q6H PRN    polyethylene glycol (MIRALAX) packet 17 g  17 g Oral DAILY PRN    ondansetron (ZOFRAN ODT) tablet 4 mg  4 mg Oral Q8H PRN    Or    ondansetron (ZOFRAN) injection 4 mg  4 mg IntraVENous Q6H PRN    magnesium sulfate 2 g/50 ml IVPB (premix or compounded)  2 g IntraVENous PRN insulin lispro (HUMALOG) injection   SubCUTAneous Q6H    glucose chewable tablet 16 g  4 Tablet Oral PRN    glucagon (GLUCAGEN) injection 1 mg  1 mg IntraMUSCular PRN    dextrose 10% infusion 0-250 mL  0-250 mL IntraVENous PRN    chlorhexidine (PERIDEX) 0.12 % mouthwash 10 mL  10 mL Oral Q12H    [Held by provider] vasopressin (VASOSTRICT) 20 Units in 0.9% sodium chloride 100 mL infusion  0-0.04 Units/min IntraVENous TITRATE    HYDROmorphone (DILAUDID) injection 1 mg  1 mg IntraVENous Q4H PRN        ROS:  Pertinent items are noted in the History of Present Illness. Physical Exam:    Temp (24hrs), Av.7 °F (37.1 °C), Min:97.6 °F (36.4 °C), Max:99.5 °F (37.5 °C)    Visit Vitals  BP (!) 146/47   Pulse 86   Temp 97.6 °F (36.4 °C)   Resp (!) 41   Ht 5' 8\" (1.727 m)   Wt 82.8 kg (182 lb 8.7 oz)   SpO2 100%   BMI 27.76 kg/m²          GEN: WD sick looking, extubated on 3L NC    PIV-R    HEENT: Unicteric. EOMI intact  --OT and ET in place  CHEST: Non laboured breathing. CTA   CVS:RRR, no mur/gallop  ABD: Obese/soft. Non tender. Non distended. Surgical wound dressed, skin looks ok  LISET: caal in place  EXT: No apparent swelling or redness on UE/LE joints-edematous hands  -L BKA  -R foot is dressed, wound not seen- pictures reviewed-- last on   --sacral wound not seen  Skin: Dry and intact. bruisings  CNS: intubated/non verbal       Microbiology  All Micro Results       Procedure Component Value Units Date/Time    CULTURE, RESPIRATORY/SPUTUM/BRONCH Janet Sanya STAIN [[id number]]  (Abnormal)  (Susceptibility) Collected: 22 0018    Order Status: Completed Specimen: Sputum from Tracheal Aspirate Updated: 22 0937     Special Requests: NO SPECIAL REQUESTS        GRAM STAIN 1+ WBCS SEEN               RARE EPITHELIAL CELLS SEEN                  OCCASIONAL GRAM POSITIVE COCCI IN CLUSTERS           Culture result:       HEAVY * Methicillin Resistant Staphylococcus aureus *                  NO NORMAL RESPIRATORY VICENTA ISOLATED          CULTURE, BLOOD [[id number]] Collected: 08/27/22 1030    Order Status: Completed Specimen: Blood Updated: 09/02/22 0755     Special Requests: NO SPECIAL REQUESTS        Culture result: NO GROWTH 6 DAYS       CULTURE, BLOOD [[id number]] Collected: 08/27/22 1100    Order Status: Completed Specimen: Blood Updated: 09/02/22 0755     Special Requests: NO SPECIAL REQUESTS        Culture result: NO GROWTH 6 DAYS       CULTURE, URINE [[id number]] Collected: 08/27/22 1442    Order Status: Completed Specimen: Cath Urine Updated: 08/29/22 0642     Special Requests: NO SPECIAL REQUESTS        [place] Count --        10026  COLONIES/mL       Culture result:       >2 ORGANISMS - CONTAMINATED SPECIMEN. SUGGEST RECOLLECTION          CULTURE, BLOOD [[id number]]     Order Status: Canceled Specimen: Blood     COVID-19 RAPID TEST [[id number]] Collected: 08/27/22 1030    Order Status: Completed Specimen: Nasopharyngeal Updated: 08/27/22 1057     Specimen source SWAB        COVID-19 rapid test Not detected        Comment: Rapid Abbott ID Now       Rapid NAAT:  The specimen is NEGATIVE for SARS-CoV-2, the novel coronavirus associated with COVID-19. Negative results should be treated as presumptive and, if inconsistent with clinical signs and symptoms or necessary for patient management, should be tested with an alternative molecular assay. Negative results do not preclude SARS-CoV-2 infection and should not be used as the sole basis for patient management decisions. This test has been authorized by the FDA under an Emergency Use Authorization (EUA) for use by authorized laboratories.    Fact sheet for Healthcare Providers: ConventionUpdate.co.nz  Fact sheet for Patients: ConventionUpdate.co.nz       Methodology: Isothermal Nucleic Acid Amplification                   Lab results:    Chemistry  Recent Labs     09/02/22  0451 09/02/22  0035 09/01/22  1338 09/01/22  1030 09/01/22  0450 08/31/22  1130 08/31/22  0312   GLU 64*  --  165*  --  117*  --  188*     --  141  --  140  --  137   K 3.4* 3.1* 2.8*   < > 3.4*   < > 3.8     --  106  --  105  --  104   CO2 26  --  24  --  22  --  23   BUN 88*  --  95*  --  102*  --  110*   CREA 2.98*  --  3.25*  --  3.31*  --  3.80*   CA 8.7  --  8.8  --  8.7  --  9.0   AGAP 9  --  11  --  13  --  10   BUCR 30*  --  29*  --  31*  --  29*   *  --   --   --  204*  --  175*   TP 5.6*  --   --   --  5.1*  --  5.3*   ALB 3.4  --   --   --  2.8*  --  1.9*   GLOB 2.2  --   --   --  2.3  --  3.4   AGRAT 1.5  --   --   --  1.2  --  0.6*    < > = values in this interval not displayed. CBC w/ Diff  Recent Labs     09/02/22  0451 09/01/22  1338 08/31/22  0312   WBC 8.3 8.1 6.6   RBC 2.88* 2.66* 3.39*   HGB 7.5* 6.9* 8.7*   HCT 23.9* 21.2* 26.9*    227 302   GRANS 75* 74* 78*   LYMPH 20* 14* 13*   EOS 1 3 1       CT HEAD WO CONT    Result Date: 8/27/2022  FINDINGS: BRAIN AND POSTERIOR FOSSA: Mild periventricular areas of decreased attenuation are identified. Gray-white matter interfaces are preserved. No intraparenchymal hemorrhage, mass effect or midline shift. The ventricular system is midline and symmetric. Atherosclerotic vascular calcifications are identified. EXTRA-AXIAL SPACES AND MENINGES: There is no evidence for extra-axial mass lesion or fluid collection. CALVARIUM: Intact. SINUSES: Sphenoid sinus mucosal thickening. OTHER: Orbits are grossly intact. Facial soft tissue are within normal limits. _______________     1. No evidence for intracranial hemorrhage, mass effect or midline shift. 2.  Mild periventricular areas of microvascular ischemic change. 3.  Cerebral atherosclerosis. 4.  Atrophy. If there are continued symptoms, a MRI is recommended for further evaluation. CT ABD PELV WO CONT    Result Date: 8/27/2022  FINDINGS: LOWER CHEST: Moderate right pleural effusion with adjacent atelectasis.  LIVER, BILIARY: Liver is normal. No biliary dilation. Gallbladder is unremarkable. PANCREAS: Normal. SPLEEN: Splenic granuloma. ADRENALS: Normal. KIDNEYS: Normal. LYMPH NODES: No enlarged lymph nodes. GASTROINTESTINAL TRACT: Uncomplicated sigmoid diverticula. Gastric distention. PELVIC ORGANS: Unremarkable. VASCULATURE: Atherosclerotic vascular calcifications. BONES: No acute or aggressive osseous abnormalities identified. OTHER: Large right inguinal hernia with large and small bowel. There is diffuse circumferential wall thickening of ascending colon within the hernia. _______________     1. Large right inguinal hernia with large and small bowel. There is diffuse wall thickening of the ascending colon concerning for strangulation. No evidence for bowel obstruction. 2. Right pleural effusion and bilateral basilar atelectasis. 3. Gastric distention. XR CHEST PORT    Result Date: 8/30/2022  EXAM:  PORTABLE CHEST INDICATION:  Follow up. TECHNIQUE:  Portable, AP view. COMPARISON:  08/29/2022 ____________________ FINDINGS:  SUPPORT DEVICES: Endotracheal tube approximately 4 cm above the alise. OG tube is traversing below left hemidiaphragm, tip not imaged. Esophageal probe is present at the level of the endotracheal tube. HEART AND MEDIASTINUM: Stable. LUNGS AND PLEURAL SPACES: Persistent right basilar opacity consistent with pleural effusion with adjacent consolidation. No pneumothorax. BONY THORAX AND SOFT TISSUES: No acute osseous abnormality. ____________________     1. Endotracheal tube approximately 4 cm above the alise. 2. No significant change in right basilar opacity consistent with pleural effusion with adjacent consolidation, atelectasis or pneumonia. *  **     XR CHEST PORT    Result Date: 8/29/2022  FINDINGS: HEART AND MEDIASTINUM: Anterior endotracheal tube tip estimated at 6.1 cm above alise. Enteric tube tip projects over left upper quadrant expected location gastric fundus.  Esophageal temperature probe tip projects over lower thoracic . Stable cardiomediastinal silhouette allowing for angulation. LUNGS AND PLEURAL SPACES: Progressive right pleural effusion and patchy opacities in the right lung. Left lung relatively clear allowing for technique. BONY THORAX AND SOFT TISSUES: No gross acute osseous abnormality. 1. Right pleural effusion and subjacent atelectasis/infiltrate, perhaps slightly progressed. 2. Tubes and lines stable in visualized extent. XR CHEST PORT    Result Date: 8/28/2022  FINDINGS: SUPPORT DEVICES: Endotracheal tube distal tip projects 5.5 cm above the alise. Enteric tube distal tip projects below the left hemidiaphragm likely in the stomach. HEART AND MEDIASTINUM: No appreciable cardiomegaly. Remaining mediastinal contours are stable. LUNGS AND PLEURAL SPACES: Right pleural effusion with adjacent opacity. No evidence for pneumothorax. Left lung is clear. BONY THORAX AND SOFT TISSUES: Unremarkable. _______________     1. Right pleural effusion with adjacent atelectasis/infiltrates. 2. Supporting tubes appear stable. XR CHEST PORT    Result Date: 8/27/2022  FINDINGS: SUPPORT DEVICES: An endotracheal tube is positioned 6 cm above the alise. HEART AND MEDIASTINUM: Heart size and mediastinal contour are midline and within normal limits. LUNGS AND PLEURAL SPACES: There are opacities throughout both lungs but more confluent within the right mid and lower lung zones. No pneumothorax. BONY THORAX AND SOFT TISSUES: Unremarkable. _______________     Interval right greater than left airspace disease Small to moderate right pleural effusion    XR CHEST PORT    Result Date: 8/27/2022FINDINGS: HEART AND MEDIASTINUM: No appreciable cardiomegaly. Remaining mediastinal contours within normal limits. LUNGS AND PLEURAL SPACES: Left lung is clear. Right pleural effusion with adjacent opacity. BONY THORAX AND SOFT TISSUES: Unremarkable. _______________     1.  Right pleural effusion with probable adjacent atelectasis. XR ABD PORT  1 V    Result Date: 8/27/2022  INDINGS: SUPPORT DEVICES: A nasogastric tube is positioned in the stomach. BOWEL GAS PATTERN: The gas pattern is nonobstructive. [place] SOFT TISSUES: Unremarkable. BONES: Degenerative changes are seen throughout the spine. ADDITIONAL FINDINGS: None. _______________     No significant abnormality. ECHO ADULT COMPLETE    Result Date: 8/28/2022  Formatting of this result is different from the original.   Left Ventricle: Normal left ventricular systolic function with a visually estimated EF of 60 - 65%. Left ventricle size is normal. Normal wall thickness. Normal wall motion. Grade I diastolic dysfunction present with normal LV EF. Unable to assess RVSP due to inadequate or insignificant tricuspid regurgitation.      Procedures/imaging: see electronic medical records for all procedures/Xrays and details which were not copied into this note but were reviewed prior to creation of Plan

## 2022-09-02 NOTE — PROGRESS NOTES
Physician Progress Note      Kaushal Velez  [facility] #:                  [id number]  :                       1945  ADMIT DATE:       2022 10:17 AM  DISCH DATE:  RESPONDING  PROVIDER #:        Mariaelena Mattson MD          QUERY TEXT:    Patient admitted with sepsis. Per progress note on 2022, noted to also have infected sacral pressure ulcer. If possible, please document in progress notes and discharge summary the location, present on admission status and stage of the pressure ulcer: The medical record reflects the following:  Risk Factors: poor mobility, severe sepsis, severe malnutrition, right foot/heel/ankle ulcers    Clinical Indicators:  2022, PN, Dr. Dina Rodriguez:  \"Infected sacral ulcer -weeping purulent material\"  2022, PN, Dr. Crystal Don: \"Evolving sacral DU per IM note-- wound team for further review. \"    Treatment: skin protective lotion/cream to buttocks and sacrum daily and as needed with incontinence care per wound care note, Keshav Larose, 2022    Stage 1:  Non-blanchable erythema of intact skin  Stage 2:  Abrasion, Blister, Partial-thickness skin loss, with exposed dermis  Stage 3:  Full-thickness skin loss with damage or necrosis of subcutaneous tissue  Stage 4:  Full-thickness skin & soft tissue loss through to underlying muscle, tendon or bone  Unstageable: Obscured full-thickness skin & tissue loss    Thank you,  KRISTINA Hernadez RN, [email or web address]  Options provided:  -- Stage 1 Pressure Ulcer of sacral present on admission  -- Stage 2 Pressure Ulcer of sacral present on admission  -- Stage 3 Pressure Ulcer of sacral present on admission  -- Stage 4 Pressure Ulcer of sacral present on admission  -- Unstageable Pressure Ulcer to sacrum present on admission  -- Other - I will add my own diagnosis  -- Disagree - Not applicable / Not valid  -- Disagree - Clinically unable to determine / Unknown  -- Refer to Clinical Documentation Reviewer    PROVIDER RESPONSE TEXT:    Provider is clinically unable to determine a response to this query. please refer to wound care to clarify    Query created by:  Darylene Basta on 9/1/2022 2:42 PM      Electronically signed by:  Adela uQinn MD 9/2/2022 10:09 AM

## 2022-09-02 NOTE — PROGRESS NOTES
Pt was being seen by [facility] prior to admission. Pt remains intubated and has been made a DNR. CM to continue to follow pt's progress and assist with care transition when medically stable to transition from an acute care setting.

## 2022-09-02 NOTE — WOUND CARE
Pt seen by wound care 8/29/22. Please see assessment, treatment note and recommendations from that encounter.

## 2022-09-02 NOTE — PROGRESS NOTES
Alert, pt oriented to self, confused throughout shift with garbled speech, assessments and VS completed, pt tolerated thoracentesis today poorly, pt noted to be breathing abdominally within the 1700 hour, provider notified, verbal order to give lasix, PRN  Bipap, stat CXR, accu checks completed, pt received no coverage, electrolytes replaced and monitored, caal care completed, FMS and Caal managed and patent, output recorded, pt received bath today.       Provider also verbl ordered CT stat to rule out CVA within the 1800 hour

## 2022-09-03 NOTE — PROGRESS NOTES
STAT CT of head ordered, patient tachypneic and restless, unable to lay flat without distress. spoke to Dr Roberto Mendez regarding options for sedation for scan, given current presentation and respiratory difficulty, decision was made to medicate for possible pain, discontinue CT scan and re-evaluate tomorrow.

## 2022-09-03 NOTE — PROGRESS NOTES
Hospitalist Progress Note    Patient: Royal Burns MRN: [id number]  CSN: [id number]    YOB: 1945  Age: 78 y.o. Sex: male    DOA: 8/27/2022 LOS:  LOS: 7 days          Chief Complaint: Ac resp failure      Assessment/Plan     78 y.o. 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 Admitted for severe septic shock with multiorgan and hypotension involvement due to multiple possible sources of infection, severe hyperkalemia, acute metabolic encephalopathy, metabolic acidosis, strangulated left inguinal hernia, anemia requiring blood transfusion, mild hypoxia coagulable state.       Severe septic shock has improved, off pressors     Acute resp failure-resolved, extubated  Not safe for PO yet, keep NPO     Incarcerated right inguinal hernia -status post ex lap with reduction of incarcerated right groin hernia with hernia mesh     Infected sacral ulcer -weeping purulent material, seen by wound care     Infected medial maleolar and heel ulcers -on IV zosyn     Anemia -s/p transfusion      Acute renal failure -likely due to profound shock, appreciate nephrology expertise, urine output improving, cr slowly improving     Hypokalemia-repletion     Metabolic acidosis -improved      Acute encephalopathy better    S/p thoracentesis    Daily lasix with albumin     Poor prognosis with multiple medical ailments and advanced PVD, diabetes complications, prior BKA< wounds and debilitated state     Hospice would certainly be reasonable option for disposition    DNR     Disposition :  Patient Active Problem List   Diagnosis Code    DM2 (diabetes mellitus, type 2) ([facility] [address] E11.9    CAD (coronary artery disease) I25.10 Acute renal failure (ARF) ([facility]) N17.9    CKD (chronic kidney disease) stage 3, GFR 30-59 ml/min ([facility]) N18.30    Elevated brain natriuretic peptide (BNP) level R79.89    UTI (urinary tract infection) N39.0    High anion gap metabolic acidosis M29.9    Hyperkalemia E87.5    Sepsis ([facility]) A41.9    AMS (altered mental status) R41.82    Strangulated inguinal hernia K40.30    Right inguinal hernia K40.90    Leukocytosis D72.829    Anemia D64.9    Chronic indwelling Mccray catheter Z97.8    S/P BKA (below knee amputation), left ([facility]) Z89.512    Right foot ulcer ([facility]) L97.519    Paroxysmal atrial fibrillation ([facility]) I48.0    Diabetic ulcer of right foot ([facility]) E11.621, L97.519    Pleural effusion on right J90    Severe protein-calorie malnutrition ([facility]) E43    Acute respiratory failure with hypoxemia ([facility]) J96.01    Septic shock ([facility]) A41.9, R65.21       Subjective:    He is awake  Frail and weak this am  Denies pain or SOB    Review of systems:    Constitutional: denies fevers    Gastrointestinal: denies nausea, vomiting, diarrhea      Vital signs/Intake and Output:  Visit Vitals  BP (!) 154/64   Pulse 84   Temp 97.7 °F (36.5 °C)   Resp 20   Ht 5' 8\" (1.727 m)   Wt 82.8 kg (182 lb 8.7 oz)   SpO2 100%   BMI 27.76 kg/m²     Current Shift:  No intake/output data recorded.   Last three shifts:  09/01 1901 - 09/03 0700  In: 667.9 [I.V.:667.9]  Out: 2691 [Urine:3475]    Exam:    General: very weak debilitated WM NAD elderly and ill appearing   CVS:Regular rate and rhythm, no M/R/G, S1/S2 heard, no thrill  Lungs:Clear to auscultation bilaterally, no wheezes, rhonchi, or rales  Abdomen: Soft, Nontender, No distention, Normal Bowel sounds  Extremities:right foot dressed, left AKA   Neuro:grossly normal   Psych:appropriate                Labs: Results:       Chemistry Recent Labs     09/03/22  0610 09/02/22  1211 09/02/22  0451 09/02/22  0035 09/01/22  1338 09/01/22  1030 09/01/22  0450   GLU 84  --  64*  --  165*  --  117*     -- 144  --  141  --  140   K 3.3* 3.3* 3.4*   < > 2.8*   < > 3.4*     --  109  --  106  --  105   CO2 25  --  26  --  24  --  22   BUN 79*  --  88*  --  95*  --  102*   CREA 2.65*  --  2.98*  --  3.25*  --  3.31*   CA 9.0  --  8.7  --  8.8  --  8.7   AGAP 8  --  9  --  11  --  13   BUCR 30*  --  30*  --  29*  --  31*     --  140*  --   --   --  204*   TP 6.2*  --  5.6*  --   --   --  5.1*   ALB 3.4  --  3.4  --   --   --  2.8*   GLOB 2.8  --  2.2  --   --   --  2.3   AGRAT 1.2  --  1.5  --   --   --  1.2    < > = values in this interval not displayed. CBC w/Diff Recent Labs     09/03/22  0610 09/02/22  0451 09/01/22  1338   WBC 9.4 8.3 8.1   RBC 3.29* 2.88* 2.66*   HGB 8.4* 7.5* 6.9*   HCT 27.3* 23.9* 21.2*    206 227   GRANS PENDING 75* 74*   LYMPH PENDING 20* 14*   EOS PENDING 1 3      Cardiac Enzymes No results for input(s): CPK, CKND1, LEONEL in the last 72 hours. No lab exists for component: CKRMB, TROIP   Coagulation Recent Labs     09/02/22  0451 09/01/22  0450   PTP 16.8* 16.6*   INR 1.3* 1.3*       Lipid Panel No results found for: CHOL, CHOLPOCT, CHOLX, CHLST, CHOLV, 373278, HDL, HDLP, LDL, LDLC, DLDLP, 063486, VLDLC, VLDL, TGLX, TRIGL, TRIGP, TGLPOCT, CHHD, CHHDX   BNP No results for input(s): BNPP in the last 72 hours.    Liver Enzymes Recent Labs     09/03/22  0610   TP 6.2*   ALB 3.4         Thyroid Studies Lab Results   Component Value Date/Time    TSH 4.73 (H) 08/28/2022 08:04 AM        Procedures/imaging: see electronic medical records for all procedures/Xrays and details which were not copied into this note but were reviewed prior to creation of Mac Boxer, MD

## 2022-09-03 NOTE — PROGRESS NOTES
Pharmacy Note: Zosyn     Pt was on extended infusion of Zosyn while in the intensive care unit. Prior order:  Zosyn  3.375g IV q12  (over 240 minutes). Now that patient has been transferred to  , Zosyn has been adjusted to standard intermittent dosing. Dose adjusted to:  Zosyn 2.25g IV q6h    (infuse over 30 minutes)      Scr =  2.65         CrCl = 25.3 ml/min  Pharmacy to continue to monitor patient daily. Will make dosage adjustments based upon changing renal function.        [place] Randall, [address]

## 2022-09-03 NOTE — PROGRESS NOTES
[facility]  (A Division of [address] [place] [address])                                                                                                                      Savage Ramirez MD  Office #: - Option # [phone]  Fax #: [phone]     Date of Admission: 8/27/2022Date of Note: 9/3/2022  Reason for Referral: Evaluation and antibiotic management of septic shock. I am on call over the weekend-- [phone]. Please call via  or Perfect Serve for on call MD if questions or concerns. Thanks. Current Antimicrobials:    Prior Antimicrobials:    Zosyn 8/27 to date # D 8   Levofloxacin / Vancomycin 8/27 to 8/30       Assessment- ID related:  --------------------------------------------------------------------------  Critically ill patient with poor prognosis:    Septic shock with ELINOR/ resp failure and encephalopathy 2/2 below: Improved  Encephalopathy-- persisting  Strangulated and incarcerated R groin hernia -post X-lap 8/27- viable bowel per op note, no resection  Leukemoid reaction- Improved  S/P resp failure- post op, remains intubated--increasing interstiial edema and R P.effusion- extubated 9/1/22  -- Resp culture: MRSA colonized 8/31/22  --S/P R thoracentesis with 460cc of fluid, PH 7/7, culture in progress-- 9/2/22  BCX:  NGSF 8/27  UCX: >2 organism on cx  BL lung infiltrates due to atelectasis/ consolidation /has pleural effusion   PAD  Unstagable eschar, skin necrosis, on RLE  L BKA  DMT2   Recommendation for ID issues I am following:  ------------------------------------------------------------------------------      Dc Zosyn after last dose today      --diuresis/ fluid management per ICU-- R pleural effusion is larger in am CXR          -> Is colonizer. No need to treat at this time      With his PAD and other co-morbidities,  wound healing unlikely on his RLE-- Karey Palma' think the source of his septic episode at this time.    BKA indicated-- Ortho following tentative plans for surgery noted           Subjective:  Pt seen in ICU. Is extubated, on 3L NC. He is awake but not conversant. Doesn't follow command. Events with thoracentesis noted--fluid reviewed    Notes/labs- reviewed . CXR 9/2:     1. No significant interval change. Probable small right pleural effusion, not  appreciably changed. 2. Right lung parenchymal findings likely largely reflect scar though are not  well assessed. Pneumonia or aspiration not entirely excluded. HPI:  Otila Kawasaki. is a 78 y.o. WHITE/NON- with PMH DM, hx of stroke, PAD- with L BKA --admitted in septic shock, leukmoid reaction and acute renal failure on 8/27 from strangulated/incarcerated r groin hernia. Underwent X-lap, bowel was viable and no bowel resection done. Improved septic shock with surgery and triple ABX-- bcx negative. Has BL lung infiltrate with effusion as well on CXR. Nexrotic/eschar wound lesion on RLE.             Active Hospital Problems    Diagnosis Date Noted    Acute respiratory failure with hypoxemia ([address] 08/28/2022    Septic shock (HCC) 08/28/2022    High anion gap metabolic acidosis 85/70/0804    Hyperkalemia 08/27/2022    Sepsis ([address] 08/27/2022    AMS (altered mental status) 08/27/2022    Strangulated inguinal hernia 08/27/2022    Right inguinal hernia 08/27/2022    Leukocytosis 08/27/2022    Anemia 08/27/2022    Chronic indwelling Mccray catheter 08/27/2022    S/P BKA (below knee amputation), left ([address] 08/27/2022    Right foot ulcer ([address] 08/27/2022    Paroxysmal atrial fibrillation ([address] 08/27/2022    Diabetic ulcer of right foot ([address] 08/27/2022    Pleural effusion on right 08/27/2022    Severe protein-calorie malnutrition ([address] 08/27/2022    UTI (urinary tract infection) 10/29/2021    Acute renal failure (ARF) ([address] 05/29/2018     Past Medical History:   Diagnosis Date    A-fib ([address]     Asthma     CAD (coronary artery disease)     Chronic kidney disease     stage 3 COVID-19     Diabetes ([facility]     GERD (gastroesophageal reflux disease)     Heart failure (HCC)     chronic diastolic heart failure    High cholesterol     Hypertension     Ill-defined condition     chronic osteomyelitis left ankle and foot    Ill-defined condition     chronic arteriosclerosis    PVD (peripheral vascular disease) ([facility]     Stroke [facility])     cva     Past Surgical History:   Procedure Laterality Date    COLONOSCOPY N/A 6/1/2018    COLONOSCOPY, POLYPECTOMY performed by Kateryna Lucas MD at [facility] ENDOSCOPY    HX CATARACT REMOVAL      HX ORTHOPAEDIC Left 2021    ankle washout, external fixator, would vac    HX ORTHOPAEDIC      external fixator removed     History reviewed. No pertinent family history. Social History     Socioeconomic History    Marital status:      Spouse name: Not on file    Number of children: Not on file    Years of education: Not on file    Highest education level: Not on file   Occupational History    Not on file   Tobacco Use    Smoking status: Former    Smokeless tobacco: Never   Substance and Sexual Activity    Alcohol use: Not Currently    Drug use: No    Sexual activity: Not on file   Other Topics Concern    Not on file   Social History Narrative    Not on file     Social Determinants of Health     Financial Resource Strain: Not on file   Food Insecurity: Not on file   Transportation Needs: Not on file   Physical Activity: Not on file   Stress: Not on file   Social Connections: Not on file   Intimate Partner Violence: Not on file   Housing Stability: Not on file       Allergies:  Patient has no known allergies.      Medications:  Current Facility-Administered Medications   Medication Dose Route Frequency    potassium chloride 10 mEq in 100 ml IVPB  10 mEq IntraVENous Q1H    budesonide (PULMICORT) 500 mcg/2 ml nebulizer suspension  500 mcg Nebulization BID RT    ipratropium (ATROVENT) 0.02 % nebulizer solution 0.5 mg  0.5 mg Nebulization Q8H RT    dextrose 5 % - 0.45% NaCl infusion  25 mL/hr IntraVENous CONTINUOUS    furosemide (LASIX) injection 40 mg  40 mg IntraVENous BID    ELECTROLYTE REPLACEMENT PROTOCOL - Potassium Renal Dosing  1 Each Other PRN    ELECTROLYTE REPLACEMENT PROTOCOL - Potassium Standard Dosing   1 Each Other PRN    ELECTROLYTE REPLACEMENT PROTOCOL - Magnesium   1 Each Other PRN    ELECTROLYTE REPLACEMENT PROTOCOL  - Phosphorus Renal Dosing  1 Each Other PRN    ELECTROLYTE REPLACEMENT PROTOCOL - Calcium   1 Each Other PRN    midodrine (PROAMATINE) tablet 2.5 mg  2.5 mg Oral BID    albumin human 25% (BUMINATE) solution 25 g  25 g IntraVENous Q6H    midazolam (VERSED) injection 0.5 mg  0.5 mg IntraVENous BID PRN    arformoteroL (BROVANA) neb solution 15 mcg  15 mcg Nebulization BID RT    0.9% sodium chloride infusion 250 mL  250 mL IntraVENous PRN    0.9% sodium chloride infusion 250 mL  250 mL IntraVENous PRN    heparin (porcine) injection 5,000 Units  5,000 Units SubCUTAneous Q8H    piperacillin-tazobactam (ZOSYN) 3.375 g in 0.9% sodium chloride (MBP/ADV) 100 mL MBP  3.375 g IntraVENous Q12H    0.9% sodium chloride infusion 250 mL  250 mL IntraVENous PRN    pantoprazole (PROTONIX) 40 mg in 0.9% sodium chloride 10 mL injection  40 mg IntraVENous Q12H    sodium chloride (NS) flush 5-40 mL  5-40 mL IntraVENous Q8H    sodium chloride (NS) flush 5-40 mL  5-40 mL IntraVENous PRN    acetaminophen (TYLENOL) tablet 650 mg  650 mg Oral Q6H PRN    Or    acetaminophen (TYLENOL) suppository 650 mg  650 mg Rectal Q6H PRN    polyethylene glycol (MIRALAX) packet 17 g  17 g Oral DAILY PRN    ondansetron (ZOFRAN ODT) tablet 4 mg  4 mg Oral Q8H PRN    Or    ondansetron (ZOFRAN) injection 4 mg  4 mg IntraVENous Q6H PRN    magnesium sulfate 2 g/50 ml IVPB (premix or compounded)  2 g IntraVENous PRN    insulin lispro (HUMALOG) injection   SubCUTAneous Q6H    glucose chewable tablet 16 g  4 Tablet Oral PRN    glucagon (GLUCAGEN) injection 1 mg  1 mg IntraMUSCular PRN    dextrose 10% infusion 0-250 mL  0-250 mL IntraVENous PRN    HYDROmorphone (DILAUDID) injection 1 mg  1 mg IntraVENous Q4H PRN        ROS:  Pertinent items are noted in the History of Present Illness. Physical Exam:    Temp (24hrs), Av.9 °F (36.6 °C), Min:97.1 °F (36.2 °C), Max:98.4 °F (36.9 °C)    Visit Vitals  BP (!) 113/94   Pulse 92   Temp 97.1 °F (36.2 °C)   Resp 20   Ht 5' 8\" (1.727 m)   Wt 82.8 kg (182 lb 8.7 oz)   SpO2 99%   BMI 27.76 kg/m²          GEN: WD sick looking, extubated on 3L NC    PIV-R    HEENT: Unicteric. EOMI intact  CHEST: Non laboured breathing. CTA   CVS:RRR, no mur/gallop  ABD: Obese/soft. Non tender. Non distended. Surgical wound dressed, skin looks ok. FMS in place  LISET: caal in place  EXT: No apparent swelling or redness on UE/LE joints-edematous hands  -L BKA  -R foot is dressed, wound not seen- pictures reviewed-- last on   --sacral wound not seen  Skin: Dry and intact. bruisings  CNS: intubated/non verbal       Microbiology  All Micro Results       Procedure Component Value Units Date/Time    CULTURE, BODY FLUID W Vinita Hanson [[id number]] Collected: 22 1430    Order Status: Completed Specimen:  Body Fluid from Pleural Fluid Updated: 22 0113     Special Requests: NO SPECIAL REQUESTS        GRAM STAIN NO WBC'S SEEN         NO ORGANISMS SEEN        Culture result: PENDING    CULTURE, FUNGUS [[id number]] Collected: 22 1430    Order Status: Sent Specimen: Pleural Fluid Updated: 22 0004    CULTURE, RESPIRATORY/SPUTUM/BRONCH Ro[place]velt Fran STAIN [[id number]]  (Susceptibility) Collected: 22 0018    Order Status: Completed Specimen: Sputum from Tracheal Aspirate Updated: 22 1537     Special Requests: NO SPECIAL REQUESTS        GRAM STAIN 1+ WBCS SEEN               RARE EPITHELIAL CELLS SEEN                  OCCASIONAL GRAM POSITIVE COCCI IN CLUSTERS           Culture result:       HEAVY * Methicillin Resistant Staphylococcus aureus *                  NO NORMAL RESPIRATORY VICENTA ISOLATED            (NOTE) MRSA NOTIFIED MS SMITH RN ON 9/2/22 AT 1534. [facility]    AFB CULTURE + SMEAR W/RFLX ID FROM CULTURE [[id number]] Collected: 09/02/22 1430    Order Status: Sent Updated: 09/02/22 1502    CULTURE, BLOOD [[id number]] Collected: 08/27/22 1030    Order Status: Completed Specimen: Blood Updated: 09/02/22 0755     Special Requests: NO SPECIAL REQUESTS        Culture result: NO GROWTH 6 DAYS       CULTURE, BLOOD [[id number]] Collected: 08/27/22 1100    Order Status: Completed Specimen: Blood Updated: 09/02/22 0755     Special Requests: NO SPECIAL REQUESTS        Culture result: NO GROWTH 6 DAYS       CULTURE, URINE [[id number]] Collected: 08/27/22 1442    Order Status: Completed Specimen: Cath Urine Updated: 08/29/22 0642     Special Requests: NO SPECIAL REQUESTS        [place] Count --        35845  COLONIES/mL       Culture result:       >2 ORGANISMS - CONTAMINATED SPECIMEN. SUGGEST RECOLLECTION          CULTURE, BLOOD [[id number]]     Order Status: Canceled Specimen: Blood     COVID-19 RAPID TEST [[id number]] Collected: 08/27/22 1030    Order Status: Completed Specimen: Nasopharyngeal Updated: 08/27/22 1057     Specimen source SWAB        COVID-19 rapid test Not detected        Comment: Rapid Abbott ID Now       Rapid NAAT:  The specimen is NEGATIVE for SARS-CoV-2, the novel coronavirus associated with COVID-19. Negative results should be treated as presumptive and, if inconsistent with clinical signs and symptoms or necessary for patient management, should be tested with an alternative molecular assay. Negative results do not preclude SARS-CoV-2 infection and should not be used as the sole basis for patient management decisions. This test has been authorized by the FDA under an Emergency Use Authorization (EUA) for use by authorized laboratories.    Fact sheet for Healthcare Providers: iTendency.uy  Fact sheet for Patients: [facility] HealthcareUpdate.co.[facility]       Methodology: Isothermal Nucleic Acid Amplification                   Lab results:    Chemistry  Recent Labs     09/03/22  0610 09/02/22  1211 09/02/22  0451 09/02/22  0035 09/01/22  1338 09/01/22  1030 09/01/22  0450   GLU 84  --  64*  --  165*  --  117*     --  144  --  141  --  140   K 3.3* 3.3* 3.4*   < > 2.8*   < > 3.4*     --  109  --  106  --  105   CO2 25  --  26  --  24  --  22   BUN 79*  --  88*  --  95*  --  102*   CREA 2.65*  --  2.98*  --  3.25*  --  3.31*   CA 9.0  --  8.7  --  8.8  --  8.7   AGAP 8  --  9  --  11  --  13   BUCR 30*  --  30*  --  29*  --  31*     --  140*  --   --   --  204*   TP 6.2*  --  5.6*  --   --   --  5.1*   ALB 3.4  --  3.4  --   --   --  2.8*   GLOB 2.8  --  2.2  --   --   --  2.3   AGRAT 1.2  --  1.5  --   --   --  1.2    < > = values in this interval not displayed. CBC w/ Diff  Recent Labs     09/03/22  0610 09/02/22  0451 09/01/22  1338   WBC 9.4 8.3 8.1   RBC 3.29* 2.88* 2.66*   HGB 8.4* 7.5* 6.9*   HCT 27.3* 23.9* 21.2*    206 227   GRANS 75* 75* 74*   LYMPH 19* 20* 14*   EOS 2 1 3       CT HEAD WO CONT    Result Date: 8/27/2022  FINDINGS: BRAIN AND POSTERIOR FOSSA: Mild periventricular areas of decreased attenuation are identified. Gray-white matter interfaces are preserved. No intraparenchymal hemorrhage, mass effect or midline shift. The ventricular system is midline and symmetric. Atherosclerotic vascular calcifications are identified. EXTRA-AXIAL SPACES AND MENINGES: There is no evidence for extra-axial mass lesion or fluid collection. CALVARIUM: Intact. SINUSES: Sphenoid sinus mucosal thickening. OTHER: Orbits are grossly intact. Facial soft tissue are within normal limits. _______________     1. No evidence for intracranial hemorrhage, mass effect or midline shift. 2.  Mild periventricular areas of microvascular ischemic change. 3.  Cerebral atherosclerosis. 4.  Atrophy.  If there are continued symptoms, a MRI is recommended for further evaluation. CT ABD PELV WO CONT    Result Date: 8/27/2022  FINDINGS: LOWER CHEST: Moderate right pleural effusion with adjacent atelectasis. LIVER, BILIARY: Liver is normal. No biliary dilation. Gallbladder is unremarkable. PANCREAS: Normal. SPLEEN: Splenic granuloma. ADRENALS: Normal. KIDNEYS: Normal. LYMPH NODES: No enlarged lymph nodes. GASTROINTESTINAL TRACT: Uncomplicated sigmoid diverticula. Gastric distention. PELVIC ORGANS: Unremarkable. VASCULATURE: Atherosclerotic vascular calcifications. BONES: No acute or aggressive osseous abnormalities identified. OTHER: Large right inguinal hernia with large and small bowel. There is diffuse circumferential wall thickening of ascending colon within the hernia. _______________     1. Large right inguinal hernia with large and small bowel. There is diffuse wall thickening of the ascending colon concerning for strangulation. No evidence for bowel obstruction. 2. Right pleural effusion and bilateral basilar atelectasis. 3. Gastric distention. XR CHEST PORT    Result Date: 8/30/2022  EXAM:  PORTABLE CHEST INDICATION:  Follow up. TECHNIQUE:  Portable, AP view. COMPARISON:  08/29/2022 ____________________ FINDINGS:  SUPPORT DEVICES: Endotracheal tube approximately 4 cm above the alise. OG tube is traversing below left hemidiaphragm, tip not imaged. Esophageal probe is present at the level of the endotracheal tube. HEART AND MEDIASTINUM: Stable. LUNGS AND PLEURAL SPACES: Persistent right basilar opacity consistent with pleural effusion with adjacent consolidation. No pneumothorax. BONY THORAX AND SOFT TISSUES: No acute osseous abnormality. ____________________     1. Endotracheal tube approximately 4 cm above the alise. 2. No significant change in right basilar opacity consistent with pleural effusion with adjacent consolidation, atelectasis or pneumonia.  *  **     XR CHEST PORT    Result Date: 8/29/2022  FINDINGS: HEART AND MEDIASTINUM: Anterior endotracheal tube tip estimated at 6.1 cm above alise. Enteric tube tip projects over left upper quadrant expected location gastric fundus. Esophageal temperature probe tip projects over lower thoracic . Stable cardiomediastinal silhouette allowing for angulation. LUNGS AND PLEURAL SPACES: Progressive right pleural effusion and patchy opacities in the right lung. Left lung relatively clear allowing for technique. BONY THORAX AND SOFT TISSUES: No gross acute osseous abnormality. 1. Right pleural effusion and subjacent atelectasis/infiltrate, perhaps slightly progressed. 2. Tubes and lines stable in visualized extent. XR CHEST PORT    Result Date: 8/28/2022  FINDINGS: SUPPORT DEVICES: Endotracheal tube distal tip projects 5.5 cm above the alise. Enteric tube distal tip projects below the left hemidiaphragm likely in the stomach. HEART AND MEDIASTINUM: No appreciable cardiomegaly. Remaining mediastinal contours are stable. LUNGS AND PLEURAL SPACES: Right pleural effusion with adjacent opacity. No evidence for pneumothorax. Left lung is clear. BONY THORAX AND SOFT TISSUES: Unremarkable. _______________     1. Right pleural effusion with adjacent atelectasis/infiltrates. 2. Supporting tubes appear stable. XR CHEST PORT    Result Date: 8/27/2022  FINDINGS: SUPPORT DEVICES: An endotracheal tube is positioned 6 cm above the alise. HEART AND MEDIASTINUM: Heart size and mediastinal contour are midline and within normal limits. LUNGS AND PLEURAL SPACES: There are opacities throughout both lungs but more confluent within the right mid and lower lung zones. No pneumothorax. BONY THORAX AND SOFT TISSUES: Unremarkable. _______________     Interval right greater than left airspace disease Small to moderate right pleural effusion    XR CHEST PORT    Result Date: 8/27/2022FINDINGS: HEART AND MEDIASTINUM: No appreciable cardiomegaly.  Remaining mediastinal contours within normal limits. LUNGS AND PLEURAL SPACES: Left lung is clear. Right pleural effusion with adjacent opacity. BONY THORAX AND SOFT TISSUES: Unremarkable. _______________     1. Right pleural effusion with probable adjacent atelectasis. XR ABD PORT  1 V    Result Date: 8/27/2022  INDINGS: SUPPORT DEVICES: A nasogastric tube is positioned in the stomach. BOWEL GAS PATTERN: The gas pattern is nonobstructive. Lorean Snare SOFT TISSUES: Unremarkable. BONES: Degenerative changes are seen throughout the spine. ADDITIONAL FINDINGS: None. _______________     No significant abnormality. ECHO ADULT COMPLETE    Result Date: 8/28/2022  Formatting of this result is different from the original.   Left Ventricle: Normal left ventricular systolic function with a visually estimated EF of 60 - 65%. Left ventricle size is normal. Normal wall thickness. Normal wall motion. Grade I diastolic dysfunction present with normal LV EF. Unable to assess RVSP due to inadequate or insignificant tricuspid regurgitation.      Procedures/imaging: see electronic medical records for all procedures/Xrays and details which were not copied into this note but were reviewed prior to creation of Plan

## 2022-09-03 NOTE — PROGRESS NOTES
CHG bath completed and wipe down of caal. pt with functional decline x past 4 years since death of spouse,chronic depression,schizoaffective d/o ,very [place] with dysarthric speech at baseline/yes

## 2022-09-03 NOTE — PROGRESS NOTES
[facility]  Pulmonary, Critical Care, and Sleep Medicine    Name: Jeancarlos Burns.  MRN: [id number]   : 1945 Hospital: [facility] [place]    Date: 9/3/2022  Room: [address] [place] [address] Note                                              Consult requesting physician: Dr. Dr Enrico Park  Reason for Consult: Strangulated bowel with complications, shock, respiratory failure       IMPRESSION:   Strangulated bowel/inguinal hernia  Acute respiratory failure with hypoxemia  Septic shock  High gap metabolic acidosis  Acute renal failure  Right inguinal hernia  Hyperkalemia  Sepsis  Altered mentation  Leukocytosis  Anemia  UTI  Right pleural effusion  Chronic Mccray catheter  Right foot ulcers  Type 2 diabetes mellitus  Paroxysmal atrial fibrillation  Left below-knee amputation  Severe malnutrition      Patient Active Problem List   Diagnosis Code    DM2 (diabetes mellitus, type 2) ([facility]) E11.9    CAD (coronary artery disease) I25.10    Acute renal failure (ARF) ([facility]) N17.9    CKD (chronic kidney disease) stage 3, GFR 30-59 ml/min ([facility]) N18.30    Elevated brain natriuretic peptide (BNP) level R79.89    UTI (urinary tract infection) N39.0    High anion gap metabolic acidosis L86.9    Hyperkalemia E87.5    Sepsis ([facility]) A41.9    AMS (altered mental status) R41.82    Strangulated inguinal hernia K40.30    Right inguinal hernia K40.90    Leukocytosis D72.829    Anemia D64.9    Chronic indwelling Mccray catheter Z97.8    S/P BKA (below knee amputation), left ([facility]) Z89.512    Right foot ulcer ([facility]) L97.519    Paroxysmal atrial fibrillation ([facility]) I48.0    Diabetic ulcer of right foot ([facility]) E11.621, L97.519    Pleural effusion on right J90    Severe protein-calorie malnutrition ([facility]) E43    Acute respiratory failure with hypoxemia ([facility]) J96.01    Septic shock ([facility]) A41.9, R65.21         Code status: Full Code      RECOMMENDATIONS:   Patient is critically ill and presenting with large right inguinal strangulated hernia, with anemia, metabolic acidosis, acute renal failure and hyperkalemia. S/p laparotomy and reduction of incarcerated right groin hernia without need for bowel resection-8/27/2022. Respiratory: on NC  Extubated on on 9/1/2022  Stable on NC    Thoracenthesis today right sided transudate   Gram stain normal  No infection  LDH slightly high due to traumatic tap a lot of RBC  Ph7.7    Follow up Cxr stable better aeration  CXR twice on 9/2 no pneumothrax post thora  Possible aspiration ? NPO now ng tube    IMPRESSION     Large right pleural effusion with adjacent atelectasis. I will consult for thoracenthesis for comfort oxygenation stable    CXR 8/30  IMPRESSION  1. Endotracheal tube approximately 4 cm above the alise. 2. No significant change in right basilar opacity consistent with pleural  effusion with adjacent consolidation, atelectasis or pneumonia. IIncrease diuresis check BNP  Add midodrine  SBT today did well for 1 hrs   Continue ventilator and sedation bundles. Sedation-midazolam infusion; prn Dilaudid; patient dropped blood pressure in ER with fentanyl. CT abdomen on admission-right lower lung shows large right-sided pleural effusion with underlying atelectasis of the right lower lobe segments. Keep SPO2 >=92%. HOB 30 degree elevation all the time. Aggressive pulmonary toileting. Aspiration precautions. CVS: BP stable    Patient off pressors;BP BP but needs diuresis add midorine low dose   Stable overnight   wean for systolic blood pressure greater than 95 mmHg. Patient currently in atrial fibrillation. Prior Echo showed normal LV function; no pulmonary hypertension reported. Repeat echocardiogram diastolic dysfunction Ef normal   Troponin not elevated; proBNP elevated. Patient likely has diastolic CHF with chronic diabetes and hypertension. ID: Wbc  Patient likely has bowel sepsis; lactic acid not elevated.   Necrotic lower extremity possible second source of infection   UA-Positive for UTI; urine culture-pending. Continue broad-spectrum antibiotic-levofloxacin, Zosyn and IV vancomycin-renal dosing. Blood cultures negative; rapid COVID test negative. Deescalate antibiotic when appropriate. Hematology/Oncology: Patient s/p 1 unit PRBC in the ER. Hemoglobin 8.7 this morning. Platelets-reactive thrombocytosis. INR 1.4 this morning; plan for 5 mg vitamin K IV. Start sq heaprin for dvt prophylaxis and monitor hb  Renal: metabolic acidosis better stop iv start oral bicarbonate   Contineu albumins malnutrition   Nephrology consulted-Dr. Garima Del Rosario. GI/: aspiration issues Ng placed  Consider feeding  Speech evalauted   Chronic Mccray catheter-changed in ER  LFTs and lipase-normal.  Albumin-low. CT abdomen/pelvis- Large right inguinal hernia with large and small bowel. There is diffuse wall  thickening of the ascending colon concerning for strangulation. No evidence for bowel obstruction. S/p exploratory laparotomy, reduction of incarcerated hernia; bowel was viable, and did not need resection. Endocrine: Monitor BS-improved; continue sliding scale insulin. Hemoglobin A1c-5.9 on admission. Check TSH. Neurology: Acute metabolic encephalopathy from sepsis. CT head-nil acute. Skin/Wound: Right foot ulcer-wound care consulted. Electrolytes: Replace electrolytes per ICU electrolyte replacement protocol; caution renal failure. IVF: Bicarb drip 75 ml per hour; albumin infusion every 6 hours. Nutrition: N.p.o. for now. Prophylaxis: DVT Prophylaxis: None- due to anemia, left BKA, right foot diabetic ulcers. GI Prophylaxis: Protonix. Restraints: Prn Wrist soft restraints for patient interfering with medical therapy/management and patient safety. Lines/Tubes: PIV, midline  ET tube 8/27  OG tube 8/27  Mccray: 8/27 changed in ER (Medically necessary for strict input/output monitoring in critically ill patient, will remove it when not needed.  Mccray bundle followed). Advance Directive/Palliative Care: consulted; overall prognosis appears to be poor. Full code status per family wishes. Quality Care: PPI, DVT prophylaxis, HOB elevated, Infection control all reviewed and addressed. Care of plan d/w icu RT and RN. Update family today when available. High complexity decision making was performed during the evaluation of this patient at high risk for decompensation with multiple organ involvement. Family meeting on 8/30 I attended now Dnr/DNI no reintubation if fails           Subjective/History of Present Illness:     Patient is a 78 y.o. male with PMHx significant for multiple medical problems. He history of atrial flutter/atrial fibrillation, coronary artery disease, chronic kidney disease stage III, diabetes mellitus type 2 with leg ulcerations, history of CHF, hypertension and hyperlipidemia, history of peripheral vascular disease, history of prior CVA. Patient was admitted October 2021 with evidence of left leg diabetic ulcerations and gangrene. He subsequently underwent left below-knee amputation. Patient has chronic indwelling Mccray catheter. The patient has come to the ER brought by family/wife acutely unwell, confused and agitated. The patient has been found to have severe right-sided inguinal hernia, and subsequent CT abdomen/pelvis study showing evidence of strangulation of the bowel. The patient is also anemic, and severe metabolic acidosis, and acute renal failure. Urine output is poor from the Mccray catheter, and appears to be purulent. Mccray catheter has been replaced in the ER. S/p EXPLORATORY LAPAROTOMY, REDUCTION INCARCERATED RIGHT GROIN HERNIA WITH HERNIA REPAIR WITH MESH 8/27/2022 - Dr Lesly Dickey  The bowel was completely viable and did not require resection. 9/3/2022  Respiratory stays stable on 2L  Last night after po intake trial in distress ?  Not safe to feed yet po  Has NG tube  Pleural effussion transudate related to Chf/hypoalbuminemia  I will see prn call me if new issues   DNR/DNI  I will see prn  Call me if new issues   S/p left BKA 10/28/2021    Review of Systems:  ROS not obtained due to patient factor. No Known Allergies   Past Medical History:   Diagnosis Date    A-fib (HCC)     Asthma     CAD (coronary artery disease)     Chronic kidney disease     stage 3    COVID-19     Diabetes (HCC)     GERD (gastroesophageal reflux disease)     Heart failure (HCC)     chronic diastolic heart failure    High cholesterol     Hypertension     Ill-defined condition     chronic osteomyelitis left ankle and foot    Ill-defined condition     chronic arteriosclerosis    PVD (peripheral vascular disease) ([facility]     Stroke ([facility]     cva      Past Surgical History:   Procedure Laterality Date    COLONOSCOPY N/A 6/1/2018    COLONOSCOPY, POLYPECTOMY performed by Desirae Santo MD at THE [facility]    HX CATARACT REMOVAL      HX ORTHOPAEDIC Left 2021    ankle washout, external fixator, would vac    HX ORTHOPAEDIC      external fixator removed      Social History     Tobacco Use    Smoking status: Former    Smokeless tobacco: Never   Substance Use Topics    Alcohol use: Not Currently      History reviewed. No pertinent family history. Prior to Admission medications    Medication Sig Start Date End Date Taking? Authorizing Provider   levoFLOXacin (Levaquin) 750 mg tablet Take 1 Tablet by mouth daily. 6/18/22   Eldridge Meckel, MD   insulin lispro (HUMALOG) 100 unit/mL injection INITIATE INSULIN CORRECTIVE PROTOCOL: Normal Insulin Sensitivity   For Blood Sugar (mg/dL) of:     Less than 150 =   0 units           150 -199 =   2 units  200 -249 =   4 units  250 -299 =   6 units  300 -349 =   8 units  350 and above = 10 units and Call Physician  If 2 glucose readings are above 200 mg/dL within a 24 hr period, proceed to \"Insulin Resistant\" dosing.    Initiate Hypoglycemia protocol if blood glucose is <70 mg/dL Fast Acting - Administer Immediately - or within 15 minutes of start of meal, if mealtime coverage. 11/3/21   Erin Rebolledo MD   gabapentin (NEURONTIN) 300 mg capsule Take 1 Capsule by mouth three (3) times daily. Max Daily Amount: 900 mg. 11/3/21   Erin Rebolledo MD   clopidogreL (Plavix) 75 mg tab Take  by mouth daily. Indications: afib    Provider, Historical   B.infantis-B.ani-B.long-B.bifi (Probiotic 4X) 10-15 mg TbEC Take  by mouth daily. Provider, Historical   multivitamin (ONE A DAY) tablet Take 1 Tablet by mouth daily. Provider, Historical   acetaminophen (TylenoL) 325 mg tablet Take 650 mg by mouth every four (4) hours as needed for Pain. Provider, Historical   tamsulosin (FLOMAX) 0.4 mg capsule Take 2 Capsules by mouth daily. 5/25/21   Brianne Jordan MD   atorvastatin (LIPITOR) 40 mg tablet Take 1 Tab by mouth nightly. 2/15/18   Nunu Merida PA-C   metoprolol succinate (TOPROL-XL) 50 mg XL tablet Take 1 Tab by mouth daily. Patient taking differently: Take 100 mg by mouth daily.  2/16/18   Nunu Merida PA-C     Current Facility-Administered Medications   Medication Dose Route Frequency    potassium chloride 10 mEq in 100 ml IVPB  10 mEq IntraVENous Q1H    budesonide (PULMICORT) 500 mcg/2 ml nebulizer suspension  500 mcg Nebulization BID RT    ipratropium (ATROVENT) 0.02 % nebulizer solution 0.5 mg  0.5 mg Nebulization Q8H RT    dextrose 5 % - 0.45% NaCl infusion  25 mL/hr IntraVENous CONTINUOUS    furosemide (LASIX) injection 40 mg  40 mg IntraVENous BID    midodrine (PROAMATINE) tablet 2.5 mg  2.5 mg Oral BID    albumin human 25% (BUMINATE) solution 25 g  25 g IntraVENous Q6H    arformoteroL (BROVANA) neb solution 15 mcg  15 mcg Nebulization BID RT    heparin (porcine) injection 5,000 Units  5,000 Units SubCUTAneous Q8H    piperacillin-tazobactam (ZOSYN) 3.375 g in 0.9% sodium chloride (MBP/ADV) 100 mL MBP  3.375 g IntraVENous Q12H    pantoprazole (PROTONIX) 40 mg in 0.9% sodium chloride 10 mL injection  40 mg IntraVENous Q12H    sodium chloride (NS) flush 5-40 mL  5-40 mL IntraVENous Q8H    insulin lispro (HUMALOG) injection   SubCUTAneous Q6H         Objective:   Vital Signs:    Visit Vitals  BP (!) 113/94   Pulse 92   Temp 97.1 °F (36.2 °C)   Resp 20   Ht 5' 8\" (1.727 m)   Wt 82.8 kg (182 lb 8.7 oz)   SpO2 99%   BMI 27.76 kg/m²       O2 Device: Nasal cannula   O2 Flow Rate (L/min): 3 l/min   Temp (24hrs), Av.9 °F (36.6 °C), Min:97.1 °F (36.2 °C), Max:98.4 °F (36.9 °C)       Intake/Output:   Last shift:       07 -  190  In: 300 [I.V.:300]  Out: 700 [Urine:500; Drains:200]    Last 3 shifts:  190 -  0700  In: 667.9 [I.V.:667.9]  Out: 3763 [Urine:3475]      Intake/Output Summary (Last 24 hours) at 9/3/2022 1111  Last data filed at 9/3/2022 3464  Gross per 24 hour   Intake 967.92 ml   Output 2575 ml   Net -1607.08 ml         Last 3 Recorded Weights in this Encounter    22 1021 22 1107 22 0940   Weight: 38.6 kg (85 lb) 38.6 kg (85 lb) 82.8 kg (182 lb 8.7 oz)       Physical Exam:   Extubated awake orieneted x1   HEENT: pupils not dilated, reactive, no scleral jaundice, moist oral mucosa, no nasal drainage  Neck: No adenopathy or thyroid swelling  Orotracheal and orogastric tubes-no bleeding or secretions  CVS: S1S2 no murmurs; JVD not elevated; telemetry-sinus rhythm  RS: Mod air entry bilaterally, decreased BS right lower chest, no obvious wheezes or crackles; not tachypneic or in distress  Abd: soft, nontender, not distended, no guarding or rigidity, bowel sounds present, no hepatosplenomegaly  Right lower abdomen laparotomy scar-no bleeding or hematoma  Neuro: Intubated and sedated; limited exam    Extrm: no leg edema or swelling or clubbing; left below-knee amputation  Skin: Large ulceration right medial malleolus and right heel, black in appearance, no discharge    Lymphatic: no cervical or supraclavicular adenopathy  Groin: Large right inguinal hernia no longer present      Data:       Recent Results (from the past 12 hour(s))   GLUCOSE, POC    Collection Time: 09/02/22 11:37 PM   Result Value Ref Range    Glucose (POC) 84 70 - 110 mg/dL   GLUCOSE, POC    Collection Time: 09/03/22  5:09 AM   Result Value Ref Range    Glucose (POC) 97 70 - 110 mg/dL   CBC WITH AUTOMATED DIFF    Collection Time: 09/03/22  6:10 AM   Result Value Ref Range    WBC 9.4 4.6 - 13.2 K/uL    RBC 3.29 (L) 4.35 - 5.65 M/uL    HGB 8.4 (L) 13.0 - 16.0 g/dL    HCT 27.3 (L) 36.0 - 48.0 %    MCV 83.0 78.0 - 100.0 FL    MCH 25.5 24.0 - 34.0 PG    MCHC 30.8 (L) 31.0 - 37.0 g/dL    RDW 20.3 (H) 11.6 - 14.5 %    PLATELET 721 914 - 557 K/uL    MPV 10.5 9.2 - 11.8 FL    NRBC 0.0 0  WBC    ABSOLUTE NRBC 0.00 0.00 - 0.01 K/uL    NEUTROPHILS 75 (H) 40 - 73 %    LYMPHOCYTES 19 (L) 21 - 52 %    MONOCYTES 4 3 - 10 %    EOSINOPHILS 2 0 - 5 %    BASOPHILS 0 0 - 2 %    IMMATURE GRANULOCYTES 0 %    ABS. NEUTROPHILS 7.0 1.8 - 8.0 K/UL    ABS. LYMPHOCYTES 1.8 0.9 - 3.6 K/UL    ABS. MONOCYTES 0.4 0.05 - 1.2 K/UL    ABS. EOSINOPHILS 0.2 0.0 - 0.4 K/UL    ABS. BASOPHILS 0.0 0.0 - 0.1 K/UL    ABS. IMM.  GRANS. 0.0 K/UL    DF MANUAL      PLATELET COMMENTS ADEQUATE PLATELETS      RBC COMMENTS ANISOCYTOSIS  1+        RBC COMMENTS OVALOCYTES  1+        RBC COMMENTS HYPOCHROMIA  SLIGHT        WBC COMMENTS REACTIVE LYMPHS     MAGNESIUM    Collection Time: 09/03/22  6:10 AM   Result Value Ref Range    Magnesium 2.0 1.6 - 2.6 mg/dL   METABOLIC PANEL, COMPREHENSIVE    Collection Time: 09/03/22  6:10 AM   Result Value Ref Range    Sodium 142 136 - 145 mmol/L    Potassium 3.3 (L) 3.5 - 5.5 mmol/L    Chloride 109 100 - 111 mmol/L    CO2 25 21 - 32 mmol/L    Anion gap 8 3.0 - 18 mmol/L    Glucose 84 74 - 99 mg/dL    BUN 79 (H) 7.0 - 18 MG/DL    Creatinine 2.65 (H) 0.6 - 1.3 MG/DL    BUN/Creatinine ratio 30 (H) 12 - 20      GFR est AA 29 (L) >60 ml/min/1.73m2    GFR est non-AA 24 (L) >60 ml/min/1.73m2    Calcium 9.0 8.5 - 10.1 MG/DL    Bilirubin, total 1.2 (H) 0.2 - 1.0 MG/DL    ALT (SGPT) 17 16 - 61 U/L    AST (SGOT) 17 10 - 38 U/L    Alk. phosphatase 114 45 - 117 U/L    Protein, total 6.2 (L) 6.4 - 8.2 g/dL    Albumin 3.4 3.4 - 5.0 g/dL    Globulin 2.8 2.0 - 4.0 g/dL    A-G Ratio 1.2 0.8 - 1.7             Chemistry Recent Labs     09/03/22  0610 09/02/22  1442 09/02/22  1211 09/02/22  0451 09/02/22  0035 09/01/22  1338 09/01/22  1030 09/01/22  0450   GLU 84  --   --  64*  --  165*  --  117*     --   --  144  --  141  --  140   K 3.3*  --  3.3* 3.4*   < > 2.8*   < > 3.4*     --   --  109  --  106  --  105   CO2 25  --   --  26  --  24  --  22   BUN 79*  --   --  88*  --  95*  --  102*   CREA 2.65*  --   --  2.98*  --  3.25*  --  3.31*   CA 9.0  --   --  8.7  --  8.8  --  8.7   MG 2.0 1.7  --  1.7  --   --   --  1.9   PHOS  --  2.3*  --  1.9*  --   --   --  2.8   AGAP 8  --   --  9  --  11  --  13   BUCR 30*  --   --  30*  --  29*  --  31*     --   --  140*  --   --   --  204*   TP 6.2*  --   --  5.6*  --   --   --  5.1*   ALB 3.4  --   --  3.4  --   --   --  2.8*   GLOB 2.8  --   --  2.2  --   --   --  2.3   AGRAT 1.2  --   --  1.5  --   --   --  1.2    < > = values in this interval not displayed. Lactic Acid Lactic acid   Date Value Ref Range Status   08/27/2022 1.7 0.4 - 2.0 MMOL/L Final     No results for input(s): LAC in the last 72 hours. Liver Enzymes Protein, total   Date Value Ref Range Status   09/03/2022 6.2 (L) 6.4 - 8.2 g/dL Final     Albumin   Date Value Ref Range Status   09/03/2022 3.4 3.4 - 5.0 g/dL Final     Globulin   Date Value Ref Range Status   09/03/2022 2.8 2.0 - 4.0 g/dL Final     A-G Ratio   Date Value Ref Range Status   09/03/2022 1.2 0.8 - 1.7   Final     Alk.  phosphatase   Date Value Ref Range Status   09/03/2022 114 45 - 117 U/L Final     Recent Labs     09/03/22  0610 09/02/22  0451 09/01/22  0450   TP 6.2* 5.6* 5.1*   ALB 3.4 3.4 2.8*   GLOB 2.8 2.2 2.3   AGRAT 1.2 1.5 1.2    140* 204*          CBC w/Diff Recent Labs     09/03/22  0610 09/02/22  0451 09/01/22  1338   WBC 9.4 8.3 8.1   RBC 3.29* 2.88* 2.66*   HGB 8.4* 7.5* 6.9*   HCT 27.3* 23.9* 21.2*    206 227   GRANS 75* 75* 74*   LYMPH 19* 20* 14*   EOS 2 1 3          Cardiac Enzymes No results found for: CPK, CK, CKMMB, CKMB, RCK3, CKMBT, CKNDX, CKND1, LEONEL, TROPT, TROIQ, WARD, TROPT, TNIPOC, BNP, BNPP     BNP No results found for: BNP, BNPP, XBNPT     Coagulation Recent Labs     09/02/22  0451 09/01/22  0450   PTP 16.8* 16.6*   INR 1.3* 1.3*           Thyroid  Lab Results   Component Value Date/Time    TSH 4.73 (H) 08/28/2022 08:04 AM       No results found for: T4     Urinalysis Lab Results   Component Value Date/Time    Color YELLOW 08/27/2022 02:27 PM    Appearance TURBID 08/27/2022 02:27 PM    Specific gravity 1.019 08/27/2022 02:27 PM    pH (UA) 6.0 08/27/2022 02:27 PM    Protein >1,000 (A) 08/27/2022 02:27 PM    Glucose Negative 08/27/2022 02:27 PM    Ketone TRACE (A) 08/27/2022 02:27 PM    Bilirubin Negative 08/27/2022 02:27 PM    Urobilinogen 1.0 08/27/2022 02:27 PM    Nitrites Negative 08/27/2022 02:27 PM    Leukocyte Esterase LARGE (A) 08/27/2022 02:27 PM    Epithelial cells FEW 08/27/2022 02:27 PM    Bacteria 1+ (A) 08/27/2022 02:27 PM    WBC TOO NUMEROUS TO COUNT 08/27/2022 02:27 PM    RBC 4 to 10 08/27/2022 02:27 PM          Culture data during this hospitalization. All Micro Results       Procedure Component Value Units Date/Time    CULTURE, BODY FLUID Myrle Loach STAIN [[id number]] Collected: 09/02/22 1430    Order Status: Completed Specimen:  Body Fluid from Pleural Fluid Updated: 09/03/22 0113     Special Requests: NO SPECIAL REQUESTS        GRAM STAIN NO WBC'S SEEN         NO ORGANISMS SEEN        Culture result: PENDING    CULTURE, FUNGUS [[id number]] Collected: 09/02/22 1430    Order Status: Sent Specimen: Pleural Fluid Updated: 09/03/22 0004    CULTURE, RESPIRATORY/SPUTUM/BRONCH W Ana Fu STAIN [[id number]]  (Susceptibility) Collected: 08/31/22 0018    Order Status: Completed Specimen: Sputum from Tracheal Aspirate Updated: 09/02/22 1537     Special Requests: NO SPECIAL REQUESTS        GRAM STAIN 1+ WBCS SEEN               RARE EPITHELIAL CELLS SEEN                  OCCASIONAL GRAM POSITIVE COCCI IN CLUSTERS           Culture result:       HEAVY * Methicillin Resistant Staphylococcus aureus *                  NO NORMAL RESPIRATORY VICENTA ISOLATED            (NOTE) MRSA NOTIFIED MS SMITH RN ON 9/2/22 AT 1534. [facility]    AFB CULTURE + SMEAR W/RFLX ID FROM CULTURE [[id number]] Collected: 09/02/22 1430    Order Status: Sent Updated: 09/02/22 1502    CULTURE, BLOOD [[id number]] Collected: 08/27/22 1030    Order Status: Completed Specimen: Blood Updated: 09/02/22 0755     Special Requests: NO SPECIAL REQUESTS        Culture result: NO GROWTH 6 DAYS       CULTURE, BLOOD [[id number]] Collected: 08/27/22 1100    Order Status: Completed Specimen: Blood Updated: 09/02/22 0755     Special Requests: NO SPECIAL REQUESTS        Culture result: NO GROWTH 6 DAYS       CULTURE, URINE [[id number]] Collected: 08/27/22 1442    Order Status: Completed Specimen: Cath Urine Updated: 08/29/22 0642     Special Requests: NO SPECIAL REQUESTS        [place] Count --        00703  COLONIES/mL       Culture result:       >2 ORGANISMS - CONTAMINATED SPECIMEN. SUGGEST RECOLLECTION          CULTURE, BLOOD [[id number]]     Order Status: Canceled Specimen: Blood     COVID-19 RAPID TEST [[id number]] Collected: 08/27/22 1030    Order Status: Completed Specimen: Nasopharyngeal Updated: 08/27/22 1057     Specimen source SWAB        COVID-19 rapid test Not detected        Comment: Rapid Abbott ID Now       Rapid NAAT:  The specimen is NEGATIVE for SARS-CoV-2, the novel coronavirus associated with COVID-19.        Negative results should be treated as presumptive and, if inconsistent with clinical signs and symptoms or necessary for patient management, should be tested with an alternative molecular assay. Negative results do not preclude SARS-CoV-2 infection and should not be used as the sole basis for patient management decisions. This test has been authorized by the FDA under an Emergency Use Authorization (EUA) for use by authorized laboratories. Fact sheet for Healthcare Providers: ConventionUpdate.co.nz  Fact sheet for Patients: ConventionUpdate.co.nz       Methodology: Isothermal Nucleic Acid Amplification                  ECHO May 2021 Interpretation Summary  Result status: Final result  Contrast used: DEFINITY. Left Ventricle: Normal cavity size, wall thickness and systolic function (ejection fraction normal). The estimated EF is 55 - 60%. There is mild (grade 1) left ventricular diastolic dysfunction E'E= 10.41. Wall Scoring: The left ventricular wall motion is normal.  Tricuspid Valve: Tricuspid valve not well visualized. No stenosis. Tricuspid regurgitation is inadequate for estimation of right ventricular systolic pressure. Images report reviewed by me:  CT 8/27/2022 (Most Recent)  Results from Hospital Encounter encounter on 08/27/22    CT HEAD WO CONT    Narrative  EXAM: CT head    INDICATION: Altered mental status. COMPARISON: None. TECHNIQUE: Axial CT imaging of the head was performed without intravenous  contrast.    One or more dose reduction techniques were used on this CT: automated exposure  control, adjustment of the mAs and/or kVp according to patient size, and  iterative reconstruction techniques. The specific techniques used on this CT  exam have been documented in the patient's electronic medical record. Digital  Imaging and Communications in Medicine (DICOM) format image data are available  to nonaffiliated external healthcare facilities or entities on a secure, media  free, reciprocally searchable basis with patient authorization for at least a  12-month period after this study. Patient motion degrades image quality. _______________    FINDINGS:    BRAIN AND POSTERIOR FOSSA:  Mild periventricular areas of decreased attenuation are identified. Gray-white matter interfaces are preserved. No intraparenchymal hemorrhage, mass effect or midline shift. The ventricular system is midline and symmetric. Atherosclerotic vascular calcifications are identified. EXTRA-AXIAL SPACES AND MENINGES:  There is no evidence for extra-axial mass lesion or fluid collection. CALVARIUM:  Intact. SINUSES:  Sphenoid sinus mucosal thickening. OTHER:  Orbits are grossly intact. Facial soft tissue are within normal limits. _______________    Impression  1. No evidence for intracranial hemorrhage, mass effect or midline shift. 2.  Mild periventricular areas of microvascular ischemic change. 3.  Cerebral atherosclerosis. 4.  Atrophy. If there are continued symptoms, a MRI is recommended for further evaluation. CT abdomen/pelvis 8/27/2022  IMPRESSION  1. Large right inguinal hernia with large and small bowel. There is diffuse wall  thickening of the ascending colon concerning for strangulation. No evidence for  bowel obstruction. 2. Right pleural effusion and bilateral basilar atelectasis. 3. Gastric distention. CXR reviewed by me:  XR 8/27 (Most Recent). Results from Hospital Encounter encounter on 08/27/22    XR CHEST PORT    Narrative  CLINICAL HISTORY:  Tachypnea    COMPARISONS:  Chest x-ray 9/2/2022    TECHNIQUE:  single frontal view of the chest    ------------------------------------------    FINDINGS:    Lungs:  Patchy parenchymal opacity in the right lung, similar to the prior  radiograph and likely largely scar. Blunting of the right lateral costophrenic  sulcus, also similar. Mild chronic appearing interstitial markings within the left lung, similar to  prior radiograph. Mediastinum: Moderate cardiomegaly.  Mediastinum is shifted towards the right,  unchanged. Atherosclerotic arterial calcification. Bones: No evidence of fracture or suspicious bone lesion.      ------------------------------------------    Impression  1. No significant interval change. Probable small right pleural effusion, not  appreciably changed. 2. Right lung parenchymal findings likely largely reflect scar though are not  well assessed. Pneumonia or aspiration not entirely excluded. AXR 8/27  FINDINGS:   SUPPORT DEVICES: A nasogastric tube is positioned in the stomach. BOWEL GAS PATTERN: The gas pattern is nonobstructive. SOFT TISSUES: Unremarkable. BONES: Degenerative changes are seen throughout the spine. ADDITIONAL FINDINGS: None. IMPRESSION   No significant abnormality. Please note: Voice-recognition software may have been used to generate this report, which may have resulted in some phonetic-based errors in grammar and contents. Even though attempts were made to correct all the mistakes, some may have been missed, and remained in the body of the document.       Judith Howell MD  9/3/2022

## 2022-09-03 NOTE — PROGRESS NOTES
TRANSFER - OUT REPORT:    Verbal report given to [address] T[address] Louis RN(name) on Vladislav Manus.  being transferred to [place], RN(unit) for routine progression of care       Report consisted of patients Situation, Background, Assessment and   Recommendations(SBAR). Information from the following report(s) SBAR, Kardex, Intake/Output, MAR, Recent Results, and Cardiac Rhythm SR-ST  was reviewed with the receiving nurse. Lines:   Peripheral IV 08/27/22 Anterior;Right Hand (Active)   Site Assessment Clean, dry, & intact 09/03/22 0800   Phlebitis Assessment 0 09/03/22 0800   Infiltration Assessment 0 09/03/22 0800   Dressing Status Clean, dry, & intact 09/03/22 0800   Dressing Type Transparent 09/03/22 0800   Hub Color/Line Status Blue;Capped 09/03/22 0800   Action Taken Open ports on tubing capped 09/03/22 0800   Alcohol Cap Used Yes 09/03/22 0800        Opportunity for questions and clarification was provided.       Patient transported with:   Monitor  O2 @ 3 liters  Registered Nurse  Tech    Wife of pt called and updated on transfer to room 336, updated on pt condition and answered all questions to her liking, no additional questions or concerns at this time

## 2022-09-03 NOTE — PROGRESS NOTES
Patient:  Getachew Huston. :  1945  Gender:  male  MRN #:  [id number]    Assessment:   Getachew Santamaria is a 78 y.o. male with hypertension, hyperlipidemia, stroke , chronic atrial fibrillation, on Plavix, DM,previous, COVID-19 infection, chronic indwelling urinary catheter for urinary retention, severe peripheral vascular disease left BKA admitted in ICU with septic shock , acute renal failure, hyperkalemia , metabolic acidosis and strangulated left inguinal hernia  s/p emergency  surgery   S/p extubation now , BP stable , decent urine output       Septic shock - Improved   Acute renal failure- Improving   Hypokalemia   Metabolic acidosis -Improved   Respiratory failure - improved      Plan:   He has decent urine output overnight with improvement in renal function every day   No indications of renal replacement therapy   Iv lasix for volume management   Minimize volume infusion   Avoid NSAIDS, contrast and nephrotoxin   Dose all meds and antibiotics for current eGFR  Transfuse prn to keep hemoglobin > 7 gram/dl   Agree with 10 meq KCL IV for 3 runs and repeat serum potassium level     Subjective/Interval Events    Decent urine output  BP stable, he is awake and alert , follows simple command   Unable to obtained review of systems         Intake/Output Summary (Last 24 hours) at 9/3/2022 0928  Last data filed at 9/3/2022 0400  Gross per 24 hour   Intake 667.92 ml   Output 1875 ml   Net -1207.08 ml           Blood pressure (!) 154/64, pulse 84, temperature 97.7 °F (36.5 °C), resp. rate 20, height 5' 8\" (1.727 m), weight 82.8 kg (182 lb 8.7 oz), SpO2 100 %.     Exam:    Not conversant   Lung: clear to auscultation,  Heart: s1s2 heard   Abdomen: soft, mild distension   Ext: no edema in RLE, erythema, wrapped in ACE bandage        Recent Labs     22  0610   WBC 9.4   HCT 27.3*   MCV 83.0       Recent Labs     22  0610 22  0451    144   CO2 25 26   BUN 79* 88*   INR  --  1.3* Recent Labs     09/03/22  0610   AGRAT 1.2       I have reviewed patient's record for pertinent labs, radiology and other test . I have reviewed old records. I have ordered labs, medications and radiology as necessary and indicated per patient's condition . Total time spent is approximately 31 minutes. Greater than 50 % of that time was spent in counseling and or care coordination.        Bert Anderson MD  Nephrology -[facility], [facility].

## 2022-09-04 NOTE — PROGRESS NOTES
Hospitalist Progress Note    Patient: Wilian Farias MRN: [id number]  [facility]: [id number]    YOB: 1945  Age: 78 y.o. Sex: male    DOA: 8/27/2022 LOS:  LOS: 8 days          Chief Complaint:    Resp failure      Assessment/Plan   78 y.o. 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 Admitted for severe septic shock with multiorgan and hypotension involvement due to multiple possible sources of infection, severe hyperkalemia, acute metabolic encephalopathy, metabolic acidosis, strangulated left inguinal hernia, anemia requiring blood transfusion, mild hypoxia coagulable state.       Hypokalemia-treating with IV repletion this am, repeat K after completed     Acute resp failure-resolved, extubated  Not safe for PO yet, keep NPO  Speech consulted    NG will remain, we may need it for tube feeding if he still is not safe to swallow     Incarcerated right inguinal hernia -status post ex lap with reduction of incarcerated right groin hernia with hernia mesh     Infected sacral ulcer -weeping purulent material, seen by wound care     Infected medial maleolar and heel ulcers -completed abx     Anemia -s/p transfusion      Acute renal failure -likely due to profound shock, appreciate nephrology expertise, urine output improving, cr slowly improving     encephalopathy , metabolic     S/p thoracentesis      Poor prognosis with multiple medical ailments and advanced PVD, diabetes complications, prior BKA< wounds and debilitated state    Ortho has seen for possible BKA right side    Family will need to review his high risk status for this versus consideration of comfort care    DVT proph-heparin     Hospice would certainly be reasonable option for disposition     DNR    Disposition :  Patient Active Problem List   Diagnosis Code    DM2 (diabetes mellitus, type 2) ([facility] [address] E11.9    CAD (coronary artery disease) I25.10    Acute renal failure (ARF) ([facility]) N17.9    CKD (chronic kidney disease) stage 3, GFR 30-59 ml/min ([facility]) N18.30    Elevated brain natriuretic peptide (BNP) level R79.89    UTI (urinary tract infection) N39.0    High anion gap metabolic acidosis F51.4    Hyperkalemia E87.5    Sepsis ([facility]) A41.9    AMS (altered mental status) R41.82    Strangulated inguinal hernia K40.30    Right inguinal hernia K40.90    Leukocytosis D72.829    Anemia D64.9    Chronic indwelling Mccray catheter Z97.8    S/P BKA (below knee amputation), left ([facility]) Z89.512    Right foot ulcer ([facility]) L97.519    Paroxysmal atrial fibrillation ([facility]) I48.0    Diabetic ulcer of right foot ([facility]) E11.621, L97.519    Pleural effusion on right J90    Severe protein-calorie malnutrition ([facility]) E43    Acute respiratory failure with hypoxemia ([facility]) J96.01    Septic shock ([facility]) A41.9, R65.21       Subjective:  NG tube in  Small drainage  Pt confused but awake and answers basic questions      Review of systems:    Constitutional: denies fevers  Respiratory: denies SOB  Cardiovascular: denies chest pain  Gastrointestinal: denies nausea      Vital signs/Intake and Output:  Visit Vitals  BP (!) 141/48   Pulse 97   Temp 97.2 °F (36.2 °C)   Resp 24   Ht 5' 8\" (1.727 m)   Wt 71.4 kg (157 lb 6.4 oz)   SpO2 94%   BMI 23.93 kg/m²     Current Shift:  No intake/output data recorded.   Last three shifts:  09/02 1901 - 09/04 0700  In: 1267.9 [I.V.:1267.9]  Out: 4000 [Urine:3800; Drains:200]    Exam:    General: Well developed, alert, NAD, OX3  Head/Neck: NCAT, supple, No masses, No lymphadenopathy  CVS:Regular rate and rhythm, no M/R/G, S1/S2 heard, no thrill  Lungs:Clear to auscultation bilaterally, no wheezes, rhonchi, or rales  Abdomen: Soft, Nontender, No distention, Normal Bowel sounds, No hepatomegaly  Extremities: No C/C/E, pulses palpable 2+  Skin:normal texture and turgor, no rashes, no lesions  Neuro:grossly normal , follows commands  Psych:appropriate                Labs: Results:       Chemistry Recent Labs     09/04/22 0110 09/03/22  0610 09/02/22  1211 09/02/22  0451   * 84  --  64*   * 142  --  144   K 2.9* 3.3* 3.3* 3.4*    109  --  109   CO2 24 25  --  26   BUN 79* 79*  --  88*   CREA 2.52* 2.65*  --  2.98*   CA 8.7 9.0  --  8.7   AGAP 12 8  --  9   BUCR 31* 30*  --  30*    114  --  140*   TP 5.9* 6.2*  --  5.6*   ALB 3.2* 3.4  --  3.4   GLOB 2.7 2.8  --  2.2   AGRAT 1.2 1.2  --  1.5      CBC w/Diff Recent Labs     09/04/22 0110 09/03/22  0610 09/02/22  0451   WBC 7.5 9.4 8.3   RBC 2.82* 3.29* 2.88*   HGB 7.2* 8.4* 7.5*   HCT 23.2* 27.3* 23.9*    234 206   GRANS 77* 75* 75*   LYMPH 13* 19* 20*   EOS 4 2 1      Cardiac Enzymes No results for input(s): CPK, CKND1, LEONEL in the last 72 hours. No lab exists for component: CKRMB, TROIP   Coagulation Recent Labs     09/02/22  0451   PTP 16.8*   INR 1.3*       Lipid Panel No results found for: CHOL, CHOLPOCT, CHOLX, CHLST, CHOLV, 009060, HDL, HDLP, LDL, LDLC, DLDLP, 779248, VLDLC, VLDL, TGLX, TRIGL, TRIGP, TGLPOCT, CHHD, CHHDX   BNP No results for input(s): BNPP in the last 72 hours.    Liver Enzymes Recent Labs     09/04/22 0110   TP 5.9*   ALB 3.2*         Thyroid Studies Lab Results   Component Value Date/Time    TSH 4.73 (H) 08/28/2022 08:04 AM        Procedures/imaging: see electronic medical records for all procedures/Xrays and details which were not copied into this note but were reviewed prior to creation of Danita Aviles MD

## 2022-09-04 NOTE — PROGRESS NOTES
No acute changes in pt status. Pt oxygen >95% on 2L NC. Janine Reason turns to ensure pt comfort and skin integrity. Pain managed with PRNs. Will cont to monitor.

## 2022-09-04 NOTE — PROGRESS NOTES
Speech Pathology Note:    Acknowledging new MD order. Pt evaluated by speech on 9/2/22. Pt with overt s/s asp/pen noted with NTL and thin liquids with O2 desat to <70 with suction required. Recommend continue NPO. Discussed evaluation results with RN, Lenka Garay. SLP to follow up on next scheduled date.      Thank you,  Margaret Tompkins MA CCC-SLP

## 2022-09-04 NOTE — PROGRESS NOTES
Patient:  Wilian Farias :  1945  Gender:  male  MRN #:  [id number]    Assessment:   Wilian Farias is a 78 y.o. male with hypertension, hyperlipidemia, stroke , chronic atrial fibrillation, on Plavix, DM,previous, COVID-19 infection, chronic indwelling urinary catheter for urinary retention, severe peripheral vascular disease left BKA admitted in ICU with septic shock , acute renal failure, hyperkalemia , metabolic acidosis and strangulated left inguinal hernia  s/p emergency  surgery   S/p extubation, breathing comfortably on nasal canula. Decent urine output      Septic shock - Improved   Acute renal failure- Improving   Hypokalemia   Metabolic acidosis -Improved   Respiratory failure - improved      Plan:   He has decent urine output with slow improvement in renal function everyday   Clinically not in fluid overload  Iv lasix prn for CHF symptoms and Hypoxia    Minimize volume infusion , stop iv albumin as well   Avoid NSAIDS, contrast and nephrotoxin   Dose all meds and antibiotics for current eGFR  Transfuse prn to keep hemoglobin > 7 gram/dl   Agree with 10  meq KCL IV for 6  runs and repeat serum potassium level after that   Replace prn to keep level 4-4.5 meq/lt     Subjective/Interval Events    Decent urine output  BP stable, he is awake and alert , follows simple command           Intake/Output Summary (Last 24 hours) at 2022 1019  Last data filed at 2022 0507  Gross per 24 hour   Intake 300 ml   Output 2500 ml   Net -2200 ml           Blood pressure (!) 141/48, pulse 97, temperature 97.2 °F (36.2 °C), resp. rate 24, height 5' 8\" (1.727 m), weight 71.4 kg (157 lb 6.4 oz), SpO2 94 %.     Exam:    Not conversant , awake and alert   Lung: clear to auscultation,  Heart: s1s2 heard   Ext: no edema in RLE, erythema, wrapped in ACE bandage        Recent Labs     22  0110   WBC 7.5   HCT 23.2*   MCV 82.3       Recent Labs     22  0110 22  0610 22  0451   * < > 144   CO2 24   < > 26   BUN 79*   < > 88*   INR  --   --  1.3*    < > = values in this interval not displayed. Recent Labs     09/04/22  0110   AGRAT 1.2       I have reviewed patient's record for pertinent labs, radiology and other test . I have reviewed old records. I have ordered labs, medications and radiology as necessary and indicated per patient's condition . Total time spent is approximately 30 minutes. Greater than 50 % of that time was spent in counseling and or care coordination.        Tiffanie Sotomayor MD  Nephrology -[facility], [facility].

## 2022-09-04 NOTE — PROGRESS NOTES
[facility]  Pulmonary, Critical Care, and Sleep Medicine    Name: Vladislav Glynn  MRN: [id number]   : 1945 Hospital: [facility] [place]    Date: 2022  Room: [address] [place] [address] Note                                              Consult requesting physician: Dr. Dr Latosha Smith  Reason for Consult: Strangulated bowel with complications, shock, respiratory failure       IMPRESSION:   Strangulated bowel/inguinal hernia  Acute respiratory failure with hypoxemia  Septic shock  High gap metabolic acidosis  Acute renal failure  Right inguinal hernia  Hyperkalemia  Sepsis  Altered mentation  Leukocytosis  Anemia  UTI  Right pleural effusion  Chronic Mccray catheter  Right foot ulcers  Type 2 diabetes mellitus  Paroxysmal atrial fibrillation  Left below-knee amputation  Severe malnutrition      Patient Active Problem List   Diagnosis Code    DM2 (diabetes mellitus, type 2) ([facility]) E11.9    CAD (coronary artery disease) I25.10    Acute renal failure (ARF) ([facility]) N17.9    CKD (chronic kidney disease) stage 3, GFR 30-59 ml/min ([facility]) N18.30    Elevated brain natriuretic peptide (BNP) level R79.89    UTI (urinary tract infection) N39.0    High anion gap metabolic acidosis K48.9    Hyperkalemia E87.5    Sepsis ([facility]) A41.9    AMS (altered mental status) R41.82    Strangulated inguinal hernia K40.30    Right inguinal hernia K40.90    Leukocytosis D72.829    Anemia D64.9    Chronic indwelling Mccray catheter Z97.8    S/P BKA (below knee amputation), left ([facility]) Z89.512    Right foot ulcer ([facility]) L97.519    Paroxysmal atrial fibrillation ([facility]) I48.0    Diabetic ulcer of right foot ([facility]) E11.621, L97.519    Pleural effusion on right J90    Severe protein-calorie malnutrition ([facility]) E43    Acute respiratory failure with hypoxemia ([facility]) J96.01    Septic shock ([facility]) A41.9, R65.21         Code status: Full Code      RECOMMENDATIONS:   Patient is critically ill and presenting with large right inguinal strangulated hernia, with anemia, metabolic acidosis, acute renal failure and hyperkalemia. Pateitn ifs now off oxygen  Lasix low dose   S/p laparotomy and reduction of incarcerated right groin hernia without need for bowel resection-8/27/2022. Respiratory: on NC  Extubated on on 9/1/2022  Stable on NC    Thoracenthesis today right sided transudate   Gram stain normal  No infection  LDH slightly high due to traumatic tap a lot of RBC  Ph7.7    Follow up Cxr stable better aeration  CXR twice on 9/2 no pneumothrax post thora  Possible aspiration ? NPO now ng tube    IMPRESSION     Large right pleural effusion with adjacent atelectasis. I will consult for thoracenthesis for comfort oxygenation stable    CXR 8/30  IMPRESSION  1. Endotracheal tube approximately 4 cm above the alise. 2. No significant change in right basilar opacity consistent with pleural  effusion with adjacent consolidation, atelectasis or pneumonia. IIncrease diuresis check BNP  Add midodrine  SBT today did well for 1 hrs   Continue ventilator and sedation bundles. Sedation-midazolam infusion; prn Dilaudid; patient dropped blood pressure in ER with fentanyl. CT abdomen on admission-right lower lung shows large right-sided pleural effusion with underlying atelectasis of the right lower lobe segments. Keep SPO2 >=92%. HOB 30 degree elevation all the time. Aggressive pulmonary toileting. Aspiration precautions. CVS: BP stable  CHF low dose lasix stop albumins due to fluid overload and BP stable    Patient off pressors;BP BP but needs diuresis add midorine low dose   Stable overnight   wean for systolic blood pressure greater than 95 mmHg. Patient currently in atrial fibrillation. Prior Echo showed normal LV function; no pulmonary hypertension reported. Repeat echocardiogram diastolic dysfunction Ef normal   Troponin not elevated; proBNP elevated. Patient likely has diastolic CHF with chronic diabetes and hypertension.   ID: Wbc  Patient likely has bowel sepsis; lactic acid not elevated. Necrotic lower extremity possible second source of infection   UA-Positive for UTI; urine culture-pending. Continue broad-spectrum antibiotic-levofloxacin, Zosyn and IV vancomycin-renal dosing. Blood cultures negative; rapid COVID test negative. Deescalate antibiotic when appropriate. Hematology/Oncology: Patient s/p 1 unit PRBC in the ER. Hemoglobin 8.7 this morning. Platelets-reactive thrombocytosis. INR 1.4 this morning; plan for 5 mg vitamin K IV. Start sq heaprin for dvt prophylaxis and monitor hb  Renal: metabolic acidosis better stop iv start oral bicarbonate   Contineu albumins malnutrition   Nephrology consulted-Dr. Carmelita Carey. GI/: aspiration issues Ng placed  Consider feeding  Speech evalauted   Chronic Mccray catheter-changed in ER  LFTs and lipase-normal.  Albumin-low. CT abdomen/pelvis- Large right inguinal hernia with large and small bowel. There is diffuse wall  thickening of the ascending colon concerning for strangulation. No evidence for bowel obstruction. S/p exploratory laparotomy, reduction of incarcerated hernia; bowel was viable, and did not need resection. Endocrine: Monitor BS-improved; continue sliding scale insulin. Hemoglobin A1c-5.9 on admission. Check TSH. Neurology: Acute metabolic encephalopathy from sepsis. CT head-nil acute. Skin/Wound: Right foot ulcer-wound care consulted. Electrolytes: Replace electrolytes per ICU electrolyte replacement protocol; caution renal failure. IVF: Bicarb drip 75 ml per hour; albumin infusion every 6 hours. Nutrition: N.p.o. for now. Prophylaxis: DVT Prophylaxis: None- due to anemia, left BKA, right foot diabetic ulcers. GI Prophylaxis: Protonix. Restraints: Prn Wrist soft restraints for patient interfering with medical therapy/management and patient safety.    Lines/Tubes: PIV, midline  ET tube 8/27  OG tube 8/27  Mccray: 8/27 changed in ER (Medically necessary for strict input/output monitoring in critically ill patient, will remove it when not needed. Mccray bundle followed). Advance Directive/Palliative Care: consulted; overall prognosis appears to be poor. Full code status per family wishes. Quality Care: PPI, DVT prophylaxis, HOB elevated, Infection control all reviewed and addressed. Care of plan d/w icu RT and RN. Update family today when available. High complexity decision making was performed during the evaluation of this patient at high risk for decompensation with multiple organ involvement. Family meeting on 8/30 I attended now Dnr/DNI no reintubation if fails           Subjective/History of Present Illness:     Patient is a 78 y.o. male with PMHx significant for multiple medical problems. He history of atrial flutter/atrial fibrillation, coronary artery disease, chronic kidney disease stage III, diabetes mellitus type 2 with leg ulcerations, history of CHF, hypertension and hyperlipidemia, history of peripheral vascular disease, history of prior CVA. Patient was admitted October 2021 with evidence of left leg diabetic ulcerations and gangrene. He subsequently underwent left below-knee amputation. Patient has chronic indwelling Mccray catheter. The patient has come to the ER brought by family/wife acutely unwell, confused and agitated. The patient has been found to have severe right-sided inguinal hernia, and subsequent CT abdomen/pelvis study showing evidence of strangulation of the bowel. The patient is also anemic, and severe metabolic acidosis, and acute renal failure. Urine output is poor from the Mccray catheter, and appears to be purulent. Mccray catheter has been replaced in the ER. S/p EXPLORATORY LAPAROTOMY, REDUCTION INCARCERATED RIGHT GROIN HERNIA WITH HERNIA REPAIR WITH MESH 8/27/2022 - Dr Ladarius Garcia  The bowel was completely viable and did not require resection.      9/4/2022  Respiratory stays stable on 2L  Now is off oxygen 98 on RA  Mild pulmonary congestion on lasix  Bnp still very high  I will hold albumins  He is wake and told me he is not sure if he wants to have amputation  Aspiration precautions  has ng tube  DNR/DNI  I will sign off please call me if new problems   S/p left BKA 10/28/2021    Review of Systems:  ROS not obtained due to patient factor. No Known Allergies   Past Medical History:   Diagnosis Date    A-fib (HCC)     Asthma     CAD (coronary artery disease)     Chronic kidney disease     stage 3    COVID-19     Diabetes (HCC)     GERD (gastroesophageal reflux disease)     Heart failure (HCC)     chronic diastolic heart failure    High cholesterol     Hypertension     Ill-defined condition     chronic osteomyelitis left ankle and foot    Ill-defined condition     chronic arteriosclerosis    PVD (peripheral vascular disease) ([facility] [address]     Stroke ([facility] [address]     cva      Past Surgical History:   Procedure Laterality Date    COLONOSCOPY N/A 6/1/2018    COLONOSCOPY, POLYPECTOMY performed by Jonathon Sterling MD at [facility] ENDOSCOPY    HX CATARACT REMOVAL      HX ORTHOPAEDIC Left 2021    ankle washout, external fixator, would vac    HX ORTHOPAEDIC      external fixator removed      Social History     Tobacco Use    Smoking status: Former    Smokeless tobacco: Never   Substance Use Topics    Alcohol use: Not Currently      History reviewed. No pertinent family history. Prior to Admission medications    Medication Sig Start Date End Date Taking? Authorizing Provider   levoFLOXacin (Levaquin) 750 mg tablet Take 1 Tablet by mouth daily.  6/18/22   Leidy Soni MD   insulin lispro (HUMALOG) 100 unit/mL injection INITIATE INSULIN CORRECTIVE PROTOCOL: Normal Insulin Sensitivity   For Blood Sugar (mg/dL) of:     Less than 150 =   0 units           150 -199 =   2 units  200 -249 =   4 units  250 -299 =   6 units  300 -349 =   8 units  350 and above = 10 units and Call Physician  If 2 glucose readings are above 200 mg/dL within a 24 hr period, proceed to \"Insulin Resistant\" dosing. Initiate Hypoglycemia protocol if blood glucose is <70 mg/dL Fast Acting - Administer Immediately - or within 15 minutes of start of meal, if mealtime coverage. 11/3/21   Ronaldo Phillips MD   gabapentin (NEURONTIN) 300 mg capsule Take 1 Capsule by mouth three (3) times daily. Max Daily Amount: 900 mg. 11/3/21   Ronaldo Phillips MD   clopidogreL (Plavix) 75 mg tab Take  by mouth daily. Indications: afib    Provider, Historical   B.infantis-B.ani-B.long-B.bifi (Probiotic 4X) 10-15 mg TbEC Take  by mouth daily. Provider, Historical   multivitamin (ONE A DAY) tablet Take 1 Tablet by mouth daily. Provider, Historical   acetaminophen (TylenoL) 325 mg tablet Take 650 mg by mouth every four (4) hours as needed for Pain. Provider, Historical   tamsulosin (FLOMAX) 0.4 mg capsule Take 2 Capsules by mouth daily. 5/25/21   Gissell Fisher MD   atorvastatin (LIPITOR) 40 mg tablet Take 1 Tab by mouth nightly. 2/15/18   Claudia Vasquez PA-C   metoprolol succinate (TOPROL-XL) 50 mg XL tablet Take 1 Tab by mouth daily. Patient taking differently: Take 100 mg by mouth daily.  2/16/18   Claudia Vasquez PA-C     Current Facility-Administered Medications   Medication Dose Route Frequency    budesonide (PULMICORT) 500 mcg/2 ml nebulizer suspension  500 mcg Nebulization BID RT    ipratropium (ATROVENT) 0.02 % nebulizer solution 0.5 mg  0.5 mg Nebulization Q8H RT    furosemide (LASIX) injection 20 mg  20 mg IntraVENous BID    [Held by provider] albumin human 25% (BUMINATE) solution 25 g  25 g IntraVENous Q12H    dextrose 5 % - 0.45% NaCl infusion  25 mL/hr IntraVENous CONTINUOUS    midodrine (PROAMATINE) tablet 2.5 mg  2.5 mg Oral BID    arformoteroL (BROVANA) neb solution 15 mcg  15 mcg Nebulization BID RT    heparin (porcine) injection 5,000 Units  5,000 Units SubCUTAneous Q8H    pantoprazole (PROTONIX) 40 mg in 0.9% sodium chloride 10 mL injection  40 mg IntraVENous Q12H    sodium chloride (NS) flush 5-40 mL  5-40 mL IntraVENous Q8H    insulin lispro (HUMALOG) injection   SubCUTAneous Q6H         Objective:   Vital Signs:    Visit Vitals  BP (!) 145/47   Pulse (!) 102   Temp 97.4 °F (36.3 °C)   Resp 30   Ht 5' 8\" (1.727 m)   Wt 71.4 kg (157 lb 6.4 oz)   SpO2 100%   BMI 23.93 kg/m²       O2 Device: None (Room air)   O2 Flow Rate (L/min): 2 l/min   Temp (24hrs), Av.3 °F (36.3 °C), Min:97.2 °F (36.2 °C), Max:97.4 °F (36.3 °C)       Intake/Output:   Last shift:       07 - 1900  In: -   Out: 500 [Urine:500]    Last 3 shifts:  190 -  0700  In: 1267.9 [I.V.:1267.9]  Out: 4000 [Urine:3800; Drains:200]      Intake/Output Summary (Last 24 hours) at 2022 1447  Last data filed at 2022 1102  Gross per 24 hour   Intake --   Output 2250 ml   Net -2250 ml         Last 3 Recorded Weights in this Encounter    22 1107 22 0940 22 0924   Weight: 38.6 kg (85 lb) 82.8 kg (182 lb 8.7 oz) 71.4 kg (157 lb 6.4 oz)       Physical Exam:   Extubated awake orieneted x2 more cooperative  HEENT: pupils not dilated, reactive, no scleral jaundice, moist oral mucosa, no nasal drainage  Neck: No adenopathy or thyroid swelling  Orotracheal and orogastric tubes-no bleeding or secretions  CVS: S1S2 no murmurs; JVD not elevated; telemetry-sinus rhythm  RS: Mod air entry bilaterally, decreased BS right lower chest, no obvious wheezes or crackles; not tachypneic or in distress  Abd: soft, nontender, not distended, no guarding or rigidity, bowel sounds present, no hepatosplenomegaly  Right lower abdomen laparotomy scar-no bleeding or hematoma  Neuro: Intubated and sedated; limited exam    Extrm: no leg edema or swelling or clubbing; left below-knee amputation  Skin: Large ulceration right medial malleolus and right heel, black in appearance, no discharge    Lymphatic: no cervical or supraclavicular adenopathy  Groin: Large right inguinal hernia no longer present      Data:       Recent Results (from the past 12 hour(s))   GLUCOSE, POC    Collection Time: 09/04/22  5:21 AM   Result Value Ref Range    Glucose (POC) 108 70 - 110 mg/dL   GLUCOSE, POC    Collection Time: 09/04/22  8:14 AM   Result Value Ref Range    Glucose (POC) 115 (H) 70 - 110 mg/dL   GLUCOSE, POC    Collection Time: 09/04/22  1:12 PM   Result Value Ref Range    Glucose (POC) 133 (H) 70 - 110 mg/dL           Chemistry Recent Labs     09/04/22  0110 09/03/22  0745 09/03/22  0610 09/02/22  1442 09/02/22  1211 09/02/22  0451   *  --  84  --   --  64*   *  --  142  --   --  144   K 2.9*  --  3.3*  --  3.3* 3.4*     --  109  --   --  109   CO2 24  --  25  --   --  26   BUN 79*  --  79*  --   --  88*   CREA 2.52*  --  2.65*  --   --  2.98*   CA 8.7  --  9.0  --   --  8.7   MG 1.7  --  2.0 1.7  --  1.7   PHOS  --  1.9*  --  2.3*  --  1.9*   AGAP 12  --  8  --   --  9   BUCR 31*  --  30*  --   --  30*     --  114  --   --  140*   TP 5.9*  --  6.2*  --   --  5.6*   ALB 3.2*  --  3.4  --   --  3.4   GLOB 2.7  --  2.8  --   --  2.2   AGRAT 1.2  --  1.2  --   --  1.5          Lactic Acid Lactic acid   Date Value Ref Range Status   08/27/2022 1.7 0.4 - 2.0 MMOL/L Final     No results for input(s): LAC in the last 72 hours. Liver Enzymes Protein, total   Date Value Ref Range Status   09/04/2022 5.9 (L) 6.4 - 8.2 g/dL Final     Albumin   Date Value Ref Range Status   09/04/2022 3.2 (L) 3.4 - 5.0 g/dL Final     Globulin   Date Value Ref Range Status   09/04/2022 2.7 2.0 - 4.0 g/dL Final     A-G Ratio   Date Value Ref Range Status   09/04/2022 1.2 0.8 - 1.7   Final     Alk.  phosphatase   Date Value Ref Range Status   09/04/2022 100 45 - 117 U/L Final     Recent Labs     09/04/22  0110 09/03/22 0610 09/02/22 0451   TP 5.9* 6.2* 5.6*   ALB 3.2* 3.4 3.4   GLOB 2.7 2.8 2.2   AGRAT 1.2 1.2 1.5    114 140*          CBC w/Diff Recent Labs     09/04/22 0110 09/03/22 0610 09/02/22  0451 WBC 7.5 9.4 8.3   RBC 2.82* 3.29* 2.88*   HGB 7.2* 8.4* 7.5*   HCT 23.2* 27.3* 23.9*    234 206   GRANS 77* 75* 75*   LYMPH 13* 19* 20*   EOS 4 2 1          Cardiac Enzymes No results found for: CPK, CK, CKMMB, CKMB, RCK3, CKMBT, CKNDX, CKND1, LEONEL, TROPT, TROIQ, WARD, TROPT, TNIPOC, BNP, BNPP     BNP No results found for: BNP, BNPP, XBNPT     Coagulation Recent Labs     09/02/22  0451   PTP 16.8*   INR 1.3*           Thyroid  Lab Results   Component Value Date/Time    TSH 4.73 (H) 08/28/2022 08:04 AM       No results found for: T4     Urinalysis Lab Results   Component Value Date/Time    Color YELLOW 08/27/2022 02:27 PM    Appearance TURBID 08/27/2022 02:27 PM    Specific gravity 1.019 08/27/2022 02:27 PM    pH (UA) 6.0 08/27/2022 02:27 PM    Protein >1,000 (A) 08/27/2022 02:27 PM    Glucose Negative 08/27/2022 02:27 PM    Ketone TRACE (A) 08/27/2022 02:27 PM    Bilirubin Negative 08/27/2022 02:27 PM    Urobilinogen 1.0 08/27/2022 02:27 PM    Nitrites Negative 08/27/2022 02:27 PM    Leukocyte Esterase LARGE (A) 08/27/2022 02:27 PM    Epithelial cells FEW 08/27/2022 02:27 PM    Bacteria 1+ (A) 08/27/2022 02:27 PM    WBC TOO NUMEROUS TO COUNT 08/27/2022 02:27 PM    RBC 4 to 10 08/27/2022 02:27 PM          Culture data during this hospitalization. All Micro Results       Procedure Component Value Units Date/Time    AFB CULTURE + SMEAR W/RFLX ID FROM CULTURE [[id number]] Collected: 09/02/22 1430    Order Status: Completed Specimen: Miscellaneous sample Updated: 09/03/22 2035     Source PLEURAL FLUID        AFB Specimen processing Concentration     Acid Fast Smear Negative        Comment: (NOTE)  Performed At: [facility] & [address] [id number]  Sheryle Sicks MD YV:[id number]          Acid Fast Culture PENDING    CULTURE, BODY FLUID W Davejerson Eusebio [[id number]] Collected: 09/02/22 1430    Order Status: Completed Specimen:  Body Fluid from Pleural Fluid Updated: 09/03/22 1606 Special Requests: NO SPECIAL REQUESTS        GRAM STAIN NO WBC'S SEEN         NO ORGANISMS SEEN        Culture result: NO GROWTH THUS FAR       CULTURE, FUNGUS [[id number]] Collected: 09/02/22 1430    Order Status: Sent Specimen: Pleural Fluid Updated: 09/03/22 0004    CULTURE, RESPIRATORY/SPUTUM/BRONCH Leti Richy STAIN [[id number]]  (Susceptibility) Collected: 08/31/22 0018    Order Status: Completed Specimen: Sputum from Tracheal Aspirate Updated: 09/02/22 1537     Special Requests: NO SPECIAL REQUESTS        GRAM STAIN 1+ WBCS SEEN               RARE EPITHELIAL CELLS SEEN                  OCCASIONAL GRAM POSITIVE COCCI IN CLUSTERS           Culture result:       HEAVY * Methicillin Resistant Staphylococcus aureus *                  NO NORMAL RESPIRATORY VICENTA ISOLATED            (NOTE) MRSA NOTIFIED MS SARAH RN ON 9/2/22 AT 1534. Chandana Christian    CULTURE, BLOOD [[id number]] Collected: 08/27/22 1030    Order Status: Completed Specimen: Blood Updated: 09/02/22 0755     Special Requests: NO SPECIAL REQUESTS        Culture result: NO GROWTH 6 DAYS       CULTURE, BLOOD [[id number]] Collected: 08/27/22 1100    Order Status: Completed Specimen: Blood Updated: 09/02/22 0755     Special Requests: NO SPECIAL REQUESTS        Culture result: NO GROWTH 6 DAYS       CULTURE, URINE [[id number]] Collected: 08/27/22 1442    Order Status: Completed Specimen: Cath Urine Updated: 08/29/22 0642     Special Requests: NO SPECIAL REQUESTS        [place] Count --        [address]  COLONIES/mL       Culture result:       >2 ORGANISMS - CONTAMINATED SPECIMEN.  SUGGEST RECOLLECTION          CULTURE, BLOOD [[id number]]     Order Status: Canceled Specimen: Blood     COVID-19 RAPID TEST [[id number]] Collected: 08/27/22 1030    Order Status: Completed Specimen: Nasopharyngeal Updated: 08/27/22 1057     Specimen source SWAB        COVID-19 rapid test Not detected        Comment: Rapid Abbott ID Now       Rapid NAAT:  The specimen is NEGATIVE for SARS-CoV-2, the novel coronavirus associated with COVID-19. Negative results should be treated as presumptive and, if inconsistent with clinical signs and symptoms or necessary for patient management, should be tested with an alternative molecular assay. Negative results do not preclude SARS-CoV-2 infection and should not be used as the sole basis for patient management decisions. This test has been authorized by the FDA under an Emergency Use Authorization (EUA) for use by authorized laboratories. Fact sheet for Healthcare Providers: [employer]  Fact sheet for Patients: [employer]       Methodology: Isothermal Nucleic Acid Amplification                  ECHO May 2021 Interpretation Summary  Result status: Final result  Contrast used: DEFINITY. Left Ventricle: Normal cavity size, wall thickness and systolic function (ejection fraction normal). The estimated EF is 55 - 60%. There is mild (grade 1) left ventricular diastolic dysfunction E'E= 10.41. Wall Scoring: The left ventricular wall motion is normal.  Tricuspid Valve: Tricuspid valve not well visualized. No stenosis. Tricuspid regurgitation is inadequate for estimation of right ventricular systolic pressure. Images report reviewed by me:  CT 8/27/2022 (Most Recent)  Results from Hospital Encounter encounter on 08/27/22    CT HEAD WO CONT    Narrative  EXAM: CT head    INDICATION: Altered mental status. COMPARISON: None. TECHNIQUE: Axial CT imaging of the head was performed without intravenous  contrast.    One or more dose reduction techniques were used on this CT: automated exposure  control, adjustment of the mAs and/or kVp according to patient size, and  iterative reconstruction techniques. The specific techniques used on this CT  exam have been documented in the patient's electronic medical record.   Digital  Imaging and Communications in Medicine (DICOM) format image data are available  to nonaffiliated external healthcare facilities or entities on a secure, media  free, reciprocally searchable basis with patient authorization for at least a  12-month period after this study. Patient motion degrades image quality. _______________    FINDINGS:    BRAIN AND POSTERIOR FOSSA:  Mild periventricular areas of decreased attenuation are identified. Gray-white matter interfaces are preserved. No intraparenchymal hemorrhage, mass effect or midline shift. The ventricular system is midline and symmetric. Atherosclerotic vascular calcifications are identified. EXTRA-AXIAL SPACES AND MENINGES:  There is no evidence for extra-axial mass lesion or fluid collection. CALVARIUM:  Intact. SINUSES:  Sphenoid sinus mucosal thickening. OTHER:  Orbits are grossly intact. Facial soft tissue are within normal limits. _______________    Impression  1. No evidence for intracranial hemorrhage, mass effect or midline shift. 2.  Mild periventricular areas of microvascular ischemic change. 3.  Cerebral atherosclerosis. 4.  Atrophy. If there are continued symptoms, a MRI is recommended for further evaluation. CT abdomen/pelvis 8/27/2022  IMPRESSION  1. Large right inguinal hernia with large and small bowel. There is diffuse wall  thickening of the ascending colon concerning for strangulation. No evidence for  bowel obstruction. 2. Right pleural effusion and bilateral basilar atelectasis. 3. Gastric distention. CXR reviewed by me:  XR 8/27 (Most Recent).   Results from [place] encounter on 08/27/22    XR ABD (KUB)    Narrative  EXAM: XR ABD (KUB)    CLINICAL INDICATION/HISTORY: NGT Placement    COMPARISON: 8/31/2022    TECHNIQUE: Supine AP view of the abdomen was obtained.    _______________    FINDINGS:    Gastric tube extends from midline thoracic esophagus curves through the left  upper quadrant and has the tip and side-port overlying right mid abdomen in the  region of the distal stomach or proximal duodenum. No air distended bowel loops  nor other evidence of bowel obstruction or bowel dilatation. The soft tissue planes and bony structures appear normal.    _______________    Impression  Adequate gastric tube placement with tip and side-port in the region of the  distal stomach/proximal duodenum. AXR 8/27  FINDINGS:   SUPPORT DEVICES: A nasogastric tube is positioned in the stomach. BOWEL GAS PATTERN: The gas pattern is nonobstructive. SOFT TISSUES: Unremarkable. BONES: Degenerative changes are seen throughout the spine. ADDITIONAL FINDINGS: None. IMPRESSION   No significant abnormality. Please note: Voice-recognition software may have been used to generate this report, which may have resulted in some phonetic-based errors in grammar and contents. Even though attempts were made to correct all the mistakes, some may have been missed, and remained in the body of the document.       Olivia Salomon MD  9/4/2022

## 2022-09-05 NOTE — PROGRESS NOTES
Hospitalist Progress Note    Patient: Sameera Kerns. MRN: [id number]  [facility]: [id number]    YOB: 1945  Age: 78 y.o. Sex: male    DOA: 8/27/2022 LOS:  LOS: 9 days          Chief Complaint:    anemia      Assessment/Plan     78 y.o. 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 Admitted for severe septic shock with multiorgan and hypotension involvement due to multiple possible sources of infection, severe hyperkalemia, acute metabolic encephalopathy, metabolic acidosis, strangulated left inguinal hernia, anemia requiring blood transfusion, mild hypoxia coagulable state.       Hypokalemia-better than yesterday but still needs further repletion, orders in  Hold lasix as not in overt volume overload    Mild bleeding from NG tube being pulled out by patient, monitor, transfusion in progress     Dysphagia-for MBS tomorrow, hold tube feeds for now as has had bleeding, get KUB xray, gentle IVF     Incarcerated right inguinal hernia -status post ex lap with reduction of incarcerated right groin hernia with hernia mesh     Prior AKA left side    Infected sacral ulcer -weeping purulent material, seen by wound care     Infected medial maleolar and heel ulcers right foot-completed abx     Anemia -acute blood loss with chronic disease     Acute renal failure -likely due to profound shock, appreciate nephrology expertise, urine output improving, cr slowly improving     encephalopathy , metabolic, I do not think he has decision making capacity as still confused at times     S/p thoracentesis for effusion      Poor prognosis with multiple medical ailments and advanced PVD, diabetes complications, prior BKA< wounds and debilitated state  Ortho has seen for possible BKA right side     Family will need to review his high risk status for this versus consideration of comfort care  I updated wife this am  I advised gravity of illness again  And consideration for hospice     Hold heparin for bleeding     Poor prognosis     DNR     Disposition :  Patient Active Problem List   Diagnosis Code    DM2 (diabetes mellitus, type 2) ([facility] E11.9    CAD (coronary artery disease) I25.10    Acute renal failure (ARF) ([facility]) N17.9    CKD (chronic kidney disease) stage 3, GFR 30-59 ml/min ([facility]) N18.30    Elevated brain natriuretic peptide (BNP) level R79.89    UTI (urinary tract infection) N39.0    High anion gap metabolic acidosis N22.8    Hyperkalemia E87.5    Sepsis ([facility] A41.9    AMS (altered mental status) R41.82    Strangulated inguinal hernia K40.30    Right inguinal hernia K40.90    Leukocytosis D72.829    Anemia D64.9    Chronic indwelling Mccray catheter Z97.8    S/P BKA (below knee amputation), left ([facility]) Z89.512    Right foot ulcer ([facility]) L97.519    Paroxysmal atrial fibrillation ([facility]) I48.0    Diabetic ulcer of right foot ([facility]) E11.621, L97.519    Pleural effusion on right J90    Severe protein-calorie malnutrition ([facility]) E43    Acute respiratory failure with hypoxemia ([facility]) J96.01    Septic shock ([facility]) A41.9, R65.21       Subjective:  He pulled NG tube out  It has been replaced  Small bleeding noted by nurses  Hgb down under 7, receiving transfusion of blood this am  Potassium repeltion ordered also  Pt is awake but confused  NAD      Review of systems:    Difficult to obtain with confusion  he overall denies pain or SOB      Vital signs/Intake and Output:  Visit Vitals  BP (!) 163/89   Pulse 100   Temp 98 °F (36.7 °C)   Resp 18   Ht 5' 8\" (1.727 m)   Wt 63.8 kg (140 lb 9.6 oz)   SpO2 96%   BMI 21.38 kg/m²     Current Shift:  09/05 0701 - 09/05 1900  In: 288.8   Out: -   Last three shifts:  09/03 1901 - 09/05 0700  In: -   Out: 1850 [[id number]]    Exam:    General: ill appearing frail weak WM NAD  Head/Neck: NG tube  CVS:Regular rate and rhythm, no M/R/G, S1/S2 heard, no thrill  Lungs:Clear to auscultation bilaterally, no wheezes, rhonchi, or rales  Abdomen: Soft, Nontender, No distention  Extremities: left AKA site dressed, right foot dressed, no pitting edema  caal  Neuro:grossly normal     Labs: Results:       Chemistry Recent Labs     09/05/22  0040 09/04/22  0110 09/03/22  0610   * 100* 84   * 146* 142   K 3.1* 2.9* 3.3*   * 110 109   CO2 20* 24 25   BUN 76* 79* 79*   CREA 2.58* 2.52* 2.65*   CA 9.0 8.7 9.0   AGAP 16 12 8   BUCR 29* 31* 30*   AP 99 100 114   TP 6.0* 5.9* 6.2*   ALB 3.1* 3.2* 3.4   GLOB 2.9 2.7 2.8   AGRAT 1.1 1.2 1.2      CBC w/Diff Recent Labs     09/05/22 0040 09/04/22  0110 09/03/22  0610   WBC 5.9 7.5 9.4   RBC 2.68* 2.82* 3.29*   HGB 6.7* 7.2* 8.4*   HCT 22.3* 23.2* 27.3*    210 234   GRANS 61 77* 75*   LYMPH 25 13* 19*   EOS 3 4 2      Cardiac Enzymes No results for input(s): CPK, CKND1, LEONEL in the last 72 hours. No lab exists for component: CKRMB, TROIP   Coagulation No results for input(s): PTP, INR, APTT, INREXT in the last 72 hours. Lipid Panel No results found for: CHOL, CHOLPOCT, CHOLX, CHLST, CHOLV, 911043, HDL, HDLP, LDL, LDLC, DLDLP, 131223, VLDLC, VLDL, TGLX, TRIGL, TRIGP, TGLPOCT, CHHD, CHHDX   BNP No results for input(s): BNPP in the last 72 hours.    Liver Enzymes Recent Labs     09/05/22  0040   TP 6.0*   ALB 3.1*   AP 99      Thyroid Studies Lab Results   Component Value Date/Time    TSH 4.73 (H) 08/28/2022 08:04 AM        Procedures/imaging: see electronic medical records for all procedures/Xrays and details which were not copied into this note but were reviewed prior to creation of Flakita Boone MD

## 2022-09-05 NOTE — PROGRESS NOTES
Patient:  Randa Doherty :  1945  Gender:  male  MRN #:  [id number]    Assessment:   Randa Doherty is a 78 y.o. male with hypertension, hyperlipidemia, stroke , chronic atrial fibrillation, on Plavix, DM,previous, COVID-19 infection, chronic indwelling urinary catheter for urinary retention, severe peripheral vascular disease left BKA admitted in ICU with septic shock , acute renal failure, hyperkalemia , metabolic acidosis and strangulated left inguinal hernia  s/p emergency  surgery   S/p extubation, breathing comfortably on nasal canula. Decent urine output        Acute renal failure- Improving   Hypokalemia   Hypernatremia     Plan:   He has decent urine output with slow improvement in renal function everyday   Clinically not in fluid overload, serum sodium is slowly rising , agree with half NS and D5W   Agree to hold lasix for now   Minimize volume infusion ,   Avoid NSAIDS, contrast and nephrotoxin   Dose all meds and antibiotics for current eGFR  Transfuse prn to keep hemoglobin > 7 gram/dl   KCL 40 meq BID   Replace prn to keep level 4-4.5 meq/lt     Subjective/Interval Events    Decent urine output  BP stable, he is awake and alert , follows simple command   Not able to communicate           Intake/Output Summary (Last 24 hours) at 2022 1235  Last data filed at 2022 1048  Gross per 24 hour   Intake 288.75 ml   Output --   Net 288.75 ml           Blood pressure (!) 163/89, pulse 100, temperature 98 °F (36.7 °C), resp. rate 18, height 5' 8\" (1.727 m), weight 63.8 kg (140 lb 9.6 oz), SpO2 96 %.     Exam:    Not conversant , awake and alert   Lung: clear to auscultation  Ext: no edema in RLE, erythema, wrapped in ACE bandage        Recent Labs     22  0040   WBC 5.9   HCT 22.3*   MCV 83.2       Recent Labs     22  004   *   CO2 20*   BUN 76*       Recent Labs     22  004   AGRAT 1.1       I have reviewed patient's record for pertinent labs, radiology and other test . I have reviewed old records. I have ordered labs, medications and radiology as necessary and indicated per patient's condition . Total time spent is approximately 26 minutes. Greater than 50 % of that time was spent in counseling and or care coordination.        Roxy Olmstead MD  Nephrology -[facility], [facility].

## 2022-09-05 NOTE — PROGRESS NOTES
Speech Pathology Note:    Speech intervention attempted on this date. Pt off the floor to ultrasound. SLP to follow up on the next scheduled date.      Thank you,  Rowena Goldberg MA CCC-SLP  Speech-Language Pathologist

## 2022-09-05 NOTE — PROGRESS NOTES
[facility]  (A Division of [address] [place] [address])                                                                                                                      Lesley Huizar MD  Office #: - Option # [phone]  Fax #: [phone]     Date of Admission: 8/27/2022Date of Note: 9/5/2022  Reason for Referral: Evaluation and antibiotic management of septic shock. Current Antimicrobials:    Prior Antimicrobials:    Zosyn 8/27 to date # D 8   Levofloxacin / Vancomycin 8/27 to 8/30       Assessment- ID related:  --------------------------------------------------------------      Septic shock with ELINOR/ resp failure and encephalopathy 2/2 below: Improved  Encephalopathy  Strangulated and incarcerated R groin hernia -post X-lap 8/27- viable bowel per op note, no resection  Leukemoid reaction- Improved  S/P resp failure- post op, remains intubated--increasing interstiial edema and R P.effusion- extubated 9/1/22  -- Resp culture: MRSA colonized 8/31/22  --S/P R thoracentesis with 460cc of fluid, PH 7/7, culture in progress-- 9/2/22  BCX:  NGSF 8/27  UCX: >2 organism on cx  BL lung infiltrates due to atelectasis/ consolidation /has pleural effusion   PAD with non healing heel ulcers, unstagable eschar, skin necrosis, on RLE  L BKA  DMT2  Anemia-<70 9/5/22    Recommendation for ID issues I am following:  ------------------------------------------------------------------------------    Monitor off systemic abx  Will follow peripherally                  -> Is colonizer. No need to treat at this time  --> pleural fluid culture: NGSF              Poor prognosis overall. Plan of care under review per primary team            Subjective:  Pt seen. On RA-- events noted. Removed his NGT X2. HB is drifting down to 6.7. Fms in place with dark liquid BM. Afebrile. DW nursing. CXR 9/2:     1. No significant interval change.  Probable small right pleural effusion, not  appreciably changed. 2. Right lung parenchymal findings likely largely reflect scar though are not  well assessed. Pneumonia or aspiration not entirely excluded. HPI:  Farrukh Kapoor is a 78 y.o. WHITE/NON- with PMH DM, hx of stroke, PAD- with L BKA --admitted in septic shock, leukmoid reaction and acute renal failure on 8/27 from strangulated/incarcerated r groin hernia. Underwent X-lap, bowel was viable and no bowel resection done. Improved septic shock with surgery and triple ABX-- bcx negative. Has BL lung infiltrate with effusion as well on CXR. Nexrotic/eschar wound lesion on RLE.             Active Hospital Problems    Diagnosis Date Noted    Acute respiratory failure with hypoxemia ([address] 08/28/2022    Septic shock (HCC) 08/28/2022    High anion gap metabolic acidosis 37/63/3814    Hyperkalemia 08/27/2022    Sepsis ([address] 08/27/2022    AMS (altered mental status) 08/27/2022    Strangulated inguinal hernia 08/27/2022    Right inguinal hernia 08/27/2022    Leukocytosis 08/27/2022    Anemia 08/27/2022    Chronic indwelling Mccray catheter 08/27/2022    S/P BKA (below knee amputation), left ([address] 08/27/2022    Right foot ulcer ([address] 08/27/2022    Paroxysmal atrial fibrillation ([address] 08/27/2022    Diabetic ulcer of right foot ([address] 08/27/2022    Pleural effusion on right 08/27/2022    Severe protein-calorie malnutrition ([address] 08/27/2022    UTI (urinary tract infection) 10/29/2021    Acute renal failure (ARF) ([address] 05/29/2018     Past Medical History:   Diagnosis Date    A-fib ([address]     Asthma     CAD (coronary artery disease)     Chronic kidney disease     stage 3    COVID-19     Diabetes (HCC)     GERD (gastroesophageal reflux disease)     Heart failure (HCC)     chronic diastolic heart failure    High cholesterol     Hypertension     Ill-defined condition     chronic osteomyelitis left ankle and foot    Ill-defined condition     chronic arteriosclerosis    PVD (peripheral vascular disease) ([address] Stroke [facility])     cva     Past Surgical History:   Procedure Laterality Date    COLONOSCOPY N/A 6/1/2018    COLONOSCOPY, POLYPECTOMY performed by Mayra Kemp MD at [facility] ENDOSCOPY    HX CATARACT REMOVAL      HX ORTHOPAEDIC Left 2021    ankle washout, external fixator, would vac    HX ORTHOPAEDIC      external fixator removed     History reviewed. No pertinent family history. Social History     Socioeconomic History    Marital status:      Spouse name: Not on file    Number of children: Not on file    Years of education: Not on file    Highest education level: Not on file   Occupational History    Not on file   Tobacco Use    Smoking status: Former    Smokeless tobacco: Never   Substance and Sexual Activity    Alcohol use: Not Currently    Drug use: No    Sexual activity: Not on file   Other Topics Concern    Not on file   Social History Narrative    Not on file     Social Determinants of Health     Financial Resource Strain: Not on file   Food Insecurity: Not on file   Transportation Needs: Not on file   Physical Activity: Not on file   Stress: Not on file   Social Connections: Not on file   Intimate Partner Violence: Not on file   Housing Stability: Not on file       Allergies:  Patient has no known allergies.      Medications:  Current Facility-Administered Medications   Medication Dose Route Frequency    0.9% sodium chloride infusion 250 mL  250 mL IntraVENous PRN    potassium bicarb-citric acid (EFFER-K) tablet 40 mEq  40 mEq Oral BID    [Held by provider] furosemide (LASIX) injection 20 mg  20 mg IntraVENous DAILY    hydrALAZINE (APRESOLINE) 20 mg/mL injection 20 mg  20 mg IntraVENous Q6H PRN    ipratropium (ATROVENT) 0.02 % nebulizer solution 0.5 mg  0.5 mg Nebulization TID RT    budesonide (PULMICORT) 500 mcg/2 ml nebulizer suspension  500 mcg Nebulization BID RT    [Held by provider] albumin human 25% (BUMINATE) solution 25 g  25 g IntraVENous Q12H    dextrose 5 % - 0.45% NaCl infusion  45 mL/hr IntraVENous CONTINUOUS    midodrine (PROAMATINE) tablet 2.5 mg  2.5 mg Oral BID    midazolam (VERSED) injection 0.5 mg  0.5 mg IntraVENous BID PRN    arformoteroL (BROVANA) neb solution 15 mcg  15 mcg Nebulization BID RT    0.9% sodium chloride infusion 250 mL  250 mL IntraVENous PRN    [Held by provider] heparin (porcine) injection 5,000 Units  5,000 Units SubCUTAneous Q8H    pantoprazole (PROTONIX) 40 mg in 0.9% sodium chloride 10 mL injection  40 mg IntraVENous Q12H    sodium chloride (NS) flush 5-40 mL  5-40 mL IntraVENous Q8H    sodium chloride (NS) flush 5-40 mL  5-40 mL IntraVENous PRN    acetaminophen (TYLENOL) tablet 650 mg  650 mg Oral Q6H PRN    Or    acetaminophen (TYLENOL) suppository 650 mg  650 mg Rectal Q6H PRN    polyethylene glycol (MIRALAX) packet 17 g  17 g Oral DAILY PRN    ondansetron (ZOFRAN ODT) tablet 4 mg  4 mg Oral Q8H PRN    Or    ondansetron (ZOFRAN) injection 4 mg  4 mg IntraVENous Q6H PRN    magnesium sulfate 2 g/50 ml IVPB (premix or compounded)  2 g IntraVENous PRN    insulin lispro (HUMALOG) injection   SubCUTAneous Q6H    glucose chewable tablet 16 g  4 Tablet Oral PRN    glucagon (GLUCAGEN) injection 1 mg  1 mg IntraMUSCular PRN    dextrose 10% infusion 0-250 mL  0-250 mL IntraVENous PRN    HYDROmorphone (DILAUDID) injection 1 mg  1 mg IntraVENous Q4H PRN        ROS:  Pertinent items are noted in the History of Present Illness. Physical Exam:    Temp (24hrs), Av °F (36.7 °C), Min:97.2 °F (36.2 °C), Max:98.7 °F (37.1 °C)    Visit Vitals  BP (!) 163/89   Pulse 100   Temp 98 °F (36.7 °C)   Resp 18   Ht 5' 8\" (1.727 m)   Wt 63.8 kg (140 lb 9.6 oz)   SpO2 96%   BMI 21.38 kg/m²          GEN: WD sick looking, extubated on RA    PIV-R    HEENT: Unicteric. EOMI intact  CHEST: Non laboured breathing. ABD: Obese/soft. Non tender. Non distended. Surgical wound dressed, skin looks ok.  FMS in place  LISET: caal in place  EXT: No apparent swelling or redness on UE/LE joints-edematous hands  -L BKA  -R foot is dressed, wound not seen- pictures reviewed-- last on 8/29  --sacral wound not seen  Skin: Dry and intact. bruising-- skin wounds not examined  CNS:awake, on RA       Microbiology  All Micro Results       Procedure Component Value Units Date/Time    AFB CULTURE + SMEAR W/RFLX ID FROM CULTURE [[id number]] Collected: 09/02/22 1430    Order Status: Completed Specimen: Miscellaneous sample Updated: 09/03/22 2035     Source PLEURAL FLUID        AFB Specimen processing Concentration     Acid Fast Smear Negative        Comment: (NOTE)  Performed At: [address] [id number]  Óscar Hernandez MD ARETHA:[id number]          Acid Fast Culture PENDING    CULTURE, BODY FLUID W Rachel Hem [[id number]] Collected: 09/02/22 1430    Order Status: Completed Specimen: Body Fluid from Pleural Fluid Updated: 09/03/22 1605     Special Requests: NO SPECIAL REQUESTS        GRAM STAIN NO WBC'S SEEN         NO ORGANISMS SEEN        Culture result: NO GROWTH THUS FAR       CULTURE, FUNGUS [[id number]] Collected: 09/02/22 1430    Order Status: Sent Specimen: Pleural Fluid Updated: 09/03/22 0004    CULTURE, RESPIRATORY/SPUTUM/BRONCH [place] STAIN [[id number]]  (Susceptibility) Collected: 08/31/22 0018    Order Status: Completed Specimen: Sputum from Tracheal Aspirate Updated: 09/02/22 1537     Special Requests: NO SPECIAL REQUESTS        GRAM STAIN 1+ WBCS SEEN               RARE EPITHELIAL CELLS SEEN                  OCCASIONAL GRAM POSITIVE COCCI IN CLUSTERS           Culture result:       HEAVY * Methicillin Resistant Staphylococcus aureus *                  NO NORMAL RESPIRATORY VICENTA ISOLATED            (NOTE) MRSA NOTIFIED MS SARAH SHAW ON 9/2/22 AT 1534.  [facility]    CULTURE, BLOOD [[id number]] Collected: 08/27/22 1030    Order Status: Completed Specimen: Blood Updated: 09/02/22 0755     Special Requests: NO SPECIAL REQUESTS        Culture result: NO GROWTH 6 DAYS       CULTURE, BLOOD [[id number]] Collected: 08/27/22 1100    Order Status: Completed Specimen: Blood Updated: 09/02/22 0755     Special Requests: NO SPECIAL REQUESTS        Culture result: NO GROWTH 6 DAYS       CULTURE, URINE [[id number]] Collected: 08/27/22 1442    Order Status: Completed Specimen: Cath Urine Updated: 08/29/22 0642     Special Requests: NO SPECIAL REQUESTS        [place] Count --        84034  COLONIES/mL       Culture result:       >2 ORGANISMS - CONTAMINATED SPECIMEN. SUGGEST RECOLLECTION          CULTURE, BLOOD [[id number]]     Order Status: Canceled Specimen: Blood     COVID-19 RAPID TEST [[id number]] Collected: 08/27/22 1030    Order Status: Completed Specimen: Nasopharyngeal Updated: 08/27/22 1057     Specimen source SWAB        COVID-19 rapid test Not detected        Comment: Rapid Abbott ID Now       Rapid NAAT:  The specimen is NEGATIVE for SARS-CoV-2, the novel coronavirus associated with COVID-19. Negative results should be treated as presumptive and, if inconsistent with clinical signs and symptoms or necessary for patient management, should be tested with an alternative molecular assay. Negative results do not preclude SARS-CoV-2 infection and should not be used as the sole basis for patient management decisions. This test has been authorized by the FDA under an Emergency Use Authorization (EUA) for use by authorized laboratories.    Fact sheet for Healthcare Providers: ConventionUpdate.co.nz  Fact sheet for Patients: ConventionUpdate.co.nz       Methodology: Isothermal Nucleic Acid Amplification                   Lab results:    Chemistry  Recent Labs     09/05/22  0040 09/04/22  0110 09/03/22  0610   * 100* 84   * 146* 142   K 3.1* 2.9* 3.3*   * 110 109   CO2 20* 24 25   BUN 76* 79* 79*   CREA 2.58* 2.52* 2.65*   CA 9.0 8.7 9.0   AGAP 16 12 8   BUCR 29* 31* 30*   AP 99 100 114   TP 6.0* 5.9* 6.2*   ALB 3.1* 3.2* 3.4   GLOB 2.9 2.7 2. 8   AGRAT 1.1 1.2 1.2       CBC w/ Diff  Recent Labs     09/05/22  0040 09/04/22  0110 09/03/22  0610   WBC 5.9 7.5 9.4   RBC 2.68* 2.82* 3.29*   HGB 6.7* 7.2* 8.4*   HCT 22.3* 23.2* 27.3*    210 234   GRANS 61 77* 75*   LYMPH 25 13* 19*   EOS 3 4 2       CT HEAD WO CONT    Result Date: 8/27/2022  FINDINGS: BRAIN AND POSTERIOR FOSSA: Mild periventricular areas of decreased attenuation are identified. Gray-white matter interfaces are preserved. No intraparenchymal hemorrhage, mass effect or midline shift. The ventricular system is midline and symmetric. Atherosclerotic vascular calcifications are identified. EXTRA-AXIAL SPACES AND MENINGES: There is no evidence for extra-axial mass lesion or fluid collection. CALVARIUM: Intact. SINUSES: Sphenoid sinus mucosal thickening. OTHER: Orbits are grossly intact. Facial soft tissue are within normal limits. _______________     1. No evidence for intracranial hemorrhage, mass effect or midline shift. 2.  Mild periventricular areas of microvascular ischemic change. 3.  Cerebral atherosclerosis. 4.  Atrophy. If there are continued symptoms, a MRI is recommended for further evaluation. CT ABD PELV WO CONT    Result Date: 8/27/2022  FINDINGS: LOWER CHEST: Moderate right pleural effusion with adjacent atelectasis. LIVER, BILIARY: Liver is normal. No biliary dilation. Gallbladder is unremarkable. PANCREAS: Normal. SPLEEN: Splenic granuloma. ADRENALS: Normal. KIDNEYS: Normal. LYMPH NODES: No enlarged lymph nodes. GASTROINTESTINAL TRACT: Uncomplicated sigmoid diverticula. Gastric distention. PELVIC ORGANS: Unremarkable. VASCULATURE: Atherosclerotic vascular calcifications. BONES: No acute or aggressive osseous abnormalities identified. OTHER: Large right inguinal hernia with large and small bowel. There is diffuse circumferential wall thickening of ascending colon within the hernia. _______________     1. Large right inguinal hernia with large and small bowel.  There is diffuse wall thickening of the ascending colon concerning for strangulation. No evidence for bowel obstruction. 2. Right pleural effusion and bilateral basilar atelectasis. 3. Gastric distention. XR CHEST PORT    Result Date: 8/30/2022  EXAM:  PORTABLE CHEST INDICATION:  Follow up. TECHNIQUE:  Portable, AP view. COMPARISON:  08/29/2022 ____________________ FINDINGS:  SUPPORT DEVICES: Endotracheal tube approximately 4 cm above the alise. OG tube is traversing below left hemidiaphragm, tip not imaged. Esophageal probe is present at the level of the endotracheal tube. HEART AND MEDIASTINUM: Stable. LUNGS AND PLEURAL SPACES: Persistent right basilar opacity consistent with pleural effusion with adjacent consolidation. No pneumothorax. BONY THORAX AND SOFT TISSUES: No acute osseous abnormality. ____________________     1. Endotracheal tube approximately 4 cm above the alise. 2. No significant change in right basilar opacity consistent with pleural effusion with adjacent consolidation, atelectasis or pneumonia. *  **     XR CHEST PORT    Result Date: 8/29/2022  FINDINGS: HEART AND MEDIASTINUM: Anterior endotracheal tube tip estimated at 6.1 cm above alise. Enteric tube tip projects over left upper quadrant expected location gastric fundus. Esophageal temperature probe tip projects over lower thoracic . Stable cardiomediastinal silhouette allowing for angulation. LUNGS AND PLEURAL SPACES: Progressive right pleural effusion and patchy opacities in the right lung. Left lung relatively clear allowing for technique. BONY THORAX AND SOFT TISSUES: No gross acute osseous abnormality. 1. Right pleural effusion and subjacent atelectasis/infiltrate, perhaps slightly progressed. 2. Tubes and lines stable in visualized extent. XR CHEST PORT    Result Date: 8/28/2022  FINDINGS: SUPPORT DEVICES: Endotracheal tube distal tip projects 5.5 cm above the alsie.  Enteric tube distal tip projects below the left hemidiaphragm likely in the stomach. HEART AND MEDIASTINUM: No appreciable cardiomegaly. Remaining mediastinal contours are stable. LUNGS AND PLEURAL SPACES: Right pleural effusion with adjacent opacity. No evidence for pneumothorax. Left lung is clear. BONY THORAX AND SOFT TISSUES: Unremarkable. _______________     1. Right pleural effusion with adjacent atelectasis/infiltrates. 2. Supporting tubes appear stable. XR CHEST PORT    Result Date: 8/27/2022  FINDINGS: SUPPORT DEVICES: An endotracheal tube is positioned 6 cm above the alise. HEART AND MEDIASTINUM: Heart size and mediastinal contour are midline and within normal limits. LUNGS AND PLEURAL SPACES: There are opacities throughout both lungs but more confluent within the right mid and lower lung zones. No pneumothorax. BONY THORAX AND SOFT TISSUES: Unremarkable. _______________     Interval right greater than left airspace disease Small to moderate right pleural effusion    XR CHEST PORT    Result Date: 8/27/2022FINDINGS: HEART AND MEDIASTINUM: No appreciable cardiomegaly. Remaining mediastinal contours within normal limits. LUNGS AND PLEURAL SPACES: Left lung is clear. Right pleural effusion with adjacent opacity. BONY THORAX AND SOFT TISSUES: Unremarkable. _______________     1. Right pleural effusion with probable adjacent atelectasis. XR ABD PORT  1 V    Result Date: 8/27/2022  INDINGS: SUPPORT DEVICES: A nasogastric tube is positioned in the stomach. BOWEL GAS PATTERN: The gas pattern is nonobstructive. Pamalee Bucker SOFT TISSUES: Unremarkable. BONES: Degenerative changes are seen throughout the spine. ADDITIONAL FINDINGS: None. _______________     No significant abnormality. ECHO ADULT COMPLETE    Result Date: 8/28/2022  Formatting of this result is different from the original.   Left Ventricle: Normal left ventricular systolic function with a visually estimated EF of 60 - 65%. Left ventricle size is normal. Normal wall thickness. Normal wall motion.  Grade I diastolic dysfunction present with normal LV EF. Unable to assess RVSP due to inadequate or insignificant tricuspid regurgitation.      Procedures/imaging: see electronic medical records for all procedures/Xrays and details which were not copied into this note but were reviewed prior to creation of Plan

## 2022-09-05 NOTE — PROGRESS NOTES
Comprehensive Nutrition Assessment    Type and Reason for Visit: Reassess, Wound, NPO/clear liquid    Nutrition Recommendations/Plan:   Recommend restarting tube feeds when clinically appropriate if pt not cleared for PO diet- MD noted that pt has bleeding today so TF currently held. Continue tube feeding regimen:     08/31/22 0810   Enteral Nutrition   EN Formula Renal   Schedule Continuous   Feeding Regimen Goal: Nepro at 45ml/hr   Additives/Modulars None   Water Flushes 100mL q4   Current EN & Flush Order Provides Nepro @ 45ml/hr:   1782kcal, 80g PRO, 165g CHO, 718ml free water + 600 flush (1318ml)        Malnutrition Assessment:  Malnutrition Status: At risk for malnutrition (specify) (08/29/22 1028)      Nutrition Assessment:    Pt extubated 9/1. Per SLP eval 9/2, recommended to continue NPO for pt d/t risk of aspiration. Per physician note, plan is to keep TF held. Pt will have MBS tomorrow to evaluate possibility of PO diet. MD noted that renal function is slowly Improving. Recommendations for TF attached, if pt is not able to begin PO diet. Nutrition Related Findings:    Potassium (3.1) L but up from 2.9 on 9/4. Pertinent meds- D5%- 0.45% NaCl infusion @ 45 ml/hr (provides 54g dextrose and 184 kcal), Humalog, Magnesium sulfate, Zofran, Miralax, Effer-K. BM 9/5. 500 ml output (urine). Wound Type: Multiple, Pressure injury, Unstageable, Surgical incision (Unstageable of R ankle, R heel, foot)      Current Nutrition Intake & Therapies:  Average Meal Intake: NPO  Average Supplement Intake: NPO  DIET NPO    Anthropometric Measures:  Height: 5' 8\" (172.7 cm)  Ideal Body Weight (IBW): 154 lbs (70 kg)  Current Body Wt:  63.8 kg (140 lb 10.5 oz), 91.3 % IBW.     Current BMI (kg/m2): 21.4  Usual Body Weight: 72.6 kg (160 lb) (6/6/2022)  % Weight Change (Calculated): 14.1  Weight Adjustment: Amputation  Total Amputation Percentage: 5.9  Adjusted Ideal Body Weight (lbs) (Calculated): 144.9  Adjusted Ideal Body Weight (kg) (Calculated): 65.86 kg  Adjusted % IBW (Calculated): 97.1  Adjusted BMI (Calculated): 22.7  BMI Category: Normal weight (BMI 22.0-24.9) age over 72    Estimated Daily Nutrient Needs:  Energy Requirements Based On: Formula (MSJ x 1.2-1.4)  Weight Used for Energy Requirements: Current  Energy (kcal/day): 8341 - 1887  Weight Used for Protein Requirements: Current (0.8-1)  Protein (g/day): 51-64  Method Used for Fluid Requirements: Standard renal  Fluid (ml/day): 750- 1500 ml    Nutrition Diagnosis:   Inadequate oral intake related to impaired respiratory function as evidenced by NPO or clear liquid status due to medical condition, intubation, nutrition support-enteral nutrition    Nutrition Interventions:   Food and/or Nutrient Delivery: Start tube feeding, Continue NPO  Nutrition Education/Counseling: No recommendations at this time  Coordination of Nutrition Care: Continue to monitor while inpatient       Goals:  Previous Goal Met: No progress toward goal(s)  Goals: Initiate nutrition support, by next RD assessment       Nutrition Monitoring and Evaluation:   Behavioral-Environmental Outcomes: None identified  Food/Nutrient Intake Outcomes: Diet advancement/tolerance, Enteral nutrition intake/tolerance  Physical Signs/Symptoms Outcomes: Biochemical data, GI status, Skin, Weight, Chewing or swallowing    Discharge Planning:     Too soon to determine    Duane Anderson, [address]  Contact: [phone]

## 2022-09-05 NOTE — ROUTINE PROCESS
Bedside and Verbal shift change report given to [address] [place] [address] (oncoming nurse) by Solo Choi RN (offgoing nurse). Report included the following information SBAR, Kardex, MAR, and Recent Results.

## 2022-09-06 PROBLEM — E87.0 HYPERNATREMIA: Status: ACTIVE | Noted: 2022-01-01

## 2022-09-06 NOTE — PROGRESS NOTES
Patient:  Clark Mcmillan :  1945  Gender:  male  MRN #:  [id number]    Assessment:   Clark Mcmillan is a 78 y.o. male with hypertension, hyperlipidemia, stroke , chronic atrial fibrillation, on Plavix, DM,previous, COVID-19 infection, chronic indwelling urinary catheter for urinary retention, severe peripheral vascular disease left BKA admitted in ICU with septic shock , acute renal failure, hyperkalemia , metabolic acidosis and strangulated left inguinal hernia  s/p emergency  surgery   Breathing comfortably on nasal canula. Decent urine output, now developing hypernatremia and intermittent hypokalemia         Acute renal failure- Improving   Hypokalemia   Hypernatremia     Plan:   He has decent urine output with slow improvement in renal function everyday   D5W 50 cc/hour , encourage free water, or 250 cc free water from NG tube q 6  hourly   Hold lasix for now   Minimize volume infusion ,   Avoid NSAIDS, contrast and nephrotoxin   Dose all meds and antibiotics for current eGFR  Transfuse prn to keep hemoglobin > 7 gram/dl     Hypokalemia- KCL 40 mew oral now   10 meq IV x 6 runs and repeat level     Subjective/Interval Events    Decent urine output   he is awake and alert , follows simple command , mental status improving per wife             Intake/Output Summary (Last 24 hours) at 2022 1125  Last data filed at 2022 0410  Gross per 24 hour   Intake --   Output 1000 ml   Net -1000 ml           Blood pressure (!) 177/75, pulse (!) 112, temperature 99.5 °F (37.5 °C), resp. rate 18, height 5' 8\" (1.727 m), weight 58.6 kg (129 lb 1.6 oz), SpO2 96 %.     Exam:    Not conversant , awake and alert   Lung: clear to auscultation  Ext: no edema in RLE, erythema, wrapped in ACE bandage        Recent Labs     22  0055   WBC 8.4   HCT 29.2*   MCV 85.4       Recent Labs     22  0055   *   CO2 21   BUN 68*       Recent Labs     22   AGRAT 0.9     Discussed with wife     I have reviewed patient's record for pertinent labs, radiology and other test . I have reviewed old records. I have ordered labs, medications and radiology as necessary and indicated per patient's condition . Total time spent is approximately 31 minutes. Greater than 50 % of that time was spent in counseling and or care coordination.        Marisol Tadeo MD  Nephrology -[facility], [facility].

## 2022-09-06 NOTE — PROGRESS NOTES
Palliative Medicine Consult    Patient Name: Christina Kowalski YOB: 1945    Date of Initial Consult: 8/29/2022  Date of follow up: 9/6/2022  Reason for Consult: Goals of care discussions  Requesting Provider: Dr. Del Llanes   Primary Care Physician: Lance Min MD      SUMMARY:   Christina Kowalski is a 78 y.o. with a past history of hypertension, hyperlipidemia, stroke, PAD, chronic atrial fibrillation, on Plavix, DM, CKD, previous, COVID-19 infection, chronic indwelling urinary catheter for urinary retention, and severe peripheral vascular disease with worsening gangrenous left foot necessitating left BKA during 8 day hospitalization last year, who was admitted on 8/27/2022 from home with a diagnosis of incarcerated Right inguinal hernia, severe septic shock with hypotension, severe hyperkalemia, acute metabolic encephalopathy, and anemia. Current medical issues leading to Palliative Medicine involvement include: goals of care discussions. 8/30/2022: Patient was off sedation, moving head back and forth, appears in distress. 9/1/2022: Patient sedation help, plan for SBT today, with possible medical extubation. 9/2/2022: Patient was medically extubated on 9/1/2022, he is oriented to person and place, disoriented to current time and situation. Appears frail, fatigued, and weak. 9/6/2022: Patient is awake, alert, oriented to person and place, confused about current time and situation. He states he is 54years old. PALLIATIVE DIAGNOSES:   Goals of care discussions  Septic shock  Acute respiratory failure   incarcerated Right inguinal hernia  Advanced age/debility       PLAN:   9/6/2022: Palliative medicine team including Bogdan Bennett RN and I met with patient at patient's bedside. Patient is awake, alert, oriented to person and place, confused about current time and situation. He states he is 54years old.   Met with wife later in the day, who states that each day patient appears to be doing better, and improving cognitively, now able to eat a pureed diet. Readdressed goals of care today, confirmed DNR/DNI. Readdressed the likely need of right lower extremity amputation versus implementation of comfort measures with hospice at discharge. Wife states that patient states \"cut it off\" when discussing his foot ulcers and seems to be okay with having the amputation. Explained at this time I do not believe patient understands the benefits and burdens of his medical choices, wife agrees. Wife would like to give patient more time to clear cognitively to participate in his goals of care conversation, understanding patient is high risk for recurrent sepsis due to severe PVD in the right lower extremity with chronic vascular ulcers. Family is not ready for hospice. We will follow with you. See previous discussions below:    9/2/2022: Palliative medicine team including  CHERYLE Sharma and I met with patient at patient's bedside. Patient is awake, alert, oriented to person and place, disoriented to current time and situation. He is not able to understand complex goals of care discussions at this time. Met with patient later in the day and wife was at bedside. Reviewed goals of care with wife. Wife has a good understanding of current health situation; we explained that patient is too weak, and confused to participate in goals of care discussions at this time. Reviewed overall level of care, with concern for RLL chronic vascular ulcers with previous recommendations from [facility] (6/9/2022) for consideration of Right AKA. Patient has historically declined this amputation. Explained to wife that patient is likely an appropriate hospice candidate, and this should be considered as an option at discharge.  Also discussed the benefits and burdens of continued aggressive measures versus implementing comfort measures with the support of hospice at discharge. Wife would like to give patient more time to see  if he clears mentally to participate in his goals of care, understanding she may have to make the decisions should patient not clear cognitively. At this time continue DNR/DNI. See previous discussions below:    9/1/2022: Palliative medicine team including Bassem Proctor and MANUEL met with patient at patient's bedside today. Patient was being removed from sedation for SBT today. Spoke to hospitalist and intensivist. Patient may be stable enough to medically extubate depending on how he does today with SBT. Per previous goals of care discussions with family, we will wait to see if patient can be medically extubated and readdress further goals of care at that point (right leg necrotic tissue with possible need for amputation versus comfort measures). Patient has historically declined amputation in the past. Goals of care discussions ongoing. Family hopes that patient can be safely extubated and would be able to participate in goals of care discussions. At this time, continue DNR/do not re-intubate for any reason. See previous discussions below:    8/30/2022: Palliative medicine team including Bassem Proctor and MANUEL met with patient at patient's bedside today. Patient was off sedation, moving head back and forth, appears in distress. Family meeting today consisting of palliative team, patient's family (wife Denver Fulling, children Saul Benjamin, and Zayra) hositalist Dr. Anoop Avery, and intensivist Dr. Segun Navarro. Medical update provided. Discussed SBT outcome today with potential for medical extubation tomorrow depending on how his SBT goes tomorrow. Discussed the benefits and burdens of reintubation in the event of respiratory decline post medical extubation. Discussed overall level of care, with concern for RLL chronic vascular ulcers with previous recommendations from [facility] (6/9/2022) for consideration of Right AKA.  Patient has historically declined this amputation. Discussed current quality of life. Family describes patient as a man who values his independence, and patient has been bothered by the fact that he hasn't  walked in over a year. Discussed that septic shock is improving and this event was likely related to bowel sepsis, but that patient is at high risk for recurrent sepsis and further clinical decline due to chronic infection in RLL, with no plan for amputation. Discussed the benefits and burdens of continuing current level of care versus implementation of comfort measures with hospice at discharge. Family clear that he would not find reintubation acceptable. Family is hopeful that if patient is medically extubated, he may wake up enough to participate in goals of care discussions. For now, patient is a DNR/do not re-intubate for any reason. Further goals of care discussions ongoing. See previous discussions below:    8/29/2022:Goals of care discussions: Palliative medicine team including  CHERYLE Tao and I met with patient at patient's bedside. Patient is orally intubated on mechanical ventilation, frequent movements in bed but no command following. Wife Loida Score at bedside (Wife notes that English is her second language but she states she understands our conversation and answering questions appropriately. Offered translation services but wife declined.) Wife confirms DNR in the event of cardiac arrest. Patient is a PARTIAL CODE: No CPR (including no chest compressions, no shock, and no ACLS meds in the event of cardiac arrest). Continue intubation at this time. Wife requesting a meeting with their children involved to address further goals of care. Wife will call me with a time for tomorrow once she has spoken to all of her children. Received a call from patient's son Muriel Arzola and explained to him the desire to meet for family meeting.  Muriel Arzola confirms DNR status upon cardiac arrest. Further goals of care discussions will occur at family meeting tomorrow. Awaiting response from wife with exact time. Septic shock:   multiple possible sources of infection, incarcerated Right inguinal hernia s/p laparotomy and reduction of incarcerated right groin hernia without need for bowel resection-8/27/2022. UA-Positive for UTI; urine culture obtained. sacral ulcer and medial maleolar and heel infected ulcers. On broad spectrum antibiotics per primary team.   Acute respiratory failure: Patient remains intubated postop due to critical illness. incarcerated Right inguinal hernia: s/p laparotomy and reduction of incarcerated right groin hernia without need for bowel resection-8/27/2022. Advanced age/debility: 78year old male who is critically ill, needs assist with all ADLs. Prior to admission, patient lived at home with his spouse and required assistance with functional ADLs.    Initial consult note routed to primary continuity provider  Communicated plan of care with: Palliative IDT       GOALS OF CARE / TREATMENT PREFERENCES:   [====Goals of Care====]  GOALS OF CARE: DNR/DNI    Patient/Health Care Proxy Stated Goals: Prolong life      TREATMENT PREFERENCES:   Code Status: DNR    Advance Care Planning:  Advance Care Planning 8/29/2022   Patient's Healthcare Decision Maker is: Legal Next of Kin   Primary Decision Maker Name -   Primary Decision Maker Phone Number -   Primary Decision Maker Relationship to Patient -   Confirm Advance Directive None   Patient Would Like to Complete Advance Directive Unable       Medical Interventions: Limited additional interventions            The palliative care team has discussed with patient / health care proxy about goals of care / treatment preferences for patient.  [====Goals of Care====]         HISTORY:     History obtained from: patient's chart, family    CHIEF COMPLAINT: incarcerated Right inguinal hernia, s/p surgery    HPI/SUBJECTIVE:    The patient is:   [] Verbal and participatory  [x] Non-participatory due to:   Oriented to person and place, confused to current time and situation     Clinical Pain Assessment (nonverbal scale for severity on nonverbal patients):   Clinical Pain Assessment  Severity: 0    Adult Nonverbal Pain Scale  Face: No particular expression or smile  Activity (Movement): Lying quietly, normal position  Guarding: Lying quietly, no positioning of hands over areas of body  Physiology (Vital Signs): Stable vital signs  Respiratory: Baseline RR/SpO2 compliant with ventilator  Total Score: 0       FUNCTIONAL ASSESSMENT:     Palliative Performance Scale (PPS):  PPS: 40       PSYCHOSOCIAL/SPIRITUAL SCREENING:     Advance Care Planning:  Advance Care Planning 8/29/2022   Patient's Healthcare Decision Maker is: Legal Next of Kin   Primary Decision Maker Name -   Primary Decision Maker Phone Number -   Primary Decision Maker Relationship to Patient -   Confirm Advance Directive None   Patient Would Like to Complete Advance Directive Unable        Any spiritual / Confucianism concerns: unable to assess  [] Yes /  [] No    Caregiver Burnout:  [] Yes /  [] No /  [x] No Caregiver Present      Anticipatory grief assessment: unable to assess  [] Normal  / [] Maladaptive          REVIEW OF SYSTEMS:     Positive and pertinent negative findings in ROS are noted above in HPI. The following systems were [] reviewed / [x] unable to be reviewed as noted in HPI  Other findings are noted below. Systems: constitutional, ears/nose/mouth/throat, respiratory, gastrointestinal, genitourinary, musculoskeletal, integumentary, neurologic, psychiatric, endocrine. Positive findings noted below.   Modified ESAS Completed by: provider   Fatigue: 5       Pain: 0   Anxiety: 3       Dyspnea: 0           Stool Occurrence(s): 1        PHYSICAL EXAM:     From RN flowsheet:  Wt Readings from Last 3 Encounters:   09/06/22 58.6 kg (129 lb 1.6 oz)   11/02/21 81.6 kg (180 lb)   10/11/21 76.7 kg (169 lb)     Blood pressure (!) 169/75, pulse 63, temperature 97.8 °F (36.6 °C), resp. rate 18, height 5' 8\" (1.727 m), weight 58.6 kg (129 lb 1.6 oz), SpO2 98 %.     Pain Scale 1: Numeric (0 - 10)  Pain Intensity 1: 0     Pain Location 1: Back  Pain Orientation 1: Posterior  Pain Description 1: Aching  Pain Intervention(s) 1: Medication (see MAR), Massage, Position, Repositioned, Rest      Constitutional: patient in NAD, confused, appears acute on chronically ill  Eyes: PERRL  ENMT: dry mucous membranes, hoarseness  Cardiovascular: regular rhythm, distal pulses intact  Respiratory: unlabored, symmetric, on NC at 2 L  Gastrointestinal: midline surgical incision CDI, no distention  Musculoskeletal: no deformity, no tenderness to palpation, L BKA  Skin: warm, dry, black eschar right ankle and right heel  Neurologic: following some commands, moving all extremities, oriented to person and place, confused to current time and situation       HISTORY:     Principal Problem:    Sepsis ([address] (8/27/2022)    Active Problems:    Acute renal failure (ARF) ([address] (5/29/2018)      UTI (urinary tract infection) (10/29/2021)      High anion gap metabolic acidosis (6/30/2730)      Hyperkalemia (8/27/2022)      AMS (altered mental status) (8/27/2022)      Strangulated inguinal hernia (8/27/2022)      Right inguinal hernia (8/27/2022)      Leukocytosis (8/27/2022)      Anemia (8/27/2022)      Chronic indwelling Mccray catheter (8/27/2022)      S/P BKA (below knee amputation), left ([address] (8/27/2022)      Right foot ulcer ([address] (8/27/2022)      Paroxysmal atrial fibrillation ([address] (8/27/2022)      Diabetic ulcer of right foot ([address] (8/27/2022)      Pleural effusion on right (8/27/2022)      Severe protein-calorie malnutrition ([address] (8/27/2022)      Acute respiratory failure with hypoxemia ([address] (8/28/2022)      Septic shock ([facility] (8/28/2022)      Hypernatremia (9/6/2022)    Past Medical History:   Diagnosis Date    A-fib ([facility]     Asthma     CAD (coronary artery disease) Chronic kidney disease     stage 3    COVID-19     Diabetes (H[facility])     GERD (gastroesophageal reflux disease)     Heart failure (HCC)     chronic diastolic heart failure    High cholesterol     Hypertension     Ill-defined condition     chronic osteomyelitis left ankle and foot    Ill-defined condition     chronic arteriosclerosis    PVD (peripheral vascular disease) ([facility]     Stroke [facility])     cva      Past Surgical History:   Procedure Laterality Date    COLONOSCOPY N/A 6/1/2018    COLONOSCOPY, POLYPECTOMY performed by Anushka Ferrell MD at [facility] ENDOSCOPY    HX CATARACT REMOVAL      HX ORTHOPAEDIC Left 2021    ankle washout, external fixator, would vac    HX ORTHOPAEDIC      external fixator removed      History reviewed. No pertinent family history. History reviewed, no pertinent family history.   Social History     Tobacco Use    Smoking status: Former    Smokeless tobacco: Never   Substance Use Topics    Alcohol use: Not Currently     No Known Allergies   Current Facility-Administered Medications   Medication Dose Route Frequency    dextrose 5% infusion  50 mL/hr IntraVENous CONTINUOUS    magnesium sulfate 2 g/50 ml IVPB (premix or compounded)  2 g IntraVENous ONCE    0.9% sodium chloride infusion 250 mL  250 mL IntraVENous PRN    hydrALAZINE (APRESOLINE) 20 mg/mL injection 20 mg  20 mg IntraVENous Q6H PRN    ipratropium (ATROVENT) 0.02 % nebulizer solution 0.5 mg  0.5 mg Nebulization TID RT    budesonide (PULMICORT) 500 mcg/2 ml nebulizer suspension  500 mcg Nebulization BID RT    albumin human 25% (BUMINATE) solution 25 g  25 g IntraVENous Q12H    midazolam (VERSED) injection 0.5 mg  0.5 mg IntraVENous BID PRN    arformoteroL (BROVANA) neb solution 15 mcg  15 mcg Nebulization BID RT    0.9% sodium chloride infusion 250 mL  250 mL IntraVENous PRN    [Held by provider] heparin (porcine) injection 5,000 Units  5,000 Units SubCUTAneous Q8H    pantoprazole (PROTONIX) 40 mg in 0.9% sodium chloride 10 mL injection  40 mg IntraVENous Q12H    sodium chloride (NS) flush 5-40 mL  5-40 mL IntraVENous Q8H    sodium chloride (NS) flush 5-40 mL  5-40 mL IntraVENous PRN    acetaminophen (TYLENOL) tablet 650 mg  650 mg Oral Q6H PRN    Or    acetaminophen (TYLENOL) suppository 650 mg  650 mg Rectal Q6H PRN    polyethylene glycol (MIRALAX) packet 17 g  17 g Oral DAILY PRN    ondansetron (ZOFRAN ODT) tablet 4 mg  4 mg Oral Q8H PRN    Or    ondansetron (ZOFRAN) injection 4 mg  4 mg IntraVENous Q6H PRN    insulin lispro (HUMALOG) injection   SubCUTAneous Q6H    glucose chewable tablet 16 g  4 Tablet Oral PRN    glucagon (GLUCAGEN) injection 1 mg  1 mg IntraMUSCular PRN    dextrose 10% infusion 0-250 mL  0-250 mL IntraVENous PRN    HYDROmorphone (DILAUDID) injection 1 mg  1 mg IntraVENous Q4H PRN          LAB AND IMAGING FINDINGS:     Lab Results   Component Value Date/Time    WBC 8.4 09/06/2022 12:55 AM    HGB 9.2 (L) 09/06/2022 12:55 AM    PLATELET 942 26/30/2998 12:55 AM     Lab Results   Component Value Date/Time    Sodium 151 (H) 09/06/2022 12:55 AM    Potassium 2.8 (LL) 09/06/2022 12:55 AM    Chloride 117 (H) 09/06/2022 12:55 AM    CO2 21 09/06/2022 12:55 AM    BUN 68 (H) 09/06/2022 12:55 AM    Creatinine 2.26 (H) 09/06/2022 12:55 AM    Calcium 9.3 09/06/2022 12:55 AM    Magnesium 1.7 09/06/2022 12:55 AM    Phosphorus 1.9 (L) 09/03/2022 07:45 AM      Lab Results   Component Value Date/Time    Alk.  phosphatase 108 09/06/2022 12:55 AM    Protein, total 6.4 09/06/2022 12:55 AM    Albumin 3.1 (L) 09/06/2022 12:55 AM    Globulin 3.3 09/06/2022 12:55 AM     Lab Results   Component Value Date/Time    INR 1.3 (H) 09/02/2022 04:51 AM    Prothrombin time 16.8 (H) 09/02/2022 04:51 AM    aPTT 118.5 (H) 05/17/2021 08:20 AM      Lab Results   Component Value Date/Time    Iron 10 (L) 05/06/2021 06:40 AM    TIBC 186 (L) 05/06/2021 06:40 AM    Iron % saturation 5 (L) 05/06/2021 06:40 AM    Ferritin 125 05/06/2021 06:40 AM      No results found for: PH, PCO2, PO2  No components found for: [place]   Lab Results   Component Value Date/Time    CK 46 05/29/2018 05:45 PM    CK - MB 1.4 05/29/2018 05:45 PM                Total time: 25 minutes  Counseling / coordination time, spent as noted above:   > 50% counseling / coordination?: yes, patient, family, and medical team    Prolonged service was provided for  []30 min   []75 min in face to face time in the presence of the patient, spent as noted above.   Time Start:   Time End:

## 2022-09-06 NOTE — PROGRESS NOTES
Called for critical [place]  Sodium rising  NGT out with bleeding  Placed several IV runs of potassium  And change fluids to D5 from d51/2ns  Check BMP in 4 hrs

## 2022-09-06 NOTE — PROGRESS NOTES
Palliative Medicine    CODE STATUS: DNR/DNI    AMD Status: none on file. His wife, [address], is his legal next of kin     9/6/2022 9233 Seen today in room 336 along with Eyal Del Rosario NP. Lying in bed with head of bed elevated. Awake, alert. Oriented to person and location. Jose Diaz he is 54. Respirations unlabored on room air. Nursing in room     Orthopedics consultation completed with recommendation for right BKA. Patient, per record review, told orthopedist to \"cut it off\". Family states that before admission, he had said that he did not want right sided BKA. No family was at the bedisde. 1150: met with wife outside of patient's room. Discussed his mental improvement and being approved for diet by SLP. Reviewed that a discussion about the recommended amputation should be ongoing as it is not clear if the patient will cognitively clear enough to have in-depth discussions about his options. Encouraged Mrs Dillon Steinberg to speak with her children as well as her . Disposition plan: to be determined based on response to treatment and family decisions    Palliative care will continue to follow Olga Dobbs  and his family during his hospitalization and support them as they make healthcare decisions and define goals of care.       Garcia Alvarez RN, MSN  Palliative Medicine  P: [phone]

## 2022-09-06 NOTE — PROGRESS NOTES
CM notes patient has palliative consult and poor prognosis. CM to watch palliative notes to assist in identifying any discharge planning needs.

## 2022-09-06 NOTE — PROGRESS NOTES
Problem: Dysphagia (Adult)  Description: Patient will:  1. Tolerate PO trials with 0 s/s overt distress in 4/5 trials. 2. Utilize compensatory swallow strategies/maneuvers (decrease bite/sip, size/rate, alt. liq/sol) with min cues in 4/5 trials. 3. Perform oral-motor/laryngeal exercises to increase oropharyngeal swallow function with min cues. 4. If medically indicated, complete an objective swallow study (i.e., MBSS) to assess swallow integrity, r/o aspiration, and determine of safest LRD, min A.    Rec:     Puree diet with thin liquids  Pt will require assistance with meal set up and feeding  Aspiration precautions  HOB >45 during po intake, remain >30 for 30-45 minutes after po   Small bites/sips; alternate liquid/solid with slow feeding rate   Oral care TID  Meds crushed in applesauce  9/6/2022 1017 by SCOOBY Pepe  Outcome: Progressing Towards Goal    [id number] [address] [facility] TREATMENT    Patient: Kellen Carolina (71 y.o. male)  Date: 9/6/2022  Diagnosis: Sepsis ([address] [A41.9]  Strangulated hernia of abdominal wall [K43.6]  UTI (urinary tract infection) [N39.0]  AMS (altered mental status) [R41.82]  Acidosis [E87.2]  Hyperkalemia [E87.5] Sepsis ([address]  Procedure(s) (LRB):  EXPLORATORY LAPAROTOMY, REDUCTION INCARCERATED RIGHT GROIN HERNIA WITH HERNIA REPAIR WITH MESH (N/A) 10 Days Post-Op  Precautions: Aspiration    PLOF: per H&P     ASSESSMENT:  Patient seen by ST for dysphagia follow-up. Patient currently NPO due to likely aspiration with thin and mildly thick liquids during bedside swallowing evaluation Friday s/p extubation. Patient appears much more coherent today, as evidenced by increased orientation and ability to follow some commands. Some confusion still noted as evidenced by patient's responses (e.g., when asked if he wanted to eat, pt stated \"not marijuana leaves, right? \"). Patient repositioned with HOB @ 45 degrees. Oriented to person and place.   OM examination significant for reduced lingual rate, strength, and coordination. Trials of ice chips, thin liquid, and puree given. Tongue tremors noted. Suspect increased oral transit time and multiple swallows per bolus noted. Swallow initiation appears timely and laryngeal elevation functional to palpation. Patient required pacing (ST removed straw from patient's mouth) as patient attempted to drink quickly. ST cued patient to clear throat after each bite; however, patient did not follow commands. Patient with reflexive throat clear x 1 in 1/10 trials of water. No other overt s/sx of aspiration visualized across consistencies. Recommend patient trial puree diet with thin liquids; aspiration precautions; meds crushed in puree; oral care three times/daily. D/w RN and Dr. Oneyda Ruiz. ST following. Progression toward goals:  []         Improving appropriately and progressing toward goals  [x]         Improving slowly and progressing toward goals  []         Not making progress toward goals and plan of care will be adjusted     PLAN:  Recommendations and Planned Interventions:  See above  Patient continues to benefit from skilled intervention to address the above impairments. Continue treatment per established plan of care. Discharge Recommendations: To Be Determined     SUBJECTIVE:   Patient stated I want more juice.     OBJECTIVE:   Cognitive and Communication Status:  Neurologic State: Alert, Confused  Orientation Level: Oriented to person, Oriented to place  Cognition: Decreased command following, Decreased attention/concentration  Perception: Appears intact  Perseveration: No perseveration noted  Safety/Judgement: Not assessed  Dysphagia Treatment:  Oral Assessment:  Oral Assessment  Labial: Decreased rate, Decreased seal, Impaired coordination, Other (comment) (limited assessment due to poor command following)  Dentition: Natural, Limited, Poor  Oral Hygiene:  (fair)  Lingual: Decreased rate, Decreased strength, Incoordinated  Velum: Unable to visualize  Mandible: No impairment  Gag Reflex:  (did not test)  P.O. Trials:   Patient Position:  (HOB >45 degrees)   Vocal quality prior to P.O.: Tremorous   Consistency Presented: Thin liquid, Puree   How Presented: SLP-fed/presented, Spoon, Straw, Successive swallows       Bolus Acceptance: No impairment   Bolus Formation/Control: Impaired   Type of Impairment: Mastication, Piecemeal   Propulsion: Lingual tremors   Oral Residue: Lingual, Less than 10% of bolus   Initiation of Swallow: No impairment   Laryngeal Elevation: Functional   Aspiration Signs/Symptoms: Clear throat   Pharyngeal Phase Characteristics: Easily fatigued , Poor endurance   Effective Modifications: Alternate liquids/solids, Small sips and bites   Cues for Modifications: Moderate         Oral Phase Severity: Mild-moderate   Pharyngeal Phase Severity : Other (comment) (overt s/sx - suspect moderate)     PAIN:  Pain level pre-treatment: 0/10   Pain level post-treatment: 0/10     After treatment:   []              Patient left in no apparent distress sitting up in chair  [x]              Patient left in no apparent distress in bed  []              Call bell left within reach  [x]              Nursing notified  []              Family present  []              Caregiver present  []              Bed alarm activated      COMMUNICATION/EDUCATION:   [x] Aspiration precautions; swallow safety; compensatory techniques  [x]        Patient unable to participate in education; education ongoing with staff  []  Posted safety precautions in patient's room.   [] Oral-motor/laryngeal strengthening exercises      SCOOBY Morales  Time Calculation: 30 mins

## 2022-09-06 NOTE — DIABETES MGMT
Diabetes/ Glycemic Control Plan of Care  Recommendations:   Recommend starting basal insulin, suggest 6 units Lantus q 24 hours    Assessment: Blood sugars ranging 141-191 mg/dl over the last 24 hours. Patient utilizing corrective insulin now, TDD 12 units Humalog. Anticipate BG elevation since diet initiated today and D5 fluids @ 50 ml/hour (provides 60 gm dextrose in 24 hours). Recent Glucose Results:   Lab Results   Component Value Date/Time     (H) 09/06/2022 12:55 AM    GLUCPOC 179 (H) 09/06/2022 05:15 AM    GLUCPOC 159 (H) 09/05/2022 11:33 PM    GLUCPOC 191 (H) 09/05/2022 06:07 PM         BG within target range (non-ICU: <180; -180):  [] Yes    [x] No   Current insulin orders: corrective Humalog - very insulin resistant   Total Daily Dose previous 24 hours = 12 units Humalog     Plan/Goals:   Blood glucose will be within target of 70 - 180 mg/dl within 72 hours  Reinforce dietary and medication compliance at home.           Education:  [] Refer to Diabetes Education Record                       [x] Education not indicated at this time     Yun Baker RD  Glycemic Control Team  [phone]    Monday-Friday   9 am - 3 pm

## 2022-09-06 NOTE — PROGRESS NOTES
No acute changes in pt status. Education provided, however, no evidence of learning. Eulis Kedar turning to ensure patient comfort and skin integrity. NG tube pulled out by pt - ordered to leave out until GI assesses patient. MBS planned for tomorrow.

## 2022-09-06 NOTE — PROGRESS NOTES
Hospitalist Progress Note    Patient: Munir Becerra. MRN: [id number]  [facility]: [id number]    YOB: 1945  Age: 78 y.o. Sex: male    DOA: 8/27/2022 LOS:  LOS: 10 days          Chief Complaint:    hypokalemia      Assessment/Plan      78 y.o. 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 Admitted for severe septic shock with multiorgan and hypotension involvement due to multiple possible sources of infection, severe hyperkalemia, acute metabolic encephalopathy, metabolic acidosis, strangulated left inguinal hernia, anemia requiring blood transfusion, mild hypoxia coagulable state.       Hypokalemia-add mag to ordered IV and PO repeltion this am  BMP Q4H ordered    Hypernatremia-dextrose IV, follow regular BMP checks     Mild bleeding from NG tube being pulled out by patient-  Ac blood loss anemia chronic     Dysphagia-passed swallow this am, diet ordered     Incarcerated right inguinal hernia -status post ex lap with reduction of incarcerated right groin hernia with hernia mesh     Prior AKA left side     Infected sacral ulcer -weeping purulent material, seen by wound care     Infected medial maleolar and heel ulcers right foot-completed abx     Anemia -acute blood loss with chronic disease     Acute renal failure -likely due to profound shock, appreciate nephrology expertise, urine output improving, cr slowly improving     encephalopathy -some improvement but still confused at times     S/p thoracentesis for effusion      Poor prognosis with multiple medical ailments and advanced PVD, diabetes complications, prior BKA< wounds and debilitated state  Ortho has seen for possible BKA right side     Wife updated yesterday  Poor prognosis  DNR     Hold heparin for bleeding            Disposition :  Patient Active Problem List   Diagnosis Code    DM2 (diabetes mellitus, type 2) ([facility] E11.9    CAD (coronary artery disease) I25.10    Acute renal failure (ARF) ([facility]) N17.9    CKD (chronic kidney disease) stage 3, GFR 30-59 ml/min ([facility]) N18.30    Elevated brain natriuretic peptide (BNP) level R79.89    UTI (urinary tract infection) N39.0    High anion gap metabolic acidosis Q84.3    Hyperkalemia E87.5    Sepsis ([facility]) A41.9    AMS (altered mental status) R41.82    Strangulated inguinal hernia K40.30    Right inguinal hernia K40.90    Leukocytosis D72.829    Anemia D64.9    Chronic indwelling Mccray catheter Z97.8    S/P BKA (below knee amputation), left ([facility]) Z89.512    Right foot ulcer ([facility]) L97.519    Paroxysmal atrial fibrillation ([facility]) I48.0    Diabetic ulcer of right foot ([facility]) E11.621, L97.519    Pleural effusion on right J90    Severe protein-calorie malnutrition ([facility]) E43    Acute respiratory failure with hypoxemia ([facility]) J96.01    Septic shock ([facility]) A41.9, R65.21       Subjective:    His K is low  Na has risen  NG out  Passed swallow test for diet per speech  Minimal ROS due to mentation    Review of systems:    Constitutional: denies fevers  Respiratory: denies SOB  Cardiovascular: denies chest pain  Gastrointestinal: denies nausea      Vital signs/Intake and Output:  Visit Vitals  BP (!) 154/79   Pulse (!) 104   Temp 98.4 °F (36.9 °C)   Resp 18   Ht 5' 8\" (1.727 m)   Wt 58.6 kg (129 lb 1.6 oz)   SpO2 96%   BMI 19.63 kg/m²     Current Shift:  No intake/output data recorded.   Last three shifts:  09/04 1901 - 09/06 0700  In: 288.8   Out: 1000 [Urine:1000]    Exam:    General: ill debilitated elderly WM, very weak, NAD  CVS:Regular rate and rhythm, no M/R/G, S1/S2 heard, no thrill  Lungs:Clear to auscultation bilaterally, no wheezes, rhonchi, or rales  Abdomen: Soft, Nontender, No distention  Extremities:left AKA< right foot dressed  Neuro:grossly normal                 Labs: Results:       Chemistry Recent Labs     09/06/22 0055 09/05/22  0040 09/04/22  0110   * 146* 100*   * 149* 146*   K 2.8* 3.1* 2.9*   * 113* 110   CO2 21 20* 24   BUN 68* 76* 79*   CREA 2.26* 2.58* 2.52*   CA 9.3 9.0 8.7   AGAP 13 16 12   BUCR 30* 29* 31*    99 100   TP 6.4 6.0* 5.9*   ALB 3.1* 3.1* 3.2*   GLOB 3.3 2.9 2.7   AGRAT 0.9 1.1 1.2      CBC w/Diff Recent Labs     09/06/22 0055 09/05/22 0040 09/04/22  0110   WBC 8.4 5.9 7.5   RBC 3.42* 2.68* 2.82*   HGB 9.2* 6.7* 7.2*   HCT 29.2* 22.3* 23.2*    205 210   GRANS 77* 61 77*   LYMPH 17* 25 13*   EOS 1 3 4      Cardiac Enzymes No results for input(s): CPK, CKND1, LEONEL in the last 72 hours. No lab exists for component: CKRMB, TROIP   Coagulation No results for input(s): PTP, INR, APTT, INREXT in the last 72 hours. Lipid Panel No results found for: CHOL, CHOLPOCT, CHOLX, CHLST, CHOLV, 642064, HDL, HDLP, LDL, LDLC, DLDLP, 479388, VLDLC, VLDL, TGLX, TRIGL, TRIGP, TGLPOCT, CHHD, CHHDX   BNP No results for input(s): BNPP in the last 72 hours.    Liver Enzymes Recent Labs     09/06/22 0055   TP 6.4   ALB 3.1*         Thyroid Studies Lab Results   Component Value Date/Time    TSH 4.73 (H) 08/28/2022 08:04 AM        Procedures/imaging: see electronic medical records for all procedures/Xrays and details which were not copied into this note but were reviewed prior to creation of Mac Boxer, MD

## 2022-09-06 NOTE — PROGRESS NOTES
Progress Note      Patient: Clark Browne. Sex: male          DOA: 8/27/2022         YOB: 1945      Age:  78 y.o.        LOS:  LOS: 10 days             Assessment / Plan    Patient has severe peripheral arterial disease with nonhealing wounds of the right lower extremity. History of left below-knee amputation complicated by gangrene requiring above-knee amputation. Patient seemed a little confused today his medical options. When I saw him on Friday he requested that I cut off his foot but he was not very coherent and not as responsive and I felt that he was not ready for that plus medically he was quite unstable. Today I discussed options between a below-knee amputation and above-knee amputation. He seemed a little apprehensive about surgery today. I discussed this case with vascular because his previous left below-knee amputation had to be revised to an above-knee amputation therefore I would like to get arterial inflow studies to get an idea when he is a candidate for a below-knee amputation. I have read the palliative care note and the idea that we would like to wait and see if he is more involved in his decisions  Subjective:     She is much more alert and cooperative today but when I discussed hospital surgical intervention for his foot he was not sure he wanted to move ahead with that. I asked if he had a prosthesis for the left side and I could not get a straight answer whether he had a prosthesis for the left above-knee amputation. Objective:      Visit Vitals  BP (!) 169/75   Pulse 63   Temp 97.8 °F (36.6 °C)   Resp 18   Ht 5' 8\" (1.727 m)   Wt 58.6 kg (129 lb 1.6 oz)   SpO2 98%   BMI 19.63 kg/m²       Physical Exam:    Right lower extremity has intact dressing no odor no red streaking. Left above-knee amputation with some atrophy of the distal residual limb    Intake and Output:  Current Shift:  No intake/output data recorded.   Last three shifts:  09/04 1901 - 09/06 0700  In: 288.8   Out: 1000 [Urine:1000]    Lab/Data Reviewed: All lab results for the last 24 hours reviewed. Medications Reviewed    Continued hospitalization is indicated due to ongoing medical care hospital palliative treatment. Assessment/Plan     Principal Problem:    Sepsis ([address] (8/27/2022)    Active Problems:    Acute renal failure (ARF) ([address] (5/29/2018)      UTI (urinary tract infection) (10/29/2021)      High anion gap metabolic acidosis (1/75/5966)      Hyperkalemia (8/27/2022)      AMS (altered mental status) (8/27/2022)      Strangulated inguinal hernia (8/27/2022)      Right inguinal hernia (8/27/2022)      Leukocytosis (8/27/2022)      Anemia (8/27/2022)      Chronic indwelling Mccray catheter (8/27/2022)      S/P BKA (below knee amputation), left ([address] (8/27/2022)      Right foot ulcer ([address] (8/27/2022)      Paroxysmal atrial fibrillation ([address] (8/27/2022)      Diabetic ulcer of right foot ([address] (8/27/2022)      Pleural effusion on right (8/27/2022)      Severe protein-calorie malnutrition ([address] (8/27/2022)      Acute respiratory failure with hypoxemia ([address] (8/28/2022)      Septic shock ([address] (8/28/2022)      Hypernatremia (9/6/2022)      Patient seemed a little confused today his medical options. When I saw him on Friday he requested that I cut off his foot but he was not very coherent and not as responsive and I felt that he was not ready for that plus medically he was quite unstable. Today I discussed options between a below-knee amputation and above-knee amputation. He seemed a little apprehensive about surgery today. I discussed this case with vascular because his previous left below-knee amputation had to be revised to an above-knee amputation therefore I would like to get arterial inflow studies to get an idea when he is a candidate for a below-knee amputation.   I have read the palliative care note and the idea that we would like to wait and see if he is more involved in his decisions

## 2022-09-07 NOTE — PROGRESS NOTES
Patient:  Carmencita Branham. :  1945  Gender:  male  MRN #:  [id number]    Assessment:   Carmencita Garcia is a 78 y.o. male with hypertension, hyperlipidemia, stroke , chronic atrial fibrillation, on Plavix, DM,previous, COVID-19 infection, chronic indwelling urinary catheter for urinary retention, severe peripheral vascular disease left BKA admitted in ICU with septic shock , acute renal failure, hyperkalemia , metabolic acidosis and strangulated left inguinal hernia  s/p emergency  surgery   Breathing comfortably on nasal canula. Decent urine output, now has hypokalemia , significant peripheral artery disease , mental status has not improved         Acute renal failure- Improving   Hypokalemia   Hypernatremia - improved     Plan:   He has decent urine output with slow improvement in renal function everyday   Hypernatremia improved so will stop D5W , encourage oral water intake if nor free water from  cc q 6 hourly   Hold lasix for now   Minimize volume infusion ,   Avoid NSAIDS, contrast and nephrotoxin   Dose all meds and antibiotics for current eGFR  Transfuse prn to keep hemoglobin > 7 gram/dl     Hypokalemia- KCL 40 now     Bmp in morning     Subjective/Interval Events    Decent urine output   he is awake and alert , follows simple command  No symptoms reported             Intake/Output Summary (Last 24 hours) at 2022 1621  Last data filed at 2022 0231  Gross per 24 hour   Intake --   Output 850 ml   Net -850 ml           Blood pressure (!) 145/85, pulse 95, temperature 98.2 °F (36.8 °C), resp. rate 18, height 5' 7.99\" (1.727 m), weight 58.6 kg (129 lb 1.6 oz), SpO2 99 %.     Exam:    Not conversant , awake and alert   Lung: clear to auscultation  Ext: no edema in RLE, erythema, wrapped in ACE bandage        Recent Labs     22  1423   WBC 8.5   HCT 30.9*   MCV 86.8       Recent Labs     22  1423      CO2 24   BUN 48*       Recent Labs     22  1423   AGRAT 1.4 Discussed with wife     I have reviewed patient's record for pertinent labs, radiology and other test . I have reviewed old records. I have ordered labs, medications and radiology as necessary and indicated per patient's condition . Total time spent is approximately 28 minutes. Greater than 50 % of that time was spent in counseling and or care coordination.        Be Ott MD  Nephrology -[facility], [facility].

## 2022-09-07 NOTE — PROGRESS NOTES
Problem: Ventilator Management  Goal: *Adequate oxygenation and ventilation  Outcome: Progressing Towards Goal  Goal: *Patient maintains clear airway/free of aspiration  Outcome: Progressing Towards Goal  Goal: *Absence of infection signs and symptoms  Outcome: Progressing Towards Goal  Goal: *Normal spontaneous ventilation  Outcome: Progressing Towards Goal     Problem: Patient Education: Go to Patient Education Activity  Goal: Patient/Family Education  Outcome: Progressing Towards Goal     Problem: Pressure Injury - Risk of  Goal: *Prevention of pressure injury  Description: Document Anam Scale and appropriate interventions in the flowsheet. Outcome: Progressing Towards Goal  Note: Pressure Injury Interventions:  Sensory Interventions: Float heels, Keep linens dry and wrinkle-free, Maintain/enhance activity level, Minimize linen layers, Monitor skin under medical devices, Pad between skin to skin, Turn and reposition approx. every two hours (pillows and wedges if needed)    Moisture Interventions: Minimize layers, Internal/External fecal devices, Internal/External urinary devices, Absorbent underpads, Apply protective barrier, creams and emollients, Check for incontinence Q2 hours and as needed    Activity Interventions: PT/OT evaluation    Mobility Interventions: Float heels, PT/OT evaluation, HOB 30 degrees or less, Turn and reposition approx.  every two hours(pillow and wedges)    Nutrition Interventions: Offer support with meals,snacks and hydration    Friction and Shear Interventions: HOB 30 degrees or less, Apply protective barrier, creams and emollients, Foam dressings/transparent film/skin sealants, Feet elevated on foot rest, Lift sheet, Minimize layers                Problem: Patient Education: Go to Patient Education Activity  Goal: Patient/Family Education  Outcome: Progressing Towards Goal     Problem: Falls - Risk of  Goal: *Absence of Falls  Description: Document Joana Fall Risk and appropriate interventions in the flowsheet. Outcome: Progressing Towards Goal  Note: Fall Risk Interventions:       Mentation Interventions: Adequate sleep, hydration, pain control, Bed/chair exit alarm, Toileting rounds, More frequent rounding, Reorient patient    Medication Interventions: Patient to call before getting OOB, Bed/chair exit alarm    Elimination Interventions: Call light in reach    History of Falls Interventions: Vital signs minimum Q4HRs X 24 hrs (comment for end date)         Problem: Patient Education: Go to Patient Education Activity  Goal: Patient/Family Education  Outcome: Progressing Towards Goal     Problem: Patient Education: Go to Patient Education Activity  Goal: Patient/Family Education  Outcome: Progressing Towards Goal     Problem: Nutrition Deficit  Goal: *Optimize nutritional status  Outcome: Progressing Towards Goal     Problem: Risk for Spread of Infection  Goal: Prevent transmission of infectious organism to others  Description: Prevent the transmission of infectious organisms to other patients, staff members, and visitors.   Outcome: Progressing Towards Goal     Problem: Patient Education:  Go to Education Activity  Goal: Patient/Family Education  Outcome: Progressing Towards Goal

## 2022-09-07 NOTE — PROGRESS NOTES
Palliative Medicine    CODE STATUS: DNR/DNI     AMD Status: none on file. His wife, [address], is his legal next of kin     9/7/2022 1025 Seen today in room 336. Lying  in bed with head of bed elevated. Awake, alert. Oriented to person and place. Still says he is 54. Respirations unlabored on room air. Wife not at bedside    Revisited the discussion about a left leg amputation. He was not able to state whether or not he was in favor of proceeding with the procedure. He did say that he had a prosthesis for his right leg but \"only used it once/ It's too long\". Unable to have clear discussion about his goals of care. Will follow up with Mrs Mirza Harper tomorrow. May need to have another family meeting     Disposition plan: to be determined    Palliative care will continue to follow Ileana Young  and his family during his hospitalization and support them as they make healthcare decisions and define goals of care.       Giuseppe Hernández RN, MSN  Palliative Medicine  P: [phone]

## 2022-09-07 NOTE — PROGRESS NOTES
Comprehensive Nutrition Assessment    Type and Reason for Visit: Reassess    Nutrition Recommendations/Plan:   Continue current diet  Advance diet as medically appropriate. Recommend ONS-Nepro TID     Malnutrition Assessment:  Malnutrition Status: At risk for malnutrition (specify) (08/29/22 1028)        Nutrition Assessment:    77yo M. H/o HTN, hyperlipidemia, stroke, CAD, DM, CKD. Admitted with profound hypotension, multiorgan SS, left strangulated inguinal hernia. New dx of ELINOR and multiple wounds of various stages. Upgraded to Dysphagia diet 9/6/22. Per chart: Pt was NPO for 3 days prior to dysphagia diet, current wt 129lb, no itakes recorded. Per RN-pt deos not like the pureed diet enjoys his sugar free puddign and drinks. Will order Nepro to supplement to meet needs. Nutrition Related Findings:    BM 9/6/22 green/brown loose. Na 151, K 2.8, Cl 117, glucose 141, BUN 68, creatinine 2.26, Phos 1.9, GFR nonAA 28. Mag-sul, KCl, dextrose 5% 50ml/hr. Wound Type: Multiple, Unstageable    Current Nutrition Intake & Therapies:  Average Meal Intake: Unable to assess  Average Supplement Intake: None ordered  ADULT DIET Dysphagia - Pureed    Anthropometric Measures:  Height: 5' 7.99\" (172.7 cm)  Ideal Body Weight (IBW): 154 lbs (70 kg)     Current Body Wt:  58.5 kg (129 lb), 83.8 % IBW.  Bed scale  Current BMI (kg/m2): 19.6  Usual Body Weight: 72.6 kg (160 lb) (6/6/22)  % Weight Change (Calculated): -19.4  Weight Adjustment: Amputation  Total Amputation Percentage: 10.1  Adjusted Ideal Body Weight (lbs) (Calculated): 138.4  Adjusted Ideal Body Weight (kg) (Calculated): 62.91 kg  Adjusted % IBW (Calculated): 93.2  Adjusted BMI (Calculated): 21.6  BMI Category: Underweight (BMI less than 22) age over 72    Estimated Daily Nutrient Needs:  Energy Requirements Based On: Kcal/kg  Weight Used for Energy Requirements: Current  Energy (kcal/day): 4036-8337  Weight Used for Protein Requirements: Current  Protein (g/day): 47-65  Method Used for Fluid Requirements: Standard renal  Fluid (ml/day): 9420-8377    Nutrition Diagnosis:   Inadequate protein-energy intake related to acute injury/trauma as evidenced by wounds    Nutrition Interventions:   Food and/or Nutrient Delivery: Continue current diet, Start oral nutrition supplement  Nutrition Education/Counseling: No recommendations at this time  Coordination of Nutrition Care: Continue to monitor while inpatient       Goals:  Previous Goal Met: No progress toward goal(s)  Goals: PO intake 50% or greater, by next RD assessment       Nutrition Monitoring and Evaluation:   Behavioral-Environmental Outcomes: None identified  Food/Nutrient Intake Outcomes: Food and nutrient intake, Diet advancement/tolerance  Physical Signs/Symptoms Outcomes: Biochemical data, Nutrition focused physical findings, Weight, Meal time behavior, GI status    Discharge Planning:     Too soon to determine    [employer]

## 2022-09-07 NOTE — WOUND CARE
[facility] MEDICAL RECORD NUMBER:  [id number]  AGE: 78 y.o. GENDER: male  : 1945  TODAY'S DATE:  2022    GENERAL     [] Follow-up   [] New Consult    Jocy Galindo. is a 78 y.o. male referred by:   [] Physician  [] Nursing  [] Other:         PAST MEDICAL HISTORY    Past Medical History:   Diagnosis Date    A-fib ([address]     Asthma     CAD (coronary artery disease)     Chronic kidney disease     stage 3    COVID-19     Diabetes ([address]     GERD (gastroesophageal reflux disease)     Heart failure (HCC)     chronic diastolic heart failure    High cholesterol     Hypertension     Ill-defined condition     chronic osteomyelitis left ankle and foot    Ill-defined condition     chronic arteriosclerosis    PVD (peripheral vascular disease) ([address]     Stroke ([address]     cva        PAST SURGICAL HISTORY    Past Surgical History:   Procedure Laterality Date    COLONOSCOPY N/A 2018    COLONOSCOPY, POLYPECTOMY performed by Glenn Noel MD at [facility] ENDOSCOPY    HX CATARACT REMOVAL      HX ORTHOPAEDIC Left     ankle washout, external fixator, would vac    HX ORTHOPAEDIC      external fixator removed       FAMILY HISTORY    History reviewed. No pertinent family history. SOCIAL HISTORY    Social History     Tobacco Use    Smoking status: Former    Smokeless tobacco: Never   Substance Use Topics    Alcohol use: Not Currently    Drug use: No       ALLERGIES    No Known Allergies    MEDICATIONS    No current facility-administered medications on file prior to encounter. Current Outpatient Medications on File Prior to Encounter   Medication Sig Dispense Refill    levoFLOXacin (Levaquin) 750 mg tablet Take 1 Tablet by mouth daily.  5 Tablet 0    insulin lispro (HUMALOG) 100 unit/mL injection INITIATE INSULIN CORRECTIVE PROTOCOL: Normal Insulin Sensitivity   For Blood Sugar (mg/dL) of:     Less than 150 =   0 units           150 -199 =   2 units  200 -249 =   4 units  250 -299 = 6 units  300 -349 =   8 units  350 and above = 10 units and Call Physician  If 2 glucose readings are above 200 mg/dL within a 24 hr period, proceed to \"Insulin Resistant\" dosing. Initiate Hypoglycemia protocol if blood glucose is <70 mg/dL Fast Acting - Administer Immediately - or within 15 minutes of start of meal, if mealtime coverage. 1 Each 0    gabapentin (NEURONTIN) 300 mg capsule Take 1 Capsule by mouth three (3) times daily. Max Daily Amount: 900 mg. 30 Capsule 0    clopidogreL (Plavix) 75 mg tab Take  by mouth daily. Indications: afib      B.infantis-B.ani-B.long-B.bifi (Probiotic 4X) 10-15 mg TbEC Take  by mouth daily. multivitamin (ONE A DAY) tablet Take 1 Tablet by mouth daily. acetaminophen (TylenoL) 325 mg tablet Take 650 mg by mouth every four (4) hours as needed for Pain.      tamsulosin (FLOMAX) 0.4 mg capsule Take 2 Capsules by mouth daily. 30 Capsule 0    atorvastatin (LIPITOR) 40 mg tablet Take 1 Tab by mouth nightly. 30 Tab 0    metoprolol succinate (TOPROL-XL) 50 mg XL tablet Take 1 Tab by mouth daily. (Patient taking differently: Take 100 mg by mouth daily.) 30 Tab 0       Wt Readings from Last 3 Encounters:   09/06/22 58.6 kg (129 lb 1.6 oz)   11/02/21 81.6 kg (180 lb)   10/11/21 76.7 kg (169 lb)       [email or web address] Vitals  BP (!) 145/85   Pulse 95   Temp 98.2 °F (36.8 °C)   Resp 18   Ht 5' 7.99\" (1.727 m)   Wt 58.6 kg (129 lb 1.6 oz)   SpO2 99%   BMI 19.63 kg/m²       ASSESSMENT     Skin impairment Identification:  Type: arterial and pressure    Contributing Factors: diabetes, poor glucose control, chronic pressure, and decreased mobility    Wound Ankle Right;Medial;Inner Eschar (Active)   Wound Image   09/07/22 1100   Wound Etiology Arterial 09/07/22 1100   Dressing Status Old drainage noted 09/07/22 1100   Cleansed Wound cleanser 09/07/22 1100   Dressing/Treatment Betadine swabs/Povidone Iodine;Gauze dressing/dressing sponge;Roll gauze; Ace wrap 09/07/22 1100 Dressing Change Due 09/08/22 09/07/22 1100   Wound Length (cm) 6 cm 09/07/22 1100   Wound Width (cm) 6.5 cm 09/07/22 1100   Wound Surface Area (cm^2) 39 cm^2 09/07/22 1100   Change in Wound Size % 0 09/07/22 1100   Wound Assessment Eschar dry 09/07/22 1100   Drainage Amount Scant 09/07/22 1100   Drainage Description Serosanguinous 09/07/22 1100   Wound Odor None 09/07/22 1100   Neha-Wound/Incision Assessment Cool 09/07/22 1100   Edges Defined edges 09/07/22 1100   Wound Thickness Description Full thickness 09/07/22 1100   Number of days: 10       Wound Heel Right;Lateral 08/29/22 (Active)   Wound Image    09/07/22 1100   Wound Etiology Pressure Unstageable 09/06/22 0750   Dressing Status Clean;Dry; Intact 09/07/22 1100   Cleansed Wound cleanser 09/07/22 1100   Dressing/Treatment Betadine swabs/Povidone Iodine;Gauze dressing/dressing sponge;Roll gauze; Ace wrap 09/07/22 1100   Wound Length (cm) 7 cm 09/07/22 1100   Wound Width (cm) 7.2 cm 09/07/22 1100   Wound Surface Area (cm^2) 50.4 cm^2 09/07/22 1100   Change in Wound Size % 0 09/07/22 1100   Wound Assessment Eschar dry 09/07/22 1100   Drainage Amount None 09/07/22 1100   Drainage Description Brown 08/29/22 1500   Wound Odor None 09/07/22 1100   Neha-Wound/Incision Assessment Cool 09/07/22 1100   Edges Attached edges 09/07/22 1100   Wound Thickness Description Full thickness 09/07/22 1100   Number of days: 9       Wound Foot Right;Lateral (Active)   Wound Image   09/07/22 1100   Wound Etiology Pressure Unstageable 09/02/22 2000   Dressing Status Clean;Dry; Intact 09/07/22 1100   Cleansed Wound cleanser 08/29/22 1500   Dressing/Treatment Betadine swabs/Povidone Iodine;Gauze dressing/dressing sponge;Roll gauze; Ace wrap 09/07/22 1100   Wound Length (cm) 2.2 cm 09/07/22 1100   Wound Width (cm) 2.5 cm 09/07/22 1100   Wound Surface Area (cm^2) 5.5 cm^2 09/07/22 1100   Change in Wound Size % 0 09/07/22 1100   Wound Assessment Eschar dry 09/07/22 1100   Drainage Amount None 09/07/22 1100   Wound Odor None 09/07/22 1100   Neha-Wound/Incision Assessment Cool 09/07/22 1100   Edges Defined edges 09/07/22 1100   Wound Thickness Description Full thickness 09/07/22 1100   Number of days: 9       Wound Sacral/coccyx (Active)   Wound Etiology Pressure Unstageable 09/07/22 1100   Dressing Status Old drainage noted 09/07/22 1100   Cleansed Cleansed with saline 09/07/22 1100   Dressing/Treatment Silicone border 27/90/57 1100   Wound Length (cm) 6.5 cm 09/07/22 1100   Wound Width (cm) 6 cm 09/07/22 1100   Wound Surface Area (cm^2) 39 cm^2 09/07/22 1100   Wound Assessment Eschar moist 09/07/22 1100   Drainage Amount Moderate 09/07/22 1100   Drainage Description Serosanguinous 09/07/22 1100   Wound Odor None 09/07/22 1100   Neha-Wound/Incision Assessment Blanchable erythema 09/07/22 1100   Wound Thickness Description Full thickness 09/07/22 1100   Number of days: 11      Pressure ulcer to AKA site bone palpable under skin      PLAN     Skin Care & Pressure Relief Recommendations  Minimize layers of linen  Pads under patient to optimize support surface  Turn/reposition approximately every 2 hours  Pillow wedges    Recommendations:paint foot wounds with betadine daily until plan made for amputation or hospice  Sacral wound - santyl dressing see orders or [facility]    Teaching completed with:   [x] Patient           [] Family member       [] Caregiver          [] Nursing  [] Other    Patient/Caregiver Teaching:  Level of patient/caregiver understanding able to:   [x] Indicates understanding       [] Needs reinforcement  [] Unsuccessful      [] Verbal Understanding  [] Demonstrated understanding       [] No evidence of learning  [] Refused teaching         [] N/A       Electronically signed by Sebastien Singh RN on 9/7/2022 at 5:30 PM

## 2022-09-07 NOTE — PROGRESS NOTES
Hospitalist Progress Note    Patient: Farrukh Mullen. MRN: [id number]  [facility]: [id number]    YOB: 1945  Age: 78 y.o. Sex: male    DOA: 8/27/2022 LOS:  LOS: 11 days          Chief Complaint:    hypokalemia      Assessment/Plan      78 y.o. 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 Admitted for severe septic shock with multiorgan and hypotension involvement due to multiple possible sources of infection, severe hyperkalemia, acute metabolic encephalopathy, metabolic acidosis, strangulated left inguinal hernia, anemia requiring blood transfusion, mild hypoxia coagulable state.       Hypokalemia-add mag to ordered IV and PO repeltion this am  No BMP today, BMP Q4H ordered yesterday and stat BMP ordered this AM     Hypernatremia-dextrose IV, follow regular BMP checks     Mild bleeding from NG tube being pulled out by patient-  Ac blood loss anemia chronic     Dysphagia-passed swallow this am, diet ordered     Incarcerated right inguinal hernia -status post ex lap with reduction of incarcerated right groin hernia with hernia mesh     Prior AKA left side     Infected sacral ulcer -weeping purulent material, seen by wound care     Infected medial maleolar and heel ulcers right foot-completed abx -followed by wound care     Anemia -acute blood loss with chronic disease     Acute renal failure -likely due to profound shock, appreciate nephrology expertise, urine output improving, waiting for BMP results      encephalopathy -some improvement but still confused at times     S/p thoracentesis for effusion      Poor prognosis with multiple medical ailments and advanced PVD, diabetes complications, prior BKA< wounds and debilitated state  Ortho has seen for possible BKA right side and consulted vascular surgery for possible AKA     Wife updated today -at bedside   Poor prognosis  DNR     Hold heparin for bleeding     Mental status too altered to be able to make reliable decisions about medical care plans -wife will need to make medical decisions at this time     Disposition :  Patient Active Problem List   Diagnosis Code    DM2 (diabetes mellitus, type 2) ([facility] E11.9    CAD (coronary artery disease) I25.10    Acute renal failure (ARF) ([facility]) N17.9    CKD (chronic kidney disease) stage 3, GFR 30-59 ml/min ([facility]) N18.30    Elevated brain natriuretic peptide (BNP) level R79.89    UTI (urinary tract infection) N39.0    High anion gap metabolic acidosis B11.7    Hyperkalemia E87.5    Sepsis ([facility] A41.9    AMS (altered mental status) R41.82    Strangulated inguinal hernia K40.30    Right inguinal hernia K40.90    Leukocytosis D72.829    Anemia D64.9    Chronic indwelling Mccray catheter Z97.8    S/P BKA (below knee amputation), left ([facility]) Z89.512    Right foot ulcer ([facility]) L97.519    Paroxysmal atrial fibrillation ([facility]) I48.0    Diabetic ulcer of right foot ([facility] E11.621, L97.519    Pleural effusion on right J90    Severe protein-calorie malnutrition ([facility] E43    Acute respiratory failure with hypoxemia ([facility]) J96.01    Septic shock ([facility]) A41.9, R65.21    Hypernatremia E87.0       Subjective:    Somewhat confused, but conversational     Review of systems:    Constitutional: denies fevers  Respiratory: denies SOB  Cardiovascular: denies chest pain  Gastrointestinal: denies nausea      Vital signs/Intake and Output:  Visit Vitals  BP (!) 147/72 (BP 1 Location: Left upper arm, BP Patient Position: At rest)   Pulse 89   Temp 97 °F (36.1 °C)   Resp 20   Ht 5' 7.99\" (1.727 m)   Wt 58.6 kg (129 lb 1.6 oz)   SpO2 99%   BMI 19.63 kg/m²     Current Shift:  No intake/output data recorded.   Last three shifts:  09/05 1901 - 09/07 0700  In: -   Out: 2550 [Urine:1750; Drains:800]    Exam:    General: ill debilitated elderly WM, very weak, NAD  CVS:Regular rate and rhythm, no M/R/G, S1/S2 heard, no thrill  Lungs:Clear to auscultation bilaterally, no wheezes, rhonchi, or rales  Abdomen: Soft, Nontender, No distention  Extremities:left AKA< right foot dressed  Neuro:grossly normal                 Labs: Results:       Chemistry Recent Labs     22   * 146*   * 149*   K 2.8* 3.1*   * 113*   CO2 21 20*   BUN 68* 76*   CREA 2.26* 2.58*   CA 9.3 9.0   AGAP 13 16   BUCR 30* 29*    99   TP 6.4 6.0*   ALB 3.1* 3.1*   GLOB 3.3 2.9   AGRAT 0.9 1.1        CBC w/Diff Recent Labs     22   WBC 8.4 5.9   RBC 3.42* 2.68*   HGB 9.2* 6.7*   HCT 29.2* 22.3*    205   GRANS 77* 61   LYMPH 17* 25   EOS 1 3        Cardiac Enzymes No results for input(s): CPK, CKND1, LEONEL in the last 72 hours. No lab exists for component: CKRMB, TROIP   Coagulation No results for input(s): PTP, INR, APTT, INREXT, INREXT in the last 72 hours. Lipid Panel No results found for: CHOL, CHOLPOCT, CHOLX, CHLST, CHOLV, 344539, HDL, HDLP, LDL, LDLC, DLDLP, 375030, VLDLC, VLDL, TGLX, TRIGL, TRIGP, TGLPOCT, CHHD, CHHDX   BNP No results for input(s): BNPP in the last 72 hours.    Liver Enzymes Recent Labs     22   TP 6.4   ALB 3.1*           Thyroid Studies Lab Results   Component Value Date/Time    TSH 4.73 (H) 2022 08:04 AM        Procedures/imaging: see electronic medical records for all procedures/Xrays and details which were not copied into this note but were reviewed prior to creation of Arlene , MD

## 2022-09-07 NOTE — PROGRESS NOTES
CM notes patient seen by Ortho concerning possible right lower extremity amputation. CM notes that Ortho plans to consult with vascular. CM to continue to follow notes to assist in identifying HEATHER needs.

## 2022-09-08 NOTE — WOUND CARE
Wound Care Note:    Chart audited for low charity score, patient with high risk for skin breakdown, no new documented wounds at time of audit.      Skin Care & Pressure Relief Recommendations  Minimize layers of linen  Pads under patient to optimize support surface  Turn/reposition approximately every 2 hours  Pillow wedges  Manage incontinence   Promote continence; Skin Protective lotion/cream to buttocks and sacrum daily and as needed with incontinence care  Offload heels pillows    Consult wound care if any wounds/ skin care needs noted during admission

## 2022-09-08 NOTE — PROGRESS NOTES
Palliative Medicine    CODE STATUS: DNR/DNI     AMD Status: none on file. His wife, Mela Brantley, is his legal next of kin     9/8/2022 1115 Seen today in room 336 along with Lorraine Dunlap NP. Lying in bed Awake, alert. Oriented to person and place. Did not know the year. When asked his age he said 58 but he was born in [birth year]. Respirations unlabored on room air. Denies pain in either leg. Does not remember speaking with the orthopedic surgeon about possible right leg BKA/AKA. Mrs Paula Hoang not at bedside. Awaiting vascular surgery input. Disposition plan: pending decision about amputation    Palliative care will continue to follow Carla Alexis  and his family during his hospitalization and support them as they make healthcare decisions and define goals of care.       Jeana Mata RN, MSN  Palliative Medicine  P: [phone]

## 2022-09-08 NOTE — PROGRESS NOTES
Problem: Dysphagia (Adult)  Description: Patient will:  1. Tolerate PO trials with 0 s/s overt distress in 4/5 trials. 2. Utilize compensatory swallow strategies/maneuvers (decrease bite/sip, size/rate, alt. liq/sol) with min cues in 4/5 trials. 3. Perform oral-motor/laryngeal exercises to increase oropharyngeal swallow function with min cues. 4. If medically indicated, complete an objective swallow study (i.e., MBSS) to assess swallow integrity, r/o aspiration, and determine of safest LRD, min A.    Rec:     Puree diet with thin liquids  Pt will require assistance with meal set up and feeding  Aspiration precautions  HOB >45 during po intake, remain >30 for 30-45 minutes after po   Small bites/sips; alternate liquid/solid with slow feeding rate   Oral care TID  Meds crushed in applesauce  Outcome: Progressing Towards Goal    SPEECH LANGUAGE PATHOLOGY DYSPHAGIA TREATMENT    Patient: Juliane Vanegas. (71 y.o. male)  Date: 9/8/2022  Diagnosis: Sepsis ([address] [A41.9]  Strangulated hernia of abdominal wall [K43.6]  UTI (urinary tract infection) [N39.0]  AMS (altered mental status) [R41.82]  Acidosis [E87.2]  Hyperkalemia [E87.5] Sepsis ([address]  Procedure(s) (LRB):  EXPLORATORY LAPAROTOMY, REDUCTION INCARCERATED RIGHT GROIN HERNIA WITH HERNIA REPAIR WITH MESH (N/A) 12 Days Post-Op  Precautions: Aspiration    PLOF: Regular, thin     ASSESSMENT:  Patient seen by ST for dysphagia follow-up. Patient is alert/oriented x2. Some confusion noted, as evidenced by off-topic remarks and difficulty following commands. Patient with puree/thin liquid breakfast tray brought to bedside. Repositioned with HOB >45 degrees. Wet vocal quality noted prior to PO. Patient cued to cough to clear. Patient required max A with feeding. With puree texture, appearance of WNL oral transit, swallow initiation, and oral clearance.  Laryngeal elevation noted to palpation and appears functional. Wet vocal quality noted following PO; again, patient cued to clear throat/cough. Patient followed cue to clear throat in 2/5 opportunities. Throat clear effective in resolving wet vocal quality; however, patient with limited command following. Patient complained about puree tray (\"this is what I got yesterday\"); however, due to confusion, poor command following, weakness, and lack of dentition, rec continue current diet and POC. ST to follow and will re-assess if patient appropriate for diet upgrade next visit. Lucius RN. Progression toward goals:  []         Improving appropriately and progressing toward goals  [x]         Improving slowly and progressing toward goals  []         Not making progress toward goals and plan of care will be adjusted     PLAN:  Recommendations and Planned Interventions:  See above  Patient continues to benefit from skilled intervention to address the above impairments. Continue treatment per established plan of care. Discharge Recommendations: To Be Determined     SUBJECTIVE:   Patient stated that's good enough for me. OBJECTIVE:   Cognitive and Communication Status:  Neurologic State: Alert, Confused  Orientation Level: Oriented to person, Oriented to place  Cognition: Decreased command following  Perception: Appears intact  Perseveration: No perseveration noted  Safety/Judgement: Not assessed  Dysphagia Treatment:  Oral Assessment:  Oral Assessment  Labial: Decreased rate, Decreased seal, Impaired coordination, Other (comment) (limited assessment due to poor command following)  Dentition: Natural, Limited, Poor  Oral Hygiene:  (fair)  Lingual: Decreased rate, Decreased strength, Incoordinated  Velum: Unable to visualize  Mandible: No impairment  Gag Reflex:  (did not test)  P.O.  Trials:   Patient Position:  (HOB >45 degrees)   Vocal quality prior to P.O.: Wet   Consistency Presented: Puree, Thin liquid   How Presented: SLP-fed/presented, Cup/sip, Straw, Spoon       Bolus Acceptance: No impairment   Bolus Formation/Control: Impaired   Type of Impairment: Mastication, Piecemeal   Propulsion: Lingual tremors   Oral Residue: Lingual, Less than 10% of bolus   Initiation of Swallow: No impairment   Laryngeal Elevation: Functional   Aspiration Signs/Symptoms: Change vocal quality   Pharyngeal Phase Characteristics: Altered vocal quality, Suspected pharyngeal residue, Poor endurance   Effective Modifications: Alternate liquids/solids, Small sips and bites, Other (comment) (volitional cough/throat clear after each bite)   Cues for Modifications: Moderate         Oral Phase Severity: Mild-moderate   Pharyngeal Phase Severity : Other (comment) (overt s/sx - suspect moderate)  Oral Motor Exercises:  Attempted but d/c due to poor command following. PAIN:  Pain level pre-treatment: 0/10   Pain level post-treatment: 0/10     After treatment:   []              Patient left in no apparent distress sitting up in chair  [x]              Patient left in no apparent distress in bed  [x]              Call bell left within reach  [x]              Nursing notified  []              Family present  []              Caregiver present  []              Bed alarm activated      COMMUNICATION/EDUCATION:   [x] Aspiration precautions; swallow safety; compensatory techniques  [x]        Patient unable to participate in education; education ongoing with staff  []  Posted safety precautions in patient's room.   [] Oral-motor/laryngeal strengthening exercises      Jiles Alpers, SLP  Time Calculation: 20 mins

## 2022-09-08 NOTE — PROGRESS NOTES
Hospitalist Progress Note    Patient: Marisol Zimmerman. MRN: [id number]  CSN: [id number]    YOB: 1945  Age: 78 y.o. Sex: male    DOA: 8/27/2022 LOS:  LOS: 12 days          Chief Complaint:    hypokalemia      Assessment/Plan      78 y.o. 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 Admitted for severe septic shock with multiorgan and hypotension involvement due to multiple possible sources of infection, severe hyperkalemia, acute metabolic encephalopathy, metabolic acidosis, strangulated left inguinal hernia, anemia requiring blood transfusion, mild hypoxia coagulable state.       Hypokalemia-repletion     Hypernatremia-resolved, dextrose IV stopped, oral po free water, follow regular BMP checks     Mild bleeding from NG tube being pulled out by patient-  Ac blood loss anemia chronic     Dysphagia-passed swallow test     Incarcerated right inguinal hernia -status post ex lap with reduction of incarcerated right groin hernia with hernia mesh     Prior AKA left side     Infected sacral ulcer -wound care following      Infected medial maleolar and heel ulcers right foot-completed abx -followed by wound care     Anemia -acute blood loss with chronic disease     Acute renal failure -Scr improved, 1.40     encephalopathy -some improvement but still confused at times     S/p thoracentesis for effusion      Poor prognosis with multiple medical ailments and advanced PVD, diabetes complications, prior BKA< wounds and debilitated state  Ortho has seen for possible BKA right side and consulted vascular surgery for possible AKA     Wife updated today -at bedside   Poor prognosis  DNR     Hold heparin for bleeding Mental status too altered to be able to make reliable decisions about medical care plans -wife will need to make medical decisions at this time     No vascular surgery consult note even though Ortho documented discussion, vascular studies done 9/6/22, will double-check to see if vascular surgery aware of consult. BKA vs AKA.  Wife plans to make decision re: surgery if once vascular surgery weighs in, she advised that her oldest son is supportive of the surgery option     Disposition :  Patient Active Problem List   Diagnosis Code    DM2 (diabetes mellitus, type 2) ([facility] E11.9    CAD (coronary artery disease) I25.10    Acute renal failure (ARF) ([facility]) N17.9    CKD (chronic kidney disease) stage 3, GFR 30-59 ml/min ([facility]) N18.30    Elevated brain natriuretic peptide (BNP) level R79.89    UTI (urinary tract infection) N39.0    High anion gap metabolic acidosis E43.6    Hyperkalemia E87.5    Sepsis ([facility] A41.9    AMS (altered mental status) R41.82    Strangulated inguinal hernia K40.30    Right inguinal hernia K40.90    Leukocytosis D72.829    Anemia D64.9    Chronic indwelling Mccray catheter Z97.8    S/P BKA (below knee amputation), left ([facility]) Z89.512    Right foot ulcer ([facility]) L97.519    Paroxysmal atrial fibrillation ([facility]) I48.0    Diabetic ulcer of right foot ([facility] E11.621, L97.519    Pleural effusion on right J90    Severe protein-calorie malnutrition ([facility] E43    Acute respiratory failure with hypoxemia ([facility]) J96.01    Septic shock ([facility]) A41.9, R65.21    Hypernatremia E87.0       Subjective:    Somewhat confused, but conversational     Review of systems:    Constitutional: denies fevers  Respiratory: denies SOB  Cardiovascular: denies chest pain  Gastrointestinal: denies nausea      Vital signs/Intake and Output:  Visit Vitals  /73   Pulse (!) 103   Temp 97.5 °F (36.4 °C)   Resp 20   Ht 5' 7.99\" (1.727 m)   Wt 58.6 kg (129 lb 1.6 oz)   SpO2 99%   BMI 19.63 kg/m²     Current Shift:  No intake/output data recorded. Last three shifts:  09/06 1901 - 09/08 0700  In: -   Out: 2650 [Urine:2000; Drains:650]    Exam:    General: ill debilitated elderly WM, very weak, NAD  CVS:Regular rate and rhythm, no M/R/G, S1/S2 heard, no thrill  Lungs:Clear to auscultation bilaterally, no wheezes, rhonchi, or rales  Abdomen: Soft, Nontender, No distention  Extremities:left AKA< right foot dressed  Neuro:grossly normal                 Labs: Results:       Chemistry Recent Labs     09/08/22  0837 09/08/22 0130 09/07/22 2056 09/07/22  1423 09/06/22  0055   * 126* 107* 146* 141*    140 138 139 151*   K 3.2* 3.2* 3.2* 3.1* 2.8*    105 106 104 117*   CO2 25 23 24 24 21   BUN 44* 43* 45* 48* 68*   CREA 1.40* 1.49* 1.44* 1.61* 2.26*   CA 8.7 8.9 8.4* 8.7 9.3   AGAP 9 12 8 11 13   BUCR 31* 29* 31* 30* 30*   AP  --  118*  --  121* 108   TP  --  7.1  --  6.2* 6.4   ALB  --  4.1  --  3.6 3.1*   GLOB  --  3.0  --  2.6 3.3   AGRAT  --  1.4  --  1.4 0.9        CBC w/Diff Recent Labs     09/08/22 0130 09/07/22 1423 09/06/22  0055   WBC 7.3 8.5 8.4   RBC 3.50* 3.56* 3.42*   HGB 9.4* 9.6* 9.2*   HCT 30.5* 30.9* 29.2*    243 251   GRANS 65 75* 77*   LYMPH 25 16* 17*   EOS 3 2 1        Cardiac Enzymes No results for input(s): CPK, CKND1, LEONEL in the last 72 hours. No lab exists for component: CKRMB, TROIP   Coagulation No results for input(s): PTP, INR, APTT, INREXT, INREXT in the last 72 hours. Lipid Panel No results found for: CHOL, CHOLPOCT, CHOLX, CHLST, CHOLV, 668292, HDL, HDLP, LDL, LDLC, DLDLP, 070773, VLDLC, VLDL, TGLX, TRIGL, TRIGP, TGLPOCT, CHHD, CHHDX   BNP No results for input(s): BNPP in the last 72 hours.    Liver Enzymes Recent Labs     09/08/22  0130   TP 7.1   ALB 4.1   *        Thyroid Studies Lab Results   Component Value Date/Time    TSH 4.73 (H) 08/28/2022 08:04 AM        Procedures/imaging: see electronic medical records for all procedures/Xrays and details which were not copied into this note but were reviewed prior to creation of Mariajose Aparicio MD

## 2022-09-08 NOTE — PROGRESS NOTES
Problem: Pressure Injury - Risk of  Goal: *Prevention of pressure injury  Description: Document Anam Scale and appropriate interventions in the flowsheet. Outcome: Progressing Towards Goal  Note: Pressure Injury Interventions:  Sensory Interventions: Float heels, Keep linens dry and wrinkle-free, Maintain/enhance activity level, Minimize linen layers, Monitor skin under medical devices, Pad between skin to skin, Turn and reposition approx. every two hours (pillows and wedges if needed)    Moisture Interventions: Minimize layers, Internal/External fecal devices, Internal/External urinary devices, Absorbent underpads, Apply protective barrier, creams and emollients, Check for incontinence Q2 hours and as needed    Activity Interventions: PT/OT evaluation    Mobility Interventions: Float heels, PT/OT evaluation, HOB 30 degrees or less, Turn and reposition approx. every two hours(pillow and wedges)    Nutrition Interventions: Offer support with meals,snacks and hydration    Friction and Shear Interventions: HOB 30 degrees or less, Apply protective barrier, creams and emollients, Foam dressings/transparent film/skin sealants, Feet elevated on foot rest, Lift sheet, Minimize layers                Problem: Falls - Risk of  Goal: *Absence of Falls  Description: Document Joana Fall Risk and appropriate interventions in the flowsheet.   Outcome: Progressing Towards Goal  Note: Fall Risk Interventions:       Mentation Interventions: Adequate sleep, hydration, pain control, Bed/chair exit alarm, Toileting rounds, More frequent rounding, Reorient patient    Medication Interventions: Patient to call before getting OOB, Bed/chair exit alarm    Elimination Interventions: Call light in reach    History of Falls Interventions: Vital signs minimum Q4HRs X 24 hrs (comment for end date)         Problem: Patient Education: Go to Patient Education Activity  Goal: Patient/Family Education  Outcome: Progressing Towards Goal Problem: Nutrition Deficit  Goal: *Optimize nutritional status  Outcome: Progressing Towards Goal     Problem: Risk for Spread of Infection  Goal: Prevent transmission of infectious organism to others  Description: Prevent the transmission of infectious organisms to other patients, staff members, and visitors.   Outcome: Progressing Towards Goal     Problem: Patient Education:  Go to Education Activity  Goal: Patient/Family Education  Outcome: Progressing Towards Goal

## 2022-09-08 NOTE — PROGRESS NOTES
Pt more talkative and interactive. Pt updated on plan of care. Opportunities for questions offered. No questions or concerns. Mccray and FMS in place. Q2hr turns.

## 2022-09-08 NOTE — PROGRESS NOTES
Palliative Medicine Consult    Patient Name: Isa Forde. YOB: 1945    Date of Initial Consult: 8/29/2022  Date of follow up: 9/8/2022  Reason for Consult: Goals of care discussions  Requesting Provider: Dr. Flora Hendrix   Primary Care Physician: Thelma Schultz MD      SUMMARY:   Isa Mathew is a 78 y.o. with a past history of hypertension, hyperlipidemia, stroke, PAD, chronic atrial fibrillation, on Plavix, DM, CKD, previous, COVID-19 infection, chronic indwelling urinary catheter for urinary retention, and severe peripheral vascular disease with worsening gangrenous left foot necessitating left BKA during 8 day hospitalization last year, who was admitted on 8/27/2022 from home with a diagnosis of incarcerated Right inguinal hernia, severe septic shock with hypotension, severe hyperkalemia, acute metabolic encephalopathy, and anemia. Current medical issues leading to Palliative Medicine involvement include: goals of care discussions. 8/30/2022: Patient was off sedation, moving head back and forth, appears in distress. 9/1/2022: Patient sedation help, plan for SBT today, with possible medical extubation. 9/2/2022: Patient was medically extubated on 9/1/2022, he is oriented to person and place, disoriented to current time and situation. Appears frail, fatigued, and weak. 9/6/2022: Patient is awake, alert, oriented to person and place, confused about current time and situation. He states he is 54years old. 9/8/2022: Patient awake, remains confused, oriented to person and place, states the year is 1945 (his birth year). He cannot recall speaking to the orthopedic surgeon about possible AKA versus BKA.    PALLIATIVE DIAGNOSES:   Goals of care discussions  Septic shock  Acute respiratory failure   incarcerated Right inguinal hernia  Advanced age/debility       PLAN:   9/8/2022: Palliative medicine team including Bushra Navarro RN and I met with patient at patient's bedside. Patient awake, remains confused, oriented to person and place, states the year is [birth year] (his birth year). He cannot recall speaking to the orthopedic surgeon about possible AKA versus BKA. Wife not at bedside. Awaiting vascular input, then will plan a meeting with family to readdress goals of care. Family is strongly hopeful that patient will clear cognitively enough to make this decision on his own but at this time he remains confused. At this time continue DNR/DNI. See previous discussions below:    9/6/2022: Palliative medicine team including Klarissa Stockton RN and I met with patient at patient's bedside. Patient is awake, alert, oriented to person and place, confused about current time and situation. He states he is 54years old. Met with wife later in the day, who states that each day patient appears to be doing better, and improving cognitively, now able to eat a pureed diet. Readdressed goals of care today, confirmed DNR/DNI. Readdressed the likely need of right lower extremity amputation versus implementation of comfort measures with hospice at discharge. Wife states that patient states \"cut it off\" when discussing his foot ulcers and seems to be okay with having the amputation. Explained at this time I do not believe patient understands the benefits and burdens of his medical choices, wife agrees. Wife would like to give patient more time to clear cognitively to participate in his goals of care conversation, understanding patient is high risk for recurrent sepsis due to severe PVD in the right lower extremity with chronic vascular ulcers. Family is not ready for hospice. We will follow with you. See previous discussions below:    9/2/2022: Palliative medicine team including  CHERYLE Thurston and I met with patient at patient's bedside. Patient is awake, alert, oriented to person and place, disoriented to current time and situation.   He is not able to understand complex goals of care discussions at this time. Met with patient later in the day and wife was at bedside. Reviewed goals of care with wife. Wife has a good understanding of current health situation; we explained that patient is too weak, and confused to participate in goals of care discussions at this time. Reviewed overall level of care, with concern for RLL chronic vascular ulcers with previous recommendations from [facility] (6/9/2022) for consideration of Right AKA. Patient has historically declined this amputation. Explained to wife that patient is likely an appropriate hospice candidate, and this should be considered as an option at discharge. Also discussed the benefits and burdens of continued aggressive measures versus implementing comfort measures with the support of hospice at discharge. Wife would like to give patient more time to see  if he clears mentally to participate in his goals of care, understanding she may have to make the decisions should patient not clear cognitively. At this time continue DNR/DNI. See previous discussions below:    9/1/2022: Palliative medicine team including Todd Hanley and I met with patient at patient's bedside today. Patient was being removed from sedation for SBT today. Spoke to hospitalist and intensivist. Patient may be stable enough to medically extubate depending on how he does today with SBT. Per previous goals of care discussions with family, we will wait to see if patient can be medically extubated and readdress further goals of care at that point (right leg necrotic tissue with possible need for amputation versus comfort measures). Patient has historically declined amputation in the past. Goals of care discussions ongoing. Family hopes that patient can be safely extubated and would be able to participate in goals of care discussions. At this time, continue DNR/do not re-intubate for any reason.     See previous discussions below:    8/30/2022: Palliative medicine team including Justin Trinidad and MANUEL met with patient at patient's bedside today. Patient was off sedation, moving head back and forth, appears in distress. Family meeting today consisting of palliative team, patient's family (wife Monique Vuong, children Edmond Lara, and Thom Godwin) hositalist Dr. Shantell Ford, and intensivist Dr. Amada Hernandez. Medical update provided. Discussed SBT outcome today with potential for medical extubation tomorrow depending on how his SBT goes tomorrow. Discussed the benefits and burdens of reintubation in the event of respiratory decline post medical extubation. Discussed overall level of care, with concern for RLL chronic vascular ulcers with previous recommendations from [facility] (6/9/2022) for consideration of Right AKA. Patient has historically declined this amputation. Discussed current quality of life. Family describes patient as a man who values his independence, and patient has been bothered by the fact that he hasn't  walked in over a year. Discussed that septic shock is improving and this event was likely related to bowel sepsis, but that patient is at high risk for recurrent sepsis and further clinical decline due to chronic infection in RLL, with no plan for amputation. Discussed the benefits and burdens of continuing current level of care versus implementation of comfort measures with hospice at discharge. Family clear that he would not find reintubation acceptable. Family is hopeful that if patient is medically extubated, he may wake up enough to participate in goals of care discussions. For now, patient is a DNR/do not re-intubate for any reason. Further goals of care discussions ongoing. See previous discussions below:    8/29/2022:Goals of care discussions: Palliative medicine team including  CHERYLE Shin and MANUEL met with patient at patient's bedside.  Patient is orally intubated on mechanical ventilation, frequent movements in bed but no command following. Wife Edenilson Castillo at bedside (Wife notes that English is her second language but she states she understands our conversation and answering questions appropriately. Offered translation services but wife declined.) Wife confirms DNR in the event of cardiac arrest. Patient is a PARTIAL CODE: No CPR (including no chest compressions, no shock, and no ACLS meds in the event of cardiac arrest). Continue intubation at this time. Wife requesting a meeting with their children involved to address further goals of care. Wife will call me with a time for tomorrow once she has spoken to all of her children. Received a call from patient's son Ginna Trejo and explained to him the desire to meet for family meeting. Ginna Trejo confirms DNR status upon cardiac arrest. Further goals of care discussions will occur at family meeting tomorrow. Awaiting response from wife with exact time. Septic shock:   multiple possible sources of infection, incarcerated Right inguinal hernia s/p laparotomy and reduction of incarcerated right groin hernia without need for bowel resection-8/27/2022. UA-Positive for UTI; urine culture obtained. sacral ulcer and medial maleolar and heel infected ulcers. On broad spectrum antibiotics per primary team.   Acute respiratory failure: Patient remains intubated postop due to critical illness. incarcerated Right inguinal hernia: s/p laparotomy and reduction of incarcerated right groin hernia without need for bowel resection-8/27/2022. Advanced age/debility: 78year old male who is critically ill, needs assist with all ADLs. Prior to admission, patient lived at home with his spouse and required assistance with functional ADLs.    Initial consult note routed to primary continuity provider  Communicated plan of care with: Palliative IDT       GOALS OF CARE / TREATMENT PREFERENCES:   [====Goals of Care====]  GOALS OF CARE: DNR/DNI    Patient/Health Care Proxy Stated Goals: Prolong life      TREATMENT PREFERENCES:   Code Status: DNR    Advance Care Planning:  Advance Care Planning 8/29/2022   Patient's Healthcare Decision Maker is: Legal Next of Kin   Primary Decision Maker Name -   Primary Decision Maker Phone Number -   Primary Decision Maker Relationship to Patient -   Confirm Advance Directive None   Patient Would Like to Complete Advance Directive Unable       Medical Interventions: Limited additional interventions            The palliative care team has discussed with patient / health care proxy about goals of care / treatment preferences for patient.  [====Goals of Care====]         HISTORY:     History obtained from: patient's chart, family    CHIEF COMPLAINT: incarcerated Right inguinal hernia, s/p surgery    HPI/SUBJECTIVE:    The patient is:   [] Verbal and participatory  [x] Non-participatory due to:   Oriented to person and place, confused to current time and situation     Clinical Pain Assessment (nonverbal scale for severity on nonverbal patients):   Clinical Pain Assessment  Severity: 0    Adult Nonverbal Pain Scale  Face: No particular expression or smile  Activity (Movement): Lying quietly, normal position  Guarding: Lying quietly, no positioning of hands over areas of body  Physiology (Vital Signs):  Stable vital signs  Respiratory: Baseline RR/SpO2 compliant with ventilator  Total Score: 0       FUNCTIONAL ASSESSMENT:     Palliative Performance Scale (PPS):  PPS: 40       PSYCHOSOCIAL/SPIRITUAL SCREENING:     Advance Care Planning:  Advance Care Planning 8/29/2022   Patient's Healthcare Decision Maker is: Legal Next of Kin   Primary Decision Maker Name -   Primary Decision Maker Phone Number -   Primary Decision Maker Relationship to Patient -   Confirm Advance Directive None   Patient Would Like to Complete Advance Directive Unable        Any spiritual / Ep[employer]opalian concerns: unable to assess  [] Yes /  [] No    Caregiver Burnout:  [] Yes /  [] No /  [x] No Caregiver Present Anticipatory grief assessment: unable to assess  [] Normal  / [] Maladaptive          REVIEW OF SYSTEMS:     Positive and pertinent negative findings in ROS are noted above in HPI. The following systems were [] reviewed / [x] unable to be reviewed as noted in HPI  Other findings are noted below. Systems: constitutional, ears/nose/mouth/throat, respiratory, gastrointestinal, genitourinary, musculoskeletal, integumentary, neurologic, psychiatric, endocrine. Positive findings noted below. Modified ESAS Completed by: provider   Fatigue: 5       Pain: 0   Anxiety: 3       Dyspnea: 0           Stool Occurrence(s): 1        PHYSICAL EXAM:     From RN flowsheet:  Wt Readings from Last 3 Encounters:   09/06/22 58.6 kg (129 lb 1.6 oz)   11/02/21 81.6 kg (180 lb)   10/11/21 76.7 kg (169 lb)     Blood pressure 134/73, pulse (!) 103, temperature 97.5 °F (36.4 °C), resp. rate 20, height 5' 7.99\" (1.727 m), weight 58.6 kg (129 lb 1.6 oz), SpO2 99 %.     Pain Scale 1: Numeric (0 - 10)  Pain Intensity 1: 0     Pain Location 1: Back  Pain Orientation 1: Posterior  Pain Description 1: Aching  Pain Intervention(s) 1: Medication (see MAR), Massage, Position, Repositioned, Rest      Constitutional: patient in NAD, confused, appears acute on chronically ill  Eyes: PERRL  ENMT: dry mucous membranes, hoarseness  Cardiovascular: regular rhythm, distal pulses intact  Respiratory: unlabored, symmetric, on NC at 2 L  Gastrointestinal: midline surgical incision CDI, no distention  Musculoskeletal: no deformity, no tenderness to palpation, L BKA  Skin: warm, dry, black eschar right ankle and right heel  Neurologic: following some commands, moving all extremities, oriented to person and place, confused to current time and situation       HISTORY:     Principal Problem:    Sepsis ([facility] (8/27/2022)    Active Problems:    Acute renal failure (ARF) ([facility] [address] (5/29/2018)      UTI (urinary tract infection) (10/29/2021)      High anion gap metabolic acidosis (8/27/2022)      Hyperkalemia (8/27/2022)      AMS (altered mental status) (8/27/2022)      Strangulated inguinal hernia (8/27/2022)      Right inguinal hernia (8/27/2022)      Leukocytosis (8/27/2022)      Anemia (8/27/2022)      Chronic indwelling Mccray catheter (8/27/2022)      S/P BKA (below knee amputation), left ([address] (8/27/2022)      Right foot ulcer ([address] (8/27/2022)      Paroxysmal atrial fibrillation ([address] (8/27/2022)      Diabetic ulcer of right foot ([address] (8/27/2022)      Pleural effusion on right (8/27/2022)      Severe protein-calorie malnutrition ([address] (8/27/2022)      Acute respiratory failure with hypoxemia ([address] (8/28/2022)      Septic shock ([address] (8/28/2022)      Hypernatremia (9/6/2022)    Past Medical History:   Diagnosis Date    A-fib (HCC)     Asthma     CAD (coronary artery disease)     Chronic kidney disease     stage 3    COVID-19     Diabetes (HCC)     GERD (gastroesophageal reflux disease)     Heart failure (HCC)     chronic diastolic heart failure    High cholesterol     Hypertension     Ill-defined condition     chronic osteomyelitis left ankle and foot    Ill-defined condition     chronic arteriosclerosis    PVD (peripheral vascular disease) ([address]     Stroke ([address]     cva      Past Surgical History:   Procedure Laterality Date    COLONOSCOPY N/A 6/1/2018    COLONOSCOPY, POLYPECTOMY performed by Glenn Noel MD at THE [facility] ENDOSCOPY    HX CATARACT REMOVAL      HX ORTHOPAEDIC Left 2021    ankle washout, external fixator, would vac    HX ORTHOPAEDIC      external fixator removed      History reviewed. No pertinent family history. History reviewed, no pertinent family history.   Social History     Tobacco Use    Smoking status: Former    Smokeless tobacco: Never   Substance Use Topics    Alcohol use: Not Currently     No Known Allergies   Current Facility-Administered Medications   Medication Dose Route Frequency    potassium bicarb-citric acid (EFFER-K) tablet 40 mEq  40 mEq Oral BID    ipratropium (ATROVENT) 0.02 % nebulizer solution 0.5 mg  0.5 mg Nebulization Q6H PRN    collagenase (SANTYL) 250 unit/gram ointment   Topical DAILY    0.9% sodium chloride infusion 250 mL  250 mL IntraVENous PRN    hydrALAZINE (APRESOLINE) 20 mg/mL injection 20 mg  20 mg IntraVENous Q6H PRN    budesonide (PULMICORT) 500 mcg/2 ml nebulizer suspension  500 mcg Nebulization BID RT    albumin human 25% (BUMINATE) solution 25 g  25 g IntraVENous Q12H    midazolam (VERSED) injection 0.5 mg  0.5 mg IntraVENous BID PRN    arformoteroL (BROVANA) neb solution 15 mcg  15 mcg Nebulization BID RT    0.9% sodium chloride infusion 250 mL  250 mL IntraVENous PRN    [Held by provider] heparin (porcine) injection 5,000 Units  5,000 Units SubCUTAneous Q8H    pantoprazole (PROTONIX) 40 mg in 0.9% sodium chloride 10 mL injection  40 mg IntraVENous Q12H    sodium chloride (NS) flush 5-40 mL  5-40 mL IntraVENous Q8H    sodium chloride (NS) flush 5-40 mL  5-40 mL IntraVENous PRN    acetaminophen (TYLENOL) tablet 650 mg  650 mg Oral Q6H PRN    Or    acetaminophen (TYLENOL) suppository 650 mg  650 mg Rectal Q6H PRN    polyethylene glycol (MIRALAX) packet 17 g  17 g Oral DAILY PRN    ondansetron (ZOFRAN ODT) tablet 4 mg  4 mg Oral Q8H PRN    Or    ondansetron (ZOFRAN) injection 4 mg  4 mg IntraVENous Q6H PRN    insulin lispro (HUMALOG) injection   SubCUTAneous Q6H    glucose chewable tablet 16 g  4 Tablet Oral PRN    glucagon (GLUCAGEN) injection 1 mg  1 mg IntraMUSCular PRN    dextrose 10% infusion 0-250 mL  0-250 mL IntraVENous PRN    HYDROmorphone (DILAUDID) injection 1 mg  1 mg IntraVENous Q4H PRN          LAB AND IMAGING FINDINGS:     Lab Results   Component Value Date/Time    WBC 7.3 09/08/2022 01:30 AM    HGB 9.4 (L) 09/08/2022 01:30 AM    PLATELET 657 48/87/8531 01:30 AM     Lab Results   Component Value Date/Time    Sodium 139 09/08/2022 08:37 AM    Potassium 3.2 (L) 09/08/2022 08:37 AM    Chloride 105 09/08/2022 08:37 AM CO2 25 09/08/2022 08:37 AM    BUN 44 (H) 09/08/2022 08:37 AM    Creatinine 1.40 (H) 09/08/2022 08:37 AM    Calcium 8.7 09/08/2022 08:37 AM    Magnesium 1.9 09/08/2022 01:30 AM    Phosphorus 1.9 (L) 09/03/2022 07:45 AM      Lab Results   Component Value Date/Time    Alk. phosphatase 118 (H) 09/08/2022 01:30 AM    Protein, total 7.1 09/08/2022 01:30 AM    Albumin 4.1 09/08/2022 01:30 AM    Globulin 3.0 09/08/2022 01:30 AM     Lab Results   Component Value Date/Time    INR 1.3 (H) 09/02/2022 04:51 AM    Prothrombin time 16.8 (H) 09/02/2022 04:51 AM    aPTT 118.5 (H) 05/17/2021 08:20 AM      Lab Results   Component Value Date/Time    Iron 10 (L) 05/06/2021 06:40 AM    TIBC 186 (L) 05/06/2021 06:40 AM    Iron % saturation 5 (L) 05/06/2021 06:40 AM    Ferritin 125 05/06/2021 06:40 AM      No results found for: PH, PCO2, PO2  No components found for: [place]   Lab Results   Component Value Date/Time    CK 46 05/29/2018 05:45 PM    CK - MB 1.4 05/29/2018 05:45 PM                Total time: 15 minutes  Counseling / coordination time, spent as noted above:   > 50% counseling / coordination?: yes, patient, family, and medical team    Prolonged service was provided for  []30 min   []75 min in face to face time in the presence of the patient, spent as noted above.   Time Start:   Time End:

## 2022-09-08 NOTE — DIABETES MGMT
Diabetes/ Glycemic Control Plan of Care  Recommendations:   Consider 5-6 units Lantus Q24 hours    Assessment: BG ranging 107-188 mg/dl over the last 24 hours on current regimen. Patient utilizing 9-12 units daily of corrective insulin. May consider initiation of conservative dose of basal insulin which may minimize corrective required.         Recent Glucose Results:   Lab Results   Component Value Date/Time     (H) 09/08/2022 08:37 AM     (H) 09/08/2022 01:30 AM     (H) 09/07/2022 08:56 PM    GLUCPOC 188 (H) 09/08/2022 11:01 AM    GLUCPOC 145 (H) 09/08/2022 05:02 AM    GLUCPOC 123 (H) 09/07/2022 11:34 PM         BG within target range (non-ICU: <180; -180):  [] Yes    [x] No   Current insulin orders: corrective Humalog  Total Daily Dose previous 24 hours = 9 units Humalog     Plan/Goals:   Blood glucose will be within target of 70 - 180 mg/dl within 72 hours     Education:  [] Refer to Diabetes Education Record                       [x] Education not indicated at this time     Robles Orr RD  Glycemic Control Team  [phone]    Monday-Friday   9 am - 3 pm

## 2022-09-08 NOTE — PROGRESS NOTES
Alert and oriented x2, to self and place, pt has no reports of pain, medication provided,  assessments and VS completed, accu checks completed, pt encouraged to eat throughout shift, frequent PVC's noted on tele strips, magnesium lab ordered. Mccray care completed, FMS monitored , wound care completed on sacrum and right  foot. Pt tolerated well.

## 2022-09-08 NOTE — PROGRESS NOTES
Patient:  Giuliana Villa :  1945  Gender:  male  MRN #:  [id number]    Assessment:   Giuliana Villa is a 78 y.o. male with hypertension, hyperlipidemia, stroke , chronic atrial fibrillation, on Plavix, DM,previous, COVID-19 infection, chronic indwelling urinary catheter for urinary retention, severe peripheral vascular disease left BKA admitted in ICU with septic shock , acute renal failure, hyperkalemia , metabolic acidosis and strangulated left inguinal hernia  s/p emergency  surgery   Breathing comfortably on nasal canula. Decent urine output, now has hypokalemia intermittently  , significant peripheral artery disease , mental status has improved much      Acute renal failure- Improving   Hypokalemia   Hypernatremia - improved     Plan:   He has decent urine output with decent improvement in renal function. Hypernatremia improved . encourage oral water intake . Hold lasix for now   Minimize volume infusion ,   Avoid NSAIDS, contrast and nephrotoxin   Dose all meds and antibiotics for current eGFR  Transfuse prn to keep hemoglobin > 7 gram/dl     Hypokalemia- KCL 40 BID for today     Bmp in morning     Subjective/Interval Events    Decent urine output   he is awake and alert , mental status improved   No symptoms reported             Intake/Output Summary (Last 24 hours) at 2022 1148  Last data filed at 2022 0405  Gross per 24 hour   Intake --   Output 1800 ml   Net -1800 ml           Blood pressure 134/73, pulse (!) 103, temperature 97.5 °F (36.4 °C), resp. rate 20, height 5' 7.99\" (1.727 m), weight 58.6 kg (129 lb 1.6 oz), SpO2 99 %.     Exam:    Not conversant , awake and alert   Lung: clear to auscultation  Ext: no edema in RLE, erythema, wrapped in ACE bandage        Recent Labs     22  0130   WBC 7.3   HCT 30.5*   MCV 87.1       Recent Labs     22  0837      CO2 25   BUN 44*       Recent Labs     22  0130   AGRAT 1.4     Discussed with wife     I have reviewed patient's record for pertinent labs, radiology and other test . I have reviewed old records. I have ordered labs, medications and radiology as necessary and indicated per patient's condition . Total time spent is approximately 27 minutes. Greater than 50 % of that time was spent in counseling and or care coordination.        Miguel Lane MD  Nephrology -[facility], [facility].

## 2022-09-09 NOTE — PROGRESS NOTES
Hospitalist Progress Note    Patient: Claudy Mckinney MRN: [id number]  [facility]: [id number]    YOB: 1945  Age: 78 y.o. Sex: male    DOA: 8/27/2022 LOS:  LOS: 13 days          Chief Complaint:    hypokalemia      Assessment/Plan      78 y.o. 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 Admitted for severe septic shock with multiorgan and hypotension involvement due to multiple possible sources of infection, severe hyperkalemia, acute metabolic encephalopathy, metabolic acidosis, strangulated left inguinal hernia, anemia requiring blood transfusion, mild hypoxia coagulable state.       Hypokalemia-repletion     Hypernatremia-resolved, dextrose IV stopped, oral po free water, follow regular BMP checks     Mild bleeding from NG tube being pulled out by patient-  Ac blood loss anemia chronic     Dysphagia-passed swallow test     Incarcerated right inguinal hernia -status post ex lap with reduction of incarcerated right groin hernia with hernia mesh     Prior AKA left side     Infected sacral ulcer -wound care following      Infected medial maleolar and heel ulcers right foot-completed abx -followed by wound care     Anemia -acute blood loss with chronic disease     Acute renal failure -Scr improved, 1.40     encephalopathy -some improvement but still confused at times     S/p thoracentesis for effusion      Poor prognosis with multiple medical ailments and advanced PVD, diabetes complications, prior BKA< wounds and debilitated state  Ortho has seen for possible BKA right side and consulted vascular surgery for possible AKA     Wife updated today -at bedside   Poor prognosis  DNR     Hold heparin for bleeding Mental status too altered to be able to make reliable decisions about medical care plans -wife will need to make medical decisions at this time     No vascular surgery consult note even though Ortho documented discussion, vascular studies done 9/6/22, will double-check to see if vascular surgery aware of consult. BKA vs AKA.  Wife plans to make decision re: surgery if once vascular surgery weighs in, she advised that her oldest son is supportive of the surgery option >reached out to Vascular surgery team today to see if consult made it to the vascular surgery consult list     Disposition : TBD  Patient Active Problem List   Diagnosis Code    DM2 (diabetes mellitus, type 2) ([facility] [address] E11.9    CAD (coronary artery disease) I25.10    Acute renal failure (ARF) ([facility]) N17.9    CKD (chronic kidney disease) stage 3, GFR 30-59 ml/min ([facility]) N18.30    Elevated brain natriuretic peptide (BNP) level R79.89    UTI (urinary tract infection) N39.0    High anion gap metabolic acidosis J00.7    Hyperkalemia E87.5    Sepsis ([facility] [address] A41.9    AMS (altered mental status) R41.82    Strangulated inguinal hernia K40.30    Right inguinal hernia K40.90    Leukocytosis D72.829    Anemia D64.9    Chronic indwelling Mccray catheter Z97.8    S/P BKA (below knee amputation), left ([facility]) Z89.512    Right foot ulcer ([facility]) L97.519    Paroxysmal atrial fibrillation ([facility]) I48.0    Diabetic ulcer of right foot ([facility] [address] E11.621, L97.519    Pleural effusion on right J90    Severe protein-calorie malnutrition ([facility] [address] E43    Acute respiratory failure with hypoxemia ([facility] [address] J96.01    Septic shock ([facility] [address] A41.9, R65.21    Hypernatremia E87.0       Subjective:    Wants his breakfast and some cold ice water     Review of systems:    Constitutional: denies fevers  Respiratory: denies SOB  Cardiovascular: denies chest pain  Gastrointestinal: denies nausea      Vital signs/Intake and Output:  Visit Vitals  BP (!) 144/77   Pulse 98   Temp 99.2 °F (37.3 °C)   Resp 20   Ht 5' 7.99\" (1.727 m)   Wt 58.6 kg (129 lb 1.6 oz)   SpO2 100%   BMI 19.63 kg/m²     Current Shift:  No intake/output data recorded. Last three shifts:  09/07 1901 - 09/09 0700  In: -   Out: 9554 [Urine:1600; Drains:50]    Exam:    General: ill debilitated elderly WM, very weak, NAD  CVS:Regular rate and rhythm, no M/R/G, S1/S2 heard, no thrill  Lungs:Clear to auscultation bilaterally, no wheezes, rhonchi, or rales  Abdomen: Soft, Nontender, No distention  Extremities:left AKA< right foot dressed  Neuro:grossly normal                 Labs: Results:       Chemistry Recent Labs     09/09/22  0045 09/08/22 0837 09/08/22 0130 09/07/22 2056 09/07/22  1423   * 151* 126*   < > 146*    139 140   < > 139   K 3.4* 3.2* 3.2*   < > 3.1*    105 105   < > 104   CO2 24 25 23   < > 24   BUN 43* 44* 43*   < > 48*   CREA 1.46* 1.40* 1.49*   < > 1.61*   CA 8.8 8.7 8.9   < > 8.7   AGAP 8 9 12   < > 11   BUCR 29* 31* 29*   < > 30*   *  --  118*  --  121*   TP 6.4  --  7.1  --  6.2*   ALB 3.8  --  4.1  --  3.6   GLOB 2.6  --  3.0  --  2.6   AGRAT 1.5  --  1.4  --  1.4    < > = values in this interval not displayed. CBC w/Diff Recent Labs     09/09/22  0045 09/08/22  0130 09/07/22  1423   WBC 6.2 7.3 8.5   RBC 3.13* 3.50* 3.56*   HGB 8.3* 9.4* 9.6*   HCT 26.6* 30.5* 30.9*    263 243   GRANS 67 65 75*   LYMPH 19* 25 16*   EOS 2 3 2        Cardiac Enzymes No results for input(s): CPK, CKND1, LEONEL in the last 72 hours. No lab exists for component: CKRMB, TROIP   Coagulation No results for input(s): PTP, INR, APTT, INREXT, INREXT in the last 72 hours. Lipid Panel No results found for: CHOL, CHOLPOCT, CHOLX, CHLST, CHOLV, 674354, HDL, HDLP, LDL, LDLC, DLDLP, 279204, VLDLC, VLDL, TGLX, TRIGL, TRIGP, TGLPOCT, CHHD, CHHDX   BNP No results for input(s): BNPP in the last 72 hours.    Liver Enzymes Recent Labs     09/09/22  0045   TP 6.4   ALB 3.8   *        Thyroid Studies Lab Results   Component Value Date/Time    TSH 4.73 (H) 08/28/2022 08:04 AM        Procedures/imaging: see electronic medical records for all procedures/Xrays and details which were not copied into this note but were reviewed prior to creation of Shruti Rebolledo MD

## 2022-09-09 NOTE — PROGRESS NOTES
[facility]  (A Division of [address] [place] [address])                                                                                                                      Marie Smith MD  Office #: - Option # [phone]  Fax #: [phone]     Date of Admission: 8/27/2022Date of Note: 9/9/2022  Reason for Referral: Evaluation and antibiotic management of septic shock. Current Antimicrobials:    Prior Antimicrobials:    Off ABX from 9/4/22   Levofloxacin / Vancomycin 8/27 to 8/30  Zosyn 8/27 to 9/4/22       Assessment- ID related:  --------------------------------------------------------------    S/P Septic shock with ELINOR/ resp failure and encephalopathy 2/2 below: Improved  Encephalopathy  Strangulated and incarcerated R groin hernia -post X-lap 8/27- viable bowel per op note, no resection  Leukemoid reaction- Improved  S/P resp failure- post op, remains intubated--increasing interstiial edema and R P.effusion- extubated 9/1/22  -- Resp culture: MRSA colonized 8/31/22  --S/P R thoracentesis with 460cc of fluid, PH 7/7, culture in progress-- 9/2/22  BCX:  NGSF 8/27  UCX: >2 organism on cx  BL lung infiltrates due to atelectasis/ consolidation /has pleural effusion   PAD with non healing heel ulcers, unstagable eschar, skin necrosis, on RLE  L BKA  DMT2  Anemia-<70 9/5/22    Recommendation for ID issues I am following:  ------------------------------------------------------------------------------    Monitor off systemic abx    Surgical plans/Vascular evaluation in process    Will check once weekly while here             Subjective:  Pt seen. On RA--doesn't voice complaints. Untouched food in front of him  Surgical evaluation being done and discussed  Last saw him 9/5/22      CXR 9/2:     1. No significant interval change. Probable small right pleural effusion, not  appreciably changed.      2. Right lung parenchymal findings likely largely reflect scar though are not  well assessed. Pneumonia or aspiration not entirely excluded. HPI:  Kellen Carolina is a 78 y.o. WHITE/NON- with PMH DM, hx of stroke, PAD- with L BKA --admitted in septic shock, leukmoid reaction and acute renal failure on 8/27 from strangulated/incarcerated r groin hernia. Underwent X-lap, bowel was viable and no bowel resection done. Improved septic shock with surgery and triple ABX-- bcx negative. Has BL lung infiltrate with effusion as well on CXR. Nexrotic/eschar wound lesion on RLE.             Active Hospital Problems    Diagnosis Date Noted    Hypernatremia 09/06/2022    Acute respiratory failure with hypoxemia (HCC) 08/28/2022    Septic shock ([facility]) 08/28/2022    High anion gap metabolic acidosis 10/22/9875    Hyperkalemia 08/27/2022    Sepsis ([address] 08/27/2022    AMS (altered mental status) 08/27/2022    Strangulated inguinal hernia 08/27/2022    Right inguinal hernia 08/27/2022    Leukocytosis 08/27/2022    Anemia 08/27/2022    Chronic indwelling Mccray catheter 08/27/2022    S/P BKA (below knee amputation), left ([address] 08/27/2022    Right foot ulcer ([address] 08/27/2022    Paroxysmal atrial fibrillation ([address] 08/27/2022    Diabetic ulcer of right foot ([address] 08/27/2022    Pleural effusion on right 08/27/2022    Severe protein-calorie malnutrition ([address] 08/27/2022    UTI (urinary tract infection) 10/29/2021    Acute renal failure (ARF) ([address] 05/29/2018     Past Medical History:   Diagnosis Date    A-fib ([address]     Asthma     CAD (coronary artery disease)     Chronic kidney disease     stage 3    COVID-19     Diabetes (HCC)     GERD (gastroesophageal reflux disease)     Heart failure (HCC)     chronic diastolic heart failure    High cholesterol     Hypertension     Ill-defined condition     chronic osteomyelitis left ankle and foot    Ill-defined condition     chronic arteriosclerosis    PVD (peripheral vascular disease) ([address]     Stroke ([address]     cva     Past Surgical History:   Procedure Laterality Date    COLONOSCOPY N/A 6/1/2018    COLONOSCOPY, POLYPECTOMY performed by Moraima Escamilla MD at THE [facility]    HX CATARACT REMOVAL      HX ORTHOPAEDIC Left 2021    ankle washout, external fixator, would vac    HX ORTHOPAEDIC      external fixator removed     History reviewed. No pertinent family history. Social History     Socioeconomic History    Marital status:      Spouse name: Not on file    Number of children: Not on file    Years of education: Not on file    Highest education level: Not on file   Occupational History    Not on file   Tobacco Use    Smoking status: Former    Smokeless tobacco: Never   Substance and Sexual Activity    Alcohol use: Not Currently    Drug use: No    Sexual activity: Not on file   Other Topics Concern    Not on file   Social History Narrative    Not on file     Social Determinants of Health     Financial Resource Strain: Not on file   Food Insecurity: Not on file   Transportation Needs: Not on file   Physical Activity: Not on file   Stress: Not on file   Social Connections: Not on file   Intimate Partner Violence: Not on file   Housing Stability: Not on file       Allergies:  Patient has no known allergies.      Medications:  Current Facility-Administered Medications   Medication Dose Route Frequency    potassium bicarb-citric acid (EFFER-K) tablet 40 mEq  40 mEq Oral DAILY    [START ON 9/10/2022] povidone-iodine (BETADINE) 10 % topical solution   Topical DAILY    ipratropium (ATROVENT) 0.02 % nebulizer solution 0.5 mg  0.5 mg Nebulization Q6H PRN    collagenase (SANTYL) 250 unit/gram ointment   Topical DAILY    0.9% sodium chloride infusion 250 mL  250 mL IntraVENous PRN    hydrALAZINE (APRESOLINE) 20 mg/mL injection 20 mg  20 mg IntraVENous Q6H PRN    budesonide (PULMICORT) 500 mcg/2 ml nebulizer suspension  500 mcg Nebulization BID RT    albumin human 25% (BUMINATE) solution 25 g  25 g IntraVENous Q12H    midazolam (VERSED) injection 0.5 mg  0.5 mg IntraVENous BID PRN    arformoteroL (BROVANA) neb solution 15 mcg  15 mcg Nebulization BID RT    0.9% sodium chloride infusion 250 mL  250 mL IntraVENous PRN    [Held by provider] heparin (porcine) injection 5,000 Units  5,000 Units SubCUTAneous Q8H    pantoprazole (PROTONIX) 40 mg in 0.9% sodium chloride 10 mL injection  40 mg IntraVENous Q12H    sodium chloride (NS) flush 5-40 mL  5-40 mL IntraVENous Q8H    sodium chloride (NS) flush 5-40 mL  5-40 mL IntraVENous PRN    acetaminophen (TYLENOL) tablet 650 mg  650 mg Oral Q6H PRN    Or    acetaminophen (TYLENOL) suppository 650 mg  650 mg Rectal Q6H PRN    polyethylene glycol (MIRALAX) packet 17 g  17 g Oral DAILY PRN    ondansetron (ZOFRAN ODT) tablet 4 mg  4 mg Oral Q8H PRN    Or    ondansetron (ZOFRAN) injection 4 mg  4 mg IntraVENous Q6H PRN    insulin lispro (HUMALOG) injection   SubCUTAneous Q6H    glucose chewable tablet 16 g  4 Tablet Oral PRN    glucagon (GLUCAGEN) injection 1 mg  1 mg IntraMUSCular PRN    dextrose 10% infusion 0-250 mL  0-250 mL IntraVENous PRN    HYDROmorphone (DILAUDID) injection 1 mg  1 mg IntraVENous Q4H PRN        ROS:  Pertinent items are noted in the History of Present Illness. Physical Exam:    Temp (24hrs), Av.3 °F (36.8 °C), Min:97.5 °F (36.4 °C), Max:99.2 °F (37.3 °C)    Visit Vitals  /67   Pulse 68   Temp 98.4 °F (36.9 °C)   Resp 18   Ht 5' 7.99\" (1.727 m)   Wt 58.6 kg (129 lb 1.6 oz)   SpO2 99%   BMI 19.63 kg/m²          GEN: WD sick looking, extubated on RA    PIV-R    HEENT: Unicteric. EOMI intact  CHEST: Non laboured breathing. ABD: Obese/soft. Non tender. Non distended. Surgical wound dressed, skin looks ok. FMS in place  LISET: caal in place  EXT: No apparent swelling or redness on UE/LE joints-edematous hands  -L BKA  -R foot is dressed, wound not seen- pictures reviewed  --sacral wound not seen  Skin: Dry and intact. bruising-- skin wounds not examined  CNS:awake, on RA       Microbiology  All Micro Results       Procedure Component Value Units Date/Time    CULTURE, BODY FLUID W Sandeep Garcia [[id number]] Collected: 09/02/22 1430    Order Status: Completed Specimen: Body Fluid from Pleural Fluid Updated: 09/07/22 0831     Special Requests: NO SPECIAL REQUESTS        GRAM STAIN NO WBC'S SEEN         NO ORGANISMS SEEN        Culture result: NO GROWTH 4 DAYS       CULTURE, FUNGUS [[id number]] Collected: 09/02/22 1430    Order Status: Completed Specimen: Pleural Fluid Updated: 09/06/22 1142     Special Requests: NO SPECIAL REQUESTS        Culture result: NO FUNGUS ISOLATED 3 DAYS       AFB CULTURE + SMEAR W/RFLX ID FROM CULTURE [[id number]] Collected: 09/02/22 1430    Order Status: Completed Specimen: Miscellaneous sample Updated: 09/03/22 2035     Source PLEURAL FLUID        AFB Specimen processing Concentration     Acid Fast Smear Negative        Comment: (NOTE)  Performed At: [facility] & [address] [id number]  Amelie Leon MD HZ:[id number]          Acid Fast Culture PENDING    CULTURE, RESPIRATORY/SPUTUM/BRONCH Leti Richy STAIN [[id number]]  (Susceptibility) Collected: 08/31/22 0018    Order Status: Completed Specimen: Sputum from Tracheal Aspirate Updated: 09/02/22 1537     Special Requests: NO SPECIAL REQUESTS        GRAM STAIN 1+ WBCS SEEN               RARE EPITHELIAL CELLS SEEN                  OCCASIONAL GRAM POSITIVE COCCI IN CLUSTERS           Culture result:       HEAVY * Methicillin Resistant Staphylococcus aureus *                  NO NORMAL RESPIRATORY VICENTA ISOLATED            (NOTE) MRSA NOTIFIED MS SARAH SHAW ON 9/2/22 AT 1534.  [facility]    CULTURE, BLOOD [[id number]] Collected: 08/27/22 1030    Order Status: Completed Specimen: Blood Updated: 09/02/22 0755     Special Requests: NO SPECIAL REQUESTS        Culture result: NO GROWTH 6 DAYS       CULTURE, BLOOD [[id number]] Collected: 08/27/22 1100    Order Status: Completed Specimen: Blood Updated: 09/02/22 0755     Special Requests: NO SPECIAL REQUESTS        Culture result: NO GROWTH 6 DAYS       CULTURE, URINE [[id number]] Collected: 08/27/22 1442    Order Status: Completed Specimen: Cath Urine Updated: 08/29/22 0642     Special Requests: NO SPECIAL REQUESTS        [place] Count --        [id number]  COLONIES/mL       Culture result:       >2 ORGANISMS - CONTAMINATED SPECIMEN. SUGGEST RECOLLECTION          CULTURE, BLOOD [[id number]]     Order Status: Canceled Specimen: Blood     COVID-19 RAPID TEST [[id number]] Collected: 08/27/22 1030    Order Status: Completed Specimen: Nasopharyngeal Updated: 08/27/22 1057     Specimen source SWAB        COVID-19 rapid test Not detected        Comment: Rapid Abbott ID Now       Rapid NAAT:  The specimen is NEGATIVE for SARS-CoV-2, the novel coronavirus associated with COVID-19. Negative results should be treated as presumptive and, if inconsistent with clinical signs and symptoms or necessary for patient management, should be tested with an alternative molecular assay. Negative results do not preclude SARS-CoV-2 infection and should not be used as the sole basis for patient management decisions. This test has been authorized by the FDA under an Emergency Use Authorization (EUA) for use by authorized laboratories.    Fact sheet for Healthcare Providers: ConventionUpdate.co.nz  Fact sheet for Patients: ConventionUpdate.co.nz       Methodology: Isothermal Nucleic Acid Amplification                   Lab results:    Chemistry  Recent Labs     09/09/22  0045 09/08/22  0837 09/08/22  0130 09/07/22 2056 09/07/22  1423   * 151* 126*   < > 146*    139 140   < > 139   K 3.4* 3.2* 3.2*   < > 3.1*    105 105   < > 104   CO2 24 25 23   < > 24   BUN 43* 44* 43*   < > 48*   CREA 1.46* 1.40* 1.49*   < > 1.61*   CA 8.8 8.7 8.9   < > 8.7   AGAP 8 9 12   < > 11   BUCR 29* 31* 29*   < > 30*   *  --  118*  --  121*   TP 6.4  --  7.1  --  6.2*   ALB 3.8  --  4.1 --  3.6   GLOB 2.6  --  3.0  --  2.6   AGRAT 1.5  --  1.4  --  1.4    < > = values in this interval not displayed. CBC w/ Diff  Recent Labs     09/09/22  0045 09/08/22  0130 09/07/22  1423   WBC 6.2 7.3 8.5   RBC 3.13* 3.50* 3.56*   HGB 8.3* 9.4* 9.6*   HCT 26.6* 30.5* 30.9*    263 243   GRANS 67 65 75*   LYMPH 19* 25 16*   EOS 2 3 2       CT HEAD WO CONT    Result Date: 8/27/2022  FINDINGS: BRAIN AND POSTERIOR FOSSA: Mild periventricular areas of decreased attenuation are identified. Gray-white matter interfaces are preserved. No intraparenchymal hemorrhage, mass effect or midline shift. The ventricular system is midline and symmetric. Atherosclerotic vascular calcifications are identified. EXTRA-AXIAL SPACES AND MENINGES: There is no evidence for extra-axial mass lesion or fluid collection. CALVARIUM: Intact. SINUSES: Sphenoid sinus mucosal thickening. OTHER: Orbits are grossly intact. Facial soft tissue are within normal limits. _______________     1. No evidence for intracranial hemorrhage, mass effect or midline shift. 2.  Mild periventricular areas of microvascular ischemic change. 3.  Cerebral atherosclerosis. 4.  Atrophy. If there are continued symptoms, a MRI is recommended for further evaluation. CT ABD PELV WO CONT    Result Date: 8/27/2022  FINDINGS: LOWER CHEST: Moderate right pleural effusion with adjacent atelectasis. LIVER, BILIARY: Liver is normal. No biliary dilation. Gallbladder is unremarkable. PANCREAS: Normal. SPLEEN: Splenic granuloma. ADRENALS: Normal. KIDNEYS: Normal. LYMPH NODES: No enlarged lymph nodes. GASTROINTESTINAL TRACT: Uncomplicated sigmoid diverticula. Gastric distention. PELVIC ORGANS: Unremarkable. VASCULATURE: Atherosclerotic vascular calcifications. BONES: No acute or aggressive osseous abnormalities identified. OTHER: Large right inguinal hernia with large and small bowel. There is diffuse circumferential wall thickening of ascending colon within the hernia. _______________     1. Large right inguinal hernia with large and small bowel. There is diffuse wall thickening of the ascending colon concerning for strangulation. No evidence for bowel obstruction. 2. Right pleural effusion and bilateral basilar atelectasis. 3. Gastric distention. XR CHEST PORT    Result Date: 8/30/2022  EXAM:  PORTABLE CHEST INDICATION:  Follow up. TECHNIQUE:  Portable, AP view. COMPARISON:  08/29/2022 ____________________ FINDINGS:  SUPPORT DEVICES: Endotracheal tube approximately 4 cm above the alise. OG tube is traversing below left hemidiaphragm, tip not imaged. Esophageal probe is present at the level of the endotracheal tube. HEART AND MEDIASTINUM: Stable. LUNGS AND PLEURAL SPACES: Persistent right basilar opacity consistent with pleural effusion with adjacent consolidation. No pneumothorax. BONY THORAX AND SOFT TISSUES: No acute osseous abnormality. ____________________     1. Endotracheal tube approximately 4 cm above the alise. 2. No significant change in right basilar opacity consistent with pleural effusion with adjacent consolidation, atelectasis or pneumonia. *  **     XR CHEST PORT    Result Date: 8/29/2022  FINDINGS: HEART AND MEDIASTINUM: Anterior endotracheal tube tip estimated at 6.1 cm above alise. Enteric tube tip projects over left upper quadrant expected location gastric fundus. Esophageal temperature probe tip projects over lower thoracic . Stable cardiomediastinal silhouette allowing for angulation. LUNGS AND PLEURAL SPACES: Progressive right pleural effusion and patchy opacities in the right lung. Left lung relatively clear allowing for technique. BONY THORAX AND SOFT TISSUES: No gross acute osseous abnormality. 1. Right pleural effusion and subjacent atelectasis/infiltrate, perhaps slightly progressed. 2. Tubes and lines stable in visualized extent.      XR CHEST PORT    Result Date: 8/28/2022  FINDINGS: SUPPORT DEVICES: Endotracheal tube distal tip projects 5.5 cm above the alise. Enteric tube distal tip projects below the left hemidiaphragm likely in the stomach. HEART AND MEDIASTINUM: No appreciable cardiomegaly. Remaining mediastinal contours are stable. LUNGS AND PLEURAL SPACES: Right pleural effusion with adjacent opacity. No evidence for pneumothorax. Left lung is clear. BONY THORAX AND SOFT TISSUES: Unremarkable. _______________     1. Right pleural effusion with adjacent atelectasis/infiltrates. 2. Supporting tubes appear stable. XR CHEST PORT    Result Date: 8/27/2022  FINDINGS: SUPPORT DEVICES: An endotracheal tube is positioned 6 cm above the alise. HEART AND MEDIASTINUM: Heart size and mediastinal contour are midline and within normal limits. LUNGS AND PLEURAL SPACES: There are opacities throughout both lungs but more confluent within the right mid and lower lung zones. No pneumothorax. BONY THORAX AND SOFT TISSUES: Unremarkable. _______________     Interval right greater than left airspace disease Small to moderate right pleural effusion    XR CHEST PORT    Result Date: 8/27/2022FINDINGS: HEART AND MEDIASTINUM: No appreciable cardiomegaly. Remaining mediastinal contours within normal limits. LUNGS AND PLEURAL SPACES: Left lung is clear. Right pleural effusion with adjacent opacity. BONY THORAX AND SOFT TISSUES: Unremarkable. _______________     1. Right pleural effusion with probable adjacent atelectasis. XR ABD PORT  1 V    Result Date: 8/27/2022  INDINGS: SUPPORT DEVICES: A nasogastric tube is positioned in the stomach. BOWEL GAS PATTERN: The gas pattern is nonobstructive. Benedetta Ou SOFT TISSUES: Unremarkable. BONES: Degenerative changes are seen throughout the spine. ADDITIONAL FINDINGS: None. _______________     No significant abnormality. ECHO ADULT COMPLETE    Result Date: 8/28/2022  Formatting of this result is different from the original.   Left Ventricle: Normal left ventricular systolic function with a visually estimated EF of 60 - 65%.  Left ventricle size is normal. Normal wall thickness. Normal wall motion. Grade I diastolic dysfunction present with normal LV EF. Unable to assess RVSP due to inadequate or insignificant tricuspid regurgitation.      Procedures/imaging: see electronic medical records for all procedures/Xrays and details which were not copied into this note but were reviewed prior to creation of Plan

## 2022-09-09 NOTE — PROGRESS NOTES
Chart reviewed. CM notes per palliative note, awaiting vascular input, family is hoping the patient will clear cognitively enough to make a decision concerning plan of care; possible option include amputation versus hospice. CM to continue to follow and remain available.  CM notes hospitalist reaching out to vascular team.

## 2022-09-09 NOTE — PROGRESS NOTES
Patient:  Romel Shields :  1945  Gender:  male  MRN #:  [id number]    Assessment:   Romel Shields is a 78 y.o. male with hypertension, hyperlipidemia, stroke , chronic atrial fibrillation, on Plavix, DM,previous, COVID-19 infection, chronic indwelling urinary catheter for urinary retention, severe peripheral vascular disease left BKA admitted in ICU with septic shock , acute renal failure, hyperkalemia , metabolic acidosis and strangulated left inguinal hernia  s/p emergency  surgery   Breathing comfortably on nasal canula. Decent urine output, now has hypokalemia intermittently  , significant peripheral artery disease , mental status has improved much      Acute renal failure- Improving   Hypokalemia   Hypernatremia - improved     Plan:   He has decent urine output with decent improvement in renal function. Hypernatremia improved . encourage oral water intake . Hold lasix for now   Minimize volume infusion ,   Avoid NSAIDS, contrast and nephrotoxin   Dose all meds and antibiotics for current eGFR  Transfuse prn to keep hemoglobin > 7 gram/dl     Hypokalemia-  start KCL 40  daily     Bmp in morning     Subjective/Interval Events    Decent urine output   he is awake and alert , says he is fine   No new symptoms and concern reported             Intake/Output Summary (Last 24 hours) at 2022 1431  Last data filed at 2022 2049  Gross per 24 hour   Intake --   Output 850 ml   Net -850 ml           Blood pressure (!) 150/75, pulse 98, temperature 98.2 °F (36.8 °C), resp. rate 19, height 5' 7.99\" (1.727 m), weight 58.6 kg (129 lb 1.6 oz), SpO2 98 %.     Exam:    Not conversant , awake and alert   Lung: clear to auscultation  Ext: no edema in RLE, erythema, wrapped in ACE bandage        Recent Labs     22  004   WBC 6.2   HCT 26.6*   MCV 85.0       Recent Labs     225      CO2 24   BUN 43*       Recent Labs     22   AGRAT 1.5     Discussed with wife     I have reviewed patient's record for pertinent labs, radiology and other test . I have reviewed old records. I have ordered labs, medications and radiology as necessary and indicated per patient's condition . Total time spent is approximately 25 minutes. Greater than 50 % of that time was spent in counseling and or care coordination.        Angeles Hendrickson MD  Nephrology -[facility], [facility].

## 2022-09-09 NOTE — PROGRESS NOTES
Alert and oriented x2 to 3, assessments and VS completed, pt hs no reports of pain, pt encouraged to increase PO intake , pt turned in bed frequently. caal care completed , FMS monitored , accu checks completed, wound care completed.

## 2022-09-09 NOTE — PROGRESS NOTES
Nutrition Note    Nutrition Recommendations/Plan:   Continue with ONS (nepro) TID. Malnutrition Assessment:  Malnutrition Status:  Severe malnutrition (09/09/22 1330)    Context:  Acute illness     Findings of the 6 clinical characteristics of malnutrition:   Energy Intake:  Unable to assess  Weight Loss:  Unable to assess     Body Fat Loss: Moderate body fat loss, Orbital, Buccal region   Muscle Mass Loss: Moderate muscle mass loss, Temples (temporalis), Thigh (quadriceps), Calf    Nutrition Diagnosis:   Severe malnutrition related to inadequate protein-energy intake as evidenced by Criteria as identified in malnutrition assessment    Attempted to ask pt regarding PO intake and appetite however pt did not respond. Pt was okay with RD completing NFPE per above.      Beryle Lovely, RD

## 2022-09-09 NOTE — CONSULTS
VASCULAR CONSULTATION NOTE    A vascular consultation has been requested on Marisela Marley, who is a 78 y.o. male admitted to the hospital on 8/27/2022 with an admitting diagnosis of Sepsis ([facility] [A41.9]  Strangulated hernia of abdominal wall [K43.6]  UTI (urinary tract infection) [N39.0]  AMS (altered mental status) [R41.82]  Acidosis [E87.2]  Hyperkalemia [E87.5]. He is being seen for evaluation and possible treatment of  atherosclerosis of native arteries    Attending Physician: Dr. Arjun Reddy             Referring Physician: Dr. Shaun Pack    HPI:  above noted, chart reviewed. Asked to evaluate patient for right below knee amputation vs right above knee amputation. At present time the patient's only answer to any of my questions is that he is tired. He will not answer my questions to person, place or time. Most recent right lower extremity arterial duplex on 9/6/22 shows monophasic arterial flow to distal common femoral artery, profunda femoris artery,  superficial femoral artery, popliteal artery, tibial arteries.         Patient Active Problem List   Diagnosis Code    DM2 (diabetes mellitus, type 2) ([facility] E11.9    CAD (coronary artery disease) I25.10    Acute renal failure (ARF) ([facility]) N17.9    CKD (chronic kidney disease) stage 3, GFR 30-59 ml/min ([facility]) N18.30    Elevated brain natriuretic peptide (BNP) level R79.89    UTI (urinary tract infection) N39.0    High anion gap metabolic acidosis J23.7    Hyperkalemia E87.5    Sepsis ([facility]) A41.9    AMS (altered mental status) R41.82    Strangulated inguinal hernia K40.30    Right inguinal hernia K40.90    Leukocytosis D72.829    Anemia D64.9    Chronic indwelling Mccray catheter Z97.8    S/P BKA (below knee amputation), left ([facility]) Z89.512    Right foot ulcer ([facility]) L97.519    Paroxysmal atrial fibrillation ([facility]) I48.0    Diabetic ulcer of right foot ([facility] E11.621, L97.519 Pleural effusion on right J90    Severe protein-calorie malnutrition (HCC) E43    Acute respiratory failure with hypoxemia (HCC) J96.01    Septic shock ([facility]) A41.9, R65.21    Hypernatremia E87.0       Past Medical History:   Diagnosis Date    A-fib ([facility]     Asthma     CAD (coronary artery disease)     Chronic kidney disease     stage 3    COVID-19     Diabetes (HCC)     GERD (gastroesophageal reflux disease)     Heart failure (HCC)     chronic diastolic heart failure    High cholesterol     Hypertension     Ill-defined condition     chronic osteomyelitis left ankle and foot    Ill-defined condition     chronic arteriosclerosis    PVD (peripheral vascular disease) ([facility]     Stroke ([facility]     cva     Past Surgical History:   Procedure Laterality Date    COLONOSCOPY N/A 6/1/2018    COLONOSCOPY, POLYPECTOMY performed by Renato Pagan MD at [facility] ENDOSCOPY    HX CATARACT REMOVAL      HX ORTHOPAEDIC Left 2021    ankle washout, external fixator, would vac    HX ORTHOPAEDIC      external fixator removed     @[email or web address]@  History reviewed. No pertinent family history. Risk factors: The patient has the following risk factors as above. The patient denies a history of as above. No Known Allergies    Home Medications:   [unfilled]    In Patient Medications:   [unfilled]    Review of Systems:   Review of systems not obtained due to patient factors. Physical Assessment:   [unfilled]     There is a diminished right common femoral artery pulse. There is a 2+ left common femoral artery pulse. Absent popliteal, and pedal pulses on right . Well healed above knee amputation on left. Lab    CBC:   Lab Results   Component Value Date/Time    WBC 6.2 09/09/2022 12:45 AM    RBC 3.13 (L) 09/09/2022 12:45 AM     BMP:   Lab Results   Component Value Date/Time    CO2 24 09/09/2022 12:45 AM    BUN 43 (H) 09/09/2022 12:45 AM       Xray   N/a.     PVL   Right Lower Arterial    Monophasic Doppler waveforms in the right distal common femoral, profunda femoris, proximal superficial femoral, middle superficial femoral, distal superficial femoral, proximal popliteal, distal popliteal, anterior tibial and posterior tibial artery. Patent right leg vessels with good upstroke. Impression:     Severe probable critical right lower extremity arterial occlusive disease. Acute renal failure. Altered mental status. Doubt patient would heal a right above knee amputation with monophasic flow in the common femoral and profunda femoris artery. Pt would more than likely require right lower extremity angiogram with intervention if attempt at aka. Plan:     Agree with palliative care. Will discuss with attending surgeon. No urgent/emergent vascular surgical issue at this time. Thank you for allowing us to participate in the care of this patient. Christa Montgomery PA-C   [phone].

## 2022-09-10 NOTE — PROGRESS NOTES
Assessment:     Getachew Santamaria is a 78 y.o. male with hypertension, hyperlipidemia, stroke , chronic atrial fibrillation, on Plavix, DM,previous, COVID-19 infection, chronic indwelling urinary catheter for urinary retention, severe peripheral vascular disease left BKA admitted in ICU with septic shock , acute renal failure, hyperkalemia , metabolic acidosis and strangulated left inguinal hernia  s/p emergency  surgery   Breathing comfortably on nasal canula. Decent urine output, now has hypokalemia intermittently  , significant peripheral artery disease , mental status has improved much       Acute renal failure- Improving   Hypokalemia   Hypernatremia - improved      Plan:   He has decent urine output with decent improvement in renal function. Hypernatremia improved . encourage oral water intake . Hold lasix for now   Minimize volume infusion ,   Avoid NSAIDS, contrast and nephrotoxin   Dose all meds and antibiotics for current eGFR  Transfuse prn to keep hemoglobin > 7 gram/dl      Hypokalemia-  start KCL 40  daily      Bmp in morning      Subjective/Interval Events       he is awake and alert , says he is fine   No new symptoms and concern reported         Review of Systems:  Negative for Edema, SOB        Blood pressure (!) 156/73, pulse 92, temperature 98.6 °F (37 °C), resp. rate 18, height 5' 7.99\" (1.727 m), weight 58.6 kg (129 lb 1.6 oz), SpO2 97 %.       awake and alert   NAD      Intake/Output Summary (Last 24 hours) at 9/10/2022 1108  Last data filed at 9/9/2022 2330  Gross per 24 hour   Intake --   Output 1125 ml   Net -1125 ml      Recent Labs     09/10/22  0414   WBC 5.0     Lab Results   Component Value Date/Time    Sodium 140 09/10/2022 08:48 AM    Potassium 4.2 09/10/2022 08:48 AM    Chloride 108 09/10/2022 08:48 AM    CO2 22 09/10/2022 08:48 AM    Anion gap 10 09/10/2022 08:48 AM    Glucose 142 (H) 09/10/2022 08:48 AM    BUN 37 (H) 09/10/2022 08:48 AM    Creatinine 1.36 (H) 09/10/2022 08:48 AM    BUN/Creatinine ratio 27 (H) 09/10/2022 08:48 AM    GFR est AA >60 09/10/2022 08:48 AM    GFR est non-AA 51 (L) 09/10/2022 08:48 AM    Calcium 9.0 09/10/2022 08:48 AM        Current Facility-Administered Medications   Medication Dose Route Frequency Provider Last Rate Last Admin    potassium bicarb-citric acid (EFFER-K) tablet 40 mEq  40 mEq Oral DAILY Ezekiel Arriaza MD   40 mEq at 09/10/22 0928    povidone-iodine (BETADINE) 10 % topical solution   Topical DAILY Lauren Kidd MD   Given at 09/10/22 0900    ipratropium (ATROVENT) 0.02 % nebulizer solution 0.5 mg  0.5 mg Nebulization Q6H PRN Ladarius Ruvalcaba MD   0.5 mg at 09/10/22 0741    collagenase (SANTYL) 250 unit/gram ointment   Topical DAILY Lauren Kidd MD   Given at 09/10/22 0900    0.9% sodium chloride infusion 250 mL  250 mL IntraVENous PRN Liam Campoverde MD        hydrALAZINE (APRESOLINE) 20 mg/mL injection 20 mg  20 mg IntraVENous Q6H PRN Wendy Reyes MD   20 mg at 09/05/22 2342    budesonide (PULMICORT) 500 mcg/2 ml nebulizer suspension  500 mcg Nebulization BID RT Ladarius Ruvalcaba MD   500 mcg at 09/10/22 0741    albumin human 25% (BUMINATE) solution 25 g  25 g IntraVENous Q12H Wendy Reyes MD 60 mL/hr at 09/07/22 2115 25 g at 09/10/22 0928    midazolam (VERSED) injection 0.5 mg  0.5 mg IntraVENous BID PRN Ladarius Ruvalcaba MD   0.5 mg at 08/31/22 2320    arformoteroL (BROVANA) neb solution 15 mcg  15 mcg Nebulization BID RT Ladarius Ruvalcaba MD   15 mcg at 09/10/22 0741    0.9% sodium chloride infusion 250 mL  250 mL IntraVENous PRN Ladarius Ruvalcaba MD        [facility] [place] by provider] heparin (porcine) injection 5,000 Units  5,000 Units SubCUTAneous Q8H Ladarius Ruvalcaba MD   5,000 Units at 09/05/22 0534    pantoprazole (PROTONIX) 40 mg in 0.9% sodium chloride 10 mL injection  40 mg IntraVENous Q12H Beau Esquivel MD   40 mg at 09/10/22 0928    sodium chloride (NS) flush 5-40 mL  5-40 mL IntraVENous Q8H Belkys Kidd MD   10 mL at 09/10/22 0645    sodium chloride (NS) flush 5-40 mL  5-40 mL IntraVENous PRN Belkys Kidd MD        acetaminophen (TYLENOL) tablet 650 mg  650 mg Oral Q6H PRN Belkys Kidd MD   650 mg at 08/29/22 1849    Or    acetaminophen (TYLENOL) suppository 650 mg  650 mg Rectal Q6H PRN Tara Kidd MD        polyethylene glycol (MIRALAX) packet 17 g  17 g Oral DAILY PRN Tara Kidd MD        ondansetron (ZOFRAN ODT) tablet 4 mg  4 mg Oral Q8H PRN Tara Kidd MD        Or    ondansetron (ZOFRAN) injection 4 mg  4 mg IntraVENous Q6H PRN Tara Kidd MD        insulin lispro (HUMALOG) injection   SubCUTAneous Q6H Randy Osorio MD   3 Units at 09/09/22 1723    glucose chewable tablet 16 g  4 Tablet Oral PRN Toni Sanchez MD        glucagon (GLUCAGEN) injection 1 mg  1 mg IntraMUSCular PRN Toni Sanchez MD        dextrose 10% infusion 0-250 mL  0-250 mL IntraVENous PRN Toni Sanchez MD        HYDROmorphone (DILAUDID) injection 1 mg  1 mg IntraVENous Q4H PRN Toni Sanchez MD   1 mg at 09/04/22 2024

## 2022-09-10 NOTE — PROGRESS NOTES
Problem: Falls - Risk of  Goal: *Absence of Falls  Description: Document Leafy Greene Fall Risk and appropriate interventions in the flowsheet.   Outcome: Progressing Towards Goal  Note: Fall Risk Interventions:       Mentation Interventions: Bed/chair exit alarm    Medication Interventions: Bed/chair exit alarm    Elimination Interventions: Bed/chair exit alarm, Call light in reach    History of Falls Interventions: Vital signs minimum Q4HRs X 24 hrs (comment for end date)         Problem: Patient Education: Go to Patient Education Activity  Goal: Patient/Family Education  Outcome: Progressing Towards Goal

## 2022-09-10 NOTE — PROGRESS NOTES
Hospitalist Progress Note    Patient: Jeancarlos Burns. MRN: [id number]  CSN: [id number]    YOB: 1945  Age: 78 y.o. Sex: male    DOA: 8/27/2022 LOS:  LOS: 14 days          Chief Complaint:    hypokalemia      Assessment/Plan      78 y.o. 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 Admitted for severe septic shock with multiorgan and hypotension involvement due to multiple possible sources of infection, severe hyperkalemia, acute metabolic encephalopathy, metabolic acidosis, strangulated left inguinal hernia, anemia requiring blood transfusion, mild hypoxia coagulable state.       Hypokalemia-repletion     Hypernatremia-resolved, dextrose IV stopped, oral po free water, follow regular BMP checks     Mild bleeding from NG tube being pulled out by patient-  Ac blood loss anemia chronic     Dysphagia-passed swallow test     Incarcerated right inguinal hernia -status post ex lap with reduction of incarcerated right groin hernia with hernia mesh     Prior AKA left side     Infected sacral ulcer -wound care following      Infected medial maleolar and heel ulcers right foot-completed abx -followed by wound care     Anemia -acute blood loss with chronic disease     Acute renal failure -Scr improved, 1.40     encephalopathy -some improvement but still confused at times     S/p thoracentesis for effusion      Poor prognosis with multiple medical ailments and advanced PVD, diabetes complications, prior BKA< wounds and debilitated state  Ortho has seen for possible BKA right side and consulted vascular surgery for possible AKA     Wife updated today -at bedside   Poor prognosis  DNR     Hold heparin for bleeding Mental status too altered to be able to make reliable decisions about medical care plans -wife will need to make medical decisions at this time     Appreciate vascular surgery consult -evaluation in progress    Disposition : TBD  Patient Active Problem List   Diagnosis Code    DM2 (diabetes mellitus, type 2) ([facility] E11.9    CAD (coronary artery disease) I25.10    Acute renal failure (ARF) ([facility]) N17.9    CKD (chronic kidney disease) stage 3, GFR 30-59 ml/min ([facility]) N18.30    Elevated brain natriuretic peptide (BNP) level R79.89    UTI (urinary tract infection) N39.0    High anion gap metabolic acidosis W98.2    Hyperkalemia E87.5    Sepsis ([facility]) A41.9    AMS (altered mental status) R41.82    Strangulated inguinal hernia K40.30    Right inguinal hernia K40.90    Leukocytosis D72.829    Anemia D64.9    Chronic indwelling Mccray catheter Z97.8    S/P BKA (below knee amputation), left ([facility]) Z89.512    Right foot ulcer ([facility]) L97.519    Paroxysmal atrial fibrillation ([facility]) I48.0    Diabetic ulcer of right foot ([facility]) E11.621, L97.519    Pleural effusion on right J90    Severe protein-calorie malnutrition ([facility]) E43    Acute respiratory failure with hypoxemia ([facility]) J96.01    Septic shock ([facility]) A41.9, R65.21    Hypernatremia E87.0       Subjective:    Not interested in eating     Review of systems:    Constitutional: denies fevers  Respiratory: denies SOB  Cardiovascular: denies chest pain  Gastrointestinal: denies nausea      Vital signs/Intake and Output:  Visit Vitals  BP (!) 156/73   Pulse 92   Temp 98.6 °F (37 °C)   Resp 18   Ht 5' 7.99\" (1.727 m)   Wt 58.6 kg (129 lb 1.6 oz)   SpO2 97%   BMI 19.63 kg/m²     Current Shift:  No intake/output data recorded.   Last three shifts:  09/08 1901 - 09/10 0700  In: -   Out: 1475 [Urine:775; Drains:700]    Exam:    General: ill debilitated elderly WM, very weak, NAD  CVS:Regular rate and rhythm, no M/R/G, S1/S2 heard, no thrill  Lungs:Clear to auscultation bilaterally, no wheezes, rhonchi, or rales  Abdomen: Soft, Nontender, No distention  Extremities:left AKA< right foot dressed  Neuro:grossly normal                 Labs: Results:       Chemistry Recent Labs     09/10/22  0848 09/10/22  0414 09/09/22  2152 09/09/22  1739 09/09/22  0045 09/08/22  0837 09/08/22  0130   * 119* 123*   < > 148*   < > 126*    142 141   < > 139   < > 140   K 4.2 3.5 3.8   < > 3.4*   < > 3.2*    110 109   < > 107   < > 105   CO2 22 25 24   < > 24   < > 23   BUN 37* 39* 40*   < > 43*   < > 43*   CREA 1.36* 1.39* 1.38*   < > 1.46*   < > 1.49*   CA 9.0 8.7 8.9   < > 8.8   < > 8.9   AGAP 10 7 8   < > 8   < > 12   BUCR 27* 28* 29*   < > 29*   < > 29*   AP  --  140*  --   --  177*  --  118*   TP  --  6.3*  --   --  6.4  --  7.1   ALB  --  3.7  --   --  3.8  --  4.1   GLOB  --  2.6  --   --  2.6  --  3.0   AGRAT  --  1.4  --   --  1.5  --  1.4    < > = values in this interval not displayed. CBC w/Diff Recent Labs     09/10/22  0414 09/09/22  0045 09/08/22  0130   WBC 5.0 6.2 7.3   RBC 2.91* 3.13* 3.50*   HGB 7.9* 8.3* 9.4*   HCT 25.4* 26.6* 30.5*    247 263   GRANS 66 67 65   LYMPH 19* 19* 25   EOS 2 2 3        Cardiac Enzymes No results for input(s): CPK, CKND1, LEONEL in the last 72 hours. No lab exists for component: CKRMB, TROIP   Coagulation No results for input(s): PTP, INR, APTT, INREXT, INREXT in the last 72 hours. Lipid Panel No results found for: CHOL, CHOLPOCT, CHOLX, CHLST, CHOLV, 216296, HDL, HDLP, LDL, LDLC, DLDLP, 167476, VLDLC, VLDL, TGLX, TRIGL, TRIGP, TGLPOCT, CHHD, CHHDX   BNP No results for input(s): BNPP in the last 72 hours.    Liver Enzymes Recent Labs     09/10/22  0414   TP 6.3*   ALB 3.7   *        Thyroid Studies Lab Results   Component Value Date/Time    TSH 4.73 (H) 08/28/2022 08:04 AM        Procedures/imaging: see electronic medical records for all procedures/Xrays and details which were not copied into this note but were reviewed prior to creation of Barby Acuna MD

## 2022-09-10 NOTE — PROGRESS NOTES
Progress Note      Patient: Royal Haro. Sex: male          DOA: 8/27/2022         YOB: 1945      Age:  78 y.o.        LOS:  LOS: 14 days             Assessment/Plan     Principal Problem:    Sepsis ([address] (8/27/2022)    Active Problems:    Acute renal failure (ARF) ([address] (5/29/2018)      UTI (urinary tract infection) (10/29/2021)      High anion gap metabolic acidosis (7/02/3549)      Hyperkalemia (8/27/2022)      AMS (altered mental status) (8/27/2022)      Strangulated inguinal hernia (8/27/2022)      Right inguinal hernia (8/27/2022)      Leukocytosis (8/27/2022)      Anemia (8/27/2022)      Chronic indwelling Mccray catheter (8/27/2022)      S/P BKA (below knee amputation), left ([address] (8/27/2022)      Right foot ulcer ([address] (8/27/2022)      Paroxysmal atrial fibrillation ([address] (8/27/2022)      Diabetic ulcer of right foot ([address] (8/27/2022)      Pleural effusion on right (8/27/2022)      Severe protein-calorie malnutrition ([address] (8/27/2022)      Acute respiratory failure with hypoxemia ([address] (8/28/2022)      Septic shock ([address] (8/28/2022)      Hypernatremia (9/6/2022)      Patient still seems confused, discussed his vascular studies. Will await angiogram.  Dry gangrene  Severe PAD  Subjective:     Pt awake and oriented to person and place, still confused on day and status. No pain    Objective:      Visit Vitals  /65   Pulse 94   Temp 98.9 °F (37.2 °C)   Resp 18   Ht 5' 7.99\" (1.727 m)   Wt 58.6 kg (129 lb 1.6 oz)   SpO2 96%   BMI 19.63 kg/m²       Physical Exam:  Dressing dry, moves ankle well    Intake and Output:  Current Shift:  No intake/output data recorded. Last three shifts:  09/08 1901 - 09/10 0700  In: -   Out: 1475 [Urine:775; Drains:700]    Lab/Data Reviewed: All lab results for the last 24 hours reviewed.     Medications Reviewed    Continued hospitalization is indicated due to MS Changes      Assessment/Plan     Principal Problem:    Sepsis ([facility] (8/27/2022)    Active Problems:    Acute renal failure (ARF) ([address] (5/29/2018)      UTI (urinary tract infection) (10/29/2021)      High anion gap metabolic acidosis (9/53/2323)      Hyperkalemia (8/27/2022)      AMS (altered mental status) (8/27/2022)      Strangulated inguinal hernia (8/27/2022)      Right inguinal hernia (8/27/2022)      Leukocytosis (8/27/2022)      Anemia (8/27/2022)      Chronic indwelling Mccray catheter (8/27/2022)      S/P BKA (below knee amputation), left ([address] (8/27/2022)      Right foot ulcer ([address] (8/27/2022)      Paroxysmal atrial fibrillation ([address] (8/27/2022)      Diabetic ulcer of right foot ([address] (8/27/2022)      Pleural effusion on right (8/27/2022)      Severe protein-calorie malnutrition ([address] (8/27/2022)      Acute respiratory failure with hypoxemia ([address] (8/28/2022)      Septic shock ([address] (8/28/2022)      Hypernatremia (9/6/2022)      Patient still seems confused, discussed his vascular studies.  Will await angiogram.  Dry gangrene  Severe PAD

## 2022-09-10 NOTE — PROGRESS NOTES
Bedside and Verbal shift change report received by Concha Medina RN  (oncoming nurse) from , RN offgoing nurse. Report included the following information SBAR, Kardex, MAR and Recent Results.

## 2022-09-11 NOTE — PROGRESS NOTES
Hospitalist Progress Note    Patient: Clark Mcmillan MRN: [id number]  CSN: [id number]    YOB: 1945  Age: 78 y.o. Sex: male    DOA: 8/27/2022 LOS:  LOS: 15 days          Chief Complaint:    hypokalemia      Assessment/Plan      78 y.o. 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 Admitted for severe septic shock with multiorgan and hypotension involvement due to multiple possible sources of infection, severe hyperkalemia, acute metabolic encephalopathy, metabolic acidosis, strangulated left inguinal hernia, anemia requiring blood transfusion, mild hypoxia coagulable state.       Hypokalemia-repletion     Hypernatremia-resolved, dextrose IV stopped, oral po free water, follow regular BMP checks     Mild bleeding from NG tube being pulled out by patient-  Ac blood loss anemia chronic     Dysphagia-passed swallow test     Incarcerated right inguinal hernia -status post ex lap with reduction of incarcerated right groin hernia with hernia mesh     Prior AKA left side     Infected sacral ulcer -wound care following      Infected medial maleolar and heel ulcers right foot-completed abx -followed by wound care     Anemia -acute blood loss with chronic disease     Acute renal failure -Scr improved, 1.40     encephalopathy -some improvement but still confused at times     S/p thoracentesis for effusion      Poor prognosis with multiple medical ailments and advanced PVD, diabetes complications, prior BKA< wounds and debilitated state  Ortho has seen for possible BKA right side and consulted vascular surgery for possible AKA     Poor prognosis  DNR     Hold heparin for bleeding     Mental status too altered to be able to make reliable decisions about medical care plans -wife will need to make medical decisions at this time     Appreciate vascular surgery consult -evaluation in progress    Disposition : TBD  Patient Active Problem List   Diagnosis Code    DM2 (diabetes mellitus, type 2) ([facility] E11.9    CAD (coronary artery disease) I25.10    Acute renal failure (ARF) ([facility]) N17.9    CKD (chronic kidney disease) stage 3, GFR 30-59 ml/min ([facility]) N18.30    Elevated brain natriuretic peptide (BNP) level R79.89    UTI (urinary tract infection) N39.0    High anion gap metabolic acidosis O43.8    Hyperkalemia E87.5    Sepsis ([facility]) A41.9    AMS (altered mental status) R41.82    Strangulated inguinal hernia K40.30    Right inguinal hernia K40.90    Leukocytosis D72.829    Anemia D64.9    Chronic indwelling Mccray catheter Z97.8    S/P BKA (below knee amputation), left ([facility]) Z89.512    Right foot ulcer ([facility]) L97.519    Paroxysmal atrial fibrillation ([facility]) I48.0    Diabetic ulcer of right foot ([facility]) E11.621, L97.519    Pleural effusion on right J90    Severe protein-calorie malnutrition ([facility]) E43    Acute respiratory failure with hypoxemia ([facility]) J96.01    Septic shock ([facility]) A41.9, R65.21    Hypernatremia E87.0       Subjective:    Fed pt his breakfast, ate about 1/2 of it and drank some juice and some water    Review of systems:    Constitutional: denies fevers  Respiratory: denies SOB  Cardiovascular: denies chest pain  Gastrointestinal: denies nausea      Vital signs/Intake and Output:  Visit Vitals  BP (!) 169/66   Pulse 96   Temp 98 °F (36.7 °C)   Resp 18   Ht 5' 7.99\" (1.727 m)   Wt 58.6 kg (129 lb 1.6 oz)   SpO2 97%   BMI 19.63 kg/m²     Current Shift:  No intake/output data recorded.   Last three shifts:  09/09 1901 - 09/11 0700  In: -   Out: 1425 [Urine:925; Drains:500]    Exam:    General: ill debilitated elderly WM, very weak, NAD  CVS:Regular rate and rhythm, no M/R/G, S1/S2 heard, no thrill  Lungs:Clear to auscultation bilaterally, no wheezes, rhonchi, or rales  Abdomen: Soft, Nontender, No distention  Extremities:left AKA< right foot dressed  Neuro:grossly normal                 Labs: Results:       Chemistry Recent Labs     09/11/22  0811 09/10/22  1918 09/10/22  1314 09/10/22  0848 09/10/22  0414 09/09/22  1739 09/09/22  0045   * 206* 165*   < > 119*   < > 148*    137 138   < > 142   < > 139   K 3.9 3.8 4.2   < > 3.5   < > 3.4*    105 107   < > 110   < > 107   CO2 23 24 22   < > 25   < > 24   BUN 35* 36* 37*   < > 39*   < > 43*   CREA 1.31* 1.42* 1.38*   < > 1.39*   < > 1.46*   CA 8.7 8.7 9.1   < > 8.7   < > 8.8   AGAP 8 8 9   < > 7   < > 8   BUCR 27* 25* 27*   < > 28*   < > 29*   *  --   --   --  140*  --  177*   TP 6.4  --   --   --  6.3*  --  6.4   ALB 3.9  --   --   --  3.7  --  3.8   GLOB 2.5  --   --   --  2.6  --  2.6   AGRAT 1.6  --   --   --  1.4  --  1.5    < > = values in this interval not displayed. CBC w/Diff Recent Labs     09/11/22  0811 09/10/22  0414 09/09/22  0045   WBC 7.2 5.0 6.2   RBC 3.18* 2.91* 3.13*   HGB 8.5* 7.9* 8.3*   HCT 27.5* 25.4* 26.6*    225 247   GRANS 72 66 67   LYMPH 15* 19* 19*   EOS 1 2 2        Cardiac Enzymes No results for input(s): CPK, CKND1, LEONEL in the last 72 hours. No lab exists for component: CKRMB, TROIP   Coagulation No results for input(s): PTP, INR, APTT, INREXT, INREXT in the last 72 hours. Lipid Panel No results found for: CHOL, CHOLPOCT, CHOLX, CHLST, CHOLV, 820994, HDL, HDLP, LDL, LDLC, DLDLP, 604074, VLDLC, VLDL, TGLX, TRIGL, TRIGP, TGLPOCT, CHHD, CHHDX   BNP No results for input(s): BNPP in the last 72 hours.    Liver Enzymes Recent Labs     09/11/22  0811   TP 6.4   ALB 3.9   *        Thyroid Studies Lab Results   Component Value Date/Time    TSH 4.73 (H) 08/28/2022 08:04 AM        Procedures/imaging: see electronic medical records for all procedures/Xrays and details which were not copied into this note but were reviewed prior to creation of Jessie Gauthier MD

## 2022-09-11 NOTE — PROGRESS NOTES
Progress Note      Patient: Christina Petersen. Sex: male          DOA: 8/27/2022         YOB: 1945      Age:  78 y.o.        LOS:  LOS: 15 days             Had similar discussion as yesterday. Await weather needs BK vs AK if pt needs surgery. Pt seems oriented but non committal to surgery. Says waiting to go home. Will see what arteriogram shows re: healing.

## 2022-09-11 NOTE — PROGRESS NOTES
Problem: Patient Education: Go to Patient Education Activity  Goal: Patient/Family Education  Outcome: Progressing Towards Goal     Problem: Patient Education: Go to Patient Education Activity  Goal: Patient/Family Education  Outcome: Progressing Towards Goal     Problem: Nutrition Deficit  Goal: *Optimize nutritional status  Outcome: Progressing Towards Goal     Problem: Risk for Spread of Infection  Goal: Prevent transmission of infectious organism to others  Description: Prevent the transmission of infectious organisms to other patients, staff members, and visitors.   Outcome: Progressing Towards Goal

## 2022-09-11 NOTE — PROGRESS NOTES
Bedside and Verbal shift change report received by Angus Torres RN  (oncoming nurse) from Juliane Bullock RN (offgoing nurse). Report included the following information SBAR, Kardex, MAR and Recent Results. Alert and oriented x 2. No complaints of pain. Medications administered.

## 2022-09-12 NOTE — PROGRESS NOTES
Speech Therapy Treatment Attempt     Chart reviewed. Attempted Speech Therapy Treatment, however, patient unable to be seen due to:  []  Nausea/vomiting  []  Eating  []  Pain  []  Patient too lethargic  []  Off Unit for testing/procedure  []  Dialysis treatment in progress  []  Telemetry Results  [x]  Other: attempt 12:10 - patient refused all attempts for [place]; attempt 3:05 p.m. - patient sleeping deeply     Will f/u later as patient's schedule allows.   Myrtle Mustafa, SLP

## 2022-09-12 NOTE — PROGRESS NOTES
Palliative Medicine    CODE STATUS: DNR/DNI    AMD Status: none on file. His wife, Aundrea Santos, is his legal next of kin     9/12/2022 1040 Seen today in room 336 along with Tricia Lenz NP. Lying in bed with eyes closed. Did not attempt to awaken. No family at bedside at this time or at 1330    Chart review shows that vascular consultation has been completed with recommendations for right lower extremity arteriogram before final options regarding amputation could be made. Disposition plan: to be determined    Palliative care will continue to follow Morgan Campoverde  and his family during his hospitalization and support them as they make healthcare decisions and define goals of care.       Mela Zabala RN, MSN  Palliative Medicine  P: [phone]

## 2022-09-12 NOTE — PROGRESS NOTES
PT order received and chart reviewed. Pt sleeping upon entering, easily aroused. Pt declined participation with edge of bed mobility at this time. Education and encouragement provided. Will follow up as able.  Also awaiting decision on AKS versus BKA on R LE.

## 2022-09-12 NOTE — PROGRESS NOTES
Patient:  Isa Mathew :  1945  Gender:  male  MRN #:  [id number]    Assessment:   Isa Mathew is a 78 y.o. male with hypertension, hyperlipidemia, stroke , chronic atrial fibrillation, on Plavix, DM,previous, COVID-19 infection, chronic indwelling urinary catheter for urinary retention, severe peripheral vascular disease left BKA admitted in ICU with septic shock , acute renal failure, hyperkalemia , metabolic acidosis and strangulated left inguinal hernia  s/p emergency  surgery   Breathing comfortably on nasal canula. Decent urine output, now has hypokalemia intermittently  , significant peripheral artery disease , mental status at baseline. Acute renal failure- Improving   Hypokalemia   Hypernatremia - improved   Hypomagnesemias     Plan:   He has decent urine output with decent improvement in renal function now at baseline   Hypernatremia improved . encourage oral water intake . Avoid NSAIDS, contrast and nephrotoxin   Dose all meds and antibiotics for current eGFR  Transfuse prn to keep hemoglobin > 7 gram/dl     Hypokalemia-  Agree with KCL supplement to keep level 4 to 4.5 mmol/lt   Hypomagnesemia- Magnesium oxide 400 mg today,   Follow up in morning       Will sign off from nephrology service, please call us if you have any question and concern     Subjective/Interval Events    Decent urine output   he is awake and alert , says he is fine   No new symptoms and concern reported since yetsermaia             Intake/Output Summary (Last 24 hours) at 2022 1204  Last data filed at 2022 1218  Gross per 24 hour   Intake --   Output 600 ml   Net -600 ml           Blood pressure (!) 154/83, pulse 81, temperature 98.3 °F (36.8 °C), resp. rate 18, height 5' 7.99\" (1.727 m), weight 61.6 kg (135 lb 14.4 oz), SpO2 94 %.     Exam:    Not conversant , awake and alert   Lung: clear to auscultation  Ext: no edema in RLE, erythema, wrapped in ACE bandage        Recent Labs 09/11/22  0811   WBC 7.2   HCT 27.5*   MCV 86.5       Recent Labs     09/12/22  0533      CO2 24   BUN 36*       Recent Labs     09/11/22  0811   AGRAT 1.6     Discussed with wife     I have reviewed patient's record for pertinent labs, radiology and other test . I have reviewed old records. I have ordered labs, medications and radiology as necessary and indicated per patient's condition . Total time spent is approximately 25 minutes. Greater than 50 % of that time was spent in counseling and or care coordination.        Catalina Corley MD  Nephrology -[facility], [facility].

## 2022-09-12 NOTE — PROGRESS NOTES
D/C Plan: Antiipate SNF/Rehab versus home with hospice    CM notes that vascular is recommending arteriogram to assess for prospects of healing for AKA. CM will continue to monitor notes to and remain available as family makes decisions on [address]. Care Management Interventions  PCP Verified by CM: Yes (Per Chart: Dr. Kelsi Decker )  Mode of Transport at Discharge: BLS  Transition of Care Consult (CM Consult): Discharge Planning  Discharge Durable Medical Equipment: No  Physical Therapy Consult: No  Occupational Therapy Consult: No  Speech Therapy Consult: No  Support Systems: Spouse/Significant Other  Confirm Follow Up Transport: Family  The Plan for Transition of Care is Related to the Following Treatment Goals : Palliative care conulted. CM to continue to follow for [address] to be established and to assist with discharge planning. Discharge Location  Patient Expects to be Discharged to[de-identified] Unable to determine at this time  .

## 2022-09-12 NOTE — PROGRESS NOTES
Hospitalist Progress Note    Patient: Ran Fischer. MRN: [id number]  CSN: [id number]    YOB: 1945  Age: 78 y.o. Sex: male    DOA: 8/27/2022 LOS:  LOS: 16 days          Chief Complaint:    PVD      Assessment/Plan     78 y.o. 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 Admitted for severe septic shock with multiorgan and hypotension involvement due to multiple possible sources of infection, severe hyperkalemia, acute metabolic encephalopathy, metabolic acidosis, strangulated left inguinal hernia, anemia requiring blood transfusion, mild hypoxia coagulable state.       Hypokalemia-repletion , and replete Mag IV, PO Kdur     Hypernatremia-resolved     Mild bleeding from NG tube being pulled out by patient-  Ac blood loss on anemia chronic     Dysphagia-passed swallow test     Incarcerated right inguinal hernia -status post ex lap with reduction of incarcerated right groin hernia with hernia mesh     Prior AKA left side     sacral ulcer      Infected medial maleolar and heel ulcers right foot-completed abx -followed by wound care     Acute renal failure -resolved    HTN-resume toprol, stop albumin     encephalopathy -some improvement but still confused at times     S/p thoracentesis for effusion    Diabetes with hyperglycemia-follow BG levels, SSI      Poor prognosis with multiple medical ailments and advanced PVD, diabetes complications, prior BKA< wounds and debilitated state  Ortho has seen for possible BKA /AKA right side and consulted vascular surgery for possible AKA    Family will need to make decision  Risks will be very high     Poor prognosis  DNR    Work on getting FMS out, start probiotics Mental status too altered to be able to make reliable decisions about medical care plans -wife will need to make medical decisions at this time         Disposition :  Patient Active Problem List   Diagnosis Code    DM2 (diabetes mellitus, type 2) ([facility] E11.9    CAD (coronary artery disease) I25.10    Acute renal failure (ARF) ([facility]) N17.9    CKD (chronic kidney disease) stage 3, GFR 30-59 ml/min ([facility]) N18.30    Elevated brain natriuretic peptide (BNP) level R79.89    UTI (urinary tract infection) N39.0    High anion gap metabolic acidosis P31.3    Hyperkalemia E87.5    Sepsis ([facility]) A41.9    AMS (altered mental status) R41.82    Strangulated inguinal hernia K40.30    Right inguinal hernia K40.90    Leukocytosis D72.829    Anemia D64.9    Chronic indwelling Mccray catheter Z97.8    S/P BKA (below knee amputation), left ([facility]) Z89.512    Right foot ulcer ([facility]) L97.519    Paroxysmal atrial fibrillation ([facility]) I48.0    Diabetic ulcer of right foot ([facility]) E11.621, L97.519    Pleural effusion on right J90    Severe protein-calorie malnutrition ([facility]) E43    Acute respiratory failure with hypoxemia ([facility]) J96.01    Septic shock ([facility]) A41.9, R65.21    Hypernatremia E87.0       Subjective:    Very quiet  Awake  Does not ay much at all  Denies pain to me  Denies any questions    Review of systems:    As above      Vital signs/Intake and Output:  Visit Vitals  BP (!) 144/65   Pulse 93   Temp 98.7 °F (37.1 °C)   Resp 18   Ht 5' 7.99\" (1.727 m)   Wt 61.6 kg (135 lb 14.4 oz)   SpO2 99%   BMI 20.67 kg/m²     Current Shift:  No intake/output data recorded.   Last three shifts:  09/10 1901 - 09/12 0700  In: -   Out: 1600 [Urine:1100; Drains:500]    Exam:    General: flat affect quiet but awake WM NAD, ill appearing chronically  Head/Neck: NCAT, supple, No masses, No lymphadenopathy  CVS:Regular rate and rhythm, no M/R/G, S1/S2 heard, no thrill  Lungs:Clear to auscultation bilaterally, no wheezes, rhonchi, or rales  Abdomen: Soft, Nontender, No distention, Normal Bowel sounds  Extremities:right foot dressed, Left AKA  Neuro:grossly normal   Mccray and FMS                Labs: Results:       Chemistry Recent Labs     09/12/22  0533 09/12/22  0046 09/11/22  1635 09/11/22  1339 09/11/22  0811 09/10/22  0848 09/10/22  0414   GLU 88 193* 143*   < > 151*   < > 119*    136 139   < > 138   < > 142   K 3.3* 3.6 4.5   < > 3.9   < > 3.5    106 109   < > 107   < > 110   CO2 24 23 21   < > 23   < > 25   BUN 36* 37* 35*   < > 35*   < > 39*   CREA 1.27 1.36* 1.33*   < > 1.31*   < > 1.39*   CA 8.8 8.5 8.8   < > 8.7   < > 8.7   AGAP 7 7 9   < > 8   < > 7   BUCR 28* 27* 26*   < > 27*   < > 28*   AP  --   --   --   --  150*  --  140*   TP  --   --   --   --  6.4  --  6.3*   ALB  --   --   --   --  3.9  --  3.7   GLOB  --   --   --   --  2.5  --  2.6   AGRAT  --   --   --   --  1.6  --  1.4    < > = values in this interval not displayed. CBC w/Diff Recent Labs     09/11/22  0811 09/10/22  0414   WBC 7.2 5.0   RBC 3.18* 2.91*   HGB 8.5* 7.9*   HCT 27.5* 25.4*    225   GRANS 72 66   LYMPH 15* 19*   EOS 1 2      Cardiac Enzymes No results for input(s): CPK, CKND1, LEONEL in the last 72 hours. No lab exists for component: CKRMB, TROIP   Coagulation No results for input(s): PTP, INR, APTT, INREXT in the last 72 hours. Lipid Panel No results found for: CHOL, CHOLPOCT, CHOLX, CHLST, CHOLV, 512020, HDL, HDLP, LDL, LDLC, DLDLP, 061717, VLDLC, VLDL, TGLX, TRIGL, TRIGP, TGLPOCT, CHHD, CHHDX   BNP No results for input(s): BNPP in the last 72 hours.    Liver Enzymes Recent Labs     09/11/22  0811   TP 6.4   ALB 3.9   *      Thyroid Studies Lab Results   Component Value Date/Time    TSH 4.73 (H) 08/28/2022 08:04 AM        Procedures/imaging: see electronic medical records for all procedures/Xrays and details which were not copied into this note but were reviewed prior to creation of Juana Walker MD

## 2022-09-13 NOTE — PROGRESS NOTES
CM notes vascular recommends AKA if family wishes to move forward with amputation. CM to watch notes to assist in determining transition of care needs.

## 2022-09-13 NOTE — PROGRESS NOTES
Attempt for OT evaluation. Max verbal encouragement provided to participate with therapy; pt adamantly refusing participation at this time. States \" you are trying to kill me\" when encouraged to participate with OOB mobility. Plans for possible AKA noted, per chart. Will discontinue current therapy orders; please refer again post procedure if pt medically appropriate for participation with therapy.    Thank you for the referral.     Dakota Draper, OTR/L

## 2022-09-13 NOTE — PROGRESS NOTES
Problem: Dysphagia (Adult)  Description: Patient will:  1. Tolerate PO trials with 0 s/s overt distress in 4/5 trials. 2. Utilize compensatory swallow strategies/maneuvers (decrease bite/sip, size/rate, alt. liq/sol) with min cues in 4/5 trials. 3. Perform oral-motor/laryngeal exercises to increase oropharyngeal swallow function with min cues. 4. If medically indicated, complete an objective swallow study (i.e., MBSS) to assess swallow integrity, r/o aspiration, and determine of safest LRD, min A.    Rec:     Soft and bite-size with thin liquids - check mouth for oral clearance  Pt will require assistance with meal set up and feeding  Aspiration precautions  HOB >45 during po intake, remain >30 for 30-45 minutes after po   Small bites/sips; alternate liquid/solid with slow feeding rate   Oral care TID  Meds crushed in applesauce  Outcome: Progressing Towards Goal    SPEECH LANGUAGE PATHOLOGY DYSPHAGIA TREATMENT    Patient: Lisa Xiong (71 y.o. male)  Date: 9/13/2022  Diagnosis: Sepsis ([address] [A41.9]  Strangulated hernia of abdominal wall [K43.6]  UTI (urinary tract infection) [N39.0]  AMS (altered mental status) [R41.82]  Acidosis [E87.2]  Hyperkalemia [E87.5] Sepsis ([address]  Procedure(s) (LRB):  EXPLORATORY LAPAROTOMY, REDUCTION INCARCERATED RIGHT GROIN HERNIA WITH HERNIA REPAIR WITH MESH (N/A) 17 Days Post-Op  Precautions: Aspiration    PLOF:Regular, thin     ASSESSMENT:      Patient seen by ST for dysphagia follow-up. Patient is alert/oriented x1. Hoarse vocal quality. RN present in room and provided encouragement to patient to try solid textures. Patient initially refused all food/drink and stated, \"I want beer. \"  With encouragement, patient able to consume ~ 4 oz dietary supplement and x 4 spoonfuls of pudding and crackers. Patient demo inconsistent rotary chew, likely 2/dentition, and prolonged mastication and oral transit. Appearance of WFL swallow initiation and laryngeal elevation.   No overt s/sx of aspiration visualized. ST attempted to check patient's oral cavity for food pocketing; patient refused to open mouth. RN able to get patient to open mouth. No residuals visualized. Recommend trial of soft-bite size, thin liquid diet to increase patient access to more preferred food items. Aspiration precautions. Oral care 3x/daily. ST to follow to assess diet tolerance/safety. D/w RN Alla. Progression toward goals:  []         Improving appropriately and progressing toward goals  [x]         Improving slowly and progressing toward goals  []         Not making progress toward goals and plan of care will be adjusted     PLAN:  Recommendations and Planned Interventions:  See above  Patient continues to benefit from skilled intervention to address the above impairments. Continue treatment per established plan of care. Discharge Recommendations: To Be Determined     SUBJECTIVE:   Patient stated I want beer. OBJECTIVE:   Cognitive and Communication Status:  Neurologic State: Alert  Orientation Level: Oriented to person  Cognition: Decreased command following, Decreased attention/concentration  Perception: Appears intact  Perseveration: No perseveration noted  Safety/Judgement: Not assessed  Dysphagia Treatment:  Oral Assessment:  Oral Assessment  Labial: Decreased rate, Decreased seal, Impaired coordination, Other (comment) (limited assessment due to poor command following)  Dentition: Natural, Limited, Poor  Oral Hygiene:  (fair)  Lingual: Decreased rate, Decreased strength, Incoordinated  Velum: Unable to visualize  Mandible: No impairment  Gag Reflex:  (did not test)  P.O.  Trials:   Patient Position:  (HOB 45 degrees)   Vocal quality prior to P.O.: Hoarse, Low volume   Consistency Presented: Solid, Mixed consistency, Thin liquid   How Presented: SLP-fed/presented, Spoon, Straw, Successive swallows       Bolus Acceptance: No impairment   Bolus Formation/Control: Impaired   Type of Impairment: Mastication, Piecemeal   Propulsion: Lingual tremors   Oral Residue: Lingual, Less than 10% of bolus   Initiation of Swallow: No impairment   Laryngeal Elevation: Functional   Aspiration Signs/Symptoms: Change vocal quality   Pharyngeal Phase Characteristics: Altered vocal quality, Suspected pharyngeal residue, Poor endurance   Effective Modifications: Alternate liquids/solids, Small sips and bites, Other (comment) (volitional cough/throat clear after each bite)   Cues for Modifications: Moderate         Oral Phase Severity: Mild-moderate   Pharyngeal Phase Severity : Other (comment) (overt s/sx - suspect moderate)    PAIN:  Pain level pre-treatment: 0/10   Pain level post-treatment: 0/10       After treatment:   []              Patient left in no apparent distress sitting up in chair  [x]              Patient left in no apparent distress in bed  [x]              Call bell left within reach  [x]              Nursing notified  []              Family present  []              Caregiver present  []              Bed alarm activated      COMMUNICATION/EDUCATION:   [x] Aspiration precautions; swallow safety; compensatory techniques  [x]        Patient unable to participate in education; education ongoing with staff  []  Posted safety precautions in patient's room.   [] Oral-motor/laryngeal strengthening exercises    SCOOBY Rodriguez  Time Calculation: 13 mins

## 2022-09-13 NOTE — PROGRESS NOTES
Attempted to see pt for PT evaluation. Despite encouragement and education pt again declined to attempt participation with EOB mobility. \"You're trying to kill me. \" Per chart review current plan if for AKA. Will discontinue PT orders at this time. Please order PT following surgical intervention.

## 2022-09-13 NOTE — PROGRESS NOTES
Palliative Medicine    CODE STATUS: DNR/DNI    AMD Status: none on file. His wife, Gavin Le, is his legal next of kin     9/13/2022  Seen today in room 336 along with Dayne Cheung NP. No family at bedside. Lying on his back. Awake, alert. Nodded and shook head but did not speak. Unable to assess orientation status but he did have good eye contact. When asked if he wanted another amputation he shook his head no. He has been declining some care including vital signs and routine bathing    0955: telephone call with Mrs Radha Domingo. She said that she and her children are in agreement to proceed with amputation if that is the medical recommendation. Hospitalist updated with that decision. Disposition plan: will await surgical outcome and his response. Palliative care will continue to follow Mortimer Reams  and his family during his hospitalization and support them as they make healthcare decisions and define goals of care.       Isaias Jackman RN, MSN  Palliative Medicine  P: [phone]

## 2022-09-13 NOTE — PROGRESS NOTES
Above noted. Case discussed with Dr. Angelina Link. At this time would not perform angiogram.  If family wishes for amputation would recommend above knee amputation. Continue current treatment, palliative care.

## 2022-09-13 NOTE — PROGRESS NOTES
Palliative Medicine Consult    Patient Name: Otila Kawasaki. YOB: 1945    Date of Initial Consult: 8/29/2022  Date of follow up: 9/13/2022  Reason for Consult: Goals of care discussions  Requesting Provider: Dr. Ora Elliott   Primary Care Physician: Grant Ledbetter MD      SUMMARY:   Otila Kawasaki. is a 78 y.o. with a past history of hypertension, hyperlipidemia, stroke, PAD, chronic atrial fibrillation, on Plavix, DM, CKD, previous, COVID-19 infection, chronic indwelling urinary catheter for urinary retention, and severe peripheral vascular disease with worsening gangrenous left foot necessitating left BKA during 8 day hospitalization last year, who was admitted on 8/27/2022 from home with a diagnosis of incarcerated Right inguinal hernia, severe septic shock with hypotension, severe hyperkalemia, acute metabolic encephalopathy, and anemia. Current medical issues leading to Palliative Medicine involvement include: goals of care discussions. 8/30/2022: Patient was off sedation, moving head back and forth, appears in distress. 9/1/2022: Patient sedation help, plan for SBT today, with possible medical extubation. 9/2/2022: Patient was medically extubated on 9/1/2022, he is oriented to person and place, disoriented to current time and situation. Appears frail, fatigued, and weak. 9/6/2022: Patient is awake, alert, oriented to person and place, confused about current time and situation. He states he is 54years old. 9/8/2022: Patient awake, remains confused, oriented to person and place, states the year is 1945 (his birth year). He cannot recall speaking to the orthopedic surgeon about possible AKA versus BKA. 9/13/2022: Patient has been refusing nursing care per chart review. Family has agreed to move forward with right AKA.     PALLIATIVE DIAGNOSES:   Goals of care discussions  Septic shock  Acute respiratory failure   incarcerated Right inguinal hernia  Advanced age/debility       PLAN:   9/13/2022: Palliative medicine team including Todd Hanley RN and I met with patient at patient's bedside. Patient is awake, flat affect, nodding appropriately, but would not answer orientation questions. When asked if he was OK with having a right AKA he shook his head \"no\" but it is uncertain if he understands all the benefits and burdens of his choices. Appreciate vascular input, will need AKA if comfort care is not pursued. Called patient's wife/legal NOK Venu Villalobos who states she and all three of patient's children are in agreement to have the AKA of the right leg. Explained our concern that patient has been refusing nursing care and vital signs, and we are concerned about patient's ability to participate in PT/OT, and undergo postoperative treatment recommendations. Wife states that she and family feel that the amputation should happen to give patient the best opportunity to get stronger and clear cognitively, in hopes that he has some quality of life (understanding he may not improve). Discussed with Dr. Grace Casanova At this time, continue DNR/DNI, and family in agreement to have right AKA. Family understands DNR status may temporarily be suspended in surgery depending on her discussion with the surgeon. We will continue to follow and continue goals of care discussions as appropriate. See previous discussions below:    9/8/2022: Palliative medicine team including Todd Hanley RN and I met with patient at patient's bedside. Patient awake, remains confused, oriented to person and place, states the year is [birth year] (his birth year). He cannot recall speaking to the orthopedic surgeon about possible AKA versus BKA. Wife not at bedside. Awaiting vascular input, then will plan a meeting with family to readdress goals of care. Family is strongly hopeful that patient will clear cognitively enough to make this decision on his own but at this time he remains confused.  At this time continue DNR/DNI. See previous discussions below:    9/6/2022: Palliative medicine team including Dora Dumont RN and I met with patient at patient's bedside. Patient is awake, alert, oriented to person and place, confused about current time and situation. He states he is 54years old. Met with wife later in the day, who states that each day patient appears to be doing better, and improving cognitively, now able to eat a pureed diet. Readdressed goals of care today, confirmed DNR/DNI. Readdressed the likely need of right lower extremity amputation versus implementation of comfort measures with hospice at discharge. Wife states that patient states \"cut it off\" when discussing his foot ulcers and seems to be okay with having the amputation. Explained at this time I do not believe patient understands the benefits and burdens of his medical choices, wife agrees. Wife would like to give patient more time to clear cognitively to participate in his goals of care conversation, understanding patient is high risk for recurrent sepsis due to severe PVD in the right lower extremity with chronic vascular ulcers. Family is not ready for hospice. We will follow with you. See previous discussions below:    9/2/2022: Palliative medicine team including  CHERYLE Meraz and I met with patient at patient's bedside. Patient is awake, alert, oriented to person and place, disoriented to current time and situation. He is not able to understand complex goals of care discussions at this time. Met with patient later in the day and wife was at bedside. Reviewed goals of care with wife. Wife has a good understanding of current health situation; we explained that patient is too weak, and confused to participate in goals of care discussions at this time.   Reviewed overall level of care, with concern for RLL chronic vascular ulcers with previous recommendations from [facility] (6/9/2022) for consideration of Right AKA. Patient has historically declined this amputation. Explained to wife that patient is likely an appropriate hospice candidate, and this should be considered as an option at discharge. Also discussed the benefits and burdens of continued aggressive measures versus implementing comfort measures with the support of hospice at discharge. Wife would like to give patient more time to see  if he clears mentally to participate in his goals of care, understanding she may have to make the decisions should patient not clear cognitively. At this time continue DNR/DNI. See previous discussions below:    9/1/2022: Palliative medicine team including Bushra Navarro and I met with patient at patient's bedside today. Patient was being removed from sedation for SBT today. Spoke to hospitalist and intensivist. Patient may be stable enough to medically extubate depending on how he does today with SBT. Per previous goals of care discussions with family, we will wait to see if patient can be medically extubated and readdress further goals of care at that point (right leg necrotic tissue with possible need for amputation versus comfort measures). Patient has historically declined amputation in the past. Goals of care discussions ongoing. Family hopes that patient can be safely extubated and would be able to participate in goals of care discussions. At this time, continue DNR/do not re-intubate for any reason. See previous discussions below:    8/30/2022: Palliative medicine team including Bushra Navarro and I met with patient at patient's bedside today. Patient was off sedation, moving head back and forth, appears in distress. Family meeting today consisting of palliative team, patient's family (wife Jarrod Cochran, children Ramonita Browning, and Escobar Meme) hositalist Dr. Angela Steen, and intensivist Dr. Laura Walters. Medical update provided.  Discussed SBT outcome today with potential for medical extubation tomorrow depending on how his SBT goes tomorrow. Discussed the benefits and burdens of reintubation in the event of respiratory decline post medical extubation. Discussed overall level of care, with concern for RLL chronic vascular ulcers with previous recommendations from [facility] (6/9/2022) for consideration of Right AKA. Patient has historically declined this amputation. Discussed current quality of life. Family describes patient as a man who values his independence, and patient has been bothered by the fact that he hasn't  walked in over a year. Discussed that septic shock is improving and this event was likely related to bowel sepsis, but that patient is at high risk for recurrent sepsis and further clinical decline due to chronic infection in RLL, with no plan for amputation. Discussed the benefits and burdens of continuing current level of care versus implementation of comfort measures with hospice at discharge. Family clear that he would not find reintubation acceptable. Family is hopeful that if patient is medically extubated, he may wake up enough to participate in goals of care discussions. For now, patient is a DNR/do not re-intubate for any reason. Further goals of care discussions ongoing. See previous discussions below:    8/29/2022:Goals of care discussions: Palliative medicine team including  CHERYLE Coe and I met with patient at patient's bedside. Patient is orally intubated on mechanical ventilation, frequent movements in bed but no command following. Wife Saurav Woodlawn at bedside (Wife notes that English is her second language but she states she understands our conversation and answering questions appropriately. Offered translation services but wife declined.) Wife confirms DNR in the event of cardiac arrest. Patient is a PARTIAL CODE: No CPR (including no chest compressions, no shock, and no ACLS meds in the event of cardiac arrest). Continue intubation at this time.  Wife requesting a meeting with their children involved to address further goals of care. Wife will call me with a time for tomorrow once she has spoken to all of her children. Received a call from patient's son Annetta Hodgkin and explained to him the desire to meet for family meeting. Annetta Hodgkin confirms DNR status upon cardiac arrest. Further goals of care discussions will occur at family meeting tomorrow. Awaiting response from wife with exact time. Septic shock:   multiple possible sources of infection, incarcerated Right inguinal hernia s/p laparotomy and reduction of incarcerated right groin hernia without need for bowel resection-8/27/2022. UA-Positive for UTI; urine culture obtained. sacral ulcer and medial maleolar and heel infected ulcers. On broad spectrum antibiotics per primary team.   Acute respiratory failure: Patient remains intubated postop due to critical illness. incarcerated Right inguinal hernia: s/p laparotomy and reduction of incarcerated right groin hernia without need for bowel resection-8/27/2022. Advanced age/debility: 78year old male who is critically ill, needs assist with all ADLs. Prior to admission, patient lived at home with his spouse and required assistance with functional ADLs.    Initial consult note routed to primary continuity provider  Communicated plan of care with: Palliative IDT       GOALS OF CARE / TREATMENT PREFERENCES:   [====Goals of Care====]  GOALS OF CARE: DNR/DNI    Patient/Health Care Proxy Stated Goals: Prolong life      TREATMENT PREFERENCES:   Code Status: DNR    Advance Care Planning:  Advance Care Planning 8/29/2022   Patient's Healthcare Decision Maker is: Legal Next of Kin   Primary Decision Maker Name -   Primary Decision Maker Phone Number -   Primary Decision Maker Relationship to Patient -   Confirm Advance Directive None   Patient Would Like to Complete Advance Directive Unable       Medical Interventions: Limited additional interventions            The palliative care team has discussed with patient / health care proxy about goals of care / treatment preferences for patient.  [====Goals of Care====]         HISTORY:     History obtained from: patient's chart, family    CHIEF COMPLAINT: incarcerated Right inguinal hernia, s/p surgery    HPI/SUBJECTIVE:    The patient is:   [] Verbal and participatory  [x] Non-participatory due to:   Oriented to person and place, confused to current time and situation     Clinical Pain Assessment (nonverbal scale for severity on nonverbal patients):   Clinical Pain Assessment  Severity: 0    Adult Nonverbal Pain Scale  Face: No particular expression or smile  Activity (Movement): Lying quietly, normal position  Guarding: Lying quietly, no positioning of hands over areas of body  Physiology (Vital Signs): Stable vital signs  Respiratory: Baseline RR/SpO2 compliant with ventilator  Total Score: 0       FUNCTIONAL ASSESSMENT:     Palliative Performance Scale (PPS):  PPS: 30       PSYCHOSOCIAL/SPIRITUAL SCREENING:     Advance Care Planning:  Advance Care Planning 8/29/2022   Patient's Healthcare Decision Maker is: Legal Next of Kin   Primary Decision Maker Name -   Primary Decision Maker Phone Number -   Primary Decision Maker Relationship to Patient -   Confirm Advance Directive None   Patient Would Like to Complete Advance Directive Unable        Any spiritual / Yazdanism concerns: unable to assess  [] Yes /  [] No    Caregiver Burnout:  [] Yes /  [] No /  [x] No Caregiver Present      Anticipatory grief assessment: unable to assess  [] Normal  / [] Maladaptive          REVIEW OF SYSTEMS:     Positive and pertinent negative findings in ROS are noted above in HPI. The following systems were [] reviewed / [x] unable to be reviewed as noted in HPI  Other findings are noted below. Systems: constitutional, ears/nose/mouth/throat, respiratory, gastrointestinal, genitourinary, musculoskeletal, integumentary, neurologic, psychiatric, endocrine. Positive findings noted below. Modified ESAS Completed by: provider   Fatigue: 5       Pain: 0   Anxiety: 3       Dyspnea: 0           Stool Occurrence(s): 1        PHYSICAL EXAM:     From RN flowsheet:  Wt Readings from Last 3 Encounters:   09/12/22 61.6 kg (135 lb 14.4 oz)   11/02/21 81.6 kg (180 lb)   10/11/21 76.7 kg (169 lb)     Blood pressure (!) 148/75, pulse 88, temperature 98.1 °F (36.7 °C), resp. rate 18, height 5' 7.99\" (1.727 m), weight 61.6 kg (135 lb 14.4 oz), SpO2 99 %.     Pain Scale 1: Numeric (0 - 10)  Pain Intensity 1: 0     Pain Location 1: Back  Pain Orientation 1: Posterior  Pain Description 1: Aching  Pain Intervention(s) 1: Medication (see MAR), Massage, Position, Repositioned, Rest      Constitutional: patient in NAD, confused, appears acute on chronically ill  Eyes: PERRL  ENMT: dry mucous membranes, hoarseness  Cardiovascular: regular rhythm, distal pulses intact  Respiratory: unlabored, symmetric, on NC at 2 L  Gastrointestinal: midline surgical incision CDI, no distention  Musculoskeletal: no deformity, no tenderness to palpation, LLE amputation  Skin: warm, dry, black eschar right ankle and right heel  Neurologic: following some commands, moving all extremities, oriented to person and place, confused to current time and situation       HISTORY:     Principal Problem:    Sepsis ([address] (8/27/2022)    Active Problems:    Acute renal failure (ARF) ([address] (5/29/2018)      UTI (urinary tract infection) (10/29/2021)      High anion gap metabolic acidosis (1/32/9504)      Hyperkalemia (8/27/2022)      AMS (altered mental status) (8/27/2022)      Strangulated inguinal hernia (8/27/2022)      Right inguinal hernia (8/27/2022)      Leukocytosis (8/27/2022)      Anemia (8/27/2022)      Chronic indwelling Mccray catheter (8/27/2022)      S/P BKA (below knee amputation), left ([address] (8/27/2022)      Right foot ulcer ([address] (8/27/2022)      Paroxysmal atrial fibrillation ([facility] (8/27/2022)      Diabetic ulcer of right foot ([facility] (8/27/2022)      Pleural effusion on right (8/27/2022)      Severe protein-calorie malnutrition ([facility] (8/27/2022)      Acute respiratory failure with hypoxemia ([facility] (8/28/2022)      Septic shock ([facility] (8/28/2022)      Hypernatremia (9/6/2022)    Past Medical History:   Diagnosis Date    A-fib ([email or web address]     Asthma     CAD (coronary artery disease)     Chronic kidney disease     stage 3    COVID-19     Diabetes (HCC)     GERD (gastroesophageal reflux disease)     Heart failure (HCC)     chronic diastolic heart failure    High cholesterol     Hypertension     Ill-defined condition     chronic osteomyelitis left ankle and foot    Ill-defined condition     chronic arteriosclerosis    PVD (peripheral vascular disease) ([facility]     Stroke ([email or web address]     cva      Past Surgical History:   Procedure Laterality Date    COLONOSCOPY N/A 6/1/2018    COLONOSCOPY, POLYPECTOMY performed by Jewel Cortez MD at THE [facility] ENDOSCOPY    HX CATARACT REMOVAL      HX ORTHOPAEDIC Left 2021    ankle washout, external fixator, would vac    HX ORTHOPAEDIC      external fixator removed      History reviewed. No pertinent family history. History reviewed, no pertinent family history.   Social History     Tobacco Use    Smoking status: Former    Smokeless tobacco: Never   Substance Use Topics    Alcohol use: Not Currently     No Known Allergies   Current Facility-Administered Medications   Medication Dose Route Frequency    pantoprazole (PROTONIX) tablet 40 mg  40 mg Oral ACB    atorvastatin (LIPITOR) tablet 40 mg  40 mg Oral QHS    metoprolol succinate (TOPROL-XL) XL tablet 50 mg  50 mg Oral DAILY    insulin lispro (HUMALOG) injection   SubCUTAneous AC&HS    Saccharomyces boulardii (FLORASTOR) capsule 250 mg  250 mg Oral BID    potassium bicarb-citric acid (EFFER-K) tablet 40 mEq  40 mEq Oral DAILY    povidone-iodine (BETADINE) 10 % topical solution   Topical DAILY    ipratropium (ATROVENT) 0.02 % nebulizer solution 0.5 mg  0.5 mg Nebulization Q6H PRN    collagenase (SANTYL) 250 unit/gram ointment   Topical DAILY    0.9% sodium chloride infusion 250 mL  250 mL IntraVENous PRN    hydrALAZINE (APRESOLINE) 20 mg/mL injection 20 mg  20 mg IntraVENous Q6H PRN    midazolam (VERSED) injection 0.5 mg  0.5 mg IntraVENous BID PRN    0.9% sodium chloride infusion 250 mL  250 mL IntraVENous PRN    [Held by provider] heparin (porcine) injection 5,000 Units  5,000 Units SubCUTAneous Q8H    sodium chloride (NS) flush 5-40 mL  5-40 mL IntraVENous Q8H    sodium chloride (NS) flush 5-40 mL  5-40 mL IntraVENous PRN    acetaminophen (TYLENOL) tablet 650 mg  650 mg Oral Q6H PRN    Or    acetaminophen (TYLENOL) suppository 650 mg  650 mg Rectal Q6H PRN    polyethylene glycol (MIRALAX) packet 17 g  17 g Oral DAILY PRN    ondansetron (ZOFRAN ODT) tablet 4 mg  4 mg Oral Q8H PRN    Or    ondansetron (ZOFRAN) injection 4 mg  4 mg IntraVENous Q6H PRN    glucose chewable tablet 16 g  4 Tablet Oral PRN    glucagon (GLUCAGEN) injection 1 mg  1 mg IntraMUSCular PRN    dextrose 10% infusion 0-250 mL  0-250 mL IntraVENous PRN    HYDROmorphone (DILAUDID) injection 1 mg  1 mg IntraVENous Q4H PRN          LAB AND IMAGING FINDINGS:     Lab Results   Component Value Date/Time    WBC 7.2 09/11/2022 08:11 AM    HGB 8.5 (L) 09/11/2022 08:11 AM    PLATELET 258 34/28/3995 08:11 AM     Lab Results   Component Value Date/Time    Sodium 137 09/13/2022 01:05 AM    Potassium 3.6 09/13/2022 01:05 AM    Chloride 105 09/13/2022 01:05 AM    CO2 24 09/13/2022 01:05 AM    BUN 35 (H) 09/13/2022 01:05 AM    Creatinine 1.20 09/13/2022 01:05 AM    Calcium 8.8 09/13/2022 01:05 AM    Magnesium 1.5 (L) 09/12/2022 05:33 AM    Phosphorus 1.9 (L) 09/03/2022 07:45 AM      Lab Results   Component Value Date/Time    Alk.  phosphatase 150 (H) 09/11/2022 08:11 AM    Protein, total 6.4 09/11/2022 08:11 AM    Albumin 3.9 09/11/2022 08:11 AM    Globulin 2.5 09/11/2022 08:11 AM     Lab Results   Component Value Date/Time    INR 1.3 (H) 09/02/2022 04:51 AM    Prothrombin time 16.8 (H) 09/02/2022 04:51 AM    aPTT 118.5 (H) 05/17/2021 08:20 AM      Lab Results   Component Value Date/Time    Iron 10 (L) 05/06/2021 06:40 AM    TIBC 186 (L) 05/06/2021 06:40 AM    Iron % saturation 5 (L) 05/06/2021 06:40 AM    Ferritin 125 05/06/2021 06:40 AM      No results found for: PH, PCO2, PO2  No components found for: [place]   Lab Results   Component Value Date/Time    CK 46 05/29/2018 05:45 PM    CK - MB 1.4 05/29/2018 05:45 PM                Total time: 25 minutes  Counseling / coordination time, spent as noted above:   > 50% counseling / coordination?: yes, patient, family, and medical team    Prolonged service was provided for  []30 min   []75 min in face to face time in the presence of the patient, spent as noted above.   Time Start:   Time End:

## 2022-09-13 NOTE — PROGRESS NOTES
RRT     Pt actively seizing. No known h/o seizures. Unresponsive.    Ativan 1mg IV given, seizure aborted   Ordered BMP, CBC, Magnesium   BG ok   Vitals stable     Will order EEG, stat head CT, Neuro consult

## 2022-09-13 NOTE — PROGRESS NOTES
Hospitalist Progress Note-critical care note     Patient: Deanna Sibley MRN: [id number]  CSN: [id number]    YOB: 1945  Age: 78 y.o.   Sex: male    DOA: 8/27/2022 LOS:  LOS: 17 days            Chief complaint:sepsis , diabetic foot ulce, pad     Assessment/Plan         Hospital Problems  Date Reviewed: 8/27/2022            Codes Class Noted POA    Hypernatremia ICD-10-CM: E87.0  ICD-9-CM: 276.0  9/6/2022 Yes        Acute respiratory failure with hypoxemia [facility]) ICD-10-CM: J96.01  ICD-9-CM: 518.81  8/28/2022 Yes        Septic shock ([facility] [address] ICD-10-CM: A41.9, R65.21  ICD-9-CM: 038.9, 785.52, 995.92  8/28/2022 Yes        High anion gap metabolic acidosis [id number]: E87.2  ICD-9-CM: 276.2  8/27/2022 Yes        Hyperkalemia ICD-10-CM: E87.5  ICD-9-CM: 276.7  8/27/2022 Yes        * (Principal) Sepsis ([facility] [address] ICD-10-CM: A41.9  ICD-9-CM: 038.9, 995.91  8/27/2022 Yes        AMS (altered mental status) ICD-10-CM: R41.82  ICD-9-CM: 780.97  8/27/2022 Yes        Strangulated inguinal hernia ICD-10-CM: K40.30  ICD-9-CM: 550.10  8/27/2022 Yes        Right inguinal hernia ICD-10-CM: K40.90  ICD-9-CM: 550.90  8/27/2022 Yes        Leukocytosis ICD-10-CM: D72.829  ICD-9-CM: 288.60  8/27/2022 Yes        Anemia ICD-10-CM: D64.9  ICD-9-CM: 285.9  8/27/2022 Yes        Chronic indwelling Mccray catheter ICD-10-CM: Z97.8  ICD-9-CM: V45.89  8/27/2022 Yes        S/P BKA (below knee amputation), left ([facility] ICD-10-CM: W84.661  ICD-9-CM: V49.75  8/27/2022 Yes        Right foot ulcer ([facility] ICD-10-CM: L97.519  ICD-9-CM: 707.15  8/27/2022 Yes        Paroxysmal atrial fibrillation (HCC) ICD-10-CM: I48.0  ICD-9-CM: 427.31  8/27/2022 Yes        Diabetic ulcer of right foot ([facility] ICD-10-CM: E11.621, L97.519  ICD-9-CM: 250.80, 707.15  8/27/2022 Yes        Pleural effusion on right ICD-10-CM: J90  ICD-9-CM: 511.9  8/27/2022 Yes        Severe protein-calorie malnutrition ([facility] ICD-10-CM: D15  ICD-9-CM: 262  8/27/2022 Yes        UTI (urinary tract infection) ICD-10-CM: N39.0  ICD-9-CM: 599.0  10/29/2021 Yes        Acute renal failure (ARF) (HCC) ICD-10-CM: N17.9  ICD-9-CM: 584.9  5/29/2018 Yes            78 y.o. 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 Admitted for severe septic shock with multiorgan and hypotension involvement due to multiple possible sources of infection, severe hyperkalemia, acute metabolic encephalopathy, metabolic acidosis, strangulated left inguinal hernia, anemia requiring blood transfusion, mild hypoxia coagulable state.    Vascular consulted recommend aka if family wants, palliative care seeing pt and family decide to go forward to have aka done      Hypokalemia-repletion , and replete Mag IV, PO Kdur     Hypernatremia-resolved     Mild bleeding from NG tube being pulled out by patient-  Ac blood loss on anemia chronic     Dysphagia-passed swallow , dysphagia diet      Incarcerated right inguinal hernia -status post ex lap with reduction of incarcerated right groin hernia with hernia mesh     Prior AKA left side     sacral ulcer      Infected medial maleolar and heel ulcers right foot-completed abx -followed by wound care, vascular [place] seeing pt-aka recommended      Acute renal failure -resolved     HTN-resume toprol, stop albumin     encephalopathy -stable overnight, still confused    Paf rate controlled per metoprolol      S/p thoracentesis for effusion     Diabetes with hyperglycemia-follow BG levels, SSI    Malnutrition severe       Poor prognosis with multiple medical ailments and advanced PVD, diabetes complications, prior BKA< wounds and debilitated state  Poor prognosis  DNR       Subjective fine       Disposition :tbd,   Review of systems:    Limited due to confusion . Vital signs/Intake and Output:  Visit Vitals  BP (!) 148/75   Pulse 88   Temp 98.1 °F (36.7 °C)   Resp 18   Ht 5' 7.99\" (1.727 m)   Wt 61.6 kg (135 lb 14.4 oz)   SpO2 99%   BMI 20.67 kg/m²     Current Shift:  No intake/output data recorded. Last three shifts:  09/11 1901 - 09/13 0700  In: -   Out: 650 [Urine:650]    Physical Exam:  General: WD, WN. Alert, cooperative, no acute distress    HEENT: NC, Atraumatic. PERRLA, anicteric sclerae. Lungs: CTA Bilaterally. No Wheezing/Rhonchi/Rales. Heart:  Regular  rhythm,  No murmur, No Rubs, No Gallops  Abdomen: Soft, Non distended, Non tender. +Bowel sounds,   Extremities: No c/c, left BKA, rt foot wrapped with ace bandage   Psych:   Not anxious or agitated. Neurologic:  No acute neurological deficit. Labs: Results:       Chemistry Recent Labs     09/13/22  0105 09/12/22  0533 09/12/22  0046 09/11/22  1339 09/11/22  0811   * 88 193*   < > 151*    137 136   < > 138   K 3.6 3.3* 3.6   < > 3.9    106 106   < > 107   CO2 24 24 23   < > 23   BUN 35* 36* 37*   < > 35*   CREA 1.20 1.27 1.36*   < > 1.31*   CA 8.8 8.8 8.5   < > 8.7   AGAP 8 7 7   < > 8   BUCR 29* 28* 27*   < > 27*   AP  --   --   --   --  150*   TP  --   --   --   --  6.4   ALB  --   --   --   --  3.9   GLOB  --   --   --   --  2.5   AGRAT  --   --   --   --  1.6    < > = values in this interval not displayed. CBC w/Diff Recent Labs     09/11/22  0811   WBC 7.2   RBC 3.18*   HGB 8.5*   HCT 27.5*      GRANS 72   LYMPH 15*   EOS 1      Cardiac Enzymes No results for input(s): CPK, CKND1, LEONEL in the last 72 hours. No lab exists for component: CKRMB, TROIP   Coagulation No results for input(s): PTP, INR, APTT, INREXT in the last 72 hours.     Lipid Panel No results found for: CHOL, CHOLPOCT, CHOLX, CHLST, CHOLV, 650743, HDL, HDLP, LDL, LDLC, DLDLP, 206889, VLDLC, VLDL, TGLX, TRIGL, TRIGP, TGLPOCT, CHHD, CHHDX BNP No results for input(s): BNPP in the last 72 hours. Liver Enzymes Recent Labs     09/11/22  0811   TP 6.4   ALB 3.9   *      Thyroid Studies Lab Results   Component Value Date/Time    TSH 4.73 (H) 08/28/2022 08:04 AM        Procedures/imaging: see electronic medical records for all procedures/Xrays and details which were not copied into this note but were reviewed prior to creation of Plan    XR ABD (KUB)    Result Date: 9/5/2022  EXAM: XR ABD (KUB) CLINICAL INDICATION/HISTORY: Abdominal pain and distention COMPARISON: None. TECHNIQUE: 3 supine AP views of the abdomen were obtained _______________ FINDINGS: Skin staples overlie right lower quadrant. There is a single borderline prominent air-filled bowel loop overlying the right paracentral inferior abdomen and superior pelvis, measuring approximately 2.7 cm in diameter. There is also seen more distally throughout the right colon. The soft tissue planes and bony structures appear normal.  _______________     Single borderline prominent air-filled right paracentral small bowel loop, appearance most suggestive of postoperative ileus. XR ABD (KUB)    Result Date: 9/5/2022  AP portable lower chest and upper abdomen for NG/OG tube placement: NG/OG tube is within the stomach. It is not as distally located as previous on the comparison exam from 9/3/2022. Evidence of abdominal surgery with midline skin staples. Persistent right lower lobe atelectasis/infiltrate with some pleural thickening or pleural fluid. NG/OG tube as described within the stomach.     XR ABD (KUB)    Result Date: 9/3/2022  EXAM: XR ABD (KUB) CLINICAL INDICATION/HISTORY: NGT Placement COMPARISON: 8/31/2022 TECHNIQUE: Supine AP view of the abdomen was obtained. _______________ FINDINGS: Gastric tube extends from midline thoracic esophagus curves through the left upper quadrant and has the tip and side-port overlying right mid abdomen in the region of the distal stomach or proximal duodenum. No air distended bowel loops nor other evidence of bowel obstruction or bowel dilatation. The soft tissue planes and bony structures appear normal.  _______________     Adequate gastric tube placement with tip and side-port in the region of the distal stomach/proximal duodenum. XR ABD (KUB)    Result Date: 8/31/2022  PORTABLE ABDOMEN Indication:  OGT placement. Technique: Portable supine AP view of the abdomen was obtained. Comparison studies:  August 27, 2022. Findings: Moderate rotation. Gastric tube terminates in the fundus of the stomach. Nonobstructive gas pattern. Telemetry leads. Lower abdominal skin closing staples. Minor scattered osseous degenerative changes. Moderate rotation. Gastric tube terminates in the fundus of the stomach. Nonobstructive gas pattern. CT HEAD WO CONT    Result Date: 8/27/2022  EXAM: CT head INDICATION: Altered mental status. COMPARISON: None. TECHNIQUE: Axial CT imaging of the head was performed without intravenous contrast.  One or more dose reduction techniques were used on this CT: automated exposure control, adjustment of the mAs and/or kVp according to patient size, and iterative reconstruction techniques. The specific techniques used on this CT exam have been documented in the patient's electronic medical record. Digital Imaging and Communications in Medicine (DICOM) format image data are available to nonaffiliated external healthcare facilities or entities on a secure, media free, reciprocally searchable basis with patient authorization for at least a 12-month period after this study. Patient motion degrades image quality. _______________ FINDINGS: BRAIN AND POSTERIOR FOSSA: Mild periventricular areas of decreased attenuation are identified. Gray-white matter interfaces are preserved. No intraparenchymal hemorrhage, mass effect or midline shift. The ventricular system is midline and symmetric. Atherosclerotic vascular calcifications are identified. EXTRA-AXIAL SPACES AND MENINGES: There is no evidence for extra-axial mass lesion or fluid collection. CALVARIUM: Intact. SINUSES: Sphenoid sinus mucosal thickening. OTHER: Orbits are grossly intact. Facial soft tissue are within normal limits. _______________     1. No evidence for intracranial hemorrhage, mass effect or midline shift. 2.  Mild periventricular areas of microvascular ischemic change. 3.  Cerebral atherosclerosis. 4.  Atrophy. If there are continued symptoms, a MRI is recommended for further evaluation. CT ABD PELV WO CONT    Result Date: 8/27/2022  EXAM: CT of the abdomen and pelvis INDICATION: Pain. COMPARISON: May 14, 2021. TECHNIQUE: Axial CT imaging of the abdomen and pelvis was performed without intravenous contrast. Multiplanar reformats were generated. One or more dose reduction techniques were used on this CT: automated exposure control, adjustment of the mAs and/or kVp according to patient size, and iterative reconstruction techniques. The specific techniques used on this CT exam have been documented in the patient's electronic medical record. Digital Imaging and Communications in Medicine (DICOM) format image data are available to nonaffiliated external healthcare facilities or entities on a secure, media free, reciprocally searchable basis with patient authorization for at least a 12-month period after this study. _______________ FINDINGS: LOWER CHEST: Moderate right pleural effusion with adjacent atelectasis. LIVER, BILIARY: Liver is normal. No biliary dilation. Gallbladder is unremarkable. PANCREAS: Normal. SPLEEN: Splenic granuloma. ADRENALS: Normal. KIDNEYS: Normal. LYMPH NODES: No enlarged lymph nodes. GASTROINTESTINAL TRACT: Uncomplicated sigmoid diverticula. Gastric distention. PELVIC ORGANS: Unremarkable. VASCULATURE: Atherosclerotic vascular calcifications. BONES: No acute or aggressive osseous abnormalities identified.  OTHER: Large right inguinal hernia with large and small bowel. There is diffuse circumferential wall thickening of ascending colon within the hernia. _______________     1. Large right inguinal hernia with large and small bowel. There is diffuse wall thickening of the ascending colon concerning for strangulation. No evidence for bowel obstruction. 2. Right pleural effusion and bilateral basilar atelectasis. 3. Gastric distention. XR CHEST PORT    Result Date: 9/2/2022  CLINICAL HISTORY:  Tachypnea COMPARISONS:  Chest x-ray 9/2/2022 TECHNIQUE:  single frontal view of the chest ------------------------------------------ FINDINGS: Lungs:  Patchy parenchymal opacity in the right lung, similar to the prior radiograph and likely largely scar. Blunting of the right lateral costophrenic sulcus, also similar. Mild chronic appearing interstitial markings within the left lung, similar to prior radiograph. Mediastinum: Moderate cardiomegaly. Mediastinum is shifted towards the right, unchanged. Atherosclerotic arterial calcification. Bones: No evidence of fracture or suspicious bone lesion. ------------------------------------------    1. No significant interval change. Probable small right pleural effusion, not appreciably changed. 2. Right lung parenchymal findings likely largely reflect scar though are not well assessed. Pneumonia or aspiration not entirely excluded. XR CHEST PORT    Result Date: 9/2/2022  EXAM: XR CHEST PORT CLINICAL INDICATION/HISTORY: Post right thoracentesis -Additional: None COMPARISON: 9/2/2022 TECHNIQUE: Portable frontal view of the chest _______________ FINDINGS: SUPPORT DEVICES: None. HEART AND MEDIASTINUM: Normal cardiac size and mediastinal contours. LUNGS AND PLEURAL SPACES: Decreased volume right-sided pleural effusion status post thoracentesis with persistent lateral and basilar components. Streaky areas of atelectasis and interstitial densities noted across the mid and lower lung zones. No evidence of postprocedural pneumothorax.  Left lung clear as visualized. Skinfold along the peripheral left hemithorax. BONY THORAX AND SOFT TISSUES: Unremarkable. _______________     1. No evidence of postprocedural pneumothorax is significantly improved right lung aeration status post thoracentesis. > Residual pleural effusion and streaky areas of atelectasis. XR CHEST PORT    Result Date: 9/2/2022  EXAM: XR CHEST PORT CLINICAL INDICATION/HISTORY: intubated -Additional: None COMPARISON: One day prior TECHNIQUE: Portable frontal view of the chest _______________ FINDINGS: SUPPORT DEVICES: Interval extubation and removal of enteric tube. HEART AND MEDIASTINUM: Heart is obscured by right consolidation. LUNGS AND PLEURAL SPACES: Large right pleural effusion with adjacent atelectasis. Left-sided interstitial opacities No pneumothorax. _______________     Large right pleural effusion with adjacent atelectasis. XR CHEST PORT    Result Date: 9/1/2022  EXAM: Portable chest radiograph 9/1/2022 CLINICAL INDICATION/HISTORY: Intubation. TECHNIQUE: A semierect portable radiograph was obtained. COMPARISON: 8/31/2022. FINDINGS: Endotracheal tube remains in position with its tip approximately 5.5 cm above the level of the alise. A nasogastric tube extends below the field of view. The patient is slightly rotated to the right which distorts the cardiomediastinal contours. However, these contours are grossly unchanged. Increasing right pleural effusion is present with associated increasing atelectasis and/or infiltrate in the right lung. There is increasing interstitial edema and/or interstitial infiltrate throughout the left lung. There is no pneumothorax or significant left pleural effusion. 1. Increasing interstitial edema and/or interstitial infiltration with increasing right pleural effusion.     XR CHEST PORT    Result Date: 8/31/2022  EXAM: XR CHEST PORT CLINICAL INDICATION/HISTORY: intubated -Additional: None COMPARISON: 8/30/2022 TECHNIQUE: Portable frontal view of the chest _______________ FINDINGS: SUPPORT DEVICES: Endotracheal and enteric tubes unchanged. HEART AND MEDIASTINUM: Cardiomediastinal silhouette within normal limits. LUNGS AND PLEURAL SPACES: Patient rotated to right. Moderate right pleural effusion/atelectasis. No pneumothorax. _______________     Moderate right pleural effusion/atelectasis. XR CHEST PORT    Result Date: 8/30/2022  EXAM:  PORTABLE CHEST INDICATION:  Follow up. TECHNIQUE:  Portable, AP view. COMPARISON:  08/29/2022 ____________________ FINDINGS:  SUPPORT DEVICES: Endotracheal tube approximately 4 cm above the alise. OG tube is traversing below left hemidiaphragm, tip not imaged. Esophageal probe is present at the level of the endotracheal tube. HEART AND MEDIASTINUM: Stable. LUNGS AND PLEURAL SPACES: Persistent right basilar opacity consistent with pleural effusion with adjacent consolidation. No pneumothorax. BONY THORAX AND SOFT TISSUES: No acute osseous abnormality. ____________________     1. Endotracheal tube approximately 4 cm above the alise. 2. No significant change in right basilar opacity consistent with pleural effusion with adjacent consolidation, atelectasis or pneumonia. *  **     XR CHEST PORT    Result Date: 8/29/2022  HISTORY: -Provided with order: intubated -Additional: None Technique : AP PORTABLE CHEST Comparison : None. FINDINGS: HEART AND MEDIASTINUM: Anterior endotracheal tube tip estimated at 6.1 cm above alise. Enteric tube tip projects over left upper quadrant expected location gastric fundus. Esophageal temperature probe tip projects over lower thoracic . Stable cardiomediastinal silhouette allowing for angulation. LUNGS AND PLEURAL SPACES: Progressive right pleural effusion and patchy opacities in the right lung. Left lung relatively clear allowing for technique. BONY THORAX AND SOFT TISSUES: No gross acute osseous abnormality.      1. Right pleural effusion and subjacent atelectasis/infiltrate, perhaps slightly progressed. 2. Tubes and lines stable in visualized extent. XR CHEST PORT    Result Date: 8/28/2022  EXAM: CHEST RADIOGRAPH CLINICAL INDICATION/HISTORY: intubated   > Additional: None COMPARISON: 8/27/2022. TECHNIQUE: Portable frontal view of the chest _______________ FINDINGS: SUPPORT DEVICES: Endotracheal tube distal tip projects 5.5 cm above the alise. Enteric tube distal tip projects below the left hemidiaphragm likely in the stomach. HEART AND MEDIASTINUM: No appreciable cardiomegaly. Remaining mediastinal contours are stable. LUNGS AND PLEURAL SPACES: Right pleural effusion with adjacent opacity. No evidence for pneumothorax. Left lung is clear. BONY THORAX AND SOFT TISSUES: Unremarkable. _______________     1. Right pleural effusion with adjacent atelectasis/infiltrates. 2. Supporting tubes appear stable. XR CHEST PORT    Result Date: 8/27/2022  EXAM: CHEST RADIOGRAPH CLINICAL INDICATION/HISTORY: intubated -Additional: None COMPARISON: August 27, 2022 TECHNIQUE: Portable frontal view of the chest _______________ FINDINGS: SUPPORT DEVICES: An endotracheal tube is positioned 6 cm above the alise. HEART AND MEDIASTINUM: Heart size and mediastinal contour are midline and within normal limits. LUNGS AND PLEURAL SPACES: There are opacities throughout both lungs but more confluent within the right mid and lower lung zones. No pneumothorax. BONY THORAX AND SOFT TISSUES: Unremarkable. _______________     Interval right greater than left airspace disease Small to moderate right pleural effusion    XR CHEST PORT    Result Date: 8/27/2022  EXAM: CHEST RADIOGRAPHS CLINICAL INDICATION/HISTORY: Sepsis   > Additional: None COMPARISON: 6/18/2022. TECHNIQUE: PA and lateral views of the chest _______________ FINDINGS: HEART AND MEDIASTINUM: No appreciable cardiomegaly. Remaining mediastinal contours within normal limits. LUNGS AND PLEURAL SPACES: Left lung is clear.  Right pleural effusion with adjacent opacity. BONY THORAX AND SOFT TISSUES: Unremarkable. _______________     1. Right pleural effusion with probable adjacent atelectasis. XR ABD PORT  1 V    Result Date: 8/27/2022  EXAM: ABDOMINAL RADIOGRAPHS CLINICAL INDICATION/HISTORY: OG placement -Additional: None COMPARISON: None TECHNIQUE: Single supine view of the abdomen _______________ FINDINGS: SUPPORT DEVICES: A nasogastric tube is positioned in the stomach. BOWEL GAS PATTERN: The gas pattern is nonobstructive. [place] SOFT TISSUES: Unremarkable. BONES: Degenerative changes are seen throughout the spine. ADDITIONAL FINDINGS: None. _______________     No significant abnormality. US THORACENTESIS RT NDL W IMAGE    Result Date: 9/2/2022  PROCEDURE: ULTRASOUND GUIDED RIGHT THORACENTESIS INDICATION: Right pleural effusion, shortness of breath PERFORMED BY:  Ronaldo Damon PA-C ATTENDING PHYSICIAN:  Bryan Ceja MD SUPERVISION: General _______________ TECHNIQUE & FINDINGS: CONSENT: The risks, benefits, and alternatives to the procedure were explained to the patient's wife prior to the procedure. Written informed consent was obtained and witnessed. Standard pre-procedure time out performed. TECHNIQUE/FINDINGS:  The right pleural effusion was localized under ultrasound guidance. Sterile ultrasound probe cover and ultrasound gel were utilized. The skin was marked, prepped and draped in the usual sterile fashion. 1% Lidocaine was used for local anesthesia. A 5 [place] Latanya Yueh needle was advanced into the right pleural space under US guidance. A total of 460 mL of clear alanna fluid was drained using a Vacutainer system. A specimen was collected and taken to the lab, as requested. The catheter was then removed and the site dressed with a sterile bandage. The patient tolerated the procedure well and there were no immediate complications. Post-procedure chest x-ray shows no pneumothorax.  GUIDANCE: Ultrasound guidance was used to position (and confirm the position of) the needle. Ultrasound image(s) saved in PACS. _______________     Successful ultrasound-guided thoracentesis with removal of 460 mL of fluid. Post-procedure chest x-ray shows no pneumothorax. A specimen was collected and taken to the lab, as requested. ECHO ADULT COMPLETE    Result Date: 8/28/2022  Formatting of this result is different from the original.   Left Ventricle: Normal left ventricular systolic function with a visually estimated EF of 60 - 65%. Left ventricle size is normal. Normal wall thickness. Normal wall motion. Grade I diastolic dysfunction present with normal LV EF. Unable to assess RVSP due to inadequate or insignificant tricuspid regurgitation. DUPLEX LOWER EXT ARTERY RIGHT    Result Date: 9/6/2022  · Patent right leg vessels with good upstroke.         Po Bernard MD

## 2022-09-13 NOTE — ROUTINE PROCESS
0017: Pt refused vital signs at this time. 0430: Shift assessment completed per flow sheet without change. Will continue to monitor. 5165: Pt refused to be turned and cleaned up for the day.

## 2022-09-14 PROBLEM — R56.9 SEIZURE (HCC): Status: ACTIVE | Noted: 2022-01-01

## 2022-09-14 NOTE — CONSULTS
NEUROLOGY CONSULTATION NOTE    Patient: Myra Lopez MRN: [id number]  CSN: [id number]    YOB: 1945  Age: 78 y.o. Sex: male    DOA: 8/27/2022 LOS:  LOS: 18 days        Requesting Physician: Dr. Mirna Campbell  Reason for Consultation: Seizure               HISTORY OF PRESENT ILLNESS:   Myra Lopez is a 78 y.o. male with history of hypertension, hyperlipidemia, also stroke, PAD, chronic atrial fibrillation, currently on Plavix, diabetes, CKD, prior COVID infection, chronic urinary catheter for urinary retention, severe PVD and left BKA, who presented with septic clinical picture. The patient had electrolyte abnormalities with severe hyperkalemia, metabolic acidosis and anemia, requiring blood transfusion, who presented with a single seizure yesterday. He was loaded with Dilantin initially and then switched to [address]. Currently, the patient is confused and lethargic. He is not responding to verbal stimuli. His brain MRI does not show any acute infarcts. PAST MEDICAL HISTORY:  Past Medical History:   Diagnosis Date    A-fib ([facility] [address]     Asthma     CAD (coronary artery disease)     Chronic kidney disease     stage 3    COVID-19     Diabetes (HCC)     GERD (gastroesophageal reflux disease)     Heart failure (HCC)     chronic diastolic heart failure    High cholesterol     Hypertension     Ill-defined condition     chronic osteomyelitis left ankle and foot    Ill-defined condition     chronic arteriosclerosis    PVD (peripheral vascular disease) ([facility] [address]     Stroke ([facility] [address]     cva     PAST SURGICAL HISTORY:  Past Surgical History:   Procedure Laterality Date    COLONOSCOPY N/A 6/1/2018    COLONOSCOPY, POLYPECTOMY performed by Codey Veras MD at THE [facility] ENDOSCOPY    HX CATARACT REMOVAL      HX ORTHOPAEDIC Left 2021    ankle washout, external fixator, would vac    HX ORTHOPAEDIC      external fixator removed     FAMILY HISTORY:  History reviewed. No pertinent family history.   SOCIAL HISTORY:  Social History     Socioeconomic History    Marital status:    Tobacco Use    Smoking status: Former    Smokeless tobacco: Never   Substance and Sexual Activity    Alcohol use: Not Currently    Drug use: No     MEDICATIONS:  Current Facility-Administered Medications   Medication Dose Route Frequency    furosemide (LASIX) injection 20 mg  20 mg IntraVENous DAILY    levoFLOXacin (LEVAQUIN) 500 mg in D5W IVPB  500 mg IntraVENous Q24H    metroNIDAZOLE (FLAGYL) IVPB premix 500 mg  500 mg IntraVENous Q8H    metoprolol (LOPRESSOR) injection 1.25 mg  1.25 mg IntraVENous Q6H    sodium chloride (NS) flush 5-40 mL  5-40 mL IntraVENous Q8H    sodium chloride (NS) flush 5-40 mL  5-40 mL IntraVENous PRN    diazePAM (VALIUM) injection 9.3 mg  0.15 mg/kg IntraVENous Q10MIN PRN    LORazepam (ATIVAN) 2 mg/mL injection 1 mg  1 mg IntraVENous Q10MIN PRN    levETIRAcetam (KEPPRA) 500 mg in 0.9% sodium chloride 100 mL IVPB  500 mg IntraVENous Q12H    pantoprazole (PROTONIX) tablet 40 mg  40 mg Oral ACB    atorvastatin (LIPITOR) tablet 40 mg  40 mg Oral QHS    [Held by provider] metoprolol succinate (TOPROL-XL) XL tablet 50 mg  50 mg Oral DAILY    insulin lispro (HUMALOG) injection   SubCUTAneous AC&HS    Saccharomyces boulardii (FLORASTOR) capsule 250 mg  250 mg Oral BID    [Held by provider] potassium bicarb-citric acid (EFFER-K) tablet 40 mEq  40 mEq Oral DAILY    povidone-iodine (BETADINE) 10 % topical solution   Topical DAILY    ipratropium (ATROVENT) 0.02 % nebulizer solution 0.5 mg  0.5 mg Nebulization Q6H PRN    collagenase (SANTYL) 250 unit/gram ointment   Topical DAILY    0.9% sodium chloride infusion 250 mL  250 mL IntraVENous PRN    hydrALAZINE (APRESOLINE) 20 mg/mL injection 20 mg  20 mg IntraVENous Q6H PRN    midazolam (VERSED) injection 0.5 mg  0.5 mg IntraVENous BID PRN    0.9% sodium chloride infusion 250 mL  250 mL IntraVENous PRN    heparin (porcine) injection 5,000 Units  5,000 Units SubCUTAneous Q8H sodium chloride (NS) flush 5-40 mL  5-40 mL IntraVENous Q8H    sodium chloride (NS) flush 5-40 mL  5-40 mL IntraVENous PRN    acetaminophen (TYLENOL) tablet 650 mg  650 mg Oral Q6H PRN    Or    acetaminophen (TYLENOL) suppository 650 mg  650 mg Rectal Q6H PRN    polyethylene glycol (MIRALAX) packet 17 g  17 g Oral DAILY PRN    ondansetron (ZOFRAN ODT) tablet 4 mg  4 mg Oral Q8H PRN    Or    ondansetron (ZOFRAN) injection 4 mg  4 mg IntraVENous Q6H PRN    glucose chewable tablet 16 g  4 Tablet Oral PRN    glucagon (GLUCAGEN) injection 1 mg  1 mg IntraMUSCular PRN    dextrose 10% infusion 0-250 mL  0-250 mL IntraVENous PRN    HYDROmorphone (DILAUDID) injection 1 mg  1 mg IntraVENous Q4H PRN     Prior to Admission medications    Medication Sig Start Date End Date Taking? Authorizing Provider   levoFLOXacin (Levaquin) 750 mg tablet Take 1 Tablet by mouth daily. 6/18/22 June MD Joe   insulin lispro (HUMALOG) 100 unit/mL injection INITIATE INSULIN CORRECTIVE PROTOCOL: Normal Insulin Sensitivity   For Blood Sugar (mg/dL) of:     Less than 150 =   0 units           150 -199 =   2 units  200 -249 =   4 units  250 -299 =   6 units  300 -349 =   8 units  350 and above = 10 units and Call Physician  If 2 glucose readings are above 200 mg/dL within a 24 hr period, proceed to \"Insulin Resistant\" dosing. Initiate Hypoglycemia protocol if blood glucose is <70 mg/dL Fast Acting - Administer Immediately - or within 15 minutes of start of meal, if mealtime coverage. 11/3/21   Ronni Dubose MD   gabapentin (NEURONTIN) 300 mg capsule Take 1 Capsule by mouth three (3) times daily. Max Daily Amount: 900 mg. 11/3/21   Ronni Dubose MD   clopidogreL (Plavix) 75 mg tab Take  by mouth daily. Indications: afib    Provider, Historical   B.infantis-B.ani-B.long-B.bifi (Probiotic 4X) 10-15 mg TbEC Take  by mouth daily. Provider, Historical   multivitamin (ONE A DAY) tablet Take 1 Tablet by mouth daily.     Provider, Historical   acetaminophen (TylenoL) 325 mg tablet Take 650 mg by mouth every four (4) hours as needed for Pain. Provider, Historical   tamsulosin (FLOMAX) 0.4 mg capsule Take 2 Capsules by mouth daily. 5/25/21   Zuleika Worley MD   atorvastatin (LIPITOR) 40 mg tablet Take 1 Tab by mouth nightly. 2/15/18   Nakia Garcia PA-C   metoprolol succinate (TOPROL-XL) 50 mg XL tablet Take 1 Tab by mouth daily. Patient taking differently: Take 100 mg by mouth daily. 2/16/18   Nakia Garcia PA-C       ALLERGIES:  No Known Allergies    Review of Systems  I am unable to review the systems. The patient is extremely lethargic and somnolent. He does not answer my questions. PHYSICAL EXAMINATION:   Visit Vitals  /72   Pulse 62   Temp 97.1 °F (36.2 °C)   Resp 16   Ht 5' 7.99\" (1.727 m)   Wt 60.5 kg (133 lb 6.4 oz)   SpO2 99%   BMI 20.29 kg/m²    O2 Flow Rate (L/min): 4 l/min O2 Device: Nasal cannula  GENERAL: Lethargic in no apparent distress. HEENT: Moist mucous membranes, sclerae anicteric, scalp is atraumatic. CVS: Regular rate and rhythm, no murmurs or gallops. No carotid bruits. PULMONARY: Clear to auscultation bilaterally. No rales or rhonchi. No wheezing. EXTREMITIES: Left BKA. ABDOMEN: Soft, nontender. SKIN:.  Ecchymosis on upper extremities. NEUROLOGIC: The patient is somnolent but opens his eyes. He is extremely lethargic. He does not follow commands. Face looks symmetric. He moves upper and lower extremities to noxious stimuli. Deep tendon reflexes are decreased at all sites.      Labs: Results:       Chemistry Recent Labs     09/14/22  0320 09/13/22  1841 09/13/22  0105   GLU 90 207* 132*    136 137   K 4.0 4.5 3.6    104 105   CO2 26 21 24   BUN 51* 45* 35*   CREA 1.56* 1.73* 1.20   CA 8.9 8.8 8.8   AGAP 5 11 8   BUCR 33* 26* 29*      CBC w/Diff Recent Labs     09/13/22  1841   WBC 12.6   RBC 3.75*   HGB 10.3*   HCT 33.9*   *   GRANS 53   LYMPH 40   EOS 0 Cardiac Enzymes No results for input(s): CPK, CKND1, LEONEL in the last 72 hours. No lab exists for component: CKRMB, TROIP   Coagulation No results for input(s): PTP, INR, APTT, INREXT in the last 72 hours. Lipid Panel No results found for: CHOL, CHOLPOCT, CHOLX, CHLST, CHOLV, 009691, HDL, HDLP, LDL, LDLC, DLDLP, 630059, VLDLC, VLDL, TGLX, TRIGL, TRIGP, TGLPOCT, CHHD, CHHDX   BNP No results for input(s): BNPP in the last 72 hours. Liver Enzymes No results for input(s): TP, ALB, TBIL, AP in the last 72 hours. No lab exists for component: SGOT, GPT, DBIL   Thyroid Studies Lab Results   Component Value Date/Time    TSH 4.73 (H) 08/28/2022 08:04 AM          Radiology:  XR ABD (KUB)    Result Date: 9/5/2022  EXAM: XR ABD (KUB) CLINICAL INDICATION/HISTORY: Abdominal pain and distention COMPARISON: None. TECHNIQUE: 3 supine AP views of the abdomen were obtained _______________ FINDINGS: Skin staples overlie right lower quadrant. There is a single borderline prominent air-filled bowel loop overlying the right paracentral inferior abdomen and superior pelvis, measuring approximately 2.7 cm in diameter. There is also seen more distally throughout the right colon. The soft tissue planes and bony structures appear normal.  _______________     Single borderline prominent air-filled right paracentral small bowel loop, appearance most suggestive of postoperative ileus. XR ABD (KUB)    Result Date: 9/5/2022  AP portable lower chest and upper abdomen for NG/OG tube placement: NG/OG tube is within the stomach. It is not as distally located as previous on the comparison exam from 9/3/2022. Evidence of abdominal surgery with midline skin staples. Persistent right lower lobe atelectasis/infiltrate with some pleural thickening or pleural fluid. NG/OG tube as described within the stomach.     XR ABD (KUB)    Result Date: 9/3/2022  EXAM: XR ABD (KUB) CLINICAL INDICATION/HISTORY: NGT Placement COMPARISON: 8/31/2022 TECHNIQUE: Supine AP view of the abdomen was obtained. _______________ FINDINGS: Gastric tube extends from midline thoracic esophagus curves through the left upper quadrant and has the tip and side-port overlying right mid abdomen in the region of the distal stomach or proximal duodenum. No air distended bowel loops nor other evidence of bowel obstruction or bowel dilatation. The soft tissue planes and bony structures appear normal.  _______________     Adequate gastric tube placement with tip and side-port in the region of the distal stomach/proximal duodenum. XR ABD (KUB)    Result Date: 8/31/2022  PORTABLE ABDOMEN Indication:  OGT placement. Technique: Portable supine AP view of the abdomen was obtained. Comparison studies:  August 27, 2022. Findings: Moderate rotation. Gastric tube terminates in the fundus of the stomach. Nonobstructive gas pattern. Telemetry leads. Lower abdominal skin closing staples. Minor scattered osseous degenerative changes. Moderate rotation. Gastric tube terminates in the fundus of the stomach. Nonobstructive gas pattern. MRI BRAIN WO CONT    Result Date: 9/14/2022  EXAM: MRI of the brain without intravenous contrast. INDICATION:  \"seizure. \" COMPARISON:  No prior MRI is available for direct comparison. CORRELATION (related prior exam):  Recent CT. PROTOCOL:  Routine brain. _______________ FINDINGS:       IMAGE QUALITY:  The exam is overall moderately degraded by motion artifact. BRAIN AND EXTRA-AXIAL SPACE:           ACUTE/SUBACUTE INFARCT:  None. MASS:  None. HEMORRHAGE:  None. SUBDURAL FLUID COLLECTION:  None. HYDROCEPHALUS:  None. ICA AND [place]IN[place] VA T2 FLOW VOIDS:  Unremarkable. REMOTE CEREBRAL TERRITORIAL INFARCT:  None definite. REMOTE CEREBELLAR INFARCT:  None definite.            STRIVE (STandards for Reporting Vascular changes on nEuroimaging):                            --[place] of white matter hyperintensity (\"leukoaraiosis\") of presumed vascular origin:  Mild to moderate. --[place] of chronic lacunes of presumed vascular origin:  Small focus in the right lentiform nucleus. --[place] of perivascular spaces:  None significant. --[place] of \"microbleeds\":   None definite. --Degree of brain atrophy:  Moderate to marked. SELLA/PITUITARY:  Unremarkable. HEENT:            ORBITS:  There are bilateral lens replacements. PARANASAL SINUSES:  Predominantly clear. MASTOID AIR CELLS:  Right mastoid and middle ear opacification by fluid, unchanged relative to the pubic CT performed yesterday but new since the CT of 8/27/2022. INCLUDED UPPER CERVICAL LYMPH NODES:  Unremarkable. INCLUDED UPPER PAROTIDS:  Unremarkable. NASOPHARYNX:  Unremarkable. BONE MARROW SIGNAL:  Unremarkable. SUPERFICIAL SOFT TISSUES: Unremarkable. _______________     1. Generalized chronic changes, however, no mass or acute findings. 2.  Fluid opacifies the right mastoid air cells and middle ear, new since 8/27/2022. _______________     CT HEAD WO CONT    Result Date: 9/13/2022  EXAM: CT HEAD WITHOUT CONTRAST CLINICAL INDICATION/HISTORY: RRT -Additional: Altered mental status COMPARISON: 8/27/2022 TECHNIQUE: Serial axial images of the head were performed without intravenous contrast. Dose reduction techniques:  Automated exposure control, mAs and/or kVp reductions based on patient size, and iterative reconstruction. The specific techniques utilized on this CT exam have been documented in the patient's electronic medical record.  Digital Imaging and Communications in Medicine (DICOM) format image data are available to nonaffiliated external healthcare facilities or entities on a secure, media free, reciprocally searchable basis with patient authorization for at least a 12-month period after this study. _______________ FINDINGS: BRAIN:   > Intraparenchymal hemorrhage: None.   > Mass effect/edema: None.   > Infarct/encephalomalacia: No evidence of acute cortical ischemia.   > White matter: Unremarkable.   > Brain volume: Normal for age. EXTRA-AXIAL SPACES:   > No extra-axial hemorrhage.   > No hydrocephalus. SINUSES/MASTOIDS: Nonspecific right mastoid effusion. CALVARIA: Intact. OTHER: None. _______________     No acute intracranial findings. Exam is limited by motion artifact. CT HEAD WO CONT    Result Date: 8/27/2022  EXAM: CT head INDICATION: Altered mental status. COMPARISON: None. TECHNIQUE: Axial CT imaging of the head was performed without intravenous contrast.  One or more dose reduction techniques were used on this CT: automated exposure control, adjustment of the mAs and/or kVp according to patient size, and iterative reconstruction techniques. The specific techniques used on this CT exam have been documented in the patient's electronic medical record. Digital Imaging and Communications in Medicine (DICOM) format image data are available to nonaffiliated external healthcare facilities or entities on a secure, media free, reciprocally searchable basis with patient authorization for at least a 12-month period after this study. Patient motion degrades image quality. _______________ FINDINGS: BRAIN AND POSTERIOR FOSSA: Mild periventricular areas of decreased attenuation are identified. Gray-white matter interfaces are preserved. No intraparenchymal hemorrhage, mass effect or midline shift. The ventricular system is midline and symmetric. Atherosclerotic vascular calcifications are identified. EXTRA-AXIAL SPACES AND MENINGES: There is no evidence for extra-axial mass lesion or fluid collection. CALVARIUM: Intact. SINUSES: Sphenoid sinus mucosal thickening. OTHER: Orbits are grossly intact. Facial soft tissue are within normal limits. _______________     1.   No evidence for intracranial hemorrhage, mass effect or midline shift. 2.  Mild periventricular areas of microvascular ischemic change. 3.  Cerebral atherosclerosis. 4.  Atrophy. If there are continued symptoms, a MRI is recommended for further evaluation. CT ABD PELV WO CONT    Result Date: 8/27/2022  EXAM: CT of the abdomen and pelvis INDICATION: Pain. COMPARISON: May 14, 2021. TECHNIQUE: Axial CT imaging of the abdomen and pelvis was performed without intravenous contrast. Multiplanar reformats were generated. One or more dose reduction techniques were used on this CT: automated exposure control, adjustment of the mAs and/or kVp according to patient size, and iterative reconstruction techniques. The specific techniques used on this CT exam have been documented in the patient's electronic medical record. Digital Imaging and Communications in Medicine (DICOM) format image data are available to nonaffiliated external healthcare facilities or entities on a secure, media free, reciprocally searchable basis with patient authorization for at least a 12-month period after this study. _______________ FINDINGS: LOWER CHEST: Moderate right pleural effusion with adjacent atelectasis. LIVER, BILIARY: Liver is normal. No biliary dilation. Gallbladder is unremarkable. PANCREAS: Normal. SPLEEN: Splenic granuloma. ADRENALS: Normal. KIDNEYS: Normal. LYMPH NODES: No enlarged lymph nodes. GASTROINTESTINAL TRACT: Uncomplicated sigmoid diverticula. Gastric distention. PELVIC ORGANS: Unremarkable. VASCULATURE: Atherosclerotic vascular calcifications. BONES: No acute or aggressive osseous abnormalities identified. OTHER: Large right inguinal hernia with large and small bowel. There is diffuse circumferential wall thickening of ascending colon within the hernia. _______________     1. Large right inguinal hernia with large and small bowel. There is diffuse wall thickening of the ascending colon concerning for strangulation.  No evidence for bowel obstruction. 2. Right pleural effusion and bilateral basilar atelectasis. 3. Gastric distention. XR CHEST PORT    Result Date: 9/14/2022  HISTORY: Sepsis, altered mental status. COMPARISON: September 2, 2022. FINDINGS: A portable view of the chest: Interval increased, moderate right and small left pleural effusions, associated regions of airspace opacity could be atelectasis or pneumonia. No pneumothoraces. Some patient rotation/obliquity noted. Mediastinal contour appears stable noting atherosclerosis and borderline heart size. No acute bony finding. Multiple artifacts on the chest.     Interval increased, moderate right and small left pleural effusions, associated regions of airspace opacity could be atelectasis or pneumonia. No pneumothoraces. Some patient rotation/obliquity noted. XR CHEST PORT    Result Date: 9/2/2022  CLINICAL HISTORY:  Tachypnea COMPARISONS:  Chest x-ray 9/2/2022 TECHNIQUE:  single frontal view of the chest ------------------------------------------ FINDINGS: Lungs:  Patchy parenchymal opacity in the right lung, similar to the prior radiograph and likely largely scar. Blunting of the right lateral costophrenic sulcus, also similar. Mild chronic appearing interstitial markings within the left lung, similar to prior radiograph. Mediastinum: Moderate cardiomegaly. Mediastinum is shifted towards the right, unchanged. Atherosclerotic arterial calcification. Bones: No evidence of fracture or suspicious bone lesion. ------------------------------------------    1. No significant interval change. Probable small right pleural effusion, not appreciably changed. 2. Right lung parenchymal findings likely largely reflect scar though are not well assessed. Pneumonia or aspiration not entirely excluded.     XR CHEST PORT    Result Date: 9/2/2022  EXAM: XR CHEST PORT CLINICAL INDICATION/HISTORY: Post right thoracentesis -Additional: None COMPARISON: 9/2/2022 TECHNIQUE: Portable frontal view of the chest _______________ FINDINGS: SUPPORT DEVICES: None. HEART AND MEDIASTINUM: Normal cardiac size and mediastinal contours. LUNGS AND PLEURAL SPACES: Decreased volume right-sided pleural effusion status post thoracentesis with persistent lateral and basilar components. Streaky areas of atelectasis and interstitial densities noted across the mid and lower lung zones. No evidence of postprocedural pneumothorax. Left lung clear as visualized. Skinfold along the peripheral left hemithorax. BONY THORAX AND SOFT TISSUES: Unremarkable. _______________     1. No evidence of postprocedural pneumothorax is significantly improved right lung aeration status post thoracentesis. > Residual pleural effusion and streaky areas of atelectasis. XR CHEST PORT    Result Date: 9/2/2022  EXAM: XR CHEST PORT CLINICAL INDICATION/HISTORY: intubated -Additional: None COMPARISON: One day prior TECHNIQUE: Portable frontal view of the chest _______________ FINDINGS: SUPPORT DEVICES: Interval extubation and removal of enteric tube. HEART AND MEDIASTINUM: Heart is obscured by right consolidation. LUNGS AND PLEURAL SPACES: Large right pleural effusion with adjacent atelectasis. Left-sided interstitial opacities No pneumothorax. _______________     Large right pleural effusion with adjacent atelectasis. XR CHEST PORT    Result Date: 9/1/2022  EXAM: Portable chest radiograph 9/1/2022 CLINICAL INDICATION/HISTORY: Intubation. TECHNIQUE: A semierect portable radiograph was obtained. COMPARISON: 8/31/2022. FINDINGS: Endotracheal tube remains in position with its tip approximately 5.5 cm above the level of the alise. A nasogastric tube extends below the field of view. The patient is slightly rotated to the right which distorts the cardiomediastinal contours. However, these contours are grossly unchanged. Increasing right pleural effusion is present with associated increasing atelectasis and/or infiltrate in the right lung. There is increasing interstitial edema and/or interstitial infiltrate throughout the left lung. There is no pneumothorax or significant left pleural effusion. 1. Increasing interstitial edema and/or interstitial infiltration with increasing right pleural effusion. XR CHEST PORT    Result Date: 8/31/2022  EXAM: XR CHEST PORT CLINICAL INDICATION/HISTORY: intubated -Additional: None COMPARISON: 8/30/2022 TECHNIQUE: Portable frontal view of the chest _______________ FINDINGS: SUPPORT DEVICES: Endotracheal and enteric tubes unchanged. HEART AND MEDIASTINUM: Cardiomediastinal silhouette within normal limits. LUNGS AND PLEURAL SPACES: Patient rotated to right. Moderate right pleural effusion/atelectasis. No pneumothorax. _______________     Moderate right pleural effusion/atelectasis. XR CHEST PORT    Result Date: 8/30/2022  EXAM:  PORTABLE CHEST INDICATION:  Follow up. TECHNIQUE:  Portable, AP view. COMPARISON:  08/29/2022 ____________________ FINDINGS:  SUPPORT DEVICES: Endotracheal tube approximately 4 cm above the alise. OG tube is traversing below left hemidiaphragm, tip not imaged. Esophageal probe is present at the level of the endotracheal tube. HEART AND MEDIASTINUM: Stable. LUNGS AND PLEURAL SPACES: Persistent right basilar opacity consistent with pleural effusion with adjacent consolidation. No pneumothorax. BONY THORAX AND SOFT TISSUES: No acute osseous abnormality. ____________________     1. Endotracheal tube approximately 4 cm above the alise. 2. No significant change in right basilar opacity consistent with pleural effusion with adjacent consolidation, atelectasis or pneumonia. *  **     XR CHEST PORT    Result Date: 8/29/2022  HISTORY: -Provided with order: intubated -Additional: None Technique : AP PORTABLE CHEST Comparison : None. FINDINGS: HEART AND MEDIASTINUM: Anterior endotracheal tube tip estimated at 6.1 cm above alise.  Enteric tube tip projects over left upper quadrant expected location gastric fundus. Esophageal temperature probe tip projects over lower thoracic . Stable cardiomediastinal silhouette allowing for angulation. LUNGS AND PLEURAL SPACES: Progressive right pleural effusion and patchy opacities in the right lung. Left lung relatively clear allowing for technique. BONY THORAX AND SOFT TISSUES: No gross acute osseous abnormality. 1. Right pleural effusion and subjacent atelectasis/infiltrate, perhaps slightly progressed. 2. Tubes and lines stable in visualized extent. XR CHEST PORT    Result Date: 8/28/2022  EXAM: CHEST RADIOGRAPH CLINICAL INDICATION/HISTORY: intubated   > Additional: None COMPARISON: 8/27/2022. TECHNIQUE: Portable frontal view of the chest _______________ FINDINGS: SUPPORT DEVICES: Endotracheal tube distal tip projects 5.5 cm above the alise. Enteric tube distal tip projects below the left hemidiaphragm likely in the stomach. HEART AND MEDIASTINUM: No appreciable cardiomegaly. Remaining mediastinal contours are stable. LUNGS AND PLEURAL SPACES: Right pleural effusion with adjacent opacity. No evidence for pneumothorax. Left lung is clear. BONY THORAX AND SOFT TISSUES: Unremarkable. _______________     1. Right pleural effusion with adjacent atelectasis/infiltrates. 2. Supporting tubes appear stable. XR CHEST PORT    Result Date: 8/27/2022  EXAM: CHEST RADIOGRAPH CLINICAL INDICATION/HISTORY: intubated -Additional: None COMPARISON: August 27, 2022 TECHNIQUE: Portable frontal view of the chest _______________ FINDINGS: SUPPORT DEVICES: An endotracheal tube is positioned 6 cm above the alise. HEART AND MEDIASTINUM: Heart size and mediastinal contour are midline and within normal limits. LUNGS AND PLEURAL SPACES: There are opacities throughout both lungs but more confluent within the right mid and lower lung zones. No pneumothorax.  BONY THORAX AND SOFT TISSUES: Unremarkable. _______________     Interval right greater than left airspace disease Small to moderate right pleural effusion    XR CHEST PORT    Result Date: 8/27/2022  EXAM: CHEST RADIOGRAPHS CLINICAL INDICATION/HISTORY: Sepsis   > Additional: None COMPARISON: 6/18/2022. TECHNIQUE: PA and lateral views of the chest _______________ FINDINGS: HEART AND MEDIASTINUM: No appreciable cardiomegaly. Remaining mediastinal contours within normal limits. LUNGS AND PLEURAL SPACES: Left lung is clear. Right pleural effusion with adjacent opacity. BONY THORAX AND SOFT TISSUES: Unremarkable. _______________     1. Right pleural effusion with probable adjacent atelectasis. XR ABD PORT  1 V    Result Date: 8/27/2022  EXAM: ABDOMINAL RADIOGRAPHS CLINICAL INDICATION/HISTORY: OG placement -Additional: None COMPARISON: None TECHNIQUE: Single supine view of the abdomen _______________ FINDINGS: SUPPORT DEVICES: A nasogastric tube is positioned in the stomach. BOWEL GAS PATTERN: The gas pattern is nonobstructive. Judit Maranda SOFT TISSUES: Unremarkable. BONES: Degenerative changes are seen throughout the spine. ADDITIONAL FINDINGS: None. _______________     No significant abnormality. US THORACENTESIS RT NDL W IMAGE    Result Date: 9/2/2022  PROCEDURE: ULTRASOUND GUIDED RIGHT THORACENTESIS INDICATION: Right pleural effusion, shortness of breath PERFORMED BY:  Leticia Elliott PA-C ATTENDING PHYSICIAN:  Nabil Obrien MD SUPERVISION: General _______________ TECHNIQUE & FINDINGS: CONSENT: The risks, benefits, and alternatives to the procedure were explained to the patient's wife prior to the procedure. Written informed consent was obtained and witnessed. Standard pre-procedure time out performed. TECHNIQUE/FINDINGS:  The right pleural effusion was localized under ultrasound guidance. Sterile ultrasound probe cover and ultrasound gel were utilized. The skin was marked, prepped and draped in the usual sterile fashion. 1% Lidocaine was used for local anesthesia.   A 5 Western Latanya Yueh needle was advanced into the right pleural space under US guidance. A total of 460 mL of clear alanna fluid was drained using a Vacutainer system. A specimen was collected and taken to the lab, as requested. The catheter was then removed and the site dressed with a sterile bandage. The patient tolerated the procedure well and there were no immediate complications. Post-procedure chest x-ray shows no pneumothorax. GUIDANCE: Ultrasound guidance was used to position (and confirm the position of) the needle. Ultrasound image(s) saved in PACS. _______________     Successful ultrasound-guided thoracentesis with removal of 460 mL of fluid. Post-procedure chest x-ray shows no pneumothorax. A specimen was collected and taken to the lab, as requested. ECHO ADULT COMPLETE    Result Date: 8/28/2022  Formatting of this result is different from the original.   Left Ventricle: Normal left ventricular systolic function with a visually estimated EF of 60 - 65%. Left ventricle size is normal. Normal wall thickness. Normal wall motion. Grade I diastolic dysfunction present with normal LV EF. Unable to assess RVSP due to inadequate or insignificant tricuspid regurgitation. DUPLEX LOWER EXT ARTERY RIGHT    Result Date: 9/6/2022  · Patent right leg vessels with good upstroke. ASSESSMENT/IMPRESSION:  A single seizure in the setting of toxic/metabolic encephalopathy. This may be provoked in nature. Regardless, due to increased risk factor for seizures with prior stroke, the patient needs to be on levetiracetam.    RECOMMENDATIONS:  1. Continue levetiracetam 500 mg IV/p.o. twice daily  2. EEG awake/asleep  3. Treatment of infection and toxic/metabolic etiologies.     Please do not hesitate to return with any questions.    ------------------------------------  Temo Lopez MD  9/14/2022  4:53 PM

## 2022-09-14 NOTE — ROUTINE PROCESS
1930: Before report given Pt seizing in bed; applied seizure precaution to bed, turned on suction, placed pt on right side; gave medication see MAR; cleaned patient and applied new sheets to bed; paged Dr. Tommy Rod to inform of the seizure and time duration; orders received for medication see MAR.       2200: Keppra received and given. 2300: Pt unresponsive to stimulus; MD notified.

## 2022-09-14 NOTE — PROGRESS NOTES
Spoke to Dr. Joanna Breaux. He agreed with plan of care for new onset seizures. Advised since we loaded with Dilantin, to continue with Keppra 500mg po bid. Update in orders completed.

## 2022-09-14 NOTE — PROGRESS NOTES
Comprehensive Nutrition Assessment    Type and Reason for Visit: Reassess    Nutrition Recommendations/Plan:   Advance to PO diet when medically appropriate. Malnutrition Assessment:  Malnutrition Status:  Severe malnutrition (09/09/22 1330)    Context:  Acute illness     Findings of the 6 clinical characteristics of malnutrition:   Energy Intake:  Unable to assess  Weight Loss:  Unable to assess     Body Fat Loss: Moderate body fat loss, Orbital, Buccal region   Muscle Mass Loss: Moderate muscle mass loss, Temples (temporalis), Thigh (quadriceps), Calf  Fluid Accumulation:   ,     Strength:        Nutrition Assessment:    Pt currently NPO d/t overnight seizure. Per chart pt was taking 1-25% of meals and % of supplement prior to NPO status. Hospice referral placed 9/14/22. Pt id DNR and will be Comfort Care upon discharge. Nutrition Related Findings:    BM 9/113/22. Labs: BUN 51, creatinine 5.6, Phos 1.9, GFR-noonAA 44, bilirubin 1.5, A1C 5.9. Meds: humalog, lipitor, protonix, effer-k. Wound Type: Multiple (Per Wound Care note (9/7/22))    Current Nutrition Intake & Therapies:  Average Meal Intake: NPO  Average Supplement Intake: NPO  ADULT ORAL NUTRITION SUPPLEMENT Breakfast, Lunch, Dinner; Renal Supplement  DIET NPO    Anthropometric Measures:  Height: 5' 7.99\" (172.7 cm)  Ideal Body Weight (IBW): 154 lbs (70 kg)     Current Body Wt:  61.2 kg (135 lb), 87.7 % IBW.  Bed scale  Current BMI (kg/m2): 20.5  Usual Body Weight: 72.6 kg (160 lb)  % Weight Change (Calculated): -15.6  Weight Adjustment: Amputation  Total Amputation Percentage: 10.1  Adjusted Ideal Body Weight (lbs) (Calculated): 138.4  Adjusted Ideal Body Weight (kg) (Calculated): 62.91 kg  Adjusted % IBW (Calculated): 97.5  Adjusted BMI (Calculated): 22.6  BMI Category: Normal weight (BMI 22.0-24.9) age over 72    Estimated Daily Nutrient Needs:  Energy Requirements Based On: Kcal/kg  Weight Used for Energy Requirements: Current  Energy (kcal/day): 5288-2110  Weight Used for Protein Requirements: Current  Protein (g/day): 50-68  Method Used for Fluid Requirements: 1 ml/kcal  Fluid (ml/day): 0340-8919    Nutrition Diagnosis:   Inadequate energy intake related to acute injury/trauma as evidenced by NPO or clear liquid status due to medical condition    Nutrition Interventions:   Food and/or Nutrient Delivery: Start oral diet  Nutrition Education/Counseling: No recommendations at this time  Coordination of Nutrition Care: Continue to monitor while inpatient       Goals:  Previous Goal Met: No progress toward goal(s)  Goals: by next RD assessment, Initiate PO diet       Nutrition Monitoring and Evaluation:   Behavioral-Environmental Outcomes: None identified  Food/Nutrient Intake Outcomes: Diet advancement/tolerance, Food and nutrient intake, Supplement intake  Physical Signs/Symptoms Outcomes: Biochemical data, GI status, Meal time behavior, Weight, Nutrition focused physical findings, Skin    Discharge Planning:     Too soon to determine    [employer],

## 2022-09-14 NOTE — PROGRESS NOTES
Cxr  edema vs pna . Put lasix-low dose , levaquin and flagyl (hx of c diff ), change metoprolol to iv form .

## 2022-09-14 NOTE — PROGRESS NOTES
Colin Sorensen is here today for Blood Pressure    Concerns/symptoms: none  Medications: medications verified and updated  Refills needed today? No     Tobacco history: verified  Advanced Directives: Yes on file.  BP greater than 140/90? No    Patient would like communication of their results via:      Cell Phone:   Telephone Information:   Mobile [phone]     Okay to leave a message containing results? Yes  Preferred language:  English.    Health Maintenance Due   Topic Date Due   • Pneumococcal 19-64 Medium Risk (1 of 1 - PPSV23) 12/19/1977   • Shingles Vaccine (1 of 2) 12/19/2008   • Lung Cancer Screening  12/19/2013      Patient is up to date, no discussion needed..    Medicare HRA:           Patient seizing . Called a rrt  where pt was given 1mg of ativan IV . PT was then taken to get a ct of the head. Vital signs , and blood sugar documented .

## 2022-09-14 NOTE — PROGRESS NOTES
Palliative Medicine follow-up note    Patient Name: Vladislav Glynn YOB: 1945    Date of Initial Consult: 8/29/2022  Date of follow up: 9/14/2022  Reason for Consult: Goals of care discussions  Requesting Provider: Dr. Masoud Childs   Primary Care Physician: Soren Marc MD      SUMMARY:   Vladislav Glynn is a 78 y.o. with a past history of hypertension, hyperlipidemia, stroke, PAD, chronic atrial fibrillation, on Plavix, DM, CKD, previous, COVID-19 infection, chronic indwelling urinary catheter for urinary retention, and severe peripheral vascular disease with worsening gangrenous left foot necessitating left BKA during 8 day hospitalization last year, who was admitted on 8/27/2022 from home with a diagnosis of incarcerated Right inguinal hernia, severe septic shock with hypotension, severe hyperkalemia, acute metabolic encephalopathy, and anemia. Current medical issues leading to Palliative Medicine involvement include: goals of care discussions. 8/30/2022: Patient was off sedation, moving head back and forth, appears in distress. 9/1/2022: Patient sedation help, plan for SBT today, with possible medical extubation. 9/2/2022: Patient was medically extubated on 9/1/2022, he is oriented to person and place, disoriented to current time and situation. Appears frail, fatigued, and weak. 9/6/2022: Patient is awake, alert, oriented to person and place, confused about current time and situation. He states he is 54years old. 9/8/2022: Patient awake, remains confused, oriented to person and place, states the year is 1945 (his birth year). He cannot recall speaking to the orthopedic surgeon about possible AKA versus BKA. 9/13/2022: Patient has been refusing nursing care per chart review. Family has agreed to move forward with right AKA. 9/14/2022: Patient was seen in presence of palliative care RN. Patient's wife is at bedside.   Patient keeps staring at us upon asking questions. Continues to have off-and-on leg pain. No obvious shortness of breath or cough. Patient had an episode of seizure last night and early this morning. PALLIATIVE DIAGNOSES:   Goals of care discussions  Septic shock liver artery disease s/p left AKA  Acute respiratory failure   incarcerated Right inguinal hernia  Advanced age/debility       PLAN:     9/14/2022: Was seen in presence of palliative care RN. Patient is awake but unable to participate in conversation due to his current mental status. Patient had an event this morning in form of seizures. Patient's wife is at bedside. Patient's wife is listed as a next up kin as well as medical power of . As per wife: They have 3 kids, 2 sons and 1 daughter. She understands patient's current medical status. Explained her about various treatment option. The first option is to continue current medical care including antiseizure medications, pain management and proceed for right AKA. The second option is to keep him as comfortable as possible, being evaluated by hospice and not to escalate medical care or proceed for surgery. She verbalized understanding about both options. She stated she would like to go with second option. She understands what comfort care/hospice means including sudden cardiac death. She has signed the post form for comfort measures upon discharge. Primary attending has been notified about it. She gave us permission to talk to her son to inform him about her decision. She also stated that her sons can call her if they have any questions about the dizziness she has made for this patient being on comfort measures. We called patient's son [[email or web address] @ [phone] and left a voice message. Plan: Patient will remain DNR/DNI, comfort measures on discharge, hospice consult. Wife is looking for placement versus more help at home.   We will continue to follow patient with you.    9/13/2022: Palliative medicine team including Kenzie Briceno RN and I met with patient at patient's bedside. Patient is awake, flat affect, nodding appropriately, but would not answer orientation questions. When asked if he was OK with having a right AKA he shook his head \"no\" but it is uncertain if he understands all the benefits and burdens of his choices. Appreciate vascular input, will need AKA if comfort care is not pursued. Called patient's wife/legal NOK Sabrina Severs who states she and all three of patient's children are in agreement to have the AKA of the right leg. Explained our concern that patient has been refusing nursing care and vital signs, and we are concerned about patient's ability to participate in PT/OT, and undergo postoperative treatment recommendations. Wife states that she and family feel that the amputation should happen to give patient the best opportunity to get stronger and clear cognitively, in hopes that he has some quality of life (understanding he may not improve). Discussed with Dr. Radha Blackman. At this time, continue DNR/DNI, and family in agreement to have right AKA. Family understands DNR status may temporarily be suspended in surgery depending on her discussion with the surgeon. We will continue to follow and continue goals of care discussions as appropriate. See previous discussions below:    9/8/2022: Palliative medicine team including Kenzie Briceno RN and I met with patient at patient's bedside. Patient awake, remains confused, oriented to person and place, states the year is [birth year] (his birth year). He cannot recall speaking to the orthopedic surgeon about possible AKA versus BKA. Wife not at bedside. Awaiting vascular input, then will plan a meeting with family to readdress goals of care. Family is strongly hopeful that patient will clear cognitively enough to make this decision on his own but at this time he remains confused. At this time continue DNR/DNI.      See previous discussions below:    9/6/2022: Palliative medicine team including Kiana Keen RN and I met with patient at patient's bedside. Patient is awake, alert, oriented to person and place, confused about current time and situation. He states he is 54years old. Met with wife later in the day, who states that each day patient appears to be doing better, and improving cognitively, now able to eat a pureed diet. Readdressed goals of care today, confirmed DNR/DNI. Readdressed the likely need of right lower extremity amputation versus implementation of comfort measures with hospice at discharge. Wife states that patient states \"cut it off\" when discussing his foot ulcers and seems to be okay with having the amputation. Explained at this time I do not believe patient understands the benefits and burdens of his medical choices, wife agrees. Wife would like to give patient more time to clear cognitively to participate in his goals of care conversation, understanding patient is high risk for recurrent sepsis due to severe PVD in the right lower extremity with chronic vascular ulcers. Family is not ready for hospice. We will follow with you. See previous discussions below:    9/2/2022: Palliative medicine team including  CHERYLE Viera and I met with patient at patient's bedside. Patient is awake, alert, oriented to person and place, disoriented to current time and situation. He is not able to understand complex goals of care discussions at this time. Met with patient later in the day and wife was at bedside. Reviewed goals of care with wife. Wife has a good understanding of current health situation; we explained that patient is too weak, and confused to participate in goals of care discussions at this time. Reviewed overall level of care, with concern for RLL chronic vascular ulcers with previous recommendations from [facility] (6/9/2022) for consideration of Right AKA. Patient has historically declined this amputation. Explained to wife that patient is likely an appropriate hospice candidate, and this should be considered as an option at discharge. Also discussed the benefits and burdens of continued aggressive measures versus implementing comfort measures with the support of hospice at discharge. Wife would like to give patient more time to see  if he clears mentally to participate in his goals of care, understanding she may have to make the decisions should patient not clear cognitively. At this time continue DNR/DNI. See previous discussions below:    9/1/2022: Palliative medicine team including Manju Leggett and I met with patient at patient's bedside today. Patient was being removed from sedation for SBT today. Spoke to hospitalist and intensivist. Patient may be stable enough to medically extubate depending on how he does today with SBT. Per previous goals of care discussions with family, we will wait to see if patient can be medically extubated and readdress further goals of care at that point (right leg necrotic tissue with possible need for amputation versus comfort measures). Patient has historically declined amputation in the past. Goals of care discussions ongoing. Family hopes that patient can be safely extubated and would be able to participate in goals of care discussions. At this time, continue DNR/do not re-intubate for any reason. See previous discussions below:    8/30/2022: Palliative medicine team including Manju Leggett and I met with patient at patient's bedside today. Patient was off sedation, moving head back and forth, appears in distress. Family meeting today consisting of palliative team, patient's family (wife Renzo Garcia, children Medina Him, and [employer]) hositalist Dr. Olga Brambila, and intensivist Dr. Roberto Mendez. Medical update provided. Discussed SBT outcome today with potential for medical extubation tomorrow depending on how his SBT goes tomorrow.  Discussed the benefits and burdens of reintubation in the event of respiratory decline post medical extubation. Discussed overall level of care, with concern for RLL chronic vascular ulcers with previous recommendations from [facility] (6/9/2022) for consideration of Right AKA. Patient has historically declined this amputation. Discussed current quality of life. Family describes patient as a man who values his independence, and patient has been bothered by the fact that he hasn't  walked in over a year. Discussed that septic shock is improving and this event was likely related to bowel sepsis, but that patient is at high risk for recurrent sepsis and further clinical decline due to chronic infection in RLL, with no plan for amputation. Discussed the benefits and burdens of continuing current level of care versus implementation of comfort measures with hospice at discharge. Family clear that he would not find reintubation acceptable. Family is hopeful that if patient is medically extubated, he may wake up enough to participate in goals of care discussions. For now, patient is a DNR/do not re-intubate for any reason. Further goals of care discussions ongoing. See previous discussions below:    8/29/2022:Goals of care discussions: Palliative medicine team including  CHERYLE Morel and MANUEL met with patient at patient's bedside. Patient is orally intubated on mechanical ventilation, frequent movements in bed but no command following. Wife Edmond Orlando at bedside (Wife notes that English is her second language but she states she understands our conversation and answering questions appropriately. Offered translation services but wife declined.) Wife confirms DNR in the event of cardiac arrest. Patient is a PARTIAL CODE: No CPR (including no chest compressions, no shock, and no ACLS meds in the event of cardiac arrest). Continue intubation at this time. Wife requesting a meeting with their children involved to address further goals of care.  Wife will call me with a time for tomorrow once she has spoken to all of her children. Received a call from patient's son [facility] and explained to him the desire to meet for family meeting. [facility] confirms DNR status upon cardiac arrest. Further goals of care discussions will occur at family meeting tomorrow. Awaiting response from wife with exact time. Septic shock:   multiple possible sources of infection, incarcerated Right inguinal hernia s/p laparotomy and reduction of incarcerated right groin hernia without need for bowel resection-8/27/2022. UA-Positive for UTI; urine culture obtained. sacral ulcer and medial maleolar and heel infected ulcers. On broad spectrum antibiotics per primary team.   Acute respiratory failure: Patient remains intubated postop due to critical illness. incarcerated Right inguinal hernia: s/p laparotomy and reduction of incarcerated right groin hernia without need for bowel resection-8/27/2022. Advanced age/debility: 78year old male who is critically ill, needs assist with all ADLs. Prior to admission, patient lived at home with his spouse and required assistance with functional ADLs.    Initial consult note routed to primary continuity provider  Communicated plan of care with: Palliative IDT       GOALS OF CARE / TREATMENT PREFERENCES:   [====Goals of Care====]  GOALS OF CARE: DNR/DNI    Patient/Health Care Proxy Stated Goals: Prolong life      TREATMENT PREFERENCES:   Code Status: DNR    Advance Care Planning:  Advance Care Planning 8/29/2022   Patient's Healthcare Decision Maker is: Legal Next of Kin   Primary Decision Maker Name -   Primary Decision Maker Phone Number -   Primary Decision Maker Relationship to Patient -   Confirm Advance Directive None   Patient Would Like to Complete Advance Directive Unable       Medical Interventions: Limited additional interventions            The palliative care team has discussed with patient / health care proxy about goals of care / treatment preferences for patient.  [====Goals of Care====]         HISTORY:     History obtained from: patient's chart, family    CHIEF COMPLAINT: incarcerated Right inguinal hernia, s/p surgery    HPI/SUBJECTIVE:    The patient is:   [] Verbal and participatory  [x] Non-participatory due to:   Oriented to person and place, confused to current time and situation     Clinical Pain Assessment (nonverbal scale for severity on nonverbal patients):   Clinical Pain Assessment  Severity: 0    Adult Nonverbal Pain Scale  Face: No particular expression or smile  Activity (Movement): Restless, excessive activity and/or withdrawal reflexes  Guarding: Lying quietly, no positioning of hands over areas of body  Physiology (Vital Signs): Stable vital signs  Respiratory: Baseline RR/SpO2 compliant with ventilator  Total Score: 2       FUNCTIONAL ASSESSMENT:     Palliative Performance Scale (PPS):  PPS: 30       PSYCHOSOCIAL/SPIRITUAL SCREENING:     Advance Care Planning:  Advance Care Planning 8/29/2022   Patient's Healthcare Decision Maker is: Legal Next of Kin   Primary Decision Maker Name -   Primary Decision Maker Phone Number -   Primary Decision Maker Relationship to Patient -   Confirm Advance Directive None   Patient Would Like to Complete Advance Directive Unable        Any spiritual / Christianity concerns: unable to assess  [] Yes /  [] No    Caregiver Burnout:  [] Yes /  [] No /  [x] No Caregiver Present      Anticipatory grief assessment: unable to assess  [] Normal  / [] Maladaptive          REVIEW OF SYSTEMS:     Positive and pertinent negative findings in ROS are noted above in HPI. The following systems were [] reviewed / [x] unable to be reviewed as noted in HPI  Other findings are noted below. Systems: constitutional, ears/nose/mouth/throat, respiratory, gastrointestinal, genitourinary, musculoskeletal, integumentary, neurologic, psychiatric, endocrine. Positive findings noted below.   Modified ESAS Completed by: provider Fatigue: 5       Pain: 0   Anxiety: 3       Dyspnea: 0           Stool Occurrence(s): 1        PHYSICAL EXAM:     From RN flowsheet:  Wt Readings from Last 3 Encounters:   09/12/22 61.6 kg (135 lb 14.4 oz)   11/02/21 81.6 kg (180 lb)   10/11/21 76.7 kg (169 lb)     Blood pressure (!) 140/46, pulse (P) 80, temperature 97.1 °F (36.2 °C), resp. rate (P) 15, height 5' 7.99\" (1.727 m), weight 61.6 kg (135 lb 14.4 oz), SpO2 (P) 100 %. Pain Scale 1: FACES  Pain Intensity 1: 0     Pain Location 1: Back  Pain Orientation 1: Posterior  Pain Description 1: Aching  Pain Intervention(s) 1: Medication (see MAR), Massage, Position, Repositioned, Rest      Constitutional:, Remains confused, keeps staring at us upon asking question, chronically ill. ENMT: dry mucous membranes, hoarseness  Cardiovascular: regular rhythm, distal pulses intact  Respiratory: Diminished breath sound bibasilar with poor respiratory effort, on NC at 2 L  Gastrointestinal: midline surgical incision CDI, no distention  Musculoskeletal: Moves right leg, left leg amputation site dressing in situ.   Skin: warm, dry, black eschar right ankle and right heel  Neurologic: following some commands, moving all extremities, confused to current time and situation       HISTORY:     Principal Problem:    Sepsis ([address] (8/27/2022)    Active Problems:    Acute renal failure (ARF) ([address] (5/29/2018)      UTI (urinary tract infection) (10/29/2021)      High anion gap metabolic acidosis (0/25/8270)      Hyperkalemia (8/27/2022)      AMS (altered mental status) (8/27/2022)      Strangulated inguinal hernia (8/27/2022)      Right inguinal hernia (8/27/2022)      Leukocytosis (8/27/2022)      Anemia (8/27/2022)      Chronic indwelling Mccray catheter (8/27/2022)      S/P BKA (below knee amputation), left ([address] (8/27/2022)      Right foot ulcer ([address] (8/27/2022)      Paroxysmal atrial fibrillation ([facility] (8/27/2022)      Diabetic ulcer of right foot ([facility] (8/27/2022) Pleural effusion on right (8/27/2022)      Severe protein-calorie malnutrition ([address] (8/27/2022)      Acute respiratory failure with hypoxemia ([address] (8/28/2022)      Septic shock ([address] (8/28/2022)      Hypernatremia (9/6/2022)      Seizure ([address] (9/14/2022)    Past Medical History:   Diagnosis Date    A-fib ([facility]     Asthma     CAD (coronary artery disease)     Chronic kidney disease     stage 3    COVID-19     Diabetes (HCC)     GERD (gastroesophageal reflux disease)     Heart failure (HCC)     chronic diastolic heart failure    High cholesterol     Hypertension     Ill-defined condition     chronic osteomyelitis left ankle and foot    Ill-defined condition     chronic arteriosclerosis    PVD (peripheral vascular disease) ([facility]     Stroke ([facility]     cva      Past Surgical History:   Procedure Laterality Date    COLONOSCOPY N/A 6/1/2018    COLONOSCOPY, POLYPECTOMY performed by Gerry Toscano MD at [facility] END[facility]COPY    HX CATARACT REMOVAL      HX ORTHOPAEDIC Left 2021    ankle washout, external fixator, would vac    HX ORTHOPAEDIC      external fixator removed      History reviewed. No pertinent family history. History reviewed, no pertinent family history.   Social History     Tobacco Use    Smoking status: Former    Smokeless tobacco: Never   Substance Use Topics    Alcohol use: Not Currently     No Known Allergies   Current Facility-Administered Medications   Medication Dose Route Frequency    sodium chloride (NS) flush 5-40 mL  5-40 mL IntraVENous Q8H    sodium chloride (NS) flush 5-40 mL  5-40 mL IntraVENous PRN    diazePAM (VALIUM) injection 9.3 mg  0.15 mg/kg IntraVENous Q10MIN PRN    LORazepam (ATIVAN) 2 mg/mL injection 1 mg  1 mg IntraVENous Q10MIN PRN    levETIRAcetam (KEPPRA) 500 mg in 0.9% sodium chloride 100 mL IVPB  500 mg IntraVENous Q12H    pantoprazole (PROTONIX) tablet 40 mg  40 mg Oral ACB    atorvastatin (LIPITOR) tablet 40 mg  40 mg Oral QHS    metoprolol succinate (TOPROL-XL) XL tablet 50 mg  50 mg Oral DAILY    insulin lispro (HUMALOG) injection   SubCUTAneous AC&HS    Saccharomyces boulardii (FLORASTOR) capsule 250 mg  250 mg Oral BID    [Held by provider] potassium bicarb-citric acid (EFFER-K) tablet 40 mEq  40 mEq Oral DAILY    povidone-iodine (BETADINE) 10 % topical solution   Topical DAILY    ipratropium (ATROVENT) 0.02 % nebulizer solution 0.5 mg  0.5 mg Nebulization Q6H PRN    collagenase (SANTYL) 250 unit/gram ointment   Topical DAILY    0.9% sodium chloride infusion 250 mL  250 mL IntraVENous PRN    hydrALAZINE (APRESOLINE) 20 mg/mL injection 20 mg  20 mg IntraVENous Q6H PRN    midazolam (VERSED) injection 0.5 mg  0.5 mg IntraVENous BID PRN    0.9% sodium chloride infusion 250 mL  250 mL IntraVENous PRN    heparin (porcine) injection 5,000 Units  5,000 Units SubCUTAneous Q8H    sodium chloride (NS) flush 5-40 mL  5-40 mL IntraVENous Q8H    sodium chloride (NS) flush 5-40 mL  5-40 mL IntraVENous PRN    acetaminophen (TYLENOL) tablet 650 mg  650 mg Oral Q6H PRN    Or    acetaminophen (TYLENOL) suppository 650 mg  650 mg Rectal Q6H PRN    polyethylene glycol (MIRALAX) packet 17 g  17 g Oral DAILY PRN    ondansetron (ZOFRAN ODT) tablet 4 mg  4 mg Oral Q8H PRN    Or    ondansetron (ZOFRAN) injection 4 mg  4 mg IntraVENous Q6H PRN    glucose chewable tablet 16 g  4 Tablet Oral PRN    glucagon (GLUCAGEN) injection 1 mg  1 mg IntraMUSCular PRN    dextrose 10% infusion 0-250 mL  0-250 mL IntraVENous PRN    HYDROmorphone (DILAUDID) injection 1 mg  1 mg IntraVENous Q4H PRN          LAB AND IMAGING FINDINGS:     Lab Results   Component Value Date/Time    WBC 12.6 09/13/2022 06:41 PM    HGB 10.3 (L) 09/13/2022 06:41 PM    PLATELET 633 (H) 98/95/3577 06:41 PM     Lab Results   Component Value Date/Time    Sodium 138 09/14/2022 03:20 AM    Potassium 4.0 09/14/2022 03:20 AM    Chloride 107 09/14/2022 03:20 AM    CO2 26 09/14/2022 03:20 AM    BUN 51 (H) 09/14/2022 03:20 AM    Creatinine 1.56 (H) 09/14/2022 03:20 AM    Calcium 8.9 09/14/2022 03:20 AM    Magnesium 2.0 09/13/2022 06:41 PM    Phosphorus 1.9 (L) 09/03/2022 07:45 AM      Lab Results   Component Value Date/Time    Alk. phosphatase 150 (H) 09/11/2022 08:11 AM    Protein, total 6.4 09/11/2022 08:11 AM    Albumin 3.9 09/11/2022 08:11 AM    Globulin 2.5 09/11/2022 08:11 AM     Lab Results   Component Value Date/Time    INR 1.3 (H) 09/02/2022 04:51 AM    Prothrombin time 16.8 (H) 09/02/2022 04:51 AM    aPTT 118.5 (H) 05/17/2021 08:20 AM      Lab Results   Component Value Date/Time    Iron 10 (L) 05/06/2021 06:40 AM    TIBC 186 (L) 05/06/2021 06:40 AM    Iron % saturation 5 (L) 05/06/2021 06:40 AM    Ferritin 125 05/06/2021 06:40 AM      No results found for: PH, PCO2, PO2  No components found for: [place]   Lab Results   Component Value Date/Time    CK 46 05/29/2018 05:45 PM    CK - MB 1.4 05/29/2018 05:45 PM                Total time to take care of this patient was 35 minutes and more than 50% of time was spent counseling and coordinating care. Disclaimer: Sections of this note are dictated using utilizing voice recognition software, which may have resulted in some phonetic based errors in grammar and contents. Even though attempts were made to correct all the mistakes, some may have been missed, and remained in the body of the document. If questions arise, please contact our department.

## 2022-09-14 NOTE — HOSPICE
Hospice referral received. Chart review in process. Thank you for the referral to [facility]. If we can be of further assistance please contact [phone].      Elvis SNOW, [facility] Inpatient Clinical Liaison  [facility] [address] [facility], [facility] [address]  [phone]  Email: [email or web address]

## 2022-09-14 NOTE — PROGRESS NOTES
Palliative Medicine    CODE STATUS: DNR/DNI, comfort measures to start at discharge. AMD Status: none on file. His wife, Barrie Hanks, is his legal next of kin     9/14/2022 0915 Seen today in room 336 along with Dr. Matt De Leon. Lying in bed with eyes open. Good eye contact but without verbalization. Did not nod or shake head in response to questions. Respirations unlabored. Oxygen on at 4 lpm per NC. Had a seizure last night with no history of seizures. Neurology consulted. CT ADOLFO. MRI pending. ADDENDUM: Met outside of patient's room with Barrie Hanks, wife, Dr Whyte Staff, and Dr Putnam Setting. Reviewed events of last night with new seizure. Discussed at length options currently available: proceed with amputation and rehabilitation or change the goals of care to comfort. Mrs Dillon Steinberg agreed to a comfort based plan and not proceeding with surgery and she agrees to hospice consultation. She was told that hospice would require someone to be home at all times with Mr Dillon Steinberg. POST completed (see below). Clinical nurse and hospitalist aware. DNR orders placed. 1150: message left for [facility] requesting a call back to discuss the decisions his mother had made    Disposition plan: to be determined    Palliative care will continue to follow Olga Dobbs  and his family during his hospitalization and support them as they make healthcare decisions and define goals of care. Advanced Steps [facility] (Physician Orders for Scope of Treatment)  Participants:   [x] Other Surrogate Decision Maker / Next of Kin    Name: Jessica Hines    Relationship to Patient: wife    Phone Number:   Advanced Steps® ACP Facilitator: Dr. Navid Dumont, RN, MSN      Conversation Topics   Understanding of Medical Condition/s AND Potential Complications: Mrs Dillon Steinberg has good understanding of her 's co-morbidities and she wants him to be comfortable at the end of his life.     Needs to discuss with spiritual/Spiritism advisor: [] Yes  [x] No    Needs more information about illness and complications:  [] Yes  [x] No      Cardiopulmonary Resuscitation      Order Elected for CPR:  []  Attempt Resuscitation [x]  Do Not Attempt Resuscitation  When NOT in Cardiopulmonary Arrest, Order Elected:    [x] Comfort Measures TO BEGIN AT DISCHARGE.   [] Limited Additional Interventions  [] Full Interventions  Artificially Administered Nutrition, Order Elected:  [x] No Feeding Tube   [] Feeding Tube for a defined trial period  [] Feeding Tube long-term if indicated    The following was provided (check all that apply):    [] Review of existing Advance Directive  [] Assistance with Completion of New Advance Directive   [x] Review of [place] Form       Meeting Outcomes:   [] ACP discussion completed   [x] [place] form completed  [x] [place] prepared for Provider review and signature   [x] Original placed on Chart, if in facility (form to be sent with patient at discharge)  [x] Copy given to healthcare agent    [x] Copy scanned to electronic medical record    Jeana Mata RN, MSN  Palliative Medicine  [phone]

## 2022-09-14 NOTE — HOSPICE
Spoke with Opal Le, pt's son via phone   Discussed [facility] philosophy, services, criteria, and IDT. Discussed caregiver need for round the clock care with Opal eL  primary caregiver identified as Roland Phillips, pt's wife  Caregiver concerns identified as: wants to discuss other treatment options before deciding on hospice     Answered all questions. April Balbuena, pt's wife and she asked if this nurse would speak to her son Opal Le. Explained services to Opal Le and he questioned if the surgery was no longer an option. Advised he would need to speak with the doctor about treatment options. He advised he would be to the hospital tomorrow evening but is available by phone. He also advised pt is a  and was accepted by the [facility] a month ago but has not been seen there yet. Advised unsure if they would be able to assist but would request the  to contact them. Asael's number is [phone]    Provided with 24/7 contact information. Thank you for the referral to [address] [place] [address]. If we can be of further assistance please contact [phone].     Wendy Lizama RN  Clinical Manager  [place] [id number] [place] [address], [address] [place] [address] Str.  [phone]  Email: [email or web address]

## 2022-09-14 NOTE — PROGRESS NOTES
SLP Note:       Repeat orders received, pt already on ST caseload. Per chart review, pt had seizure last night and made NPO. Could not progress with ST intervention because patient:       [x]  Lethargic, unable to be alerted enough for safe PO intake  [x]  Refused participation. Pt required max verbal/tactile stim to maintain alertness, nonverbal, no command following. When attempted ice chip pt would not open mouth and shook head \"no\". VALERIA Presley at bedside during attempt. Hospice/comfort measure upon d/c noted. @ 1330  []  Off the unit  []  NPO for procedure  [x]  Other: VALERIA Presley reported pt very out of it, in significant amount of pain and just received pain medications, about to go to MRI. Tolerate meds with water this AM. @1100     Will cont to follow.      Zahira Bray M.S., RATNAY-SLP  Speech-Language Pathologist

## 2022-09-14 NOTE — PROGRESS NOTES
Hospitalist Progress Note-critical care note     Patient: Lisa Xiong MRN: [id number]  CSN: [id number]    YOB: 1945  Age: 78 y.o.   Sex: male    DOA: 8/27/2022 LOS:  LOS: 18 days            Chief complaint:sepsis , diabetic foot ulce, pad     Assessment/Plan         Hospital Problems  Date Reviewed: 8/27/2022            Codes Class Noted POA    Seizure ([facility] ICD-10-CM: R56.9  ICD-9-CM: 780.39  9/14/2022 Unknown        Hypernatremia ICD-10-CM: E87.0  ICD-9-CM: 276.0  9/6/2022 Yes        Acute respiratory failure with hypoxemia ([facility] ICD-10-CM: J96.01  ICD-9-CM: 518.81  8/28/2022 Yes        Septic shock ([facility] ICD-10-CM: A41.9, R65.21  ICD-9-CM: 038.9, 785.52, 995.92  8/28/2022 Yes        High anion gap metabolic acidosis [id number]: E87.2  ICD-9-CM: 276.2  8/27/2022 Yes        Hyperkalemia ICD-10-CM: E87.5  ICD-9-CM: 276.7  8/27/2022 Yes        * (Principal) Sepsis ([facility] ICD-10-CM: A41.9  ICD-9-CM: 038.9, 995.91  8/27/2022 Yes        AMS (altered mental status) ICD-10-CM: R41.82  ICD-9-CM: 780.97  8/27/2022 Yes        Strangulated inguinal hernia ICD-10-CM: K40.30  ICD-9-CM: 550.10  8/27/2022 Yes        Right inguinal hernia ICD-10-CM: K40.90  ICD-9-CM: 550.90  8/27/2022 Yes        Leukocytosis ICD-10-CM: D72.829  ICD-9-CM: 288.60  8/27/2022 Yes        Anemia ICD-10-CM: D64.9  ICD-9-CM: 285.9  8/27/2022 Yes        Chronic indwelling Mccray catheter ICD-10-CM: Z97.8  ICD-9-CM: V45.89  8/27/2022 Yes        S/P BKA (below knee amputation), left ([facility] ICD-10-CM: L47.430  ICD-9-CM: V49.75  8/27/2022 Yes        Right foot ulcer ([facility] ICD-10-CM: L97.519  ICD-9-CM: 707.15  8/27/2022 Yes        Paroxysmal atrial fibrillation (HCC) ICD-10-CM: I48.0  ICD-9-CM: 427.31  8/27/2022 Yes        Diabetic ulcer of right foot ([facility] ICD-10-CM: E11.621, L97.519  ICD-9-CM: 250.80, 707.15  8/27/2022 Yes        Pleural effusion on right ICD-10-CM: J90  ICD-9-CM: 511.9  8/27/2022 Yes        Severe protein-calorie malnutrition [facility]) ICD-10-CM: E43  ICD-9-CM: 262  8/27/2022 Yes        UTI (urinary tract infection) ICD-10-CM: N39.0  ICD-9-CM: 599.0  10/29/2021 Yes        Acute renal failure (ARF) (HCC) ICD-10-CM: N17.9  ICD-9-CM: 584.9  5/29/2018 Yes          78 y.o. 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 Admitted for severe septic shock with multiorgan and hypotension involvement due to multiple possible sources of infection, severe hyperkalemia, acute metabolic encephalopathy, metabolic acidosis, strangulated left inguinal hernia, anemia requiring blood transfusion, mild hypoxia coagulable state. Vascular consulted recommend aka if family wants, palliative care seeing pt and family decide to go forward to have aka done. However, RRT called due to active seizure, ativan was administrated and seizure stopped.  Nomi Crane was administrated and neurologist is consulted      Seizure -rrt called last night   Neuro consulted   Mri brain planning    Discussed with Dr. Samina Martinez on keppra    Seizure precaution     Hypokalemia-repletion , and replete Mag IV, PO Kdur     Hypernatremia-resolved     Mild bleeding from NG tube being pulled out by patient-  Ac blood loss on anemia chronic     Dysphagia-will have speech reevaluation     Incarcerated right inguinal hernia -status post ex lap with reduction of incarcerated right groin hernia with hernia mesh     Prior AKA left side     sacral ulcer      Infected medial maleolar and heel ulcers right foot-completed abx -followed by wound care, vascular Haily Malik seeing pt-aka recommended      Acute renal failure -resolved     HTN-resume toprol, stop albumin     encephalopathy -stable overnight, still confused    Paf rate controlled per metoprolol      S/p thoracentesis for effusion     Diabetes with hyperglycemia-follow BG levels, SSI    Malnutrition severe       Poor prognosis with multiple medical ailments and advanced PVD, diabetes complications, prior BKA< wounds and debilitated state  Poor prognosis  DNR       Talked with wife per phone and face to face. Hospice and comfort care discussed with her. She wound like to have meeting in the hospital. Informed the palliative care team about the meeting. Dr. Bri Cristobal talked with wife and hospice consult per Palliaitve care team     Hold the potassium will see wife final decision     All questions have been answered. 55 total min's spent on patient care including >50% on counseling/coordinating care. Discussed the above assessments. also discussed labs, medications and hospital course    Disposition :tbd,   Review of systems:    Limited due to confusion . Vital signs/Intake and Output:  Visit Vitals  BP (!) 140/46   Pulse (P) 80   Temp 97.1 °F (36.2 °C)   Resp (P) 15   Ht 5' 7.99\" (1.727 m)   Wt 61.6 kg (135 lb 14.4 oz)   SpO2 (P) 100%   BMI 20.67 kg/m²     Current Shift:  No intake/output data recorded. Last three shifts:  09/12 1901 - 09/14 0700  In: 160 [P.O.:120]  Out: 550 [Urine:550]    Physical Exam:  General: WD, WN. Alert, not cooperative, no acute distress    HEENT: NC, Atraumatic. PERRLA, anicteric sclerae. Lungs: CTA Bilaterally. No Wheezing/Rhonchi/Rales. Heart:  Regular  rhythm,  No murmur, No Rubs, No Gallops  Abdomen: Soft, Non distended, Non tender. +Bowel sounds,   Extremities: No c/c, left BKA, rt foot wrapped with ace bandage   Psych:   Not anxious or agitated.   Neurologic:  Confusion         Labs: Results:       Chemistry Recent Labs     09/14/22  0320 09/13/22  1841 09/13/22  0105   GLU 90 207* 132*    136 137   K 4.0 4.5 3.6    104 105   CO2 26 21 24   BUN 51* 45* 35*   CREA 1.56* 1.73* 1.20   CA 8.9 8.8 8.8   AGAP 5 11 8 BUCR 33* 26* 29*        CBC w/Diff Recent Labs     09/13/22  1841   WBC 12.6   RBC 3.75*   HGB 10.3*   HCT 33.9*   *   GRANS 53   LYMPH 40   EOS 0        Cardiac Enzymes No results for input(s): CPK, CKND1, LEONEL in the last 72 hours. No lab exists for component: CKRMB, TROIP   Coagulation No results for input(s): PTP, INR, APTT, INREXT, INREXT in the last 72 hours. Lipid Panel No results found for: CHOL, CHOLPOCT, CHOLX, CHLST, CHOLV, 524512, HDL, HDLP, LDL, LDLC, DLDLP, 503511, VLDLC, VLDL, TGLX, TRIGL, TRIGP, TGLPOCT, CHHD, CHHDX   BNP No results for input(s): BNPP in the last 72 hours. Liver Enzymes No results for input(s): TP, ALB, TBIL, AP in the last 72 hours. No lab exists for component: SGOT, GPT, DBIL     Thyroid Studies Lab Results   Component Value Date/Time    TSH 4.73 (H) 08/28/2022 08:04 AM        Procedures/imaging: see electronic medical records for all procedures/Xrays and details which were not copied into this note but were reviewed prior to creation of Plan    XR ABD (KUB)    Result Date: 9/5/2022  EXAM: XR ABD (KUB) CLINICAL INDICATION/HISTORY: Abdominal pain and distention COMPARISON: None. TECHNIQUE: 3 supine AP views of the abdomen were obtained _______________ FINDINGS: Skin staples overlie right lower quadrant. There is a single borderline prominent air-filled bowel loop overlying the right paracentral inferior abdomen and superior pelvis, measuring approximately 2.7 cm in diameter. There is also seen more distally throughout the right colon. The soft tissue planes and bony structures appear normal.  _______________     Single borderline prominent air-filled right paracentral small bowel loop, appearance most suggestive of postoperative ileus. XR ABD (KUB)    Result Date: 9/5/2022  AP portable lower chest and upper abdomen for NG/OG tube placement: NG/OG tube is within the stomach. It is not as distally located as previous on the comparison exam from 9/3/2022. Evidence of abdominal surgery with midline skin staples. Persistent right lower lobe atelectasis/infiltrate with some pleural thickening or pleural fluid. NG/OG tube as described within the stomach. XR ABD (KUB)    Result Date: 9/3/2022  EXAM: XR ABD (KUB) CLINICAL INDICATION/HISTORY: NGT Placement COMPARISON: 8/31/2022 TECHNIQUE: Supine AP view of the abdomen was obtained. _______________ FINDINGS: Gastric tube extends from midline thoracic esophagus curves through the left upper quadrant and has the tip and side-port overlying right mid abdomen in the region of the distal stomach or proximal duodenum. No air distended bowel loops nor other evidence of bowel obstruction or bowel dilatation. The soft tissue planes and bony structures appear normal.  _______________     Adequate gastric tube placement with tip and side-port in the region of the distal stomach/proximal duodenum. XR ABD (KUB)    Result Date: 8/31/2022  PORTABLE ABDOMEN Indication:  OGT placement. Technique: Portable supine AP view of the abdomen was obtained. Comparison studies:  August 27, 2022. Findings: Moderate rotation. Gastric tube terminates in the fundus of the stomach. Nonobstructive gas pattern. Telemetry leads. Lower abdominal skin closing staples. Minor scattered osseous degenerative changes. Moderate rotation. Gastric tube terminates in the fundus of the stomach. Nonobstructive gas pattern. CT HEAD WO CONT    Result Date: 8/27/2022  EXAM: CT head INDICATION: Altered mental status. COMPARISON: None. TECHNIQUE: Axial CT imaging of the head was performed without intravenous contrast.  One or more dose reduction techniques were used on this CT: automated exposure control, adjustment of the mAs and/or kVp according to patient size, and iterative reconstruction techniques. The specific techniques used on this CT exam have been documented in the patient's electronic medical record.   Digital Imaging and Communications in Medicine (DICOM) format image data are available to nonaffiliated external healthcare facilities or entities on a secure, media free, reciprocally searchable basis with patient authorization for at least a 12-month period after this study. Patient motion degrades image quality. _______________ FINDINGS: BRAIN AND POSTERIOR FOSSA: Mild periventricular areas of decreased attenuation are identified. Gray-white matter interfaces are preserved. No intraparenchymal hemorrhage, mass effect or midline shift. The ventricular system is midline and symmetric. Atherosclerotic vascular calcifications are identified. EXTRA-AXIAL SPACES AND MENINGES: There is no evidence for extra-axial mass lesion or fluid collection. CALVARIUM: Intact. SINUSES: Sphenoid sinus mucosal thickening. OTHER: Orbits are grossly intact. Facial soft tissue are within normal limits. _______________     1. No evidence for intracranial hemorrhage, mass effect or midline shift. 2.  Mild periventricular areas of microvascular ischemic change. 3.  Cerebral atherosclerosis. 4.  Atrophy. If there are continued symptoms, a MRI is recommended for further evaluation. CT ABD PELV WO CONT    Result Date: 8/27/2022  EXAM: CT of the abdomen and pelvis INDICATION: Pain. COMPARISON: May 14, 2021. TECHNIQUE: Axial CT imaging of the abdomen and pelvis was performed without intravenous contrast. Multiplanar reformats were generated. One or more dose reduction techniques were used on this CT: automated exposure control, adjustment of the mAs and/or kVp according to patient size, and iterative reconstruction techniques. The specific techniques used on this CT exam have been documented in the patient's electronic medical record.   Digital Imaging and Communications in Medicine (DICOM) format image data are available to nonaffiliated external healthcare facilities or entities on a secure, media free, reciprocally searchable basis with patient authorization for at least a 12-month period after this study. _______________ FINDINGS: LOWER CHEST: Moderate right pleural effusion with adjacent atelectasis. LIVER, BILIARY: Liver is normal. No biliary dilation. Gallbladder is unremarkable. PANCREAS: Normal. SPLEEN: Splenic granuloma. ADRENALS: Normal. KIDNEYS: Normal. LYMPH NODES: No enlarged lymph nodes. GASTROINTESTINAL TRACT: Uncomplicated sigmoid diverticula. Gastric distention. PELVIC ORGANS: Unremarkable. VASCULATURE: Atherosclerotic vascular calcifications. BONES: No acute or aggressive osseous abnormalities identified. OTHER: Large right inguinal hernia with large and small bowel. There is diffuse circumferential wall thickening of ascending colon within the hernia. _______________     1. Large right inguinal hernia with large and small bowel. There is diffuse wall thickening of the ascending colon concerning for strangulation. No evidence for bowel obstruction. 2. Right pleural effusion and bilateral basilar atelectasis. 3. Gastric distention. XR CHEST PORT    Result Date: 9/2/2022  CLINICAL HISTORY:  Tachypnea COMPARISONS:  Chest x-ray 9/2/2022 TECHNIQUE:  single frontal view of the chest ------------------------------------------ FINDINGS: Lungs:  Patchy parenchymal opacity in the right lung, similar to the prior radiograph and likely largely scar. Blunting of the right lateral costophrenic sulcus, also similar. Mild chronic appearing interstitial markings within the left lung, similar to prior radiograph. Mediastinum: Moderate cardiomegaly. Mediastinum is shifted towards the right, unchanged. Atherosclerotic arterial calcification. Bones: No evidence of fracture or suspicious bone lesion. ------------------------------------------    1. No significant interval change. Probable small right pleural effusion, not appreciably changed. 2. Right lung parenchymal findings likely largely reflect scar though are not well assessed. Pneumonia or aspiration not entirely excluded.     XR CHEST PORT    Result Date: 9/2/2022  EXAM: XR CHEST PORT CLINICAL INDICATION/HISTORY: Post right thoracentesis -Additional: None COMPARISON: 9/2/2022 TECHNIQUE: Portable frontal view of the chest _______________ FINDINGS: SUPPORT DEVICES: None. HEART AND MEDIASTINUM: Normal cardiac size and mediastinal contours. LUNGS AND PLEURAL SPACES: Decreased volume right-sided pleural effusion status post thoracentesis with persistent lateral and basilar components. Streaky areas of atelectasis and interstitial densities noted across the mid and lower lung zones. No evidence of postprocedural pneumothorax. Left lung clear as visualized. Skinfold along the peripheral left hemithorax. BONY THORAX AND SOFT TISSUES: Unremarkable. _______________     1. No evidence of postprocedural pneumothorax is significantly improved right lung aeration status post thoracentesis. > Residual pleural effusion and streaky areas of atelectasis. XR CHEST PORT    Result Date: 9/2/2022  EXAM: XR CHEST PORT CLINICAL INDICATION/HISTORY: intubated -Additional: None COMPARISON: One day prior TECHNIQUE: Portable frontal view of the chest _______________ FINDINGS: SUPPORT DEVICES: Interval extubation and removal of enteric tube. HEART AND MEDIASTINUM: Heart is obscured by right consolidation. LUNGS AND PLEURAL SPACES: Large right pleural effusion with adjacent atelectasis. Left-sided interstitial opacities No pneumothorax. _______________     Large right pleural effusion with adjacent atelectasis. XR CHEST PORT    Result Date: 9/1/2022  EXAM: Portable chest radiograph 9/1/2022 CLINICAL INDICATION/HISTORY: Intubation. TECHNIQUE: A semierect portable radiograph was obtained. COMPARISON: 8/31/2022. FINDINGS: Endotracheal tube remains in position with its tip approximately 5.5 cm above the level of the alise. A nasogastric tube extends below the field of view.   The patient is slightly rotated to the right which distorts the cardiomediastinal contours. However, these contours are grossly unchanged. Increasing right pleural effusion is present with associated increasing atelectasis and/or infiltrate in the right lung. There is increasing interstitial edema and/or interstitial infiltrate throughout the left lung. There is no pneumothorax or significant left pleural effusion. 1. Increasing interstitial edema and/or interstitial infiltration with increasing right pleural effusion. XR CHEST PORT    Result Date: 8/31/2022  EXAM: XR CHEST PORT CLINICAL INDICATION/HISTORY: intubated -Additional: None COMPARISON: 8/30/2022 TECHNIQUE: Portable frontal view of the chest _______________ FINDINGS: SUPPORT DEVICES: Endotracheal and enteric tubes unchanged. HEART AND MEDIASTINUM: Cardiomediastinal silhouette within normal limits. LUNGS AND PLEURAL SPACES: Patient rotated to right. Moderate right pleural effusion/atelectasis. No pneumothorax. _______________     Moderate right pleural effusion/atelectasis. XR CHEST PORT    Result Date: 8/30/2022  EXAM:  PORTABLE CHEST INDICATION:  Follow up. TECHNIQUE:  Portable, AP view. COMPARISON:  08/29/2022 ____________________ FINDINGS:  SUPPORT DEVICES: Endotracheal tube approximately 4 cm above the alise. OG tube is traversing below left hemidiaphragm, tip not imaged. Esophageal probe is present at the level of the endotracheal tube. HEART AND MEDIASTINUM: Stable. LUNGS AND PLEURAL SPACES: Persistent right basilar opacity consistent with pleural effusion with adjacent consolidation. No pneumothorax. BONY THORAX AND SOFT TISSUES: No acute osseous abnormality. ____________________     1. Endotracheal tube approximately 4 cm above the alise. 2. No significant change in right basilar opacity consistent with pleural effusion with adjacent consolidation, atelectasis or pneumonia.  *  **     XR CHEST PORT    Result Date: 8/29/2022  HISTORY: -Provided with order: intubated -Additional: None Technique : AP PORTABLE CHEST Comparison : None. FINDINGS: HEART AND MEDIASTINUM: Anterior endotracheal tube tip estimated at 6.1 cm above alise. Enteric tube tip projects over left upper quadrant expected location gastric fundus. Esophageal temperature probe tip projects over lower thoracic . Stable cardiomediastinal silhouette allowing for angulation. LUNGS AND PLEURAL SPACES: Progressive right pleural effusion and patchy opacities in the right lung. Left lung relatively clear allowing for technique. BONY THORAX AND SOFT TISSUES: No gross acute osseous abnormality. 1. Right pleural effusion and subjacent atelectasis/infiltrate, perhaps slightly progressed. 2. Tubes and lines stable in visualized extent. XR CHEST PORT    Result Date: 8/28/2022  EXAM: CHEST RADIOGRAPH CLINICAL INDICATION/HISTORY: intubated   > Additional: None COMPARISON: 8/27/2022. TECHNIQUE: Portable frontal view of the chest _______________ FINDINGS: SUPPORT DEVICES: Endotracheal tube distal tip projects 5.5 cm above the alise. Enteric tube distal tip projects below the left hemidiaphragm likely in the stomach. HEART AND MEDIASTINUM: No appreciable cardiomegaly. Remaining mediastinal contours are stable. LUNGS AND PLEURAL SPACES: Right pleural effusion with adjacent opacity. No evidence for pneumothorax. Left lung is clear. BONY THORAX AND SOFT TISSUES: Unremarkable. _______________     1. Right pleural effusion with adjacent atelectasis/infiltrates. 2. Supporting tubes appear stable. XR CHEST PORT    Result Date: 8/27/2022  EXAM: CHEST RADIOGRAPH CLINICAL INDICATION/HISTORY: intubated -Additional: None COMPARISON: August 27, 2022 TECHNIQUE: Portable frontal view of the chest _______________ FINDINGS: SUPPORT DEVICES: An endotracheal tube is positioned 6 cm above the alise. HEART AND MEDIASTINUM: Heart size and mediastinal contour are midline and within normal limits.  LUNGS AND PLEURAL SPACES: There are opacities throughout both lungs but more confluent within the right mid and lower lung zones. No pneumothorax. BONY THORAX AND SOFT TISSUES: Unremarkable. _______________     Interval right greater than left airspace disease Small to moderate right pleural effusion    XR CHEST PORT    Result Date: 8/27/2022  EXAM: CHEST RADIOGRAPHS CLINICAL INDICATION/HISTORY: Sepsis   > Additional: None COMPARISON: 6/18/2022. TECHNIQUE: PA and lateral views of the chest _______________ FINDINGS: HEART AND MEDIASTINUM: No appreciable cardiomegaly. Remaining mediastinal contours within normal limits. LUNGS AND PLEURAL SPACES: Left lung is clear. Right pleural effusion with adjacent opacity. BONY THORAX AND SOFT TISSUES: Unremarkable. _______________     1. Right pleural effusion with probable adjacent atelectasis. XR ABD PORT  1 V    Result Date: 8/27/2022  EXAM: ABDOMINAL RADIOGRAPHS CLINICAL INDICATION/HISTORY: OG placement -Additional: None COMPARISON: None TECHNIQUE: Single supine view of the abdomen _______________ FINDINGS: SUPPORT DEVICES: A nasogastric tube is positioned in the stomach. BOWEL GAS PATTERN: The gas pattern is nonobstructive. Clenton Reas SOFT TISSUES: Unremarkable. BONES: Degenerative changes are seen throughout the spine. ADDITIONAL FINDINGS: None. _______________     No significant abnormality. US THORACENTESIS RT NDL W IMAGE    Result Date: 9/2/2022  PROCEDURE: ULTRASOUND GUIDED RIGHT THORACENTESIS INDICATION: Right pleural effusion, shortness of breath PERFORMED BY:  Piotr García PA-C ATTENDING PHYSICIAN:  Ev Grajeda MD SUPERVISION: General _______________ TECHNIQUE & FINDINGS: CONSENT: The risks, benefits, and alternatives to the procedure were explained to the patient's wife prior to the procedure. Written informed consent was obtained and witnessed. Standard pre-procedure time out performed. TECHNIQUE/FINDINGS:  The right pleural effusion was localized under ultrasound guidance. Sterile ultrasound probe cover and ultrasound gel were utilized.   The skin was marked, prepped and draped in the usual sterile fashion. 1% Lidocaine was used for local anesthesia. A 5 Western Latanya Yueh needle was advanced into the right pleural space under US guidance. A total of 460 mL of clear alanna fluid was drained using a Vacutainer system. A specimen was collected and taken to the lab, as requested. The catheter was then removed and the site dressed with a sterile bandage. The patient tolerated the procedure well and there were no immediate complications. Post-procedure chest x-ray shows no pneumothorax. GUIDANCE: Ultrasound guidance was used to position (and confirm the position of) the needle. Ultrasound image(s) saved in PACS. _______________     Successful ultrasound-guided thoracentesis with removal of 460 mL of fluid. Post-procedure chest x-ray shows no pneumothorax. A specimen was collected and taken to the lab, as requested. ECHO ADULT COMPLETE    Result Date: 8/28/2022  Formatting of this result is different from the original.   Left Ventricle: Normal left ventricular systolic function with a visually estimated EF of 60 - 65%. Left ventricle size is normal. Normal wall thickness. Normal wall motion. Grade I diastolic dysfunction present with normal LV EF. Unable to assess RVSP due to inadequate or insignificant tricuspid regurgitation. DUPLEX LOWER EXT ARTERY RIGHT    Result Date: 9/6/2022  · Patent right leg vessels with good upstroke.         Simon Menendez MD

## 2022-09-14 NOTE — PROGRESS NOTES
CM called to speak with spouse and discussed CM role. Per spouse, she can not take care of the patient alone at home and is interested in rehab after AKA. Cm to meet with spouse and discuss options of rehab versus HH/personal aides at home. 2003-CM notified that palliative is to have a family meeting at 12 where they will discuss hospice. Cm to follow up with palliative. [id number] notified by palliative that family would like hospice consult. Palliative placing hospice consult.

## 2022-09-15 NOTE — PROGRESS NOTES
Palliative Medicine follow-up note    Patient Name: Marisela Barnes. YOB: 1945    Date of Initial Consult: 8/29/2022  Date of follow up: 9/15/2022  Reason for Consult: Goals of care discussions  Requesting Provider: Dr. Curly Lerner   Primary Care Physician: Dayanna Champion MD      SUMMARY:   Marisela Barnes. is a 78 y.o. with a past history of hypertension, hyperlipidemia, stroke, PAD, chronic atrial fibrillation, on Plavix, DM, CKD, previous, COVID-19 infection, chronic indwelling urinary catheter for urinary retention, and severe peripheral vascular disease with worsening gangrenous left foot necessitating left BKA during 8 day hospitalization last year, who was admitted on 8/27/2022 from home with a diagnosis of incarcerated Right inguinal hernia, severe septic shock with hypotension, severe hyperkalemia, acute metabolic encephalopathy, and anemia. Current medical issues leading to Palliative Medicine involvement include: goals of care discussions. 8/30/2022: Patient was off sedation, moving head back and forth, appears in distress. 9/1/2022: Patient sedation help, plan for SBT today, with possible medical extubation. 9/2/2022: Patient was medically extubated on 9/1/2022, he is oriented to person and place, disoriented to current time and situation. Appears frail, fatigued, and weak. 9/6/2022: Patient is awake, alert, oriented to person and place, confused about current time and situation. He states he is 54years old. 9/8/2022: Patient awake, remains confused, oriented to person and place, states the year is 1945 (his birth year). He cannot recall speaking to the orthopedic surgeon about possible AKA versus BKA. 9/13/2022: Patient has been refusing nursing care per chart review. Family has agreed to move forward with right AKA. 9/14/2022: Patient was seen in presence of palliative care RN. Patient's wife is at bedside.   Patient keeps staring at us upon asking questions. Continues to have off-and-on leg pain. No obvious shortness of breath or cough. Patient had an episode of seizure last night and early this morning. 9/15/2022:  Patient was asleep, easily awakened to verbal stimuli, appears in no acute distress, nodding his head \"yes\" to every question being asked. It is uncertain what his orientation status is as he is currently nonverbal.     PALLIATIVE DIAGNOSES:   Goals of care discussions  Septic shock liver artery disease s/p left AKA  Acute respiratory failure   incarcerated Right inguinal hernia  Advanced age/debility       PLAN:   9/15/2022: Palliative medicine team including  CHERYLE Baum and I met with patient at patient's bedside. Patient was asleep, easily awakened to verbal stimuli, appears in no acute distress, nodding his head \"yes\" to every question being asked. It is uncertain what his orientation status is as he is currently nonverbal. Family meeting yesterday with patient's wife who agreed with comfort measures at discharge. Per chart review, patient's son Christina Garcia III had questions about continuing medical treatment when he was having a conversation with hospice liaison. With wife's permission, called patient's son Christina Garcia III to review goals of care. Discussed patient's worsening debility, poor oral intake, recent seizure activity, recent refusal of care, failure to thrive, and concern of patient's ability to participate in rehab should he undergo right AKA. Also discussed the risks of right AKA including death, poor nutrition and concern for wound healing, and inability to participate in rehab. Discussed option for feeding tube. Discussed patient's current quality of life, and asked the question of whether or not patient would find this current quality of life acceptable. Christina Garcia III states that he appreciated all of the medical information, and the holistic review of his father.  He states  now he understands why Mrs. Winfield Gosselin agreed to hospice and comfort measures at discharge. Marino III states he supports these decisions, but would like to speak to his other 2 siblings today to ensure the whole family is on the same page. We will follow-up with the family tomorrow morning to ensure everyone is in agreement with hospice at discharge (wife wants the support from their 3 children). There is questions on whether or not patient's wife will be able to care for patient at home in his current debilitated state; case management following for safe discharge plan whether that is facility versus home with hospice. At this time there are no changes in goals of care. Patient will remain DNR/DNI, comfort measures on discharge, hospice consult. Wife is looking for placement versus more help at home. POST form on file. We will continue to follow patient with you. See previous discussions below:    9/14/2022: Was seen in presence of palliative care RN. Patient is awake but unable to participate in conversation due to his current mental status. Patient had an event this morning in form of seizures. Patient's wife is at bedside. Patient's wife is listed as a next up kin as well as medical power of . As per wife: They have 3 kids, 2 sons and 1 daughter. She understands patient's current medical status. Explained her about various treatment option. The first option is to continue current medical care including antiseizure medications, pain management and proceed for right AKA. The second option is to keep him as comfortable as possible, being evaluated by hospice and not to escalate medical care or proceed for surgery. She verbalized understanding about both options. She stated she would like to go with second option. She understands what comfort care/hospice means including sudden cardiac death. She has signed the post form for comfort measures upon discharge. Primary attending has been notified about it.   She gave us permission to talk to her son to inform him about her decision. She also stated that her sons can call her if they have any questions about the dizziness she has made for this patient being on comfort measures. We called patient's son [[email or web address] @ [phone] and left a voice message. Plan: Patient will remain DNR/DNI, comfort measures on discharge, hospice consult. Wife is looking for placement versus more help at home. We will continue to follow patient with you.    9/13/2022: Palliative medicine team including Dominic Archuleta RN and I met with patient at patient's bedside. Patient is awake, flat affect, nodding appropriately, but would not answer orientation questions. When asked if he was OK with having a right AKA he shook his head \"no\" but it is uncertain if he understands all the benefits and burdens of his choices. Appreciate vascular input, will need AKA if comfort care is not pursued. Called patient's wife/legal NOK Francisco Jones who states she and all three of patient's children are in agreement to have the AKA of the right leg. Explained our concern that patient has been refusing nursing care and vital signs, and we are concerned about patient's ability to participate in PT/OT, and undergo postoperative treatment recommendations. Wife states that she and family feel that the amputation should happen to give patient the best opportunity to get stronger and clear cognitively, in hopes that he has some quality of life (understanding he may not improve). Discussed with Dr. Sherlyn Douglass. At this time, continue DNR/DNI, and family in agreement to have right AKA. Family understands DNR status may temporarily be suspended in surgery depending on her discussion with the surgeon. We will continue to follow and continue goals of care discussions as appropriate. See previous discussions below:    9/8/2022: Palliative medicine team including Dominic Archuleta RN and I met with patient at patient's bedside.  Patient awake, remains confused, oriented to person and place, states the year is [birth year] (his birth year). He cannot recall speaking to the orthopedic surgeon about possible AKA versus BKA. Wife not at bedside. Awaiting vascular input, then will plan a meeting with family to readdress goals of care. Family is strongly hopeful that patient will clear cognitively enough to make this decision on his own but at this time he remains confused. At this time continue DNR/DNI. See previous discussions below:    9/6/2022: Palliative medicine team including Janay Chu RN and I met with patient at patient's bedside. Patient is awake, alert, oriented to person and place, confused about current time and situation. He states he is 54years old. Met with wife later in the day, who states that each day patient appears to be doing better, and improving cognitively, now able to eat a pureed diet. Readdressed goals of care today, confirmed DNR/DNI. Readdressed the likely need of right lower extremity amputation versus implementation of comfort measures with hospice at discharge. Wife states that patient states \"cut it off\" when discussing his foot ulcers and seems to be okay with having the amputation. Explained at this time I do not believe patient understands the benefits and burdens of his medical choices, wife agrees. Wife would like to give patient more time to clear cognitively to participate in his goals of care conversation, understanding patient is high risk for recurrent sepsis due to severe PVD in the right lower extremity with chronic vascular ulcers. Family is not ready for hospice. We will follow with you. See previous discussions below:    9/2/2022: Palliative medicine team including  CHERYLE Myles and I met with patient at patient's bedside. Patient is awake, alert, oriented to person and place, disoriented to current time and situation.   He is not able to understand complex goals of care discussions at this time. Met with patient later in the day and wife was at bedside. Reviewed goals of care with wife. Wife has a good understanding of current health situation; we explained that patient is too weak, and confused to participate in goals of care discussions at this time. Reviewed overall level of care, with concern for RLL chronic vascular ulcers with previous recommendations from [facility] (6/9/2022) for consideration of Right AKA. Patient has historically declined this amputation. Explained to wife that patient is likely an appropriate hospice candidate, and this should be considered as an option at discharge. Also discussed the benefits and burdens of continued aggressive measures versus implementing comfort measures with the support of hospice at discharge. Wife would like to give patient more time to see  if he clears mentally to participate in his goals of care, understanding she may have to make the decisions should patient not clear cognitively. At this time continue DNR/DNI. See previous discussions below:    9/1/2022: Palliative medicine team including Kiana Keen and I met with patient at patient's bedside today. Patient was being removed from sedation for SBT today. Spoke to hospitalist and intensivist. Patient may be stable enough to medically extubate depending on how he does today with SBT. Per previous goals of care discussions with family, we will wait to see if patient can be medically extubated and readdress further goals of care at that point (right leg necrotic tissue with possible need for amputation versus comfort measures). Patient has historically declined amputation in the past. Goals of care discussions ongoing. Family hopes that patient can be safely extubated and would be able to participate in goals of care discussions. At this time, continue DNR/do not re-intubate for any reason.     See previous discussions below:    8/30/2022: Palliative medicine team including Nataly Carr and I met with patient at patient's bedside today. Patient was off sedation, moving head back and forth, appears in distress. Family meeting today consisting of palliative team, patient's family (wife Shanelle Modi, children Nina Suárez, and Zayra) hositalist Dr. Christian Solis, and intensivist Dr. Edmund Villeda. Medical update provided. Discussed SBT outcome today with potential for medical extubation tomorrow depending on how his SBT goes tomorrow. Discussed the benefits and burdens of reintubation in the event of respiratory decline post medical extubation. Discussed overall level of care, with concern for RLL chronic vascular ulcers with previous recommendations from [facility] (6/9/2022) for consideration of Right AKA. Patient has historically declined this amputation. Discussed current quality of life. Family describes patient as a man who values his independence, and patient has been bothered by the fact that he hasn't  walked in over a year. Discussed that septic shock is improving and this event was likely related to bowel sepsis, but that patient is at high risk for recurrent sepsis and further clinical decline due to chronic infection in RLL, with no plan for amputation. Discussed the benefits and burdens of continuing current level of care versus implementation of comfort measures with hospice at discharge. Family clear that he would not find reintubation acceptable. Family is hopeful that if patient is medically extubated, he may wake up enough to participate in goals of care discussions. For now, patient is a DNR/do not re-intubate for any reason. Further goals of care discussions ongoing. See previous discussions below:    8/29/2022:Goals of care discussions: Palliative medicine team including  CHERYLE Diop and I met with patient at patient's bedside. Patient is orally intubated on mechanical ventilation, frequent movements in bed but no command following.  Wife Feng Rae at bedside (Wife notes that English is her second language but she states she understands our conversation and answering questions appropriately. Offered translation services but wife declined.) Wife confirms DNR in the event of cardiac arrest. Patient is a PARTIAL CODE: No CPR (including no chest compressions, no shock, and no ACLS meds in the event of cardiac arrest). Continue intubation at this time. Wife requesting a meeting with their children involved to address further goals of care. Wife will call me with a time for tomorrow once she has spoken to all of her children. Received a call from patient's son Luther River and explained to him the desire to meet for family meeting. Luther River confirms DNR status upon cardiac arrest. Further goals of care discussions will occur at family meeting tomorrow. Awaiting response from wife with exact time. Septic shock:   multiple possible sources of infection, incarcerated Right inguinal hernia s/p laparotomy and reduction of incarcerated right groin hernia without need for bowel resection-8/27/2022. UA-Positive for UTI; urine culture obtained. sacral ulcer and medial maleolar and heel infected ulcers. On broad spectrum antibiotics per primary team.   Acute respiratory failure: Patient remains intubated postop due to critical illness. incarcerated Right inguinal hernia: s/p laparotomy and reduction of incarcerated right groin hernia without need for bowel resection-8/27/2022. Advanced age/debility: 78year old male who is critically ill, needs assist with all ADLs. Prior to admission, patient lived at home with his spouse and required assistance with functional ADLs.    Initial consult note routed to primary continuity provider  Communicated plan of care with: Palliative IDT       GOALS OF CARE / TREATMENT PREFERENCES:   [====Goals of Care====]  GOALS OF CARE: DNR/DNI    Patient/Health Care Proxy Stated Goals: Comfort      TREATMENT PREFERENCES:   Code Status: DNR    Advance Care Planning:  Advance Care Planning 8/29/2022   Patient's Healthcare Decision Maker is: Legal Next of Kin   Primary Decision Maker Name -   Primary Decision Maker Phone Number -   Primary Decision Maker Relationship to Patient -   Confirm Advance Directive None   Patient Would Like to Complete Advance Directive Unable       Medical Interventions: Other (comment) (comfort measures to be initiated at discharge)     Artificially Administered Nutrition: No feeding tube      The palliative care team has discussed with patient / health care proxy about goals of care / treatment preferences for patient.  [====Goals of Care====]         HISTORY:     History obtained from: patient's chart, family    CHIEF COMPLAINT: incarcerated Right inguinal hernia, s/p surgery    HPI/SUBJECTIVE:    The patient is:   [] Verbal and participatory  [x] Non-participatory due to:   Orientation status unable to assess, nods \"yes\" to every question     Clinical Pain Assessment (nonverbal scale for severity on nonverbal patients):   Clinical Pain Assessment  Severity: 0    Adult Nonverbal Pain Scale  Face: No particular expression or smile  Activity (Movement): Lying quietly, normal position  Guarding: Lying quietly, no positioning of hands over areas of body  Physiology (Vital Signs):  Stable vital signs  Respiratory: Baseline RR/SpO2 compliant with ventilator  Total Score: 0       FUNCTIONAL ASSESSMENT:     Palliative Performance Scale (PPS):  PPS: 30       PSYCHOSOCIAL/SPIRITUAL SCREENING:     Advance Care Planning:  Advance Care Planning 8/29/2022   Patient's Healthcare Decision Maker is: Legal Next of Kin   Primary Decision Maker Name -   Primary Decision Maker Phone Number -   Primary Decision Maker Relationship to Patient -   Confirm Advance Directive None   Patient Would Like to Complete Advance Directive Unable        Any spiritual / Ep[employer]opalian concerns: unable to assess  [] Yes /  [] No    Caregiver Burnout:  [] Yes /  [] No /  [x] No Caregiver Present      Anticipatory grief assessment: unable to assess  [] Normal  / [] Maladaptive          REVIEW OF SYSTEMS:     Positive and pertinent negative findings in ROS are noted above in HPI. The following systems were [] reviewed / [x] unable to be reviewed as noted in HPI  Other findings are noted below. Systems: constitutional, ears/nose/mouth/throat, respiratory, gastrointestinal, genitourinary, musculoskeletal, integumentary, neurologic, psychiatric, endocrine. Positive findings noted below. Modified ESAS Completed by: provider   Fatigue: 5       Pain: 0   Anxiety: 3       Dyspnea: 0           Stool Occurrence(s): 1        PHYSICAL EXAM:     From RN flowsheet:  Wt Readings from Last 3 Encounters:   09/14/22 60.5 kg (133 lb 6.4 oz)   11/02/21 81.6 kg (180 lb)   10/11/21 76.7 kg (169 lb)     Blood pressure (!) 159/66, pulse 78, temperature 98.1 °F (36.7 °C), resp. rate 18, height 5' 7.99\" (1.727 m), weight 60.5 kg (133 lb 6.4 oz), SpO2 95 %. Pain Scale 1: Numeric (0 - 10)  Pain Intensity 1: 0     Pain Location 1: Back  Pain Orientation 1: Posterior  Pain Description 1: Aching  Pain Intervention(s) 1: Medication (see MAR), Massage, Position, Repositioned, Rest      Constitutional:, Remains confused, keeps staring at us upon asking question, chronically ill. ENMT: dry mucous membranes, hoarseness  Cardiovascular: regular rhythm, distal pulses intact  Respiratory: Diminished breath sound bibasilar with poor respiratory effort, on NC at 2 L  Gastrointestinal: midline surgical incision CDI, no distention  Musculoskeletal: Moves right leg, left leg amputation site dressing in situ.   Skin: warm, dry, black eschar right ankle and right heel  Neurologic: following some commands, moving all extremities, confused to current time and situation       HISTORY:     Principal Problem:    Sepsis ([facility] (8/27/2022)    Active Problems:    Acute renal failure (ARF) ([facility] (5/29/2018)      UTI (urinary tract infection) (10/29/2021)      High anion gap metabolic acidosis (0/22/3757)      Hyperkalemia (8/27/2022)      AMS (altered mental status) (8/27/2022)      Strangulated inguinal hernia (8/27/2022)      Right inguinal hernia (8/27/2022)      Leukocytosis (8/27/2022)      Anemia (8/27/2022)      Chronic indwelling Mccray catheter (8/27/2022)      S/P BKA (below knee amputation), left ([address] (8/27/2022)      Right foot ulcer ([address] (8/27/2022)      Paroxysmal atrial fibrillation ([address] (8/27/2022)      Diabetic ulcer of right foot ([address] (8/27/2022)      Pleural effusion on right (8/27/2022)      Severe protein-calorie malnutrition ([address] (8/27/2022)      Acute respiratory failure with hypoxemia ([address] (8/28/2022)      Septic shock ([address] (8/28/2022)      Hypernatremia (9/6/2022)      Seizure ([address] (9/14/2022)    Past Medical History:   Diagnosis Date    A-fib (HCC)     Asthma     CAD (coronary artery disease)     Chronic kidney disease     stage 3    COVID-19     Diabetes ([address]     GERD (gastroesophageal reflux disease)     Heart failure (HCC)     chronic diastolic heart failure    High cholesterol     Hypertension     Ill-defined condition     chronic osteomyelitis left ankle and foot    Ill-defined condition     chronic arteriosclerosis    PVD (peripheral vascular disease) ([address]     Stroke ([address]     cva      Past Surgical History:   Procedure Laterality Date    COLONOSCOPY N/A 6/1/2018    COLONOSCOPY, POLYPECTOMY performed by Gissell Huff MD at THE [facility] ENDOSCOPY    HX CATARACT REMOVAL      HX ORTHOPAEDIC Left 2021    ankle washout, external fixator, would vac    HX ORTHOPAEDIC      external fixator removed      History reviewed. No pertinent family history. History reviewed, no pertinent family history.   Social History     Tobacco Use    Smoking status: Former    Smokeless tobacco: Never   Substance Use Topics    Alcohol use: Not Currently     No Known Allergies   Current Facility-Administered Medications Medication Dose Route Frequency    furosemide (LASIX) injection 20 mg  20 mg IntraVENous DAILY    levoFLOXacin (LEVAQUIN) 500 mg in D5W IVPB  500 mg IntraVENous Q24H    metroNIDAZOLE (FLAGYL) IVPB premix 500 mg  500 mg IntraVENous Q8H    metoprolol (LOPRESSOR) injection 1.25 mg  1.25 mg IntraVENous Q6H    sodium chloride (NS) flush 5-40 mL  5-40 mL IntraVENous Q8H    sodium chloride (NS) flush 5-40 mL  5-40 mL IntraVENous PRN    diazePAM (VALIUM) injection 9.3 mg  0.15 mg/kg IntraVENous Q10MIN PRN    LORazepam (ATIVAN) 2 mg/mL injection 1 mg  1 mg IntraVENous Q10MIN PRN    levETIRAcetam (KEPPRA) 500 mg in 0.9% sodium chloride 100 mL IVPB  500 mg IntraVENous Q12H    pantoprazole (PROTONIX) tablet 40 mg  40 mg Oral ACB    atorvastatin (LIPITOR) tablet 40 mg  40 mg Oral QHS    [Held by provider] metoprolol succinate (TOPROL-XL) XL tablet 50 mg  50 mg Oral DAILY    insulin lispro (HUMALOG) injection   SubCUTAneous AC&HS    Saccharomyces boulardii (FLORASTOR) capsule 250 mg  250 mg Oral BID    [Held by provider] potassium bicarb-citric acid (EFFER-K) tablet 40 mEq  40 mEq Oral DAILY    povidone-iodine (BETADINE) 10 % topical solution   Topical DAILY    ipratropium (ATROVENT) 0.02 % nebulizer solution 0.5 mg  0.5 mg Nebulization Q6H PRN    collagenase (SANTYL) 250 unit/gram ointment   Topical DAILY    0.9% sodium chloride infusion 250 mL  250 mL IntraVENous PRN    hydrALAZINE (APRESOLINE) 20 mg/mL injection 20 mg  20 mg IntraVENous Q6H PRN    midazolam (VERSED) injection 0.5 mg  0.5 mg IntraVENous BID PRN    0.9% sodium chloride infusion 250 mL  250 mL IntraVENous PRN    heparin (porcine) injection 5,000 Units  5,000 Units SubCUTAneous Q8H    sodium chloride (NS) flush 5-40 mL  5-40 mL IntraVENous Q8H    sodium chloride (NS) flush 5-40 mL  5-40 mL IntraVENous PRN    acetaminophen (TYLENOL) tablet 650 mg  650 mg Oral Q6H PRN    Or    acetaminophen (TYLENOL) suppository 650 mg  650 mg Rectal Q6H PRN    polyethylene glycol (MIRALAX) packet 17 g  17 g Oral DAILY PRN    ondansetron (ZOFRAN ODT) tablet 4 mg  4 mg Oral Q8H PRN    Or    ondansetron (ZOFRAN) injection 4 mg  4 mg IntraVENous Q6H PRN    glucose chewable tablet 16 g  4 Tablet Oral PRN    glucagon (GLUCAGEN) injection 1 mg  1 mg IntraMUSCular PRN    dextrose 10% infusion 0-250 mL  0-250 mL IntraVENous PRN    HYDROmorphone (DILAUDID) injection 1 mg  1 mg IntraVENous Q4H PRN          LAB AND IMAGING FINDINGS:     Lab Results   Component Value Date/Time    WBC 12.6 09/13/2022 06:41 PM    HGB 10.3 (L) 09/13/2022 06:41 PM    PLATELET 505 (H) 04/39/3563 06:41 PM     Lab Results   Component Value Date/Time    Sodium 140 09/15/2022 02:01 AM    Potassium 4.4 09/15/2022 02:01 AM    Chloride 107 09/15/2022 02:01 AM    CO2 25 09/15/2022 02:01 AM    BUN 50 (H) 09/15/2022 02:01 AM    Creatinine 1.44 (H) 09/15/2022 02:01 AM    Calcium 9.0 09/15/2022 02:01 AM    Magnesium 2.0 09/13/2022 06:41 PM    Phosphorus 1.9 (L) 09/03/2022 07:45 AM      Lab Results   Component Value Date/Time    Alk. phosphatase 150 (H) 09/11/2022 08:11 AM    Protein, total 6.4 09/11/2022 08:11 AM    Albumin 3.9 09/11/2022 08:11 AM    Globulin 2.5 09/11/2022 08:11 AM     Lab Results   Component Value Date/Time    INR 1.3 (H) 09/02/2022 04:51 AM    Prothrombin time 16.8 (H) 09/02/2022 04:51 AM    aPTT 118.5 (H) 05/17/2021 08:20 AM      Lab Results   Component Value Date/Time    Iron 10 (L) 05/06/2021 06:40 AM    TIBC 186 (L) 05/06/2021 06:40 AM    Iron % saturation 5 (L) 05/06/2021 06:40 AM    Ferritin 125 05/06/2021 06:40 AM      No results found for: PH, PCO2, PO2  No components found for: [place]   Lab Results   Component Value Date/Time    CK 46 05/29/2018 05:45 PM    CK - MB 1.4 05/29/2018 05:45 PM                Total time to take care of this patient was 35 minutes and more than 50% of time was spent counseling and coordinating care.      Disclaimer: Sections of this note are dictated using utilizing voice recognition software, which may have resulted in some phonetic based errors in grammar and contents. Even though attempts were made to correct all the mistakes, some may have been missed, and remained in the body of the document. If questions arise, please contact our department.

## 2022-09-15 NOTE — PROGRESS NOTES
0725 Bedside shift change report given to Venita Lao RN (oncoming nurse) by Polo Olea RN (offgoing nurse). Report included the following information SBAR, Kardex, Intake/Output, MAR, and Recent Results. 1916 Bedside shift change report given to Wayne Leonard (oncoming nurse) by Venita Lao RN (offgoing nurse). Report included the following information SBAR, Kardex, Intake/Output, MAR, and Recent Results.

## 2022-09-15 NOTE — PROGRESS NOTES
Palliative care note from yesterday reviewed. Comfort measures to start at discharge. Will not follow actively. Please call if needed.

## 2022-09-15 NOTE — PROGRESS NOTES
Problem: Pressure Injury - Risk of  Goal: *Prevention of pressure injury  Description: Document Anam Scale and appropriate interventions in the flowsheet. Outcome: Progressing Towards Goal  Note: Pressure Injury Interventions:  Sensory Interventions: Float heels, Keep linens dry and wrinkle-free, Minimize linen layers    Moisture Interventions: Absorbent underpads, Check for incontinence Q2 hours and as needed, Minimize layers    Activity Interventions: Pressure redistribution bed/mattress(bed type)    Mobility Interventions: Assess need for specialty bed, HOB 30 degrees or less, Pressure redistribution bed/mattress (bed type)    Nutrition Interventions: Document food/fluid/supplement intake    Friction and Shear Interventions: Feet elevated on foot rest, HOB 30 degrees or less, Lift sheet, Minimize layers                Problem: Patient Education: Go to Patient Education Activity  Goal: Patient/Family Education  Outcome: Progressing Towards Goal     Problem: Falls - Risk of  Goal: *Absence of Falls  Description: Document Joana Fall Risk and appropriate interventions in the flowsheet.   Outcome: Progressing Towards Goal  Note: Fall Risk Interventions:  Mobility Interventions: Bed/chair exit alarm, Communicate number of staff needed for ambulation/transfer    Mentation Interventions: Adequate sleep, hydration, pain control, Bed/chair exit alarm, Door open when patient unattended    Medication Interventions: Bed/chair exit alarm, Evaluate medications/consider consulting pharmacy    Elimination Interventions: Call light in reach, Patient to call for help with toileting needs, Toileting schedule/hourly rounds    History of Falls Interventions: Bed/chair exit alarm, Door open when patient unattended, Evaluate medications/consider consulting pharmacy, Investigate reason for fall, Room close to nurse's station, Utilize gait belt for transfer/ambulation         Problem: Patient Education: Go to Patient Education Activity  Goal: Patient/Family Education  Outcome: Progressing Towards Goal     Problem: Patient Education: Go to Patient Education Activity  Goal: Patient/Family Education  Outcome: Progressing Towards Goal     Problem: Risk for Spread of Infection  Goal: Prevent transmission of infectious organism to others  Description: Prevent the transmission of infectious organisms to other patients, staff members, and visitors.   Outcome: Progressing Towards Goal     Problem: Patient Education:  Go to Education Activity  Goal: Patient/Family Education  Outcome: Progressing Towards Goal

## 2022-09-15 NOTE — PROGRESS NOTES
CM notes that hospice met with family and son still has questions concerning surgical option. The family also wants CM to reach out to the [address] [place] [address] to see if they can offer assistance. CMS referral placed. CM notes that patient spouse is interested in placement if patient goes on hospice. Patient is connected to the [address] [place] [address]. Please note [facility] placement can be pursued once the family has made a definitive decision on hospice. Clinicals sent to the [address] [place] [address]. VALARIE spoke with spouse who wants to pursue Hospice through the [address] [place] [address]. VALARIE contacted Malka Ramirez [phone], the [facility] coordinator. She will review clinicals and contact the family.

## 2022-09-15 NOTE — PROGRESS NOTES
Problem: Dysphagia (Adult)  Description: Patient will:  1. Tolerate PO trials with 0 s/s overt distress in 4/5 trials. 2. Utilize compensatory swallow strategies/maneuvers (decrease bite/sip, size/rate, alt. liq/sol) with min cues in 4/5 trials. 3. Perform oral-motor/laryngeal exercises to increase oropharyngeal swallow function with min cues. 4. If medically indicated, complete an objective swallow study (i.e., MBSS) to assess swallow integrity, r/o aspiration, and determine of safest LRD, min A.    Rec:     Minced/moist diet with thin liquids - feeding assistance, pacing  Aspiration precautions  HOB >45 during po intake, remain >30 for 30-45 minutes after po   Small bites/sips; alternate liquid/solid with slow feeding rate   Oral care TID  Meds crushed in applesauce  Outcome: Progressing Towards Goal      SPEECH [address]   EVALUATION & TREATMENT     Patient: Merry Tian (71 y.o. male)  Date: 9/15/2022  Primary Diagnosis: Sepsis ([address] [A41.9]  Strangulated hernia of abdominal wall [K43.6]  UTI (urinary tract infection) [N39.0]  AMS (altered mental status) [R41.82]  Acidosis [E87.2]  Hyperkalemia [E87.5]  Procedure(s) (LRB):  EXPLORATORY LAPAROTOMY, REDUCTION INCARCERATED RIGHT GROIN HERNIA WITH HERNIA REPAIR WITH MESH (N/A) 19 Days Post-Op   Precautions: Aspiration     PLOF: Previously on minced/moist, thin    ASSESSMENT :  Based on the objective data described below, the patient presents with mild-moderate oropharyngeal dysphagia. ST re-consulted following seizure that occurred 9/13/22 after which patient made NPO. Patient was alert upon entry; shook head in response to orientation questions. Patient repositioned with HOB>45 degrees. OM examination limited due to poor command following. Limited/poor dentition; decreased labial seal. Suspect reduced lingual strength, coordination, and ROM. PO trials of thin, pudding, and solid textures given.  Patient agreeable as evidenced by patient opening mouth in response to straw/ spoon being placed on patient's bottom lip. Patient took single and consecutive sips + straw; suspect pharyngeal weakness as evidenced by double/multiple swallows per bolus. Wet vocal quality noted after consecutive sips of thin liquids; patient cued to clear throat but only followed directive in 1/5 opportunities. Patient should only be given small, single sips of thin liquids. No difficulties visualized with pudding texture. Patient demo significantly prolonged mastication, oral transit with solid textures. Likely 2/dentition and overall weakness. Suspect swallowing initiation delay and laryngeal elevation appears reduced (to palpation). Patient required multiple cues to swallow and required liquid wash/tongue sweep to clear oral cavity. Recommend trial of minced/moist, thin liquid diet with swallow, aspiration, reflux precautions. Posted precautions in patient's room and discussed with RN/CNA. TREATMENT :  Skilled therapy initiated; Educated pt on aspiration precautions and importance of compensatory swallow techniques to decrease aspiration risk (decrease rate of intake & sip/bite size, upright @HOB for all po intake and ~30 minutes after po); verbalized comprehension. SLP to follow as indicated. Patient will benefit from skilled intervention to address the above impairments. Patient's rehabilitation potential is considered to be Fair  Factors which may influence rehabilitation potential include:   []            None noted  [x]            Mental ability/status  [x]            Medical condition  []            Home/family situation and support systems  [x]            Safety awareness  []            Pain tolerance/management  []            Other:      PLAN :  Recommendations and Planned Interventions:  See above  Frequency/Duration: Patient will be followed by speech-language pathology 3 times a week to address goals. Discharge Recommendations:  To Be Determined     SUBJECTIVE:   Patient stated that's enough. OBJECTIVE:     Past Medical History:   Diagnosis Date    A-fib ([facility]     Asthma     CAD (coronary artery disease)     Chronic kidney disease     stage 3    COVID-19     Diabetes (HCC)     GERD (gastroesophageal reflux disease)     Heart failure (HCC)     chronic diastolic heart failure    High cholesterol     Hypertension     Ill-defined condition     chronic osteomyelitis left ankle and foot    Ill-defined condition     chronic arteriosclerosis    PVD (peripheral vascular disease) ([facility]     Stroke ([facility]     cva     Past Surgical History:   Procedure Laterality Date    COLONOSCOPY N/A 6/1/2018    COLONOSCOPY, POLYPECTOMY performed by Mari Vail MD at THE [facility] ENDOSCOPY    HX CATARACT REMOVAL      HX ORTHOPAEDIC Left 2021    ankle washout, external fixator, would vac    HX ORTHOPAEDIC      external fixator removed     Home Situation:   Home Situation  Support Systems: Spouse/Significant Other  Patient Expects to be Discharged to[de-identified] Unable to determine at this time    Diet prior to admission: previous on minced/moist, thin  Current Diet:  NPO     Cognitive and Communication Status:  Neurologic State: Alert, Confused  Orientation Level: Unable to verbalize (shakes head)  Cognition: Unable to assess (comment) (shakes head)  Perception: Appears intact  Perseveration: No perseveration noted  Safety/Judgement: Decreased insight into deficits  Oral Assessment:  Oral Assessment  Labial: Decreased seal  Dentition: Natural;Limited;Poor  Oral Hygiene:  (adequate)  Lingual: Other (comment) (unable to assess due to poor command following; suspect decreased rate, strength, coordination)  Velum:  (unable to assess due to poor command following)  Mandible: Other (comment) (unable to assess due to poor command following)  Gag Reflex:  (did not assess)  P.O.  Trials:  Patient Position:  (HOB >45 degrees)  Vocal quality prior to P.O.: Strain;Hoarse  Consistency Presented: Thin liquid;Pudding; Solid  How Presented: SLP-fed/presented;Spoon;Straw;Successive swallows     Bolus Acceptance: Impaired  Bolus Formation/Control: Impaired  Type of Impairment: Mastication;Piecemeal  Propulsion: Discoordination;Delayed (# of seconds)  Oral Residue: Lingual;10-50% of bolus; Left;Right  Initiation of Swallow:  (suspect delay)  Laryngeal Elevation: Decreased;Weak  Aspiration Signs/Symptoms: Change vocal quality  Pharyngeal Phase Characteristics: Double swallow;Easily fatigued ; Poor endurance; Suspected pharyngeal residue  Effective Modifications: Alternate liquids/solids;Small sips and bites (throat clear)  Cues for Modifications: Moderate-maximal       Oral Phase Severity: Mild-moderate  Pharyngeal Phase Severity :  (suspect)    PAIN:  Pain level pre-treatment: unable to verbalize #/10   Pain level post-treatment: unable to verbalize #/10     After treatment:   []            Patient left in no apparent distress sitting up in chair  [x]            Patient left in no apparent distress in bed  [x]            Call bell left within reach  [x]            Nursing notified  []            Family present  []            Caregiver present  []            Bed alarm activated    COMMUNICATION/EDUCATION:   [x]            Aspiration precautions; swallow safety; compensatory techniques. []            Patient/family have participated as able in goal setting and plan of care. []            Patient/family agree to work toward stated goals and plan of care. []            Patient understands intent and goals of therapy; neutral about participation. [x]            Patient unable to participate in goal setting/plan of care; educ ongoing with interdisciplinary staff  []         Posted safety precautions in patient's room.     Thank you for this referral.  SCOOBY Sinclair  Time Calculation: 26 mins  Evaluation Time: 18 minutes   Treatment Time: 8 minutes

## 2022-09-15 NOTE — PROGRESS NOTES
NEUROLOGY PROGRESS NOTE        Patient: Romel Shields Sex: male          DOA: 8/27/2022  YOB: 1945      Age:  78 y.o.         LOS: 19 days     Identification:  [id number] man with history of hypertension, hyperlipidemia, stroke, PAD, chronic A. fib and diabetes, who presented with a single seizure            SUBJECTIVE:   The patient is more awake today. He is not compliant with interview or examination. His EEG shows generalized slowing. REVIEW OF SYSTEMS: I am unable to review the systems due to compliance issues. OBJECTIVE:    Visit Vitals  BP (!) 149/60   Pulse 80   Temp 98 °F (36.7 °C)   Resp 18   Ht 5' 7.99\" (1.727 m)   Wt 60.5 kg (133 lb 6.4 oz)   SpO2 97%   BMI 20.29 kg/m²     Physical Exam:  GEN: Alert, but agitated since he was woken up from sleep. Widespread  EYES: conjunctiva normal, lids with out lesions  HEENT: MMM. HEART: RRR +S1 +S2  LUNGS: CTA B/L no rales or rhonchi. ABDOMEN: Soft, non-tender. EXTREMITIES: Ecchymosis. Left BKA. SKIN: no rashes or skin breakdown, no nodules  NEURO: Alert. It is difficult to examine the patient. He moves all extremities. Face looks symmetric.     Current Facility-Administered Medications   Medication Dose Route Frequency    furosemide (LASIX) injection 20 mg  20 mg IntraVENous DAILY    levoFLOXacin (LEVAQUIN) 500 mg in D5W IVPB  500 mg IntraVENous Q24H    metroNIDAZOLE (FLAGYL) IVPB premix 500 mg  500 mg IntraVENous Q8H    metoprolol (LOPRESSOR) injection 1.25 mg  1.25 mg IntraVENous Q6H    sodium chloride (NS) flush 5-40 mL  5-40 mL IntraVENous Q8H    sodium chloride (NS) flush 5-40 mL  5-40 mL IntraVENous PRN    diazePAM (VALIUM) injection 9.3 mg  0.15 mg/kg IntraVENous Q10MIN PRN    LORazepam (ATIVAN) 2 mg/mL injection 1 mg  1 mg IntraVENous Q10MIN PRN    levETIRAcetam (KEPPRA) 500 mg in 0.9% sodium chloride 100 mL IVPB  500 mg IntraVENous Q12H    pantoprazole (PROTONIX) tablet 40 mg  40 mg Oral ACB    atorvastatin (LIPITOR) tablet 40 mg  40 mg Oral QHS    [Held by provider] metoprolol succinate (TOPROL-XL) XL tablet 50 mg  50 mg Oral DAILY    insulin lispro (HUMALOG) injection   SubCUTAneous AC&HS    Saccharomyces boulardii (FLORASTOR) capsule 250 mg  250 mg Oral BID    [Held by provider] potassium bicarb-citric acid (EFFER-K) tablet 40 mEq  40 mEq Oral DAILY    povidone-iodine (BETADINE) 10 % topical solution   Topical DAILY    ipratropium (ATROVENT) 0.02 % nebulizer solution 0.5 mg  0.5 mg Nebulization Q6H PRN    collagenase (SANTYL) 250 unit/gram ointment   Topical DAILY    0.9% sodium chloride infusion 250 mL  250 mL IntraVENous PRN    hydrALAZINE (APRESOLINE) 20 mg/mL injection 20 mg  20 mg IntraVENous Q6H PRN    midazolam (VERSED) injection 0.5 mg  0.5 mg IntraVENous BID PRN    0.9% sodium chloride infusion 250 mL  250 mL IntraVENous PRN    heparin (porcine) injection 5,000 Units  5,000 Units SubCUTAneous Q8H    sodium chloride (NS) flush 5-40 mL  5-40 mL IntraVENous Q8H    sodium chloride (NS) flush 5-40 mL  5-40 mL IntraVENous PRN    acetaminophen (TYLENOL) tablet 650 mg  650 mg Oral Q6H PRN    Or    acetaminophen (TYLENOL) suppository 650 mg  650 mg Rectal Q6H PRN    polyethylene glycol (MIRALAX) packet 17 g  17 g Oral DAILY PRN    ondansetron (ZOFRAN ODT) tablet 4 mg  4 mg Oral Q8H PRN    Or    ondansetron (ZOFRAN) injection 4 mg  4 mg IntraVENous Q6H PRN    glucose chewable tablet 16 g  4 Tablet Oral PRN    glucagon (GLUCAGEN) injection 1 mg  1 mg IntraMUSCular PRN    dextrose 10% infusion 0-250 mL  0-250 mL IntraVENous PRN    HYDROmorphone (DILAUDID) injection 1 mg  1 mg IntraVENous Q4H PRN       Laboratory  Recent Results (from the past 24 hour(s))   GLUCOSE, POC    Collection Time: 09/14/22 11:40 AM   Result Value Ref Range    Glucose (POC) 111 (H) 70 - 110 mg/dL   GLUCOSE, POC    Collection Time: 09/14/22  6:12 PM   Result Value Ref Range    Glucose (POC) 119 (H) 70 - 110 mg/dL   GLUCOSE, POC    Collection Time: 09/14/22  9:27 PM   Result Value Ref Range    Glucose (POC) 133 (H) 70 - 184 mg/dL   METABOLIC PANEL, BASIC    Collection Time: 09/15/22  2:01 AM   Result Value Ref Range    Sodium 140 136 - 145 mmol/L    Potassium 4.4 3.5 - 5.5 mmol/L    Chloride 107 100 - 111 mmol/L    CO2 25 21 - 32 mmol/L    Anion gap 8 3.0 - 18 mmol/L    Glucose 128 (H) 74 - 99 mg/dL    BUN 50 (H) 7.0 - 18 MG/DL    Creatinine 1.44 (H) 0.6 - 1.3 MG/DL    BUN/Creatinine ratio 35 (H) 12 - 20      GFR est AA 58 (L) >60 ml/min/1.73m2    GFR est non-AA 48 (L) >60 ml/min/1.73m2    Calcium 9.0 8.5 - 10.1 MG/DL   GLUCOSE, POC    Collection Time: 09/15/22  8:00 AM   Result Value Ref Range    Glucose (POC) 127 (H) 70 - 110 mg/dL       Radiology:  XR ABD (KUB)    Result Date: 9/5/2022  EXAM: XR ABD (KUB) CLINICAL INDICATION/HISTORY: Abdominal pain and distention COMPARISON: None. TECHNIQUE: 3 supine AP views of the abdomen were obtained _______________ FINDINGS: Skin staples overlie right lower quadrant. There is a single borderline prominent air-filled bowel loop overlying the right paracentral inferior abdomen and superior pelvis, measuring approximately 2.7 cm in diameter. There is also seen more distally throughout the right colon. The soft tissue planes and bony structures appear normal.  _______________     Single borderline prominent air-filled right paracentral small bowel loop, appearance most suggestive of postoperative ileus. XR ABD (KUB)    Result Date: 9/5/2022  AP portable lower chest and upper abdomen for NG/OG tube placement: NG/OG tube is within the stomach. It is not as distally located as previous on the comparison exam from 9/3/2022. Evidence of abdominal surgery with midline skin staples. Persistent right lower lobe atelectasis/infiltrate with some pleural thickening or pleural fluid. NG/OG tube as described within the stomach.     XR ABD (KUB)    Result Date: 9/3/2022  EXAM: XR ABD (KUB) CLINICAL INDICATION/HISTORY: NGT Placement COMPARISON: 8/31/2022 TECHNIQUE: Supine AP view of the abdomen was obtained. _______________ FINDINGS: Gastric tube extends from midline thoracic esophagus curves through the left upper quadrant and has the tip and side-port overlying right mid abdomen in the region of the distal stomach or proximal duodenum. No air distended bowel loops nor other evidence of bowel obstruction or bowel dilatation. The soft tissue planes and bony structures appear normal.  _______________     Adequate gastric tube placement with tip and side-port in the region of the distal stomach/proximal duodenum. XR ABD (KUB)    Result Date: 8/31/2022  PORTABLE ABDOMEN Indication:  OGT placement. Technique: Portable supine AP view of the abdomen was obtained. Comparison studies:  August 27, 2022. Findings: Moderate rotation. Gastric tube terminates in the fundus of the stomach. Nonobstructive gas pattern. Telemetry leads. Lower abdominal skin closing staples. Minor scattered osseous degenerative changes. Moderate rotation. Gastric tube terminates in the fundus of the stomach. Nonobstructive gas pattern. MRI BRAIN WO CONT    Result Date: 9/14/2022  EXAM: MRI of the brain without intravenous contrast. INDICATION:  \"seizure. \" COMPARISON:  No prior MRI is available for direct comparison. CORRELATION (related prior exam):  Recent CT. PROTOCOL:  Routine brain. _______________ FINDINGS:       IMAGE QUALITY:  The exam is overall moderately degraded by motion artifact. BRAIN AND EXTRA-AXIAL SPACE:           ACUTE/SUBACUTE INFARCT:  None. MASS:  None. HEMORRHAGE:  None. SUBDURAL FLUID COLLECTION:  None. HYDROCEPHALUS:  None. ICA AND [place]IN[place] VA T2 FLOW VOIDS:  Unremarkable. REMOTE CEREBRAL TERRITORIAL INFARCT:  None definite. REMOTE CEREBELLAR INFARCT:  None definite.            STRIVE (STandards for Reporting Vascular changes on nEuroimaging): --[place] of white matter hyperintensity (\"leukoaraiosis\") of presumed vascular origin:  Mild to moderate. --[place] of chronic lacunes of presumed vascular origin:  Small focus in the right lentiform nucleus. --[place] of perivascular spaces:  None significant. --[place] of \"microbleeds\":   None definite. --Degree of brain atrophy:  Moderate to marked. SELLA/PITUITARY:  Unremarkable. HEENT:            ORBITS:  There are bilateral lens replacements. PARANASAL SINUSES:  Predominantly clear. MASTOID AIR CELLS:  Right mastoid and middle ear opacification by fluid, unchanged relative to the pubic CT performed yesterday but new since the CT of 8/27/2022. INCLUDED UPPER CERVICAL LYMPH NODES:  Unremarkable. INCLUDED UPPER PAROTIDS:  Unremarkable. NASOPHARYNX:  Unremarkable. BONE MARROW SIGNAL:  Unremarkable. SUPERFICIAL SOFT TISSUES: Unremarkable. _______________     1. Generalized chronic changes, however, no mass or acute findings. 2.  Fluid opacifies the right mastoid air cells and middle ear, new since 8/27/2022. _______________     CT HEAD WO CONT    Result Date: 9/13/2022  EXAM: CT HEAD WITHOUT CONTRAST CLINICAL INDICATION/HISTORY: RRT -Additional: Altered mental status COMPARISON: 8/27/2022 TECHNIQUE: Serial axial images of the head were performed without intravenous contrast. Dose reduction techniques:  Automated exposure control, mAs and/or kVp reductions based on patient size, and iterative reconstruction. The specific techniques utilized on this CT exam have been documented in the patient's electronic medical record.  Digital Imaging and Communications in Medicine (DICOM) format image data are available to nonaffiliated external healthcare facilities or entities on a secure, media free, reciprocally searchable basis with patient authorization for at least a 12-month period after this study. _______________ FINDINGS: BRAIN:   > Intraparenchymal hemorrhage: None.   > Mass effect/edema: None.   > Infarct/encephalomalacia: No evidence of acute cortical ischemia.   > White matter: Unremarkable.   > Brain volume: Normal for age. EXTRA-AXIAL SPACES:   > No extra-axial hemorrhage.   > No hydrocephalus. SINUSES/MASTOIDS: Nonspecific right mastoid effusion. CALVARIA: Intact. OTHER: None. _______________     No acute intracranial findings. Exam is limited by motion artifact. CT HEAD WO CONT    Result Date: 8/27/2022  EXAM: CT head INDICATION: Altered mental status. COMPARISON: None. TECHNIQUE: Axial CT imaging of the head was performed without intravenous contrast.  One or more dose reduction techniques were used on this CT: automated exposure control, adjustment of the mAs and/or kVp according to patient size, and iterative reconstruction techniques. The specific techniques used on this CT exam have been documented in the patient's electronic medical record. Digital Imaging and Communications in Medicine (DICOM) format image data are available to nonaffiliated external healthcare facilities or entities on a secure, media free, reciprocally searchable basis with patient authorization for at least a 12-month period after this study. Patient motion degrades image quality. _______________ FINDINGS: BRAIN AND POSTERIOR FOSSA: Mild periventricular areas of decreased attenuation are identified. Gray-white matter interfaces are preserved. No intraparenchymal hemorrhage, mass effect or midline shift. The ventricular system is midline and symmetric. Atherosclerotic vascular calcifications are identified. EXTRA-AXIAL SPACES AND MENINGES: There is no evidence for extra-axial mass lesion or fluid collection. CALVARIUM: Intact. SINUSES: Sphenoid sinus mucosal thickening. OTHER: Orbits are grossly intact. Facial soft tissue are within normal limits. _______________     1. No evidence for intracranial hemorrhage, mass effect or midline shift. 2.  Mild periventricular areas of microvascular ischemic change. 3.  Cerebral atherosclerosis. 4.  Atrophy. If there are continued symptoms, a MRI is recommended for further evaluation. CT ABD PELV WO CONT    Result Date: 8/27/2022  EXAM: CT of the abdomen and pelvis INDICATION: Pain. COMPARISON: May 14, 2021. TECHNIQUE: Axial CT imaging of the abdomen and pelvis was performed without intravenous contrast. Multiplanar reformats were generated. One or more dose reduction techniques were used on this CT: automated exposure control, adjustment of the mAs and/or kVp according to patient size, and iterative reconstruction techniques. The specific techniques used on this CT exam have been documented in the patient's electronic medical record. Digital Imaging and Communications in Medicine (DICOM) format image data are available to nonaffiliated external healthcare facilities or entities on a secure, media free, reciprocally searchable basis with patient authorization for at least a 12-month period after this study. _______________ FINDINGS: LOWER CHEST: Moderate right pleural effusion with adjacent atelectasis. LIVER, BILIARY: Liver is normal. No biliary dilation. Gallbladder is unremarkable. PANCREAS: Normal. SPLEEN: Splenic granuloma. ADRENALS: Normal. KIDNEYS: Normal. LYMPH NODES: No enlarged lymph nodes. GASTROINTESTINAL TRACT: Uncomplicated sigmoid diverticula. Gastric distention. PELVIC ORGANS: Unremarkable. VASCULATURE: Atherosclerotic vascular calcifications. BONES: No acute or aggressive osseous abnormalities identified. OTHER: Large right inguinal hernia with large and small bowel. There is diffuse circumferential wall thickening of ascending colon within the hernia. _______________     1. Large right inguinal hernia with large and small bowel.  There is diffuse wall thickening of the ascending colon concerning for strangulation. No evidence for bowel obstruction. 2. Right pleural effusion and bilateral basilar atelectasis. 3. Gastric distention. XR CHEST PORT    Result Date: 9/14/2022  HISTORY: Sepsis, altered mental status. COMPARISON: September 2, 2022. FINDINGS: A portable view of the chest: Interval increased, moderate right and small left pleural effusions, associated regions of airspace opacity could be atelectasis or pneumonia. No pneumothoraces. Some patient rotation/obliquity noted. Mediastinal contour appears stable noting atherosclerosis and borderline heart size. No acute bony finding. Multiple artifacts on the chest.     Interval increased, moderate right and small left pleural effusions, associated regions of airspace opacity could be atelectasis or pneumonia. No pneumothoraces. Some patient rotation/obliquity noted. XR CHEST PORT    Result Date: 9/2/2022  CLINICAL HISTORY:  Tachypnea COMPARISONS:  Chest x-ray 9/2/2022 TECHNIQUE:  single frontal view of the chest ------------------------------------------ FINDINGS: Lungs:  Patchy parenchymal opacity in the right lung, similar to the prior radiograph and likely largely scar. Blunting of the right lateral costophrenic sulcus, also similar. Mild chronic appearing interstitial markings within the left lung, similar to prior radiograph. Mediastinum: Moderate cardiomegaly. Mediastinum is shifted towards the right, unchanged. Atherosclerotic arterial calcification. Bones: No evidence of fracture or suspicious bone lesion. ------------------------------------------    1. No significant interval change. Probable small right pleural effusion, not appreciably changed. 2. Right lung parenchymal findings likely largely reflect scar though are not well assessed. Pneumonia or aspiration not entirely excluded.     XR CHEST PORT    Result Date: 9/2/2022  EXAM: XR CHEST PORT CLINICAL INDICATION/HISTORY: Post right thoracentesis -Additional: None COMPARISON: 9/2/2022 TECHNIQUE: Portable frontal view of the chest _______________ FINDINGS: SUPPORT DEVICES: None. HEART AND MEDIASTINUM: Normal cardiac size and mediastinal contours. LUNGS AND PLEURAL SPACES: Decreased volume right-sided pleural effusion status post thoracentesis with persistent lateral and basilar components. Streaky areas of atelectasis and interstitial densities noted across the mid and lower lung zones. No evidence of postprocedural pneumothorax. Left lung clear as visualized. [place] along the peripheral left hemithorax. BONY THORAX AND SOFT TISSUES: Unremarkable. _______________     1. No evidence of postprocedural pneumothorax is significantly improved right lung aeration status post thoracentesis. > Residual pleural effusion and streaky areas of atelectasis. XR CHEST PORT    Result Date: 9/2/2022  EXAM: XR CHEST PORT CLINICAL INDICATION/HISTORY: intubated -Additional: None COMPARISON: One day prior TECHNIQUE: Portable frontal view of the chest _______________ FINDINGS: SUPPORT DEVICES: Interval extubation and removal of enteric tube. HEART AND MEDIASTINUM: Heart is obscured by right consolidation. LUNGS AND PLEURAL SPACES: Large right pleural effusion with adjacent atelectasis. Left-sided interstitial opacities No pneumothorax. _______________     Large right pleural effusion with adjacent atelectasis. XR CHEST PORT    Result Date: 9/1/2022  EXAM: Portable chest radiograph 9/1/2022 CLINICAL INDICATION/HISTORY: Intubation. TECHNIQUE: A semierect portable radiograph was obtained. COMPARISON: 8/31/2022. FINDINGS: Endotracheal tube remains in position with its tip approximately 5.5 cm above the level of the alise. A nasogastric tube extends below the field of view. The patient is slightly rotated to the right which distorts the cardiomediastinal contours. However, these contours are grossly unchanged.  Increasing right pleural effusion is present with associated increasing atelectasis and/or infiltrate in the right lung. There is increasing interstitial edema and/or interstitial infiltrate throughout the left lung. There is no pneumothorax or significant left pleural effusion. 1. Increasing interstitial edema and/or interstitial infiltration with increasing right pleural effusion. XR CHEST PORT    Result Date: 8/31/2022  EXAM: XR CHEST PORT CLINICAL INDICATION/HISTORY: intubated -Additional: None COMPARISON: 8/30/2022 TECHNIQUE: Portable frontal view of the chest _______________ FINDINGS: SUPPORT DEVICES: Endotracheal and enteric tubes unchanged. HEART AND MEDIASTINUM: Cardiomediastinal silhouette within normal limits. LUNGS AND PLEURAL SPACES: Patient rotated to right. Moderate right pleural effusion/atelectasis. No pneumothorax. _______________     Moderate right pleural effusion/atelectasis. XR CHEST PORT    Result Date: 8/30/2022  EXAM:  PORTABLE CHEST INDICATION:  Follow up. TECHNIQUE:  Portable, AP view. COMPARISON:  08/29/2022 ____________________ FINDINGS:  SUPPORT DEVICES: Endotracheal tube approximately 4 cm above the alise. OG tube is traversing below left hemidiaphragm, tip not imaged. Esophageal probe is present at the level of the endotracheal tube. HEART AND MEDIASTINUM: Stable. LUNGS AND PLEURAL SPACES: Persistent right basilar opacity consistent with pleural effusion with adjacent consolidation. No pneumothorax. BONY THORAX AND SOFT TISSUES: No acute osseous abnormality. ____________________     1. Endotracheal tube approximately 4 cm above the alise. 2. No significant change in right basilar opacity consistent with pleural effusion with adjacent consolidation, atelectasis or pneumonia. *  **     XR CHEST PORT    Result Date: 8/29/2022  HISTORY: -Provided with order: intubated -Additional: None Technique : AP PORTABLE CHEST Comparison : None. FINDINGS: HEART AND MEDIASTINUM: Anterior endotracheal tube tip estimated at 6.1 cm above alise.  Enteric tube tip projects over left upper quadrant expected location gastric fundus. Esophageal temperature probe tip projects over lower thoracic . Stable cardiomediastinal silhouette allowing for angulation. LUNGS AND PLEURAL SPACES: Progressive right pleural effusion and patchy opacities in the right lung. Left lung relatively clear allowing for technique. BONY THORAX AND SOFT TISSUES: No gross acute osseous abnormality. 1. Right pleural effusion and subjacent atelectasis/infiltrate, perhaps slightly progressed. 2. Tubes and lines stable in visualized extent. XR CHEST PORT    Result Date: 8/28/2022  EXAM: CHEST RADIOGRAPH CLINICAL INDICATION/HISTORY: intubated   > Additional: None COMPARISON: 8/27/2022. TECHNIQUE: Portable frontal view of the chest _______________ FINDINGS: SUPPORT DEVICES: Endotracheal tube distal tip projects 5.5 cm above the alise. Enteric tube distal tip projects below the left hemidiaphragm likely in the stomach. HEART AND MEDIASTINUM: No appreciable cardiomegaly. Remaining mediastinal contours are stable. LUNGS AND PLEURAL SPACES: Right pleural effusion with adjacent opacity. No evidence for pneumothorax. Left lung is clear. BONY THORAX AND SOFT TISSUES: Unremarkable. _______________     1. Right pleural effusion with adjacent atelectasis/infiltrates. 2. Supporting tubes appear stable. XR CHEST PORT    Result Date: 8/27/2022  EXAM: CHEST RADIOGRAPH CLINICAL INDICATION/HISTORY: intubated -Additional: None COMPARISON: August 27, 2022 TECHNIQUE: Portable frontal view of the chest _______________ FINDINGS: SUPPORT DEVICES: An endotracheal tube is positioned 6 cm above the alise. HEART AND MEDIASTINUM: Heart size and mediastinal contour are midline and within normal limits. LUNGS AND PLEURAL SPACES: There are opacities throughout both lungs but more confluent within the right mid and lower lung zones. No pneumothorax.  BONY THORAX AND SOFT TISSUES: Unremarkable. _______________     Interval right greater than left airspace disease Small to moderate right pleural effusion    XR CHEST PORT    Result Date: 8/27/2022  EXAM: CHEST RADIOGRAPHS CLINICAL INDICATION/HISTORY: Sepsis   > Additional: None COMPARISON: 6/18/2022. TECHNIQUE: PA and lateral views of the chest _______________ FINDINGS: HEART AND MEDIASTINUM: No appreciable cardiomegaly. Remaining mediastinal contours within normal limits. LUNGS AND PLEURAL SPACES: Left lung is clear. Right pleural effusion with adjacent opacity. BONY THORAX AND SOFT TISSUES: Unremarkable. _______________     1. Right pleural effusion with probable adjacent atelectasis. XR ABD PORT  1 V    Result Date: 8/27/2022  EXAM: ABDOMINAL RADIOGRAPHS CLINICAL INDICATION/HISTORY: OG placement -Additional: None COMPARISON: None TECHNIQUE: Single supine view of the abdomen _______________ FINDINGS: SUPPORT DEVICES: A nasogastric tube is positioned in the stomach. BOWEL GAS PATTERN: The gas pattern is nonobstructive. [place] SOFT TISSUES: Unremarkable. BONES: Degenerative changes are seen throughout the spine. ADDITIONAL FINDINGS: None. _______________     No significant abnormality. US THORACENTESIS RT NDL W IMAGE    Result Date: 9/2/2022  PROCEDURE: ULTRASOUND GUIDED RIGHT THORACENTESIS INDICATION: Right pleural effusion, shortness of breath PERFORMED BY:  Tyree Smith PA-C ATTENDING PHYSICIAN:  Casey Smyth MD SUPERVISION: General _______________ TECHNIQUE & FINDINGS: CONSENT: The risks, benefits, and alternatives to the procedure were explained to the patient's wife prior to the procedure. Written informed consent was obtained and witnessed. Standard pre-procedure time out performed. TECHNIQUE/FINDINGS:  The right pleural effusion was localized under ultrasound guidance. Sterile ultrasound probe cover and ultrasound gel were utilized. The skin was marked, prepped and draped in the usual sterile fashion. 1% Lidocaine was used for local anesthesia.   A 5 [place]anya Yueh needle was advanced into the right pleural space under US guidance. A total of 460 mL of clear alanna fluid was drained using a Vacutainer system. A specimen was collected and taken to the lab, as requested. The catheter was then removed and the site dressed with a sterile bandage. The patient tolerated the procedure well and there were no immediate complications. Post-procedure chest x-ray shows no pneumothorax. GUIDANCE: Ultrasound guidance was used to position (and confirm the position of) the needle. Ultrasound image(s) saved in PACS. _______________     Successful ultrasound-guided thoracentesis with removal of 460 mL of fluid. Post-procedure chest x-ray shows no pneumothorax. A specimen was collected and taken to the lab, as requested. ECHO ADULT COMPLETE    Result Date: 8/28/2022  Formatting of this result is different from the original.   Left Ventricle: Normal left ventricular systolic function with a visually estimated EF of 60 - 65%. Left ventricle size is normal. Normal wall thickness. Normal wall motion. Grade I diastolic dysfunction present with normal LV EF. Unable to assess RVSP due to inadequate or insignificant tricuspid regurgitation. DUPLEX LOWER EXT ARTERY RIGHT    Result Date: 9/6/2022  · Patent right leg vessels with good upstroke. ASSESSMENT/IMPRESSION:   A single seizure in the setting of toxic/metabolic encephalopathy. The patient has increased risk factor for seizures. Even though the seizure looks to be provoked, it would be reasonable to continue levetiracetam as it is. PLAN/RECOMMENDATIONS:  1. Continue levetiracetam 500 mg IV/p.o. twice daily  2. Treatment of underlying infection/toxic/metabolic etiologies    Neurology signs off at this time. Please do not hesitate to return with any questions.     Signed:  Anneliese Romano MD  9/15/2022  9:06 AM

## 2022-09-15 NOTE — PROGRESS NOTES
Bedside and Verbal shift change report given to Jacobo Brenner, RN and Marichuy Almanzar, RN (oncoming nurse) by Glenny Baires RN (offgoing nurse). Report included the following information SBAR, Kardex, MAR, Recent Results, and Cardiac Rhythm NSR .      2000H Shift assessment done;Patient awakes when called; unable to verbalized some concerns and mostly have flat affect but able to localize pain at times. [id number] Informed Hospitalist regarding patient placed on NPO and was still having oral meds. Hospitalist ordered to hold oral medications for now.

## 2022-09-15 NOTE — PROGRESS NOTES
[facility]  (A Division of [address] [place] [address])                                                                                                                      Ana Saenz MD  Office #: - Option # [phone]  Fax #: [phone]     Date of Admission: 8/27/2022Date of Note: 9/15/2022  Reason for Referral: Evaluation and antibiotic management of septic shock. Current Antimicrobials:    Prior Antimicrobials:    Levofloxacin/Flagyl 9/14 to date   Levofloxacin / Vancomycin 8/27 to 8/30  Zosyn 8/27 to 9/4/22       Assessment- ID related:  --------------------------------------------------------------    Recent episode of Sezure  BL lung infiltrate- BL lung effusion-- doubt HAP  S/P Septic shock with ELINOR/ resp failure and encephalopathy 2/2 below: Improved  Encephalopathy  Strangulated and incarcerated R groin hernia -post X-lap 8/27- viable bowel per op note, no resection  Leukemoid reaction- Improved  S/P resp failure- post op, remains intubated--increasing interstiial edema and R P.effusion- extubated 9/1/22  -- Resp culture: MRSA colonized 8/31/22  --S/P R thoracentesis with 460cc of fluid, PH 7/7, culture in progress-- 9/2/22  BCX:  NGSF 8/27  UCX: >2 organism on cx  BL lung infiltrates due to atelectasis/ consolidation /has pleural effusion   PAD with non healing heel ulcers, unstagable eschar, skin necrosis, on RLE  L BKA  DMT2  Anemia-<70 9/5/22    Recommendation for ID issues I am following:  ------------------------------------------------------------------------------    Can DC levofloxacin and Flagyl from ID side-- on day 2    Very poor prognosis and adding AKA procedure with his PVD and poor healing and recovery prospect will not add much to his qualitiy of  life. Comfort care and hospice seems appropriate in this patient    DW DR JIMENEZ           Subjective:  Pt seen. On RA--doesn't voice complaints.  Seems lethargic  No high fever  Noted seizure episode and new medications started      CXR 9/14:     Interval increased, moderate right and small left pleural effusions, associated  regions of airspace opacity could be atelectasis or pneumonia. No  pneumothoraces. Some patient rotation/obliquity noted. HPI:  Noni Sheridan is a 78 y.o. WHITE/NON- with PMH DM, hx of stroke, PAD- with L BKA --admitted in septic shock, leukmoid reaction and acute renal failure on 8/27 from strangulated/incarcerated r groin hernia. Underwent X-lap, bowel was viable and no bowel resection done. Improved septic shock with surgery and triple ABX-- bcx negative. Has BL lung infiltrate with effusion as well on CXR. Nexrotic/eschar wound lesion on RLE.             Active Hospital Problems    Diagnosis Date Noted    Seizure ([address] 09/14/2022    Hypernatremia 09/06/2022    Acute respiratory failure with hypoxemia (HCC) 08/28/2022    Septic shock ([address] 08/28/2022    High anion gap metabolic acidosis 61/05/6535    Hyperkalemia 08/27/2022    Sepsis ([address] 08/27/2022    AMS (altered mental status) 08/27/2022    Strangulated inguinal hernia 08/27/2022    Right inguinal hernia 08/27/2022    Leukocytosis 08/27/2022    Anemia 08/27/2022    Chronic indwelling Mccray catheter 08/27/2022    S/P BKA (below knee amputation), left ([address] 08/27/2022    Right foot ulcer ([address] 08/27/2022    Paroxysmal atrial fibrillation ([address] 08/27/2022    Diabetic ulcer of right foot ([address] 08/27/2022    Pleural effusion on right 08/27/2022    Severe protein-calorie malnutrition ([address] 08/27/2022    UTI (urinary tract infection) 10/29/2021    Acute renal failure (ARF) ([address] 05/29/2018     Past Medical History:   Diagnosis Date    A-fib ([address]     Asthma     CAD (coronary artery disease)     Chronic kidney disease     stage 3    COVID-19     Diabetes (HCC)     GERD (gastroesophageal reflux disease)     Heart failure (HCC)     chronic diastolic heart failure    High cholesterol     Hypertension     Ill-defined condition     chronic osteomyelitis left ankle and foot    Ill-defined condition     chronic arteriosclerosis    PVD (peripheral vascular disease) ([facility]     Stroke [facility])     cva     Past Surgical History:   Procedure Laterality Date    COLONOSCOPY N/A 6/1/2018    COLONOSCOPY, POLYPECTOMY performed by Ld Britt MD at [facility] ENDOSCOPY    HX CATARACT REMOVAL      HX ORTHOPAEDIC Left 2021    ankle washout, external fixator, would vac    HX ORTHOPAEDIC      external fixator removed     History reviewed. No pertinent family history. Social History     Socioeconomic History    Marital status:      Spouse name: Not on file    Number of children: Not on file    Years of education: Not on file    Highest education level: Not on file   Occupational History    Not on file   Tobacco Use    Smoking status: Former    Smokeless tobacco: Never   Substance and Sexual Activity    Alcohol use: Not Currently    Drug use: No    Sexual activity: Not on file   Other Topics Concern    Not on file   Social History Narrative    Not on file     Social Determinants of Health     Financial Resource Strain: Not on file   Food Insecurity: Not on file   Transportation Needs: Not on file   Physical Activity: Not on file   Stress: Not on file   Social Connections: Not on file   Intimate Partner Violence: Not on file   Housing Stability: Not on file       Allergies:  Patient has no known allergies.      Medications:  Current Facility-Administered Medications   Medication Dose Route Frequency    furosemide (LASIX) injection 20 mg  20 mg IntraVENous DAILY    levoFLOXacin (LEVAQUIN) 500 mg in D5W IVPB  500 mg IntraVENous Q24H    metroNIDAZOLE (FLAGYL) IVPB premix 500 mg  500 mg IntraVENous Q8H    metoprolol (LOPRESSOR) injection 1.25 mg  1.25 mg IntraVENous Q6H    sodium chloride (NS) flush 5-40 mL  5-40 mL IntraVENous Q8H    sodium chloride (NS) flush 5-40 mL  5-40 mL IntraVENous PRN    diazePAM (VALIUM) injection 9.3 mg  0.15 mg/kg IntraVENous Q10MIN PRN    LORazepam (ATIVAN) 2 mg/mL injection 1 mg  1 mg IntraVENous Q10MIN PRN    levETIRAcetam (KEPPRA) 500 mg in 0.9% sodium chloride 100 mL IVPB  500 mg IntraVENous Q12H    pantoprazole (PROTONIX) tablet 40 mg  40 mg Oral ACB    atorvastatin (LIPITOR) tablet 40 mg  40 mg Oral QHS    [Held by provider] metoprolol succinate (TOPROL-XL) XL tablet 50 mg  50 mg Oral DAILY    insulin lispro (HUMALOG) injection   SubCUTAneous AC&HS    Saccharomyces boulardii (FLORASTOR) capsule 250 mg  250 mg Oral BID    [Held by provider] potassium bicarb-citric acid (EFFER-K) tablet 40 mEq  40 mEq Oral DAILY    povidone-iodine (BETADINE) 10 % topical solution   Topical DAILY    ipratropium (ATROVENT) 0.02 % nebulizer solution 0.5 mg  0.5 mg Nebulization Q6H PRN    collagenase (SANTYL) 250 unit/gram ointment   Topical DAILY    0.9% sodium chloride infusion 250 mL  250 mL IntraVENous PRN    hydrALAZINE (APRESOLINE) 20 mg/mL injection 20 mg  20 mg IntraVENous Q6H PRN    midazolam (VERSED) injection 0.5 mg  0.5 mg IntraVENous BID PRN    0.9% sodium chloride infusion 250 mL  250 mL IntraVENous PRN    heparin (porcine) injection 5,000 Units  5,000 Units SubCUTAneous Q8H    sodium chloride (NS) flush 5-40 mL  5-40 mL IntraVENous Q8H    sodium chloride (NS) flush 5-40 mL  5-40 mL IntraVENous PRN    acetaminophen (TYLENOL) tablet 650 mg  650 mg Oral Q6H PRN    Or    acetaminophen (TYLENOL) suppository 650 mg  650 mg Rectal Q6H PRN    polyethylene glycol (MIRALAX) packet 17 g  17 g Oral DAILY PRN    ondansetron (ZOFRAN ODT) tablet 4 mg  4 mg Oral Q8H PRN    Or    ondansetron (ZOFRAN) injection 4 mg  4 mg IntraVENous Q6H PRN    glucose chewable tablet 16 g  4 Tablet Oral PRN    glucagon (GLUCAGEN) injection 1 mg  1 mg IntraMUSCular PRN    dextrose 10% infusion 0-250 mL  0-250 mL IntraVENous PRN    HYDROmorphone (DILAUDID) injection 1 mg  1 mg IntraVENous Q4H PRN        ROS:  Pertinent items are noted in the History of Present Illness. Physical Exam:    Temp (24hrs), Av.5 °F (36.4 °C), Min:96.9 °F (36.1 °C), Max:98.1 °F (36.7 °C)    Visit Vitals  BP (!) 159/66   Pulse 78   Temp 98.1 °F (36.7 °C)   Resp 18   Ht 5' 7.99\" (1.727 m)   Wt 60.5 kg (133 lb 6.4 oz)   SpO2 95%   BMI 20.29 kg/m²          GEN: WD sick looking, extubated on RA    PIV-R    HEENT: Unicteric. EOMI intact  CHEST: Non laboured breathing. CTA  CVS: RRR- no mur/gallop  ABD: Obese/soft. Non tender. Non distended. Surgical wound dressed, skin looks ok. FMS in place  LISET: caal in place  EXT: No apparent swelling or redness on UE/LE joints-edematous hands  -L BKA  --sacral wound not seen  Skin: Dry and intact. bruising-- skin wounds not examined  CNS:lethargic , on RA       Microbiology  All Micro Results       Procedure Component Value Units Date/Time    CULTURE, FUNGUS [[id number]] Collected: 22 1430    Order Status: Completed Specimen: Pleural Fluid Updated: 22 0951     Special Requests: NO SPECIAL REQUESTS        Culture result: NO FUNGUS ISOLATED 9 DAYS       CULTURE, BODY FLUID [address] [[id number]] Collected: 22 1430    Order Status: Completed Specimen:  Body Fluid from Pleural Fluid Updated: 22 0831     Special Requests: NO SPECIAL REQUESTS        GRAM STAIN NO WBC'S SEEN         NO ORGANISMS SEEN        Culture result: NO GROWTH 4 DAYS       AFB CULTURE + SMEAR W/RFLX ID FROM CULTURE [[id number]] Collected: 22 1430    Order Status: Completed Specimen: Miscellaneous sample Updated: 22     Source PLEURAL FLUID        AFB Specimen processing Concentration     Acid Fast Smear Negative        Comment: (NOTE)  Performed At: [facility] & [address] [id number]  Pipo Harrell MD [id number]          Acid Fast Culture PENDING    CULTURE, RESPIRATORY/SPUTUM/BRONCH Jacqui Pina STAIN [[id number]]  (Susceptibility) Collected: 22 0018    Order Status: Completed Specimen: Sputum from Tracheal Aspirate Updated: 09/02/22 1537     Special Requests: NO SPECIAL REQUESTS        GRAM STAIN 1+ WBCS SEEN               RARE EPITHELIAL CELLS SEEN                  OCCASIONAL GRAM POSITIVE COCCI IN CLUSTERS           Culture result:       HEAVY * Methicillin Resistant Staphylococcus aureus *                  NO NORMAL RESPIRATORY VICENTA ISOLATED            (NOTE) MRSA NOTIFIED MS SMITH RN ON 9/2/22 AT 1534. [facility]    CULTURE, BLOOD [[id number]] Collected: 08/27/22 1030    Order Status: Completed Specimen: Blood Updated: 09/02/22 0755     Special Requests: NO SPECIAL REQUESTS        Culture result: NO GROWTH 6 DAYS       CULTURE, BLOOD [[id number]] Collected: 08/27/22 1100    Order Status: Completed Specimen: Blood Updated: 09/02/22 0755     Special Requests: NO SPECIAL REQUESTS        Culture result: NO GROWTH 6 DAYS       CULTURE, URINE [[id number]] Collected: 08/27/22 1442    Order Status: Completed Specimen: Cath Urine Updated: 08/29/22 0642     Special Requests: NO SPECIAL REQUESTS        [place] Count --        63612  COLONIES/mL       Culture result:       >2 ORGANISMS - CONTAMINATED SPECIMEN. SUGGEST RECOLLECTION          CULTURE, BLOOD [[id number]]     Order Status: Canceled Specimen: Blood     COVID-19 RAPID TEST [[id number]] Collected: 08/27/22 1030    Order Status: Completed Specimen: Nasopharyngeal Updated: 08/27/22 1057     Specimen source SWAB        COVID-19 rapid test Not detected        Comment: Rapid Abbott ID Now       Rapid NAAT:  The specimen is NEGATIVE for SARS-CoV-2, the novel coronavirus associated with COVID-19. Negative results should be treated as presumptive and, if inconsistent with clinical signs and symptoms or necessary for patient management, should be tested with an alternative molecular assay. Negative results do not preclude SARS-CoV-2 infection and should not be used as the sole basis for patient management decisions.        This test has been authorized by the FDA under an Emergency Use Authorization (EUA) for use by authorized laboratories. Fact sheet for Healthcare Providers: ConventionUpdate.co.nz  Fact sheet for Patients: ConventionUpdate.co.nz       Methodology: Isothermal Nucleic Acid Amplification                   Lab results:    Chemistry  Recent Labs     09/15/22  0201 09/14/22  0320 09/13/22  1841   * 90 207*    138 136   K 4.4 4.0 4.5    107 104   CO2 25 26 21   BUN 50* 51* 45*   CREA 1.44* 1.56* 1.73*   CA 9.0 8.9 8.8   AGAP 8 5 11   BUCR 35* 33* 26*       CBC w/ Diff  Recent Labs     09/13/22  1841   WBC 12.6   RBC 3.75*   HGB 10.3*   HCT 33.9*   *   GRANS 53   LYMPH 40   EOS 0       CT HEAD WO CONT    Result Date: 8/27/2022  FINDINGS: BRAIN AND POSTERIOR FOSSA: Mild periventricular areas of decreased attenuation are identified. Gray-white matter interfaces are preserved. No intraparenchymal hemorrhage, mass effect or midline shift. The ventricular system is midline and symmetric. Atherosclerotic vascular calcifications are identified. EXTRA-AXIAL SPACES AND MENINGES: There is no evidence for extra-axial mass lesion or fluid collection. CALVARIUM: Intact. SINUSES: Sphenoid sinus mucosal thickening. OTHER: Orbits are grossly intact. Facial soft tissue are within normal limits. _______________     1. No evidence for intracranial hemorrhage, mass effect or midline shift. 2.  Mild periventricular areas of microvascular ischemic change. 3.  Cerebral atherosclerosis. 4.  Atrophy. If there are continued symptoms, a MRI is recommended for further evaluation. CT ABD PELV WO CONT    Result Date: 8/27/2022  FINDINGS: LOWER CHEST: Moderate right pleural effusion with adjacent atelectasis. LIVER, BILIARY: Liver is normal. No biliary dilation. Gallbladder is unremarkable. PANCREAS: Normal. SPLEEN: Splenic granuloma. ADRENALS: Normal. KIDNEYS: Normal. LYMPH NODES: No enlarged lymph nodes.  GASTROINTESTINAL TRACT: Uncomplicated sigmoid diverticula. Gastric distention. PELVIC ORGANS: Unremarkable. VASCULATURE: Atherosclerotic vascular calcifications. BONES: No acute or aggressive osseous abnormalities identified. OTHER: Large right inguinal hernia with large and small bowel. There is diffuse circumferential wall thickening of ascending colon within the hernia. _______________     1. Large right inguinal hernia with large and small bowel. There is diffuse wall thickening of the ascending colon concerning for strangulation. No evidence for bowel obstruction. 2. Right pleural effusion and bilateral basilar atelectasis. 3. Gastric distention. XR CHEST PORT    Result Date: 8/30/2022  EXAM:  PORTABLE CHEST INDICATION:  Follow up. TECHNIQUE:  Portable, AP view. COMPARISON:  08/29/2022 ____________________ FINDINGS:  SUPPORT DEVICES: Endotracheal tube approximately 4 cm above the alise. OG tube is traversing below left hemidiaphragm, tip not imaged. Esophageal probe is present at the level of the endotracheal tube. HEART AND MEDIASTINUM: Stable. LUNGS AND PLEURAL SPACES: Persistent right basilar opacity consistent with pleural effusion with adjacent consolidation. No pneumothorax. BONY THORAX AND SOFT TISSUES: No acute osseous abnormality. ____________________     1. Endotracheal tube approximately 4 cm above the alise. 2. No significant change in right basilar opacity consistent with pleural effusion with adjacent consolidation, atelectasis or pneumonia. *  **     XR CHEST PORT    Result Date: 8/29/2022  FINDINGS: HEART AND MEDIASTINUM: Anterior endotracheal tube tip estimated at 6.1 cm above alise. Enteric tube tip projects over left upper quadrant expected location gastric fundus. Esophageal temperature probe tip projects over lower thoracic . Stable cardiomediastinal silhouette allowing for angulation. LUNGS AND PLEURAL SPACES: Progressive right pleural effusion and patchy opacities in the right lung.  Left lung relatively clear allowing for technique. BONY THORAX AND SOFT TISSUES: No gross acute osseous abnormality. 1. Right pleural effusion and subjacent atelectasis/infiltrate, perhaps slightly progressed. 2. Tubes and lines stable in visualized extent. XR CHEST PORT    Result Date: 8/28/2022  FINDINGS: SUPPORT DEVICES: Endotracheal tube distal tip projects 5.5 cm above the alise. Enteric tube distal tip projects below the left hemidiaphragm likely in the stomach. HEART AND MEDIASTINUM: No appreciable cardiomegaly. Remaining mediastinal contours are stable. LUNGS AND PLEURAL SPACES: Right pleural effusion with adjacent opacity. No evidence for pneumothorax. Left lung is clear. BONY THORAX AND SOFT TISSUES: Unremarkable. _______________     1. Right pleural effusion with adjacent atelectasis/infiltrates. 2. Supporting tubes appear stable. XR CHEST PORT    Result Date: 8/27/2022  FINDINGS: SUPPORT DEVICES: An endotracheal tube is positioned 6 cm above the alise. HEART AND MEDIASTINUM: Heart size and mediastinal contour are midline and within normal limits. LUNGS AND PLEURAL SPACES: There are opacities throughout both lungs but more confluent within the right mid and lower lung zones. No pneumothorax. BONY THORAX AND SOFT TISSUES: Unremarkable. _______________     Interval right greater than left airspace disease Small to moderate right pleural effusion    XR CHEST PORT    Result Date: 8/27/2022FINDINGS: HEART AND MEDIASTINUM: No appreciable cardiomegaly. Remaining mediastinal contours within normal limits. LUNGS AND PLEURAL SPACES: Left lung is clear. Right pleural effusion with adjacent opacity. BONY THORAX AND SOFT TISSUES: Unremarkable. _______________     1. Right pleural effusion with probable adjacent atelectasis. XR ABD PORT  1 V    Result Date: 8/27/2022  INDINGS: SUPPORT DEVICES: A nasogastric tube is positioned in the stomach. BOWEL GAS PATTERN: The gas pattern is nonobstructive. Eddie Burr SOFT TISSUES: Unremarkable.  BONES: Degenerative changes are seen throughout the spine. ADDITIONAL FINDINGS: None. _______________     No significant abnormality. ECHO ADULT COMPLETE    Result Date: 8/28/2022  Formatting of this result is different from the original.   Left Ventricle: Normal left ventricular systolic function with a visually estimated EF of 60 - 65%. Left ventricle size is normal. Normal wall thickness. Normal wall motion. Grade I diastolic dysfunction present with normal LV EF. Unable to assess RVSP due to inadequate or insignificant tricuspid regurgitation.      Procedures/imaging: see electronic medical records for all procedures/Xrays and details which were not copied into this note but were reviewed prior to creation of Plan

## 2022-09-15 NOTE — ROUTINE PROCESS
Bedside and Verbal shift change report given to Radha Camejo RN (oncoming nurse) by Narendra Bah RN and Cortes Muse RN (offgoing nurse). Report included the following information SBAR, Kardex, MAR, Recent Results, and Cardiac Rhythm NSR .

## 2022-09-15 NOTE — ACP (ADVANCE CARE PLANNING)
Advance Care Planning     General Advance Care Planning (ACP) Conversation      Date of Conversation: 9/15/2022    Conducted with: Patient, patient's son Justin Genao), Emily Valladares NP (Palliative) and this writer. Healthcare Decision Maker:     Patient does not have a formal healthcare decision maker document, on file. Patient's wife Darrel Mooney, SW#[phone]) is patient's legal next-of-kin and default healthcare decision maker. Patient's wife, however, still converses with their children Kavita Guevara and Willie); when it comes to decision making. Content/Action Overview: This writer, along with Emily Valladares NP, with the Palliative Care team; visited with patient today to offer support and discuss goals of care moving forward. Patient was laying in bed and alert. Patient basically shook his head to answer all questions posed to him. Patient was asked about amputation vs. Comfort care. Patient nodded yes to being okay with amputation and nodded yes to being okay with comfort care. Patient does not appear to be able to effectively participate in any complex goals of care discussions and make in complex medical decisions. Patient's wife had completed a POST form, with the Palliative Care team yesterday. The team, today, reached out to patient's son Kavita Guevara) to ensure that the whole family was truly on board to patient transitioning to comfort measures and hospice upon discharge. Eun Buzz was fully updated on patient's condition and fully educated on amputation vs comfort care. Eunjerson Gerber had some preconceived notions regarding amputation; that was dispelled by the Palliative Care team. Eun Gerber now has a better understanding of what is going on with patient and agrees that comfort measures and hospice, upon discharge; is the best option for his father. Eun Gerber wanted time to talk to the other siblings to make sure all were on the same page.  At this time, patient will remain a DNR/DNI Comfort Measures to begin at discharge and NO feeding tubes. Recommendations: The Palliative Care team will continue to offer support to patient and his family, at this time. Length of Voluntary ACP Conversation in minutes:  30 minutes        Tanner Hearn, MSW  Palliative Care   #[phone]

## 2022-09-15 NOTE — PROGRESS NOTES
Pt is not in room  Will check back later or tomorrow  Evelyne Quezada MD  [facility])  Office #:     [phone]  [phone] [phone]   Office Fax: [phone]

## 2022-09-15 NOTE — PROCEDURES
ELECTROENCEPHALOGRAPHY     Patient: Romel Shields MRN: [id number]  CSN: [id number]    YOB: 1945  Age: 78 y.o. Sex: male    DOA: 8/27/2022 LOS:  LOS: 19 days        Date of Service: 09/14/2022    DICTATING: Flower Nuñez MD     REFERRING PHYSICIAN: Dr. Meg Rivera: 22-64    HISTORY OF PRESENT ILLNESS: This is a 62-year-old gentleman with several medical comorbidities, who presented with a single seizure. This EEG was performed in order to assess electrodiagnostic risk factors for epilepsy. ELECTROENCEPHALOGRAM INTERPRETATION: This is a referential and bipolar EEG recorded with a 10-20 system. EEG background is complicated with remarkable myogenic and movement artifacts. There is no visible posterior dominant rhythm of wakefulness or sleep pattern. There is generalized 4 to 7 hertz theta activity in the recently. Photic stimulation does not produce an abnormal activity. There are no epileptiform discharges or electrographic seizures in the study. IMPRESSION: This EEG is abnormal due to mild generalized slowing, which is an indicator of diffuse cerebral dysfunction from any cause including -but not limited to- toxic, metabolic and infectious etiologies. There are no ictal or interictal findings with a specific correlation with seizures. Please note that this EEG is limited due to myogenic/movement artifacts.         Signed:  Flower Nuñez MD  9/15/2022  9:04 AM

## 2022-09-15 NOTE — PROGRESS NOTES
Hospitalist Progress Note-critical care note     Patient: Munir Becerra. MRN: [id number]  CSN: [id number]    YOB: 1945  Age: 78 y.o.   Sex: male    DOA: 8/27/2022 LOS:  LOS: 19 days            Chief complaint:sepsis , diabetic foot ulce, pad     Assessment/Plan         Hospital Problems  Date Reviewed: 8/27/2022            Codes Class Noted POA    Seizure ([facility] ICD-10-CM: R56.9  ICD-9-CM: 780.39  9/14/2022 Unknown        Hypernatremia ICD-10-CM: E87.0  ICD-9-CM: 276.0  9/6/2022 Yes        Acute respiratory failure with hypoxemia ([facility] ICD-10-CM: J96.01  ICD-9-CM: 518.81  8/28/2022 Yes        Septic shock ([facility] ICD-10-CM: A41.9, R65.21  ICD-9-CM: 038.9, 785.52, 995.92  8/28/2022 Yes        High anion gap metabolic acidosis WHP-40-OU: E87.2  ICD-9-CM: 276.2  8/27/2022 Yes        Hyperkalemia ICD-10-CM: E87.5  ICD-9-CM: 276.7  8/27/2022 Yes        * (Principal) Sepsis ([facility] ICD-10-CM: A41.9  ICD-9-CM: 038.9, 995.91  8/27/2022 Yes        AMS (altered mental status) ICD-10-CM: R41.82  ICD-9-CM: 780.97  8/27/2022 Yes        Strangulated inguinal hernia ICD-10-CM: K40.30  ICD-9-CM: 550.10  8/27/2022 Yes        Right inguinal hernia ICD-10-CM: K40.90  ICD-9-CM: 550.90  8/27/2022 Yes        Leukocytosis ICD-10-CM: D72.829  ICD-9-CM: 288.60  8/27/2022 Yes        Anemia ICD-10-CM: D64.9  ICD-9-CM: 285.9  8/27/2022 Yes        Chronic indwelling Mccray catheter ICD-10-CM: Z97.8  ICD-9-CM: V45.89  8/27/2022 Yes        S/P BKA (below knee amputation), left ([facility] ICD-10-CM: B34.401  ICD-9-CM: V49.75  8/27/2022 Yes        Right foot ulcer ([facility] ICD-10-CM: L97.519  ICD-9-CM: 707.15  8/27/2022 Yes        Paroxysmal atrial fibrillation (HCC) ICD-10-CM: I48.0  ICD-9-CM: 427.31  8/27/2022 Yes        Diabetic ulcer of right foot ([facility] ICD-10-CM: E11.621, L97.519  ICD-9-CM: 250.80, 707.15  8/27/2022 Yes        Pleural effusion on right ICD-10-CM: J90  ICD-9-CM: 511.9  8/27/2022 Yes        Severe protein-calorie malnutrition [facility]) ICD-10-CM: E43  ICD-9-CM: 262  8/27/2022 Yes        UTI (urinary tract infection) ICD-10-CM: N39.0  ICD-9-CM: 599.0  10/29/2021 Yes        Acute renal failure (ARF) (HCC) ICD-10-CM: N17.9  ICD-9-CM: 584.9  5/29/2018 Yes          78 y.o. 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 Admitted for severe septic shock with multiorgan and hypotension involvement due to multiple possible sources of infection, severe hyperkalemia, acute metabolic encephalopathy, metabolic acidosis, strangulated left inguinal hernia, anemia requiring blood transfusion, mild hypoxia coagulable state. Vascular consulted recommend aka if family wants, palliative care seeing pt and family decide to go forward to have aka done. However, RRT called due to active seizure, ativan was administrated and seizure stopped. Volanda Bexar was administrated and neurologist is consulted      Seizure -rrt called last night   Neuro consulted   Mri brain planning    Discussed with Dr. Elizabeth Castellon on keppra    Seizure precaution     Hypokalemia-resolved      Hypernatremia-resolved     Mild bleeding from NG tube being pulled out by patient-  Ac blood loss on anemia chronic     Dysphagia-will have speech reevaluation .  Today he is more alert, hope can pass speech      Incarcerated right inguinal hernia -status post ex lap with reduction of incarcerated right groin hernia with hernia mesh     Prior AKA left side     sacral ulcer      Infected medial maleolar and heel ulcers right foot-completed abx -followed by wound care, vascular /ortho seeing pt-aka recommended -now surgical hold due to goal of care changed      Acute renal failure -resolved     HTN-resume toprol, stop albumin     encephalopathy -stable overnight, still confused    Paf rate controlled per metoprolol      S/p thoracentesis for effusion     Diabetes with hyperglycemia-follow BG levels, SSI    Malnutrition severe       Poor prognosis with multiple medical ailments and advanced PVD, diabetes complications, prior BKA< wounds and debilitated state  Poor prognosis  DNR     Talked with palliative care team : \"Patient will remain DNR/DNI, comfort measures on discharge, hospice consult. Wife is looking for placement versus more help at home\"    Discussed with Dr. Quesada Bending recommend pt will be benefit from comfort care   Disposition :tbd,   Review of systems:    Limited due to confusion . Vital signs/Intake and Output:  Visit Vitals  BP (!) 159/66   Pulse 78   Temp 98.1 °F (36.7 °C)   Resp 18   Ht 5' 7.99\" (1.727 m)   Wt 60.5 kg (133 lb 6.4 oz)   SpO2 95%   BMI 20.29 kg/m²     Current Shift:  09/15 0701 - 09/15 1900  In: 300 [I.V.:300]  Out: 300 [Urine:300]  Last three shifts:  09/13 1901 - 09/15 0700  In: 80   Out: 1150 [Urine:1150]    Physical Exam:  General: WD, WN. Alert, not cooperative, no acute distress    HEENT: NC, Atraumatic. PERRLA, anicteric sclerae. Lungs: CTA Bilaterally. No Wheezing/Rhonchi/Rales. Heart:  Regular  rhythm,  No murmur, No Rubs, No Gallops  Abdomen: Soft, Non distended, Non tender. +Bowel sounds,   Extremities: No c/c, left BKA, rt foot wrapped with ace bandage   Psych:   Not anxious or agitated.   Neurologic:  Confusion         Labs: Results:       Chemistry Recent Labs     09/15/22  0201 09/14/22  0320 09/13/22  1841   * 90 207*    138 136   K 4.4 4.0 4.5    107 104   CO2 25 26 21   BUN 50* 51* 45*   CREA 1.44* 1.56* 1.73*   CA 9.0 8.9 8.8   AGAP 8 5 11   BUCR 35* 33* 26*        CBC w/Diff Recent Labs     09/13/22  1841   WBC 12.6   RBC 3.75*   HGB 10.3*   HCT 33.9*   *   GRANS 53   LYMPH 40   EOS 0        Cardiac Enzymes No results for input(s): CPK, CKND1, LEONEL in the last 72 hours. No lab exists for component: CKRMB, TROIP   Coagulation No results for input(s): PTP, INR, APTT, INREXT, INREXT in the last 72 hours. Lipid Panel No results found for: CHOL, CHOLPOCT, CHOLX, CHLST, CHOLV, 747408, HDL, HDLP, LDL, LDLC, DLDLP, 795740, VLDLC, VLDL, TGLX, TRIGL, TRIGP, TGLPOCT, CHHD, CHHDX   BNP No results for input(s): BNPP in the last 72 hours. Liver Enzymes No results for input(s): TP, ALB, TBIL, AP in the last 72 hours. No lab exists for component: SGOT, GPT, DBIL     Thyroid Studies Lab Results   Component Value Date/Time    TSH 4.73 (H) 08/28/2022 08:04 AM        Procedures/imaging: see electronic medical records for all procedures/Xrays and details which were not copied into this note but were reviewed prior to creation of Plan    XR ABD (KUB)    Result Date: 9/5/2022  EXAM: XR ABD (KUB) CLINICAL INDICATION/HISTORY: Abdominal pain and distention COMPARISON: None. TECHNIQUE: 3 supine AP views of the abdomen were obtained _______________ FINDINGS: Skin staples overlie right lower quadrant. There is a single borderline prominent air-filled bowel loop overlying the right paracentral inferior abdomen and superior pelvis, measuring approximately 2.7 cm in diameter. There is also seen more distally throughout the right colon. The soft tissue planes and bony structures appear normal.  _______________     Single borderline prominent air-filled right paracentral small bowel loop, appearance most suggestive of postoperative ileus. XR ABD (KUB)    Result Date: 9/5/2022  AP portable lower chest and upper abdomen for NG/OG tube placement: NG/OG tube is within the stomach. It is not as distally located as previous on the comparison exam from 9/3/2022. Evidence of abdominal surgery with midline skin staples. Persistent right lower lobe atelectasis/infiltrate with some pleural thickening or pleural fluid. NG/OG tube as described within the stomach.     XR ABD (KUB)    Result Date: 9/3/2022  EXAM: XR ABD (KUB) CLINICAL INDICATION/HISTORY: NGT Placement COMPARISON: 8/31/2022 TECHNIQUE: Supine AP view of the abdomen was obtained. _______________ FINDINGS: Gastric tube extends from midline thoracic esophagus curves through the left upper quadrant and has the tip and side-port overlying right mid abdomen in the region of the distal stomach or proximal duodenum. No air distended bowel loops nor other evidence of bowel obstruction or bowel dilatation. The soft tissue planes and bony structures appear normal.  _______________     Adequate gastric tube placement with tip and side-port in the region of the distal stomach/proximal duodenum. XR ABD (KUB)    Result Date: 8/31/2022  PORTABLE ABDOMEN Indication:  OGT placement. Technique: Portable supine AP view of the abdomen was obtained. Comparison studies:  August 27, 2022. Findings: Moderate rotation. Gastric tube terminates in the fundus of the stomach. Nonobstructive gas pattern. Telemetry leads. Lower abdominal skin closing staples. Minor scattered osseous degenerative changes. Moderate rotation. Gastric tube terminates in the fundus of the stomach. Nonobstructive gas pattern. CT HEAD WO CONT    Result Date: 8/27/2022  EXAM: CT head INDICATION: Altered mental status. COMPARISON: None. TECHNIQUE: Axial CT imaging of the head was performed without intravenous contrast.  One or more dose reduction techniques were used on this CT: automated exposure control, adjustment of the mAs and/or kVp according to patient size, and iterative reconstruction techniques. The specific techniques used on this CT exam have been documented in the patient's electronic medical record.   Digital Imaging and Communications in Medicine (DICOM) format image data are available to nonaffiliated external healthcare facilities or entities on a secure, media free, reciprocally searchable basis with patient authorization for at least a 12-month period after this study. Patient motion degrades image quality. _______________ FINDINGS: BRAIN AND POSTERIOR FOSSA: Mild periventricular areas of decreased attenuation are identified. Gray-white matter interfaces are preserved. No intraparenchymal hemorrhage, mass effect or midline shift. The ventricular system is midline and symmetric. Atherosclerotic vascular calcifications are identified. EXTRA-AXIAL SPACES AND MENINGES: There is no evidence for extra-axial mass lesion or fluid collection. CALVARIUM: Intact. SINUSES: Sphenoid sinus mucosal thickening. OTHER: Orbits are grossly intact. Facial soft tissue are within normal limits. _______________     1. No evidence for intracranial hemorrhage, mass effect or midline shift. 2.  Mild periventricular areas of microvascular ischemic change. 3.  Cerebral atherosclerosis. 4.  Atrophy. If there are continued symptoms, a MRI is recommended for further evaluation. CT ABD PELV WO CONT    Result Date: 8/27/2022  EXAM: CT of the abdomen and pelvis INDICATION: Pain. COMPARISON: May 14, 2021. TECHNIQUE: Axial CT imaging of the abdomen and pelvis was performed without intravenous contrast. Multiplanar reformats were generated. One or more dose reduction techniques were used on this CT: automated exposure control, adjustment of the mAs and/or kVp according to patient size, and iterative reconstruction techniques. The specific techniques used on this CT exam have been documented in the patient's electronic medical record. Digital Imaging and Communications in Medicine (DICOM) format image data are available to nonaffiliated external healthcare facilities or entities on a secure, media free, reciprocally searchable basis with patient authorization for at least a 12-month period after this study. _______________ FINDINGS: LOWER CHEST: Moderate right pleural effusion with adjacent atelectasis. LIVER, BILIARY: Liver is normal. No biliary dilation.  Gallbladder is unremarkable. PANCREAS: Normal. SPLEEN: Splenic granuloma. ADRENALS: Normal. KIDNEYS: Normal. LYMPH NODES: No enlarged lymph nodes. GASTROINTESTINAL TRACT: Uncomplicated sigmoid diverticula. Gastric distention. PELVIC ORGANS: Unremarkable. VASCULATURE: Atherosclerotic vascular calcifications. BONES: No acute or aggressive osseous abnormalities identified. OTHER: Large right inguinal hernia with large and small bowel. There is diffuse circumferential wall thickening of ascending colon within the hernia. _______________     1. Large right inguinal hernia with large and small bowel. There is diffuse wall thickening of the ascending colon concerning for strangulation. No evidence for bowel obstruction. 2. Right pleural effusion and bilateral basilar atelectasis. 3. Gastric distention. XR CHEST PORT    Result Date: 9/2/2022  CLINICAL HISTORY:  Tachypnea COMPARISONS:  Chest x-ray 9/2/2022 TECHNIQUE:  single frontal view of the chest ------------------------------------------ FINDINGS: Lungs:  Patchy parenchymal opacity in the right lung, similar to the prior radiograph and likely largely scar. Blunting of the right lateral costophrenic sulcus, also similar. Mild chronic appearing interstitial markings within the left lung, similar to prior radiograph. Mediastinum: Moderate cardiomegaly. Mediastinum is shifted towards the right, unchanged. Atherosclerotic arterial calcification. Bones: No evidence of fracture or suspicious bone lesion. ------------------------------------------    1. No significant interval change. Probable small right pleural effusion, not appreciably changed. 2. Right lung parenchymal findings likely largely reflect scar though are not well assessed. Pneumonia or aspiration not entirely excluded.     XR CHEST PORT    Result Date: 9/2/2022  EXAM: XR CHEST PORT CLINICAL INDICATION/HISTORY: Post right thoracentesis -Additional: None COMPARISON: 9/2/2022 TECHNIQUE: Portable frontal view of the chest _______________ FINDINGS: SUPPORT DEVICES: None. HEART AND MEDIASTINUM: Normal cardiac size and mediastinal contours. LUNGS AND PLEURAL SPACES: Decreased volume right-sided pleural effusion status post thoracentesis with persistent lateral and basilar components. Streaky areas of atelectasis and interstitial densities noted across the mid and lower lung zones. No evidence of postprocedural pneumothorax. Left lung clear as visualized. Skinfold along the peripheral left hemithorax. BONY THORAX AND SOFT TISSUES: Unremarkable. _______________     1. No evidence of postprocedural pneumothorax is significantly improved right lung aeration status post thoracentesis. > Residual pleural effusion and streaky areas of atelectasis. XR CHEST PORT    Result Date: 9/2/2022  EXAM: XR CHEST PORT CLINICAL INDICATION/HISTORY: intubated -Additional: None COMPARISON: One day prior TECHNIQUE: Portable frontal view of the chest _______________ FINDINGS: SUPPORT DEVICES: Interval extubation and removal of enteric tube. HEART AND MEDIASTINUM: Heart is obscured by right consolidation. LUNGS AND PLEURAL SPACES: Large right pleural effusion with adjacent atelectasis. Left-sided interstitial opacities No pneumothorax. _______________     Large right pleural effusion with adjacent atelectasis. XR CHEST PORT    Result Date: 9/1/2022  EXAM: Portable chest radiograph 9/1/2022 CLINICAL INDICATION/HISTORY: Intubation. TECHNIQUE: A semierect portable radiograph was obtained. COMPARISON: 8/31/2022. FINDINGS: Endotracheal tube remains in position with its tip approximately 5.5 cm above the level of the alise. A nasogastric tube extends below the field of view. The patient is slightly rotated to the right which distorts the cardiomediastinal contours. However, these contours are grossly unchanged. Increasing right pleural effusion is present with associated increasing atelectasis and/or infiltrate in the right lung.   There is increasing interstitial edema and/or interstitial infiltrate throughout the left lung. There is no pneumothorax or significant left pleural effusion. 1. Increasing interstitial edema and/or interstitial infiltration with increasing right pleural effusion. XR CHEST PORT    Result Date: 8/31/2022  EXAM: XR CHEST PORT CLINICAL INDICATION/HISTORY: intubated -Additional: None COMPARISON: 8/30/2022 TECHNIQUE: Portable frontal view of the chest _______________ FINDINGS: SUPPORT DEVICES: Endotracheal and enteric tubes unchanged. HEART AND MEDIASTINUM: Cardiomediastinal silhouette within normal limits. LUNGS AND PLEURAL SPACES: Patient rotated to right. Moderate right pleural effusion/atelectasis. No pneumothorax. _______________     Moderate right pleural effusion/atelectasis. XR CHEST PORT    Result Date: 8/30/2022  EXAM:  PORTABLE CHEST INDICATION:  Follow up. TECHNIQUE:  Portable, AP view. COMPARISON:  08/29/2022 ____________________ FINDINGS:  SUPPORT DEVICES: Endotracheal tube approximately 4 cm above the alise. OG tube is traversing below left hemidiaphragm, tip not imaged. Esophageal probe is present at the level of the endotracheal tube. HEART AND MEDIASTINUM: Stable. LUNGS AND PLEURAL SPACES: Persistent right basilar opacity consistent with pleural effusion with adjacent consolidation. No pneumothorax. BONY THORAX AND SOFT TISSUES: No acute osseous abnormality. ____________________     1. Endotracheal tube approximately 4 cm above the alise. 2. No significant change in right basilar opacity consistent with pleural effusion with adjacent consolidation, atelectasis or pneumonia. *  **     XR CHEST PORT    Result Date: 8/29/2022  HISTORY: -Provided with order: intubated -Additional: None Technique : AP PORTABLE CHEST Comparison : None. FINDINGS: HEART AND MEDIASTINUM: Anterior endotracheal tube tip estimated at 6.1 cm above alise.  Enteric tube tip projects over left upper quadrant expected location gastric fundus. Esophageal temperature probe tip projects over lower thoracic . Stable cardiomediastinal silhouette allowing for angulation. LUNGS AND PLEURAL SPACES: Progressive right pleural effusion and patchy opacities in the right lung. Left lung relatively clear allowing for technique. BONY THORAX AND SOFT TISSUES: No gross acute osseous abnormality. 1. Right pleural effusion and subjacent atelectasis/infiltrate, perhaps slightly progressed. 2. Tubes and lines stable in visualized extent. XR CHEST PORT    Result Date: 8/28/2022  EXAM: CHEST RADIOGRAPH CLINICAL INDICATION/HISTORY: intubated   > Additional: None COMPARISON: 8/27/2022. TECHNIQUE: Portable frontal view of the chest _______________ FINDINGS: SUPPORT DEVICES: Endotracheal tube distal tip projects 5.5 cm above the alise. Enteric tube distal tip projects below the left hemidiaphragm likely in the stomach. HEART AND MEDIASTINUM: No appreciable cardiomegaly. Remaining mediastinal contours are stable. LUNGS AND PLEURAL SPACES: Right pleural effusion with adjacent opacity. No evidence for pneumothorax. Left lung is clear. BONY THORAX AND SOFT TISSUES: Unremarkable. _______________     1. Right pleural effusion with adjacent atelectasis/infiltrates. 2. Supporting tubes appear stable. XR CHEST PORT    Result Date: 8/27/2022  EXAM: CHEST RADIOGRAPH CLINICAL INDICATION/HISTORY: intubated -Additional: None COMPARISON: August 27, 2022 TECHNIQUE: Portable frontal view of the chest _______________ FINDINGS: SUPPORT DEVICES: An endotracheal tube is positioned 6 cm above the alise. HEART AND MEDIASTINUM: Heart size and mediastinal contour are midline and within normal limits. LUNGS AND PLEURAL SPACES: There are opacities throughout both lungs but more confluent within the right mid and lower lung zones. No pneumothorax.  BONY THORAX AND SOFT TISSUES: Unremarkable. _______________     Interval right greater than left airspace disease Small to moderate right pleural effusion    XR CHEST PORT    Result Date: 8/27/2022  EXAM: CHEST RADIOGRAPHS CLINICAL INDICATION/HISTORY: Sepsis   > Additional: None COMPARISON: 6/18/2022. TECHNIQUE: PA and lateral views of the chest _______________ FINDINGS: HEART AND MEDIASTINUM: No appreciable cardiomegaly. Remaining mediastinal contours within normal limits. LUNGS AND PLEURAL SPACES: Left lung is clear. Right pleural effusion with adjacent opacity. BONY THORAX AND SOFT TISSUES: Unremarkable. _______________     1. Right pleural effusion with probable adjacent atelectasis. XR ABD PORT  1 V    Result Date: 8/27/2022  EXAM: ABDOMINAL RADIOGRAPHS CLINICAL INDICATION/HISTORY: OG placement -Additional: None COMPARISON: None TECHNIQUE: Single supine view of the abdomen _______________ FINDINGS: SUPPORT DEVICES: A nasogastric tube is positioned in the stomach. BOWEL GAS PATTERN: The gas pattern is nonobstructive. Lillian Sandoval SOFT TISSUES: Unremarkable. BONES: Degenerative changes are seen throughout the spine. ADDITIONAL FINDINGS: None. _______________     No significant abnormality. US THORACENTESIS RT NDL W IMAGE    Result Date: 9/2/2022  PROCEDURE: ULTRASOUND GUIDED RIGHT THORACENTESIS INDICATION: Right pleural effusion, shortness of breath PERFORMED BY:  Lamar Dejesus PA-C ATTENDING PHYSICIAN:  Rigo Khan MD SUPERVISION: General _______________ TECHNIQUE & FINDINGS: CONSENT: The risks, benefits, and alternatives to the procedure were explained to the patient's wife prior to the procedure. Written informed consent was obtained and witnessed. Standard pre-procedure time out performed. TECHNIQUE/FINDINGS:  The right pleural effusion was localized under ultrasound guidance. Sterile ultrasound probe cover and ultrasound gel were utilized. The skin was marked, prepped and draped in the usual sterile fashion. 1% Lidocaine was used for local anesthesia. A 5 Western Latanya Yueh needle was advanced into the right pleural space under US guidance.   A total of 460 mL of clear alanna fluid was drained using a Vacutainer system. A specimen was collected and taken to the lab, as requested. The catheter was then removed and the site dressed with a sterile bandage. The patient tolerated the procedure well and there were no immediate complications. Post-procedure chest x-ray shows no pneumothorax. GUIDANCE: Ultrasound guidance was used to position (and confirm the position of) the needle. Ultrasound image(s) saved in PACS. _______________     Successful ultrasound-guided thoracentesis with removal of 460 mL of fluid. Post-procedure chest x-ray shows no pneumothorax. A specimen was collected and taken to the lab, as requested. ECHO ADULT COMPLETE    Result Date: 8/28/2022  Formatting of this result is different from the original.   Left Ventricle: Normal left ventricular systolic function with a visually estimated EF of 60 - 65%. Left ventricle size is normal. Normal wall thickness. Normal wall motion. Grade I diastolic dysfunction present with normal LV EF. Unable to assess RVSP due to inadequate or insignificant tricuspid regurgitation. DUPLEX LOWER EXT ARTERY RIGHT    Result Date: 9/6/2022  · Patent right leg vessels with good upstroke.         Marlyn Lanes, MD

## 2022-09-16 NOTE — PROGRESS NOTES
Hospitalist Progress Note-critical care note     Patient: Lesley Shields. MRN: [id number]  [facility]: [id number]    YOB: 1945  Age: 78 y.o.   Sex: male    DOA: 8/27/2022 LOS:  LOS: 20 days            Chief complaint:sepsis , diabetic foot ulce, pad     Assessment/Plan         Hospital Problems  Date Reviewed: 8/27/2022            Codes Class Noted POA    Seizure ([facility] ICD-10-CM: R56.9  ICD-9-CM: 780.39  9/14/2022 Unknown        Hypernatremia ICD-10-CM: E87.0  ICD-9-CM: 276.0  9/6/2022 Yes        Acute respiratory failure with hypoxemia ([facility] ICD-10-CM: J96.01  ICD-9-CM: 518.81  8/28/2022 Yes        Septic shock ([facility] ICD-10-CM: A41.9, R65.21  ICD-9-CM: 038.9, 785.52, 995.92  8/28/2022 Yes        High anion gap metabolic acidosis K-59-UB: E87.2  ICD-9-CM: 276.2  8/27/2022 Yes        Hyperkalemia ICD-10-CM: E87.5  ICD-9-CM: 276.7  8/27/2022 Yes        * (Principal) Sepsis ([facility] ICD-10-CM: A41.9  ICD-9-CM: 038.9, 995.91  8/27/2022 Yes        AMS (altered mental status) ICD-10-CM: R41.82  ICD-9-CM: 780.97  8/27/2022 Yes        Strangulated inguinal hernia ICD-10-CM: K40.30  ICD-9-CM: 550.10  8/27/2022 Yes        Right inguinal hernia ICD-10-CM: K40.90  ICD-9-CM: 550.90  8/27/2022 Yes        Leukocytosis ICD-10-CM: D72.829  ICD-9-CM: 288.60  8/27/2022 Yes        Anemia ICD-10-CM: D64.9  ICD-9-CM: 285.9  8/27/2022 Yes        Chronic indwelling Mccray catheter ICD-10-CM: Z97.8  ICD-9-CM: V45.89  8/27/2022 Yes        S/P BKA (below knee amputation), left ([facility] ICD-10-CM: F01.100  ICD-9-CM: V49.75  8/27/2022 Yes        Right foot ulcer ([facility] ICD-10-CM: L97.519  ICD-9-CM: 707.15  8/27/2022 Yes        Paroxysmal atrial fibrillation (HCC) ICD-10-CM: I48.0  ICD-9-CM: 427.31  8/27/2022 Yes        Diabetic ulcer of right foot ([facility] ICD-10-CM: E11.621, L97.519  ICD-9-CM: 250.80, 707.15  8/27/2022 Yes        Pleural effusion on right ICD-10-CM: J90  ICD-9-CM: 511.9  8/27/2022 Yes        Severe protein-calorie malnutrition [facility]) ICD-10-CM: E43  ICD-9-CM: 262  8/27/2022 Yes        UTI (urinary tract infection) ICD-10-CM: N39.0  ICD-9-CM: 599.0  10/29/2021 Yes        Acute renal failure (ARF) (HCC) ICD-10-CM: N17.9  ICD-9-CM: 584.9  5/29/2018 Yes          78 y.o. 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 Admitted for severe septic shock with multiorgan and hypotension involvement due to multiple possible sources of infection, severe hyperkalemia, acute metabolic encephalopathy, metabolic acidosis, strangulated left inguinal hernia, anemia requiring blood transfusion, mild hypoxia coagulable state. Vascular consulted recommend aka if family wants, palliative care seeing pt and family decide to go forward to have aka done. However, RRT called due to active seizure, ativan was administrated and seizure stopped. [address] was administrated and neurologist is consulted      Seizure -no more seizure since keppra   Neuro consulted   Mri brain no acute stroke     Seizure precaution     Hypokalemia-resolved      Hypernatremia-resolved     Mild bleeding from NG tube being pulled out by patient-  Ac blood loss on anemia chronic     Dysphagia-will have speech reevaluation .  Today he is more alert, hope can pass speech      Incarcerated right inguinal hernia -status post ex lap with reduction of incarcerated right groin hernia with hernia mesh     Prior AKA left side     sacral ulcer      Infected medial maleolar and heel ulcers right foot-completed abx -followed by wound care, vascular /ortho seeing pt-aka recommended -now surgical hold due to goal of care changed      Acute renal failure -resolved     HTN-resume toprol, stop albumin encephalopathy -stable overnight, still confused    Paf rate controlled per metoprolol      S/p thoracentesis for effusion     Diabetes with hyperglycemia-follow BG levels, SSI    Malnutrition severe       Poor prognosis with multiple medical ailments and advanced PVD, diabetes complications, prior BKA< wounds and debilitated state  Poor prognosis  DNR     He looks more alert today. [facility]   Review of systems:    Limited due to pt condition . He shaked head while asking for chest pain and sob     Vital signs/Intake and Output:  Visit Vitals  BP (!) 160/76 (BP 1 Location: Left upper arm)   Pulse 92   Temp 99.7 °F (37.6 °C)   Resp 18   Ht 5' 7.99\" (1.727 m)   Wt 61.1 kg (134 lb 12.8 oz)   SpO2 100%   BMI 20.50 kg/m²     Current Shift:  No intake/output data recorded. Last three shifts:  09/14 1901 - 09/16 0700  In: 300 [I.V.:300]  Out: 2600 [Urine:2600]    Physical Exam:  General: WD, WN. Alert, not cooperative, no acute distress    HEENT: NC, Atraumatic. PERRLA, anicteric sclerae. Lungs: CTA Bilaterally. No Wheezing/Rhonchi/Rales. Heart:  Regular  rhythm,  No murmur, No Rubs, No Gallops  Abdomen: Soft, Non distended, Non tender. +Bowel sounds,   Extremities: No c/c, left BKA, rt foot wrapped with ace bandage   Psych:   Not anxious or agitated. Neurologic:  Confusion-more alert today         Labs: Results:       Chemistry Recent Labs     09/16/22  0327 09/15/22  0201 09/14/22  0320   * 128* 90    140 138   K 3.6 4.4 4.0    107 107   CO2 20* 25 26   BUN 44* 50* 51*   CREA 1.32* 1.44* 1.56*   CA 9.0 9.0 8.9   AGAP 12 8 5   BUCR 33* 35* 33*        CBC w/Diff Recent Labs     09/16/22  0327 09/13/22  1841   WBC 4.4* 12.6   RBC 3.13* 3.75*   HGB 8.4* 10.3*   HCT 28.2* 33.9*    492*   GRANS 64 53   LYMPH 30 40   EOS 0 0        Cardiac Enzymes No results for input(s): CPK, CKND1, LEONEL in the last 72 hours.     No lab exists for component: Juan Daniel Elizabeth Coagulation No results for input(s): PTP, INR, APTT, INREXT, INREXT in the last 72 hours. Lipid Panel No results found for: CHOL, CHOLPOCT, CHOLX, CHLST, CHOLV, 792328, HDL, HDLP, LDL, LDLC, DLDLP, 259840, VLDLC, VLDL, TGLX, TRIGL, TRIGP, TGLPOCT, CHHD, CHHDX   BNP No results for input(s): BNPP in the last 72 hours. Liver Enzymes No results for input(s): TP, ALB, TBIL, AP in the last 72 hours. No lab exists for component: SGOT, GPT, DBIL     Thyroid Studies Lab Results   Component Value Date/Time    TSH 4.73 (H) 08/28/2022 08:04 AM        Procedures/imaging: see electronic medical records for all procedures/Xrays and details which were not copied into this note but were reviewed prior to creation of Plan    XR ABD (KUB)    Result Date: 9/5/2022  EXAM: XR ABD (KUB) CLINICAL INDICATION/HISTORY: Abdominal pain and distention COMPARISON: None. TECHNIQUE: 3 supine AP views of the abdomen were obtained _______________ FINDINGS: Skin staples overlie right lower quadrant. There is a single borderline prominent air-filled bowel loop overlying the right paracentral inferior abdomen and superior pelvis, measuring approximately 2.7 cm in diameter. There is also seen more distally throughout the right colon. The soft tissue planes and bony structures appear normal.  _______________     Single borderline prominent air-filled right paracentral small bowel loop, appearance most suggestive of postoperative ileus. XR ABD (KUB)    Result Date: 9/5/2022  AP portable lower chest and upper abdomen for NG/OG tube placement: NG/OG tube is within the stomach. It is not as distally located as previous on the comparison exam from 9/3/2022. Evidence of abdominal surgery with midline skin staples. Persistent right lower lobe atelectasis/infiltrate with some pleural thickening or pleural fluid. NG/OG tube as described within the stomach.     XR ABD (KUB)    Result Date: 9/3/2022  EXAM: XR ABD (KUB) CLINICAL INDICATION/HISTORY: NGT Placement COMPARISON: 8/31/2022 TECHNIQUE: Supine AP view of the abdomen was obtained. _______________ FINDINGS: Gastric tube extends from midline thoracic esophagus curves through the left upper quadrant and has the tip and side-port overlying right mid abdomen in the region of the distal stomach or proximal duodenum. No air distended bowel loops nor other evidence of bowel obstruction or bowel dilatation. The soft tissue planes and bony structures appear normal.  _______________     Adequate gastric tube placement with tip and side-port in the region of the distal stomach/proximal duodenum. XR ABD (KUB)    Result Date: 8/31/2022  PORTABLE ABDOMEN Indication:  OGT placement. Technique: Portable supine AP view of the abdomen was obtained. Comparison studies:  August 27, 2022. Findings: Moderate rotation. Gastric tube terminates in the fundus of the stomach. Nonobstructive gas pattern. Telemetry leads. Lower abdominal skin closing staples. Minor scattered osseous degenerative changes. Moderate rotation. Gastric tube terminates in the fundus of the stomach. Nonobstructive gas pattern. CT HEAD WO CONT    Result Date: 8/27/2022  EXAM: CT head INDICATION: Altered mental status. COMPARISON: None. TECHNIQUE: Axial CT imaging of the head was performed without intravenous contrast.  One or more dose reduction techniques were used on this CT: automated exposure control, adjustment of the mAs and/or kVp according to patient size, and iterative reconstruction techniques. The specific techniques used on this CT exam have been documented in the patient's electronic medical record. Digital Imaging and Communications in Medicine (DICOM) format image data are available to nonaffiliated external healthcare facilities or entities on a secure, media free, reciprocally searchable basis with patient authorization for at least a 12-month period after this study. Patient motion degrades image quality.  _______________ FINDINGS: BRAIN AND POSTERIOR FOSSA: Mild periventricular areas of decreased attenuation are identified. Gray-white matter interfaces are preserved. No intraparenchymal hemorrhage, mass effect or midline shift. The ventricular system is midline and symmetric. Atherosclerotic vascular calcifications are identified. EXTRA-AXIAL SPACES AND MENINGES: There is no evidence for extra-axial mass lesion or fluid collection. CALVARIUM: Intact. SINUSES: Sphenoid sinus mucosal thickening. OTHER: Orbits are grossly intact. Facial soft tissue are within normal limits. _______________     1. No evidence for intracranial hemorrhage, mass effect or midline shift. 2.  Mild periventricular areas of microvascular ischemic change. 3.  Cerebral atherosclerosis. 4.  Atrophy. If there are continued symptoms, a MRI is recommended for further evaluation. CT ABD PELV WO CONT    Result Date: 8/27/2022  EXAM: CT of the abdomen and pelvis INDICATION: Pain. COMPARISON: May 14, 2021. TECHNIQUE: Axial CT imaging of the abdomen and pelvis was performed without intravenous contrast. Multiplanar reformats were generated. One or more dose reduction techniques were used on this CT: automated exposure control, adjustment of the mAs and/or kVp according to patient size, and iterative reconstruction techniques. The specific techniques used on this CT exam have been documented in the patient's electronic medical record. Digital Imaging and Communications in Medicine (DICOM) format image data are available to nonaffiliated external healthcare facilities or entities on a secure, media free, reciprocally searchable basis with patient authorization for at least a 12-month period after this study. _______________ FINDINGS: LOWER CHEST: Moderate right pleural effusion with adjacent atelectasis. LIVER, BILIARY: Liver is normal. No biliary dilation. Gallbladder is unremarkable. PANCREAS: Normal. SPLEEN: Splenic granuloma.  ADRENALS: Normal. KIDNEYS: Normal. LYMPH NODES: No enlarged lymph nodes. GASTROINTESTINAL TRACT: Uncomplicated sigmoid diverticula. Gastric distention. PELVIC ORGANS: Unremarkable. VASCULATURE: Atherosclerotic vascular calcifications. BONES: No acute or aggressive osseous abnormalities identified. OTHER: Large right inguinal hernia with large and small bowel. There is diffuse circumferential wall thickening of ascending colon within the hernia. _______________     1. Large right inguinal hernia with large and small bowel. There is diffuse wall thickening of the ascending colon concerning for strangulation. No evidence for bowel obstruction. 2. Right pleural effusion and bilateral basilar atelectasis. 3. Gastric distention. XR CHEST PORT    Result Date: 9/2/2022  CLINICAL HISTORY:  Tachypnea COMPARISONS:  Chest x-ray 9/2/2022 TECHNIQUE:  single frontal view of the chest ------------------------------------------ FINDINGS: Lungs:  Patchy parenchymal opacity in the right lung, similar to the prior radiograph and likely largely scar. Blunting of the right lateral costophrenic sulcus, also similar. Mild chronic appearing interstitial markings within the left lung, similar to prior radiograph. Mediastinum: Moderate cardiomegaly. Mediastinum is shifted towards the right, unchanged. Atherosclerotic arterial calcification. Bones: No evidence of fracture or suspicious bone lesion. ------------------------------------------    1. No significant interval change. Probable small right pleural effusion, not appreciably changed. 2. Right lung parenchymal findings likely largely reflect scar though are not well assessed. Pneumonia or aspiration not entirely excluded. XR CHEST PORT    Result Date: 9/2/2022  EXAM: XR CHEST PORT CLINICAL INDICATION/HISTORY: Post right thoracentesis -Additional: None COMPARISON: 9/2/2022 TECHNIQUE: Portable frontal view of the chest _______________ FINDINGS: SUPPORT DEVICES: None.  HEART AND MEDIASTINUM: Normal cardiac size and mediastinal contours. LUNGS AND PLEURAL SPACES: Decreased volume right-sided pleural effusion status post thoracentesis with persistent lateral and basilar components. Streaky areas of atelectasis and interstitial densities noted across the mid and lower lung zones. No evidence of postprocedural pneumothorax. Left lung clear as visualized. Skinfold along the peripheral left hemithorax. BONY THORAX AND SOFT TISSUES: Unremarkable. _______________     1. No evidence of postprocedural pneumothorax is significantly improved right lung aeration status post thoracentesis. > Residual pleural effusion and streaky areas of atelectasis. XR CHEST PORT    Result Date: 9/2/2022  EXAM: XR CHEST PORT CLINICAL INDICATION/HISTORY: intubated -Additional: None COMPARISON: One day prior TECHNIQUE: Portable frontal view of the chest _______________ FINDINGS: SUPPORT DEVICES: Interval extubation and removal of enteric tube. HEART AND MEDIASTINUM: Heart is obscured by right consolidation. LUNGS AND PLEURAL SPACES: Large right pleural effusion with adjacent atelectasis. Left-sided interstitial opacities No pneumothorax. _______________     Large right pleural effusion with adjacent atelectasis. XR CHEST PORT    Result Date: 9/1/2022  EXAM: Portable chest radiograph 9/1/2022 CLINICAL INDICATION/HISTORY: Intubation. TECHNIQUE: A semierect portable radiograph was obtained. COMPARISON: 8/31/2022. FINDINGS: Endotracheal tube remains in position with its tip approximately 5.5 cm above the level of the alise. A nasogastric tube extends below the field of view. The patient is slightly rotated to the right which distorts the cardiomediastinal contours. However, these contours are grossly unchanged. Increasing right pleural effusion is present with associated increasing atelectasis and/or infiltrate in the right lung. There is increasing interstitial edema and/or interstitial infiltrate throughout the left lung.  There is no pneumothorax or significant left pleural effusion. 1. Increasing interstitial edema and/or interstitial infiltration with increasing right pleural effusion. XR CHEST PORT    Result Date: 8/31/2022  EXAM: XR CHEST PORT CLINICAL INDICATION/HISTORY: intubated -Additional: None COMPARISON: 8/30/2022 TECHNIQUE: Portable frontal view of the chest _______________ FINDINGS: SUPPORT DEVICES: Endotracheal and enteric tubes unchanged. HEART AND MEDIASTINUM: Cardiomediastinal silhouette within normal limits. LUNGS AND PLEURAL SPACES: Patient rotated to right. Moderate right pleural effusion/atelectasis. No pneumothorax. _______________     Moderate right pleural effusion/atelectasis. XR CHEST PORT    Result Date: 8/30/2022  EXAM:  PORTABLE CHEST INDICATION:  Follow up. TECHNIQUE:  Portable, AP view. COMPARISON:  08/29/2022 ____________________ FINDINGS:  SUPPORT DEVICES: Endotracheal tube approximately 4 cm above the alise. OG tube is traversing below left hemidiaphragm, tip not imaged. Esophageal probe is present at the level of the endotracheal tube. HEART AND MEDIASTINUM: Stable. LUNGS AND PLEURAL SPACES: Persistent right basilar opacity consistent with pleural effusion with adjacent consolidation. No pneumothorax. BONY THORAX AND SOFT TISSUES: No acute osseous abnormality. ____________________     1. Endotracheal tube approximately 4 cm above the alise. 2. No significant change in right basilar opacity consistent with pleural effusion with adjacent consolidation, atelectasis or pneumonia. *  **     XR CHEST PORT    Result Date: 8/29/2022  HISTORY: -Provided with order: intubated -Additional: None Technique : AP PORTABLE CHEST Comparison : None. FINDINGS: HEART AND MEDIASTINUM: Anterior endotracheal tube tip estimated at 6.1 cm above alise. Enteric tube tip projects over left upper quadrant expected location gastric fundus. Esophageal temperature probe tip projects over lower thoracic .  Stable cardiomediastinal silhouette allowing for angulation. LUNGS AND PLEURAL SPACES: Progressive right pleural effusion and patchy opacities in the right lung. Left lung relatively clear allowing for technique. BONY THORAX AND SOFT TISSUES: No gross acute osseous abnormality. 1. Right pleural effusion and subjacent atelectasis/infiltrate, perhaps slightly progressed. 2. Tubes and lines stable in visualized extent. XR CHEST PORT    Result Date: 8/28/2022  EXAM: CHEST RADIOGRAPH CLINICAL INDICATION/HISTORY: intubated   > Additional: None COMPARISON: 8/27/2022. TECHNIQUE: Portable frontal view of the chest _______________ FINDINGS: SUPPORT DEVICES: Endotracheal tube distal tip projects 5.5 cm above the alise. Enteric tube distal tip projects below the left hemidiaphragm likely in the stomach. HEART AND MEDIASTINUM: No appreciable cardiomegaly. Remaining mediastinal contours are stable. LUNGS AND PLEURAL SPACES: Right pleural effusion with adjacent opacity. No evidence for pneumothorax. Left lung is clear. BONY THORAX AND SOFT TISSUES: Unremarkable. _______________     1. Right pleural effusion with adjacent atelectasis/infiltrates. 2. Supporting tubes appear stable. XR CHEST PORT    Result Date: 8/27/2022  EXAM: CHEST RADIOGRAPH CLINICAL INDICATION/HISTORY: intubated -Additional: None COMPARISON: August 27, 2022 TECHNIQUE: Portable frontal view of the chest _______________ FINDINGS: SUPPORT DEVICES: An endotracheal tube is positioned 6 cm above the alise. HEART AND MEDIASTINUM: Heart size and mediastinal contour are midline and within normal limits. LUNGS AND PLEURAL SPACES: There are opacities throughout both lungs but more confluent within the right mid and lower lung zones. No pneumothorax.  BONY THORAX AND SOFT TISSUES: Unremarkable. _______________     Interval right greater than left airspace disease Small to moderate right pleural effusion    XR CHEST PORT    Result Date: 8/27/2022  EXAM: CHEST RADIOGRAPHS CLINICAL INDICATION/HISTORY: Sepsis   > Additional: None COMPARISON: 6/18/2022. TECHNIQUE: PA and lateral views of the chest _______________ FINDINGS: HEART AND MEDIASTINUM: No appreciable cardiomegaly. Remaining mediastinal contours within normal limits. LUNGS AND PLEURAL SPACES: Left lung is clear. Right pleural effusion with adjacent opacity. BONY THORAX AND SOFT TISSUES: Unremarkable. _______________     1. Right pleural effusion with probable adjacent atelectasis. XR ABD PORT  1 V    Result Date: 8/27/2022  EXAM: ABDOMINAL RADIOGRAPHS CLINICAL INDICATION/HISTORY: OG placement -Additional: None COMPARISON: None TECHNIQUE: Single supine view of the abdomen _______________ FINDINGS: SUPPORT DEVICES: A nasogastric tube is positioned in the stomach. BOWEL GAS PATTERN: The gas pattern is nonobstructive. Yaneth Sharper SOFT TISSUES: Unremarkable. BONES: Degenerative changes are seen throughout the spine. ADDITIONAL FINDINGS: None. _______________     No significant abnormality. US THORACENTESIS RT NDL W IMAGE    Result Date: 9/2/2022  PROCEDURE: ULTRASOUND GUIDED RIGHT THORACENTESIS INDICATION: Right pleural effusion, shortness of breath PERFORMED BY:  Donis Hayes PA-C ATTENDING PHYSICIAN:  Sammi Rausch MD SUPERVISION: General _______________ TECHNIQUE & FINDINGS: CONSENT: The risks, benefits, and alternatives to the procedure were explained to the patient's wife prior to the procedure. Written informed consent was obtained and witnessed. Standard pre-procedure time out performed. TECHNIQUE/FINDINGS:  The right pleural effusion was localized under ultrasound guidance. Sterile ultrasound probe cover and ultrasound gel were utilized. The skin was marked, prepped and draped in the usual sterile fashion. 1% Lidocaine was used for local anesthesia. A 5 Western Latanya Yueh needle was advanced into the right pleural space under US guidance. A total of 460 mL of clear alanna fluid was drained using a Vacutainer system.    A specimen was collected and taken to the lab, as requested. The catheter was then removed and the site dressed with a sterile bandage. The patient tolerated the procedure well and there were no immediate complications. Post-procedure chest x-ray shows no pneumothorax. GUIDANCE: Ultrasound guidance was used to position (and confirm the position of) the needle. Ultrasound image(s) saved in PACS. _______________     Successful ultrasound-guided thoracentesis with removal of 460 mL of fluid. Post-procedure chest x-ray shows no pneumothorax. A specimen was collected and taken to the lab, as requested. ECHO ADULT COMPLETE    Result Date: 8/28/2022  Formatting of this result is different from the original.   Left Ventricle: Normal left ventricular systolic function with a visually estimated EF of 60 - 65%. Left ventricle size is normal. Normal wall thickness. Normal wall motion. Grade I diastolic dysfunction present with normal LV EF. Unable to assess RVSP due to inadequate or insignificant tricuspid regurgitation. DUPLEX LOWER EXT ARTERY RIGHT    Result Date: 9/6/2022  · Patent right leg vessels with good upstroke.         Sandra Barragan MD

## 2022-09-16 NOTE — PROGRESS NOTES
[id number] Shift Summary: Pt rested well overnight with no complaints. All PO meds were held since the pt does not follow directions. No new clinical concerns noted.      Nightshift Chart Audit Completed

## 2022-09-16 NOTE — PROGRESS NOTES
Comprehensive Nutrition Assessment    Type and Reason for Visit: Reassess    Nutrition Recommendations/Plan:   Continue current diet order. Continue ONS-Nepro TID     Malnutrition Assessment:  Malnutrition Status:  Severe malnutrition (09/09/22 1330)    Context:  Acute illness     Findings of the 6 clinical characteristics of malnutrition:   Energy Intake:  Unable to assess  Weight Loss:  Unable to assess     Body Fat Loss: Moderate body fat loss, Orbital, Buccal region   Muscle Mass Loss: Moderate muscle mass loss, Temples (temporalis), Thigh (quadriceps), Calf  Fluid Accumulation:   ,     Strength:        Nutrition Assessment:    Per chart- pt is on minced/moist diet taking 1-25% of meals and % of supplement. Pt remains DNR/DNI with comfort measures beginning upon discharge. Per RN-pt only interested in sweeter items, does not eat much of his other food items, but does drink his Nepro supplement. Nutrition Related Findings:    BM 9/15/22-watery. Labs: Glucoes, 149, BUN 44, creatininne 1.32, Phos. 1.9, A1C 5.9. Meds: lasix, humalog, lipitor, protonix, effer-k. Wound Type: Multiple, Pressure injury, Unstageable    Current Nutrition Intake & Therapies:  Average Meal Intake: 1-25%  Average Supplement Intake: %  ADULT ORAL NUTRITION SUPPLEMENT Breakfast, Lunch, Dinner; Renal Supplement  ADULT DIET Dysphagia - Minced & Moist    Anthropometric Measures:  Height: 5' 7.99\" (172.7 cm)  Ideal Body Weight (IBW): 154 lbs (70 kg)     Current Body Wt:  60.8 kg (134 lb), 87 % IBW.  Bed scale  Current BMI (kg/m2): 20.4  Usual Body Weight: 72.6 kg (160 lb) (6/6/22)  % Weight Change (Calculated): -16.3  Weight Adjustment: No adjustment  Total Amputation Percentage: 10.1  Adjusted Ideal Body Weight (lbs) (Calculated): 138.4  Adjusted Ideal Body Weight (kg) (Calculated): 62.91 kg  Adjusted % IBW (Calculated): 96.8  Adjusted BMI (Calculated): 22.5  BMI Category: Normal weight (BMI 22.0-24.9) age over 65    Estimated Daily Nutrient Needs:  Energy Requirements Based On: Kcal/kg  Weight Used for Energy Requirements: Current  Energy (kcal/day): 4635-9850  Weight Used for Protein Requirements: Current  Protein (g/day): 49-68  Method Used for Fluid Requirements: 1 ml/kcal  Fluid (ml/day): 2534-8187    Nutrition Diagnosis:   Inadequate energy intake related to acute injury/trauma, cognitive or neurological impairment as evidenced by intake 0-25%, wounds    Nutrition Interventions:   Food and/or Nutrient Delivery: Continue current diet, Continue oral nutrition supplement  Nutrition Education/Counseling: No recommendations at this time  Coordination of Nutrition Care: Continue to monitor while inpatient       Goals:  Previous Goal Met: No progress toward goal(s)  Goals: PO intake 50% or greater, by next RD assessment       Nutrition Monitoring and Evaluation:   Behavioral-Environmental Outcomes: None identified  Food/Nutrient Intake Outcomes: Diet advancement/tolerance, Food and nutrient intake, Supplement intake  Physical Signs/Symptoms Outcomes: Biochemical data, GI status, Meal time behavior, Weight    Discharge Planning:    Continue current diet, Continue oral nutrition supplement    Aryan Webster,

## 2022-09-16 NOTE — HOSPICE
Per chart review, pt was established with [facility] prior to this admission. Per conversation with CM, pt will be using this company, upon discharge, for hospice services.      Roberth SNOW, [facility] Inpatient Clinical Liaison  [place] [address] [facility], [address]  [phone]  Email: [email or web address]

## 2022-09-16 NOTE — PROGRESS NOTES
Palliative Medicine follow-up note    Patient Name: Syeda Aburto YOB: 1945    Date of Initial Consult: 8/29/2022  Date of follow up: 9/16/2022  Reason for Consult: Goals of care discussions  Requesting Provider: Dr. Aggie Mixon   Primary Care Physician: Stephen Cisneros MD      SUMMARY:   Syeda Aburto is a 78 y.o. with a past history of hypertension, hyperlipidemia, stroke, PAD, chronic atrial fibrillation, on Plavix, DM, CKD, previous, COVID-19 infection, chronic indwelling urinary catheter for urinary retention, and severe peripheral vascular disease with worsening gangrenous left foot necessitating left BKA during 8 day hospitalization last year, who was admitted on 8/27/2022 from home with a diagnosis of incarcerated Right inguinal hernia, severe septic shock with hypotension, severe hyperkalemia, acute metabolic encephalopathy, and anemia. Current medical issues leading to Palliative Medicine involvement include: goals of care discussions. 8/30/2022: Patient was off sedation, moving head back and forth, appears in distress. 9/1/2022: Patient sedation help, plan for SBT today, with possible medical extubation. 9/2/2022: Patient was medically extubated on 9/1/2022, he is oriented to person and place, disoriented to current time and situation. Appears frail, fatigued, and weak. 9/6/2022: Patient is awake, alert, oriented to person and place, confused about current time and situation. He states he is 54years old. 9/8/2022: Patient awake, remains confused, oriented to person and place, states the year is 1945 (his birth year). He cannot recall speaking to the orthopedic surgeon about possible AKA versus BKA. 9/13/2022: Patient has been refusing nursing care per chart review. Family has agreed to move forward with right AKA. 9/14/2022: Patient was seen in presence of palliative care RN. Patient's wife is at bedside.   Patient keeps staring at us upon asking questions. Continues to have off-and-on leg pain. No obvious shortness of breath or cough. Patient had an episode of seizure last night and early this morning. 9/15/2022:  Patient was asleep, easily awakened to verbal stimuli, appears in no acute distress, nodding his head \"yes\" to every question being asked. It is uncertain what his orientation status is as he is currently nonverbal.    9/16/2022: Patient drowsy, no family at bedside. Called Mrs. Gilles Doshi (wife) and patient's son Sandra Redmond today who confirm their desire for DNR/DNI, comfort measures with hospice at discharge, and no feeding tubes. PALLIATIVE DIAGNOSES:   Goals of care discussions  Septic shock liver artery disease s/p left AKA  Acute respiratory failure   incarcerated Right inguinal hernia  Advanced age/debility       PLAN:   9/16/2022: Palliative medicine team including Fazal Luis RN and I met with patient at patient's bedside. Patient drowsy, no family at bedside. Called Mrs. Gilles Doshi (wife) and patient's son Sandra Redmond today who confirm their desire for DNR/DNI, comfort measures with hospice at discharge, and no feeding tubes. POST form on file. Discussed with hospice liaison and CM. Family not sure they can care for him at home unless he is able to have care aides. Will defer d/c planning to case management. See previous discussions below:    9/15/2022: Palliative medicine team including  CHERYLE Freed and I met with patient at patient's bedside. Patient was asleep, easily awakened to verbal stimuli, appears in no acute distress, nodding his head \"yes\" to every question being asked. It is uncertain what his orientation status is as he is currently nonverbal. Family meeting yesterday with patient's wife who agreed with comfort measures at discharge. Per chart review, patient's son Etelvina Mosqueda III had questions about continuing medical treatment when he was having a conversation with hospice liaison.   With wife's permission, called patient's son Lianet Cloud III to review goals of care. Discussed patient's worsening debility, poor oral intake, recent seizure activity, recent refusal of care, failure to thrive, and concern of patient's ability to participate in rehab should he undergo right AKA. Also discussed the risks of right AKA including death, poor nutrition and concern for wound healing, and inability to participate in rehab. Discussed option for feeding tube. Discussed patient's current quality of life, and asked the question of whether or not patient would find this current quality of life acceptable. Lianet Cloud III states that he appreciated all of the medical information, and the holistic review of his father. He states  now he understands why Mrs. Lex Garcia agreed to hospice and comfort measures at discharge. Marino MCARTHUR states he supports these decisions, but would like to speak to his other 2 siblings today to ensure the whole family is on the same page. We will follow-up with the family tomorrow morning to ensure everyone is in agreement with hospice at discharge (wife wants the support from their 3 children). There is questions on whether or not patient's wife will be able to care for patient at home in his current debilitated state; case management following for safe discharge plan whether that is facility versus home with hospice. At this time there are no changes in goals of care. Patient will remain DNR/DNI, comfort measures on discharge, hospice consult. Wife is looking for placement versus more help at home. POST form on file. We will continue to follow patient with you. See previous discussions below:    9/14/2022: Was seen in presence of palliative care RN. Patient is awake but unable to participate in conversation due to his current mental status. Patient had an event this morning in form of seizures. Patient's wife is at bedside.   Patient's wife is listed as a next up kin as well as medical power of . As per wife: They have 3 kids, 2 sons and 1 daughter. She understands patient's current medical status. Explained her about various treatment option. The first option is to continue current medical care including antiseizure medications, pain management and proceed for right AKA. The second option is to keep him as comfortable as possible, being evaluated by hospice and not to escalate medical care or proceed for surgery. She verbalized understanding about both options. She stated she would like to go with second option. She understands what comfort care/hospice means including sudden cardiac death. She has signed the post form for comfort measures upon discharge. Primary attending has been notified about it. She gave us permission to talk to her son to inform him about her decision. She also stated that her sons can call her if they have any questions about the dizziness she has made for this patient being on comfort measures. We called patient's son [[email or web address] @ [phone] and left a voice message. Plan: Patient will remain DNR/DNI, comfort measures on discharge, hospice consult. Wife is looking for placement versus more help at home. We will continue to follow patient with you.    9/13/2022: Palliative medicine team including Constanza May, RN and I met with patient at patient's bedside. Patient is awake, flat affect, nodding appropriately, but would not answer orientation questions. When asked if he was OK with having a right AKA he shook his head \"no\" but it is uncertain if he understands all the benefits and burdens of his choices. Appreciate vascular input, will need AKA if comfort care is not pursued. Called patient's wife/legal NOK Zuleika Smith who states she and all three of patient's children are in agreement to have the AKA of the right leg.  Explained our concern that patient has been refusing nursing care and vital signs, and we are concerned about patient's ability to participate in PT/OT, and undergo postoperative treatment recommendations. Wife states that she and family feel that the amputation should happen to give patient the best opportunity to get stronger and clear cognitively, in hopes that he has some quality of life (understanding he may not improve). Discussed with Dr. Latricia Kauffman. At this time, continue DNR/DNI, and family in agreement to have right AKA. Family understands DNR status may temporarily be suspended in surgery depending on her discussion with the surgeon. We will continue to follow and continue goals of care discussions as appropriate. See previous discussions below:    9/8/2022: Palliative medicine team including Johan Bennett RN and I met with patient at patient's bedside. Patient awake, remains confused, oriented to person and place, states the year is [birth year] (his birth year). He cannot recall speaking to the orthopedic surgeon about possible AKA versus BKA. Wife not at bedside. Awaiting vascular input, then will plan a meeting with family to readdress goals of care. Family is strongly hopeful that patient will clear cognitively enough to make this decision on his own but at this time he remains confused. At this time continue DNR/DNI. See previous discussions below:    9/6/2022: Palliative medicine team including Johan Bennett RN and I met with patient at patient's bedside. Patient is awake, alert, oriented to person and place, confused about current time and situation. He states he is 54years old. Met with wife later in the day, who states that each day patient appears to be doing better, and improving cognitively, now able to eat a pureed diet. Readdressed goals of care today, confirmed DNR/DNI. Readdressed the likely need of right lower extremity amputation versus implementation of comfort measures with hospice at discharge.   Wife states that patient states \"cut it off\" when discussing his foot ulcers and seems to be okay with having the amputation. Explained at this time I do not believe patient understands the benefits and burdens of his medical choices, wife agrees. Wife would like to give patient more time to clear cognitively to participate in his goals of care conversation, understanding patient is high risk for recurrent sepsis due to severe PVD in the right lower extremity with chronic vascular ulcers. Family is not ready for hospice. We will follow with you. See previous discussions below:    9/2/2022: Palliative medicine team including  CHERYLE Sharma and I met with patient at patient's bedside. Patient is awake, alert, oriented to person and place, disoriented to current time and situation. He is not able to understand complex goals of care discussions at this time. Met with patient later in the day and wife was at bedside. Reviewed goals of care with wife. Wife has a good understanding of current health situation; we explained that patient is too weak, and confused to participate in goals of care discussions at this time. Reviewed overall level of care, with concern for RLL chronic vascular ulcers with previous recommendations from [facility] (6/9/2022) for consideration of Right AKA. Patient has historically declined this amputation. Explained to wife that patient is likely an appropriate hospice candidate, and this should be considered as an option at discharge. Also discussed the benefits and burdens of continued aggressive measures versus implementing comfort measures with the support of hospice at discharge. Wife would like to give patient more time to see  if he clears mentally to participate in his goals of care, understanding she may have to make the decisions should patient not clear cognitively. At this time continue DNR/DNI. See previous discussions below:    9/1/2022: Palliative medicine team including Bassem Proctor and I met with patient at patient's bedside today. Patient was being removed from sedation for SBT today. Spoke to hospitalist and intensivist. Patient may be stable enough to medically extubate depending on how he does today with SBT. Per previous goals of care discussions with family, we will wait to see if patient can be medically extubated and readdress further goals of care at that point (right leg necrotic tissue with possible need for amputation versus comfort measures). Patient has historically declined amputation in the past. Goals of care discussions ongoing. Family hopes that patient can be safely extubated and would be able to participate in goals of care discussions. At this time, continue DNR/do not re-intubate for any reason. See previous discussions below:    8/30/2022: Palliative medicine team including Todd Hanley and I met with patient at patient's bedside today. Patient was off sedation, moving head back and forth, appears in distress. Family meeting today consisting of palliative team, patient's family (wife Heath Lane, children Hildale Daniels, and Shima Gutting) hositalist Dr. Bárbara Godoy, and intensivist Dr. Chencho Schulz. Medical update provided. Discussed SBT outcome today with potential for medical extubation tomorrow depending on how his SBT goes tomorrow. Discussed the benefits and burdens of reintubation in the event of respiratory decline post medical extubation. Discussed overall level of care, with concern for RLL chronic vascular ulcers with previous recommendations from [facility] (6/9/2022) for consideration of Right AKA. Patient has historically declined this amputation. Discussed current quality of life. Family describes patient as a man who values his independence, and patient has been bothered by the fact that he hasn't  walked in over a year.  Discussed that septic shock is improving and this event was likely related to bowel sepsis, but that patient is at high risk for recurrent sepsis and further clinical decline due to chronic infection in RLL, with no plan for amputation. Discussed the benefits and burdens of continuing current level of care versus implementation of comfort measures with hospice at discharge. Family clear that he would not find reintubation acceptable. Family is hopeful that if patient is medically extubated, he may wake up enough to participate in goals of care discussions. For now, patient is a DNR/do not re-intubate for any reason. Further goals of care discussions ongoing. See previous discussions below:    8/29/2022:Goals of care discussions: Palliative medicine team including  CHERYLE Shin and I met with patient at patient's bedside. Patient is orally intubated on mechanical ventilation, frequent movements in bed but no command following. Wife Christian Hernandez at bedside (Wife notes that English is her second language but she states she understands our conversation and answering questions appropriately. Offered translation services but wife declined.) Wife confirms DNR in the event of cardiac arrest. Patient is a PARTIAL CODE: No CPR (including no chest compressions, no shock, and no ACLS meds in the event of cardiac arrest). Continue intubation at this time. Wife requesting a meeting with their children involved to address further goals of care. Wife will call me with a time for tomorrow once she has spoken to all of her children. Received a call from patient's son Josue Delacruz and explained to him the desire to meet for family meeting. Josue Delacruz confirms DNR status upon cardiac arrest. Further goals of care discussions will occur at family meeting tomorrow. Awaiting response from wife with exact time. Septic shock:   multiple possible sources of infection, incarcerated Right inguinal hernia s/p laparotomy and reduction of incarcerated right groin hernia without need for bowel resection-8/27/2022. UA-Positive for UTI; urine culture obtained. sacral ulcer and medial maleolar and heel infected ulcers.  On broad spectrum antibiotics per primary team.   Acute respiratory failure: Patient remains intubated postop due to critical illness. incarcerated Right inguinal hernia: s/p laparotomy and reduction of incarcerated right groin hernia without need for bowel resection-8/27/2022. Advanced age/debility: 78year old male who is critically ill, needs assist with all ADLs. Prior to admission, patient lived at home with his spouse and required assistance with functional ADLs. Initial consult note routed to primary continuity provider  Communicated plan of care with: Palliative IDT       GOALS OF CARE / TREATMENT PREFERENCES:   [====Goals of Care====]  GOALS OF CARE: DNR/DNI    Patient/Health Care Proxy Stated Goals: Comfort      TREATMENT PREFERENCES:   Code Status: DNR    Advance Care Planning:  Advance Care Planning 8/29/2022   Patient's Healthcare Decision Maker is: Legal Next of Kin   Primary Decision Maker Name -   Primary Decision Maker Phone Number -   Primary Decision Maker Relationship to Patient -   Confirm Advance Directive None   Patient Would Like to Complete Advance Directive Unable       Medical Interventions:  Other (comment) (comfort measures at discharge)     Artificially Administered Nutrition: No feeding tube      The palliative care team has discussed with patient / health care proxy about goals of care / treatment preferences for patient.  [====Goals of Care====]         HISTORY:     History obtained from: patient's chart, family    CHIEF COMPLAINT: incarcerated Right inguinal hernia, s/p surgery    HPI/SUBJECTIVE:    The patient is:   [] Verbal and participatory  [x] Non-participatory due to:   Orientation status unable to assess, nods \"yes\" to every question     Clinical Pain Assessment (nonverbal scale for severity on nonverbal patients):   Clinical Pain Assessment  Severity: 0    Adult Nonverbal Pain Scale  Face: No particular expression or smile  Activity (Movement): Lying quietly, normal position  Guarding: Lying quietly, no positioning of hands over areas of body  Physiology (Vital Signs): Stable vital signs  Respiratory: Baseline RR/SpO2 compliant with ventilator  Total Score: 0       FUNCTIONAL ASSESSMENT:     Palliative Performance Scale (PPS):  PPS: 30       PSYCHOSOCIAL/SPIRITUAL SCREENING:     Advance Care Planning:  Advance Care Planning 8/29/2022   Patient's Healthcare Decision Maker is: Legal Next of Kin   Primary Decision Maker Name -   Primary Decision Maker Phone Number -   Primary Decision Maker Relationship to Patient -   Confirm Advance Directive None   Patient Would Like to Complete Advance Directive Unable        Any spiritual / Holiness concerns: unable to assess  [] Yes /  [] No    Caregiver Burnout:  [] Yes /  [] No /  [x] No Caregiver Present      Anticipatory grief assessment: unable to assess  [] Normal  / [] Maladaptive          REVIEW OF SYSTEMS:     Positive and pertinent negative findings in ROS are noted above in HPI. The following systems were [] reviewed / [x] unable to be reviewed as noted in HPI  Other findings are noted below. Systems: constitutional, ears/nose/mouth/throat, respiratory, gastrointestinal, genitourinary, musculoskeletal, integumentary, neurologic, psychiatric, endocrine. Positive findings noted below. Modified ESAS Completed by: provider   Fatigue: 6       Pain: 0   Anxiety: 0 Nausea: 0   Anorexia: 9 Dyspnea: 0     Constipation: No     Stool Occurrence(s): 1        PHYSICAL EXAM:     From RN flowsheet:  Wt Readings from Last 3 Encounters:   09/16/22 61.1 kg (134 lb 12.8 oz)   11/02/21 81.6 kg (180 lb)   10/11/21 76.7 kg (169 lb)     Blood pressure (!) 160/76, pulse 92, temperature 99.7 °F (37.6 °C), resp. rate 18, height 5' 7.99\" (1.727 m), weight 61.1 kg (134 lb 12.8 oz), SpO2 100 %.     Pain Scale 1: Numeric (0 - 10)  Pain Intensity 1: 0     Pain Location 1: Back  Pain Orientation 1: Posterior  Pain Description 1: Aching  Pain Intervention(s) 1: Medication (see MAR), Massage, Position, Repositioned, Rest      Constitutional:, Remains confused, keeps staring at us upon asking question, chronically ill. ENMT: dry mucous membranes, hoarseness  Cardiovascular: regular rhythm, distal pulses intact  Respiratory: Diminished breath sound bibasilar with poor respiratory effort, on NC at 2 L  Gastrointestinal: midline surgical incision CDI, no distention  Musculoskeletal: Moves right leg, left leg amputation site dressing in situ.   Skin: warm, dry, black eschar right ankle and right heel  Neurologic: following some commands, moving all extremities, confused to current time and situation       HISTORY:     Principal Problem:    Sepsis ([address] (8/27/2022)    Active Problems:    Acute renal failure (ARF) ([address] (5/29/2018)      UTI (urinary tract infection) (10/29/2021)      High anion gap metabolic acidosis (9/05/3721)      Hyperkalemia (8/27/2022)      AMS (altered mental status) (8/27/2022)      Strangulated inguinal hernia (8/27/2022)      Right inguinal hernia (8/27/2022)      Leukocytosis (8/27/2022)      Anemia (8/27/2022)      Chronic indwelling Mccray catheter (8/27/2022)      S/P BKA (below knee amputation), left ([address] (8/27/2022)      Right foot ulcer ([address] (8/27/2022)      Paroxysmal atrial fibrillation ([address] (8/27/2022)      Diabetic ulcer of right foot ([address] (8/27/2022)      Pleural effusion on right (8/27/2022)      Severe protein-calorie malnutrition ([address] (8/27/2022)      Acute respiratory failure with hypoxemia ([address] (8/28/2022)      Septic shock ([address] (8/28/2022)      Hypernatremia (9/6/2022)      Seizure ([address] (9/14/2022)    Past Medical History:   Diagnosis Date    A-fib (HCC)     Asthma     CAD (coronary artery disease)     Chronic kidney disease     stage 3    COVID-19     Diabetes ([address]     GERD (gastroesophageal reflux disease)     Heart failure (HCC)     chronic diastolic heart failure    High cholesterol     Hypertension     Ill-defined condition     chronic osteomyelitis left ankle and foot    Ill-defined condition     chronic arteriosclerosis    PVD (peripheral vascular disease) ([address]     Stroke [facility])     cva      Past Surgical History:   Procedure Laterality Date    COLONOSCOPY N/A 6/1/2018    COLONOSCOPY, POLYPECTOMY performed by Sherif Wallace MD at [facility]    HX CATARACT REMOVAL      HX ORTHOPAEDIC Left 2021    ankle washout, external fixator, would vac    HX ORTHOPAEDIC      external fixator removed      History reviewed. No pertinent family history. History reviewed, no pertinent family history.   Social History     Tobacco Use    Smoking status: Former    Smokeless tobacco: Never   Substance Use Topics    Alcohol use: Not Currently     No Known Allergies   Current Facility-Administered Medications   Medication Dose Route Frequency    furosemide (LASIX) injection 20 mg  20 mg IntraVENous DAILY    levoFLOXacin (LEVAQUIN) 500 mg in D5W IVPB  500 mg IntraVENous Q24H    metroNIDAZOLE (FLAGYL) IVPB premix 500 mg  500 mg IntraVENous Q8H    metoprolol (LOPRESSOR) injection 1.25 mg  1.25 mg IntraVENous Q6H    sodium chloride (NS) flush 5-40 mL  5-40 mL IntraVENous Q8H    sodium chloride (NS) flush 5-40 mL  5-40 mL IntraVENous PRN    diazePAM (VALIUM) injection 9.3 mg  0.15 mg/kg IntraVENous Q10MIN PRN    LORazepam (ATIVAN) 2 mg/mL injection 1 mg  1 mg IntraVENous Q10MIN PRN    levETIRAcetam (KEPPRA) 500 mg in 0.9% sodium chloride 100 mL IVPB  500 mg IntraVENous Q12H    pantoprazole (PROTONIX) tablet 40 mg  40 mg Oral ACB    atorvastatin (LIPITOR) tablet 40 mg  40 mg Oral QHS    [Held by provider] metoprolol succinate (TOPROL-XL) XL tablet 50 mg  50 mg Oral DAILY    insulin lispro (HUMALOG) injection   SubCUTAneous AC&HS    Saccharomyces boulardii (FLORASTOR) capsule 250 mg  250 mg Oral BID    [Held by provider] potassium bicarb-citric acid (EFFER-K) tablet 40 mEq  40 mEq Oral DAILY    povidone-iodine (BETADINE) 10 % topical solution   Topical DAILY ipratropium (ATROVENT) 0.02 % nebulizer solution 0.5 mg  0.5 mg Nebulization Q6H PRN    collagenase (SANTYL) 250 unit/gram ointment   Topical DAILY    0.9% sodium chloride infusion 250 mL  250 mL IntraVENous PRN    hydrALAZINE (APRESOLINE) 20 mg/mL injection 20 mg  20 mg IntraVENous Q6H PRN    midazolam (VERSED) injection 0.5 mg  0.5 mg IntraVENous BID PRN    0.9% sodium chloride infusion 250 mL  250 mL IntraVENous PRN    heparin (porcine) injection 5,000 Units  5,000 Units SubCUTAneous Q8H    sodium chloride (NS) flush 5-40 mL  5-40 mL IntraVENous Q8H    sodium chloride (NS) flush 5-40 mL  5-40 mL IntraVENous PRN    acetaminophen (TYLENOL) tablet 650 mg  650 mg Oral Q6H PRN    Or    acetaminophen (TYLENOL) suppository 650 mg  650 mg Rectal Q6H PRN    polyethylene glycol (MIRALAX) packet 17 g  17 g Oral DAILY PRN    ondansetron (ZOFRAN ODT) tablet 4 mg  4 mg Oral Q8H PRN    Or    ondansetron (ZOFRAN) injection 4 mg  4 mg IntraVENous Q6H PRN    glucose chewable tablet 16 g  4 Tablet Oral PRN    glucagon (GLUCAGEN) injection 1 mg  1 mg IntraMUSCular PRN    dextrose 10% infusion 0-250 mL  0-250 mL IntraVENous PRN    HYDROmorphone (DILAUDID) injection 1 mg  1 mg IntraVENous Q4H PRN          LAB AND IMAGING FINDINGS:     Lab Results   Component Value Date/Time    WBC 4.4 (L) 09/16/2022 03:27 AM    HGB 8.4 (L) 09/16/2022 03:27 AM    PLATELET 399 80/21/5440 03:27 AM     Lab Results   Component Value Date/Time    Sodium 138 09/16/2022 03:27 AM    Potassium 3.6 09/16/2022 03:27 AM    Chloride 106 09/16/2022 03:27 AM    CO2 20 (L) 09/16/2022 03:27 AM    BUN 44 (H) 09/16/2022 03:27 AM    Creatinine 1.32 (H) 09/16/2022 03:27 AM    Calcium 9.0 09/16/2022 03:27 AM    Magnesium 2.0 09/13/2022 06:41 PM    Phosphorus 1.9 (L) 09/03/2022 07:45 AM      Lab Results   Component Value Date/Time    Alk.  phosphatase 150 (H) 09/11/2022 08:11 AM    Protein, total 6.4 09/11/2022 08:11 AM    Albumin 3.9 09/11/2022 08:11 AM    Globulin 2.5 09/11/2022 08:11 AM     Lab Results   Component Value Date/Time    INR 1.3 (H) 09/02/2022 04:51 AM    Prothrombin time 16.8 (H) 09/02/2022 04:51 AM    aPTT 118.5 (H) 05/17/2021 08:20 AM      Lab Results   Component Value Date/Time    Iron 10 (L) 05/06/2021 06:40 AM    TIBC 186 (L) 05/06/2021 06:40 AM    Iron % saturation 5 (L) 05/06/2021 06:40 AM    Ferritin 125 05/06/2021 06:40 AM      No results found for: PH, PCO2, PO2  No components found for: [place]   Lab Results   Component Value Date/Time    CK 46 05/29/2018 05:45 PM    CK - MB 1.4 05/29/2018 05:45 PM                Total time to take care of this patient was 25 minutes and more than 50% of time was spent counseling and coordinating care. Disclaimer: Sections of this note are dictated using utilizing voice recognition software, which may have resulted in some phonetic based errors in grammar and contents. Even though attempts were made to correct all the mistakes, some may have been missed, and remained in the body of the document. If questions arise, please contact our department.

## 2022-09-16 NOTE — ROUTINE PROCESS
Bedside and Verbal shift change report given to Juan Carlos Ramesh RN  (oncoming nurse) by Lucio White RN (offgoing nurse). Report given with SBAR, Kardex, Intake/Output and Recent Results.

## 2022-09-16 NOTE — PROGRESS NOTES
D/C to facility hospice arranged by the [facility] pending    CM reached out to the [facility] coordinator for an update. Per the coordinator, she had received the clinicals but needs the POST form to complete the referral. CM to send the POST form. [facility] will [facility] spouse on Monday once they receive the POST. CM placed call to spouse to provide update. Left vm that the [facility] would call on Monday. Care Management Interventions  PCP Verified by CM:  Yes (Per Chart: Dr. Iván Brennan )  Mode of Transport at Discharge: BLS  Transition of Care Consult (CM Consult): Discharge Planning  Discharge Durable Medical Equipment: No  Physical Therapy Consult: No  Occupational Therapy Consult: No  Speech Therapy Consult: No  Support Systems: Spouse/Significant Other  Confirm Follow Up Transport: Family  The Plan for Transition of Care is Related to the Following Treatment Goals : hospice at a SNF through the [place]  Discharge Location  Patient Expects to be Discharged to[de-identified] Skilled nursing facility

## 2022-09-16 NOTE — PROGRESS NOTES
0715 Bedside shift change report given to Venita Lao RN (oncoming nurse) by Sharif Cuellar (offgoing nurse). Report included the following information SBAR, Kardex, Intake/Output, MAR, and Recent Results. 1927 Bedside shift change report given to Sharif Cuellar (oncoming nurse) by Venita Lao RN (offgoing nurse). Report included the following information SBAR, Kardex, Intake/Output, MAR, and Recent Results.

## 2022-09-16 NOTE — PROGRESS NOTES
Problem: Ventilator Management  Goal: *Adequate oxygenation and ventilation  Outcome: Progressing Towards Goal  Goal: *Patient maintains clear airway/free of aspiration  Outcome: Progressing Towards Goal  Goal: *Absence of infection signs and symptoms  Outcome: Progressing Towards Goal  Goal: *Normal spontaneous ventilation  Outcome: Progressing Towards Goal     Problem: Patient Education: Go to Patient Education Activity  Goal: Patient/Family Education  Outcome: Progressing Towards Goal     Problem: Pressure Injury - Risk of  Goal: *Prevention of pressure injury  Description: Document Anam Scale and appropriate interventions in the flowsheet. Outcome: Progressing Towards Goal  Note: Pressure Injury Interventions:  Sensory Interventions: Discuss PT/OT consult with provider, Check visual cues for pain    Moisture Interventions: Internal/External urinary devices, Internal/External fecal devices    Activity Interventions: Pressure redistribution bed/mattress(bed type), PT/OT evaluation    Mobility Interventions: Assess need for specialty bed, HOB 30 degrees or less, Pressure redistribution bed/mattress (bed type), PT/OT evaluation    Nutrition Interventions: Document food/fluid/supplement intake    Friction and Shear Interventions: Apply protective barrier, creams and emollients, Feet elevated on foot rest, Foam dressings/transparent film/skin sealants, HOB 30 degrees or less                Problem: Patient Education: Go to Patient Education Activity  Goal: Patient/Family Education  Outcome: Progressing Towards Goal     Problem: Falls - Risk of  Goal: *Absence of Falls  Description: Document Joana Fall Risk and appropriate interventions in the flowsheet.   Outcome: Progressing Towards Goal  Note: Fall Risk Interventions:  Mobility Interventions: Bed/chair exit alarm, OT consult for ADLs, Patient to call before getting OOB, PT Consult for mobility concerns, PT Consult for assist device competence    Mentation Interventions: Adequate sleep, hydration, pain control, Bed/chair exit alarm, Door open when patient unattended    Medication Interventions: Assess postural VS orthostatic hypotension, Bed/chair exit alarm, Patient to call before getting OOB    Elimination Interventions: Bed/chair exit alarm, Call light in reach, Patient to call for help with toileting needs    History of Falls Interventions: Bed/chair exit alarm, Door open when patient unattended, Evaluate medications/consider consulting pharmacy, Investigate reason for fall, Room close to nurse's station, Utilize gait belt for transfer/ambulation         Problem: Patient Education: Go to Patient Education Activity  Goal: Patient/Family Education  Outcome: Progressing Towards Goal     Problem: Patient Education: Go to Patient Education Activity  Goal: Patient/Family Education  Outcome: Progressing Towards Goal     Problem: Nutrition Deficit  Goal: *Optimize nutritional status  Outcome: Progressing Towards Goal     Problem: Risk for Spread of Infection  Goal: Prevent transmission of infectious organism to others  Description: Prevent the transmission of infectious organisms to other patients, staff members, and visitors.   Outcome: Progressing Towards Goal     Problem: Patient Education:  Go to Education Activity  Goal: Patient/Family Education  Outcome: Progressing Towards Goal     Problem: Dysphagia (Pediatrics)  Goal: *Acute Goals and Plan of Care  Outcome: Progressing Towards Goal

## 2022-09-16 NOTE — PROGRESS NOTES
Palliative Medicine    CODE STATUS: DNR/DNI; comfort measures to begin at discharge    AMD Status: none on file. His wife, Jarrod Cochran, is his legal next of kin     9/16/2022 0955 Seen today in room 336 along with Sindhu Colbert NP. Lying in bed with eyes closed but easily awakened. The only answers he gave to questions were \"[employer]\" and \"retired\". Respirations unlabored on room air. Did not appear uncomfortable. Telephone conversation with Siomara Aguirre, son. Reviewed yesterdays's discussion and he had no other questions. He does understand why his mother decided to transition care to comfort and the family is awaiting input from the care management team about discharge options. He had not spoken with his brother, Ashli Tavera. Offered our phone number for him to share with Ashli Tavera if there are questions. Ashli Tavera did not return our call from 9/14/2022. Disposition plan: anticipate discharge to a facility with hospice support. Palliative care will continue to follow Siomara Aguirre  and his family during his hospitalization and support them as they make healthcare decisions and define goals of care.       Tamy Mitchell RN, MSN  Palliative Medicine  P: [phone]

## 2022-09-16 NOTE — PROGRESS NOTES
Problem: Dysphagia (Adult)  Description: Patient will:  1. Tolerate PO trials with 0 s/s overt distress in 4/5 trials. 2. Utilize compensatory swallow strategies/maneuvers (decrease bite/sip, size/rate, alt. liq/sol) with min cues in 4/5 trials. 3. Perform oral-motor/laryngeal exercises to increase oropharyngeal swallow function with min cues. 4. If medically indicated, complete an objective swallow study (i.e., MBSS) to assess swallow integrity, r/o aspiration, and determine of safest LRD, min A.    Rec:     Minced/moist diet with thin liquids - feeding assistance, pacing  Aspiration precautions  HOB >45 during po intake, remain >30 for 30-45 minutes after po   Small bites/sips; alternate liquid/solid with slow feeding rate   Oral care TID  Meds crushed in applesauce  Outcome: Progressing Towards Goal    SPEECH LANGUAGE PATHOLOGY DYSPHAGIA TREATMENT    Patient: Danish Cooper (71 y.o. male)  Date: 9/16/2022  Diagnosis: Sepsis ([address] [A41.9]  Strangulated hernia of abdominal wall [K43.6]  UTI (urinary tract infection) [N39.0]  AMS (altered mental status) [R41.82]  Acidosis [E87.2]  Hyperkalemia [E87.5] Sepsis ([address]  Procedure(s) (LRB):  EXPLORATORY LAPAROTOMY, REDUCTION INCARCERATED RIGHT GROIN HERNIA WITH HERNIA REPAIR WITH MESH (N/A) 20 Days Post-Op  Precautions: Aspiration    PLOF:Regular, thin     ASSESSMENT:  Patient seen by ST for dysphagia management. Patient alert upon entry. Did not respond to orientation questions and shook head. Repositioned with HOB>45 degrees. Patient stated, \"I want water. \"  Vocal quality c/b strain, hoarseness. Patient instructed to take small, single sips but attempted to take continuous sips. Pacing given by removing straw from patient's mouth after each sip. Patient's lunch tray brought to bedside. No difficulties noted with puree textures; however, patient demo prolonged mastication with minced/moist texture.  Patient required x 2 liquid wash to clear oral cavity with minced/moist texture. Excessive chewing likely to fatigue patient who already presents with limited intake. Patient refused trials after x2 bites puree, x 2 bites mech soft, and x 5 sips Ensure (\"I don't want more food\"). Patient's oral cavity checked and clear before ST d/c treatment. HOB remained>45 degrees. Swallow precautions (reviewed with patient's spouse previous day) left at bedside. Patient unlikely to meet hydration/nutritional needs with current rate of intake. D/w RN and CNA. ST following. Progression toward goals:  []         Improving appropriately and progressing toward goals  [x]         Improving slowly and progressing toward goals  []         Not making progress toward goals and plan of care will be adjusted     PLAN:  Recommendations and Planned Interventions:  See above  Patient continues to benefit from skilled intervention to address the above impairments. Continue treatment per established plan of care. Discharge Recommendations: To Be Determined     SUBJECTIVE:   Patient stated I want water. OBJECTIVE:   Cognitive and Communication Status:  Neurologic State: Agitated, Alert  Orientation Level: Other (Comment) (did not verbalize)  Cognition: Impaired decision making, Decreased command following  Perception: Appears intact  Perseveration: No perseveration noted  Safety/Judgement: Decreased insight into deficits  Dysphagia Treatment:  Oral Assessment:  Oral Assessment  Labial: Decreased seal  Dentition: Natural, Limited, Poor  Oral Hygiene:  (adequate)  Lingual: Other (comment) (unable to assess due to poor command following; suspect decreased rate, strength, coordination)  Velum:  (unable to assess due to poor command following)  Mandible: Other (comment) (unable to assess due to poor command following)  Gag Reflex:  (did not assess)  P.O. Trials:   Patient Position:  (HOB >45 degrees)   Vocal quality prior to P.O.: Hoarse, Strain   Consistency Presented:  Thin liquid, Puree, Mechanical soft   How Presented: Self-fed/presented, Spoon, Successive swallows       Bolus Acceptance: Impaired   Bolus Formation/Control: Impaired   Type of Impairment: Mastication, Piecemeal   Propulsion: Discoordination, Delayed (# of seconds)   Oral Residue: Lingual, 10-50% of bolus, Left, Right   Initiation of Swallow:  (suspect delay)   Laryngeal Elevation: Decreased, Weak   Aspiration Signs/Symptoms: Change vocal quality   Pharyngeal Phase Characteristics: Double swallow, Easily fatigued , Poor endurance, Suspected pharyngeal residue   Effective Modifications: Alternate liquids/solids, Small sips and bites (throat clear)   Cues for Modifications: Moderate-maximal         Oral Phase Severity: Mild-moderate   Pharyngeal Phase Severity :  (suspect)      PAIN:  Pain level pre-treatment: 0/10   Pain level post-treatment: 0/10       After treatment:   []              Patient left in no apparent distress sitting up in chair  [x]              Patient left in no apparent distress in bed  [x]              Call bell left within reach  [x]              Nursing notified  []              Family present  []              Caregiver present  []              Bed alarm activated      COMMUNICATION/EDUCATION:   [x] Aspiration precautions; swallow safety; compensatory techniques  []        Patient unable to participate in education; education ongoing with staff  [x]  Posted safety precautions in patient's room.   [] Oral-motor/laryngeal strengthening exercises      Tavia Davis SLP  Time Calculation: 15 mins

## 2022-09-16 NOTE — WOUND CARE
[address] [facility]. MEDICAL RECORD NUMBER:  [id number]  AGE: 78 y.o. GENDER: male  : 1945  TODAY'S DATE:  2022    GENERAL     [x] Follow-up   [] New Consult    Rober Smith. is a 78 y.o. male referred by:   [x] Physician  [] Nursing  [] Other:         PAST MEDICAL HISTORY    Past Medical History:   Diagnosis Date    A-fib ([facility]     Asthma     CAD (coronary artery disease)     Chronic kidney disease     stage 3    COVID-19     Diabetes ([facility]     GERD (gastroesophageal reflux disease)     Heart failure (H[facility])     chronic diastolic heart failure    High cholesterol     Hypertension     Ill-defined condition     chronic osteomyelitis left ankle and foot    Ill-defined condition     chronic arteriosclerosis    PVD (peripheral vascular disease) ([facility]     Stroke ([facility]     cva        PAST SURGICAL HISTORY    Past Surgical History:   Procedure Laterality Date    COLONOSCOPY N/A 2018    COLONOSCOPY, POLYPECTOMY performed by Venancio Jean MD at [facility] ENDOSCOPY    HX CATARACT REMOVAL      HX ORTHOPAEDIC Left     ankle washout, external fixator, would vac    HX ORTHOPAEDIC      external fixator removed       FAMILY HISTORY    History reviewed. No pertinent family history. SOCIAL HISTORY    Social History     Tobacco Use    Smoking status: Former    Smokeless tobacco: Never   Substance Use Topics    Alcohol use: Not Currently    Drug use: No       ALLERGIES    No Known Allergies    MEDICATIONS    No current facility-administered medications on file prior to encounter. Current Outpatient Medications on File Prior to Encounter   Medication Sig Dispense Refill    levoFLOXacin (Levaquin) 750 mg tablet Take 1 Tablet by mouth daily.  5 Tablet 0    insulin lispro (HUMALOG) 100 unit/mL injection INITIATE INSULIN CORRECTIVE PROTOCOL: Normal Insulin Sensitivity   For Blood Sugar (mg/dL) of:     Less than 150 =   0 units           150 -199 =   2 units  200 -249 =   4 units  250 -299 =   6 units  300 -349 =   8 units  350 and above = 10 units and Call Physician  If 2 glucose readings are above 200 mg/dL within a 24 hr period, proceed to \"Insulin Resistant\" dosing. Initiate Hypoglycemia protocol if blood glucose is <70 mg/dL Fast Acting - Administer Immediately - or within 15 minutes of start of meal, if mealtime coverage. 1 Each 0    gabapentin (NEURONTIN) 300 mg capsule Take 1 Capsule by mouth three (3) times daily. Max Daily Amount: 900 mg. 30 Capsule 0    clopidogreL (Plavix) 75 mg tab Take  by mouth daily. Indications: afib      B.infantis-B.ani-B.long-B.bifi (Probiotic 4X) 10-15 mg TbEC Take  by mouth daily. multivitamin (ONE A DAY) tablet Take 1 Tablet by mouth daily. acetaminophen (TylenoL) 325 mg tablet Take 650 mg by mouth every four (4) hours as needed for Pain.      tamsulosin (FLOMAX) 0.4 mg capsule Take 2 Capsules by mouth daily. 30 Capsule 0    atorvastatin (LIPITOR) 40 mg tablet Take 1 Tab by mouth nightly. 30 Tab 0    metoprolol succinate (TOPROL-XL) 50 mg XL tablet Take 1 Tab by mouth daily. (Patient taking differently: Take 100 mg by mouth daily.) 30 Tab 0       Wt Readings from Last 3 Encounters:   09/16/22 61.1 kg (134 lb 12.8 oz)   11/02/21 81.6 kg (180 lb)   10/11/21 76.7 kg (169 lb)       [email or web address] Vitals  BP (!) 157/58   Pulse 88   Temp 98.5 °F (36.9 °C)   Resp 18   Ht 5' 7.99\" (1.727 m)   Wt 61.1 kg (134 lb 12.8 oz)   SpO2 97%   BMI 20.50 kg/m²       ASSESSMENT     Skin impairment Identification:  Type: pressure    Contributing Factors: chronic pressure, decreased mobility, shear force, incontinence of stool, incontinence of urine, and decreased tissue oxygenation    Wound Sacral/coccyx Posterior small pin size hole open with scant yellow dischage noted in opening (Active)   Dressing Status Clean;Dry; Intact 09/16/22 0305   Number of days: 374       Wound Ankle Right;Medial;Inner Eschar (Active)   Wound Image   09/12/22 1355   Wound Etiology Pressure Unstageable 09/16/22 0830   Dressing Status Clean;Dry; Intact; New dressing applied 09/16/22 1200   Cleansed Cleansed with saline 09/12/22 1355   Dressing/Treatment Betadine swabs/Povidone Iodine 09/12/22 1355   Dressing Change Due 09/08/22 09/07/22 1100   Wound Length (cm) 6.5 cm 09/12/22 1355   Wound Width (cm) 6 cm 09/12/22 1355   Wound Surface Area (cm^2) 39 cm^2 09/12/22 1355   Change in Wound Size % 0 09/12/22 1355   Wound Assessment Eschar dry;Eschar moist;Slough 09/12/22 1355   Drainage Amount Small 09/12/22 1355   Drainage Description Thick; Other (Comment); Yellow 09/12/22 1355   Wound Odor None 09/07/22 1100   Neha-Wound/Incision Assessment Cool 09/07/22 1100   Edges Defined edges 09/07/22 1100   Wound Thickness Description Full thickness 09/07/22 1100   Number of days: 19       Wound Heel Right;Lateral 08/29/22 (Active)   Wound Image   09/12/22 1355   Wound Etiology Pressure Unstageable 09/12/22 1355   Dressing Status Clean;Dry; Intact 09/16/22 1200   Cleansed Cleansed with saline 09/12/22 1355   Dressing/Treatment Betadine swabs/Povidone Iodine;Gauze dressing/dressing sponge;Roll gauze; Ace wrap 09/12/22 1355   Wound Length (cm) 7 cm 09/12/22 1355   Wound Width (cm) 7.2 cm 09/12/22 1355   Wound Surface Area (cm^2) 50.4 cm^2 09/12/22 1355   Change in Wound Size % 0 09/12/22 1355   Wound Assessment Eschar dry 09/12/22 1355   Drainage Amount Scant 09/12/22 1355   Drainage Description Yellow 09/12/22 1355   Wound Odor None 09/08/22 1500   Neha-Wound/Incision Assessment Cool 09/07/22 1100   Edges Attached edges 09/07/22 1100   Wound Thickness Description Full thickness 09/07/22 1100   Number of days: 18       Wound Foot Right;Lateral (Active)   Wound Image   09/12/22 1355   Wound Etiology Pressure Unstageable 09/12/22 1355   Dressing Status Clean;Dry; Intact 09/16/22 1200   Cleansed Cleansed with saline 09/12/22 1355   Dressing/Treatment Ace wrap 09/07/22 2007   Wound Length (cm) 2.2 cm 09/12/22 1498 Wound Width (cm) 2.2 cm 09/12/22 1355   Wound Surface Area (cm^2) 4.84 cm^2 09/12/22 1355   Change in Wound Size % 12 09/12/22 1355   Wound Assessment Eschar dry;Eschar moist 09/12/22 1355   Drainage Amount Scant 09/12/22 1355   Drainage Description Yellow 09/12/22 1355   Wound Odor None 09/12/22 1355   Neha-Wound/Incision Assessment Intact 09/12/22 1355   Edges Defined edges 09/07/22 1100   Wound Thickness Description Full thickness 09/07/22 1100   Number of days: 18       Wound Sacral/coccyx (Active)   Wound Image   09/12/22 1355   Wound Etiology Pressure Unstageable 09/12/22 1355   Dressing Status Clean;Dry; Intact; New dressing applied 09/16/22 1200   Cleansed Cleansed with saline 09/12/22 1355   Dressing/Treatment Gauze dressing/dressing sponge;Moisten with saline; Pharmaceutical agent (see MAR); Silicone border 01/14/07 1355   Wound Length (cm) 6.5 cm 09/12/22 1355   Wound Width (cm) 6 cm 09/12/22 1355   Wound Surface Area (cm^2) 39 cm^2 09/12/22 1355   Change in Wound Size % 0 09/12/22 1355   Wound Assessment Eschar moist;Slough;Fibrinous 09/12/22 1355   Drainage Amount Moderate 09/12/22 1355   Drainage Description Yellow 09/12/22 1355   Wound Odor Mild 09/12/22 1355   Neha-Wound/Incision Assessment Intact;Fragile 09/12/22 1355   Edges Attached edges 09/12/22 1355   Wound Thickness Description Full thickness 09/12/22 1355   Number of days: 20          PLAN     Skin Care & Pressure Relief Recommendations  Minimize layers of linen  Pads under patient to optimize support surface  Turn/reposition approximately every 2 hours  Pillow wedges  Please use in bed position system      Recommendations: continue santyl and betadine orders until decision is may about goals of care.       Teaching completed with:   [] Patient           [] Family member       [] Caregiver          [x] Nursing  [] Other    Patient/Caregiver Teaching:  Level of patient/caregiver understanding able to:   [x] Indicates understanding       [] Needs reinforcement  [] Unsuccessful      [] Verbal Understanding  [] Demonstrated understanding       [] No evidence of learning  [] Refused teaching         [] N/A       Electronically signed by Patricia Low RN on 9/16/2022 at 2:15 PM

## 2022-09-16 NOTE — PROGRESS NOTES
Problem: Ventilator Management  Goal: *Adequate oxygenation and ventilation  Outcome: Progressing Towards Goal  Goal: *Patient maintains clear airway/free of aspiration  Outcome: Progressing Towards Goal  Goal: *Absence of infection signs and symptoms  Outcome: Progressing Towards Goal  Goal: *Normal spontaneous ventilation  Outcome: Progressing Towards Goal     Problem: Patient Education: Go to Patient Education Activity  Goal: Patient/Family Education  Outcome: Progressing Towards Goal     Problem: Pressure Injury - Risk of  Goal: *Prevention of pressure injury  Description: Document Anam Scale and appropriate interventions in the flowsheet. Outcome: Progressing Towards Goal  Note: Pressure Injury Interventions:  Sensory Interventions: Assess changes in LOC, Avoid rigorous massage over bony prominences, Discuss PT/OT consult with provider    Moisture Interventions: Absorbent underpads, Apply protective barrier, creams and emollients, Internal/External fecal devices, Internal/External urinary devices    Activity Interventions: Assess need for specialty bed, Increase time out of bed, Pressure redistribution bed/mattress(bed type), PT/OT evaluation    Mobility Interventions: Float heels, HOB 30 degrees or less, Pressure redistribution bed/mattress (bed type), PT/OT evaluation    Nutrition Interventions: Document food/fluid/supplement intake    Friction and Shear Interventions: Apply protective barrier, creams and emollients, Feet elevated on foot rest, Foam dressings/transparent film/skin sealants, HOB 30 degrees or less                Problem: Patient Education: Go to Patient Education Activity  Goal: Patient/Family Education  Outcome: Progressing Towards Goal     Problem: Falls - Risk of  Goal: *Absence of Falls  Description: Document Joana Fall Risk and appropriate interventions in the flowsheet.   Outcome: Progressing Towards Goal  Note: Fall Risk Interventions:  Mobility Interventions: Assess mobility with egress test, OT consult for ADLs, Patient to call before getting OOB, PT Consult for mobility concerns, PT Consult for assist device competence    Mentation Interventions: Adequate sleep, hydration, pain control, Bed/chair exit alarm    Medication Interventions: Assess postural VS orthostatic hypotension, Bed/chair exit alarm, Evaluate medications/consider consulting pharmacy, Patient to call before getting OOB    Elimination Interventions: Bed/chair exit alarm, Call light in reach, Patient to call for help with toileting needs, Stay With Me (per policy)    History of Falls Interventions: Utilize gait belt for transfer/ambulation, Bed/chair exit alarm         Problem: Patient Education: Go to Patient Education Activity  Goal: Patient/Family Education  Outcome: Progressing Towards Goal     Problem: Patient Education: Go to Patient Education Activity  Goal: Patient/Family Education  Outcome: Progressing Towards Goal     Problem: Nutrition Deficit  Goal: *Optimize nutritional status  Outcome: Progressing Towards Goal     Problem: Risk for Spread of Infection  Goal: Prevent transmission of infectious organism to others  Description: Prevent the transmission of infectious organisms to other patients, staff members, and visitors.   Outcome: Progressing Towards Goal     Problem: Patient Education:  Go to Education Activity  Goal: Patient/Family Education  Outcome: Progressing Towards Goal     Problem: Dysphagia (Pediatrics)  Goal: *Acute Goals and Plan of Care  Outcome: Progressing Towards Goal

## 2022-09-17 NOTE — PROGRESS NOTES
Hospitalist Progress Note    Patient: Marisela Barnes. MRN: [id number]  CSN: [id number]    YOB: 1945  Age: 78 y.o. Sex: male    DOA: 8/27/2022 LOS:  LOS: 21 days            Assessment/Plan   Sepsis  2. infected medial maleoalar & heel ulcers  Incarcerated R inguinal hernia sp ex lap  Seizures  Hypokalemia  Chronic afib  gangrene    Plan:  - pt is now comfort measures and [facility].  Will stop lab draws, heparin, fingersticks  - DC telemetry      Patient Active Problem List   Diagnosis Code    DM2 (diabetes mellitus, type 2) ([facility] [address] E11.9    CAD (coronary artery disease) I25.10    Acute renal failure (ARF) ([facility]) N17.9    CKD (chronic kidney disease) stage 3, GFR 30-59 ml/min ([facility]) N18.30    Elevated brain natriuretic peptide (BNP) level R79.89    UTI (urinary tract infection) N39.0    High anion gap metabolic acidosis E22.5    Hyperkalemia E87.5    Sepsis ([facility]) A41.9    AMS (altered mental status) R41.82    Strangulated inguinal hernia K40.30    Right inguinal hernia K40.90    Leukocytosis D72.829    Anemia D64.9    Chronic indwelling Mccray catheter Z97.8    S/P BKA (below knee amputation), left ([facility]) Z89.512    Right foot ulcer ([facility]) L97.519    Paroxysmal atrial fibrillation ([facility]) I48.0    Diabetic ulcer of right foot ([facility]) E11.621, L97.519    Pleural effusion on right J90    Severe protein-calorie malnutrition ([facility]) E43    Acute respiratory failure with hypoxemia ([facility]) J96.01    Septic shock ([facility]) A41.9, R65.21    Hypernatremia E87.0    Seizure ([facility] [address] R56.9               Subjective:    cc: sepsis  No acute events overnight  Sleeping, comfortable      REVIEW OF SYSTEMS:  Unable to complete 2/2 clinical conditin    Objective:        Vital signs/Intake and Output:  Visit Vitals  BP (!) 160/72   Pulse 79   Temp 97.8 °F (36.6 °C)   Resp 18   Ht 5' 7.99\" (1.727 m)   Wt 61.1 kg (134 lb 12.8 oz)   SpO2 100%   BMI 20.50 kg/m²     Current Shift:  No intake/output data recorded. Last three shifts:  09/15 1901 - 09/17 0700  In: 600 [P.O.:200; I.V.:400]  Out: 1600 [Urine:1450; Drains:150]    Body mass index is 20.5 kg/m².     Physical Exam:  GEN: sleeping, comfortable  EYES: conjunctiva normal, lids with out lesions  HEENT: MMM, No thyromegaly, no lymphadenopathy  HEART: RRR +S1 +S2, no JVD, pulses 2+ distally  LUNGS: CTA B/L no wheezes, rales or rhonchi  ABDOMEN: + BS, soft NT/ND no organomegaly,  No rebound  EXTREMITIES: No edema cyanosis, cap refill normal L AKA, skin ulcers bandaged cdi    Current Facility-Administered Medications   Medication Dose Route Frequency    furosemide (LASIX) injection 20 mg  20 mg IntraVENous DAILY    levoFLOXacin (LEVAQUIN) 500 mg in D5W IVPB  500 mg IntraVENous Q24H    metroNIDAZOLE (FLAGYL) IVPB premix 500 mg  500 mg IntraVENous Q8H    metoprolol (LOPRESSOR) injection 1.25 mg  1.25 mg IntraVENous Q6H    sodium chloride (NS) flush 5-40 mL  5-40 mL IntraVENous Q8H    sodium chloride (NS) flush 5-40 mL  5-40 mL IntraVENous PRN    diazePAM (VALIUM) injection 9.3 mg  0.15 mg/kg IntraVENous Q10MIN PRN    LORazepam (ATIVAN) 2 mg/mL injection 1 mg  1 mg IntraVENous Q10MIN PRN    levETIRAcetam (KEPPRA) 500 mg in 0.9% sodium chloride 100 mL IVPB  500 mg IntraVENous Q12H    pantoprazole (PROTONIX) tablet 40 mg  40 mg Oral ACB    atorvastatin (LIPITOR) tablet 40 mg  40 mg Oral QHS    [Held by provider] metoprolol succinate (TOPROL-XL) XL tablet 50 mg  50 mg Oral DAILY    Saccharomyces boulardii (FLORASTOR) capsule 250 mg  250 mg Oral BID    [Held by provider] potassium bicarb-citric acid (EFFER-K) tablet 40 mEq  40 mEq Oral DAILY    povidone-iodine (BETADINE) 10 % topical solution   Topical DAILY    ipratropium (ATROVENT) 0.02 % nebulizer solution 0.5 mg  0.5 mg Nebulization Q6H PRN    collagenase (SANTYL) 250 unit/gram ointment   Topical DAILY    0.9% sodium chloride infusion 250 mL  250 mL IntraVENous PRN    hydrALAZINE (APRESOLINE) 20 mg/mL injection 20 mg  20 mg IntraVENous Q6H PRN    midazolam (VERSED) injection 0.5 mg  0.5 mg IntraVENous BID PRN    0.9% sodium chloride infusion 250 mL  250 mL IntraVENous PRN    sodium chloride (NS) flush 5-40 mL  5-40 mL IntraVENous Q8H    sodium chloride (NS) flush 5-40 mL  5-40 mL IntraVENous PRN    acetaminophen (TYLENOL) tablet 650 mg  650 mg Oral Q6H PRN    Or    acetaminophen (TYLENOL) suppository 650 mg  650 mg Rectal Q6H PRN    polyethylene glycol (MIRALAX) packet 17 g  17 g Oral DAILY PRN    ondansetron (ZOFRAN ODT) tablet 4 mg  4 mg Oral Q8H PRN    Or    ondansetron (ZOFRAN) injection 4 mg  4 mg IntraVENous Q6H PRN    glucose chewable tablet 16 g  4 Tablet Oral PRN    glucagon (GLUCAGEN) injection 1 mg  1 mg IntraMUSCular PRN    dextrose 10% infusion 0-250 mL  0-250 mL IntraVENous PRN    HYDROmorphone (DILAUDID) injection 1 mg  1 mg IntraVENous Q4H PRN         All the patient's labs over the past 24 hours were reviewed both during my initial daily workflow process and at the time notated as \"note time\" in [facility]. (It is not time stamped separately in this workflow.)  Select labs are listed below.         Labs: Results:       Chemistry Recent Labs     09/17/22  0622 09/17/22  0200 09/16/22  0327 09/15/22  0201   GLU  --  144* 149* 128*   NA  --  140 138 140   K 2.8* 2.4* 3.6 4.4   CL  --  106 106 107   CO2  --  26 20* 25   BUN  --  43* 44* 50*   CREA  --  1.38* 1.32* 1.44*   CA  --  8.8 9.0 9.0   AGAP  --  8 12 8   BUCR  --  31* 33* 35*      CBC w/Diff Recent Labs     09/16/22 0327   WBC 4.4*   RBC 3.13*   HGB 8.4*   HCT 28.2*      GRANS 64   LYMPH 30   EOS 0                          Thyroid Studies Lab Results   Component Value Date/Time    TSH 4.73 (H) 08/28/2022 08:04 AM        Procedures/imaging: see electronic medical records for all procedures/Xrays and details which were not copied into this note but were reviewed prior to creation of [address] [place] [address] Rd, DO  Internal Medicine/Geriatrics

## 2022-09-17 NOTE — PROGRESS NOTES
Problem: Falls - Risk of  Goal: *Absence of Falls  Description: Document Shravan Patrick Fall Risk and appropriate interventions in the flowsheet.   Outcome: Progressing Towards Goal  Note: Fall Risk Interventions:  Mobility Interventions: Bed/chair exit alarm    Mentation Interventions: Door open when patient unattended, Bed/chair exit alarm, Adequate sleep, hydration, pain control, Evaluate medications/consider consulting pharmacy, More frequent rounding    Medication Interventions: Bed/chair exit alarm, Evaluate medications/consider consulting pharmacy    Elimination Interventions: Bed/chair exit alarm, Toilet paper/wipes in reach, Toileting schedule/hourly rounds    History of Falls Interventions: Bed/chair exit alarm, Consult care management for discharge planning, Room close to nurse's station

## 2022-09-17 NOTE — PROGRESS NOTES
[id number] Shift Summary: Pt rested well overnight with no s/s of distress of any kind. No new clinical concerns noted.      Nightshift Chart Audit Completed

## 2022-09-17 NOTE — PROGRESS NOTES
Problem: Ventilator Management  Goal: *Adequate oxygenation and ventilation  Outcome: Progressing Towards Goal  Goal: *Patient maintains clear airway/free of aspiration  Outcome: Progressing Towards Goal  Goal: *Absence of infection signs and symptoms  Outcome: Progressing Towards Goal  Goal: *Normal spontaneous ventilation  Outcome: Progressing Towards Goal     Problem: Patient Education: Go to Patient Education Activity  Goal: Patient/Family Education  Outcome: Progressing Towards Goal     Problem: Pressure Injury - Risk of  Goal: *Prevention of pressure injury  Description: Document Anam Scale and appropriate interventions in the flowsheet. Outcome: Progressing Towards Goal  Note: Pressure Injury Interventions:  Sensory Interventions: Assess changes in LOC, Keep linens dry and wrinkle-free, Turn and reposition approx. every two hours (pillows and wedges if needed)    Moisture Interventions: Absorbent underpads, Apply protective barrier, creams and emollients    Activity Interventions: Pressure redistribution bed/mattress(bed type), Assess need for specialty bed    Mobility Interventions: Turn and reposition approx. every two hours(pillow and wedges), HOB 30 degrees or less, Pressure redistribution bed/mattress (bed type)    Nutrition Interventions: Offer support with meals,snacks and hydration    Friction and Shear Interventions: Apply protective barrier, creams and emollients, Transferring/repositioning devices                Problem: Patient Education: Go to Patient Education Activity  Goal: Patient/Family Education  Outcome: Progressing Towards Goal     Problem: Falls - Risk of  Goal: *Absence of Falls  Description: Document Joana Fall Risk and appropriate interventions in the flowsheet.   Outcome: Progressing Towards Goal  Note: Fall Risk Interventions:  Mobility Interventions: PT Consult for mobility concerns    Mentation Interventions: Adequate sleep, hydration, pain control, Bed/chair exit alarm    Medication Interventions: Assess postural VS orthostatic hypotension    Elimination Interventions: Bed/chair exit alarm, Toileting schedule/hourly rounds    History of Falls Interventions: Utilize gait belt for transfer/ambulation, Bed/chair exit alarm         Problem: Patient Education: Go to Patient Education Activity  Goal: Patient/Family Education  Outcome: Progressing Towards Goal     Problem: Patient Education: Go to Patient Education Activity  Goal: Patient/Family Education  Outcome: Progressing Towards Goal     Problem: Nutrition Deficit  Goal: *Optimize nutritional status  Outcome: Progressing Towards Goal     Problem: Risk for Spread of Infection  Goal: Prevent transmission of infectious organism to others  Description: Prevent the transmission of infectious organisms to other patients, staff members, and visitors.   Outcome: Progressing Towards Goal     Problem: Patient Education:  Go to Education Activity  Goal: Patient/Family Education  Outcome: Progressing Towards Goal     Problem: Dysphagia (Pediatrics)  Goal: *Acute Goals and Plan of Care  Outcome: Progressing Towards Goal

## 2022-09-17 NOTE — PROGRESS NOTES
19:50 Assessment completed. Alert to self only,Requesting breakfast freq, given applesauce by writer. Lungs are clear bilat. Denies pain or discomfort. Resting quietly. 22:30 Shift assessment completed. See nsg flow sheet for details. 02:55 Reassessed with 0 changes noted. Resting quietly in bed with eyes closed between cares. 07:10 Bedside and Verbal shift change report given to Benny Price (oncoming nurse) by Benny Price RN (offgoing nurse). Report included the following information SBAR.

## 2022-09-17 NOTE — ROUTINE PROCESS
Bedside and Verbal shift change report given to Romana Parker RN  (oncoming nurse) by Rachell Amaral RN  (offgoing nurse). Report given with SBAR, Kardex, Intake/Output and Recent Results.

## 2022-09-18 NOTE — PROGRESS NOTES
Problem: Falls - Risk of  Goal: *Absence of Falls  Description: Document Evans Kumari Fall Risk and appropriate interventions in the flowsheet.   Outcome: Not Progressing Towards Goal  Note: Fall Risk Interventions:  Mobility Interventions: Bed/chair exit alarm, Communicate number of staff needed for ambulation/transfer, Patient to call before getting OOB    Mentation Interventions: Bed/chair exit alarm, Door open when patient unattended    Medication Interventions: Bed/chair exit alarm, Patient to call before getting OOB    Elimination Interventions: Call light in reach, Bed/chair exit alarm    History of Falls Interventions: Bed/chair exit alarm

## 2022-09-18 NOTE — PROGRESS NOTES
Hospitalist Progress Note    Patient: Claudy Mckinney MRN: [id number]  CSN: [id number]    YOB: 1945  Age: 78 y.o. Sex: male    DOA: 8/27/2022 LOS:  LOS: 22 days            Assessment/Plan   Sepsis  2. infected medial maleoalar & heel ulcers  Incarcerated R inguinal hernia sp ex lap  Seizures  Hypokalemia  Chronic afib  gangrene    Plan:  DC allIV medications  - pt is comfort measures. Awaiting outpt hospice by [address] [facility]      Patient Active Problem List   Diagnosis Code    DM2 (diabetes mellitus, type 2) ([facility] [address] E11.9    CAD (coronary artery disease) I25.10    Acute renal failure (ARF) ([facility]) N17.9    CKD (chronic kidney disease) stage 3, GFR 30-59 ml/min ([facility]) N18.30    Elevated brain natriuretic peptide (BNP) level R79.89    UTI (urinary tract infection) N39.0    High anion gap metabolic acidosis U76.9    Hyperkalemia E87.5    Sepsis ([facility]) A41.9    AMS (altered mental status) R41.82    Strangulated inguinal hernia K40.30    Right inguinal hernia K40.90    Leukocytosis D72.829    Anemia D64.9    Chronic indwelling Mccray catheter Z97.8    S/P BKA (below knee amputation), left ([facility]) Z89.512    Right foot ulcer ([facility]) L97.519    Paroxysmal atrial fibrillation ([facility]) I48.0    Diabetic ulcer of right foot ([facility]) E11.621, L97.519    Pleural effusion on right J90    Severe protein-calorie malnutrition ([facility]) E43    Acute respiratory failure with hypoxemia ([facility]) J96.01    Septic shock ([facility]) A41.9, R65.21    Hypernatremia E87.0    [facility] R56.9               Subjective:    cc: sepsis  Pt awake, not answering questions this AM      REVIEW OF SYSTEMS:  General: No fevers or chills. Cardiovascular: No chest pain or pressure. No palpitations. Pulmonary: No shortness of breath. Gastrointestinal: No nausea, vomiting.      Objective:        Vital signs/Intake and Output:  Visit Vitals  BP (!) 154/97 (BP 1 Location: Left upper arm, BP Patient Position: At rest;Lying)   Pulse 68   Temp 97.4 °F (36.3 °C)   Resp 17   Ht 5' 7.99\" (1.727 m)   Wt 61.1 kg (134 lb 12.8 oz)   SpO2 99%   BMI 20.50 kg/m²     Current Shift:  No intake/output data recorded. Last three shifts:  09/16 1901 - 09/18 0700  In: -   Out: 350 [Urine:350]    Body mass index is 20.5 kg/m².     Physical Exam:  Jose Southerly, not answering questions or following commands  EYES: conjunctiva normal, lids with out lesions  HEENT: MMM, No thyromegaly, no lymphadenopathy  HEART: RRR +S1 +S2, no JVD, pulses 2+ distally  LUNGS: CTA B/L no wheezes, rales or rhonchi  ABDOMEN: + BS, soft NT/ND no organomegaly,  No rebound    Current Facility-Administered Medications   Medication Dose Route Frequency    levETIRAcetam (KEPPRA) tablet 500 mg  500 mg Oral BID    oxyCODONE IR (ROXICODONE) tablet 5 mg  5 mg Oral Q4H PRN    sodium chloride (NS) flush 5-40 mL  5-40 mL IntraVENous Q8H    sodium chloride (NS) flush 5-40 mL  5-40 mL IntraVENous PRN    diazePAM (VALIUM) injection 9.3 mg  0.15 mg/kg IntraVENous Q10MIN PRN    LORazepam (ATIVAN) 2 mg/mL injection 1 mg  1 mg IntraVENous Q10MIN PRN    pantoprazole (PROTONIX) tablet 40 mg  40 mg Oral ACB    [Held by provider] metoprolol succinate (TOPROL-XL) XL tablet 50 mg  50 mg Oral DAILY    Saccharomyces boulardii (FLORASTOR) capsule 250 mg  250 mg Oral BID    povidone-iodine (BETADINE) 10 % topical solution   Topical DAILY    ipratropium (ATROVENT) 0.02 % nebulizer solution 0.5 mg  0.5 mg Nebulization Q6H PRN    collagenase (SANTYL) 250 unit/gram ointment   Topical DAILY    0.9% sodium chloride infusion 250 mL  250 mL IntraVENous PRN    hydrALAZINE (APRESOLINE) 20 mg/mL injection 20 mg  20 mg IntraVENous Q6H PRN    midazolam (VERSED) injection 0.5 mg  0.5 mg IntraVENous BID PRN    0.9% sodium chloride infusion 250 mL  250 mL IntraVENous PRN    acetaminophen (TYLENOL) tablet 650 mg  650 mg Oral Q6H PRN    Or    acetaminophen (TYLENOL) suppository 650 mg  650 mg Rectal Q6H PRN    polyethylene glycol (MIRALAX) packet 17 g  17 g Oral DAILY PRN    ondansetron (ZOFRAN ODT) tablet 4 mg  4 mg Oral Q8H PRN    Or    ondansetron (ZOFRAN) injection 4 mg  4 mg IntraVENous Q6H PRN    glucose chewable tablet 16 g  4 Tablet Oral PRN    glucagon (GLUCAGEN) injection 1 mg  1 mg IntraMUSCular PRN    dextrose 10% infusion 0-250 mL  0-250 mL IntraVENous PRN    HYDROmorphone (DILAUDID) injection 1 mg  1 mg IntraVENous Q4H PRN         All the patient's labs over the past 24 hours were reviewed both during my initial daily workflow process and at the time notated as \"note time\" in [address] [facility]. (It is not time stamped separately in this workflow.)  Select labs are listed below.         Labs: Results:       Chemistry Recent Labs     09/17/22  0622 09/17/22  0200 09/16/22  0327   GLU  --  144* 149*   NA  --  140 138   K 2.8* 2.4* 3.6   CL  --  106 106   CO2  --  26 20*   BUN  --  43* 44*   CREA  --  1.38* 1.32*   CA  --  8.8 9.0   AGAP  --  8 12   BUCR  --  31* 33*      CBC w/Diff Recent Labs     09/16/22 0327   WBC 4.4*   RBC 3.13*   HGB 8.4*   HCT 28.2*      GRANS 64   LYMPH 30   EOS 0                          Thyroid Studies Lab Results   Component Value Date/Time    TSH 4.73 (H) 08/28/2022 08:04 AM        Procedures/imaging: see electronic medical records for all procedures/Xrays and details which were not copied into this note but were reviewed prior to creation of Plan    Imaging personally reviewed:  \            Nasim Butts,   Internal Medicine/Geriatrics

## 2022-09-18 NOTE — PROGRESS NOTES
Lost IV access. Spoke with MD. No more sticks, hold all IV meds, and discontinue finger sticks for comfort.

## 2022-09-18 NOTE — PROGRESS NOTES
1600 Mccray and FMS in place and draining. All wound care changed and completed. Pt not interested in any food or drinks offered. Pt would not open mouth to take oral pills as well. No PIV- MD aware. All IV medications to be discontinued. 1710- nurse called to pts room and found to be having a seizure. Pt turned on side and MD paged. Orders per MAR IM Versed administered at 1757. MD came to bedside, more orders were put in via [facility]. Report given to night shift nurse.

## 2022-09-18 NOTE — PROGRESS NOTES
Problem: Falls - Risk of  Goal: *Absence of Falls  Description: Document Yonis Meeks Fall Risk and appropriate interventions in the flowsheet. Outcome: Progressing Towards Goal  Note: Fall Risk Interventions:  Mobility Interventions: Bed/chair exit alarm    Mentation Interventions: Bed/chair exit alarm, Door open when patient unattended, Familiar objects from home, Room close to nurse's station    Medication Interventions: Bed/chair exit alarm, Evaluate medications/consider consulting pharmacy    Elimination Interventions: Bed/chair exit alarm, Call light in reach    History of Falls Interventions: Bed/chair exit alarm, Door open when patient unattended, Evaluate medications/consider consulting pharmacy, Room close to nurse's station         Problem: Pressure Injury - Risk of  Goal: *Prevention of pressure injury  Description: Document Anam Scale and appropriate interventions in the flowsheet. Outcome: Not Progressing Towards Goal  Note: Pressure Injury Interventions:  Sensory Interventions: Assess changes in LOC, Avoid rigorous massage over bony prominences, Check visual cues for pain, Float heels, Keep linens dry and wrinkle-free    Moisture Interventions: Absorbent underpads, Contain wound drainage, Internal/External fecal devices, Internal/External urinary devices, Limit adult briefs    Activity Interventions: Pressure redistribution bed/mattress(bed type), Assess need for specialty bed    Mobility Interventions: Turn and reposition approx.  every two hours(pillow and wedges)    Nutrition Interventions: Document food/fluid/supplement intake, Offer support with meals,snacks and hydration    Friction and Shear Interventions: Apply protective barrier, creams and emollients, Foam dressings/transparent film/skin sealants, HOB 30 degrees or less, Minimize layers

## 2022-09-19 NOTE — PROGRESS NOTES
Patient is made comfort measures. Will sign off. Please re-consult as needed. Favian Mcrae MD  [facility]  Office #:     [phone]  [id number]   Office Fax: [phone]

## 2022-09-19 NOTE — PROGRESS NOTES
Problem: Ventilator Management  Goal: *Adequate oxygenation and ventilation  Outcome: Not Progressing Towards Goal  Goal: *Patient maintains clear airway/free of aspiration  Outcome: Not Progressing Towards Goal  Goal: *Absence of infection signs and symptoms  Outcome: Not Progressing Towards Goal  Goal: *Normal spontaneous ventilation  Outcome: Not Progressing Towards Goal     Problem: Patient Education: Go to Patient Education Activity  Goal: Patient/Family Education  Outcome: Not Progressing Towards Goal     Problem: Pressure Injury - Risk of  Goal: *Prevention of pressure injury  Description: Document Anam Scale and appropriate interventions in the flowsheet. Outcome: Not Progressing Towards Goal  Note: Pressure Injury Interventions:  Sensory Interventions: Assess changes in LOC, Avoid rigorous massage over bony prominences, Check visual cues for pain, Float heels, Keep linens dry and wrinkle-free    Moisture Interventions: Absorbent underpads, Apply protective barrier, creams and emollients    Activity Interventions: Pressure redistribution bed/mattress(bed type), Assess need for specialty bed    Mobility Interventions: Turn and reposition approx. every two hours(pillow and wedges)    Nutrition Interventions: Document food/fluid/supplement intake, Offer support with meals,snacks and hydration    Friction and Shear Interventions: Apply protective barrier, creams and emollients                Problem: Patient Education: Go to Patient Education Activity  Goal: Patient/Family Education  Outcome: Not Progressing Towards Goal     Problem: Falls - Risk of  Goal: *Absence of Falls  Description: Document Joana Fall Risk and appropriate interventions in the flowsheet.   Outcome: Not Progressing Towards Goal  Note: Fall Risk Interventions:  Mobility Interventions: Bed/chair exit alarm    Mentation Interventions: Bed/chair exit alarm, Door open when patient unattended, Familiar objects from home, Room close to nurse's station    Medication Interventions: Bed/chair exit alarm, Evaluate medications/consider consulting pharmacy    Elimination Interventions: Bed/chair exit alarm, Call light in reach    History of Falls Interventions: Bed/chair exit alarm, Door open when patient unattended, Evaluate medications/consider consulting pharmacy, Room close to nurse's station         Problem: Patient Education: Go to Patient Education Activity  Goal: Patient/Family Education  Outcome: Not Progressing Towards Goal     Problem: Patient Education: Go to Patient Education Activity  Goal: Patient/Family Education  Outcome: Not Progressing Towards Goal     Problem: Nutrition Deficit  Goal: *Optimize nutritional status  Outcome: Not Progressing Towards Goal     Problem: Risk for Spread of Infection  Goal: Prevent transmission of infectious organism to others  Description: Prevent the transmission of infectious organisms to other patients, staff members, and visitors.   Outcome: Not Progressing Towards Goal     Problem: Patient Education:  Go to Education Activity  Goal: Patient/Family Education  Outcome: Not Progressing Towards Goal     Problem: Dysphagia (Pediatrics)  Goal: *Acute Goals and Plan of Care  Outcome: Not Progressing Towards Goal

## 2022-09-19 NOTE — HOSPICE
Per CM note, [facility] for [facility]. [facility] will continue to follow for a safe discharge plan.       Sims Mcardle BSN, [facility] Inpatient Clinical Liaison  [facility] [address] [facility], [address] [place] [address] Str.  [phone]  Email: [email or web address]

## 2022-09-19 NOTE — PROGRESS NOTES
Problem: Ventilator Management  Goal: *Adequate oxygenation and ventilation  Outcome: Progressing Towards Goal  Goal: *Patient maintains clear airway/free of aspiration  Outcome: Progressing Towards Goal  Goal: *Absence of infection signs and symptoms  Outcome: Progressing Towards Goal  Goal: *Normal spontaneous ventilation  Outcome: Progressing Towards Goal     Problem: Patient Education: Go to Patient Education Activity  Goal: Patient/Family Education  Outcome: Progressing Towards Goal     Problem: Pressure Injury - Risk of  Goal: *Prevention of pressure injury  Description: Document Anam Scale and appropriate interventions in the flowsheet. Outcome: Progressing Towards Goal  Note: Pressure Injury Interventions:  Sensory Interventions: Assess changes in LOC, Avoid rigorous massage over bony prominences, Check visual cues for pain, Float heels, Keep linens dry and wrinkle-free    Moisture Interventions: Absorbent underpads, Contain wound drainage, Internal/External fecal devices, Internal/External urinary devices, Limit adult briefs    Activity Interventions: Pressure redistribution bed/mattress(bed type), Assess need for specialty bed    Mobility Interventions: Turn and reposition approx. every two hours(pillow and wedges)    Nutrition Interventions: Document food/fluid/supplement intake, Offer support with meals,snacks and hydration    Friction and Shear Interventions: Apply protective barrier, creams and emollients, Foam dressings/transparent film/skin sealants, HOB 30 degrees or less, Minimize layers                Problem: Patient Education: Go to Patient Education Activity  Goal: Patient/Family Education  Outcome: Progressing Towards Goal     Problem: Falls - Risk of  Goal: *Absence of Falls  Description: Document Joana Fall Risk and appropriate interventions in the flowsheet.   Outcome: Progressing Towards Goal  Note: Fall Risk Interventions:  Mobility Interventions: Bed/chair exit alarm    Mentation Interventions: Bed/chair exit alarm, Door open when patient unattended, Familiar objects from home, Room close to nurse's station    Medication Interventions: Bed/chair exit alarm, Evaluate medications/consider consulting pharmacy    Elimination Interventions: Bed/chair exit alarm, Call light in reach    History of Falls Interventions: Bed/chair exit alarm, Door open when patient unattended, Evaluate medications/consider consulting pharmacy, Room close to nurse's station         Problem: Patient Education: Go to Patient Education Activity  Goal: Patient/Family Education  Outcome: Progressing Towards Goal     Problem: Patient Education: Go to Patient Education Activity  Goal: Patient/Family Education  Outcome: Progressing Towards Goal     Problem: Nutrition Deficit  Goal: *Optimize nutritional status  Outcome: Progressing Towards Goal     Problem: Risk for Spread of Infection  Goal: Prevent transmission of infectious organism to others  Description: Prevent the transmission of infectious organisms to other patients, staff members, and visitors.   Outcome: Progressing Towards Goal     Problem: Patient Education:  Go to Education Activity  Goal: Patient/Family Education  Outcome: Progressing Towards Goal     Problem: Dysphagia (Pediatrics)  Goal: *Acute Goals and Plan of Care  Outcome: Progressing Towards Goal

## 2022-09-19 NOTE — PROGRESS NOTES
Palliative Medicine follow-up note    Patient Name: Isa Forde. YOB: 1945    Date of Initial Consult: 8/29/2022  Date of follow up: 9/19/2022  Reason for Consult: Goals of care discussions  Requesting Provider: Dr. Flora Hendrix   Primary Care Physician: Thelma Schultz MD      SUMMARY:   Isa Mathew is a 78 y.o. with a past history of hypertension, hyperlipidemia, stroke, PAD, chronic atrial fibrillation, on Plavix, DM, CKD, previous, COVID-19 infection, chronic indwelling urinary catheter for urinary retention, and severe peripheral vascular disease with worsening gangrenous left foot necessitating left BKA during 8 day hospitalization last year, who was admitted on 8/27/2022 from home with a diagnosis of incarcerated Right inguinal hernia, severe septic shock with hypotension, severe hyperkalemia, acute metabolic encephalopathy, and anemia. Current medical issues leading to Palliative Medicine involvement include: goals of care discussions. 8/30/2022: Patient was off sedation, moving head back and forth, appears in distress. 9/1/2022: Patient sedation help, plan for SBT today, with possible medical extubation. 9/2/2022: Patient was medically extubated on 9/1/2022, he is oriented to person and place, disoriented to current time and situation. Appears frail, fatigued, and weak. 9/6/2022: Patient is awake, alert, oriented to person and place, confused about current time and situation. He states he is 54years old. 9/8/2022: Patient awake, remains confused, oriented to person and place, states the year is 1945 (his birth year). He cannot recall speaking to the orthopedic surgeon about possible AKA versus BKA. 9/13/2022: Patient has been refusing nursing care per chart review. Family has agreed to move forward with right AKA. 9/14/2022: Patient was seen in presence of palliative care RN. Patient's wife is at bedside.   Patient keeps staring at us upon asking questions. Continues to have off-and-on leg pain. No obvious shortness of breath or cough. Patient had an episode of seizure last night and early this morning. 9/15/2022:  Patient was asleep, easily awakened to verbal stimuli, appears in no acute distress, nodding his head \"yes\" to every question being asked. It is uncertain what his orientation status is as he is currently nonverbal.    9/16/2022: Patient drowsy, no family at bedside. Called Mrs. Karol Juares (wife) and patient's son Raul Celestin today who confirm their desire for DNR/DNI, comfort measures with hospice at discharge, and no feeding tubes. 9/19/2022: Patient appears imminent, but appears comfortable. Patient is now comfort measures only. PALLIATIVE DIAGNOSES:   Goals of care discussions  Septic shock liver artery disease s/p left AKA  Acute respiratory failure   incarcerated Right inguinal hernia  Advanced age/debility       PLAN:   9/19/2022: Palliative medicine team including Todd Hanley RN and I met with patient at patient's bedside. Patient noted to be lying in bed with eyes open, Not tracking, no response to verbal or tactile stimuli, and cheyne jackson respiratory pattern. Patient appears imminent, but appears comfortable. Called patient's wife Venu Villalobos and son Blayne Mckeon III to inform them of the above. Wife/legal NOK Mrs. Karol Juares agrees to implement comfort measures only at this time. Comfort orders placed. Discussed with RN and hospitalist. Family arrived to bedside, and support offered during this most difficult time. At this time, patient is a DNR/DNI, comfort measures, no feeding tubes. See previous discussions below:    9/16/2022: Palliative medicine team including Todd Hanley RN and I met with patient at patient's bedside. Patient drowsy, no family at bedside.  Called Mrs. Karol Juares (wife) and patient's son Raul Celestin today who confirm their desire for DNR/DNI, comfort measures with hospice at discharge, and no feeding tubes. POST form on file. Discussed with hospice liaison and CM. Family not sure they can care for him at home unless he is able to have care aides. Will defer d/c planning to case management. See previous discussions below:    9/15/2022: Palliative medicine team including  CHERYLE Baum and MANUEL met with patient at patient's bedside. Patient was asleep, easily awakened to verbal stimuli, appears in no acute distress, nodding his head \"yes\" to every question being asked. It is uncertain what his orientation status is as he is currently nonverbal. Family meeting yesterday with patient's wife who agreed with comfort measures at discharge. Per chart review, patient's son Christina Garcia III had questions about continuing medical treatment when he was having a conversation with hospice liaison. With wife's permission, called patient's son Christina Garcia III to review goals of care. Discussed patient's worsening debility, poor oral intake, recent seizure activity, recent refusal of care, failure to thrive, and concern of patient's ability to participate in rehab should he undergo right AKA. Also discussed the risks of right AKA including death, poor nutrition and concern for wound healing, and inability to participate in rehab. Discussed option for feeding tube. Discussed patient's current quality of life, and asked the question of whether or not patient would find this current quality of life acceptable. Christina Garcia III states that he appreciated all of the medical information, and the holistic review of his father. He states  now he understands why Mrs. Winfield Gosselin agreed to hospice and comfort measures at discharge. Marino III states he supports these decisions, but would like to speak to his other 2 siblings today to ensure the whole family is on the same page. We will follow-up with the family tomorrow morning to ensure everyone is in agreement with hospice at discharge (wife wants the support from their 3 children). There is questions on whether or not patient's wife will be able to care for patient at home in his current debilitated state; case management following for safe discharge plan whether that is facility versus home with hospice. At this time there are no changes in goals of care. Patient will remain DNR/DNI, comfort measures on discharge, hospice consult. Wife is looking for placement versus more help at home. POST form on file. We will continue to follow patient with you. See previous discussions below:    9/14/2022: Was seen in presence of palliative care RN. Patient is awake but unable to participate in conversation due to his current mental status. Patient had an event this morning in form of seizures. Patient's wife is at bedside. Patient's wife is listed as a next up kin as well as medical power of . As per wife: They have 3 kids, 2 sons and 1 daughter. She understands patient's current medical status. Explained her about various treatment option. The first option is to continue current medical care including antiseizure medications, pain management and proceed for right AKA. The second option is to keep him as comfortable as possible, being evaluated by hospice and not to escalate medical care or proceed for surgery. She verbalized understanding about both options. She stated she would like to go with second option. She understands what comfort care/hospice means including sudden cardiac death. She has signed the post form for comfort measures upon discharge. Primary attending has been notified about it. She gave us permission to talk to her son to inform him about her decision. She also stated that her sons can call her if they have any questions about the dizziness she has made for this patient being on comfort measures. We called patient's son [ferdinand @ [phone] and left a voice message. Plan: Patient will remain DNR/DNI, comfort measures on discharge, hospice consult.   Wife is looking for placement versus more help at home. We will continue to follow patient with you.    9/13/2022: Palliative medicine team including Fazal Luis RN and I met with patient at patient's bedside. Patient is awake, flat affect, nodding appropriately, but would not answer orientation questions. When asked if he was OK with having a right AKA he shook his head \"no\" but it is uncertain if he understands all the benefits and burdens of his choices. Appreciate vascular input, will need AKA if comfort care is not pursued. Called patient's wife/legal NOK Zabrina Gonzales who states she and all three of patient's children are in agreement to have the AKA of the right leg. Explained our concern that patient has been refusing nursing care and vital signs, and we are concerned about patient's ability to participate in PT/OT, and undergo postoperative treatment recommendations. Wife states that she and family feel that the amputation should happen to give patient the best opportunity to get stronger and clear cognitively, in hopes that he has some quality of life (understanding he may not improve). Discussed with Dr. Roberta Gunderson. At this time, continue DNR/DNI, and family in agreement to have right AKA. Family understands DNR status may temporarily be suspended in surgery depending on her discussion with the surgeon. We will continue to follow and continue goals of care discussions as appropriate. See previous discussions below:    9/8/2022: Palliative medicine team including Fazal Luis RN and I met with patient at patient's bedside. Patient awake, remains confused, oriented to person and place, states the year is [birth year] (his birth year). He cannot recall speaking to the orthopedic surgeon about possible AKA versus BKA. Wife not at bedside. Awaiting vascular input, then will plan a meeting with family to readdress goals of care.  Family is strongly hopeful that patient will clear cognitively enough to make this decision on his own but at this time he remains confused. At this time continue DNR/DNI. See previous discussions below:    9/6/2022: Palliative medicine team including Todd Hanley RN and I met with patient at patient's bedside. Patient is awake, alert, oriented to person and place, confused about current time and situation. He states he is 54years old. Met with wife later in the day, who states that each day patient appears to be doing better, and improving cognitively, now able to eat a pureed diet. Readdressed goals of care today, confirmed DNR/DNI. Readdressed the likely need of right lower extremity amputation versus implementation of comfort measures with hospice at discharge. Wife states that patient states \"cut it off\" when discussing his foot ulcers and seems to be okay with having the amputation. Explained at this time I do not believe patient understands the benefits and burdens of his medical choices, wife agrees. Wife would like to give patient more time to clear cognitively to participate in his goals of care conversation, understanding patient is high risk for recurrent sepsis due to severe PVD in the right lower extremity with chronic vascular ulcers. Family is not ready for hospice. We will follow with you. See previous discussions below:    9/2/2022: Palliative medicine team including  CHERYLE Ford and I met with patient at patient's bedside. Patient is awake, alert, oriented to person and place, disoriented to current time and situation. He is not able to understand complex goals of care discussions at this time. Met with patient later in the day and wife was at bedside. Reviewed goals of care with wife. Wife has a good understanding of current health situation; we explained that patient is too weak, and confused to participate in goals of care discussions at this time.   Reviewed overall level of care, with concern for RLL chronic vascular ulcers with previous recommendations from [facility] (6/9/2022) for consideration of Right AKA. Patient has historically declined this amputation. Explained to wife that patient is likely an appropriate hospice candidate, and this should be considered as an option at discharge. Also discussed the benefits and burdens of continued aggressive measures versus implementing comfort measures with the support of hospice at discharge. Wife would like to give patient more time to see  if he clears mentally to participate in his goals of care, understanding she may have to make the decisions should patient not clear cognitively. At this time continue DNR/DNI. See previous discussions below:    9/1/2022: Palliative medicine team including Segun Adams and I met with patient at patient's bedside today. Patient was being removed from sedation for SBT today. Spoke to hospitalist and intensivist. Patient may be stable enough to medically extubate depending on how he does today with SBT. Per previous goals of care discussions with family, we will wait to see if patient can be medically extubated and readdress further goals of care at that point (right leg necrotic tissue with possible need for amputation versus comfort measures). Patient has historically declined amputation in the past. Goals of care discussions ongoing. Family hopes that patient can be safely extubated and would be able to participate in goals of care discussions. At this time, continue DNR/do not re-intubate for any reason. See previous discussions below:    8/30/2022: Palliative medicine team including Segun Adams and I met with patient at patient's bedside today. Patient was off sedation, moving head back and forth, appears in distress. Family meeting today consisting of palliative team, patient's family (wife Gavin Le, children Fabian Grace, and Lia Buzz) hositalist Dr. Wendy Moore, and intensivist Dr. Brody Way. Medical update provided.  Discussed SBT outcome today with potential for medical extubation tomorrow depending on how his SBT goes tomorrow. Discussed the benefits and burdens of reintubation in the event of respiratory decline post medical extubation. Discussed overall level of care, with concern for RLL chronic vascular ulcers with previous recommendations from [facility] (6/9/2022) for consideration of Right AKA. Patient has historically declined this amputation. Discussed current quality of life. Family describes patient as a man who values his independence, and patient has been bothered by the fact that he hasn't  walked in over a year. Discussed that septic shock is improving and this event was likely related to bowel sepsis, but that patient is at high risk for recurrent sepsis and further clinical decline due to chronic infection in RLL, with no plan for amputation. Discussed the benefits and burdens of continuing current level of care versus implementation of comfort measures with hospice at discharge. Family clear that he would not find reintubation acceptable. Family is hopeful that if patient is medically extubated, he may wake up enough to participate in goals of care discussions. For now, patient is a DNR/do not re-intubate for any reason. Further goals of care discussions ongoing. See previous discussions below:    8/29/2022:Goals of care discussions: Palliative medicine team including  CHERYLE Myles and I met with patient at patient's bedside. Patient is orally intubated on mechanical ventilation, frequent movements in bed but no command following. Wife Adriano Atnonio at bedside (Wife notes that English is her second language but she states she understands our conversation and answering questions appropriately.  Offered translation services but wife declined.) Wife confirms DNR in the event of cardiac arrest. Patient is a PARTIAL CODE: No CPR (including no chest compressions, no shock, and no ACLS meds in the event of cardiac arrest). Continue intubation at this time. Wife requesting a meeting with their children involved to address further goals of care. Wife will call me with a time for tomorrow once she has spoken to all of her children. Received a call from patient's son [facility] and explained to him the desire to meet for family meeting. [facility] confirms DNR status upon cardiac arrest. Further goals of care discussions will occur at family meeting tomorrow. Awaiting response from wife with exact time. Septic shock:   multiple possible sources of infection, incarcerated Right inguinal hernia s/p laparotomy and reduction of incarcerated right groin hernia without need for bowel resection-8/27/2022. UA-Positive for UTI; urine culture obtained. sacral ulcer and medial maleolar and heel infected ulcers. On broad spectrum antibiotics per primary team.   Acute respiratory failure: Patient remains intubated postop due to critical illness. incarcerated Right inguinal hernia: s/p laparotomy and reduction of incarcerated right groin hernia without need for bowel resection-8/27/2022. Advanced age/debility: 78year old male who is critically ill, needs assist with all ADLs. Prior to admission, patient lived at home with his spouse and required assistance with functional ADLs.    Initial consult note routed to primary continuity provider  Communicated plan of care with: Palliative IDT       GOALS OF CARE / TREATMENT PREFERENCES:   [====Goals of Care====]  GOALS OF CARE: DNR/DNI, comfort measures, no feeding tubes    Patient/Health Care Proxy Stated Goals: Comfort      TREATMENT PREFERENCES:   Code Status: DNR    Advance Care Planning:  Advance Care Planning 8/29/2022   Patient's Healthcare Decision Maker is: Legal Next of Kin   Primary Decision Maker Name -   Primary Decision Maker Phone Number -   Primary Decision Maker Relationship to Patient -   Confirm Advance Directive None   Patient Would Like to Complete Advance Directive Unable       Medical Interventions: Comfort measures     Artificially Administered Nutrition: No feeding tube      The palliative care team has discussed with patient / health care proxy about goals of care / treatment preferences for patient.  [====Goals of Care====]         HISTORY:     History obtained from: patient's chart, family    CHIEF COMPLAINT: incarcerated Right inguinal hernia, s/p surgery    HPI/SUBJECTIVE:    The patient is:   [] Verbal and participatory  [x] Non-participatory due to: Unresponsive to verbal and tactile stimuli     Clinical Pain Assessment (nonverbal scale for severity on nonverbal patients):   Clinical Pain Assessment  Severity: 0    Adult Nonverbal Pain Scale  Face: No particular expression or smile  Activity (Movement): Lying quietly, normal position  Guarding: Lying quietly, no positioning of hands over areas of body  Physiology (Vital Signs): Stable vital signs  Respiratory: Baseline RR/SpO2 compliant with ventilator  Total Score: 0       FUNCTIONAL ASSESSMENT:     Palliative Performance Scale (PPS):  PPS: 20       PSYCHOSOCIAL/SPIRITUAL SCREENING:     Advance Care Planning:  Advance Care Planning 8/29/2022   Patient's Healthcare Decision Maker is: Legal Next of Kin   Primary Decision Maker Name -   Primary Decision Maker Phone Number -   Primary Decision Maker Relationship to Patient -   Confirm Advance Directive None   Patient Would Like to Complete Advance Directive Unable        Any spiritual / Anabaptist concerns: unable to assess  [] Yes /  [] No    Caregiver Burnout:  [] Yes /  [] No /  [x] No Caregiver Present      Anticipatory grief assessment: unable to assess  [] Normal  / [] Maladaptive          REVIEW OF SYSTEMS:     Positive and pertinent negative findings in ROS are noted above in HPI. The following systems were [] reviewed / [x] unable to be reviewed as noted in HPI  Other findings are noted below.   Systems: constitutional, ears/nose/mouth/throat, respiratory, gastrointestinal, genitourinary, musculoskeletal, integumentary, neurologic, psychiatric, endocrine. Positive findings noted below. Modified ESAS Completed by: provider   Fatigue: 6       Pain: 0   Anxiety: 0 Nausea: 0   Anorexia: 9 Dyspnea: 0     Constipation: No     Stool Occurrence(s): 1        PHYSICAL EXAM:     From RN flowsheet:  Wt Readings from Last 3 Encounters:   09/16/22 61.1 kg (134 lb 12.8 oz)   11/02/21 81.6 kg (180 lb)   10/11/21 76.7 kg (169 lb)     Blood pressure (!) 163/69, pulse (!) 101, temperature 98.5 °F (36.9 °C), resp. rate 18, height 5' 7.99\" (1.727 m), weight 61.1 kg (134 lb 12.8 oz), SpO2 92 %.     Pain Scale 1: Adult Nonverbal Pain Scale  Pain Intensity 1: 0     Pain Location 1: Back  Pain Orientation 1: Posterior  Pain Description 1: Aching  Pain Intervention(s) 1: Medication (see MAR), Massage, Position, Repositioned, Rest      Constitutional:, not responsive this AM, with cheyne jackson respirations, eyes open, appears imminent   ENMT: dry mucous membranes  Cardiovascular: distal pulses intact  Respiratory: short apnea pauses, shallow respirations, poor respiratory effort, on RA  Gastrointestinal: midline surgical incision CDI, no distention  Skin: warm, dry, black eschar right ankle and right heel  Neurologic: no response to verbal or tactile stimuli, eyes open but not tracking       HISTORY:     Principal Problem:    Sepsis ([facility] (8/27/2022)    Active Problems:    Acute renal failure (ARF) ([address] (5/29/2018)      UTI (urinary tract infection) (10/29/2021)      High anion gap metabolic acidosis (5/34/4358)      Hyperkalemia (8/27/2022)      AMS (altered mental status) (8/27/2022)      Strangulated inguinal hernia (8/27/2022)      Right inguinal hernia (8/27/2022)      Leukocytosis (8/27/2022)      Anemia (8/27/2022)      Chronic indwelling Mccray catheter (8/27/2022)      S/P BKA (below knee amputation), left ([address] (8/27/2022)      Right foot ulcer ([facility] (8/27/2022)      Paroxysmal atrial fibrillation ([address] (8/27/2022)      Diabetic ulcer of right foot ([address] (8/27/2022)      Pleural effusion on right (8/27/2022)      Severe protein-calorie malnutrition ([address] (8/27/2022)      Acute respiratory failure with hypoxemia ([address] (8/28/2022)      Septic shock ([address] (8/28/2022)      Hypernatremia (9/6/2022)      Seizure ([address] (9/14/2022)    Past Medical History:   Diagnosis Date    A-fib ([address]     Asthma     CAD (coronary artery disease)     Chronic kidney disease     stage 3    COVID-19     Diabetes ([address]     GERD (gastroesophageal reflux disease)     Heart failure (HCC)     chronic diastolic heart failure    High cholesterol     Hypertension     Ill-defined condition     chronic osteomyelitis left ankle and foot    Ill-defined condition     chronic arteriosclerosis    PVD (peripheral vascular disease) ([address]     Stroke ([address]     cva      Past Surgical History:   Procedure Laterality Date    COLONOSCOPY N/A 6/1/2018    COLONOSCOPY, POLYPECTOMY performed by Cesar Lobo MD at THE [facility] ENDOSCOPY    HX CATARACT REMOVAL      HX ORTHOPAEDIC Left 2021    ankle washout, external fixator, would vac    HX ORTHOPAEDIC      external fixator removed      History reviewed. No pertinent family history. History reviewed, no pertinent family history.   Social History     Tobacco Use    Smoking status: Former    Smokeless tobacco: Never   Substance Use Topics    Alcohol use: Not Currently     No Known Allergies   Current Facility-Administered Medications   Medication Dose Route Frequency    levETIRAcetam (KEPPRA) 500 mg in 0.9% sodium chloride 100 mL IVPB  500 mg IntraVENous Q12H    ondansetron (ZOFRAN ODT) tablet 4 mg  4 mg SubLINGual Q6H PRN    LORazepam (INTENSOL) 2 mg/mL oral concentrate 1 mg  1 mg SubLINGual Q1H PRN    acetaminophen (TYLENOL) tablet 650 mg  650 mg Oral Q4H PRN    Or    acetaminophen (TYLENOL) solution 650 mg  650 mg Oral Q4H PRN    Or    acetaminophen (TYLENOL) suppository 650 mg  650 mg Rectal Q4H PRN    scopolamine (TRANSDERM-SCOP) 1 mg over 3 days 1 Patch  1 Patch TransDERmal Q72H PRN    bisacodyL (DULCOLAX) suppository 10 mg  10 mg Rectal DAILY PRN    morphine (ROXANOL) 100 mg/5 mL (20 mg/mL) concentrated solution 5 mg  5 mg SubLINGual Q1H PRN    diazePAM (VALIUM) injection 9.3 mg  0.15 mg/kg IntraVENous Q10MIN PRN    povidone-iodine (BETADINE) 10 % topical solution   Topical DAILY    collagenase (SANTYL) 250 unit/gram ointment   Topical DAILY    ondansetron (ZOFRAN) injection 4 mg  4 mg IntraVENous Q6H PRN          LAB AND IMAGING FINDINGS:     Lab Results   Component Value Date/Time    WBC 4.4 (L) 09/16/2022 03:27 AM    HGB 8.4 (L) 09/16/2022 03:27 AM    PLATELET 645 55/84/9277 03:27 AM     Lab Results   Component Value Date/Time    Sodium 140 09/17/2022 02:00 AM    Potassium 2.8 (LL) 09/17/2022 06:22 AM    Chloride 106 09/17/2022 02:00 AM    CO2 26 09/17/2022 02:00 AM    BUN 43 (H) 09/17/2022 02:00 AM    Creatinine 1.38 (H) 09/17/2022 02:00 AM    Calcium 8.8 09/17/2022 02:00 AM    Magnesium 1.4 (L) 09/17/2022 06:22 AM    Phosphorus 1.9 (L) 09/03/2022 07:45 AM      Lab Results   Component Value Date/Time    Alk.  phosphatase 150 (H) 09/11/2022 08:11 AM    Protein, total 6.4 09/11/2022 08:11 AM    Albumin 3.9 09/11/2022 08:11 AM    Globulin 2.5 09/11/2022 08:11 AM     Lab Results   Component Value Date/Time    INR 1.3 (H) 09/02/2022 04:51 AM    Prothrombin time 16.8 (H) 09/02/2022 04:51 AM    aPTT 118.5 (H) 05/17/2021 08:20 AM      Lab Results   Component Value Date/Time    Iron 10 (L) 05/06/2021 06:40 AM    TIBC 186 (L) 05/06/2021 06:40 AM    Iron % saturation 5 (L) 05/06/2021 06:40 AM    Ferritin 125 05/06/2021 06:40 AM      No results found for: PH, PCO2, PO2  No components found for: [place]   Lab Results   Component Value Date/Time    CK 46 05/29/2018 05:45 PM    CK - MB 1.4 05/29/2018 05:45 PM                Total time to take care of this patient was 25 minutes and more than 50% of time was spent counseling and coordinating care. Disclaimer: Sections of this note are dictated using utilizing voice recognition software, which may have resulted in some phonetic based errors in grammar and contents. Even though attempts were made to correct all the mistakes, some may have been missed, and remained in the body of the document. If questions arise, please contact our department.

## 2022-09-19 NOTE — PROGRESS NOTES
Hospitalist Progress Note    Patient: Wilian Farias MRN: [id number]  CSN: [id number]    YOB: 1945  Age: 78 y.o. Sex: male    DOA: 8/27/2022 LOS:  LOS: 23 days          Chief Complaint:      Family is at bedside appears comfortable having small seizures that responded   Remains on Keppra for comfort    Assessment/Plan     Sepsis  2. infected medial maleoalar & heel ulcers  Incarcerated R inguinal hernia sp ex lap  Seizures  Hypokalemia  Chronic afib  gangrene    DC allIV medications  - pt is comfort measures. Awaiting outpt hospice by [facility]       Patient Active Problem List   Diagnosis Code    DM2 (diabetes mellitus, type 2) ([facility] [address] E11.9    CAD (coronary artery disease) I25.10    Acute renal failure (ARF) ([facility]) N17.9    CKD (chronic kidney disease) stage 3, GFR 30-59 ml/min ([facility]) N18.30    Elevated brain natriuretic peptide (BNP) level R79.89    UTI (urinary tract infection) N39.0    High anion gap metabolic acidosis F04.1    Hyperkalemia E87.5    Sepsis ([facility]) A41.9    AMS (altered mental status) R41.82    Strangulated inguinal hernia K40.30    Right inguinal hernia K40.90    Leukocytosis D72.829    Anemia D64.9    Chronic indwelling Mccray catheter Z97.8    S/P BKA (below knee amputation), left ([facility]) Z89.512    Right foot ulcer ([facility]) L97.519    Paroxysmal atrial fibrillation ([facility]) I48.0    Diabetic ulcer of right foot ([facility]) E11.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, L97.519    Pleural effusion on right J90    Severe protein-calorie malnutrition ([facility]) E43    Acute respiratory failure with hypoxemia ([facility]) J96.01    Septic shock ([facility]) A41.9, R65.21    Hypernatremia E87.0    Seizure ([facility] [address] R56.9       Subjective:    Comfort measures discussed with family at bedside    Vital signs/Intake and Output:  Visit Vitals  BP (!) 163/69   Pulse (!) 101   Temp 98.5 °F (36.9 °C)   Resp 18   Ht 5' 7.99\" (1.727 m)   Wt 61.1 kg (134 lb 12.8 oz)   SpO2 92%   BMI 20.50 kg/m²     Current Shift:  No intake/output data recorded.   Last three shifts:  No intake/output data recorded. Exam:    General: Slow breathing lethargic   Head/Neck: NCAT, supple, No masses, No lymphadenopathy  CVS:Regular rate and rhythm, tachycardic  Lungs:C audible rhonchi but not in distress  Abdomen: Soft, Nontender, No distention, Extremities: No C/C/E, pulses palpable 2+  Skin: Not assessed today please see her wound care results  Neuro: Does not follows commands or speak  Psych:appropriate appears comfortable                Labs: Results:       Chemistry Recent Labs     09/17/22  0622 09/17/22  0200   GLU  --  144*   NA  --  140   K 2.8* 2.4*   CL  --  106   CO2  --  26   BUN  --  43*   CREA  --  1.38*   CA  --  8.8   AGAP  --  8   BUCR  --  31*      CBC w/Diff No results for input(s): WBC, RBC, HGB, HCT, PLT, GRANS, LYMPH, EOS, HGBEXT, HCTEXT, PLTEXT in the last 72 hours. Cardiac Enzymes No results for input(s): CPK, CKND1, LEONEL in the last 72 hours. No lab exists for component: CKRMB, TROIP   Coagulation No results for input(s): PTP, INR, APTT, INREXT in the last 72 hours. Lipid Panel No results found for: CHOL, CHOLPOCT, CHOLX, CHLST, CHOLV, 762154, HDL, HDLP, LDL, LDLC, DLDLP, 280342, VLDLC, VLDL, TGLX, TRIGL, TRIGP, TGLPOCT, CHHD, CHHDX   BNP No results for input(s): BNPP in the last 72 hours. Liver Enzymes No results for input(s): TP, ALB, TBIL, AP in the last 72 hours.     No lab exists for component: SGOT, GPT, DBIL   Thyroid Studies Lab Results   Component Value Date/Time    TSH 4.73 (H) 08/28/2022 08:04 AM        Procedures/imaging: see electronic medical records for all procedures/Xrays and details which were not copied into this note but were reviewed prior to creation of Yanelis Campbell MD

## 2022-09-19 NOTE — PROGRESS NOTES
Palliative Medicine    CODE STATUS: DNR/DNI; comfort measures to begin at discharge     AMD Status: none on file. His wife, Connor Lenz, is his legal next of kin     9/19/2022 0900 Seen today in room 305 along with Agueda Marquis NP. Lying in bed . Eyes occasionally open. No verbalization. Respirations irregular with periods of apnea. Radial pulse thready and tachycardic. Did not look to be uncomfortable    Family notified of change in status. Encouraged to visit when they can. Mrs Blake Thakkar agreed to change goals of care to comfort now--orders placed Clinical nurse and hospitalist aware. Disposition plan: anticipate discharge to facility with hospice support. Palliative care will continue to follow Marcello Gonsalez  and his family during his hospitalization and support them as they make healthcare decisions and define goals of care.       Arjun Garner RN, MSN  Palliative Medicine  P: [phone]

## 2022-09-19 NOTE — PROGRESS NOTES
D/C PLan: Facility hospice through the [place]    CM placed call to the [place] hospice coordinator to follow up on progress. Left vm. [facility] anticip[facility] today to discuss hospice through the [place] and coordinate services. CM to continue to monitor and remain available. Care Management Interventions  PCP Verified by CM:  Yes (Per Chart: Dr. Avila Petit )  Mode of Transport at Discharge: BLS  Transition of Care Consult (CM Consult): Discharge Planning  Discharge Durable Medical Equipment: No  Physical Therapy Consult: No  Occupational Therapy Consult: No  Speech Therapy Consult: No  Support Systems: Spouse/Significant Other  Confirm Follow Up Transport: Family  The Plan for Transition of Care is Related to the Following Treatment Goals : hospice at a SNF through the [place]  Discharge Location  Patient Expects to be Discharged to[de-identified] Skilled nursing facility

## 2022-09-20 NOTE — PROGRESS NOTES
Palliative Medicine    CODE STATUS: DNR/DNI; comfort measures; no feeding tube    AMD Status: none on file. His wife, Constantine Natarajan, is his legal next of kin     9/20/2022 0900 Seen today in room 305 along with Sachi Mask, NP. Lying supine with eyes open but no response. Wife and two sons at bedside. Oxygen on at 1.5 lpm per NC. Febrile this morning. Respirations mildly labored but he did not look to be in distress. Family had received notification that the [place] accepted him for hospice admission. Update care management team.     Disposition plan: possible transfer to  nursing facility for hospice care. Palliative care will continue to follow Elda Reynolds  and his family during his hospitalization and support them as they make healthcare decisions and define goals of care.       Randall Payton RN, MSN  Palliative Medicine  P: [phone]

## 2022-09-20 NOTE — PROGRESS NOTES
Problem: Ventilator Management  Goal: *Adequate oxygenation and ventilation  Outcome: Not Progressing Towards Goal  Goal: *Patient maintains clear airway/free of aspiration  Outcome: Not Progressing Towards Goal  Goal: *Absence of infection signs and symptoms  Outcome: Not Progressing Towards Goal  Goal: *Normal spontaneous ventilation  Outcome: Not Progressing Towards Goal     Problem: Patient Education: Go to Patient Education Activity  Goal: Patient/Family Education  Outcome: Not Progressing Towards Goal     Problem: Pressure Injury - Risk of  Goal: *Prevention of pressure injury  Description: Document Anam Scale and appropriate interventions in the flowsheet. Outcome: Not Progressing Towards Goal  Note: Pressure Injury Interventions:  Sensory Interventions: Pad between skin to skin, Monitor skin under medical devices, Minimize linen layers, Float heels, Keep linens dry and wrinkle-free, Maintain/enhance activity level, Turn and reposition approx. every two hours (pillows and wedges if needed)    Moisture Interventions: Absorbent underpads, Internal/External urinary devices, Internal/External fecal devices, Check for incontinence Q2 hours and as needed, Minimize layers    Activity Interventions: PT/OT evaluation    Mobility Interventions: Float heels, HOB 30 degrees or less    Nutrition Interventions: Discuss nutritional consult with provider    Friction and Shear Interventions: Feet elevated on foot rest, HOB 30 degrees or less, Foam dressings/transparent film/skin sealants, Lift sheet, Minimize layers                Problem: Patient Education: Go to Patient Education Activity  Goal: Patient/Family Education  Outcome: Not Progressing Towards Goal     Problem: Falls - Risk of  Goal: *Absence of Falls  Description: Document Joana Fall Risk and appropriate interventions in the flowsheet.   Outcome: Not Progressing Towards Goal  Note: Fall Risk Interventions:  Mobility Interventions: Bed/chair exit alarm    Mentation Interventions: Adequate sleep, hydration, pain control, More frequent rounding    Medication Interventions: Bed/chair exit alarm    Elimination Interventions: Bed/chair exit alarm    History of Falls Interventions: Vital signs minimum Q4HRs X 24 hrs (comment for end date), Bed/chair exit alarm         Problem: Patient Education: Go to Patient Education Activity  Goal: Patient/Family Education  Outcome: Not Progressing Towards Goal     Problem: Patient Education: Go to Patient Education Activity  Goal: Patient/Family Education  Outcome: Not Progressing Towards Goal     Problem: Nutrition Deficit  Goal: *Optimize nutritional status  Outcome: Not Progressing Towards Goal     Problem: Risk for Spread of Infection  Goal: Prevent transmission of infectious organism to others  Description: Prevent the transmission of infectious organisms to other patients, staff members, and visitors.   Outcome: Not Progressing Towards Goal     Problem: Patient Education:  Go to Education Activity  Goal: Patient/Family Education  Outcome: Not Progressing Towards Goal     Problem: Dysphagia (Pediatrics)  Goal: *Acute Goals and Plan of Care  Outcome: Not Progressing Towards Goal

## 2022-09-20 NOTE — PROGRESS NOTES
Hospitalist Progress Note    Patient: Darian Carter. MRN: [id number]  CSN: [id number]    YOB: 1945  Age: 78 y.o. Sex: male    DOA: 8/27/2022 LOS:  LOS: 24 days          Chief Complaint:      Family is at bedside appears comfortable having small seizures that responded   Remains on Keppra for comfort  Febrile     Assessment/Plan     Sepsis  2. infected medial maleoalar & heel ulcers  Incarcerated R inguinal hernia sp ex lap  Seizures  Hypokalemia  Chronic afib  gangrene    DC allIV medications and abx  - pt is comfort measures.    Awaiting outpt hospice by [facility] for fever     Patient Active Problem List   Diagnosis Code    DM2 (diabetes mellitus, type 2) ([facility] [address] E11.9    CAD (coronary artery disease) I25.10    Acute renal failure (ARF) ([facility]) N17.9    CKD (chronic kidney disease) stage 3, GFR 30-59 ml/min ([facility]) N18.30    Elevated brain natriuretic peptide (BNP) level R79.89    UTI (urinary tract infection) N39.0    High anion gap metabolic acidosis K32.4    Hyperkalemia E87.5    Sepsis ([facility]) A41.9    AMS (altered mental status) R41.82    Strangulated inguinal hernia K40.30    Right inguinal hernia K40.90    Leukocytosis D72.829    Anemia D64.9    Chronic indwelling Mccray catheter Z97.8    S/P BKA (below knee amputation), left ([facility]) Z89.512    Right foot ulcer ([facility]) L97.519    Paroxysmal atrial fibrillation ([facility]) I48.0    Diabetic ulcer of right foot ([facility]) E11.621, L97.519    Pleural effusion on right J90    Severe protein-calorie malnutrition ([facility]) E43    Acute respiratory failure with hypoxemia ([facility]) J96.01    Septic shock ([facility]) A41.9, R65.21    Hypernatremia E87.0    [facility] R56.9       Subjective:    Comfort measures non verbal    Vital signs/Intake and Output:  Visit Vitals  BP (!) 157/57 (BP 1 Location: Left upper arm, BP Patient Position: Lying)   Pulse (!) 114   Temp (!) 101.9 °F (38.8 °C)   Resp 18   Ht 5' 7.99\" (1.727 m)   Wt 61.1 kg (134 lb 12.8 oz)   SpO2 97%   BMI 20.50 kg/m²     Current Shift:  No intake/output data recorded. Last three shifts:  09/18 1901 - 09/20 0700  In: -   Out: 550 [Urine:550]    Exam:    General: Slow breathing lethargic   Head/Neck: NCAT, supple, No masses, No lymphadenopathy  CVS:Regular rate and rhythm, tachycardic  Lungs:C audible rhonchi but not in distress  Abdomen: Soft, Nontender, No distention, Extremities: No C/C/E, pulses palpable 2+  Skin: Not assessed today please see her wound care results  Neuro: Does not follows commands or speak  Psych:appropriate appears comfortable                Labs: Results:       Chemistry No results for input(s): GLU, NA, K, CL, CO2, BUN, CREA, CA, AGAP, BUCR, TBIL, AP, TP, ALB, GLOB, AGRAT in the last 72 hours. No lab exists for component: GPT     CBC w/Diff No results for input(s): WBC, RBC, HGB, HCT, PLT, GRANS, LYMPH, EOS, HGBEXT, HCTEXT, PLTEXT, HGBEXT, HCTEXT, PLTEXT in the last 72 hours. Cardiac Enzymes No results for input(s): CPK, CKND1, LEONEL in the last 72 hours. No lab exists for component: CKRMB, TROIP   Coagulation No results for input(s): PTP, INR, APTT, INREXT, INREXT in the last 72 hours. Lipid Panel No results found for: CHOL, CHOLPOCT, CHOLX, CHLST, CHOLV, 600270, HDL, HDLP, LDL, LDLC, DLDLP, 056016, VLDLC, VLDL, TGLX, TRIGL, TRIGP, TGLPOCT, CHHD, CHHDX   BNP No results for input(s): BNPP in the last 72 hours. Liver Enzymes No results for input(s): TP, ALB, TBIL, AP in the last 72 hours.     No lab exists for component: SGOT, GPT, DBIL   Thyroid Studies Lab Results   Component Value Date/Time    TSH 4.73 (H) 08/28/2022 08:04 AM        Procedures/imaging: see electronic medical records for all procedures/Xrays and details which were not copied into this note but were reviewed prior to creation of Vicki Shaffer MD

## 2022-09-20 NOTE — PROGRESS NOTES
Hospitalist Progress Note    Patient: Sameera Kerns. MRN: [id number]  CSN: [id number]    YOB: 1945  Age: 78 y.o. Sex: male    DOA: 8/27/2022 LOS:  LOS: 24 days          Chief Complaint:      Family is at bedside appears comfortable having small seizures that responded   Remains on Keppra for comfort  Agonally breathing family at bedside    Assessment/Plan     Sepsis  2. infected medial maleoalar & heel ulcers  Incarcerated R inguinal hernia sp ex lap  Seizures  Hypokalemia  Chronic afib  gangrene    DC allIV medications  - pt is comfort measures.  Awaiting outpt hospice by [place]       Patient Active Problem List   Diagnosis Code    DM2 (diabetes mellitus, type 2) ([facility] [address] E11.9    CAD (coronary artery disease) I25.10    Acute renal failure (ARF) ([facility]) N17.9    CKD (chronic kidney disease) stage 3, GFR 30-59 ml/min ([facility]) N18.30    Elevated brain natriuretic peptide (BNP) level R79.89    UTI (urinary tract infection) N39.0    High anion gap metabolic acidosis T77.6    Hyperkalemia E87.5    Sepsis ([facility]) A41.9    AMS (altered mental status) R41.82    Strangulated inguinal hernia K40.30    Right inguinal hernia K40.90    Leukocytosis D72.829    Anemia D64.9    Chronic indwelling Mccray catheter Z97.8    S/P BKA (below knee amputation), left ([facility]) Z89.512    Right foot ulcer ([facility]) L97.519    Paroxysmal atrial fibrillation ([facility]) I48.0    Diabetic ulcer of right foot ([facility]) E11.621, L97.519    Pleural effusion on right J90    Severe protein-calorie malnutrition ([facility]) E43    Acute respiratory failure with hypoxemia ([facility]) J96.01    Septic shock ([facility]) A41.9, R65.21    Hypernatremia E87.0    Seizure ([facility] [address] R56.9       Subjective:    Comfort measures     Vital signs/Intake and Output:  Visit Vitals  BP (!) 157/57 (BP 1 Location: Left upper arm, BP Patient Position: Lying)   Pulse (!) 114   Temp (!) 101.9 °F (38.8 °C)   Resp 18   Ht 5' 7.99\" (1.727 m)   Wt 61.1 kg (134 lb 12.8 oz)   SpO2 97%   BMI 20.50 kg/m² Current Shift:  No intake/output data recorded. Last three shifts:  09/18 1901 - 09/20 0700  In: -   Out: 550 [Urine:550]    Exam:    General: Slow breathing lethargic   Head/Neck: NCAT, supple, No masses, No lymphadenopathy  CVS:Regular rate and rhythm, tachycardic  Lungs:C audible rhonchi but not in distress  Abdomen: Soft, Nontender, No distention, Extremities: No C/C/E, pulses palpable 2+  Skin: Not assessed today please see her wound care results  Neuro: Does not follows commands or speak with me   Psych:appropriate appears comfortable                Labs: Results:       Chemistry No results for input(s): GLU, NA, K, CL, CO2, BUN, CREA, CA, AGAP, BUCR, TBIL, AP, TP, ALB, GLOB, AGRAT in the last 72 hours. No lab exists for component: GPT     CBC w/Diff No results for input(s): WBC, RBC, HGB, HCT, PLT, GRANS, LYMPH, EOS, HGBEXT, HCTEXT, PLTEXT, HGBEXT, HCTEXT, PLTEXT in the last 72 hours. Cardiac Enzymes No results for input(s): CPK, CKND1, LEONEL in the last 72 hours. No lab exists for component: CKRMB, TROIP   Coagulation No results for input(s): PTP, INR, APTT, INREXT, INREXT in the last 72 hours. Lipid Panel No results found for: CHOL, CHOLPOCT, CHOLX, CHLST, CHOLV, 316350, HDL, HDLP, LDL, LDLC, DLDLP, 549467, VLDLC, VLDL, TGLX, TRIGL, TRIGP, TGLPOCT, CHHD, CHHDX   BNP No results for input(s): BNPP in the last 72 hours. Liver Enzymes No results for input(s): TP, ALB, TBIL, AP in the last 72 hours.     No lab exists for component: SGOT, GPT, DBIL   Thyroid Studies Lab Results   Component Value Date/Time    TSH 4.73 (H) 08/28/2022 08:04 AM        Procedures/imaging: see electronic medical records for all procedures/Xrays and details which were not copied into this note but were reviewed prior to creation of Eulogio Crowder MD

## 2022-09-20 NOTE — PROGRESS NOTES
Palliative Medicine follow-up note    Patient Name: Royal Burns YOB: 1945    Date of Initial Consult: 8/29/2022  Date of follow up: 9/20/2022  Reason for Consult: Goals of care discussions  Requesting Provider: Dr. Rasta Cook   Primary Care Physician: Deep Perez MD      SUMMARY:   Royal Burns is a 78 y.o. with a past history of hypertension, hyperlipidemia, stroke, PAD, chronic atrial fibrillation, on Plavix, DM, CKD, previous, COVID-19 infection, chronic indwelling urinary catheter for urinary retention, and severe peripheral vascular disease with worsening gangrenous left foot necessitating left BKA during 8 day hospitalization last year, who was admitted on 8/27/2022 from home with a diagnosis of incarcerated Right inguinal hernia, severe septic shock with hypotension, severe hyperkalemia, acute metabolic encephalopathy, and anemia. Current medical issues leading to Palliative Medicine involvement include: goals of care discussions. 8/30/2022: Patient was off sedation, moving head back and forth, appears in distress. 9/1/2022: Patient sedation help, plan for SBT today, with possible medical extubation. 9/2/2022: Patient was medically extubated on 9/1/2022, he is oriented to person and place, disoriented to current time and situation. Appears frail, fatigued, and weak. 9/6/2022: Patient is awake, alert, oriented to person and place, confused about current time and situation. He states he is 54years old. 9/8/2022: Patient awake, remains confused, oriented to person and place, states the year is 1945 (his birth year). He cannot recall speaking to the orthopedic surgeon about possible AKA versus BKA. 9/13/2022: Patient has been refusing nursing care per chart review. Family has agreed to move forward with right AKA. 9/14/2022: Patient was seen in presence of palliative care RN. Patient's wife is at bedside.   Patient keeps staring at us upon asking questions. Continues to have off-and-on leg pain. No obvious shortness of breath or cough. Patient had an episode of seizure last night and early this morning. 9/15/2022:  Patient was asleep, easily awakened to verbal stimuli, appears in no acute distress, nodding his head \"yes\" to every question being asked. It is uncertain what his orientation status is as he is currently nonverbal.    9/16/2022: Patient drowsy, no family at bedside. Called Mrs. Travis Urena (wife) and patient's son Rmoe Purdy today who confirm their desire for DNR/DNI, comfort measures with hospice at discharge, and no feeding tubes. 9/19/2022: Patient appears imminent, but appears comfortable. Patient is now comfort measures only. 9/20/2022: Patient appears imminent, appears in NAD. Febrile this AM, encouraged RN to administer tylenol for comfort. PALLIATIVE DIAGNOSES:   Goals of care discussions  Septic shock liver artery disease s/p left AKA  Acute respiratory failure   incarcerated Right inguinal hernia  Advanced age/debility       PLAN:   9/20/2022: Palliative medicine team including Olga Fuentes RN and I met with patient at patient's bedside. Patient lying in bed, appears in NAD, remains unresponsive to verbal and tactile stimuli. Scopolamine patch in place. Wife provided oral care recently. Support offered to wife and sons at bedside. Patient appears imminent, appears in NAD. Febrile this AM, encouraged RN to administer tylenol for comfort. CM following. At this time, patient is a DNR/DNI, comfort measures, no feeding tubes. POST form on file. See previous discussions below:    9/19/2022: Palliative medicine team including Olga Fuentes RN and I met with patient at patient's bedside. Patient noted to be lying in bed with eyes open, Not tracking, no response to verbal or tactile stimuli, and cheyne jackson respiratory pattern. Patient appears imminent, but appears comfortable.  Called patient's wife Reyes Pal and son Rene Marie III to inform them of the above. Wife/legal NOK Mrs. Hanane Valadez agrees to implement comfort measures only at this time. Comfort orders placed. Discussed with RN and hospitalist. Family arrived to bedside, and support offered during this most difficult time. At this time, patient is a DNR/DNI, comfort measures, no feeding tubes. See previous discussions below:    9/16/2022: Palliative medicine team including Jordan Lancaster RN and I met with patient at patient's bedside. Patient drowsy, no family at bedside. Called Mrs. Hanane Valadez (wife) and patient's son Florian Marc today who confirm their desire for DNR/DNI, comfort measures with hospice at discharge, and no feeding tubes. POST form on file. Discussed with hospice liaison and CM. Family not sure they can care for him at home unless he is able to have care aides. Will defer d/c planning to case management. See previous discussions below:    9/15/2022: Palliative medicine team including  CHERYLE Son and I met with patient at patient's bedside. Patient was asleep, easily awakened to verbal stimuli, appears in no acute distress, nodding his head \"yes\" to every question being asked. It is uncertain what his orientation status is as he is currently nonverbal. Family meeting yesterday with patient's wife who agreed with comfort measures at discharge. Per chart review, patient's son Samina Martinez III had questions about continuing medical treatment when he was having a conversation with hospice liaison. With wife's permission, called patient's son Samina Martinez III to review goals of care. Discussed patient's worsening debility, poor oral intake, recent seizure activity, recent refusal of care, failure to thrive, and concern of patient's ability to participate in rehab should he undergo right AKA. Also discussed the risks of right AKA including death, poor nutrition and concern for wound healing, and inability to participate in rehab.   Discussed option for feeding tube. Discussed patient's current quality of life, and asked the question of whether or not patient would find this current quality of life acceptable. Beatriz Durbin III states that he appreciated all of the medical information, and the holistic review of his father. He states  now he understands why Mrs. Rashida Thibodeaux agreed to hospice and comfort measures at discharge. Marino III states he supports these decisions, but would like to speak to his other 2 siblings today to ensure the whole family is on the same page. We will follow-up with the family tomorrow morning to ensure everyone is in agreement with hospice at discharge (wife wants the support from their 3 children). There is questions on whether or not patient's wife will be able to care for patient at home in his current debilitated state; case management following for safe discharge plan whether that is facility versus home with hospice. At this time there are no changes in goals of care. Patient will remain DNR/DNI, comfort measures on discharge, hospice consult. Wife is looking for placement versus more help at home. POST form on file. We will continue to follow patient with you. See previous discussions below:    9/14/2022: Was seen in presence of palliative care RN. Patient is awake but unable to participate in conversation due to his current mental status. Patient had an event this morning in form of seizures. Patient's wife is at bedside. Patient's wife is listed as a next up kin as well as medical power of . As per wife: They have 3 kids, 2 sons and 1 daughter. She understands patient's current medical status. Explained her about various treatment option. The first option is to continue current medical care including antiseizure medications, pain management and proceed for right AKA. The second option is to keep him as comfortable as possible, being evaluated by hospice and not to escalate medical care or proceed for surgery. She verbalized understanding about both options. She stated she would like to go with second option. She understands what comfort care/hospice means including sudden cardiac death. She has signed the post form for comfort measures upon discharge. Primary attending has been notified about it. She gave us permission to talk to her son to inform him about her decision. She also stated that her sons can call her if they have any questions about the dizziness she has made for this patient being on comfort measures. We called patient's son [ferdinand @ [phone] and left a voice message. Plan: Patient will remain DNR/DNI, comfort measures on discharge, hospice consult. Wife is looking for placement versus more help at home. We will continue to follow patient with you.    9/13/2022: Palliative medicine team including Manju Leggett RN and I met with patient at patient's bedside. Patient is awake, flat affect, nodding appropriately, but would not answer orientation questions. When asked if he was OK with having a right AKA he shook his head \"no\" but it is uncertain if he understands all the benefits and burdens of his choices. Appreciate vascular input, will need AKA if comfort care is not pursued. Called patient's wife/legal NOK Mark Becerra who states she and all three of patient's children are in agreement to have the AKA of the right leg. Explained our concern that patient has been refusing nursing care and vital signs, and we are concerned about patient's ability to participate in PT/OT, and undergo postoperative treatment recommendations. Wife states that she and family feel that the amputation should happen to give patient the best opportunity to get stronger and clear cognitively, in hopes that he has some quality of life (understanding he may not improve). Discussed with Dr. Prem Grijalva. At this time, continue DNR/DNI, and family in agreement to have right AKA.  Family understands DNR status may temporarily be suspended in surgery depending on her discussion with the surgeon. We will continue to follow and continue goals of care discussions as appropriate. See previous discussions below:    9/8/2022: Palliative medicine team including Celestine Aparicio RN and I met with patient at patient's bedside. Patient awake, remains confused, oriented to person and place, states the year is [birth year] (his birth year). He cannot recall speaking to the orthopedic surgeon about possible AKA versus BKA. Wife not at bedside. Awaiting vascular input, then will plan a meeting with family to readdress goals of care. Family is strongly hopeful that patient will clear cognitively enough to make this decision on his own but at this time he remains confused. At this time continue DNR/DNI. See previous discussions below:    9/6/2022: Palliative medicine team including Celestine Aparicio RN and I met with patient at patient's bedside. Patient is awake, alert, oriented to person and place, confused about current time and situation. He states he is 54years old. Met with wife later in the day, who states that each day patient appears to be doing better, and improving cognitively, now able to eat a pureed diet. Readdressed goals of care today, confirmed DNR/DNI. Readdressed the likely need of right lower extremity amputation versus implementation of comfort measures with hospice at discharge. Wife states that patient states \"cut it off\" when discussing his foot ulcers and seems to be okay with having the amputation. Explained at this time I do not believe patient understands the benefits and burdens of his medical choices, wife agrees. Wife would like to give patient more time to clear cognitively to participate in his goals of care conversation, understanding patient is high risk for recurrent sepsis due to severe PVD in the right lower extremity with chronic vascular ulcers. Family is not ready for hospice.   We will follow with you.    See previous discussions below:    9/2/2022: Palliative medicine team including  CHERYLE Myles and I met with patient at patient's bedside. Patient is awake, alert, oriented to person and place, disoriented to current time and situation. He is not able to understand complex goals of care discussions at this time. Met with patient later in the day and wife was at bedside. Reviewed goals of care with wife. Wife has a good understanding of current health situation; we explained that patient is too weak, and confused to participate in goals of care discussions at this time. Reviewed overall level of care, with concern for RLL chronic vascular ulcers with previous recommendations from [facility] (6/9/2022) for consideration of Right AKA. Patient has historically declined this amputation. Explained to wife that patient is likely an appropriate hospice candidate, and this should be considered as an option at discharge. Also discussed the benefits and burdens of continued aggressive measures versus implementing comfort measures with the support of hospice at discharge. Wife would like to give patient more time to see  if he clears mentally to participate in his goals of care, understanding she may have to make the decisions should patient not clear cognitively. At this time continue DNR/DNI. See previous discussions below:    9/1/2022: Palliative medicine team including Janay Chu and I met with patient at patient's bedside today. Patient was being removed from sedation for SBT today. Spoke to hospitalist and intensivist. Patient may be stable enough to medically extubate depending on how he does today with SBT. Per previous goals of care discussions with family, we will wait to see if patient can be medically extubated and readdress further goals of care at that point (right leg necrotic tissue with possible need for amputation versus comfort measures).  Patient has historically declined amputation in the past. Goals of care discussions ongoing. Family hopes that patient can be safely extubated and would be able to participate in goals of care discussions. At this time, continue DNR/do not re-intubate for any reason. See previous discussions below:    8/30/2022: Palliative medicine team including Kiana Keen and I met with patient at patient's bedside today. Patient was off sedation, moving head back and forth, appears in distress. Family meeting today consisting of palliative team, patient's family (wife [employer], children Cindy Liu, and Jefe Dueñas) hositalist Dr. Chad Smith, and intensivist Dr. Du Espitia. Medical update provided. Discussed SBT outcome today with potential for medical extubation tomorrow depending on how his SBT goes tomorrow. Discussed the benefits and burdens of reintubation in the event of respiratory decline post medical extubation. Discussed overall level of care, with concern for RLL chronic vascular ulcers with previous recommendations from [facility] (6/9/2022) for consideration of Right AKA. Patient has historically declined this amputation. Discussed current quality of life. Family describes patient as a man who values his independence, and patient has been bothered by the fact that he hasn't  walked in over a year. Discussed that septic shock is improving and this event was likely related to bowel sepsis, but that patient is at high risk for recurrent sepsis and further clinical decline due to chronic infection in RLL, with no plan for amputation. Discussed the benefits and burdens of continuing current level of care versus implementation of comfort measures with hospice at discharge. Family clear that he would not find reintubation acceptable. Family is hopeful that if patient is medically extubated, he may wake up enough to participate in goals of care discussions. For now, patient is a DNR/do not re-intubate for any reason.  Further goals of care discussions ongoing. See previous discussions below:    8/29/2022:Goals of care discussions: Palliative medicine team including  CHERYLE Holland and I met with patient at patient's bedside. Patient is orally intubated on mechanical ventilation, frequent movements in bed but no command following. Wife Mitchell Mcardle at bedside (Wife notes that English is her second language but she states she understands our conversation and answering questions appropriately. Offered translation services but wife declined.) Wife confirms DNR in the event of cardiac arrest. Patient is a PARTIAL CODE: No CPR (including no chest compressions, no shock, and no ACLS meds in the event of cardiac arrest). Continue intubation at this time. Wife requesting a meeting with their children involved to address further goals of care. Wife will call me with a time for tomorrow once she has spoken to all of her children. Received a call from patient's son Adrian Lundborg and explained to him the desire to meet for family meeting. Adrian Lundborg confirms DNR status upon cardiac arrest. Further goals of care discussions will occur at family meeting tomorrow. Awaiting response from wife with exact time. Septic shock:   multiple possible sources of infection, incarcerated Right inguinal hernia s/p laparotomy and reduction of incarcerated right groin hernia without need for bowel resection-8/27/2022. UA-Positive for UTI; urine culture obtained. sacral ulcer and medial maleolar and heel infected ulcers. On broad spectrum antibiotics per primary team.   Acute respiratory failure: Patient remains intubated postop due to critical illness. incarcerated Right inguinal hernia: s/p laparotomy and reduction of incarcerated right groin hernia without need for bowel resection-8/27/2022. Advanced age/debility: 78year old male who is critically ill, needs assist with all ADLs.  Prior to admission, patient lived at home with his spouse and required assistance with functional ADLs. Initial consult note routed to primary continuity provider  Communicated plan of care with: Palliative IDT       GOALS OF CARE / TREATMENT PREFERENCES:   [====Goals of Care====]  GOALS OF CARE: DNR/DNI, comfort measures, no feeding tubes    Patient/Health Care Proxy Stated Goals: Comfort      TREATMENT PREFERENCES:   Code Status: DNR    Advance Care Planning:  Advance Care Planning 8/29/2022   Patient's Healthcare Decision Maker is: Legal Next of Kin   Primary Decision Maker Name -   Primary Decision Maker Phone Number -   Primary Decision Maker Relationship to Patient -   Confirm Advance Directive None   Patient Would Like to Complete Advance Directive Unable       Medical Interventions: Comfort measures     Artificially Administered Nutrition: No feeding tube      The palliative care team has discussed with patient / health care proxy about goals of care / treatment preferences for patient.  [====Goals of Care====]         HISTORY:     History obtained from: patient's chart, family    CHIEF COMPLAINT: incarcerated Right inguinal hernia, s/p surgery    HPI/SUBJECTIVE:    The patient is:   [] Verbal and participatory  [x] Non-participatory due to: Unresponsive to verbal and tactile stimuli     Clinical Pain Assessment (nonverbal scale for severity on nonverbal patients):   Clinical Pain Assessment  Severity: 0    Adult Nonverbal Pain Scale  Face: No particular expression or smile  Activity (Movement): Lying quietly, normal position  Guarding: Lying quietly, no positioning of hands over areas of body  Physiology (Vital Signs):  Stable vital signs  Respiratory: Baseline RR/SpO2 compliant with ventilator  Total Score: 0       FUNCTIONAL ASSESSMENT:     Palliative Performance Scale (PPS):  PPS: 20       PSYCHOSOCIAL/SPIRITUAL SCREENING:     Advance Care Planning:  Advance Care Planning 8/29/2022   Patient's Healthcare Decision Maker is: Legal Next of Kin   Primary Decision Maker Name -   Primary Decision Maker Phone Number -   Primary Decision Maker Relationship to Patient -   Confirm Advance Directive None   Patient Would Like to Complete Advance Directive Unable        Any spiritual / Hoahaoism concerns: unable to assess  [] Yes /  [] No    Caregiver Burnout:  [] Yes /  [] No /  [x] No Caregiver Present      Anticipatory grief assessment: unable to assess  [] Normal  / [] Maladaptive          REVIEW OF SYSTEMS:     Positive and pertinent negative findings in ROS are noted above in HPI. The following systems were [] reviewed / [x] unable to be reviewed as noted in HPI  Other findings are noted below. Systems: constitutional, ears/nose/mouth/throat, respiratory, gastrointestinal, genitourinary, musculoskeletal, integumentary, neurologic, psychiatric, endocrine. Positive findings noted below. Modified ESAS Completed by: provider   Fatigue: 6       Pain: 0   Anxiety: 0 Nausea: 0   Anorexia: 9 Dyspnea: 0     Constipation: No     Stool Occurrence(s): 1        PHYSICAL EXAM:     From RN flowsheet:  Wt Readings from Last 3 Encounters:   09/16/22 61.1 kg (134 lb 12.8 oz)   11/02/21 81.6 kg (180 lb)   10/11/21 76.7 kg (169 lb)     Blood pressure (!) 157/57, pulse (!) 114, temperature (!) 101.9 °F (38.8 °C), resp. rate 18, height 5' 7.99\" (1.727 m), weight 61.1 kg (134 lb 12.8 oz), SpO2 97 %.     Pain Scale 1: Adult Nonverbal Pain Scale  Pain Intensity 1: 0     Pain Location 1: Back  Pain Orientation 1: Posterior  Pain Description 1: Aching  Pain Intervention(s) 1: Medication (see MAR), Massage, Position, Repositioned, Rest      Constitutional:, not responsive this AM, with cheyne jackson respirations, eyes open, appears imminent   ENMT: dry mucous membranes  Cardiovascular: distal pulses intact  Respiratory: short apnea pauses, shallow respirations, poor respiratory effort, on RA  Gastrointestinal: midline surgical incision CDI, no distention  Skin: warm, dry, black eschar right ankle and right heel  Neurologic: no response to verbal or tactile stimuli, eyes open but not tracking       HISTORY:     Principal Problem:    Sepsis ([address] (8/27/2022)    Active Problems:    Acute renal failure (ARF) ([address] (5/29/2018)      UTI (urinary tract infection) (10/29/2021)      High anion gap metabolic acidosis (2/92/9579)      Hyperkalemia (8/27/2022)      AMS (altered mental status) (8/27/2022)      Strangulated inguinal hernia (8/27/2022)      Right inguinal hernia (8/27/2022)      Leukocytosis (8/27/2022)      Anemia (8/27/2022)      Chronic indwelling Mccray catheter (8/27/2022)      S/P BKA (below knee amputation), left ([address] (8/27/2022)      Right foot ulcer ([address] (8/27/2022)      Paroxysmal atrial fibrillation ([address] (8/27/2022)      Diabetic ulcer of right foot ([address] (8/27/2022)      Pleural effusion on right (8/27/2022)      Severe protein-calorie malnutrition ([address] (8/27/2022)      Acute respiratory failure with hypoxemia ([address] (8/28/2022)      Septic shock ([address] (8/28/2022)      Hypernatremia (9/6/2022)      Seizure ([address] (9/14/2022)    Past Medical History:   Diagnosis Date    A-fib (HCC)     Asthma     CAD (coronary artery disease)     Chronic kidney disease     stage 3    COVID-19     Diabetes (HCC)     GERD (gastroesophageal reflux disease)     Heart failure (HCC)     chronic diastolic heart failure    High cholesterol     Hypertension     Ill-defined condition     chronic osteomyelitis left ankle and foot    Ill-defined condition     chronic arteriosclerosis    PVD (peripheral vascular disease) ([address]     Stroke ([address]     cva      Past Surgical History:   Procedure Laterality Date    COLONOSCOPY N/A 6/1/2018    COLONOSCOPY, POLYPECTOMY performed by Becky Mills MD at [facility]    HX CATARACT REMOVAL      HX ORTHOPAEDIC Left 2021    ankle washout, external fixator, would vac    HX ORTHOPAEDIC      external fixator removed      History reviewed. No pertinent family history.    History reviewed, no pertinent family history. Social History     Tobacco Use    Smoking status: Former    Smokeless tobacco: Never   Substance Use Topics    Alcohol use: Not Currently     No Known Allergies   Current Facility-Administered Medications   Medication Dose Route Frequency    levETIRAcetam (KEPPRA) 500 mg in 0.9% sodium chloride 100 mL IVPB  500 mg IntraVENous Q12H    ondansetron (ZOFRAN ODT) tablet 4 mg  4 mg SubLINGual Q6H PRN    LORazepam (INTENSOL) 2 mg/mL oral concentrate 1 mg  1 mg SubLINGual Q1H PRN    acetaminophen (TYLENOL) tablet 650 mg  650 mg Oral Q4H PRN    Or    acetaminophen (TYLENOL) solution 650 mg  650 mg Oral Q4H PRN    Or    acetaminophen (TYLENOL) suppository 650 mg  650 mg Rectal Q4H PRN    scopolamine (TRANSDERM-SCOP) 1 mg over 3 days 1 Patch  1 Patch TransDERmal Q72H PRN    bisacodyL (DULCOLAX) suppository 10 mg  10 mg Rectal DAILY PRN    morphine (ROXANOL) 100 mg/5 mL (20 mg/mL) concentrated solution 5 mg  5 mg SubLINGual Q1H PRN    diazePAM (VALIUM) injection 9.3 mg  0.15 mg/kg IntraVENous Q10MIN PRN    povidone-iodine (BETADINE) 10 % topical solution   Topical DAILY    collagenase (SANTYL) 250 unit/gram ointment   Topical DAILY    ondansetron (ZOFRAN) injection 4 mg  4 mg IntraVENous Q6H PRN          LAB AND IMAGING FINDINGS:     Lab Results   Component Value Date/Time    WBC 4.4 (L) 09/16/2022 03:27 AM    HGB 8.4 (L) 09/16/2022 03:27 AM    PLATELET 264 67/57/4710 03:27 AM     Lab Results   Component Value Date/Time    Sodium 140 09/17/2022 02:00 AM    Potassium 2.8 (LL) 09/17/2022 06:22 AM    Chloride 106 09/17/2022 02:00 AM    CO2 26 09/17/2022 02:00 AM    BUN 43 (H) 09/17/2022 02:00 AM    Creatinine 1.38 (H) 09/17/2022 02:00 AM    Calcium 8.8 09/17/2022 02:00 AM    Magnesium 1.4 (L) 09/17/2022 06:22 AM    Phosphorus 1.9 (L) 09/03/2022 07:45 AM      Lab Results   Component Value Date/Time    Alk.  phosphatase 150 (H) 09/11/2022 08:11 AM    Protein, total 6.4 09/11/2022 08:11 AM    Albumin 3.9 09/11/2022 08:11 AM Globulin 2.5 09/11/2022 08:11 AM     Lab Results   Component Value Date/Time    INR 1.3 (H) 09/02/2022 04:51 AM    Prothrombin time 16.8 (H) 09/02/2022 04:51 AM    aPTT 118.5 (H) 05/17/2021 08:20 AM      Lab Results   Component Value Date/Time    Iron 10 (L) 05/06/2021 06:40 AM    TIBC 186 (L) 05/06/2021 06:40 AM    Iron % saturation 5 (L) 05/06/2021 06:40 AM    Ferritin 125 05/06/2021 06:40 AM      No results found for: PH, PCO2, PO2  No components found for: [place]   Lab Results   Component Value Date/Time    CK 46 05/29/2018 05:45 PM    CK - MB 1.4 05/29/2018 05:45 PM                Total time to take care of this patient was 15 minutes and more than 50% of time was spent counseling and coordinating care. Disclaimer: Sections of this note are dictated using utilizing voice recognition software, which may have resulted in some phonetic based errors in grammar and contents. Even though attempts were made to correct all the mistakes, some may have been missed, and remained in the body of the document. If questions arise, please contact our department.

## 2022-09-20 NOTE — PROGRESS NOTES
0034  Bedside and verbal shift change report given to Matthew Heimlich, RN (on coming nurse) by Mallorie Mancilla, RN (off going nurse). Report included the following information SBAR, Kardex, Intake/Output and MAR.    0410  Pt stops seizure activity. 3338  Bedside and verbal shift change report given by VALERIA Treadwell (off going nurse) to Katie Mancilla RN(on coming nurse). Report included the following information SBAR, Kardex, Intake/Output and MAR.

## 2022-09-20 NOTE — PROGRESS NOTES
Problem: Pressure Injury - Risk of  Goal: *Prevention of pressure injury  Description: Document Anam Scale and appropriate interventions in the flowsheet. Outcome: Progressing Towards Goal  Note: Pressure Injury Interventions:  Sensory Interventions: Pad between skin to skin, Monitor skin under medical devices, Minimize linen layers, Float heels, Keep linens dry and wrinkle-free, Maintain/enhance activity level, Turn and reposition approx. every two hours (pillows and wedges if needed)    Moisture Interventions: Absorbent underpads, Internal/External urinary devices, Internal/External fecal devices, Check for incontinence Q2 hours and as needed, Minimize layers    Activity Interventions: PT/OT evaluation    Mobility Interventions: Float heels, HOB 30 degrees or less    Nutrition Interventions: Discuss nutritional consult with provider    Friction and Shear Interventions: Feet elevated on foot rest, HOB 30 degrees or less, Foam dressings/transparent film/skin sealants, Lift sheet, Minimize layers                Problem: Falls - Risk of  Goal: *Absence of Falls  Description: Document Joana Fall Risk and appropriate interventions in the flowsheet.   Outcome: Progressing Towards Goal  Note: Fall Risk Interventions:  Mobility Interventions: Bed/chair exit alarm    Mentation Interventions: Adequate sleep, hydration, pain control, More frequent rounding    Medication Interventions: Bed/chair exit alarm    Elimination Interventions: Bed/chair exit alarm    History of Falls Interventions: Vital signs minimum Q4HRs X 24 hrs (comment for end date), Bed/chair exit alarm

## 2022-09-20 NOTE — PROGRESS NOTES
As pt is comfort measures and Vascular felt even [place] high risk will sign off. Please contact me if needed.    [facility]  [id number]

## 2022-09-21 NOTE — PROGRESS NOTES
Bereavement Note:     responded to the death of Romel Shields, who is a 78 y.o., male, offering Spiritual Care to patient and family, see flow sheets for interventions. Date of Death: 2022    Extended Emergency Contact Information  Primary Emergency Contact: Coleen Pham  Address: [address] Phone: [phone]  Mobile Phone: [phone]  Relation: Spouse  Secondary Emergency Contact: Heraclio Patrick  Mobile Phone: [phone]  Relation: Son                 YES      NO  UNKNOWN  Life Net   []        [x]    []   [employer]   [] [x] []   Medical Examiner  []        [x]  []   Going to [employer]  [x]        [] []      Autopsy   []        [x]         []   Sympathy Card  [x]        []  Bereavement Materials  [x]        []           Business Card Provided  [x]        []              Home:     Chaplains will continue to follow family and will provide spiritual care as needed.   [address] [facility]   [phone] - [place]

## 2022-09-21 NOTE — DEATH NOTE
Death Pronouncement Note    Patient's Name: Juliane Vanegas.    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 His hospital stay has already crossed 2 medically necessary midnights; ongoing hospitalization was warranted for this high risk patient with concern for coronary ischemia. Since Juni Galan required medically necessary hospital care spanning at least two midnights, he has met the benchmark for Inpatient Admission status. Based on the documented clinical condition and care plan, we recommend upgrading patient's hospitalization status to INPATIENT. The final decision regarding the patient's hospitalization status depends on the attending physician's clinical judgment. Arianne Vences MD, KUNAL, FACP, [facility], [facility] Physician Advisor [address] [place] [address],[address] [phone]

## 2022-09-21 NOTE — PROGRESS NOTES
Called to bedside by OTONIEL Wolfe due to noting pt has . Skin pale, no respirations or pulse noted x1 minute. [address] notified and made aware of findings. [place], wife, nursing supervisor, and  also notified of passing.

## 2022-09-21 NOTE — PROGRESS NOTES
2311  Bedside and verbal shift change report given to Ana Garay RN (on coming nurse) by Corrie Walkre. Ruchi Monteiro RN (off going nurse). Report included the following information SBAR, Kardex, Intake/Output and MAR.    0247  Cleaned pt's body & sent to [facility].

## 2022-09-21 NOTE — DISCHARGE SUMMARY
Discharge Summary    Patient: Vladislav Glynn MRN: [id number]  CSN: [id number]    YOB: 1945  Age: 78 y.o.   Sex: male    DOA: 8/27/2022 LOS:  LOS: 24 days   Discharge Date:      Primary Care Provider:  Soren Marc MD    Admission Diagnoses: Sepsis [facility]) [A41.9]  Strangulated hernia of abdominal wall [K43.6]  UTI (urinary tract infection) [N39.0]  AMS (altered mental status) [R41.82]  Acidosis [E87.2]  Hyperkalemia [E87.5]    Discharge Diagnoses:    Problem List as of 9/20/2022 Date Reviewed: 8/27/2022            Codes Class Noted - Resolved    Seizure ([facility] ICD-10-CM: R56.9  ICD-9-CM: 780.39  9/14/2022 - Present        Hypernatremia ICD-10-CM: E87.0  ICD-9-CM: 276.0  9/6/2022 - Present        Acute respiratory failure with hypoxemia ([facility] ICD-10-CM: J96.01  ICD-9-CM: 518.81  8/28/2022 - Present        Septic shock ([facility] ICD-10-CM: A41.9, R65.21  ICD-9-CM: 038.9, 785.52, 995.92  8/28/2022 - Present        High anion gap metabolic acidosis LRO-84-IU: E87.2  ICD-9-CM: 276.2  8/27/2022 - Present        Hyperkalemia ICD-10-CM: E87.5  ICD-9-CM: 276.7  8/27/2022 - Present        * (Principal) Sepsis ([facility] ICD-10-CM: A41.9  ICD-9-CM: 038.9, 995.91  8/27/2022 - Present        AMS (altered mental status) ICD-10-CM: R41.82  ICD-9-CM: 780.97  8/27/2022 - Present        Strangulated inguinal hernia ICD-10-CM: K40.30  ICD-9-CM: 550.10  8/27/2022 - Present        Right inguinal hernia ICD-10-CM: K40.90  ICD-9-CM: 550.90  8/27/2022 - Present        Leukocytosis ICD-10-CM: D72.829  ICD-9-CM: 288.60  8/27/2022 - Present        Anemia ICD-10-CM: D64.9  ICD-9-CM: 285.9  8/27/2022 - Present        Chronic indwelling Mccray catheter ICD-10-CM: Z97.8  ICD-9-CM: V45.89  8/27/2022 - Present        S/P BKA (below knee amputation), left ([facility] ICD-10-CM: L14.042  ICD-9-CM: V49.75  8/27/2022 - Present        Right foot ulcer ([facility] ICD-10-CM: L97.519  ICD-9-CM: 707.15  8/27/2022 - Present        Paroxysmal atrial fibrillation ([place] [address] ICD-10-CM: I48.0  ICD-9-CM: 427.31  8/27/2022 - Present        Diabetic ulcer of right foot ([place] [address].) ICD-10-CM: E11.621, L97.519  ICD-9-CM: 250.80, 707.15  8/27/2022 - Present        Pleural effusion on right ICD-10-CM: J90  ICD-9-CM: 511.9  8/27/2022 - Present        Severe protein-calorie malnutrition ([facility] ICD-10-CM: E43  ICD-9-CM: 262  8/27/2022 - Present        UTI (urinary tract infection) ICD-10-CM: N39.0  ICD-9-CM: 599.0  10/29/2021 - Present        CAD (coronary artery disease) ICD-10-CM: I25.10  ICD-9-CM: 414.00  5/29/2018 - Present        Acute renal failure (ARF) ([facility]) ICD-10-CM: N17.9  ICD-9-CM: 584.9  5/29/2018 - Present        CKD (chronic kidney disease) stage 3, GFR 30-59 ml/min ([facility]) ICD-10-CM: N18.30  ICD-9-CM: 585.3  5/29/2018 - Present        Elevated brain natriuretic peptide (BNP) level ICD-10-CM: R79.89  ICD-9-CM: 790.99  5/29/2018 - Present        DM2 (diabetes mellitus, type 2) ([place] [address] ICD-10-CM: E11.9  ICD-9-CM: 250.00  2/12/2018 - Present        RESOLVED: Strangulated hernia of abdominal wall ICD-10-CM: K43.6  ICD-9-CM: 552.20  8/27/2022 - 8/27/2022        RESOLVED: Gangrene of left foot ([facility] ICD-10-CM: X85  ICD-9-CM: 785.4  10/27/2021 - 8/27/2022        RESOLVED: Diabetes mellitus with foot ulcer and gangrene ([facility] ICD-10-CM: E11.621, L97.509, E11.52  ICD-9-CM: 250.70, 250.80, 785.4, 707.15  10/26/2021 - 8/27/2022        RESOLVED: S/P foot surgery, left ICD-10-CM: W96.676  ICD-9-CM: V45.89  9/7/2021 - 8/27/2022        RESOLVED: Chronic atrial fibrillation ([facility] ICD-10-CM: I48.20  ICD-9-CM: 427.31  5/22/2021 - 8/27/2022        RESOLVED: PVD (peripheral vascular disease) ([facility] ICD-10-CM: I73.9  ICD-9-CM: 443.9  5/22/2021 - 8/27/2022        RESOLVED: Osteomyelitis ([facility] ICD-10-CM: M86.9  ICD-9-CM: 730.20  5/4/2021 - 8/27/2022        RESOLVED: Trimalleolar fracture of ankle, closed, left, initial encounter ICD-10-CM: H76.649T  ICD-9-CM: 824.6  5/4/2021 - 8/27/2022 RESOLVED: Displacement of peripherally inserted central catheter (PICC) ([facility] ICD-10-CM: L72.507M  ICD-9-CM: 996.1  2021 - 2022        RESOLVED: Ulcer of left heel, with necrosis of bone ([facility] ICD-10-CM: L97.424  ICD-9-CM: 707.14, 730.17  2021 - 2022        RESOLVED: Acute hematogenous osteomyelitis of left foot ([facility] ICD-10-CM: M86.072  ICD-9-CM: 730.07  2021 - 2022        RESOLVED: Cellulitis of left foot ICD-10-CM: L03.116  ICD-9-CM: 682.7  2021 - 2022        RESOLVED: Diabetic gangrene ([facility] ICD-10-CM: E11.52  ICD-9-CM: 250.70, 785.4  2021 - 2021        RESOLVED: Diabetic foot ulcer with osteomyelitis ([facility] ICD-10-CM: E11.621, E11.69, L97.509, M86.9  ICD-9-CM: 250.80, 707.15, 730.27, 731.8  2021 - 2022        RESOLVED: Acute osteomyelitis ([facility] ICD-10-CM: M86.10  ICD-9-CM: 730.00  4/15/2021 - 2022        RESOLVED: Diabetic ulcer of left foot ([facility] ICD-10-CM: E11.621, L97.529  ICD-9-CM: 250.80, 707.15  2021 - 2022        RESOLVED: COVID-19 virus infection ICD-10-CM: U07.1  ICD-9-CM: 079.89  2021 - 2022        RESOLVED: Atrial flutter ([facility] ICD-10-CM: H07.96  ICD-9-CM: 427.32  2018 - 2022        RESOLVED: Acute renal insufficiency ICD-10-CM: N28.9  ICD-9-CM: 593.9  2018 - 2021        RESOLVED: Tachycardia ICD-10-CM: R00.0  ICD-9-CM: 785.0  2/10/2018 - 2018           Discharge Medications:     Current Discharge Medication List          Discharge Condition:     Procedures : -laparotomy for right incarcerated inguinal hernia; extubation-; thoracentesis-9/3    Consults: General Surgery and Pulmonary/Critical Care, Nephrology, Infectious Disease, Orthopedics, Vascular Surgery, Hospice, Palliative Care, Speech Therapy, Neurology, Wound Care      PHYSICAL EXAM   Visit Vitals  BP (!) 157/57 (BP 1 Location: Left upper arm, BP Patient Position: Lying)   Pulse (!) 114   Temp (!) 101.9 °F (38.8 °C) Resp 18   Ht 5' 7.99\" (1.727 m)   Wt 61.1 kg (134 lb 12.8 oz)   SpO2 97%   BMI 20.50 kg/m²     Death exam revealed absent cardiac sounds x1 minute as well as absent pupillary and corneal reflexes. Admission HPI : Syeda Aburto is a 78 y.o. 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 He presents to the ED very septic with hypotension 70 over palp initially, tachycardic, bright red blood in diaper, profoundly altered. He was given 30 mg/kg resuscitation IV fluid but his blood pressure up to 152/71 at the time of admission. He was also found to have a profoundly elevated potassium of 7.0 he was given several regimens to lower his potassium currently it was lowered to 6.6 and then to 6.1. Patient's initial bicarb was 5 and had to be given amps of bicarb. Patient overall was found to be in multiorgan septic shock from multiple sources of infection. The most life ultimately source lower contributed to septic shock was a strangulated left inguinal hernia. General surgery has been called and patient was urgently going to the OR. Hospital Course : He was admitted to the ICU with multiorgan failure due to septic shock related to incarcerated/strangulated right inguinal hernia with multiple electrolyte derangements. He underwent emergent surgery on 8/27/22. He was left intubated post procedure due to critical illness. He also had a right foot wound which was necrotic and a urinary tract infection with indwelling caal catheter. He was treated with broad spectrum antibiotics. He was seen by multiple subspecialists during his ICU course.  Orthopedics and vascular surgery recommended above the knee amputation of his right lower extremity due to critical peripheral vascular disease. Given he had a prior left BKA with ultimate AKA, surgery was not performed as a poor outcome was expected. Palliative care and hospice were very involved in the case during his prolonged hospital stay. His family ultimately decided on comfort measures after he developed seizures with further clinical deterioration. He was pronounced  at 2147. Disposition:     Minutes spent on discharge: 45 minutes      Labs: Results:       Chemistry No results for input(s): GLU, NA, K, CL, CO2, BUN, CREA, CA, AGAP, BUCR, TBIL, AP, TP, ALB, GLOB, AGRAT in the last 72 hours. No lab exists for component: GPT   CBC w/Diff No results for input(s): WBC, RBC, HGB, HCT, PLT, GRANS, LYMPH, EOS, HGBEXT, HCTEXT, PLTEXT in the last 72 hours. Cardiac Enzymes No results for input(s): CPK, CKND1, LEONEL in the last 72 hours. No lab exists for component: CKRMB, TROIP   Coagulation No results for input(s): PTP, INR, APTT, INREXT in the last 72 hours. Lipid Panel No results found for: CHOL, CHOLPOCT, CHOLX, CHLST, CHOLV, 040400, HDL, HDLP, LDL, LDLC, DLDLP, 097890, VLDLC, VLDL, TGLX, TRIGL, TRIGP, TGLPOCT, CHHD, CHHDX   BNP No results for input(s): BNPP in the last 72 hours. Liver Enzymes No results for input(s): TP, ALB, TBIL, AP in the last 72 hours. No lab exists for component: SGOT, GPT, DBIL   Thyroid Studies Lab Results   Component Value Date/Time    TSH 4.73 (H) 2022 08:04 AM            Significant Diagnostic Studies: XR ABD (KUB)    Result Date: 2022  EXAM: XR ABD (KUB) CLINICAL INDICATION/HISTORY: Abdominal pain and distention COMPARISON: None. TECHNIQUE: 3 supine AP views of the abdomen were obtained _______________ FINDINGS: Skin staples overlie right lower quadrant.  There is a single borderline prominent air-filled bowel loop overlying the right paracentral inferior abdomen and superior pelvis, measuring approximately 2.7 cm in diameter. There is also seen more distally throughout the right colon. The soft tissue planes and bony structures appear normal.  _______________     Single borderline prominent air-filled right paracentral small bowel loop, appearance most suggestive of postoperative ileus. XR ABD (KUB)    Result Date: 9/5/2022  AP portable lower chest and upper abdomen for NG/OG tube placement: NG/OG tube is within the stomach. It is not as distally located as previous on the comparison exam from 9/3/2022. Evidence of abdominal surgery with midline skin staples. Persistent right lower lobe atelectasis/infiltrate with some pleural thickening or pleural fluid. NG/OG tube as described within the stomach. XR ABD (KUB)    Result Date: 9/3/2022  EXAM: XR ABD (KUB) CLINICAL INDICATION/HISTORY: NGT Placement COMPARISON: 8/31/2022 TECHNIQUE: Supine AP view of the abdomen was obtained. _______________ FINDINGS: Gastric tube extends from midline thoracic esophagus curves through the left upper quadrant and has the tip and side-port overlying right mid abdomen in the region of the distal stomach or proximal duodenum. No air distended bowel loops nor other evidence of bowel obstruction or bowel dilatation. The soft tissue planes and bony structures appear normal.  _______________     Adequate gastric tube placement with tip and side-port in the region of the distal stomach/proximal duodenum. XR ABD (KUB)    Result Date: 8/31/2022  PORTABLE ABDOMEN Indication:  OGT placement. Technique: Portable supine AP view of the abdomen was obtained. Comparison studies:  August 27, 2022. Findings: Moderate rotation. Gastric tube terminates in the fundus of the stomach. Nonobstructive gas pattern. Telemetry leads. Lower abdominal skin closing staples. Minor scattered osseous degenerative changes. Moderate rotation. Gastric tube terminates in the fundus of the stomach. Nonobstructive gas pattern.      MRI BRAIN WO CONT    Result Date: 9/14/2022  EXAM: MRI of the brain without intravenous contrast. INDICATION:  \"seizure. \" COMPARISON:  No prior MRI is available for direct comparison. CORRELATION (related prior exam):  Recent CT. PROTOCOL:  Routine brain. _______________ FINDINGS:       IMAGE QUALITY:  The exam is overall moderately degraded by motion artifact. BRAIN AND EXTRA-AXIAL SPACE:           ACUTE/SUBACUTE INFARCT:  None. MASS:  None. HEMORRHAGE:  None. SUBDURAL FLUID COLLECTION:  None. HYDROCEPHALUS:  None. ICA AND DOMINANT VA T2 FLOW VOIDS:  Unremarkable. REMOTE CEREBRAL TERRITORIAL INFARCT:  None definite. REMOTE CEREBELLAR INFARCT:  None definite. STRIVE (STandards for Reporting Vascular changes on nEuroimaging):                            --[place] of white matter hyperintensity (\"leukoaraiosis\") of presumed vascular origin:  Mild to moderate. --[place] of chronic lacunes of presumed vascular origin:  Small focus in the right lentiform nucleus. --[place] of perivascular spaces:  None significant. --[place] of \"microbleeds\":   None definite. --Degree of brain atrophy:  Moderate to marked. SELLA/PITUITARY:  Unremarkable. HEENT:            ORBITS:  There are bilateral lens replacements. PARANASAL SINUSES:  Predominantly clear. MASTOID AIR CELLS:  Right mastoid and middle ear opacification by fluid, unchanged relative to the pubic CT performed yesterday but new since the CT of 8/27/2022. INCLUDED UPPER CERVICAL LYMPH NODES:  Unremarkable. INCLUDED UPPER PAROTIDS:  Unremarkable. NASOPHARYNX:  Unremarkable. BONE MARROW SIGNAL:  Unremarkable. SUPERFICIAL SOFT TISSUES: Unremarkable. _______________     1.   Generalized chronic changes, however, no mass or acute findings. 2.  Fluid opacifies the right mastoid air cells and middle ear, new since 8/27/2022. _______________     CT HEAD WO CONT    Result Date: 9/13/2022  EXAM: CT HEAD WITHOUT CONTRAST CLINICAL INDICATION/HISTORY: RRT -Additional: Altered mental status COMPARISON: 8/27/2022 TECHNIQUE: Serial axial images of the head were performed without intravenous contrast. Dose reduction techniques:  Automated exposure control, mAs and/or kVp reductions based on patient size, and iterative reconstruction. The specific techniques utilized on this CT exam have been documented in the patient's electronic medical record. Digital Imaging and Communications in Medicine (DICOM) format image data are available to nonaffiliated external healthcare facilities or entities on a secure, media free, reciprocally searchable basis with patient authorization for at least a 12-month period after this study. _______________ FINDINGS: BRAIN:   > Intraparenchymal hemorrhage: None.   > Mass effect/edema: None.   > Infarct/encephalomalacia: No evidence of acute cortical ischemia.   > White matter: Unremarkable.   > Brain volume: Normal for age. EXTRA-AXIAL SPACES:   > No extra-axial hemorrhage.   > No hydrocephalus. SINUSES/MASTOIDS: Nonspecific right mastoid effusion. CALVARIA: Intact. OTHER: None. _______________     No acute intracranial findings. Exam is limited by motion artifact. CT HEAD WO CONT    Result Date: 8/27/2022  EXAM: CT head INDICATION: Altered mental status. COMPARISON: None. TECHNIQUE: Axial CT imaging of the head was performed without intravenous contrast.  One or more dose reduction techniques were used on this CT: automated exposure control, adjustment of the mAs and/or kVp according to patient size, and iterative reconstruction techniques. The specific techniques used on this CT exam have been documented in the patient's electronic medical record.   Digital Imaging and Communications in Medicine (DICOM) format image data are available to nonaffiliated external healthcare facilities or entities on a secure, media free, reciprocally searchable basis with patient authorization for at least a 12-month period after this study. Patient motion degrades image quality. _______________ FINDINGS: BRAIN AND POSTERIOR FOSSA: Mild periventricular areas of decreased attenuation are identified. Gray-white matter interfaces are preserved. No intraparenchymal hemorrhage, mass effect or midline shift. The ventricular system is midline and symmetric. Atherosclerotic vascular calcifications are identified. EXTRA-AXIAL SPACES AND MENINGES: There is no evidence for extra-axial mass lesion or fluid collection. CALVARIUM: Intact. SINUSES: Sphenoid sinus mucosal thickening. OTHER: Orbits are grossly intact. Facial soft tissue are within normal limits. _______________     1. No evidence for intracranial hemorrhage, mass effect or midline shift. 2.  Mild periventricular areas of microvascular ischemic change. 3.  Cerebral atherosclerosis. 4.  Atrophy. If there are continued symptoms, a MRI is recommended for further evaluation. CT ABD PELV WO CONT    Result Date: 8/27/2022  EXAM: CT of the abdomen and pelvis INDICATION: Pain. COMPARISON: May 14, 2021. TECHNIQUE: Axial CT imaging of the abdomen and pelvis was performed without intravenous contrast. Multiplanar reformats were generated. One or more dose reduction techniques were used on this CT: automated exposure control, adjustment of the mAs and/or kVp according to patient size, and iterative reconstruction techniques. The specific techniques used on this CT exam have been documented in the patient's electronic medical record.   Digital Imaging and Communications in Medicine (DICOM) format image data are available to nonaffiliated external healthcare facilities or entities on a secure, media free, reciprocally searchable basis with patient authorization for at least a 12-month period after this study. _______________ FINDINGS: LOWER CHEST: Moderate right pleural effusion with adjacent atelectasis. LIVER, BILIARY: Liver is normal. No biliary dilation. Gallbladder is unremarkable. PANCREAS: Normal. SPLEEN: Splenic granuloma. ADRENALS: Normal. KIDNEYS: Normal. LYMPH NODES: No enlarged lymph nodes. GASTROINTESTINAL TRACT: Uncomplicated sigmoid diverticula. Gastric distention. PELVIC ORGANS: Unremarkable. VASCULATURE: Atherosclerotic vascular calcifications. BONES: No acute or aggressive osseous abnormalities identified. OTHER: Large right inguinal hernia with large and small bowel. There is diffuse circumferential wall thickening of ascending colon within the hernia. _______________     1. Large right inguinal hernia with large and small bowel. There is diffuse wall thickening of the ascending colon concerning for strangulation. No evidence for bowel obstruction. 2. Right pleural effusion and bilateral basilar atelectasis. 3. Gastric distention. XR CHEST PORT    Addendum Date: 9/20/2022    Addendum: First sentence within the technique section of the initial report was incorrectly transcribed and should instead read: TECHNIQUE: AP view of the chest was obtained on 2 images    Result Date: 9/20/2022  EXAM: CHEST RADIOGRAPHS CLINICAL INDICATION/HISTORY: Sepsis   > Additional: None COMPARISON: 6/18/2022. TECHNIQUE: PA and lateral views of the chest _______________ FINDINGS: HEART AND MEDIASTINUM: No appreciable cardiomegaly. Remaining mediastinal contours within normal limits. LUNGS AND PLEURAL SPACES: Left lung is clear. Right pleural effusion with adjacent opacity. BONY THORAX AND SOFT TISSUES: Unremarkable. _______________     1. Right pleural effusion with probable adjacent atelectasis. XR CHEST PORT    Result Date: 9/14/2022  HISTORY: Sepsis, altered mental status. COMPARISON: September 2, 2022.  FINDINGS: A portable view of the chest: Interval increased, moderate right and small left pleural effusions, associated regions of airspace opacity could be atelectasis or pneumonia. No pneumothoraces. Some patient rotation/obliquity noted. Mediastinal contour appears stable noting atherosclerosis and borderline heart size. No acute bony finding. Multiple artifacts on the chest.     Interval increased, moderate right and small left pleural effusions, associated regions of airspace opacity could be atelectasis or pneumonia. No pneumothoraces. Some patient rotation/obliquity noted. XR CHEST PORT    Result Date: 9/2/2022  CLINICAL HISTORY:  Tachypnea COMPARISONS:  Chest x-ray 9/2/2022 TECHNIQUE:  single frontal view of the chest ------------------------------------------ FINDINGS: Lungs:  Patchy parenchymal opacity in the right lung, similar to the prior radiograph and likely largely scar. Blunting of the right lateral costophrenic sulcus, also similar. Mild chronic appearing interstitial markings within the left lung, similar to prior radiograph. Mediastinum: Moderate cardiomegaly. Mediastinum is shifted towards the right, unchanged. Atherosclerotic arterial calcification. Bones: No evidence of fracture or suspicious bone lesion. ------------------------------------------    1. No significant interval change. Probable small right pleural effusion, not appreciably changed. 2. Right lung parenchymal findings likely largely reflect scar though are not well assessed. Pneumonia or aspiration not entirely excluded. XR CHEST PORT    Result Date: 9/2/2022  EXAM: XR CHEST PORT CLINICAL INDICATION/HISTORY: Post right thoracentesis -Additional: None COMPARISON: 9/2/2022 TECHNIQUE: Portable frontal view of the chest _______________ FINDINGS: SUPPORT DEVICES: None. HEART AND MEDIASTINUM: Normal cardiac size and mediastinal contours. LUNGS AND PLEURAL SPACES: Decreased volume right-sided pleural effusion status post thoracentesis with persistent lateral and basilar components.  Streaky areas of atelectasis and interstitial densities noted across the mid and lower lung zones. No evidence of postprocedural pneumothorax. Left lung clear as visualized. Skinfold along the peripheral left hemithorax. BONY THORAX AND SOFT TISSUES: Unremarkable. _______________     1. No evidence of postprocedural pneumothorax is significantly improved right lung aeration status post thoracentesis. > Residual pleural effusion and streaky areas of atelectasis. XR CHEST PORT    Result Date: 9/2/2022  EXAM: XR CHEST PORT CLINICAL INDICATION/HISTORY: intubated -Additional: None COMPARISON: One day prior TECHNIQUE: Portable frontal view of the chest _______________ FINDINGS: SUPPORT DEVICES: Interval extubation and removal of enteric tube. HEART AND MEDIASTINUM: Heart is obscured by right consolidation. LUNGS AND PLEURAL SPACES: Large right pleural effusion with adjacent atelectasis. Left-sided interstitial opacities No pneumothorax. _______________     Large right pleural effusion with adjacent atelectasis. XR CHEST PORT    Result Date: 9/1/2022  EXAM: Portable chest radiograph 9/1/2022 CLINICAL INDICATION/HISTORY: Intubation. TECHNIQUE: A semierect portable radiograph was obtained. COMPARISON: 8/31/2022. FINDINGS: Endotracheal tube remains in position with its tip approximately 5.5 cm above the level of the alise. A nasogastric tube extends below the field of view. The patient is slightly rotated to the right which distorts the cardiomediastinal contours. However, these contours are grossly unchanged. Increasing right pleural effusion is present with associated increasing atelectasis and/or infiltrate in the right lung. There is increasing interstitial edema and/or interstitial infiltrate throughout the left lung. There is no pneumothorax or significant left pleural effusion. 1. Increasing interstitial edema and/or interstitial infiltration with increasing right pleural effusion.     XR CHEST PORT    Result Date: 8/31/2022  EXAM: XR CHEST PORT CLINICAL INDICATION/HISTORY: intubated -Additional: None COMPARISON: 8/30/2022 TECHNIQUE: Portable frontal view of the chest _______________ FINDINGS: SUPPORT DEVICES: Endotracheal and enteric tubes unchanged. HEART AND MEDIASTINUM: Cardiomediastinal silhouette within normal limits. LUNGS AND PLEURAL SPACES: Patient rotated to right. Moderate right pleural effusion/atelectasis. No pneumothorax. _______________     Moderate right pleural effusion/atelectasis. XR CHEST PORT    Result Date: 8/30/2022  EXAM:  PORTABLE CHEST INDICATION:  Follow up. TECHNIQUE:  Portable, AP view. COMPARISON:  08/29/2022 ____________________ FINDINGS:  SUPPORT DEVICES: Endotracheal tube approximately 4 cm above the alise. OG tube is traversing below left hemidiaphragm, tip not imaged. Esophageal probe is present at the level of the endotracheal tube. HEART AND MEDIASTINUM: Stable. LUNGS AND PLEURAL SPACES: Persistent right basilar opacity consistent with pleural effusion with adjacent consolidation. No pneumothorax. BONY THORAX AND SOFT TISSUES: No acute osseous abnormality. ____________________     1. Endotracheal tube approximately 4 cm above the alise. 2. No significant change in right basilar opacity consistent with pleural effusion with adjacent consolidation, atelectasis or pneumonia. *  **     XR CHEST PORT    Result Date: 8/29/2022  HISTORY: -Provided with order: intubated -Additional: None Technique : AP PORTABLE CHEST Comparison : None. FINDINGS: HEART AND MEDIASTINUM: Anterior endotracheal tube tip estimated at 6.1 cm above alise. Enteric tube tip projects over left upper quadrant expected location gastric fundus. Esophageal temperature probe tip projects over lower thoracic . Stable cardiomediastinal silhouette allowing for angulation. LUNGS AND PLEURAL SPACES: Progressive right pleural effusion and patchy opacities in the right lung. Left lung relatively clear allowing for technique.  BONY THORAX AND SOFT TISSUES: No gross acute osseous abnormality. 1. Right pleural effusion and subjacent atelectasis/infiltrate, perhaps slightly progressed. 2. Tubes and lines stable in visualized extent. XR CHEST PORT    Result Date: 8/28/2022  EXAM: CHEST RADIOGRAPH CLINICAL INDICATION/HISTORY: intubated   > Additional: None COMPARISON: 8/27/2022. TECHNIQUE: Portable frontal view of the chest _______________ FINDINGS: SUPPORT DEVICES: Endotracheal tube distal tip projects 5.5 cm above the alise. Enteric tube distal tip projects below the left hemidiaphragm likely in the stomach. HEART AND MEDIASTINUM: No appreciable cardiomegaly. Remaining mediastinal contours are stable. LUNGS AND PLEURAL SPACES: Right pleural effusion with adjacent opacity. No evidence for pneumothorax. Left lung is clear. BONY THORAX AND SOFT TISSUES: Unremarkable. _______________     1. Right pleural effusion with adjacent atelectasis/infiltrates. 2. Supporting tubes appear stable. XR CHEST PORT    Result Date: 8/27/2022  EXAM: CHEST RADIOGRAPH CLINICAL INDICATION/HISTORY: intubated -Additional: None COMPARISON: August 27, 2022 TECHNIQUE: Portable frontal view of the chest _______________ FINDINGS: SUPPORT DEVICES: An endotracheal tube is positioned 6 cm above the alise. HEART AND MEDIASTINUM: Heart size and mediastinal contour are midline and within normal limits. LUNGS AND PLEURAL SPACES: There are opacities throughout both lungs but more confluent within the right mid and lower lung zones. No pneumothorax. BONY THORAX AND SOFT TISSUES: Unremarkable. _______________     Interval right greater than left airspace disease Small to moderate right pleural effusion    XR ABD PORT  1 V    Result Date: 8/27/2022  EXAM: ABDOMINAL RADIOGRAPHS CLINICAL INDICATION/HISTORY: OG placement -Additional: None COMPARISON: None TECHNIQUE: Single supine view of the abdomen _______________ FINDINGS: SUPPORT DEVICES: A nasogastric tube is positioned in the stomach.  BOWEL GAS PATTERN: The gas pattern is nonobstructive. Graylon John Paul SOFT TISSUES: Unremarkable. BONES: Degenerative changes are seen throughout the spine. ADDITIONAL FINDINGS: None. _______________     No significant abnormality. US THORACENTESIS RT NDL W IMAGE    Result Date: 9/2/2022  PROCEDURE: ULTRASOUND GUIDED RIGHT THORACENTESIS INDICATION: Right pleural effusion, shortness of breath PERFORMED BY:  Severa Mouse, PA-C ATTENDING PHYSICIAN:  Aislinn Garcia MD SUPERVISION: General _______________ TECHNIQUE & FINDINGS: CONSENT: The risks, benefits, and alternatives to the procedure were explained to the patient's wife prior to the procedure. Written informed consent was obtained and witnessed. Standard pre-procedure time out performed. TECHNIQUE/FINDINGS:  The right pleural effusion was localized under ultrasound guidance. Sterile ultrasound probe cover and ultrasound gel were utilized. The skin was marked, prepped and draped in the usual sterile fashion. 1% Lidocaine was used for local anesthesia. A 5 Western Latanya Yueh needle was advanced into the right pleural space under US guidance. A total of 460 mL of clear alanna fluid was drained using a Vacutainer system. A specimen was collected and taken to the lab, as requested. The catheter was then removed and the site dressed with a sterile bandage. The patient tolerated the procedure well and there were no immediate complications. Post-procedure chest x-ray shows no pneumothorax. GUIDANCE: Ultrasound guidance was used to position (and confirm the position of) the needle. Ultrasound image(s) saved in PACS. _______________     Successful ultrasound-guided thoracentesis with removal of 460 mL of fluid. Post-procedure chest x-ray shows no pneumothorax. A specimen was collected and taken to the lab, as requested.      ECHO ADULT COMPLETE    Result Date: 8/28/2022  Formatting of this result is different from the original.   Left Ventricle: Normal left ventricular systolic function with a visually estimated EF of 60 - 65%. Left ventricle size is normal. Normal wall thickness. Normal wall motion. Grade I diastolic dysfunction present with normal LV EF. Unable to assess RVSP due to inadequate or insignificant tricuspid regurgitation. DUPLEX LOWER EXT ARTERY RIGHT    Result Date: 9/6/2022  · Patent right leg vessels with good upstroke. No results found for this or any previous visit.         CC: Terence Tavarez MD

## 2022-09-21 NOTE — ROUTINE PROCESS
Bedside and Verbal shift change report given to VALERIA Mcgee (oncoming nurse) by Ayush Hooper nurse). Report included the following information SBAR, Kardex, Intake/Output, and MAR.

## 2022-10-03 LAB
BACTERIA SPEC CULT: NORMAL
SERVICE CMNT-IMP: NORMAL

## 2022-10-16 LAB
ACID FAST STN SPEC: NEGATIVE
MYCOBACTERIUM SPEC QL CULT: NEGATIVE
SPECIMEN PREPARATION: NORMAL
SPECIMEN SOURCE: NORMAL

## 2024-08-23 NOTE — CONSULTS
[facility] Consult Note      Patient: Clint Lowe. MRN: [id number]  SSN: [id number]    YOB: 1945  Age: 81 y.o. Sex: male    Date of Consultation: 02/10/2018  Referring Physician: Elmo Umana MD  Reason for Consultation: Shortness of breath    Chief complain: Shortness of breath     HPI: 74-year-old gentleman came to emergency room with worsening  Of shortness of breath for past 2-3 days. He is complaining of shortness of breath on minimal exertion for past 2 days. he is complaining of orthopnea and PND. He had cold symptoms 2 days ago. Denies any chest pain on exertion. Denies any dizziness, palpitation, presyncope or syncope. He is ex-smoker and quit smoking 20 years ago. Denies any family history of  Coronary artery disease. He has hypertension and diabetes mellitus but not on any medication at this time. Past Medical History:   Diagnosis Date    Asthma     Diabetes ([facility] [address]     High cholesterol     Hypertension      Past Surgical History:   Procedure Laterality Date    HX CATARACT REMOVAL       Current Facility-Administered Medications   Medication Dose Route Frequency    heparin 25,000 units in D5W 250 ml infusion  18-36 Units/kg/hr IntraVENous TITRATE    acetaminophen (TYLENOL) tablet 650 mg  650 mg Oral Q4H PRN    insulin glargine (LANTUS) injection 15 Units  0.2 Units/kg SubCUTAneous QHS    insulin lispro (HUMALOG) injection   SubCUTAneous AC&HS    glucose chewable tablet 16 g  4 Tab Oral PRN    glucagon (GLUCAGEN) injection 1 mg  1 mg IntraMUSCular PRN    dextrose (D50W) injection syrg 12.5-25 g  25-50 mL IntraVENous PRN    metoprolol tartrate (LOPRESSOR) tablet 25 mg  25 mg Oral BID    [START ON 2/11/2018] aspirin delayed-release tablet 81 mg  81 mg Oral DAILY    atorvastatin (LIPITOR) tablet 20 mg  20 mg Oral QHS    furosemide (LASIX) injection 60 mg  60 mg IntraVENous BID       Allergies and Intolerances:   No Known Allergies    Family History:   History reviewed. No pertinent family history. Social History:   He  reports that he has quit smoking. He has never used smokeless tobacco.  He  reports that he drinks about 0.6 oz of alcohol per week       Review of Systems:     Gen: No fever, chills, malaise, weight loss/gain. Heent: No headache, rhinorrhea, epistaxis, ear pain, hearing loss, sinus pain, neck pain/stiffness, sore throat. Heart: No chest pain, palpitations, positive shortness of breath, pnd, and orthopnea. Resp: No cough, hemoptysis, wheezing and dyspnea. GI: No nausea, vomiting, diarrhea, constipation, melena or hematochezia. : No urinary obstruction, dysuria or hematuria. Derm: No rash, new skin lesion or pruritis. Musc/skeletal: no bone or joint complains. Vasc: No edema, cyanosis or claudication. Endo: No heat/cold intolerance, no polyuria,polydipsia or polyphagia. Neuro: No unilateral weakness, numbness, tingling. No seizures. Heme: No easy bruising or bleeding. Physical:   Patient Vitals for the past 6 hrs:   Pulse Resp BP SpO2   02/10/18 1645 (!) 118 24 (!) 138/96 100 %   02/10/18 1638 - 20 - -   02/10/18 1530 (!) 102 23 115/69 95 %         Exam:   General Appearance: Comfortable, not using accessory muscles of respiration. HEENT: SUSHMA. HEAD: Atraumatic  NECK: No JVD, no thyroidomeglay. CAROTIDS:no bruit  LUNGS: absent at entry at both bases, no crepitation   HEART: S1+S2 audible, tachycardic, no murmur, no pericardial rub. ABD: Non-tender, BS Audible    EXT: ++ leg edema, and no cyanosis. PSYCHIATRIC EXAM: Mood is appropriate. MUSCULOSKELETAL: Grossly no joint deformity. NEUROLOGICAL: AAO times 3, Motor and sensory sytem intact .     Review of Data:   LABS:   Lab Results   Component Value Date/Time    WBC 14.0 (H) 02/10/2018 12:38 PM    HGB 15.4 02/10/2018 12:38 PM    HCT 45.7 02/10/2018 12:38 PM    PLATELET 405 (H) 44/11/9232 12:38 PM     Lab Results   Component Value Date/Time    Sodium 134 (L) 02/10/2018 12:38 PM Potassium 5.4 02/10/2018 12:38 PM    Chloride 97 (L) 02/10/2018 12:38 PM    CO2 25 02/10/2018 12:38 PM    Glucose 519 (HH) 02/10/2018 12:38 PM    BUN 32 (H) 02/10/2018 12:38 PM    Creatinine 1.91 (H) 02/10/2018 12:38 PM     No results found for: CHOL, CHOLX, CHLST, CHOLV, HDL, LDL, LDLC, DLDLP, TGLX, TRIGL, TRIGP  No results found for: GPT  Lab Results   Component Value Date/Time    Hemoglobin A1c 11.7 (H) 02/10/2018 12:38 PM         Cardiology Procedures:   Results for orders placed or performed during the hospital encounter of 02/10/18   EKG, 12 LEAD, INITIAL   Result Value Ref Range    Ventricular Rate 140 BPM    Atrial Rate 140 BPM    P-R Interval 136 ms    QRS Duration 116 ms    Q-T Interval 302 ms    QTC Calculation (Bezet) 461 ms    Calculated R Axis -14 degrees    Calculated T Axis 142 degrees    Diagnosis       Narrow complex tachycardia, ventricular rate 140 bpm,  Anteroseptal infarct , age undetermined  LVH  T wave abnormality, consider lateral ischemia  Abnormal ECG               Impression / Plan:      Acute decompensated congestive heart failure (LVEF unknown)  Abnormal troponin  Renal insufficiency baseline unknown  Narrow complex tachycardia (Atrial flutter with 2:1 AV block)  Uncontrolled diabetes    Continue aspirin. Start Metoprolol tartrate 25 mg by mouth twice a day and titrate as per heart rate and blood pressure. Injection Lasix 60 mg IV twice a day. Start Lipitor 20 mg by mouth daily at bedtime  Continue IV heparin and titrate as per aPTT  Serial EKG and cardiac enzyme every 6-8 hours×3  Strict input-output  Fluid restriction up to 1.2 L per/day and salt restriction up to 2 g per day. Echocardiogram  Daily BMP  Continue management as per hospital medicine. Further cardiac workup as clinically indicated.     Signed By: Aleida Chamorro MD     February 10, 2018 Opt out

## 2024-11-05 NOTE — PROGRESS NOTES
[facility]  DATE OF VISIT : 2024    Patient : Javy Ca   Age : 66 y.o.    : 1957   MRN : [id number]   ______________________________________________________________________    Chief Complaint :   Chief Complaint   Patient presents with    Follow-up     6 month Follow-Up       HPI : Javy Ca is 66 y.o. male who presented to the clinic today for OV.     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  Device check last done 11/10. Only on ASA. Had xray done to verify pacemaker placement  which was normal. Had stress test done 24 which was normal with an estimated EF 70%. Last seen by EP on 24.     Lower abdominal pain: feels relief with BM. Has not noticed any BRBPR but does note intermittent dark stools, including this morning. He had last C-Scope 2022 which was normal, Dr. Santillan recommended C-Scope in 3yrs.     Chronic back and neck pain: Following with pain management and neurosurgery.  History of C3-C7 anterior fusion.  Last seen by neurosurgery 10/21 and pain management . Doing well with the gabapentin.     GERD: started having acid reflux about 3mos ago. Worse with certain foods    I reviewed the patient's past medications, allergies and past medical history during this visit.    Past Medical History :    Past Medical History:   Diagnosis Date    Cervical fusion syndrome     Cervical radiculopathy     Cervical spinal stenosis     severe    DDD (degenerative disc disease)     Enlarged prostate     External carotid artery stenosis 2018    Headache     History of colonic polyps     cscope 3/2022 showed no polyps    History of head injury     Hyperlipidemia     treated with diet before    Low back pain     Lumbar radiculopathy     Prominent abdominal aortic pulse 2018    Right rotator cuff tear     Syncope, near tilt table test neg 2011    felt light headed, unsteady gait,  Progress Note      Patient: Dionicio Aquino Sex: male          DOA: 5/4/2021       YOB: 1945      Age:  70 y.o.        LOS:  LOS: 15 days             Assessment/Plan     POD # 5 debridement and wound VAC placement. Ex fix  No pain in the ankle, stable alignment  Will write 2-3 x week vac change plantar ulcer    POD # 2 SFA stent  Dry gangrene still anterolateral ankle and foot  Possible hematoma in groin    Anemia  Poss hematoma groin      Principal Problem:    Trimalleolar fracture of ankle, closed, left, initial encounter (5/4/2021)    Active Problems:    Osteomyelitis ([address] (5/4/2021)      Displacement of peripherally inserted central catheter (PICC) ([facility] [address] (5/4/2021)         Subjective:   Patient seen today for follow up. Denies pain in the ankle, feeling pressure in the groin.       Medications:     Current Facility-Administered Medications:     0.9% sodium chloride infusion 250 mL, 250 mL, IntraVENous, PRN, Sherry Stokes MD    piperacillin-tazobactam (ZOSYN) 3.375 g in 0.9% sodium chloride (MBP/ADV) 100 mL MBP, 3.375 g, IntraVENous, Q6H, Evelyne Rodriguez MD, Last Rate: 200 mL/hr at 05/19/21 0208, 3.375 g at 05/19/21 0208    iopamidoL (ISOVUE 250) 51 % contrast injection 1-100 mL, 1-100 mL, IntraarTERial, RAD CONTINUOUSDorothy MD, 80 mL at 05/17/21 1722    fentaNYL citrate (PF) injection  mcg,  mcg, IntraVENous, Rad Tyrone Logan Lulla Jasmine, MD, 25 mcg at 05/17/21 1658    midazolam (VERSED) injection 0.5-2 mg, 0.5-2 mg, IntraVENous, Rad Tyrone Logan Todd W, MD, 0.5 mg at 05/17/21 1653    heparin (porcine) 1,000 unit/mL injection 1,000-10,000 Units, 1,000-10,000 Units, IntraVENous, Rad Dorothy Logan MD, 10,000 Units at 05/17/21 1642    0.9% sodium chloride infusion, 50 mL/hr, IntraVENous, CONTINUOUS, Dorothy Bonilla MD, Last Rate: 50 mL/hr at 05/17/21 1805, 50 mL/hr at 05/17/21 1805    [Held by provider] clopidogreL (PLAVIX) tablet 75 mg, 75 mg, Oral, DAILY, Adama Rascon MD, 75 mg at 05/18/21 0843    furosemide (LASIX) tablet 20 mg, 20 mg, Oral, DAILY, Diane Willingham MD, 20 mg at 05/18/21 0843    pantoprazole (PROTONIX) tablet 40 mg, 40 mg, Oral, ACB, Diane Willingham MD, 40 mg at 05/19/21 0658    polyethylene glycol (MIRALAX) packet 17 g, 17 g, Oral, DAILY, Diane Willingham MD, 17 g at 05/18/21 0843    0.9% sodium chloride infusion 250 mL, 250 mL, IntraVENous, PRN, Digna Campoverde MD    albuterol-ipratropium (DUO-NEB) 2.5 MG-0.5 MG/3 ML, 3 mL, Nebulization, Q4H PRN, Diane Willingham MD, 3 mL at 05/15/21 0610    ferrous sulfate tablet 325 mg, 1 Tablet, Oral, BID WITH MEALS, Diane Willingham MD, 325 mg at 05/18/21 1724    metoprolol succinate (TOPROL-XL) XL tablet 50 mg, 50 mg, Oral, DAILY, Jaimie Perez MD, 50 mg at 05/18/21 0843    tamsulosin (FLOMAX) capsule 0.4 mg, 0.4 mg, Oral, DAILY, Dayne Mosqueda MD, 0.4 mg at 05/18/21 0843    heparin (porcine) 100 unit/mL injection 500 Units, 500 Units, InterCATHeter, Q8H PRN, Amy Barbosa MD, 500 Units at 05/12/21 2127    [Held by provider] apixaban (ELIQUIS) tablet 2.5 mg, 2.5 mg, Oral, BID, Sherry Sepulveda MD    acetaminophen (TYLENOL) tablet 1,000 mg, 1,000 mg, Oral, Q8H PRN, Linnea TAYLOR MD, 1,000 mg at 05/15/21 1651    atorvastatin (LIPITOR) tablet 40 mg, 40 mg, Oral, QHS, Sherry Sepulveda MD, 40 mg at 05/18/21 2129    insulin glargine (LANTUS) injection 15 Units, 15 Units, SubCUTAneous, DAILY, Sherry Green MD, 15 Units at 05/18/21 0843    glucose chewable tablet 16 g, 16 g, Oral, PRN, Edna Salazar MD    glucagon (GLUCAGEN) injection 1 mg, 1 mg, IntraMUSCular, PRN, Edna Salazar MD    dextrose (D50W) injection syrg 25 g, 50 mL, IntraVENous, PRN, Edna Salazar MD    insulin lispro (HUMALOG) injection, , SubCUTAneous, AC&HS, Edna Salzaar MD, 3 Units at 05/18/21 2130    Review of Systems  Negative for chest pain and shortness of breath        Objective:      Visit Vitals  BP (!) 147/56 (BP 1 Location: Left arm, BP Patient Position: At rest)   Pulse 87   Temp 98.5 °F (36.9 °C)   Resp 20   Ht 5' 8\" (1.727 m)   Wt 106.2 kg (234 lb 3.2 oz)   SpO2 100%   BMI 35.61 kg/m²       Physical Exam:  Necrotic skin over anterolateral leg  No odor  No drainage  Pin sites clean  Moves toes well  Good alignment of ankle    Labs:  Recent Results (from the past 24 hour(s))   RBC, ALLOCATE    Collection Time: 05/18/21 11:30 AM   Result Value Ref Range    HISTORY CHECKED?  Historical check performed    GLUCOSE, POC    Collection Time: 05/18/21 11:48 AM   Result Value Ref Range    Glucose (POC) 171 (H) 70 - 110 mg/dL   TYPE & SCREEN    Collection Time: 05/18/21 11:55 AM   Result Value Ref Range    Crossmatch Expiration 05/21/2021,2359     ABO/Rh(D) O POSITIVE     Antibody screen NEG     CALLED TO:       STALIN WRIGHT RN AT 4536 ON 5/18/21 BLOOD READY LAD    Unit number [id number]     Blood component type  LR     Unit division 00     Status of unit TRANSFUSED     Crossmatch result Compatible    GLUCOSE, POC    Collection Time: 05/18/21  3:55 PM   Result Value Ref Range    Glucose (POC) 201 (H) 70 - 110 mg/dL   GLUCOSE, POC    Collection Time: 05/18/21  9:08 PM   Result Value Ref Range    Glucose (POC) 169 (H) 70 - 110 mg/dL   GLUCOSE, POC    Collection Time: 05/19/21  6:16 AM   Result Value Ref Range    Glucose (POC) 129 (H) 70 - 110 mg/dL

## 2025-02-12 NOTE — ED NOTES
TRANSFER - OUT REPORT:    Verbal report given to Kathy RN(name) on Yennifer Mon.  being transferred to ICU(unit) for routine progression of care       Report consisted of patients Situation, Background, Assessment and   Recommendations(SBAR). Information from the following report(s) SBAR, ED Summary, Intake/Output, MAR, Recent Results, and Cardiac Rhythm NSR w/PVCs  was reviewed with the receiving nurse. Lines:   Peripheral IV 08/27/22 Left Antecubital (Active)        Opportunity for questions and clarification was provided.       Patient transported with:   Monitor  O2 @ [facility] liters  Registered Nurse Unable to reach the patient. I left a detailed message requesting the patient return the clinic call in regards to the matter there is a message left for them. Per the provider please have the patient to continue medication regiment Warfarin 11 mg (10 mg & 1mg) daily and Repeat INR recheck in 1 month (03/12/2025) or as needed for the At Home Vendor requirements. Patients voice mailbox was Full.  I will try calling back at a later time, thank you.

## (undated) DEVICE — MOUTHPIECE ENDOSCP 20X27MM --

## (undated) DEVICE — ZIMMER® STERILE DISPOSABLE TOURNIQUET CUFF WITH PROTECTIVE SLEEVE AND PLC, SINGLE PORT, SINGLE BLADDER, 34 IN. (86 CM)

## (undated) DEVICE — Device

## (undated) DEVICE — SYR 20ML LL STRL LF --

## (undated) DEVICE — MAJ-1414 SINGLE USE ADPATER BIOPSY VALV: Brand: SINGLE USE ADAPTOR BIOPSY VALVE

## (undated) DEVICE — 4-PORT MANIFOLD: Brand: NEPTUNE 2

## (undated) DEVICE — MINOR: Brand: MEDLINE INDUSTRIES, INC.

## (undated) DEVICE — NDL FLTR TIP 5 MIC 18GX1.5IN --

## (undated) DEVICE — WRISTBAND ID AD W2.5XL9.5CM RED VYN ADH CLSR UNI-PRINT

## (undated) DEVICE — TRNQT TEXT 1X18IN BLU LF DISP -- CONVERT TO ITEM 362165

## (undated) DEVICE — TUBERCULIN SAFETY SYRINGE WITH NEEDLE: Brand: MONOJECT

## (undated) DEVICE — BANDAGE COMPR W4INXL10YD WHITE/BEIGE E MTRX HK LOOP CLSR

## (undated) DEVICE — WOUND RETRACTOR AND PROTECTOR: Brand: ALEXIS WOUND PROTECTOR-RETRACTOR

## (undated) DEVICE — NEEDLE HYPO 25GA L1.5IN BLU POLYPR HUB S STL REG BVL STR

## (undated) DEVICE — 3-0 COATED VICRYL PLUS UNDYED 1X27" SH --

## (undated) DEVICE — SPONGE GZ W4XL4IN COT 12 PLY TYP VII WVN C FLD DSGN

## (undated) DEVICE — PAD,ABDOMINAL,5"X9",ST,LF,25/BX: Brand: MEDLINE INDUSTRIES, INC.

## (undated) DEVICE — KENDALL RADIOLUCENT FOAM MONITORING ELECTRODE RECTANGULAR SHAPE: Brand: KENDALL

## (undated) DEVICE — SNARE POLYP SM W13MMXL240CM SHTH DIA2.4MM OVL FLX DISP

## (undated) DEVICE — GOWN,AURORA,NONRNF,XL,30/CS: Brand: MEDLINE

## (undated) DEVICE — PACK PROCEDURE SURG EXTREMITY CUST

## (undated) DEVICE — MASTISOL ADHESIVE LIQ 2/3ML

## (undated) DEVICE — TUBING, SUCTION, 1/4" X 12', STRAIGHT: Brand: MEDLINE

## (undated) DEVICE — TOWEL,OR,DSP,ST,BLUE,STD,4/PK,20PK/CS: Brand: MEDLINE

## (undated) DEVICE — PADDING CAST W4INXL4YD ST COT COHESIVE HND TEARABLE SPEC

## (undated) DEVICE — NEEDLE HYPO 21GA L1.5IN INTRAMUSCULAR S STL LATCH BVL UP

## (undated) DEVICE — 1010 S-DRAPE TOWEL DRAPE 10/BX: Brand: STERI-DRAPE™

## (undated) DEVICE — SKIN PREP TRAY 4 COMPARTM TRAY: Brand: MEDLINE INDUSTRIES, INC.

## (undated) DEVICE — FORCEPS BX CAP 240CM L RAD JAW 4

## (undated) DEVICE — KIT EXT FIX TWO RAIL W UNDERNEATH SURF CVR BY RUB FOR

## (undated) DEVICE — SOL IRRIGATION INJ NACL 0.9% 500ML BTL

## (undated) DEVICE — SUT VCRL + 2-0 27IN SH UD --

## (undated) DEVICE — SPONGE LAP 18X18IN STRL -- 5/PK

## (undated) DEVICE — DRAPE EQUIP CARM MINI STRL

## (undated) DEVICE — BLADE SAW 1.27X90 MM FOR LG BNE [KMS2513PM62STE] [KOMET MEDICAL]

## (undated) DEVICE — IMMOBILIZER KNEE UNIV L19IN FOR 12-24IN THGH FOAM T BAR

## (undated) DEVICE — CATH IV SAFE STR 22GX1IN BLU -- PROTECTIV PLUS

## (undated) DEVICE — SUTURE PDS II SZ 1 L36IN ABSRB VLT L48MM CTX 1/2 CIR Z371T

## (undated) DEVICE — GLOVE SURG SZ 7 L12IN FNGR THK79MIL GRN LTX FREE

## (undated) DEVICE — STRIP WND CLSR FLX SELF ADH NO [TP1103] [INTEGRA LIFESCIENCES CORP]

## (undated) DEVICE — BANDAGE,GAUZE,BULKEE II,4.5"X4.1YD,STRL: Brand: MEDLINE

## (undated) DEVICE — BANDAGE COMPR SELF ADH 5 YDX6 IN TAN STRL PREMIERPRO LF

## (undated) DEVICE — SUTURE VCRL 2-0 TIE 54IN ABSRB BRAID UD J607H

## (undated) DEVICE — CATH SUC CTRL PRT TRIFLO 14FR --

## (undated) DEVICE — SUT ETHLN 2-0 18IN FS BLK --

## (undated) DEVICE — MAYO STAND COVER: Brand: CONVERTORS

## (undated) DEVICE — MEDI-VAC NON-CONDUCTIVE SUCTION TUBING: Brand: CARDINAL HEALTH

## (undated) DEVICE — SINGLE PORT MANIFOLD: Brand: NEPTUNE 2

## (undated) DEVICE — YANKAUER,TAPERED BULBOUS TIP,W/O VENT: Brand: MEDLINE

## (undated) DEVICE — PREP SKN CHLRAPRP APL 26ML STR --

## (undated) DEVICE — GARMENT,MEDLINE,DVT,INT,CALF,MED, GEN2: Brand: MEDLINE

## (undated) DEVICE — SYRINGE 50ML E/T

## (undated) DEVICE — STRAP,POSITIONING,KNEE/BODY,FOAM,4X60": Brand: MEDLINE

## (undated) DEVICE — GLOVE SURG SZ 85 L12IN FNGR THK79MIL GRN LTX FREE

## (undated) DEVICE — SUTURE PDS II SZ 1 L36IN ABSRB VLT L36MM CT-1 1/2 CIR Z347H

## (undated) DEVICE — SWAB CULT LIQ STUART AGR AERB MOD IN BRK SGL RAYON TIP PLAS 220099] BECTON DICKINSON MICRO]

## (undated) DEVICE — GLOVE ORANGE PI 8 1/2   MSG9085

## (undated) DEVICE — SUTURE VCRL + SZ 3-0 L18IN ABSRB UD SH 1/2 CIR TAPERCUT NDL VCP864D

## (undated) DEVICE — STRIP SKIN CLSR W0.25XL4IN WHT SPUNBOUND FBR NYL HI ADH

## (undated) DEVICE — PAD,ABDOMINAL,5"X9",STERILE,LF,1/PK: Brand: MEDLINE INDUSTRIES, INC.

## (undated) DEVICE — SOLUTION IV 500ML 0.9% SOD CHL FLX CONT

## (undated) DEVICE — SOLUTION IRRIG 3000ML 0.9% SOD CHL FLX CONT 0797208] ICU MEDICAL INC]

## (undated) DEVICE — DRESSING PETRO W3XL8IN N ADH OIL EMUL GZ CURAD

## (undated) DEVICE — REM POLYHESIVE ADULT PATIENT RETURN ELECTRODE: Brand: VALLEYLAB

## (undated) DEVICE — DRESSING,GAUZE,XEROFORM,CURAD,1"X8",ST: Brand: CURAD

## (undated) DEVICE — STERILE POLYISOPRENE POWDER-FREE SURGICAL GLOVES: Brand: PROTEXIS

## (undated) DEVICE — SYR 5ML 1/5 GRAD LL NSAF LF --

## (undated) DEVICE — SHEET,DRAPE,40X58,STERILE: Brand: MEDLINE

## (undated) DEVICE — SUTURE COAT VCRL SZ 4-0 L18IN ABSRB UD L19MM PS-2 1/2 CIR J496G

## (undated) DEVICE — NUT EXT FIX L10MM S STL TRUELOK SYS

## (undated) DEVICE — TRNQT RMFG CUFF 2PRT 34IN W/O -- LAWSON OEM ITEM 338154

## (undated) DEVICE — ENDO CARRY-ON PROCEDURE KIT INCLUDES ENZYMATIC SPONGE, GAUZE, BIOHAZARD LABEL, TRAY, LUBRICANT, DIRTY SCOPE LABEL, WATER LABEL, TRAY, DRAWSTRING PAD, AND DEFENDO 4-PIECE KIT.: Brand: ENDO CARRY-ON PROCEDURE KIT

## (undated) DEVICE — BLADE ELECTRODE: Brand: EDGE

## (undated) DEVICE — SET ADMIN 16ML TBNG L100IN 2 Y INJ SITE IV PIGGY BK DISP

## (undated) DEVICE — SUT VCRL + 2-0 36IN CT1 UD --

## (undated) DEVICE — GAUZE PKG STRP IODOFRM 1/4X5YD --

## (undated) DEVICE — NUT EXT FIX DIA10MM ANK FT M6X1 EXT FOR TRUELOK FRME ASSEMB

## (undated) DEVICE — NEEDLE HYPO 22GA L1.5IN BLK S STL HUB POLYPR SHLD REG BVL

## (undated) DEVICE — TOTAL TRAY, DB, 100% SILI FOLEY, 16FR 10: Brand: MEDLINE

## (undated) DEVICE — GLOVE SURG SZ 65 THK91MIL LTX FREE SYN POLYISOPRENE

## (undated) DEVICE — SWAB CULT SGL AMIES W/O CHAR FOR THRT VAG SKIN HRT CULTSWAB

## (undated) DEVICE — HANDPIECE SET WITH HIGH FLOW TIP AND SUCTION TUBE: Brand: INTERPULSE

## (undated) DEVICE — BNDG,ELSTC,MATRIX,STRL,6"X5YD,LF,HOOK&LP: Brand: MEDLINE

## (undated) DEVICE — PLATE EXT FIX DIA180MM ANK FT FOR TRUELOK FRME ASSEMB

## (undated) DEVICE — CANNULA CUSH AD W/ 14FT TBG

## (undated) DEVICE — DRAPE XR C ARM 41X74IN LF --

## (undated) DEVICE — STRUT EXT FIX M L98-188MM ACUTE ADJ LOK UNIV HNG ON BOTH

## (undated) DEVICE — VERSAJET II HYDROSURGERY SYSTEM HANDSET, 45DEG 14MM EXACT: Brand: VERSAJET

## (undated) DEVICE — SUTURE PDS II SZ 0 L27IN ABSRB VLT L26MM CT-2 1/2 CIR Z334H

## (undated) DEVICE — TRAP SPEC COLL POLYP POLYSTYR --

## (undated) DEVICE — SPONGE GZ W4XL4IN RAYON POLY 4 PLY NONWOVEN FASTER WICKING

## (undated) DEVICE — SUTURE VCRL SZ 3-0 L18IN ABSRB UD L26MM SH 1/2 CIR J864D

## (undated) DEVICE — POST EXT FIX ANK FT 1 H FOR TRUELOK FRME ASSEMB HEXAPOD SYS

## (undated) DEVICE — NDL PRT INJ NSAF BLNT 18GX1.5 --

## (undated) DEVICE — INTENDED FOR TISSUE SEPARATION, AND OTHER PROCEDURES THAT REQUIRE A SHARP SURGICAL BLADE TO PUNCTURE OR CUT.: Brand: BARD-PARKER ® CARBON RIB-BACK BLADES

## (undated) DEVICE — SHEET,DRAPE,70X85,STERILE: Brand: MEDLINE

## (undated) DEVICE — SYR 3ML LL TIP 1/10ML GRAD --

## (undated) DEVICE — TRAY PREP DRY W/ PREM GLV 2 APPL 6 SPNG 2 UNDPD 1 OVERWRAP

## (undated) DEVICE — DRAPE FLD WRM W44XL66IN C6L FOR INTRATEMP SYS THERMABASIN